# Patient Record
Sex: MALE | Race: WHITE | NOT HISPANIC OR LATINO | Employment: OTHER | URBAN - METROPOLITAN AREA
[De-identification: names, ages, dates, MRNs, and addresses within clinical notes are randomized per-mention and may not be internally consistent; named-entity substitution may affect disease eponyms.]

---

## 2017-01-01 ENCOUNTER — RESULTS ENCOUNTER (OUTPATIENT)
Dept: FAMILY MEDICINE CLINIC | Facility: CLINIC | Age: 67
End: 2017-01-01

## 2017-01-01 DIAGNOSIS — I25.10 CORONARY ARTERY DISEASE INVOLVING NATIVE CORONARY ARTERY OF NATIVE HEART WITHOUT ANGINA PECTORIS: ICD-10-CM

## 2017-01-01 DIAGNOSIS — I11.9 HYPERTENSIVE HEART DISEASE WITHOUT HEART FAILURE: ICD-10-CM

## 2017-01-03 RX ORDER — IPRATROPIUM BROMIDE 17 UG/1
AEROSOL, METERED RESPIRATORY (INHALATION)
Qty: 12.9 INHALER | Refills: 3 | Status: SHIPPED | OUTPATIENT
Start: 2017-01-03 | End: 2017-05-01 | Stop reason: SDUPTHER

## 2017-01-12 ENCOUNTER — HOSPITAL ENCOUNTER (OUTPATIENT)
Dept: CARDIOLOGY | Facility: HOSPITAL | Age: 67
Setting detail: RECURRING SERIES
Discharge: HOME | End: 2017-01-12

## 2017-01-12 PROCEDURE — 36416 COLLJ CAPILLARY BLOOD SPEC: CPT | Performed by: INTERNAL MEDICINE

## 2017-01-12 PROCEDURE — 85610 PROTHROMBIN TIME: CPT | Performed by: INTERNAL MEDICINE

## 2017-01-24 RX ORDER — POTASSIUM CHLORIDE 20 MEQ/1
20 TABLET, EXTENDED RELEASE ORAL 2 TIMES DAILY
Qty: 60 TABLET | Refills: 8 | Status: SHIPPED | OUTPATIENT
Start: 2017-01-24 | End: 2017-08-17

## 2017-01-31 ENCOUNTER — OFFICE VISIT (OUTPATIENT)
Dept: FAMILY MEDICINE CLINIC | Facility: CLINIC | Age: 67
End: 2017-01-31

## 2017-01-31 VITALS
DIASTOLIC BLOOD PRESSURE: 86 MMHG | SYSTOLIC BLOOD PRESSURE: 134 MMHG | OXYGEN SATURATION: 94 % | WEIGHT: 315 LBS | HEIGHT: 76 IN | HEART RATE: 86 BPM | BODY MASS INDEX: 38.36 KG/M2

## 2017-01-31 DIAGNOSIS — I48.0 PAROXYSMAL ATRIAL FIBRILLATION (HCC): ICD-10-CM

## 2017-01-31 DIAGNOSIS — I11.9 HYPERTENSIVE HEART DISEASE WITHOUT HEART FAILURE: Primary | ICD-10-CM

## 2017-01-31 DIAGNOSIS — I25.10 ATHEROSCLEROSIS OF NATIVE CORONARY ARTERY OF NATIVE HEART WITHOUT ANGINA PECTORIS: ICD-10-CM

## 2017-01-31 DIAGNOSIS — L89.311: ICD-10-CM

## 2017-01-31 PROCEDURE — 99214 OFFICE O/P EST MOD 30 MIN: CPT | Performed by: INTERNAL MEDICINE

## 2017-01-31 RX ORDER — MUPIROCIN CALCIUM 20 MG/G
CREAM TOPICAL 3 TIMES DAILY
Qty: 30 G | Refills: 0 | Status: SHIPPED | OUTPATIENT
Start: 2017-01-31 | End: 2018-01-25 | Stop reason: SDUPTHER

## 2017-01-31 RX ORDER — TRIAMCINOLONE ACETONIDE 1 MG/G
CREAM TOPICAL 2 TIMES DAILY
Qty: 45 G | Refills: 0 | Status: SHIPPED | OUTPATIENT
Start: 2017-01-31 | End: 2017-11-16

## 2017-02-02 LAB
ALBUMIN SERPL-MCNC: 4.3 G/DL (ref 3.6–4.8)
ALBUMIN/GLOB SERPL: 1.5 {RATIO} (ref 1.1–2.5)
ALP SERPL-CCNC: 82 IU/L (ref 39–117)
ALT SERPL-CCNC: 11 IU/L (ref 0–44)
APPEARANCE UR: CLEAR
AST SERPL-CCNC: 16 IU/L (ref 0–40)
BACTERIA #/AREA URNS HPF: ABNORMAL /[HPF]
BILIRUB SERPL-MCNC: 0.6 MG/DL (ref 0–1.2)
BILIRUB UR QL STRIP: NEGATIVE
BUN SERPL-MCNC: 10 MG/DL (ref 8–27)
BUN/CREAT SERPL: 12 (ref 10–22)
CALCIUM SERPL-MCNC: 9.4 MG/DL (ref 8.6–10.2)
CHLORIDE SERPL-SCNC: 98 MMOL/L (ref 96–106)
CHOLEST SERPL-MCNC: 170 MG/DL (ref 100–199)
CO2 SERPL-SCNC: 24 MMOL/L (ref 18–29)
COLOR UR: YELLOW
CREAT SERPL-MCNC: 0.85 MG/DL (ref 0.76–1.27)
EPI CELLS #/AREA URNS HPF: ABNORMAL /HPF
ERYTHROCYTE [DISTWIDTH] IN BLOOD BY AUTOMATED COUNT: 14.4 % (ref 12.3–15.4)
GLOBULIN SER CALC-MCNC: 2.8 G/DL (ref 1.5–4.5)
GLUCOSE SERPL-MCNC: 85 MG/DL (ref 65–99)
GLUCOSE UR QL: NEGATIVE
HCT VFR BLD AUTO: 44.3 % (ref 37.5–51)
HDLC SERPL-MCNC: 59 MG/DL
HGB BLD-MCNC: 15.4 G/DL (ref 12.6–17.7)
HGB UR QL STRIP: ABNORMAL
KETONES UR QL STRIP: NEGATIVE
LDLC SERPL CALC-MCNC: 90 MG/DL (ref 0–99)
LEUKOCYTE ESTERASE UR QL STRIP: NEGATIVE
MCH RBC QN AUTO: 33.1 PG (ref 26.6–33)
MCHC RBC AUTO-ENTMCNC: 34.8 G/DL (ref 31.5–35.7)
MCV RBC AUTO: 95 FL (ref 79–97)
MICRO URNS: ABNORMAL
MUCOUS THREADS URNS QL MICRO: PRESENT
NITRITE UR QL STRIP: NEGATIVE
PH UR STRIP: 7 [PH] (ref 5–7.5)
PLATELET # BLD AUTO: 175 X10E3/UL (ref 150–379)
POTASSIUM SERPL-SCNC: 4.4 MMOL/L (ref 3.5–5.2)
PROT SERPL-MCNC: 7.1 G/DL (ref 6–8.5)
PROT UR QL STRIP: NEGATIVE
RBC # BLD AUTO: 4.65 X10E6/UL (ref 4.14–5.8)
RBC #/AREA URNS HPF: ABNORMAL /HPF
SODIUM SERPL-SCNC: 140 MMOL/L (ref 134–144)
SP GR UR: 1.02 (ref 1–1.03)
TRIGL SERPL-MCNC: 106 MG/DL (ref 0–149)
UROBILINOGEN UR STRIP-MCNC: 1 MG/DL (ref 0.2–1)
VLDLC SERPL CALC-MCNC: 21 MG/DL (ref 5–40)
WBC # BLD AUTO: 6.2 X10E3/UL (ref 3.4–10.8)
WBC #/AREA URNS HPF: ABNORMAL /HPF

## 2017-02-03 RX ORDER — CARVEDILOL 12.5 MG/1
TABLET ORAL
Qty: 90 TABLET | Refills: 3 | Status: SHIPPED | OUTPATIENT
Start: 2017-02-03 | End: 2017-04-14 | Stop reason: SDUPTHER

## 2017-02-03 RX ORDER — PRAVASTATIN SODIUM 20 MG
TABLET ORAL
Qty: 90 TABLET | Refills: 3 | Status: SHIPPED | OUTPATIENT
Start: 2017-02-03 | End: 2018-01-28 | Stop reason: SDUPTHER

## 2017-02-03 RX ORDER — WARFARIN SODIUM 5 MG/1
TABLET ORAL
Qty: 135 TABLET | Refills: 0 | Status: SHIPPED | OUTPATIENT
Start: 2017-02-03 | End: 2017-05-03 | Stop reason: SDUPTHER

## 2017-02-06 RX ORDER — METOCLOPRAMIDE 10 MG/1
TABLET ORAL
Qty: 4 TABLET | Refills: 0 | Status: SHIPPED | OUTPATIENT
Start: 2017-02-06 | End: 2017-02-07 | Stop reason: SDUPTHER

## 2017-02-06 RX ORDER — METOCLOPRAMIDE 10 MG/1
TABLET ORAL
Qty: 120 TABLET | Refills: 6 | OUTPATIENT
Start: 2017-02-06

## 2017-02-06 NOTE — TELEPHONE ENCOUNTER
----- Message from Erin Portillo sent at 2/6/2017  3:43 PM EST -----  Regarding: PT CALLED - PENDING REFILL  Contact: [phone]  PT IS OUT OF REGLAN. HE DID ELEN O/V FOR TOMORROW. CAN WE CALL SCRIPT IN TODAY?

## 2017-02-07 ENCOUNTER — OFFICE VISIT (OUTPATIENT)
Dept: GASTROENTEROLOGY | Facility: CLINIC | Age: 67
End: 2017-02-07

## 2017-02-07 ENCOUNTER — OFFICE VISIT (OUTPATIENT)
Dept: FAMILY MEDICINE CLINIC | Facility: CLINIC | Age: 67
End: 2017-02-07

## 2017-02-07 VITALS
BODY MASS INDEX: 38.36 KG/M2 | SYSTOLIC BLOOD PRESSURE: 132 MMHG | WEIGHT: 315 LBS | HEIGHT: 76 IN | DIASTOLIC BLOOD PRESSURE: 80 MMHG

## 2017-02-07 VITALS
HEIGHT: 76 IN | OXYGEN SATURATION: 95 % | SYSTOLIC BLOOD PRESSURE: 152 MMHG | WEIGHT: 315 LBS | BODY MASS INDEX: 38.36 KG/M2 | HEART RATE: 72 BPM | DIASTOLIC BLOOD PRESSURE: 90 MMHG

## 2017-02-07 DIAGNOSIS — K63.5 COLON POLYPS: ICD-10-CM

## 2017-02-07 DIAGNOSIS — K31.84 GASTROPARESIS: Primary | ICD-10-CM

## 2017-02-07 DIAGNOSIS — G47.00 INSOMNIA, UNSPECIFIED TYPE: Primary | ICD-10-CM

## 2017-02-07 PROCEDURE — 99213 OFFICE O/P EST LOW 20 MIN: CPT | Performed by: NURSE PRACTITIONER

## 2017-02-07 PROCEDURE — 99213 OFFICE O/P EST LOW 20 MIN: CPT | Performed by: INTERNAL MEDICINE

## 2017-02-07 RX ORDER — METOCLOPRAMIDE 10 MG/1
TABLET ORAL
Qty: 120 TABLET | Refills: 5 | Status: SHIPPED | OUTPATIENT
Start: 2017-02-07 | End: 2017-11-01 | Stop reason: SDUPTHER

## 2017-02-07 RX ORDER — ALPRAZOLAM 0.5 MG/1
0.5 TABLET ORAL NIGHTLY PRN
Qty: 12 TABLET | Refills: 0 | Status: SHIPPED | OUTPATIENT
Start: 2017-02-07 | End: 2017-04-07 | Stop reason: SDUPTHER

## 2017-02-07 NOTE — PROGRESS NOTES
Subjective     Jalen Lockwood is a 66 y.o. male, followed by Dr. Lagunas for gastroparesis.  Treated with Reglan 10 mg before meals and bedtime.  Reports no side effects of Reglan, aware of side effects and to stop medication immediately should they occur.  Denies early satiety, belching, vomiting and nausea.  History of constipation in the past but has recently began using a stool softener, constipation has resolved.  Will have BM every day, occasionally every other day.  Denies bright red rectal bleeding, black/tarry stools and bloody stools.  Also denies dyspepsia, dysphagia and odynophagia.  Last colonoscopy,9/2015, revealed internal hemorrhoids, diverticulosis and polyps, hyperplastic and tubular adenomas.  Is to have surveillance colonoscopy in 9/2018.    History of Present Illness     Vitals:    02/07/17 1305   BP: 132/80         Current Outpatient Prescriptions:   •  ATROVENT HFA 17 MCG/ACT inhaler, INHALE 2 PUFFS 4 (FOUR) TIMES A DAY., Disp: 12.9 inhaler, Rfl: 3  •  carvedilol (COREG) 12.5 MG tablet, TAKE 1 TABLET BY MOUTH DAILY., Disp: 90 tablet, Rfl: 3  •  finasteride (PROSCAR) 5 MG tablet, Take 1 tablet by mouth daily., Disp: , Rfl:   •  HYDROcodone-acetaminophen (NORCO) 7.5-325 MG per tablet, Take 1 tablet by mouth 2 (two) times a day as needed., Disp: , Rfl:   •  lisinopril (PRINIVIL,ZESTRIL) 20 MG tablet, Take 1 tablet by mouth Daily., Disp: 90 tablet, Rfl: 2  •  metoclopramide (REGLAN) 10 MG tablet, Take 1 tab before meals and at bedtime., Disp: 120 tablet, Rfl: 5  •  mupirocin (BACTROBAN) 2 % cream, Apply  topically 3 (Three) Times a Day., Disp: 30 g, Rfl: 0  •  pravastatin (PRAVACHOL) 20 MG tablet, TAKE 1 TABLET BY MOUTH DAILY., Disp: 90 tablet, Rfl: 3  •  silodosin (RAPAFLO) 8 MG capsule capsule, Take 1 capsule by mouth daily. With food., Disp: , Rfl:   •  triamcinolone (KENALOG) 0.1 % cream, Apply  topically 2 (Two) Times a Day., Disp: 45 g, Rfl: 0  •  VENTOLIN  (90 BASE) MCG/ACT inhaler,  INHALE TWO PUFFS EVERY FOUR HOURS AS NEEDED FOR WHEEZING, Disp: 36 inhaler, Rfl: 1  •  warfarin (COUMADIN) 5 MG tablet, TAKE 1 AND 1/2 TABLETS BY MOUTH EVERY DAY OR USE AS DIRECTED, Disp: 135 tablet, Rfl: 0  •  furosemide (LASIX) 40 MG tablet, Take 1 tablet (40 mg total) by mouth daily., Disp: 90 tablet, Rfl: 3  •  potassium chloride (KLOR-CON) 20 MEQ CR tablet, Take 1 tablet by mouth 2 (Two) Times a Day., Disp: 60 tablet, Rfl: 8    Allergies   Allergen Reactions   • Cephalexin Hives   • Codeine Nausea Only       Past Medical History   Diagnosis Date   • Allergic rhinitis    • Aortectasia      3cm infrarenal abdominal aorta   • Arthritis    • Atrial flutter 2010     s/p ablation    • Charcot's joint of foot    • CHF (congestive heart failure)    • Chronic edema    • Chronic venous insufficiency    • COPD (chronic obstructive pulmonary disease)    • Coronary atherosclerosis      Cath 2010: diffuse 40-50% disease   • Diverticulosis    • Duodenitis    • Fatty liver    • Gastritis    • Gastroparesis    • Hematoma      post-operative; After catheterization, right groin, required surgical exploration   • Hypertensive heart disease without heart failure 7/28/2016   • Internal hemorrhoids    • Morbid obesity    • Osteomyelitis    • Paroxysmal atrial fibrillation    • Peripheral neuropathy    • Popliteal artery aneurysm      left, s/p stenting by Dr. Boston   • Skin cancer    • Sleep apnea    • Tachycardia induced cardiomyopathy      due to flutter and afib; cath 2010 with nonobstructive disease   • Venous stasis        Past Surgical History   Procedure Laterality Date   • Hip surgery Right    • Total knee arthroplasty Left    • Tonsillectomy     • Repair knee ligament       Primary repair of knee ligament cruciate anterior right   • Other surgical history       Catheter ablation atrial flutter   • Repair aneurysm / pseudo aneurysm / ruptured aneurysm popliteal artery       Stent-Graft of the the left popliteal artery   •  Colonoscopy  09/28/2015     NBIH, diverticulosis, polyps   • Upper gastrointestinal endoscopy  09/16/2014     acute gastritis, acute duodenitis       Family History   Problem Relation Age of Onset   • Emphysema Father        Social History     Social History   • Marital status:      Spouse name: N/A   • Number of children: N/A   • Years of education: N/A     Social History Main Topics   • Smoking status: Current Every Day Smoker     Packs/day: 0.25     Types: Cigarettes   • Smokeless tobacco: Never Used   • Alcohol use 4.2 oz/week     7 Standard drinks or equivalent per week      Comment: a couple drinks every day   • Drug use: No   • Sexual activity: Not Asked     Other Topics Concern   • None     Social History Narrative       The following portions of the patient's history were reviewed and updated as appropriate: allergies, current medications, past family history, past medical history, past social history, past surgical history and problem list.    Review of Systems   Constitutional: Negative for activity change, appetite change, chills, fever and unexpected weight change.   HENT: Negative for trouble swallowing.    Respiratory: Negative for cough and shortness of breath.    Cardiovascular: Positive for leg swelling. Negative for chest pain.        Goes to wound care center weekly for venous stasis   Gastrointestinal: Negative for abdominal distention, abdominal pain, anal bleeding, blood in stool, constipation, diarrhea, nausea, rectal pain and vomiting.   Genitourinary: Negative for dysuria.   Musculoskeletal: Positive for arthralgias, gait problem (ankle deformity) and joint swelling. Negative for myalgias.   Skin: Negative.    Neurological: Negative for dizziness, syncope and light-headedness.       Objective     Physical Exam   Constitutional: He is oriented to person, place, and time. He appears well-developed. No distress.   Grossly obese.   HENT:   Head: Normocephalic and atraumatic.   Eyes:  Conjunctivae are normal. No scleral icterus.   Cardiovascular: Normal rate, regular rhythm, normal heart sounds and intact distal pulses.    Lower extremities edematous, wraps on.   Pulmonary/Chest: Effort normal and breath sounds normal. No respiratory distress.   Abdominal: Soft. Bowel sounds are normal. He exhibits no mass. There is no tenderness. There is no rebound and no guarding.   Obese abdomen   Musculoskeletal: He exhibits deformity (bilateral ankles).   Neurological: He is alert and oriented to person, place, and time.   Skin: Skin is warm and dry. No pallor.   Psychiatric: He has a normal mood and affect. His behavior is normal. Judgment and thought content normal.   Nursing note and vitals reviewed.      Assessment/Plan     Jalen was seen today for follow-up.    Diagnoses and all orders for this visit:    Gastroparesis    Colon polyps    Other orders  -     metoclopramide (REGLAN) 10 MG tablet; Take 1 tab before meals and at bedtime.     assessment/plan  1.  Gastroparesis - refill Reglan.  Will stop Reglan and notify the office should side effects occur.  Follow-up in 6 months.  2.  Colon polyps - due surveillance colonoscopy in 9/2018.  Last colonoscopy, 9/2015, bowel prep reported as fair.  Dr. Lagunas recommends 1 gallon GoLYTELY for    Discussed plan of care with patient. Verbalizes understanding and agrees with plan.  Advised to call office for any concerns or questions regarding today's visit and if any GI problems should arise. next colonoscopy preparation.

## 2017-02-07 NOTE — PROGRESS NOTES
Subjective   Jalen Lockwood is a 66 y.o. male who presents today for:    Insomnia (is taking wife's xanax when have trouble sleeping)    History of Present Illness   Insomnia has been a major issue for many years., with no trouble falling asleep but trouble falling back to sleep when he awakens.    He goes to sleep around 0100, sleeping in a chair due to chronic foot pain.  He awakens around 0530 to urinate and usually has trouble falling back to sleep.  He usually awakens around 0900 - 1000 most mornings.    He has been taking his wife's Xanax on the order of 5-6 times the past 2-3 months.  He has had trouble falling back to sleep after dozing off earlier and awakening earlier than usual.  He denies any hangover effect from it.    He has BECKI but has not tolerated the CPAP device.  He wears O2 while sleeping now.    Mr. Lockwood  reports that he has been smoking Cigarettes.  He has been smoking about 0.25 packs per day. He has never used smokeless tobacco. He reports that he drinks about 4.2 oz of alcohol per week  He reports that he does not use illicit drugs. He usually has a couple of glasses of wine with dinner and a highball later that night.        Current Outpatient Prescriptions:   •  ATROVENT HFA 17 MCG/ACT inhaler, INHALE 2 PUFFS 4 (FOUR) TIMES A DAY., Disp: 12.9 inhaler, Rfl: 3  •  carvedilol (COREG) 12.5 MG tablet, TAKE 1 TABLET BY MOUTH DAILY., Disp: 90 tablet, Rfl: 3  •  finasteride (PROSCAR) 5 MG tablet, Take 1 tablet by mouth daily., Disp: , Rfl:   •  HYDROcodone-acetaminophen (NORCO) 7.5-325 MG per tablet, Take 1 tablet by mouth 2 (two) times a day as needed., Disp: , Rfl:   •  lisinopril (PRINIVIL,ZESTRIL) 20 MG tablet, Take 1 tablet by mouth Daily., Disp: 90 tablet, Rfl: 2  •  metoclopramide (REGLAN) 10 MG tablet, Take 1 tab before meals and at bedtime., Disp: 120 tablet, Rfl: 5  •  mupirocin (BACTROBAN) 2 % cream, Apply  topically 3 (Three) Times a Day., Disp: 30 g, Rfl: 0  •  potassium chloride  "(KLOR-CON) 20 MEQ CR tablet, Take 1 tablet by mouth 2 (Two) Times a Day. (Patient taking differently: Take 20 mEq by mouth 2 (Two) Times a Day As Needed.), Disp: 60 tablet, Rfl: 8  •  pravastatin (PRAVACHOL) 20 MG tablet, TAKE 1 TABLET BY MOUTH DAILY., Disp: 90 tablet, Rfl: 3  •  silodosin (RAPAFLO) 8 MG capsule capsule, Take 1 capsule by mouth daily. With food., Disp: , Rfl:   •  triamcinolone (KENALOG) 0.1 % cream, Apply  topically 2 (Two) Times a Day., Disp: 45 g, Rfl: 0  •  VENTOLIN  (90 BASE) MCG/ACT inhaler, INHALE TWO PUFFS EVERY FOUR HOURS AS NEEDED FOR WHEEZING, Disp: 36 inhaler, Rfl: 1  •  warfarin (COUMADIN) 5 MG tablet, TAKE 1 AND 1/2 TABLETS BY MOUTH EVERY DAY OR USE AS DIRECTED, Disp: 135 tablet, Rfl: 0  •  furosemide (LASIX) 40 MG tablet, Take 1 tablet (40 mg total) by mouth daily. (Patient taking differently: Take 40 mg by mouth Daily As Needed.), Disp: 90 tablet, Rfl: 3      The following portions of the patient's history were reviewed and updated as appropriate: allergies, current medications, past social history and problem list.    Review of Systems   Respiratory: Positive for shortness of breath.    Cardiovascular: Positive for leg swelling.   Musculoskeletal: Positive for arthralgias and back pain.   Psychiatric/Behavioral: Positive for sleep disturbance. Negative for dysphoric mood.         Objective   Vitals:    02/07/17 1542   BP: 152/90   BP Location: Right arm   Patient Position: Sitting   Cuff Size: Large Adult   Pulse: 72   SpO2: 95%   Weight: (!) 332 lb (151 kg)   Height: 76\" (193 cm)     Physical Exam  Obese, smells of smoke.  In good spirits.  Alert and oriented ×4.  Affect is appropriate.  Insight and judgment appear intact.  Gait is slow but balance is normal.    Assessment/Plan   Jalen was seen today for insomnia.    Diagnoses and all orders for this visit:    Insomnia, unspecified type  -     ALPRAZolam (XANAX) 0.5 MG tablet; Take 1 tablet by mouth At Night As Needed for " sleep.    GEOFFREY report was obtained and reviewed during the course of today's office visit.   A CSA was signed today, also, but a UDT was not obtained.  He has them done regularly through his pain specialist, so we will obtain his most recent one.  He was instructed to inform his pain specialist of his use of Xanax for sleep.  He was also instructed on the importance of using his Oxygen every night due to his BECKI.  We will give him a limited quantity for use initially, and see how often he uses them.  Our goal, based on his reported need today, is for about once weekly use.  He was counseled regarding the risk of dependence with continued, regular use.

## 2017-02-09 ENCOUNTER — HOSPITAL ENCOUNTER (OUTPATIENT)
Dept: CARDIOLOGY | Facility: HOSPITAL | Age: 67
Setting detail: RECURRING SERIES
Discharge: HOME | End: 2017-02-09

## 2017-02-09 PROCEDURE — 36416 COLLJ CAPILLARY BLOOD SPEC: CPT

## 2017-02-09 PROCEDURE — 85610 PROTHROMBIN TIME: CPT

## 2017-02-28 ENCOUNTER — HOSPITAL ENCOUNTER (OUTPATIENT)
Dept: CARDIOLOGY | Facility: HOSPITAL | Age: 67
Setting detail: RECURRING SERIES
Discharge: HOME | End: 2017-02-28

## 2017-02-28 PROCEDURE — 36416 COLLJ CAPILLARY BLOOD SPEC: CPT

## 2017-02-28 PROCEDURE — 85610 PROTHROMBIN TIME: CPT

## 2017-03-07 ENCOUNTER — HOSPITAL ENCOUNTER (OUTPATIENT)
Dept: CARDIOLOGY | Facility: HOSPITAL | Age: 67
Setting detail: RECURRING SERIES
Discharge: HOME | End: 2017-03-07

## 2017-03-07 PROCEDURE — 85610 PROTHROMBIN TIME: CPT

## 2017-03-07 PROCEDURE — 36416 COLLJ CAPILLARY BLOOD SPEC: CPT

## 2017-03-14 ENCOUNTER — HOSPITAL ENCOUNTER (OUTPATIENT)
Dept: CARDIOLOGY | Facility: HOSPITAL | Age: 67
Setting detail: RECURRING SERIES
Discharge: HOME | End: 2017-03-14

## 2017-03-14 PROCEDURE — 36416 COLLJ CAPILLARY BLOOD SPEC: CPT

## 2017-03-14 PROCEDURE — 85610 PROTHROMBIN TIME: CPT

## 2017-03-23 ENCOUNTER — HOSPITAL ENCOUNTER (OUTPATIENT)
Dept: CARDIOLOGY | Facility: HOSPITAL | Age: 67
Setting detail: RECURRING SERIES
Discharge: HOME | End: 2017-03-23

## 2017-03-23 PROCEDURE — 36416 COLLJ CAPILLARY BLOOD SPEC: CPT

## 2017-03-23 PROCEDURE — 85610 PROTHROMBIN TIME: CPT

## 2017-04-07 ENCOUNTER — OFFICE VISIT (OUTPATIENT)
Dept: FAMILY MEDICINE CLINIC | Facility: CLINIC | Age: 67
End: 2017-04-07

## 2017-04-07 VITALS
HEART RATE: 72 BPM | BODY MASS INDEX: 38.36 KG/M2 | WEIGHT: 315 LBS | DIASTOLIC BLOOD PRESSURE: 94 MMHG | HEIGHT: 76 IN | OXYGEN SATURATION: 93 % | SYSTOLIC BLOOD PRESSURE: 168 MMHG

## 2017-04-07 DIAGNOSIS — G47.00 INSOMNIA, UNSPECIFIED TYPE: ICD-10-CM

## 2017-04-07 DIAGNOSIS — I11.9 HYPERTENSIVE HEART DISEASE WITHOUT HEART FAILURE: Primary | ICD-10-CM

## 2017-04-07 PROCEDURE — 99213 OFFICE O/P EST LOW 20 MIN: CPT | Performed by: INTERNAL MEDICINE

## 2017-04-07 RX ORDER — ALBUTEROL SULFATE 90 UG/1
AEROSOL, METERED RESPIRATORY (INHALATION)
Qty: 36 INHALER | Refills: 1 | Status: SHIPPED | OUTPATIENT
Start: 2017-04-07 | End: 2017-07-16 | Stop reason: SDUPTHER

## 2017-04-07 RX ORDER — ALPRAZOLAM 0.5 MG/1
0.5 TABLET ORAL NIGHTLY PRN
Qty: 30 TABLET | Refills: 0 | Status: SHIPPED | OUTPATIENT
Start: 2017-04-07 | End: 2017-08-17 | Stop reason: SDUPTHER

## 2017-04-07 NOTE — PROGRESS NOTES
Subjective   Jalen Lockwood is a 67 y.o. male who presents today for:    Insomnia (CSE - xanax refill)    History of Present Illness   Insomnia has been a major issue for many years., with no trouble falling asleep but trouble falling back to sleep when he awakens.     He goes to sleep around 0100, sleeping in a chair due to chronic foot pain. He awakens around 0530 to urinate and usually has trouble falling back to sleep. He usually awakens around 0900 - 1000 most mornings.     He has been taking his wife's Xanax on the order of 5-6 times the past 2-3 months. He has had trouble falling back to sleep after dozing off earlier and awakening earlier than usual. He denies any hangover effect from it.     He has BECKI but has not tolerated the CPAP device. He wears O2 while sleeping now.    Mr. Lockwood  reports that he has been smoking Cigarettes.  He has been smoking about 0.25 packs per day. He has never used smokeless tobacco. He reports that he drinks about 4.2 oz of alcohol per week  He reports that he does not use illicit drugs.         Current Outpatient Prescriptions:   •  ALPRAZolam (XANAX) 0.5 MG tablet, Take 1 tablet by mouth At Night As Needed for sleep., Disp: 12 tablet, Rfl: 0  •  ATROVENT HFA 17 MCG/ACT inhaler, INHALE 2 PUFFS 4 (FOUR) TIMES A DAY., Disp: 12.9 inhaler, Rfl: 3  •  carvedilol (COREG) 12.5 MG tablet, TAKE 1 TABLET BY MOUTH DAILY., Disp: 90 tablet, Rfl: 3  •  finasteride (PROSCAR) 5 MG tablet, Take 1 tablet by mouth daily., Disp: , Rfl:   •  furosemide (LASIX) 40 MG tablet, Take 1 tablet (40 mg total) by mouth daily. (Patient taking differently: Take 40 mg by mouth Daily As Needed.), Disp: 90 tablet, Rfl: 3  •  HYDROcodone-acetaminophen (NORCO) 7.5-325 MG per tablet, Take 1 tablet by mouth 2 (two) times a day as needed., Disp: , Rfl:   •  lisinopril (PRINIVIL,ZESTRIL) 20 MG tablet, Take 1 tablet by mouth Daily., Disp: 90 tablet, Rfl: 2  •  metoclopramide (REGLAN) 10 MG tablet, Take 1 tab before  "meals and at bedtime., Disp: 120 tablet, Rfl: 5  •  potassium chloride (KLOR-CON) 20 MEQ CR tablet, Take 1 tablet by mouth 2 (Two) Times a Day. (Patient taking differently: Take 20 mEq by mouth 2 (Two) Times a Day As Needed.), Disp: 60 tablet, Rfl: 8  •  pravastatin (PRAVACHOL) 20 MG tablet, TAKE 1 TABLET BY MOUTH DAILY., Disp: 90 tablet, Rfl: 3  •  silodosin (RAPAFLO) 8 MG capsule capsule, Take 1 capsule by mouth daily. With food., Disp: , Rfl:   •  VENTOLIN  (90 BASE) MCG/ACT inhaler, INHALE TWO PUFFS EVERY FOUR HOURS AS NEEDED FOR WHEEZING, Disp: 36 inhaler, Rfl: 1  •  warfarin (COUMADIN) 5 MG tablet, TAKE 1 AND 1/2 TABLETS BY MOUTH EVERY DAY OR USE AS DIRECTED, Disp: 135 tablet, Rfl: 0  •  mupirocin (BACTROBAN) 2 % cream, Apply  topically 3 (Three) Times a Day., Disp: 30 g, Rfl: 0  •  triamcinolone (KENALOG) 0.1 % cream, Apply  topically 2 (Two) Times a Day., Disp: 45 g, Rfl: 0      The following portions of the patient's history were reviewed and updated as appropriate: allergies, current medications, past social history and problem list.    Review of Systems   Constitutional: Positive for fatigue. Negative for chills, fever and unexpected weight change.   Eyes: Positive for visual disturbance.   Respiratory: Positive for cough and shortness of breath.    Cardiovascular: Positive for leg swelling. Negative for chest pain and palpitations.   Psychiatric/Behavioral: Positive for sleep disturbance. Negative for dysphoric mood. The patient is nervous/anxious.          Objective   Vitals:    04/07/17 1628   BP: 168/94   BP Location: Left arm   Patient Position: Sitting   Cuff Size: Large Adult   Pulse: 72   SpO2: 93%   Weight: (!) 324 lb 9.6 oz (147 kg)   Height: 76\" (193 cm)     Physical Exam  Constitutional: He is oriented to person, place, and time. He appears well-developed. Obese  Eyes: Conjunctivae are normal. No scleral icterus.   Neck: No thyromegaly present.   Cardiovascular: Normal rate and regular " rhythm.  Exam reveals distant heart sounds.    No murmur heard.  Both lower legs are edematous but wrapped.   Abdominal: Soft. Bowel sounds are normal. He exhibits no abdominal bruit.   Lymphadenopathy:     He has no cervical adenopathy.   Neurological: He is alert and oriented to person, place, and time.     Assessment/Plan   Jalen was seen today for insomnia.    Diagnoses and all orders for this visit:    Hypertensive heart disease without heart failure    Insomnia, unspecified type  -     ALPRAZolam (XANAX) 0.5 MG tablet; Take 1 tablet by mouth At Night As Needed for Sleep.    Blood pressure is markedly elevated today.  Unfortunately, he does not know why he is only taking Coreg once daily.  Usually this is dosed twice a day.  We will ask him to increase it to twice a day if it is okay with his cardiologist.  (A message was sent today.)  We will then reassess his blood pressure at his follow-up in approximately 3 months.  BECKI treatment would also help this, but he cannot tolerate a CPAP mask and is reluctant to try nasal pillows or any other method.    GEOFFREY report was obtained and reviewed during the course of today's office visit.  Refill for Xanax was given to patient, expecting he will take it no more than 3 nights per week.  I have asked him to try to limit it so that he is not taking it more days than not.  He has helped, and has not caused any side effects.

## 2017-04-15 RX ORDER — CARVEDILOL 12.5 MG/1
12.5 TABLET ORAL 2 TIMES DAILY WITH MEALS
Qty: 180 TABLET | Refills: 1 | Status: SHIPPED | OUTPATIENT
Start: 2017-04-15 | End: 2017-10-16 | Stop reason: SDUPTHER

## 2017-04-20 ENCOUNTER — HOSPITAL ENCOUNTER (OUTPATIENT)
Dept: CARDIOLOGY | Facility: HOSPITAL | Age: 67
Setting detail: RECURRING SERIES
Discharge: HOME | End: 2017-04-20

## 2017-04-20 PROCEDURE — 36416 COLLJ CAPILLARY BLOOD SPEC: CPT

## 2017-04-20 PROCEDURE — 85610 PROTHROMBIN TIME: CPT

## 2017-05-03 RX ORDER — WARFARIN SODIUM 5 MG/1
TABLET ORAL
Qty: 135 TABLET | Refills: 0 | Status: SHIPPED | OUTPATIENT
Start: 2017-05-03 | End: 2017-08-14 | Stop reason: SDUPTHER

## 2017-05-03 RX ORDER — IPRATROPIUM BROMIDE 17 UG/1
AEROSOL, METERED RESPIRATORY (INHALATION)
Qty: 12.9 INHALER | Refills: 3 | Status: SHIPPED | OUTPATIENT
Start: 2017-05-03 | End: 2017-08-10 | Stop reason: SDUPTHER

## 2017-05-18 ENCOUNTER — HOSPITAL ENCOUNTER (OUTPATIENT)
Dept: CARDIOLOGY | Facility: HOSPITAL | Age: 67
Setting detail: RECURRING SERIES
Discharge: HOME | End: 2017-05-18

## 2017-05-18 PROCEDURE — 36416 COLLJ CAPILLARY BLOOD SPEC: CPT

## 2017-05-18 PROCEDURE — 85610 PROTHROMBIN TIME: CPT

## 2017-06-16 ENCOUNTER — HOSPITAL ENCOUNTER (OUTPATIENT)
Dept: CARDIOLOGY | Facility: HOSPITAL | Age: 67
Setting detail: RECURRING SERIES
Discharge: HOME | End: 2017-06-16

## 2017-06-16 PROCEDURE — 85610 PROTHROMBIN TIME: CPT

## 2017-06-16 PROCEDURE — 36416 COLLJ CAPILLARY BLOOD SPEC: CPT

## 2017-06-29 ENCOUNTER — HOSPITAL ENCOUNTER (OUTPATIENT)
Dept: CARDIOLOGY | Facility: HOSPITAL | Age: 67
Setting detail: RECURRING SERIES
Discharge: HOME | End: 2017-06-29

## 2017-06-29 PROCEDURE — 85610 PROTHROMBIN TIME: CPT

## 2017-06-29 PROCEDURE — 36416 COLLJ CAPILLARY BLOOD SPEC: CPT

## 2017-07-06 ENCOUNTER — HOSPITAL ENCOUNTER (OUTPATIENT)
Dept: CARDIOLOGY | Facility: HOSPITAL | Age: 67
Setting detail: RECURRING SERIES
Discharge: HOME | End: 2017-07-06

## 2017-07-06 PROCEDURE — 36416 COLLJ CAPILLARY BLOOD SPEC: CPT

## 2017-07-06 PROCEDURE — 85610 PROTHROMBIN TIME: CPT

## 2017-07-17 RX ORDER — ALBUTEROL SULFATE 90 UG/1
AEROSOL, METERED RESPIRATORY (INHALATION)
Qty: 36 INHALER | Refills: 1 | Status: SHIPPED | OUTPATIENT
Start: 2017-07-17 | End: 2017-11-01 | Stop reason: SDUPTHER

## 2017-07-21 ENCOUNTER — HOSPITAL ENCOUNTER (OUTPATIENT)
Dept: CARDIOLOGY | Facility: HOSPITAL | Age: 67
Setting detail: RECURRING SERIES
Discharge: HOME | End: 2017-07-21

## 2017-07-21 PROCEDURE — 85610 PROTHROMBIN TIME: CPT

## 2017-07-21 PROCEDURE — 36416 COLLJ CAPILLARY BLOOD SPEC: CPT

## 2017-08-11 RX ORDER — IPRATROPIUM BROMIDE 17 UG/1
AEROSOL, METERED RESPIRATORY (INHALATION)
Qty: 12.9 INHALER | Refills: 3 | Status: SHIPPED | OUTPATIENT
Start: 2017-08-11 | End: 2017-11-26 | Stop reason: SDUPTHER

## 2017-08-14 RX ORDER — WARFARIN SODIUM 5 MG/1
TABLET ORAL
Qty: 135 TABLET | Refills: 1 | Status: SHIPPED | OUTPATIENT
Start: 2017-08-14 | End: 2018-02-09 | Stop reason: SDUPTHER

## 2017-08-17 ENCOUNTER — OFFICE VISIT (OUTPATIENT)
Dept: FAMILY MEDICINE CLINIC | Facility: CLINIC | Age: 67
End: 2017-08-17

## 2017-08-17 VITALS
HEART RATE: 76 BPM | OXYGEN SATURATION: 92 % | DIASTOLIC BLOOD PRESSURE: 92 MMHG | HEIGHT: 76 IN | SYSTOLIC BLOOD PRESSURE: 154 MMHG | WEIGHT: 314.7 LBS | BODY MASS INDEX: 38.32 KG/M2

## 2017-08-17 DIAGNOSIS — G47.00 INSOMNIA, UNSPECIFIED TYPE: Primary | ICD-10-CM

## 2017-08-17 DIAGNOSIS — I11.9 HYPERTENSIVE HEART DISEASE WITHOUT HEART FAILURE: ICD-10-CM

## 2017-08-17 PROCEDURE — 99213 OFFICE O/P EST LOW 20 MIN: CPT | Performed by: INTERNAL MEDICINE

## 2017-08-17 RX ORDER — ALPRAZOLAM 0.5 MG/1
0.5 TABLET ORAL NIGHTLY PRN
Qty: 30 TABLET | Refills: 1 | Status: SHIPPED | OUTPATIENT
Start: 2017-08-17 | End: 2017-11-16 | Stop reason: SDUPTHER

## 2017-08-17 NOTE — PROGRESS NOTES
"Subjective   Jalen Lockwood is a 67 y.o. male who presents today for:    Insomnia (CSE)    History of Present Illness   Insomnia has been a major issue for many years., with no trouble falling asleep but trouble falling back to sleep when he awakens.      He goes to sleep around 0100, sleeping in a chair due to chronic foot pain. He awakens around 0530 to urinate and usually has trouble falling back to sleep. He usually awakens around 0900 - 1000 most mornings.  He awakens \"feeling good\" when he takes them and sleeps well.  He denies excess sedation with it.    He has BECKI but has not tolerated the CPAP device. He wears O2 while sleeping now.      Mr. Lockwood  reports that he has been smoking Cigarettes.  He has been smoking about 0.25 packs per day. He has never used smokeless tobacco. He reports that he drinks about 4.2 oz of alcohol per week  He reports that he does not use illicit drugs.     Allergies   Allergen Reactions   • Cephalexin Hives   • Codeine Nausea Only       Current Outpatient Prescriptions:   •  ALPRAZolam (XANAX) 0.5 MG tablet, Take 1 tablet by mouth At Night As Needed for Sleep., Disp: 30 tablet, Rfl: 0  •  ATROVENT HFA 17 MCG/ACT inhaler, INHALE 2 PUFFS 4 (FOUR) TIMES A DAY., Disp: 12.9 inhaler, Rfl: 3  •  carvedilol (COREG) 12.5 MG tablet, Take 1 tablet by mouth 2 (Two) Times a Day With Meals., Disp: 180 tablet, Rfl: 1  •  finasteride (PROSCAR) 5 MG tablet, Take 1 tablet by mouth daily., Disp: , Rfl:   •  HYDROcodone-acetaminophen (NORCO) 7.5-325 MG per tablet, Take 1 tablet by mouth 2 (two) times a day as needed., Disp: , Rfl:   •  lisinopril (PRINIVIL,ZESTRIL) 20 MG tablet, Take 1 tablet by mouth Daily., Disp: 90 tablet, Rfl: 2  •  metoclopramide (REGLAN) 10 MG tablet, Take 1 tab before meals and at bedtime., Disp: 120 tablet, Rfl: 5  •  mupirocin (BACTROBAN) 2 % cream, Apply  topically 3 (Three) Times a Day., Disp: 30 g, Rfl: 0  •  pravastatin (PRAVACHOL) 20 MG tablet, TAKE 1 TABLET BY MOUTH " "DAILY., Disp: 90 tablet, Rfl: 3  •  silodosin (RAPAFLO) 8 MG capsule capsule, Take 1 capsule by mouth daily. With food., Disp: , Rfl:   •  triamcinolone (KENALOG) 0.1 % cream, Apply  topically 2 (Two) Times a Day., Disp: 45 g, Rfl: 0  •  VENTOLIN  (90 BASE) MCG/ACT inhaler, INHALE TWO PUFFS EVERY FOUR HOURS AS NEEDED FOR WHEEZING, Disp: 36 inhaler, Rfl: 1  •  warfarin (COUMADIN) 5 MG tablet, TAKE 1 AND 1/2 TABLETS BY MOUTH EVERY DAY OR USE AS DIRECTED, Disp: 135 tablet, Rfl: 1      Review of Systems   Constitutional: Positive for fatigue. Negative for chills, fever and unexpected weight change.   Eyes: Positive for visual disturbance.   Respiratory: Positive for cough and shortness of breath.    Cardiovascular: Positive for leg swelling. Negative for chest pain and palpitations.   Psychiatric/Behavioral: Positive for sleep disturbance. Negative for dysphoric mood. The patient is nervous/anxious.        Objective   Vitals:    08/17/17 1537   BP: 154/92   BP Location: Right arm   Patient Position: Sitting   Cuff Size: Large Adult   Pulse: 76   SpO2: 92%   Weight: (!) 314 lb 11.2 oz (143 kg)   Height: 76\" (193 cm)     Physical Exam  Obese, in NAD.  RRR.  Bilateral LE edema.  Mood is frustrated by lack of sleep; affect is appropriate.      Assessment/Plan   Jalen was seen today for insomnia.    Diagnoses and all orders for this visit:    Insomnia, unspecified type  -     ALPRAZolam (XANAX) 0.5 MG tablet; Take 1 tablet by mouth At Night As Needed for Sleep.    Hypertensive heart disease without heart failure  -     Comprehensive Metabolic Panel; Future  -     CBC (No Diff); Future    GEOFFREY report was obtained and reviewed.  Continue Xanax at night as it continues to give him good benefit without side-effect.    RTO in 3 months with labs prior.  Recheck BP at that time.  He remains reluctant to resume CPAP.  "

## 2017-08-31 ENCOUNTER — HOSPITAL ENCOUNTER (OUTPATIENT)
Dept: CARDIOLOGY | Facility: HOSPITAL | Age: 67
Setting detail: RECURRING SERIES
Discharge: HOME | End: 2017-08-31

## 2017-08-31 PROCEDURE — 36416 COLLJ CAPILLARY BLOOD SPEC: CPT

## 2017-08-31 PROCEDURE — 85610 PROTHROMBIN TIME: CPT

## 2017-09-06 RX ORDER — LISINOPRIL 20 MG/1
TABLET ORAL
Qty: 30 TABLET | Refills: 4 | Status: SHIPPED | OUTPATIENT
Start: 2017-09-06 | End: 2017-11-01 | Stop reason: SDUPTHER

## 2017-09-12 ENCOUNTER — HOSPITAL ENCOUNTER (OUTPATIENT)
Dept: CARDIOLOGY | Facility: HOSPITAL | Age: 67
Setting detail: RECURRING SERIES
Discharge: HOME | End: 2017-09-12

## 2017-09-12 PROCEDURE — 85610 PROTHROMBIN TIME: CPT

## 2017-09-12 PROCEDURE — 36416 COLLJ CAPILLARY BLOOD SPEC: CPT

## 2017-09-29 ENCOUNTER — HOSPITAL ENCOUNTER (OUTPATIENT)
Dept: CARDIOLOGY | Facility: HOSPITAL | Age: 67
Setting detail: RECURRING SERIES
Discharge: HOME | End: 2017-09-29

## 2017-09-29 PROCEDURE — 36416 COLLJ CAPILLARY BLOOD SPEC: CPT

## 2017-09-29 PROCEDURE — 85610 PROTHROMBIN TIME: CPT

## 2017-10-16 RX ORDER — CARVEDILOL 12.5 MG/1
TABLET ORAL
Qty: 180 TABLET | Refills: 1 | Status: SHIPPED | OUTPATIENT
Start: 2017-10-16 | End: 2018-04-20 | Stop reason: SDUPTHER

## 2017-10-26 ENCOUNTER — HOSPITAL ENCOUNTER (OUTPATIENT)
Dept: CARDIOLOGY | Facility: HOSPITAL | Age: 67
Setting detail: RECURRING SERIES
Discharge: HOME | End: 2017-10-26

## 2017-10-26 ENCOUNTER — TELEPHONE (OUTPATIENT)
Dept: GASTROENTEROLOGY | Facility: CLINIC | Age: 67
End: 2017-10-26

## 2017-10-26 PROCEDURE — 85610 PROTHROMBIN TIME: CPT

## 2017-10-26 PROCEDURE — 36416 COLLJ CAPILLARY BLOOD SPEC: CPT

## 2017-10-26 RX ORDER — METOCLOPRAMIDE 10 MG/1
10 TABLET ORAL
Qty: 120 TABLET | Refills: 3 | Status: SHIPPED | OUTPATIENT
Start: 2017-10-26 | End: 2018-06-14 | Stop reason: SDUPTHER

## 2017-10-26 NOTE — TELEPHONE ENCOUNTER
----- Message from Rhonda Haddad sent at 10/26/2017 11:11 AM EDT -----  Regarding: PHARM CALLED FOR REFILL REQUEST   Contact: [phone]  PHARM CALLED FOR REFILL REQUEST ON MEDICATION (REGLAN) 10 MG tablet ???

## 2017-10-30 ENCOUNTER — RESULTS ENCOUNTER (OUTPATIENT)
Dept: FAMILY MEDICINE CLINIC | Facility: CLINIC | Age: 67
End: 2017-10-30

## 2017-10-30 DIAGNOSIS — I11.9 HYPERTENSIVE HEART DISEASE WITHOUT HEART FAILURE: ICD-10-CM

## 2017-11-01 ENCOUNTER — OFFICE VISIT (OUTPATIENT)
Dept: CARDIOLOGY | Facility: CLINIC | Age: 67
End: 2017-11-01

## 2017-11-01 VITALS
BODY MASS INDEX: 38.36 KG/M2 | HEIGHT: 76 IN | WEIGHT: 315 LBS | HEART RATE: 62 BPM | DIASTOLIC BLOOD PRESSURE: 82 MMHG | SYSTOLIC BLOOD PRESSURE: 168 MMHG

## 2017-11-01 DIAGNOSIS — R00.0 TACHYCARDIA INDUCED CARDIOMYOPATHY (HCC): ICD-10-CM

## 2017-11-01 DIAGNOSIS — I72.4 POPLITEAL ARTERY ANEURYSM (HCC): ICD-10-CM

## 2017-11-01 DIAGNOSIS — I25.10 NONOCCLUSIVE CORONARY ATHEROSCLEROSIS OF NATIVE CORONARY ARTERY: ICD-10-CM

## 2017-11-01 DIAGNOSIS — I10 ESSENTIAL HYPERTENSION: ICD-10-CM

## 2017-11-01 DIAGNOSIS — I48.3 TYPICAL ATRIAL FLUTTER (HCC): ICD-10-CM

## 2017-11-01 DIAGNOSIS — I77.819 AORTECTASIA (HCC): ICD-10-CM

## 2017-11-01 DIAGNOSIS — I87.8 VENOUS STASIS: ICD-10-CM

## 2017-11-01 DIAGNOSIS — I43 TACHYCARDIA INDUCED CARDIOMYOPATHY (HCC): ICD-10-CM

## 2017-11-01 DIAGNOSIS — I48.0 PAROXYSMAL ATRIAL FIBRILLATION (HCC): Primary | ICD-10-CM

## 2017-11-01 PROCEDURE — 93000 ELECTROCARDIOGRAM COMPLETE: CPT | Performed by: INTERNAL MEDICINE

## 2017-11-01 PROCEDURE — 99213 OFFICE O/P EST LOW 20 MIN: CPT | Performed by: INTERNAL MEDICINE

## 2017-11-01 RX ORDER — LISINOPRIL 20 MG/1
40 TABLET ORAL DAILY
Qty: 90 TABLET | Refills: 3 | Status: SHIPPED | OUTPATIENT
Start: 2017-11-01 | End: 2018-05-16 | Stop reason: SDUPTHER

## 2017-11-01 NOTE — PROGRESS NOTES
Date of Office Visit: 2017  Encounter Provider: Kurt Henry MD  Place of Service: [facility] CARDIOLOGY  Patient Name: Jalen Lockwood  :1950    Chief Complaint   Patient presents with   • Atrial Fibrillation     1 yr follow up   :     HPI: Jalen Lockwood is a 67 y.o. male who presents today for yearly follow up. He presented with rapid atrial flutter in . His ejection fraction was low due to tachycardia-mediated cardiomyopathy. His catheterization revealed moderate nonobstructive coronary disease. Medical therapy only was recommended and his ejection fraction normalized. It was also found that he was drinking quite heavily and we recommended that he cut back.  Shortly after that, he presented with rapid atrial fibrillation and his ejection fraction declined again to 35%, but once his atrial fibrillation resolved, his ejection fraction improved again.     He has chronic edema due to severe venous stasis. In 2016, he developed cellulitis and was treated at [facility]; a lower extremity ultrasound revealed a left popliteal artery aneurysm, which was stented by Dr. Boston.  This was complicated by a wound infection.  A non-contrasted CT of the abdomen/pelvis revealed aortectasia of the infrarenal abdominal aorta but no aneurysm elsewhere.      He denies chest pain, dyspnea, orthopnea, PND, palpitations, or syncope.  He still smokes.  He has not had bleeding from warfarin.  His SBP is usually 150 mm Hg when checked at wound care.      Past Medical History:   Diagnosis Date   • Allergic rhinitis    • Aortectasia     3cm infrarenal abdominal aorta   • Arthritis    • Atrial flutter     s/p ablation    • Charcot's joint of foot    • CHF (congestive heart failure)    • Chronic edema    • Chronic venous insufficiency    • COPD (chronic obstructive pulmonary disease)    • Coronary atherosclerosis     Cath 2010: diffuse 40-50% disease   • Diverticulosis    • Duodenitis    •  Fatty liver    • Gastritis    • Gastroparesis    • Hematoma     post-operative; After catheterization, right groin, required surgical exploration   • Hypertensive heart disease without heart failure 7/28/2016   • Internal hemorrhoids    • Morbid obesity    • Osteomyelitis    • Paroxysmal atrial fibrillation    • Peripheral neuropathy    • Popliteal artery aneurysm     left, s/p stenting by Dr. Boston   • Skin cancer    • Sleep apnea    • Tachycardia induced cardiomyopathy     due to flutter and afib; cath 2010 with nonobstructive disease   • Venous stasis        Past Surgical History:   Procedure Laterality Date   • COLONOSCOPY  09/28/2015    NBIH, diverticulosis, polyps   • HIP SURGERY Right    • OTHER SURGICAL HISTORY      Catheter ablation atrial flutter   • REPAIR ANEURYSM / PSEUDO ANEURYSM / RUPTURED ANEURYSM POPLITEAL ARTERY      Stent-Graft of the the left popliteal artery   • REPAIR KNEE LIGAMENT      Primary repair of knee ligament cruciate anterior right   • TONSILLECTOMY     • TOTAL KNEE ARTHROPLASTY Left    • UPPER GASTROINTESTINAL ENDOSCOPY  09/16/2014    acute gastritis, acute duodenitis       Social History     Social History   • Marital status:      Spouse name: N/A   • Number of children: N/A   • Years of education: N/A     Occupational History   • Not on file.     Social History Main Topics   • Smoking status: Current Every Day Smoker     Packs/day: 0.25     Types: Cigarettes   • Smokeless tobacco: Never Used      Comment: caffeine use: 3 cups of coffee in the morning/ Dt soft drinks in the evening.    • Alcohol use 4.2 oz/week     7 Standard drinks or equivalent per week      Comment: a couple drinks every day   • Drug use: No   • Sexual activity: Not on file     Other Topics Concern   • Not on file     Social History Narrative       Family History   Problem Relation Age of Onset   • Emphysema Father        Review of Systems   Cardiovascular: Positive for leg swelling.   All other systems  "reviewed and are negative.      Allergies   Allergen Reactions   • Cephalexin Hives   • Codeine Nausea Only         Current Outpatient Prescriptions:   •  ALPRAZolam (XANAX) 0.5 MG tablet, Take 1 tablet by mouth At Night As Needed for Sleep., Disp: 30 tablet, Rfl: 1  •  ATROVENT HFA 17 MCG/ACT inhaler, INHALE 2 PUFFS 4 (FOUR) TIMES A DAY., Disp: 12.9 inhaler, Rfl: 3  •  carvedilol (COREG) 12.5 MG tablet, TAKE 1 TABLET BY MOUTH 2 (TWO) TIMES A DAY WITH MEALS., Disp: 180 tablet, Rfl: 1  •  finasteride (PROSCAR) 5 MG tablet, Take 1 tablet by mouth daily., Disp: , Rfl:   •  HYDROcodone-acetaminophen (NORCO) 7.5-325 MG per tablet, Take 1 tablet by mouth 2 (two) times a day as needed., Disp: , Rfl:   •  lisinopril (PRINIVIL,ZESTRIL) 20 MG tablet, Take 2 tablets by mouth Daily., Disp: 90 tablet, Rfl: 3  •  metoclopramide (REGLAN) 10 MG tablet, Take 1 tablet by mouth 4 (Four) Times a Day Before Meals & at Bedtime. (Patient taking differently: Take 10 mg by mouth 3 (Three) Times a Day.), Disp: 120 tablet, Rfl: 3  •  mupirocin (BACTROBAN) 2 % cream, Apply  topically 3 (Three) Times a Day., Disp: 30 g, Rfl: 0  •  pravastatin (PRAVACHOL) 20 MG tablet, TAKE 1 TABLET BY MOUTH DAILY., Disp: 90 tablet, Rfl: 3  •  silodosin (RAPAFLO) 8 MG capsule capsule, Take 1 capsule by mouth daily. With food., Disp: , Rfl:   •  triamcinolone (KENALOG) 0.1 % cream, Apply  topically 2 (Two) Times a Day., Disp: 45 g, Rfl: 0  •  VENTOLIN  (90 BASE) MCG/ACT inhaler, INHALE TWO PUFFS EVERY FOUR HOURS AS NEEDED FOR WHEEZING, Disp: 36 inhaler, Rfl: 1  •  warfarin (COUMADIN) 5 MG tablet, TAKE 1 AND 1/2 TABLETS BY MOUTH EVERY DAY OR USE AS DIRECTED, Disp: 135 tablet, Rfl: 1     Objective:     Vitals:    11/01/17 1300   BP: 168/82   BP Location: Left arm   Pulse: 62   Weight: (!) 317 lb 3.2 oz (144 kg)   Height: 76\" (193 cm)     Body mass index is 38.61 kg/(m^2).    Physical Exam   Constitutional: He is oriented to person, place, and time.   Obese "   HENT:   Head: Normocephalic.   Nose: Nose normal.   Mouth/Throat: Oropharynx is clear and moist.   Eyes: Conjunctivae and EOM are normal. Pupils are equal, round, and reactive to light.   Neck: Normal range of motion.   Cannot assess for JVD due to habitus   Cardiovascular: Normal rate, regular rhythm, normal heart sounds and intact distal pulses.    No murmur heard.  Pulmonary/Chest: Effort normal.   Abdominal: Soft.   Obesity limits abdominal exam   Musculoskeletal: Normal range of motion. He exhibits edema (severe varicose veins/venous stasis/edema) and deformity (bilateral charcot foot).   Neurological: He is alert and oriented to person, place, and time. No cranial nerve deficit.   Skin: Skin is warm and dry. No rash noted.   Numerous seb kers and actinic kers     Psychiatric: He has a normal mood and affect. His behavior is normal. Judgment and thought content normal.   Vitals reviewed.        ECG 12 Lead  Date/Time: 11/1/2017 1:23 PM  Performed by: HUSAM POWELL  Authorized by: HUSAM POWELL   Comparison: compared with previous ECG   Similar to previous ECG  Rhythm: sinus rhythm  Conduction: conduction normal  ST Segments: ST segments normal  T Waves: T waves normal  QRS axis: normal  Other: no other findings  Clinical impression: normal ECG              Assessment:       Diagnosis Plan   1. Paroxysmal atrial fibrillation     2. Typical atrial flutter     3. Tachycardia induced cardiomyopathy     4. Nonocclusive coronary atherosclerosis of native coronary artery     5. Popliteal artery aneurysm     6. Aortectasia     7. Venous stasis     8. Essential hypertension            Plan:       1/2.  He has had both flutter and fib in the past; the former has been ablated.  He will remain on carvedilol and warfarin.    3.  His tachy-mediated CM resolved, and he does not have CHF.  He only uses furosemide as needed and is doing well with that.  He's on carvedilol and lisinopril.    4.  He has diffuse moderate CAD  that is in the 40-50% range.  He does not have angina.  He really needs to quit smoking.    He will remain on pravastatin.    5/6.  His PAD is managed by Dr. Boston.    7.  He has severe venous insufficiency and goes to the wound care clinic.    8.  I increased his lisinopril to 40mg daily.  He has a lab appointment in one week for his physical exam labs and then sees Dr. Caldera in two weeks; the labs will make sure his SCr/K+ tolerate the increased dose.     Sincerely,       Kurt Henry MD

## 2017-11-02 RX ORDER — ALBUTEROL SULFATE 90 UG/1
AEROSOL, METERED RESPIRATORY (INHALATION)
Qty: 1 INHALER | Refills: 2 | Status: SHIPPED | OUTPATIENT
Start: 2017-11-02 | End: 2018-01-14 | Stop reason: SDUPTHER

## 2017-11-09 LAB
ALBUMIN SERPL-MCNC: 4 G/DL (ref 3.6–4.8)
ALBUMIN/GLOB SERPL: 1.3 {RATIO} (ref 1.2–2.2)
ALP SERPL-CCNC: 75 IU/L (ref 39–117)
ALT SERPL-CCNC: 8 IU/L (ref 0–44)
AMBIG ABBREV CMP14 DEFAULT: NORMAL
AST SERPL-CCNC: 14 IU/L (ref 0–40)
BILIRUB SERPL-MCNC: 0.5 MG/DL (ref 0–1.2)
BUN SERPL-MCNC: 8 MG/DL (ref 8–27)
BUN/CREAT SERPL: 10 (ref 10–24)
CALCIUM SERPL-MCNC: 9.2 MG/DL (ref 8.6–10.2)
CHLORIDE SERPL-SCNC: 101 MMOL/L (ref 96–106)
CO2 SERPL-SCNC: 24 MMOL/L (ref 18–29)
CREAT SERPL-MCNC: 0.81 MG/DL (ref 0.76–1.27)
ERYTHROCYTE [DISTWIDTH] IN BLOOD BY AUTOMATED COUNT: 14.5 % (ref 12.3–15.4)
GFR SERPLBLD CREATININE-BSD FMLA CKD-EPI: 107 ML/MIN/1.73
GFR SERPLBLD CREATININE-BSD FMLA CKD-EPI: 93 ML/MIN/1.73
GLOBULIN SER CALC-MCNC: 3 G/DL (ref 1.5–4.5)
GLUCOSE SERPL-MCNC: 70 MG/DL (ref 65–99)
HCT VFR BLD AUTO: 39.2 % (ref 37.5–51)
HGB BLD-MCNC: 13.8 G/DL (ref 12.6–17.7)
MCH RBC QN AUTO: 32.8 PG (ref 26.6–33)
MCHC RBC AUTO-ENTMCNC: 35.2 G/DL (ref 31.5–35.7)
MCV RBC AUTO: 93 FL (ref 79–97)
PLATELET # BLD AUTO: 187 X10E3/UL (ref 150–379)
POTASSIUM SERPL-SCNC: 4.6 MMOL/L (ref 3.5–5.2)
PROT SERPL-MCNC: 7 G/DL (ref 6–8.5)
RBC # BLD AUTO: 4.21 X10E6/UL (ref 4.14–5.8)
SODIUM SERPL-SCNC: 142 MMOL/L (ref 134–144)
WBC # BLD AUTO: 8.5 X10E3/UL (ref 3.4–10.8)

## 2017-11-16 ENCOUNTER — OFFICE VISIT (OUTPATIENT)
Dept: FAMILY MEDICINE CLINIC | Facility: CLINIC | Age: 67
End: 2017-11-16

## 2017-11-16 VITALS
DIASTOLIC BLOOD PRESSURE: 92 MMHG | HEIGHT: 76 IN | OXYGEN SATURATION: 97 % | SYSTOLIC BLOOD PRESSURE: 146 MMHG | BODY MASS INDEX: 38.32 KG/M2 | HEART RATE: 74 BPM | WEIGHT: 314.7 LBS

## 2017-11-16 DIAGNOSIS — I25.10 NONOCCLUSIVE CORONARY ATHEROSCLEROSIS OF NATIVE CORONARY ARTERY: ICD-10-CM

## 2017-11-16 DIAGNOSIS — Z23 FLU VACCINE NEED: ICD-10-CM

## 2017-11-16 DIAGNOSIS — G47.00 INSOMNIA, UNSPECIFIED TYPE: ICD-10-CM

## 2017-11-16 DIAGNOSIS — I11.9 HYPERTENSIVE HEART DISEASE WITHOUT HEART FAILURE: Primary | ICD-10-CM

## 2017-11-16 PROCEDURE — 99213 OFFICE O/P EST LOW 20 MIN: CPT | Performed by: INTERNAL MEDICINE

## 2017-11-16 PROCEDURE — 90662 IIV NO PRSV INCREASED AG IM: CPT | Performed by: INTERNAL MEDICINE

## 2017-11-16 PROCEDURE — 90471 IMMUNIZATION ADMIN: CPT | Performed by: INTERNAL MEDICINE

## 2017-11-16 RX ORDER — HYDROCHLOROTHIAZIDE 12.5 MG/1
12.5 TABLET ORAL DAILY
Qty: 30 TABLET | Refills: 3 | Status: SHIPPED | OUTPATIENT
Start: 2017-11-16 | End: 2018-03-04 | Stop reason: SDUPTHER

## 2017-11-16 RX ORDER — ALPRAZOLAM 0.5 MG/1
0.5 TABLET ORAL NIGHTLY PRN
Qty: 30 TABLET | Refills: 1 | Status: SHIPPED | OUTPATIENT
Start: 2017-11-16 | End: 2018-02-15 | Stop reason: SDUPTHER

## 2017-11-16 NOTE — PROGRESS NOTES
"Subjective   Jalen Lockwood is a 67 y.o. male who presents today for:    Insomnia (3 month f/u & review labs) and Hypertension    History of Present Illness   Insomnia has been a major issue for many years., with no trouble falling asleep but trouble falling back to sleep when he awakens.      He goes to sleep around 0100, sleeping in a chair due to chronic foot pain. He awakens around 0530 to urinate and usually has trouble falling back to sleep. He usually awakens around 0900 - 1000 most mornings.  He awakens \"feeling good\" when he takes them and sleeps well.  He denies excess sedation with it.     He has a long h/o HTN managed with multiple meds. He has cardiomyopathy, likely due to hypertensive heart disease, although he does have nonobstructive ASCVD. He has a h/o a-fib that has dropped his EF enough to cause heart failure, but no episodes in over a year.  Since he has remained out of a-fib and out of heart failure, he has not been taking his furosemide regularly.      He has BECKI but has not tolerated the CPAP device. He wears O2 while sleeping now.    Mr. Lockwood  reports that he has been smoking Cigarettes.  He has been smoking about 0.25 packs per day. He has never used smokeless tobacco. He reports that he drinks about 4.2 oz of alcohol per week  He reports that he does not use illicit drugs.     Allergies   Allergen Reactions   • Cephalexin Hives   • Codeine Nausea Only       Current Outpatient Prescriptions:   •  ALPRAZolam (XANAX) 0.5 MG tablet, Take 1 tablet by mouth At Night As Needed for Sleep., Disp: 30 tablet, Rfl: 1  •  ATROVENT HFA 17 MCG/ACT inhaler, INHALE 2 PUFFS 4 (FOUR) TIMES A DAY., Disp: 12.9 inhaler, Rfl: 3  •  carvedilol (COREG) 12.5 MG tablet, TAKE 1 TABLET BY MOUTH 2 (TWO) TIMES A DAY WITH MEALS., Disp: 180 tablet, Rfl: 1  •  finasteride (PROSCAR) 5 MG tablet, Take 1 tablet by mouth daily., Disp: , Rfl:   •  HYDROcodone-acetaminophen (NORCO) 7.5-325 MG per tablet, Take 1 tablet by mouth " "2 (two) times a day as needed., Disp: , Rfl:   •  lisinopril (PRINIVIL,ZESTRIL) 20 MG tablet, Take 2 tablets by mouth Daily., Disp: 90 tablet, Rfl: 3  •  metoclopramide (REGLAN) 10 MG tablet, Take 1 tablet by mouth 4 (Four) Times a Day Before Meals & at Bedtime. (Patient taking differently: Take 10 mg by mouth 3 (Three) Times a Day.), Disp: 120 tablet, Rfl: 3  •  mupirocin (BACTROBAN) 2 % cream, Apply  topically 3 (Three) Times a Day., Disp: 30 g, Rfl: 0  •  pravastatin (PRAVACHOL) 20 MG tablet, TAKE 1 TABLET BY MOUTH DAILY., Disp: 90 tablet, Rfl: 3  •  silodosin (RAPAFLO) 8 MG capsule capsule, Take 1 capsule by mouth daily. With food., Disp: , Rfl:   •  VENTOLIN  (90 Base) MCG/ACT inhaler, INHALE TWO PUFFS EVERY FOUR HOURS AS NEEDED FOR WHEEZING, Disp: 1 inhaler, Rfl: 2  •  warfarin (COUMADIN) 5 MG tablet, TAKE 1 AND 1/2 TABLETS BY MOUTH EVERY DAY OR USE AS DIRECTED, Disp: 135 tablet, Rfl: 1      Review of Systems   Constitutional: Positive for fatigue. Negative for chills, fever and unexpected weight change.   Eyes: Positive for visual disturbance.   Respiratory: Positive for cough and shortness of breath.    Cardiovascular: Positive for leg swelling. Negative for chest pain and palpitations.   Psychiatric/Behavioral: Positive for sleep disturbance. Negative for dysphoric mood. The patient is nervous/anxious.      Positive symptoms noted above are chronic and unchanged.    Objective   Vitals:    11/16/17 1514   BP: 146/92   BP Location: Right arm   Patient Position: Sitting   Cuff Size: Large Adult   Pulse: 74   SpO2: 97%   Weight: (!) 314 lb 11.2 oz (143 kg)   Height: 76\" (193 cm)     Physical Exam     Morbidly obese male in no acute distress.  Sclerae are anicteric but the conjunctivae are mildly injected.  No discharge noted.  No thyromegaly or mass appreciated.  No carotid bruit noted.  Regular rate and rhythm with distant S1 and S2.  No murmur appreciated.  Abdomen is obese, but soft and nontender.  " Bowel sounds are normoactive.  No bruit noted.  Assessment for hepatosplenomegaly and masses difficult due to his body habitus.  Both lower legs are edematous, but wrapped.  He is alert and oriented ×4 and answers all questions appropriately.  Mood is normal; affect is full and appropriate.  He walks with an antalgic gait, but is fairly steady.      Assessment/Plan   Jalen was seen today for insomnia and hypertension.    Diagnoses and all orders for this visit:    Hypertensive heart disease without heart failure  -     hydrochlorothiazide (HYDRODIURIL) 12.5 MG tablet; Take 1 tablet by mouth Daily.    Insomnia, unspecified type  -     ALPRAZolam (XANAX) 0.5 MG tablet; Take 1 tablet by mouth At Night As Needed for Sleep.    Flu vaccine need  -     Flu Vaccine High Dose PF 65YR+    Blood pressure remains elevated.  Given his cardiomyopathy, we will shoot to bring that down a little bit with hydrochlorothiazide.  He should not need a potassium supplement with this.  We will keep an eye on his electrolytes going forward.  He will call us if he develops muscle cramps, lightheadedness or dizziness, or any other problems with the medicine as he is taking it every day.  Should he develop edema, he may still take the furosemide on a prn basis, and he should take the potassium with that.     GEOFFREY report was obtained and reviewed.  Continue Xanax at night as it continues to give him good benefit without side-effect.

## 2017-11-27 RX ORDER — IPRATROPIUM BROMIDE 17 UG/1
AEROSOL, METERED RESPIRATORY (INHALATION)
Qty: 12.9 INHALER | Refills: 3 | Status: SHIPPED | OUTPATIENT
Start: 2017-11-27 | End: 2018-03-04 | Stop reason: SDUPTHER

## 2017-12-01 ENCOUNTER — HOSPITAL ENCOUNTER (OUTPATIENT)
Dept: CARDIOLOGY | Facility: HOSPITAL | Age: 67
Setting detail: RECURRING SERIES
Discharge: HOME | End: 2017-12-01

## 2017-12-01 PROCEDURE — 36416 COLLJ CAPILLARY BLOOD SPEC: CPT

## 2017-12-01 PROCEDURE — 85610 PROTHROMBIN TIME: CPT

## 2017-12-08 ENCOUNTER — HOSPITAL ENCOUNTER (OUTPATIENT)
Dept: CARDIOLOGY | Facility: HOSPITAL | Age: 67
Setting detail: RECURRING SERIES
Discharge: HOME | End: 2017-12-08

## 2017-12-08 PROCEDURE — 36416 COLLJ CAPILLARY BLOOD SPEC: CPT

## 2017-12-08 PROCEDURE — 85610 PROTHROMBIN TIME: CPT

## 2018-01-04 ENCOUNTER — HOSPITAL ENCOUNTER (OUTPATIENT)
Dept: CARDIOLOGY | Facility: HOSPITAL | Age: 68
Setting detail: RECURRING SERIES
Discharge: HOME | End: 2018-01-04

## 2018-01-04 PROCEDURE — 36416 COLLJ CAPILLARY BLOOD SPEC: CPT

## 2018-01-04 PROCEDURE — 85610 PROTHROMBIN TIME: CPT

## 2018-01-11 ENCOUNTER — HOSPITAL ENCOUNTER (OUTPATIENT)
Dept: CARDIOLOGY | Facility: HOSPITAL | Age: 68
Setting detail: RECURRING SERIES
Discharge: HOME | End: 2018-01-11

## 2018-01-11 PROCEDURE — 85610 PROTHROMBIN TIME: CPT

## 2018-01-11 PROCEDURE — 36416 COLLJ CAPILLARY BLOOD SPEC: CPT

## 2018-01-15 RX ORDER — ALBUTEROL SULFATE 90 UG/1
AEROSOL, METERED RESPIRATORY (INHALATION)
Qty: 18 INHALER | Refills: 2 | Status: SHIPPED | OUTPATIENT
Start: 2018-01-15 | End: 2018-04-10 | Stop reason: SDUPTHER

## 2018-01-25 ENCOUNTER — OFFICE VISIT (OUTPATIENT)
Dept: FAMILY MEDICINE CLINIC | Facility: CLINIC | Age: 68
End: 2018-01-25

## 2018-01-25 VITALS
BODY MASS INDEX: 37.14 KG/M2 | SYSTOLIC BLOOD PRESSURE: 108 MMHG | HEART RATE: 74 BPM | WEIGHT: 305 LBS | DIASTOLIC BLOOD PRESSURE: 66 MMHG | OXYGEN SATURATION: 97 % | HEIGHT: 76 IN

## 2018-01-25 DIAGNOSIS — L02.02 BOIL, FACE: Primary | ICD-10-CM

## 2018-01-25 PROCEDURE — 99213 OFFICE O/P EST LOW 20 MIN: CPT | Performed by: INTERNAL MEDICINE

## 2018-01-25 RX ORDER — SULFAMETHOXAZOLE AND TRIMETHOPRIM 800; 160 MG/1; MG/1
TABLET ORAL
Refills: 2 | COMMUNITY
Start: 2018-01-15 | End: 2018-02-15

## 2018-01-25 RX ORDER — BETAMETHASONE DIPROPIONATE 0.5 MG/G
CREAM TOPICAL
Refills: 3 | COMMUNITY
Start: 2018-01-15 | End: 2018-06-14

## 2018-01-25 NOTE — PROGRESS NOTES
Chief Complaint   Patient presents with   • Mass     on left side face; red and warm to touch; noticed on Saturday     At onset and rapid development of a lesion on the left side of the face, near the jawline started last Saturday.  Within 3-4 days, it had begun to drain pus with a decrease in the size and tenderness.    He is being treated for an MRSA wound on his foot.  He is currently on Bactrim DS twice a day and using Bactroban on his foot.  He has started using the Bactroban on his facial sore as well.    Allergies   Allergen Reactions   • Cephalexin Hives   • Codeine Nausea Only         Current Outpatient Prescriptions:   •  ALPRAZolam (XANAX) 0.5 MG tablet, Take 1 tablet by mouth At Night As Needed for Sleep., Disp: 30 tablet, Rfl: 1  •  ATROVENT HFA 17 MCG/ACT inhaler, INHALE 2 PUFFS 4 (FOUR) TIMES A DAY., Disp: 12.9 inhaler, Rfl: 3  •  betamethasone dipropionate (DIPROLENE) 0.05 % cream, APPLY TO AFFECTED AREA EVERY DAY, Disp: , Rfl: 3  •  carvedilol (COREG) 12.5 MG tablet, TAKE 1 TABLET BY MOUTH 2 (TWO) TIMES A DAY WITH MEALS., Disp: 180 tablet, Rfl: 1  •  finasteride (PROSCAR) 5 MG tablet, Take 1 tablet by mouth daily., Disp: , Rfl:   •  hydrochlorothiazide (HYDRODIURIL) 12.5 MG tablet, Take 1 tablet by mouth Daily., Disp: 30 tablet, Rfl: 3  •  HYDROcodone-acetaminophen (NORCO) 7.5-325 MG per tablet, Take 1 tablet by mouth 2 (two) times a day as needed., Disp: , Rfl:   •  lisinopril (PRINIVIL,ZESTRIL) 20 MG tablet, Take 2 tablets by mouth Daily., Disp: 90 tablet, Rfl: 3  •  metoclopramide (REGLAN) 10 MG tablet, Take 1 tablet by mouth 4 (Four) Times a Day Before Meals & at Bedtime. (Patient taking differently: Take 10 mg by mouth 3 (Three) Times a Day.), Disp: 120 tablet, Rfl: 3  •  mupirocin (BACTROBAN) 2 % ointment, APPLY TO AFFECTED AREA EVERY DAY, Disp: , Rfl: 3  •  pravastatin (PRAVACHOL) 20 MG tablet, TAKE 1 TABLET BY MOUTH DAILY., Disp: 90 tablet, Rfl: 3  •  silodosin (RAPAFLO) 8 MG capsule  "capsule, Take 1 capsule by mouth daily. With food., Disp: , Rfl:   •  sulfamethoxazole-trimethoprim (BACTRIM DS,SEPTRA DS) 800-160 MG per tablet, TAKE 1 TABLET BY MOUTH TWICE A DAY, Disp: , Rfl: 2  •  VENTOLIN  (90 Base) MCG/ACT inhaler, INHALE TWO PUFFS EVERY FOUR HOURS AS NEEDED FOR WHEEZING, Disp: 18 inhaler, Rfl: 2  •  warfarin (COUMADIN) 5 MG tablet, TAKE 1 AND 1/2 TABLETS BY MOUTH EVERY DAY OR USE AS DIRECTED, Disp: 135 tablet, Rfl: 1      He  reports that he has been smoking Cigarettes.  He has been smoking about 0.25 packs per day. He has never used smokeless tobacco. He reports that he drinks about 4.2 oz of alcohol per week  He reports that he does not use illicit drugs.    ROS:   Chronic shortness of breath.  Chronic foot wounds.    /66 (BP Location: Right arm, Patient Position: Sitting, Cuff Size: Large Adult)  Pulse 74  Ht 193 cm (76\")  Wt (!) 138 kg (305 lb)  SpO2 97%  BMI 37.13 kg/m2      EXAM:  Obese male in no acute distress.  No cervical or supraclavicular lymphadenopathy.  1 cm diameter, raised, erythematous pustule on the left side of the face, over the mandible, just inferior to the angle of the mouth.  There is crusting over the surface; there is no focal area of purulence appreciated.  Minimally warm.  Minimally tender.      Jalen was seen today for mass.    Diagnoses and all orders for this visit:    Boil, face    Warm compresses, cleanse the site twice a day and apply Bactroban twice a day.  Continue taking the Bactrim that has been prescribed by the wound specialist at [facility].    Results of a wound culture from 1/15/18 were reviewed during the course of today's office visit.  It shows heavy growth of MRSA from a lower extremity site; it is sensitive to sulfamethoxazole and vancomycin.    If the site worsens, he may discuss this with the wound care specialists at [facility] or he may give us a call.        "

## 2018-01-26 ENCOUNTER — HOSPITAL ENCOUNTER (OUTPATIENT)
Dept: CARDIOLOGY | Facility: HOSPITAL | Age: 68
Setting detail: RECURRING SERIES
Discharge: HOME | End: 2018-01-26

## 2018-01-26 PROCEDURE — 85610 PROTHROMBIN TIME: CPT

## 2018-01-26 PROCEDURE — 36416 COLLJ CAPILLARY BLOOD SPEC: CPT

## 2018-01-30 RX ORDER — PRAVASTATIN SODIUM 20 MG
TABLET ORAL
Qty: 30 TABLET | Refills: 0 | Status: SHIPPED | OUTPATIENT
Start: 2018-01-30 | End: 2018-02-27 | Stop reason: SDUPTHER

## 2018-02-05 ENCOUNTER — RESULTS ENCOUNTER (OUTPATIENT)
Dept: FAMILY MEDICINE CLINIC | Facility: CLINIC | Age: 68
End: 2018-02-05

## 2018-02-05 DIAGNOSIS — I11.9 HYPERTENSIVE HEART DISEASE WITHOUT HEART FAILURE: ICD-10-CM

## 2018-02-05 DIAGNOSIS — I25.10 NONOCCLUSIVE CORONARY ATHEROSCLEROSIS OF NATIVE CORONARY ARTERY: ICD-10-CM

## 2018-02-09 ENCOUNTER — HOSPITAL ENCOUNTER (OUTPATIENT)
Dept: CARDIOLOGY | Facility: HOSPITAL | Age: 68
Setting detail: RECURRING SERIES
Discharge: HOME | End: 2018-02-09

## 2018-02-09 PROCEDURE — 36416 COLLJ CAPILLARY BLOOD SPEC: CPT

## 2018-02-09 PROCEDURE — 85610 PROTHROMBIN TIME: CPT

## 2018-02-12 RX ORDER — WARFARIN SODIUM 5 MG/1
TABLET ORAL
Qty: 135 TABLET | Refills: 0 | Status: SHIPPED | OUTPATIENT
Start: 2018-02-12 | End: 2018-06-27 | Stop reason: SDUPTHER

## 2018-02-15 ENCOUNTER — OFFICE VISIT (OUTPATIENT)
Dept: FAMILY MEDICINE CLINIC | Facility: CLINIC | Age: 68
End: 2018-02-15

## 2018-02-15 VITALS
HEIGHT: 76 IN | DIASTOLIC BLOOD PRESSURE: 72 MMHG | SYSTOLIC BLOOD PRESSURE: 114 MMHG | BODY MASS INDEX: 37.74 KG/M2 | HEART RATE: 76 BPM | OXYGEN SATURATION: 94 % | WEIGHT: 309.9 LBS

## 2018-02-15 DIAGNOSIS — G47.00 INSOMNIA, UNSPECIFIED TYPE: ICD-10-CM

## 2018-02-15 DIAGNOSIS — I11.9 HYPERTENSIVE HEART DISEASE WITHOUT HEART FAILURE: Primary | ICD-10-CM

## 2018-02-15 DIAGNOSIS — F51.01 PRIMARY INSOMNIA: ICD-10-CM

## 2018-02-15 PROCEDURE — 99213 OFFICE O/P EST LOW 20 MIN: CPT | Performed by: INTERNAL MEDICINE

## 2018-02-15 RX ORDER — ALPRAZOLAM 0.5 MG/1
0.5 TABLET ORAL NIGHTLY PRN
Qty: 30 TABLET | Refills: 2 | Status: SHIPPED | OUTPATIENT
Start: 2018-02-15 | End: 2018-06-14 | Stop reason: SDUPTHER

## 2018-02-15 NOTE — PROGRESS NOTES
"Subjective   Jalen Lockwood is a 67 y.o. male who presents today for:    Insomnia (CSE)    History of Present Illness     Insomnia has been a major issue for many years., with no trouble falling asleep but trouble falling back to sleep when he awakens.      He goes to sleep around 0100, sleeping in a chair due to chronic foot pain. He awakens around 0530 to urinate and usually has trouble falling back to sleep. He usually awakens around 0900 - 1000 most mornings.  He awakens \"feeling good\" when he takes them and sleeps well.  He denies excess sedation with it.      He has a long h/o HTN managed with multiple meds. He has cardiomyopathy, likely due to hypertensive heart disease, although he does have nonobstructive ASCVD. He has a h/o a-fib that has dropped his EF enough to cause heart failure, but no episodes in over a year.  Since he has remained out of a-fib and out of heart failure, he has not been taking his furosemide regularly.  Therefore, when his blood pressure was elevated in November, 2017, we added HCTZ.  Its it without any lightheadedness, dizziness, or sense that he is going to pass out.  He does get up in the middle the night to urinate, but had been doing that before we started hydrochlorothiazide.  Blood pressure readings at the [facility] care center have been excellent as well.     He has BECKI but has not tolerated the CPAP device. He wears O2 while sleeping now.    Mr. Lockwood  reports that he has been smoking Cigarettes.  He has been smoking about 0.25 packs per day. He has never used smokeless tobacco. He reports that he drinks about 4.2 oz of alcohol per week  He reports that he does not use illicit drugs.     Allergies   Allergen Reactions   • Cephalexin Hives   • Codeine Nausea Only       Current Outpatient Prescriptions:   •  ALPRAZolam (XANAX) 0.5 MG tablet, Take 1 tablet by mouth At Night As Needed for Sleep., Disp: 30 tablet, Rfl: 1  •  ATROVENT HFA 17 MCG/ACT inhaler, INHALE 2 PUFFS 4 (FOUR) " TIMES A DAY., Disp: 12.9 inhaler, Rfl: 3  •  betamethasone dipropionate (DIPROLENE) 0.05 % cream, APPLY TO AFFECTED AREA EVERY DAY, Disp: , Rfl: 3  •  carvedilol (COREG) 12.5 MG tablet, TAKE 1 TABLET BY MOUTH 2 (TWO) TIMES A DAY WITH MEALS., Disp: 180 tablet, Rfl: 1  •  finasteride (PROSCAR) 5 MG tablet, Take 1 tablet by mouth daily., Disp: , Rfl:   •  hydrochlorothiazide (HYDRODIURIL) 12.5 MG tablet, Take 1 tablet by mouth Daily., Disp: 30 tablet, Rfl: 3  •  HYDROcodone-acetaminophen (NORCO) 7.5-325 MG per tablet, Take 1 tablet by mouth 2 (two) times a day as needed., Disp: , Rfl:   •  lisinopril (PRINIVIL,ZESTRIL) 20 MG tablet, Take 2 tablets by mouth Daily., Disp: 90 tablet, Rfl: 3  •  metoclopramide (REGLAN) 10 MG tablet, Take 1 tablet by mouth 4 (Four) Times a Day Before Meals & at Bedtime. (Patient taking differently: Take 10 mg by mouth 3 (Three) Times a Day.), Disp: 120 tablet, Rfl: 3  •  mupirocin (BACTROBAN) 2 % ointment, APPLY TO AFFECTED AREA EVERY DAY, Disp: , Rfl: 3  •  pravastatin (PRAVACHOL) 20 MG tablet, TAKE 1 TABLET BY MOUTH DAILY., Disp: 30 tablet, Rfl: 0  •  silodosin (RAPAFLO) 8 MG capsule capsule, Take 1 capsule by mouth daily. With food., Disp: , Rfl:   •  VENTOLIN  (90 Base) MCG/ACT inhaler, INHALE TWO PUFFS EVERY FOUR HOURS AS NEEDED FOR WHEEZING, Disp: 18 inhaler, Rfl: 2  •  warfarin (COUMADIN) 5 MG tablet, TAKE 1 AND 1/2 TABLETS BY MOUTH EVERY DAY OR USE AS DIRECTED, Disp: 135 tablet, Rfl: 0      Review of Systems   Constitutional: Negative for unexpected weight change.   Respiratory: Positive for shortness of breath (Chronic).    Cardiovascular: Positive for leg swelling. Negative for chest pain.   Musculoskeletal: Positive for arthralgias.        Rare muscle cramps   Neurological: Negative for syncope and headaches.         Objective   Vitals:    02/15/18 1504   BP: 114/72   BP Location: Right arm   Patient Position: Sitting   Cuff Size: Large Adult   Pulse: 76   SpO2: 94%  "  Weight: (!) 141 kg (309 lb 14.4 oz)   Height: 193 cm (76\")     Physical Exam  Obese male in no acute distress.  He smells of cigarette smoke.  Alert and oriented ×4 and answers all questions appropriately.  Sclerae are anicteric.  Regular rate and rhythm.  Mood is upbeat and affect is appropriate.  Insight and judgment are intact.    Assessment/Plan   Jalen was seen today for insomnia.    Diagnoses and all orders for this visit:    Hypertensive heart disease without heart failure  Blood pressure is doing better with the addition of the HCTZ.  Continue that unchanged.  Labs that were ordered for today have not been done; I have asked him to go to the [facility] nearest him to get those drawn within the next week.    Primary insomnia  -     ALPRAZolam (XANAX) 0.5 MG tablet; Take 1 tablet by mouth At Night As Needed for Sleep.  GEOFFREY report was obtained and reviewed during the course of today's office visit.  He continues to do well on the alprazolam.  In reviewing the Geoffrey report, it would appear he takes it was often than I suspected; his most recent refill was on 1/20/18, 2 months after his last fill.  He should continue to take it as sparingly as possible.  As long as it continues to provide benefit with no untoward side effects, we will continue to use this medication for his insomnia.    "

## 2018-02-24 LAB
ALBUMIN SERPL-MCNC: 3.9 G/DL (ref 3.6–4.8)
ALBUMIN/GLOB SERPL: 1.4 {RATIO} (ref 1.2–2.2)
ALP SERPL-CCNC: 69 IU/L (ref 39–117)
ALT SERPL-CCNC: 11 IU/L (ref 0–44)
AST SERPL-CCNC: 13 IU/L (ref 0–40)
BILIRUB SERPL-MCNC: 0.6 MG/DL (ref 0–1.2)
BUN SERPL-MCNC: 8 MG/DL (ref 8–27)
BUN/CREAT SERPL: 9 (ref 10–24)
CALCIUM SERPL-MCNC: 9.5 MG/DL (ref 8.6–10.2)
CHLORIDE SERPL-SCNC: 96 MMOL/L (ref 96–106)
CHOLEST SERPL-MCNC: 138 MG/DL (ref 100–199)
CO2 SERPL-SCNC: 26 MMOL/L (ref 18–29)
CREAT SERPL-MCNC: 0.94 MG/DL (ref 0.76–1.27)
GFR SERPLBLD CREATININE-BSD FMLA CKD-EPI: 84 ML/MIN/1.73
GFR SERPLBLD CREATININE-BSD FMLA CKD-EPI: 97 ML/MIN/1.73
GLOBULIN SER CALC-MCNC: 2.8 G/DL (ref 1.5–4.5)
GLUCOSE SERPL-MCNC: 91 MG/DL (ref 65–99)
HDLC SERPL-MCNC: 57 MG/DL
LDLC SERPL CALC-MCNC: 64 MG/DL (ref 0–99)
POTASSIUM SERPL-SCNC: 4.2 MMOL/L (ref 3.5–5.2)
PROT SERPL-MCNC: 6.7 G/DL (ref 6–8.5)
SODIUM SERPL-SCNC: 138 MMOL/L (ref 134–144)
TRIGL SERPL-MCNC: 85 MG/DL (ref 0–149)
VLDLC SERPL CALC-MCNC: 17 MG/DL (ref 5–40)

## 2018-02-27 RX ORDER — PRAVASTATIN SODIUM 20 MG
TABLET ORAL
Qty: 30 TABLET | Refills: 2 | Status: SHIPPED | OUTPATIENT
Start: 2018-02-27 | End: 2018-04-26 | Stop reason: SDUPTHER

## 2018-03-04 DIAGNOSIS — I11.9 HYPERTENSIVE HEART DISEASE WITHOUT HEART FAILURE: ICD-10-CM

## 2018-03-05 RX ORDER — IPRATROPIUM BROMIDE 17 UG/1
AEROSOL, METERED RESPIRATORY (INHALATION)
Qty: 12.9 INHALER | Refills: 3 | Status: SHIPPED | OUTPATIENT
Start: 2018-03-05 | End: 2018-06-13 | Stop reason: SDUPTHER

## 2018-03-05 RX ORDER — HYDROCHLOROTHIAZIDE 12.5 MG/1
TABLET ORAL
Qty: 30 TABLET | Refills: 3 | Status: SHIPPED | OUTPATIENT
Start: 2018-03-05 | End: 2018-06-27 | Stop reason: SDUPTHER

## 2018-03-09 ENCOUNTER — HOSPITAL ENCOUNTER (OUTPATIENT)
Dept: CARDIOLOGY | Facility: HOSPITAL | Age: 68
Setting detail: RECURRING SERIES
Discharge: HOME | End: 2018-03-09

## 2018-03-09 PROCEDURE — 36416 COLLJ CAPILLARY BLOOD SPEC: CPT

## 2018-03-09 PROCEDURE — 85610 PROTHROMBIN TIME: CPT

## 2018-04-04 RX ORDER — METOCLOPRAMIDE 10 MG/1
10 TABLET ORAL
Qty: 120 TABLET | Refills: 3 | OUTPATIENT
Start: 2018-04-04

## 2018-04-05 ENCOUNTER — TELEPHONE (OUTPATIENT)
Dept: GASTROENTEROLOGY | Facility: CLINIC | Age: 68
End: 2018-04-05

## 2018-04-05 RX ORDER — METOCLOPRAMIDE 10 MG/1
10 TABLET ORAL
Qty: 120 TABLET | Refills: 0 | Status: SHIPPED | OUTPATIENT
Start: 2018-04-05 | End: 2018-05-17 | Stop reason: SDUPTHER

## 2018-04-05 NOTE — TELEPHONE ENCOUNTER
----- Message from Rhonda Haddad sent at 4/5/2018 12:22 PM EDT -----  Regarding: medication   Contact: [phone]  Pt is calling about his refill on med  (REGLAN). It was denied & pt doesn't know why. He said he has 5 days left of the medication .

## 2018-04-05 NOTE — TELEPHONE ENCOUNTER
Called pt back. Advised that he has not been seen since 2/2017 so he is due for his yearly visit in order to refill meds. Advised if we make an appt, I can refill his medication. Pt verb understanding. Appt made for 4/19 at 3:15 PM.   Medication e-scribed.

## 2018-04-06 ENCOUNTER — HOSPITAL ENCOUNTER (OUTPATIENT)
Dept: CARDIOLOGY | Facility: HOSPITAL | Age: 68
Setting detail: RECURRING SERIES
Discharge: HOME | End: 2018-04-06

## 2018-04-06 PROCEDURE — 85610 PROTHROMBIN TIME: CPT

## 2018-04-06 PROCEDURE — 36416 COLLJ CAPILLARY BLOOD SPEC: CPT

## 2018-04-11 RX ORDER — ALBUTEROL SULFATE 90 UG/1
AEROSOL, METERED RESPIRATORY (INHALATION)
Qty: 18 INHALER | Refills: 2 | Status: SHIPPED | OUTPATIENT
Start: 2018-04-11 | End: 2018-07-16 | Stop reason: SDUPTHER

## 2018-04-19 ENCOUNTER — OFFICE VISIT (OUTPATIENT)
Dept: GASTROENTEROLOGY | Facility: CLINIC | Age: 68
End: 2018-04-19

## 2018-04-19 VITALS
WEIGHT: 315 LBS | BODY MASS INDEX: 38.36 KG/M2 | TEMPERATURE: 98.1 F | SYSTOLIC BLOOD PRESSURE: 136 MMHG | HEIGHT: 76 IN | DIASTOLIC BLOOD PRESSURE: 84 MMHG

## 2018-04-19 DIAGNOSIS — D12.6 ADENOMATOUS POLYP OF COLON, UNSPECIFIED PART OF COLON: ICD-10-CM

## 2018-04-19 DIAGNOSIS — K31.84 GASTROPARESIS: Primary | ICD-10-CM

## 2018-04-19 PROCEDURE — 99214 OFFICE O/P EST MOD 30 MIN: CPT | Performed by: NURSE PRACTITIONER

## 2018-04-19 NOTE — PROGRESS NOTES
Chief Complaint   Patient presents with   • Follow-up     yearly check up        Jalen Lockwood is a  68 y.o. male here for a follow up visit for gastroparesis.     HPI  67 yo m presents today for follow up for gastroparesis with hx adenomatous colon polyps. He is a patient of Dr. Lagunas. He was last seen in the office on 2/2017. He has hx gastroparesis and has been doing well on reglan 10 mg before meals and at bedtime x 5 years. He denies any adverse side effects with the medication. He denies any dysphagia, reflux, abd pain, N&V, diarrhea, constipation, rectal bleeding or melena. He admits his appetite is good and his weight has gone up a bit since last visit (305 to 317).     Past Medical History:   Diagnosis Date   • Allergic rhinitis    • Aortectasia     3cm infrarenal abdominal aorta   • Arthritis    • Atrial flutter 2010    s/p ablation    • Charcot's joint of foot    • CHF (congestive heart failure)    • Chronic edema    • Chronic venous insufficiency    • COPD (chronic obstructive pulmonary disease)    • Coronary atherosclerosis     Cath 2010: diffuse 40-50% disease   • Diverticulosis    • Duodenitis    • Fatty liver    • Gastritis    • Gastroparesis    • Hematoma     post-operative; After catheterization, right groin, required surgical exploration   • Hypertensive heart disease without heart failure 7/28/2016   • Internal hemorrhoids    • Morbid obesity    • Osteomyelitis    • Paroxysmal atrial fibrillation    • Peripheral neuropathy    • Popliteal artery aneurysm     left, s/p stenting by Dr. Boston   • Skin cancer    • Sleep apnea    • Tachycardia induced cardiomyopathy     due to flutter and afib; cath 2010 with nonobstructive disease   • Venous stasis        Past Surgical History:   Procedure Laterality Date   • COLONOSCOPY  09/28/2015    NBIH, diverticulosis, polyps   • HIP SURGERY Right    • OTHER SURGICAL HISTORY      Catheter ablation atrial flutter   • REPAIR ANEURYSM / PSEUDO ANEURYSM /  RUPTURED ANEURYSM POPLITEAL ARTERY      Stent-Graft of the the left popliteal artery   • REPAIR KNEE LIGAMENT      Primary repair of knee ligament cruciate anterior right   • TONSILLECTOMY     • TOTAL KNEE ARTHROPLASTY Left    • UPPER GASTROINTESTINAL ENDOSCOPY  09/16/2014    acute gastritis, acute duodenitis       Scheduled Meds:    Continuous Infusions:  No current facility-administered medications for this visit.     PRN Meds:.    Allergies   Allergen Reactions   • Cephalexin Hives   • Codeine Nausea Only       Social History     Social History   • Marital status:      Spouse name: N/A   • Number of children: N/A   • Years of education: N/A     Occupational History   • Not on file.     Social History Main Topics   • Smoking status: Current Every Day Smoker     Packs/day: 0.25     Types: Cigarettes   • Smokeless tobacco: Never Used      Comment: caffeine use: 3 cups of coffee in the morning/ Dt soft drinks in the evening.    • Alcohol use 4.2 oz/week     7 Standard drinks or equivalent per week      Comment: a couple drinks every day   • Drug use: No   • Sexual activity: Not on file     Other Topics Concern   • Not on file     Social History Narrative   • No narrative on file       Family History   Problem Relation Age of Onset   • Emphysema Father        Review of Systems   Constitutional: Negative for appetite change, chills, diaphoresis, fatigue, fever and unexpected weight change.   HENT: Negative for nosebleeds, postnasal drip, sore throat, trouble swallowing and voice change.    Respiratory: Negative for cough, choking, chest tightness, shortness of breath and wheezing.    Cardiovascular: Negative for chest pain.   Gastrointestinal: Negative for abdominal distention, abdominal pain, anal bleeding, blood in stool, constipation, diarrhea, nausea, rectal pain and vomiting.   Endocrine: Negative for polydipsia, polyphagia and polyuria.   Musculoskeletal: Negative for gait problem.   Skin: Negative for  rash and wound.   Allergic/Immunologic: Negative for food allergies.   Neurological: Negative for dizziness, speech difficulty and light-headedness.   Psychiatric/Behavioral: Negative for confusion, self-injury, sleep disturbance and suicidal ideas.       Vitals:    04/19/18 1518   BP: 136/84   Temp: 98.1 °F (36.7 °C)       Physical Exam   Constitutional: He is oriented to person, place, and time. He appears well-developed and well-nourished. He does not appear ill. No distress.   HENT:   Head: Normocephalic.   Eyes: Pupils are equal, round, and reactive to light.   Cardiovascular: Normal rate, regular rhythm and normal heart sounds.    Pulmonary/Chest: Effort normal and breath sounds normal.   Abdominal: Soft. Bowel sounds are normal. He exhibits no distension and no mass. There is no hepatosplenomegaly. There is no tenderness. There is no rebound and no guarding. No hernia.   Musculoskeletal: Normal range of motion.   Neurological: He is alert and oriented to person, place, and time.   Skin: Skin is warm and dry.   Psychiatric: He has a normal mood and affect. His speech is normal and behavior is normal. Judgment normal.       No images are attached to the encounter.    Assessment & Plan    1. Gastroparesis    2. Adenomatous polyp of colon, unspecified part of colon  - Case Request; Standing  - Case Request    Gastroparesis is well controlled on reglan. Denies any side effects from the reglan. Patient is due for surveillance colonoscopy this September given hx adenomatous colon polyps. He is on coumadin and will need cardiac clearance to come off of it before proceeding with colonoscopy. I will send note to RN to get this done. Follow up with Dr. Lagunas in 1 year or sooner as needed.

## 2018-04-20 ENCOUNTER — TELEPHONE (OUTPATIENT)
Dept: GASTROENTEROLOGY | Facility: CLINIC | Age: 68
End: 2018-04-20

## 2018-04-20 RX ORDER — CARVEDILOL 12.5 MG/1
TABLET ORAL
Qty: 180 TABLET | Refills: 1 | Status: SHIPPED | OUTPATIENT
Start: 2018-04-20 | End: 2018-09-10

## 2018-04-20 NOTE — TELEPHONE ENCOUNTER
----- Message from ESTEFANY Carvalho sent at 4/19/2018  3:41 PM EDT -----  Patient needs surveillance colonoscopy in Sept but is on coumadin and needs clearance. Can you work on this and then notify patient so he can schedule. Thanks.

## 2018-04-26 RX ORDER — PRAVASTATIN SODIUM 20 MG
20 TABLET ORAL DAILY
Qty: 90 TABLET | Refills: 2 | Status: SHIPPED | OUTPATIENT
Start: 2018-04-26 | End: 2018-11-06 | Stop reason: SDUPTHER

## 2018-05-04 ENCOUNTER — HOSPITAL ENCOUNTER (OUTPATIENT)
Dept: CARDIOLOGY | Facility: HOSPITAL | Age: 68
Setting detail: RECURRING SERIES
Discharge: HOME | End: 2018-05-04

## 2018-05-04 PROCEDURE — 85610 PROTHROMBIN TIME: CPT

## 2018-05-04 PROCEDURE — 36416 COLLJ CAPILLARY BLOOD SPEC: CPT

## 2018-05-16 RX ORDER — LISINOPRIL 20 MG/1
40 TABLET ORAL DAILY
Qty: 180 TABLET | Refills: 2 | Status: SHIPPED | OUTPATIENT
Start: 2018-05-16 | End: 2018-11-06 | Stop reason: SDUPTHER

## 2018-05-21 RX ORDER — METOCLOPRAMIDE 10 MG/1
10 TABLET ORAL
Qty: 120 TABLET | Refills: 5 | Status: SHIPPED | OUTPATIENT
Start: 2018-05-21 | End: 2019-01-08 | Stop reason: SDUPTHER

## 2018-05-23 PROBLEM — D12.6 ADENOMATOUS POLYP OF COLON: Status: ACTIVE | Noted: 2018-05-23

## 2018-06-14 ENCOUNTER — OFFICE VISIT (OUTPATIENT)
Dept: FAMILY MEDICINE CLINIC | Facility: CLINIC | Age: 68
End: 2018-06-14

## 2018-06-14 VITALS
BODY MASS INDEX: 36.48 KG/M2 | OXYGEN SATURATION: 94 % | SYSTOLIC BLOOD PRESSURE: 116 MMHG | HEIGHT: 76 IN | DIASTOLIC BLOOD PRESSURE: 76 MMHG | HEART RATE: 75 BPM | WEIGHT: 299.6 LBS

## 2018-06-14 DIAGNOSIS — L72.3 SEBACEOUS CYST: ICD-10-CM

## 2018-06-14 DIAGNOSIS — G89.29 CHRONIC RIGHT SHOULDER PAIN: ICD-10-CM

## 2018-06-14 DIAGNOSIS — M75.01 ADHESIVE CAPSULITIS OF RIGHT SHOULDER: ICD-10-CM

## 2018-06-14 DIAGNOSIS — Z79.01 LONG TERM CURRENT USE OF ANTICOAGULANT: ICD-10-CM

## 2018-06-14 DIAGNOSIS — F51.01 PRIMARY INSOMNIA: Primary | ICD-10-CM

## 2018-06-14 DIAGNOSIS — M25.511 CHRONIC RIGHT SHOULDER PAIN: ICD-10-CM

## 2018-06-14 DIAGNOSIS — H61.92 SKIN LESION OF LEFT EAR: ICD-10-CM

## 2018-06-14 DIAGNOSIS — G47.00 INSOMNIA, UNSPECIFIED TYPE: ICD-10-CM

## 2018-06-14 DIAGNOSIS — M25.511 RIGHT SHOULDER PAIN, UNSPECIFIED CHRONICITY: ICD-10-CM

## 2018-06-14 DIAGNOSIS — Z79.899 ENCOUNTER FOR LONG-TERM (CURRENT) USE OF MEDICATIONS: ICD-10-CM

## 2018-06-14 PROCEDURE — 99214 OFFICE O/P EST MOD 30 MIN: CPT | Performed by: INTERNAL MEDICINE

## 2018-06-14 PROCEDURE — 73030 X-RAY EXAM OF SHOULDER: CPT | Performed by: INTERNAL MEDICINE

## 2018-06-14 RX ORDER — IPRATROPIUM BROMIDE 17 UG/1
AEROSOL, METERED RESPIRATORY (INHALATION)
Qty: 12.9 INHALER | Refills: 3 | Status: SHIPPED | OUTPATIENT
Start: 2018-06-14 | End: 2018-09-10

## 2018-06-14 RX ORDER — ALPRAZOLAM 0.5 MG/1
0.5 TABLET ORAL NIGHTLY PRN
Qty: 30 TABLET | Refills: 2 | Status: SHIPPED | OUTPATIENT
Start: 2018-06-14 | End: 2018-08-30 | Stop reason: SDUPTHER

## 2018-06-14 RX ORDER — AZITHROMYCIN 250 MG/1
TABLET, FILM COATED ORAL
Qty: 6 TABLET | Refills: 0 | Status: SHIPPED | OUTPATIENT
Start: 2018-06-14 | End: 2018-08-02

## 2018-06-14 NOTE — PROGRESS NOTES
"Subjective   Jalen Lockwood is a 68 y.o. male who presents today for:    Insomnia (CSE)    History of Present Illness     Insomnia has been a major issue for many years., with no trouble falling asleep but trouble falling back to sleep when he awakens.      He goes to sleep around 0100, sleeping in a chair due to chronic foot pain. He awakens around 0530 to urinate and usually has trouble falling back to sleep. He usually awakens around 0900 - 1000 most mornings.  He awakens \"feeling good\" when he takes the alprazolam and sleeps well.  He denies excess sedation with it.    He has a large skin lesion on his right cheek that he would like evaluated.  He does not know if this is benign, precancerous, or cancerous.    He has chronic, left shoulder pain that has plagued him for years.  Within the last year, he developed right shoulder pain that has escalated over the last 6 months.  He has trouble raising his right arm.  He has no pain at rest, only with certain movements.  He denies any injury, recent or remote.  He leads a fairly sedentary lifestyle.  This is a new problem that has not been evaluated by us.    Mr. Lockwood  reports that he has been smoking Cigarettes.  He has been smoking about 0.25 packs per day. He has never used smokeless tobacco. He reports that he drinks about 4.2 oz of alcohol per week . He reports that he does not use drugs.     Allergies   Allergen Reactions   • Cephalexin Hives   • Codeine Nausea Only       Current Outpatient Prescriptions:   •  ALPRAZolam (XANAX) 0.5 MG tablet, Take 1 tablet by mouth At Night As Needed for Sleep., Disp: 30 tablet, Rfl: 2  •  ATROVENT HFA 17 MCG/ACT inhaler, INHALE 2 PUFFS 4 (FOUR) TIMES A DAY., Disp: 12.9 inhaler, Rfl: 3  •  carvedilol (COREG) 12.5 MG tablet, TAKE 1 TABLET BY MOUTH 2 (TWO) TIMES A DAY WITH MEALS., Disp: 180 tablet, Rfl: 1  •  finasteride (PROSCAR) 5 MG tablet, Take 1 tablet by mouth daily., Disp: , Rfl:   •  hydrochlorothiazide (HYDRODIURIL) " "12.5 MG tablet, TAKE 1 TABLET BY MOUTH DAILY., Disp: 30 tablet, Rfl: 3  •  HYDROcodone-acetaminophen (NORCO) 7.5-325 MG per tablet, Take 1 tablet by mouth 2 (two) times a day as needed., Disp: , Rfl:   •  lisinopril (PRINIVIL,ZESTRIL) 20 MG tablet, TAKE 2 TABLETS BY MOUTH DAILY., Disp: 180 tablet, Rfl: 2  •  metoclopramide (REGLAN) 10 MG tablet, TAKE 1 TABLET BY MOUTH 4 (FOUR) TIMES A DAY BEFORE MEALS & AT BEDTIME., Disp: 120 tablet, Rfl: 5  •  pravastatin (PRAVACHOL) 20 MG tablet, Take 1 tablet by mouth Daily., Disp: 90 tablet, Rfl: 2  •  silodosin (RAPAFLO) 8 MG capsule capsule, Take 1 capsule by mouth daily. With food., Disp: , Rfl:   •  VENTOLIN  (90 Base) MCG/ACT inhaler, INHALE TWO PUFFS EVERY FOUR HOURS AS NEEDED FOR WHEEZING, Disp: 18 inhaler, Rfl: 2  •  warfarin (COUMADIN) 5 MG tablet, TAKE 1 AND 1/2 TABLETS BY MOUTH EVERY DAY OR USE AS DIRECTED, Disp: 135 tablet, Rfl: 0  •  mupirocin (BACTROBAN) 2 % ointment, APPLY TO AFFECTED AREA EVERY DAY, Disp: , Rfl: 3      Review of Systems   Constitutional: Positive for unexpected weight change.   Musculoskeletal: Positive for arthralgias and back pain.   Skin:        New lesions   Psychiatric/Behavioral: Positive for sleep disturbance.       Objective   Vitals:    06/14/18 1502   BP: 116/76   BP Location: Right arm   Patient Position: Sitting   Cuff Size: Large Adult   Pulse: 75   SpO2: 94%   Weight: 136 kg (299 lb 9.6 oz)   Height: 193 cm (76\")     Physical Exam    Obese male in no acute distress.  Sclerae are anicteric and the conjunctivae are pink but mildly injected.  No carotid bruit appreciated.  No thyromegaly or mass; no thyroid tenderness.  Regular rate and rhythm with a distant S1 and S2.  No murmur noted.  Abdomen is obese, soft, and nontender.  No bruit appreciated.  Bowel sounds are normal.  Erythematous, scaling papule on the superior aspect of the left pinna with a central area of ulceration.  No surrounding erythema.  Multiple, large, waxy " brown papules over the face, neck, and most of the body.  Along the margin of the left buttock, there is an area of hyperpigmentation with a central area of induration that is only mildly tender.  There is no purulent discharge from the site.  There is no significant surrounding erythema.  Decreased range of motion at the right GHJ: He is unable to fully adductor the arm. He is unable to AB duct of the arm beyond 85°.  Internal and external rotation are also compromised.  Empty can sign is positive for pain but negative for give way.  There is no warmth or effusion about the right GHJ.            Jalen was seen today for insomnia.    Diagnoses and all orders for this visit:    Primary insomnia  Continue the alprazolam unchanged.    Encounter for long-term (current) use of medications  We will obtain a GEOFFREY report and then a copy of his urine drug test from his pain specialist.    Skin lesion of left ear  Comments:  superior aspect of the left ear- concerning for malignancy.  Refer to Dr. Jennings.  Orders:  -     Ambulatory Referral to Dermatology    Insomnia, unspecified type  -     ALPRAZolam (XANAX) 0.5 MG tablet; Take 1 tablet by mouth At Night As Needed for Sleep.    Sebaceous cyst  Comments:  Z-pack. Call if it persists/drains.    Long term current use of anticoagulant    Right shoulder pain, unspecified chronicity / Adhesive capsulitis of right shoulder  -     Ambulatory Referral to Physical Therapy  -     XR Shoulder 2+ View Right (In Office): 3 views of the right shoulder were obtained and reviewed today; I have no prior films for comparison.  There are degenerative changes throughout the glenohumeral joint consistent with moderate-severe arthritis.  No fracture or dislocation is appreciated.  I await the radiologist's final interpretation.  However, it is clear that he is going to need physical therapy in order to get good function out of this shoulder.  He will likely need physical therapy on the left  shoulder at some point as well.  If he is unable to do physical therapy because of limiting pain, we will have to get him in here for a steroid injection.    Other orders  -     azithromycin (ZITHROMAX Z-LENNOX) 250 MG tablet; Take 2 tablets the first day, then 1 tablet daily for 4 days for the sebaceous cyst as noted above.

## 2018-06-21 ENCOUNTER — HOSPITAL ENCOUNTER (OUTPATIENT)
Dept: PHYSICAL THERAPY | Facility: HOSPITAL | Age: 68
Setting detail: THERAPIES SERIES
Discharge: HOME | End: 2018-06-21
Attending: INTERNAL MEDICINE

## 2018-06-21 DIAGNOSIS — Z74.09 IMPAIRED MOBILITY: ICD-10-CM

## 2018-06-21 DIAGNOSIS — M25.511 ACUTE PAIN OF RIGHT SHOULDER: Primary | ICD-10-CM

## 2018-06-21 PROCEDURE — 97161 PT EVAL LOW COMPLEX 20 MIN: CPT

## 2018-06-21 PROCEDURE — 97110 THERAPEUTIC EXERCISES: CPT

## 2018-06-22 ENCOUNTER — HOSPITAL ENCOUNTER (OUTPATIENT)
Dept: CARDIOLOGY | Facility: HOSPITAL | Age: 68
Setting detail: RECURRING SERIES
Discharge: HOME | End: 2018-06-22

## 2018-06-22 PROCEDURE — 36416 COLLJ CAPILLARY BLOOD SPEC: CPT

## 2018-06-22 PROCEDURE — G8985 CARRY GOAL STATUS: HCPCS

## 2018-06-22 PROCEDURE — G8984 CARRY CURRENT STATUS: HCPCS

## 2018-06-22 PROCEDURE — 85610 PROTHROMBIN TIME: CPT

## 2018-06-22 NOTE — THERAPY EVALUATION
Outpatient Physical Therapy Ortho Initial Evaluation  [facility]     Patient Name: Jalen Lockwood  : 1950  MRN: [id number]  Today's Date: 2018      Visit Date: 2018    Patient Active Problem List   Diagnosis   • Chronic osteomyelitis   • Foot pain   • Peripheral neuropathy   • Venous stasis   • Paroxysmal atrial fibrillation   • Sleep apnea   • Chronic edema   • Morbid obesity   • COPD (chronic obstructive pulmonary disease)   • Nonocclusive coronary atherosclerosis of native coronary artery   • Atrial flutter   • Tachycardia induced cardiomyopathy   • Aortectasia   • Popliteal artery aneurysm   • Colon polyps   • Gastroparesis   • Insomnia   • Hypertensive heart disease without heart failure   • Adenomatous polyp of colon        Past Medical History:   Diagnosis Date   • Allergic rhinitis    • Aortectasia     3cm infrarenal abdominal aorta   • Arthritis    • Atrial flutter 2010    s/p ablation    • Charcot's joint of foot    • CHF (congestive heart failure)    • Chronic edema    • Chronic venous insufficiency    • COPD (chronic obstructive pulmonary disease)    • Coronary atherosclerosis     Cath 2010: diffuse 40-50% disease   • Diverticulosis    • Duodenitis    • Fatty liver    • Gastritis    • Gastroparesis    • Hematoma     post-operative; After catheterization, right groin, required surgical exploration   • Hypertensive heart disease without heart failure 2016   • Internal hemorrhoids    • Morbid obesity    • Osteomyelitis    • Paroxysmal atrial fibrillation    • Peripheral neuropathy    • Popliteal artery aneurysm     left, s/p stenting by Dr. Boston   • Skin cancer    • Sleep apnea    • Tachycardia induced cardiomyopathy     due to flutter and afib; cath  with nonobstructive disease   • Venous stasis         Past Surgical History:   Procedure Laterality Date   • COLONOSCOPY  2015    NBIH, diverticulosis, polyps   • HIP SURGERY Right    • OTHER SURGICAL HISTORY       Catheter ablation atrial flutter   • REPAIR ANEURYSM / PSEUDO ANEURYSM / RUPTURED ANEURYSM POPLITEAL ARTERY      Stent-Graft of the the left popliteal artery   • REPAIR KNEE LIGAMENT      Primary repair of knee ligament cruciate anterior right   • TONSILLECTOMY     • TOTAL KNEE ARTHROPLASTY Left    • UPPER GASTROINTESTINAL ENDOSCOPY  09/16/2014    acute gastritis, acute duodenitis       Visit Dx:     ICD-10-CM ICD-9-CM   1. Acute pain of right shoulder M25.511 719.41   2. Impaired mobility Z74.09 799.89             Patient History     Row Name 06/21/18 1700             History    Chief Complaint Pain;Muscle weakness;Joint stiffness;Difficulty with daily activities  -CN      Type of Pain Shoulder pain  -CN      Date Current Problem(s) Began 05/08/18  -CN      Brief Description of Current Complaint Pt reports onset of R shoulder pain approximately 1.5 months ago after playing golf. Pt reports history of bursitis 35 years ago which resolved after injection. Pt reports imparied ability to perform OH movements, however underhanded motions do not bother symptoms.   -CN      Patient/Caregiver Goals Relieve pain;Return to prior level of function;Know what to do to help the symptoms;Improve mobility  -CN      Hand Dominance right-handed  -CN      Occupation/sports/leisure activities Plays golf, retired  -CN      How has patient tried to help current problem? Rest  -CN      What clinical tests have you had for this problem? X-ray  -CN      Results of Clinical Tests No acute fractures, OA noted  -CN         Pain     Pain Location Shoulder  -CN      Pain at Present 1  -CN      Pain at Best 1  -CN      Pain at Worst 5  -CN      Pain Frequency Constant/continuous  -CN      Pain Description Aching;Shooting  -CN      What Performance Factors Make the Current Problem(s) WORSE? Reaching OH  -CN      What Performance Factors Make the Current Problem(s) BETTER? Rest  -CN      Is your sleep disturbed? Yes   1x/week  -CN       Difficulties at work? Retired  -CN      Difficulties with ADL's? Yes, any OH movements (putting dishes in cabinet L handed, donning shirt)  -CN      Difficulties with recreational activities? Able to play golf without exacerbation  -CN         Fall Risk Assessment    Any falls in the past year: No  -CN         Daily Activities    Primary Language English  -CN      How does patient learn best? Listening  -CN      Teaching needs identified Home Exercise Program;Management of Condition  -CN      Pt Participated in POC and Goals Yes  -CN         Safety    Are you being hurt, hit, or frightened by anyone at home or in your life? No  -CN      Are you being neglected by a caregiver No  -CN        User Key  (r) = Recorded By, (t) = Taken By, (c) = Cosigned By    Initials Name Provider Type    CN Kierra Ware PT Physical Therapist                PT Ortho     Row Name 06/21/18 1700       Posture/Observations    Alignment Options Forward head;Rounded shoulders;Thoracic kyphosis;Scapular elevation  -CN    Forward Head Mild  -CN    Thoracic Kyphosis Mild;Moderate  -CN    Rounded Shoulders Moderate  -CN    Scapular Elevation Right:;Moderate;Severe  -CN       Shoulder Girdle Palpation    Infraspinatus Right:;Tender  -CN       Shoulder Impingement/Rotator Cuff Special Tests    Marie-Ahmet Test (RC Lesion vs. Bursitis) Right:;Positive   for pain  -CN    Neer Impingement Test (RC Lesion vs. Bursitis) Right:;Positive   for pain  -CN    Full Can Test (RC Lesion) Right:;Positive   for pain  -CN    Empty Can Test (RC Lesion) Right:;Positive   for pain  -CN       Right Upper Ext    Rt Shoulder Abduction AROM 55  -CN    Rt Shoulder Abduction PROM 80  -CN    Rt Shoulder Flexion AROM 55  -CN    Rt Shoulder Flexion PROM 72  -CN    Rt Shoulder External Rotation AROM 62  -CN    Rt Shoulder External Rotation PROM 67  -CN    Rt Shoulder Internal Rotation AROM 49  -CN    Rt Shoulder Internal Rotation PROM 58  -CN    Rt Upper  Extremity Comments  Assessed in semi reclined position due to pain in supine  -CN       Left Upper Ext    Lt Shoulder Abduction AROM 113  -CN    Lt Shoulder Abduction PROM 140  -CN    Lt Shoulder Flexion AROM 135  -CN    Lt Shoulder Flexion PROM 140  -CN    Lt Shoulder External Rotation AROM 62  -CN    Lt Shoulder External Rotation PROM 67  -CN    Lt Shoulder Internal Rotation AROM 45  -CN    Lt Shoulder Internal Rotation PROM 59  -CN    Lt Upper Extremity Comments  Assessed in semi reclined position due to pain in supine  -CN       Left Shoulder (Manual Muscle Testing)    MMT: Flexion, Left Shoulder flexion  -CN    MMT, Gross Movement: Left Shoulder Flexion (4/5) good  -CN    MMT: ABduction, Left Shoulder abduction  -CN    MMT, Gross Movement: Left Shoulder ABduction (4/5) good  -CN    MMT: Internal Rotation, Left Shoulder internal rotation  -CN    MMT, Gross Movement: Left Shoulder Internal Rotation (4+/5) good plus  -CN    MMT: External Rotation, Left Shoulder external rotation  -CN    MMT, Gross Movement: Left Shoulder External Rotation (4/5) good  -CN       Right Shoulder (Manual Muscle Testing)    MMT: Flexion, Right Shoulder flexion  -CN    MMT, Gross Movement: Right Shoulder Flexion (4-/5) good minus  -CN    MMT: ABduction, Right Shoulder abduction  -CN    MMT, Gross Movement: Right Shoulder ABduction (3-/5) fair minus  -CN    MMT: Internal Rotation, Right Shoulder internal rotation  -CN    MMT, Gross Movement: Right Shoulder Internal Rotation (4-/5) good minus  -CN    MMT: External Rotation, Right Shoulder external rotation  -CN    MMT, Gross Movement: Right Shoulder External Rotation (4-/5) good minus  -CN       Upper Extremity Flexibility    Pect Minor Bilateral:;Mildly limited  -CN    Pect Major Bilateral:;Mildly limited  -CN    Latissimus Dorsi Bilateral:;Mildly limited  -CN      User Key  (r) = Recorded By, (t) = Taken By, (c) = Cosigned By    Initials Name Provider Type    ALEX Ware,  PT Physical Therapist                      Therapy Education  Education Details: Anatomy, goals of PT, eval findings, possible referral to ortho MD  Given: HEP, Symptoms/condition management, Pain management, Posture/body mechanics  Program: New  How Provided: Verbal, Demonstration, Written  Provided to: Patient  Level of Understanding: Teach back education performed, Verbalized, Demonstrated           PT OP Goals     Row Name 06/21/18 1300          PT Short Term Goals    STG Date to Achieve 07/13/18  -CN     STG 1 Pt will report subjective pain at 2/10 or less to demonstrate symptom management with ADLs.   -CN     STG 1 Progress New  -CN     STG 2 Pt will be independent and compliant with HEP in order to facilitate self-management of symptoms.   -CN     STG 2 Progress New  -CN        Long Term Goals    LTG Date to Achieve 08/03/18  -CN     LTG 1 Pt will increase shoulder AROM to flex=100 and abd=100 on R side in order to perform all ADLs.   -CN     LTG 1 Progress New  -CN     LTG 2 Pt will improve gross shoulder strength to 4/5 on right side.   -CN     LTG 2 Progress New  -CN     LTG 3 Pt will achieve a DASH score of 5% to demonstrate independence with functional activities.  -CN     LTG 3 Progress New  -CN        Time Calculation    PT Goal Re-Cert Due Date 07/22/18  -CN       User Key  (r) = Recorded By, (t) = Taken By, (c) = Cosigned By    Initials Name Provider Type    ALEX Ware, PT Physical Therapist                PT Assessment/Plan     Row Name 06/21/18 1750          PT Assessment    Functional Limitations Decreased safety during functional activities;Limitations in functional capacity and performance;Limitation in home management;Performance in self-care ADL  -CN     Impairments Range of motion;Muscle strength;Pain;Posture;Poor body mechanics;Impaired flexibility  -CN     Assessment Comments Pt is a 68 year old male in stable clinical condition with c/o R shoulder pain with onset 1.5 months  ago. Symptoms began after playing round of golf and have not changed since that time. Pt with forward head and rounded shoulders as well as significant R shoulder elevation. Pt with decreased and painful AROM and demonstrates shoulder hike with seated elevation activities. Increased pain with Marie kenndey and Neer tests and full/empty can tests were also positive for pain. Unable to assess capsular end feel with ROM due to pain limitations. Discussed possible referral to ortho MD pending response to PT and pt agreeable to this plan. Pt scored 14% on the DASH where 0% is no disability and is having difficulty with putting away dishes, donning shirt and reaching OH. Pt would benefit from skilled PT to address the deficits and return to PLOF.   -CN     Please refer to paper survey for additional self-reported information Yes  -CN     Rehab Potential Good  -CN     Patient/caregiver participated in establishment of treatment plan and goals Yes  -CN     Patient would benefit from skilled therapy intervention Yes  -CN        PT Plan    PT Frequency 2x/week  -CN     Predicted Duration of Therapy Intervention (Therapy Eval) 6 weeks  -CN     Planned CPT's? PT EVAL LOW COMPLEXITY: 93334;PT RE-EVAL: 65845;PT THER PROC EA 15 MIN: 94711;PT THER ACT EA 15 MIN: 50561;PT MANUAL THERAPY EA 15 MIN: 06729;PT NEUROMUSC RE-EDUCATION EA 15 MIN: 06838;PT GAIT TRAINING EA 15 MIN: 59486;PT HOT OR COLD PACK TREAT MCARE;PT ELECTRICAL STIM UNATTEND: ;PT ULTRASOUND EA 15 MIN: 00728  -CN     PT Plan Comments Pt to treat 2x/weeks for 6 weeks consisting of ROM, strengthening and stability exercises. Consider UBE warm up, IR/ER isometrics, supine punches and wall slides next visit.   -CN       User Key  (r) = Recorded By, (t) = Taken By, (c) = Cosigned By    Initials Name Provider Type    ALEX Ware, PT Physical Therapist                  Exercises     Row Name 06/21/18 1300             Total Minutes    76249 - PT Therapeutic  Exercise Minutes 10  -CN         Exercise 1    Exercise Name 1 Reclined OH flexion, self assist  -CN      Cueing 1 Demo  -CN      Reps 1 10  -CN      Time 1 5 sec  -CN         Exercise 2    Exercise Name 2 Reclined ER with cane  -CN      Cueing 2 Demo  -CN      Reps 2 10  -CN      Time 2 5 sec  -CN         Exercise 3    Exercise Name 3 Post shoulder rolls  -CN      Cueing 3 Demo  -CN      Reps 3 10  -CN         Exercise 4    Exercise Name 4 Scap squeeze  -CN      Cueing 4 Demo  -CN      Reps 4 10  -CN        User Key  (r) = Recorded By, (t) = Taken By, (c) = Cosigned By    Initials Name Provider Type    CN Kierra Ware, PT Physical Therapist                        Outcome Measure Options: Disabilities of the Arm, Shoulder, and Hand (DASH)         Time Calculation:     Therapy Suggested Charges     Code   Minutes Charges    80851 (CPT®) Hc Pt Neuromusc Re Education Ea 15 Min      57584 (CPT®) Hc Pt Ther Proc Ea 15 Min 10 1    30106 (CPT®) Hc Gait Training Ea 15 Min      17412 (CPT®) Hc Pt Therapeutic Act Ea 15 Min      43941 (CPT®) Hc Pt Manual Therapy Ea 15 Min      05588 (CPT®) Hc Pt Ther Massage- Per 15 Min      38730 (CPT®) Hc Pt Iontophoresis Ea 15 Min      26624 (CPT®) Hc Pt Elec Stim Ea-Per 15 Min      30675 (CPT®) Hc Pt Ultrasound Ea 15 Min      84394 (CPT®) Hc Pt Self Care/Mgmt/Train Ea 15 Min      Total  10 1          Start Time: 1704  Stop Time: 1744  Time Calculation (min): 40 min     Therapy Charges for Today     Code Description Service Date Service Provider Modifiers Qty    58898497849 HC PT THER PROC EA 15 MIN 6/21/2018 Kierra Ware, PT GP 1    03715905517 HC PT EVAL LOW COMPLEXITY 2 6/21/2018 Kierra Ware, PT GP 1    42376960217 HC PT CARRY MOV HAND OBJ CURRENT 6/22/2018 Kierra Ware, PT GP, CJ 1    73814630100 HC PT CARRY MOV HAND OBJ PROJECTED 6/22/2018 Kierra Ware, PT GP, CI 1          PT G-Codes  PT Professional Judgement Used?: Yes  Outcome  Measure Options: Disabilities of the Arm, Shoulder, and Hand (DASH)  Score: 14% where 0% is no disability  Functional Limitation: Carrying, moving and handling objects  Carrying, Moving and Handling Objects Current Status (): At least 20 percent but less than 40 percent impaired, limited or restricted  Carrying, Moving and Handling Objects Goal Status (): At least 1 percent but less than 20 percent impaired, limited or restricted         Kierra Ware, PT  6/21/2018

## 2018-06-27 DIAGNOSIS — I11.9 HYPERTENSIVE HEART DISEASE WITHOUT HEART FAILURE: ICD-10-CM

## 2018-06-28 ENCOUNTER — HOSPITAL ENCOUNTER (OUTPATIENT)
Dept: PHYSICAL THERAPY | Facility: HOSPITAL | Age: 68
Setting detail: THERAPIES SERIES
Discharge: HOME | End: 2018-06-28

## 2018-06-28 DIAGNOSIS — M25.511 ACUTE PAIN OF RIGHT SHOULDER: Primary | ICD-10-CM

## 2018-06-28 DIAGNOSIS — Z74.09 IMPAIRED MOBILITY: ICD-10-CM

## 2018-06-28 PROCEDURE — 97110 THERAPEUTIC EXERCISES: CPT

## 2018-06-28 RX ORDER — HYDROCHLOROTHIAZIDE 12.5 MG/1
TABLET ORAL
Qty: 30 TABLET | Refills: 3 | Status: SHIPPED | OUTPATIENT
Start: 2018-06-28 | End: 2018-09-05 | Stop reason: SDUPTHER

## 2018-06-28 RX ORDER — WARFARIN SODIUM 5 MG/1
TABLET ORAL
Qty: 96 TABLET | Refills: 0 | Status: SHIPPED | OUTPATIENT
Start: 2018-06-28 | End: 2018-06-28 | Stop reason: SDUPTHER

## 2018-06-28 RX ORDER — WARFARIN SODIUM 5 MG/1
TABLET ORAL
Qty: 96 TABLET | Refills: 0 | Status: SHIPPED | OUTPATIENT
Start: 2018-06-28 | End: 2018-08-02 | Stop reason: SDUPTHER

## 2018-06-28 NOTE — THERAPY TREATMENT NOTE
Outpatient Physical Therapy Ortho Treatment Note  [facility]     Patient Name: Jalen Lockwood  : 1950  MRN: [id number]  Today's Date: 2018      Visit Date: 2018    Visit Dx:    ICD-10-CM ICD-9-CM   1. Acute pain of right shoulder M25.511 719.41   2. Impaired mobility Z74.09 799.89       Patient Active Problem List   Diagnosis   • Chronic osteomyelitis   • Foot pain   • Peripheral neuropathy   • Venous stasis   • Paroxysmal atrial fibrillation   • Sleep apnea   • Chronic edema   • Morbid obesity   • COPD (chronic obstructive pulmonary disease)   • Nonocclusive coronary atherosclerosis of native coronary artery   • Atrial flutter   • Tachycardia induced cardiomyopathy   • Aortectasia   • Popliteal artery aneurysm   • Colon polyps   • Gastroparesis   • Insomnia   • Hypertensive heart disease without heart failure   • Adenomatous polyp of colon        Past Medical History:   Diagnosis Date   • Allergic rhinitis    • Aortectasia     3cm infrarenal abdominal aorta   • Arthritis    • Atrial flutter 2010    s/p ablation    • Charcot's joint of foot    • CHF (congestive heart failure)    • Chronic edema    • Chronic venous insufficiency    • COPD (chronic obstructive pulmonary disease)    • Coronary atherosclerosis     Cath 2010: diffuse 40-50% disease   • Diverticulosis    • Duodenitis    • Fatty liver    • Gastritis    • Gastroparesis    • Hematoma     post-operative; After catheterization, right groin, required surgical exploration   • Hypertensive heart disease without heart failure 2016   • Internal hemorrhoids    • Morbid obesity    • Osteomyelitis    • Paroxysmal atrial fibrillation    • Peripheral neuropathy    • Popliteal artery aneurysm     left, s/p stenting by Dr. Boston   • Skin cancer    • Sleep apnea    • Tachycardia induced cardiomyopathy     due to flutter and afib; cath  with nonobstructive disease   • Venous stasis         Past Surgical History:   Procedure Laterality  Date   • COLONOSCOPY  09/28/2015    NBIH, diverticulosis, polyps   • HIP SURGERY Right    • OTHER SURGICAL HISTORY      Catheter ablation atrial flutter   • REPAIR ANEURYSM / PSEUDO ANEURYSM / RUPTURED ANEURYSM POPLITEAL ARTERY      Stent-Graft of the the left popliteal artery   • REPAIR KNEE LIGAMENT      Primary repair of knee ligament cruciate anterior right   • TONSILLECTOMY     • TOTAL KNEE ARTHROPLASTY Left    • UPPER GASTROINTESTINAL ENDOSCOPY  09/16/2014    acute gastritis, acute duodenitis                             PT Assessment/Plan     Row Name 06/28/18 2736          PT Assessment    Assessment Comments Pt returns for initial follow up after evaluation and reports mild soreness after last session, however no difficulty with exercises at home. Pt reports taking pain pill earlier today and is unsure if shoulder would be bothering him if not for the pain pill. Added several gentle ROM and strengthening exercises today and administered handouts for HEP.   -CN        PT Plan    PT Plan Comments Assess response to added exercises and progress as tolerated.   -CN       User Key  (r) = Recorded By, (t) = Taken By, (c) = Cosigned By    Initials Name Provider Type    CN Kierra Ware, PT Physical Therapist                    Exercises     Row Name 06/28/18 1700             Subjective Comments    Subjective Comments It felt a little sore after last time, but I have been doing the exercises and they feel ok. My feet were really hurting earlier so I took a pain pill. I don't have any pain in my shoulder right now, but it is probably because of the pain pill.   -CN         Subjective Pain    Able to rate subjective pain? yes  -CN      Pre-Treatment Pain Level 0  -CN         Total Minutes    58778 - PT Therapeutic Exercise Minutes 34  -CN         Exercise 1    Exercise Name 1 Reclined OH flexion, self assist  -CN      Cueing 1 Demo  -CN      Reps 1 10  -CN      Time 1 5 sec  -CN         Exercise 2    Exercise  Name 2 Reclined ER with cane  -CN      Cueing 2 Demo  -CN      Reps 2 10  -CN      Time 2 5 sec  -CN         Exercise 3    Exercise Name 3 Post shoulder rolls  -CN      Cueing 3 Demo  -CN      Reps 3 15  -CN         Exercise 4    Exercise Name 4 Scap squeeze  -CN      Cueing 4 Demo  -CN      Reps 4 10  -CN         Exercise 5    Exercise Name 5 UBE  -CN      Cueing 5 Verbal  -CN      Time 5 4 min  -CN      Additional Comments cueing for AAROM on R  -CN         Exercise 6    Exercise Name 6 Pulleys into scaption  -CN      Cueing 6 Demo  -CN      Reps 6 10  -CN      Time 6 5 sec  -CN         Exercise 7    Exercise Name 7 Supine punches  -CN      Cueing 7 Demo  -CN      Sets 7 2  -CN      Reps 7 10  -CN         Exercise 8    Exercise Name 8 Seated rows  -CN      Cueing 8 Demo  -CN      Sets 8 2  -CN      Reps 8 10  -CN      Additional Comments RTB  -CN         Exercise 9    Exercise Name 9 Seated IR/ER isometrics with PT manual assist  -CN      Cueing 9 Verbal;Tactile  -CN      Time 9 3 min  -CN         Exercise 10    Exercise Name 10 Wall slide  -CN      Cueing 10 Demo  -CN      Reps 10 10  -CN      Time 10 5 sec  -CN        User Key  (r) = Recorded By, (t) = Taken By, (c) = Cosigned By    Initials Name Provider Type    ALEX Ware, PT Physical Therapist                               PT OP Goals     Row Name 06/28/18 1800          PT Short Term Goals    STG Date to Achieve 07/13/18  -CN     STG 1 Pt will report subjective pain at 2/10 or less to demonstrate symptom management with ADLs.   -CN     STG 1 Progress Ongoing  -CN     STG 1 Progress Comments Pt reports no pain today, however took pain pill earlier this afternoon due to foot pain.   -CN     STG 2 Pt will be independent and compliant with HEP in order to facilitate self-management of symptoms.   -CN     STG 2 Progress Progressing  -CN     STG 2 Progress Comments Compliant with current program, progressed today.   -CN        Long Term Goals    LTG  Date to Achieve 08/03/18  -CN     LTG 1 Pt will increase shoulder AROM to flex=100 and abd=100 on R side in order to perform all ADLs.   -CN     LTG 1 Progress Ongoing  -CN     LTG 2 Pt will improve gross shoulder strength to 4/5 on right side.   -CN     LTG 2 Progress Ongoing  -CN     LTG 3 Pt will achieve a DASH score of 5% to demonstrate independence with functional activities.  -CN     LTG 3 Progress Ongoing  -CN       User Key  (r) = Recorded By, (t) = Taken By, (c) = Cosigned By    Initials Name Provider Type    ALEX Ware, PT Physical Therapist          Therapy Education  Given: HEP, Symptoms/condition management, Pain management, Posture/body mechanics  Program: Progressed  How Provided: Verbal, Demonstration, Written  Provided to: Patient  Level of Understanding: Teach back education performed, Verbalized, Demonstrated              Time Calculation:   Start Time: 1741  Stop Time: 1815  Time Calculation (min): 34 min  Therapy Suggested Charges     Code   Minutes Charges    17275 (CPT®) Hc Pt Neuromusc Re Education Ea 15 Min      24772 (CPT®) Hc Pt Ther Proc Ea 15 Min 34 2    29861 (CPT®) Hc Gait Training Ea 15 Min      85439 (CPT®) Hc Pt Therapeutic Act Ea 15 Min      25059 (CPT®) Hc Pt Manual Therapy Ea 15 Min      75959 (CPT®) Hc Pt Ther Massage- Per 15 Min      82882 (CPT®) Hc Pt Iontophoresis Ea 15 Min      48442 (CPT®) Hc Pt Elec Stim Ea-Per 15 Min      38574 (CPT®) Hc Pt Ultrasound Ea 15 Min      75494 (CPT®) Hc Pt Self Care/Mgmt/Train Ea 15 Min      Total  34 2        Therapy Charges for Today     Code Description Service Date Service Provider Modifiers Qty    59935012464 HC PT THER PROC EA 15 MIN 6/28/2018 Kierra Ware, PT GP 2                    Kierra Ware PT  6/28/2018

## 2018-07-02 ENCOUNTER — HOSPITAL ENCOUNTER (OUTPATIENT)
Dept: PHYSICAL THERAPY | Facility: HOSPITAL | Age: 68
Setting detail: THERAPIES SERIES
Discharge: HOME | End: 2018-07-02

## 2018-07-02 DIAGNOSIS — Z74.09 IMPAIRED MOBILITY: ICD-10-CM

## 2018-07-02 DIAGNOSIS — M25.511 ACUTE PAIN OF RIGHT SHOULDER: Primary | ICD-10-CM

## 2018-07-02 PROCEDURE — 97140 MANUAL THERAPY 1/> REGIONS: CPT

## 2018-07-02 PROCEDURE — 97110 THERAPEUTIC EXERCISES: CPT

## 2018-07-02 RX ORDER — WARFARIN SODIUM 5 MG/1
TABLET ORAL
Qty: 135 TABLET | Refills: 0 | Status: SHIPPED | OUTPATIENT
Start: 2018-07-02 | End: 2018-09-10

## 2018-07-02 NOTE — THERAPY TREATMENT NOTE
Outpatient Physical Therapy Ortho Treatment Note  [facility]     Patient Name: Jalen Lockwood  : 1950  MRN: [id number]  Today's Date: 2018      Visit Date: 2018    Visit Dx:    ICD-10-CM ICD-9-CM   1. Acute pain of right shoulder M25.511 719.41   2. Impaired mobility Z74.09 799.89       Patient Active Problem List   Diagnosis   • Chronic osteomyelitis (CMS/HCC)   • Foot pain   • Peripheral neuropathy   • Venous stasis   • Paroxysmal atrial fibrillation (CMS/HCC)   • Sleep apnea   • Chronic edema   • Morbid obesity (CMS/HCC)   • COPD (chronic obstructive pulmonary disease) (CMS/HCC)   • Nonocclusive coronary atherosclerosis of native coronary artery   • Atrial flutter (CMS/HCC)   • 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CMS/HCC)   • Aortectasia (CMS/HCC)   • Popliteal artery aneurysm (CMS/HCC)   • Colon polyps   • Gastroparesis   • Insomnia   • Hypertensive heart disease without heart failure   • Adenomatous polyp of colon        Past Medical History:   Diagnosis Date   • Allergic rhinitis    • Aortectasia (CMS/HCC)     3cm infrarenal abdominal aorta   • Arthritis    • Atrial flutter (CMS/HCC) 2010    s/p ablation    • Charcot's joint of foot    • CHF (congestive heart failure) (CMS/HCC)    • Chronic edema    • Chronic venous insufficiency    • COPD (chronic obstructive pulmonary disease) (CMS/HCC)    • Coronary atherosclerosis     Cath 2010: diffuse 40-50% disease   • Diverticulosis    • Duodenitis    • Fatty liver    • Gastritis    • Gastroparesis    • Hematoma     post-operative; After catheterization, right groin, required surgical exploration   • Hypertensive heart disease without heart failure 2016   • Internal hemorrhoids    • Morbid obesity (CMS/HCC)    • Osteomyelitis (CMS/HCC)    • Paroxysmal atrial fibrillation (CMS/HCC)    • Peripheral neuropathy    • Popliteal artery aneurysm (CMS/HCC)     left, s/p stenting by Dr. Boston   • Skin cancer    • Sleep apnea    • Tachycardia  induced cardiomyopathy (CMS/HCC)     due to flutter and afib; cath 2010 with nonobstructive disease   • Venous stasis         Past Surgical History:   Procedure Laterality Date   • COLONOSCOPY  09/28/2015    NBIH, diverticulosis, polyps   • HIP SURGERY Right    • OTHER SURGICAL HISTORY      Catheter ablation atrial flutter   • REPAIR ANEURYSM / PSEUDO ANEURYSM / RUPTURED ANEURYSM POPLITEAL ARTERY      Stent-Graft of the the left popliteal artery   • REPAIR KNEE LIGAMENT      Primary repair of knee ligament cruciate anterior right   • TONSILLECTOMY     • TOTAL KNEE ARTHROPLASTY Left    • UPPER GASTROINTESTINAL ENDOSCOPY  09/16/2014    acute gastritis, acute duodenitis                             PT Assessment/Plan     Row Name 07/02/18 1614          PT Assessment    Assessment Comments Pt reports no soreness after last visit and improved symptoms at end of session. Added several resisted exercises as well as manual PNF and rhythmic stabilization techniques which pt was able to perform with cueing for correct scapular activation.   -CN        PT Plan    PT Plan Comments Assess response to supine resisted exercies and manual techniques and progress as able.   -CN       User Key  (r) = Recorded By, (t) = Taken By, (c) = Cosigned By    Initials Name Provider Type    CN Kierra Ware, PT Physical Therapist                    Exercises     Row Name 07/02/18 1500             Subjective Comments    Subjective Comments It felt fine after last time. It is sore today though and I don't know why.   -CN         Subjective Pain    Able to rate subjective pain? yes  -CN      Pre-Treatment Pain Level 4  -CN         Total Minutes    47346 - PT Therapeutic Exercise Minutes 35  -CN      09698 - PT Manual Therapy Minutes 10  -CN         Exercise 1    Exercise Name 1 Reclined OH flexion, self assist  -CN      Cueing 1 Demo  -CN      Reps 1 10  -CN      Time 1 5 sec  -CN         Exercise 2    Exercise Name 2 Reclined ER with cane   -CN      Cueing 2 Demo  -CN      Reps 2 10  -CN      Time 2 5 sec  -CN         Exercise 3    Exercise Name 3 Post shoulder rolls  -CN      Cueing 3 Demo  -CN      Reps 3 15  -CN         Exercise 5    Exercise Name 5 UBE  -CN      Cueing 5 Verbal  -CN      Time 5 4 min  -CN         Exercise 6    Exercise Name 6 Pulleys into flexion and abduction  -CN      Cueing 6 Demo  -CN      Reps 6 10  -CN      Time 6 5 sec  -CN         Exercise 7    Exercise Name 7 Supine punches  -CN      Cueing 7 Demo  -CN      Sets 7 2  -CN      Reps 7 10  -CN         Exercise 11    Exercise Name 11 Supine B UE ER  -CN      Cueing 11 Demo  -CN      Sets 11 2  -CN      Reps 11 10  -CN      Additional Comments RTB  -CN         Exercise 12    Exercise Name 12 Supine star  -CN      Cueing 12 Demo  -CN      Reps 12 2x10  -CN      Additional Comments RTB  -CN        User Key  (r) = Recorded By, (t) = Taken By, (c) = Cosigned By    Initials Name Provider Type    ALEX Ware, PT Physical Therapist                        Manual Rx (last 36 hours)      Manual Treatments     Row Name 07/02/18 1500             Total Minutes    43161 - PT Manual Therapy Minutes 10  -CN         Manual Rx 1    Manual Rx 1 Location Manual PNF patterns with slow reversal, rhythmic stabilization, resisted ER/IR in 45 deg abduction  -CN      Manual Rx 1 Type Pt in HLing and SLing  -CN      Manual Rx 1 Duration 10 min  -CN        User Key  (r) = Recorded By, (t) = Taken By, (c) = Cosigned By    Initials Name Provider Type    ALEX Ware PT Physical Therapist                PT OP Goals     Row Name 07/02/18 1600          PT Short Term Goals    STG Date to Achieve 07/13/18  -CN     STG 1 Pt will report subjective pain at 2/10 or less to demonstrate symptom management with ADLs.   -CN     STG 1 Progress Ongoing  -CN     STG 1 Progress Comments Pt reports pain 4/10 today.   -CN     STG 2 Pt will be independent and compliant with HEP in order to  facilitate self-management of symptoms.   -CN     STG 2 Progress Progressing  -CN     STG 2 Progress Comments Continues to report complince, required cueing for correct form.   -CN        Long Term Goals    LTG Date to Achieve 08/03/18  -CN     LTG 1 Pt will increase shoulder AROM to flex=100 and abd=100 on R side in order to perform all ADLs.   -CN     LTG 1 Progress Ongoing  -CN     LTG 2 Pt will improve gross shoulder strength to 4/5 on right side.   -CN     LTG 2 Progress Ongoing  -CN     LTG 3 Pt will achieve a DASH score of 5% to demonstrate independence with functional activities.  -CN     LTG 3 Progress Ongoing  -CN       User Key  (r) = Recorded By, (t) = Taken By, (c) = Cosigned By    Initials Name Provider Type    ALEX Ware, PT Physical Therapist          Therapy Education  Given: HEP, Symptoms/condition management, Pain management, Posture/body mechanics  Program: Progressed  How Provided: Verbal, Demonstration  Provided to: Patient  Level of Understanding: Teach back education performed, Verbalized, Demonstrated              Time Calculation:   Start Time: 1530  Stop Time: 1615  Time Calculation (min): 45 min  Therapy Suggested Charges     Code   Minutes Charges    67758 (CPT®) Hc Pt Neuromusc Re Education Ea 15 Min      37184 (CPT®) Hc Pt Ther Proc Ea 15 Min 35 2    08925 (CPT®) Hc Gait Training Ea 15 Min      29932 (CPT®) Hc Pt Therapeutic Act Ea 15 Min      48692 (CPT®) Hc Pt Manual Therapy Ea 15 Min 10 1    00822 (CPT®) Hc Pt Ther Massage- Per 15 Min      36000 (CPT®) Hc Pt Iontophoresis Ea 15 Min      57025 (CPT®) Hc Pt Elec Stim Ea-Per 15 Min      10139 (CPT®) Hc Pt Ultrasound Ea 15 Min      79876 (CPT®) Hc Pt Self Care/Mgmt/Train Ea 15 Min      Total  45 3        Therapy Charges for Today     Code Description Service Date Service Provider Modifiers Qty    22221659278 HC PT THER PROC EA 15 MIN 7/2/2018 Kierra Ware, PT GP 2    76279807851 HC PT MANUAL THERAPY EA 15 MIN  7/2/2018 Kierra Ware, PT GP 1                    Kierra Ware, PT  7/2/2018

## 2018-07-05 ENCOUNTER — HOSPITAL ENCOUNTER (OUTPATIENT)
Dept: PHYSICAL THERAPY | Facility: HOSPITAL | Age: 68
Setting detail: THERAPIES SERIES
Discharge: HOME | End: 2018-07-05

## 2018-07-05 DIAGNOSIS — M25.511 ACUTE PAIN OF RIGHT SHOULDER: Primary | ICD-10-CM

## 2018-07-05 DIAGNOSIS — Z74.09 IMPAIRED MOBILITY: ICD-10-CM

## 2018-07-05 PROCEDURE — 97140 MANUAL THERAPY 1/> REGIONS: CPT

## 2018-07-05 PROCEDURE — 97110 THERAPEUTIC EXERCISES: CPT

## 2018-07-05 NOTE — THERAPY TREATMENT NOTE
Outpatient Physical Therapy Ortho Treatment Note  [facility]     Patient Name: Jalen Lockwood  : 1950  MRN: [id number]  Today's Date: 2018      Visit Date: 2018    Visit Dx:    ICD-10-CM ICD-9-CM   1. Acute pain of right shoulder M25.511 719.41   2. Impaired mobility Z74.09 799.89       Patient Active Problem List   Diagnosis   • Chronic osteomyelitis (CMS/HCC)   • Foot pain   • Peripheral neuropathy   • Venous stasis   • Paroxysmal atrial fibrillation (CMS/HCC)   • Sleep apnea   • Chronic edema   • Morbid obesity (CMS/HCC)   • COPD (chronic obstructive pulmonary disease) (CMS/HCC)   • Nonocclusive coronary atherosclerosis of native coronary artery   • Atrial flutter (CMS/HCC)   • 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CMS/HCC)   • Aortectasia (CMS/HCC)   • Popliteal artery aneurysm (CMS/HCC)   • Colon polyps   • Gastroparesis   • Insomnia   • Hypertensive heart disease without heart failure   • Adenomatous polyp of colon        Past Medical History:   Diagnosis Date   • Allergic rhinitis    • Aortectasia (CMS/HCC)     3cm infrarenal abdominal aorta   • Arthritis    • Atrial flutter (CMS/HCC) 2010    s/p ablation    • Charcot's joint of foot    • CHF (congestive heart failure) (CMS/HCC)    • Chronic edema    • Chronic venous insufficiency    • COPD (chronic obstructive pulmonary disease) (CMS/HCC)    • Coronary atherosclerosis     Cath 2010: diffuse 40-50% disease   • Diverticulosis    • Duodenitis    • Fatty liver    • Gastritis    • Gastroparesis    • Hematoma     post-operative; After catheterization, right groin, required surgical exploration   • Hypertensive heart disease without heart failure 2016   • Internal hemorrhoids    • Morbid obesity (CMS/HCC)    • Osteomyelitis (CMS/HCC)    • Paroxysmal atrial fibrillation (CMS/HCC)    • Peripheral neuropathy    • Popliteal artery aneurysm (CMS/HCC)     left, s/p stenting by Dr. Boston   • Skin cancer    • Sleep apnea    • Tachycardia  induced cardiomyopathy (CMS/HCC)     due to flutter and afib; cath 2010 with nonobstructive disease   • Venous stasis         Past Surgical History:   Procedure Laterality Date   • COLONOSCOPY  09/28/2015    NBIH, diverticulosis, polyps   • HIP SURGERY Right    • OTHER SURGICAL HISTORY      Catheter ablation atrial flutter   • REPAIR ANEURYSM / PSEUDO ANEURYSM / RUPTURED ANEURYSM POPLITEAL ARTERY      Stent-Graft of the the left popliteal artery   • REPAIR KNEE LIGAMENT      Primary repair of knee ligament cruciate anterior right   • TONSILLECTOMY     • TOTAL KNEE ARTHROPLASTY Left    • UPPER GASTROINTESTINAL ENDOSCOPY  09/16/2014    acute gastritis, acute duodenitis                             PT Assessment/Plan     Row Name 07/05/18 1751          PT Assessment    Assessment Comments Pt tolerating progressive exercises well and able to add supine ABCs, lat pull downs and doorway pec stretch for improved shoulder girdle strength and flexibility. Pt demonstrates much scapular movement with elevation tasks and requires cueing for correct performance of scap squeeze with exercises.   -CN        PT Plan    PT Plan Comments Consider supine flexion with band around wrists and shoulder abd/flex to 90 without pain.   -CN       User Key  (r) = Recorded By, (t) = Taken By, (c) = Cosigned By    Initials Name Provider Type    CN Kierra Ware, PT Physical Therapist                    Exercises     Row Name 07/05/18 1700             Subjective Comments    Subjective Comments It is sore today when I try to raise it over my head. It felt fine after last time.   -CN         Subjective Pain    Able to rate subjective pain? yes  -CN      Pre-Treatment Pain Level 3  -CN         Total Minutes    29877 - PT Therapeutic Exercise Minutes 35  -CN      82618 - PT Manual Therapy Minutes 10  -CN         Exercise 1    Exercise Name 1 Reclined OH flexion, self assist  -CN      Cueing 1 Demo  -CN      Reps 1 10  -CN      Time 1 5 sec   -CN         Exercise 2    Exercise Name 2 Reclined ER with cane  -CN      Cueing 2 Demo  -CN      Reps 2 10  -CN      Time 2 5 sec  -CN         Exercise 4    Exercise Name 4 Lat pull down  -CN      Cueing 4 Demo  -CN      Sets 4 2  -CN      Reps 4 10  -CN      Additional Comments RTB  -CN         Exercise 5    Exercise Name 5 UBE  -CN      Cueing 5 Verbal  -CN      Time 5 4 min  -CN         Exercise 6    Exercise Name 6 Pulleys into flexion and abduction  -CN      Cueing 6 Demo  -CN      Reps 6 10  -CN      Time 6 5 sec  -CN         Exercise 7    Exercise Name 7 Supine punches  -CN      Cueing 7 Demo  -CN      Sets 7 2  -CN      Reps 7 10  -CN         Exercise 8    Exercise Name 8 Seated rows  -CN      Cueing 8 Demo  -CN      Sets 8 2  -CN      Reps 8 10  -CN      Additional Comments RTB  -CN         Exercise 9    Exercise Name 9 Doorway pec stretch  -CN      Cueing 9 Demo  -CN      Reps 9 3  -CN      Time 9 20 sec  -CN         Exercise 11    Exercise Name 11 Supine B UE ER  -CN      Cueing 11 Demo  -CN      Sets 11 2  -CN      Reps 11 10  -CN      Additional Comments RTB  -CN         Exercise 12    Exercise Name 12 Supine star  -CN      Cueing 12 Demo  -CN      Reps 12 2x10  -CN      Additional Comments RTB  -CN         Exercise 13    Exercise Name 13 Supine ABCs with protraction  -CN      Cueing 13 Demo  -CN      Reps 13 1  -CN      Additional Comments 1#  -CN        User Key  (r) = Recorded By, (t) = Taken By, (c) = Cosigned By    Initials Name Provider Type    CN Kierra Ware, PT Physical Therapist                        Manual Rx (last 36 hours)      Manual Treatments     Row Name 07/05/18 1700             Total Minutes    51723 - PT Manual Therapy Minutes 10  -CN         Manual Rx 1    Manual Rx 1 Location Manual PNF patterns with slow reversal, rhythmic stabilization, resisted ER/IR in 45 deg abduction  -CN      Manual Rx 1 Type Pt in HLing and SLing  -CN      Manual Rx 1 Duration 10 min  -CN         User Key  (r) = Recorded By, (t) = Taken By, (c) = Cosigned By    Initials Name Provider Type    CN Kierra Ware, PT Physical Therapist              Therapy Education  Given: HEP, Symptoms/condition management, Pain management, Posture/body mechanics  Program: Progressed  How Provided: Verbal, Demonstration  Provided to: Patient  Level of Understanding: Teach back education performed, Verbalized, Demonstrated              Time Calculation:   Start Time: 1700  Stop Time: 1745  Time Calculation (min): 45 min  Therapy Suggested Charges     Code   Minutes Charges    32194 (CPT®) Hc Pt Neuromusc Re Education Ea 15 Min      43776 (CPT®) Hc Pt Ther Proc Ea 15 Min 35 2    52243 (CPT®) Hc Gait Training Ea 15 Min      95080 (CPT®) Hc Pt Therapeutic Act Ea 15 Min      88035 (CPT®) Hc Pt Manual Therapy Ea 15 Min 10 1    47510 (CPT®) Hc Pt Ther Massage- Per 15 Min      66992 (CPT®) Hc Pt Iontophoresis Ea 15 Min      14188 (CPT®) Hc Pt Elec Stim Ea-Per 15 Min      75203 (CPT®) Hc Pt Ultrasound Ea 15 Min      49964 (CPT®) Hc Pt Self Care/Mgmt/Train Ea 15 Min      Total  45 3        Therapy Charges for Today     Code Description Service Date Service Provider Modifiers Qty    22981658598 HC PT THER PROC EA 15 MIN 7/5/2018 Kierra Ware, PT GP 2    35079976347 HC PT MANUAL THERAPY EA 15 MIN 7/5/2018 Kierra Ware, PT GP 1                    Kierra Ware, PT  7/5/2018

## 2018-07-09 ENCOUNTER — HOSPITAL ENCOUNTER (OUTPATIENT)
Dept: PHYSICAL THERAPY | Facility: HOSPITAL | Age: 68
Setting detail: THERAPIES SERIES
Discharge: HOME | End: 2018-07-09

## 2018-07-09 DIAGNOSIS — M25.511 ACUTE PAIN OF RIGHT SHOULDER: Primary | ICD-10-CM

## 2018-07-09 DIAGNOSIS — Z74.09 IMPAIRED MOBILITY: ICD-10-CM

## 2018-07-09 PROCEDURE — 97140 MANUAL THERAPY 1/> REGIONS: CPT

## 2018-07-09 PROCEDURE — 97110 THERAPEUTIC EXERCISES: CPT

## 2018-07-09 NOTE — THERAPY TREATMENT NOTE
Outpatient Physical Therapy Ortho Treatment Note  [facility]     Patient Name: Jalen Lockwood  : 1950  MRN: [id number]  Today's Date: 2018      Visit Date: 2018    Visit Dx:    ICD-10-CM ICD-9-CM   1. Acute pain of right shoulder M25.511 719.41   2. Impaired mobility Z74.09 799.89       Patient Active Problem List   Diagnosis   • Chronic osteomyelitis (CMS/HCC)   • Foot pain   • Peripheral neuropathy   • Venous stasis   • Paroxysmal atrial fibrillation (CMS/HCC)   • Sleep apnea   • Chronic edema   • Morbid obesity (CMS/HCC)   • COPD (chronic obstructive pulmonary disease) (CMS/HCC)   • Nonocclusive coronary atherosclerosis of native coronary artery   • Atrial flutter (CMS/HCC)   • 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CMS/HCC)   • Aortectasia (CMS/HCC)   • Popliteal artery aneurysm (CMS/HCC)   • Colon polyps   • Gastroparesis   • Insomnia   • Hypertensive heart disease without heart failure   • Adenomatous polyp of colon        Past Medical History:   Diagnosis Date   • Allergic rhinitis    • Aortectasia (CMS/HCC)     3cm infrarenal abdominal aorta   • Arthritis    • Atrial flutter (CMS/HCC) 2010    s/p ablation    • Charcot's joint of foot    • CHF (congestive heart failure) (CMS/HCC)    • Chronic edema    • Chronic venous insufficiency    • COPD (chronic obstructive pulmonary disease) (CMS/HCC)    • Coronary atherosclerosis     Cath 2010: diffuse 40-50% disease   • Diverticulosis    • Duodenitis    • Fatty liver    • Gastritis    • Gastroparesis    • Hematoma     post-operative; After catheterization, right groin, required surgical exploration   • Hypertensive heart disease without heart failure 2016   • Internal hemorrhoids    • Morbid obesity (CMS/HCC)    • Osteomyelitis (CMS/HCC)    • Paroxysmal atrial fibrillation (CMS/HCC)    • Peripheral neuropathy    • Popliteal artery aneurysm (CMS/HCC)     left, s/p stenting by Dr. Boston   • Skin cancer    • Sleep apnea    • Tachycardia  induced cardiomyopathy (CMS/HCC)     due to flutter and afib; cath 2010 with nonobstructive disease   • Venous stasis         Past Surgical History:   Procedure Laterality Date   • COLONOSCOPY  09/28/2015    NBIH, diverticulosis, polyps   • HIP SURGERY Right    • OTHER SURGICAL HISTORY      Catheter ablation atrial flutter   • REPAIR ANEURYSM / PSEUDO ANEURYSM / RUPTURED ANEURYSM POPLITEAL ARTERY      Stent-Graft of the the left popliteal artery   • REPAIR KNEE LIGAMENT      Primary repair of knee ligament cruciate anterior right   • TONSILLECTOMY     • TOTAL KNEE ARTHROPLASTY Left    • UPPER GASTROINTESTINAL ENDOSCOPY  09/16/2014    acute gastritis, acute duodenitis                             PT Assessment/Plan     Row Name 07/09/18 1620          PT Assessment    Assessment Comments Pt reporting continued pain with OH elevation and with sleeping at night. Added several exercises for improved stability with OH elevation and pt able to perform with pain only during last reps due to muscle fatigue.   -CN        PT Plan    PT Plan Comments Continue with OH elevation and retraction activities.   -CN       User Key  (r) = Recorded By, (t) = Taken By, (c) = Cosigned By    Initials Name Provider Type    CN Kierra Ware, PT Physical Therapist                    Exercises     Row Name 07/09/18 1500             Subjective Comments    Subjective Comments It feels pretty good today. It still hurts at night and when I reach up though.   -CN         Subjective Pain    Able to rate subjective pain? yes  -CN      Pre-Treatment Pain Level 2  -CN         Total Minutes    29325 - PT Therapeutic Exercise Minutes 35  -CN      61892 - PT Manual Therapy Minutes 10  -CN         Exercise 1    Exercise Name 1 Shoulder flexion with band at wrist  -CN      Cueing 1 Demo  -CN      Sets 1 2  -CN      Reps 1 8  -CN      Additional Comments RTB  -CN         Exercise 5    Exercise Name 5 UBE  -CN      Cueing 5 Verbal  -CN      Time 5 4  min  -CN         Exercise 6    Exercise Name 6 Pulleys into flexion and abduction  -CN      Cueing 6 Demo  -CN      Reps 6 10  -CN      Time 6 5 sec  -CN         Exercise 7    Exercise Name 7 Supine punches  -CN      Cueing 7 Demo  -CN      Sets 7 2  -CN      Reps 7 10  -CN      Additional Comments 4#  -CN         Exercise 11    Exercise Name 11 Supine B UE ER  -CN      Cueing 11 Demo  -CN      Sets 11 2  -CN      Reps 11 10  -CN      Additional Comments RTB  -CN         Exercise 12    Exercise Name 12 Supine star  -CN      Cueing 12 Demo  -CN      Reps 12 2x10  -CN      Additional Comments RTB  -CN         Exercise 13    Exercise Name 13 Supine ABCs with protraction  -CN      Cueing 13 Demo  -CN      Reps 13 1  -CN      Additional Comments 2#  -CN         Exercise 14    Exercise Name 14 Wall walks  -CN      Cueing 14 Demo  -CN      Reps 14 5  -CN      Additional Comments RTB  -CN         Exercise 15    Exercise Name 15 Shelf taps  -CN      Cueing 15 Demo  -CN      Reps 15 10  -CN      Additional Comments 2.5 shelves  -CN         Exercise 16    Exercise Name 16 Seated shoulder flexion with band at wrists  -CN      Cueing 16 Demo  -CN      Reps 16 10  -CN      Additional Comments RTB  -CN         Exercise 17    Exercise Name 17 Ball bounces up wall  -CN      Additional Comments Stopped after 2 reps due to pain  -CN        User Key  (r) = Recorded By, (t) = Taken By, (c) = Cosigned By    Initials Name Provider Type    CN Kierra Ware, PT Physical Therapist                        Manual Rx (last 36 hours)      Manual Treatments     Row Name 07/09/18 1500             Total Minutes    97618 - PT Manual Therapy Minutes 10  -CN         Manual Rx 1    Manual Rx 1 Location Manual PNF patterns with slow reversal, rhythmic stabilization, resisted ER/IR in 45 deg abduction  -CN      Manual Rx 1 Type Pt in HLing and SLing  -CN      Manual Rx 1 Duration 10 min  -CN        User Key  (r) = Recorded By, (t) = Taken By, (c)  = Cosigned By    Initials Name Provider Type    ALEX Ware PT Physical Therapist                PT OP Goals     Row Name 07/09/18 1600          PT Short Term Goals    STG Date to Achieve 07/13/18  -CN     STG 1 Pt will report subjective pain at 2/10 or less to demonstrate symptom management with ADLs.   -CN     STG 1 Progress Ongoing  -CN     STG 2 Pt will be independent and compliant with HEP in order to facilitate self-management of symptoms.   -CN     STG 2 Progress Met  -CN        Long Term Goals    LTG Date to Achieve 08/03/18  -CN     LTG 1 Pt will increase shoulder AROM to flex=100 and abd=100 on R side in order to perform all ADLs.   -CN     LTG 1 Progress Ongoing  -CN     LTG 1 Progress Comments Pt demonstrates improved flexion in standing with exercises.   -CN     LTG 2 Pt will improve gross shoulder strength to 4/5 on right side.   -CN     LTG 2 Progress Ongoing  -CN     LTG 3 Pt will achieve a DASH score of 5% to demonstrate independence with functional activities.  -CN     LTG 3 Progress Ongoing  -CN       User Key  (r) = Recorded By, (t) = Taken By, (c) = Cosigned By    Initials Name Provider Type    ALEX Ware PT Physical Therapist          Therapy Education  Given: HEP, Symptoms/condition management, Pain management, Posture/body mechanics  Program: Progressed  How Provided: Verbal, Demonstration  Provided to: Patient  Level of Understanding: Teach back education performed, Verbalized, Demonstrated              Time Calculation:   Start Time: 1530  Stop Time: 1615  Time Calculation (min): 45 min  Therapy Suggested Charges     Code   Minutes Charges    74402 (CPT®) Hc Pt Neuromusc Re Education Ea 15 Min      74318 (CPT®) Hc Pt Ther Proc Ea 15 Min 35 2    41840 (CPT®) Hc Gait Training Ea 15 Min      16846 (CPT®) Hc Pt Therapeutic Act Ea 15 Min      56915 (CPT®) Hc Pt Manual Therapy Ea 15 Min 10 1    82681 (CPT®) Hc Pt Ther Massage- Per 15 Min      28113 (CPT®) Hc Pt  Iontophoresis Ea 15 Min      33849 (CPT®) Hc Pt Elec Stim Ea-Per 15 Min      75253 (CPT®) Hc Pt Ultrasound Ea 15 Min      71204 (CPT®) Hc Pt Self Care/Mgmt/Train Ea 15 Min      Total  45 3        Therapy Charges for Today     Code Description Service Date Service Provider Modifiers Qty    04244523505 HC PT THER PROC EA 15 MIN 7/9/2018 Kierra Ware, PT GP 2    27737048407 HC PT MANUAL THERAPY EA 15 MIN 7/9/2018 Kierra Ware, PT GP 1                    Kierra Ware, PT  7/9/2018

## 2018-07-11 ENCOUNTER — HOSPITAL ENCOUNTER (OUTPATIENT)
Dept: PHYSICAL THERAPY | Facility: HOSPITAL | Age: 68
Setting detail: THERAPIES SERIES
Discharge: HOME | End: 2018-07-11

## 2018-07-11 DIAGNOSIS — Z74.09 IMPAIRED MOBILITY: ICD-10-CM

## 2018-07-11 DIAGNOSIS — M25.511 ACUTE PAIN OF RIGHT SHOULDER: Primary | ICD-10-CM

## 2018-07-11 PROCEDURE — 97110 THERAPEUTIC EXERCISES: CPT

## 2018-07-11 PROCEDURE — 97140 MANUAL THERAPY 1/> REGIONS: CPT

## 2018-07-11 NOTE — THERAPY TREATMENT NOTE
Outpatient Physical Therapy Ortho Treatment Note  [facility]     Patient Name: Jalen Lockwood  : 1950  MRN: [id number]  Today's Date: 2018      Visit Date: 2018    Visit Dx:    ICD-10-CM ICD-9-CM   1. Acute pain of right shoulder M25.511 719.41   2. Impaired mobility Z74.09 799.89       Patient Active Problem List   Diagnosis   • Chronic osteomyelitis (CMS/HCC)   • Foot pain   • Peripheral neuropathy   • Venous stasis   • Paroxysmal atrial fibrillation (CMS/HCC)   • Sleep apnea   • Chronic edema   • Morbid obesity (CMS/HCC)   • COPD (chronic obstructive pulmonary disease) (CMS/HCC)   • Nonocclusive coronary atherosclerosis of native coronary artery   • Atrial flutter (CMS/HCC)   • 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CMS/HCC)   • Aortectasia (CMS/HCC)   • Popliteal artery aneurysm (CMS/HCC)   • Colon polyps   • Gastroparesis   • Insomnia   • Hypertensive heart disease without heart failure   • Adenomatous polyp of colon        Past Medical History:   Diagnosis Date   • Allergic rhinitis    • Aortectasia (CMS/HCC)     3cm infrarenal abdominal aorta   • Arthritis    • Atrial flutter (CMS/HCC) 2010    s/p ablation    • Charcot's joint of foot    • CHF (congestive heart failure) (CMS/HCC)    • Chronic edema    • Chronic venous insufficiency    • COPD (chronic obstructive pulmonary disease) (CMS/HCC)    • Coronary atherosclerosis     Cath 2010: diffuse 40-50% disease   • Diverticulosis    • Duodenitis    • Fatty liver    • Gastritis    • Gastroparesis    • Hematoma     post-operative; After catheterization, right groin, required surgical exploration   • Hypertensive heart disease without heart failure 2016   • Internal hemorrhoids    • Morbid obesity (CMS/HCC)    • Osteomyelitis (CMS/HCC)    • Paroxysmal atrial fibrillation (CMS/HCC)    • Peripheral neuropathy    • Popliteal artery aneurysm (CMS/HCC)     left, s/p stenting by Dr. Boston   • Skin cancer    • Sleep apnea    • Tachycardia  induced cardiomyopathy (CMS/HCC)     due to flutter and afib; cath 2010 with nonobstructive disease   • Venous stasis         Past Surgical History:   Procedure Laterality Date   • COLONOSCOPY  09/28/2015    NBIH, diverticulosis, polyps   • HIP SURGERY Right    • OTHER SURGICAL HISTORY      Catheter ablation atrial flutter   • REPAIR ANEURYSM / PSEUDO ANEURYSM / RUPTURED ANEURYSM POPLITEAL ARTERY      Stent-Graft of the the left popliteal artery   • REPAIR KNEE LIGAMENT      Primary repair of knee ligament cruciate anterior right   • TONSILLECTOMY     • TOTAL KNEE ARTHROPLASTY Left    • UPPER GASTROINTESTINAL ENDOSCOPY  09/16/2014    acute gastritis, acute duodenitis                             PT Assessment/Plan     Row Name 07/11/18 1645          PT Assessment    Assessment Comments Pain level with active movement continue to be 2/10. Continue to fatigue with exercises. Perfoming exercises at home. Declined need for ice  -WS       User Key  (r) = Recorded By, (t) = Taken By, (c) = Cosigned By    Initials Name Provider Type    WS Bart Pepper PTA Physical Therapy Assistant                    Exercises     Row Name 07/11/18 1615 07/11/18 1500          Subjective Comments    Subjective Comments shoulder sore if raise arm  -WS  --        Subjective Pain    Able to rate subjective pain? yes  -WS  --     Pre-Treatment Pain Level 2  -WS  --        Total Minutes    13471 - PT Therapeutic Exercise Minutes 30  -WS  --     24663 - PT Manual Therapy Minutes  -- 10  -WS        Exercise 1    Exercise Name 1 Shoulder flexion with band at wrist  -WS  --     Cueing 1 Demo  -WS  --     Sets 1 2  -WS  --     Reps 1 8  -WS  --     Time 1 5 sec  -WS  --     Additional Comments RTB  -WS  --        Exercise 5    Exercise Name 5 UBE  -WS  --     Cueing 5 Verbal  -WS  --     Time 5 4 min  -WS  --        Exercise 6    Exercise Name 6 Pulleys into flexion and abduction  -WS  --     Cueing 6 Demo  -WS  --     Reps 6 10  -WS  --     Time  6 5 sec  -WS  --        Exercise 7    Exercise Name 7 Supine punches  -WS  --     Cueing 7 Demo  -WS  --     Sets 7 2  -WS  --     Reps 7 10  -WS  --     Additional Comments 4#  -WS  --        Exercise 11    Exercise Name 11 Supine B UE ER  -WS  --     Cueing 11 Demo  -WS  --     Sets 11 2  -WS  --     Reps 11 10  -WS  --     Additional Comments RTB  -WS  --        Exercise 12    Exercise Name 12 Supine star  -WS  --     Cueing 12 Demo  -WS  --     Reps 12 2x10  -WS  --     Additional Comments RTB  -WS  --        Exercise 13    Exercise Name 13 Supine ABCs with protraction  -WS  --     Cueing 13 Demo  -WS  --     Reps 13 1  -WS  --     Additional Comments 2#  -WS  --        Exercise 14    Exercise Name 14 Wall walks  -WS  --     Cueing 14 Demo  -WS  --     Reps 14 5  -WS  --     Additional Comments RTB  -WS  --        Exercise 15    Exercise Name 15 Shelf taps  -WS  --     Cueing 15 Demo  -WS  --     Reps 15 10  -WS  --     Additional Comments 2.5 shelves  -WS  --        Exercise 16    Exercise Name 16 Seated shoulder flexion with band at wrists  -WS  --     Cueing 16 Demo  -WS  --     Reps 16 10  -WS  --     Additional Comments RTB  -WS  --        Exercise 17    Exercise Name 17 Ball bounces up wall  -WS  --       User Key  (r) = Recorded By, (t) = Taken By, (c) = Cosigned By    Initials Name Provider Type     Bart Pepper PTA Physical Therapy Assistant                        Manual Rx (last 36 hours)      Manual Treatments     Row Name 07/11/18 1500             Total Minutes    37768 - PT Manual Therapy Minutes 10  -WS         Manual Rx 1    Manual Rx 1 Location Manual PNF patterns with slow reversal, rhythmic stabilization, resisted ER/IR in 45 deg abduction  -WS      Manual Rx 1 Duration 10 min  -WS        User Key  (r) = Recorded By, (t) = Taken By, (c) = Cosigned By    Initials Name Provider Type     Bart Pepper PTA Physical Therapy Assistant                PT OP Goals     Row Name 07/11/18 1600           PT Short Term Goals    STG Date to Achieve 07/13/18  -WS     STG 1 Pt will report subjective pain at 2/10 or less to demonstrate symptom management with ADLs.   -WS     STG 1 Progress Ongoing  -WS     STG 2 Pt will be independent and compliant with HEP in order to facilitate self-management of symptoms.   -WS     STG 2 Progress Met  -WS        Long Term Goals    LTG Date to Achieve 08/03/18  -WS     LTG 1 Pt will increase shoulder AROM to flex=100 and abd=100 on R side in order to perform all ADLs.   -WS     LTG 1 Progress Ongoing  -WS     LTG 2 Pt will improve gross shoulder strength to 4/5 on right side.   -WS     LTG 2 Progress Ongoing  -WS     LTG 3 Pt will achieve a DASH score of 5% to demonstrate independence with functional activities.  -     LTG 3 Progress Ongoing  -       User Key  (r) = Recorded By, (t) = Taken By, (c) = Cosigned By    Initials Name Provider Type    SOCORRO Pepper PTA Physical Therapy Assistant          Therapy Education  Given: HEP, Symptoms/condition management, Posture/body mechanics  Program: Reinforced  How Provided: Verbal  Provided to: Patient              Time Calculation:   Start Time: 1615  Stop Time: 1655  Time Calculation (min): 40 min  Therapy Suggested Charges     Code   Minutes Charges    57530 (CPT®) Hc Pt Neuromusc Re Education Ea 15 Min      21836 (CPT®) Hc Pt Ther Proc Ea 15 Min 30 2    72217 (CPT®) Hc Gait Training Ea 15 Min      33797 (CPT®) Hc Pt Therapeutic Act Ea 15 Min      73232 (CPT®) Hc Pt Manual Therapy Ea 15 Min      44164 (CPT®) Hc Pt Ther Massage- Per 15 Min      90357 (CPT®) Hc Pt Iontophoresis Ea 15 Min      68237 (CPT®) Hc Pt Elec Stim Ea-Per 15 Min      24355 (CPT®) Hc Pt Ultrasound Ea 15 Min      47491 (CPT®) Hc Pt Self Care/Mgmt/Train Ea 15 Min      Total  30 2        Therapy Charges for Today     Code Description Service Date Service Provider Modifiers Qty    09563716382 HC PT THER PROC EA 15 MIN 7/11/2018 Bart Pepper PTA GP 2     [id number]  PT MANUAL THERAPY EA 15 MIN 7/11/2018 Bart Pepper, PTA GP 1                    Bart Pepper, DESIREE  7/11/2018

## 2018-07-13 ENCOUNTER — HOSPITAL ENCOUNTER (OUTPATIENT)
Dept: CARDIOLOGY | Facility: HOSPITAL | Age: 68
Setting detail: RECURRING SERIES
Discharge: HOME | End: 2018-07-13

## 2018-07-13 PROCEDURE — 85610 PROTHROMBIN TIME: CPT

## 2018-07-13 PROCEDURE — 36416 COLLJ CAPILLARY BLOOD SPEC: CPT

## 2018-07-16 ENCOUNTER — HOSPITAL ENCOUNTER (OUTPATIENT)
Dept: PHYSICAL THERAPY | Facility: HOSPITAL | Age: 68
Setting detail: THERAPIES SERIES
Discharge: HOME | End: 2018-07-16

## 2018-07-16 DIAGNOSIS — Z74.09 IMPAIRED MOBILITY: ICD-10-CM

## 2018-07-16 DIAGNOSIS — M25.511 ACUTE PAIN OF RIGHT SHOULDER: Primary | ICD-10-CM

## 2018-07-16 PROCEDURE — 97140 MANUAL THERAPY 1/> REGIONS: CPT

## 2018-07-16 PROCEDURE — 97110 THERAPEUTIC EXERCISES: CPT

## 2018-07-16 NOTE — THERAPY TREATMENT NOTE
Outpatient Physical Therapy Ortho Treatment Note  [facility]     Patient Name: Jalen Lockwood  : 1950  MRN: [id number]  Today's Date: 2018      Visit Date: 2018    Visit Dx:    ICD-10-CM ICD-9-CM   1. Acute pain of right shoulder M25.511 719.41   2. Impaired mobility Z74.09 799.89       Patient Active Problem List   Diagnosis   • Chronic osteomyelitis (CMS/HCC)   • Foot pain   • Peripheral neuropathy   • Venous stasis   • Paroxysmal atrial fibrillation (CMS/HCC)   • Sleep apnea   • Chronic edema   • Morbid obesity (CMS/HCC)   • COPD (chronic obstructive pulmonary disease) (CMS/HCC)   • Nonocclusive coronary atherosclerosis of native coronary artery   • Atrial flutter (CMS/HCC)   • 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CMS/HCC)   • Aortectasia (CMS/HCC)   • Popliteal artery aneurysm (CMS/HCC)   • Colon polyps   • Gastroparesis   • Insomnia   • Hypertensive heart disease without heart failure   • Adenomatous polyp of colon        Past Medical History:   Diagnosis Date   • Allergic rhinitis    • Aortectasia (CMS/HCC)     3cm infrarenal abdominal aorta   • Arthritis    • Atrial flutter (CMS/HCC) 2010    s/p ablation    • Charcot's joint of foot    • CHF (congestive heart failure) (CMS/HCC)    • Chronic edema    • Chronic venous insufficiency    • COPD (chronic obstructive pulmonary disease) (CMS/HCC)    • Coronary atherosclerosis     Cath 2010: diffuse 40-50% disease   • Diverticulosis    • Duodenitis    • Fatty liver    • Gastritis    • Gastroparesis    • Hematoma     post-operative; After catheterization, right groin, required surgical exploration   • Hypertensive heart disease without heart failure 2016   • Internal hemorrhoids    • Morbid obesity (CMS/HCC)    • Osteomyelitis (CMS/HCC)    • Paroxysmal atrial fibrillation (CMS/HCC)    • Peripheral neuropathy    • Popliteal artery aneurysm (CMS/HCC)     left, s/p stenting by Dr. Boston   • Skin cancer    • Sleep apnea    • Tachycardia  induced cardiomyopathy (CMS/HCC)     due to flutter and afib; cath 2010 with nonobstructive disease   • Venous stasis         Past Surgical History:   Procedure Laterality Date   • COLONOSCOPY  09/28/2015    NBIH, diverticulosis, polyps   • HIP SURGERY Right    • OTHER SURGICAL HISTORY      Catheter ablation atrial flutter   • REPAIR ANEURYSM / PSEUDO ANEURYSM / RUPTURED ANEURYSM POPLITEAL ARTERY      Stent-Graft of the the left popliteal artery   • REPAIR KNEE LIGAMENT      Primary repair of knee ligament cruciate anterior right   • TONSILLECTOMY     • TOTAL KNEE ARTHROPLASTY Left    • UPPER GASTROINTESTINAL ENDOSCOPY  09/16/2014    acute gastritis, acute duodenitis                             PT Assessment/Plan     Row Name 07/16/18 1658          PT Assessment    Assessment Comments Pt reports increased pain levels yesterday with unknown cause. Pt with improved symptoms today and able to tolerate progression of weights and bands with exercises. Pt continues with limited exercise endurance and reports fatigue in shoulder tissues at end of session.  -CN        PT Plan    PT Plan Comments Assess response to progressed weights/bands and advance exercises as tolerated.   -CN       User Key  (r) = Recorded By, (t) = Taken By, (c) = Cosigned By    Initials Name Provider Type    CN Kierra Ware, PT Physical Therapist                    Exercises     Row Name 07/16/18 1500             Subjective Comments    Subjective Comments It was really hurting yesterday and I don't know why.   -CN         Subjective Pain    Able to rate subjective pain? yes  -CN      Pre-Treatment Pain Level 2  -CN         Total Minutes    55864 - PT Therapeutic Exercise Minutes 32  -CN      07051 - PT Manual Therapy Minutes 10  -CN         Exercise 1    Exercise Name 1 Shoulder flexion with band at wrist  -CN      Cueing 1 Demo  -CN      Sets 1 2  -CN      Reps 1 8  -CN      Additional Comments RTB  -CN         Exercise 6    Exercise Name 6  Pulleys into flexion and abduction  -CN      Cueing 6 Demo  -CN      Reps 6 10  -CN      Time 6 5 sec  -CN         Exercise 7    Exercise Name 7 Supine punches  -CN      Cueing 7 Demo  -CN      Sets 7 2  -CN      Reps 7 10  -CN      Additional Comments 4#  -CN         Exercise 8    Exercise Name 8 Standing rows  -CN      Cueing 8 Demo  -CN      Sets 8 2  -CN      Reps 8 10  -CN      Additional Comments BTB  -CN         Exercise 9    Exercise Name 9 Doorway pec stretch  -CN      Cueing 9 Demo  -CN      Reps 9 3  -CN      Time 9 20 sec  -CN         Exercise 10    Exercise Name 10 Shoulder extension  -CN      Cueing 10 Demo  -CN      Sets 10 2  -CN      Reps 10 10  -CN      Additional Comments BTB  -CN         Exercise 11    Exercise Name 11 Supine B UE ER  -CN      Cueing 11 Demo  -CN      Sets 11 2  -CN      Reps 11 10  -CN      Additional Comments RTB  -CN         Exercise 12    Exercise Name 12 Supine star  -CN      Cueing 12 Demo  -CN      Reps 12 2x10  -CN      Additional Comments GTB  -CN         Exercise 13    Exercise Name 13 Supine ABCs with protraction  -CN      Cueing 13 Demo  -CN      Reps 13 1  -CN      Additional Comments 3#  -CN         Exercise 14    Exercise Name 14 Wall walks  -CN      Cueing 14 Demo  -CN      Sets 14 2  -CN      Reps 14 10  -CN      Additional Comments RTB  -CN         Exercise 16    Exercise Name 16 Seated shoulder flexion with band at wrists  -CN      Cueing 16 Demo  -CN      Reps 16 10  -CN      Additional Comments RTB  -CN         Exercise 17    Exercise Name 17 Ball bounces up wall  -CN      Reps 17 5  -CN      Additional Comments cues for scapular retraction  -CN        User Key  (r) = Recorded By, (t) = Taken By, (c) = Cosigned By    Initials Name Provider Type    CN Kierra Ware, PT Physical Therapist                        Manual Rx (last 36 hours)      Manual Treatments     Row Name 07/16/18 1500             Total Minutes    29695 - PT Manual Therapy Minutes 10   -CN         Manual Rx 1    Manual Rx 1 Location Manual PNF patterns with slow reversal, rhythmic stabilization, resisted ER/IR in 45 deg abduction  -CN      Manual Rx 1 Type in supine  -CN      Manual Rx 1 Duration 10 min  -CN        User Key  (r) = Recorded By, (t) = Taken By, (c) = Cosigned By    Initials Name Provider Type    ALEX Ware, PT Physical Therapist                PT OP Goals     Row Name 07/16/18 1600          PT Short Term Goals    STG Date to Achieve 07/13/18  -CN     STG 1 Pt will report subjective pain at 2/10 or less to demonstrate symptom management with ADLs.   -CN     STG 1 Progress Ongoing  -CN     STG 1 Progress Comments Pt reports pain rated 2/10 today, however increased pain yesterday which resolved with pain pill.   -CN     STG 2 Pt will be independent and compliant with HEP in order to facilitate self-management of symptoms.   -CN     STG 2 Progress Met  -CN        Long Term Goals    LTG Date to Achieve 08/03/18  -CN     LTG 1 Pt will increase shoulder AROM to flex=100 and abd=100 on R side in order to perform all ADLs.   -CN     LTG 1 Progress Ongoing  -CN     LTG 2 Pt will improve gross shoulder strength to 4/5 on right side.   -CN     LTG 2 Progress Ongoing  -CN     LTG 3 Pt will achieve a DASH score of 5% to demonstrate independence with functional activities.  -CN     LTG 3 Progress Ongoing  -CN       User Key  (r) = Recorded By, (t) = Taken By, (c) = Cosigned By    Initials Name Provider Type    ALEX Ware, STEPHEN Physical Therapist          Therapy Education  Given: HEP, Symptoms/condition management, Posture/body mechanics  Program: Reinforced  How Provided: Verbal  Provided to: Patient  Level of Understanding: Teach back education performed, Verbalized, Demonstrated              Time Calculation:   Start Time: 1530  Stop Time: 1615  Time Calculation (min): 45 min  Therapy Suggested Charges     Code   Minutes Charges    29414 (CPT®)  Pt Neuromusc Re  Education Ea 15 Min      93204 (CPT®) Hc Pt Ther Proc Ea 15 Min 32 2    17110 (CPT®) Hc Gait Training Ea 15 Min      08355 (CPT®) Hc Pt Therapeutic Act Ea 15 Min      40116 (CPT®) Hc Pt Manual Therapy Ea 15 Min 10 1    83417 (CPT®) Hc Pt Ther Massage- Per 15 Min      33886 (CPT®) Hc Pt Iontophoresis Ea 15 Min      70423 (CPT®) Hc Pt Elec Stim Ea-Per 15 Min      56659 (CPT®) Hc Pt Ultrasound Ea 15 Min      00493 (CPT®) Hc Pt Self Care/Mgmt/Train Ea 15 Min      Total  42 3        Therapy Charges for Today     Code Description Service Date Service Provider Modifiers Qty    68520521438 HC PT THER PROC EA 15 MIN 7/16/2018 Kierra Ware, PT GP 2    [id number] HC PT MANUAL THERAPY EA 15 MIN 7/16/2018 Kierra Ware, PT GP 1                    Kierra Ware, PT  7/16/2018

## 2018-07-17 RX ORDER — ALBUTEROL SULFATE 90 UG/1
AEROSOL, METERED RESPIRATORY (INHALATION)
Qty: 18 INHALER | Refills: 2 | Status: SHIPPED | OUTPATIENT
Start: 2018-07-17 | End: 2018-09-10

## 2018-07-18 ENCOUNTER — HOSPITAL ENCOUNTER (OUTPATIENT)
Dept: PHYSICAL THERAPY | Facility: HOSPITAL | Age: 68
Setting detail: THERAPIES SERIES
Discharge: HOME | End: 2018-07-18

## 2018-07-18 DIAGNOSIS — Z74.09 IMPAIRED MOBILITY: ICD-10-CM

## 2018-07-18 DIAGNOSIS — M25.511 ACUTE PAIN OF RIGHT SHOULDER: Primary | ICD-10-CM

## 2018-07-18 PROCEDURE — 97110 THERAPEUTIC EXERCISES: CPT

## 2018-07-18 NOTE — THERAPY TREATMENT NOTE
Outpatient Physical Therapy Ortho Treatment Note  [facility]     Patient Name: Jalen Lockwood  : 1950  MRN: [id number]  Today's Date: 2018      Visit Date: 2018    Visit Dx:    ICD-10-CM ICD-9-CM   1. Acute pain of right shoulder M25.511 719.41   2. Impaired mobility Z74.09 799.89       Patient Active Problem List   Diagnosis   • Chronic osteomyelitis (CMS/HCC)   • Foot pain   • Peripheral neuropathy   • Venous stasis   • Paroxysmal atrial fibrillation (CMS/HCC)   • Sleep apnea   • Chronic edema   • Morbid obesity (CMS/HCC)   • COPD (chronic obstructive pulmonary disease) (CMS/HCC)   • Nonocclusive coronary atherosclerosis of native coronary artery   • Atrial flutter (CMS/HCC)   • 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CMS/HCC)   • Aortectasia (CMS/HCC)   • Popliteal artery aneurysm (CMS/HCC)   • Colon polyps   • Gastroparesis   • Insomnia   • Hypertensive heart disease without heart failure   • Adenomatous polyp of colon        Past Medical History:   Diagnosis Date   • Allergic rhinitis    • Aortectasia (CMS/HCC)     3cm infrarenal abdominal aorta   • Arthritis    • Atrial flutter (CMS/HCC) 2010    s/p ablation    • Charcot's joint of foot    • CHF (congestive heart failure) (CMS/HCC)    • Chronic edema    • Chronic venous insufficiency    • COPD (chronic obstructive pulmonary disease) (CMS/HCC)    • Coronary atherosclerosis     Cath 2010: diffuse 40-50% disease   • Diverticulosis    • Duodenitis    • Fatty liver    • Gastritis    • Gastroparesis    • Hematoma     post-operative; After catheterization, right groin, required surgical exploration   • Hypertensive heart disease without heart failure 2016   • Internal hemorrhoids    • Morbid obesity (CMS/HCC)    • Osteomyelitis (CMS/HCC)    • Paroxysmal atrial fibrillation (CMS/HCC)    • Peripheral neuropathy    • Popliteal artery aneurysm (CMS/HCC)     left, s/p stenting by Dr. Boston   • Skin cancer    • Sleep apnea    • Tachycardia  induced cardiomyopathy (CMS/HCC)     due to flutter and afib; cath 2010 with nonobstructive disease   • Venous stasis         Past Surgical History:   Procedure Laterality Date   • COLONOSCOPY  09/28/2015    NBIH, diverticulosis, polyps   • HIP SURGERY Right    • OTHER SURGICAL HISTORY      Catheter ablation atrial flutter   • REPAIR ANEURYSM / PSEUDO ANEURYSM / RUPTURED ANEURYSM POPLITEAL ARTERY      Stent-Graft of the the left popliteal artery   • REPAIR KNEE LIGAMENT      Primary repair of knee ligament cruciate anterior right   • TONSILLECTOMY     • TOTAL KNEE ARTHROPLASTY Left    • UPPER GASTROINTESTINAL ENDOSCOPY  09/16/2014    acute gastritis, acute duodenitis                             PT Assessment/Plan     Row Name 07/18/18 1619          PT Assessment    Assessment Comments Pt with increased symptoms last night and this am due to playing golf yesterday. Pt continues with much limitation with OH activity, however compliant with current program.   -CN        PT Plan    PT Plan Comments Continue with strengthening with emphasis on OH activity as tolerated.   -CN       User Key  (r) = Recorded By, (t) = Taken By, (c) = Cosigned By    Initials Name Provider Type    CN Kierra Ware, PT Physical Therapist                    Exercises     Row Name 07/18/18 1500             Subjective Comments    Subjective Comments Well I played golf last night and it wasn't hurting while I was playing, but it was sore last night and this morning.   -CN         Subjective Pain    Able to rate subjective pain? yes  -CN      Pre-Treatment Pain Level 2  -CN         Total Minutes    43327 - PT Therapeutic Exercise Minutes 35  -CN      90386 - PT Manual Therapy Minutes 5  -CN         Exercise 1    Exercise Name 1 Shoulder flexion with band at wrist  -CN      Cueing 1 Demo  -CN      Sets 1 2  -CN      Reps 1 10  -CN      Additional Comments RTB  -CN         Exercise 4    Exercise Name 4 Lat pull down  -CN      Cueing 4 Demo   -CN      Sets 4 2  -CN      Reps 4 10  -CN      Additional Comments GTB  -CN         Exercise 5    Exercise Name 5 UBE  -CN      Cueing 5 Verbal  -CN      Time 5 4 min  -CN         Exercise 6    Exercise Name 6 Pulleys into flexion and abduction  -CN      Cueing 6 Demo  -CN      Reps 6 10  -CN      Time 6 5 sec  -CN         Exercise 7    Exercise Name 7 Supine punches  -CN      Cueing 7 Demo  -CN      Sets 7 2  -CN      Reps 7 10  -CN      Additional Comments 4#  -CN         Exercise 8    Exercise Name 8 Standing rows  -CN      Cueing 8 Demo  -CN      Sets 8 2  -CN      Reps 8 10  -CN      Additional Comments BTB  -CN         Exercise 9    Exercise Name 9 Doorway pec stretch  -CN      Cueing 9 Demo  -CN      Reps 9 3  -CN      Time 9 20 sec  -CN         Exercise 10    Exercise Name 10 Shoulder extension  -CN      Cueing 10 Demo  -CN      Sets 10 2  -CN      Reps 10 10  -CN      Additional Comments BTB  -CN         Exercise 11    Exercise Name 11 Supine B UE ER  -CN      Cueing 11 Demo  -CN      Sets 11 2  -CN      Reps 11 10  -CN      Additional Comments RTB  -CN         Exercise 12    Exercise Name 12 Supine star  -CN      Cueing 12 Demo  -CN      Reps 12 2x10  -CN      Additional Comments GTB  -CN         Exercise 13    Exercise Name 13 Supine ABCs with protraction  -CN      Cueing 13 Demo  -CN      Reps 13 1  -CN      Additional Comments 3#  -CN         Exercise 14    Exercise Name 14 Wall walks  -CN      Cueing 14 Demo  -CN      Sets 14 2  -CN      Reps 14 10  -CN      Additional Comments RTB  -CN         Exercise 16    Exercise Name 16 Seated shoulder flexion with band at wrists  -CN      Cueing 16 Demo  -CN      Sets 16 2  -CN      Reps 16 10  -CN      Additional Comments RTB  -CN         Exercise 17    Exercise Name 17 Ball bounces up wall  -CN      Additional Comments stopped due to pain  -CN        User Key  (r) = Recorded By, (t) = Taken By, (c) = Cosigned By    Initials Name Provider Type    CN Kierra  Chasity Ware, STEPHEN Physical Therapist                        Manual Rx (last 36 hours)      Manual Treatments     Row Name 07/18/18 1500             Total Minutes    86093 - PT Manual Therapy Minutes 5  -CN         Manual Rx 1    Manual Rx 1 Location Attempted manual PNF and rhythmic stabilization, however stopped due to reports of pain; able to perform isometrics into ER/IR  -CN      Manual Rx 1 Type in supine  -CN      Manual Rx 1 Duration 5 min  -CN        User Key  (r) = Recorded By, (t) = Taken By, (c) = Cosigned By    Initials Name Provider Type    CN Kierra Ware, STEPHEN Physical Therapist              Therapy Education  Given: HEP, Symptoms/condition management, Posture/body mechanics  Program: Reinforced  How Provided: Verbal  Provided to: Patient  Level of Understanding: Teach back education performed, Verbalized, Demonstrated              Time Calculation:   Start Time: 1530  Stop Time: 1610  Time Calculation (min): 40 min  Therapy Suggested Charges     Code   Minutes Charges    29080 (CPT®) Hc Pt Neuromusc Re Education Ea 15 Min      06381 (CPT®) Hc Pt Ther Proc Ea 15 Min 35 3    15062 (CPT®) Hc Gait Training Ea 15 Min      36885 (CPT®) Hc Pt Therapeutic Act Ea 15 Min      23584 (CPT®) Hc Pt Manual Therapy Ea 15 Min 5     64629 (CPT®) Hc Pt Ther Massage- Per 15 Min      57837 (CPT®) Hc Pt Iontophoresis Ea 15 Min      84059 (CPT®) Hc Pt Elec Stim Ea-Per 15 Min      41461 (CPT®) Hc Pt Ultrasound Ea 15 Min      60507 (CPT®) Hc Pt Self Care/Mgmt/Train Ea 15 Min      Total  40 3        Therapy Charges for Today     Code Description Service Date Service Provider Modifiers Qty    07182681806 HC PT THER PROC EA 15 MIN 7/18/2018 Kierra Ware, PT GP 3                    Kierra Ware, PT  7/18/2018

## 2018-07-23 ENCOUNTER — HOSPITAL ENCOUNTER (OUTPATIENT)
Dept: PHYSICAL THERAPY | Facility: HOSPITAL | Age: 68
Setting detail: THERAPIES SERIES
Discharge: HOME | End: 2018-07-23

## 2018-07-23 DIAGNOSIS — Z74.09 IMPAIRED MOBILITY: ICD-10-CM

## 2018-07-23 DIAGNOSIS — M25.511 ACUTE PAIN OF RIGHT SHOULDER: Primary | ICD-10-CM

## 2018-07-23 PROCEDURE — G8986 CARRY D/C STATUS: HCPCS

## 2018-07-23 PROCEDURE — 97110 THERAPEUTIC EXERCISES: CPT

## 2018-07-23 PROCEDURE — G8985 CARRY GOAL STATUS: HCPCS

## 2018-07-23 NOTE — THERAPY PROGRESS REPORT/RE-CERT
Outpatient Physical Therapy Ortho Re-Assessment  [facility]     Patient Name: Jalen Lockwood  : 1950  MRN: [id number]  Today's Date: 2018      Visit Date: 2018    Patient Active Problem List   Diagnosis   • Chronic osteomyelitis (CMS/HCC)   • Foot pain   • Peripheral neuropathy   • Venous stasis   • Paroxysmal atrial fibrillation (CMS/HCC)   • Sleep apnea   • Chronic edema   • Morbid obesity (CMS/HCC)   • COPD (chronic obstructive pulmonary disease) (CMS/HCC)   • Nonocclusive coronary atherosclerosis of native coronary artery   • Atrial flutter (CMS/HCC)   • 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CMS/HCC)   • Aortectasia (CMS/HCC)   • Popliteal artery aneurysm (CMS/HCC)   • Colon polyps   • Gastroparesis   • Insomnia   • Hypertensive heart disease without heart failure   • Adenomatous polyp of colon        Past Medical History:   Diagnosis Date   • Allergic rhinitis    • Aortectasia (CMS/HCC)     3cm infrarenal abdominal aorta   • Arthritis    • Atrial flutter (CMS/HCC) 2010    s/p ablation    • Charcot's joint of foot    • CHF (congestive heart failure) (CMS/HCC)    • Chronic edema    • Chronic venous insufficiency    • COPD (chronic obstructive pulmonary disease) (CMS/HCC)    • Coronary atherosclerosis     Cath 2010: diffuse 40-50% disease   • Diverticulosis    • Duodenitis    • Fatty liver    • Gastritis    • Gastroparesis    • Hematoma     post-operative; After catheterization, right groin, required surgical exploration   • Hypertensive heart disease without heart failure 2016   • Internal hemorrhoids    • Morbid obesity (CMS/HCC)    • Osteomyelitis (CMS/HCC)    • Paroxysmal atrial fibrillation (CMS/HCC)    • Peripheral neuropathy    • Popliteal artery aneurysm (CMS/HCC)     left, s/p stenting by Dr. Boston   • Skin cancer    • Sleep apnea    • 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CMS/HCC)     due to flutter and afib; cath  with nonobstructive disease   • Venous stasis          Past Surgical History:   Procedure Laterality Date   • COLONOSCOPY  09/28/2015    NBIH, diverticulosis, polyps   • HIP SURGERY Right    • OTHER SURGICAL HISTORY      Catheter ablation atrial flutter   • REPAIR ANEURYSM / PSEUDO ANEURYSM / RUPTURED ANEURYSM POPLITEAL ARTERY      Stent-Graft of the the left popliteal artery   • REPAIR KNEE LIGAMENT      Primary repair of knee ligament cruciate anterior right   • TONSILLECTOMY     • TOTAL KNEE ARTHROPLASTY Left    • UPPER GASTROINTESTINAL ENDOSCOPY  09/16/2014    acute gastritis, acute duodenitis       Visit Dx:     ICD-10-CM ICD-9-CM   1. Acute pain of right shoulder M25.511 719.41   2. Impaired mobility Z74.09 799.89                 PT Ortho     Row Name 07/23/18 1500       Right Upper Ext    Rt Shoulder Abduction AROM 108  -CN    Rt Shoulder Flexion AROM 130  -CN       Right Shoulder (Manual Muscle Testing)    MMT, Gross Movement: Right Shoulder Flexion (4-/5) good minus  -CN    MMT, Gross Movement: Right Shoulder ABduction (4-/5) good minus  -CN    MMT, Gross Movement: Right Shoulder Internal Rotation (4-/5) good minus  -CN    MMT, Gross Movement: Right Shoulder External Rotation (4-/5) good minus  -CN      User Key  (r) = Recorded By, (t) = Taken By, (c) = Cosigned By    Initials Name Provider Type    ALEX Ware, PT Physical Therapist                      Therapy Education  Given: HEP, Symptoms/condition management, Posture/body mechanics  Program: Reinforced  How Provided: Verbal  Provided to: Patient  Level of Understanding: Teach back education performed, Verbalized, Demonstrated           PT OP Goals     Row Name 07/23/18 1500          PT Short Term Goals    STG Date to Achieve 07/13/18  -CN     STG 1 Pt will report subjective pain at 2/10 or less to demonstrate symptom management with ADLs.   -CN     STG 1 Progress Partially Met  -CN     STG 1 Progress Comments Typically around 2/10, however spikes to 5/10 occasionally.   -CN     STG 2 Pt  will be independent and compliant with HEP in order to facilitate self-management of symptoms.   -CN     STG 2 Progress Met  -CN        Long Term Goals    LTG Date to Achieve 08/03/18  -CN     LTG 1 Pt will increase shoulder AROM to flex=100 and abd=100 on R side in order to perform all ADLs.   -CN     LTG 1 Progress Met  -CN     LTG 2 Pt will improve gross shoulder strength to 4/5 on right side.   -CN     LTG 2 Progress Ongoing  -CN     LTG 2 Progress Comments Pt continues with significant weakness and pain with MMT.   -CN     LTG 3 Pt will achieve a DASH score of 5% to demonstrate independence with functional activities.  -CN     LTG 3 Progress Not Met  -CN     LTG 3 Progress Comments 21% where 0% is no disability.   -CN       User Key  (r) = Recorded By, (t) = Taken By, (c) = Cosigned By    Initials Name Provider Type    ALEX Ware, PT Physical Therapist                PT Assessment/Plan     Row Name 07/23/18 4434          PT Assessment    Functional Limitations Decreased safety during functional activities;Limitations in functional capacity and performance;Limitation in home management;Performance in self-care ADL  -CN     Impairments Range of motion;Muscle strength;Pain;Posture;Poor body mechanics;Impaired flexibility  -CN     Assessment Comments Pt has attended 8 skilled therapy sessions for treatment of R shoulder pain with likely RTC tear. Pt reports improved symtpoms overall with decreased frequency and intensity of pain with elevation. Pt demonstrates increased shoulder flexion and abduction ROM and improved scapular stability leading to better joint mechanics. Pt to return to MD in 2 weeks and advised to inquire about referral to ortho MD for futher work up. Pt advised to continue with current HEP and return to PT pending ortho evaluation.   -CN        PT Plan    PT Plan Comments Hold on PT until likely ortho evaluation.   -CN       User Key  (r) = Recorded By, (t) = Taken By, (c) =  Cosigned By    Initials Name Provider Type    CN Kierra Ware, PT Physical Therapist                  Exercises     Row Name 07/23/18 1500             Subjective Comments    Subjective Comments Its pretty achey today.   -CN         Subjective Pain    Able to rate subjective pain? yes  -CN      Pre-Treatment Pain Level 2  -CN         Total Minutes    57508 - PT Therapeutic Exercise Minutes 40  -CN         Exercise 1    Exercise Name 1 Shoulder flexion with band at wrist  -CN      Cueing 1 Demo  -CN      Sets 1 2  -CN      Reps 1 10  -CN      Additional Comments GTB  -CN         Exercise 4    Exercise Name 4 Lat pull down  -CN      Cueing 4 Demo  -CN      Sets 4 2  -CN      Reps 4 10  -CN      Additional Comments GTB  -CN         Exercise 5    Exercise Name 5 UBE  -CN      Cueing 5 Verbal  -CN      Time 5 4 min  -CN         Exercise 6    Exercise Name 6 Pulleys into flexion and abduction  -CN      Cueing 6 Demo  -CN      Reps 6 10  -CN      Time 6 5 sec  -CN         Exercise 7    Exercise Name 7 Supine punches  -CN      Cueing 7 Demo  -CN      Sets 7 2  -CN      Reps 7 10  -CN      Additional Comments 4#  -CN         Exercise 8    Exercise Name 8 Standing rows  -CN      Cueing 8 Demo  -CN      Sets 8 2  -CN      Reps 8 10  -CN      Additional Comments BTB  -CN         Exercise 9    Exercise Name 9 Doorway pec stretch  -CN      Cueing 9 Demo  -CN      Reps 9 3  -CN      Time 9 20 sec  -CN         Exercise 10    Exercise Name 10 Shoulder extension  -CN      Cueing 10 Demo  -CN      Sets 10 2  -CN      Reps 10 10  -CN      Additional Comments BTB  -CN         Exercise 11    Exercise Name 11 Supine B UE ER  -CN      Cueing 11 Demo  -CN      Sets 11 2  -CN      Reps 11 10  -CN      Additional Comments GTB  -CN         Exercise 12    Exercise Name 12 Supine star  -CN      Cueing 12 Demo  -CN      Reps 12 2x10  -CN      Additional Comments GTB  -CN         Exercise 13    Exercise Name 13 Supine ABCs with  protraction  -CN      Cueing 13 Demo  -CN      Reps 13 1  -CN      Additional Comments 3#  -CN         Exercise 14    Exercise Name 14 Wall walks  -CN      Cueing 14 Demo  -CN      Sets 14 2  -CN      Reps 14 10  -CN      Additional Comments RTB  -CN         Exercise 16    Exercise Name 16 Seated shoulder flexion with band at wrists  -CN      Cueing 16 Demo  -CN      Sets 16 2  -CN      Reps 16 10  -CN      Additional Comments GTB  -CN         Exercise 17    Exercise Name 17 Ball bounces up wall  -CN      Additional Comments stopped due to pain  -CN        User Key  (r) = Recorded By, (t) = Taken By, (c) = Cosigned By    Initials Name Provider Type    CN Kierra Ware, PT Physical Therapist                        Outcome Measure Options: Disabilities of the Arm, Shoulder, and Hand (DASH)         Time Calculation:     Therapy Suggested Charges     Code   Minutes Charges    76179 (CPT®) Hc Pt Neuromusc Re Education Ea 15 Min      42272 (CPT®) Hc Pt Ther Proc Ea 15 Min 40 3    09159 (CPT®) Hc Gait Training Ea 15 Min      11129 (CPT®) Hc Pt Therapeutic Act Ea 15 Min      28045 (CPT®) Hc Pt Manual Therapy Ea 15 Min      90987 (CPT®) Hc Pt Ther Massage- Per 15 Min      76592 (CPT®) Hc Pt Iontophoresis Ea 15 Min      91273 (CPT®) Hc Pt Elec Stim Ea-Per 15 Min      17523 (CPT®) Hc Pt Ultrasound Ea 15 Min      01642 (CPT®) Hc Pt Self Care/Mgmt/Train Ea 15 Min      Total  40 3          Start Time: 1530  Stop Time: 1615  Time Calculation (min): 45 min     Therapy Charges for Today     Code Description Service Date Service Provider Modifiers Qty    96520978354 HC PT THER PROC EA 15 MIN 7/23/2018 Kierra Ware, PT GP 3    63473752021 HC PT CARRY MOV HAND OBJ PROJECTED 7/23/2018 Kierra Ware, PT GP, CI 1    06507139602 HC PT CARRY MOV HAND OBJ DISCHARGE 7/23/2018 Kierra Ware, PT GP, CJ 1          PT G-Codes  PT Professional Judgement Used?: Yes  Outcome Measure Options: Disabilities of the  Arm, Shoulder, and Hand (DASH)  Score: 21% where 0% is no disability despite subjective reports of improvement  Functional Limitation: Carrying, moving and handling objects  Carrying, Moving and Handling Objects Goal Status (): At least 1 percent but less than 20 percent impaired, limited or restricted  Carrying, Moving and Handling Objects Discharge Status (): At least 20 percent but less than 40 percent impaired, limited or restricted         Kierra Ware, PT  7/23/2018

## 2018-08-02 ENCOUNTER — OFFICE VISIT (OUTPATIENT)
Dept: FAMILY MEDICINE CLINIC | Facility: CLINIC | Age: 68
End: 2018-08-02

## 2018-08-02 VITALS
SYSTOLIC BLOOD PRESSURE: 128 MMHG | WEIGHT: 295 LBS | HEIGHT: 76 IN | HEART RATE: 87 BPM | OXYGEN SATURATION: 96 % | DIASTOLIC BLOOD PRESSURE: 82 MMHG | BODY MASS INDEX: 35.92 KG/M2

## 2018-08-02 DIAGNOSIS — G89.29 CHRONIC RIGHT SHOULDER PAIN: Primary | ICD-10-CM

## 2018-08-02 DIAGNOSIS — M25.511 CHRONIC RIGHT SHOULDER PAIN: Primary | ICD-10-CM

## 2018-08-02 DIAGNOSIS — M75.01 ADHESIVE CAPSULITIS OF RIGHT SHOULDER: ICD-10-CM

## 2018-08-02 PROCEDURE — 20610 DRAIN/INJ JOINT/BURSA W/O US: CPT | Performed by: INTERNAL MEDICINE

## 2018-08-02 RX ORDER — DOXYCYCLINE 100 MG/1
1 CAPSULE ORAL 2 TIMES DAILY
COMMUNITY
Start: 2018-08-01 | End: 2018-09-10

## 2018-08-02 RX ORDER — TRIAMCINOLONE ACETONIDE 40 MG/ML
80 INJECTION, SUSPENSION INTRA-ARTICULAR; INTRAMUSCULAR ONCE
Status: CANCELLED | OUTPATIENT
Start: 2018-08-02 | End: 2018-08-02

## 2018-08-02 RX ORDER — TRIAMCINOLONE ACETONIDE 40 MG/ML
80 INJECTION, SUSPENSION INTRA-ARTICULAR; INTRAMUSCULAR ONCE
Status: COMPLETED | OUTPATIENT
Start: 2018-08-02 | End: 2018-08-02

## 2018-08-02 RX ADMIN — TRIAMCINOLONE ACETONIDE 80 MG: 40 INJECTION, SUSPENSION INTRA-ARTICULAR; INTRAMUSCULAR at 15:25

## 2018-08-02 NOTE — PROGRESS NOTES
Procedure: Steroid Injection of the Shoulder    Indication: Adhesive capsulitis of the right shoulder with persistent pain    Diagnosis:    Encounter Diagnoses   Name Primary?   • Chronic right shoulder pain Yes   • Adhesive capsulitis of right shoulder          Description of procedure:    The risks, benefits, and alternatives of a steroid injection of the right shoulder were discussed with the patient and consent was obtained.  The posterolateral aspect of the right shoulder was prepped with Betadine ×3 and alcohol ×1.  Ethyl chloride spray was used for topical anesthesia.  Under aseptic technique, the right shoulder was injected with 2 cc of Lidocaine 1% without Epi and 2 cc of Kenalog- 40 mg/mL via the posterolateral approach.  The site was cleansed of Betadine and a bandage was applied.    The patient tolerated the procedure well and there were no complications.    The patient was instructed to use ice for postinjection flare and to return to the office for signs or symptoms of an infection.

## 2018-08-09 ENCOUNTER — HOSPITAL ENCOUNTER (OUTPATIENT)
Dept: CARDIOLOGY | Facility: HOSPITAL | Age: 68
Setting detail: RECURRING SERIES
Discharge: HOME | End: 2018-08-09

## 2018-08-09 PROCEDURE — 85610 PROTHROMBIN TIME: CPT

## 2018-08-09 PROCEDURE — 36416 COLLJ CAPILLARY BLOOD SPEC: CPT

## 2018-08-23 ENCOUNTER — HOSPITAL ENCOUNTER (OUTPATIENT)
Dept: CARDIOLOGY | Facility: HOSPITAL | Age: 68
Setting detail: RECURRING SERIES
Discharge: HOME | End: 2018-08-23

## 2018-08-23 PROCEDURE — 36416 COLLJ CAPILLARY BLOOD SPEC: CPT

## 2018-08-23 PROCEDURE — 85610 PROTHROMBIN TIME: CPT

## 2018-08-30 ENCOUNTER — OFFICE VISIT (OUTPATIENT)
Dept: FAMILY MEDICINE CLINIC | Facility: CLINIC | Age: 68
End: 2018-08-30

## 2018-08-30 VITALS
BODY MASS INDEX: 35.38 KG/M2 | SYSTOLIC BLOOD PRESSURE: 120 MMHG | HEART RATE: 80 BPM | OXYGEN SATURATION: 98 % | WEIGHT: 290.5 LBS | HEIGHT: 76 IN | DIASTOLIC BLOOD PRESSURE: 84 MMHG

## 2018-08-30 DIAGNOSIS — G47.00 INSOMNIA, UNSPECIFIED TYPE: ICD-10-CM

## 2018-08-30 PROCEDURE — 99212 OFFICE O/P EST SF 10 MIN: CPT | Performed by: INTERNAL MEDICINE

## 2018-08-30 RX ORDER — ALPRAZOLAM 0.5 MG/1
0.5 TABLET ORAL NIGHTLY PRN
Qty: 30 TABLET | Refills: 2 | Status: SHIPPED | OUTPATIENT
Start: 2018-08-30 | End: 2018-11-29 | Stop reason: SDUPTHER

## 2018-08-30 RX ORDER — CLOTRIMAZOLE AND BETAMETHASONE DIPROPIONATE 10; .64 MG/G; MG/G
CREAM TOPICAL
COMMUNITY
Start: 2018-08-14 | End: 2018-09-10

## 2018-08-30 NOTE — PROGRESS NOTES
Chief Complaint   Patient presents with   • Insomnia     CSE     He has chronic insomnia, plaguing him for years, with trouble staying asleep when he would awaken.  He usually goes to sleep in a chair due to chronic foot pain, but would awaken in the middle the night to urinate.  At that time, he would have trouble falling back to sleep.  He was using Xanax one half tablet at bedtime and one half tablet when he had trouble falling back to sleep.  He currently takes a whole tablet at bedtime.  He is able to go back to sleep upon awakening in the middle the night.  He reports feeling good the morning after taking his Xanax and resting well, without excess sedation.    He  reports that he has been smoking Cigarettes.  He has been smoking about 0.25 packs per day. He has never used smokeless tobacco. He reports that he drinks about 4.2 oz of alcohol per week . He reports that he does not use drugs.    Denies any family history of substance abuse.    Allergies   Allergen Reactions   • Cephalexin Hives   • Codeine Nausea Only       Current Outpatient Prescriptions:   •  ALPRAZolam (XANAX) 0.5 MG tablet, Take 1 tablet by mouth At Night As Needed for Sleep., Disp: 30 tablet, Rfl: 2  •  ATROVENT HFA 17 MCG/ACT inhaler, INHALE 2 PUFFS 4 (FOUR) TIMES A DAY., Disp: 12.9 inhaler, Rfl: 3  •  carvedilol (COREG) 12.5 MG tablet, TAKE 1 TABLET BY MOUTH 2 (TWO) TIMES A DAY WITH MEALS., Disp: 180 tablet, Rfl: 1  •  clotrimazole-betamethasone (LOTRISONE) 1-0.05 % cream, , Disp: , Rfl:   •  doxycycline (MONODOX) 100 MG capsule, Take 1 capsule by mouth 2 (Two) Times a Day., Disp: , Rfl:   •  finasteride (PROSCAR) 5 MG tablet, Take 1 tablet by mouth daily., Disp: , Rfl:   •  hydrochlorothiazide (HYDRODIURIL) 12.5 MG tablet, TAKE 1 TABLET BY MOUTH DAILY., Disp: 30 tablet, Rfl: 3  •  HYDROcodone-acetaminophen (NORCO) 7.5-325 MG per tablet, Take 1 tablet by mouth 2 (two) times a day as needed., Disp: , Rfl:   •  lisinopril (PRINIVIL,ZESTRIL)  "20 MG tablet, TAKE 2 TABLETS BY MOUTH DAILY., Disp: 180 tablet, Rfl: 2  •  metoclopramide (REGLAN) 10 MG tablet, TAKE 1 TABLET BY MOUTH 4 (FOUR) TIMES A DAY BEFORE MEALS & AT BEDTIME., Disp: 120 tablet, Rfl: 5  •  mupirocin (BACTROBAN) 2 % ointment, APPLY TO AFFECTED AREA EVERY DAY, Disp: , Rfl: 3  •  pravastatin (PRAVACHOL) 20 MG tablet, Take 1 tablet by mouth Daily., Disp: 90 tablet, Rfl: 2  •  silodosin (RAPAFLO) 8 MG capsule capsule, Take 1 capsule by mouth daily. With food., Disp: , Rfl:   •  VENTOLIN  (90 Base) MCG/ACT inhaler, INHALE TWO PUFFS EVERY FOUR HOURS AS NEEDED FOR WHEEZING, Disp: 18 inhaler, Rfl: 2  •  warfarin (COUMADIN) 5 MG tablet, TAKE 1 AND 1/2 TABLETS BY MOUTH EVERY DAY OR USE AS DIRECTED, Disp: 135 tablet, Rfl: 0    ROS:  Chronic insomnia.  Chronic dyspnea on exertion with occasional shortness of breath at rest.  Chronic lower extremity edema.  Chronic lower extremity pain.      Vitals:    08/30/18 1359   BP: 120/84   BP Location: Right arm   Patient Position: Sitting   Cuff Size: Large Adult   Pulse: 80   SpO2: 98%   Weight: 132 kg (290 lb 8 oz)   Height: 193 cm (76\")     Obese male, in NAD.  Smells of smoke.  A&O x 4; answers all questions appropriately.    Jalen was seen today for insomnia.    Diagnoses and all orders for this visit:    Insomnia, unspecified type  -     ALPRAZolam (XANAX) 0.5 MG tablet; Take 1 tablet by mouth At Night As Needed for Sleep.    GEOFFREY report was obtained and reviewed during the course of today's office visit.  Continue the alprazolam unchanged.  He continues to give him good benefit without untoward side effect.    "

## 2018-08-31 DIAGNOSIS — I25.10 NONOCCLUSIVE CORONARY ATHEROSCLEROSIS OF NATIVE CORONARY ARTERY: Primary | ICD-10-CM

## 2018-09-05 DIAGNOSIS — I11.9 HYPERTENSIVE HEART DISEASE WITHOUT HEART FAILURE: ICD-10-CM

## 2018-09-05 RX ORDER — HYDROCHLOROTHIAZIDE 12.5 MG/1
12.5 TABLET ORAL DAILY
Qty: 90 TABLET | Refills: 3 | Status: SHIPPED | OUTPATIENT
Start: 2018-09-05 | End: 2019-04-29 | Stop reason: HOSPADM

## 2018-09-10 RX ORDER — DOCUSATE SODIUM 100 MG/1
100 CAPSULE, LIQUID FILLED ORAL DAILY
COMMUNITY
End: 2022-01-01 | Stop reason: ALTCHOICE

## 2018-09-10 RX ORDER — WARFARIN SODIUM 5 MG/1
7.5 TABLET ORAL
COMMUNITY
End: 2018-11-29 | Stop reason: SDUPTHER

## 2018-09-10 RX ORDER — CARVEDILOL 12.5 MG/1
12.5 TABLET ORAL 2 TIMES DAILY WITH MEALS
COMMUNITY
End: 2018-10-08 | Stop reason: SDUPTHER

## 2018-09-10 RX ORDER — ALBUTEROL SULFATE 90 UG/1
2 AEROSOL, METERED RESPIRATORY (INHALATION) EVERY 4 HOURS PRN
COMMUNITY
End: 2018-11-29 | Stop reason: SDUPTHER

## 2018-09-10 RX ORDER — WARFARIN SODIUM 5 MG/1
5 TABLET ORAL
COMMUNITY
End: 2018-11-29 | Stop reason: SDUPTHER

## 2018-09-11 ENCOUNTER — ANESTHESIA EVENT (OUTPATIENT)
Dept: GASTROENTEROLOGY | Facility: HOSPITAL | Age: 68
End: 2018-09-11

## 2018-09-11 ENCOUNTER — ANESTHESIA (OUTPATIENT)
Dept: GASTROENTEROLOGY | Facility: HOSPITAL | Age: 68
End: 2018-09-11

## 2018-09-11 ENCOUNTER — HOSPITAL ENCOUNTER (OUTPATIENT)
Facility: HOSPITAL | Age: 68
Setting detail: HOSPITAL OUTPATIENT SURGERY
Discharge: HOME | End: 2018-09-11
Attending: INTERNAL MEDICINE | Admitting: INTERNAL MEDICINE

## 2018-09-11 VITALS
HEIGHT: 76 IN | RESPIRATION RATE: 16 BRPM | SYSTOLIC BLOOD PRESSURE: 142 MMHG | BODY MASS INDEX: 36.8 KG/M2 | DIASTOLIC BLOOD PRESSURE: 86 MMHG | WEIGHT: 302.2 LBS | OXYGEN SATURATION: 95 % | TEMPERATURE: 98.1 F | HEART RATE: 69 BPM

## 2018-09-11 DIAGNOSIS — D12.6 ADENOMATOUS POLYP OF COLON, UNSPECIFIED PART OF COLON: ICD-10-CM

## 2018-09-11 PROCEDURE — 25010000002 PROPOFOL 10 MG/ML EMULSION: Performed by: ANESTHESIOLOGY

## 2018-09-11 PROCEDURE — S0260 H&P FOR SURGERY: HCPCS | Performed by: INTERNAL MEDICINE

## 2018-09-11 PROCEDURE — 88305 TISSUE EXAM BY PATHOLOGIST: CPT | Performed by: INTERNAL MEDICINE

## 2018-09-11 PROCEDURE — 45380 COLONOSCOPY AND BIOPSY: CPT | Performed by: INTERNAL MEDICINE

## 2018-09-11 RX ORDER — PROPOFOL 10 MG/ML
VIAL (ML) INTRAVENOUS CONTINUOUS PRN
Status: DISCONTINUED | OUTPATIENT
Start: 2018-09-11 | End: 2018-09-11 | Stop reason: SURG

## 2018-09-11 RX ORDER — SODIUM CHLORIDE, SODIUM LACTATE, POTASSIUM CHLORIDE, CALCIUM CHLORIDE 600; 310; 30; 20 MG/100ML; MG/100ML; MG/100ML; MG/100ML
1000 INJECTION, SOLUTION INTRAVENOUS CONTINUOUS
Status: DISCONTINUED | OUTPATIENT
Start: 2018-09-11 | End: 2018-09-11 | Stop reason: HOSPADM

## 2018-09-11 RX ORDER — SODIUM CHLORIDE 0.9 % (FLUSH) 0.9 %
3 SYRINGE (ML) INJECTION AS NEEDED
Status: DISCONTINUED | OUTPATIENT
Start: 2018-09-11 | End: 2018-09-11 | Stop reason: HOSPADM

## 2018-09-11 RX ORDER — LIDOCAINE HYDROCHLORIDE 20 MG/ML
INJECTION, SOLUTION INFILTRATION; PERINEURAL AS NEEDED
Status: DISCONTINUED | OUTPATIENT
Start: 2018-09-11 | End: 2018-09-11 | Stop reason: SURG

## 2018-09-11 RX ORDER — LIDOCAINE HYDROCHLORIDE 10 MG/ML
0.5 INJECTION, SOLUTION INFILTRATION; PERINEURAL ONCE AS NEEDED
Status: DISCONTINUED | OUTPATIENT
Start: 2018-09-11 | End: 2018-09-11 | Stop reason: HOSPADM

## 2018-09-11 RX ADMIN — LIDOCAINE HYDROCHLORIDE 40 MG: 20 INJECTION, SOLUTION INFILTRATION; PERINEURAL at 13:07

## 2018-09-11 RX ADMIN — PROPOFOL 180 MCG/KG/MIN: 10 INJECTION, EMULSION INTRAVENOUS at 13:07

## 2018-09-11 RX ADMIN — ALFENTANIL HYDROCHLORIDE 500 MCG: 500 INJECTION, SOLUTION INTRAVENOUS at 13:08

## 2018-09-11 RX ADMIN — SODIUM CHLORIDE, POTASSIUM CHLORIDE, SODIUM LACTATE AND CALCIUM CHLORIDE 1000 ML: 600; 310; 30; 20 INJECTION, SOLUTION INTRAVENOUS at 12:29

## 2018-09-11 NOTE — NURSING NOTE
"Pt is on Afib with controlled rate. He is on Coumadin and was held since last Wednesday. Informed Dr. Lagunas and asked if he wants a PT/INR check prior to procedure. He said \"no\" when asked. Informed the Adriana.RN. Pt also has bilateral venous ulcer and had been seeing  for wound dressing change every Monday and is due next week. Unable to assess site. Covered with elastic bandage and socks.  "

## 2018-09-11 NOTE — ANESTHESIA PREPROCEDURE EVALUATION
Anesthesia Evaluation     Patient summary reviewed and Nursing notes reviewed   no history of anesthetic complications:  NPO Solid Status: > 6 hours  NPO Liquid Status: > 6 hours           Airway   Mallampati: II  TM distance: >3 FB  Neck ROM: full  no difficulty expected and No difficulty expected  Dental - normal exam     Pulmonary - normal exam    breath sounds clear to auscultation  (+) a smoker Current Smoked day of surgery, COPD, sleep apnea,   (-) rhonchi, decreased breath sounds, wheezes, rales, stridor  Cardiovascular - normal exam    NYHA Classification: I  Rhythm: regular  Rate: normal    (+) hypertension, CAD, dysrhythmias Atrial Fib, Atrial Flutter, PVD, hyperlipidemia,   (-) murmur, weak pulses, friction rub, systolic click, carotid bruits, JVD, peripheral edema      Neuro/Psych- negative ROS  GI/Hepatic/Renal/Endo    (+) obesity, morbid obesity,  liver disease,     Musculoskeletal (-) negative ROS    Abdominal  - normal exam  (+) obese,     Abdomen: soft.   Substance History - negative use     OB/GYN negative ob/gyn ROS         Other      history of cancer remission                  Anesthesia Plan    ASA 3     MAC     intravenous induction   Anesthetic plan, all risks, benefits, and alternatives have been provided, discussed and informed consent has been obtained with: patient.

## 2018-09-11 NOTE — ANESTHESIA POSTPROCEDURE EVALUATION
"Patient: Jalen Lockwood    Procedure Summary     Date:  09/11/18 Room / Location:  [facility] ENDOSCOPY 10 / [facility] ENDOSCOPY    Anesthesia Start:  1307 Anesthesia Stop:  1339    Procedure:  COLONOSCOPY TO CECUM  AND TERM. ILEUM WITH COLD SNARE POLYPECTOMIES (N/A ) Diagnosis:       Adenomatous polyp of colon, unspecified part of colon      (Adenomatous polyp of colon, unspecified part of colon [D12.6])    Surgeon:  Kane Lagunas MD Provider:  Luis Salcido MD    Anesthesia Type:  MAC ASA Status:  3          Anesthesia Type: MAC  Last vitals  BP   142/86 (09/11/18 1358)   Temp   36.7 °C (98.1 °F) (09/11/18 1347)   Pulse   69 (09/11/18 1358)   Resp   16 (09/11/18 1209)     SpO2   95 % (09/11/18 1358)     Post Anesthesia Care and Evaluation    Patient location during evaluation: bedside  Patient participation: complete - patient participated  Level of consciousness: awake and alert  Pain score: 0  Pain management: adequate  Airway patency: patent  Anesthetic complications: No anesthetic complications    Cardiovascular status: acceptable  Respiratory status: acceptable  Hydration status: acceptable    Comments: /86 (BP Location: Left arm, Patient Position: Lying)   Pulse 69   Temp 36.7 °C (98.1 °F) (Oral)   Resp 16   Ht 193 cm (76\")   Wt (!) 137 kg (302 lb 3.2 oz)   SpO2 95%   BMI 36.78 kg/m²       "

## 2018-09-11 NOTE — H&P
Progress Notes  Encounter Date: 4/19/2018  Heather Rose APRN   Gastroenterology   Expand All Collapse All    []Manual[]Template  []Copied       Chief Complaint   Patient presents with   • Follow-up       yearly check up          Jalen Lockwood is a  68 y.o. male here for a follow up visit for gastroparesis.      HPI  69 yo m presents today for follow up for gastroparesis with hx adenomatous colon polyps. He is a patient of Dr. Lagunas. He was last seen in the office on 2/2017. He has hx gastroparesis and has been doing well on reglan 10 mg before meals and at bedtime x 5 years. He denies any adverse side effects with the medication. He denies any dysphagia, reflux, abd pain, N&V, diarrhea, constipation, rectal bleeding or melena. He admits his appetite is good and his weight has gone up a bit since last visit (305 to 317).      Medical History        Past Medical History:   Diagnosis Date   • Allergic rhinitis     • Aortectasia       3cm infrarenal abdominal aorta   • Arthritis     • Atrial flutter 2010     s/p ablation    • Charcot's joint of foot     • CHF (congestive heart failure)     • Chronic edema     • Chronic venous insufficiency     • COPD (chronic obstructive pulmonary disease)     • Coronary atherosclerosis       Cath 2010: diffuse 40-50% disease   • Diverticulosis     • Duodenitis     • Fatty liver     • Gastritis     • Gastroparesis     • Hematoma       post-operative; After catheterization, right groin, required surgical exploration   • Hypertensive heart disease without heart failure 7/28/2016   • Internal hemorrhoids     • Morbid obesity     • Osteomyelitis     • Paroxysmal atrial fibrillation     • Peripheral neuropathy     • Popliteal artery aneurysm       left, s/p stenting by Dr. Boston   • Skin cancer     • Sleep apnea     • Tachycardia induced cardiomyopathy       due to flutter and afib; cath 2010 with nonobstructive disease   • Venous stasis              Surgical History          Past Surgical History:   Procedure Laterality Date   • COLONOSCOPY   09/28/2015     NBIH, diverticulosis, polyps   • HIP SURGERY Right     • OTHER SURGICAL HISTORY         Catheter ablation atrial flutter   • REPAIR ANEURYSM / PSEUDO ANEURYSM / RUPTURED ANEURYSM POPLITEAL ARTERY         Stent-Graft of the the left popliteal artery   • REPAIR KNEE LIGAMENT         Primary repair of knee ligament cruciate anterior right   • TONSILLECTOMY       • TOTAL KNEE ARTHROPLASTY Left     • UPPER GASTROINTESTINAL ENDOSCOPY   09/16/2014     acute gastritis, acute duodenitis            Scheduled Meds:     Continuous Infusions:  No current facility-administered medications for this visit.      PRN Meds:.          Allergies   Allergen Reactions   • Cephalexin Hives   • Codeine Nausea Only         Social History   Social History            Social History   • Marital status:        Spouse name: N/A   • Number of children: N/A   • Years of education: N/A          Occupational History   • Not on file.             Social History Main Topics   • Smoking status: Current Every Day Smoker       Packs/day: 0.25       Types: Cigarettes   • Smokeless tobacco: Never Used         Comment: caffeine use: 3 cups of coffee in the morning/ Dt soft drinks in the evening.    • Alcohol use 4.2 oz/week       7 Standard drinks or equivalent per week         Comment: a couple drinks every day   • Drug use: No   • Sexual activity: Not on file           Other Topics Concern   • Not on file          Social History Narrative   • No narrative on file                  Family History   Problem Relation Age of Onset   • Emphysema Father           Review of Systems   Constitutional: Negative for appetite change, chills, diaphoresis, fatigue, fever and unexpected weight change.   HENT: Negative for nosebleeds, postnasal drip, sore throat, trouble swallowing and voice change.    Respiratory: Negative for cough, choking, chest tightness, shortness of breath  and wheezing.    Cardiovascular: Negative for chest pain.   Gastrointestinal: Negative for abdominal distention, abdominal pain, anal bleeding, blood in stool, constipation, diarrhea, nausea, rectal pain and vomiting.   Endocrine: Negative for polydipsia, polyphagia and polyuria.   Musculoskeletal: Negative for gait problem.   Skin: Negative for rash and wound.   Allergic/Immunologic: Negative for food allergies.   Neurological: Negative for dizziness, speech difficulty and light-headedness.   Psychiatric/Behavioral: Negative for confusion, self-injury, sleep disturbance and suicidal ideas.             Vitals:     04/19/18 1518   BP: 136/84   Temp: 98.1 °F (36.7 °C)         Physical Exam   Constitutional: He is oriented to person, place, and time. He appears well-developed and well-nourished. He does not appear ill. No distress.   HENT:   Head: Normocephalic.   Eyes: Pupils are equal, round, and reactive to light.   Cardiovascular: Normal rate, regular rhythm and normal heart sounds.    Pulmonary/Chest: Effort normal and breath sounds normal.   Abdominal: Soft. Bowel sounds are normal. He exhibits no distension and no mass. There is no hepatosplenomegaly. There is no tenderness. There is no rebound and no guarding. No hernia.   Musculoskeletal: Normal range of motion.   Neurological: He is alert and oriented to person, place, and time.   Skin: Skin is warm and dry.   Psychiatric: He has a normal mood and affect. His speech is normal and behavior is normal. Judgment normal.         No images are attached to the encounter.     Assessment & Plan     1. Gastroparesis     2. Adenomatous polyp of colon, unspecified part of colon  - Case Request; Standing  - Case Request     Gastroparesis is well controlled on reglan. Denies any side effects from the reglan. Patient is due for surveillance colonoscopy this September given hx adenomatous colon polyps. He is on coumadin and will need cardiac clearance to come off of it  before proceeding with colonoscopy. I will send note to RN to get this done. Follow up with Dr. Lagunas in 1 year or sooner as needed.           Office Visit on 4/19/2018            Detailed Report

## 2018-09-12 LAB
CYTO UR: NORMAL
LAB AP CASE REPORT: NORMAL
PATH REPORT.FINAL DX SPEC: NORMAL
PATH REPORT.GROSS SPEC: NORMAL

## 2018-09-19 ENCOUNTER — TELEPHONE (OUTPATIENT)
Dept: GASTROENTEROLOGY | Facility: CLINIC | Age: 68
End: 2018-09-19

## 2018-09-19 NOTE — TELEPHONE ENCOUNTER
----- Message from Kane Lagunas MD sent at 9/12/2018  5:25 PM EDT -----  Tubular adenoma colon polyps, colonoscopy recall one year

## 2018-09-19 NOTE — TELEPHONE ENCOUNTER
Patient called, advised as per Dr. Lagunas's note. He verb understanding and is agreeable to the plan. Patient's health maintenance record updated to reflect the need to repeat colonoscopy in 1 years.

## 2018-09-27 ENCOUNTER — ANTICOAGULATION VISIT (OUTPATIENT)
Dept: PHARMACY | Facility: HOSPITAL | Age: 68
End: 2018-09-27

## 2018-09-27 DIAGNOSIS — I48.0 PAROXYSMAL ATRIAL FIBRILLATION (HCC): ICD-10-CM

## 2018-09-27 LAB
INR PPP: 2.4 (ref 0.91–1.09)
PROTHROMBIN TIME: 28.5 SECONDS (ref 10–13.8)

## 2018-09-27 PROCEDURE — 36416 COLLJ CAPILLARY BLOOD SPEC: CPT

## 2018-09-27 PROCEDURE — 85610 PROTHROMBIN TIME: CPT

## 2018-09-27 PROCEDURE — G0463 HOSPITAL OUTPT CLINIC VISIT: HCPCS

## 2018-09-27 RX ORDER — DOXYCYCLINE HYCLATE 100 MG/1
100 CAPSULE ORAL 2 TIMES DAILY
COMMUNITY
End: 2019-01-31

## 2018-09-27 NOTE — PROGRESS NOTES
Anticoagulation Clinic Progress Note  Anticoagulation Summary  As of 2018    INR goal:   2.0-3.0   TTR:   --   Today's INR:   2.4   Warfarin maintenance plan:   7.5 mg on Tue, Sat; 5 mg all other days   Weekly warfarin total:   40 mg   Plan last modified:   Sharita Nash [facility] (2018)   Next INR check:   10/25/2018   Target end date:   Indefinite    Indications    Paroxysmal atrial fibrillation (CMS/HCC) [I48.0]             Anticoagulation Episode Summary     INR check location:       Preferred lab:       Send INR reminders to:    [facility]    Comments:         Anticoagulation Care Providers     Provider Role Specialty Phone number    Kurt Henry MD Referring Cardiology [phone]    Sharita Nash [facility] Responsible Pharmacy             Drug interactions: started on Doxycycline  Diet: consistent    Clinic Interview:      Education:  Jalen Lockwood is a new start in the [facility]. We discussed the followin) Warfarin's indication, mechanism, and dosing  2) Enforced the importance of taking warfarin as instructed and at the same time every day, preferably in the evening so that we can make dose adjustments more easily following subsequent clinic visits  3) What he should do about a missed dose; pts can take missed doses within about 12 hours of their usual scheduled dose, but he was instructed on the importance of not doubling up on doses unless told to do so by the [facility]  4) Explained possible side effects of warfarin therapy, including increased risk of bleeding, s/sx of bleeding and s/sx of any additional clots/PE/CVA.   5) Discussed monitoring of warfarin, the INR, goal INR range, and the frequency of monitoring  6) Reviewed drug/food/tobacco/EtOH interactions and provided written information covering these topics in more detail, explaining that green, leafy vegetables interact most heavily with warfarin  7) Instructed the pt  not to take or discontinue any medications without informing his physician/pharmacist and reminded him to inform us of any dietary changes, as well  8) Explained that he would be coming into the clinic more frequently in these first few weeks of therapy as we try to adjust his dose and achieve a therapeutic INR x 2 consecutive readings. Once that is achieved, patient will follow up in clinic every 4 weeks, on average.    He stated no problems with transportation or scheduling clinic appts in this manner. he expressed understanding of the information provided and has no additional questions at this time.    Jalen Lockwood was presented with a copy of the Patients Rights and Responsibilities. he expressed verbal consent and agreement to receive care in the [facility] under the current collaborative care agreement with [facility].       INR History:  Previous INR data from [facility] is recorded in [facility] and scanned into the patient's chart in [employer]. These results have been analyzed and reviewed.      Plan:  1. INR is Therapeutic today- see above in Anticoagulation Summary.   Will instruct Jalen Lockwood to Continue their warfarin regimen- see above in Anticoagulation Summary.  2. Follow up in 1 month  3. Patient declines warfarin refills.  4. Verbal and written information provided. Patient expresses understanding and has no further questions at this time.    Sharita Nash [facility]

## 2018-10-08 RX ORDER — CARVEDILOL 12.5 MG/1
12.5 TABLET ORAL 2 TIMES DAILY WITH MEALS
Qty: 180 TABLET | Refills: 3 | Status: SHIPPED | OUTPATIENT
Start: 2018-10-08 | End: 2019-04-29 | Stop reason: HOSPADM

## 2018-10-09 RX ORDER — IPRATROPIUM BROMIDE 17 UG/1
AEROSOL, METERED RESPIRATORY (INHALATION)
Qty: 12.9 INHALER | Refills: 6 | Status: SHIPPED | OUTPATIENT
Start: 2018-10-09 | End: 2019-05-23 | Stop reason: ALTCHOICE

## 2018-10-18 RX ORDER — ALBUTEROL SULFATE 90 UG/1
AEROSOL, METERED RESPIRATORY (INHALATION)
Qty: 18 INHALER | Refills: 2 | Status: SHIPPED | OUTPATIENT
Start: 2018-10-18 | End: 2019-01-14 | Stop reason: SDUPTHER

## 2018-10-19 RX ORDER — WARFARIN SODIUM 5 MG/1
TABLET ORAL
Qty: 135 TABLET | Refills: 0 | Status: SHIPPED | OUTPATIENT
Start: 2018-10-19 | End: 2019-01-31 | Stop reason: SDUPTHER

## 2018-10-25 ENCOUNTER — ANTICOAGULATION VISIT (OUTPATIENT)
Dept: PHARMACY | Facility: HOSPITAL | Age: 68
End: 2018-10-25

## 2018-10-25 DIAGNOSIS — I48.0 PAROXYSMAL ATRIAL FIBRILLATION (HCC): ICD-10-CM

## 2018-10-25 LAB
INR PPP: 2.8 (ref 0.91–1.09)
PROTHROMBIN TIME: 33.5 SECONDS (ref 10–13.8)

## 2018-10-25 PROCEDURE — 85610 PROTHROMBIN TIME: CPT

## 2018-10-25 PROCEDURE — 36416 COLLJ CAPILLARY BLOOD SPEC: CPT

## 2018-10-25 NOTE — PROGRESS NOTES
Anticoagulation Clinic Progress Note    Anticoagulation Summary  As of 10/25/2018    INR goal:   2.0-3.0   TTR:   100.0 % (2.6 wk)   Today's INR:      Warfarin maintenance plan:   7.5 mg on Tue, Sat; 5 mg all other days   Weekly warfarin total:   40 mg   No change documented:   Charlee Hall   Plan last modified:   Sharita Nash [facility] (9/27/2018)   Next INR check:   11/21/2018   Priority:   Maintenance   Target end date:   Indefinite    Indications    Paroxysmal atrial fibrillation (CMS/HCC) [I48.0]             Anticoagulation Episode Summary     INR check location:       Preferred lab:       [facility]ers [facility] [place]    Comments:         Anticoagulation Care Providers     Provider Role Specialty Phone number    Kurt Henry MD Referring Cardiology [phone]    Sharita Nash [facility] Responsible Pharmacy           Drug interactions: has remained unchanged.  Diet: has remained unchanged.    Clinic Interview:  Patient Findings     Negatives:   Signs/symptoms of thrombosis, Signs/symptoms of bleeding,   Laboratory test error suspected, Change in health, Change in alcohol use,   Change in activity, Upcoming invasive procedure, Emergency department   visit, Upcoming dental procedure, Missed doses, Extra doses, Change in   medications, Change in diet/appetite, Hospital admission, Bruising, Other   complaints      Clinical Outcomes     Negatives:   Major bleeding event, Thromboembolic event,   Anticoagulation-related hospital admission, Anticoagulation-related ED   visit, Anticoagulation-related fatality        INR History:  Anticoagulation Monitoring 9/27/2018 10/25/2018   INR 2.4 -   INR Date 9/27/2018 -   INR Goal 2.0-3.0 2.0-3.0   Trend - Same   Last Week Total 0 mg 40 mg   Next Week Total 40 mg 40 mg   Sun 5 mg 5 mg   Mon 5 mg 5 mg   Tue 7.5 mg 7.5 mg   Wed 5 mg 5 mg   Thu 5 mg 5 mg   Fri 5 mg 5 mg   Sat 7.5 mg 7.5 mg   Visit Report - -       Plan:  1. INR is therapeutic  today- see above in Anticoagulation Summary.   Will instruct Jalen Lockwood to continue their warfarin regimen- see above in Anticoagulation Summary.  2. Follow up in 4 weeks.  3. Patient declines warfarin refills.  4. Verbal and written information provided. Patient expresses understanding and has no further questions at this time.    Charlee Hall

## 2018-10-26 NOTE — PROGRESS NOTES
Anticoagulation Monitoring 9/27/2018 10/25/2018   INR 2.4 2.8   INR Date 9/27/2018 10/25/2018   INR Goal 2.0-3.0 2.0-3.0   Trend - Same   Last Week Total 0 mg 40 mg   Next Week Total 40 mg 40 mg   Sun 5 mg 5 mg   Mon 5 mg 5 mg   Tue 7.5 mg 7.5 mg   Wed 5 mg 5 mg   Thu 5 mg 5 mg   Fri 5 mg 5 mg   Sat 7.5 mg 7.5 mg   Visit Report - -

## 2018-11-06 RX ORDER — LISINOPRIL 20 MG/1
40 TABLET ORAL DAILY
Qty: 180 TABLET | Refills: 0 | Status: SHIPPED | OUTPATIENT
Start: 2018-11-06 | End: 2019-02-19 | Stop reason: SDUPTHER

## 2018-11-06 RX ORDER — PRAVASTATIN SODIUM 20 MG
20 TABLET ORAL DAILY
Qty: 90 TABLET | Refills: 0 | Status: SHIPPED | OUTPATIENT
Start: 2018-11-06 | End: 2019-05-18 | Stop reason: SDUPTHER

## 2018-11-07 ENCOUNTER — DOCUMENTATION (OUTPATIENT)
Dept: PHYSICAL THERAPY | Facility: HOSPITAL | Age: 68
End: 2018-11-07

## 2018-11-07 DIAGNOSIS — M25.511 ACUTE PAIN OF RIGHT SHOULDER: Primary | ICD-10-CM

## 2018-11-07 DIAGNOSIS — Z74.09 IMPAIRED MOBILITY: ICD-10-CM

## 2018-11-07 NOTE — THERAPY DISCHARGE NOTE
Outpatient Physical Therapy Discharge Summary         Patient Name: Jalen Lockwood  : 1950  MRN: [id number]    Today's Date: 2018    Visit Dx:    ICD-10-CM ICD-9-CM   1. Acute pain of right shoulder M25.511 719.41   2. Impaired mobility Z74.09 799.89             PT OP Goals     Row Name 18 0700          PT Short Term Goals    STG Date to Achieve 18  -CN     STG 1 Pt will report subjective pain at 2/10 or less to demonstrate symptom management with ADLs.   -CN     STG 1 Progress Partially Met  -CN     STG 2 Pt will be independent and compliant with HEP in order to facilitate self-management of symptoms.   -CN     STG 2 Progress Met  -CN        Long Term Goals    LTG Date to Achieve 18  -CN     LTG 1 Pt will increase shoulder AROM to flex=100 and abd=100 on R side in order to perform all ADLs.   -CN     LTG 1 Progress Met  -CN     LTG 2 Pt will improve gross shoulder strength to 4/5 on right side.   -CN     LTG 2 Progress Not Met  -CN     LTG 3 Pt will achieve a DASH score of 5% to demonstrate independence with functional activities.  -CN     LTG 3 Progress Not Met  -CN       User Key  (r) = Recorded By, (t) = Taken By, (c) = Cosigned By    Initials Name Provider Type    Kierra Oliva, PT Physical Therapist          OP PT Discharge Summary  Date of Discharge: 18  Reason for Discharge: other (comment) (Plateau with skilled PT services)  Outcomes Achieved: Patient able to partially acheive established goals  Discharge Destination: Home with home program  Discharge Instructions/Additional Comments: Pt attended 8 skilled therapy sessions for treatment of R shoulder pain. Pt with improved pain overall, courtmartin demonstrates plateau with skilled PT services. Returned to MD after last appointment for epidural injection. D/C from skilled PT with instructions to continue with HEP for continued strengthening of R shoulder.       Time Calculation:        Therapy Suggested  Charges     Code   Minutes Charges    None                       Kierra Ware, PT  11/7/2018

## 2018-11-11 ENCOUNTER — RESULTS ENCOUNTER (OUTPATIENT)
Dept: FAMILY MEDICINE CLINIC | Facility: CLINIC | Age: 68
End: 2018-11-11

## 2018-11-11 DIAGNOSIS — I25.10 NONOCCLUSIVE CORONARY ATHEROSCLEROSIS OF NATIVE CORONARY ARTERY: ICD-10-CM

## 2018-11-15 ENCOUNTER — OFFICE VISIT (OUTPATIENT)
Dept: CARDIOLOGY | Facility: CLINIC | Age: 68
End: 2018-11-15

## 2018-11-15 VITALS
HEIGHT: 76 IN | HEART RATE: 72 BPM | BODY MASS INDEX: 35.44 KG/M2 | SYSTOLIC BLOOD PRESSURE: 122 MMHG | DIASTOLIC BLOOD PRESSURE: 82 MMHG | WEIGHT: 291 LBS | OXYGEN SATURATION: 99 %

## 2018-11-15 DIAGNOSIS — R00.0 TACHYCARDIA INDUCED CARDIOMYOPATHY (HCC): ICD-10-CM

## 2018-11-15 DIAGNOSIS — I72.4 POPLITEAL ARTERY ANEURYSM (HCC): ICD-10-CM

## 2018-11-15 DIAGNOSIS — I48.21 PERMANENT ATRIAL FIBRILLATION (HCC): Primary | ICD-10-CM

## 2018-11-15 DIAGNOSIS — I48.3 TYPICAL ATRIAL FLUTTER (HCC): ICD-10-CM

## 2018-11-15 DIAGNOSIS — I87.8 VENOUS STASIS: ICD-10-CM

## 2018-11-15 DIAGNOSIS — I10 ESSENTIAL HYPERTENSION: ICD-10-CM

## 2018-11-15 DIAGNOSIS — I25.10 NONOCCLUSIVE CORONARY ATHEROSCLEROSIS OF NATIVE CORONARY ARTERY: ICD-10-CM

## 2018-11-15 DIAGNOSIS — I43 TACHYCARDIA INDUCED CARDIOMYOPATHY (HCC): ICD-10-CM

## 2018-11-15 DIAGNOSIS — I77.819 AORTECTASIA (HCC): ICD-10-CM

## 2018-11-15 PROCEDURE — 93000 ELECTROCARDIOGRAM COMPLETE: CPT | Performed by: INTERNAL MEDICINE

## 2018-11-15 PROCEDURE — 99213 OFFICE O/P EST LOW 20 MIN: CPT | Performed by: INTERNAL MEDICINE

## 2018-11-15 NOTE — PROGRESS NOTES
Date of Office Visit: 11/15/2018  Encounter Provider: Kurt Henry MD  Place of Service: [facility] CARDIOLOGY  Patient Name: Jalen Lockwood  :1950    Chief Complaint   Patient presents with   • Leg Swelling     1 year follow up   • Coronary Artery Disease   :     HPI: Jalen Lockwood is a 68 y.o. male who presents today for yearly follow up. He presented with rapid atrial flutter in . His ejection fraction was low due to tachycardia-mediated cardiomyopathy. His catheterization revealed moderate nonobstructive coronary disease. Medical therapy only was recommended and his ejection fraction normalized. It was also found that he was drinking quite heavily and we recommended that he cut back.  Shortly after that, he presented with rapid atrial fibrillation and his ejection fraction declined again to 35%, but once his atrial fibrillation resolved, his ejection fraction improved again.     He has chronic edema due to severe venous stasis. In , he developed cellulitis and was treated at [facility]; a lower extremity ultrasound revealed a left popliteal artery aneurysm, which was stented by Dr. Boston.  This was complicated by a wound infection.  A non-contrasted CT of the abdomen/pelvis revealed aortectasia of the infrarenal abdominal aorta but no aneurysm elsewhere.      He denies chest pain, dyspnea, orthopnea, PND, palpitations, or syncope.  He still smokes.  He has not had bleeding from warfarin.  He has made dietary changes and has intentionally lost 20 pounds.      Past Medical History:   Diagnosis Date   • Allergic rhinitis    • Aortectasia (CMS/HCC)     3cm infrarenal abdominal aorta   • Arthritis    • Atrial flutter (CMS/HCC)     s/p ablation    • Charcot's joint of foot    • CHF (congestive heart failure) (CMS/HCC)    • Chronic edema     both legs and sees wound care center at [place]    • Chronic venous insufficiency    • COPD (chronic obstructive pulmonary  disease) (CMS/HCC)    • Coronary atherosclerosis     Cath 2010: diffuse 40-50% disease   • Diverticulosis    • Duodenitis    • Fatty liver    • Gastritis    • Gastroparesis    • Hematoma     post-operative; After catheterization, right groin, required surgical exploration   • Hyperlipidemia    • Hypertension    • Hypertensive heart disease without heart failure 7/28/2016   • Internal hemorrhoids    • Morbid obesity (CMS/HCC)    • Open wound     izzy legs has drsg chg weekly at wound care center at [place]  pt does second dressing on left leg another time during week   • Osteomyelitis (CMS/HCC)    • Paroxysmal atrial fibrillation (CMS/HCC)    • Peripheral neuropathy    • Popliteal artery aneurysm (CMS/HCC)     left, s/p stenting by Dr. Boston   • Skin cancer    • Sleep apnea     o2   • 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CMS/HCC)     due to flutter and afib; cath 2010 with nonobstructive disease   • Venous stasis    • Venous stasis ulcer (CMS/HCC)     bilateral legs        Past Surgical History:   Procedure Laterality Date   • CARDIAC CATHETERIZATION     • CATARACT EXTRACTION     • COLONOSCOPY  09/28/2015    NBIH, diverticulosis, polyps   • HIP ARTHROPLASTY Right    • JOINT REPLACEMENT     • OTHER SURGICAL HISTORY      Catheter ablation atrial flutter   • REPAIR ANEURYSM / PSEUDO ANEURYSM / RUPTURED ANEURYSM POPLITEAL ARTERY      Stent-Graft of the the left popliteal artery   • REPAIR KNEE LIGAMENT      Primary repair of knee ligament cruciate anterior right   • TONSILLECTOMY     • TONSILLECTOMY     • TOTAL KNEE ARTHROPLASTY Bilateral    • UPPER GASTROINTESTINAL ENDOSCOPY  09/16/2014    acute gastritis, acute duodenitis       Social History     Socioeconomic History   • Marital status:      Spouse name: Not on file   • Number of children: Not on file   • Years of education: Not on file   • Highest education level: Not on file   Social Needs   • Financial resource strain: Not on file   • Food insecurity -  worry: Not on file   • Food insecurity - inability: Not on file   • Transportation needs - medical: Not on file   • Transportation needs - non-medical: Not on file   Occupational History   • Not on file   Tobacco Use   • Smoking status: Current Every Day Smoker     Packs/day: 0.25     Years: 45.00     Pack years: 11.25     Types: Cigarettes   • Smokeless tobacco: Never Used   • Tobacco comment: caffeine use: 3 cups of coffee in the morning/ Dt soft drinks in the evening.    Substance and Sexual Activity   • Alcohol use: Yes     Alcohol/week: 12.6 oz     Types: 14 Shots of liquor, 7 Standard drinks or equivalent per week     Comment: 2 rum a day   • Drug use: No   • Sexual activity: Defer   Other Topics Concern   • Not on file   Social History Narrative   • Not on file       Family History   Problem Relation Age of Onset   • Emphysema Father    • Malig Hyperthermia Neg Hx        Review of Systems   Constitution: Positive for weight loss.   Cardiovascular: Positive for leg swelling.   Skin: Positive for poor wound healing.   All other systems reviewed and are negative.      Allergies   Allergen Reactions   • Cephalexin Hives   • Codeine Nausea Only         Current Outpatient Medications:   •  albuterol (PROVENTIL HFA;VENTOLIN HFA) 108 (90 Base) MCG/ACT inhaler, Inhale 2 puffs Every 4 (Four) Hours As Needed for Wheezing., Disp: , Rfl:   •  albuterol (VENTOLIN HFA) 108 (90 Base) MCG/ACT inhaler, INHALE TWO PUFFS EVERY FOUR HOURS AS NEEDED FOR WHEEZING, Disp: 18 inhaler, Rfl: 2  •  ALPRAZolam (XANAX) 0.5 MG tablet, Take 1 tablet by mouth At Night As Needed for Sleep., Disp: 30 tablet, Rfl: 2  •  ATROVENT HFA 17 MCG/ACT inhaler, INHALE 2 PUFFS 4 (FOUR) TIMES A DAY., Disp: 12.9 inhaler, Rfl: 6  •  carvedilol (COREG) 12.5 MG tablet, Take 1 tablet by mouth 2 (Two) Times a Day With Meals., Disp: 180 tablet, Rfl: 3  •  docusate sodium (COLACE) 100 MG capsule, Take 100 mg by mouth Daily., Disp: , Rfl:   •  doxycycline  "(VIBRAMYCIN) 100 MG capsule, Take 100 mg by mouth 2 (Two) Times a Day., Disp: , Rfl:   •  finasteride (PROSCAR) 5 MG tablet, Take 1 tablet by mouth daily., Disp: , Rfl:   •  hydrochlorothiazide (HYDRODIURIL) 12.5 MG tablet, Take 1 tablet by mouth Daily., Disp: 90 tablet, Rfl: 3  •  HYDROcodone-acetaminophen (NORCO) 7.5-325 MG per tablet, Take 1 tablet by mouth 2 (two) times a day as needed., Disp: , Rfl:   •  ipratropium (ATROVENT HFA) 17 MCG/ACT inhaler, Inhale 2 puffs 4 (Four) Times a Day., Disp: , Rfl:   •  lisinopril (PRINIVIL,ZESTRIL) 20 MG tablet, Take 2 tablets by mouth Daily., Disp: 180 tablet, Rfl: 0  •  metoclopramide (REGLAN) 10 MG tablet, TAKE 1 TABLET BY MOUTH 4 (FOUR) TIMES A DAY BEFORE MEALS & AT BEDTIME., Disp: 120 tablet, Rfl: 5  •  mupirocin (BACTROBAN) 2 % ointment, Apply  topically to the appropriate area as directed 3 (Three) Times a Week., Disp: , Rfl:   •  pravastatin (PRAVACHOL) 20 MG tablet, Take 1 tablet by mouth Daily., Disp: 90 tablet, Rfl: 0  •  silodosin (RAPAFLO) 8 MG capsule capsule, Take 1 capsule by mouth daily. With food., Disp: , Rfl:   •  warfarin (COUMADIN) 5 MG tablet, Take 5 mg by mouth. mon  WED  THUR  FRI  SUN, Disp: , Rfl:   •  warfarin (COUMADIN) 5 MG tablet, Take 7.5 mg by mouth. Tues  Sat, Disp: , Rfl:   •  warfarin (COUMADIN) 5 MG tablet, TAKE ONE AND ONE - HALF TABLET BY MOUTH ON Tuesday AND Saturday THEN TAKE ONE TABLET ALL OTHER DAYS OR AS DIRECTED., Disp: 135 tablet, Rfl: 0     Objective:     Vitals:    11/15/18 1433   BP: 122/82   BP Location: Right arm   Pulse: 72   SpO2: 99%   Weight: 132 kg (291 lb)   Height: 193 cm (76\")     Body mass index is 35.42 kg/m².    Physical Exam   Constitutional: He is oriented to person, place, and time.   Obese   HENT:   Head: Normocephalic.   Nose: Nose normal.   Mouth/Throat: Oropharynx is clear and moist.   Eyes: Conjunctivae and EOM are normal. Pupils are equal, round, and reactive to light.   Neck: Normal range of motion. "   Cannot assess for JVD due to habitus   Cardiovascular: Normal rate, normal heart sounds and intact distal pulses. An irregularly irregular rhythm present.   No murmur heard.  Pulmonary/Chest: Effort normal.   Abdominal: Soft.   Obesity limits abdominal exam   Musculoskeletal: Normal range of motion. He exhibits edema (severe varicose veins/venous stasis/edema) and deformity (bilateral charcot foot).   Neurological: He is alert and oriented to person, place, and time. No cranial nerve deficit.   Skin: Skin is warm and dry. No rash noted.   Numerous seb kers and actinic kers     Psychiatric: He has a normal mood and affect. His behavior is normal. Judgment and thought content normal.   Vitals reviewed.        ECG 12 Lead  Date/Time: 11/15/2018 2:58 PM  Performed by: Kurt Henry MD  Authorized by: Kurt Henry MD   Comparison: compared with previous ECG   Comparison to previous ECG: AF replaces SR  Rhythm: atrial fibrillation  Conduction: conduction normal  ST Segments: ST segments normal  T Waves: T waves normal  QRS axis: normal  Other: no other findings  Clinical impression: abnormal ECG              Assessment:       Diagnosis Plan   1. Permanent atrial fibrillation (CMS/HCC)     2. Typical atrial flutter (CMS/HCC)     3. 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CMS/HCC)     4. Nonocclusive coronary atherosclerosis of native coronary artery     5. Essential hypertension     6. Aortectasia (CMS/HCC)     7. Popliteal artery aneurysm (CMS/HCC)     8. Venous stasis            Plan:       1/2.  He has had both flutter and fib in the past; the former has been ablated.  He will remain on carvedilol and warfarin.  He has progressed to what I suspect is now permanent atrial fibrillation.    3.  His tachy-mediated CM resolved, and he does not have CHF.  He only uses furosemide as needed and is doing well with that.  He's on carvedilol and lisinopril.    4.  He has diffuse moderate CAD that is in the 40-50% range.  He does  not have angina.  He really needs to quit smoking.    He will remain on pravastatin.    5.  His BP is well controlled.  If he continues to lose weight, he may not need as much medication.    6/7.  His PAD is managed by Dr. Boston.    8.  He has severe venous insufficiency and goes to the wound care clinic.    Sincerely,       Kurt Henry MD

## 2018-11-20 ENCOUNTER — ANTICOAGULATION VISIT (OUTPATIENT)
Dept: PHARMACY | Facility: HOSPITAL | Age: 68
End: 2018-11-20

## 2018-11-20 DIAGNOSIS — I48.21 PERMANENT ATRIAL FIBRILLATION (HCC): ICD-10-CM

## 2018-11-20 LAB
INR PPP: 1.9 (ref 0.91–1.09)
PROTHROMBIN TIME: 23.1 SECONDS (ref 10–13.8)

## 2018-11-20 PROCEDURE — G0463 HOSPITAL OUTPT CLINIC VISIT: HCPCS

## 2018-11-20 PROCEDURE — 36416 COLLJ CAPILLARY BLOOD SPEC: CPT

## 2018-11-20 PROCEDURE — 85610 PROTHROMBIN TIME: CPT

## 2018-11-20 NOTE — PROGRESS NOTES
Anticoagulation Clinic Progress Note    Anticoagulation Summary  As of 2018    INR goal:   2.0-3.0   TTR:   93.5 % (1.5 mo)   INR used for dosin.9! (2018)   Warfarin maintenance plan:   7.5 mg on Tue, Sat; 5 mg all other days   Weekly warfarin total:   40 mg   Plan last modified:   Sharita Nash [facility] (2018)   Next INR check:   2018   Priority:   Maintenance   Target end date:   Indefinite    Indications    Permanent atrial fibrillation (CMS/HCC) [I48.2]             Anticoagulation Episode Summary     INR check location:       Preferred lab:       Send INR reminders to:    [facility]    Comments:         Anticoagulation Care Providers     Provider Role Specialty Phone number    Kurt Henry MD Referring Cardiology [phone]    Sharita Nash [facility] [phone]          Drug interactions: has remained unchanged.  Diet: has remained unchanged.    Clinic Interview:  Patient Findings     Positives:   Change in medications    Negatives:   Signs/symptoms of thrombosis, Signs/symptoms of bleeding,   Laboratory test error suspected, Change in health, Change in alcohol use,   Change in activity, Upcoming invasive procedure, Emergency department   visit, Upcoming dental procedure, Missed doses, Extra doses, Change in   diet/appetite, Hospital admission, Bruising, Other complaints    Comments:   Stopped Doxycycline 2 weeks ago.       Clinical Outcomes     Negatives:   Major bleeding event, Thromboembolic event,   Anticoagulation-related hospital admission, Anticoagulation-related ED   visit, Anticoagulation-related fatality    Comments:   Stopped Doxycycline 2 weeks ago.         INR History:  Anticoagulation Monitoring 2018 10/25/2018 2018   INR 2.4 2.8 1.9   INR Date 2018 10/25/2018 2018   INR Goal 2.0-3.0 2.0-3.0 2.0-3.0   Trend - Same Same   Last Week Total 0 mg 40 mg 40 mg   Next Week Total 40 mg 40 mg 40 mg   Sun 5 mg 5 mg 5  mg   Mon 5 mg 5 mg 5 mg   Tue 7.5 mg 7.5 mg 7.5 mg   Wed 5 mg 5 mg 5 mg   Thu 5 mg 5 mg 5 mg   Fri 5 mg 5 mg 5 mg   Sat 7.5 mg 7.5 mg 7.5 mg   Visit Report - - -   Some recent data might be hidden       Previous INR data from [facility] is recorded in [facility] and scanned into the patient's chart in [facility]. These results have been analyzed and reviewed.      Plan:  1. INR is Subtherapeutic today- see above in Anticoagulation Summary.  Will instruct Jalen Lockwood to Continue their warfarin regimen- see above in Anticoagulation Summary. Stopped Doxycycline 2 weeks ago.   2. Follow up in 2 weeks  3. Patient declines warfarin refills.  4. Verbal and written information provided. Patient expresses understanding and has no further questions at this time.    Sharita Nash [facility]

## 2018-11-21 LAB
ALBUMIN SERPL-MCNC: 4.3 G/DL (ref 3.6–4.8)
ALBUMIN/GLOB SERPL: 1.7 {RATIO} (ref 1.2–2.2)
ALP SERPL-CCNC: 67 IU/L (ref 39–117)
ALT SERPL-CCNC: 9 IU/L (ref 0–44)
AST SERPL-CCNC: 15 IU/L (ref 0–40)
BASOPHILS # BLD AUTO: 0.1 X10E3/UL (ref 0–0.2)
BASOPHILS NFR BLD AUTO: 1 %
BILIRUB SERPL-MCNC: 0.8 MG/DL (ref 0–1.2)
BUN SERPL-MCNC: 8 MG/DL (ref 8–27)
BUN/CREAT SERPL: 9 (ref 10–24)
CALCIUM SERPL-MCNC: 9.4 MG/DL (ref 8.6–10.2)
CHLORIDE SERPL-SCNC: 96 MMOL/L (ref 96–106)
CHOLEST SERPL-MCNC: 137 MG/DL (ref 100–199)
CO2 SERPL-SCNC: 23 MMOL/L (ref 20–29)
CREAT SERPL-MCNC: 0.9 MG/DL (ref 0.76–1.27)
EOSINOPHIL # BLD AUTO: 0.2 X10E3/UL (ref 0–0.4)
EOSINOPHIL NFR BLD AUTO: 2 %
ERYTHROCYTE [DISTWIDTH] IN BLOOD BY AUTOMATED COUNT: 15 % (ref 12.3–15.4)
GLOBULIN SER CALC-MCNC: 2.5 G/DL (ref 1.5–4.5)
GLUCOSE SERPL-MCNC: 88 MG/DL (ref 65–99)
HCT VFR BLD AUTO: 40.2 % (ref 37.5–51)
HDLC SERPL-MCNC: 70 MG/DL
HGB BLD-MCNC: 13.9 G/DL (ref 13–17.7)
IMM GRANULOCYTES # BLD: 0 X10E3/UL (ref 0–0.1)
IMM GRANULOCYTES NFR BLD: 0 %
LDLC SERPL CALC-MCNC: 55 MG/DL (ref 0–99)
LYMPHOCYTES # BLD AUTO: 2 X10E3/UL (ref 0.7–3.1)
LYMPHOCYTES NFR BLD AUTO: 24 %
MCH RBC QN AUTO: 33.3 PG (ref 26.6–33)
MCHC RBC AUTO-ENTMCNC: 34.6 G/DL (ref 31.5–35.7)
MCV RBC AUTO: 96 FL (ref 79–97)
MONOCYTES # BLD AUTO: 0.5 X10E3/UL (ref 0.1–0.9)
MONOCYTES NFR BLD AUTO: 6 %
NEUTROPHILS # BLD AUTO: 5.6 X10E3/UL (ref 1.4–7)
NEUTROPHILS NFR BLD AUTO: 67 %
PLATELET # BLD AUTO: 186 X10E3/UL (ref 150–379)
POTASSIUM SERPL-SCNC: 4 MMOL/L (ref 3.5–5.2)
PROT SERPL-MCNC: 6.8 G/DL (ref 6–8.5)
RBC # BLD AUTO: 4.17 X10E6/UL (ref 4.14–5.8)
SODIUM SERPL-SCNC: 136 MMOL/L (ref 134–144)
TRIGL SERPL-MCNC: 61 MG/DL (ref 0–149)
VLDLC SERPL CALC-MCNC: 12 MG/DL (ref 5–40)
WBC # BLD AUTO: 8.4 X10E3/UL (ref 3.4–10.8)

## 2018-11-29 ENCOUNTER — OFFICE VISIT (OUTPATIENT)
Dept: FAMILY MEDICINE CLINIC | Facility: CLINIC | Age: 68
End: 2018-11-29

## 2018-11-29 VITALS
SYSTOLIC BLOOD PRESSURE: 118 MMHG | HEIGHT: 76 IN | DIASTOLIC BLOOD PRESSURE: 68 MMHG | HEART RATE: 62 BPM | BODY MASS INDEX: 35.13 KG/M2 | OXYGEN SATURATION: 98 % | WEIGHT: 288.5 LBS

## 2018-11-29 DIAGNOSIS — F51.01 PRIMARY INSOMNIA: ICD-10-CM

## 2018-11-29 DIAGNOSIS — I10 ESSENTIAL HYPERTENSION: Primary | ICD-10-CM

## 2018-11-29 DIAGNOSIS — G47.00 INSOMNIA, UNSPECIFIED TYPE: ICD-10-CM

## 2018-11-29 DIAGNOSIS — I48.21 PERMANENT ATRIAL FIBRILLATION (HCC): ICD-10-CM

## 2018-11-29 DIAGNOSIS — Z79.899 ENCOUNTER FOR LONG-TERM (CURRENT) USE OF MEDICATIONS: ICD-10-CM

## 2018-11-29 PROCEDURE — 99214 OFFICE O/P EST MOD 30 MIN: CPT | Performed by: INTERNAL MEDICINE

## 2018-11-29 RX ORDER — ALPRAZOLAM 0.5 MG/1
0.5 TABLET ORAL NIGHTLY PRN
Qty: 30 TABLET | Refills: 2 | Status: SHIPPED | OUTPATIENT
Start: 2018-11-29 | End: 2019-02-21 | Stop reason: SDUPTHER

## 2018-12-05 ENCOUNTER — ANTICOAGULATION VISIT (OUTPATIENT)
Dept: PHARMACY | Facility: HOSPITAL | Age: 68
End: 2018-12-05

## 2018-12-05 DIAGNOSIS — I48.21 PERMANENT ATRIAL FIBRILLATION (HCC): ICD-10-CM

## 2018-12-05 LAB
DRUGS UR: NORMAL
INR PPP: 1.9 (ref 0.91–1.09)
PROTHROMBIN TIME: 22.3 SECONDS (ref 10–13.8)

## 2018-12-05 PROCEDURE — 36416 COLLJ CAPILLARY BLOOD SPEC: CPT

## 2018-12-05 PROCEDURE — 85610 PROTHROMBIN TIME: CPT

## 2018-12-05 NOTE — PROGRESS NOTES
Anticoagulation Clinic Progress Note    Anticoagulation Summary  As of 2018    INR goal:   2.0-3.0   TTR:   70.0 % (2 mo)   INR used for dosin.9! (2018)   Warfarin maintenance plan:   7.5 mg on Tue, Sat; 5 mg all other days   Weekly warfarin total:   40 mg   No change documented:   Chaitanya Tello RP   Plan last modified:   Sharita Nash [facility] (2018)   Next INR check:   2019   Priority:   Maintenance   Target end date:   Indefinite    Indications    Permanent atrial fibrillation (CMS/HCC) [I48.2]             Anticoagulation Episode Summary     INR check location:       Preferred lab:       Send INR reminders to:    [facility] [place]    Comments:         Anticoagulation Care Providers     Provider Role Specialty Phone number    Kurt Henry MD Referring Cardiology [phone]          Drug interactions: has remained unchanged.  Diet: has remained unchanged.    Clinic Interview:  Patient Findings     Negatives:   Signs/symptoms of thrombosis, Signs/symptoms of bleeding,   Laboratory test error suspected, Change in health, Change in alcohol use,   Change in activity, Upcoming invasive procedure, Emergency department   visit, Upcoming dental procedure, Missed doses, Extra doses, Change in   medications, Change in diet/appetite, Hospital admission, Bruising, Other   complaints      Clinical Outcomes     Negatives:   Major bleeding event, Thromboembolic event,   Anticoagulation-related hospital admission, Anticoagulation-related ED   visit, Anticoagulation-related fatality        INR History:  Anticoagulation Monitoring 10/25/2018 2018 2018   INR 2.8 1.9 1.9   INR Date 10/25/2018 2018 2018   INR Goal 2.0-3.0 2.0-3.0 2.0-3.0   Trend Same Same Same   Last Week Total 40 mg 40 mg 40 mg   Next Week Total 40 mg 40 mg 40 mg   Sun 5 mg 5 mg 5 mg   Mon 5 mg 5 mg 5 mg   Tue 7.5 mg 7.5 mg 7.5 mg   Wed 5 mg 5 mg 5 mg   Thu 5 mg 5 mg 5 mg   Fri 5 mg 5 mg 5 mg   Sat 7.5  mg 7.5 mg 7.5 mg   Visit Report - - -   Some recent data might be hidden       Plan:  1. INR is Subtherapeutic today- see above in Anticoagulation Summary.  Will instruct Jalen Lockwood to Continue their warfarin regimen- see above in Anticoagulation Summary.  2. Follow up in 1 month. Pt refused 2 week follow up.  3. Patient declines warfarin refills.  4. Verbal and written information provided. Patient expresses understanding and has no further questions at this time.    Chaitanya Tello [facility]

## 2019-01-04 ENCOUNTER — ANTICOAGULATION VISIT (OUTPATIENT)
Dept: PHARMACY | Facility: HOSPITAL | Age: 69
End: 2019-01-04

## 2019-01-04 DIAGNOSIS — I48.21 PERMANENT ATRIAL FIBRILLATION (HCC): ICD-10-CM

## 2019-01-04 LAB
INR PPP: 2.8 (ref 0.91–1.09)
PROTHROMBIN TIME: 34.1 SECONDS (ref 10–13.8)

## 2019-01-04 PROCEDURE — 85610 PROTHROMBIN TIME: CPT

## 2019-01-04 PROCEDURE — 36416 COLLJ CAPILLARY BLOOD SPEC: CPT

## 2019-01-04 NOTE — PROGRESS NOTES
Anticoagulation Clinic Progress Note    Anticoagulation Summary  As of 2019    INR goal:   2.0-3.0   TTR:   76.3 % (3 mo)   INR used for dosin.8 (2019)   Warfarin maintenance plan:   7.5 mg on Tue, Sat; 5 mg all other days   Weekly warfarin total:   40 mg   No change documented:   Iman Benites   Plan last modified:   Sharita Nash [facility] (2018)   Next INR check:   2019   Priority:   Maintenance   Target end date:   Indefinite    Indications    Permanent atrial fibrillation (CMS/HCC) [I48.2]             Anticoagulation Episode Summary     INR check location:       Preferred lab:       Send INR reminders to:    [facility] POOL    Comments:         Anticoagulation Care Providers     Provider Role Specialty Phone number    Kurt Henry MD Referring Cardiology [phone]          Clinic Interview:  Patient Findings     Negatives:   Signs/symptoms of thrombosis, Signs/symptoms of bleeding,   Laboratory test error suspected, Change in health, Change in alcohol use,   Change in activity, Upcoming invasive procedure, Emergency department   visit, Upcoming dental procedure, Missed doses, Extra doses, Change in   medications, Change in diet/appetite, Hospital admission, Bruising, Other   complaints      Clinical Outcomes     Negatives:   Major bleeding event, Thromboembolic event,   Anticoagulation-related hospital admission, Anticoagulation-related ED   visit, Anticoagulation-related fatality        INR History:  Anticoagulation Monitoring 2018   INR 1.9 1.9 2.8   INR Date 2018   INR Goal 2.0-3.0 2.0-3.0 2.0-3.0   Trend Same Same Same   Last Week Total 40 mg 40 mg 40 mg   Next Week Total 40 mg 40 mg 40 mg   Sun 5 mg 5 mg 5 mg   Mon 5 mg 5 mg 5 mg   Tue 7.5 mg 7.5 mg 7.5 mg   Wed 5 mg 5 mg 5 mg   Thu 5 mg 5 mg 5 mg   Fri 5 mg 5 mg 5 mg   Sat 7.5 mg 7.5 mg 7.5 mg   Visit Report - - -   Some recent data might be hidden        Plan:  1. INR is therapeutic today- see above in Anticoagulation Summary.   Will instruct Jalen Lockwood to continue their warfarin regimen- see above in Anticoagulation Summary.  2. Follow up in 4 weeks.  3. Patient declines warfarin refills.  4. Verbal and written information provided. Patient expresses understanding and has no further questions at this time.    Iman Benites

## 2019-01-10 RX ORDER — METOCLOPRAMIDE 10 MG/1
10 TABLET ORAL
Qty: 120 TABLET | Refills: 2 | Status: SHIPPED | OUTPATIENT
Start: 2019-01-10 | End: 2019-06-27 | Stop reason: SDUPTHER

## 2019-01-14 RX ORDER — ALBUTEROL SULFATE 90 UG/1
AEROSOL, METERED RESPIRATORY (INHALATION)
Qty: 1 INHALER | Refills: 2 | Status: SHIPPED | OUTPATIENT
Start: 2019-01-14 | End: 2019-02-21 | Stop reason: SDUPTHER

## 2019-01-31 ENCOUNTER — ANTICOAGULATION VISIT (OUTPATIENT)
Dept: PHARMACY | Facility: HOSPITAL | Age: 69
End: 2019-01-31

## 2019-01-31 DIAGNOSIS — I48.21 PERMANENT ATRIAL FIBRILLATION (HCC): ICD-10-CM

## 2019-01-31 LAB
INR PPP: 2.6 (ref 0.91–1.09)
PROTHROMBIN TIME: 31.7 SECONDS (ref 10–13.8)

## 2019-01-31 PROCEDURE — 85610 PROTHROMBIN TIME: CPT

## 2019-01-31 PROCEDURE — 36416 COLLJ CAPILLARY BLOOD SPEC: CPT

## 2019-01-31 RX ORDER — WARFARIN SODIUM 5 MG/1
TABLET ORAL
Qty: 135 TABLET | Refills: 1 | Status: SHIPPED | OUTPATIENT
Start: 2019-01-31 | End: 2019-04-29 | Stop reason: HOSPADM

## 2019-01-31 NOTE — PROGRESS NOTES
Anticoagulation Clinic Progress Note    Anticoagulation Summary  As of 2019    INR goal:   2.0-3.0   TTR:   81.8 % (3.9 mo)   INR used for dosin.6 (2019)   Warfarin maintenance plan:   7.5 mg on Tue, Sat; 5 mg all other days   Weekly warfarin total:   40 mg   No change documented:   Charlee Hall   Plan last modified:   Sharita Nash [facility] (2018)   Next INR check:   2019   Priority:   Maintenance   Target end date:   Indefinite    Indications    Permanent atrial fibrillation (CMS/HCC) [I48.2]             Anticoagulation Episode Summary     INR check location:       Preferred lab:       Send INR reminders to:    [facility] POOL    Comments:         Anticoagulation Care Providers     Provider Role Specialty Phone number    Kurt Henry MD Referring Cardiology [phone]          Clinic Interview:  Patient Findings     Negatives:   Signs/symptoms of thrombosis, Signs/symptoms of bleeding,   Laboratory test error suspected, Change in health, Change in alcohol use,   Change in activity, Upcoming invasive procedure, Emergency department   visit, Upcoming dental procedure, Missed doses, Extra doses, Change in   medications, Change in diet/appetite, Hospital admission, Bruising, Other   complaints      Clinical Outcomes     Negatives:   Major bleeding event, Thromboembolic event,   Anticoagulation-related hospital admission, Anticoagulation-related ED   visit, Anticoagulation-related fatality        INR History:  Anticoagulation Monitoring 2018   INR 1.9 2.8 2.6   INR Date 2018   INR Goal 2.0-3.0 2.0-3.0 2.0-3.0   Trend Same Same Same   Last Week Total 40 mg 40 mg 40 mg   Next Week Total 40 mg 40 mg 40 mg   Sun 5 mg 5 mg 5 mg   Mon 5 mg 5 mg 5 mg   Tue 7.5 mg 7.5 mg 7.5 mg   Wed 5 mg 5 mg 5 mg   Thu 5 mg 5 mg 5 mg   Fri 5 mg 5 mg 5 mg   Sat 7.5 mg 7.5 mg 7.5 mg   Visit Report - - -   Some recent data might be hidden        Plan:  1. INR is therapeutic today- see above in Anticoagulation Summary.   Will instruct Jalen Lockwood to continue their warfarin regimen- see above in Anticoagulation Summary.  2. Follow up in 4 weeks.  3. Patient desires warfarin refills.  4. Verbal and written information provided. Patient expresses understanding and has no further questions at this time.    Charlee Hall

## 2019-02-19 RX ORDER — LISINOPRIL 20 MG/1
TABLET ORAL
Qty: 180 TABLET | Refills: 2 | Status: SHIPPED | OUTPATIENT
Start: 2019-02-19 | End: 2019-04-29 | Stop reason: HOSPADM

## 2019-02-21 ENCOUNTER — OFFICE VISIT (OUTPATIENT)
Dept: FAMILY MEDICINE CLINIC | Facility: CLINIC | Age: 69
End: 2019-02-21

## 2019-02-21 VITALS
DIASTOLIC BLOOD PRESSURE: 74 MMHG | HEART RATE: 72 BPM | WEIGHT: 280 LBS | BODY MASS INDEX: 34.1 KG/M2 | HEIGHT: 76 IN | SYSTOLIC BLOOD PRESSURE: 108 MMHG | OXYGEN SATURATION: 94 %

## 2019-02-21 DIAGNOSIS — F51.01 PRIMARY INSOMNIA: Primary | ICD-10-CM

## 2019-02-21 DIAGNOSIS — Z79.01 LONG TERM CURRENT USE OF ANTICOAGULANT: ICD-10-CM

## 2019-02-21 DIAGNOSIS — J44.9 CHRONIC OBSTRUCTIVE PULMONARY DISEASE, UNSPECIFIED COPD TYPE (HCC): ICD-10-CM

## 2019-02-21 DIAGNOSIS — I48.3 TYPICAL ATRIAL FLUTTER (HCC): ICD-10-CM

## 2019-02-21 DIAGNOSIS — G47.00 INSOMNIA, UNSPECIFIED TYPE: ICD-10-CM

## 2019-02-21 LAB — INR PPP: 4.4 (ref 2–3)

## 2019-02-21 PROCEDURE — 85610 PROTHROMBIN TIME: CPT | Performed by: INTERNAL MEDICINE

## 2019-02-21 PROCEDURE — 36416 COLLJ CAPILLARY BLOOD SPEC: CPT | Performed by: INTERNAL MEDICINE

## 2019-02-21 PROCEDURE — 99214 OFFICE O/P EST MOD 30 MIN: CPT | Performed by: INTERNAL MEDICINE

## 2019-02-21 RX ORDER — DOXYCYCLINE 100 MG/1
100 CAPSULE ORAL 2 TIMES DAILY
COMMUNITY
Start: 2019-02-12 | End: 2019-04-29 | Stop reason: HOSPADM

## 2019-02-21 RX ORDER — CLOTRIMAZOLE AND BETAMETHASONE DIPROPIONATE 10; .64 MG/G; MG/G
CREAM TOPICAL
Refills: 3 | COMMUNITY
Start: 2019-01-09 | End: 2020-11-10 | Stop reason: SDDI

## 2019-02-21 RX ORDER — TRIAMCINOLONE ACETONIDE 1 MG/G
CREAM TOPICAL
Refills: 5 | COMMUNITY
Start: 2019-02-04 | End: 2019-04-29 | Stop reason: HOSPADM

## 2019-02-21 RX ORDER — ALBUTEROL SULFATE 90 UG/1
2 AEROSOL, METERED RESPIRATORY (INHALATION) EVERY 4 HOURS PRN
Qty: 1 INHALER | Refills: 6 | Status: SHIPPED | OUTPATIENT
Start: 2019-02-21 | End: 2019-04-09 | Stop reason: SDUPTHER

## 2019-02-21 RX ORDER — GENTAMICIN SULFATE 1 MG/G
OINTMENT TOPICAL
Refills: 2 | COMMUNITY
Start: 2019-02-10 | End: 2019-12-10

## 2019-02-21 RX ORDER — ALPRAZOLAM 0.5 MG/1
0.5 TABLET ORAL NIGHTLY PRN
Qty: 30 TABLET | Refills: 2 | Status: SHIPPED | OUTPATIENT
Start: 2019-02-21 | End: 2019-05-03 | Stop reason: HOSPADM

## 2019-02-21 RX ORDER — SULFAMETHOXAZOLE AND TRIMETHOPRIM 800; 160 MG/1; MG/1
1 TABLET ORAL 2 TIMES DAILY
COMMUNITY
Start: 2019-02-12 | End: 2019-04-29 | Stop reason: HOSPADM

## 2019-02-21 NOTE — PROGRESS NOTES
Subjective   Jalen Lockwood is a 68 y.o. male who presents today for:    Insomnia (CSE)    History of Present Illness     He has chronic insomnia treated with alprazolam nightly.  Despite chronic use, he still realizes good benefit from it.  He does not experience excess sedation, even when taken close to his Norco dose.)  (The pain medication is prescribed by his pain specialist, Dr. Benavidez.)  His insomnia manifests as difficulty falling back to sleep once he awakens in the middle the night, usually to urinate.    He has a history of atrial flutter/atrial fibrillation treated with rate control (carvedilol) and anticoagulation for stroke prophylaxis (warfarin).    He is on Bactrim for cellulitis.  This was started about 3 weeks ago and he has about another 3-4 days of treatment left.  He has not had a pro time done since starting the Bactrim.    Mr. Lockwood  reports that he has been smoking cigarettes.  He has a 11.25 pack-year smoking history. he has never used smokeless tobacco. He reports that he drinks about 12.6 oz of alcohol per week. He reports that he does not use drugs.     Allergies   Allergen Reactions   • Cephalexin Hives   • Codeine Nausea Only       Current Outpatient Medications:   •  albuterol sulfate  (90 Base) MCG/ACT inhaler, NHALE TWO PUFFS EVERY FOUR HOURS AS NEEDED FOR WHEEZING, Disp: 1 inhaler, Rfl: 2  •  ALPRAZolam (XANAX) 0.5 MG tablet, Take 1 tablet by mouth At Night As Needed for Sleep., Disp: 30 tablet, Rfl: 2  •  ATROVENT HFA 17 MCG/ACT inhaler, INHALE 2 PUFFS 4 (FOUR) TIMES A DAY., Disp: 12.9 inhaler, Rfl: 6  •  carvedilol (COREG) 12.5 MG tablet, Take 1 tablet by mouth 2 (Two) Times a Day With Meals., Disp: 180 tablet, Rfl: 3  •  clotrimazole-betamethasone (LOTRISONE) 1-0.05 % cream, APPLY TO AFFECTED AREA DAILY, Disp: , Rfl: 3  •  docusate sodium (COLACE) 100 MG capsule, Take 100 mg by mouth Daily., Disp: , Rfl:   •  doxycycline (MONODOX) 100 MG capsule, Take 100 mg by mouth 2  (Two) Times a Day., Disp: , Rfl:   •  finasteride (PROSCAR) 5 MG tablet, Take 1 tablet by mouth daily., Disp: , Rfl:   •  gentamicin (GARAMYCIN) 0.1 % ointment, APPLY TO AFFECTED AREA EVERY DAY, Disp: , Rfl: 2  •  hydrochlorothiazide (HYDRODIURIL) 12.5 MG tablet, Take 1 tablet by mouth Daily., Disp: 90 tablet, Rfl: 3  •  HYDROcodone-acetaminophen (NORCO) 7.5-325 MG per tablet, Take 1 tablet by mouth 2 (two) times a day as needed., Disp: , Rfl:   •  lisinopril (PRINIVIL,ZESTRIL) 20 MG tablet, TAKE 2 TABLETS BY MOUTH EVERY DAY, Disp: 180 tablet, Rfl: 2  •  metoclopramide (REGLAN) 10 MG tablet, TAKE 1 TABLET BY MOUTH 4 (FOUR) TIMES A DAY BEFORE MEALS & AT BEDTIME., Disp: 120 tablet, Rfl: 2  •  mupirocin (BACTROBAN) 2 % ointment, Apply  topically to the appropriate area as directed 3 (Three) Times a Week., Disp: , Rfl:   •  pravastatin (PRAVACHOL) 20 MG tablet, Take 1 tablet by mouth Daily., Disp: 90 tablet, Rfl: 0  •  silodosin (RAPAFLO) 8 MG capsule capsule, Take 1 capsule by mouth daily. With food., Disp: , Rfl:   •  sulfamethoxazole-trimethoprim (BACTRIM DS,SEPTRA DS) 800-160 MG per tablet, Take 1 tablet by mouth 2 (Two) Times a Day., Disp: , Rfl:   •  triamcinolone (KENALOG) 0.1 % cream, APPLY TO AFFECTED AREAS 2 TIMES A DAY, Disp: , Rfl: 5  •  warfarin (COUMADIN) 5 MG tablet, TAKE ONE AND ONE - HALF TABS BY MOUTH ON Tuesday AND Saturday. TAKE ONE TABLET ALL OTHER DAYS OR AS DIRECTED BY [facility]., Disp: 135 tablet, Rfl: 1      Review of Systems   Constitutional: Negative for diaphoresis and fever.   Eyes: Negative for visual disturbance.   Respiratory: Positive for shortness of breath and wheezing (intermittently). Negative for cough.    Cardiovascular: Positive for leg swelling (Chronic). Negative for chest pain and palpitations.   Gastrointestinal: Negative for abdominal pain, constipation, diarrhea and nausea.   Genitourinary: Positive for difficulty urinating and urgency (chronic).   Musculoskeletal:  "Positive for arthralgias, back pain and gait problem.   Skin:        Lower extremity cellulitis.   Neurological: Positive for numbness. Negative for dizziness, syncope and headaches.   Hematological: Bruises/bleeds easily.   Psychiatric/Behavioral: Positive for sleep disturbance. Negative for dysphoric mood.         Objective   Vitals:    02/21/19 1428   BP: 108/74   BP Location: Right arm   Patient Position: Sitting   Cuff Size: Large Adult   Pulse: 72   SpO2: 94%   Weight: 127 kg (280 lb)   Height: 193 cm (76\")     Physical Exam    Obese male in no acute distress.  He smells of smoke.  Alert and oriented x4.  He answers all questions appropriately.  No carotid bruit.  Diminished breath sounds throughout all lung fields.  No wheeze or crackles appreciated.  Respiratory effort is normal.  Irregularly irregular rhythm.  Diminished S1 and S2.  No murmur noted.  Affect is mildly blunted, but otherwise appropriate.  Insight and judgment are intact.        Jalen was seen today for insomnia.    Diagnoses and all orders for this visit:    Primary insomnia  GEOFFREY report was obtained and reviewed.  He will continue the alprazolam at this low dose at bedtime.  It has given him good benefit with out untoward side effect.  A prescription for #30/2 was given to the patient today.    -     ALPRAZolam (XANAX) 0.5 MG tablet; Take 1 tablet by mouth At Night As Needed for Sleep.    Chronic obstructive pulmonary disease, unspecified COPD type (CMS/HCC)  Hopefully, he will continue to cut back on his smoking.  He has done well in that regard of late.  A refill for his albuterol was given to him today.    -     albuterol sulfate  (90 Base) MCG/ACT inhaler; Inhale 2 puffs Every 4 (Four) Hours As Needed for Wheezing.    Typical atrial flutter (CMS/HCC  Long term current use of anticoagulant  -     POC INR is 4.4 today.  Therefore, he will hold his warfarin today, resume it tomorrow but only take 2.5 mg.  He will take 2.5 mg " daily until he has completed his course of Bactrim.  He will take 5 mg that first day after he has completed his Bactrim course, and he will resume his regular dosing schedule thereafter.

## 2019-02-28 ENCOUNTER — ANTICOAGULATION VISIT (OUTPATIENT)
Dept: PHARMACY | Facility: HOSPITAL | Age: 69
End: 2019-02-28

## 2019-02-28 DIAGNOSIS — I48.21 PERMANENT ATRIAL FIBRILLATION (HCC): ICD-10-CM

## 2019-02-28 LAB
INR PPP: 2.1 (ref 0.91–1.09)
PROTHROMBIN TIME: 24.8 SECONDS (ref 10–13.8)

## 2019-02-28 PROCEDURE — 36416 COLLJ CAPILLARY BLOOD SPEC: CPT

## 2019-02-28 PROCEDURE — 85610 PROTHROMBIN TIME: CPT

## 2019-02-28 NOTE — PROGRESS NOTES
Anticoagulation Clinic Progress Note    Anticoagulation Summary  As of 2019    INR goal:   2.0-3.0   TTR:   71.1 % (4.8 mo)   INR used for dosin.1 (2019)   Warfarin maintenance plan:   7.5 mg on Tue, Sat; 5 mg all other days   Weekly warfarin total:   40 mg   No change documented:   Paulette Valle   Plan last modified:   Sharita Nash, [facility] (2018)   Next INR check:   3/28/2019   Priority:   Maintenance   Target end date:   Indefinite    Indications    Permanent atrial fibrillation (CMS/HCC) [I48.2]             Anticoagulation Episode Summary     INR check location:       Preferred lab:       [facility] [place]    Comments:         Anticoagulation Care Providers     Provider Role Specialty Phone number    Kurt Henry MD Referring Cardiology [phone]          Clinic Interview:  Patient Findings     Positives:   Change in medications    Comments:   : Was on Bactrim DS and Doxy 100 Startin/12 #20 BID   for 10 Days. * We were unaware of him starting antibiotics*        INR History:  Anticoagulation Monitoring 2019   INR 2.8 2.6 2.1   INR Date 2019   INR Goal 2.0-3.0 2.0-3.0 2.0-3.0   Trend Same Same Same   Last Week Total 40 mg 40 mg 40 mg   Next Week Total 40 mg 40 mg 40 mg   Sun 5 mg 5 mg 5 mg   Mon 5 mg 5 mg 5 mg   Tue 7.5 mg 7.5 mg 7.5 mg   Wed 5 mg 5 mg 5 mg   Thu 5 mg 5 mg 5 mg   Fri 5 mg 5 mg 5 mg   Sat 7.5 mg 7.5 mg 7.5 mg   Visit Report - - -   Some recent data might be hidden       Plan:  1. INR is therapeutic today- see above in Anticoagulation Summary.   Will instruct Jalen Lockwood to continue their warfarin regimen- see above in Anticoagulation Summary.  2. Patient refusing pharmacist interaction at this visit and states he will be back in 4 weeks for INR.  He did receive PCP instruction previously for managing INR elevated due to antibiotics.  3. Patient declines warfarin  refills.  4. Verbal and written information provided. Patient expresses understanding and has no further questions at this time.      Meghna Horan [facility]

## 2019-02-28 NOTE — PROGRESS NOTES
Anticoagulation Clinic Progress Note    Anticoagulation Summary  As of 2019    INR goal:   2.0-3.0   TTR:   71.1 % (4.8 mo)   INR used for dosin.1 (2019)   Warfarin maintenance plan:   7.5 mg on Tue, Sat; 5 mg all other days   Weekly warfarin total:   40 mg   No change documented:   Paluette Valle   Plan last modified:   Sharita Nash, [facility] (2018)   Next INR check:   3/28/2019   Priority:   Maintenance   Target end date:   Indefinite    Indications    Permanent atrial fibrillation (CMS/HCC) [I48.2]             Anticoagulation Episode Summary     INR check location:       Preferred lab:       Send INR reminders to:    [facility] POOL    Comments:         Anticoagulation Care Providers     Provider Role Specialty Phone number    Kurt Henry MD Referring Cardiology [phone]          Clinic Interview:  Patient Findings     Positives:   Change in medications    Comments:   : Was on Bactrim DS and Doxy 100 Startin/12 #20 BID   for 10 Days. * We were un aware of him starting antibiotics*      Clinical Outcomes     Comments:   : Was on Bactrim DS and Doxy 100 Startin/12 #20 BID   for 10 Days. * We were un aware of him starting antibiotics*        INR History:  Anticoagulation Monitoring 2019   INR 2.8 2.6 2.1   INR Date 2019   INR Goal 2.0-3.0 2.0-3.0 2.0-3.0   Trend Same Same Same   Last Week Total 40 mg 40 mg 40 mg   Next Week Total 40 mg 40 mg 40 mg   Sun 5 mg 5 mg 5 mg   Mon 5 mg 5 mg 5 mg   Tue 7.5 mg 7.5 mg 7.5 mg   Wed 5 mg 5 mg 5 mg   Thu 5 mg 5 mg 5 mg   Fri 5 mg 5 mg 5 mg   Sat 7.5 mg 7.5 mg 7.5 mg   Visit Report - - -   Some recent data might be hidden       Plan:  1. INR is therapeutic today- see above in Anticoagulation Summary.   Will instruct Jalen Lockwood to continue their warfarin regimen- see above in Anticoagulation Summary.  2. Follow up in 4 weeks.  3. Patient declines warfarin refills.  4.  February 7, 2017      Geoffrey Noel, OD  [address]  [place] [address]           [facility]  [address]  [place] [address]  Phone: [phone]  Fax: [phone]          Patient: Brett Castañeda   MR Number: [id number]   YOB: 1972   Date of Visit: 2/7/2017       Dear Dr. Geoffrey Noel:    Thank you for referring Brett Castañeda to me for evaluation. Attached you will find relevant portions of my assessment and plan of care.    If you have questions, please do not hesitate to call me. I look forward to following Brett Castañeda along with you.    Sincerely,    Dilma Matamoros MD    Enclosure  CC:  No Recipients    If you would like to receive this communication electronically, please contact [email or web address] or [phone] to request more information on [employer] access.    For providers and/or their staff who would like to refer a patient to [facility], please contact us through our one-stop-shop provider referral line, [facility], at [phone].    If you feel you have received this communication in error or would no longer like to receive these types of communications, please e-mail [email or web address]          Verbal and written information provided. Patient expresses understanding and has no further questions at this time.    Paulette Valle

## 2019-03-28 ENCOUNTER — ANTICOAGULATION VISIT (OUTPATIENT)
Dept: PHARMACY | Facility: HOSPITAL | Age: 69
End: 2019-03-28

## 2019-03-28 DIAGNOSIS — I48.21 PERMANENT ATRIAL FIBRILLATION (HCC): ICD-10-CM

## 2019-03-28 LAB
INR PPP: 2.7 (ref 0.91–1.09)
PROTHROMBIN TIME: 32.4 SECONDS (ref 10–13.8)

## 2019-03-28 PROCEDURE — 36416 COLLJ CAPILLARY BLOOD SPEC: CPT

## 2019-03-28 PROCEDURE — G0463 HOSPITAL OUTPT CLINIC VISIT: HCPCS

## 2019-03-28 PROCEDURE — 85610 PROTHROMBIN TIME: CPT

## 2019-03-28 NOTE — PROGRESS NOTES
Anticoagulation Clinic Progress Note    Anticoagulation Summary  As of 3/28/2019    INR goal:   2.0-3.0   TTR:   75.8 % (5.7 mo)   INR used for dosin.7 (3/28/2019)   Warfarin maintenance plan:   7.5 mg every Tue, Sat; 5 mg all other days   Weekly warfarin total:   40 mg   Plan last modified:   Sharita Nash, [facility] (2018)   Next INR check:   2019   Priority:   Maintenance   Target end date:   Indefinite    Indications    Permanent atrial fibrillation (CMS/HCC) [I48.2]             Anticoagulation Episode Summary     INR check location:       Preferred lab:       Send INR reminders to:    [facility] CLINICAL POOL    Comments:         Anticoagulation Care Providers     Provider Role Specialty Phone number    Kurt Henry MD Referring Cardiology [phone]          Clinic Interview:  Patient Findings     Positives:   Change in health, Change in medications    Negatives:   Signs/symptoms of thrombosis, Signs/symptoms of bleeding,   Laboratory test error suspected, Change in alcohol use, Change in   activity, Upcoming invasive procedure, Emergency department visit,   Upcoming dental procedure, Missed doses, Extra doses, Change in   diet/appetite, Hospital admission, Bruising, Other complaints    Comments:   Started Bactrim/doxycycline 3/25 (10 day course).      Clinical Outcomes     Negatives:   Major bleeding event, Thromboembolic event,   Anticoagulation-related hospital admission, Anticoagulation-related ED   visit, Anticoagulation-related fatality    Comments:   Started Bactrim/doxycycline 3/25 (10 day course).        INR History:  Anticoagulation Monitoring 2019 2019 3/28/2019   INR 2.6 2.1 2.7   INR Date 2019 2019 3/28/2019   INR Goal 2.0-3.0 2.0-3.0 2.0-3.0   Trend Same Same Same   Last Week Total 40 mg 40 mg 40 mg   Next Week Total 40 mg 40 mg 35 mg   Sun 5 mg 5 mg 5 mg   Mon 5 mg 5 mg 5 mg   Tue 7.5 mg 7.5 mg 5 mg ()   Wed 5 mg 5 mg 5 mg   Thu 5 mg 5 mg 5 mg     mg 5 mg 5 mg   Sat 7.5 mg 7.5 mg 5 mg (3/30)   Visit Report - - -   Some recent data might be hidden       Plan:  1. INR is Therapeutic today- see above in Anticoagulation Summary.  Will instruct Jalen Lockwood to Change their warfarin regimen to 5 mg daily until Bactrim/doxycycline are completed after 10 day course - see above in Anticoagulation Summary.  2. Follow up in 1 week  3. Patient declines warfarin refills.  4. Verbal information provided (declined written information). Patient expresses understanding and has no further questions at this time.    Brendan Live [facility]

## 2019-04-04 ENCOUNTER — ANTICOAGULATION VISIT (OUTPATIENT)
Dept: PHARMACY | Facility: HOSPITAL | Age: 69
End: 2019-04-04

## 2019-04-04 DIAGNOSIS — I48.21 PERMANENT ATRIAL FIBRILLATION (HCC): ICD-10-CM

## 2019-04-04 LAB
INR PPP: 3.3 (ref 0.91–1.09)
PROTHROMBIN TIME: 40 SECONDS (ref 10–13.8)

## 2019-04-04 PROCEDURE — 85610 PROTHROMBIN TIME: CPT

## 2019-04-04 PROCEDURE — G0463 HOSPITAL OUTPT CLINIC VISIT: HCPCS

## 2019-04-04 PROCEDURE — 36416 COLLJ CAPILLARY BLOOD SPEC: CPT

## 2019-04-04 NOTE — PROGRESS NOTES
Anticoagulation Clinic Progress Note    Anticoagulation Summary  As of 4/4/2019    INR goal:   2.0-3.0   TTR:   74.8 % (6 mo)   INR used for dosing:   3.3! (4/4/2019)   Warfarin maintenance plan:   7.5 mg every Tue, Sat; 5 mg all other days   Weekly warfarin total:   40 mg   Plan last modified:   Sharita Nash, [facility] (9/27/2018)   Next INR check:   4/11/2019   Priority:   Maintenance   Target end date:   Indefinite    Indications    Permanent atrial fibrillation (CMS/HCC) [I48.2]             Anticoagulation Episode Summary     INR check location:       Preferred lab:       Send INR reminders to:    [facility] CLINICAL POOL    Comments:         Anticoagulation Care Providers     Provider Role Specialty Phone number    Kurt Henry MD Referring Cardiology [phone]          Clinic Interview:  Patient Findings     Positives:   Change in medications    Negatives:   Signs/symptoms of thrombosis, Signs/symptoms of bleeding,   Laboratory test error suspected, Change in health, Change in alcohol use,   Change in activity, Upcoming invasive procedure, Emergency department   visit, Upcoming dental procedure, Missed doses, Extra doses, Change in   diet/appetite, Hospital admission, Bruising, Other complaints    Comments:   Bactrim/doxycylcine to be extended 10 days starting tomorrow.        Clinical Outcomes     Negatives:   Major bleeding event, Thromboembolic event,   Anticoagulation-related hospital admission, Anticoagulation-related ED   visit, Anticoagulation-related fatality    Comments:   Bactrim/doxycylcine to be extended 10 days starting tomorrow.          INR History:  Anticoagulation Monitoring 2/28/2019 3/28/2019 4/4/2019   INR 2.1 2.7 3.3   INR Date 2/28/2019 3/28/2019 4/4/2019   INR Goal 2.0-3.0 2.0-3.0 2.0-3.0   Trend Same Same Same   Last Week Total 40 mg 40 mg 35 mg   Next Week Total 40 mg 35 mg 30 mg   Sun 5 mg 5 mg 5 mg   Mon 5 mg 5 mg 2.5 mg (4/8)   Tue 7.5 mg 5 mg (4/2) 5 mg (4/9)   Wed 5 mg 5  mg 5 mg   Thu 5 mg 5 mg 2.5 mg (4/4)   Fri 5 mg 5 mg 5 mg   Sat 7.5 mg 5 mg (3/30) 5 mg (4/6)   Visit Report - - -   Some recent data might be hidden       Plan:  1. INR is Supratherapeutic today- see above in Anticoagulation Summary.  Will instruct Jalen Lockwood to Change their warfarin regimen- see above in Anticoagulation Summary.  2. Follow up in 1 week  3. Patient declines warfarin refills.  4. Verbal and written information provided. Patient expresses understanding and has no further questions at this time.    Brendan Live [facility]

## 2019-04-09 ENCOUNTER — OFFICE VISIT (OUTPATIENT)
Dept: FAMILY MEDICINE CLINIC | Facility: CLINIC | Age: 69
End: 2019-04-09

## 2019-04-09 VITALS
OXYGEN SATURATION: 98 % | RESPIRATION RATE: 18 BRPM | SYSTOLIC BLOOD PRESSURE: 122 MMHG | BODY MASS INDEX: 34.1 KG/M2 | HEIGHT: 76 IN | WEIGHT: 280 LBS | DIASTOLIC BLOOD PRESSURE: 80 MMHG | TEMPERATURE: 98.3 F | HEART RATE: 78 BPM

## 2019-04-09 DIAGNOSIS — G89.29 CHRONIC RIGHT SHOULDER PAIN: Primary | ICD-10-CM

## 2019-04-09 DIAGNOSIS — M25.511 CHRONIC RIGHT SHOULDER PAIN: Primary | ICD-10-CM

## 2019-04-09 PROCEDURE — 99213 OFFICE O/P EST LOW 20 MIN: CPT | Performed by: NURSE PRACTITIONER

## 2019-04-09 RX ORDER — ALBUTEROL SULFATE 90 UG/1
2 AEROSOL, METERED RESPIRATORY (INHALATION) EVERY 4 HOURS PRN
Qty: 1 INHALER | Refills: 6 | Status: SHIPPED | OUTPATIENT
Start: 2019-04-09 | End: 2019-07-10 | Stop reason: SDUPTHER

## 2019-04-09 NOTE — PROGRESS NOTES
Subjective   Jalen Lockwood is a 69 y.o. male.     Chief Complaint   Patient presents with   • Shoulder Pain     Right     Mr. Lockwood presents today with complaints of right shoulder pain. This is not a new problem to him. This current episode of pain started 2 months ago. He states that he had an injection that was done by Dr. Caldera last summer into his shoulder. He was hoping to get another injection today.     I have reviewed the patient's medical history in detail and updated the computerized patient record.     The following portions of the patient's history were reviewed and updated as appropriate: allergies, current medications, past family history, past medical history, past social history, past surgical history and problem list.       Current Outpatient Medications:   •  albuterol sulfate  (90 Base) MCG/ACT inhaler, Inhale 2 puffs Every 4 (Four) Hours As Needed for Wheezing., Disp: 1 inhaler, Rfl: 6  •  ALPRAZolam (XANAX) 0.5 MG tablet, Take 1 tablet by mouth At Night As Needed for Sleep., Disp: 30 tablet, Rfl: 2  •  ATROVENT HFA 17 MCG/ACT inhaler, INHALE 2 PUFFS 4 (FOUR) TIMES A DAY., Disp: 12.9 inhaler, Rfl: 6  •  carvedilol (COREG) 12.5 MG tablet, Take 1 tablet by mouth 2 (Two) Times a Day With Meals., Disp: 180 tablet, Rfl: 3  •  clotrimazole-betamethasone (LOTRISONE) 1-0.05 % cream, APPLY TO AFFECTED AREA DAILY, Disp: , Rfl: 3  •  docusate sodium (COLACE) 100 MG capsule, Take 100 mg by mouth Daily., Disp: , Rfl:   •  doxycycline (MONODOX) 100 MG capsule, Take 100 mg by mouth 2 (Two) Times a Day., Disp: , Rfl:   •  finasteride (PROSCAR) 5 MG tablet, Take 1 tablet by mouth daily., Disp: , Rfl:   •  gentamicin (GARAMYCIN) 0.1 % ointment, APPLY TO AFFECTED AREA EVERY DAY, Disp: , Rfl: 2  •  hydrochlorothiazide (HYDRODIURIL) 12.5 MG tablet, Take 1 tablet by mouth Daily., Disp: 90 tablet, Rfl: 3  •  HYDROcodone-acetaminophen (NORCO) 7.5-325 MG per tablet, Take 1 tablet by mouth 2 (two) times a  "day as needed., Disp: , Rfl:   •  lisinopril (PRINIVIL,ZESTRIL) 20 MG tablet, TAKE 2 TABLETS BY MOUTH EVERY DAY, Disp: 180 tablet, Rfl: 2  •  metoclopramide (REGLAN) 10 MG tablet, TAKE 1 TABLET BY MOUTH 4 (FOUR) TIMES A DAY BEFORE MEALS & AT BEDTIME., Disp: 120 tablet, Rfl: 2  •  mupirocin (BACTROBAN) 2 % ointment, Apply  topically to the appropriate area as directed 3 (Three) Times a Week., Disp: , Rfl:   •  pravastatin (PRAVACHOL) 20 MG tablet, Take 1 tablet by mouth Daily., Disp: 90 tablet, Rfl: 0  •  silodosin (RAPAFLO) 8 MG capsule capsule, Take 1 capsule by mouth daily. With food., Disp: , Rfl:   •  sulfamethoxazole-trimethoprim (BACTRIM DS,SEPTRA DS) 800-160 MG per tablet, Take 1 tablet by mouth 2 (Two) Times a Day., Disp: , Rfl:   •  triamcinolone (KENALOG) 0.1 % cream, APPLY TO AFFECTED AREAS 2 TIMES A DAY, Disp: , Rfl: 5  •  warfarin (COUMADIN) 5 MG tablet, TAKE ONE AND ONE - HALF TABS BY MOUTH ON Tuesday AND Saturday. TAKE ONE TABLET ALL OTHER DAYS OR AS DIRECTED BY [facility]., Disp: 135 tablet, Rfl: 1    Review of Systems   Constitutional: Negative.    Eyes: Negative.    Respiratory: Negative.    Musculoskeletal: Positive for arthralgias (right shoulder pain).   Neurological: Negative.         Vitals:    04/09/19 1421   BP: 122/80   BP Location: Right arm   Patient Position: Sitting   Cuff Size: Adult   Pulse: 78   Resp: 18   Temp: 98.3 °F (36.8 °C)   TempSrc: Oral   SpO2: 98%   Weight: 127 kg (280 lb)   Height: 193 cm (76\")       Objective   Physical Exam   Constitutional: He is oriented to person, place, and time. He appears well-developed and well-nourished.   Cardiovascular: Normal rate, regular rhythm and normal heart sounds.   Pulmonary/Chest: Effort normal and breath sounds normal.   Musculoskeletal:        Right shoulder: He exhibits decreased range of motion, pain and decreased strength.   Neurological: He is alert and oriented to person, place, and time.   Psychiatric:   No acute distress "   Vitals reviewed.        Assessment/Plan   Jalen was seen today for shoulder pain.    Diagnoses and all orders for this visit:    Chronic right shoulder pain  -     Ambulatory Referral to Orthopedic Surgery    Other orders  -     albuterol sulfate  (90 Base) MCG/ACT inhaler; Inhale 2 puffs Every 4 (Four) Hours As Needed for Wheezing.    1. I have explained to Mr. Lockwood that I do not do joint injections and the I will refer him to orthopedic surgery for further evaluation and treatment.   2. He also needs refills on Albuterol today.  3. He is to follow up as needed until he establishes with his new PCP in June.

## 2019-04-11 ENCOUNTER — ANTICOAGULATION VISIT (OUTPATIENT)
Dept: PHARMACY | Facility: HOSPITAL | Age: 69
End: 2019-04-11

## 2019-04-11 DIAGNOSIS — I48.21 PERMANENT ATRIAL FIBRILLATION (HCC): ICD-10-CM

## 2019-04-11 LAB
INR PPP: 2 (ref 0.91–1.09)
PROTHROMBIN TIME: 23.8 SECONDS (ref 10–13.8)

## 2019-04-11 PROCEDURE — G0463 HOSPITAL OUTPT CLINIC VISIT: HCPCS

## 2019-04-11 PROCEDURE — 36416 COLLJ CAPILLARY BLOOD SPEC: CPT

## 2019-04-11 PROCEDURE — 85610 PROTHROMBIN TIME: CPT

## 2019-04-11 NOTE — PROGRESS NOTES
Anticoagulation Clinic Progress Note    Anticoagulation Summary  As of 2019    INR goal:   2.0-3.0   TTR:   74.8 % (6.2 mo)   INR used for dosin.0 (2019)   Warfarin maintenance plan:   7.5 mg every Tue, Sat; 5 mg all other days   Weekly warfarin total:   40 mg   Plan last modified:   Sharita Nash [facility] (2018)   Next INR check:   2019   Priority:   Maintenance   Target end date:   Indefinite    Indications    Permanent atrial fibrillation (CMS/HCC) [I48.2]             Anticoagulation Episode Summary     INR check location:       Preferred lab:       Send INR reminders [facility]:    [facility]    Comments:         Anticoagulation Care Providers     Provider Role Specialty Phone number    Kurt Henry MD Referring Cardiology [phone]          Clinic Interview:      INR History:  Anticoagulation Monitoring 3/28/2019 2019 2019   INR 2.7 3.3 2.0   INR Date 3/28/2019 2019 2019   INR Goal 2.0-3.0 2.0-3.0 2.0-3.0   Trend Same Same Same   Last Week Total 40 mg 35 mg 30 mg   Next Week Total 35 mg 30 mg 37.5 mg   Sun 5 mg 5 mg 5 mg   Mon 5 mg 2.5 mg () 5 mg   Tue 5 mg () 5 mg () 7.5 mg   Wed 5 mg 5 mg 5 mg   Thu 5 mg 2.5 mg () 5 mg   Fri 5 mg 5 mg 5 mg   Sat 5 mg (3/30) 5 mg () 5 mg ()   Visit Report - - -   Some recent data might be hidden       Plan:  1. INR is Therapeutic today- see above in Anticoagulation Summary.  Will instruct Jalen Lockwood to Change their warfarin regimen- see above in Anticoagulation Summary.  2. Follow up in 1 week  3. Patient declines warfarin refills.  4. Verbal and written information provided. Patient expresses understanding and has no further questions at this time.    Jennifer Harris [facility]

## 2019-04-18 ENCOUNTER — ANTICOAGULATION VISIT (OUTPATIENT)
Dept: PHARMACY | Facility: HOSPITAL | Age: 69
End: 2019-04-18

## 2019-04-18 DIAGNOSIS — I48.21 PERMANENT ATRIAL FIBRILLATION (HCC): ICD-10-CM

## 2019-04-18 LAB
INR PPP: 2.5 (ref 0.91–1.09)
PROTHROMBIN TIME: 29.9 SECONDS (ref 10–13.8)

## 2019-04-18 PROCEDURE — G0463 HOSPITAL OUTPT CLINIC VISIT: HCPCS

## 2019-04-18 PROCEDURE — 85610 PROTHROMBIN TIME: CPT

## 2019-04-18 PROCEDURE — 36416 COLLJ CAPILLARY BLOOD SPEC: CPT

## 2019-04-18 NOTE — PROGRESS NOTES
Anticoagulation Clinic Progress Note    Anticoagulation Summary  As of 2019    INR goal:   2.0-3.0   TTR:   75.8 % (6.4 mo)   INR used for dosin.5 (2019)   Warfarin maintenance plan:   7.5 mg every Tue, Sat; 5 mg all other days   Weekly warfarin total:   40 mg   Plan last modified:   Sharita Nash, [facility] (2018)   Next INR check:   2019   Priority:   Maintenance   Target end date:   Indefinite    Indications    Permanent atrial fibrillation (CMS/HCC) [I48.2]             Anticoagulation Episode Summary     INR check location:       Preferred lab:       Send INR reminders to:    [facility] CLINICAL POOL    Comments:         Anticoagulation Care Providers     Provider Role Specialty Phone number    Kurt Henry MD Referring Cardiology [phone]          Clinic Interview:  Patient Findings     Positives:   Change in medications    Negatives:   Signs/symptoms of thrombosis, Signs/symptoms of bleeding,   Laboratory test error suspected, Change in health, Change in alcohol use,   Change in activity, Upcoming invasive procedure, Emergency department   visit, Upcoming dental procedure, Missed doses, Extra doses, Change in   diet/appetite, Hospital admission, Bruising, Other complaints    Comments:   Bactrim and Doxycycline have been extended an additional 10   days. Last day of antibiotics now 19      Clinical Outcomes     Negatives:   Major bleeding event, Thromboembolic event,   Anticoagulation-related hospital admission, Anticoagulation-related ED   visit, Anticoagulation-related fatality    Comments:   Bactrim and Doxycycline have been extended an additional 10   days. Last day of antibiotics now 19        INR History:  Anticoagulation Monitoring 2019   INR 3.3 2.0 2.5   INR Date 2019   INR Goal 2.0-3.0 2.0-3.0 2.0-3.0   Trend Same Same Same   Last Week Total 35 mg 30 mg 37.5 mg   Next Week Total 30 mg 37.5 mg 35 mg   Sun 5 mg 5  mg 5 mg   Mon 2.5 mg (4/8) 5 mg 5 mg   Tue 5 mg (4/9) 7.5 mg 5 mg (4/23)   Wed 5 mg 5 mg 5 mg   Thu 2.5 mg (4/4) 5 mg 5 mg   Fri 5 mg 5 mg 5 mg   Sat 5 mg (4/6) 5 mg (4/13) 5 mg (4/20)   Visit Report - - -   Some recent data might be hidden       Plan:  1. INR is Therapeutic today- see above in Anticoagulation Summary.  Will instruct Jalen Lockwood to take 5 mg daily until next INR follow-up in clinic in 1 week due to continuation of antibiotics - see above in Anticoagulation Summary.  2. Follow up in 1 week  3. Patient declines warfarin refills.  4. Verbal and written information provided. Patient expresses understanding and has no further questions at this time.    Jennifer Harris, [facility]

## 2019-04-24 ENCOUNTER — APPOINTMENT (OUTPATIENT)
Dept: CT IMAGING | Facility: HOSPITAL | Age: 69
End: 2019-04-24

## 2019-04-24 ENCOUNTER — HOSPITAL ENCOUNTER (INPATIENT)
Facility: HOSPITAL | Age: 69
LOS: 5 days | Discharge: HOME HEALTH | End: 2019-04-29
Attending: EMERGENCY MEDICINE | Admitting: HOSPITALIST

## 2019-04-24 DIAGNOSIS — R77.9 ABNORMALITY OF PLASMA PROTEIN: ICD-10-CM

## 2019-04-24 DIAGNOSIS — R63.4 ABNORMAL WEIGHT LOSS: ICD-10-CM

## 2019-04-24 DIAGNOSIS — E87.1 HYPONATREMIA: Primary | ICD-10-CM

## 2019-04-24 DIAGNOSIS — I87.8 VENOUS STASIS: ICD-10-CM

## 2019-04-24 DIAGNOSIS — R53.1 WEAKNESS: ICD-10-CM

## 2019-04-24 DIAGNOSIS — Z79.01 CHRONIC ANTICOAGULATION: Chronic | ICD-10-CM

## 2019-04-24 DIAGNOSIS — T14.8XXA OPEN WOUND: ICD-10-CM

## 2019-04-24 DIAGNOSIS — T14.8XXA MULTIPLE SKIN TEARS: ICD-10-CM

## 2019-04-24 DIAGNOSIS — R59.0 RETROPERITONEAL LYMPHADENOPATHY: ICD-10-CM

## 2019-04-24 DIAGNOSIS — R53.1 GENERALIZED WEAKNESS: ICD-10-CM

## 2019-04-24 DIAGNOSIS — R29.6 MULTIPLE FALLS: ICD-10-CM

## 2019-04-24 PROBLEM — T50.2X5A: Status: ACTIVE | Noted: 2019-04-24

## 2019-04-24 PROBLEM — F10.10 ETOH ABUSE: Status: ACTIVE | Noted: 2019-04-24

## 2019-04-24 PROBLEM — R13.12 OROPHARYNGEAL DYSPHAGIA: Status: ACTIVE | Noted: 2019-04-24

## 2019-04-24 PROBLEM — Z72.0 TOBACCO ABUSE: Chronic | Status: ACTIVE | Noted: 2019-04-24

## 2019-04-24 LAB
ALBUMIN SERPL-MCNC: 3.9 G/DL (ref 3.5–5.2)
ALBUMIN/GLOB SERPL: 1.5 G/DL
ALP SERPL-CCNC: 69 U/L (ref 39–117)
ALT SERPL W P-5'-P-CCNC: 17 U/L (ref 1–41)
AMPHET+METHAMPHET UR QL: NEGATIVE
ANION GAP SERPL CALCULATED.3IONS-SCNC: 10.1 MMOL/L
ANION GAP SERPL CALCULATED.3IONS-SCNC: 15.4 MMOL/L
ANION GAP SERPL CALCULATED.3IONS-SCNC: 15.5 MMOL/L
ANION GAP SERPL CALCULATED.3IONS-SCNC: 18.2 MMOL/L
AST SERPL-CCNC: 30 U/L (ref 1–40)
BACTERIA UR QL AUTO: NORMAL /HPF
BARBITURATES UR QL SCN: NEGATIVE
BASOPHILS # BLD AUTO: 0.03 10*3/MM3 (ref 0–0.2)
BASOPHILS NFR BLD AUTO: 0.4 % (ref 0–1.5)
BENZODIAZ UR QL SCN: NEGATIVE
BILIRUB SERPL-MCNC: 0.7 MG/DL (ref 0.2–1.2)
BILIRUB UR QL STRIP: NEGATIVE
BUN BLD-MCNC: 10 MG/DL (ref 8–23)
BUN BLD-MCNC: 8 MG/DL (ref 8–23)
BUN BLD-MCNC: 9 MG/DL (ref 8–23)
BUN BLD-MCNC: 9 MG/DL (ref 8–23)
BUN/CREAT SERPL: 8 (ref 7–25)
BUN/CREAT SERPL: 8 (ref 7–25)
BUN/CREAT SERPL: 8.4 (ref 7–25)
BUN/CREAT SERPL: 8.6 (ref 7–25)
CALCIUM SPEC-SCNC: 9.1 MG/DL (ref 8.6–10.5)
CALCIUM SPEC-SCNC: 9.6 MG/DL (ref 8.6–10.5)
CALCIUM SPEC-SCNC: 9.7 MG/DL (ref 8.6–10.5)
CALCIUM SPEC-SCNC: 9.9 MG/DL (ref 8.6–10.5)
CANNABINOIDS SERPL QL: NEGATIVE
CHLORIDE SERPL-SCNC: 77 MMOL/L (ref 98–107)
CHLORIDE SERPL-SCNC: 81 MMOL/L (ref 98–107)
CHLORIDE SERPL-SCNC: 85 MMOL/L (ref 98–107)
CHLORIDE SERPL-SCNC: 86 MMOL/L (ref 98–107)
CHLORIDE UR-SCNC: <20 MMOL/L
CK SERPL-CCNC: 198 U/L (ref 20–200)
CLARITY UR: CLEAR
CO2 SERPL-SCNC: 19.8 MMOL/L (ref 22–29)
CO2 SERPL-SCNC: 21.6 MMOL/L (ref 22–29)
CO2 SERPL-SCNC: 22.9 MMOL/L (ref 22–29)
CO2 SERPL-SCNC: 23.5 MMOL/L (ref 22–29)
COCAINE UR QL: NEGATIVE
COLOR UR: YELLOW
CREAT BLD-MCNC: 1 MG/DL (ref 0.76–1.27)
CREAT BLD-MCNC: 1.05 MG/DL (ref 0.76–1.27)
CREAT BLD-MCNC: 1.12 MG/DL (ref 0.76–1.27)
CREAT BLD-MCNC: 1.19 MG/DL (ref 0.76–1.27)
CREAT UR-MCNC: 40.7 MG/DL
CREAT UR-MCNC: 42.4 MG/DL
DEPRECATED RDW RBC AUTO: 46.7 FL (ref 37–54)
DEPRECATED RDW RBC AUTO: 47 FL (ref 37–54)
DIFFERENTIAL METHOD BLD: ABNORMAL
EOSINOPHIL # BLD AUTO: 0.1 10*3/MM3 (ref 0–0.4)
EOSINOPHIL NFR BLD AUTO: 1.5 % (ref 0.3–6.2)
ERYTHROCYTE [DISTWIDTH] IN BLOOD BY AUTOMATED COUNT: 13.3 % (ref 12.3–15.4)
ERYTHROCYTE [DISTWIDTH] IN BLOOD BY AUTOMATED COUNT: 13.3 % (ref 12.3–15.4)
ETHANOL BLD-MCNC: 57 MG/DL (ref 0–10)
ETHANOL UR QL: 0.06 %
GFR SERPL CREATININE-BSD FRML MDRD: 61 ML/MIN/1.73
GFR SERPL CREATININE-BSD FRML MDRD: 65 ML/MIN/1.73
GFR SERPL CREATININE-BSD FRML MDRD: 70 ML/MIN/1.73
GFR SERPL CREATININE-BSD FRML MDRD: 74 ML/MIN/1.73
GLOBULIN UR ELPH-MCNC: 2.6 GM/DL
GLUCOSE BLD-MCNC: 113 MG/DL (ref 65–99)
GLUCOSE BLD-MCNC: 91 MG/DL (ref 65–99)
GLUCOSE BLD-MCNC: 94 MG/DL (ref 65–99)
GLUCOSE BLD-MCNC: 94 MG/DL (ref 65–99)
GLUCOSE UR STRIP-MCNC: NEGATIVE MG/DL
HCT VFR BLD AUTO: 32.1 % (ref 37.5–51)
HCT VFR BLD AUTO: 34.9 % (ref 37.5–51)
HGB BLD-MCNC: 11.8 G/DL (ref 13–17.7)
HGB BLD-MCNC: 12.7 G/DL (ref 13–17.7)
HGB UR QL STRIP.AUTO: NEGATIVE
HYALINE CASTS UR QL AUTO: NORMAL /LPF
IMM GRANULOCYTES # BLD AUTO: 0.03 10*3/MM3 (ref 0–0.05)
IMM GRANULOCYTES NFR BLD AUTO: 0.4 % (ref 0–0.5)
INR PPP: 2.29 (ref 0.9–1.1)
INR PPP: 2.45 (ref 0.9–1.1)
KETONES UR QL STRIP: NEGATIVE
LEUKOCYTE ESTERASE UR QL STRIP.AUTO: ABNORMAL
LYMPHOCYTES # BLD AUTO: 1.14 10*3/MM3 (ref 0.7–3.1)
LYMPHOCYTES NFR BLD AUTO: 16.8 % (ref 19.6–45.3)
MAGNESIUM SERPL-MCNC: 1.8 MG/DL (ref 1.6–2.4)
MCH RBC QN AUTO: 34.8 PG (ref 26.6–33)
MCH RBC QN AUTO: 35.1 PG (ref 26.6–33)
MCHC RBC AUTO-ENTMCNC: 36.4 G/DL (ref 31.5–35.7)
MCHC RBC AUTO-ENTMCNC: 36.8 G/DL (ref 31.5–35.7)
MCV RBC AUTO: 95.5 FL (ref 79–97)
MCV RBC AUTO: 95.6 FL (ref 79–97)
METHADONE UR QL SCN: NEGATIVE
MONOCYTES # BLD AUTO: 0.37 10*3/MM3 (ref 0.1–0.9)
MONOCYTES NFR BLD AUTO: 5.5 % (ref 5–12)
NEUTROPHILS # BLD AUTO: 5.11 10*3/MM3 (ref 1.7–7)
NEUTROPHILS NFR BLD AUTO: 75.4 % (ref 42.7–76)
NITRITE UR QL STRIP: NEGATIVE
NRBC BLD AUTO-RTO: 0 /100 WBC (ref 0–0.2)
OPIATES UR QL: POSITIVE
OSMOLALITY SERPL: 256 MOSM/KG (ref 280–301)
OSMOLALITY UR: 158 MOSM/KG
OSMOLALITY UR: 203 MOSM/KG
OXYCODONE UR QL SCN: NEGATIVE
PH UR STRIP.AUTO: 6.5 [PH] (ref 5–8)
PHOSPHATE SERPL-MCNC: 2.6 MG/DL (ref 2.5–4.5)
PLATELET # BLD AUTO: 127 10*3/MM3 (ref 140–450)
PLATELET # BLD AUTO: 139 10*3/MM3 (ref 140–450)
PMV BLD AUTO: 10.1 FL (ref 6–12)
PMV BLD AUTO: 10.5 FL (ref 6–12)
POTASSIUM BLD-SCNC: 4.1 MMOL/L (ref 3.5–5.2)
POTASSIUM BLD-SCNC: 4.4 MMOL/L (ref 3.5–5.2)
POTASSIUM BLD-SCNC: 4.5 MMOL/L (ref 3.5–5.2)
POTASSIUM BLD-SCNC: 4.6 MMOL/L (ref 3.5–5.2)
PROT SERPL-MCNC: 6.5 G/DL (ref 6–8.5)
PROT UR QL STRIP: NEGATIVE
PROTHROMBIN TIME: 24.9 SECONDS (ref 11.7–14.2)
PROTHROMBIN TIME: 26.3 SECONDS (ref 11.7–14.2)
RBC # BLD AUTO: 3.36 10*6/MM3 (ref 4.14–5.8)
RBC # BLD AUTO: 3.65 10*6/MM3 (ref 4.14–5.8)
RBC # UR: NORMAL /HPF
REF LAB TEST METHOD: NORMAL
SODIUM BLD-SCNC: 115 MMOL/L (ref 136–145)
SODIUM BLD-SCNC: 119 MMOL/L (ref 136–145)
SODIUM BLD-SCNC: 120 MMOL/L (ref 136–145)
SODIUM BLD-SCNC: 122 MMOL/L (ref 136–145)
SODIUM BLD-SCNC: 122 MMOL/L (ref 136–145)
SODIUM UR-SCNC: 21 MMOL/L
SODIUM UR-SCNC: 37 MMOL/L
SP GR UR STRIP: 1.01 (ref 1–1.03)
SQUAMOUS #/AREA URNS HPF: NORMAL /HPF
TROPONIN T SERPL-MCNC: <0.01 NG/ML (ref 0–0.03)
UROBILINOGEN UR QL STRIP: ABNORMAL
WBC # BLD AUTO: 6.78 10*3/MM3 (ref 3.4–10.8)
WBC NRBC COR # BLD: 6.78 10*3/MM3 (ref 3.4–10.8)
WBC NRBC COR # BLD: 9.21 10*3/MM3 (ref 3.4–10.8)
WBC UR QL AUTO: NORMAL /HPF

## 2019-04-24 PROCEDURE — 94640 AIRWAY INHALATION TREATMENT: CPT

## 2019-04-24 PROCEDURE — 93010 ELECTROCARDIOGRAM REPORT: CPT | Performed by: INTERNAL MEDICINE

## 2019-04-24 PROCEDURE — 81001 URINALYSIS AUTO W/SCOPE: CPT | Performed by: PHYSICIAN ASSISTANT

## 2019-04-24 PROCEDURE — 94799 UNLISTED PULMONARY SVC/PX: CPT

## 2019-04-24 PROCEDURE — 80307 DRUG TEST PRSMV CHEM ANLYZR: CPT | Performed by: PHYSICIAN ASSISTANT

## 2019-04-24 PROCEDURE — 82570 ASSAY OF URINE CREATININE: CPT | Performed by: NURSE PRACTITIONER

## 2019-04-24 PROCEDURE — 0 DIATRIZOATE MEGLUMINE & SODIUM PER 1 ML: Performed by: INTERNAL MEDICINE

## 2019-04-24 PROCEDURE — 36415 COLL VENOUS BLD VENIPUNCTURE: CPT | Performed by: NURSE PRACTITIONER

## 2019-04-24 PROCEDURE — 83930 ASSAY OF BLOOD OSMOLALITY: CPT | Performed by: NURSE PRACTITIONER

## 2019-04-24 PROCEDURE — 84484 ASSAY OF TROPONIN QUANT: CPT | Performed by: PHYSICIAN ASSISTANT

## 2019-04-24 PROCEDURE — 84100 ASSAY OF PHOSPHORUS: CPT | Performed by: NURSE PRACTITIONER

## 2019-04-24 PROCEDURE — 80069 RENAL FUNCTION PANEL: CPT | Performed by: INTERNAL MEDICINE

## 2019-04-24 PROCEDURE — 92610 EVALUATE SWALLOWING FUNCTION: CPT

## 2019-04-24 PROCEDURE — 70450 CT HEAD/BRAIN W/O DYE: CPT

## 2019-04-24 PROCEDURE — 84295 ASSAY OF SERUM SODIUM: CPT | Performed by: INTERNAL MEDICINE

## 2019-04-24 PROCEDURE — 74176 CT ABD & PELVIS W/O CONTRAST: CPT

## 2019-04-24 PROCEDURE — 83735 ASSAY OF MAGNESIUM: CPT | Performed by: NURSE PRACTITIONER

## 2019-04-24 PROCEDURE — 97162 PT EVAL MOD COMPLEX 30 MIN: CPT

## 2019-04-24 PROCEDURE — 85610 PROTHROMBIN TIME: CPT | Performed by: NURSE PRACTITIONER

## 2019-04-24 PROCEDURE — 82436 ASSAY OF URINE CHLORIDE: CPT | Performed by: INTERNAL MEDICINE

## 2019-04-24 PROCEDURE — 82550 ASSAY OF CK (CPK): CPT | Performed by: PHYSICIAN ASSISTANT

## 2019-04-24 PROCEDURE — 0HQDXZZ REPAIR RIGHT LOWER ARM SKIN, EXTERNAL APPROACH: ICD-10-PCS | Performed by: EMERGENCY MEDICINE

## 2019-04-24 PROCEDURE — 85027 COMPLETE CBC AUTOMATED: CPT | Performed by: NURSE PRACTITIONER

## 2019-04-24 PROCEDURE — 85025 COMPLETE CBC W/AUTO DIFF WBC: CPT | Performed by: PHYSICIAN ASSISTANT

## 2019-04-24 PROCEDURE — 83935 ASSAY OF URINE OSMOLALITY: CPT | Performed by: INTERNAL MEDICINE

## 2019-04-24 PROCEDURE — 93005 ELECTROCARDIOGRAM TRACING: CPT | Performed by: PHYSICIAN ASSISTANT

## 2019-04-24 PROCEDURE — 82570 ASSAY OF URINE CREATININE: CPT | Performed by: INTERNAL MEDICINE

## 2019-04-24 PROCEDURE — 0HQBXZZ REPAIR RIGHT UPPER ARM SKIN, EXTERNAL APPROACH: ICD-10-PCS | Performed by: EMERGENCY MEDICINE

## 2019-04-24 PROCEDURE — 71250 CT THORAX DX C-: CPT

## 2019-04-24 PROCEDURE — 25010000002 THIAMINE PER 100 MG: Performed by: NURSE PRACTITIONER

## 2019-04-24 PROCEDURE — 97110 THERAPEUTIC EXERCISES: CPT

## 2019-04-24 PROCEDURE — 83935 ASSAY OF URINE OSMOLALITY: CPT | Performed by: NURSE PRACTITIONER

## 2019-04-24 PROCEDURE — 99285 EMERGENCY DEPT VISIT HI MDM: CPT

## 2019-04-24 PROCEDURE — 80053 COMPREHEN METABOLIC PANEL: CPT | Performed by: PHYSICIAN ASSISTANT

## 2019-04-24 PROCEDURE — 84300 ASSAY OF URINE SODIUM: CPT | Performed by: INTERNAL MEDICINE

## 2019-04-24 PROCEDURE — 85610 PROTHROMBIN TIME: CPT | Performed by: PHYSICIAN ASSISTANT

## 2019-04-24 PROCEDURE — 84300 ASSAY OF URINE SODIUM: CPT | Performed by: NURSE PRACTITIONER

## 2019-04-24 RX ORDER — LORAZEPAM 2 MG/ML
1 INJECTION INTRAMUSCULAR
Status: DISCONTINUED | OUTPATIENT
Start: 2019-04-24 | End: 2019-04-29 | Stop reason: HOSPADM

## 2019-04-24 RX ORDER — DOCUSATE SODIUM 100 MG/1
100 CAPSULE, LIQUID FILLED ORAL DAILY
Status: DISCONTINUED | OUTPATIENT
Start: 2019-04-24 | End: 2019-04-29 | Stop reason: HOSPADM

## 2019-04-24 RX ORDER — LORAZEPAM 0.5 MG/1
0.5 TABLET ORAL
Status: DISCONTINUED | OUTPATIENT
Start: 2019-04-24 | End: 2019-04-29 | Stop reason: HOSPADM

## 2019-04-24 RX ORDER — FOLIC ACID 1 MG/1
1 TABLET ORAL DAILY
Status: COMPLETED | OUTPATIENT
Start: 2019-04-25 | End: 2019-04-27

## 2019-04-24 RX ORDER — THIAMINE MONONITRATE (VIT B1) 100 MG
100 TABLET ORAL DAILY
Status: COMPLETED | OUTPATIENT
Start: 2019-04-25 | End: 2019-04-27

## 2019-04-24 RX ORDER — SODIUM CHLORIDE 9 MG/ML
50 INJECTION, SOLUTION INTRAVENOUS CONTINUOUS
Status: DISCONTINUED | OUTPATIENT
Start: 2019-04-24 | End: 2019-04-24

## 2019-04-24 RX ORDER — IPRATROPIUM BROMIDE AND ALBUTEROL SULFATE 2.5; .5 MG/3ML; MG/3ML
3 SOLUTION RESPIRATORY (INHALATION) ONCE
Status: COMPLETED | OUTPATIENT
Start: 2019-04-24 | End: 2019-04-24

## 2019-04-24 RX ORDER — WARFARIN SODIUM 7.5 MG/1
7.5 TABLET ORAL
Status: DISCONTINUED | OUTPATIENT
Start: 2019-04-27 | End: 2019-04-26 | Stop reason: DRUGHIGH

## 2019-04-24 RX ORDER — METOCLOPRAMIDE 10 MG/1
10 TABLET ORAL
Status: DISCONTINUED | OUTPATIENT
Start: 2019-04-24 | End: 2019-04-29 | Stop reason: HOSPADM

## 2019-04-24 RX ORDER — GENTAMICIN SULFATE 1 MG/G
OINTMENT TOPICAL 2 TIMES DAILY
Status: DISCONTINUED | OUTPATIENT
Start: 2019-04-24 | End: 2019-04-29 | Stop reason: HOSPADM

## 2019-04-24 RX ORDER — IPRATROPIUM BROMIDE AND ALBUTEROL SULFATE 2.5; .5 MG/3ML; MG/3ML
3 SOLUTION RESPIRATORY (INHALATION)
Status: DISCONTINUED | OUTPATIENT
Start: 2019-04-24 | End: 2019-04-29 | Stop reason: HOSPADM

## 2019-04-24 RX ORDER — ONDANSETRON 2 MG/ML
4 INJECTION INTRAMUSCULAR; INTRAVENOUS EVERY 6 HOURS PRN
Status: DISCONTINUED | OUTPATIENT
Start: 2019-04-24 | End: 2019-04-29 | Stop reason: HOSPADM

## 2019-04-24 RX ORDER — ALBUTEROL SULFATE 2.5 MG/3ML
2.5 SOLUTION RESPIRATORY (INHALATION) EVERY 6 HOURS PRN
Status: DISCONTINUED | OUTPATIENT
Start: 2019-04-24 | End: 2019-04-29 | Stop reason: HOSPADM

## 2019-04-24 RX ORDER — PRAVASTATIN SODIUM 20 MG
20 TABLET ORAL DAILY
Status: DISCONTINUED | OUTPATIENT
Start: 2019-04-24 | End: 2019-04-29 | Stop reason: HOSPADM

## 2019-04-24 RX ORDER — SODIUM CHLORIDE 0.9 % (FLUSH) 0.9 %
3 SYRINGE (ML) INJECTION EVERY 12 HOURS SCHEDULED
Status: DISCONTINUED | OUTPATIENT
Start: 2019-04-24 | End: 2019-04-29 | Stop reason: HOSPADM

## 2019-04-24 RX ORDER — FINASTERIDE 5 MG/1
5 TABLET, FILM COATED ORAL DAILY
Status: DISCONTINUED | OUTPATIENT
Start: 2019-04-24 | End: 2019-04-29 | Stop reason: HOSPADM

## 2019-04-24 RX ORDER — CARVEDILOL 12.5 MG/1
12.5 TABLET ORAL 2 TIMES DAILY WITH MEALS
Status: DISCONTINUED | OUTPATIENT
Start: 2019-04-24 | End: 2019-04-27

## 2019-04-24 RX ORDER — TRIAMCINOLONE ACETONIDE 1 MG/G
CREAM TOPICAL EVERY 12 HOURS SCHEDULED
Status: DISCONTINUED | OUTPATIENT
Start: 2019-04-24 | End: 2019-04-25

## 2019-04-24 RX ORDER — WARFARIN SODIUM 5 MG/1
5 TABLET ORAL
Status: DISCONTINUED | OUTPATIENT
Start: 2019-04-24 | End: 2019-04-25 | Stop reason: DRUGHIGH

## 2019-04-24 RX ORDER — ONDANSETRON 4 MG/1
4 TABLET, FILM COATED ORAL EVERY 6 HOURS PRN
Status: DISCONTINUED | OUTPATIENT
Start: 2019-04-24 | End: 2019-04-29 | Stop reason: HOSPADM

## 2019-04-24 RX ORDER — SODIUM CHLORIDE 9 MG/ML
100 INJECTION, SOLUTION INTRAVENOUS ONCE
Status: DISCONTINUED | OUTPATIENT
Start: 2019-04-24 | End: 2019-04-24

## 2019-04-24 RX ORDER — HYDROCODONE BITARTRATE AND ACETAMINOPHEN 7.5; 325 MG/1; MG/1
1 TABLET ORAL EVERY 8 HOURS PRN
Status: DISCONTINUED | OUTPATIENT
Start: 2019-04-24 | End: 2019-04-29 | Stop reason: HOSPADM

## 2019-04-24 RX ORDER — CLOTRIMAZOLE AND BETAMETHASONE DIPROPIONATE 10; .64 MG/G; MG/G
CREAM TOPICAL EVERY 12 HOURS SCHEDULED
Status: DISCONTINUED | OUTPATIENT
Start: 2019-04-24 | End: 2019-04-29 | Stop reason: HOSPADM

## 2019-04-24 RX ORDER — LORAZEPAM 2 MG/ML
0.5 INJECTION INTRAMUSCULAR
Status: DISCONTINUED | OUTPATIENT
Start: 2019-04-24 | End: 2019-04-29 | Stop reason: HOSPADM

## 2019-04-24 RX ORDER — ACETAMINOPHEN 325 MG/1
650 TABLET ORAL EVERY 4 HOURS PRN
Status: DISCONTINUED | OUTPATIENT
Start: 2019-04-24 | End: 2019-04-29 | Stop reason: HOSPADM

## 2019-04-24 RX ORDER — TAMSULOSIN HYDROCHLORIDE 0.4 MG/1
0.4 CAPSULE ORAL NIGHTLY
Status: DISCONTINUED | OUTPATIENT
Start: 2019-04-24 | End: 2019-04-29 | Stop reason: HOSPADM

## 2019-04-24 RX ORDER — SODIUM CHLORIDE 9 MG/ML
50 INJECTION, SOLUTION INTRAVENOUS CONTINUOUS
Status: DISCONTINUED | OUTPATIENT
Start: 2019-04-24 | End: 2019-04-25

## 2019-04-24 RX ORDER — DIPHENOXYLATE HYDROCHLORIDE AND ATROPINE SULFATE 2.5; .025 MG/1; MG/1
1 TABLET ORAL DAILY
Status: COMPLETED | OUTPATIENT
Start: 2019-04-25 | End: 2019-04-27

## 2019-04-24 RX ORDER — LORAZEPAM 1 MG/1
1 TABLET ORAL
Status: DISCONTINUED | OUTPATIENT
Start: 2019-04-24 | End: 2019-04-29 | Stop reason: HOSPADM

## 2019-04-24 RX ORDER — SODIUM CHLORIDE 0.9 % (FLUSH) 0.9 %
1-10 SYRINGE (ML) INJECTION AS NEEDED
Status: DISCONTINUED | OUTPATIENT
Start: 2019-04-24 | End: 2019-04-29 | Stop reason: HOSPADM

## 2019-04-24 RX ORDER — NITROGLYCERIN 0.4 MG/1
0.4 TABLET SUBLINGUAL
Status: DISCONTINUED | OUTPATIENT
Start: 2019-04-24 | End: 2019-04-29 | Stop reason: HOSPADM

## 2019-04-24 RX ORDER — LISINOPRIL 40 MG/1
40 TABLET ORAL DAILY
Status: DISCONTINUED | OUTPATIENT
Start: 2019-04-24 | End: 2019-04-26

## 2019-04-24 RX ORDER — THIAMINE MONONITRATE (VIT B1) 100 MG
100 TABLET ORAL ONCE
Status: COMPLETED | OUTPATIENT
Start: 2019-04-24 | End: 2019-04-24

## 2019-04-24 RX ADMIN — TAMSULOSIN HYDROCHLORIDE 0.4 MG: 0.4 CAPSULE ORAL at 21:32

## 2019-04-24 RX ADMIN — SODIUM CHLORIDE, PRESERVATIVE FREE 3 ML: 5 INJECTION INTRAVENOUS at 21:32

## 2019-04-24 RX ADMIN — SODIUM CHLORIDE 50 ML/HR: 9 INJECTION, SOLUTION INTRAVENOUS at 04:11

## 2019-04-24 RX ADMIN — PRAVASTATIN SODIUM 20 MG: 20 TABLET ORAL at 16:55

## 2019-04-24 RX ADMIN — IPRATROPIUM BROMIDE AND ALBUTEROL SULFATE 3 ML: 2.5; .5 SOLUTION RESPIRATORY (INHALATION) at 11:16

## 2019-04-24 RX ADMIN — CARVEDILOL 12.5 MG: 12.5 TABLET, FILM COATED ORAL at 17:31

## 2019-04-24 RX ADMIN — DOCUSATE SODIUM 100 MG: 100 CAPSULE, LIQUID FILLED ORAL at 16:55

## 2019-04-24 RX ADMIN — IPRATROPIUM BROMIDE AND ALBUTEROL SULFATE 3 ML: 2.5; .5 SOLUTION RESPIRATORY (INHALATION) at 21:41

## 2019-04-24 RX ADMIN — METOCLOPRAMIDE 10 MG: 10 TABLET ORAL at 21:32

## 2019-04-24 RX ADMIN — WARFARIN SODIUM 5 MG: 5 TABLET ORAL at 16:55

## 2019-04-24 RX ADMIN — THIAMINE HYDROCHLORIDE 100 MG: 100 INJECTION, SOLUTION INTRAMUSCULAR; INTRAVENOUS at 14:15

## 2019-04-24 RX ADMIN — TRIAMCINOLONE ACETONIDE: 1 CREAM TOPICAL at 21:33

## 2019-04-24 RX ADMIN — DIATRIZOATE MEGLUMINE AND DIATRIZOATE SODIUM 30 ML: 600; 100 SOLUTION ORAL; RECTAL at 16:54

## 2019-04-24 RX ADMIN — TRIAMCINOLONE ACETONIDE: 1 CREAM TOPICAL at 16:54

## 2019-04-24 RX ADMIN — IPRATROPIUM BROMIDE AND ALBUTEROL SULFATE 3 ML: 2.5; .5 SOLUTION RESPIRATORY (INHALATION) at 04:12

## 2019-04-24 RX ADMIN — LISINOPRIL 40 MG: 40 TABLET ORAL at 16:56

## 2019-04-24 RX ADMIN — SODIUM CHLORIDE, PRESERVATIVE FREE 3 ML: 5 INJECTION INTRAVENOUS at 11:33

## 2019-04-24 RX ADMIN — FINASTERIDE 5 MG: 5 TABLET, FILM COATED ORAL at 16:55

## 2019-04-24 RX ADMIN — METOCLOPRAMIDE 10 MG: 10 TABLET ORAL at 16:55

## 2019-04-24 RX ADMIN — SODIUM CHLORIDE 75 ML/HR: 9 INJECTION, SOLUTION INTRAVENOUS at 18:38

## 2019-04-24 RX ADMIN — IPRATROPIUM BROMIDE AND ALBUTEROL SULFATE 3 ML: 2.5; .5 SOLUTION RESPIRATORY (INHALATION) at 14:59

## 2019-04-24 RX ADMIN — HYDROCODONE BITARTRATE AND ACETAMINOPHEN 1 TABLET: 7.5; 325 TABLET ORAL at 16:54

## 2019-04-25 ENCOUNTER — APPOINTMENT (OUTPATIENT)
Dept: GENERAL RADIOLOGY | Facility: HOSPITAL | Age: 69
End: 2019-04-25

## 2019-04-25 ENCOUNTER — APPOINTMENT (OUTPATIENT)
Dept: ULTRASOUND IMAGING | Facility: HOSPITAL | Age: 69
End: 2019-04-25

## 2019-04-25 PROBLEM — D69.6 THROMBOCYTOPENIA (HCC): Status: ACTIVE | Noted: 2019-04-25

## 2019-04-25 PROBLEM — R59.0 RETROPERITONEAL LYMPHADENOPATHY: Status: ACTIVE | Noted: 2019-04-25

## 2019-04-25 PROBLEM — D35.02 ADRENAL ADENOMA, LEFT: Status: ACTIVE | Noted: 2019-04-25

## 2019-04-25 PROBLEM — E01.0 THYROMEGALY: Status: ACTIVE | Noted: 2019-04-25

## 2019-04-25 PROBLEM — B37.2 CANDIDAL INTERTRIGO: Chronic | Status: ACTIVE | Noted: 2019-04-25

## 2019-04-25 LAB
ALBUMIN SERPL-MCNC: 3.6 G/DL (ref 3.5–5.2)
ANION GAP SERPL CALCULATED.3IONS-SCNC: 11.4 MMOL/L
ANION GAP SERPL CALCULATED.3IONS-SCNC: 9.6 MMOL/L
BASOPHILS # BLD AUTO: 0.01 10*3/MM3 (ref 0–0.2)
BASOPHILS NFR BLD AUTO: 0.2 % (ref 0–1.5)
BUN BLD-MCNC: 8 MG/DL (ref 8–23)
BUN BLD-MCNC: 9 MG/DL (ref 8–23)
BUN/CREAT SERPL: 9.8 (ref 7–25)
BUN/CREAT SERPL: 9.9 (ref 7–25)
CALCIUM SPEC-SCNC: 8.6 MG/DL (ref 8.6–10.5)
CALCIUM SPEC-SCNC: 8.9 MG/DL (ref 8.6–10.5)
CHLORIDE SERPL-SCNC: 89 MMOL/L (ref 98–107)
CHLORIDE SERPL-SCNC: 90 MMOL/L (ref 98–107)
CO2 SERPL-SCNC: 22.4 MMOL/L (ref 22–29)
CO2 SERPL-SCNC: 22.6 MMOL/L (ref 22–29)
CREAT BLD-MCNC: 0.81 MG/DL (ref 0.76–1.27)
CREAT BLD-MCNC: 0.92 MG/DL (ref 0.76–1.27)
DEPRECATED RDW RBC AUTO: 48.8 FL (ref 37–54)
EOSINOPHIL # BLD AUTO: 0.03 10*3/MM3 (ref 0–0.4)
EOSINOPHIL NFR BLD AUTO: 0.5 % (ref 0.3–6.2)
ERYTHROCYTE [DISTWIDTH] IN BLOOD BY AUTOMATED COUNT: 13.5 % (ref 12.3–15.4)
GFR SERPL CREATININE-BSD FRML MDRD: 82 ML/MIN/1.73
GFR SERPL CREATININE-BSD FRML MDRD: 95 ML/MIN/1.73
GLUCOSE BLD-MCNC: 104 MG/DL (ref 65–99)
GLUCOSE BLD-MCNC: 121 MG/DL (ref 65–99)
HCT VFR BLD AUTO: 31.4 % (ref 37.5–51)
HEMOCCULT STL QL: NEGATIVE
HGB BLD-MCNC: 11.1 G/DL (ref 13–17.7)
HGB RETIC QN AUTO: 39.1 PG (ref 29.8–36.1)
IMM RETICS NFR: 9.9 % (ref 3–15.8)
INR PPP: 3.02 (ref 0.9–1.1)
LYMPHOCYTES # BLD AUTO: 0.68 10*3/MM3 (ref 0.7–3.1)
LYMPHOCYTES NFR BLD AUTO: 12.4 % (ref 19.6–45.3)
MCH RBC QN AUTO: 34.8 PG (ref 26.6–33)
MCHC RBC AUTO-ENTMCNC: 35.4 G/DL (ref 31.5–35.7)
MCV RBC AUTO: 98.4 FL (ref 79–97)
MONOCYTES # BLD AUTO: 0.33 10*3/MM3 (ref 0.1–0.9)
MONOCYTES NFR BLD AUTO: 6 % (ref 5–12)
NEUTROPHILS # BLD AUTO: 4.42 10*3/MM3 (ref 1.7–7)
NEUTROPHILS NFR BLD AUTO: 80.5 % (ref 42.7–76)
PHOSPHATE SERPL-MCNC: 2.4 MG/DL (ref 2.5–4.5)
PLATELET # BLD AUTO: 112 10*3/MM3 (ref 140–450)
PMV BLD AUTO: 10.4 FL (ref 6–12)
POTASSIUM BLD-SCNC: 4.2 MMOL/L (ref 3.5–5.2)
POTASSIUM BLD-SCNC: 4.4 MMOL/L (ref 3.5–5.2)
PROTHROMBIN TIME: 31 SECONDS (ref 11.7–14.2)
PSA SERPL-MCNC: 0.09 NG/ML (ref 0–4)
RBC # BLD AUTO: 3.19 10*6/MM3 (ref 4.14–5.8)
RETICS/RBC NFR AUTO: 2.89 % (ref 0.7–1.9)
SODIUM BLD-SCNC: 122 MMOL/L (ref 136–145)
SODIUM BLD-SCNC: 123 MMOL/L (ref 136–145)
WBC NRBC COR # BLD: 5.49 10*3/MM3 (ref 3.4–10.8)

## 2019-04-25 PROCEDURE — 94760 N-INVAS EAR/PLS OXIMETRY 1: CPT

## 2019-04-25 PROCEDURE — 85610 PROTHROMBIN TIME: CPT | Performed by: NURSE PRACTITIONER

## 2019-04-25 PROCEDURE — 97110 THERAPEUTIC EXERCISES: CPT

## 2019-04-25 PROCEDURE — 77075 RADEX OSSEOUS SURVEY COMPL: CPT

## 2019-04-25 PROCEDURE — 94799 UNLISTED PULMONARY SVC/PX: CPT

## 2019-04-25 PROCEDURE — 84153 ASSAY OF PSA TOTAL: CPT | Performed by: HOSPITALIST

## 2019-04-25 PROCEDURE — 80048 BASIC METABOLIC PNL TOTAL CA: CPT | Performed by: NURSE PRACTITIONER

## 2019-04-25 PROCEDURE — 85046 RETICYTE/HGB CONCENTRATE: CPT | Performed by: INTERNAL MEDICINE

## 2019-04-25 PROCEDURE — 85025 COMPLETE CBC W/AUTO DIFF WBC: CPT | Performed by: HOSPITALIST

## 2019-04-25 PROCEDURE — 99223 1ST HOSP IP/OBS HIGH 75: CPT | Performed by: INTERNAL MEDICINE

## 2019-04-25 PROCEDURE — 82272 OCCULT BLD FECES 1-3 TESTS: CPT | Performed by: HOSPITALIST

## 2019-04-25 PROCEDURE — 76536 US EXAM OF HEAD AND NECK: CPT

## 2019-04-25 RX ORDER — NYSTATIN 100000 [USP'U]/G
POWDER TOPICAL EVERY 12 HOURS SCHEDULED
Status: DISCONTINUED | OUTPATIENT
Start: 2019-04-25 | End: 2019-04-29 | Stop reason: HOSPADM

## 2019-04-25 RX ADMIN — METOCLOPRAMIDE 10 MG: 10 TABLET ORAL at 17:54

## 2019-04-25 RX ADMIN — NYSTATIN: 100000 POWDER TOPICAL at 20:14

## 2019-04-25 RX ADMIN — SODIUM CHLORIDE 500 ML: 9 INJECTION, SOLUTION INTRAVENOUS at 20:26

## 2019-04-25 RX ADMIN — IPRATROPIUM BROMIDE AND ALBUTEROL SULFATE 3 ML: 2.5; .5 SOLUTION RESPIRATORY (INHALATION) at 20:27

## 2019-04-25 RX ADMIN — PRAVASTATIN SODIUM 20 MG: 20 TABLET ORAL at 08:38

## 2019-04-25 RX ADMIN — CARVEDILOL 12.5 MG: 12.5 TABLET, FILM COATED ORAL at 08:37

## 2019-04-25 RX ADMIN — METOCLOPRAMIDE 10 MG: 10 TABLET ORAL at 20:24

## 2019-04-25 RX ADMIN — HYDROCODONE BITARTRATE AND ACETAMINOPHEN 1 TABLET: 7.5; 325 TABLET ORAL at 08:38

## 2019-04-25 RX ADMIN — FINASTERIDE 5 MG: 5 TABLET, FILM COATED ORAL at 08:38

## 2019-04-25 RX ADMIN — METOCLOPRAMIDE 10 MG: 10 TABLET ORAL at 06:53

## 2019-04-25 RX ADMIN — IPRATROPIUM BROMIDE AND ALBUTEROL SULFATE 3 ML: 2.5; .5 SOLUTION RESPIRATORY (INHALATION) at 09:00

## 2019-04-25 RX ADMIN — DOCUSATE SODIUM 100 MG: 100 CAPSULE, LIQUID FILLED ORAL at 08:38

## 2019-04-25 RX ADMIN — LISINOPRIL 40 MG: 40 TABLET ORAL at 08:38

## 2019-04-25 RX ADMIN — SODIUM CHLORIDE, PRESERVATIVE FREE 3 ML: 5 INJECTION INTRAVENOUS at 08:38

## 2019-04-25 RX ADMIN — CARVEDILOL 12.5 MG: 12.5 TABLET, FILM COATED ORAL at 17:54

## 2019-04-25 RX ADMIN — Medication 100 MG: at 08:38

## 2019-04-25 RX ADMIN — IPRATROPIUM BROMIDE AND ALBUTEROL SULFATE 3 ML: 2.5; .5 SOLUTION RESPIRATORY (INHALATION) at 13:53

## 2019-04-25 RX ADMIN — FOLIC ACID 1 MG: 1 TABLET ORAL at 08:38

## 2019-04-25 RX ADMIN — ALBUTEROL SULFATE 2.5 MG: 2.5 SOLUTION RESPIRATORY (INHALATION) at 01:50

## 2019-04-25 RX ADMIN — TAMSULOSIN HYDROCHLORIDE 0.4 MG: 0.4 CAPSULE ORAL at 20:13

## 2019-04-25 RX ADMIN — METOCLOPRAMIDE 10 MG: 10 TABLET ORAL at 12:11

## 2019-04-25 RX ADMIN — TRIAMCINOLONE ACETONIDE: 1 CREAM TOPICAL at 08:38

## 2019-04-25 RX ADMIN — Medication 1 TABLET: at 08:38

## 2019-04-25 RX ADMIN — SODIUM CHLORIDE, PRESERVATIVE FREE 3 ML: 5 INJECTION INTRAVENOUS at 20:14

## 2019-04-26 LAB
ALBUMIN SERPL-MCNC: 3.7 G/DL (ref 3.5–5.2)
ALBUMIN/GLOB SERPL: 1.4 G/DL
ALP SERPL-CCNC: 60 U/L (ref 39–117)
ALT SERPL W P-5'-P-CCNC: 16 U/L (ref 1–41)
ANION GAP SERPL CALCULATED.3IONS-SCNC: 10 MMOL/L
AST SERPL-CCNC: 24 U/L (ref 1–40)
BASOPHILS # BLD AUTO: 0.03 10*3/MM3 (ref 0–0.2)
BASOPHILS NFR BLD AUTO: 0.6 % (ref 0–1.5)
BILIRUB SERPL-MCNC: 0.9 MG/DL (ref 0.2–1.2)
BUN BLD-MCNC: 10 MG/DL (ref 8–23)
BUN/CREAT SERPL: 11.2 (ref 7–25)
CALCIUM SPEC-SCNC: 9 MG/DL (ref 8.6–10.5)
CHLORIDE SERPL-SCNC: 94 MMOL/L (ref 98–107)
CO2 SERPL-SCNC: 23 MMOL/L (ref 22–29)
CORTIS SERPL-MCNC: 10.38 MCG/DL
CREAT BLD-MCNC: 0.89 MG/DL (ref 0.76–1.27)
DEPRECATED RDW RBC AUTO: 50 FL (ref 37–54)
EOSINOPHIL # BLD AUTO: 0.08 10*3/MM3 (ref 0–0.4)
EOSINOPHIL NFR BLD AUTO: 1.5 % (ref 0.3–6.2)
ERYTHROCYTE [DISTWIDTH] IN BLOOD BY AUTOMATED COUNT: 13.8 % (ref 12.3–15.4)
FERRITIN SERPL-MCNC: 584 NG/ML (ref 30–400)
FOLATE SERPL-MCNC: 6.13 NG/ML (ref 4.78–24.2)
GFR SERPL CREATININE-BSD FRML MDRD: 85 ML/MIN/1.73
GLOBULIN UR ELPH-MCNC: 2.6 GM/DL
GLUCOSE BLD-MCNC: 105 MG/DL (ref 65–99)
HCT VFR BLD AUTO: 30 % (ref 37.5–51)
HGB BLD-MCNC: 10.7 G/DL (ref 13–17.7)
IMM GRANULOCYTES # BLD AUTO: 0.04 10*3/MM3 (ref 0–0.05)
IMM GRANULOCYTES NFR BLD AUTO: 0.7 % (ref 0–0.5)
INR PPP: 2.01 (ref 0.9–1.1)
IRON 24H UR-MRATE: 66 MCG/DL (ref 59–158)
IRON SATN MFR SERPL: 27 % (ref 20–50)
LYMPHOCYTES # BLD AUTO: 1 10*3/MM3 (ref 0.7–3.1)
LYMPHOCYTES NFR BLD AUTO: 18.5 % (ref 19.6–45.3)
MCH RBC QN AUTO: 35.5 PG (ref 26.6–33)
MCHC RBC AUTO-ENTMCNC: 35.7 G/DL (ref 31.5–35.7)
MCV RBC AUTO: 99.7 FL (ref 79–97)
MONOCYTES # BLD AUTO: 0.35 10*3/MM3 (ref 0.1–0.9)
MONOCYTES NFR BLD AUTO: 6.5 % (ref 5–12)
NEUTROPHILS # BLD AUTO: 3.9 10*3/MM3 (ref 1.7–7)
NEUTROPHILS NFR BLD AUTO: 72.2 % (ref 42.7–76)
NRBC BLD AUTO-RTO: 0 /100 WBC (ref 0–0.2)
PLATELET # BLD AUTO: 113 10*3/MM3 (ref 140–450)
PLATELET # BLD AUTO: 113 10*3/MM3 (ref 140–450)
PLATELETS.RETICULATED NFR BLD AUTO: 5.2 % (ref 0.9–6.5)
PMV BLD AUTO: 10.7 FL (ref 6–12)
POTASSIUM BLD-SCNC: 4.3 MMOL/L (ref 3.5–5.2)
PROT SERPL-MCNC: 6.3 G/DL (ref 6–8.5)
PROTHROMBIN TIME: 22.5 SECONDS (ref 11.7–14.2)
RBC # BLD AUTO: 3.01 10*6/MM3 (ref 4.14–5.8)
SODIUM BLD-SCNC: 127 MMOL/L (ref 136–145)
T4 FREE SERPL-MCNC: 1.27 NG/DL (ref 0.93–1.7)
TIBC SERPL-MCNC: 247 MCG/DL (ref 298–536)
TRANSFERRIN SERPL-MCNC: 166 MG/DL (ref 200–360)
TSH SERPL DL<=0.05 MIU/L-ACNC: 1.43 MIU/ML (ref 0.27–4.2)
VIT B12 BLD-MCNC: 365 PG/ML (ref 211–946)
WBC NRBC COR # BLD: 5.4 10*3/MM3 (ref 3.4–10.8)

## 2019-04-26 PROCEDURE — 82607 VITAMIN B-12: CPT | Performed by: HOSPITALIST

## 2019-04-26 PROCEDURE — 94799 UNLISTED PULMONARY SVC/PX: CPT

## 2019-04-26 PROCEDURE — 86334 IMMUNOFIX E-PHORESIS SERUM: CPT | Performed by: INTERNAL MEDICINE

## 2019-04-26 PROCEDURE — 84443 ASSAY THYROID STIM HORMONE: CPT | Performed by: HOSPITALIST

## 2019-04-26 PROCEDURE — 84165 PROTEIN E-PHORESIS SERUM: CPT | Performed by: INTERNAL MEDICINE

## 2019-04-26 PROCEDURE — 83883 ASSAY NEPHELOMETRY NOT SPEC: CPT | Performed by: INTERNAL MEDICINE

## 2019-04-26 PROCEDURE — 82784 ASSAY IGA/IGD/IGG/IGM EACH: CPT | Performed by: INTERNAL MEDICINE

## 2019-04-26 PROCEDURE — 80053 COMPREHEN METABOLIC PANEL: CPT | Performed by: HOSPITALIST

## 2019-04-26 PROCEDURE — 85610 PROTHROMBIN TIME: CPT | Performed by: NURSE PRACTITIONER

## 2019-04-26 PROCEDURE — 99233 SBSQ HOSP IP/OBS HIGH 50: CPT | Performed by: INTERNAL MEDICINE

## 2019-04-26 PROCEDURE — 82533 TOTAL CORTISOL: CPT | Performed by: HOSPITALIST

## 2019-04-26 PROCEDURE — 84439 ASSAY OF FREE THYROXINE: CPT | Performed by: HOSPITALIST

## 2019-04-26 PROCEDURE — 85025 COMPLETE CBC W/AUTO DIFF WBC: CPT | Performed by: HOSPITALIST

## 2019-04-26 PROCEDURE — 94760 N-INVAS EAR/PLS OXIMETRY 1: CPT

## 2019-04-26 PROCEDURE — 97110 THERAPEUTIC EXERCISES: CPT

## 2019-04-26 PROCEDURE — 83540 ASSAY OF IRON: CPT | Performed by: HOSPITALIST

## 2019-04-26 PROCEDURE — 82728 ASSAY OF FERRITIN: CPT | Performed by: HOSPITALIST

## 2019-04-26 PROCEDURE — 84466 ASSAY OF TRANSFERRIN: CPT | Performed by: HOSPITALIST

## 2019-04-26 PROCEDURE — 85055 RETICULATED PLATELET ASSAY: CPT | Performed by: HOSPITALIST

## 2019-04-26 PROCEDURE — 82746 ASSAY OF FOLIC ACID SERUM: CPT | Performed by: HOSPITALIST

## 2019-04-26 RX ORDER — LISINOPRIL 20 MG/1
20 TABLET ORAL DAILY
Status: DISCONTINUED | OUTPATIENT
Start: 2019-04-26 | End: 2019-04-29

## 2019-04-26 RX ORDER — PETROLATUM 420 MG/G
OINTMENT TOPICAL EVERY 12 HOURS SCHEDULED
Status: DISCONTINUED | OUTPATIENT
Start: 2019-04-26 | End: 2019-04-29 | Stop reason: HOSPADM

## 2019-04-26 RX ORDER — WARFARIN SODIUM 5 MG/1
5 TABLET ORAL
Status: DISCONTINUED | OUTPATIENT
Start: 2019-04-26 | End: 2019-04-27

## 2019-04-26 RX ADMIN — HYDROCODONE BITARTRATE AND ACETAMINOPHEN 1 TABLET: 7.5; 325 TABLET ORAL at 18:21

## 2019-04-26 RX ADMIN — METOCLOPRAMIDE 10 MG: 10 TABLET ORAL at 12:34

## 2019-04-26 RX ADMIN — FINASTERIDE 5 MG: 5 TABLET, FILM COATED ORAL at 08:51

## 2019-04-26 RX ADMIN — IPRATROPIUM BROMIDE AND ALBUTEROL SULFATE 3 ML: 2.5; .5 SOLUTION RESPIRATORY (INHALATION) at 14:58

## 2019-04-26 RX ADMIN — PETROLATUM 100 APPLICATION: 420 OINTMENT TOPICAL at 20:28

## 2019-04-26 RX ADMIN — FOLIC ACID 1 MG: 1 TABLET ORAL at 08:51

## 2019-04-26 RX ADMIN — METOCLOPRAMIDE 10 MG: 10 TABLET ORAL at 06:30

## 2019-04-26 RX ADMIN — SODIUM CHLORIDE, PRESERVATIVE FREE 3 ML: 5 INJECTION INTRAVENOUS at 20:27

## 2019-04-26 RX ADMIN — Medication 100 MG: at 08:51

## 2019-04-26 RX ADMIN — NYSTATIN: 100000 POWDER TOPICAL at 08:57

## 2019-04-26 RX ADMIN — METOCLOPRAMIDE 10 MG: 10 TABLET ORAL at 20:27

## 2019-04-26 RX ADMIN — TAMSULOSIN HYDROCHLORIDE 0.4 MG: 0.4 CAPSULE ORAL at 20:27

## 2019-04-26 RX ADMIN — IPRATROPIUM BROMIDE AND ALBUTEROL SULFATE 3 ML: 2.5; .5 SOLUTION RESPIRATORY (INHALATION) at 10:58

## 2019-04-26 RX ADMIN — WARFARIN SODIUM 5 MG: 5 TABLET ORAL at 20:27

## 2019-04-26 RX ADMIN — GENTAMICIN SULFATE: 1 OINTMENT TOPICAL at 20:27

## 2019-04-26 RX ADMIN — IPRATROPIUM BROMIDE AND ALBUTEROL SULFATE 3 ML: 2.5; .5 SOLUTION RESPIRATORY (INHALATION) at 07:11

## 2019-04-26 RX ADMIN — DOCUSATE SODIUM 100 MG: 100 CAPSULE, LIQUID FILLED ORAL at 08:57

## 2019-04-26 RX ADMIN — METOCLOPRAMIDE 10 MG: 10 TABLET ORAL at 18:22

## 2019-04-26 RX ADMIN — NYSTATIN 1 APPLICATION: 100000 POWDER TOPICAL at 21:41

## 2019-04-26 RX ADMIN — Medication 1 TABLET: at 08:51

## 2019-04-26 RX ADMIN — PRAVASTATIN SODIUM 20 MG: 20 TABLET ORAL at 08:51

## 2019-04-26 RX ADMIN — IPRATROPIUM BROMIDE AND ALBUTEROL SULFATE 3 ML: 2.5; .5 SOLUTION RESPIRATORY (INHALATION) at 21:10

## 2019-04-26 RX ADMIN — CLOTRIMAZOLE AND BETAMETHASONE DIPROPIONATE: 10; .5 CREAM TOPICAL at 20:27

## 2019-04-26 RX ADMIN — SODIUM CHLORIDE, PRESERVATIVE FREE 3 ML: 5 INJECTION INTRAVENOUS at 08:51

## 2019-04-27 PROBLEM — D63.8 ANEMIA OF CHRONIC DISEASE: Status: ACTIVE | Noted: 2019-04-27

## 2019-04-27 LAB
ALBUMIN SERPL-MCNC: 3.5 G/DL (ref 3.5–5.2)
ANION GAP SERPL CALCULATED.3IONS-SCNC: 7.4 MMOL/L
BASOPHILS # BLD AUTO: 0.03 10*3/MM3 (ref 0–0.2)
BASOPHILS NFR BLD AUTO: 0.5 % (ref 0–1.5)
BUN BLD-MCNC: 11 MG/DL (ref 8–23)
BUN/CREAT SERPL: 14.3 (ref 7–25)
CALCIUM SPEC-SCNC: 9.2 MG/DL (ref 8.6–10.5)
CHLORIDE SERPL-SCNC: 94 MMOL/L (ref 98–107)
CO2 SERPL-SCNC: 24.6 MMOL/L (ref 22–29)
CREAT BLD-MCNC: 0.77 MG/DL (ref 0.76–1.27)
DEPRECATED RDW RBC AUTO: 51.4 FL (ref 37–54)
EOSINOPHIL # BLD AUTO: 0.1 10*3/MM3 (ref 0–0.4)
EOSINOPHIL NFR BLD AUTO: 1.7 % (ref 0.3–6.2)
ERYTHROCYTE [DISTWIDTH] IN BLOOD BY AUTOMATED COUNT: 13.9 % (ref 12.3–15.4)
GFR SERPL CREATININE-BSD FRML MDRD: 100 ML/MIN/1.73
GLUCOSE BLD-MCNC: 100 MG/DL (ref 65–99)
HCT VFR BLD AUTO: 30.1 % (ref 37.5–51)
HGB BLD-MCNC: 10.6 G/DL (ref 13–17.7)
INR PPP: 1.4 (ref 0.9–1.1)
KAPPA LC SERPL-MCNC: 44.4 MG/L (ref 3.3–19.4)
KAPPA LC/LAMBDA SER: 1.68 {RATIO} (ref 0.26–1.65)
LAMBDA LC FREE SERPL-MCNC: 26.4 MG/L (ref 5.7–26.3)
LYMPHOCYTES # BLD AUTO: 1.24 10*3/MM3 (ref 0.7–3.1)
LYMPHOCYTES NFR BLD AUTO: 21.3 % (ref 19.6–45.3)
MCH RBC QN AUTO: 35.5 PG (ref 26.6–33)
MCHC RBC AUTO-ENTMCNC: 35.2 G/DL (ref 31.5–35.7)
MCV RBC AUTO: 100.7 FL (ref 79–97)
MONOCYTES # BLD AUTO: 0.36 10*3/MM3 (ref 0.1–0.9)
MONOCYTES NFR BLD AUTO: 6.2 % (ref 5–12)
NEUTROPHILS # BLD AUTO: 4.04 10*3/MM3 (ref 1.7–7)
NEUTROPHILS NFR BLD AUTO: 69.4 % (ref 42.7–76)
PHOSPHATE SERPL-MCNC: 3.2 MG/DL (ref 2.5–4.5)
PLATELET # BLD AUTO: 123 10*3/MM3 (ref 140–450)
PMV BLD AUTO: 10.2 FL (ref 6–12)
POTASSIUM BLD-SCNC: 4.1 MMOL/L (ref 3.5–5.2)
PROTHROMBIN TIME: 16.9 SECONDS (ref 11.7–14.2)
RBC # BLD AUTO: 2.99 10*6/MM3 (ref 4.14–5.8)
SODIUM BLD-SCNC: 126 MMOL/L (ref 136–145)
WBC NRBC COR # BLD: 5.82 10*3/MM3 (ref 3.4–10.8)

## 2019-04-27 PROCEDURE — 94799 UNLISTED PULMONARY SVC/PX: CPT

## 2019-04-27 PROCEDURE — 80069 RENAL FUNCTION PANEL: CPT | Performed by: INTERNAL MEDICINE

## 2019-04-27 PROCEDURE — 85610 PROTHROMBIN TIME: CPT | Performed by: NURSE PRACTITIONER

## 2019-04-27 PROCEDURE — 99232 SBSQ HOSP IP/OBS MODERATE 35: CPT | Performed by: INTERNAL MEDICINE

## 2019-04-27 PROCEDURE — 85025 COMPLETE CBC W/AUTO DIFF WBC: CPT | Performed by: INTERNAL MEDICINE

## 2019-04-27 PROCEDURE — 97110 THERAPEUTIC EXERCISES: CPT | Performed by: PHYSICAL THERAPIST

## 2019-04-27 RX ORDER — FUROSEMIDE 20 MG/1
20 TABLET ORAL DAILY
Status: DISCONTINUED | OUTPATIENT
Start: 2019-04-27 | End: 2019-04-29 | Stop reason: HOSPADM

## 2019-04-27 RX ORDER — CARVEDILOL 6.25 MG/1
6.25 TABLET ORAL 2 TIMES DAILY WITH MEALS
Status: DISCONTINUED | OUTPATIENT
Start: 2019-04-27 | End: 2019-04-29

## 2019-04-27 RX ORDER — WARFARIN SODIUM 7.5 MG/1
7.5 TABLET ORAL
Status: COMPLETED | OUTPATIENT
Start: 2019-04-27 | End: 2019-04-27

## 2019-04-27 RX ORDER — WARFARIN SODIUM 5 MG/1
5 TABLET ORAL
Status: DISCONTINUED | OUTPATIENT
Start: 2019-04-28 | End: 2019-04-28

## 2019-04-27 RX ADMIN — IPRATROPIUM BROMIDE AND ALBUTEROL SULFATE 3 ML: 2.5; .5 SOLUTION RESPIRATORY (INHALATION) at 15:32

## 2019-04-27 RX ADMIN — FINASTERIDE 5 MG: 5 TABLET, FILM COATED ORAL at 09:18

## 2019-04-27 RX ADMIN — PETROLATUM 100 APPLICATION: 420 OINTMENT TOPICAL at 21:57

## 2019-04-27 RX ADMIN — FOLIC ACID 1 MG: 1 TABLET ORAL at 09:18

## 2019-04-27 RX ADMIN — CLOTRIMAZOLE AND BETAMETHASONE DIPROPIONATE: 10; .5 CREAM TOPICAL at 21:56

## 2019-04-27 RX ADMIN — IPRATROPIUM BROMIDE AND ALBUTEROL SULFATE 3 ML: 2.5; .5 SOLUTION RESPIRATORY (INHALATION) at 20:14

## 2019-04-27 RX ADMIN — METOCLOPRAMIDE 10 MG: 10 TABLET ORAL at 12:34

## 2019-04-27 RX ADMIN — LISINOPRIL 20 MG: 20 TABLET ORAL at 09:18

## 2019-04-27 RX ADMIN — WARFARIN SODIUM 7.5 MG: 7.5 TABLET ORAL at 17:35

## 2019-04-27 RX ADMIN — Medication 100 MG: at 09:18

## 2019-04-27 RX ADMIN — SODIUM CHLORIDE, PRESERVATIVE FREE 3 ML: 5 INJECTION INTRAVENOUS at 21:57

## 2019-04-27 RX ADMIN — HYDROCODONE BITARTRATE AND ACETAMINOPHEN 1 TABLET: 7.5; 325 TABLET ORAL at 18:30

## 2019-04-27 RX ADMIN — METOCLOPRAMIDE 10 MG: 10 TABLET ORAL at 17:35

## 2019-04-27 RX ADMIN — PETROLATUM: 420 OINTMENT TOPICAL at 09:20

## 2019-04-27 RX ADMIN — CARVEDILOL 6.25 MG: 6.25 TABLET, FILM COATED ORAL at 17:35

## 2019-04-27 RX ADMIN — IPRATROPIUM BROMIDE AND ALBUTEROL SULFATE 3 ML: 2.5; .5 SOLUTION RESPIRATORY (INHALATION) at 07:57

## 2019-04-27 RX ADMIN — PRAVASTATIN SODIUM 20 MG: 20 TABLET ORAL at 09:18

## 2019-04-27 RX ADMIN — METOCLOPRAMIDE 10 MG: 10 TABLET ORAL at 21:56

## 2019-04-27 RX ADMIN — Medication 1 TABLET: at 09:18

## 2019-04-27 RX ADMIN — CARVEDILOL 12.5 MG: 12.5 TABLET, FILM COATED ORAL at 09:18

## 2019-04-27 RX ADMIN — FUROSEMIDE 20 MG: 20 TABLET ORAL at 18:27

## 2019-04-27 RX ADMIN — TAMSULOSIN HYDROCHLORIDE 0.4 MG: 0.4 CAPSULE ORAL at 21:56

## 2019-04-27 RX ADMIN — SODIUM CHLORIDE, PRESERVATIVE FREE 3 ML: 5 INJECTION INTRAVENOUS at 09:18

## 2019-04-27 RX ADMIN — CLOTRIMAZOLE AND BETAMETHASONE DIPROPIONATE: 10; .5 CREAM TOPICAL at 09:19

## 2019-04-27 RX ADMIN — METOCLOPRAMIDE 10 MG: 10 TABLET ORAL at 06:31

## 2019-04-27 RX ADMIN — GENTAMICIN SULFATE: 1 OINTMENT TOPICAL at 09:18

## 2019-04-27 RX ADMIN — NYSTATIN: 100000 POWDER TOPICAL at 21:56

## 2019-04-27 RX ADMIN — GENTAMICIN SULFATE: 1 OINTMENT TOPICAL at 21:56

## 2019-04-27 RX ADMIN — IPRATROPIUM BROMIDE AND ALBUTEROL SULFATE 3 ML: 2.5; .5 SOLUTION RESPIRATORY (INHALATION) at 11:28

## 2019-04-27 RX ADMIN — HYDROCODONE BITARTRATE AND ACETAMINOPHEN 1 TABLET: 7.5; 325 TABLET ORAL at 02:05

## 2019-04-27 RX ADMIN — NYSTATIN: 100000 POWDER TOPICAL at 09:18

## 2019-04-28 LAB
ANION GAP SERPL CALCULATED.3IONS-SCNC: 10.2 MMOL/L
BUN BLD-MCNC: 12 MG/DL (ref 8–23)
BUN/CREAT SERPL: 14.3 (ref 7–25)
CALCIUM SPEC-SCNC: 9.1 MG/DL (ref 8.6–10.5)
CHLORIDE SERPL-SCNC: 96 MMOL/L (ref 98–107)
CO2 SERPL-SCNC: 23.8 MMOL/L (ref 22–29)
CREAT BLD-MCNC: 0.84 MG/DL (ref 0.76–1.27)
DEPRECATED RDW RBC AUTO: 51.9 FL (ref 37–54)
ERYTHROCYTE [DISTWIDTH] IN BLOOD BY AUTOMATED COUNT: 14 % (ref 12.3–15.4)
GFR SERPL CREATININE-BSD FRML MDRD: 91 ML/MIN/1.73
GLUCOSE BLD-MCNC: 100 MG/DL (ref 65–99)
HCT VFR BLD AUTO: 30.6 % (ref 37.5–51)
HGB BLD-MCNC: 10.5 G/DL (ref 13–17.7)
INR PPP: 1.35 (ref 0.9–1.1)
MCH RBC QN AUTO: 34.5 PG (ref 26.6–33)
MCHC RBC AUTO-ENTMCNC: 34.3 G/DL (ref 31.5–35.7)
MCV RBC AUTO: 100.7 FL (ref 79–97)
PLATELET # BLD AUTO: 128 10*3/MM3 (ref 140–450)
PMV BLD AUTO: 10.2 FL (ref 6–12)
POTASSIUM BLD-SCNC: 4.2 MMOL/L (ref 3.5–5.2)
PROTHROMBIN TIME: 16.3 SECONDS (ref 11.7–14.2)
RBC # BLD AUTO: 3.04 10*6/MM3 (ref 4.14–5.8)
SODIUM BLD-SCNC: 130 MMOL/L (ref 136–145)
WBC NRBC COR # BLD: 5.49 10*3/MM3 (ref 3.4–10.8)

## 2019-04-28 PROCEDURE — 80048 BASIC METABOLIC PNL TOTAL CA: CPT | Performed by: HOSPITALIST

## 2019-04-28 PROCEDURE — 85610 PROTHROMBIN TIME: CPT | Performed by: NURSE PRACTITIONER

## 2019-04-28 PROCEDURE — 94799 UNLISTED PULMONARY SVC/PX: CPT

## 2019-04-28 PROCEDURE — 85027 COMPLETE CBC AUTOMATED: CPT | Performed by: HOSPITALIST

## 2019-04-28 PROCEDURE — 99233 SBSQ HOSP IP/OBS HIGH 50: CPT | Performed by: INTERNAL MEDICINE

## 2019-04-28 RX ORDER — WARFARIN SODIUM 7.5 MG/1
7.5 TABLET ORAL
Status: DISCONTINUED | OUTPATIENT
Start: 2019-04-28 | End: 2019-04-29 | Stop reason: HOSPADM

## 2019-04-28 RX ADMIN — CARVEDILOL 6.25 MG: 6.25 TABLET, FILM COATED ORAL at 09:11

## 2019-04-28 RX ADMIN — METOCLOPRAMIDE 10 MG: 10 TABLET ORAL at 17:04

## 2019-04-28 RX ADMIN — CLOTRIMAZOLE AND BETAMETHASONE DIPROPIONATE: 10; .5 CREAM TOPICAL at 20:53

## 2019-04-28 RX ADMIN — SODIUM CHLORIDE, PRESERVATIVE FREE 3 ML: 5 INJECTION INTRAVENOUS at 09:12

## 2019-04-28 RX ADMIN — LISINOPRIL 20 MG: 20 TABLET ORAL at 09:11

## 2019-04-28 RX ADMIN — CARVEDILOL 6.25 MG: 6.25 TABLET, FILM COATED ORAL at 17:04

## 2019-04-28 RX ADMIN — DOCUSATE SODIUM 100 MG: 100 CAPSULE, LIQUID FILLED ORAL at 09:11

## 2019-04-28 RX ADMIN — IPRATROPIUM BROMIDE AND ALBUTEROL SULFATE 3 ML: 2.5; .5 SOLUTION RESPIRATORY (INHALATION) at 07:33

## 2019-04-28 RX ADMIN — FUROSEMIDE 20 MG: 20 TABLET ORAL at 09:11

## 2019-04-28 RX ADMIN — NYSTATIN: 100000 POWDER TOPICAL at 09:12

## 2019-04-28 RX ADMIN — CLOTRIMAZOLE AND BETAMETHASONE DIPROPIONATE: 10; .5 CREAM TOPICAL at 09:12

## 2019-04-28 RX ADMIN — METOCLOPRAMIDE 10 MG: 10 TABLET ORAL at 20:53

## 2019-04-28 RX ADMIN — GENTAMICIN SULFATE: 1 OINTMENT TOPICAL at 20:53

## 2019-04-28 RX ADMIN — PETROLATUM 100 APPLICATION: 420 OINTMENT TOPICAL at 20:54

## 2019-04-28 RX ADMIN — TAMSULOSIN HYDROCHLORIDE 0.4 MG: 0.4 CAPSULE ORAL at 20:53

## 2019-04-28 RX ADMIN — WARFARIN SODIUM 7.5 MG: 7.5 TABLET ORAL at 17:04

## 2019-04-28 RX ADMIN — PRAVASTATIN SODIUM 20 MG: 20 TABLET ORAL at 09:11

## 2019-04-28 RX ADMIN — GENTAMICIN SULFATE: 1 OINTMENT TOPICAL at 09:12

## 2019-04-28 RX ADMIN — NYSTATIN: 100000 POWDER TOPICAL at 20:54

## 2019-04-28 RX ADMIN — IPRATROPIUM BROMIDE AND ALBUTEROL SULFATE 3 ML: 2.5; .5 SOLUTION RESPIRATORY (INHALATION) at 21:44

## 2019-04-28 RX ADMIN — FINASTERIDE 5 MG: 5 TABLET, FILM COATED ORAL at 09:11

## 2019-04-28 RX ADMIN — PETROLATUM 100 APPLICATION: 420 OINTMENT TOPICAL at 09:12

## 2019-04-28 RX ADMIN — HYDROCODONE BITARTRATE AND ACETAMINOPHEN 1 TABLET: 7.5; 325 TABLET ORAL at 17:08

## 2019-04-28 RX ADMIN — IPRATROPIUM BROMIDE AND ALBUTEROL SULFATE 3 ML: 2.5; .5 SOLUTION RESPIRATORY (INHALATION) at 15:34

## 2019-04-28 RX ADMIN — SODIUM CHLORIDE, PRESERVATIVE FREE 3 ML: 5 INJECTION INTRAVENOUS at 20:53

## 2019-04-28 RX ADMIN — METOCLOPRAMIDE 10 MG: 10 TABLET ORAL at 12:07

## 2019-04-28 RX ADMIN — METOCLOPRAMIDE 10 MG: 10 TABLET ORAL at 06:10

## 2019-04-28 RX ADMIN — IPRATROPIUM BROMIDE AND ALBUTEROL SULFATE 3 ML: 2.5; .5 SOLUTION RESPIRATORY (INHALATION) at 11:35

## 2019-04-28 RX ADMIN — HYDROCODONE BITARTRATE AND ACETAMINOPHEN 1 TABLET: 7.5; 325 TABLET ORAL at 03:16

## 2019-04-29 VITALS
BODY MASS INDEX: 34.39 KG/M2 | SYSTOLIC BLOOD PRESSURE: 106 MMHG | OXYGEN SATURATION: 97 % | WEIGHT: 282.4 LBS | DIASTOLIC BLOOD PRESSURE: 73 MMHG | RESPIRATION RATE: 16 BRPM | HEIGHT: 76 IN | TEMPERATURE: 97.9 F | HEART RATE: 81 BPM

## 2019-04-29 PROBLEM — E04.1 THYROID NODULE: Status: ACTIVE | Noted: 2019-04-29

## 2019-04-29 PROBLEM — M14.679 CHARCOT'S JOINT OF FOOT: Chronic | Status: ACTIVE | Noted: 2019-04-29

## 2019-04-29 LAB
ALBUMIN SERPL-MCNC: 3.1 G/DL (ref 2.9–4.4)
ALBUMIN/GLOB SERPL: 1.1 {RATIO} (ref 0.7–1.7)
ALPHA1 GLOB FLD ELPH-MCNC: 0.4 G/DL (ref 0–0.4)
ALPHA2 GLOB SERPL ELPH-MCNC: 0.5 G/DL (ref 0.4–1)
ANION GAP SERPL CALCULATED.3IONS-SCNC: 8.1 MMOL/L
B-GLOBULIN SERPL ELPH-MCNC: 0.9 G/DL (ref 0.7–1.3)
BASOPHILS # BLD AUTO: 0.05 10*3/MM3 (ref 0–0.2)
BASOPHILS NFR BLD AUTO: 0.9 % (ref 0–1.5)
BUN BLD-MCNC: 14 MG/DL (ref 8–23)
BUN/CREAT SERPL: 15.6 (ref 7–25)
CALCIUM SPEC-SCNC: 9.5 MG/DL (ref 8.6–10.5)
CHLORIDE SERPL-SCNC: 96 MMOL/L (ref 98–107)
CO2 SERPL-SCNC: 28.9 MMOL/L (ref 22–29)
CREAT BLD-MCNC: 0.9 MG/DL (ref 0.76–1.27)
DEPRECATED RDW RBC AUTO: 52.9 FL (ref 37–54)
EOSINOPHIL # BLD AUTO: 0.11 10*3/MM3 (ref 0–0.4)
EOSINOPHIL NFR BLD AUTO: 1.9 % (ref 0.3–6.2)
ERYTHROCYTE [DISTWIDTH] IN BLOOD BY AUTOMATED COUNT: 14 % (ref 12.3–15.4)
GAMMA GLOB SERPL ELPH-MCNC: 1.1 G/DL (ref 0.4–1.8)
GFR SERPL CREATININE-BSD FRML MDRD: 84 ML/MIN/1.73
GLOBULIN SER CALC-MCNC: 3 G/DL (ref 2.2–3.9)
GLUCOSE BLD-MCNC: 103 MG/DL (ref 65–99)
HCT VFR BLD AUTO: 32 % (ref 37.5–51)
HGB BLD-MCNC: 11 G/DL (ref 13–17.7)
IGA SERPL-MCNC: 344 MG/DL (ref 61–437)
IGG SERPL-MCNC: 914 MG/DL (ref 700–1600)
IGM SERPL-MCNC: 139 MG/DL (ref 20–172)
IMM GRANULOCYTES # BLD AUTO: 0.04 10*3/MM3 (ref 0–0.05)
IMM GRANULOCYTES NFR BLD AUTO: 0.7 % (ref 0–0.5)
INR PPP: 1.32 (ref 0.9–1.1)
INTERPRETATION SERPL IEP-IMP: NORMAL
LYMPHOCYTES # BLD AUTO: 1.23 10*3/MM3 (ref 0.7–3.1)
LYMPHOCYTES NFR BLD AUTO: 21.1 % (ref 19.6–45.3)
Lab: NORMAL
M-SPIKE: NORMAL G/DL
MCH RBC QN AUTO: 35.1 PG (ref 26.6–33)
MCHC RBC AUTO-ENTMCNC: 34.4 G/DL (ref 31.5–35.7)
MCV RBC AUTO: 102.2 FL (ref 79–97)
MONOCYTES # BLD AUTO: 0.45 10*3/MM3 (ref 0.1–0.9)
MONOCYTES NFR BLD AUTO: 7.7 % (ref 5–12)
NEUTROPHILS # BLD AUTO: 3.94 10*3/MM3 (ref 1.7–7)
NEUTROPHILS NFR BLD AUTO: 67.7 % (ref 42.7–76)
NRBC BLD AUTO-RTO: 0 /100 WBC (ref 0–0.2)
PLATELET # BLD AUTO: 136 10*3/MM3 (ref 140–450)
PMV BLD AUTO: 9.2 FL (ref 6–12)
POTASSIUM BLD-SCNC: 4.8 MMOL/L (ref 3.5–5.2)
PROT SERPL-MCNC: 6.1 G/DL (ref 6–8.5)
PROTHROMBIN TIME: 16.1 SECONDS (ref 11.7–14.2)
RBC # BLD AUTO: 3.13 10*6/MM3 (ref 4.14–5.8)
SODIUM BLD-SCNC: 133 MMOL/L (ref 136–145)
WBC NRBC COR # BLD: 5.82 10*3/MM3 (ref 3.4–10.8)

## 2019-04-29 PROCEDURE — 85610 PROTHROMBIN TIME: CPT | Performed by: NURSE PRACTITIONER

## 2019-04-29 PROCEDURE — 80048 BASIC METABOLIC PNL TOTAL CA: CPT | Performed by: HOSPITALIST

## 2019-04-29 PROCEDURE — 94799 UNLISTED PULMONARY SVC/PX: CPT

## 2019-04-29 PROCEDURE — 85025 COMPLETE CBC W/AUTO DIFF WBC: CPT | Performed by: HOSPITALIST

## 2019-04-29 RX ORDER — CARVEDILOL 3.12 MG/1
3.12 TABLET ORAL 2 TIMES DAILY WITH MEALS
Qty: 60 TABLET | Refills: 0 | Status: SHIPPED | OUTPATIENT
Start: 2019-04-29 | End: 2019-07-10 | Stop reason: SDUPTHER

## 2019-04-29 RX ORDER — LISINOPRIL 10 MG/1
10 TABLET ORAL DAILY
Status: DISCONTINUED | OUTPATIENT
Start: 2019-04-29 | End: 2019-04-29 | Stop reason: HOSPADM

## 2019-04-29 RX ORDER — PETROLATUM 420 MG/G
OINTMENT TOPICAL EVERY 12 HOURS SCHEDULED
Qty: 454 G | Refills: 0 | Status: SHIPPED | OUTPATIENT
Start: 2019-04-29 | End: 2019-05-23

## 2019-04-29 RX ORDER — LISINOPRIL 10 MG/1
10 TABLET ORAL DAILY
Qty: 30 TABLET | Refills: 0 | Status: SHIPPED | OUTPATIENT
Start: 2019-04-30 | End: 2019-11-20 | Stop reason: ALTCHOICE

## 2019-04-29 RX ORDER — WARFARIN SODIUM 5 MG/1
7.5 TABLET ORAL NIGHTLY
Qty: 60 TABLET | Refills: 0 | Status: SHIPPED | OUTPATIENT
Start: 2019-04-29 | End: 2019-10-21 | Stop reason: SDUPTHER

## 2019-04-29 RX ORDER — CARVEDILOL 3.12 MG/1
3.12 TABLET ORAL 2 TIMES DAILY WITH MEALS
Status: DISCONTINUED | OUTPATIENT
Start: 2019-04-29 | End: 2019-04-29 | Stop reason: HOSPADM

## 2019-04-29 RX ORDER — NYSTATIN 100000 [USP'U]/G
POWDER TOPICAL EVERY 12 HOURS SCHEDULED
Qty: 60 G | Refills: 1 | Status: SHIPPED | OUTPATIENT
Start: 2019-04-29 | End: 2019-12-10

## 2019-04-29 RX ORDER — FUROSEMIDE 20 MG/1
20 TABLET ORAL DAILY
Qty: 30 TABLET | Refills: 0 | Status: SHIPPED | OUTPATIENT
Start: 2019-04-30 | End: 2019-06-11 | Stop reason: SDUPTHER

## 2019-04-29 RX ADMIN — IPRATROPIUM BROMIDE AND ALBUTEROL SULFATE 3 ML: 2.5; .5 SOLUTION RESPIRATORY (INHALATION) at 11:01

## 2019-04-29 RX ADMIN — DOCUSATE SODIUM 100 MG: 100 CAPSULE, LIQUID FILLED ORAL at 09:03

## 2019-04-29 RX ADMIN — FINASTERIDE 5 MG: 5 TABLET, FILM COATED ORAL at 09:03

## 2019-04-29 RX ADMIN — SODIUM CHLORIDE, PRESERVATIVE FREE 3 ML: 5 INJECTION INTRAVENOUS at 09:06

## 2019-04-29 RX ADMIN — METOCLOPRAMIDE 10 MG: 10 TABLET ORAL at 06:48

## 2019-04-29 RX ADMIN — IPRATROPIUM BROMIDE AND ALBUTEROL SULFATE 3 ML: 2.5; .5 SOLUTION RESPIRATORY (INHALATION) at 14:54

## 2019-04-29 RX ADMIN — LISINOPRIL 10 MG: 10 TABLET ORAL at 11:52

## 2019-04-29 RX ADMIN — HYDROCODONE BITARTRATE AND ACETAMINOPHEN 1 TABLET: 7.5; 325 TABLET ORAL at 01:49

## 2019-04-29 RX ADMIN — CLOTRIMAZOLE AND BETAMETHASONE DIPROPIONATE: 10; .5 CREAM TOPICAL at 09:04

## 2019-04-29 RX ADMIN — GENTAMICIN SULFATE: 1 OINTMENT TOPICAL at 09:04

## 2019-04-29 RX ADMIN — FUROSEMIDE 20 MG: 20 TABLET ORAL at 09:03

## 2019-04-29 RX ADMIN — HYDROCODONE BITARTRATE AND ACETAMINOPHEN 1 TABLET: 7.5; 325 TABLET ORAL at 11:57

## 2019-04-29 RX ADMIN — METOCLOPRAMIDE 10 MG: 10 TABLET ORAL at 11:52

## 2019-04-29 RX ADMIN — IPRATROPIUM BROMIDE AND ALBUTEROL SULFATE 3 ML: 2.5; .5 SOLUTION RESPIRATORY (INHALATION) at 07:07

## 2019-04-29 RX ADMIN — PRAVASTATIN SODIUM 20 MG: 20 TABLET ORAL at 09:03

## 2019-04-29 RX ADMIN — CARVEDILOL 3.12 MG: 6.25 TABLET, FILM COATED ORAL at 09:02

## 2019-04-29 RX ADMIN — NYSTATIN: 100000 POWDER TOPICAL at 09:05

## 2019-04-29 RX ADMIN — PETROLATUM 100 APPLICATION: 420 OINTMENT TOPICAL at 09:05

## 2019-04-30 ENCOUNTER — READMISSION MANAGEMENT (OUTPATIENT)
Dept: CALL CENTER | Facility: HOSPITAL | Age: 69
End: 2019-04-30

## 2019-04-30 ENCOUNTER — ANTICOAGULATION VISIT (OUTPATIENT)
Dept: PHARMACY | Facility: HOSPITAL | Age: 69
End: 2019-04-30

## 2019-04-30 ENCOUNTER — APPOINTMENT (OUTPATIENT)
Dept: GENERAL RADIOLOGY | Facility: HOSPITAL | Age: 69
End: 2019-04-30

## 2019-04-30 ENCOUNTER — HOSPITAL ENCOUNTER (INPATIENT)
Facility: HOSPITAL | Age: 69
LOS: 3 days | Discharge: NURSING, REHAB OR LONG-TERM CARE | End: 2019-05-03
Attending: EMERGENCY MEDICINE | Admitting: INTERNAL MEDICINE

## 2019-04-30 DIAGNOSIS — I48.21 PERMANENT ATRIAL FIBRILLATION (HCC): ICD-10-CM
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
## 2019-04-30 DIAGNOSIS — R26.2 DIFFICULTY WALKING: ICD-10-CM

## 2019-04-30 DIAGNOSIS — I48.91 ATRIAL FIBRILLATION WITH RAPID VENTRICULAR RESPONSE (HCC): ICD-10-CM

## 2019-04-30 DIAGNOSIS — R53.1 GENERALIZED WEAKNESS: ICD-10-CM

## 2019-04-30 DIAGNOSIS — R33.9 URINARY RETENTION: ICD-10-CM

## 2019-04-30 DIAGNOSIS — L03.116 LEFT LEG CELLULITIS: Primary | ICD-10-CM

## 2019-04-30 LAB
ALBUMIN SERPL-MCNC: 4.1 G/DL (ref 3.5–5.2)
ALBUMIN/GLOB SERPL: 1.2 G/DL
ALP SERPL-CCNC: 70 U/L (ref 39–117)
ALT SERPL W P-5'-P-CCNC: 27 U/L (ref 1–41)
ANION GAP SERPL CALCULATED.3IONS-SCNC: 10.9 MMOL/L
AST SERPL-CCNC: 26 U/L (ref 1–40)
BACTERIA UR QL AUTO: ABNORMAL /HPF
BASOPHILS # BLD AUTO: 0.04 10*3/MM3 (ref 0–0.2)
BASOPHILS NFR BLD AUTO: 0.3 % (ref 0–1.5)
BILIRUB SERPL-MCNC: 1.3 MG/DL (ref 0.2–1.2)
BILIRUB UR QL STRIP: NEGATIVE
BUN BLD-MCNC: 17 MG/DL (ref 8–23)
BUN/CREAT SERPL: 17.7 (ref 7–25)
CALCIUM SPEC-SCNC: 9.5 MG/DL (ref 8.6–10.5)
CHLORIDE SERPL-SCNC: 93 MMOL/L (ref 98–107)
CLARITY UR: CLEAR
CO2 SERPL-SCNC: 27.1 MMOL/L (ref 22–29)
COLOR UR: YELLOW
CREAT BLD-MCNC: 0.96 MG/DL (ref 0.76–1.27)
D-LACTATE SERPL-SCNC: 1.5 MMOL/L (ref 0.5–2)
DEPRECATED RDW RBC AUTO: 51.3 FL (ref 37–54)
EOSINOPHIL # BLD AUTO: 0.01 10*3/MM3 (ref 0–0.4)
EOSINOPHIL NFR BLD AUTO: 0.1 % (ref 0.3–6.2)
ERYTHROCYTE [DISTWIDTH] IN BLOOD BY AUTOMATED COUNT: 13.6 % (ref 12.3–15.4)
ETHANOL BLD-MCNC: <10 MG/DL (ref 0–10)
ETHANOL UR QL: <0.01 %
GFR SERPL CREATININE-BSD FRML MDRD: 78 ML/MIN/1.73
GLOBULIN UR ELPH-MCNC: 3.3 GM/DL
GLUCOSE BLD-MCNC: 90 MG/DL (ref 65–99)
GLUCOSE UR STRIP-MCNC: NEGATIVE MG/DL
HCT VFR BLD AUTO: 36.8 % (ref 37.5–51)
HGB BLD-MCNC: 12.8 G/DL (ref 13–17.7)
HGB UR QL STRIP.AUTO: NEGATIVE
HYALINE CASTS UR QL AUTO: ABNORMAL /LPF
IMM GRANULOCYTES # BLD AUTO: 0.17 10*3/MM3 (ref 0–0.05)
IMM GRANULOCYTES NFR BLD AUTO: 1.2 % (ref 0–0.5)
INR PPP: 1.18 (ref 0.9–1.1)
INR PPP: 1.3
KETONES UR QL STRIP: NEGATIVE
LEUKOCYTE ESTERASE UR QL STRIP.AUTO: ABNORMAL
LYMPHOCYTES # BLD AUTO: 0.32 10*3/MM3 (ref 0.7–3.1)
LYMPHOCYTES NFR BLD AUTO: 2.3 % (ref 19.6–45.3)
MAGNESIUM SERPL-MCNC: 2 MG/DL (ref 1.6–2.4)
MCH RBC QN AUTO: 35.2 PG (ref 26.6–33)
MCHC RBC AUTO-ENTMCNC: 34.8 G/DL (ref 31.5–35.7)
MCV RBC AUTO: 101.1 FL (ref 79–97)
MONOCYTES # BLD AUTO: 0.34 10*3/MM3 (ref 0.1–0.9)
MONOCYTES NFR BLD AUTO: 2.5 % (ref 5–12)
NEUTROPHILS # BLD AUTO: 12.96 10*3/MM3 (ref 1.7–7)
NEUTROPHILS NFR BLD AUTO: 93.6 % (ref 42.7–76)
NITRITE UR QL STRIP: NEGATIVE
NRBC BLD AUTO-RTO: 0 /100 WBC (ref 0–0.2)
NT-PROBNP SERPL-MCNC: 3262 PG/ML (ref 5–900)
PH UR STRIP.AUTO: 6 [PH] (ref 5–8)
PLATELET # BLD AUTO: 148 10*3/MM3 (ref 140–450)
PMV BLD AUTO: 9.6 FL (ref 6–12)
POTASSIUM BLD-SCNC: 4.2 MMOL/L (ref 3.5–5.2)
PROT SERPL-MCNC: 7.4 G/DL (ref 6–8.5)
PROT UR QL STRIP: NEGATIVE
PROTHROMBIN TIME: 14.7 SECONDS (ref 11.7–14.2)
RBC # BLD AUTO: 3.64 10*6/MM3 (ref 4.14–5.8)
RBC # UR: ABNORMAL /HPF
REF LAB TEST METHOD: ABNORMAL
SODIUM BLD-SCNC: 131 MMOL/L (ref 136–145)
SP GR UR STRIP: 1.01 (ref 1–1.03)
SQUAMOUS #/AREA URNS HPF: ABNORMAL /HPF
TROPONIN T SERPL-MCNC: <0.01 NG/ML (ref 0–0.03)
TSH SERPL DL<=0.05 MIU/L-ACNC: 1.25 MIU/ML (ref 0.27–4.2)
UROBILINOGEN UR QL STRIP: ABNORMAL
WBC NRBC COR # BLD: 13.84 10*3/MM3 (ref 3.4–10.8)
WBC UR QL AUTO: ABNORMAL /HPF

## 2019-04-30 PROCEDURE — 93010 ELECTROCARDIOGRAM REPORT: CPT | Performed by: INTERNAL MEDICINE

## 2019-04-30 PROCEDURE — 94640 AIRWAY INHALATION TREATMENT: CPT

## 2019-04-30 PROCEDURE — 99285 EMERGENCY DEPT VISIT HI MDM: CPT

## 2019-04-30 PROCEDURE — 81001 URINALYSIS AUTO W/SCOPE: CPT | Performed by: EMERGENCY MEDICINE

## 2019-04-30 PROCEDURE — 85610 PROTHROMBIN TIME: CPT | Performed by: EMERGENCY MEDICINE

## 2019-04-30 PROCEDURE — 25010000002 VANCOMYCIN 10 G RECONSTITUTED SOLUTION: Performed by: EMERGENCY MEDICINE

## 2019-04-30 PROCEDURE — 84484 ASSAY OF TROPONIN QUANT: CPT | Performed by: EMERGENCY MEDICINE

## 2019-04-30 PROCEDURE — 83605 ASSAY OF LACTIC ACID: CPT | Performed by: EMERGENCY MEDICINE

## 2019-04-30 PROCEDURE — 36415 COLL VENOUS BLD VENIPUNCTURE: CPT

## 2019-04-30 PROCEDURE — 71046 X-RAY EXAM CHEST 2 VIEWS: CPT

## 2019-04-30 PROCEDURE — 93005 ELECTROCARDIOGRAM TRACING: CPT | Performed by: EMERGENCY MEDICINE

## 2019-04-30 PROCEDURE — 51702 INSERT TEMP BLADDER CATH: CPT

## 2019-04-30 PROCEDURE — 83735 ASSAY OF MAGNESIUM: CPT | Performed by: EMERGENCY MEDICINE

## 2019-04-30 PROCEDURE — 85025 COMPLETE CBC W/AUTO DIFF WBC: CPT | Performed by: EMERGENCY MEDICINE

## 2019-04-30 PROCEDURE — 51798 US URINE CAPACITY MEASURE: CPT

## 2019-04-30 PROCEDURE — 84443 ASSAY THYROID STIM HORMONE: CPT | Performed by: EMERGENCY MEDICINE

## 2019-04-30 PROCEDURE — 80053 COMPREHEN METABOLIC PANEL: CPT | Performed by: EMERGENCY MEDICINE

## 2019-04-30 PROCEDURE — 25010000002 MORPHINE PER 10 MG: Performed by: EMERGENCY MEDICINE

## 2019-04-30 PROCEDURE — 87040 BLOOD CULTURE FOR BACTERIA: CPT | Performed by: EMERGENCY MEDICINE

## 2019-04-30 PROCEDURE — 83880 ASSAY OF NATRIURETIC PEPTIDE: CPT | Performed by: EMERGENCY MEDICINE

## 2019-04-30 PROCEDURE — 80307 DRUG TEST PRSMV CHEM ANLYZR: CPT | Performed by: EMERGENCY MEDICINE

## 2019-04-30 RX ORDER — FINASTERIDE 5 MG/1
5 TABLET, FILM COATED ORAL DAILY
Status: DISCONTINUED | OUTPATIENT
Start: 2019-05-01 | End: 2019-05-03 | Stop reason: HOSPADM

## 2019-04-30 RX ORDER — ALBUTEROL SULFATE 2.5 MG/3ML
2.5 SOLUTION RESPIRATORY (INHALATION) EVERY 6 HOURS PRN
Status: DISCONTINUED | OUTPATIENT
Start: 2019-04-30 | End: 2019-05-03 | Stop reason: HOSPADM

## 2019-04-30 RX ORDER — FUROSEMIDE 20 MG/1
20 TABLET ORAL DAILY
Status: DISCONTINUED | OUTPATIENT
Start: 2019-05-01 | End: 2019-05-03 | Stop reason: HOSPADM

## 2019-04-30 RX ORDER — GENTAMICIN SULFATE 1 MG/G
OINTMENT TOPICAL 2 TIMES DAILY
Status: DISCONTINUED | OUTPATIENT
Start: 2019-04-30 | End: 2019-05-03 | Stop reason: HOSPADM

## 2019-04-30 RX ORDER — NYSTATIN 100000 [USP'U]/G
POWDER TOPICAL EVERY 12 HOURS SCHEDULED
Status: DISCONTINUED | OUTPATIENT
Start: 2019-04-30 | End: 2019-05-03 | Stop reason: HOSPADM

## 2019-04-30 RX ORDER — ONDANSETRON 2 MG/ML
4 INJECTION INTRAMUSCULAR; INTRAVENOUS EVERY 6 HOURS PRN
Status: DISCONTINUED | OUTPATIENT
Start: 2019-04-30 | End: 2019-05-03 | Stop reason: HOSPADM

## 2019-04-30 RX ORDER — SODIUM CHLORIDE 0.9 % (FLUSH) 0.9 %
3 SYRINGE (ML) INJECTION EVERY 12 HOURS SCHEDULED
Status: DISCONTINUED | OUTPATIENT
Start: 2019-04-30 | End: 2019-05-03 | Stop reason: HOSPADM

## 2019-04-30 RX ORDER — PETROLATUM 420 MG/G
OINTMENT TOPICAL EVERY 12 HOURS SCHEDULED
Status: DISCONTINUED | OUTPATIENT
Start: 2019-04-30 | End: 2019-05-03 | Stop reason: HOSPADM

## 2019-04-30 RX ORDER — WARFARIN SODIUM 7.5 MG/1
7.5 TABLET ORAL
Status: DISCONTINUED | OUTPATIENT
Start: 2019-04-30 | End: 2019-05-03 | Stop reason: HOSPADM

## 2019-04-30 RX ORDER — NITROGLYCERIN 0.4 MG/1
0.4 TABLET SUBLINGUAL
Status: DISCONTINUED | OUTPATIENT
Start: 2019-04-30 | End: 2019-05-03 | Stop reason: HOSPADM

## 2019-04-30 RX ORDER — ACETAMINOPHEN 500 MG
1000 TABLET ORAL ONCE
Status: COMPLETED | OUTPATIENT
Start: 2019-04-30 | End: 2019-04-30

## 2019-04-30 RX ORDER — ONDANSETRON 4 MG/1
4 TABLET, FILM COATED ORAL EVERY 6 HOURS PRN
Status: DISCONTINUED | OUTPATIENT
Start: 2019-04-30 | End: 2019-05-03 | Stop reason: HOSPADM

## 2019-04-30 RX ORDER — CARVEDILOL 3.12 MG/1
3.12 TABLET ORAL 2 TIMES DAILY WITH MEALS
Status: DISCONTINUED | OUTPATIENT
Start: 2019-04-30 | End: 2019-05-03 | Stop reason: HOSPADM

## 2019-04-30 RX ORDER — DOCUSATE SODIUM 100 MG/1
100 CAPSULE, LIQUID FILLED ORAL DAILY
Status: DISCONTINUED | OUTPATIENT
Start: 2019-05-01 | End: 2019-05-03 | Stop reason: HOSPADM

## 2019-04-30 RX ORDER — TAMSULOSIN HYDROCHLORIDE 0.4 MG/1
0.4 CAPSULE ORAL NIGHTLY
Status: DISCONTINUED | OUTPATIENT
Start: 2019-04-30 | End: 2019-05-03 | Stop reason: HOSPADM

## 2019-04-30 RX ORDER — ACETAMINOPHEN 325 MG/1
650 TABLET ORAL EVERY 4 HOURS PRN
Status: DISCONTINUED | OUTPATIENT
Start: 2019-04-30 | End: 2019-05-03 | Stop reason: HOSPADM

## 2019-04-30 RX ORDER — SODIUM CHLORIDE 0.9 % (FLUSH) 0.9 %
1-10 SYRINGE (ML) INJECTION AS NEEDED
Status: DISCONTINUED | OUTPATIENT
Start: 2019-04-30 | End: 2019-05-03 | Stop reason: HOSPADM

## 2019-04-30 RX ORDER — MORPHINE SULFATE 2 MG/ML
2 INJECTION, SOLUTION INTRAMUSCULAR; INTRAVENOUS ONCE
Status: COMPLETED | OUTPATIENT
Start: 2019-04-30 | End: 2019-04-30

## 2019-04-30 RX ORDER — METOCLOPRAMIDE 10 MG/1
10 TABLET ORAL
Status: DISCONTINUED | OUTPATIENT
Start: 2019-04-30 | End: 2019-05-03 | Stop reason: HOSPADM

## 2019-04-30 RX ORDER — CLOTRIMAZOLE AND BETAMETHASONE DIPROPIONATE 10; .64 MG/G; MG/G
CREAM TOPICAL EVERY 12 HOURS SCHEDULED
Status: DISCONTINUED | OUTPATIENT
Start: 2019-04-30 | End: 2019-05-03 | Stop reason: HOSPADM

## 2019-04-30 RX ORDER — HYDROCODONE BITARTRATE AND ACETAMINOPHEN 7.5; 325 MG/1; MG/1
1 TABLET ORAL EVERY 8 HOURS PRN
Status: DISCONTINUED | OUTPATIENT
Start: 2019-04-30 | End: 2019-05-03 | Stop reason: HOSPADM

## 2019-04-30 RX ORDER — IPRATROPIUM BROMIDE AND ALBUTEROL SULFATE 2.5; .5 MG/3ML; MG/3ML
3 SOLUTION RESPIRATORY (INHALATION)
Status: DISCONTINUED | OUTPATIENT
Start: 2019-04-30 | End: 2019-05-03 | Stop reason: HOSPADM

## 2019-04-30 RX ORDER — LISINOPRIL 10 MG/1
10 TABLET ORAL DAILY
Status: DISCONTINUED | OUTPATIENT
Start: 2019-05-01 | End: 2019-05-03 | Stop reason: HOSPADM

## 2019-04-30 RX ORDER — SODIUM CHLORIDE 9 MG/ML
125 INJECTION, SOLUTION INTRAVENOUS CONTINUOUS
Status: DISCONTINUED | OUTPATIENT
Start: 2019-04-30 | End: 2019-05-01

## 2019-04-30 RX ORDER — SODIUM CHLORIDE 0.9 % (FLUSH) 0.9 %
10 SYRINGE (ML) INJECTION AS NEEDED
Status: DISCONTINUED | OUTPATIENT
Start: 2019-04-30 | End: 2019-05-03 | Stop reason: HOSPADM

## 2019-04-30 RX ORDER — PRAVASTATIN SODIUM 20 MG
20 TABLET ORAL DAILY
Status: DISCONTINUED | OUTPATIENT
Start: 2019-05-01 | End: 2019-05-03 | Stop reason: HOSPADM

## 2019-04-30 RX ADMIN — CARVEDILOL 3.12 MG: 3.12 TABLET, FILM COATED ORAL at 23:45

## 2019-04-30 RX ADMIN — ACETAMINOPHEN 1000 MG: 500 TABLET, FILM COATED ORAL at 17:06

## 2019-04-30 RX ADMIN — VANCOMYCIN HYDROCHLORIDE 2500 MG: 10 INJECTION, POWDER, LYOPHILIZED, FOR SOLUTION INTRAVENOUS at 19:54

## 2019-04-30 RX ADMIN — SODIUM CHLORIDE 125 ML/HR: 9 INJECTION, SOLUTION INTRAVENOUS at 17:32

## 2019-04-30 RX ADMIN — IPRATROPIUM BROMIDE AND ALBUTEROL SULFATE 3 ML: 2.5; .5 SOLUTION RESPIRATORY (INHALATION) at 23:31

## 2019-04-30 RX ADMIN — METOCLOPRAMIDE 10 MG: 10 TABLET ORAL at 23:45

## 2019-04-30 RX ADMIN — MORPHINE SULFATE 2 MG: 2 INJECTION, SOLUTION INTRAMUSCULAR; INTRAVENOUS at 18:48

## 2019-04-30 RX ADMIN — SODIUM CHLORIDE 125 ML/HR: 9 INJECTION, SOLUTION INTRAVENOUS at 23:45

## 2019-04-30 RX ADMIN — TAMSULOSIN HYDROCHLORIDE 0.4 MG: 0.4 CAPSULE ORAL at 23:45

## 2019-04-30 NOTE — PROGRESS NOTES
Anticoagulation Clinic Progress Note    Anticoagulation Summary  As of 2019    INR goal:   2.0-3.0   TTR:   76.4 % (6.6 mo)   INR used for dosin.30! (2019)   Warfarin maintenance plan:   7.5 mg every Tue, Sat; 5 mg all other days   Weekly warfarin total:   40 mg   Plan last modified:   Sharita Nash [facility] (2018)   Next INR check:   2019   Priority:   Maintenance   Target end date:   Indefinite    Indications    Permanent atrial fibrillation (CMS/HCC) [I48.2]             Anticoagulation Episode Summary     INR check location:       Preferred lab:       Send INR reminders to:    [facility] MCMULLEN CLINICAL POOL    Comments:         Anticoagulation Care Providers     Provider Role Specialty Phone number    Kurt Henry MD Referring Cardiology [phone]          INR History:  Anticoagulation Monitoring 2019   INR 2.0 2.5 1.30   INR Date 2019   INR Goal 2.0-3.0 2.0-3.0 2.0-3.0   Trend Same Same Same   Last Week Total 30 mg 37.5 mg 35 mg   Next Week Total 37.5 mg 35 mg 42.5 mg   Sun 5 mg 5 mg -   Mon 5 mg 5 mg -   Tue 7.5 mg 5 mg () 7.5 mg   Wed 5 mg 5 mg 7.5 mg ()   Thu 5 mg 5 mg -   Fri 5 mg 5 mg -   Sat 5 mg () 5 mg () -   Visit Report - - -   Some recent data might be hidden       Plan:  1. INR is Subtherapeutic today- see above in Anticoagulation Summary.   Provided instructions to Brittney with [facility] to have Mr. Lockwood take a booster dose today and tomorrow of 7.5mg, then to recheck INR on .    Sharita Nash [facility]

## 2019-05-01 ENCOUNTER — READMISSION MANAGEMENT (OUTPATIENT)
Dept: CALL CENTER | Facility: HOSPITAL | Age: 69
End: 2019-05-01

## 2019-05-01 PROBLEM — A41.9 SEPSIS (HCC): Status: ACTIVE | Noted: 2019-05-01

## 2019-05-01 LAB
ANION GAP SERPL CALCULATED.3IONS-SCNC: 10.4 MMOL/L
BASOPHILS # BLD AUTO: 0.03 10*3/MM3 (ref 0–0.2)
BASOPHILS NFR BLD AUTO: 0.3 % (ref 0–1.5)
BUN BLD-MCNC: 16 MG/DL (ref 8–23)
BUN/CREAT SERPL: 18.2 (ref 7–25)
CALCIUM SPEC-SCNC: 9 MG/DL (ref 8.6–10.5)
CHLORIDE SERPL-SCNC: 99 MMOL/L (ref 98–107)
CO2 SERPL-SCNC: 22.6 MMOL/L (ref 22–29)
CREAT BLD-MCNC: 0.88 MG/DL (ref 0.76–1.27)
DEPRECATED RDW RBC AUTO: 53 FL (ref 37–54)
EOSINOPHIL # BLD AUTO: 0.01 10*3/MM3 (ref 0–0.4)
EOSINOPHIL NFR BLD AUTO: 0.1 % (ref 0.3–6.2)
ERYTHROCYTE [DISTWIDTH] IN BLOOD BY AUTOMATED COUNT: 13.9 % (ref 12.3–15.4)
GFR SERPL CREATININE-BSD FRML MDRD: 86 ML/MIN/1.73
GLUCOSE BLD-MCNC: 91 MG/DL (ref 65–99)
HCT VFR BLD AUTO: 30.4 % (ref 37.5–51)
HGB BLD-MCNC: 10.1 G/DL (ref 13–17.7)
IMM GRANULOCYTES # BLD AUTO: 0.1 10*3/MM3 (ref 0–0.05)
IMM GRANULOCYTES NFR BLD AUTO: 1.1 % (ref 0–0.5)
LYMPHOCYTES # BLD AUTO: 0.51 10*3/MM3 (ref 0.7–3.1)
LYMPHOCYTES NFR BLD AUTO: 5.4 % (ref 19.6–45.3)
MCH RBC QN AUTO: 34.6 PG (ref 26.6–33)
MCHC RBC AUTO-ENTMCNC: 33.2 G/DL (ref 31.5–35.7)
MCV RBC AUTO: 104.1 FL (ref 79–97)
MONOCYTES # BLD AUTO: 0.39 10*3/MM3 (ref 0.1–0.9)
MONOCYTES NFR BLD AUTO: 4.1 % (ref 5–12)
NEUTROPHILS # BLD AUTO: 8.44 10*3/MM3 (ref 1.7–7)
NEUTROPHILS NFR BLD AUTO: 89 % (ref 42.7–76)
NRBC BLD AUTO-RTO: 0 /100 WBC (ref 0–0.2)
PLATELET # BLD AUTO: 123 10*3/MM3 (ref 140–450)
PMV BLD AUTO: 9.7 FL (ref 6–12)
POTASSIUM BLD-SCNC: 3.7 MMOL/L (ref 3.5–5.2)
RBC # BLD AUTO: 2.92 10*6/MM3 (ref 4.14–5.8)
SODIUM BLD-SCNC: 132 MMOL/L (ref 136–145)
WBC NRBC COR # BLD: 9.48 10*3/MM3 (ref 3.4–10.8)

## 2019-05-01 PROCEDURE — 85025 COMPLETE CBC W/AUTO DIFF WBC: CPT | Performed by: INTERNAL MEDICINE

## 2019-05-01 PROCEDURE — 94799 UNLISTED PULMONARY SVC/PX: CPT

## 2019-05-01 PROCEDURE — 25010000002 VANCOMYCIN 10 G RECONSTITUTED SOLUTION: Performed by: INTERNAL MEDICINE

## 2019-05-01 PROCEDURE — 97162 PT EVAL MOD COMPLEX 30 MIN: CPT

## 2019-05-01 PROCEDURE — 92610 EVALUATE SWALLOWING FUNCTION: CPT

## 2019-05-01 PROCEDURE — 80048 BASIC METABOLIC PNL TOTAL CA: CPT | Performed by: INTERNAL MEDICINE

## 2019-05-01 RX ORDER — WARFARIN SODIUM 2.5 MG/1
2.5 TABLET ORAL
Status: COMPLETED | OUTPATIENT
Start: 2019-05-01 | End: 2019-05-01

## 2019-05-01 RX ADMIN — NYSTATIN: 100000 POWDER TOPICAL at 20:59

## 2019-05-01 RX ADMIN — WARFARIN SODIUM 2.5 MG: 2.5 TABLET ORAL at 00:37

## 2019-05-01 RX ADMIN — CARVEDILOL 3.12 MG: 3.12 TABLET, FILM COATED ORAL at 08:12

## 2019-05-01 RX ADMIN — DOCUSATE SODIUM 100 MG: 100 CAPSULE, LIQUID FILLED ORAL at 08:12

## 2019-05-01 RX ADMIN — IPRATROPIUM BROMIDE AND ALBUTEROL SULFATE 3 ML: 2.5; .5 SOLUTION RESPIRATORY (INHALATION) at 21:03

## 2019-05-01 RX ADMIN — WARFARIN SODIUM 7.5 MG: 7.5 TABLET ORAL at 18:59

## 2019-05-01 RX ADMIN — CLOTRIMAZOLE AND BETAMETHASONE DIPROPIONATE: 10; .5 CREAM TOPICAL at 20:59

## 2019-05-01 RX ADMIN — SODIUM CHLORIDE, PRESERVATIVE FREE 3 ML: 5 INJECTION INTRAVENOUS at 21:00

## 2019-05-01 RX ADMIN — CLOTRIMAZOLE AND BETAMETHASONE DIPROPIONATE: 10; .5 CREAM TOPICAL at 16:40

## 2019-05-01 RX ADMIN — TAMSULOSIN HYDROCHLORIDE 0.4 MG: 0.4 CAPSULE ORAL at 20:59

## 2019-05-01 RX ADMIN — SODIUM CHLORIDE, PRESERVATIVE FREE 3 ML: 5 INJECTION INTRAVENOUS at 03:00

## 2019-05-01 RX ADMIN — VANCOMYCIN HYDROCHLORIDE 2000 MG: 10 INJECTION, POWDER, LYOPHILIZED, FOR SOLUTION INTRAVENOUS at 08:15

## 2019-05-01 RX ADMIN — METOCLOPRAMIDE 10 MG: 10 TABLET ORAL at 08:12

## 2019-05-01 RX ADMIN — NYSTATIN: 100000 POWDER TOPICAL at 00:37

## 2019-05-01 RX ADMIN — FINASTERIDE 5 MG: 5 TABLET, FILM COATED ORAL at 08:12

## 2019-05-01 RX ADMIN — IPRATROPIUM BROMIDE AND ALBUTEROL SULFATE 3 ML: 2.5; .5 SOLUTION RESPIRATORY (INHALATION) at 07:58

## 2019-05-01 RX ADMIN — METOCLOPRAMIDE 10 MG: 10 TABLET ORAL at 16:49

## 2019-05-01 RX ADMIN — SODIUM CHLORIDE, PRESERVATIVE FREE 3 ML: 5 INJECTION INTRAVENOUS at 02:32

## 2019-05-01 RX ADMIN — PRAVASTATIN SODIUM 20 MG: 20 TABLET ORAL at 08:12

## 2019-05-01 RX ADMIN — IPRATROPIUM BROMIDE AND ALBUTEROL SULFATE 3 ML: 2.5; .5 SOLUTION RESPIRATORY (INHALATION) at 15:25

## 2019-05-01 RX ADMIN — METOCLOPRAMIDE 10 MG: 10 TABLET ORAL at 20:59

## 2019-05-01 RX ADMIN — HYDROCODONE BITARTRATE AND ACETAMINOPHEN 1 TABLET: 7.5; 325 TABLET ORAL at 16:49

## 2019-05-01 RX ADMIN — PETROLATUM 100 APPLICATION: 420 OINTMENT TOPICAL at 15:58

## 2019-05-01 RX ADMIN — FUROSEMIDE 20 MG: 20 TABLET ORAL at 08:12

## 2019-05-01 RX ADMIN — HYDROCODONE BITARTRATE AND ACETAMINOPHEN 1 TABLET: 7.5; 325 TABLET ORAL at 01:44

## 2019-05-01 RX ADMIN — LISINOPRIL 10 MG: 10 TABLET ORAL at 08:12

## 2019-05-01 RX ADMIN — IPRATROPIUM BROMIDE AND ALBUTEROL SULFATE 3 ML: 2.5; .5 SOLUTION RESPIRATORY (INHALATION) at 11:26

## 2019-05-01 RX ADMIN — VANCOMYCIN HYDROCHLORIDE 2000 MG: 10 INJECTION, POWDER, LYOPHILIZED, FOR SOLUTION INTRAVENOUS at 20:59

## 2019-05-01 RX ADMIN — CARVEDILOL 3.12 MG: 3.12 TABLET, FILM COATED ORAL at 18:59

## 2019-05-01 RX ADMIN — NYSTATIN: 100000 POWDER TOPICAL at 16:41

## 2019-05-01 RX ADMIN — GENTAMICIN SULFATE: 1 OINTMENT TOPICAL at 15:57

## 2019-05-01 NOTE — PLAN OF CARE
Problem: Patient Care Overview  Goal: Plan of Care Review   05/01/19 1530   Coping/Psychosocial   Plan of Care Reviewed With patient   OTHER   Outcome Summary Pt is 70 yo male adm with LE weakness, cellulitis. Diag with sepsis. Pt just d/c home from hosp 4/29 for afib, dysphagia, & other medical conditions. Pt up in chair waiting for LEs to be wrapped [facility]. Not safe at this time to stand or ambulate pt as he wears orthotic shoes and has skin issues. Will follow up tomorrow for txfs, standing, & ambulation when pt is able to maureen shoes. He will benefit from skilled PT to work on bed mob, txfs, gait, general strengthening.

## 2019-05-01 NOTE — PROGRESS NOTES
"Pharmacokinetic Consult - Vancomycin Dosing (Initial Note)    Jalen Lockwood has been consulted for pharmacy to dose vancomycin for SSTI.  Pharmacy dosing vancomycin per Dr. Singh's request.   Goal trough: 15-20 mg/L   Other antimicrobials: None    Relevant clinical data and objective history reviewed:  69 y.o. male 193 cm (76\") 127 kg (280 lb)      Creatinine   Date Value Ref Range Status   04/30/2019 0.96 0.76 - 1.27 mg/dL Final   04/29/2019 0.90 0.76 - 1.27 mg/dL Final   04/28/2019 0.84 0.76 - 1.27 mg/dL Final     BUN   Date Value Ref Range Status   04/30/2019 17 8 - 23 mg/dL Final     Estimated Creatinine Clearance: 107.3 mL/min (by C-G formula based on SCr of 0.96 mg/dL).    Lab Results   Component Value Date    WBC 13.84 (H) 04/30/2019     Temp Readings from Last 3 Encounters:   04/30/19 100.2 °F (37.9 °C) (Oral)   04/29/19 97.9 °F (36.6 °C) (Oral)   04/09/19 98.3 °F (36.8 °C) (Oral)      Baseline culture/source/susceptibility:   4/30   Blood Cx pending 2/2       Assessment/Plan  Given Vancomcyin 2500mg (19.7mg/kg) in the ED at 1954.    Will start vancomycin 2000mg (15.7mg/kg) IV q12h with the next dose at 0800 on 5/1. Will monitor serum creatinine every 24 hours for the first 3 days then at least every 48 hours per dosing recommendations. Vancomycin trough scheduled for 5/2 at 0730 prior to 4th total dose. Pharmacy will continue to follow daily while on vancomycin and adjust as needed.     Edil Araiza, [facility]  "

## 2019-05-01 NOTE — PROGRESS NOTES
Discharge Planning Assessment  [facility]     Patient Name: Jalen Lockwood  MRN: [id number]  Today's Date: 5/1/2019    Admit Date: 4/30/2019    Discharge Needs Assessment     Row Name 05/01/19 1338       Living Environment    Lives With  spouse    Name(s) of Who Lives With Patient  wife, Mariposa Lockwood, [phone]    Current Living Arrangements  home/apartment/condo    Primary Care Provided by  self    Provides Primary Care For  no one    Family Caregiver if Needed  spouse    Quality of Family Relationships  helpful;involved;supportive    Able to Return to Prior Arrangements  yes       Resource/Environmental Concerns    Resource/Environmental Concerns  none       Transition Planning    Patient/Family Anticipates Transition to  inpatient rehabilitation facility;home with help/services;home with family    Patient/Family Anticipated Services at Transition  home health care;rehabilitation services;skilled nursing    Transportation Anticipated  family or friend will provide       Discharge Needs Assessment    Concerns to be Addressed  discharge planning    Equipment Currently Used at Home  oxygen;walker, rolling    Outpatient/Agency/Support Group Needs  homecare agency;skilled nursing facility    Discharge Facility/Level of Care Needs  home with home health;nursing facility, skilled    Discharge Coordination/Progress  SNF referral pending        Discharge Plan     Row Name 05/01/19 1340       Plan    Plan  SNF referral pending, will need [employer] Medicare precert for rehab    Patient/Family in Agreement with Plan  yes    Plan Comments  IMM letter checked. CCP met with pt to discuss d/c planning. Facesheet verified. CCP role explained. Pt resides with his wife in a single level home, uses walker and O2 via [facility], and was recently discharged from [facility] with [facility] services (4/24/19). Pt has been to sub-acute rehab at [facility] but now requests a referral to [facility]. Pt's pharmacy is [facility] on [address]. Pt  is seen at [facility]. CCP made referral to [facility] and will follow for facility eval. PT eval pending. Pt will need [employer] Medicare precert for rehab. CURRY Brown        Destination      Service Provider Request Status Selected Services Address Phone Number Fax Number    [facility] Pending - Request Sent N/A [address] [address] [phone]      Durable Medical Equipment      No service coordination in this encounter.      Dialysis/Infusion      No service coordination in this encounter.      Home Medical Care      Service Provider Request Status Selected Services Address Phone Number Fax Number    [facility] Pending - Request Sent N/A [address] [address] [phone]      Therapy      No service coordination in this encounter.      Community Resources      No service coordination in this encounter.          Demographic Summary     Row Name 05/01/19 1338       General Information    Admission Type  inpatient    Arrived From  home    Required Notices Provided  Important Message from Medicare    Referral Source  admission list    Reason for Consult  discharge planning    Preferred Language  English        Functional Status     Row Name 05/01/19 1338       Functional Status    Usual Activity Tolerance  moderate    Current Activity Tolerance  poor       Functional Status, IADL    Medications  assistive equipment    Meal Preparation  assistive equipment    Housekeeping  assistive equipment    Laundry  assistive equipment    Shopping  assistive equipment       Mental Status Summary    Recent Changes in Mental Status/Cognitive Functioning  no changes        Psychosocial    No documentation.       Abuse/Neglect    No documentation.       Legal    No documentation.       Substance Abuse    No documentation.       Patient Forms    No documentation.           Valerie Lock  LCSW

## 2019-05-01 NOTE — PROGRESS NOTES
" LOS: 1 day     Name: Jalen Lockwood  Age: 69 y.o.  Sex: male  :  1950  MRN: [id number]         Primary Care Physician: Nannette Higgins APRN    Subjective   Subjective  States that he is feeling better today.  Overall still weak.  Denies fevers or chills.    Objective   Vital Signs  Temp:  [98.6 °F (37 °C)-102.9 °F (39.4 °C)] 99.5 °F (37.5 °C)  Heart Rate:  [] 87  Resp:  [18-22] 18  BP: ()/(55-80) 106/60  Body mass index is 34.68 kg/m².    Objective:  General Appearance:  Comfortable and in no acute distress (Chronically ill-appearing.  Appears older than stated age.).    Vital signs: (most recent): Blood pressure 106/60, pulse 87, temperature 99.5 °F (37.5 °C), temperature source Oral, resp. rate 18, height 193 cm (76\"), weight 129 kg (284 lb 14.4 oz), SpO2 98 %.    Lungs:  Normal effort and normal respiratory rate.    Heart: Normal rate.  Regular rhythm.    Abdomen: Abdomen is soft.  Bowel sounds are normal.   There is no abdominal tenderness.     Extremities: There is dependent edema.  There is no local swelling.  (Chronic deformity of the bilateral feet and ankles with external rotation)  Neurological: Patient is alert and oriented to person, place and time.    Skin:  Warm and dry.  (Evidence of chronic venous stasis as well as overlying cellulitis)            Results Review:       I reviewed the patient's new clinical results.    Results from last 7 days   Lab Units 19  1134 19  1659 19  0704 19  0538 19  0522 19  0558 19  0624   WBC 10*3/mm3 9.48 13.84* 5.82 5.49 5.82 5.40 5.49   HEMOGLOBIN g/dL 10.1* 12.8* 11.0* 10.5* 10.6* 10.7* 11.1*   PLATELETS 10*3/mm3 123* 148 136* 128* 123* 113*  113* 112*     Results from last 7 days   Lab Units 19  1134 19  1659 19  0704 19  0538 19  0522 19  0558 19  0624   SODIUM mmol/L 132* 131* 133* 130* 126* 127* 122*   POTASSIUM mmol/L 3.7 4.2 4.8 4.2 4.1 4.3 4.2   CHLORIDE " mmol/L 99 93* 96* 96* 94* 94* 90*   CO2 mmol/L 22.6 27.1 28.9 23.8 24.6 23.0 22.4   BUN mg/dL 16 17 14 12 11 10 8   CREATININE mg/dL 0.88 0.96 0.90 0.84 0.77 0.89 0.81   CALCIUM mg/dL 9.0 9.5 9.5 9.1 9.2 9.0 8.6   GLUCOSE mg/dL 91 90 103* 100* 100* 105* 104*     Results from last 7 days   Lab Units 04/30/19  1700 04/30/19 04/29/19  0704 04/28/19  0538 04/27/19  0522 04/26/19  0558 04/25/19  0624   INR  1.18* 1.30 1.32* 1.35* 1.40* 2.01* 3.02*       Scheduled Meds:     carvedilol 3.125 mg Oral BID With Meals   clotrimazole-betamethasone  Topical Q12H   docusate sodium 100 mg Oral Daily   finasteride 5 mg Oral Daily   furosemide 20 mg Oral Daily   gentamicin  Topical BID   ipratropium-albuterol 3 mL Nebulization 4x Daily - RT   lisinopril 10 mg Oral Daily   metoclopramide 10 mg Oral 4x Daily AC & at Bedtime   nystatin  Topical Q12H   Petrolatum  Topical Q12H   pravastatin 20 mg Oral Daily   sodium chloride 3 mL Intravenous Q12H   tamsulosin 0.4 mg Oral Nightly   vancomycin 15 mg/kg Intravenous Q12H   warfarin 7.5 mg Oral Daily     PRN Meds:   •  acetaminophen  •  albuterol  •  HYDROcodone-acetaminophen  •  nitroglycerin  •  ondansetron **OR** ondansetron  •  Pharmacy to dose vancomycin  •  sodium chloride  •  [COMPLETED] Insert peripheral IV **AND** sodium chloride  Continuous Infusions:    Pharmacy to dose vancomycin        Assessment/Plan   Active Hospital Problems    Diagnosis  POA   • **Left leg cellulitis [L03.116]  Yes   • Sepsis (CMS/HCC) [A41.9]  Unknown   • Anemia of chronic disease [D63.8]  Yes   • Weakness [R53.1]  Yes   • Chronic anticoagulation [Z79.01]  Not Applicable   • Essential hypertension [I10]  Yes   • Gastroparesis [K31.84]  Yes   • Morbid obesity (CMS/HCC) [E66.01]  Yes   • COPD (chronic obstructive pulmonary disease) (CMS/HCC) [J44.9]  Yes   • Permanent atrial fibrillation (CMS/HCC) [I48.2]  Yes   • Venous stasis [I87.8]  Yes   • Sleep apnea [G47.30]  Yes      Resolved Hospital Problems   No  resolved problems to display.       Assessment & Plan    -Cellulitis and sepsis present on admission are responding to vancomycin started yesterday evening with reduction of his fever and resolution of leukocytosis.  Continue same for now and consider switching to doxycycline tomorrow.  -Blood pressure running on the low side.  Avoid antihypertensive agents and will be cautious with IV fluids.  -Follow-up on pending blood cultures  -Sodium level appears stable.  Continue management.  Will stop IV fluids  -INR subtherapeutic yesterday.  Continue Coumadin at current dose recheck tomorrow.  -Physical therapy  -Discussed with the patient and his wife.  They are agreeable to rehab.  Discussed with CCP who will help facilitate this.      Javi Hatch MD  [facility]  05/01/19  1:28 PM

## 2019-05-01 NOTE — THERAPY EVALUATION
Acute Care - Speech Language Pathology   Swallow Initial Evaluation [facility]     Patient Name: Jalen Lockwood  : 1950  MRN: [id number]  Today's Date: 2019               Admit Date: 2019    Visit Dx:     ICD-10-CM ICD-9-CM   1. Left leg cellulitis L03.116 682.6   2. Atrial fibrillation with rapid ventricular response (CMS/HCC) I48.91 427.31   3. Generalized weakness R53.1 780.79   4. Fall, initial encounter W19.XXXA E888.9     Patient Active Problem List   Diagnosis   • Chronic osteomyelitis (CMS/HCC)   • Foot pain   • Peripheral neuropathy   • Venous stasis   • Permanent atrial fibrillation (CMS/HCC)   • Sleep apnea   • Chronic edema   • Morbid obesity (CMS/HCC)   • COPD (chronic obstructive pulmonary disease) (CMS/HCC)   • Nonocclusive coronary atherosclerosis of native coronary artery   • Atrial flutter (CMS/HCC)   • 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CMS/HCC)   • Aortectasia (CMS/HCC)   • Popliteal artery aneurysm (CMS/HCC)   • Colon polyps   • Gastroparesis   • Insomnia   • Adenomatous polyp of colon   • Essential hypertension   • Hyponatremia   • ETOH abuse   • Open wound   • Adverse effect of thiazide diuretic   • Chronic anticoagulation   • Weakness   • Oropharyngeal dysphagia   • Abnormal weight loss   • Tobacco abuse   • Thyromegaly   • Adrenal adenoma, left   • Thrombocytopenia (CMS/HCC)   • Retroperitoneal lymphadenopathy   • Candidal intertrigo   • Anemia of chronic disease   • Thyroid nodule   • Charcot's joint of foot   • Left leg cellulitis     Past Medical History:   Diagnosis Date   • Allergic rhinitis    • Aortectasia (CMS/HCC)     3cm infrarenal abdominal aorta   • Arthritis    • Atrial flutter (CMS/HCC) 2010    s/p ablation    • Charcot's joint of foot    • CHF (congestive heart failure) (CMS/HCC)    • Chronic edema     both legs and sees wound care center at [place]    • Chronic venous insufficiency    • COPD (chronic obstructive pulmonary disease) (CMS/HCC)    • Coronary  atherosclerosis     Cath 2010: diffuse 40-50% disease   • Diverticulosis    • Duodenitis    • Fatty liver    • Gastritis    • Gastroparesis    • Hematoma     post-operative; After catheterization, right groin, required surgical exploration   • Hyperlipidemia    • Hypertension    • Hypertensive heart disease without heart failure 7/28/2016   • Internal hemorrhoids    • Morbid obesity (CMS/HCC)    • Open wound     izzy legs has drsg chg weekly at wound care center at [place]  pt does second dressing on left leg another time during week   • Osteomyelitis (CMS/HCC)    • Paroxysmal atrial fibrillation (CMS/HCC)    • Peripheral neuropathy    • Popliteal artery aneurysm (CMS/HCC)     left, s/p stenting by Dr. Boston   • Skin cancer    • Sleep apnea     o2   • 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CMS/HCC)     due to flutter and afib; cath 2010 with nonobstructive disease   • Venous stasis    • Venous stasis ulcer (CMS/HCC)     bilateral legs      Past Surgical History:   Procedure Laterality Date   • CARDIAC CATHETERIZATION     • CATARACT EXTRACTION     • COLONOSCOPY  09/28/2015    NBIH, diverticulosis, polyps   • COLONOSCOPY N/A 9/11/2018    Procedure: COLONOSCOPY TO CECUM  AND TERM. ILEUM WITH COLD SNARE POLYPECTOMIES;  Surgeon: Kane Lagunas MD;  Location: [facility] ENDOSCOPY;  Service: Gastroenterology   • HIP ARTHROPLASTY Right    • JOINT REPLACEMENT     • OTHER SURGICAL HISTORY      Catheter ablation atrial flutter   • REPAIR ANEURYSM / PSEUDO ANEURYSM / RUPTURED ANEURYSM POPLITEAL ARTERY      Stent-Graft of the the left popliteal artery   • REPAIR KNEE LIGAMENT      Primary repair of knee ligament cruciate anterior right   • TONSILLECTOMY     • TONSILLECTOMY     • TOTAL KNEE ARTHROPLASTY Bilateral    • UPPER GASTROINTESTINAL ENDOSCOPY  09/16/2014    acute gastritis, acute duodenitis        SWALLOW EVALUATION (last 72 hours)      [facility] Adult Swallow Evaluation     Row Name 05/01/19 1200                   Rehab Evaluation     Document Type  evaluation  -AW        Subjective Information  no complaints  -AW        Patient Observations  alert;cooperative;agree to therapy  -AW        Patient/Family Observations  Pt up in recliner, wife present.  -AW        Patient Effort  good  -AW        Symptoms Noted During/After Treatment  none  -AW           General Information    Patient Profile Reviewed  yes  -AW        Pertinent History Of Current Problem  Pt discharged from hospital yesterday and readmitted with B LE weakness, fever.  -AW        Current Method of Nutrition  regular textures;thin liquids  -AW        Prior Level of Function-Communication  WFL  -AW        Prior Level of Function-Swallowing  no diet consistency restrictions;other (see comments) no straws  -AW        Plans/Goals Discussed with  patient;spouse/S.O.;agreed upon  -AW        Barriers to Rehab  none identified  -AW        Patient's Goals for Discharge  return home  -AW        Family Goals for Discharge  family did not state  -AW           Pain Assessment    Additional Documentation  Pain Scale: Numbers Pre/Post-Treatment (Group)  -AW           Pain Scale: Numbers Pre/Post-Treatment    Pain Scale: Numbers, Pretreatment  0/10 - no pain  -AW        Pain Scale: Numbers, Post-Treatment  0/10 - no pain  -AW           Oral Motor and Function    Dentition Assessment  upper dentures/partial in place  -AW        Secretion Management  WNL/WFL  -AW        Mucosal Quality  moist, healthy  -AW        Volitional Swallow  WFL  -AW        Volitional Cough  WFL  -AW           Oral Musculature and Cranial Nerve Assessment    Oral Motor General Assessment  WFL  -AW           General Eating/Swallowing Observations    Respiratory Support Currently in Use  room air  -AW        Eating/Swallowing Skills  fed by SLP  -AW        Positioning During Eating  upright in chair  -AW        Utensils Used  spoon;cup  -AW        Consistencies Trialed  soft textures;pureed;thin liquids mixed  -AW            Clinical Swallow Eval    Oral Prep Phase  WFL  -AW        Oral Transit  WFL  -AW        Oral Residue  WFL  -AW        Pharyngeal Phase  no overt signs/symptoms of pharyngeal impairment  -AW        Clinical Swallow Evaluation Summary  Pt had upper teeth only. Regular textures were not tried. Wife to bring lower dentures today. Pt tolerated thins via cup with no s/s. Straws not tested due to recommendation on last BSE. No s/s with pureed, soft, or mixed. Laryngeal elevation adequate.  -AW           Clinical Impression    SLP Swallowing Diagnosis  functional oral phase;functional pharyngeal phase  -AW        Functional Impact  no impact on function  -AW        Swallow Criteria for Skilled Therapeutic Interventions Met  no problems identified which require skilled intervention  -AW           Recommendations    Therapy Frequency (Swallow)  evaluation only  -AW        SLP Diet Recommendation  regular textures;thin liquids  -AW        Recommended Precautions and Strategies  upright posture during/after eating;small bites of food and sips of liquid;no straw  -AW        SLP Rec. for Method of Medication Administration  meds whole;with thin liquids;with pudding or applesauce;as tolerated  -AW        Monitor for Signs of Aspiration  yes;notify SLP if any concerns  -AW        [facility]  -AW          User Key  (r) = Recorded By, (t) = Taken By, (c) = Cosigned By    Initials Name Effective Dates    Payal Biswas, MS CCC-SLP 06/08/18 -           EDUCATION  The patient has been educated in the following areas:   Dysphagia (Swallowing Impairment) Oral Care/Hydration.    SLP Recommendation and Plan  SLP Swallowing Diagnosis: functional oral phase, functional pharyngeal phase  SLP Diet Recommendation: regular textures, thin liquids  Recommended Precautions and Strategies: upright posture during/after eating, small bites of food and sips of liquid, no straw     Monitor for Signs of  Aspiration: yes, notify SLP if any concerns     Swallow Criteria for Skilled Therapeutic Interventions Met: no problems identified which require skilled intervention  Anticipated Dischage Disposition: skilled nursing facility     Therapy Frequency (Swallow): evaluation only          Plan of Care Reviewed With: patient  Plan of Care Review  Plan of Care Reviewed With: patient  Progress: no change  Outcome Summary: Bedside Swallow eval completed. No s/s of aspiration noted. Recommend continue on a regular diet with thin, no straw. ST is not indicated at this time.         SLP Outcome Measures (last 72 hours)      SLP Outcome Measures     Row Name 05/01/19 1200             SLP Outcome Measures    Outcome Measure Used?  Adult NOMS  -AW         Adult FCM Scores    FCM Chosen  Swallowing  -AW      Swallowing FCM Score  6  -AW        User Key  (r) = Recorded By, (t) = Taken By, (c) = Cosigned By    Initials Name Effective Dates    Payal Biswas MS CCC-SLP 06/08/18 -            Time Calculation:   Time Calculation- SLP     Row Name 05/01/19 1219             Time Calculation- SLP    SLP Start Time  1130  -      SLP Received On  05/01/19  -        User Key  (r) = Recorded By, (t) = Taken By, (c) = Cosigned By    Initials Name Provider Type    Paayl Biswas MS CCC-SLP Speech and Language Pathologist          Therapy Charges for Today     Code Description Service Date Service Provider Modifiers Qty    [id number] HC ST EVAL ORAL PHARYNG SWALLOW 3 5/1/2019 Payal Chavira MS CCC-SLP GN 1               Payal Chavira MS CCC-SLP  5/1/2019

## 2019-05-01 NOTE — NURSING NOTE
CWOCN consult for BLE. Patient has had a recent admission and is known to our team. He is current with Dr Mckeon at [facility] [place]. At this time, his BLE appear about the same as last admission. We had tried additional compression prior, which did not make much difference in his progress so recommend to continue the home plan and patient will continue to follow up with MD.  Patient has his own special shoes made related to the deformities of his feet. They are quite difficult to put on and they do not seem to allow for any air flow. When we removed them this am there was a lot of moisture in the shoes and odor. When we returned after his wife brought the Promogran, the odor had improved. She brought a newer pair of the shoes that patient states are vented. I believe the shoes, though necessary due to the shape of his foot, may be contributing to the wounds and the drainage.   There are weeping areas on the left great toe, left medial and lateral ankle. There is a moderate amount of serous drainage come from these wounds. The feet are misshapen. BLE are dry. Also, there is a wound between the 1st and 2nd toes on the right foot. The skin is macerated and there is scant serous drainage.   Wound care orders: cleanse leg with foam cleanser and wounds with NS. Apply aquaphor to legs. Apply gentamyacin ointment to open wounds. Apply Promogran over the toe and medial ankle (LLE). Apply opticel (without silver) to the wound between the 1st and 2nd toes on the right foot. Wrap a thin layer of kerlix toes to knee on BLE. Wrap ACE wraps x2 ankle to knee.   Patient is knowledgeable of this care. It is performed 2x/day.  I also ordered a low air loss mattress for patient per RN request.  He fell at home and his buttocks are bruised as noted in the photo. I talked with patient about not spending all day sitting in the chair and getting in bed. He stated that he is walking some. He needs off loading. He seems to prefer  sitting in recliner.

## 2019-05-01 NOTE — DISCHARGE PLACEMENT REQUEST
"He Lockwood (69 y.o. Male)     Date of Birth Social Security Number Address Home Phone MRN    1950  [id number] [id number] [phone] [id number]    Sikh Marital Status          None        Admission Date Admission Type Admitting Provider Attending Provider Department, Room/Bed    4/30/19 Emergency Rahel Singh MD McCracken, Robert Russell, MD [address], [address]    Discharge Date Discharge Disposition Discharge Destination                       Attending Provider:  Javi Hatch MD    Allergies:  Cephalexin, Codeine, Penicillins    Isolation:  None   Infection:  None   Code Status:  CPR    Ht:  193 cm (76\")   Wt:  129 kg (284 lb 14.4 oz)    Admission Cmt:  None   Principal Problem:  Left leg cellulitis [L03.116]                 Active Insurance as of 4/30/2019     Primary Coverage     Payor Plan Insurance Group Employer/Plan Group    HU[employer] MEDICARE REPLACEMENT HU[employer]NA MEDICARE REPL [id number]     Payor Plan Address Payor Plan Phone Number Payor Plan Fax Number Effective Dates    [address] [address] [phone]  1/1/2018 - None Entered    [facility] [id number]       Subscriber Name Subscriber Birth Date Member ID       HE LOCKWOOD 1950 [id number]                 Emergency Contacts      (Rel.) Home Phone Work Phone Mobile Phone    Mariposa Lockwood (Spouse) [phone] -- [phone]    AlyssabehzadCarl (Friend) -- -- [phone]              "

## 2019-05-01 NOTE — H&P
Internal medicine history and physical  INTERNAL MEDICINE   [facility]       Patient Identification:  Name: Jalen Lockwood  Age: 69 y.o.  Sex: male  :  1950  MRN: [id number]                     Primary Care Physician: Nannette Higgins APRN                                   Chief Complaint: Bilateral lower extremity weakness and unable to use can and ambulate after being discharged from the hospital yesterday on 2019.    History of Present Illness:   Patient is a 69-year-old male who appears much older than the stated age has complicated past medical history as noted below was in the hospital for 5 days and was discharged on 2019 after being managed for following issues consisting of hyponatremia atrial fibrillation on chronic anticoagulation therapy alcohol abuse abnormal weight loss dysphagia chronic bilateral lower extremity edema with skin breakdown and chronic wounds and candidal intertrigo rash.  His creatinine improved to 133 from 115 over the course of 5 days after adjustment of his diuretics, placement of fluid restriction and recommendations of avoiding alcohol or use of hydrochlorothiazide.  According to the patient when he left he was feeling better and today when he tried to get up he could not because he was very weak his attempt to use cane was also unsuccessful.  This resulted in EMS being called and patient was brought to the emergency room.  In the emergency room he was found to be febrile ill-appearing and noted to have WBC count of 13,000 with left leg erythema in the context of chronic wound.  Patient's lactic acid level was 1.5 but he did have episode of atrial fibrillation with rapid ventricular response with heart rate in 150s.  His sodium was 131.  Patient is being admitted for evolving sepsis due to cellulitis of the left leg and chronic wound of the lower extremities.  Patient currently denies any other symptoms.      Past Medical History:  Past Medical  History:   Diagnosis Date   • Allergic rhinitis    • Aortectasia (CMS/HCC)     3cm infrarenal abdominal aorta   • Arthritis    • Atrial flutter (CMS/HCC) 2010    s/p ablation    • Charcot's joint of foot    • CHF (congestive heart failure) (CMS/HCC)    • Chronic edema     both legs and sees wound care center at [place]    • Chronic venous insufficiency    • COPD (chronic obstructive pulmonary disease) (CMS/HCC)    • Coronary atherosclerosis     Cath 2010: diffuse 40-50% disease   • Diverticulosis    • Duodenitis    • Fatty liver    • Gastritis    • Gastroparesis    • Hematoma     post-operative; After catheterization, right groin, required surgical exploration   • Hyperlipidemia    • Hypertension    • Hypertensive heart disease without heart failure 7/28/2016   • Internal hemorrhoids    • Morbid obesity (CMS/HCC)    • Open wound     izzy legs has drsg chg weekly at wound care center at [place]  pt does second dressing on left leg another time during week   • Osteomyelitis (CMS/HCC)    • Paroxysmal atrial fibrillation (CMS/HCC)    • Peripheral neuropathy    • Popliteal artery aneurysm (CMS/HCC)     left, s/p stenting by Dr. Boston   • Skin cancer    • Sleep apnea     o2   • 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CMS/HCC)     due to flutter and afib; cath 2010 with nonobstructive disease   • Venous stasis    • Venous stasis ulcer (CMS/HCC)     bilateral legs      Past Surgical History:  Past Surgical History:   Procedure Laterality Date   • CARDIAC CATHETERIZATION     • CATARACT EXTRACTION     • COLONOSCOPY  09/28/2015    NBIH, diverticulosis, polyps   • COLONOSCOPY N/A 9/11/2018    Procedure: COLONOSCOPY TO CECUM  AND TERM. ILEUM WITH COLD SNARE POLYPECTOMIES;  Surgeon: Kane Lagunas MD;  Location: [facility] ENDOSCOPY;  Service: Gastroenterology   • HIP ARTHROPLASTY Right    • JOINT REPLACEMENT     • OTHER SURGICAL HISTORY      Catheter ablation atrial flutter   • REPAIR ANEURYSM / PSEUDO ANEURYSM / RUPTURED ANEURYSM  POPLITEAL ARTERY      Stent-Graft of the the left popliteal artery   • REPAIR KNEE LIGAMENT      Primary repair of knee ligament cruciate anterior right   • TONSILLECTOMY     • TONSILLECTOMY     • TOTAL KNEE ARTHROPLASTY Bilateral    • UPPER GASTROINTESTINAL ENDOSCOPY  09/16/2014    acute gastritis, acute duodenitis      Home Meds:  Medications Prior to Admission   Medication Sig Dispense Refill Last Dose   • albuterol sulfate  (90 Base) MCG/ACT inhaler Inhale 2 puffs Every 4 (Four) Hours As Needed for Wheezing. 1 inhaler 6 4/23/2019 at Unknown time   • ALPRAZolam (XANAX) 0.5 MG tablet Take 1 tablet by mouth At Night As Needed for Sleep. 30 tablet 2 4/23/2019 at Unknown time   • ATROVENT HFA 17 MCG/ACT inhaler INHALE 2 PUFFS 4 (FOUR) TIMES A DAY. 12.9 inhaler 6 4/23/2019 at Unknown time   • carvedilol (COREG) 3.125 MG tablet Take 1 tablet by mouth 2 (Two) Times a Day With Meals. 60 tablet 0    • clotrimazole-betamethasone (LOTRISONE) 1-0.05 % cream APPLY TO AFFECTED AREA DAILY  3 4/23/2019 at Unknown time   • docusate sodium (COLACE) 100 MG capsule Take 100 mg by mouth Daily.   4/23/2019 at Unknown time   • finasteride (PROSCAR) 5 MG tablet Take 1 tablet by mouth daily.   4/23/2019 at Unknown time   • furosemide (LASIX) 20 MG tablet Take 1 tablet by mouth Daily. 30 tablet 0    • gentamicin (GARAMYCIN) 0.1 % ointment APPLY TO AFFECTED AREA EVERY DAY  2 4/23/2019 at Unknown time   • HYDROcodone-acetaminophen (NORCO) 7.5-325 MG per tablet Take 1 tablet by mouth Every 8 (Eight) Hours As Needed.   4/23/2019 at Unknown time   • lisinopril (PRINIVIL,ZESTRIL) 10 MG tablet Take 1 tablet by mouth Daily. 30 tablet 0    • metoclopramide (REGLAN) 10 MG tablet TAKE 1 TABLET BY MOUTH 4 (FOUR) TIMES A DAY BEFORE MEALS & AT BEDTIME. 120 tablet 2 4/23/2019 at Unknown time   • mupirocin (BACTROBAN) 2 % ointment Apply  topically to the appropriate area as directed 3 (Three) Times a Week.   Taking   • nystatin (MYCOSTATIN) 498329  UNIT/GM powder Apply  topically to the appropriate area as directed Every 12 (Twelve) Hours. 60 g 1    • Petrolatum 42 % ointment Apply  topically to the appropriate area as directed Every 12 (Twelve) Hours. 454 g 0    • pravastatin (PRAVACHOL) 20 MG tablet Take 1 tablet by mouth Daily. 90 tablet 0 Past Week at Unknown time   • silodosin (RAPAFLO) 8 MG capsule capsule Take 1 capsule by mouth daily. With food.   4/23/2019 at Unknown time   • warfarin (COUMADIN) 5 MG tablet Take 1.5 tablets by mouth Every Night. 60 tablet 0      Current Meds:     Current Facility-Administered Medications:   •  [COMPLETED] Insert peripheral IV, , , Once **AND** sodium chloride 0.9 % flush 10 mL, 10 mL, Intravenous, PRN, Katie Nelson MD  •  sodium chloride 0.9 % infusion, 125 mL/hr, Intravenous, Continuous, Katie Nelson MD, Last Rate: 125 mL/hr at 04/30/19 1732, 125 mL/hr at 04/30/19 1732  Allergies:  Allergies   Allergen Reactions   • Cephalexin Hives   • Codeine Nausea Only   • Penicillins Other (See Comments)     Childhood allergy     Social History:   Social History     Tobacco Use   • Smoking status: Current Every Day Smoker     Packs/day: 0.25     Years: 45.00     Pack years: 11.25     Types: Cigarettes   • Smokeless tobacco: Never Used   • Tobacco comment: caffeine use: 3 cups of coffee in the morning/ Dt soft drinks in the evening.    Substance Use Topics   • Alcohol use: Yes     Alcohol/week: 12.6 oz     Types: 14 Shots of liquor, 7 Standard drinks or equivalent per week     Comment: 2 rum a day      Family History:  Family History   Problem Relation Age of Onset   • Emphysema Father    • Malig Hyperthermia Neg Hx           Review of Systems  See history of present illness and past medical history.   Constitutional: Negative for chills and fever in fact surprised by the fact that when he was told in the emergency room that his temperature was 102.9.  HENT: Negative for sore throat and trouble swallowing.    Eyes: Negative for  "visual disturbance.   Respiratory: Negative for cough and shortness of breath.    Cardiovascular: Negative for chest pain and leg swelling.   Gastrointestinal: Negative for abdominal pain, diarrhea and vomiting.   Endocrine: Negative.    Genitourinary: Positive for decreased urine volume. Negative for frequency.   Musculoskeletal: Negative for neck pain.  Has chronic swelling of his legs and discomfort in his left leg more than usual.  Skin: Negative for rash.   Allergic/Immunologic: Negative.    Neurological: Positive for weakness (generalized, BLE). Negative for numbness.   Hematological: Negative.    Psychiatric/Behavioral: Negative.          Vitals:   BP 96/57 (BP Location: Right arm, Patient Position: Lying)   Pulse 81   Temp 100.2 °F (37.9 °C) (Oral)   Resp 22   Ht 193 cm (76\")   Wt 127 kg (280 lb)   SpO2 96%   BMI 34.08 kg/m²   I/O:     Intake/Output Summary (Last 24 hours) at 4/30/2019 2128  Last data filed at 4/30/2019 2046  Gross per 24 hour   Intake --   Output 850 ml   Net -850 ml     Exam:  General Appearance:    Alert, cooperative, no distress, appears stated age   Head:    Normocephalic, without obvious abnormality, atraumatic   Eyes:    PERRL, conjunctiva/corneas clear, EOM's intact, both eyes   Ears:    Normal external ear canals, both ears   Nose:   Nares normal, septum midline, mucosa normal, no drainage    or sinus tenderness   Throat:   Lips, tongue, gums normal; oral mucosa pink and moist   Neck:   Supple, symmetrical, trachea midline, no adenopathy;     thyroid:  no enlargement/tenderness/nodules; no carotid    bruit or JVD   Back:     Symmetric, no curvature, ROM normal, no CVA tenderness   Lungs:     Clear to auscultation bilaterally, respirations unlabored   Chest Wall:    No tenderness or deformity    Heart:    Regular rate and rhythm, S1 and S2 normal, no murmur, rub   or gallop   Abdomen:     Soft, non-tender, bowel sounds active all four quadrants,     no masses, no hepatomegaly, " no splenomegaly   Extremities:   Extremities normal, atraumatic, no cyanosis or edema   Pulses:   Pulses palpable in all extremities; symmetric all extremities   Skin:   Skin color normal, Skin is warm and dry,  no rashes or palpable lesions   Neurologic:   CNII-XII intact, motor strength grossly intact, sensation grossly intact to light touch, no focal deficits noted       Data Review:      I reviewed the patient's new clinical results.  Results from last 7 days   Lab Units 04/30/19 1659 04/29/19  0704 04/28/19  0538 04/27/19  0522 04/26/19  0558 04/25/19  0624 04/24/19  0754   WBC 10*3/mm3 13.84* 5.82 5.49 5.82 5.40 5.49 9.21   HEMOGLOBIN g/dL 12.8* 11.0* 10.5* 10.6* 10.7* 11.1* 12.7*   PLATELETS 10*3/mm3 148 136* 128* 123* 113*  113* 112* 139*     Results from last 7 days   Lab Units 04/30/19 1659 04/29/19  0704 04/28/19  0538 04/27/19  0522 04/26/19  0558 04/25/19  0624 04/24/19  2334   SODIUM mmol/L 131* 133* 130* 126* 127* 122* 123*   POTASSIUM mmol/L 4.2 4.8 4.2 4.1 4.3 4.2 4.4   CHLORIDE mmol/L 93* 96* 96* 94* 94* 90* 89*   CO2 mmol/L 27.1 28.9 23.8 24.6 23.0 22.4 22.6   BUN mg/dL 17 14 12 11 10 8 9   CREATININE mg/dL 0.96 0.90 0.84 0.77 0.89 0.81 0.92   CALCIUM mg/dL 9.5 9.5 9.1 9.2 9.0 8.6 8.9   GLUCOSE mg/dL 90 103* 100* 100* 105* 104* 121*       Assessment:  Active Hospital Problems    Diagnosis POA   • **Left leg cellulitis [L03.116] Yes   • Anemia of chronic disease [D63.8] Yes   • Weakness [R53.1] Yes   • Chronic anticoagulation [Z79.01] Not Applicable   • Essential hypertension [I10] Yes   • Gastroparesis [K31.84] Yes   • Morbid obesity (CMS/HCC) [E66.01] Yes   • COPD (chronic obstructive pulmonary disease) (CMS/HCC) [J44.9] Yes   • Permanent atrial fibrillation (CMS/HCC) [I48.2] Yes   • Venous stasis [I87.8] Yes   • Sleep apnea [G47.30] Yes       Medical decision making:  Generalized weakness of fairly sudden onset associated with increasing fever of 102.9 and redness of the left foot and leg and  WBC count of 13,000 -this presentation is likely consistent with evolving sepsis due to cellulitis of the left leg with systemic weakness.  Plan is to admit the patient continue with IV vancomycin elevate his leg and monitor his electrolytes and clinical course.  Continue with wrapping of the legs and wound care consult.  History of bilateral lower extremity edema with venous stasis and chronic wound-continue with wound care consult and wrapping of the legs.  Hyponatremia with sodium of 131 much improved compared to previous hospital admission sodium level of 115.  Plan is to continue fluid restriction and avoidance of hydrochlorothiazide and monitor his electrolytes level.  History of alcohol use-patient was placed on CIWA protocol during his last hospitalization but did not require use of Ativan plan is to continue to monitor home for any alcohol withdrawal and intervene as needed.  Hypertension-continue his antihypertensive regimen and avoid diuretics.  COPD with sleep apnea-provide him with nebulizer treatment and monitor his pulse ox and mental status.  History of atrial fibrillation-continue with anticoagulation therapy currently he is subtherapeutic and closely monitor his INR.  Chronic anemia with hemoglobin stable likely due to chronic disease plan is to monitor.  Rahel Singh MD   4/30/2019  9:28 PM  Much of this encounter note is an electronic transcription/translation of spoken language to printed text. The electronic translation of spoken language may permit erroneous, or at times, nonsensical words or phrases to be inadvertently transcribed; Although I have reviewed the note for such errors, some may still exist

## 2019-05-01 NOTE — PLAN OF CARE
Problem: Patient Care Overview  Goal: Plan of Care Review  Outcome: Ongoing (interventions implemented as appropriate)   05/01/19 1218   Coping/Psychosocial   Plan of Care Reviewed With patient   Plan of Care Review   Progress no change   OTHER   Outcome Summary Bedside Swallow eval completed. No s/s of aspiration noted. Recommend continue on a regular diet with thin, no straw. ST is not indicated at this time.

## 2019-05-01 NOTE — THERAPY EVALUATION
Acute Care - Physical Therapy Initial Evaluation  [facility]     Patient Name: Jalen Lockwood  : 1950  MRN: [id number]  Today's Date: 2019   Onset of Illness/Injury or Date of Surgery: 19  Date of Referral to PT: 19         Admit Date: 2019    Visit Dx:     ICD-10-CM ICD-9-CM   1. Left leg cellulitis L03.116 682.6   2. Atrial fibrillation with rapid ventricular response (CMS/HCC) I48.91 427.31   3. Generalized weakness R53.1 780.79   4. Fall, initial encounter W19.XXXA E888.9   5. Difficulty walking R26.2 719.7     Patient Active Problem List   Diagnosis   • Chronic osteomyelitis (CMS/HCC)   • Foot pain   • Peripheral neuropathy   • Venous stasis   • Permanent atrial fibrillation (CMS/HCC)   • Sleep apnea   • Chronic edema   • Morbid obesity (CMS/HCC)   • COPD (chronic obstructive pulmonary disease) (CMS/HCC)   • Nonocclusive coronary atherosclerosis of native coronary artery   • Atrial flutter (CMS/HCC)   • 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CMS/HCC)   • Aortectasia (CMS/HCC)   • Popliteal artery aneurysm (CMS/HCC)   • Colon polyps   • Gastroparesis   • Insomnia   • Adenomatous polyp of colon   • Essential hypertension   • Hyponatremia   • ETOH abuse   • Open wound   • Adverse effect of thiazide diuretic   • Chronic anticoagulation   • Weakness   • Oropharyngeal dysphagia   • Abnormal weight loss   • Tobacco abuse   • Thyromegaly   • Adrenal adenoma, left   • Thrombocytopenia (CMS/HCC)   • Retroperitoneal lymphadenopathy   • Candidal intertrigo   • Anemia of chronic disease   • Thyroid nodule   • Charcot's joint of foot   • Left leg cellulitis     Past Medical History:   Diagnosis Date   • Allergic rhinitis    • Aortectasia (CMS/HCC)     3cm infrarenal abdominal aorta   • Arthritis    • Atrial flutter (CMS/HCC) 2010    s/p ablation    • Charcot's joint of foot    • CHF (congestive heart failure) (CMS/HCC)    • Chronic edema     both legs and sees wound care center at [place]    •  Chronic venous insufficiency    • COPD (chronic obstructive pulmonary disease) (CMS/HCC)    • Coronary atherosclerosis     Cath 2010: diffuse 40-50% disease   • Diverticulosis    • Duodenitis    • Fatty liver    • Gastritis    • Gastroparesis    • Hematoma     post-operative; After catheterization, right groin, required surgical exploration   • Hyperlipidemia    • Hypertension    • Hypertensive heart disease without heart failure 7/28/2016   • Internal hemorrhoids    • Morbid obesity (CMS/HCC)    • Open wound     izzy legs has drsg chg weekly at wound care center at [place]  pt does second dressing on left leg another time during week   • Osteomyelitis (CMS/HCC)    • Paroxysmal atrial fibrillation (CMS/HCC)    • Peripheral neuropathy    • Popliteal artery aneurysm (CMS/HCC)     left, s/p stenting by Dr. Boston   • Skin cancer    • Sleep apnea     o2   • 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CMS/HCC)     due to flutter and afib; cath 2010 with nonobstructive disease   • Venous stasis    • Venous stasis ulcer (CMS/HCC)     bilateral legs      Past Surgical History:   Procedure Laterality Date   • CARDIAC CATHETERIZATION     • CATARACT EXTRACTION     • COLONOSCOPY  09/28/2015    NBIH, diverticulosis, polyps   • COLONOSCOPY N/A 9/11/2018    Procedure: COLONOSCOPY TO CECUM  AND TERM. ILEUM WITH COLD SNARE POLYPECTOMIES;  Surgeon: Kane Lagunas MD;  Location: [facility] ENDOSCOPY;  Service: Gastroenterology   • HIP ARTHROPLASTY Right    • JOINT REPLACEMENT     • OTHER SURGICAL HISTORY      Catheter ablation atrial flutter   • REPAIR ANEURYSM / PSEUDO ANEURYSM / RUPTURED ANEURYSM POPLITEAL ARTERY      Stent-Graft of the the left popliteal artery   • REPAIR KNEE LIGAMENT      Primary repair of knee ligament cruciate anterior right   • TONSILLECTOMY     • TONSILLECTOMY     • TOTAL KNEE ARTHROPLASTY Bilateral    • UPPER GASTROINTESTINAL ENDOSCOPY  09/16/2014    acute gastritis, acute duodenitis        PT ASSESSMENT (last 12  hours)      Physical Therapy Evaluation     Row Name 05/01/19 1521          PT Evaluation Time/Intention    Subjective Information  no complaints waiting for Wound Center to wrap legs  -EF     Document Type  evaluation  -EF     Mode of Treatment  physical therapy  -EF     Patient Effort  good  -EF     Symptoms Noted During/After Treatment  none  -EF     Row Name 05/01/19 1521          General Information    Patient Profile Reviewed?  yes  -EF     Onset of Illness/Injury or Date of Surgery  04/30/19  -EF     Patient Observations  alert;cooperative;agree to therapy  -EF     Patient/Family Observations  reclined in chair, LE edema & discolored, B ankle valgus  -EF     Prior Level of Function  -- pt pt amb with wx & shoes  -EF     Equipment Currently Used at Home  walker, rolling orthotic shoes  -EF     Pertinent History of Current Functional Problem  Pt adm from home with LE weakness, cellulitis. Diag with sepsis. Pt had just returned home 4/29 following hosp admission for afib, dysphagia, & other medical issues.  -EF     Existing Precautions/Restrictions  fall skin, wears orthotic shoes  -EF     Barriers to Rehab  medically complex  -EF     Row Name 05/01/19 1521          Cognitive Assessment/Intervention- PT/OT    Orientation Status (Cognition)  oriented x 3  -EF     Follows Commands (Cognition)  WFL  -EF     Row Name 05/01/19 1521          Bed Mobility Assessment/Treatment    Comment (Bed Mobility)  not tested-up in chair  -EF     Row Name 05/01/19 1521          Transfer Assessment/Treatment    Comment (Transfers)  not tested due to LEs not wrapped-waiting for Wound Center  -EF     Row Name 05/01/19 1521          Gait/Stairs Assessment/Training    Comment (Gait/Stairs)  not assessed  -EF     Row Name 05/01/19 1521          General ROM    GENERAL ROM COMMENTS  L UE WFL, R UE WFL except shoulder trace/min, B LEs WFL except ankles min  -EF     Row Name 05/01/19 1521          MMT (Manual Muscle Testing)    General MMT  Comments  L UE WFL, R UE WFL except shoulder 2-/5, B LEs 3/5 or greater except B ankles 2/5  -EF     Row Name 05/01/19 1521          Motor Assessment/Intervention    Additional Documentation  Therapeutic Exercise (Group);Therapeutic Exercise Interventions (Group)  -EF     Row Name 05/01/19 1521          Therapeutic Exercise    Comment (Therapeutic Exercise)  sitting LAQ x 5 reps  -EF     Row Name 05/01/19 1521          Pain Scale: Numbers Pre/Post-Treatment    Pain Scale: Numbers, Pretreatment  0/10 - no pain  -EF     Pain Scale: Numbers, Post-Treatment  0/10 - no pain  -EF     Row Name             Wound 04/24/19 0508 Left elbow skin tear    Wound - Properties Group Date first assessed: 04/24/19  -GC Time first assessed: 0508  -GC Present On Admission : yes  -GC Side: Left  -GC Location: elbow  -GC Type: skin tear  -GC    Row Name             Wound 04/24/19 0514 Right elbow avulsion    Wound - Properties Group Date first assessed: 04/24/19  -GC Time first assessed: 0514  -GC Present On Admission : yes  -GC Side: Right  -GC Location: elbow  -GC Type: avulsion  -GC    Row Name             Wound 04/24/19 0520 Right arm skin tear    Wound - Properties Group Date first assessed: 04/24/19  -GC Time first assessed: 0520  -GC Side: Right  -GC Location: arm  -GC, forearm  Type: skin tear  -GC    Row Name             Wound 04/24/19 0528 Right anterior other (see notes) skin tear    Wound - Properties Group Date first assessed: 04/24/19  -GC Time first assessed: 0528  -GC Present On Admission : yes  -GC Side: Right  -GC Orientation: anterior  -GC Location: other (see notes)  -GC, forearm/wrist  Type: skin tear  -GC    Row Name             Wound 04/24/19 0532 Right lateral hand skin tear    Wound - Properties Group Date first assessed: 04/24/19  -GC Time first assessed: 0532  -GC Present On Admission : yes  -GC Side: Right  -GC Orientation: lateral  -GC Location: hand  -GC Type: skin tear  -GC    Row Name             Wound  04/24/19 0535 Right medial ankle ulceration, venous    Wound - Properties Group Date first assessed: 04/24/19  -GC Time first assessed: 0535  -GC Present On Admission : yes;picture taken  -GC Side: Right  -GC Orientation: medial  -GC Location: ankle  -GC Type: ulceration, venous  -GC    Row Name             Wound 04/24/19 0540 Left upper foot ulceration, venous    Wound - Properties Group Date first assessed: 04/24/19  -GC Time first assessed: 0540  -GC Present On Admission : yes;picture taken  -GC Side: Left  -GC Orientation: upper  -GC Location: foot  -GC, top of first and second toe  Type: ulceration, venous  -GC    Row Name             Wound 04/24/19 Left heel ulceration, venous    Wound - Properties Group Date first assessed: 04/24/19  -GC Present On Admission : yes;picture taken  -GC Side: Left  -GC Location: heel  -GC, bottom  Type: ulceration, venous  -GC    Row Name             Wound 04/24/19 0545 Left midline ankle    Wound - Properties Group Date first assessed: 04/24/19  -GC Time first assessed: 0545  -GC Present On Admission : yes  -GC Side: Left  -GC Orientation: midline  -GC Location: ankle  -GC    Row Name             Wound 04/30/19 2110 Other (See comments) coccyx pressure injury    Wound - Properties Group Date first assessed: 04/30/19  -TB Time first assessed: 2110  -TB Side: Other (See comments)  -TB, coccyx  Location: coccyx  -TB Type: pressure injury  -TB Stage, Pressure Injury: deep tissue injury  -TB    Row Name 05/01/19 1521          Physical Therapy Clinical Impression    Date of Referral to PT  05/01/19  -EF     Criteria for Skilled Interventions Met (PT Clinical Impression)  yes;treatment indicated  -EF     Rehab Potential (PT Clinical Summary)  good, to achieve stated therapy goals  -EF     Row Name 05/01/19 1521          Physical Therapy Goals    Bed Mobility Goal Selection (PT)  bed mobility, PT goal 1  -EF     Transfer Goal Selection (PT)  transfer, PT goal 1  -EF     Gait Training  Goal Selection (PT)  gait training, PT goal 1  -EF     Row Name 05/01/19 1521          Bed Mobility Goal 1 (PT)    Activity/Assistive Device (Bed Mobility Goal 1, PT)  bed mobility activities, all  -EF     [place] Level/Cues Needed (Bed Mobility Goal 1, PT)  minimum assist (75% or more patient effort)  -EF     Time Frame (Bed Mobility Goal 1, PT)  1 week  -EF     Row Name 05/01/19 1521          Transfer Goal 1 (PT)    Activity/Assistive Device (Transfer Goal 1, PT)  transfers, all;walker, rolling  -EF     [place] Level/Cues Needed (Transfer Goal 1, PT)  minimum assist (75% or more patient effort);2 person assist  -EF     Time Frame (Transfer Goal 1, PT)  1 week  -EF     Row Name 05/01/19 1521          Gait Training Goal 1 (PT)    Activity/Assistive Device (Gait Training Goal 1, PT)  gait (walking locomotion);assistive device use;walker, rolling  -EF     [place] Level (Gait Training Goal 1, PT)  minimum assist (75% or more patient effort);2 person assist  -EF     Distance (Gait Goal 1, PT)  60  -EF     Time Frame (Gait Training Goal 1, PT)  1 week  -EF     Row Name 05/01/19 1521          Positioning and Restraints    Pre-Treatment Position  sitting in chair/recliner  -EF     Post Treatment Position  chair  -EF     In Chair  reclined;notified nsg;call light within reach;encouraged to call for assist  -EF       User Key  (r) = Recorded By, (t) = Taken By, (c) = Cosigned By    Initials Name Provider Type    EF Kaylan Gilliland, PT Physical Therapist    Daria Funes RN Registered Nurse    Bonnie Castro RN Registered Nurse          PT Recommendation and Plan  Anticipated Discharge Disposition (PT): skilled nursing facility  Therapy Frequency (PT Clinical Impression): daily  Outcome Summary/Treatment Plan (PT)  Anticipated Discharge Disposition (PT): skilled nursing facility  Plan of Care Reviewed With: patient  Outcome Summary: Pt is 70 yo male adm with LE weakness, cellulitis.  Diag with sepsis. Pt just d/c home from hosp 4/29 for afib, dysphagia, & other medical conditions. Pt up in chair waiting for LEs to be wrapped by [facility]. Not safe at this time to stand or ambulate pt as he wears orthotic shoes and has skin issues. Will follow up tomorrow for txfs, standing, & ambulation when pt is able to maureen shoes. He will benefit from skilled PT to work on bed mob, txfs, gait, general strengthening.  Outcome Measures     Row Name 05/01/19 1500             How much help from another person do you currently need...    Turning from your back to your side while in flat bed without using bedrails?  3  -EF      Moving from lying on back to sitting on the side of a flat bed without bedrails?  3  -EF      Moving to and from a bed to a chair (including a wheelchair)?  2  -EF      Standing up from a chair using your arms (e.g., wheelchair, bedside chair)?  2  -EF      Climbing 3-5 steps with a railing?  1  -EF      To walk in hospital room?  2  -EF      AM-PAC 6 Clicks Score  13  -EF         Functional Assessment    Outcome Measure Options  AM-PAC 6 Clicks Basic Mobility (PT)  -EF        User Key  (r) = Recorded By, (t) = Taken By, (c) = Cosigned By    Initials Name Provider Type    Kaylan Wilson, PT Physical Therapist         Time Calculation:   PT Charges     Row Name 05/01/19 1533 05/01/19 1201          Time Calculation    Start Time  1509  -EF  --     Stop Time  1519  -EF  --     Time Calculation (min)  10 min  -EF  --     PT Received On  05/01/19  -EF  --     PT - Next Appointment  05/02/19  -EF  --  -EE     PT Goal Re-Cert Due Date  05/08/19  -EF  --       User Key  (r) = Recorded By, (t) = Taken By, (c) = Cosigned By    Initials Name Provider Type    Kaylan Wilson, PT Physical Therapist    Chica Yap PT Physical Therapist        Therapy Charges for Today     Code Description Service Date Service Provider Modifiers Qty    [id number] HC PT EVAL MOD COMPLEXITY 2  5/1/2019 Kaylan Gilliland, PT GP 1          PT G-Codes  Outcome Measure Options: AM-PAC 6 Clicks Basic Mobility (PT)  AM-PAC 6 Clicks Score: 13      Kaylan Gilliland, PT  5/1/2019

## 2019-05-01 NOTE — OUTREACH NOTE
Medical Week 1 Survey      Responses   Facility patient discharged from?  [facility]   Does the patient have one of the following disease processes/diagnoses(primary or secondary)?  Other   Is there a successful TCM telephone encounter documented?  No   Week 1 attempt successful?  No   Revoke  Readmitted          Pretty Huang RN

## 2019-05-02 LAB
ANION GAP SERPL CALCULATED.3IONS-SCNC: 10.9 MMOL/L
BUN BLD-MCNC: 15 MG/DL (ref 8–23)
BUN/CREAT SERPL: 22.4 (ref 7–25)
CALCIUM SPEC-SCNC: 8.7 MG/DL (ref 8.6–10.5)
CHLORIDE SERPL-SCNC: 102 MMOL/L (ref 98–107)
CO2 SERPL-SCNC: 23.1 MMOL/L (ref 22–29)
CREAT BLD-MCNC: 0.67 MG/DL (ref 0.76–1.27)
DEPRECATED RDW RBC AUTO: 51.2 FL (ref 37–54)
ERYTHROCYTE [DISTWIDTH] IN BLOOD BY AUTOMATED COUNT: 13.8 % (ref 12.3–15.4)
GFR SERPL CREATININE-BSD FRML MDRD: 118 ML/MIN/1.73
GLUCOSE BLD-MCNC: 99 MG/DL (ref 65–99)
HCT VFR BLD AUTO: 31.5 % (ref 37.5–51)
HGB BLD-MCNC: 10.8 G/DL (ref 13–17.7)
INR PPP: 1.21 (ref 0.9–1.1)
MCH RBC QN AUTO: 35 PG (ref 26.6–33)
MCHC RBC AUTO-ENTMCNC: 34.3 G/DL (ref 31.5–35.7)
MCV RBC AUTO: 101.9 FL (ref 79–97)
PLATELET # BLD AUTO: 131 10*3/MM3 (ref 140–450)
PMV BLD AUTO: 10.1 FL (ref 6–12)
POTASSIUM BLD-SCNC: 4 MMOL/L (ref 3.5–5.2)
PROTHROMBIN TIME: 15 SECONDS (ref 11.7–14.2)
RBC # BLD AUTO: 3.09 10*6/MM3 (ref 4.14–5.8)
SODIUM BLD-SCNC: 136 MMOL/L (ref 136–145)
VANCOMYCIN TROUGH SERPL-MCNC: 18.4 MCG/ML (ref 5–20)
WBC NRBC COR # BLD: 5.93 10*3/MM3 (ref 3.4–10.8)

## 2019-05-02 PROCEDURE — 85027 COMPLETE CBC AUTOMATED: CPT | Performed by: INTERNAL MEDICINE

## 2019-05-02 PROCEDURE — 80202 ASSAY OF VANCOMYCIN: CPT | Performed by: INTERNAL MEDICINE

## 2019-05-02 PROCEDURE — 25010000002 ENOXAPARIN PER 10 MG: Performed by: INTERNAL MEDICINE

## 2019-05-02 PROCEDURE — 85610 PROTHROMBIN TIME: CPT | Performed by: INTERNAL MEDICINE

## 2019-05-02 PROCEDURE — 80048 BASIC METABOLIC PNL TOTAL CA: CPT | Performed by: INTERNAL MEDICINE

## 2019-05-02 PROCEDURE — 97110 THERAPEUTIC EXERCISES: CPT | Performed by: PHYSICAL THERAPIST

## 2019-05-02 PROCEDURE — 94799 UNLISTED PULMONARY SVC/PX: CPT

## 2019-05-02 PROCEDURE — 25010000002 VANCOMYCIN 10 G RECONSTITUTED SOLUTION: Performed by: INTERNAL MEDICINE

## 2019-05-02 RX ORDER — ALPRAZOLAM 0.5 MG/1
0.5 TABLET ORAL NIGHTLY PRN
Status: DISCONTINUED | OUTPATIENT
Start: 2019-05-02 | End: 2019-05-03 | Stop reason: HOSPADM

## 2019-05-02 RX ORDER — AMOXICILLIN AND CLAVULANATE POTASSIUM 875; 125 MG/1; MG/1
1 TABLET, FILM COATED ORAL EVERY 12 HOURS SCHEDULED
Status: DISCONTINUED | OUTPATIENT
Start: 2019-05-02 | End: 2019-05-03 | Stop reason: HOSPADM

## 2019-05-02 RX ORDER — DOXYCYCLINE 100 MG/1
100 CAPSULE ORAL EVERY 12 HOURS SCHEDULED
Status: DISCONTINUED | OUTPATIENT
Start: 2019-05-02 | End: 2019-05-03 | Stop reason: HOSPADM

## 2019-05-02 RX ADMIN — METOCLOPRAMIDE 10 MG: 10 TABLET ORAL at 22:17

## 2019-05-02 RX ADMIN — LISINOPRIL 10 MG: 10 TABLET ORAL at 08:55

## 2019-05-02 RX ADMIN — HYDROCODONE BITARTRATE AND ACETAMINOPHEN 1 TABLET: 7.5; 325 TABLET ORAL at 22:17

## 2019-05-02 RX ADMIN — GENTAMICIN SULFATE: 1 OINTMENT TOPICAL at 22:17

## 2019-05-02 RX ADMIN — IPRATROPIUM BROMIDE AND ALBUTEROL SULFATE 3 ML: 2.5; .5 SOLUTION RESPIRATORY (INHALATION) at 15:35

## 2019-05-02 RX ADMIN — WARFARIN SODIUM 7.5 MG: 7.5 TABLET ORAL at 17:21

## 2019-05-02 RX ADMIN — HYDROCODONE BITARTRATE AND ACETAMINOPHEN 1 TABLET: 7.5; 325 TABLET ORAL at 13:56

## 2019-05-02 RX ADMIN — IPRATROPIUM BROMIDE AND ALBUTEROL SULFATE 3 ML: 2.5; .5 SOLUTION RESPIRATORY (INHALATION) at 10:33

## 2019-05-02 RX ADMIN — DOXYCYCLINE 100 MG: 100 CAPSULE ORAL at 13:54

## 2019-05-02 RX ADMIN — GENTAMICIN SULFATE: 1 OINTMENT TOPICAL at 11:25

## 2019-05-02 RX ADMIN — FINASTERIDE 5 MG: 5 TABLET, FILM COATED ORAL at 08:55

## 2019-05-02 RX ADMIN — VANCOMYCIN HYDROCHLORIDE 2000 MG: 10 INJECTION, POWDER, LYOPHILIZED, FOR SOLUTION INTRAVENOUS at 08:44

## 2019-05-02 RX ADMIN — AMOXICILLIN AND CLAVULANATE POTASSIUM 1 TABLET: 875; 125 TABLET, FILM COATED ORAL at 13:54

## 2019-05-02 RX ADMIN — IPRATROPIUM BROMIDE AND ALBUTEROL SULFATE 3 ML: 2.5; .5 SOLUTION RESPIRATORY (INHALATION) at 20:10

## 2019-05-02 RX ADMIN — SODIUM CHLORIDE, PRESERVATIVE FREE 3 ML: 5 INJECTION INTRAVENOUS at 22:18

## 2019-05-02 RX ADMIN — DOXYCYCLINE 100 MG: 100 CAPSULE ORAL at 22:17

## 2019-05-02 RX ADMIN — CARVEDILOL 3.12 MG: 3.12 TABLET, FILM COATED ORAL at 08:44

## 2019-05-02 RX ADMIN — PETROLATUM 100 APPLICATION: 420 OINTMENT TOPICAL at 22:17

## 2019-05-02 RX ADMIN — NYSTATIN: 100000 POWDER TOPICAL at 13:54

## 2019-05-02 RX ADMIN — IPRATROPIUM BROMIDE AND ALBUTEROL SULFATE 3 ML: 2.5; .5 SOLUTION RESPIRATORY (INHALATION) at 06:54

## 2019-05-02 RX ADMIN — SODIUM CHLORIDE, PRESERVATIVE FREE 3 ML: 5 INJECTION INTRAVENOUS at 08:44

## 2019-05-02 RX ADMIN — CLOTRIMAZOLE AND BETAMETHASONE DIPROPIONATE: 10; .5 CREAM TOPICAL at 11:25

## 2019-05-02 RX ADMIN — ALBUTEROL SULFATE 2.5 MG: 2.5 SOLUTION RESPIRATORY (INHALATION) at 03:18

## 2019-05-02 RX ADMIN — CLOTRIMAZOLE AND BETAMETHASONE DIPROPIONATE: 10; .5 CREAM TOPICAL at 22:17

## 2019-05-02 RX ADMIN — METOCLOPRAMIDE 10 MG: 10 TABLET ORAL at 12:00

## 2019-05-02 RX ADMIN — TAMSULOSIN HYDROCHLORIDE 0.4 MG: 0.4 CAPSULE ORAL at 22:17

## 2019-05-02 RX ADMIN — PETROLATUM 100 APPLICATION: 420 OINTMENT TOPICAL at 11:25

## 2019-05-02 RX ADMIN — PRAVASTATIN SODIUM 20 MG: 20 TABLET ORAL at 08:55

## 2019-05-02 RX ADMIN — FUROSEMIDE 20 MG: 20 TABLET ORAL at 08:55

## 2019-05-02 RX ADMIN — METOCLOPRAMIDE 10 MG: 10 TABLET ORAL at 17:21

## 2019-05-02 RX ADMIN — METOCLOPRAMIDE 10 MG: 10 TABLET ORAL at 08:10

## 2019-05-02 RX ADMIN — AMOXICILLIN AND CLAVULANATE POTASSIUM 1 TABLET: 875; 125 TABLET, FILM COATED ORAL at 22:17

## 2019-05-02 RX ADMIN — NYSTATIN: 100000 POWDER TOPICAL at 22:18

## 2019-05-02 RX ADMIN — DOCUSATE SODIUM 100 MG: 100 CAPSULE, LIQUID FILLED ORAL at 08:55

## 2019-05-02 RX ADMIN — HYDROCODONE BITARTRATE AND ACETAMINOPHEN 1 TABLET: 7.5; 325 TABLET ORAL at 02:33

## 2019-05-02 RX ADMIN — ENOXAPARIN SODIUM 40 MG: 40 INJECTION SUBCUTANEOUS at 12:08

## 2019-05-02 RX ADMIN — CARVEDILOL 3.12 MG: 3.12 TABLET, FILM COATED ORAL at 17:21

## 2019-05-02 NOTE — PROGRESS NOTES
Continued Stay Note  [facility]     Patient Name: Jalen Lockwood  MRN: [id number]  Today's Date: 5/2/2019    Admit Date: 4/30/2019    Discharge Plan     Row Name 05/02/19 1702       Plan    Plan  [facility], accepted pending pre-cert, pre-cert initiated today    Plan Comments  Spoke with [facility], pt has been accepted to [facility], pre-cert initiated today. Per Dr Hatch pt may be ready for dc tomorrow. CCP to follow. JChasteenRN/CCP          Discharge Codes    No documentation.             Elvi Baca RN

## 2019-05-02 NOTE — PROGRESS NOTES
Continued Stay Note  [facility]     Patient Name: Jalen Lockwood  MRN: [id number]  Today's Date: 5/2/2019    Admit Date: 4/30/2019    Discharge Plan     Row Name 05/02/19 1348       Plan    Plan  [facility], referral pending, will need [facility] pre-cert     Plan Comments  Spoke with Dr Hatch, pt will likely be ready for dc tomorrow. Spoke with [facility], [facility] cannot accept the pt at this time. Spoke with pts wife, she would like a referral made to [facility] notified. CCP to follow. JChasteenRn/CCP         Discharge Codes    No documentation.             Elvi Baca RN

## 2019-05-02 NOTE — PROGRESS NOTES
"Pharmacokinetic Consult - Vancomycin Dosing (Follow-up Note)    Jalen Lockwood is on day 3 of 7 pharmacy to dose vancomycin for SSTI  Pharmacy dosing vancomycin per Dr. Singh's request.   Goal trough: 15-20 mg/L     Relevant clinical data and objective history reviewed:  69 y.o. male 193 cm (76\") 132 kg (291 lb 4.8 oz)    Past Medical History:   Diagnosis Date   • Allergic rhinitis    • Aortectasia (CMS/HCC)     3cm infrarenal abdominal aorta   • Arthritis    • Atrial flutter (CMS/HCC) 2010    s/p ablation    • Charcot's joint of foot    • CHF (congestive heart failure) (CMS/HCC)    • Chronic edema     both legs and sees wound care center at [place]    • Chronic venous insufficiency    • COPD (chronic obstructive pulmonary disease) (CMS/HCC)    • Coronary atherosclerosis     Cath 2010: diffuse 40-50% disease   • Diverticulosis    • Duodenitis    • Fatty liver    • Gastritis    • Gastroparesis    • Hematoma     post-operative; After catheterization, right groin, required surgical exploration   • Hyperlipidemia    • Hypertension    • Hypertensive heart disease without heart failure 7/28/2016   • Internal hemorrhoids    • Morbid obesity (CMS/HCC)    • Open wound     izzy legs has drsg chg weekly at wound care center at [place]  pt does second dressing on left leg another time during week   • Osteomyelitis (CMS/HCC)    • Paroxysmal atrial fibrillation (CMS/HCC)    • Peripheral neuropathy    • Popliteal artery aneurysm (CMS/HCC)     left, s/p stenting by Dr. Boston   • Skin cancer    • Sleep apnea     o2   • 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CMS/HCC)     due to flutter and afib; cath 2010 with nonobstructive disease   • Venous stasis    • Venous stasis ulcer (CMS/HCC)     bilateral legs      Creatinine   Date Value Ref Range Status   05/02/2019 0.67 (L) 0.76 - 1.27 mg/dL Final   05/01/2019 0.88 0.76 - 1.27 mg/dL Final   04/30/2019 0.96 0.76 - 1.27 mg/dL Final     BUN   Date Value Ref Range Status   05/02/2019 15 8 - " 23 mg/dL Final     Estimated Creatinine Clearance: 131.3 mL/min (A) (by C-G formula based on SCr of 0.67 mg/dL (L)).    Lab Results   Component Value Date    WBC 5.93 05/02/2019     Temp Readings from Last 3 Encounters:   05/02/19 97.2 °F (36.2 °C) (Oral)   04/29/19 97.9 °F (36.6 °C) (Oral)   04/09/19 98.3 °F (36.8 °C) (Oral)       Anti-Infectives (From admission, onward)    Ordered     Dose/Rate Route Frequency Start Stop    04/30/19 2144  vancomycin 2000 mg/500 mL 0.9% NS IVPB ([facility])     Ordering Provider:  Rahel Singh MD    15 mg/kg × 127 kg  over 200 Minutes Intravenous Every 12 Hours 05/01/19 0800 05/08/19 0759    04/30/19 2131  Pharmacy to dose vancomycin     Ordering Provider:  Rahel Singh MD     Does not apply Continuous PRN 04/30/19 2131 05/07/19 2130 04/30/19 1834  vancomycin 2500 mg/500 mL 0.9% NS IVPB ([facility])     Ordering Provider:  Katie Nelson MD    20 mg/kg × 127 kg Intravenous Once 04/30/19 1836 04/30/19 1954         Lab Results   Component Value Date    VANCOTROUGH 18.40 05/02/2019     No results found for: DARREN    Assessment/Plan  Vancomycin trough level this am at 0721 = 18.4 mcg/ml (therapeutic) therefore will continue with current regimen of 2gm iv q12h for now. Pharmacy will continue to follow daily while on the vancomycin and adjust if needed.     Arden Merida, [facility]

## 2019-05-02 NOTE — PLAN OF CARE
Problem: Patient Care Overview  Goal: Plan of Care Review  Outcome: Ongoing (interventions implemented as appropriate)      Problem: Fall Risk (Adult)  Goal: Identify Related Risk Factors and Signs and Symptoms  Outcome: Ongoing (interventions implemented as appropriate)    Goal: Absence of Fall  Outcome: Ongoing (interventions implemented as appropriate)      Problem: Wound (Includes Pressure Injury) (Adult)  Goal: Signs and Symptoms of Listed Potential Problems Will be Absent, Minimized or Managed (Wound)  Outcome: Ongoing (interventions implemented as appropriate)

## 2019-05-02 NOTE — PLAN OF CARE
Problem: Wound (Includes Pressure Injury) (Adult)  Intervention: Promote Effective Wound Healing   05/02/19 1417   Nutrition Interventions   Oral Nutrition Promotion nutritional therapy counseling provided; agrees to [facility] B[facility] to help promote wound healing

## 2019-05-02 NOTE — THERAPY TREATMENT NOTE
Acute Care - Physical Therapy Treatment Note  [facility]     Patient Name: Jalen Lockwood  : 1950  MRN: [id number]  Today's Date: 2019  Onset of Illness/Injury or Date of Surgery: 19  Date of Referral to PT: 19       Admit Date: 2019    Visit Dx:    ICD-10-CM ICD-9-CM   1. Left leg cellulitis L03.116 682.6   2. Atrial fibrillation with rapid ventricular response (CMS/HCC) I48.91 427.31   3. Generalized weakness R53.1 780.79   4. Fall, initial encounter W19.XXXA E888.9   5. Difficulty walking R26.2 719.7     Patient Active Problem List   Diagnosis   • Chronic osteomyelitis (CMS/HCC)   • Foot pain   • Peripheral neuropathy   • Venous stasis   • Permanent atrial fibrillation (CMS/HCC)   • Sleep apnea   • Chronic edema   • Morbid obesity (CMS/HCC)   • COPD (chronic obstructive pulmonary disease) (CMS/HCC)   • Nonocclusive coronary atherosclerosis of native coronary artery   • Atrial flutter (CMS/HCC)   • 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CMS/HCC)   • Aortectasia (CMS/HCC)   • Popliteal artery aneurysm (CMS/HCC)   • Colon polyps   • Gastroparesis   • Insomnia   • Adenomatous polyp of colon   • Essential hypertension   • Hyponatremia   • ETOH abuse   • Open wound   • Adverse effect of thiazide diuretic   • Chronic anticoagulation   • Weakness   • Oropharyngeal dysphagia   • Abnormal weight loss   • Tobacco abuse   • Thyromegaly   • Adrenal adenoma, left   • Thrombocytopenia (CMS/HCC)   • Retroperitoneal lymphadenopathy   • Candidal intertrigo   • Anemia of chronic disease   • Thyroid nodule   • Charcot's joint of foot   • Left leg cellulitis   • Sepsis (CMS/HCC)       Therapy Treatment    Rehabilitation Treatment Summary     Row Name 19 1000             Treatment Time/Intention    Discipline  physical therapist  -      Document Type  therapy note (daily note)  -TOÑITO      Subjective Information  no complaints  -KH      Mode of Treatment  physical therapy  -      Patient/Family  Observations  sitting up in chair  -KH      Therapy Frequency (PT Clinical Impression)  daily  -KH      Patient Effort  good  -KH      Existing Precautions/Restrictions  fall  -KH      Recorded by [TOÑITO] Sofy Ramsay, PT 05/02/19 1013      Row Name 05/02/19 1000             Bed Mobility Assessment/Treatment    Comment (Bed Mobility)  NT- up in chair  -KH      Recorded by [TOÑITO] Sofy Ramsay, PT 05/02/19 1013      Row Name 05/02/19 1000             Transfer Assessment/Treatment    Transfer Assessment/Treatment  sit-stand transfer;stand-sit transfer  -KH      Recorded by [TOÑITO] Sofy Ramsay, PT 05/02/19 1013      Row Name 05/02/19 1000             Sit-Stand Transfer    Sit-Stand [place] (Transfers)  minimum assist (75% patient effort);2 person assist  -      Assistive Device (Sit-Stand Transfers)  walker, front-wheeled  -KH      Recorded by [TOÑITO] Sofy Ramsay, PT 05/02/19 1013      Row Name 05/02/19 1000             Stand-Sit Transfer    Stand-Sit [place] (Transfers)  minimum assist (75% patient effort)  -      Assistive Device (Stand-Sit Transfers)  walker, front-wheeled  -KH      Recorded by [] Sofy Ramsay, PT 05/02/19 1013      Row Name 05/02/19 1000             Gait/Stairs Assessment/Training    [place] Level (Gait)  contact guard;2 person assist  -      Assistive Device (Gait)  walker, front-wheeled  -KH      Distance in Feet (Gait)  150  -KH      Pattern (Gait)  step-through  -KH      Deviations/Abnormal Patterns (Gait)  stride length decreased;gait speed decreased  -KH      Comment (Gait/Stairs)  secondary person due to family reports that legs have been giving out lately  -KH      Recorded by [TOÑITO] Sofy Ramsay, PT 05/02/19 1013      Row Name 05/02/19 1000             Therapeutic Exercise    Comment (Therapeutic Exercise)  LAQ, seated marching x 15 reps  -KH      Recorded by [] Sofy Ramsay, PT 05/02/19 1013      Row  Name 05/02/19 1000             Positioning and Restraints    Pre-Treatment Position  sitting in chair/recliner  -KH      Post Treatment Position  chair  -KH      In Chair  sitting;call light within reach;encouraged to call for assist  -KH      Recorded by [KH] Sofy Ramsay, PT 05/02/19 1013      Row Name 05/02/19 1000             Pain Scale: Numbers Pre/Post-Treatment    Pain Scale: Numbers, Pretreatment  0/10 - no pain  -KH      Pain Scale: Numbers, Post-Treatment  0/10 - no pain  -KH      Recorded by [KH] Sofy Ramsay, PT 05/02/19 1013      Row Name                Wound 04/24/19 0508 Left elbow skin tear    Wound - Properties Group Date first assessed: 04/24/19 [GC] Time first assessed: 0508 [GC] Present On Admission : yes [GC] Side: Left [GC] Location: elbow [GC] Type: skin tear [GC] Recorded by:  [GC] Daria Castaneda RN 04/24/19 0510    Row Name                Wound 04/24/19 0514 Right elbow avulsion    Wound - Properties Group Date first assessed: 04/24/19 [GC] Time first assessed: 0514 [GC] Present On Admission : yes [GC] Side: Right [GC] Location: elbow [GC] Type: avulsion [GC] Recorded by:  [ELIZABETH] Daria Castaneda RN 04/24/19 0515    Row Name                Wound 04/24/19 0520 Right arm skin tear    Wound - Properties Group Date first assessed: 04/24/19 [GC] Time first assessed: 0520 [GC] Side: Right [GC] Location: arm [GC], forearm  Type: skin tear [GC] Recorded by:  [ELIZABETH] Daria Castaneda RN 04/24/19 0521    Row Name                Wound 04/24/19 0528 Right anterior other (see notes) skin tear    Wound - Properties Group Date first assessed: 04/24/19 [GC] Time first assessed: 0528 [GC] Present On Admission : yes [GC] Side: Right [GC] Orientation: anterior [GC] Location: other (see notes) [GC], forearm/wrist  Type: skin tear [GC] Recorded by:  [ELIZABETH] Daria Castaneda RN 04/24/19 0528    Row Name                Wound 04/24/19 0532 Right  lateral hand skin tear    Wound - Properties Group Date first assessed: 04/24/19 [GC] Time first assessed: 0532 [GC] Present On Admission : yes [GC] Side: Right [GC] Orientation: lateral [GC] Location: hand [GC] Type: skin tear [GC] Recorded by:  [GC] Daria Castaneda RN 04/24/19 0532    Row Name                Wound 04/24/19 0535 Right medial ankle ulceration, venous    Wound - Properties Group Date first assessed: 04/24/19 [GC] Time first assessed: 0535 [GC] Present On Admission : yes;picture taken [GC2] Side: Right [GC] Orientation: medial [GC] Location: ankle [GC] Type: ulceration, venous [GC] Recorded by:  [GC] Daria Castaneda RN 04/24/19 0535 [GC2] Daria Castaneda RN 04/24/19 0539    Row Name                Wound 04/24/19 0540 Left upper foot ulceration, venous    Wound - Properties Group Date first assessed: 04/24/19 [GC] Time first assessed: 0540 [GC] Present On Admission : yes;picture taken [GC] Side: Left [GC] Orientation: upper [GC] Location: foot [GC2], top of first and second toe  Type: ulceration, venous [GC] Recorded by:  [GC] Daria Castaneda RN 04/24/19 0540 [GC2] Daria Castaneda, RN 04/24/19 0543    Row Name                Wound 04/24/19 Left heel ulceration, venous    Wound - Properties Group Date first assessed: 04/24/19 [GC] Present On Admission : yes;picture taken [GC] Side: Left [GC] Location: heel [GC2], bottom  Type: ulceration, venous [GC] Recorded by:  [GC] Daria Castaneda RN 04/24/19 0543 [GC2] Daria Castaneda, RN 04/24/19 0550    Row Name                Wound 04/24/19 0545 Left midline ankle    Wound - Properties Group Date first assessed: 04/24/19 [GC] Time first assessed: 0545 [GC] Present On Admission : yes [GC] Side: Left [GC] Orientation: midline [GC] Location: ankle [GC] Recorded by:  [GC] Daria Castaneda RN 04/24/19 8081    Row Name                Wound 04/30/19 2110 Other (See  comments) coccyx pressure injury    Wound - Properties Group Date first assessed: 04/30/19 [TB] Time first assessed: 2110 [TB] Side: Other (See comments) [TB], coccyx  Location: coccyx [TB] Type: pressure injury [TB] Stage, Pressure Injury: deep tissue injury [TB] Recorded by:  [TB] Bonnie Feldman RN 05/01/19 0331    Row Name 05/02/19 1000             Plan of Care Review    Plan of Care Reviewed With  patient;spouse  -      Recorded by [] Sofy Ramsay, PT 05/02/19 1013      Row Name 05/02/19 1000             Outcome Summary/Treatment Plan (PT)    Anticipated Discharge Disposition (PT)  skilled nursing facility  -      Recorded by [TOÑITO] Sofy Ramsay, PT 05/02/19 1013        User Key  (r) = Recorded By, (t) = Taken By, (c) = Cosigned By    Initials Name Effective Dates Discipline    Sofy Vora, PT 02/05/19 -  PT    Daria Funes RN 10/30/17 -  Nurse    TB Bonnie Feldman RN 06/16/16 -  Nurse          Wound 04/24/19 0508 Left elbow skin tear (Active)   Dressing Appearance open to air 5/2/2019  8:58 AM   Base scab 5/2/2019  8:58 AM   Drainage Amount none 5/2/2019  8:58 AM       Wound 04/24/19 0514 Right elbow avulsion (Active)   Dressing Appearance dry;intact 5/2/2019  8:58 AM   Closure JUANA 5/2/2019  8:58 AM   Base dressing in place, unable to visualize 5/2/2019  8:58 AM       Wound 04/24/19 0520 Right arm skin tear (Active)   Closure JUANA 5/2/2019  8:58 AM   Base dressing in place, unable to visualize 5/2/2019  8:58 AM       Wound 04/24/19 0528 Right anterior other (see notes) skin tear (Active)   Dressing Appearance dry;intact 5/2/2019  8:58 AM   Closure JUANA 5/2/2019  8:58 AM   Base dressing in place, unable to visualize 5/2/2019  8:58 AM       Wound 04/24/19 0532 Right lateral hand skin tear (Active)   Dressing Appearance dry;intact 5/2/2019  8:58 AM   Closure JUANA 5/2/2019  8:58 AM   Base dressing in place, unable to visualize 5/2/2019  8:58 AM        Wound 04/24/19 0535 Right medial ankle ulceration, venous (Active)   Dressing Appearance dry;intact 5/2/2019  8:58 AM   Closure JUANA 5/2/2019  8:58 AM   Base dressing in place, unable to visualize 5/2/2019  8:58 AM       Wound 04/24/19 0540 Left upper foot ulceration, venous (Active)   Closure JUANA 5/2/2019  8:58 AM   Base dressing in place, unable to visualize 5/2/2019  8:58 AM       Wound 04/24/19 Left heel ulceration, venous (Active)   Closure JUANA 5/2/2019  8:58 AM   Base dressing in place, unable to visualize 5/2/2019  8:58 AM       Wound 04/24/19 0545 Left midline ankle (Active)   Closure JUANA 5/2/2019  8:58 AM   Base dressing in place, unable to visualize 5/2/2019  8:58 AM       Wound 04/30/19 2110 Other (See comments) coccyx pressure injury (Active)   Dressing Appearance open to air 5/2/2019  8:58 AM   Closure None 5/2/2019  8:58 AM   Base maroon/purple 5/2/2019  8:58 AM   Drainage Amount none 5/2/2019  8:58 AM           Physical Therapy Education     Title: PT OT SLP Therapies (Done)     Topic: Physical Therapy (Done)     Point: Mobility training (Done)     Learning Progress Summary           Patient Acceptance, E, VU,NR by  at 5/2/2019 10:14 AM                   Point: Home exercise program (Done)     Learning Progress Summary           Patient Acceptance, E, VU,NR by  at 5/2/2019 10:14 AM                   Point: Body mechanics (Done)     Learning Progress Summary           Patient Acceptance, E, VU,NR by TOÑITO at 5/2/2019 10:14 AM                   Point: Precautions (Done)     Learning Progress Summary           Patient Acceptance, E, VU,NR by  at 5/2/2019 10:14 AM                               User Key     Initials Effective Dates Name Provider Type Discipline    TOÑITO 02/05/19 -  Sofy Ramsay, PT Physical Therapist PT                PT Recommendation and Plan  Anticipated Discharge Disposition (PT): skilled nursing facility  Therapy Frequency (PT Clinical Impression): daily  Outcome  Summary/Treatment Plan (PT)  Anticipated Discharge Disposition (PT): skilled nursing facility  Plan of Care Reviewed With: patient, spouse  Outcome Summary: Pt required min A x2 to stand and was able to ambulate 150 ft with RWx CGA. Seconds person present for safety as family reports frequent knee buckling.   Outcome Measures     Row Name 05/02/19 1024 05/01/19 1500          How much help from another person do you currently need...    Turning from your back to your side while in flat bed without using bedrails?  4  -KH  3  -EF     Moving from lying on back to sitting on the side of a flat bed without bedrails?  4  -KH  3  -EF     Moving to and from a bed to a chair (including a wheelchair)?  3  -KH  2  -EF     Standing up from a chair using your arms (e.g., wheelchair, bedside chair)?  3  -KH  2  -EF     Climbing 3-5 steps with a railing?  1  -KH  1  -EF     To walk in hospital room?  3  -KH  2  -EF     AM-PAC 6 Clicks Score  18  -KH  13  -EF        Functional Assessment    Outcome Measure Options  AM-PAC 6 Clicks Basic Mobility (PT)  -KH  AM-PAC 6 Clicks Basic Mobility (PT)  -EF       User Key  (r) = Recorded By, (t) = Taken By, (c) = Cosigned By    Initials Name Provider Type    Sofy Voar, PT Physical Therapist    Kaylan Wilson, PT Physical Therapist         Time Calculation:   PT Charges     Row Name 05/02/19 1009             Time Calculation    Start Time  0958  -      Stop Time  1010  -      Time Calculation (min)  12 min  -      PT Received On  05/02/19  -TOÑITO      PT - Next Appointment  05/03/19  -TOÑITO         Time Calculation- PT    Total Timed Code Minutes- PT  12 minute(s)  -        User Key  (r) = Recorded By, (t) = Taken By, (c) = Cosigned By    Initials Name Provider Type    Sofy Vora, PT Physical Therapist        Therapy Charges for Today     Code Description Service Date Service Provider Modifiers Qty    [id number]  PT THER PROC EA 15 MIN 5/2/2019  Sofy Ramsay, PT GP 1    [id number]  PT THER SUPP EA 15 MIN 5/2/2019 Sofy Ramsay, PT GP 1          PT G-Codes  Outcome Measure Options: AM-PAC 6 Clicks Basic Mobility (PT)  AM-PAC 6 Clicks Score: 18    Sofy Ramsay, PT  5/2/2019

## 2019-05-02 NOTE — PLAN OF CARE
Problem: Patient Care Overview  Goal: Plan of Care Review  Outcome: Ongoing (interventions implemented as appropriate)   05/02/19 8383   Coping/Psychosocial   Plan of Care Reviewed With patient   OTHER   Outcome Summary ambulated in stock twice today with walker; encouraged pt to keep legs elevated in bed or in recliner, but pt prefers to sit in chair; only voiding small amounts per urinal; straight cath done x1; denies bladder discomfort; on oral antibiotics now; cont to monitor     Goal: Individualization and Mutuality  Outcome: Ongoing (interventions implemented as appropriate)    Goal: Discharge Needs Assessment  Outcome: Ongoing (interventions implemented as appropriate)    Goal: Interprofessional Rounds/Family Conf  Outcome: Ongoing (interventions implemented as appropriate)      Problem: Fall Risk (Adult)  Goal: Identify Related Risk Factors and Signs and Symptoms  Outcome: Outcome(s) achieved Date Met: 05/02/19    Goal: Absence of Fall  Outcome: Ongoing (interventions implemented as appropriate)      Problem: Wound (Includes Pressure Injury) (Adult)  Goal: Signs and Symptoms of Listed Potential Problems Will be Absent, Minimized or Managed (Wound)  Outcome: Ongoing (interventions implemented as appropriate)

## 2019-05-02 NOTE — PROGRESS NOTES
Adult Nutrition  Assessment/PES    Patient Name:  Jalen Lockwood  YOB: 1950  MRN: [id number]  Admit Date:  4/30/2019    Assessment Date:  5/2/2019    Comments:  Assessment triggered by chart skin report.  Patient with a large variety of skin issues.  Followed by [facility].  50-75% at meals.  D/c'ed 4/29, re-admitted 4/30.  Seen by RD last admit on 4/26.    Patient reports good appetite, says eating fairly well.  Agrees to Lion BID to help promote wound healing.    Will continue to follow.    Reason for Assessment     Row Name 05/02/19 1409          Reason for Assessment    Reason For Assessment  identified at risk by screening criteria     Diagnosis  cardiac disease;pulmonary disease;gastrointestinal disease;infection/sepsis arthritis, CHF, COPD, diverticulosis, duodenitis, gastritis, fatty liver, gastroparesis, HTN, PAF, sleep apnea; adm with L leg cellulitis     Identified At Risk by Screening Criteria  large or nonhealing wound, burn or pressure injury         Nutrition/Diet History     Row Name 05/02/19 1410          Nutrition/Diet History    Typical Food/Fluid Intake  patient reports good appetite     Supplemental Drinks/Foods/Additives  agrees to try Lion BID         Anthropometrics     Row Name 05/02/19 1411 05/02/19 0900       Anthropometrics    Weight  --  132 kg (291 lb 4.8 oz) with speacial boots on        Admit Weight    Admit Weight  -- 291# 5/2  --       Body Mass Index (BMI)    BMI Assessment  BMI 35-39.9: obesity grade II  --        Labs/Tests/Procedures/Meds     Row Name 05/02/19 1411          Labs/Procedures/Meds    Lab Results Reviewed  reviewed, pertinent     Lab Results Comments  Creat, HGb, Hct, Platelets        Diagnostic Tests/Procedures    Diagnostic Test/Procedure Reviewed  reviewed, pertinent     Diagnostic Test/Procedures Comments  s/p SLP yesterday        Medications    Pertinent Medications Reviewed  reviewed, pertinent     Pertinent Medications Comments   augmentin, colace, lasix, reglan, coumadin         Physical Findings     Row Name 05/02/19 1412          Physical Findings    Overall Physical Appearance  obese     Skin  other (see comments);non-healing wound(s);poor skin integrity/turgor coccyx DTI, L elbow skin tear, R elbow avulsion, R arm skin tear, R forearm/wrist skin tear, R hand skin tear, R ankle venous ulcer, L foot venous ulcer, L heel venous ulcer, L ankle wound, bruised, B=19           Nutrition Prescription Ordered     Row Name 05/02/19 1413          Nutrition Prescription PO    Current PO Diet  Regular     Fluid Consistency  Thin     Common Modifiers  Cardiac;Fluid Restriction     Fluid Restriction mL per Day  1500 mL     Other Modifiers  No Straws         Evaluation of Received Nutrient/Fluid Intake     Row Name 05/02/19 1413          PO Evaluation    Number of Meals  3     % PO Intake  67               Problem/Interventions:  Problem 1     Row Name 05/02/19 1413          Nutrition Diagnoses Problem 1    Problem 1  Increased Nutrient Needs     Etiology (related to)  Medical Diagnosis     Skin  Non healing wound     Signs/Symptoms (evidenced by)  Report/Observation                 Intervention Goal     Row Name 05/02/19 1414          Intervention Goal    General  Maintain nutrition;Disease management/therapy;Reduce/improve symptoms     PO  Increase intake;PO intake (%)     PO Intake %  75 %     Weight  Appropriate weight loss         Nutrition Intervention     Row Name 05/02/19 1414          Nutrition Intervention    RD/Tech Action  Follow Tx progress;Care plan reviewd;Encourage intake;Recommend/ordered     Recommended/Ordered  Supplement         Nutrition Prescription     Row Name 05/02/19 1414          Nutrition Prescription PO    PO Prescription  Begin/change supplement     Supplement  Lion     Supplement Frequency  2 times a day     New PO Prescription Ordered?  Yes         Education/Evaluation     Row Name 05/02/19 1414          Education     Education  Will Instruct as appropriate        Monitor/Evaluation    Monitor  Per protocol;PO intake;Supplement intake;Pertinent labs;Weight;Skin status;Symptoms           Electronically signed by:  Opal Batres RD  05/02/19 2:15 PM

## 2019-05-02 NOTE — PLAN OF CARE
Problem: Patient Care Overview  Goal: Plan of Care Review   05/02/19 1014   OTHER   Outcome Summary Pt required min A x2 to stand and was able to ambulate 150 ft with RWx CGA. Seconds person present for safety as family reports frequent knee buckling.

## 2019-05-02 NOTE — PROGRESS NOTES
" LOS: 2 days     Name: Jalen Lockwood  Age: 69 y.o.  Sex: male  :  1950  MRN: [id number]         Primary Care Physician: Nannette Higgins APRN    Subjective   Subjective  No new complaints or overnight events.  No fevers or chills.  Feels about at baseline.    Objective   Vital Signs  Temp:  [97.2 °F (36.2 °C)-98.5 °F (36.9 °C)] 97.2 °F (36.2 °C)  Heart Rate:  [65-79] 78  Resp:  [16-18] 18  BP: ()/(57-96) 131/96  Body mass index is 35.46 kg/m².    Objective:  General Appearance:  Comfortable and in no acute distress (Chronically ill-appearing).    Vital signs: (most recent): Blood pressure 131/96, pulse 78, temperature 97.2 °F (36.2 °C), temperature source Oral, resp. rate 18, height 193 cm (76\"), weight 132 kg (291 lb 4.8 oz), SpO2 99 %.    Lungs:  Normal effort and normal respiratory rate.    Heart: Normal rate.  Regular rhythm.    Abdomen: Abdomen is soft.  Bowel sounds are normal.   There is no abdominal tenderness.     Extremities: There is dependent edema.  There is no local swelling.    Neurological: Patient is alert and oriented to person, place and time.    Skin:  Warm and dry.  (Changes consistent with chronic venous stasis and improving cellulitis)            Results Review:       I reviewed the patient's new clinical results.    Results from last 7 days   Lab Units 19  0719  0704 19  0538 19  0522 19  0558   WBC 10*3/mm3 5.93 9.48 13.84* 5.82 5.49 5.82 5.40   HEMOGLOBIN g/dL 10.8* 10.1* 12.8* 11.0* 10.5* 10.6* 10.7*   PLATELETS 10*3/mm3 131* 123* 148 136* 128* 123* 113*  113*     Results from last 7 days   Lab Units 19  0721 19  11319  16519  0704 19  0538 19  0522 19  0558   SODIUM mmol/L 136 132* 131* 133* 130* 126* 127*   POTASSIUM mmol/L 4.0 3.7 4.2 4.8 4.2 4.1 4.3   CHLORIDE mmol/L 102 99 93* 96* 96* 94* 94*   CO2 mmol/L 23.1 22.6 27.1 28.9 23.8 24.6 23.0   BUN mg/dL 15 16 17 " 14 12 11 10   CREATININE mg/dL 0.67* 0.88 0.96 0.90 0.84 0.77 0.89   CALCIUM mg/dL 8.7 9.0 9.5 9.5 9.1 9.2 9.0   GLUCOSE mg/dL 99 91 90 103* 100* 100* 105*     Results from last 7 days   Lab Units 05/02/19  0721 04/30/19  1700 04/30/19 04/29/19  0704 04/28/19  0538 04/27/19  0522 04/26/19  0558   INR  1.21* 1.18* 1.30 1.32* 1.35* 1.40* 2.01*             Scheduled Meds:     amoxicillin-clavulanate 1 tablet Oral Q12H   carvedilol 3.125 mg Oral BID With Meals   clotrimazole-betamethasone  Topical Q12H   docusate sodium 100 mg Oral Daily   doxycycline 100 mg Oral Q12H   enoxaparin 40 mg Subcutaneous Q24H   finasteride 5 mg Oral Daily   furosemide 20 mg Oral Daily   gentamicin  Topical BID   ipratropium-albuterol 3 mL Nebulization 4x Daily - RT   lisinopril 10 mg Oral Daily   metoclopramide 10 mg Oral 4x Daily AC & at Bedtime   nystatin  Topical Q12H   Petrolatum  Topical Q12H   pravastatin 20 mg Oral Daily   sodium chloride 3 mL Intravenous Q12H   tamsulosin 0.4 mg Oral Nightly   warfarin 7.5 mg Oral Daily     PRN Meds:   •  acetaminophen  •  albuterol  •  HYDROcodone-acetaminophen  •  nitroglycerin  •  ondansetron **OR** ondansetron  •  sodium chloride  •  [COMPLETED] Insert peripheral IV **AND** sodium chloride  Continuous Infusions:       Assessment/Plan   Active Hospital Problems    Diagnosis  POA   • **Left leg cellulitis [L03.116]  Yes   • Sepsis (CMS/HCC) [A41.9]  Unknown   • Anemia of chronic disease [D63.8]  Yes   • Weakness [R53.1]  Yes   • Chronic anticoagulation [Z79.01]  Not Applicable   • Essential hypertension [I10]  Yes   • Gastroparesis [K31.84]  Yes   • Morbid obesity (CMS/HCC) [E66.01]  Yes   • COPD (chronic obstructive pulmonary disease) (CMS/HCC) [J44.9]  Yes   • Permanent atrial fibrillation (CMS/HCC) [I48.2]  Yes   • Venous stasis [I87.8]  Yes   • Sleep apnea [G47.30]  Yes      Resolved Hospital Problems   No resolved problems to display.       Assessment & Plan    -Cellulitis and sepsis present  on admission are responding nicely to antibiotics.  I will change to doxycycline and Augmentin.  Discussed his listed penicillin allergy however he states the allergy was from childhood and he does not know what the reaction was.  He has tolerated ampicillin and amoxicillin in the past without difficulty.  -Blood pressure improved today  -Blood cultures no growth to date  -Sodium level appears stable.  Continue to monitor.  -INR subtherapeutic yesterday.  Continue Coumadin at current dose recheck tomorrow.  -Add Lovenox for DVT prophylaxis  -Sleeping very poorly.  He has requested that we add back his home dose of evening, as needed Xanax and I will do so today.  -Physical therapy  -Discussed with the patient and his wife.  They are agreeable to rehab.  Discussed with CCP who will help facilitate this.    Javi Hatch MD  [facility]  05/02/19  12:41 PM

## 2019-05-03 VITALS
RESPIRATION RATE: 16 BRPM | HEART RATE: 68 BPM | TEMPERATURE: 98.1 F | SYSTOLIC BLOOD PRESSURE: 131 MMHG | BODY MASS INDEX: 35.57 KG/M2 | HEIGHT: 76 IN | WEIGHT: 292.1 LBS | OXYGEN SATURATION: 98 % | DIASTOLIC BLOOD PRESSURE: 89 MMHG

## 2019-05-03 LAB
ANION GAP SERPL CALCULATED.3IONS-SCNC: 8.9 MMOL/L
BUN BLD-MCNC: 12 MG/DL (ref 8–23)
BUN/CREAT SERPL: 16.9 (ref 7–25)
CALCIUM SPEC-SCNC: 8.7 MG/DL (ref 8.6–10.5)
CHLORIDE SERPL-SCNC: 104 MMOL/L (ref 98–107)
CO2 SERPL-SCNC: 22.1 MMOL/L (ref 22–29)
CREAT BLD-MCNC: 0.71 MG/DL (ref 0.76–1.27)
DEPRECATED RDW RBC AUTO: 49.9 FL (ref 37–54)
ERYTHROCYTE [DISTWIDTH] IN BLOOD BY AUTOMATED COUNT: 13.2 % (ref 12.3–15.4)
GFR SERPL CREATININE-BSD FRML MDRD: 110 ML/MIN/1.73
GLUCOSE BLD-MCNC: 97 MG/DL (ref 65–99)
HCT VFR BLD AUTO: 29.4 % (ref 37.5–51)
HGB BLD-MCNC: 10.3 G/DL (ref 13–17.7)
INR PPP: 1.21 (ref 0.9–1.1)
MCH RBC QN AUTO: 35.3 PG (ref 26.6–33)
MCHC RBC AUTO-ENTMCNC: 35 G/DL (ref 31.5–35.7)
MCV RBC AUTO: 100.7 FL (ref 79–97)
PLATELET # BLD AUTO: 125 10*3/MM3 (ref 140–450)
PMV BLD AUTO: 10.1 FL (ref 6–12)
POTASSIUM BLD-SCNC: 4 MMOL/L (ref 3.5–5.2)
PROTHROMBIN TIME: 15 SECONDS (ref 11.7–14.2)
RBC # BLD AUTO: 2.92 10*6/MM3 (ref 4.14–5.8)
SODIUM BLD-SCNC: 135 MMOL/L (ref 136–145)
WBC NRBC COR # BLD: 4.54 10*3/MM3 (ref 3.4–10.8)

## 2019-05-03 PROCEDURE — 25010000002 ENOXAPARIN PER 10 MG: Performed by: INTERNAL MEDICINE

## 2019-05-03 PROCEDURE — 85610 PROTHROMBIN TIME: CPT | Performed by: INTERNAL MEDICINE

## 2019-05-03 PROCEDURE — 94799 UNLISTED PULMONARY SVC/PX: CPT

## 2019-05-03 PROCEDURE — 97110 THERAPEUTIC EXERCISES: CPT

## 2019-05-03 PROCEDURE — 85027 COMPLETE CBC AUTOMATED: CPT | Performed by: INTERNAL MEDICINE

## 2019-05-03 PROCEDURE — 80048 BASIC METABOLIC PNL TOTAL CA: CPT | Performed by: INTERNAL MEDICINE

## 2019-05-03 RX ORDER — ALPRAZOLAM 0.5 MG/1
0.5 TABLET ORAL NIGHTLY PRN
Qty: 3 TABLET | Refills: 0 | Status: SHIPPED | OUTPATIENT
Start: 2019-05-03 | End: 2021-04-13 | Stop reason: HOSPADM

## 2019-05-03 RX ORDER — HYDROCODONE BITARTRATE AND ACETAMINOPHEN 7.5; 325 MG/1; MG/1
1 TABLET ORAL EVERY 8 HOURS PRN
Qty: 12 TABLET | Refills: 0 | Status: ON HOLD | OUTPATIENT
Start: 2019-05-03 | End: 2021-03-31

## 2019-05-03 RX ORDER — DOXYCYCLINE 100 MG/1
100 CAPSULE ORAL EVERY 12 HOURS SCHEDULED
Start: 2019-05-03 | End: 2019-05-10

## 2019-05-03 RX ORDER — AMOXICILLIN AND CLAVULANATE POTASSIUM 875; 125 MG/1; MG/1
1 TABLET, FILM COATED ORAL EVERY 12 HOURS SCHEDULED
Start: 2019-05-03 | End: 2019-05-10

## 2019-05-03 RX ADMIN — DOCUSATE SODIUM 100 MG: 100 CAPSULE, LIQUID FILLED ORAL at 11:59

## 2019-05-03 RX ADMIN — ALBUTEROL SULFATE 2.5 MG: 2.5 SOLUTION RESPIRATORY (INHALATION) at 02:46

## 2019-05-03 RX ADMIN — METOCLOPRAMIDE 10 MG: 10 TABLET ORAL at 17:58

## 2019-05-03 RX ADMIN — HYDROCODONE BITARTRATE AND ACETAMINOPHEN 1 TABLET: 7.5; 325 TABLET ORAL at 11:59

## 2019-05-03 RX ADMIN — ENOXAPARIN SODIUM 40 MG: 40 INJECTION SUBCUTANEOUS at 13:52

## 2019-05-03 RX ADMIN — AMOXICILLIN AND CLAVULANATE POTASSIUM 1 TABLET: 875; 125 TABLET, FILM COATED ORAL at 12:00

## 2019-05-03 RX ADMIN — IPRATROPIUM BROMIDE AND ALBUTEROL SULFATE 3 ML: 2.5; .5 SOLUTION RESPIRATORY (INHALATION) at 15:16

## 2019-05-03 RX ADMIN — PETROLATUM 100 APPLICATION: 420 OINTMENT TOPICAL at 12:07

## 2019-05-03 RX ADMIN — SODIUM CHLORIDE, PRESERVATIVE FREE 3 ML: 5 INJECTION INTRAVENOUS at 12:06

## 2019-05-03 RX ADMIN — FINASTERIDE 5 MG: 5 TABLET, FILM COATED ORAL at 11:59

## 2019-05-03 RX ADMIN — ALPRAZOLAM 0.5 MG: 0.5 TABLET ORAL at 02:38

## 2019-05-03 RX ADMIN — CLOTRIMAZOLE AND BETAMETHASONE DIPROPIONATE: 10; .5 CREAM TOPICAL at 12:07

## 2019-05-03 RX ADMIN — FUROSEMIDE 20 MG: 20 TABLET ORAL at 11:59

## 2019-05-03 RX ADMIN — CARVEDILOL 3.12 MG: 3.12 TABLET, FILM COATED ORAL at 11:59

## 2019-05-03 RX ADMIN — GENTAMICIN SULFATE: 1 OINTMENT TOPICAL at 12:07

## 2019-05-03 RX ADMIN — METOCLOPRAMIDE 10 MG: 10 TABLET ORAL at 11:59

## 2019-05-03 RX ADMIN — NYSTATIN: 100000 POWDER TOPICAL at 12:07

## 2019-05-03 RX ADMIN — LISINOPRIL 10 MG: 10 TABLET ORAL at 11:59

## 2019-05-03 RX ADMIN — WARFARIN SODIUM 7.5 MG: 7.5 TABLET ORAL at 17:58

## 2019-05-03 RX ADMIN — DOXYCYCLINE 100 MG: 100 CAPSULE ORAL at 11:59

## 2019-05-03 RX ADMIN — PRAVASTATIN SODIUM 20 MG: 20 TABLET ORAL at 11:59

## 2019-05-03 RX ADMIN — IPRATROPIUM BROMIDE AND ALBUTEROL SULFATE 3 ML: 2.5; .5 SOLUTION RESPIRATORY (INHALATION) at 06:35

## 2019-05-03 RX ADMIN — CARVEDILOL 3.12 MG: 3.12 TABLET, FILM COATED ORAL at 17:58

## 2019-05-03 RX ADMIN — IPRATROPIUM BROMIDE AND ALBUTEROL SULFATE 3 ML: 2.5; .5 SOLUTION RESPIRATORY (INHALATION) at 10:40

## 2019-05-03 NOTE — PROGRESS NOTES
Continued Stay Note  [facility]     Patient Name: Jalen Lockwood  MRN: [id number]  Today's Date: 5/3/2019    Admit Date: 4/30/2019    Discharge Plan     Row Name 05/03/19 1523       Plan    Plan  Giovany, accepted pre-cert obtained, bed available     Plan Comments  Spoke with Odalys/Giovany, pre-cert obtained, bed available today, [facility] Rm 121. Family to transport. Dr Gonzalez notified. Pt updated at bedside. CCP to follow. JChasteleniRN/CCP         Discharge Codes    No documentation.             Elvi Baca RN

## 2019-05-03 NOTE — PLAN OF CARE
Problem: Patient Care Overview  Goal: Plan of Care Review  Outcome: Ongoing (interventions implemented as appropriate)   05/03/19 0947   Coping/Psychosocial   Plan of Care Reviewed With patient   Plan of Care Review   Progress improving   OTHER   Outcome Summary pt tolerated treatment with no complaints. Pt improving with sit/stand transfers requiring CGA. Pt ambulated 75 feet X2 with rwx, CGAX2. Pt will continue to benefit from skilled PT to improve strength and overall mobility.

## 2019-05-03 NOTE — PLAN OF CARE
Problem: Patient Care Overview  Goal: Plan of Care Review  Outcome: Ongoing (interventions implemented as appropriate)   05/03/19 0331   Coping/Psychosocial   Plan of Care Reviewed With patient   Plan of Care Review   Progress improving   OTHER   Outcome Summary Voiding small amounts per urinal. Sat in chair until 0215. Dressing changes done as ordered after returned to bed. Medicated once for pain with good results. No acute distress noted. Will continue to monitor.       Problem: Fall Risk (Adult)  Goal: Absence of Fall  Outcome: Ongoing (interventions implemented as appropriate)      Problem: Wound (Includes Pressure Injury) (Adult)  Goal: Signs and Symptoms of Listed Potential Problems Will be Absent, Minimized or Managed (Wound)  Outcome: Ongoing (interventions implemented as appropriate)

## 2019-05-03 NOTE — DISCHARGE SUMMARY
Patient Name: Jalen Lockwood  : 1950  MRN: [id number]    Date of Admission: 2019  Date of Discharge:  5/3/2019  Primary Care Physician: Nannette Higgins APRN      Chief Complaint:   Weakness - Generalized      Discharge Diagnoses     Active Hospital Problems    Diagnosis  POA   • **Left leg cellulitis [L03.116]  Yes   • Sepsis (CMS/HCC) [A41.9]  Unknown   • Anemia of chronic disease [D63.8]  Yes   • Weakness [R53.1]  Yes   • Chronic anticoagulation [Z79.01]  Not Applicable   • Essential hypertension [I10]  Yes   • Gastroparesis [K31.84]  Yes   • Morbid obesity (CMS/[facility]) [E66.01]  Yes   • COPD (chronic obstructive pulmonary disease) (CMS/[facility]) [J44.9]  Yes   • Permanent atrial fibrillation (CMS/[facility]) [I48.2]  Yes   • Venous stasis [I87.8]  Yes   • Sleep apnea [G47.30]  Yes      Resolved Hospital Problems   No resolved problems to display.        Hospital Course     Mr. Lockwood is a 69 y.o. male smoker with a history of CHF, COPD, CAD, hypertension, BECKI, chronic venous stasis, etc. who presented to [facility] initially complaining of weakness since discharge day prior.  Please see the admitting history and physical for further details.  As stated he was discharged the day prior but had not done well at home since discharge.  On presentation to the ED he had a temp of almost 103 degrees with evidence of sepsis and left leg cellulitis.  Started on empiric antibiotics.  Cultures are negative and he is afebrile.  He seems to responded well to treatment.  He was transitioned to oral antibiotics yesterday will complete full course.  Arrangements have been made to transfer to a SNU facility post discharge.    Please see original discharge summary from 2019 by Dr. Rip Samuel for full details on previous hospitalization.      Day of Discharge     HPI:   He denies any chest pain, SOA, nausea, vomiting or diarrhea.     Physical Exam:  Temp:  [97.8 °F (36.6 °C)-98.2 °F (36.8 °C)] 98.1 °F (36.7  °C)  Heart Rate:  [68-77] 68  Resp:  [16-18] 16  BP: (125-136)/(78-89) 131/89  Body mass index is 35.56 kg/m².  Physical Exam   Constitutional: He is oriented to person, place, and time. No distress.   Cardiovascular: Normal rate and regular rhythm.   No murmur heard.  Pulmonary/Chest: Effort normal and breath sounds normal.   Abdominal: Soft. Bowel sounds are normal. He exhibits no distension. There is no tenderness.   Musculoskeletal: Normal range of motion. He exhibits no edema.   Neurological: He is alert and oriented to person, place, and time.   Skin: Skin is warm and dry. He is not diaphoretic.   Legs wrapped       Consultants     None    Procedures     Imaging Results (all)     Procedure Component Value Units Date/Time    XR Chest 2 View [[id number]] Collected:  04/30/19 1834     Updated:  04/30/19 2029    Narrative:       TWO VIEWS CHEST     HISTORY: Pneumonia.     Two views of the chest demonstrate the heart to be within normal limits  in size. There is mild bibasilar atelectasis/infiltrate slightly more  prominent at the left lung base without consolidation or effusion. There  is no evidence of congestive failure.              Pertinent Labs     Results from last 7 days   Lab Units 05/03/19  0649 05/02/19  0721 05/01/19  1134 04/30/19  1659   WBC 10*3/mm3 4.54 5.93 9.48 13.84*   HEMOGLOBIN g/dL 10.3* 10.8* 10.1* 12.8*   PLATELETS 10*3/mm3 125* 131* 123* 148     Results from last 7 days   Lab Units 05/03/19  0649 05/02/19  0721 05/01/19  1134 04/30/19  1659   SODIUM mmol/L 135* 136 132* 131*   POTASSIUM mmol/L 4.0 4.0 3.7 4.2   CHLORIDE mmol/L 104 102 99 93*   CO2 mmol/L 22.1 23.1 22.6 27.1   BUN mg/dL 12 15 16 17   CREATININE mg/dL 0.71* 0.67* 0.88 0.96   GLUCOSE mg/dL 97 99 91 90   Estimated Creatinine Clearance: 131.3 mL/min (A) (by C-G formula based on SCr of 0.71 mg/dL (L)).  Results from last 7 days   Lab Units 04/30/19  1659 04/27/19  0522   ALBUMIN g/dL 4.10 3.50   BILIRUBIN mg/dL 1.3*  --    ALK  PHOS U/L 70  --    AST (SGOT) U/L 26  --    ALT (SGPT) U/L 27  --      Results from last 7 days   Lab Units 05/03/19  0649 05/02/19  0721 05/01/19  1134 04/30/19  1659  04/27/19  0522   CALCIUM mg/dL 8.7 8.7 9.0 9.5   < > 9.2   ALBUMIN g/dL  --   --   --  4.10  --  3.50   MAGNESIUM mg/dL  --   --   --  2.0  --   --    PHOSPHORUS mg/dL  --   --   --   --   --  3.2    < > = values in this interval not displayed.       Results from last 7 days   Lab Units 04/30/19  1659   TROPONIN T ng/mL <0.010   PROBNP pg/mL 3,262.0*     Results from last 7 days   Lab Units 04/30/19  1853 04/30/19  1700   BLOODCX  No growth at 2 days No growth at 2 days     Results from last 7 days   Lab Units 05/03/19  0649 05/02/19  0721 04/30/19  1700 04/30/19 04/29/19  0704 04/28/19  0538 04/27/19  0522   PROTIME Seconds 15.0* 15.0* 14.7*  --  16.1* 16.3* 16.9*   INR  1.21* 1.21* 1.18* 1.30 1.32* 1.35* 1.40*       Test Results Pending at Discharge      Order Current Status    Blood Culture - Blood, Arm, Left Preliminary result    Blood Culture - Blood, Arm, Left Preliminary result        Discharge Details        Discharge Medications      New Medications      Instructions Start Date   amoxicillin-clavulanate 875-125 MG per tablet  Commonly known as:  AUGMENTIN   1 tablet, Oral, Every 12 Hours Scheduled      doxycycline 100 MG capsule  Commonly known as:  MONODOX   100 mg, Oral, Every 12 Hours Scheduled         Changes to Medications      Instructions Start Date   ALPRAZolam 0.5 MG tablet  Commonly known as:  XANAX  What changed:  reasons to take this   0.5 mg, Oral, Nightly PRN      HYDROcodone-acetaminophen 7.5-325 MG per tablet  Commonly known as:  NORCO  What changed:  reasons to take this   1 tablet, Oral, Every 8 Hours PRN         Continue These Medications      Instructions Start Date   albuterol sulfate  (90 Base) MCG/ACT inhaler  Commonly known as:  PROVENTIL HFA;VENTOLIN HFA;PROAIR HFA   2 puffs, Inhalation, Every 4 Hours PRN       ATROVENT HFA 17 MCG/ACT inhaler  Generic drug:  ipratropium   INHALE 2 PUFFS 4 (FOUR) TIMES A DAY.      carvedilol 3.125 MG tablet  Commonly known as:  COREG   3.125 mg, Oral, 2 Times Daily With Meals      clotrimazole-betamethasone 1-0.05 % cream  Commonly known as:  LOTRISONE   APPLY TO AFFECTED AREA DAILY      docusate sodium 100 MG capsule  Commonly known as:  COLACE   100 mg, Oral, Daily      finasteride 5 MG tablet  Commonly known as:  PROSCAR   1 tablet, Oral, Daily      furosemide 20 MG tablet  Commonly known as:  LASIX   20 mg, Oral, Daily      gentamicin 0.1 % ointment  Commonly known as:  GARAMYCIN   APPLY TO AFFECTED AREA EVERY DAY      lisinopril 10 MG tablet  Commonly known as:  PRINIVIL,ZESTRIL   10 mg, Oral, Daily      metoclopramide 10 MG tablet  Commonly known as:  REGLAN   10 mg, Oral, 4 Times Daily Before Meals & Nightly      mupirocin 2 % ointment  Commonly known as:  BACTROBAN   Topical, 3 Times Weekly      nystatin 823937 UNIT/GM powder  Commonly known as:  MYCOSTATIN   Topical, Every 12 Hours Scheduled      Petrolatum 42 % ointment   Topical, Every 12 Hours Scheduled      pravastatin 20 MG tablet  Commonly known as:  PRAVACHOL   20 mg, Oral, Daily      silodosin 8 MG capsule capsule  Commonly known as:  RAPAFLO   1 capsule, Oral, Daily, With food.       warfarin 5 MG tablet  Commonly known as:  COUMADIN   7.5 mg, Oral, Nightly             Allergies   Allergen Reactions   • Cephalexin Hives   • Codeine Nausea Only   • Penicillins Other (See Comments)     Childhood allergy         Discharge Disposition:  Skilled Nursing Facility (DC - External)    Discharge Diet:  Diet Order   Procedures   • Diet Regular; Thin; Cardiac, Daily Fluid Restriction; 1500 mL Fluid Per Day       Discharge Activity:   Activity Instructions     Activity as Tolerated            CODE STATUS:    Code Status and Medical Interventions:   Ordered at: 04/30/19 0712     Code Status:    CPR     Medical Interventions (Level of  Support Prior to Arrest):    Full       Future Appointments   Date Time Provider Department Center   5/14/2019  9:00 AM [facility] PET ADMIN RM 1 [facility]   5/14/2019 10:30 AM [facility] PET 1 [facility]   5/17/2019  1:20 PM Bunny Carver MD [facility] None   5/21/2019  3:50 PM LAB CHAIR 2 [facility]   5/21/2019  4:20 PM Juni Landa MD [facility]   6/5/2019  2:45 PM Nannette Higgins APRN [facility] None   6/24/2019 10:00 AM Marc Ludwig MD [facility] None   11/20/2019  1:45 PM Kurt Henry MD [facility] None     Additional Instructions for the Follow-ups that You Need to Schedule     Protime-INR    May 09, 2019 (Approximate)      Is Patient on anti-coag:  Yes            Contact information for follow-up providers     Nannette Higgins APRN Follow up in 2 week(s).    Specialty:  Family Medicine  Contact information:  [address] [place] [id number]  [phone]                   Contact information for after-discharge care     Destination     [facility] Follow up.    Service:  Skilled Nursing  Contact information:  [address] [facility] [address] [phone]                             Additional Instructions for the Follow-ups that You Need to Schedule     Protime-INR    May 09, 2019 (Approximate)      Is Patient on anti-coag:  Yes               Time Spent on Discharge:  Greater than 30 minutes      Electronically signed by Ayush Gonzalez MD, 5/3/2019, 3:38 PM

## 2019-05-03 NOTE — THERAPY TREATMENT NOTE
Acute Care - Physical Therapy Treatment Note  [facility]     Patient Name: Jalen Lockwood  : 1950  MRN: [id number]  Today's Date: 5/3/2019  Onset of Illness/Injury or Date of Surgery: 19  Date of Referral to PT: 19       Admit Date: 2019    Visit Dx:    ICD-10-CM ICD-9-CM   1. Left leg cellulitis L03.116 682.6   2. Atrial fibrillation with rapid ventricular response (CMS/HCC) I48.91 427.31   3. Generalized weakness R53.1 780.79   4. Fall, initial encounter W19.XXXA E888.9   5. Difficulty walking R26.2 719.7   6. Urinary retention R33.9 788.20     Patient Active Problem List   Diagnosis   • Chronic osteomyelitis (CMS/HCC)   • Foot pain   • Peripheral neuropathy   • Venous stasis   • Permanent atrial fibrillation (CMS/HCC)   • Sleep apnea   • Chronic edema   • Morbid obesity (CMS/HCC)   • COPD (chronic obstructive pulmonary disease) (CMS/HCC)   • Nonocclusive coronary atherosclerosis of native coronary artery   • Atrial flutter (CMS/HCC)   • 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CMS/HCC)   • Aortectasia (CMS/HCC)   • Popliteal artery aneurysm (CMS/HCC)   • Colon polyps   • Gastroparesis   • Insomnia   • Adenomatous polyp of colon   • Essential hypertension   • Hyponatremia   • ETOH abuse   • Open wound   • Adverse effect of thiazide diuretic   • Chronic anticoagulation   • Weakness   • Oropharyngeal dysphagia   • Abnormal weight loss   • Tobacco abuse   • Thyromegaly   • Adrenal adenoma, left   • Thrombocytopenia (CMS/HCC)   • Retroperitoneal lymphadenopathy   • Candidal intertrigo   • Anemia of chronic disease   • Thyroid nodule   • Charcot's joint of foot   • Left leg cellulitis   • Sepsis (CMS/HCC)       Therapy Treatment    Rehabilitation Treatment Summary     Row Name 19 0949             Treatment Time/Intention    Discipline  physical therapy assistant  -EH      Document Type  therapy note (daily note)  -EH      Subjective Information  no complaints  -EH      Mode of Treatment   physical therapy  -EH      Patient/Family Observations  pt sitting in chair  -EH      Care Plan Review  patient/other agree to care plan  -EH      Therapy Frequency (PT Clinical Impression)  daily  -EH      Patient Effort  good  -EH      Recorded by [] Leslie Cabrera PTA 05/03/19 0950      Row Name 05/03/19 0949             Cognitive Assessment/Intervention- PT/OT    Orientation Status (Cognition)  oriented x 3  -EH      Follows Commands (Cognition)  WFL  -EH      Recorded by [] Leslie Cabrera PTA 05/03/19 0950      Row Name 05/03/19 0949             Sit-Stand Transfer    Sit-Stand [place] (Transfers)  contact guard  -      Assistive Device (Sit-Stand Transfers)  walker, front-wheeled  -EH      Recorded by [] Leslie Cabrera PTA 05/03/19 0950      Row Name 05/03/19 0949             Stand-Sit Transfer    Stand-Sit [place] (Transfers)  contact guard  -      Assistive Device (Stand-Sit Transfers)  walker, front-wheeled  -EH      Recorded by [] Leslie Cabrera PTA 05/03/19 0950      Row Name 05/03/19 0949             Gait/Stairs Assessment/Training    [place]field Level (Gait)  contact guard;2 person assist  -EH      Assistive Device (Gait)  walker, front-wheeled  -EH      Distance in Feet (Gait)  75 X2  -EH      Pattern (Gait)  step-through  -EH      Deviations/Abnormal Patterns (Gait)  stride length decreased;gait speed decreased  -EH      Recorded by [] Leslie Cabrera PTA 05/03/19 0950      Row Name 05/03/19 0949             Positioning and Restraints    Pre-Treatment Position  sitting in chair/recliner no chair alarm on up on entry  -EH      Post Treatment Position  chair  -EH      In Chair  sitting;call light within reach;encouraged to call for assist  -EH      Recorded by [] Leslie Cabrera PTA 05/03/19 0950      Row Name                Wound 04/24/19 0508 Left elbow skin tear    Wound - Properties Group Date first assessed: 04/24/19 [GC] Time first assessed: 0508 [GC] Present On Admission :  yes [GC] Side: Left [GC] Location: elbow [GC] Type: skin tear [GC] Recorded by:  [GC] Daria Castaneda RN 04/24/19 0510    Row Name                Wound 04/24/19 0514 Right elbow avulsion    Wound - Properties Group Date first assessed: 04/24/19 [GC] Time first assessed: 0514 [GC] Present On Admission : yes [GC] Side: Right [GC] Location: elbow [GC] Type: avulsion [GC] Recorded by:  [GC] Daria Castaneda RN 04/24/19 0515    Row Name                Wound 04/24/19 0520 Right arm skin tear    Wound - Properties Group Date first assessed: 04/24/19 [GC] Time first assessed: 0520 [GC] Side: Right [GC] Location: arm [GC], forearm  Type: skin tear [GC] Recorded by:  [GC] Daria Castaneda RN 04/24/19 0521    Row Name                Wound 04/24/19 0528 Right anterior other (see notes) skin tear    Wound - Properties Group Date first assessed: 04/24/19 [GC] Time first assessed: 0528 [GC] Present On Admission : yes [GC] Side: Right [GC] Orientation: anterior [GC] Location: other (see notes) [GC], forearm/wrist  Type: skin tear [GC] Recorded by:  [GC] Daria Castaneda RN 04/24/19 0528    Row Name                Wound 04/24/19 0532 Right lateral hand skin tear    Wound - Properties Group Date first assessed: 04/24/19 [GC] Time first assessed: 0532 [GC] Present On Admission : yes [GC] Side: Right [GC] Orientation: lateral [GC] Location: hand [GC] Type: skin tear [GC] Recorded by:  [GC] Daria Castaneda RN 04/24/19 0532    Row Name                Wound 04/24/19 0535 Right medial ankle ulceration, venous    Wound - Properties Group Date first assessed: 04/24/19 [GC] Time first assessed: 0535 [GC] Present On Admission : yes;picture taken [GC2] Side: Right [GC] Orientation: medial [GC] Location: ankle [GC] Type: ulceration, venous [GC] Recorded by:  [GC] Daria Castaneda, RN 04/24/19 0535 [GC2] Darai Castaneda, RN 04/24/19 0539    Row Name                 Wound 04/24/19 0540 Left upper foot ulceration, venous    Wound - Properties Group Date first assessed: 04/24/19 [GC] Time first assessed: 0540 [GC] Present On Admission : yes;picture taken [GC] Side: Left [GC] Orientation: upper [GC] Location: foot [GC2], top of first and second toe  Type: ulceration, venous [GC] Recorded by:  [GC] Daria aCstaneda RN 04/24/19 0540 [GC2] Daria Castaneda RN 04/24/19 0543    Row Name                Wound 04/24/19 Left heel ulceration, venous    Wound - Properties Group Date first assessed: 04/24/19 [GC] Present On Admission : yes;picture taken [GC] Side: Left [GC] Location: heel [GC2], bottom  Type: ulceration, venous [GC] Recorded by:  [GC] Daria Castaneda RN 04/24/19 0543 [GC2] Daria Castaneda RN 04/24/19 0550    Row Name                Wound 04/24/19 0545 Left midline ankle    Wound - Properties Group Date first assessed: 04/24/19 [GC] Time first assessed: 0545 [GC] Present On Admission : yes [GC] Side: Left [GC] Orientation: midline [GC] Location: ankle [GC] Recorded by:  [GC] Daria Castaneda RN 04/24/19 0545    Row Name                Wound 04/30/19 2110 Other (See comments) coccyx pressure injury    Wound - Properties Group Date first assessed: 04/30/19 [TB] Time first assessed: 2110 [TB] Side: Other (See comments) [TB], coccyx  Location: coccyx [TB] Type: pressure injury [TB] Stage, Pressure Injury: deep tissue injury [TB] Recorded by:  [TB] Bonnie Feldman RN 05/01/19 0331      User Key  (r) = Recorded By, (t) = Taken By, (c) = Cosigned By    Initials Name Effective Dates Discipline     Daria Castaneda RN 10/30/17 -  Nurse    Bonnie Castro RN 06/16/16 -  Nurse    Leslie Brown PTA 08/19/18 -  PT          Wound 04/24/19 0509 Left elbow skin tear (Active)   Dressing Appearance open to air 5/2/2019 10:16 PM   Base scab 5/2/2019  2:48 PM   Drainage Amount none 5/2/2019  2:48 PM        Wound 04/24/19 0514 Right elbow avulsion (Active)   Dressing Appearance dry;intact 5/2/2019 10:16 PM   Closure JUANA 5/2/2019  2:48 PM   Base dressing in place, unable to visualize 5/2/2019  2:48 PM   Dressing Care, Wound dressing changed;border dressing;non-adherent 5/3/2019  2:46 AM       Wound 04/24/19 0520 Right arm skin tear (Active)   Dressing Appearance dry;intact 5/2/2019 10:16 PM   Closure JUANA 5/2/2019  2:48 PM   Base dressing in place, unable to visualize 5/2/2019  2:48 PM   Dressing Care, Wound dressing changed;petroleum-based;tubular wrap 5/3/2019  2:46 AM       Wound 04/24/19 0528 Right anterior other (see notes) skin tear (Active)   Dressing Appearance dry;intact 5/2/2019 10:16 PM   Closure JUANA 5/2/2019  2:48 PM   Base dressing in place, unable to visualize 5/2/2019  2:48 PM   Dressing Care, Wound dressing changed;border dressing 5/3/2019  2:46 AM       Wound 04/24/19 0532 Right lateral hand skin tear (Active)   Dressing Appearance dry;intact 5/2/2019 10:16 PM   Closure JUANA 5/2/2019  2:48 PM   Base dressing in place, unable to visualize 5/2/2019  2:48 PM   Dressing Care, Wound dressing changed;petroleum-based;tubular wrap 5/3/2019  2:46 AM       Wound 04/24/19 0535 Right medial ankle ulceration, venous (Active)   Dressing Appearance dry;intact 5/2/2019 10:16 PM   Closure JUANA 5/2/2019  2:48 PM   Base dressing in place, unable to visualize 5/2/2019  2:48 PM   Dressing Care, Wound other (see comments) 5/3/2019  2:46 AM       Wound 04/24/19 0540 Left upper foot ulceration, venous (Active)   Dressing Appearance dry;intact 5/2/2019 10:16 PM   Closure JUANA 5/2/2019  2:48 PM   Base dressing in place, unable to visualize 5/2/2019  2:48 PM   Periwound pink 5/2/2019 11:30 AM   Drainage Amount moderate 5/3/2019  2:46 AM   Care, Wound collagenase agent applied;cleansed with 5/3/2019  2:46 AM   Dressing Care, Wound dressing changed 5/3/2019  2:46 AM   Periwound Care, Wound cleansed with pH balanced cleanser 5/3/2019   2:46 AM       Wound 04/24/19 Left heel ulceration, venous (Active)   Dressing Appearance dry;intact 5/2/2019 10:16 PM   Closure JUANA 5/2/2019  2:48 PM   Base dressing in place, unable to visualize 5/2/2019  2:48 PM   Drainage Characteristics/Odor serous 5/2/2019 11:30 AM   Drainage Amount small 5/2/2019 11:30 AM   Care, Wound cleansed with;collagenase agent applied 5/3/2019  2:46 AM   Dressing Care, Wound dressing changed 5/3/2019  2:46 AM   Periwound Care, Wound cleansed with pH balanced cleanser 5/3/2019  2:46 AM       Wound 04/24/19 0545 Left midline ankle (Active)   Dressing Appearance dry;intact 5/2/2019 10:16 PM   Closure JUANA 5/2/2019  2:48 PM   Base dressing in place, unable to visualize 5/2/2019  2:48 PM   Periwound intact;dry 5/2/2019 11:30 AM   Drainage Amount none 5/2/2019 11:30 AM   Care, Wound cleansed with;collagenase agent applied 5/3/2019  2:46 AM   Dressing Care, Wound dressing changed 5/3/2019  2:46 AM   Periwound Care, Wound cleansed with pH balanced cleanser 5/3/2019  2:46 AM       Wound 04/30/19 2110 Other (See comments) coccyx pressure injury (Active)   Dressing Appearance open to air 5/2/2019 10:16 PM   Closure None 5/2/2019  2:48 PM   Base maroon/purple 5/2/2019  2:48 PM   Drainage Amount none 5/2/2019 10:16 PM           Physical Therapy Education     Title: PT OT SLP Therapies (Done)     Topic: Physical Therapy (Done)     Point: Mobility training (Done)     Learning Progress Summary           Patient Acceptance, E, VU,NR by  at 5/3/2019  9:48 AM    Acceptance, E, VU,NR by  at 5/2/2019 10:14 AM                   Point: Home exercise program (Done)     Learning Progress Summary           Patient Acceptance, E, VU,NR by  at 5/3/2019  9:48 AM    Acceptance, E, VU,NR by TOÑITO at 5/2/2019 10:14 AM                   Point: Body mechanics (Done)     Learning Progress Summary           Patient Acceptance, E, VU,NR by  at 5/3/2019  9:48 AM    Acceptance, E, VU,NR by TOÑITO at 5/2/2019 10:14 AM                    Point: Precautions (Done)     Learning Progress Summary           Patient Acceptance, E, VU,NR by  at 5/3/2019  9:48 AM    Acceptance, E, VU,NR by  at 5/2/2019 10:14 AM                               User Key     Initials Effective Dates Name Provider Type Discipline     02/05/19 -  Sofy Ramsay, PT Physical Therapist PT     08/19/18 -  Leslie Cabrera PTA Physical Therapy Assistant PT                PT Recommendation and Plan  Therapy Frequency (PT Clinical Impression): daily  Plan of Care Reviewed With: patient  Progress: improving  Outcome Summary: pt tolerated treatment with no complaints. Pt improving with sit/stand transfers requiring CGA. Pt ambulated 75 feet X2 with rwx, CGAX2. Pt will continue to benefit from skilled PT to improve strength and overall mobility.   Outcome Measures     Row Name 05/03/19 0900 05/02/19 1024 05/01/19 1500       How much help from another person do you currently need...    Turning from your back to your side while in flat bed without using bedrails?  4  -  4  -  3  -EF    Moving from lying on back to sitting on the side of a flat bed without bedrails?  4  -  4  -  3  -EF    Moving to and from a bed to a chair (including a wheelchair)?  3  -  3  -  2  -EF    Standing up from a chair using your arms (e.g., wheelchair, bedside chair)?  3  -  3  -  2  -EF    Climbing 3-5 steps with a railing?  3  -  1  -  1  -EF    To walk in hospital room?  3  -  3  -  2  -EF    AM-PAC 6 Clicks Score  20  -  18  -  13  -EF       Functional Assessment    Outcome Measure Options  --  AM-PAC 6 Clicks Basic Mobility (PT)  -  AM-PAC 6 Clicks Basic Mobility (PT)  -EF      User Key  (r) = Recorded By, (t) = Taken By, (c) = Cosigned By    Initials Name Provider Type    Sofy Vora, PT Physical Therapist     Kaylan Gilliland, PT Physical Therapist     Leslie Cabrera, DESIREE Physical Therapy Assistant         Time Calculation:   PT  Charges     Row Name 05/03/19 0948             Time Calculation    Start Time  0930  -      Stop Time  0943  -      Time Calculation (min)  13 min  -      PT Received On  05/03/19  -      PT - Next Appointment  05/04/19  -         Time Calculation- PT    Total Timed Code Minutes- PT  13 minute(s)  -        User Key  (r) = Recorded By, (t) = Taken By, (c) = Cosigned By    Initials Name Provider Type     Leslie Cabrera PTA Physical Therapy Assistant        Therapy Charges for Today     Code Description Service Date Service Provider Modifiers Qty    96390967199 HC PT THER PROC EA 15 MIN 5/3/2019 Leslie Cabrera PTA GP 1    95457079081 HC PT THER SUPP EA 15 MIN 5/3/2019 Leslie Cabrera PTA GP 1          PT G-Codes  Outcome Measure Options: AM-PAC 6 Clicks Basic Mobility (PT)  AM-PAC 6 Clicks Score: 20    Leslie Cabrera PTA  5/3/2019

## 2019-05-05 LAB
BACTERIA SPEC AEROBE CULT: NORMAL
BACTERIA SPEC AEROBE CULT: NORMAL

## 2019-05-07 NOTE — PROGRESS NOTES
Case Management Discharge Note    Final Note: Pt dc'd to [facility]     Destination - Selection Complete      Service Provider Request Status Selected Services Address Phone Number Fax Number    [facility] Selected Skilled Nursing [address] [place] [id number] [phone]      Durable Medical Equipment      No service has been selected for the patient.      Dialysis/Infusion      No service has been selected for the patient.      Home Medical Care      No service has been selected for the patient.      Therapy      No service has been selected for the patient.      Community Resources      No service has been selected for the patient.        Transportation Services  Private: Car    Final Discharge Disposition Code: 03 - skilled nursing facility (SNF)

## 2019-05-13 ENCOUNTER — TELEPHONE (OUTPATIENT)
Dept: GENERAL RADIOLOGY | Facility: HOSPITAL | Age: 69
End: 2019-05-13

## 2019-05-13 NOTE — TELEPHONE ENCOUNTER
----- Message from Amira River sent at 5/13/2019 10:47 AM EDT -----  Regarding: please cancel PET scheduled for tomorrow 05/14/2019  Per Dr Landa please cancel PET that is scheduled for tomorrow 05/14/2019.    Thank you   ----- Message -----  From: Abby Torres APRN  Sent: 5/13/2019  10:15 AM  To: Amira Paz  Subject: RE: INS HAS DENIED PET WANTS BX FIRST            I have spoken with Dr. Landa and he states he will just see the patient back next week and decide what to do from there. So cancel PET. Thanks.    Kelly    ----- Message -----  From: Amira River  Sent: 5/13/2019   9:35 AM  To: Juni Landa MD, Julia Limon, #  Subject: INS HAS DENIED PET WANTS BX FIRST                Dr Cordell Diaz has a PET order for tomorrow 05/14/2019. Insurance has denied this on a Nurse level, a Biopsy is required first.    If you do not agree with this then next step is peer to peer      Please advise so that I can follow up accordingly.        Thank you

## 2019-05-14 ENCOUNTER — HOSPITAL ENCOUNTER (OUTPATIENT)
Dept: PET IMAGING | Facility: HOSPITAL | Age: 69
End: 2019-05-14

## 2019-05-14 ENCOUNTER — APPOINTMENT (OUTPATIENT)
Dept: PET IMAGING | Facility: HOSPITAL | Age: 69
End: 2019-05-14

## 2019-05-20 ENCOUNTER — ANTICOAGULATION VISIT (OUTPATIENT)
Dept: PHARMACY | Facility: HOSPITAL | Age: 69
End: 2019-05-20

## 2019-05-20 DIAGNOSIS — I48.21 PERMANENT ATRIAL FIBRILLATION (HCC): ICD-10-CM

## 2019-05-20 LAB — INR PPP: 1.9

## 2019-05-20 NOTE — PROGRESS NOTES
Anticoagulation Clinic Progress Note    Anticoagulation Summary  As of 2019    INR goal:   2.0-3.0   TTR:   76.4 % (6.6 mo)   INR used for dosin.90! (2019)   Warfarin maintenance plan:   7.5 mg every Tue, Sat; 5 mg all other days   Weekly warfarin total:   40 mg   Plan last modified:   [facility] (2018)   Next INR check:   2019   Priority:   Maintenance   Target end date:   Indefinite    Indications    Permanent atrial fibrillation (CMS/HCC) [I48.2]             Anticoagulation Episode Summary     INR check location:       Preferred lab:       Send INR reminders to:    [facility] CLINICAL POOL    Comments:         Anticoagulation Care Providers     Provider Role Specialty Phone number    Kurt Henry MD Referring Cardiology [phone]          INR History:  Anticoagulation Monitoring 2019   INR 2.5 1.30 1.90   INR Date 2019   INR Goal 2.0-3.0 2.0-3.0 2.0-3.0   Trend Same Same Same   Last Week Total 37.5 mg 35 mg 70 mg   Next Week Total 35 mg 42.5 mg 55 mg   Sun 5 mg - -   Mon 5 mg - 12.5 mg ()   Tue 5 mg () 7.5 mg 10 mg ()   Wed 5 mg 7.5 mg () 10 mg ()   Thu 5 mg - -   Fri 5 mg - -   Sat 5 mg () - -   Visit Report - - -   Some recent data might be hidden       Plan:  1. INR is Subtherapeutic today- see above in Anticoagulation Summary.   Provided instructions to Domonique with [facility] to Change their warfarin regimen- see above in Anticoagulation Summary.  2. Follow up in 3 days      [facility]rendan [facility]

## 2019-05-20 NOTE — PATIENT INSTRUCTIONS
Extra 2.5 mg today (12.5 mg total), then resume reported 10 mg daily.   Recheck INR in 3 days, 5/23/19.

## 2019-05-21 ENCOUNTER — LAB (OUTPATIENT)
Dept: LAB | Facility: HOSPITAL | Age: 69
End: 2019-05-21

## 2019-05-21 ENCOUNTER — OFFICE VISIT (OUTPATIENT)
Dept: ONCOLOGY | Facility: CLINIC | Age: 69
End: 2019-05-21

## 2019-05-21 VITALS
TEMPERATURE: 98.3 F | HEART RATE: 78 BPM | DIASTOLIC BLOOD PRESSURE: 79 MMHG | OXYGEN SATURATION: 97 % | RESPIRATION RATE: 16 BRPM | BODY MASS INDEX: 30.16 KG/M2 | WEIGHT: 247.7 LBS | SYSTOLIC BLOOD PRESSURE: 113 MMHG | HEIGHT: 76 IN

## 2019-05-21 DIAGNOSIS — R93.7 ABNORMAL CT SCAN, LUMBAR SPINE: ICD-10-CM

## 2019-05-21 DIAGNOSIS — R59.0 RETROPERITONEAL LYMPHADENOPATHY: ICD-10-CM

## 2019-05-21 DIAGNOSIS — R59.0 RETROPERITONEAL LYMPHADENOPATHY: Primary | ICD-10-CM

## 2019-05-21 LAB
ALBUMIN SERPL-MCNC: 3.5 G/DL (ref 3.5–5.2)
ALBUMIN/GLOB SERPL: 1 G/DL (ref 1.1–2.4)
ALP SERPL-CCNC: 80 U/L (ref 38–116)
ALT SERPL W P-5'-P-CCNC: 12 U/L (ref 0–41)
ANION GAP SERPL CALCULATED.3IONS-SCNC: 9.5 MMOL/L
AST SERPL-CCNC: 13 U/L (ref 0–40)
BASOPHILS # BLD AUTO: 0.05 10*3/MM3 (ref 0–0.2)
BASOPHILS NFR BLD AUTO: 0.7 % (ref 0–1.5)
BILIRUB SERPL-MCNC: 0.5 MG/DL (ref 0.2–1.2)
BUN BLD-MCNC: 22 MG/DL (ref 6–20)
BUN/CREAT SERPL: 20.8 (ref 7.3–30)
CALCIUM SPEC-SCNC: 9.4 MG/DL (ref 8.5–10.2)
CHLORIDE SERPL-SCNC: 99 MMOL/L (ref 98–107)
CO2 SERPL-SCNC: 26.5 MMOL/L (ref 22–29)
CREAT BLD-MCNC: 1.06 MG/DL (ref 0.7–1.3)
DEPRECATED RDW RBC AUTO: 47.4 FL (ref 37–54)
EOSINOPHIL # BLD AUTO: 0.17 10*3/MM3 (ref 0–0.4)
EOSINOPHIL NFR BLD AUTO: 2.2 % (ref 0.3–6.2)
ERYTHROCYTE [DISTWIDTH] IN BLOOD BY AUTOMATED COUNT: 12.9 % (ref 12.3–15.4)
GFR SERPL CREATININE-BSD FRML MDRD: 69 ML/MIN/1.73
GLOBULIN UR ELPH-MCNC: 3.4 GM/DL (ref 1.8–3.5)
GLUCOSE BLD-MCNC: 97 MG/DL (ref 74–124)
HCT VFR BLD AUTO: 34.1 % (ref 37.5–51)
HGB BLD-MCNC: 11.6 G/DL (ref 13–17.7)
IMM GRANULOCYTES # BLD AUTO: 0.02 10*3/MM3 (ref 0–0.05)
IMM GRANULOCYTES NFR BLD AUTO: 0.3 % (ref 0–0.5)
LYMPHOCYTES # BLD AUTO: 1.54 10*3/MM3 (ref 0.7–3.1)
LYMPHOCYTES NFR BLD AUTO: 20.2 % (ref 19.6–45.3)
MCH RBC QN AUTO: 33.9 PG (ref 26.6–33)
MCHC RBC AUTO-ENTMCNC: 34 G/DL (ref 31.5–35.7)
MCV RBC AUTO: 99.7 FL (ref 79–97)
MONOCYTES # BLD AUTO: 0.68 10*3/MM3 (ref 0.1–0.9)
MONOCYTES NFR BLD AUTO: 8.9 % (ref 5–12)
NEUTROPHILS # BLD AUTO: 5.15 10*3/MM3 (ref 1.7–7)
NEUTROPHILS NFR BLD AUTO: 67.7 % (ref 42.7–76)
NRBC BLD AUTO-RTO: 0 /100 WBC (ref 0–0.2)
PLATELET # BLD AUTO: 185 10*3/MM3 (ref 140–450)
PMV BLD AUTO: 10 FL (ref 6–12)
POTASSIUM BLD-SCNC: 4.7 MMOL/L (ref 3.5–4.7)
PROT SERPL-MCNC: 6.9 G/DL (ref 6.3–8)
RBC # BLD AUTO: 3.42 10*6/MM3 (ref 4.14–5.8)
SODIUM BLD-SCNC: 135 MMOL/L (ref 134–145)
WBC NRBC COR # BLD: 7.61 10*3/MM3 (ref 3.4–10.8)

## 2019-05-21 PROCEDURE — 85025 COMPLETE CBC W/AUTO DIFF WBC: CPT | Performed by: INTERNAL MEDICINE

## 2019-05-21 PROCEDURE — 80053 COMPREHEN METABOLIC PANEL: CPT | Performed by: INTERNAL MEDICINE

## 2019-05-21 PROCEDURE — 99215 OFFICE O/P EST HI 40 MIN: CPT | Performed by: INTERNAL MEDICINE

## 2019-05-21 PROCEDURE — 36415 COLL VENOUS BLD VENIPUNCTURE: CPT | Performed by: INTERNAL MEDICINE

## 2019-05-21 RX ORDER — FLUCONAZOLE 200 MG/1
TABLET ORAL
COMMUNITY
Start: 2019-05-20 | End: 2020-05-21

## 2019-05-21 RX ORDER — DOXYCYCLINE 100 MG/1
CAPSULE ORAL
COMMUNITY
Start: 2019-05-20 | End: 2019-12-10

## 2019-05-21 RX ORDER — PRAVASTATIN SODIUM 20 MG
TABLET ORAL
Qty: 90 TABLET | Refills: 2 | Status: SHIPPED | OUTPATIENT
Start: 2019-05-21 | End: 2020-02-14

## 2019-05-21 NOTE — PROGRESS NOTES
[facility] GROUP OUTPATIENT FOLLOW UP CLINIC VISIT    REASON FOR FOLLOW-UP:    Lymphadenopathy and bone lesions    HISTORY OF PRESENT ILLNESS:  Jalen Lockwood is a 69 y.o. male who returns today for follow up of the above issues.  He feels reasonably well.  He remains weak.  He denies any alcohol use.  His legs are wrapped.  He states that his groin rash has improved.  He states he is eating and drinking well.        ONCOLOGIC HISTORY:  He has a history of alcohol use.  He was admitted with bilateral lower extremity weakness.  Labs indicated significant hyponatremia with sodium of 115.  It has improved somewhat.  Otherwise his creatinine has been normal.  His calcium is normal.  He is mildly anemic with a normal to slightly elevated MCV.  Platelets are mildly low.     He states he has lost about 50 pounds over the past year.  He states that he did this by cutting out fast food and not eating as much.  His wife apparently states that he does not eat much at all and is drinking 2 glasses of rum daily.  He admits to 2-3 rum and diet Cokes.  He smokes about a third of cigarettes daily.     He has no personal history of malignancy.  He is anticoagulated for atrial fibrillation and has chronic purpura on his arms.       CT imaging of the chest abdomen and pelvis was performed on 4/24/2019 to further evaluate weakness and weight loss.  There is a 4 cm a sending aorta.  A few small subcentimeter mediastinal lymph nodes are noted.  There is asymmetric enlargement of the left thyroid lobe.  No pleural effusion.  No pneumothorax.  No pulmonary consolidation or mass.  There is a small 7 mm left lower lobe pulmonary nodule.  The examination of the abdomen and pelvis is limited without intravenous contrast.  There is a 2.1 cm left adrenal nodule consistent with adenoma.  Left adrenal 1.5 cm adenomas present as well.  There is a third 1.2 cm left adrenal nodule consistent with adenoma.  The liver, gallbladder, spleen,  adrenal glands, pancreas, kidneys, and bladder otherwise appeared normal.  There is no bowel obstruction.  Borderline prominent lymphadenopathy was noted with a left iliac chain node measuring 1.2 cm in the right iliac chain node also measuring 1.2 cm.  The left inguinal lymph node is partially imaged but measures 1.7 cm.  Scattered lymphadenopathy noted otherwise.  There are some scattered lucent lesions present at L2, L4, left pubis, right pubis.  No fracture is noted.     He denies any respiratory symptoms.  No fevers or chills.  No digestive problems.  No urinary problems.  Again, he has chronic purpura on his arms.  He has chronic orthopedic issues with his feet.  He has a bilateral groin rash.    Serum protein studies showed no evidence for a monoclonal protein.        ALLERGIES:  Allergies   Allergen Reactions   • Cephalexin Hives   • Codeine Nausea Only   • Penicillins Other (See Comments)     Childhood allergy       MEDICATIONS:  The medication list has been reviewed with the patient by the medical assistant, and the list has been updated in the electronic medical record, which I reviewed.  Medication dosages and frequencies were confirmed to be accurate.    REVIEW OF SYSTEMS:  PAIN:  See Vital Signs below.  GENERAL:  No fevers, chills, night sweats, or unintended weight loss.  SKIN:  No rashes or non-healing lesions.  HEME/LYMPH:  No abnormal bleeding.  No palpable lymphadenopathy.  EYES:  No vision changes or diplopia.  ENT:  No sore throat or difficulty swallowing.  RESPIRATORY:  No cough, shortness of breath, hemoptysis, or wheezing.  CARDIOVASCULAR:  No chest pain, palpitations, orthopnea, or dyspnea on exertion.  GASTROINTESTINAL:  No abdominal pain, nausea, vomiting, constipation, diarrhea, melena, or hematochezia.  GENITOURINARY:  No dysuria or hematuria.  MUSCULOSKELETAL: Chronic foot deformities.  Legs are wrapped.  NEUROLOGIC:  No dizziness, loss of consciousness, or seizures.  PSYCHIATRIC:  No  "depression, anxiety, or mood changes.    Vitals:    05/21/19 1558   BP: 113/79   Pulse: 78   Resp: 16   Temp: 98.3 °F (36.8 °C)   TempSrc: Oral   SpO2: 97%   Weight: 112 kg (247 lb 11.2 oz)   Height: 193 cm (75.98\")   PainSc: 4  Comment: both feet       PHYSICAL EXAMINATION:  GENERAL:  Well-developed well-nourished male; awake, alert and oriented, in no acute distress.  SKIN: Chronic skin changes.  Purpura on both of his arms.  HEAD:  Normocephalic, atraumatic.  EYES:  Pupils equal, round and reactive to light.  Extraocular movements intact.  Conjunctivae normal.  EARS:  Hearing intact.  NOSE:  Septum midline.  No excoriations or nasal discharge.  MOUTH:  No stomatitis or ulcers.  Lips are normal.  THROAT:  Oropharynx without lesions or exudates.  LYMPHATICS:  No cervical, supraclavicular, axillary lymphadenopathy.   CHEST:  Lungs are clear to auscultation bilaterally.  No wheezes, rales, or rhonchi.  HEART:  Regular rate; normal rhythm.  No murmurs, gallops or rubs.  ABDOMEN: Not examined today  EXTREMITIES: Legs are wrapped.  Chronic foot deformities  NEUROLOGICAL:  No focal neurologic deficits.    DIAGNOSTIC DATA:  Results for orders placed or performed in visit on 05/21/19   CBC Auto Differential   Result Value Ref Range    WBC 7.61 3.40 - 10.80 10*3/mm3    RBC 3.42 (L) 4.14 - 5.80 10*6/mm3    Hemoglobin 11.6 (L) 13.0 - 17.7 g/dL    Hematocrit 34.1 (L) 37.5 - 51.0 %    MCV 99.7 (H) 79.0 - 97.0 fL    MCH 33.9 (H) 26.6 - 33.0 pg    MCHC 34.0 31.5 - 35.7 g/dL    RDW 12.9 12.3 - 15.4 %    RDW-SD 47.4 37.0 - 54.0 fl    MPV 10.0 6.0 - 12.0 fL    Platelets 185 140 - 450 10*3/mm3    Neutrophil % 67.7 42.7 - 76.0 %    Lymphocyte % 20.2 19.6 - 45.3 %    Monocyte % 8.9 5.0 - 12.0 %    Eosinophil % 2.2 0.3 - 6.2 %    Basophil % 0.7 0.0 - 1.5 %    Immature Grans % 0.3 0.0 - 0.5 %    Neutrophils, Absolute 5.15 1.70 - 7.00 10*3/mm3    Lymphocytes, Absolute 1.54 0.70 - 3.10 10*3/mm3    Monocytes, Absolute 0.68 0.10 - 0.90 " 10*3/mm3    Eosinophils, Absolute 0.17 0.00 - 0.40 10*3/mm3    Basophils, Absolute 0.05 0.00 - 0.20 10*3/mm3    Immature Grans, Absolute 0.02 0.00 - 0.05 10*3/mm3    nRBC 0.0 0.0 - 0.2 /100 WBC       IMAGING: None for review today      CT chest 4/24/2019:  IMPRESSION:     1. Nonspecific retroperitoneal, bilateral iliac chain, left groin lymph  nodes could be reactive or neoplastic in nature, clinical correlation  and further evaluation/follow-up recommended.  2. An indeterminate left lower lobe pulmonary nodule, three-month  follow-up chest CT recommended to characterize change.     3. Indeterminate lucent lesions in the spine, pubis.  4. Nonobstructive right nephrolith. Bilateral perinephric fat stranding,  correlate clinically. Urinary bladder is partly obscured by hardware  artifact, could be further characterized with ultrasound.  5. Asymmetric enlargement of the left thyroid lobe could reflect  presence of the thyroid nodule.     6. Abdominal aortic aneurysm.    ASSESSMENT:  This is a 69 y.o. male with:  1.  Lucent bone lesions on imaging at L2, L4, and the pubis.  These were visualized on CT imaging but not necessarily on the skeletal survey.  MRI was suggested by radiology.  We opted for a PET scan due to the lymphadenopathy.  However, this was not approved by insurance.  Serum protein studies did not indicate a monoclonal protein.    2.  Lymphadenopathy: This is small and scattered and my suspicion for lymphoma has been low.  He does have some inguinal lymphadenopathy but this could be reactive secondary to the tinea intertrigo.  If the lymphadenopathy persists we can consider an ultrasound-guided needle biopsy of the largest left inguinal node.  However, he is therapeutically anticoagulated on warfarin.    3.  Thrombocytopenia: Platelets are normal today    4.  Alcohol use: He denies any alcohol use at this time    5.  Normocytic to macrocytic anemia with ferritin of 584 likely secondary to alcohol use and  a normal percent iron saturation at 27%.  Fecal occult blood testing was negative.  Likely due to chronic disease and alcohol consumption.  Hemoglobin is improving    6.  Purpura associated with anticoagulation    7.  Atrial fibrillation/flutter anticoagulated with warfarin    8.  Stage III chronic kidney disease: His creatinine is 1.06 today.  We will plan to give IV contrast with his scans.    9.  Significant hyponatremia during her recent hospitalization at [facility] which improved with supportive care.    PLAN:  1.  In about a month we will obtain an MRI of his lumbar spine with and without contrast and we will repeat CT imaging of the chest abdomen and pelvis with contrast to further evaluate the lumbar spine lesions and also to reassess lymphadenopathy.  I will see him back after that to review the results.

## 2019-05-22 ENCOUNTER — TELEPHONE (OUTPATIENT)
Dept: CARDIOLOGY | Facility: CLINIC | Age: 69
End: 2019-05-22

## 2019-05-22 NOTE — TELEPHONE ENCOUNTER
Dagoberto with [facility] called to give an update on pt's bp during home visit.      Pt's bp was running low today 98/60, hr 86;  denies lightheaded, dizziness or fatigue.     Carvedilol 3.125 mg bid   Furosemide 20 mg qd  Lisinopril 10 mg qd      Does pt need any med changes or recommendations?

## 2019-05-23 ENCOUNTER — OFFICE VISIT (OUTPATIENT)
Dept: FAMILY MEDICINE CLINIC | Facility: CLINIC | Age: 69
End: 2019-05-23

## 2019-05-23 ENCOUNTER — ANTICOAGULATION VISIT (OUTPATIENT)
Dept: PHARMACY | Facility: HOSPITAL | Age: 69
End: 2019-05-23

## 2019-05-23 VITALS
TEMPERATURE: 98 F | SYSTOLIC BLOOD PRESSURE: 116 MMHG | BODY MASS INDEX: 32.76 KG/M2 | RESPIRATION RATE: 18 BRPM | WEIGHT: 269 LBS | DIASTOLIC BLOOD PRESSURE: 80 MMHG | OXYGEN SATURATION: 99 % | HEIGHT: 76 IN | HEART RATE: 74 BPM

## 2019-05-23 DIAGNOSIS — I48.21 PERMANENT ATRIAL FIBRILLATION (HCC): ICD-10-CM

## 2019-05-23 DIAGNOSIS — Z09 HOSPITAL DISCHARGE FOLLOW-UP: Primary | ICD-10-CM

## 2019-05-23 LAB — INR PPP: 3

## 2019-05-23 PROCEDURE — 99214 OFFICE O/P EST MOD 30 MIN: CPT | Performed by: NURSE PRACTITIONER

## 2019-05-23 RX ORDER — ALPRAZOLAM 0.5 MG/1
0.5 TABLET ORAL 2 TIMES DAILY PRN
Qty: 30 TABLET | Refills: 0 | Status: SHIPPED | OUTPATIENT
Start: 2019-05-23 | End: 2019-07-16 | Stop reason: SDUPTHER

## 2019-05-23 NOTE — PROGRESS NOTES
Anticoagulation Clinic Progress Note    Anticoagulation Summary  As of 5/23/2019    INR goal:   2.0-3.0   TTR:   76.6 % (6.7 mo)   INR used for dosing:   3.00 (5/23/2019)   Warfarin maintenance plan:   7.5 mg every Sun, Thu; 10 mg all other days   Weekly warfarin total:   65 mg   Plan last modified:   Brendan Live RPH (5/23/2019)   Next INR check:   5/28/2019   Priority:   Maintenance   Target end date:   Indefinite    Indications    Permanent atrial fibrillation (CMS/HCC) [I48.2]             Anticoagulation Episode Summary     INR check location:       Preferred lab:       Send INR reminders to:    [facility] [place]    Comments:         Anticoagulation Care Providers     Provider Role Specialty Phone number    Kurt Henry MD Referring Cardiology [phone]        Pt findings:  Increased vit k in hospital and rehab but less since returning home. Pt confirmed taking 10 mg daily last 3 weeks with exception of boost earlier this wk.    INR History:  Anticoagulation Monitoring 4/30/2019 5/20/2019 5/23/2019   INR 1.30 1.90 3.00   INR Date 4/30/2019 5/20/2019 5/23/2019   INR Goal 2.0-3.0 2.0-3.0 2.0-3.0   Trend Same Same Up   Last Week Total 35 mg 70 mg 72.5 mg   Next Week Total 42.5 mg 55 mg 65 mg   Sun - - 7.5 mg (5/26)   Mon - 12.5 mg (5/20) 10 mg (5/27)   Tue 7.5 mg 10 mg (5/21) -   Wed 7.5 mg (5/1) 10 mg (5/22) -   Thu - - 7.5 mg (5/23)   Fri - - 10 mg (5/24)   Sat - - 10 mg (5/25)   Visit Report - - -   Some recent data might be hidden       Plan:  1. INR is Therapeutic today- see above in Anticoagulation Summary.   Provided instructions to Dagoberto with [facility] and to pt to Change their warfarin regimen- see above in Anticoagulation Summary.  2. Follow up in 5 days      Brendan Live RPH

## 2019-05-28 ENCOUNTER — ANTICOAGULATION VISIT (OUTPATIENT)
Dept: PHARMACY | Facility: HOSPITAL | Age: 69
End: 2019-05-28

## 2019-05-28 DIAGNOSIS — I48.21 PERMANENT ATRIAL FIBRILLATION (HCC): ICD-10-CM

## 2019-05-28 LAB — INR PPP: 8

## 2019-05-28 NOTE — PROGRESS NOTES
Anticoagulation Clinic Progress Note    Anticoagulation Summary  As of 2019    INR goal:   2.0-3.0   TTR:   74.8 % (6.9 mo)   INR used for dosin.00! (2019)   Warfarin maintenance plan:   7.5 mg every Sun, Thu; 10 mg all other days   Weekly warfarin total:   65 mg   Plan last modified:   Brendan Live RPH (2019)   Next INR check:   2019   Priority:   Maintenance   Target end date:   Indefinite    Indications    Permanent atrial fibrillation (CMS/HCC) [I48.2]             Anticoagulation Episode Summary     INR check location:       Preferred lab:       Send INR reminders to:    [facility]    Comments:         Anticoagulation Care Providers     Provider Role Specialty Phone number    Kurt Henry MD Referring Cardiology [phone]        Patient findings:  Fluconazole x 7 days (finished 2 days ago); Doxycycline x 10 days (Will finish tomorrow evening). Denies s/sx of bleeding.    INR History:  Anticoagulation Monitoring 2019   INR 1.90 3.00 8.00   INR Date 2019   INR Goal 2.0-3.0 2.0-3.0 2.0-3.0   Trend Same Up Same   Last Week Total 70 mg 72.5 mg 65 mg   Next Week Total 55 mg 65 mg 37.5 mg   Sun - 7.5 mg () -   Mon 12.5 mg () 10 mg () -   Tue 10 mg () - Hold ()   Wed 10 mg () - Hold ()   Thu - 7.5 mg () Hold ()   Fri - 10 mg () -   Sat - 10 mg () -   Visit Report - - -   Some recent data might be hidden       Plan:  1. INR is Supratherapeutic today- see above in Anticoagulation Summary.   Provided instructions to Dagoberto with [facility] to Change their warfarin regimen- see above in Anticoagulation Summary.  2. Follow up in 3 days  3. Instructions were relayed to both [facility] nurse Dagoberto and pt. Mr. Lockwood was advised to seek immediate medical attention if any s/sx of bleeding are noted or a fall is experienced.       Brendan Live, [facility]

## 2019-05-31 ENCOUNTER — ANTICOAGULATION VISIT (OUTPATIENT)
Dept: PHARMACY | Facility: HOSPITAL | Age: 69
End: 2019-05-31

## 2019-05-31 DIAGNOSIS — I48.21 PERMANENT ATRIAL FIBRILLATION (HCC): ICD-10-CM

## 2019-05-31 LAB — INR PPP: 4.5

## 2019-05-31 NOTE — PROGRESS NOTES
Anticoagulation Clinic Progress Note    Anticoagulation Summary  As of 2019    INR goal:   2.0-3.0   TTR:   73.7 % (7 mo)   INR used for dosin.50! (2019)   Warfarin maintenance plan:   7.5 mg every Sun, Thu; 10 mg all other days   Weekly warfarin total:   65 mg   Plan last modified:   Brendan Live RPH (2019)   Next INR check:   6/3/2019   Priority:   Maintenance   Target end date:   Indefinite    Indications    Permanent atrial fibrillation (CMS/HCC) [I48.2]             Anticoagulation Episode Summary     INR check location:       Preferred lab:       Send INR reminders to:    [facility] [place]    Comments:         Anticoagulation Care Providers     Provider Role Specialty Phone number    Kurt Henry MD Referring Cardiology [phone]          INR History:  Anticoagulation Monitoring 2019   INR 3.00 8.00 4.50   INR Date 2019   INR Goal 2.0-3.0 2.0-3.0 2.0-3.0   Trend Up Same Same   Last Week Total 72.5 mg 65 mg 37.5 mg   Next Week Total 65 mg 37.5 mg 47.5 mg   Sun 7.5 mg () - 5 mg ()   Mon 10 mg () - -   Tue - Hold () -   Wed - Hold () -   Thu 7.5 mg () Hold () -   Fri 10 mg () - Hold ()   Sat 10 mg () - 5 mg ()   Visit Report - - -   Some recent data might be hidden       Plan:  1. INR is Supratherapeutic today- see above in Anticoagulation Summary.   Provided instructions to JAYANT Arenas with [facility] to Change their warfarin regimen- see above in Anticoagulation Summary. Also spoke with patient to provide dosing instructions.  2. Follow up in 3 days      Preet River RPH

## 2019-06-03 ENCOUNTER — ANTICOAGULATION VISIT (OUTPATIENT)
Dept: PHARMACY | Facility: HOSPITAL | Age: 69
End: 2019-06-03

## 2019-06-03 DIAGNOSIS — I48.21 PERMANENT ATRIAL FIBRILLATION (HCC): ICD-10-CM

## 2019-06-03 LAB — INR PPP: 3.3

## 2019-06-03 NOTE — PROGRESS NOTES
Anticoagulation Clinic Progress Note    Anticoagulation Summary  As of 6/3/2019    INR goal:   2.0-3.0   TTR:   72.7 % (7.1 mo)   INR used for dosing:   3.30! (6/3/2019)   Warfarin maintenance plan:   7.5 mg every Sun, Thu; 10 mg all other days   Weekly warfarin total:   65 mg   Plan last modified:   Brendan Live RPH (5/23/2019)   Next INR check:   6/6/2019   Priority:   Maintenance   Target end date:   Indefinite    Indications    Permanent atrial fibrillation (CMS/HCC) [I48.2]             Anticoagulation Episode Summary     INR check location:       Preferred lab:       Send INR reminders to:    [facility]    Comments:         Anticoagulation Care Providers     Provider Role Specialty Phone number    Kurt Henry MD Referring Cardiology [phone]          INR History:  Anticoagulation Monitoring 5/28/2019 5/31/2019 6/3/2019   INR 8.00 4.50 3.30   INR Date 5/28/2019 5/31/2019 6/3/2019   INR Goal 2.0-3.0 2.0-3.0 2.0-3.0   Trend Same Same Same   Last Week Total 65 mg 37.5 mg 20 mg   Next Week Total 37.5 mg 47.5 mg 50 mg   Sun - 5 mg (6/2) -   Mon - - 5 mg (6/3)   Tue Hold (5/28) - 5 mg (6/4)   Wed Hold (5/29) - 5 mg (6/5)   Thu Hold (5/30) - -   Fri - Hold (5/31) -   Sat - 5 mg (6/1) -   Visit Report - - -   Some recent data might be hidden       Plan:  1. INR is Supratherapeutic today- see above in Anticoagulation Summary.   Provided instructions to Juan Manuel with [facility] to Change their warfarin regimen- see above in Anticoagulation Summary.  2. Follow up in 3 days      Brendan Live RPH

## 2019-06-06 ENCOUNTER — ANTICOAGULATION VISIT (OUTPATIENT)
Dept: PHARMACY | Facility: HOSPITAL | Age: 69
End: 2019-06-06

## 2019-06-06 DIAGNOSIS — I48.21 PERMANENT ATRIAL FIBRILLATION (HCC): ICD-10-CM

## 2019-06-06 LAB — INR PPP: 1.9

## 2019-06-06 NOTE — PROGRESS NOTES
Anticoagulation Clinic Progress Note    Anticoagulation Summary  As of 2019    INR goal:   2.0-3.0   TTR:   72.7 % (7.2 mo)   INR used for dosin.90! (2019)   Warfarin maintenance plan:   7.5 mg every Sun, Thu; 5 mg all other days   Weekly warfarin total:   40 mg   Plan last modified:   Meghna Horan RPH (2019)   Next INR check:   2019   Priority:   Maintenance   Target end date:   Indefinite    Indications    Permanent atrial fibrillation (CMS/HCC) [I48.2]             Anticoagulation Episode Summary     INR check location:       Preferred lab:       Send INR reminders to:    [facility] PO[facility]    Comments:         Anticoagulation Care Providers     Provider Role Specialty Phone number    Kurt Henry MD Referring Cardiology [phone]          INR History:  Anticoagulation Monitoring 2019 6/3/2019 2019   INR 4.50 3.30 1.90   INR Date 2019 6/3/2019 2019   INR Goal 2.0-3.0 2.0-3.0 2.0-3.0   Trend Same Same Down   Last Week Total 37.5 mg 20 mg 25 mg   Next Week Total 47.5 mg 50 mg 40 mg   Sun 5 mg () - 7.5 mg   Mon - 5 mg (6/3) 5 mg   Tue - 5 mg () -   Wed - 5 mg () -   Thu - - 7.5 mg   Fri Hold () - 5 mg   Sat 5 mg () - 5 mg   Visit Report - - -   Some recent data might be hidden       Plan:  1. INR is Subtherapeutic today- see above in Anticoagulation Summary.   Provided instructions to Dagoberto with [facility] to Increase their warfarin regimen- see above in Anticoagulation Summary.  2. Follow up in 5 days      Meghna Horan RPH

## 2019-06-07 ENCOUNTER — OFFICE VISIT (OUTPATIENT)
Dept: ORTHOPEDIC SURGERY | Facility: CLINIC | Age: 69
End: 2019-06-07

## 2019-06-07 VITALS — HEIGHT: 72 IN | WEIGHT: 268 LBS | TEMPERATURE: 97.3 F | BODY MASS INDEX: 36.3 KG/M2

## 2019-06-07 DIAGNOSIS — M75.100 TEAR OF ROTATOR CUFF, UNSPECIFIED LATERALITY, UNSPECIFIED TEAR EXTENT: ICD-10-CM

## 2019-06-07 DIAGNOSIS — M25.511 CHRONIC RIGHT SHOULDER PAIN: Primary | ICD-10-CM

## 2019-06-07 DIAGNOSIS — G89.29 CHRONIC RIGHT SHOULDER PAIN: Primary | ICD-10-CM

## 2019-06-07 DIAGNOSIS — M19.019 ARTHRITIS OF SHOULDER: ICD-10-CM

## 2019-06-07 PROCEDURE — 20610 DRAIN/INJ JOINT/BURSA W/O US: CPT | Performed by: ORTHOPAEDIC SURGERY

## 2019-06-07 PROCEDURE — 99203 OFFICE O/P NEW LOW 30 MIN: CPT | Performed by: ORTHOPAEDIC SURGERY

## 2019-06-07 PROCEDURE — 73030 X-RAY EXAM OF SHOULDER: CPT | Performed by: ORTHOPAEDIC SURGERY

## 2019-06-07 RX ADMIN — METHYLPREDNISOLONE ACETATE 80 MG: 80 INJECTION, SUSPENSION INTRA-ARTICULAR; INTRALESIONAL; INTRAMUSCULAR; SOFT TISSUE at 14:08

## 2019-06-07 NOTE — PROGRESS NOTES
Patient: Jalen Lockwood    YOB: 1950    Medical Record Number: [id number]    Chief Complaints:  Right shoulder pain and weakness    History of Present Illness:     69 y.o. male patient who presents with a long history of progressively worsening pain and dysfunction affecting the right shoulder.  Symptoms began about a year ago.  He tells me that he had an injection at the time and it helped tremendously.  He was also referred to physical therapy prior to the injection but it did not seem to help nearly as much.  His pain recurred in March.  He does not recall any specific inciting event or factor.  Pain is moderate, intermittent and aching.  Pain is worse with reaching and lifting.  He has noticed some clicking and popping in his shoulder.  He is right-hand dominant and this has been limiting for him from a functional standpoint.  Denies any other complaints or issues.    Allergies:   Allergies   Allergen Reactions   • Cephalexin Hives   • Codeine Nausea Only   • Penicillins Other (See Comments)     Childhood allergy       Home Medications:    Current Outpatient Medications:   •  albuterol sulfate  (90 Base) MCG/ACT inhaler, Inhale 2 puffs Every 4 (Four) Hours As Needed for Wheezing., Disp: 1 inhaler, Rfl: 6  •  ALPRAZolam (XANAX) 0.5 MG tablet, Take 1 tablet by mouth 2 (Two) Times a Day As Needed for Anxiety., Disp: 30 tablet, Rfl: 0  •  carvedilol (COREG) 3.125 MG tablet, Take 1 tablet by mouth 2 (Two) Times a Day With Meals., Disp: 60 tablet, Rfl: 0  •  docusate sodium (COLACE) 100 MG capsule, Take 100 mg by mouth Daily., Disp: , Rfl:   •  finasteride (PROSCAR) 5 MG tablet, Take 1 tablet by mouth daily., Disp: , Rfl:   •  furosemide (LASIX) 20 MG tablet, Take 1 tablet by mouth Daily., Disp: 30 tablet, Rfl: 0  •  gentamicin (GARAMYCIN) 0.1 % ointment, APPLY TO AFFECTED AREA EVERY DAY, Disp: , Rfl: 2  •  HYDROcodone-acetaminophen (NORCO) 7.5-325 MG per tablet, Take 1 tablet by mouth Every 8  (Eight) Hours As Needed for Mild Pain ., Disp: 12 tablet, Rfl: 0  •  lisinopril (PRINIVIL,ZESTRIL) 10 MG tablet, Take 1 tablet by mouth Daily., Disp: 30 tablet, Rfl: 0  •  metoclopramide (REGLAN) 10 MG tablet, TAKE 1 TABLET BY MOUTH 4 (FOUR) TIMES A DAY BEFORE MEALS & AT BEDTIME., Disp: 120 tablet, Rfl: 2  •  nystatin (MYCOSTATIN) 723376 UNIT/GM powder, Apply  topically to the appropriate area as directed Every 12 (Twelve) Hours., Disp: 60 g, Rfl: 1  •  pravastatin (PRAVACHOL) 20 MG tablet, TAKE 1 TABLET BY MOUTH EVERY DAY, Disp: 90 tablet, Rfl: 2  •  silodosin (RAPAFLO) 8 MG capsule capsule, Take 1 capsule by mouth daily. With food., Disp: , Rfl:   •  warfarin (COUMADIN) 5 MG tablet, Take 1.5 tablets by mouth Every Night., Disp: 60 tablet, Rfl: 0  •  clotrimazole-betamethasone (LOTRISONE) 1-0.05 % cream, APPLY TO AFFECTED AREA DAILY, Disp: , Rfl: 3  •  doxycycline (MONODOX) 100 MG capsule, , Disp: , Rfl:   •  fluconazole (DIFLUCAN) 200 MG tablet, , Disp: , Rfl:   •  mupirocin (BACTROBAN) 2 % ointment, Apply  topically to the appropriate area as directed 3 (Three) Times a Week., Disp: , Rfl:     Past Medical History:   Diagnosis Date   • Allergic rhinitis    • Aortectasia (CMS/HCC)     3cm infrarenal abdominal aorta   • Arthritis    • Atrial flutter (CMS/HCC) 2010    s/p ablation    • Charcot's joint of foot    • CHF (congestive heart failure) (CMS/HCC)    • Chronic edema     both legs and sees wound care center at [place]    • Chronic venous insufficiency    • COPD (chronic obstructive pulmonary disease) (CMS/HCC)    • Coronary atherosclerosis     Cath 2010: diffuse 40-50% disease   • Diverticulosis    • Duodenitis    • Fatty liver    • Gastritis    • Gastroparesis    • Hematoma     post-operative; After catheterization, right groin, required surgical exploration   • Hyperlipidemia    • Hypertension    • Hypertensive heart disease without heart failure 7/28/2016   • Internal hemorrhoids    • Morbid obesity  (CMS/HCC)    • Open wound     izzy legs has teddy marrufog weekly at wound care center at [place]  pt does second dressing on left leg another time during week   • Osteomyelitis (CMS/HCC)    • Paroxysmal atrial fibrillation (CMS/HCC)    • Peripheral neuropathy    • Popliteal artery aneurysm (CMS/HCC)     left, s/p stenting by Dr. Boston   • Skin cancer    • Sleep apnea     o2   • 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CMS/HCC)     due to flutter and afib; cath 2010 with nonobstructive disease   • Venous stasis    • Venous stasis ulcer (CMS/HCC)     bilateral legs        Past Surgical History:   Procedure Laterality Date   • CARDIAC CATHETERIZATION     • CATARACT EXTRACTION     • COLONOSCOPY  09/28/2015    NBIH, diverticulosis, polyps   • COLONOSCOPY N/A 9/11/2018    Procedure: COLONOSCOPY TO CECUM  AND TERM. ILEUM WITH COLD SNARE POLYPECTOMIES;  Surgeon: Kane Lagunas MD;  Location: [facility] ENDOSCOPY;  Service: Gastroenterology   • HIP ARTHROPLASTY Right    • JOINT REPLACEMENT     • OTHER SURGICAL HISTORY      Catheter ablation atrial flutter   • REPAIR ANEURYSM / PSEUDO ANEURYSM / RUPTURED ANEURYSM POPLITEAL ARTERY      Stent-Graft of the the left popliteal artery   • REPAIR KNEE LIGAMENT      Primary repair of knee ligament cruciate anterior right   • TONSILLECTOMY     • TONSILLECTOMY     • TOTAL KNEE ARTHROPLASTY Bilateral    • UPPER GASTROINTESTINAL ENDOSCOPY  09/16/2014    acute gastritis, acute duodenitis       Social History     Occupational History   • Not on file   Tobacco Use   • Smoking status: Current Every Day Smoker     Packs/day: 0.25     Years: 45.00     Pack years: 11.25     Types: Cigarettes   • Smokeless tobacco: Never Used   • Tobacco comment: caffeine use: 3 cups of coffee in the morning/ Dt soft drinks in the evening.    Substance and Sexual Activity   • Alcohol use: Yes     Alcohol/week: 4.8 - 5.4 oz     Types: 7 Standard drinks or equivalent, 1 - 2 Shots of liquor per week     Comment: 2 rum a day  "  • Drug use: No   • Sexual activity: Defer      Social History     Social History Narrative   • Not on file       Family History   Problem Relation Age of Onset   • Emphysema Father    • Malig Hyperthermia Neg Hx      Review of Systems:      Constitutional: Denies fever, shaking or chills   Eyes: Denies change in visual acuity   HEENT: Denies nasal congestion or sore throat   Respiratory: Denies cough or shortness of breath   Cardiovascular: Denies chest pain or edema  Endocrine: Denies tremors, palpitations, intolerance of heat or cold, polyuria, polydipsia.  GI: Denies abdominal pain, nausea, vomiting, bloody stools or diarrhea  : Denies frequency, urgency, incontinence, retention, or nocturia.  Musculoskeletal: Denies numbness, tingling or loss of motor function except as above  Integument: Denies rash, lesion or ulceration   Neurologic: Denies headache or focal weakness, deficits  Heme: Denies spontaneous or excessive bleeding, epistaxis, hematuria, melena, fatigue, enlarged or tender lymph nodes.      All other pertinent positives and negatives as noted above in HPI.    Physical Exam: 69 y.o. male  Vitals:    06/07/19 1335   Temp: 97.3 °F (36.3 °C)   Weight: 122 kg (268 lb)   Height: 182.9 cm (72\")     General:  Patient is awake and alert.  Appears in no acute distress or discomfort.    Psych:  Affect and demeanor are appropriate.    Eyes:  Conjunctiva and sclera appear grossly normal.  Eyes track well and EOM seem to be intact.    Ears:  No gross abnormalities.  Hearing adequate for the exam.    Cardiovascular:  Regular rate and rhythm.    Lungs:  Good chest expansion.  Breathing unlabored.    Spine:  Neck appears grossly normal.  No palpable masses or adenopathy.  Good motion.  Spurling's maneuver is negative for any shoulder or arm symptoms.    Extremities: Right shoulder is examined.  Skin is benign.  No obvious gross abnormalities about right shoulder.  No palpable masses or adenopathy.  Moderate " tenderness noted over anterior glenohumeral joint and rotator interval.  Motion is very limited and uncomfortable.  He cannot elevate beyond about 60 degrees or abduct beyond about 40 degrees.  He has crepitus with range of motion.  No instability.  4 out of 5 strength with resistive testing of elevation in the scapular plane and external rotation.  Intact axillary nerve sensation.  Good motor and sensory function in lower arm and hand.  Palpable radial pulse and brisk capillary refill.         Radiology:  AP, scapular Y, and axillary views of the right shoulder are ordered by myself and reviewed to evaluate the patient's complaint.  No comparison films are immediately available.  The x-rays show significant rotator cuff tear arthropathy with a diminished acromiohumeral interval, joint space narrowing, osteophyte formation, and subchondral sclerosis.      Assessment/Plan:  Right shoulder rotator cuff tear arthropathy    We discussed treatment options in detail including the risks, benefits, and alternatives of conservative treatment versus surgical options.  Regarding conservative treatment, we discussed appropriate activity modifications, anti-inflammatories, injections, and physical therapy.  We also discussed the option of an arthroplasty and all that would entail.  I have recommended that we start with a conservative approach and the patient agrees.    The patient has acknowledged understanding of the information and elected for an injection.  The risks, benefits and alternatives were discussed.  He consented.  Going forward, he will follow-up as needed.    Bunny Carver MD    06/07/2019    CC to Nannette Higgins APRN     Large Joint Arthrocentesis: R subacromial bursa  Date/Time: 6/7/2019 2:08 PM  Consent given by: patient  Site marked: site marked  Timeout: Immediately prior to procedure a time out was called to verify the correct patient, procedure, equipment, support staff and site/side marked as  required   Supporting Documentation  Indications: pain   Procedure Details  Location: shoulder - R subacromial bursa  Preparation: Patient was prepped and draped in the usual sterile fashion  Needle gauge: 21 G.  Approach: posterior  Medications administered: 2 mL lidocaine (cardiac); 80 mg methylPREDNISolone acetate 80 MG/ML  Patient tolerance: patient tolerated the procedure well with no immediate complications

## 2019-06-09 RX ORDER — METHYLPREDNISOLONE ACETATE 80 MG/ML
80 INJECTION, SUSPENSION INTRA-ARTICULAR; INTRALESIONAL; INTRAMUSCULAR; SOFT TISSUE
Status: COMPLETED | OUTPATIENT
Start: 2019-06-07 | End: 2019-06-07

## 2019-06-11 ENCOUNTER — ANTICOAGULATION VISIT (OUTPATIENT)
Dept: PHARMACY | Facility: HOSPITAL | Age: 69
End: 2019-06-11

## 2019-06-11 DIAGNOSIS — I48.21 PERMANENT ATRIAL FIBRILLATION (HCC): ICD-10-CM

## 2019-06-11 LAB — INR PPP: 1.7

## 2019-06-11 RX ORDER — FUROSEMIDE 20 MG/1
20 TABLET ORAL DAILY
Qty: 90 TABLET | Refills: 0 | Status: SHIPPED | OUTPATIENT
Start: 2019-06-11 | End: 2019-09-17 | Stop reason: SDUPTHER

## 2019-06-11 NOTE — PROGRESS NOTES
Anticoagulation Clinic Progress Note    Anticoagulation Summary  As of 2019    INR goal:   2.0-3.0   TTR:   71.0 % (7.4 mo)   INR used for dosin.70! (2019)   Warfarin maintenance plan:   7.5 mg every Sun, Tue, Thu; 5 mg all other days   Weekly warfarin total:   42.5 mg   Plan last modified:   Brendan Live RPH (2019)   Next INR check:   2019   Priority:   Maintenance   Target end date:   Indefinite    Indications    Permanent atrial fibrillation (CMS/HCC) [I48.2]             Anticoagulation Episode Summary     INR check location:       Preferred lab:       Send INR reminders to:   [facility] CLINICAL POOL    Comments:         Anticoagulation Care Providers     Provider Role Specialty Phone number    Kurt Henry MD Referring Cardiology [phone]          INR History:  Anticoagulation Monitoring 6/3/2019 2019 2019   INR 3.30 1.90 1.70   INR Date 6/3/2019 2019 2019   INR Goal 2.0-3.0 2.0-3.0 2.0-3.0   Trend Same Down Up   Last Week Total 20 mg 25 mg 40 mg   Next Week Total 50 mg 40 mg 42.5 mg   Sun - 7.5 mg -   Mon 5 mg (6/3) 5 mg -   Tue 5 mg () - 7.5 mg   Wed 5 mg (/) - 5 mg   Thu - 7.5 mg 7.5 mg   Fri - 5 mg -   Sat - 5 mg -   Visit Report - - -   Some recent data might be hidden       Plan:  1. INR is Subtherapeutic today- see above in Anticoagulation Summary.   Provided instructions to Dagoberto with [facility] to Increase their warfarin regimen- see above in Anticoagulation Summary.  2. Follow up in 3 days      Brendan Live RPH

## 2019-06-14 ENCOUNTER — LAB REQUISITION (OUTPATIENT)
Dept: LAB | Facility: HOSPITAL | Age: 69
End: 2019-06-14

## 2019-06-14 ENCOUNTER — ANTICOAGULATION VISIT (OUTPATIENT)
Dept: PHARMACY | Facility: HOSPITAL | Age: 69
End: 2019-06-14

## 2019-06-14 DIAGNOSIS — I48.21 PERMANENT ATRIAL FIBRILLATION (HCC): ICD-10-CM

## 2019-06-14 DIAGNOSIS — Z00.00 ENCOUNTER FOR GENERAL ADULT MEDICAL EXAMINATION WITHOUT ABNORMAL FINDINGS: ICD-10-CM

## 2019-06-14 LAB
INR PPP: 1.9 (ref 0.9–1.1)
PROTHROMBIN TIME: 21.5 SECONDS (ref 11.7–14.2)

## 2019-06-14 PROCEDURE — 85610 PROTHROMBIN TIME: CPT | Performed by: INTERNAL MEDICINE

## 2019-06-14 NOTE — PROGRESS NOTES
Anticoagulation Clinic Progress Note    Anticoagulation Summary  As of 2019    INR goal:   2.0-3.0   TTR:   69.9 % (7.5 mo)   INR used for dosin.90! (2019)   Warfarin maintenance plan:   5 mg every Mon, Wed, Fri; 7.5 mg all other days   Weekly warfarin total:   45 mg   Plan last modified:   Brendan Live RPH (2019)   Next INR check:   2019   Priority:   Maintenance   Target end date:   Indefinite    Indications    Permanent atrial fibrillation (CMS/HCC) [I48.2]             Anticoagulation Episode Summary     INR check location:       Preferred lab:       [facility]ers [facility]    Comments:         Anticoagulation Care Providers     Provider Role Specialty Phone number    Kurt Henry MD Referring Cardiology [phone]          INR History:  Anticoagulation Monitoring 2019   INR 1.90 1.70 1.90   INR Date 2019   INR Goal 2.0-3.0 2.0-3.0 2.0-3.0   Trend Down Up Up   Last Week Total 25 mg 40 mg 42.5 mg   Next Week Total 40 mg 42.5 mg 45 mg   Sun 7.5 mg - 7.5 mg   Mon 5 mg - 5 mg   Tue - 7.5 mg 7.5 mg   Wed - 5 mg -   Thu 7.5 mg 7.5 mg -   Fri 5 mg - 5 mg   Sat 5 mg - 7.5 mg   Visit Report - - -   Some recent data might be hidden       Plan:  1. INR is Subtherapeutic today- see above in Anticoagulation Summary.   Provided instructions to Svitlana/Juan Manuel with [facility] to Increase their warfarin regimen- see above in Anticoagulation Summary.  2. Follow up in 5 days      Brendan Live RPH

## 2019-06-18 ENCOUNTER — HOSPITAL ENCOUNTER (OUTPATIENT)
Dept: MRI IMAGING | Facility: HOSPITAL | Age: 69
Discharge: HOME | End: 2019-06-18

## 2019-06-18 ENCOUNTER — HOSPITAL ENCOUNTER (OUTPATIENT)
Dept: CT IMAGING | Facility: HOSPITAL | Age: 69
Discharge: HOME | End: 2019-06-18
Admitting: INTERNAL MEDICINE

## 2019-06-18 DIAGNOSIS — R59.0 RETROPERITONEAL LYMPHADENOPATHY: ICD-10-CM

## 2019-06-18 DIAGNOSIS — R93.7 ABNORMAL CT SCAN, LUMBAR SPINE: ICD-10-CM

## 2019-06-18 LAB — CREAT BLDA-MCNC: 0.8 MG/DL (ref 0.6–1.3)

## 2019-06-18 PROCEDURE — A9577 INJ MULTIHANCE: HCPCS | Performed by: INTERNAL MEDICINE

## 2019-06-18 PROCEDURE — 71260 CT THORAX DX C+: CPT

## 2019-06-18 PROCEDURE — 25010000002 IOPAMIDOL 61 % SOLUTION: Performed by: INTERNAL MEDICINE

## 2019-06-18 PROCEDURE — 0 GADOBENATE DIMEGLUMINE 529 MG/ML SOLUTION: Performed by: INTERNAL MEDICINE

## 2019-06-18 PROCEDURE — 82565 ASSAY OF CREATININE: CPT

## 2019-06-18 PROCEDURE — 74177 CT ABD & PELVIS W/CONTRAST: CPT

## 2019-06-18 PROCEDURE — 72158 MRI LUMBAR SPINE W/O & W/DYE: CPT

## 2019-06-18 RX ADMIN — GADOBENATE DIMEGLUMINE 20 ML: 529 INJECTION, SOLUTION INTRAVENOUS at 12:38

## 2019-06-18 RX ADMIN — IOPAMIDOL 85 ML: 612 INJECTION, SOLUTION INTRAVENOUS at 14:56

## 2019-06-19 ENCOUNTER — ANTICOAGULATION VISIT (OUTPATIENT)
Dept: PHARMACY | Facility: HOSPITAL | Age: 69
End: 2019-06-19

## 2019-06-19 DIAGNOSIS — I48.21 PERMANENT ATRIAL FIBRILLATION (HCC): ICD-10-CM

## 2019-06-19 LAB — INR PPP: 3.6

## 2019-06-19 NOTE — PROGRESS NOTES
Anticoagulation Clinic Progress Note    Anticoagulation Summary  As of 6/19/2019    INR goal:   2.0-3.0   TTR:   69.7 % (7.6 mo)   INR used for dosing:   3.60! (6/19/2019)   Warfarin maintenance plan:   7.5 mg every Tue, Sat; 5 mg all other days   Weekly warfarin total:   40 mg   Plan last modified:   Meghna Horan RPH (6/19/2019)   Next INR check:   6/25/2019   Priority:   Maintenance   Target end date:   Indefinite    Indications    Permanent atrial fibrillation (CMS/HCC) [I48.2]             Anticoagulation Episode Summary     INR check location:       Preferred lab:       Send INR reminders to:    [facility] CLINICAL POOL    Comments:         Anticoagulation Care Providers     Provider Role Specialty Phone number    Kurt Henry MD Referring Cardiology [phone]          INR History:  Anticoagulation Monitoring 6/11/2019 6/14/2019 6/19/2019   INR 1.70 1.90 3.60   INR Date 6/11/2019 6/14/2019 6/19/2019   INR Goal 2.0-3.0 2.0-3.0 2.0-3.0   Trend Up Up Down   Last Week Total 40 mg 42.5 mg 45 mg   Next Week Total 42.5 mg 45 mg 35 mg   Sun - 7.5 mg 5 mg   Mon - 5 mg 5 mg   Tue 7.5 mg 7.5 mg -   Wed 5 mg - 2.5 mg (6/19)   Thu 7.5 mg - 5 mg   Fri - 5 mg 5 mg   Sat - 7.5 mg 5 mg (6/22)   Visit Report - - -   Some recent data might be hidden       Plan:  1. INR is Supratherapeutic today- see above in Anticoagulation Summary.   Provided instructions to Dagoberto with [facility] to Change their warfarin regimen- see above in Anticoagulation Summary.  2. Follow up in 6 days      Meghna Horan RPH

## 2019-06-24 ENCOUNTER — OFFICE VISIT (OUTPATIENT)
Dept: FAMILY MEDICINE CLINIC | Facility: CLINIC | Age: 69
End: 2019-06-24

## 2019-06-24 VITALS
OXYGEN SATURATION: 98 % | TEMPERATURE: 98.5 F | WEIGHT: 275 LBS | HEIGHT: 72 IN | DIASTOLIC BLOOD PRESSURE: 86 MMHG | SYSTOLIC BLOOD PRESSURE: 120 MMHG | BODY MASS INDEX: 37.25 KG/M2 | HEART RATE: 68 BPM

## 2019-06-24 DIAGNOSIS — I25.10 NONOCCLUSIVE CORONARY ATHEROSCLEROSIS OF NATIVE CORONARY ARTERY: ICD-10-CM

## 2019-06-24 DIAGNOSIS — E78.2 MIXED HYPERLIPIDEMIA: ICD-10-CM

## 2019-06-24 DIAGNOSIS — G58.8 OTHER MONONEUROPATHY: ICD-10-CM

## 2019-06-24 DIAGNOSIS — J44.9 CHRONIC OBSTRUCTIVE PULMONARY DISEASE, UNSPECIFIED COPD TYPE (HCC): ICD-10-CM

## 2019-06-24 DIAGNOSIS — D69.6 THROMBOCYTOPENIA (HCC): ICD-10-CM

## 2019-06-24 DIAGNOSIS — E66.9 OBESITY (BMI 35.0-39.9 WITHOUT COMORBIDITY): ICD-10-CM

## 2019-06-24 DIAGNOSIS — I10 ESSENTIAL HYPERTENSION: Primary | ICD-10-CM

## 2019-06-24 DIAGNOSIS — Z51.81 ENCOUNTER FOR MEDICATION MONITORING: ICD-10-CM

## 2019-06-24 DIAGNOSIS — D35.02 ADRENAL ADENOMA, LEFT: ICD-10-CM

## 2019-06-24 DIAGNOSIS — Z72.0 TOBACCO ABUSE: ICD-10-CM

## 2019-06-24 DIAGNOSIS — E04.1 THYROID NODULE: ICD-10-CM

## 2019-06-24 DIAGNOSIS — D63.8 ANEMIA OF CHRONIC DISEASE: ICD-10-CM

## 2019-06-24 PROCEDURE — 99214 OFFICE O/P EST MOD 30 MIN: CPT | Performed by: FAMILY MEDICINE

## 2019-06-24 RX ORDER — LISINOPRIL 20 MG/1
40 TABLET ORAL DAILY
Refills: 2 | COMMUNITY
Start: 2019-06-07 | End: 2019-12-30

## 2019-06-24 RX ORDER — IPRATROPIUM BROMIDE 17 UG/1
AEROSOL, METERED RESPIRATORY (INHALATION)
COMMUNITY
Start: 2019-06-07 | End: 2019-07-11 | Stop reason: SDUPTHER

## 2019-06-24 NOTE — PROGRESS NOTES
SUBJECTIVE:  The patient is a 69-year-old white male who is in this office for the first time.  He has a history of multiple medical problems.  He generally feels okay in spite of his multiple chronic illnesses.  PAST MEDICAL HISTORY:  Reviewed.    REVIEW OF SYSTEMS:  Please see above; 14 point ROS negative.      OBJECTIVE:   Vitals signs are reviewed and are stable.    General:  Well-nourished.  Alert and oriented x3 in no acute distress.  HEENT: PERRLA.   Neck:  Supple.   Lungs:  Clear.    Heart:  Regular rate and rhythm.   Abdomen:   Soft, nontender.   Extremities: Edema noted.  The legs are currently wrapped and he is scheduled to see his wound care specialist this morning.  Neurological:  Grossly intact without motor or sensory deficits.     ASSESSMENT:    Hypertension  Hyperlipidemia  COPD  Neuropathy  Insomnia  Chronic anemia  Tobacco abuse  Thyroid nodule  Coronary artery disease  Venous stasis/venous insufficiency-current left foot ulcer    PLAN: Patient had recent labs which are reviewed.  He will schedule another appointment in 2 or 3 months.  In the meantime he will call if any problems.    Dictated utilizing Dragon dictation.

## 2019-06-25 ENCOUNTER — LAB (OUTPATIENT)
Dept: LAB | Facility: HOSPITAL | Age: 69
End: 2019-06-25

## 2019-06-25 ENCOUNTER — OFFICE VISIT (OUTPATIENT)
Dept: ONCOLOGY | Facility: CLINIC | Age: 69
End: 2019-06-25

## 2019-06-25 VITALS
BODY MASS INDEX: 37.25 KG/M2 | TEMPERATURE: 98.7 F | WEIGHT: 275 LBS | HEART RATE: 77 BPM | DIASTOLIC BLOOD PRESSURE: 87 MMHG | HEIGHT: 72 IN | OXYGEN SATURATION: 95 % | RESPIRATION RATE: 16 BRPM | SYSTOLIC BLOOD PRESSURE: 133 MMHG

## 2019-06-25 DIAGNOSIS — R59.0 RETROPERITONEAL LYMPHADENOPATHY: Primary | ICD-10-CM

## 2019-06-25 DIAGNOSIS — R59.0 RETROPERITONEAL LYMPHADENOPATHY: ICD-10-CM

## 2019-06-25 LAB
ALBUMIN SERPL-MCNC: 3.9 G/DL (ref 3.5–5.2)
ALBUMIN/GLOB SERPL: 1.3 G/DL (ref 1.1–2.4)
ALP SERPL-CCNC: 77 U/L (ref 38–116)
ALT SERPL W P-5'-P-CCNC: 12 U/L (ref 0–41)
ANION GAP SERPL CALCULATED.3IONS-SCNC: 11.6 MMOL/L
AST SERPL-CCNC: 15 U/L (ref 0–40)
BASOPHILS # BLD AUTO: 0.05 10*3/MM3 (ref 0–0.2)
BASOPHILS NFR BLD AUTO: 0.8 % (ref 0–1.5)
BILIRUB SERPL-MCNC: 1.1 MG/DL (ref 0.2–1.2)
BUN BLD-MCNC: 8 MG/DL (ref 6–20)
BUN/CREAT SERPL: 9 (ref 7.3–30)
CALCIUM SPEC-SCNC: 9.4 MG/DL (ref 8.5–10.2)
CHLORIDE SERPL-SCNC: 102 MMOL/L (ref 98–107)
CO2 SERPL-SCNC: 27.4 MMOL/L (ref 22–29)
CREAT BLD-MCNC: 0.89 MG/DL (ref 0.7–1.3)
DEPRECATED RDW RBC AUTO: 51.2 FL (ref 37–54)
EOSINOPHIL # BLD AUTO: 0.16 10*3/MM3 (ref 0–0.4)
EOSINOPHIL NFR BLD AUTO: 2.5 % (ref 0.3–6.2)
ERYTHROCYTE [DISTWIDTH] IN BLOOD BY AUTOMATED COUNT: 14.4 % (ref 12.3–15.4)
GFR SERPL CREATININE-BSD FRML MDRD: 85 ML/MIN/1.73
GLOBULIN UR ELPH-MCNC: 3 GM/DL (ref 1.8–3.5)
GLUCOSE BLD-MCNC: 96 MG/DL (ref 74–124)
HCT VFR BLD AUTO: 38.1 % (ref 37.5–51)
HGB BLD-MCNC: 13 G/DL (ref 13–17.7)
IMM GRANULOCYTES # BLD AUTO: 0.02 10*3/MM3 (ref 0–0.05)
IMM GRANULOCYTES NFR BLD AUTO: 0.3 % (ref 0–0.5)
LYMPHOCYTES # BLD AUTO: 1.62 10*3/MM3 (ref 0.7–3.1)
LYMPHOCYTES NFR BLD AUTO: 25.6 % (ref 19.6–45.3)
MCH RBC QN AUTO: 33 PG (ref 26.6–33)
MCHC RBC AUTO-ENTMCNC: 34.1 G/DL (ref 31.5–35.7)
MCV RBC AUTO: 96.7 FL (ref 79–97)
MONOCYTES # BLD AUTO: 0.32 10*3/MM3 (ref 0.1–0.9)
MONOCYTES NFR BLD AUTO: 5 % (ref 5–12)
NEUTROPHILS # BLD AUTO: 4.17 10*3/MM3 (ref 1.7–7)
NEUTROPHILS NFR BLD AUTO: 65.8 % (ref 42.7–76)
NRBC BLD AUTO-RTO: 0 /100 WBC (ref 0–0.2)
PLATELET # BLD AUTO: 145 10*3/MM3 (ref 140–450)
PMV BLD AUTO: 9.8 FL (ref 6–12)
POTASSIUM BLD-SCNC: 3.7 MMOL/L (ref 3.5–4.7)
PROT SERPL-MCNC: 6.9 G/DL (ref 6.3–8)
RBC # BLD AUTO: 3.94 10*6/MM3 (ref 4.14–5.8)
SODIUM BLD-SCNC: 141 MMOL/L (ref 134–145)
WBC NRBC COR # BLD: 6.34 10*3/MM3 (ref 3.4–10.8)

## 2019-06-25 PROCEDURE — 99215 OFFICE O/P EST HI 40 MIN: CPT | Performed by: INTERNAL MEDICINE

## 2019-06-25 PROCEDURE — 85025 COMPLETE CBC W/AUTO DIFF WBC: CPT

## 2019-06-25 PROCEDURE — 80053 COMPREHEN METABOLIC PANEL: CPT

## 2019-06-25 PROCEDURE — 36415 COLL VENOUS BLD VENIPUNCTURE: CPT

## 2019-06-25 NOTE — PROGRESS NOTES
[facility] GROUP OUTPATIENT FOLLOW UP CLINIC VISIT    REASON FOR FOLLOW-UP:    Lymphadenopathy and bone lesions    HISTORY OF PRESENT ILLNESS:  Jalen Lockwood is a 69 y.o. male who returns today for follow up of the above issues to review CT imaging and an MRI of his lumbar spine.  He remains weak but is slowly regaining his strength.  He continues to lose some weight and his abdominal girth is decreasing.  He is eating and drinking well according to him and his wife.  He complains of pain in his legs and feet as well as lower extremity edema.  These issues are chronic..      ONCOLOGIC HISTORY:  He has a history of alcohol use.  He was admitted with bilateral lower extremity weakness.  Labs indicated significant hyponatremia with sodium of 115.  It has improved somewhat.  Otherwise his creatinine has been normal.  His calcium is normal.  He is mildly anemic with a normal to slightly elevated MCV.  Platelets are mildly low.     He states he has lost about 50 pounds over the past year.  He states that he did this by cutting out fast food and not eating as much.  His wife apparently states that he does not eat much at all and is drinking 2 glasses of rum daily.  He admits to 2-3 rum and diet Cokes.  He smokes about a third of cigarettes daily.     He has no personal history of malignancy.  He is anticoagulated for atrial fibrillation and has chronic purpura on his arms.       CT imaging of the chest abdomen and pelvis was performed on 4/24/2019 to further evaluate weakness and weight loss.  There is a 4 cm a sending aorta.  A few small subcentimeter mediastinal lymph nodes are noted.  There is asymmetric enlargement of the left thyroid lobe.  No pleural effusion.  No pneumothorax.  No pulmonary consolidation or mass.  There is a small 7 mm left lower lobe pulmonary nodule.  The examination of the abdomen and pelvis is limited without intravenous contrast.  There is a 2.1 cm left adrenal nodule consistent with  adenoma.  Left adrenal 1.5 cm adenomas present as well.  There is a third 1.2 cm left adrenal nodule consistent with adenoma.  The liver, gallbladder, spleen, adrenal glands, pancreas, kidneys, and bladder otherwise appeared normal.  There is no bowel obstruction.  Borderline prominent lymphadenopathy was noted with a left iliac chain node measuring 1.2 cm in the right iliac chain node also measuring 1.2 cm.  The left inguinal lymph node is partially imaged but measures 1.7 cm.  Scattered lymphadenopathy noted otherwise.  There are some scattered lucent lesions present at L2, L4, left pubis, right pubis.  No fracture is noted.     He denies any respiratory symptoms.  No fevers or chills.  No digestive problems.  No urinary problems.  Again, he has chronic purpura on his arms.  He has chronic orthopedic issues with his feet.  He has a bilateral groin rash.    Serum protein studies showed no evidence for a monoclonal protein.        ALLERGIES:  Allergies   Allergen Reactions   • Cephalexin Hives   • Codeine Nausea Only   • Penicillins Other (See Comments)     Childhood allergy       MEDICATIONS:  The medication list has been reviewed with the patient by the medical assistant, and the list has been updated in the electronic medical record, which I reviewed.  Medication dosages and frequencies were confirmed to be accurate.    REVIEW OF SYSTEMS:  PAIN:  See Vital Signs below.  GENERAL:  No fevers, chills, night sweats, or unintended weight loss.  SKIN:  No rashes or non-healing lesions.  HEME/LYMPH:  No abnormal bleeding.  No palpable lymphadenopathy.  EYES:  No vision changes or diplopia.  ENT:  No sore throat or difficulty swallowing.  RESPIRATORY:  No cough, shortness of breath, hemoptysis, or wheezing.  CARDIOVASCULAR:  No chest pain, palpitations, orthopnea, or dyspnea on exertion.  GASTROINTESTINAL:  No abdominal pain, nausea, vomiting, constipation, diarrhea, melena, or hematochezia.  GENITOURINARY:  No dysuria  "or hematuria.  MUSCULOSKELETAL: Chronic foot deformities.  Legs are wrapped.  NEUROLOGIC:  No dizziness, loss of consciousness, or seizures.  PSYCHIATRIC:  No depression, anxiety, or mood changes.    Vitals:    06/25/19 1507   BP: 133/87   Pulse: 77   Resp: 16   Temp: 98.7 °F (37.1 °C)   TempSrc: Oral   SpO2: 95%   Weight: 125 kg (275 lb)   Height: 182.9 cm (72.01\")   PainSc: 4  Comment: retroperitoneal lymphadenopathy, both feet       PHYSICAL EXAMINATION:  GENERAL:  Well-developed well-nourished male; awake, alert and oriented, in no acute distress.  SKIN: Chronic skin changes.  Purpura on both of his arms.  HEAD:  Normocephalic, atraumatic.  EARS:  Hearing intact.  NOSE:  Septum midline.  No excoriations or nasal discharge.  MOUTH:  No stomatitis or ulcers.  Lips are normal.  THROAT:  Oropharynx without lesions or exudates.  LYMPHATICS:  No cervical, supraclavicular, axillary lymphadenopathy.   CHEST:  Lungs are clear to auscultation bilaterally.  No wheezes, rales, or rhonchi.  HEART:  Regular rate; normal rhythm.  No murmurs, gallops or rubs.  ABDOMEN: Not examined today  EXTREMITIES: Legs are wrapped.  Chronic foot deformities  NEUROLOGICAL:  No focal neurologic deficits.    DIAGNOSTIC DATA:  Results for orders placed or performed in visit on 06/25/19   CBC Auto Differential   Result Value Ref Range    WBC 6.34 3.40 - 10.80 10*3/mm3    RBC 3.94 (L) 4.14 - 5.80 10*6/mm3    Hemoglobin 13.0 13.0 - 17.7 g/dL    Hematocrit 38.1 37.5 - 51.0 %    MCV 96.7 79.0 - 97.0 fL    MCH 33.0 26.6 - 33.0 pg    MCHC 34.1 31.5 - 35.7 g/dL    RDW 14.4 12.3 - 15.4 %    RDW-SD 51.2 37.0 - 54.0 fl    MPV 9.8 6.0 - 12.0 fL    Platelets 145 140 - 450 10*3/mm3    Neutrophil % 65.8 42.7 - 76.0 %    Lymphocyte % 25.6 19.6 - 45.3 %    Monocyte % 5.0 5.0 - 12.0 %    Eosinophil % 2.5 0.3 - 6.2 %    Basophil % 0.8 0.0 - 1.5 %    Immature Grans % 0.3 0.0 - 0.5 %    Neutrophils, Absolute 4.17 1.70 - 7.00 10*3/mm3    Lymphocytes, Absolute 1.62 " 0.70 - 3.10 10*3/mm3    Monocytes, Absolute 0.32 0.10 - 0.90 10*3/mm3    Eosinophils, Absolute 0.16 0.00 - 0.40 10*3/mm3    Basophils, Absolute 0.05 0.00 - 0.20 10*3/mm3    Immature Grans, Absolute 0.02 0.00 - 0.05 10*3/mm3    nRBC 0.0 0.0 - 0.2 /100 WBC       IMAGING:     CT images of the chest abdomen and pelvis and MRI lumbar spine dated 6/18/2019 images personally reviewed.  Stable small lymphadenopathy.  Small bony lesions.  Indeterminate.  Not well visualized on MRI imaging.        ASSESSMENT:  This is a 69 y.o. male with:  1.  Lucent bone lesions on imaging at L2, L4, and the pubis.  These were visualized on CT imaging but not necessarily on the skeletal survey.  MRI was suggested by radiology.  We opted for a PET scan due to the lymphadenopathy.  However, this was not approved by insurance.  Serum protein studies did not indicate a monoclonal protein.    Not well visualized on MRI imaging.  Likely benign.  Follow-up on repeat CT imaging.    2.  Lymphadenopathy: This is small and scattered and my suspicion for lymphoma has been low.  He does have some inguinal lymphadenopathy but this could be reactive secondary to the tinea intertrigo.  If the lymphadenopathy persists we can consider an ultrasound-guided needle biopsy of the largest left inguinal node.  However, he is therapeutically anticoagulated on warfarin.    Follow-up imaging 6/18/2019 stable.    3.  Thrombocytopenia: Platelets are normal today    4.  Alcohol use: He denies any alcohol use at this time    5.  Normocytic to macrocytic anemia with ferritin of 584 likely secondary to alcohol use and a normal percent iron saturation at 27%.  Fecal occult blood testing was negative.  Likely due to chronic disease and alcohol consumption.  Hemoglobin is normal today    6.  Purpura associated with anticoagulation    7.  Atrial fibrillation/flutter anticoagulated with warfarin    8.  Stage III chronic kidney disease: Creatinine pending from today.    9.   Significant hyponatremia during his recent hospitalization at [facility] which improved with supportive care.    PLAN:  1.  CT imaging of the abdomen and pelvis in 6 months and I will see him back with a CBC after that to review the results.  If everything appears stable at that time I do not think any further evaluation or follow-up here will be necessary.

## 2019-06-26 ENCOUNTER — ANTICOAGULATION VISIT (OUTPATIENT)
Dept: PHARMACY | Facility: HOSPITAL | Age: 69
End: 2019-06-26

## 2019-06-26 ENCOUNTER — TELEPHONE (OUTPATIENT)
Dept: FAMILY MEDICINE CLINIC | Facility: CLINIC | Age: 69
End: 2019-06-26

## 2019-06-26 DIAGNOSIS — I48.21 PERMANENT ATRIAL FIBRILLATION (HCC): ICD-10-CM

## 2019-06-26 LAB — INR PPP: 2.4

## 2019-06-26 NOTE — PROGRESS NOTES
Anticoagulation Clinic Progress Note    Anticoagulation Summary  As of 2019    INR goal:   2.0-3.0   TTR:   69.1 % (7.9 mo)   INR used for dosin.40 (2019)   Warfarin maintenance plan:   7.5 mg every Tue, Sat; 5 mg all other days   Weekly warfarin total:   40 mg   Plan last modified:   Brendan Live RPH (2019)   Next INR check:   7/3/2019   Priority:   Maintenance   Target end date:   Indefinite    Indications    Permanent atrial fibrillation (CMS/HCC) [I48.2]             Anticoagulation Episode Summary     INR check location:       Preferred lab:       [facility]    Comments:         Anticoagulation Care Providers     Provider Role Specialty Phone number    Kurt Henry MD Referring Cardiology [phone]          INR History:  Anticoagulation Monitoring 2019   INR 1.90 3.60 2.40   INR Date 2019   INR Goal 2.0-3.0 2.0-3.0 2.0-3.0   Trend Up Down Same   Last Week Total 42.5 mg 45 mg 32.5 mg   Next Week Total 45 mg 35 mg 40 mg   Sun 7.5 mg 5 mg 5 mg   Mon 5 mg 5 mg 5 mg   Tue 7.5 mg - 7.5 mg   Wed - 2.5 mg () 5 mg   Thu - 5 mg 5 mg   Fri 5 mg 5 mg 5 mg   Sat 7.5 mg 5 mg () 7.5 mg   Visit Report - - -   Some recent data might be hidden       Plan:  1. INR is Therapeutic today- see above in Anticoagulation Summary.   Provided instructions to Dagoberto with [facility] to Change their warfarin regimen- see above in Anticoagulation Summary.  2. Follow up in 7 days (pt refused another check sooner than 7 days due to other conflicts)      Brendan Live RPH

## 2019-06-28 LAB — CONV REPORT SUMMARY: NORMAL

## 2019-06-28 RX ORDER — METOCLOPRAMIDE 10 MG/1
10 TABLET ORAL
Qty: 360 TABLET | Refills: 0 | Status: SHIPPED | OUTPATIENT
Start: 2019-06-28 | End: 2019-11-06 | Stop reason: SDUPTHER

## 2019-07-03 ENCOUNTER — ANTICOAGULATION VISIT (OUTPATIENT)
Dept: PHARMACY | Facility: HOSPITAL | Age: 69
End: 2019-07-03

## 2019-07-03 DIAGNOSIS — I48.21 PERMANENT ATRIAL FIBRILLATION (HCC): ICD-10-CM

## 2019-07-03 LAB — INR PPP: 1.7

## 2019-07-03 NOTE — PROGRESS NOTES
Anticoagulation Clinic Progress Note    Anticoagulation Summary  As of 7/3/2019    INR goal:   2.0-3.0   TTR:   68.8 % (8.1 mo)   INR used for dosin.70! (7/3/2019)   Warfarin maintenance plan:   7.5 mg every Wed, Sat; 5 mg all other days   Weekly warfarin total:   40 mg   Plan last modified:   Brendan Live RPH (7/3/2019)   Next INR check:   7/10/2019   Priority:   Maintenance   Target end date:   Indefinite    Indications    Permanent atrial fibrillation (CMS/HCC) [I48.2]             Anticoagulation Episode Summary     INR check location:       Preferred lab:       Send INR reminders to:    [facility] CLINICAL POOL    Comments:         Anticoagulation Care Providers     Provider Role Specialty Phone number    Kurt Henry MD Referring Cardiology [phone]        Pertinent information:  Pt reports taking lower dose of 7.5 mg Sun, 5 mg rest of wk (per phone call with [facility] RN, Kelsey).    INR History:  Anticoagulation Monitoring 2019 2019 7/3/2019   INR 3.60 2.40 1.70   INR Date 2019 2019 7/3/2019   INR Goal 2.0-3.0 2.0-3.0 2.0-3.0   Trend Down Same Same   Last Week Total 45 mg 32.5 mg 37.5 mg   Next Week Total 35 mg 40 mg 40 mg   Sun 5 mg 5 mg 5 mg   Mon 5 mg 5 mg 5 mg   Tue - 7.5 mg 5 mg   Wed 2.5 mg () 5 mg 7.5 mg (7/3)   Thu 5 mg 5 mg 5 mg   Fri 5 mg 5 mg 5 mg   Sat 5 mg () 7.5 mg 7.5 mg   Visit Report - - -   Some recent data might be hidden       Plan:  1. INR is Subtherapeutic today- see above in Anticoagulation Summary.   Provided instructions to Jerman with [facility] to Increase their warfarin regimen- see above in Anticoagulation Summary.  2. Follow up in 1 week      Brendan Live RPH

## 2019-07-10 ENCOUNTER — ANTICOAGULATION VISIT (OUTPATIENT)
Dept: PHARMACY | Facility: HOSPITAL | Age: 69
End: 2019-07-10

## 2019-07-10 ENCOUNTER — TELEPHONE (OUTPATIENT)
Dept: FAMILY MEDICINE CLINIC | Facility: CLINIC | Age: 69
End: 2019-07-10

## 2019-07-10 DIAGNOSIS — I48.21 PERMANENT ATRIAL FIBRILLATION (HCC): ICD-10-CM

## 2019-07-10 LAB — INR PPP: 1.8

## 2019-07-10 RX ORDER — CARVEDILOL 3.12 MG/1
3.12 TABLET ORAL 2 TIMES DAILY WITH MEALS
Qty: 60 TABLET | Refills: 3 | Status: SHIPPED | OUTPATIENT
Start: 2019-07-10 | End: 2019-10-02 | Stop reason: SDUPTHER

## 2019-07-10 RX ORDER — ALBUTEROL SULFATE 90 UG/1
2 AEROSOL, METERED RESPIRATORY (INHALATION) EVERY 4 HOURS PRN
Qty: 1 INHALER | Refills: 6 | Status: SHIPPED | OUTPATIENT
Start: 2019-07-10 | End: 2020-06-07

## 2019-07-10 NOTE — PROGRESS NOTES
Anticoagulation Clinic Progress Note    Anticoagulation Summary  As of 7/10/2019    INR goal:   2.0-3.0   TTR:   66.9 % (8.3 mo)   INR used for dosin.80! (7/10/2019)   Warfarin maintenance plan:   7.5 mg every Sun, Wed, Fri; 5 mg all other days   Weekly warfarin total:   42.5 mg   Plan last modified:   Brendan Live RPH (7/10/2019)   Next INR check:   2019   Priority:   Maintenance   Target end date:   Indefinite    Indications    Permanent atrial fibrillation (CMS/HCC) [I48.2]             Anticoagulation Episode Summary     INR check location:       Preferred lab:       [facility]ers [facility]    Comments:         Anticoagulation Care Providers     Provider Role Specialty Phone number    Kurt Henry MD Referring Cardiology [phone]        Pertinent info:  Pt reported taking warfarin 7.5 mg Thurs/Sun, 5 mg rest of wk rather than 7.5 mg Wed/Sat, 5 mg rest of wk.    INR History:  Anticoagulation Monitoring 2019 7/3/2019 7/10/2019   INR 2.40 1.70 1.80   INR Date 2019 7/3/2019 7/10/2019   INR Goal 2.0-3.0 2.0-3.0 2.0-3.0   Trend Same Same Up   Last Week Total 32.5 mg 37.5 mg 40 mg   Next Week Total 40 mg 40 mg 42.5 mg   Sun 5 mg 5 mg 7.5 mg   Mon 5 mg 5 mg 5 mg   Tue 7.5 mg 5 mg 5 mg   Wed 5 mg 7.5 mg (7/3) 7.5 mg   Thu 5 mg 5 mg 5 mg   Fri 5 mg 5 mg 7.5 mg   Sat 7.5 mg 7.5 mg 5 mg   Visit Report - - -   Some recent data might be hidden       Plan:  1. INR is Subtherapeutic today- see above in Anticoagulation Summary.   Provided instructions via secure voicemail to Dagoberto with [facility] to Increase their warfarin regimen- see above in Anticoagulation Summary.  2. Follow up in 1 week      Brendan Live RPH

## 2019-07-11 ENCOUNTER — TELEPHONE (OUTPATIENT)
Dept: FAMILY MEDICINE CLINIC | Facility: CLINIC | Age: 69
End: 2019-07-11

## 2019-07-11 RX ORDER — IPRATROPIUM BROMIDE 17 UG/1
2 AEROSOL, METERED RESPIRATORY (INHALATION)
Qty: 3 INHALER | Refills: 3 | Status: SHIPPED | OUTPATIENT
Start: 2019-07-11 | End: 2020-08-08

## 2019-07-16 RX ORDER — ALPRAZOLAM 0.5 MG/1
0.5 TABLET ORAL 2 TIMES DAILY PRN
Qty: 30 TABLET | Refills: 0 | Status: SHIPPED | OUTPATIENT
Start: 2019-07-16 | End: 2019-08-19 | Stop reason: SDUPTHER

## 2019-07-17 LAB — INR PPP: 2.7

## 2019-07-18 ENCOUNTER — ANTICOAGULATION VISIT (OUTPATIENT)
Dept: PHARMACY | Facility: HOSPITAL | Age: 69
End: 2019-07-18

## 2019-07-18 DIAGNOSIS — I48.21 PERMANENT ATRIAL FIBRILLATION (HCC): ICD-10-CM

## 2019-07-18 NOTE — PROGRESS NOTES
Anticoagulation Clinic Progress Note    Anticoagulation Summary  As of 2019    INR goal:   2.0-3.0   TTR:   67.1 % (8.6 mo)   INR used for dosin.70 (2019)   Warfarin maintenance plan:   7.5 mg every Sun, Tue, Thu; 5 mg all other days   Weekly warfarin total:   42.5 mg   Plan last modified:   Sharita Nash [facility] (2019)   Next INR check:   2019   Priority:   Maintenance   Target end date:   Indefinite    Indications    Permanent atrial fibrillation (CMS/HCC) [I48.2]             Anticoagulation Episode Summary     INR check location:       Preferred lab:       Send INR reminders to:   [facility] CLINICAL POOL    Comments:         Anticoagulation Care Providers     Provider Role Specialty Phone number    Kurt Henry MD Referring Cardiology [phone]          INR History:  Anticoagulation Monitoring 7/3/2019 7/10/2019 2019   INR 1.70 1.80 2.70   INR Date 7/3/2019 7/10/2019 2019   INR Goal 2.0-3.0 2.0-3.0 2.0-3.0   Trend Same Up Same   Last Week Total 37.5 mg 40 mg 42.5 mg   Next Week Total 40 mg 42.5 mg 42.5 mg   Sun 5 mg 7.5 mg 7.5 mg   Mon 5 mg 5 mg 5 mg   Tue 5 mg 5 mg 7.5 mg   Wed 7.5 mg (7/3) 7.5 mg -   Thu 5 mg 5 mg 7.5 mg   Fri 5 mg 7.5 mg 5 mg   Sat 7.5 mg 5 mg 5 mg   Visit Report - - -   Some recent data might be hidden       Plan:  1. INR is Therapeutic today- see above in Anticoagulation Summary.   Provided instructions to Adriana with [facility] to continue their current warfarin regimen- see above in Anticoagulation Summary.  2. Follow up in 1 week      Sharita Nash [facility]

## 2019-07-24 ENCOUNTER — ANTICOAGULATION VISIT (OUTPATIENT)
Dept: PHARMACY | Facility: HOSPITAL | Age: 69
End: 2019-07-24

## 2019-07-24 DIAGNOSIS — I48.21 PERMANENT ATRIAL FIBRILLATION (HCC): ICD-10-CM

## 2019-07-24 LAB — INR PPP: 1.9

## 2019-07-24 NOTE — PROGRESS NOTES
Anticoagulation Clinic Progress Note    Anticoagulation Summary  As of 2019    INR goal:   2.0-3.0   TTR:   67.7 % (8.8 mo)   INR used for dosin.90! (2019)   Warfarin maintenance plan:   7.5 mg every Sun, Tue, Thu; 5 mg all other days   Weekly warfarin total:   42.5 mg   Plan last modified:   Sharita Nash RPH (2019)   Next INR check:   2019   Priority:   Maintenance   Target end date:   Indefinite    Indications    Permanent atrial fibrillation (CMS/HCC) [I48.2]             Anticoagulation Episode Summary     INR check location:       Preferred lab:       [facility]    Comments:         Anticoagulation Care Providers     Provider Role Specialty Phone number    Kurt Henry MD Referring Cardiology [phone]          INR History:  Anticoagulation Monitoring 7/10/2019 2019 2019   INR 1.80 2.70 1.90   INR Date 7/10/2019 2019 2019   INR Goal 2.0-3.0 2.0-3.0 2.0-3.0   Trend Up Same Same   Last Week Total 40 mg 42.5 mg 42.5 mg   Next Week Total 42.5 mg 42.5 mg 42.5 mg   Sun 7.5 mg 7.5 mg 7.5 mg   Mon 5 mg 5 mg 5 mg   Tue 5 mg 7.5 mg 7.5 mg   Wed 7.5 mg - 5 mg   Thu 5 mg 7.5 mg 7.5 mg   Fri 7.5 mg 5 mg 5 mg   Sat 5 mg 5 mg 5 mg   Visit Report - - -   Some recent data might be hidden       Plan:  1. INR is Subtherapeutic today- see above in Anticoagulation Summary.   Provided instructions to Dagoberto with [facility] to Continue their warfarin regimen- see above in Anticoagulation Summary.  2. Follow up in 1 week      Meghna Horan RPH

## 2019-07-31 ENCOUNTER — ANTICOAGULATION VISIT (OUTPATIENT)
Dept: PHARMACY | Facility: HOSPITAL | Age: 69
End: 2019-07-31

## 2019-07-31 DIAGNOSIS — I48.21 PERMANENT ATRIAL FIBRILLATION (HCC): ICD-10-CM

## 2019-07-31 LAB — INR PPP: 1.8

## 2019-07-31 RX ORDER — WARFARIN SODIUM 5 MG/1
TABLET ORAL
Qty: 102 TABLET | Refills: 0 | Status: SHIPPED | OUTPATIENT
Start: 2019-07-31 | End: 2019-10-18 | Stop reason: SDUPTHER

## 2019-07-31 NOTE — PROGRESS NOTES
Anticoagulation Clinic Progress Note    Anticoagulation Summary  As of 2019    INR goal:   2.0-3.0   TTR:   65.9 % (9 mo)   INR used for dosin.80! (2019)   Warfarin maintenance plan:   5 mg every Mon, Wed, Fri; 7.5 mg all other days   Weekly warfarin total:   45 mg   Plan last modified:   Brendan Live RPH (2019)   Next INR check:   2019   Priority:   Maintenance   Target end date:   Indefinite    Indications    Permanent atrial fibrillation (CMS/HCC) [I48.2]             Anticoagulation Episode Summary     INR check location:       Preferred lab:       Send INR reminders to:    [facility] CL[facility]ICAL POOL    Comments:         Anticoagulation Care Providers     Provider Role Specialty Phone number    Kurt Henry MD Referring Cardiology [phone]          INR History:  Anticoagulation Monitoring 2019   INR 2.70 1.90 1.80   INR Date 2019   INR Goal 2.0-3.0 2.0-3.0 2.0-3.0   Trend Same Same Up   Last Week Total 42.5 mg 42.5 mg 42.5 mg   Next Week Total 42.5 mg 42.5 mg 45 mg   Sun 7.5 mg 7.5 mg 7.5 mg   Mon 5 mg 5 mg 5 mg   Tue 7.5 mg 7.5 mg 7.5 mg   Wed - 5 mg 5 mg   Thu 7.5 mg 7.5 mg 7.5 mg   Fri 5 mg 5 mg 5 mg   Sat 5 mg 5 mg 7.5 mg   Visit Report - - -   Some recent data might be hidden       Plan:  1. INR is Subtherapeutic today- see above in Anticoagulation Summary.   Provided instructions via secure voicemail to Lianne with [facility] [employer] [facility] to Increase their warfarin regimen- see above in Anticoagulation Summary. Faxed order to [facility].  2. Follow up in 1 week      Brendan Live RPH

## 2019-08-07 ENCOUNTER — ANTICOAGULATION VISIT (OUTPATIENT)
Dept: PHARMACY | Facility: HOSPITAL | Age: 69
End: 2019-08-07

## 2019-08-07 DIAGNOSIS — I48.21 PERMANENT ATRIAL FIBRILLATION (HCC): ICD-10-CM

## 2019-08-07 LAB — INR PPP: 2.5

## 2019-08-07 NOTE — PROGRESS NOTES
Anticoagulation Clinic Progress Note    Anticoagulation Summary  As of 2019    INR goal:   2.0-3.0   TTR:   66.1 % (9.3 mo)   INR used for dosin.50 (2019)   Warfarin maintenance plan:   7.5 mg every Sun, Tue, Thu; 5 mg all other days   Weekly warfarin total:   42.5 mg   Plan last modified:   Brendan Live RPH (2019)   Next INR check:   2019   Priority:   Maintenance   Target end date:   Indefinite    Indications    Permanent atrial fibrillation (CMS/HCC) [I48.2]             Anticoagulation Episode Summary     INR check location:       Preferred lab:       [facility]    Comments:         Anticoagulation Care Providers     Provider Role Specialty Phone number    Kurt Henry MD Referring Cardiology [phone]        Pertinent information:  Pt reported still only taking 7.5 Sun/Tues/Thurs, 5 mg rest of wk. (Not Sat despite being instructed)    INR History:  Anticoagulation Monitoring 2019   INR 1.90 1.80 2.50   INR Date 2019   INR Goal 2.0-3.0 2.0-3.0 2.0-3.0   Trend Same Up Down   Last Week Total 42.5 mg 42.5 mg 42.5 mg   Next Week Total 42.5 mg 45 mg 42.5 mg   Sun 7.5 mg 7.5 mg 7.5 mg   Mon 5 mg 5 mg 5 mg   Tue 7.5 mg 7.5 mg 7.5 mg   Wed 5 mg 5 mg 5 mg   Thu 7.5 mg 7.5 mg 7.5 mg   Fri 5 mg 5 mg 5 mg   Sat 5 mg 7.5 mg 5 mg   Visit Report - - -   Some recent data might be hidden       Plan:  1. INR is Therapeutic today- see above in Anticoagulation Summary.   Provided instructions to Kelsey with [facility] [place] to Continue their warfarin regimen as reported (7.5 mg Sun/Tues/Thurs, 5 mg rest of wk)- see above in Anticoagulation Summary.  2. Follow up in 1 week      Brendan Live RPH

## 2019-08-15 ENCOUNTER — ANTICOAGULATION VISIT (OUTPATIENT)
Dept: PHARMACY | Facility: HOSPITAL | Age: 69
End: 2019-08-15

## 2019-08-15 DIAGNOSIS — I48.21 PERMANENT ATRIAL FIBRILLATION (HCC): ICD-10-CM

## 2019-08-15 LAB — INR PPP: 2.8

## 2019-08-15 NOTE — PROGRESS NOTES
Anticoagulation Clinic Progress Note    Anticoagulation Summary  As of 8/15/2019    INR goal:   2.0-3.0   TTR:   67.0 % (9.5 mo)   INR used for dosin.80 (8/15/2019)   Warfarin maintenance plan:   7.5 mg every Sun, Tue, Thu; 5 mg all other days   Weekly warfarin total:   42.5 mg   No change documented:   Brendan Live RPH   Plan last modified:   Brendan Live RPH (2019)   Next INR check:   2019   Priority:   Maintenance   Target end date:   Indefinite    Indications    Permanent atrial fibrillation (CMS/HCC) [I48.2]             Anticoagulation Episode Summary     INR check location:       Preferred lab:       Send INR reminders to:    [facility] CLINICAL [place]    Comments:         Anticoagulation Care Providers     Provider Role Specialty Phone number    Kurt Henry MD Referring Cardiology [phone]          INR History:  Anticoagulation Monitoring 2019 2019 8/15/2019   INR 1.80 2.50 2.80   INR Date 2019 2019 8/15/2019   INR Goal 2.0-3.0 2.0-3.0 2.0-3.0   Trend Up Down Same   Last Week Total 42.5 mg 42.5 mg 42.5 mg   Next Week Total 45 mg 42.5 mg 42.5 mg   Sun 7.5 mg 7.5 mg 7.5 mg   Mon 5 mg 5 mg 5 mg   Tue 7.5 mg 7.5 mg 7.5 mg   Wed 5 mg 5 mg 5 mg   Thu 7.5 mg 7.5 mg 7.5 mg   Fri 5 mg 5 mg 5 mg   Sat 7.5 mg 5 mg 5 mg   Visit Report - - -   Some recent data might be hidden       Plan:  1. INR is Therapeutic today- see above in Anticoagulation Summary.   Provided instructions to Dagoberto with [facility] to Continue their warfarin regimen- see above in Anticoagulation Summary.  2. Follow up in 1 week      Brendan Live RPH

## 2019-08-20 RX ORDER — ALPRAZOLAM 0.5 MG/1
0.5 TABLET ORAL 2 TIMES DAILY PRN
Qty: 30 TABLET | Refills: 0 | Status: SHIPPED | OUTPATIENT
Start: 2019-08-20 | End: 2019-09-18 | Stop reason: SDUPTHER

## 2019-08-23 ENCOUNTER — ANTICOAGULATION VISIT (OUTPATIENT)
Dept: PHARMACY | Facility: HOSPITAL | Age: 69
End: 2019-08-23

## 2019-08-23 ENCOUNTER — TELEPHONE (OUTPATIENT)
Dept: FAMILY MEDICINE CLINIC | Facility: CLINIC | Age: 69
End: 2019-08-23

## 2019-08-23 DIAGNOSIS — I48.21 PERMANENT ATRIAL FIBRILLATION (HCC): ICD-10-CM

## 2019-08-23 LAB — INR PPP: 2.8

## 2019-08-23 NOTE — PROGRESS NOTES
Anticoagulation Clinic Progress Note    Anticoagulation Summary  As of 2019    INR goal:   2.0-3.0   TTR:   67.9 % (9.8 mo)   INR used for dosin.80 (2019)   Warfarin maintenance plan:   7.5 mg every Sun, Tue, Thu; 5 mg all other days   Weekly warfarin total:   42.5 mg   No change documented:   Meghna Horan RPH   Plan last modified:   Brendan Live RPH (2019)   Next INR check:   2019   Priority:   Maintenance   Target end date:   Indefinite    Indications    Permanent atrial fibrillation (CMS/HCC) [I48.2]             Anticoagulation Episode Summary     INR check location:       Preferred lab:       Send INR reminders to:    [facility] CL[facility]ICAL [place]    Comments:         Anticoagulation Care Providers     Provider Role Specialty Phone number    Kurt Henry MD Referring Cardiology [phone]          INR History:  Anticoagulation Monitoring 2019 8/15/2019 2019   INR 2.50 2.80 2.80   INR Date 2019 8/15/2019 2019   INR Goal 2.0-3.0 2.0-3.0 2.0-3.0   Trend Down Same Same   Last Week Total 42.5 mg 42.5 mg 42.5 mg   Next Week Total 42.5 mg 42.5 mg 42.5 mg   Sun 7.5 mg 7.5 mg 7.5 mg   Mon 5 mg 5 mg 5 mg   Tue 7.5 mg 7.5 mg 7.5 mg   Wed 5 mg 5 mg 5 mg   Thu 7.5 mg 7.5 mg 7.5 mg   Fri 5 mg 5 mg 5 mg   Sat 5 mg 5 mg 5 mg   Visit Report - - -   Some recent data might be hidden       Plan:  1. INR is Therapeutic today- see above in Anticoagulation Summary.   Provided instructions to Dagoberto with [facility] to Continue their warfarin regimen- see above in Anticoagulation Summary.  2. Follow up in 1 week      Meghna Horan RPH

## 2019-08-28 ENCOUNTER — TELEPHONE (OUTPATIENT)
Dept: FAMILY MEDICINE CLINIC | Facility: CLINIC | Age: 69
End: 2019-08-28

## 2019-08-30 ENCOUNTER — ANTICOAGULATION VISIT (OUTPATIENT)
Dept: PHARMACY | Facility: HOSPITAL | Age: 69
End: 2019-08-30

## 2019-08-30 DIAGNOSIS — I48.21 PERMANENT ATRIAL FIBRILLATION (HCC): ICD-10-CM

## 2019-08-30 LAB — INR PPP: 1.6

## 2019-08-30 NOTE — PROGRESS NOTES
Anticoagulation Clinic Progress Note    Anticoagulation Summary  As of 2019    INR goal:   2.0-3.0   TTR:   67.9 % (10 mo)   INR used for dosin.60! (2019)   Warfarin maintenance plan:   7.5 mg every Sun, Tue, Thu; 5 mg all other days   Weekly warfarin total:   42.5 mg   Plan last modified:   Brendan Live RPH (2019)   Next INR check:   2019   Priority:   Maintenance   Target end date:   Indefinite    Indications    Permanent atrial fibrillation (CMS/HCC) [I48.2]             Anticoagulation Episode Summary     INR check location:       Preferred lab:       Send INR reminders to:   [facility] CLINICAL [place]    Comments:         Anticoagulation Care Providers     Provider Role Specialty Phone number    Kurt Henry MD Referring Cardiology [phone]          INR History:  Anticoagulation Monitoring 8/15/2019 2019 2019   INR 2.80 2.80 1.60   INR Date 8/15/2019 2019 2019   INR Goal 2.0-3.0 2.0-3.0 2.0-3.0   Trend Same Same Same   Last Week Total 42.5 mg 42.5 mg 37.5 mg   Next Week Total 42.5 mg 42.5 mg 45 mg   Sun 7.5 mg 7.5 mg 7.5 mg   Mon 5 mg 5 mg 5 mg   Tue 7.5 mg 7.5 mg 7.5 mg   Wed 5 mg 5 mg 5 mg   Thu 7.5 mg 7.5 mg 7.5 mg   Fri 5 mg 5 mg 7.5 mg ()   Sat 5 mg 5 mg 5 mg   Visit Report - - -   Some recent data might be hidden       Plan:  1. INR is Subtherapeutic today likely due to missed dose on - see above in Anticoagulation Summary.   Provided instructions to [facility] to boost to 7.5mg today then continue their warfarin regimen- see above in Anticoagulation Summary.  2. Follow up in 1 week      Meghna Horan RPH

## 2019-09-06 ENCOUNTER — ANTICOAGULATION VISIT (OUTPATIENT)
Dept: PHARMACY | Facility: HOSPITAL | Age: 69
End: 2019-09-06

## 2019-09-06 DIAGNOSIS — I48.21 PERMANENT ATRIAL FIBRILLATION (HCC): ICD-10-CM

## 2019-09-06 LAB — INR PPP: 2.7

## 2019-09-06 NOTE — PROGRESS NOTES
Anticoagulation Clinic Progress Note    Anticoagulation Summary  As of 2019    INR goal:   2.0-3.0   TTR:   67.8 % (10.3 mo)   INR used for dosin.70 (2019)   Warfarin maintenance plan:   7.5 mg every Sun, Tue, Thu; 5 mg all other days   Weekly warfarin total:   42.5 mg   No change documented:   Brendan Live RPH   Plan last modified:   Brendan Live RPH (2019)   Next INR check:   2019   Priority:   Maintenance   Target end date:   Indefinite    Indications    Permanent atrial fibrillation (CMS/HCC) [I48.2]             Anticoagulation Episode Summary     INR check location:       Preferred lab:       Send INR reminders to:    [facility]    Comments:         Anticoagulation Care Providers     Provider Role Specialty Phone number    Kurt Henry MD Referring Cardiology [phone]          INR History:  Anticoagulation Monitoring 2019   INR 2.80 1.60 2.70   INR Date 2019   INR Goal 2.0-3.0 2.0-3.0 2.0-3.0   Trend Same Same Same   Last Week Total 42.5 mg 37.5 mg 45 mg   Next Week Total 42.5 mg 45 mg 42.5 mg   Sun 7.5 mg 7.5 mg 7.5 mg   Mon 5 mg 5 mg 5 mg   Tue 7.5 mg 7.5 mg 7.5 mg   Wed 5 mg 5 mg 5 mg   Thu 7.5 mg 7.5 mg 7.5 mg   Fri 5 mg 7.5 mg () 5 mg   Sat 5 mg 5 mg 5 mg   Visit Report - - -   Some recent data might be hidden       Plan:  1. INR is Therapeutic today- see above in Anticoagulation Summary.   Provided instructions via faxed orders to [facility] to Continue their warfarin regimen- see above in Anticoagulation Summary.  2. Follow up in 1 week      Brendan Live RPH

## 2019-09-09 RX ORDER — FUROSEMIDE 20 MG/1
TABLET ORAL
Qty: 90 TABLET | Refills: 0 | OUTPATIENT
Start: 2019-09-09

## 2019-09-12 ENCOUNTER — PREP FOR SURGERY (OUTPATIENT)
Dept: OTHER | Facility: HOSPITAL | Age: 69
End: 2019-09-12

## 2019-09-12 DIAGNOSIS — K63.5 COLON POLYPS: Primary | ICD-10-CM

## 2019-09-13 ENCOUNTER — ANTICOAGULATION VISIT (OUTPATIENT)
Dept: PHARMACY | Facility: HOSPITAL | Age: 69
End: 2019-09-13

## 2019-09-13 DIAGNOSIS — I48.21 PERMANENT ATRIAL FIBRILLATION (HCC): ICD-10-CM

## 2019-09-13 LAB — INR PPP: 2.3

## 2019-09-13 NOTE — PROGRESS NOTES
Anticoagulation Clinic Progress Note    Anticoagulation Summary  As of 2019    INR goal:   2.0-3.0   TTR:   68.5 % (10.5 mo)   INR used for dosin.30 (2019)   Warfarin maintenance plan:   7.5 mg every Sun, Tue, Thu; 5 mg all other days   Weekly warfarin total:   42.5 mg   No change documented:   Brendan Live RPH   Plan last modified:   Brendan Live RPH (2019)   Next INR check:   2019   Priority:   Maintenance   Target end date:   Indefinite    Indications    Permanent atrial fibrillation (CMS/HCC) [I48.2]             Anticoagulation Episode Summary     INR check location:       Preferred lab:       Send INR reminders to:    [facility] [place]    Comments:         Anticoagulation Care Providers     Provider Role Specialty Phone number    Kurt Hnery MD Referring Cardiology [phone]          INR History:  Anticoagulation Monitoring 2019   INR 1.60 2.70 2.30   INR Date 2019   INR Goal 2.0-3.0 2.0-3.0 2.0-3.0   Trend Same Same Same   Last Week Total 37.5 mg 45 mg 42.5 mg   Next Week Total 45 mg 42.5 mg 42.5 mg   Sun 7.5 mg 7.5 mg 7.5 mg   Mon 5 mg 5 mg 5 mg   Tue 7.5 mg 7.5 mg 7.5 mg   Wed 5 mg 5 mg 5 mg   Thu 7.5 mg 7.5 mg 7.5 mg   Fri 7.5 mg () 5 mg 5 mg   Sat 5 mg 5 mg 5 mg   Visit Report - - -   Some recent data might be hidden       Plan:  1. INR is Therapeutic today- see above in Anticoagulation Summary.   Provided instructions to Juan Manuel with [facility] to Continue their warfarin regimen- see above in Anticoagulation Summary.  2. Follow up in 1 week      Brendan Live RPH

## 2019-09-17 RX ORDER — FUROSEMIDE 20 MG/1
TABLET ORAL
Qty: 90 TABLET | Refills: 0 | Status: SHIPPED | OUTPATIENT
Start: 2019-09-17 | End: 2019-12-10 | Stop reason: SDUPTHER

## 2019-09-18 RX ORDER — ALPRAZOLAM 0.5 MG/1
0.5 TABLET ORAL 2 TIMES DAILY PRN
Qty: 30 TABLET | Refills: 0 | Status: SHIPPED | OUTPATIENT
Start: 2019-09-18 | End: 2019-10-14 | Stop reason: SDUPTHER

## 2019-09-20 ENCOUNTER — ANTICOAGULATION VISIT (OUTPATIENT)
Dept: PHARMACY | Facility: HOSPITAL | Age: 69
End: 2019-09-20

## 2019-09-20 DIAGNOSIS — I48.21 PERMANENT ATRIAL FIBRILLATION (HCC): ICD-10-CM

## 2019-09-20 LAB — INR PPP: 2.6

## 2019-09-20 NOTE — PROGRESS NOTES
Anticoagulation Clinic Progress Note    Anticoagulation Summary  As of 2019    INR goal:   2.0-3.0   TTR:   69.2 % (10.7 mo)   INR used for dosin.60 (2019)   Warfarin maintenance plan:   7.5 mg every Sun, Tue, Thu; 5 mg all other days   Weekly warfarin total:   42.5 mg   No change documented:   Brendan Live RPH   Plan last modified:   Brendan Live RPH (2019)   Next INR check:   2019   Priority:   Maintenance   Target end date:   Indefinite    Indications    Permanent atrial fibrillation (CMS/HCC) [I48.2]             Anticoagulation Episode Summary     INR check location:       Preferred lab:       Send INR reminders to:    [facility] [place]    Comments:         Anticoagulation Care Providers     Provider Role Specialty Phone number    Kurt Henry MD Referring Cardiology [phone]          INR History:  Anticoagulation Monitoring 2019   INR 2.70 2.30 2.60   INR Date 2019   INR Goal 2.0-3.0 2.0-3.0 2.0-3.0   Trend Same Same Same   Last Week Total 45 mg 42.5 mg 42.5 mg   Next Week Total 42.5 mg 42.5 mg 42.5 mg   Sun 7.5 mg 7.5 mg 7.5 mg   Mon 5 mg 5 mg 5 mg   Tue 7.5 mg 7.5 mg 7.5 mg   Wed 5 mg 5 mg 5 mg   Thu 7.5 mg 7.5 mg 7.5 mg   Fri 5 mg 5 mg 5 mg   Sat 5 mg 5 mg 5 mg   Visit Report - - -   Some recent data might be hidden       Plan:  1. INR is Therapeutic today- see above in Anticoagulation Summary.   Provided instructions to Juan Manuel with [facility] to Continue their warfarin regimen- see above in Anticoagulation Summary.  2. Follow up in 1 week      Brendan Live RPH

## 2019-09-27 ENCOUNTER — ANTICOAGULATION VISIT (OUTPATIENT)
Dept: PHARMACY | Facility: HOSPITAL | Age: 69
End: 2019-09-27

## 2019-09-27 DIAGNOSIS — I48.21 PERMANENT ATRIAL FIBRILLATION (HCC): ICD-10-CM

## 2019-09-27 LAB — INR PPP: 1.9

## 2019-09-27 NOTE — PROGRESS NOTES
Anticoagulation Clinic Progress Note    Anticoagulation Summary  As of 2019    INR goal:   2.0-3.0   TTR:   69.5 % (11 mo)   INR used for dosin.90! (2019)   Warfarin maintenance plan:   7.5 mg every Sun, Tue, Thu; 5 mg all other days   Weekly warfarin total:   42.5 mg   No change documented:   Brendan Live RPH   Plan last modified:   Brendan Live RPH (2019)   Next INR check:   10/4/2019   Priority:   Maintenance   Target end date:   Indefinite    Indications    Permanent atrial fibrillation (CMS/HCC) [I48.2]             Anticoagulation Episode Summary     INR check location:       Preferred lab:       [facility] [place]    Comments:         Anticoagulation Care Providers     Provider Role Specialty Phone number    Kurt Henry MD Referring Cardiology [phone]          INR History:  Anticoagulation Monitoring 2019   INR 2.30 2.60 1.90   INR Date 2019   INR Goal 2.0-3.0 2.0-3.0 2.0-3.0   Trend Same Same Same   Last Week Total 42.5 mg 42.5 mg 42.5 mg   Next Week Total 42.5 mg 42.5 mg 42.5 mg   Sun 7.5 mg 7.5 mg 7.5 mg   Mon 5 mg 5 mg 5 mg   Tue 7.5 mg 7.5 mg 7.5 mg   Wed 5 mg 5 mg 5 mg   Thu 7.5 mg 7.5 mg 7.5 mg   Fri 5 mg 5 mg 5 mg   Sat 5 mg 5 mg 5 mg   Visit Report - - -   Some recent data might be hidden       Plan:  1. INR is Subtherapeutic today- see above in Anticoagulation Summary.   Provided instructions via secure voicemail to Dagoberto with [facility] to Continue their warfarin regimen- see above in Anticoagulation Summary.  2. Follow up in 1 week      Brendan Live RPH

## 2019-09-27 NOTE — PROGRESS NOTES
Anticoagulation Clinic Progress Note    Anticoagulation Summary  As of 2019    INR goal:   2.0-3.0   TTR:   69.5 % (11 mo)   INR used for dosin.90! (2019)   Warfarin maintenance plan:   7.5 mg every Sun, Tue, Thu; 5 mg all other days   Weekly warfarin total:   42.5 mg   Plan last modified:   Brendan Live RPH (2019)   Next INR check:   10/4/2019   Priority:   Maintenance   Target end date:   Indefinite    Indications    Permanent atrial fibrillation (CMS/HCC) [I48.2]             Anticoagulation Episode Summary     INR check location:       Preferred lab:       [facility] [place]    Comments:         Anticoagulation Care Providers     Provider Role Specialty Phone number    Kurt Henry MD Referring Cardiology [phone]        Pertinent info:  Forgot last night's dose; took >12 hours later this morning.    INR History:  Anticoagulation Monitoring 2019   INR 2.30 2.60 1.90   INR Date 2019   INR Goal 2.0-3.0 2.0-3.0 2.0-3.0   Trend Same Same Same   Last Week Total 42.5 mg 42.5 mg 35 mg   Next Week Total 42.5 mg 42.5 mg 47.5 mg   Sun 7.5 mg 7.5 mg 7.5 mg   Mon 5 mg 5 mg 5 mg   Tue 7.5 mg 7.5 mg 7.5 mg   Wed 5 mg 5 mg 5 mg   Thu 7.5 mg 7.5 mg 7.5 mg   Fri 5 mg 5 mg 10 mg ()   Sat 5 mg 5 mg 5 mg   Visit Report - - -   Some recent data might be hidden       Plan:  1. INR is Subtherapeutic today- see above in Anticoagulation Summary.   Provided instructions to Joaquín Fabián and Dagoberto with [facility] to Change their warfarin regimen- see above in Anticoagulation Summary.  2. Follow up in 1 week      Brendan Live RPH

## 2019-10-02 RX ORDER — CARVEDILOL 3.12 MG/1
TABLET ORAL
Qty: 180 TABLET | Refills: 1 | Status: SHIPPED | OUTPATIENT
Start: 2019-10-02 | End: 2020-03-26

## 2019-10-04 ENCOUNTER — ANTICOAGULATION VISIT (OUTPATIENT)
Dept: PHARMACY | Facility: HOSPITAL | Age: 69
End: 2019-10-04

## 2019-10-04 DIAGNOSIS — I48.21 PERMANENT ATRIAL FIBRILLATION (HCC): ICD-10-CM

## 2019-10-04 LAB — INR PPP: 2.2

## 2019-10-04 NOTE — PROGRESS NOTES
Anticoagulation Clinic Progress Note    Anticoagulation Summary  As of 10/4/2019    INR goal:   2.0-3.0   TTR:   69.5 % (11.2 mo)   INR used for dosin.20 (10/4/2019)   Warfarin maintenance plan:   7.5 mg every Sun, Tue, Thu; 5 mg all other days   Weekly warfarin total:   42.5 mg   No change documented:   Brendan Live RPH   Plan last modified:   Brendan Live RPH (2019)   Next INR check:   10/11/2019   Priority:   Maintenance   Target end date:   Indefinite    Indications    Permanent atrial fibrillation [I48.21]             Anticoagulation Episode Summary     INR check location:       Preferred lab:       [facility] [place]    Comments:         Anticoagulation Care Providers     Provider Role Specialty Phone number    Kurt Henry MD Referring Cardiology [phone]          INR History:  Anticoagulation Monitoring 2019 2019 10/4/2019   INR 2.60 1.90 2.20   INR Date 2019 2019 10/4/2019   INR Goal 2.0-3.0 2.0-3.0 2.0-3.0   Trend Same Same Same   Last Week Total 42.5 mg 35 mg 47.5 mg   Next Week Total 42.5 mg 47.5 mg 42.5 mg   Sun 7.5 mg 7.5 mg 7.5 mg   Mon 5 mg 5 mg 5 mg   Tue 7.5 mg 7.5 mg 7.5 mg   Wed 5 mg 5 mg 5 mg   Thu 7.5 mg 7.5 mg 7.5 mg   Fri 5 mg 10 mg () 5 mg   Sat 5 mg 5 mg 5 mg   Visit Report - - -   Some recent data might be hidden       Plan:  1. INR is Therapeutic today- see above in Anticoagulation Summary.   Provided instructions to Lianne with [facility] to Continue their warfarin regimen- see above in Anticoagulation Summary.  2. Follow up in 1 week      Brendan Live RPH

## 2019-10-10 ENCOUNTER — ANTICOAGULATION VISIT (OUTPATIENT)
Dept: PHARMACY | Facility: HOSPITAL | Age: 69
End: 2019-10-10

## 2019-10-10 DIAGNOSIS — I48.21 PERMANENT ATRIAL FIBRILLATION (HCC): ICD-10-CM

## 2019-10-10 LAB — INR PPP: 3

## 2019-10-10 NOTE — PROGRESS NOTES
Anticoagulation Clinic Progress Note    Anticoagulation Summary  As of 10/10/2019    INR goal:   2.0-3.0   TTR:   70.0 % (11.4 mo)   INR used for dosing:   3.00 (10/10/2019)   Warfarin maintenance plan:   7.5 mg every Sun, Tue, Thu; 5 mg all other days   Weekly warfarin total:   42.5 mg   Plan last modified:   [facility] (8[facility]   Next INR check:   10/17/2019   Priority:   Maintenance   Target end date:   Indefinite    Indications    Permanent atrial fibrillation [I48.21]             Anticoagulation Episode Summary     INR check location:       Preferred lab:       Send INR reminders to:    [facility] [place]    Comments:         Anticoagulation Care Providers     Provider Role Specialty Phone number    Kurt Henry MD Referring Cardiology [phone]          Drug interactions: has remained unchanged.  Diet: has remained unchanged.    Clinic Interview:  Patient Findings     Negatives:   Signs/symptoms of thrombosis, Signs/symptoms of bleeding,   Laboratory test error suspected, Change in health, Change in alcohol use,   Change in activity, Upcoming invasive procedure, Emergency department   visit, Upcoming dental procedure, Missed doses, Extra doses, Change in   medications, Change in diet/appetite, Hospital admission, Bruising, Other   complaints      Clinical Outcomes     Negatives:   Major bleeding event, Thromboembolic event,   Anticoagulation-related hospital admission, Anticoagulation-related ED   visit, Anticoagulation-related fatality        INR History:  Anticoagulation Monitoring 9/27/2019 10/4/2019 10/10/2019   INR 1.90 2.20 3.00   INR Date 9/27/2019 10/4/2019 10/10/2019   INR Goal 2.0-3.0 2.0-3.0 2.0-3.0   Trend Same Same Same   Last Week Total 35 mg 47.5 mg 42.5 mg   Next Week Total 47.5 mg 42.5 mg 42.5 mg   Sun 7.5 mg 7.5 mg 7.5 mg   Mon 5 mg 5 mg 5 mg   Tue 7.5 mg 7.5 mg 7.5 mg   Wed 5 mg 5 mg 5 mg   Thu 7.5 mg 7.5 mg 7.5 mg   Fri 10 mg (9/27) 5 mg 5 mg   Sat 5 mg 5 mg 5 mg    Visit Report - - -   Some recent data might be hidden       Plan:  1. INR is Therapeutic today- see above in Anticoagulation Summary.   Will instruct Jalen Lockwood to Continue their warfarin regimen- see above in Anticoagulation Summary.  2. Follow up in 1 week  3. Pt has agreed to only be called if INR out of range. They have been instructed to call if any changes in medications, doses, concerns, etc. Patient expresses understanding and has no further questions at this time.    Fahad Jimenez, PharmD

## 2019-10-15 RX ORDER — ALPRAZOLAM 0.5 MG/1
0.5 TABLET ORAL 2 TIMES DAILY PRN
Qty: 30 TABLET | Refills: 0 | Status: SHIPPED | OUTPATIENT
Start: 2019-10-15 | End: 2019-11-15 | Stop reason: SDUPTHER

## 2019-10-17 ENCOUNTER — ANTICOAGULATION VISIT (OUTPATIENT)
Dept: PHARMACY | Facility: HOSPITAL | Age: 69
End: 2019-10-17

## 2019-10-17 DIAGNOSIS — I48.21 PERMANENT ATRIAL FIBRILLATION (HCC): ICD-10-CM

## 2019-10-17 LAB — INR PPP: 3.2

## 2019-10-17 NOTE — PROGRESS NOTES
Anticoagulation Clinic Progress Note    Anticoagulation Summary  As of 10/17/2019    INR goal:   2.0-3.0   TTR:   68.6 % (11.6 mo)   INR used for dosing:   3.20! (10/17/2019)   Warfarin maintenance plan:   7.5 mg every Sun, Tue, Thu; 5 mg all other days   Weekly warfarin total:   42.5 mg   Plan last modified:   Brendan Live RPH (8/7/2019)   Next INR check:   10/24/2019   Priority:   Maintenance   Target end date:   Indefinite    Indications    Permanent atrial fibrillation [I48.21]             Anticoagulation Episode Summary     INR check location:       Preferred lab:       [facility]    Comments:         Anticoagulation Care Providers     Provider Role Specialty Phone number    Kurt Henry MD Referring Cardiology [phone]          INR History:  Anticoagulation Monitoring 10/4/2019 10/10/2019 10/17/2019   INR 2.20 3.00 3.20   INR Date 10/4/2019 10/10/2019 10/17/2019   INR Goal 2.0-3.0 2.0-3.0 2.0-3.0   Trend Same Same Same   Last Week Total 47.5 mg 42.5 mg 42.5 mg   Next Week Total 42.5 mg 42.5 mg 40 mg   Sun 7.5 mg 7.5 mg 7.5 mg   Mon 5 mg 5 mg 5 mg   Tue 7.5 mg 7.5 mg 7.5 mg   Wed 5 mg 5 mg 5 mg   Thu 7.5 mg 7.5 mg 5 mg (10/17)   Fri 5 mg 5 mg 5 mg   Sat 5 mg 5 mg 5 mg   Visit Report - - -   Some recent data might be hidden       Plan:  1. INR is Supratherapeutic today- see above in Anticoagulation Summary.   Provided instructions to Dagoberto with [facility] to Change their warfarin regimen- see above in Anticoagulation Summary.  2. Follow up in 1 week      Brendan Live RPH

## 2019-10-21 RX ORDER — WARFARIN SODIUM 5 MG/1
TABLET ORAL
Qty: 110 TABLET | Refills: 0 | Status: SHIPPED | OUTPATIENT
Start: 2019-10-21 | End: 2020-01-14

## 2019-10-24 ENCOUNTER — ANTICOAGULATION VISIT (OUTPATIENT)
Dept: PHARMACY | Facility: HOSPITAL | Age: 69
End: 2019-10-24

## 2019-10-24 DIAGNOSIS — I48.21 PERMANENT ATRIAL FIBRILLATION (HCC): ICD-10-CM

## 2019-10-24 LAB — INR PPP: 2.3

## 2019-10-24 NOTE — PROGRESS NOTES
Anticoagulation Clinic Progress Note    Anticoagulation Summary  As of 10/24/2019    INR goal:   2.0-3.0   TTR:   68.8 % (11.9 mo)   INR used for dosin.30 (10/24/2019)   Warfarin maintenance plan:   7.5 mg every Sun, Tue, Thu; 5 mg all other days   Weekly warfarin total:   42.5 mg   Plan last modified:   Brendan Live RPH (2019)   Next INR check:   10/31/2019   Priority:   Maintenance   Target end date:   Indefinite    Indications    Permanent atrial fibrillation [I48.21]             Anticoagulation Episode Summary     INR check location:       Preferred lab:       Send INR reminders to:    [facility]    Comments:         Anticoagulation Care Providers     Provider Role Specialty Phone number    Kurt Henry MD Referring Cardiology [phone]        Pertinent information:  Colonoscopy 10/29; Dr. Lagunas has advised holding x 5 days per pt and Julita/[facility] health report.    INR History:  Anticoagulation Monitoring 10/10/2019 10/17/2019 10/24/2019   INR 3.00 3.20 2.30   INR Date 10/10/2019 10/17/2019 10/24/2019   INR Goal 2.0-3.0 2.0-3.0 2.0-3.0   Trend Same Same Same   Last Week Total 42.5 mg 42.5 mg 40 mg   Next Week Total 42.5 mg 40 mg 15 mg   Sun 7.5 mg 7.5 mg Hold (10/27)   Mon 5 mg 5 mg Hold (10/28)   Tue 7.5 mg 7.5 mg 10 mg (10/29)   Wed 5 mg 5 mg 5 mg   Thu 7.5 mg 5 mg (10/17) Hold (10/24)   Fri 5 mg 5 mg Hold (10/25)   Sat 5 mg 5 mg Hold (10/26)   Visit Report - - -   Some recent data might be hidden       Plan:  1. INR is Therapeutic today- see above in Anticoagulation Summary.   Provided instructions to Julita with [facility] to Change their warfarin regimen- see above in Anticoagulation Summary.  2. Follow up in 1 week      Brendan Live RPH

## 2019-10-29 ENCOUNTER — ANESTHESIA EVENT (OUTPATIENT)
Dept: GASTROENTEROLOGY | Facility: HOSPITAL | Age: 69
End: 2019-10-29

## 2019-10-29 ENCOUNTER — HOSPITAL ENCOUNTER (OUTPATIENT)
Facility: HOSPITAL | Age: 69
Setting detail: HOSPITAL OUTPATIENT SURGERY
Discharge: HOME | End: 2019-10-29
Attending: INTERNAL MEDICINE | Admitting: INTERNAL MEDICINE

## 2019-10-29 ENCOUNTER — ANESTHESIA (OUTPATIENT)
Dept: GASTROENTEROLOGY | Facility: HOSPITAL | Age: 69
End: 2019-10-29

## 2019-10-29 VITALS
HEIGHT: 76 IN | BODY MASS INDEX: 33.97 KG/M2 | TEMPERATURE: 98.2 F | RESPIRATION RATE: 16 BRPM | WEIGHT: 279 LBS | SYSTOLIC BLOOD PRESSURE: 168 MMHG | OXYGEN SATURATION: 97 % | DIASTOLIC BLOOD PRESSURE: 95 MMHG | HEART RATE: 71 BPM

## 2019-10-29 DIAGNOSIS — K63.5 COLON POLYPS: ICD-10-CM

## 2019-10-29 PROCEDURE — 88305 TISSUE EXAM BY PATHOLOGIST: CPT | Performed by: INTERNAL MEDICINE

## 2019-10-29 PROCEDURE — 45385 COLONOSCOPY W/LESION REMOVAL: CPT | Performed by: INTERNAL MEDICINE

## 2019-10-29 PROCEDURE — 25010000002 MIDAZOLAM PER 1 MG: Performed by: ANESTHESIOLOGY

## 2019-10-29 PROCEDURE — S0260 H&P FOR SURGERY: HCPCS | Performed by: INTERNAL MEDICINE

## 2019-10-29 PROCEDURE — 25010000002 PROPOFOL 10 MG/ML EMULSION: Performed by: ANESTHESIOLOGY

## 2019-10-29 RX ORDER — PROMETHAZINE HYDROCHLORIDE 25 MG/1
25 TABLET ORAL ONCE AS NEEDED
Status: DISCONTINUED | OUTPATIENT
Start: 2019-10-29 | End: 2019-10-29 | Stop reason: HOSPADM

## 2019-10-29 RX ORDER — EPHEDRINE SULFATE 50 MG/ML
5 INJECTION, SOLUTION INTRAVENOUS ONCE AS NEEDED
Status: DISCONTINUED | OUTPATIENT
Start: 2019-10-29 | End: 2019-10-29 | Stop reason: HOSPADM

## 2019-10-29 RX ORDER — DIPHENHYDRAMINE HCL 25 MG
25 CAPSULE ORAL
Status: DISCONTINUED | OUTPATIENT
Start: 2019-10-29 | End: 2019-10-29 | Stop reason: HOSPADM

## 2019-10-29 RX ORDER — PROMETHAZINE HYDROCHLORIDE 25 MG/ML
12.5 INJECTION, SOLUTION INTRAMUSCULAR; INTRAVENOUS ONCE AS NEEDED
Status: DISCONTINUED | OUTPATIENT
Start: 2019-10-29 | End: 2019-10-29 | Stop reason: HOSPADM

## 2019-10-29 RX ORDER — NALOXONE HCL 0.4 MG/ML
0.2 VIAL (ML) INJECTION AS NEEDED
Status: DISCONTINUED | OUTPATIENT
Start: 2019-10-29 | End: 2019-10-29 | Stop reason: HOSPADM

## 2019-10-29 RX ORDER — DIPHENHYDRAMINE HYDROCHLORIDE 50 MG/ML
12.5 INJECTION INTRAMUSCULAR; INTRAVENOUS
Status: DISCONTINUED | OUTPATIENT
Start: 2019-10-29 | End: 2019-10-29 | Stop reason: HOSPADM

## 2019-10-29 RX ORDER — SODIUM CHLORIDE, SODIUM LACTATE, POTASSIUM CHLORIDE, CALCIUM CHLORIDE 600; 310; 30; 20 MG/100ML; MG/100ML; MG/100ML; MG/100ML
1000 INJECTION, SOLUTION INTRAVENOUS CONTINUOUS
Status: DISCONTINUED | OUTPATIENT
Start: 2019-10-29 | End: 2019-10-29 | Stop reason: HOSPADM

## 2019-10-29 RX ORDER — LIDOCAINE HYDROCHLORIDE 20 MG/ML
INJECTION, SOLUTION INFILTRATION; PERINEURAL AS NEEDED
Status: DISCONTINUED | OUTPATIENT
Start: 2019-10-29 | End: 2019-10-29 | Stop reason: SURG

## 2019-10-29 RX ORDER — PROMETHAZINE HYDROCHLORIDE 25 MG/1
25 SUPPOSITORY RECTAL ONCE AS NEEDED
Status: DISCONTINUED | OUTPATIENT
Start: 2019-10-29 | End: 2019-10-29 | Stop reason: HOSPADM

## 2019-10-29 RX ORDER — FENTANYL CITRATE 50 UG/ML
50 INJECTION, SOLUTION INTRAMUSCULAR; INTRAVENOUS
Status: DISCONTINUED | OUTPATIENT
Start: 2019-10-29 | End: 2019-10-29 | Stop reason: HOSPADM

## 2019-10-29 RX ORDER — HYDROCODONE BITARTRATE AND ACETAMINOPHEN 7.5; 325 MG/1; MG/1
1 TABLET ORAL ONCE AS NEEDED
Status: DISCONTINUED | OUTPATIENT
Start: 2019-10-29 | End: 2019-10-29 | Stop reason: HOSPADM

## 2019-10-29 RX ORDER — HYDRALAZINE HYDROCHLORIDE 20 MG/ML
5 INJECTION INTRAMUSCULAR; INTRAVENOUS
Status: DISCONTINUED | OUTPATIENT
Start: 2019-10-29 | End: 2019-10-29 | Stop reason: HOSPADM

## 2019-10-29 RX ORDER — ONDANSETRON 2 MG/ML
4 INJECTION INTRAMUSCULAR; INTRAVENOUS ONCE AS NEEDED
Status: DISCONTINUED | OUTPATIENT
Start: 2019-10-29 | End: 2019-10-29 | Stop reason: HOSPADM

## 2019-10-29 RX ORDER — MIDAZOLAM HYDROCHLORIDE 1 MG/ML
1 INJECTION INTRAMUSCULAR; INTRAVENOUS ONCE
Status: COMPLETED | OUTPATIENT
Start: 2019-10-29 | End: 2019-10-29

## 2019-10-29 RX ORDER — ACETAMINOPHEN 650 MG/1
650 SUPPOSITORY RECTAL ONCE AS NEEDED
Status: DISCONTINUED | OUTPATIENT
Start: 2019-10-29 | End: 2019-10-29 | Stop reason: HOSPADM

## 2019-10-29 RX ORDER — ACETAMINOPHEN 325 MG/1
650 TABLET ORAL ONCE AS NEEDED
Status: DISCONTINUED | OUTPATIENT
Start: 2019-10-29 | End: 2019-10-29 | Stop reason: HOSPADM

## 2019-10-29 RX ORDER — PROPOFOL 10 MG/ML
VIAL (ML) INTRAVENOUS AS NEEDED
Status: DISCONTINUED | OUTPATIENT
Start: 2019-10-29 | End: 2019-10-29 | Stop reason: SURG

## 2019-10-29 RX ORDER — HYDROMORPHONE HCL 110MG/55ML
0.5 PATIENT CONTROLLED ANALGESIA SYRINGE INTRAVENOUS
Status: DISCONTINUED | OUTPATIENT
Start: 2019-10-29 | End: 2019-10-29 | Stop reason: HOSPADM

## 2019-10-29 RX ORDER — FLUMAZENIL 0.1 MG/ML
0.2 INJECTION INTRAVENOUS AS NEEDED
Status: DISCONTINUED | OUTPATIENT
Start: 2019-10-29 | End: 2019-10-29 | Stop reason: HOSPADM

## 2019-10-29 RX ORDER — PROMETHAZINE HYDROCHLORIDE 25 MG/ML
6.25 INJECTION, SOLUTION INTRAMUSCULAR; INTRAVENOUS
Status: DISCONTINUED | OUTPATIENT
Start: 2019-10-29 | End: 2019-10-29 | Stop reason: HOSPADM

## 2019-10-29 RX ORDER — LIDOCAINE HYDROCHLORIDE 10 MG/ML
0.5 INJECTION, SOLUTION INFILTRATION; PERINEURAL ONCE AS NEEDED
Status: DISCONTINUED | OUTPATIENT
Start: 2019-10-29 | End: 2019-10-29 | Stop reason: HOSPADM

## 2019-10-29 RX ORDER — PROPOFOL 10 MG/ML
VIAL (ML) INTRAVENOUS CONTINUOUS PRN
Status: DISCONTINUED | OUTPATIENT
Start: 2019-10-29 | End: 2019-10-29 | Stop reason: SURG

## 2019-10-29 RX ORDER — SODIUM CHLORIDE 0.9 % (FLUSH) 0.9 %
10 SYRINGE (ML) INJECTION AS NEEDED
Status: DISCONTINUED | OUTPATIENT
Start: 2019-10-29 | End: 2019-10-29 | Stop reason: HOSPADM

## 2019-10-29 RX ORDER — OXYCODONE AND ACETAMINOPHEN 7.5; 325 MG/1; MG/1
1 TABLET ORAL ONCE AS NEEDED
Status: DISCONTINUED | OUTPATIENT
Start: 2019-10-29 | End: 2019-10-29 | Stop reason: HOSPADM

## 2019-10-29 RX ADMIN — MIDAZOLAM 1 MG: 1 INJECTION INTRAMUSCULAR; INTRAVENOUS at 16:08

## 2019-10-29 RX ADMIN — PROPOFOL 140 MCG/KG/MIN: 10 INJECTION, EMULSION INTRAVENOUS at 16:26

## 2019-10-29 RX ADMIN — SODIUM CHLORIDE, POTASSIUM CHLORIDE, SODIUM LACTATE AND CALCIUM CHLORIDE 1000 ML: 600; 310; 30; 20 INJECTION, SOLUTION INTRAVENOUS at 15:15

## 2019-10-29 RX ADMIN — PROPOFOL 100 MG: 10 INJECTION, EMULSION INTRAVENOUS at 16:26

## 2019-10-29 RX ADMIN — LIDOCAINE HYDROCHLORIDE 100 MG: 20 INJECTION, SOLUTION INFILTRATION; PERINEURAL at 16:26

## 2019-10-29 NOTE — ANESTHESIA PREPROCEDURE EVALUATION
Anesthesia Evaluation     Patient summary reviewed and Nursing notes reviewed   no history of anesthetic complications:  NPO Solid Status: > 8 hours             Airway   Mallampati: II  TM distance: >3 FB  Neck ROM: full  Dental - normal exam     Pulmonary - normal exam   (+) COPD, sleep apnea,   Cardiovascular - normal exam    (+) hypertension, CAD, dysrhythmias Atrial Fib, CHF , hyperlipidemia,       Neuro/Psych  (+) weakness, numbness, psychiatric history,     GI/Hepatic/Renal/Endo    (+) obesity,   liver disease,     Musculoskeletal     Abdominal  - normal exam    Bowel sounds: normal.   Substance History   (+) alcohol use,      OB/GYN negative ob/gyn ROS         Other   arthritis,    history of cancer                    Anesthesia Plan    ASA 3     MAC     Anesthetic plan, all risks, benefits, and alternatives have been provided, discussed and informed consent has been obtained with: patient.    Plan discussed with CRNA.

## 2019-10-29 NOTE — ANESTHESIA POSTPROCEDURE EVALUATION
"Patient: Jalen Lockwood    Procedure Summary     Date:  10/29/19 Room / Location:   [facility]    Anesthesia Start:  1624 Anesthesia Stop:  1656    Procedure:  COLONOSCOPY TO TO CECUM AND TERMINAL ILEUM WITH HOT AND COLD SNARE POLYPECTOMIES (N/A ) Diagnosis:       Colon polyps      (Colon polyps [K63.5])    Surgeon:  Kane Lagunas MD Provider:  Aminta Dimas MD    Anesthesia Type:  MAC ASA Status:  3          Anesthesia Type: MAC  Last vitals  BP   132/96 (10/29/19 1652)   Temp   36.8 °C (98.2 °F) (10/29/19 1505)   Pulse   71 (10/29/19 1652)   Resp   16 (10/29/19 1505)     SpO2   95 % (10/29/19 1652)     Post Anesthesia Care and Evaluation    Patient location during evaluation: PHASE II  Patient participation: complete - patient participated  Level of consciousness: sleepy but conscious  Pain management: adequate  Airway patency: patent  Anesthetic complications: No anesthetic complications    Cardiovascular status: acceptable  Respiratory status: acceptable  Hydration status: acceptable    Comments: /96 (BP Location: Left arm, Patient Position: Lying)   Pulse 71   Temp 36.8 °C (98.2 °F) (Oral)   Resp 16   Ht 193 cm (76\")   Wt 127 kg (279 lb)   SpO2 95%   BMI 33.96 kg/m²         "

## 2019-10-31 ENCOUNTER — ANTICOAGULATION VISIT (OUTPATIENT)
Dept: PHARMACY | Facility: HOSPITAL | Age: 69
End: 2019-10-31

## 2019-10-31 DIAGNOSIS — I48.21 PERMANENT ATRIAL FIBRILLATION (HCC): ICD-10-CM

## 2019-10-31 LAB
CYTO UR: NORMAL
INR PPP: 1.3
LAB AP CASE REPORT: NORMAL
PATH REPORT.FINAL DX SPEC: NORMAL
PATH REPORT.GROSS SPEC: NORMAL

## 2019-10-31 NOTE — PROGRESS NOTES
Anticoagulation Clinic Progress Note    Anticoagulation Summary  As of 10/31/2019    INR goal:   2.0-3.0   TTR:   68.1 % (1 y)   INR used for dosin.30! (10/31/2019)   Warfarin maintenance plan:   7.5 mg every Sun, Tue, Thu; 5 mg all other days   Weekly warfarin total:   42.5 mg   Plan last modified:   Brendan Live RPH (2019)   Next INR check:   2019   Priority:   Maintenance   Target end date:   Indefinite    Indications    Permanent atrial fibrillation [I48.21]             Anticoagulation Episode Summary     INR check location:       Preferred lab:       Send INR reminders to:    [facility] [place]    Comments:         Anticoagulation Care Providers     Provider Role Specialty Phone number    Kurt Henry MD Referring Cardiology [phone]        Pertinent info:  Resumed warfarin yesterday following holding for colonoscopy. Took higher dose of 10 mg x 1 day as instructed.    INR History:  Anticoagulation Monitoring 10/17/2019 10/24/2019 10/31/2019   INR 3.20 2.30 1.30   INR Date 10/17/2019 10/24/2019 10/31/2019   INR Goal 2.0-3.0 2.0-3.0 2.0-3.0   Trend Same Same Same   Last Week Total 42.5 mg 40 mg 10 mg   Next Week Total 40 mg 15 mg 45 mg   Sun 7.5 mg Hold (10/27) 7.5 mg   Mon 5 mg Hold (10/28) 5 mg   Tue 7.5 mg 10 mg (10/29) 7.5 mg   Wed 5 mg 5 mg 5 mg   Thu 5 mg (10/17) Hold (10/24) 10 mg (10/31)   Fri 5 mg Hold (10/25) 5 mg   Sat 5 mg Hold (10/26) 5 mg   Visit Report - - -   Some recent data might be hidden       Plan:  1. INR is Subtherapeutic today- see above in Anticoagulation Summary.   Provided instructions to Ema with [facility] to Change their warfarin regimen- see above in Anticoagulation Summary.  2. Follow up in 1 week      Brendan Live RPH

## 2019-11-06 ENCOUNTER — TELEPHONE (OUTPATIENT)
Dept: GASTROENTEROLOGY | Facility: CLINIC | Age: 69
End: 2019-11-06

## 2019-11-06 NOTE — TELEPHONE ENCOUNTER
----- Message from Kane Lagunas MD sent at 11/1/2019 10:58 AM EDT -----  Tubular adenoma colon polyps  Colonoscopy recall 3 years

## 2019-11-07 ENCOUNTER — ANTICOAGULATION VISIT (OUTPATIENT)
Dept: PHARMACY | Facility: HOSPITAL | Age: 69
End: 2019-11-07

## 2019-11-07 DIAGNOSIS — I48.21 PERMANENT ATRIAL FIBRILLATION (HCC): ICD-10-CM

## 2019-11-07 LAB — INR PPP: 2

## 2019-11-07 RX ORDER — METOCLOPRAMIDE 10 MG/1
10 TABLET ORAL
Qty: 360 TABLET | Refills: 0 | Status: SHIPPED | OUTPATIENT
Start: 2019-11-07 | End: 2020-04-23

## 2019-11-07 NOTE — PROGRESS NOTES
Anticoagulation Clinic Progress Note    Anticoagulation Summary  As of 2019    INR goal:   2.0-3.0   TTR:   66.8 % (1 y)   INR used for dosin.00 (2019)   Warfarin maintenance plan:   7.5 mg every Sun, Tue, Thu; 5 mg all other days   Weekly warfarin total:   42.5 mg   No change documented:   Brendan Live RPH   Plan last modified:   Brendan Live RPH (2019)   Next INR check:   2019   Priority:   Maintenance   Target end date:   Indefinite    Indications    Permanent atrial fibrillation [I48.21]             Anticoagulation Episode Summary     INR check location:       Preferred lab:       [facility]    Comments:         Anticoagulation Care Providers     Provider Role Specialty Phone number    Kurt Henry MD Referring Cardiology [phone]          INR History:  Anticoagulation Monitoring 10/24/2019 10/31/2019 2019   INR 2.30 1.30 2.00   INR Date 10/24/2019 10/31/2019 2019   INR Goal 2.0-3.0 2.0-3.0 2.0-3.0   Trend Same Same Same   Last Week Total 40 mg 10 mg 45 mg   Next Week Total 15 mg 45 mg 42.5 mg   Sun Hold (10/27) 7.5 mg 7.5 mg   Mon Hold (10/28) 5 mg 5 mg   Tue 10 mg (10/29) 7.5 mg 7.5 mg   Wed 5 mg 5 mg 5 mg   Thu Hold (10/24) 10 mg (10/31) 7.5 mg   Fri Hold (10/25) 5 mg 5 mg   Sat Hold (10/26) 5 mg 5 mg   Visit Report - - -   Some recent data might be hidden       Plan:  1. INR is Therapeutic today- see above in Anticoagulation Summary.   Provided instructions to Dagoberto with [facility] to Continue their warfarin regimen- see above in Anticoagulation Summary.  2. Follow up in 1 week      Brendan Live RPH

## 2019-11-14 ENCOUNTER — ANTICOAGULATION VISIT (OUTPATIENT)
Dept: PHARMACY | Facility: HOSPITAL | Age: 69
End: 2019-11-14

## 2019-11-14 DIAGNOSIS — I48.21 PERMANENT ATRIAL FIBRILLATION (HCC): ICD-10-CM

## 2019-11-14 LAB — INR PPP: 3.5

## 2019-11-14 NOTE — PROGRESS NOTES
Anticoagulation Clinic Progress Note    Anticoagulation Summary  As of 11/14/2019    INR goal:   2.0-3.0   TTR:   66.8 % (1 y)   INR used for dosing:   3.50! (11/14/2019)   Warfarin maintenance plan:   7.5 mg every Sun, Thu; 5 mg all other days   Weekly warfarin total:   40 mg   Plan last modified:   Berndan Live RPH (11/14/2019)   Next INR check:   11/21/2019   Priority:   Maintenance   Target end date:   Indefinite    Indications    Permanent atrial fibrillation [I48.21]             Anticoagulation Episode Summary     INR check location:       Preferred lab:       Send INR reminders to:    [facility]    Comments:         Anticoagulation Care Providers     Provider Role Specialty Phone number    Kurt Henry MD Referring Cardiology [phone]          INR History:  Anticoagulation Monitoring 10/31/2019 11/7/2019 11/14/2019   INR 1.30 2.00 3.50   INR Date 10/31/2019 11/7/2019 11/14/2019   INR Goal 2.0-3.0 2.0-3.0 2.0-3.0   Trend Same Same Down   Last Week Total 10 mg 45 mg 42.5 mg   Next Week Total 45 mg 42.5 mg 37.5 mg   Sun 7.5 mg 7.5 mg 7.5 mg   Mon 5 mg 5 mg 5 mg   Tue 7.5 mg 7.5 mg 5 mg   Wed 5 mg 5 mg 5 mg   Thu 10 mg (10/31) 7.5 mg 5 mg (11/14)   Fri 5 mg 5 mg 5 mg   Sat 5 mg 5 mg 5 mg   Visit Report - - -   Some recent data might be hidden       Plan:  1. INR is Supratherapeutic today- see above in Anticoagulation Summary.   Provided instructions to Najma with [facility] to Change their warfarin regimen (Partial dose of 5 mg today, then decrease to 7.5 Sun/Thurs, 5 mg rest of wk.)- see above in Anticoagulation Summary.  2. Follow up in 1 week      Brendan Live RPH

## 2019-11-16 RX ORDER — ALPRAZOLAM 0.5 MG/1
0.5 TABLET ORAL 2 TIMES DAILY PRN
Qty: 30 TABLET | Refills: 0 | Status: SHIPPED | OUTPATIENT
Start: 2019-11-16 | End: 2019-12-16 | Stop reason: SDUPTHER

## 2019-11-20 ENCOUNTER — OFFICE VISIT (OUTPATIENT)
Dept: CARDIOLOGY | Facility: CLINIC | Age: 69
End: 2019-11-20

## 2019-11-20 VITALS
BODY MASS INDEX: 34.1 KG/M2 | HEART RATE: 76 BPM | SYSTOLIC BLOOD PRESSURE: 135 MMHG | DIASTOLIC BLOOD PRESSURE: 90 MMHG | WEIGHT: 280 LBS | HEIGHT: 76 IN

## 2019-11-20 DIAGNOSIS — I43 TACHYCARDIA INDUCED CARDIOMYOPATHY (HCC): ICD-10-CM

## 2019-11-20 DIAGNOSIS — R00.0 TACHYCARDIA INDUCED CARDIOMYOPATHY (HCC): ICD-10-CM

## 2019-11-20 DIAGNOSIS — I48.21 PERMANENT ATRIAL FIBRILLATION (HCC): Primary | ICD-10-CM

## 2019-11-20 DIAGNOSIS — I10 ESSENTIAL HYPERTENSION: ICD-10-CM

## 2019-11-20 DIAGNOSIS — I25.10 NONOCCLUSIVE CORONARY ATHEROSCLEROSIS OF NATIVE CORONARY ARTERY: ICD-10-CM

## 2019-11-20 DIAGNOSIS — I87.8 VENOUS STASIS: ICD-10-CM

## 2019-11-20 DIAGNOSIS — I72.4 POPLITEAL ARTERY ANEURYSM (HCC): ICD-10-CM

## 2019-11-20 DIAGNOSIS — I48.3 TYPICAL ATRIAL FLUTTER (HCC): ICD-10-CM

## 2019-11-20 DIAGNOSIS — I77.819 AORTECTASIA (HCC): ICD-10-CM

## 2019-11-20 PROBLEM — B37.2 CANDIDAL INTERTRIGO: Chronic | Status: RESOLVED | Noted: 2019-04-25 | Resolved: 2019-11-20

## 2019-11-20 PROBLEM — L03.116 LEFT LEG CELLULITIS: Status: RESOLVED | Noted: 2019-04-30 | Resolved: 2019-11-20

## 2019-11-20 PROBLEM — R63.4 ABNORMAL WEIGHT LOSS: Status: RESOLVED | Noted: 2019-04-24 | Resolved: 2019-11-20

## 2019-11-20 PROBLEM — R53.1 WEAKNESS: Status: RESOLVED | Noted: 2019-04-24 | Resolved: 2019-11-20

## 2019-11-20 PROBLEM — A41.9 SEPSIS: Status: RESOLVED | Noted: 2019-05-01 | Resolved: 2019-11-20

## 2019-11-20 PROBLEM — T14.8XXA OPEN WOUND: Status: RESOLVED | Noted: 2019-04-24 | Resolved: 2019-11-20

## 2019-11-20 PROBLEM — E87.1 HYPONATREMIA: Status: RESOLVED | Noted: 2019-04-24 | Resolved: 2019-11-20

## 2019-11-20 PROBLEM — T50.2X5A: Status: RESOLVED | Noted: 2019-04-24 | Resolved: 2019-11-20

## 2019-11-20 PROCEDURE — 93000 ELECTROCARDIOGRAM COMPLETE: CPT | Performed by: INTERNAL MEDICINE

## 2019-11-20 PROCEDURE — 99214 OFFICE O/P EST MOD 30 MIN: CPT | Performed by: INTERNAL MEDICINE

## 2019-11-20 NOTE — PATIENT INSTRUCTIONS
Steps to Quit Smoking    Smoking tobacco can be harmful to your health and can affect almost every organ in your body. Smoking puts you, and those around you, at risk for developing many serious chronic diseases. Quitting smoking is difficult, but it is one of the best things that you can do for your health. It is never too late to quit.  What are the benefits of quitting smoking?  When you quit smoking, you lower your risk of developing serious diseases and conditions, such as:  · Lung cancer or lung disease, such as COPD.  · Heart disease.  · Stroke.  · Heart attack.  · Infertility.  · Osteoporosis and bone fractures.  Additionally, symptoms such as coughing, wheezing, and shortness of breath may get better when you quit. You may also find that you get sick less often because your body is stronger at fighting off colds and infections. If you are pregnant, quitting smoking can help to reduce your chances of having a baby of low birth weight.  How do I get ready to quit?  When you decide to quit smoking, create a plan to make sure that you are successful. Before you quit:  · Pick a date to quit. Set a date within the next two weeks to give you time to prepare.  · Write down the reasons why you are quitting. Keep this list in places where you will see it often, such as on your bathroom mirror or in your car or wallet.  · Identify the people, places, things, and activities that make you want to smoke (triggers) and avoid them. Make sure to take these actions:  ? Throw away all cigarettes at home, at work, and in your car.  ? Throw away smoking accessories, such as ashtrays and lighters.  ? Clean your car and make sure to empty the ashtray.  ? Clean your home, including curtains and carpets.  · Tell your family, friends, and coworkers that you are quitting. Support from your loved ones can make quitting easier.  · Talk with your health care provider about your options for quitting smoking.  · Find out what  treatment options are covered by your health insurance.  What strategies can I use to quit smoking?  Talk with your healthcare provider about different strategies to quit smoking. Some strategies include:  · Quitting smoking altogether instead of gradually lessening how much you smoke over a period of time. Research shows that quitting “cold turkey” is more successful than gradually quitting.  · Attending in-person counseling to help you build problem-solving skills. You are more likely to have success in quitting if you attend several counseling sessions. Even short sessions of 10 minutes can be effective.  · Finding resources and support systems that can help you to quit smoking and remain smoke-free after you quit. These resources are most helpful when you use them often. They can include:  ? Online chats with a counselor.  ? Telephone quitlines.  ? Printed self-help materials.  ? Support groups or group counseling.  ? Text messaging programs.  ? Mobile phone applications.  · Taking medicines to help you quit smoking. (If you are pregnant or breastfeeding, talk with your health care provider first.) Some medicines contain nicotine and some do not. Both types of medicines help with cravings, but the medicines that include nicotine help to relieve withdrawal symptoms. Your health care provider may recommend:  ? Nicotine patches, gum, or lozenges.  ? Nicotine inhalers or sprays.  ? Non-nicotine medicine that is taken by mouth.  Talk with your health care provider about combining strategies, such as taking medicines while you are also receiving in-person counseling. Using these two strategies together makes you more likely to succeed in quitting than if you used either strategy on its own.  If you are pregnant or breastfeeding, talk with your health care provider about finding counseling or other support strategies to quit smoking. Do not take medicine to help you quit smoking unless told to do so by your health care  provider.  What things can I do to make it easier to quit?  Quitting smoking might feel overwhelming at first, but there is a lot that you can do to make it easier. Take these important actions:  · Reach out to your family and friends and ask that they support and encourage you during this time. Call telephone quitlines, reach out to support groups, or work with a counselor for support.  · Ask people who smoke to avoid smoking around you.  · Avoid places that trigger you to smoke, such as bars, parties, or smoke-break areas at work.  · Spend time around people who do not smoke.  · Lessen stress in your life, because stress can be a smoking trigger for some people. To lessen stress, try:  ? Exercising regularly.  ? Deep-breathing exercises.  ? Yoga.  ? Meditating.  ? Performing a body scan. This involves closing your eyes, scanning your body from head to toe, and noticing which parts of your body are particularly tense. Purposefully relax the muscles in those areas.  · Download or purchase mobile phone or tablet apps (applications) that can help you stick to your quit plan by providing reminders, tips, and encouragement. There are many free apps, such as QuitGuide from the CDC (Centers for Disease Control and Prevention). You can find other support for quitting smoking (smoking cessation) through smokefree.gov and other websites.  How will I feel when I quit smoking?  Within the first 24 hours of quitting smoking, you may start to feel some withdrawal symptoms. These symptoms are usually most noticeable 2-3 days after quitting, but they usually do not last beyond 2-3 weeks. Changes or symptoms that you might experience include:  · Mood swings.  · Restlessness, anxiety, or irritation.  · Difficulty concentrating.  · Dizziness.  · Strong cravings for sugary foods in addition to nicotine.  · Mild weight gain.  · Constipation.  · Nausea.  · Coughing or a sore throat.  · Changes in how your medicines work in your  body.  · A depressed mood.  · Difficulty sleeping (insomnia).  After the first 2-3 weeks of quitting, you may start to notice more positive results, such as:  · Improved sense of smell and taste.  · Decreased coughing and sore throat.  · Slower heart rate.  · Lower blood pressure.  · Clearer skin.  · The ability to breathe more easily.  · Fewer sick days.  Quitting smoking is very challenging for most people. Do not get discouraged if you are not successful the first time. Some people need to make many attempts to quit before they achieve long-term success. Do your best to stick to your quit plan, and talk with your health care provider if you have any questions or concerns.  This information is not intended to replace advice given to you by your health care provider. Make sure you discuss any questions you have with your health care provider.  Document Released: 12/12/2002 Document Revised: 07/24/2018 Document Reviewed: 05/03/2016  [employer] Interactive Patient Education © 2019 [employer]

## 2019-11-20 NOTE — PROGRESS NOTES
Date of Office Visit: 2019  Encounter Provider: Kurt Henry MD  Place of Service: [facility] CARDIOLOGY  Patient Name: Jalen Lockwood  :1950    Chief Complaint   Patient presents with   • Atrial Fibrillation   :     HPI: Jalen Lockwood is a 69 y.o. male who presents today for yearly follow up.     He presented with rapid atrial flutter in . His ejection fraction was low due to tachycardia-mediated cardiomyopathy. His catheterization revealed moderate nonobstructive coronary disease. Medical therapy only was recommended and his ejection fraction normalized. It was also found that he was drinking quite heavily and we recommended that he cut back.  Shortly after that, he presented with rapid atrial fibrillation and his ejection fraction declined again to 35%, but once his atrial fibrillation resolved, his ejection fraction improved again.     He has chronic edema due to severe venous stasis. He has had recurrent cellulitis.      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 Boston.  This was complicated by a wound infection.  A non-contrasted CT of the abdomen/pelvis revealed aortectasia of the infrarenal abdominal aorta but no aneurysm elsewhere.     Earlier this year, he became very hyponatremic and weak.  His thiazide was stopped. He has lost fifty pounds!    He denies chest pain, dyspnea, orthopnea, PND, palpitations, or syncope.  He still smokes, and he drinks moderately. He has not had bleeding from warfarin.     Past Medical History:   Diagnosis Date   • Allergic rhinitis    • Anxiety    • Aortectasia (CMS/[facility])     3cm infrarenal abdominal aorta   • Arthritis    • Atrial flutter (CMS/HCC)     s/p ablation    • Charcot's joint of foot    • Chronic edema     both legs and sees wound care center at [place]    • Chronic venous insufficiency    • COPD (chronic obstructive pulmonary disease) (CMS/HCC)    • Coronary  atherosclerosis     Cath 2010: diffuse 40-50% disease   • Diverticulosis    • Duodenitis    • Fatty liver    • Gastritis    • Gastroparesis    • Hematoma     post-operative; After catheterization, right groin, required surgical exploration   • Hyperlipidemia    • Hypertension    • Insomnia    • Internal hemorrhoids    • Open wound     izzy legs has drsg chg weekly at wound care center at [place]  pt does second dressing on left leg another time during week   • Osteomyelitis (CMS/HCC)    • Paroxysmal atrial fibrillation (CMS/HCC)    • Peripheral neuropathy    • Popliteal artery aneurysm (CMS/HCC)     left, s/p stenting by Dr. Boston   • Skin cancer    • Sleep apnea     o2   • 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CMS/HCC)     due to flutter and afib; cath 2010 with nonobstructive disease   • Venous stasis    • Venous stasis ulcer (CMS/HCC)     bilateral legs        Past Surgical History:   Procedure Laterality Date   • CARDIAC CATHETERIZATION     • CATARACT EXTRACTION     • COLONOSCOPY  09/28/2015    NBIH, diverticulosis, polyps   • COLONOSCOPY N/A 9/11/2018    Procedure: COLONOSCOPY TO CECUM  AND TERM. ILEUM WITH COLD SNARE POLYPECTOMIES;  Surgeon: Kane Lagunas MD;  Location: [facility] ENDOSCOPY;  Service: Gastroenterology   • COLONOSCOPY N/A 10/29/2019    Procedure: COLONOSCOPY TO TO CECUM AND TERMINAL ILEUM WITH HOT AND COLD SNARE POLYPECTOMIES;  Surgeon: Kane Lagunas MD;  Location: [facility] ENDOSCOPY;  Service: Gastroenterology   • HIP ARTHROPLASTY Right    • JOINT REPLACEMENT Left    • OTHER SURGICAL HISTORY      Catheter ablation atrial flutter   • REPAIR ANEURYSM / PSEUDO ANEURYSM / RUPTURED ANEURYSM POPLITEAL ARTERY      Stent-Graft of the the left popliteal artery   • REPAIR KNEE LIGAMENT      Primary repair of knee ligament cruciate anterior right   • TONSILLECTOMY     • TONSILLECTOMY     • TOTAL KNEE ARTHROPLASTY Bilateral    • UPPER GASTROINTESTINAL ENDOSCOPY  09/16/2014    acute gastritis, acute  duodenitis       Social History     Socioeconomic History   • Marital status:      Spouse name: Not on file   • Number of children: Not on file   • Years of education: Not on file   • Highest education level: Not on file   Tobacco Use   • Smoking status: Current Every Day Smoker     Packs/day: 0.25     Years: 45.00     Pack years: 11.25     Types: Cigarettes   • Smokeless tobacco: Never Used   Substance and Sexual Activity   • Alcohol use: Yes     Alcohol/week: 4.8 - 5.4 oz     Types: 1 - 2 Shots of liquor, 7 Standard drinks or equivalent per week     Comment: 2 rum a day   • Drug use: No   • Sexual activity: Defer   Social History Narrative    Today is pt's first day with no cigarettes.         Family History   Problem Relation Age of Onset   • Emphysema Father    • Malig Hyperthermia Neg Hx        Review of Systems   Constitution: Positive for weight loss.   Cardiovascular: Positive for leg swelling.   Skin: Positive for color change and poor wound healing.   Musculoskeletal: Positive for joint pain and joint swelling.   Genitourinary: Positive for decreased libido.   All other systems reviewed and are negative.      Allergies   Allergen Reactions   • Cephalexin Hives   • Codeine Nausea Only   • Penicillins Other (See Comments)     Childhood allergy         Current Outpatient Medications:   •  albuterol sulfate  (90 Base) MCG/ACT inhaler, Inhale 2 puffs Every 4 (Four) Hours As Needed for Wheezing., Disp: 1 inhaler, Rfl: 6  •  ALPRAZolam (XANAX) 0.5 MG tablet, TAKE 1 TABLET BY MOUTH 2 (TWO) TIMES A DAY AS NEEDED FOR ANXIETY., Disp: 30 tablet, Rfl: 0  •  ATROVENT HFA 17 MCG/ACT inhaler, Inhale 2 puffs 4 (Four) Times a Day., Disp: 3 inhaler, Rfl: 3  •  carvedilol (COREG) 3.125 MG tablet, TAKE 1 TABLET BY MOUTH TWICE A DAY WITH MEALS, Disp: 180 tablet, Rfl: 1  •  clotrimazole-betamethasone (LOTRISONE) 1-0.05 % cream, APPLY TO AFFECTED AREA DAILY, Disp: , Rfl: 3  •  docusate sodium (COLACE) 100 MG  "capsule, Take 100 mg by mouth Daily., Disp: , Rfl:   •  doxycycline (MONODOX) 100 MG capsule, , Disp: , Rfl:   •  finasteride (PROSCAR) 5 MG tablet, Take 1 tablet by mouth daily., Disp: , Rfl:   •  fluconazole (DIFLUCAN) 200 MG tablet, , Disp: , Rfl:   •  furosemide (LASIX) 20 MG tablet, TAKE 1 TABLET BY MOUTH EVERY DAY, Disp: 90 tablet, Rfl: 0  •  gentamicin (GARAMYCIN) 0.1 % ointment, APPLY TO AFFECTED AREA EVERY DAY, Disp: , Rfl: 2  •  HYDROcodone-acetaminophen (NORCO) 7.5-325 MG per tablet, Take 1 tablet by mouth Every 8 (Eight) Hours As Needed for Mild Pain ., Disp: 12 tablet, Rfl: 0  •  metoclopramide (REGLAN) 10 MG tablet, TAKE 1 TABLET BY MOUTH 4 (FOUR) TIMES A DAY BEFORE MEALS & AT BEDTIME., Disp: 360 tablet, Rfl: 0  •  mupirocin (BACTROBAN) 2 % ointment, Apply  topically to the appropriate area as directed 3 (Three) Times a Week., Disp: , Rfl:   •  nystatin (MYCOSTATIN) 794457 UNIT/GM powder, Apply  topically to the appropriate area as directed Every 12 (Twelve) Hours., Disp: 60 g, Rfl: 1  •  pravastatin (PRAVACHOL) 20 MG tablet, TAKE 1 TABLET BY MOUTH EVERY DAY, Disp: 90 tablet, Rfl: 2  •  silodosin (RAPAFLO) 8 MG capsule capsule, Take 1 capsule by mouth daily. With food., Disp: , Rfl:   •  warfarin (COUMADIN) 5 MG tablet, Take 1.5 tablets (7.5 mg) by mouth Sun, Tues, Thurs, and take 1 tablet (5 mg) by mouth all other days or as directed., Disp: 110 tablet, Rfl: 0  •  lisinopril (PRINIVIL,ZESTRIL) 10 MG tablet, Take 1 tablet by mouth Daily., Disp: 30 tablet, Rfl: 0  •  lisinopril (PRINIVIL,ZESTRIL) 20 MG tablet, Take 40 mg by mouth Daily., Disp: , Rfl: 2     Objective:     Vitals:    11/20/19 1424 11/20/19 1440   BP: 146/100 135/90   BP Location: Left arm    Pulse: 76    Weight: 127 kg (280 lb)    Height: 193 cm (76\")      Body mass index is 34.08 kg/m².    Physical Exam   Constitutional: He is oriented to person, place, and time.   Obese   HENT:   Head: Normocephalic.   Nose: Nose normal.   Mouth/Throat: " Oropharynx is clear and moist.   Eyes: Conjunctivae and EOM are normal. Pupils are equal, round, and reactive to light.   Neck: Normal range of motion. No JVD present.   Cardiovascular: Normal rate, normal heart sounds and intact distal pulses. An irregularly irregular rhythm present.   No murmur heard.  Pulmonary/Chest: Effort normal.   Abdominal: Soft. There is no tenderness.   Obesity limits abdominal exam   Musculoskeletal: Normal range of motion. He exhibits edema (severe varicose veins/venous stasis/edema) and deformity (bilateral charcot foot).   Neurological: He is alert and oriented to person, place, and time. No cranial nerve deficit.   Skin: Skin is warm and dry. No rash noted.   Numerous seb kers and actinic kers     Psychiatric: He has a normal mood and affect. His behavior is normal. Judgment and thought content normal.   Vitals reviewed.        ECG 12 Lead  Date/Time: 11/20/2019 2:33 PM  Performed by: Kurt Henry MD  Authorized by: Kurt Henry MD   Comparison: compared with previous ECG   Similar to previous ECG  Rhythm: atrial fibrillation  Conduction: non-specific intraventricular conduction delay  ST Segments: ST segments normal  T Waves: T waves normal  QRS axis: normal  Other: no other findings    Clinical impression: abnormal EKG              Assessment:       Diagnosis Plan   1. Permanent atrial fibrillation  ECG 12 Lead   2. Typical atrial flutter (CMS/HCC)     3. 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CMS/HCC)     4. Nonocclusive coronary atherosclerosis of native coronary artery     5. Essential hypertension     6. Aortectasia (CMS/HCC)     7. Popliteal artery aneurysm (CMS/HCC)     8. Venous stasis            Plan:       1/2.  He has had both flutter and fib in the past; the former has been ablated.  He will remain on carvedilol and warfarin.  He has progressed to what I suspect is now permanent atrial fibrillation.    3.  His tachy-mediated CM resolved, and he does not have CHF.  He  only uses furosemide as needed and is doing well with that.  He's on carvedilol and lisinopril.    4.  He has diffuse moderate CAD that is in the 40-50% range.  He does not have angina.  He really needs to quit smoking.    He will remain on pravastatin.    5.  His BP is a little high today.  At one pont he was on a thiazide but it caused hyponatremia. His BP is borderline today, but honestly, he has so much going on that I want to watch him right now.  We could add spironolactone if needed.    6/7.  His PAD is managed by the group at [place].    8.  He has severe venous insufficiency and goes to the wound care clinic.    Sincerely,       Kurt Henry MD

## 2019-11-21 ENCOUNTER — ANTICOAGULATION VISIT (OUTPATIENT)
Dept: PHARMACY | Facility: HOSPITAL | Age: 69
End: 2019-11-21

## 2019-11-21 DIAGNOSIS — I48.21 PERMANENT ATRIAL FIBRILLATION (HCC): ICD-10-CM

## 2019-11-21 LAB — INR PPP: 2.9

## 2019-11-21 NOTE — PROGRESS NOTES
Anticoagulation Clinic Progress Note    Anticoagulation Summary  As of 2019    INR goal:   2.0-3.0   TTR:   65.9 % (1.1 y)   INR used for dosin.90 (2019)   Warfarin maintenance plan:   7.5 mg every Sun, Thu; 5 mg all other days   Weekly warfarin total:   40 mg   No change documented:   Brendan Live RPH   Plan last modified:   Brendan Live RPH (2019)   Next INR check:   2019   Priority:   Maintenance   Target end date:   Indefinite    Indications    Permanent atrial fibrillation [I48.21]             Anticoagulation Episode Summary     INR check location:       Preferred lab:       Send INR reminders to:   [facility] CLINICAL POOL    Comments:         Anticoagulation Care Providers     Provider Role Specialty Phone number    Kurt Henry MD Referring Cardiology [phone]          INR History:  Anticoagulation Monitoring 2019   INR 2.00 3.50 2.90   INR Date 2019   INR Goal 2.0-3.0 2.0-3.0 2.0-3.0   Trend Same Down Same   Last Week Total 45 mg 42.5 mg 37.5 mg   Next Week Total 42.5 mg 37.5 mg 40 mg   Sun 7.5 mg 7.5 mg 7.5 mg   Mon 5 mg 5 mg 5 mg   Tue 7.5 mg 5 mg -   Wed 5 mg 5 mg -   Thu 7.5 mg 5 mg () 7.5 mg   Fri 5 mg 5 mg 5 mg   Sat 5 mg 5 mg 5 mg   Visit Report - - -   Some recent data might be hidden       Plan:  1. INR is Therapeutic today- see above in Anticoagulation Summary.   Provided instructions to Juan Manuel with [facility] to Continue their warfarin regimen- see above in Anticoagulation Summary.  2. Follow up in 5 days      Brendan Live RPH

## 2019-11-25 ENCOUNTER — OFFICE VISIT (OUTPATIENT)
Dept: FAMILY MEDICINE CLINIC | Facility: CLINIC | Age: 69
End: 2019-11-25

## 2019-11-25 VITALS
WEIGHT: 279 LBS | OXYGEN SATURATION: 93 % | BODY MASS INDEX: 33.97 KG/M2 | TEMPERATURE: 98 F | HEART RATE: 100 BPM | SYSTOLIC BLOOD PRESSURE: 122 MMHG | HEIGHT: 76 IN | DIASTOLIC BLOOD PRESSURE: 82 MMHG

## 2019-11-25 DIAGNOSIS — R06.02 SOB (SHORTNESS OF BREATH) ON EXERTION: ICD-10-CM

## 2019-11-25 DIAGNOSIS — R60.0 BILATERAL LEG EDEMA: Primary | ICD-10-CM

## 2019-11-25 LAB
ALBUMIN SERPL-MCNC: 4.2 G/DL (ref 3.5–5.2)
ALBUMIN/GLOB SERPL: 1.4 G/DL
ALP SERPL-CCNC: 69 U/L (ref 39–117)
ALT SERPL W P-5'-P-CCNC: 5 U/L (ref 1–41)
ANION GAP SERPL CALCULATED.3IONS-SCNC: 10.8 MMOL/L (ref 5–15)
AST SERPL-CCNC: 12 U/L (ref 1–40)
BILIRUB SERPL-MCNC: 2.2 MG/DL (ref 0.2–1.2)
BUN BLD-MCNC: 9 MG/DL (ref 8–23)
BUN/CREAT SERPL: 8.7 (ref 7–25)
CALCIUM SPEC-SCNC: 9.5 MG/DL (ref 8.6–10.5)
CHLORIDE SERPL-SCNC: 98 MMOL/L (ref 98–107)
CO2 SERPL-SCNC: 29.2 MMOL/L (ref 22–29)
CREAT BLD-MCNC: 1.04 MG/DL (ref 0.76–1.27)
ERYTHROCYTE [DISTWIDTH] IN BLOOD BY AUTOMATED COUNT: 17 % (ref 12.3–15.4)
GFR SERPL CREATININE-BSD FRML MDRD: 71 ML/MIN/1.73
GLOBULIN UR ELPH-MCNC: 3.1 GM/DL
GLUCOSE BLD-MCNC: 114 MG/DL (ref 65–99)
HCT VFR BLD AUTO: 42.3 % (ref 37.5–51)
HGB BLD-MCNC: 14.1 G/DL (ref 13–17.7)
LYMPHOCYTES # BLD AUTO: 1.3 10*3/MM3 (ref 0.7–3.1)
LYMPHOCYTES NFR BLD AUTO: 18.4 % (ref 19.6–45.3)
MCH RBC QN AUTO: 33.4 PG (ref 26.6–33)
MCHC RBC AUTO-ENTMCNC: 33.3 G/DL (ref 31.5–35.7)
MCV RBC AUTO: 100.1 FL (ref 79–97)
MONOCYTES # BLD AUTO: 0.3 10*3/MM3 (ref 0.1–0.9)
MONOCYTES NFR BLD AUTO: 4.4 % (ref 5–12)
NEUTROPHILS # BLD AUTO: 5.5 10*3/MM3 (ref 1.7–7)
NEUTROPHILS NFR BLD AUTO: 77.2 % (ref 42.7–76)
NT-PROBNP SERPL-MCNC: 2144 PG/ML (ref 5–900)
PLATELET # BLD AUTO: 140 10*3/MM3 (ref 140–450)
PMV BLD AUTO: 7.6 FL (ref 6–12)
POTASSIUM BLD-SCNC: 3.7 MMOL/L (ref 3.5–5.2)
PROT SERPL-MCNC: 7.3 G/DL (ref 6–8.5)
RBC # BLD AUTO: 4.22 10*6/MM3 (ref 4.14–5.8)
SODIUM BLD-SCNC: 138 MMOL/L (ref 136–145)
WBC NRBC COR # BLD: 7.1 10*3/MM3 (ref 3.4–10.8)

## 2019-11-25 PROCEDURE — 36415 COLL VENOUS BLD VENIPUNCTURE: CPT | Performed by: NURSE PRACTITIONER

## 2019-11-25 PROCEDURE — 85025 COMPLETE CBC W/AUTO DIFF WBC: CPT | Performed by: NURSE PRACTITIONER

## 2019-11-25 PROCEDURE — 99213 OFFICE O/P EST LOW 20 MIN: CPT | Performed by: NURSE PRACTITIONER

## 2019-11-25 PROCEDURE — 83880 ASSAY OF NATRIURETIC PEPTIDE: CPT | Performed by: NURSE PRACTITIONER

## 2019-11-25 PROCEDURE — 80053 COMPREHEN METABOLIC PANEL: CPT | Performed by: NURSE PRACTITIONER

## 2019-11-25 PROCEDURE — 71046 X-RAY EXAM CHEST 2 VIEWS: CPT | Performed by: NURSE PRACTITIONER

## 2019-11-25 RX ORDER — DOXYCYCLINE HYCLATE 100 MG
100 TABLET ORAL 2 TIMES DAILY
Qty: 14 TABLET | Refills: 0 | Status: SHIPPED | OUTPATIENT
Start: 2019-11-25 | End: 2019-12-04

## 2019-11-25 NOTE — PATIENT INSTRUCTIONS
Labs today will call with results.   cxr today, will call with results.   Advised admission or ER today for leg swelling and SOA, patient refused hospital today, he would like to try outpatient medications, educated about swelling and SOA, he will resume lasix 20mg daily, start doxycycline 100mg bid for 7 days, recheck in office Wednesday.   He was advised to go to the ER with any worsening symptoms, SOA, swelling,  Patient agrees with plan of care and understands instructions. Call if worsening symptoms or any problems or concerns.

## 2019-11-25 NOTE — PROGRESS NOTES
Subjective   Jalen Lockwood is a 69 y.o. male.     History of Present Illness   C/o SOB, states he stopped smoking last week, has had SOB since then, states SOA with sitting, saw wound care Dr. Uribe today for leg swelling, states he has noticed increased swelling in bilat legs and feet over the weekend, has lasix 20mg has not recently taken medication, has as needed, plans to resume today. sees cardiology Dr. Henry, last visit 11/20/19, states swelling worse this week, with afib, cardiomyopathy, wearing home 02 at night, wore all weekend, has albuterol inhaler, hx of COPD, taking coumadin for afib, checked by home health.       The following portions of the patient's history were reviewed and updated as appropriate: allergies, current medications, past family history, past medical history, past social history, past surgical history and problem list.    Review of Systems   Constitutional: Negative for chills, diaphoresis and fever.   HENT: Negative for congestion.    Respiratory: Positive for shortness of breath. Negative for cough and wheezing.    Cardiovascular: Positive for leg swelling. Negative for chest pain and palpitations.   Musculoskeletal: Negative for arthralgias and myalgias.   Neurological: Negative for dizziness, light-headedness and headache.   All other systems reviewed and are negative.      Objective   Physical Exam   Constitutional: He is oriented to person, place, and time. He appears well-developed and well-nourished.   HENT:   Head: Normocephalic.   Eyes: Pupils are equal, round, and reactive to light.   Neck: Normal range of motion.   Cardiovascular: Normal rate, regular rhythm, normal heart sounds and intact distal pulses.   bilat LE edema 3+ edema. Legs wrapped per wound care.      Pulmonary/Chest: Effort normal and breath sounds normal. No respiratory distress. He has no decreased breath sounds. He has no wheezes. He has no rhonchi. He has no rales.   Musculoskeletal: Normal range of  motion.   Lymphadenopathy:     He has no cervical adenopathy.   Neurological: He is alert and oriented to person, place, and time.   Skin: Skin is warm and dry.   Psychiatric: He has a normal mood and affect. His behavior is normal.   Nursing note and vitals reviewed.    cxr 2v in office for SOA no comparison, shows possible fluid, increase pulmonary vasculature, awaiting radiology over read.     Assessment/Plan   Jalen was seen today for shortness of breath and medication problem.    Diagnoses and all orders for this visit:    Bilateral leg edema  -     CBC & Differential  -     Comprehensive Metabolic Panel  -     proBNP  -     XR Chest 2 View  -     CBC Auto Differential    SOB (shortness of breath) on exertion  -     CBC & Differential  -     Comprehensive Metabolic Panel  -     proBNP  -     XR Chest 2 View  -     CBC Auto Differential    Other orders  -     doxycycline (VIBRAMYICN) 100 MG tablet; Take 1 tablet by mouth 2 (Two) Times a Day.        Labs today will call with results.   cxr today, will call with results.   Advised admission or ER today for leg swelling and SOA, patient refused hospital today, he would like to try outpatient medications, educated about swelling and SOA, he will resume lasix 20mg daily, start doxycycline 100mg bid for 7 days, recheck in office Wednesday.   He was advised to go to the ER with any worsening symptoms, SOA, swelling,  Patient agrees with plan of care and understands instructions. Call if worsening symptoms or any problems or concerns.

## 2019-11-27 ENCOUNTER — ANTICOAGULATION VISIT (OUTPATIENT)
Dept: PHARMACY | Facility: HOSPITAL | Age: 69
End: 2019-11-27

## 2019-11-27 ENCOUNTER — TELEPHONE (OUTPATIENT)
Dept: FAMILY MEDICINE CLINIC | Facility: CLINIC | Age: 69
End: 2019-11-27

## 2019-11-27 ENCOUNTER — OFFICE VISIT (OUTPATIENT)
Dept: FAMILY MEDICINE CLINIC | Facility: CLINIC | Age: 69
End: 2019-11-27

## 2019-11-27 VITALS
DIASTOLIC BLOOD PRESSURE: 88 MMHG | WEIGHT: 275 LBS | SYSTOLIC BLOOD PRESSURE: 124 MMHG | OXYGEN SATURATION: 98 % | HEIGHT: 76 IN | BODY MASS INDEX: 33.49 KG/M2 | HEART RATE: 86 BPM | TEMPERATURE: 98.2 F

## 2019-11-27 DIAGNOSIS — J90 PLEURAL EFFUSION: ICD-10-CM

## 2019-11-27 DIAGNOSIS — R60.0 BILATERAL LEG EDEMA: Primary | ICD-10-CM

## 2019-11-27 DIAGNOSIS — I48.21 PERMANENT ATRIAL FIBRILLATION (HCC): ICD-10-CM

## 2019-11-27 LAB — INR PPP: 3.7

## 2019-11-27 PROCEDURE — G0008 ADMIN INFLUENZA VIRUS VAC: HCPCS | Performed by: NURSE PRACTITIONER

## 2019-11-27 PROCEDURE — 99213 OFFICE O/P EST LOW 20 MIN: CPT | Performed by: NURSE PRACTITIONER

## 2019-11-27 PROCEDURE — 90653 IIV ADJUVANT VACCINE IM: CPT | Performed by: NURSE PRACTITIONER

## 2019-11-27 RX ORDER — POTASSIUM CHLORIDE 750 MG/1
10 TABLET, FILM COATED, EXTENDED RELEASE ORAL DAILY
Qty: 30 TABLET | Refills: 0 | Status: SHIPPED | OUTPATIENT
Start: 2019-11-27 | End: 2019-12-19

## 2019-11-27 NOTE — PROGRESS NOTES
Anticoagulation Clinic Progress Note    Anticoagulation Summary  As of 11/27/2019    INR goal:   2.0-3.0   TTR:   65.0 % (1.1 y)   INR used for dosing:   3.70! (11/27/2019)   Warfarin maintenance plan:   7.5 mg every Sun; 5 mg all other days   Weekly warfarin total:   37.5 mg   Plan last modified:   Meghna Horan RPH (11/27/2019)   Next INR check:   12/11/2019   Priority:   Maintenance   Target end date:   Indefinite    Indications    Permanent atrial fibrillation [I48.21]             Anticoagulation Episode Summary     INR check location:       Preferred lab:       [facility] IN[facility] reminders [facility]:    [facility] [place]    Comments:         Anticoagulation Care Providers     Provider Role Specialty Phone number    Kurt Henry MD Referring Cardiology [phone]          INR History:  Anticoagulation Monitoring 11/14/2019 11/21/2019 11/27/2019   INR 3.50 2.90 3.70   INR Date 11/14/2019 11/21/2019 11/27/2019   INR Goal 2.0-3.0 2.0-3.0 2.0-3.0   Trend Down Same Down   Last Week Total 42.5 mg 37.5 mg 40 mg   Next Week Total 37.5 mg 40 mg 22.5 mg   Sun 7.5 mg 7.5 mg 7.5 mg   Mon 5 mg 5 mg Hold (12/2); Otherwise 5 mg   Tue 5 mg - Hold (12/3); Otherwise 5 mg   Wed 5 mg - Hold (11/27, 12/4)   Thu 5 mg (11/14) 7.5 mg 5 mg   Fri 5 mg 5 mg 5 mg   Sat 5 mg 5 mg 5 mg   Visit Report - - -   Some recent data might be hidden       Plan:  1. INR is Supratherapeutic today and pt is on doxycycline which may increase INR- see above in Anticoagulation Summary.   Provided instructions to Lianne (intake) with [facility] to HOLD today then change their warfarin regimen- see above in Anticoagulation Summary.  2. Follow up in 1 week--after holding for surgery or as directed at discharge      Meghna Horan RPH

## 2019-11-27 NOTE — PROGRESS NOTES
Subjective   Jalen Lockwood is a 69 y.o. male.     History of Present Illness   Here today for f/u, he was seen on 11/25/19 for bilat leg edema, he deferred hospital visit that visit, resumed lasix 20mg also given doxycycline, labs ok BNP slightly elevated, cxr showed increased vascular markings, possible edema,small bilat pleural effusions, sees cardiology Dr. Henry. States he is feeling better today, he does have some SOA but much improved since 2 days ago, no longer requiring home 02 except at night, which he normally uses. He states leg swelling has improved. Taking lasix 20mg. He has also lost about 4lbs. denies dizziness, CP, HA. Not fasting today.           The following portions of the patient's history were reviewed and updated as appropriate: allergies, current medications, past family history, past medical history, past social history, past surgical history and problem list.    Review of Systems   Constitutional: Negative for chills, diaphoresis and fever.   Respiratory: Negative for cough and shortness of breath.    Cardiovascular: Positive for leg swelling. Negative for chest pain and palpitations.   Musculoskeletal: Negative for arthralgias and myalgias.   Neurological: Negative for dizziness, light-headedness and headache.   All other systems reviewed and are negative.      Objective   Physical Exam   Constitutional: He is oriented to person, place, and time. He appears well-developed and well-nourished.   HENT:   Head: Normocephalic.   Eyes: Pupils are equal, round, and reactive to light.   Neck: Normal range of motion.   Cardiovascular: Normal rate, regular rhythm and normal heart sounds.   Pulses:       Radial pulses are 2+ on the right side, and 2+ on the left side.   bilat leg edema noted 3+ but noticeably improved.    Pulmonary/Chest: Effort normal. He has no decreased breath sounds. He has no wheezes. He has no rhonchi. He has no rales.   Musculoskeletal: Normal range of motion.    Lymphadenopathy:     He has no cervical adenopathy.   Neurological: He is alert and oriented to person, place, and time.   Skin: Skin is warm and dry.   Psychiatric: He has a normal mood and affect. His behavior is normal.   Nursing note and vitals reviewed.        Assessment/Plan   Jalen was seen today for follow-up.    Diagnoses and all orders for this visit:    Bilateral leg edema    Pleural effusion      Symptoms improved today, 02 level improved today, deferred ER again today, he will cont lasix 20mg daily, monitor weight, cont doxycycline, f/u in office 1 week for recheck and recheck labs.   Advised to make f/u with cardiology, he will call for appt as he is established,   If any worsening symptoms, SOA, leg swelling advised ER.   Patient agrees with plan of care and understands instructions. Call if worsening symptoms or any problems or concerns.

## 2019-11-27 NOTE — PATIENT INSTRUCTIONS
Symptoms improved today, 02 level improved today, deferred ER again today, he will cont lasix 20mg daily, monitor weight, cont doxycycline, f/u in office 1 week for recheck and recheck labs.   Advised to make f/u with cardiology, he will call for appt as he is established,   If any worsening symptoms, SOA, leg swelling advised ER.   Patient agrees with plan of care and understands instructions. Call if worsening symptoms or any problems or concerns.

## 2019-12-03 ENCOUNTER — HOSPITAL ENCOUNTER (OUTPATIENT)
Dept: PET IMAGING | Facility: HOSPITAL | Age: 69
Discharge: HOME | End: 2019-12-03
Admitting: INTERNAL MEDICINE

## 2019-12-03 DIAGNOSIS — R59.0 RETROPERITONEAL LYMPHADENOPATHY: ICD-10-CM

## 2019-12-03 LAB — CREAT BLDA-MCNC: 1.1 MG/DL (ref 0.6–1.3)

## 2019-12-03 PROCEDURE — 74177 CT ABD & PELVIS W/CONTRAST: CPT

## 2019-12-03 PROCEDURE — 82565 ASSAY OF CREATININE: CPT

## 2019-12-03 PROCEDURE — 0 DIATRIZOATE MEGLUMINE & SODIUM PER 1 ML: Performed by: INTERNAL MEDICINE

## 2019-12-03 RX ADMIN — DIATRIZOATE MEGLUMINE AND DIATRIZOATE SODIUM 30 ML: 660; 100 LIQUID ORAL; RECTAL at 11:58

## 2019-12-04 ENCOUNTER — OFFICE VISIT (OUTPATIENT)
Dept: FAMILY MEDICINE CLINIC | Facility: CLINIC | Age: 69
End: 2019-12-04

## 2019-12-04 VITALS
TEMPERATURE: 97.7 F | DIASTOLIC BLOOD PRESSURE: 82 MMHG | HEART RATE: 81 BPM | WEIGHT: 264 LBS | SYSTOLIC BLOOD PRESSURE: 124 MMHG | BODY MASS INDEX: 32.15 KG/M2 | OXYGEN SATURATION: 93 % | HEIGHT: 76 IN

## 2019-12-04 DIAGNOSIS — R60.0 BILATERAL LEG EDEMA: Primary | ICD-10-CM

## 2019-12-04 DIAGNOSIS — L98.9 SKIN LESION: ICD-10-CM

## 2019-12-04 LAB
ANION GAP SERPL CALCULATED.3IONS-SCNC: 14.3 MMOL/L (ref 5–15)
BUN BLD-MCNC: 8 MG/DL (ref 8–23)
BUN/CREAT SERPL: 7.1 (ref 7–25)
CALCIUM SPEC-SCNC: 9.4 MG/DL (ref 8.6–10.5)
CHLORIDE SERPL-SCNC: 97 MMOL/L (ref 98–107)
CO2 SERPL-SCNC: 27.7 MMOL/L (ref 22–29)
CREAT BLD-MCNC: 1.13 MG/DL (ref 0.76–1.27)
GFR SERPL CREATININE-BSD FRML MDRD: 65 ML/MIN/1.73
GLUCOSE BLD-MCNC: 108 MG/DL (ref 65–99)
POTASSIUM BLD-SCNC: 3.9 MMOL/L (ref 3.5–5.2)
SODIUM BLD-SCNC: 139 MMOL/L (ref 136–145)

## 2019-12-04 PROCEDURE — 80048 BASIC METABOLIC PNL TOTAL CA: CPT | Performed by: NURSE PRACTITIONER

## 2019-12-04 PROCEDURE — 99213 OFFICE O/P EST LOW 20 MIN: CPT | Performed by: NURSE PRACTITIONER

## 2019-12-04 PROCEDURE — 36415 COLL VENOUS BLD VENIPUNCTURE: CPT | Performed by: NURSE PRACTITIONER

## 2019-12-04 NOTE — PATIENT INSTRUCTIONS
Recheck bmp today,   Cont lasix daily.   Cont f/u with specialists as scheduled.   Increase fluid intake, get plenty of rest.   Patient agrees with plan of care and understands instructions. Call if worsening symptoms or any problems or concerns.

## 2019-12-04 NOTE — PROGRESS NOTES
Subjective   Jalen Lockwood is a 69 y.o. male.     History of Present Illness   Here today for f/u he was seen on 11/25/19 and 11/27/19 for bilat leg edema, deferred hospital, resumed lasix 20mg daily, also given doxycycline, sees wound care Dr. Chavez regularly for leg swelling, recently had doppler, kelly 11/29/19, states leg swelling has improved, denies SOA, wearing oxygen as usual. He has finished abx. Now taking lasix daily, also taking potassium supplement daily.   With a fib, sees cardiology Dr. Henry, taking coumadin, has checked at INR clinic, has procedure scheduled for tomorrow with dermatology for skin lesion removal, skin cancer. He states he will have local anesthesia. [facility] Stopped coumadin per cardiology. He will have home health check INR tomorrow, also for wrapping legs.       The following portions of the patient's history were reviewed and updated as appropriate: allergies, current medications, past family history, past medical history, past social history, past surgical history and problem list.    Review of Systems   Constitutional: Negative for chills, diaphoresis and fever.   Respiratory: Negative for cough, shortness of breath and wheezing.    Cardiovascular: Positive for leg swelling. Negative for chest pain and palpitations.   Musculoskeletal: Negative for arthralgias and myalgias.   Skin: Positive for skin lesions.   Neurological: Negative for dizziness, light-headedness and headache.   All other systems reviewed and are negative.      Objective   Physical Exam   Constitutional: He is oriented to person, place, and time. He appears well-developed and well-nourished.   HENT:   Head: Normocephalic.   Eyes: Pupils are equal, round, and reactive to light.   Neck: Normal range of motion.   Cardiovascular: Normal rate, regular rhythm and normal heart sounds.   Pulses:       Radial pulses are 2+ on the right side, and 2+ on the left side.   bilat leg  edema improved, 1+ pitting.    Pulmonary/Chest: Effort normal and breath sounds normal.   Musculoskeletal: Normal range of motion.   Lymphadenopathy:     He has no cervical adenopathy.   Neurological: He is alert and oriented to person, place, and time.   Skin: Skin is warm and dry. Lesion noted.        Psychiatric: He has a normal mood and affect. His behavior is normal.   Nursing note and vitals reviewed.        Assessment/Plan   Jalen was seen today for follow-up and surgical clearance.    Diagnoses and all orders for this visit:    Bilateral leg edema  -     Basic metabolic panel    Skin lesion        Recheck bmp today,   Cont lasix daily.   Cont f/u with specialists as scheduled.   Increase fluid intake, get plenty of rest.   Patient agrees with plan of care and understands instructions. Call if worsening symptoms or any problems or concerns.

## 2019-12-10 ENCOUNTER — OFFICE VISIT (OUTPATIENT)
Dept: ONCOLOGY | Facility: CLINIC | Age: 69
End: 2019-12-10

## 2019-12-10 ENCOUNTER — LAB (OUTPATIENT)
Dept: LAB | Facility: HOSPITAL | Age: 69
End: 2019-12-10

## 2019-12-10 VITALS
WEIGHT: 279.5 LBS | HEART RATE: 78 BPM | TEMPERATURE: 97.7 F | DIASTOLIC BLOOD PRESSURE: 98 MMHG | SYSTOLIC BLOOD PRESSURE: 124 MMHG | HEIGHT: 72 IN | RESPIRATION RATE: 16 BRPM | OXYGEN SATURATION: 97 % | BODY MASS INDEX: 37.86 KG/M2

## 2019-12-10 DIAGNOSIS — R59.0 RETROPERITONEAL LYMPHADENOPATHY: ICD-10-CM

## 2019-12-10 DIAGNOSIS — D69.6 THROMBOCYTOPENIA (HCC): ICD-10-CM

## 2019-12-10 DIAGNOSIS — R59.0 RETROPERITONEAL LYMPHADENOPATHY: Primary | ICD-10-CM

## 2019-12-10 LAB
BASOPHILS # BLD AUTO: 0.07 10*3/MM3 (ref 0–0.2)
BASOPHILS NFR BLD AUTO: 1.1 % (ref 0–1.5)
DEPRECATED RDW RBC AUTO: 52.1 FL (ref 37–54)
EOSINOPHIL # BLD AUTO: 0.21 10*3/MM3 (ref 0–0.4)
EOSINOPHIL NFR BLD AUTO: 3.2 % (ref 0.3–6.2)
ERYTHROCYTE [DISTWIDTH] IN BLOOD BY AUTOMATED COUNT: 14.4 % (ref 12.3–15.4)
HCT VFR BLD AUTO: 40.6 % (ref 37.5–51)
HGB BLD-MCNC: 14.2 G/DL (ref 13–17.7)
IMM GRANULOCYTES # BLD AUTO: 0.03 10*3/MM3 (ref 0–0.05)
IMM GRANULOCYTES NFR BLD AUTO: 0.5 % (ref 0–0.5)
LYMPHOCYTES # BLD AUTO: 1.33 10*3/MM3 (ref 0.7–3.1)
LYMPHOCYTES NFR BLD AUTO: 20.5 % (ref 19.6–45.3)
MCH RBC QN AUTO: 34.1 PG (ref 26.6–33)
MCHC RBC AUTO-ENTMCNC: 35 G/DL (ref 31.5–35.7)
MCV RBC AUTO: 97.4 FL (ref 79–97)
MONOCYTES # BLD AUTO: 0.44 10*3/MM3 (ref 0.1–0.9)
MONOCYTES NFR BLD AUTO: 6.8 % (ref 5–12)
NEUTROPHILS # BLD AUTO: 4.41 10*3/MM3 (ref 1.7–7)
NEUTROPHILS NFR BLD AUTO: 67.9 % (ref 42.7–76)
NRBC BLD AUTO-RTO: 0 /100 WBC (ref 0–0.2)
PLATELET # BLD AUTO: 114 10*3/MM3 (ref 140–450)
PMV BLD AUTO: 10.7 FL (ref 6–12)
RBC # BLD AUTO: 4.17 10*6/MM3 (ref 4.14–5.8)
WBC NRBC COR # BLD: 6.49 10*3/MM3 (ref 3.4–10.8)

## 2019-12-10 PROCEDURE — 36416 COLLJ CAPILLARY BLOOD SPEC: CPT

## 2019-12-10 PROCEDURE — 99214 OFFICE O/P EST MOD 30 MIN: CPT | Performed by: INTERNAL MEDICINE

## 2019-12-10 PROCEDURE — 85025 COMPLETE CBC W/AUTO DIFF WBC: CPT

## 2019-12-10 RX ORDER — FUROSEMIDE 20 MG/1
TABLET ORAL
Qty: 90 TABLET | Refills: 0 | Status: SHIPPED | OUTPATIENT
Start: 2019-12-10 | End: 2020-03-05

## 2019-12-10 NOTE — PROGRESS NOTES
[facility] GROUP OUTPATIENT FOLLOW UP CLINIC VISIT    REASON FOR FOLLOW-UP:    Lymphadenopathy and bone lesions    HISTORY OF PRESENT ILLNESS:  Jalen Lockwood is a 69 y.o. male who returns today for follow up of the above issues to review CT imaging.    He continues to do about the same.  He denies any palpable lymphadenopathy.  He recently had a lesion resected from the right side of his face with sutures still in place.  No bleeding.  Anticoagulation has been temporarily held but now restarted.  No fevers chills or night sweats.    ONCOLOGIC HISTORY:  He has a history of alcohol use.  He was admitted with bilateral lower extremity weakness.  Labs indicated significant hyponatremia with sodium of 115.  It has improved somewhat.  Otherwise his creatinine has been normal.  His calcium is normal.  He is mildly anemic with a normal to slightly elevated MCV.  Platelets are mildly low.     He states he has lost about 50 pounds over the past year.  He states that he did this by cutting out fast food and not eating as much.  His wife apparently states that he does not eat much at all and is drinking 2 glasses of rum daily.  He admits to 2-3 rum and diet Cokes.  He smokes about a third of cigarettes daily.     He has no personal history of malignancy.  He is anticoagulated for atrial fibrillation and has chronic purpura on his arms.       CT imaging of the chest abdomen and pelvis was performed on 4/24/2019 to further evaluate weakness and weight loss.  There is a 4 cm a sending aorta.  A few small subcentimeter mediastinal lymph nodes are noted.  There is asymmetric enlargement of the left thyroid lobe.  No pleural effusion.  No pneumothorax.  No pulmonary consolidation or mass.  There is a small 7 mm left lower lobe pulmonary nodule.  The examination of the abdomen and pelvis is limited without intravenous contrast.  There is a 2.1 cm left adrenal nodule consistent with adenoma.  Left adrenal 1.5 cm adenomas  present as well.  There is a third 1.2 cm left adrenal nodule consistent with adenoma.  The liver, gallbladder, spleen, adrenal glands, pancreas, kidneys, and bladder otherwise appeared normal.  There is no bowel obstruction.  Borderline prominent lymphadenopathy was noted with a left iliac chain node measuring 1.2 cm in the right iliac chain node also measuring 1.2 cm.  The left inguinal lymph node is partially imaged but measures 1.7 cm.  Scattered lymphadenopathy noted otherwise.  There are some scattered lucent lesions present at L2, L4, left pubis, right pubis.  No fracture is noted.     He denies any respiratory symptoms.  No fevers or chills.  No digestive problems.  No urinary problems.  Again, he has chronic purpura on his arms.  He has chronic orthopedic issues with his feet.  He has a bilateral groin rash.    Serum protein studies showed no evidence for a monoclonal protein.        ALLERGIES:  Allergies   Allergen Reactions   • Cephalexin Hives   • Codeine Nausea Only   • Penicillins Other (See Comments)     Childhood allergy       MEDICATIONS:  The medication list has been reviewed with the patient by the medical assistant, and the list has been updated in the electronic medical record, which I reviewed.  Medication dosages and frequencies were confirmed to be accurate.    REVIEW OF SYSTEMS:  PAIN:  See Vital Signs below.  GENERAL:  No fevers, chills, night sweats, or unintended weight loss.  SKIN: Healing surgical incision on the right side of his face  HEME/LYMPH:  No abnormal bleeding.  No palpable lymphadenopathy.  EYES:  No vision changes or diplopia.  ENT:  No sore throat or difficulty swallowing.  RESPIRATORY:  No cough, shortness of breath, hemoptysis, or wheezing.  CARDIOVASCULAR:  No chest pain, palpitations, orthopnea, or dyspnea on exertion.  GASTROINTESTINAL:  No abdominal pain, nausea, vomiting, constipation, diarrhea, melena, or hematochezia.  GENITOURINARY:  No dysuria or  "hematuria.  MUSCULOSKELETAL: Chronic foot deformities.  Legs are wrapped.  NEUROLOGIC:  No dizziness, loss of consciousness, or seizures.  PSYCHIATRIC:  No depression, anxiety, or mood changes.    Vitals:    12/10/19 1436 12/10/19 1454   BP: (!) 165/113  Comment: did not take med today 124/98  Comment: manually   Pulse: 78    Resp: 16    Temp: 97.7 °F (36.5 °C)    TempSrc: Oral    SpO2: 97%    Weight: 127 kg (279 lb 8 oz)    Height: 182.9 cm (72.01\")    PainSc: 3  Comment: retroperitoneal laymphadenopathy    PainLoc: Foot  Comment: both feet        PHYSICAL EXAMINATION:  GENERAL:  Well-developed well-nourished male; awake, alert and oriented, in no acute distress.  SKIN: Chronic skin changes.  Purpura on both of his arms.  Healing surgical incision on the right side of his face.  HEAD:  Normocephalic, atraumatic.  EARS:  Hearing intact.  NOSE:  Septum midline.  No excoriations or nasal discharge.  MOUTH:  No stomatitis or ulcers.  Lips are normal.  THROAT:  Oropharynx without lesions or exudates.  LYMPHATICS:  No cervical, supraclavicular, axillary lymphadenopathy.   CHEST:  Lungs are clear to auscultation bilaterally.  No wheezes, rales, or rhonchi.  HEART:  Regular rate; normal rhythm.  No murmurs, gallops or rubs.  ABDOMEN: Not examined today  EXTREMITIES: Legs are wrapped.  Chronic foot deformities  NEUROLOGICAL:  No focal neurologic deficits.    DIAGNOSTIC DATA:  Results for orders placed or performed in visit on 12/10/19   CBC Auto Differential   Result Value Ref Range    WBC 6.49 3.40 - 10.80 10*3/mm3    RBC 4.17 4.14 - 5.80 10*6/mm3    Hemoglobin 14.2 13.0 - 17.7 g/dL    Hematocrit 40.6 37.5 - 51.0 %    MCV 97.4 (H) 79.0 - 97.0 fL    MCH 34.1 (H) 26.6 - 33.0 pg    MCHC 35.0 31.5 - 35.7 g/dL    RDW 14.4 12.3 - 15.4 %    RDW-SD 52.1 37.0 - 54.0 fl    MPV 10.7 6.0 - 12.0 fL    Platelets 114 (L) 140 - 450 10*3/mm3    Neutrophil % 67.9 42.7 - 76.0 %    Lymphocyte % 20.5 19.6 - 45.3 %    Monocyte % 6.8 5.0 - 12.0 " %    Eosinophil % 3.2 0.3 - 6.2 %    Basophil % 1.1 0.0 - 1.5 %    Immature Grans % 0.5 0.0 - 0.5 %    Neutrophils, Absolute 4.41 1.70 - 7.00 10*3/mm3    Lymphocytes, Absolute 1.33 0.70 - 3.10 10*3/mm3    Monocytes, Absolute 0.44 0.10 - 0.90 10*3/mm3    Eosinophils, Absolute 0.21 0.00 - 0.40 10*3/mm3    Basophils, Absolute 0.07 0.00 - 0.20 10*3/mm3    Immature Grans, Absolute 0.03 0.00 - 0.05 10*3/mm3    nRBC 0.0 0.0 - 0.2 /100 WBC       IMAGING:     I personally reviewed CT images of the abdomen and pelvis from 12/3/2019.  Lymphadenopathy persists, slightly larger.    ASSESSMENT:  This is a 69 y.o. male with:  1.  Lucent bone lesions on imaging at L2, L4, and the pubis.  These were visualized on CT imaging but not necessarily on the skeletal survey.  MRI was suggested by radiology.  We opted for a PET scan due to the lymphadenopathy.  However, this was not approved by insurance.  Serum protein studies did not indicate a monoclonal protein.    Not well visualized on MRI imaging.  Likely benign.  Follow-up on repeat CT imaging.    2.  Lymphadenopathy: This is small and scattered and my suspicion for lymphoma has been low.  He does have some inguinal lymphadenopathy but this could be reactive secondary to the tinea intertrigo.  If the lymphadenopathy persists we can consider an ultrasound-guided needle biopsy of the largest left inguinal node.  However, he is therapeutically anticoagulated on warfarin.    Follow-up imaging 6/18/2019 stable.    Follow-up imaging 12/3/2019 with a mild increase in size of lymphadenopathy.    3.  Thrombocytopenia: Platelets are a little bit low today.  Continue to monitor.    4.  History of alcohol use: He denies any alcohol use at this time    5.  Normocytic to macrocytic anemia with ferritin of 584 likely secondary to alcohol use and a normal percent iron saturation at 27%.  Fecal occult blood testing was negative.  Likely due to chronic disease and alcohol consumption.  Hemoglobin is  normal today    6.  Purpura associated with anticoagulation    7.  Atrial fibrillation/flutter anticoagulated with warfarin    8.  Stage III chronic kidney disease:    9.  Significant hyponatremia during a  hospitalization at [facility] which improved with supportive care.    PLAN:  1.  CT imaging of the abdomen and pelvis in 6 months and I will see him back with a CBC after that to review the results.  Biopsy only if necessary.

## 2019-12-12 ENCOUNTER — ANTICOAGULATION VISIT (OUTPATIENT)
Dept: PHARMACY | Facility: HOSPITAL | Age: 69
End: 2019-12-12

## 2019-12-12 DIAGNOSIS — I48.21 PERMANENT ATRIAL FIBRILLATION (HCC): ICD-10-CM

## 2019-12-12 LAB — INR PPP: 1.4

## 2019-12-12 NOTE — PROGRESS NOTES
Anticoagulation Clinic Progress Note    Anticoagulation Summary  As of 2019    INR goal:   2.0-3.0   TTR:   64.2 % (1.1 y)   INR used for dosin.40! (2019)   Warfarin maintenance plan:   7.5 mg every Sun; 5 mg all other days   Weekly warfarin total:   37.5 mg   Plan last modified:   Meghna Horan RPH (2019)   Next INR check:   2019   Priority:   Maintenance   Target end date:   Indefinite    Indications    Permanent atrial fibrillation [I48.21]             Anticoagulation Episode Summary     INR check location:       Preferred lab:       Send INR reminders to:    [facility]    Comments:         Anticoagulation Care Providers     Provider Role Specialty Phone number    Kurt Henry MD Referring Cardiology [phone]          INR History:  Anticoagulation Monitoring 2019   INR 2.90 3.70 1.40   INR Date 2019   INR Goal 2.0-3.0 2.0-3.0 2.0-3.0   Trend Same Down Same   Last Week Total 37.5 mg 40 mg 37.5 mg   Next Week Total 40 mg 22.5 mg 40 mg   Sun 7.5 mg 7.5 mg 7.5 mg   Mon 5 mg Hold (); Otherwise 5 mg 5 mg   Tue - Hold (12/3); Otherwise 5 mg 5 mg   Wed - Hold (, ) 5 mg   Thu 7.5 mg 5 mg 7.5 mg ()   Fri 5 mg 5 mg 5 mg   Sat 5 mg 5 mg 5 mg   Visit Report - - -   Some recent data might be hidden       Plan:  1. INR is Subtherapeutic today- see above in Anticoagulation Summary.   Provided instructions via secure voicemail to Lianne with [facility] to Change their warfarin regimen- see above in Anticoagulation Summary.  2. Follow up in 1 week      Brendan Live RPH

## 2019-12-17 RX ORDER — ALPRAZOLAM 0.5 MG/1
0.5 TABLET ORAL 2 TIMES DAILY PRN
Qty: 30 TABLET | Refills: 0 | Status: SHIPPED | OUTPATIENT
Start: 2019-12-17 | End: 2020-01-25

## 2019-12-19 ENCOUNTER — ANTICOAGULATION VISIT (OUTPATIENT)
Dept: PHARMACY | Facility: HOSPITAL | Age: 69
End: 2019-12-19

## 2019-12-19 ENCOUNTER — TELEPHONE (OUTPATIENT)
Dept: FAMILY MEDICINE CLINIC | Facility: CLINIC | Age: 69
End: 2019-12-19

## 2019-12-19 DIAGNOSIS — I48.21 PERMANENT ATRIAL FIBRILLATION (HCC): ICD-10-CM

## 2019-12-19 LAB — INR PPP: 2.1

## 2019-12-19 RX ORDER — POTASSIUM CHLORIDE 750 MG/1
TABLET, FILM COATED, EXTENDED RELEASE ORAL
Qty: 30 TABLET | Refills: 0 | Status: SHIPPED | OUTPATIENT
Start: 2019-12-19 | End: 2020-05-21

## 2019-12-30 RX ORDER — LISINOPRIL 20 MG/1
TABLET ORAL
Qty: 180 TABLET | Refills: 2 | Status: SHIPPED | OUTPATIENT
Start: 2019-12-30 | End: 2020-09-18

## 2020-01-02 ENCOUNTER — ANTICOAGULATION VISIT (OUTPATIENT)
Dept: PHARMACY | Facility: HOSPITAL | Age: 70
End: 2020-01-02

## 2020-01-02 DIAGNOSIS — I48.21 PERMANENT ATRIAL FIBRILLATION (HCC): ICD-10-CM

## 2020-01-02 LAB
INR PPP: 2.5 (ref 0.91–1.09)
PROTHROMBIN TIME: 30.2 SECONDS (ref 10–13.8)

## 2020-01-02 PROCEDURE — 36416 COLLJ CAPILLARY BLOOD SPEC: CPT

## 2020-01-02 PROCEDURE — 85610 PROTHROMBIN TIME: CPT

## 2020-01-02 NOTE — PROGRESS NOTES
I have supervised and reviewed the notes, assessments, and/or procedures performed by our PharmD Candidate. The documented assessment and plan were developed cooperatively. I concur with the documentation of this patient encounter.    Brendan Live RP

## 2020-01-02 NOTE — PROGRESS NOTES
Anticoagulation Clinic Progress Note    Anticoagulation Summary  As of 2020    INR goal:   2.0-3.0   TTR:   64.6 % (1.2 y)   INR used for dosin.5 (2020)   Warfarin maintenance plan:   7.5 mg every Sun, Thu; 5 mg all other days   Weekly warfarin total:   40 mg   No change documented:   Dianna Brown, Pharmacy Intern   Plan last modified:   Meghna Horan RPH (2019)   Next INR check:   2020   Priority:   Maintenance   Target end date:   Indefinite    Indications    Permanent atrial fibrillation [I48.21]             Anticoagulation Episode Summary     INR check location:       Preferred lab:       Send INR reminders to:    [facility]    Comments:         Anticoagulation Care Providers     Provider Role Specialty Phone number    Kurt Henry MD Referring Cardiology [phone]          Clinic Interview:  Patient Findings     Negatives:   Signs/symptoms of thrombosis, Signs/symptoms of bleeding,   Laboratory test error suspected, Change in health, Change in alcohol use,   Change in activity, Upcoming invasive procedure, Emergency department   visit, Upcoming dental procedure, Missed doses, Extra doses, Change in   medications, Change in diet/appetite, Hospital admission, Bruising, Other   complaints      Clinical Outcomes     Negatives:   Major bleeding event, Thromboembolic event,   Anticoagulation-related hospital admission, Anticoagulation-related ED   visit, Anticoagulation-related fatality        INR History:  Anticoagulation Monitoring 2019   INR 1.40 2.10 2.5   INR Date 2019   INR Goal 2.0-3.0 2.0-3.0 2.0-3.0   Trend Same Up Same   Last Week Total 37.5 mg 40 mg 40 mg   Next Week Total 40 mg 40 mg 40 mg   Sun 7.5 mg 7.5 mg 7.5 mg   Mon 5 mg 5 mg 5 mg   Tue 5 mg 5 mg 5 mg   Wed 5 mg 5 mg 5 mg   Thu 7.5 mg () 7.5 mg 7.5 mg   Fri 5 mg 5 mg 5 mg   Sat 5 mg 5 mg 5 mg   Visit Report - - -   Some recent data might be  hidden       Plan:  1. INR is Therapeutic today- see above in Anticoagulation Summary.  Will instruct Jalen Lockwood to Continue their warfarin regimen- see above in Anticoagulation Summary.  2. Follow up in 2 weeks  3. Patient declines warfarin refills.  4. Verbal and written information provided. Patient expresses understanding and has no further questions at this time.    Dianna Brown, Pharmacy Intern

## 2020-01-14 RX ORDER — WARFARIN SODIUM 5 MG/1
TABLET ORAL
Qty: 105 TABLET | Refills: 0 | Status: SHIPPED | OUTPATIENT
Start: 2020-01-14 | End: 2020-06-18

## 2020-01-16 ENCOUNTER — ANTICOAGULATION VISIT (OUTPATIENT)
Dept: PHARMACY | Facility: HOSPITAL | Age: 70
End: 2020-01-16

## 2020-01-16 DIAGNOSIS — I48.21 PERMANENT ATRIAL FIBRILLATION (HCC): ICD-10-CM

## 2020-01-16 LAB
INR PPP: 2.3 (ref 0.91–1.09)
PROTHROMBIN TIME: 27.7 SECONDS (ref 10–13.8)

## 2020-01-16 PROCEDURE — 36416 COLLJ CAPILLARY BLOOD SPEC: CPT

## 2020-01-16 PROCEDURE — 85610 PROTHROMBIN TIME: CPT

## 2020-01-16 NOTE — PROGRESS NOTES
Anticoagulation Clinic Progress Note    Anticoagulation Summary  As of 2020    INR goal:   2.0-3.0   TTR:   65.8 % (1.2 y)   INR used for dosin.3 (2020)   Warfarin maintenance plan:   7.5 mg every Sun, Thu; 5 mg all other days   Weekly warfarin total:   40 mg   No change documented:   Paulette Valle   Plan last modified:   Meghna Horan RPH (2019)   Next INR check:   2020   Priority:   Maintenance   Target end date:   Indefinite    Indications    Permanent atrial fibrillation [I48.21]             Anticoagulation Episode Summary     INR check location:       Preferred lab:       Send INR reminders to:    [facility]    Comments:         Anticoagulation Care Providers     Provider Role Specialty Phone number    Kurt Henry MD Referring Cardiology [phone]          Clinic Interview:  Patient Findings     Negatives:   Signs/symptoms of thrombosis, Signs/symptoms of bleeding,   Laboratory test error suspected, Change in health, Change in alcohol use,   Change in activity, Upcoming invasive procedure, Emergency department   visit, Upcoming dental procedure, Missed doses, Extra doses, Change in   medications, Change in diet/appetite, Hospital admission, Bruising, Other   complaints      Clinical Outcomes     Negatives:   Major bleeding event, Thromboembolic event,   Anticoagulation-related hospital admission, Anticoagulation-related ED   visit, Anticoagulation-related fatality        INR History:  Anticoagulation Monitoring 2019   INR 2.10 2.5 2.3   INR Date 2019   INR Goal 2.0-3.0 2.0-3.0 2.0-3.0   Trend Up Same Same   Last Week Total 40 mg 40 mg 40 mg   Next Week Total 40 mg 40 mg 40 mg   Sun 7.5 mg 7.5 mg 7.5 mg   Mon 5 mg 5 mg 5 mg   Tue 5 mg 5 mg 5 mg   Wed 5 mg 5 mg 5 mg   Thu 7.5 mg 7.5 mg 7.5 mg   Fri 5 mg 5 mg 5 mg   Sat 5 mg 5 mg 5 mg   Visit Report - - -   Some recent data might be hidden       Plan:  1. INR is  therapeutic today- see above in Anticoagulation Summary.   Will instruct Jalen Lockwood to continue their warfarin regimen- see above in Anticoagulation Summary.  2. Follow up in 4 weeks.  3. Patient declines warfarin refills.  4. Verbal and written information provided. Patient expresses understanding and has no further questions at this time.    Paulette Valle

## 2020-01-25 RX ORDER — ALPRAZOLAM 0.5 MG/1
0.5 TABLET ORAL 2 TIMES DAILY PRN
Qty: 30 TABLET | Refills: 0 | Status: SHIPPED | OUTPATIENT
Start: 2020-01-25 | End: 2020-02-24

## 2020-02-13 ENCOUNTER — ANTICOAGULATION VISIT (OUTPATIENT)
Dept: PHARMACY | Facility: HOSPITAL | Age: 70
End: 2020-02-13

## 2020-02-13 DIAGNOSIS — I48.21 PERMANENT ATRIAL FIBRILLATION (HCC): ICD-10-CM

## 2020-02-13 LAB
INR PPP: 1.8 (ref 0.91–1.09)
PROTHROMBIN TIME: 21.6 SECONDS (ref 10–13.8)

## 2020-02-13 PROCEDURE — G0463 HOSPITAL OUTPT CLINIC VISIT: HCPCS

## 2020-02-13 PROCEDURE — 36416 COLLJ CAPILLARY BLOOD SPEC: CPT

## 2020-02-13 PROCEDURE — 85610 PROTHROMBIN TIME: CPT

## 2020-02-13 NOTE — PROGRESS NOTES
Anticoagulation Clinic Progress Note    Anticoagulation Summary  As of 2020    INR goal:   2.0-3.0   TTR:   65.4 % (1.3 y)   INR used for dosin.8! (2020)   Warfarin maintenance plan:   7.5 mg every Sun, Thu; 5 mg all other days   Weekly warfarin total:   40 mg   No change documented:   Brendan Live RPH   Plan last modified:   Meghna Horan RPH (2019)   Next INR check:   3/5/2020   Priority:   Maintenance   Target end date:   Indefinite    Indications    Permanent atrial fibrillation [I48.21]             Anticoagulation Episode Summary     INR check location:       Preferred lab:       Send INR reminders to:    [facility]    Comments:         Anticoagulation Care Providers     Provider Role Specialty Phone number    Kurt Henry MD Referring Cardiology [phone]          Clinic Interview:  Patient Findings     Positives:   Missed doses    Negatives:   Signs/symptoms of thrombosis, Signs/symptoms of bleeding,   Laboratory test error suspected, Change in health, Change in alcohol use,   Change in activity, Upcoming invasive procedure, Emergency department   visit, Upcoming dental procedure, Extra doses, Change in medications,   Change in diet/appetite, Hospital admission, Bruising, Other complaints    Comments:   Missed dose Mon.       Clinical Outcomes     Negatives:   Major bleeding event, Thromboembolic event,   Anticoagulation-related hospital admission, Anticoagulation-related ED   visit, Anticoagulation-related fatality    Comments:   Missed dose Mon.         INR History:  Anticoagulation Monitoring 2020   INR 2.5 2.3 1.8   INR Date 2020   INR Goal 2.0-3.0 2.0-3.0 2.0-3.0   Trend Same Same Same   Last Week Total 40 mg 40 mg 35 mg   Next Week Total 40 mg 40 mg 40 mg   Sun 7.5 mg 7.5 mg 7.5 mg   Mon 5 mg 5 mg 5 mg   Tue 5 mg 5 mg 5 mg   Wed 5 mg 5 mg 5 mg   Thu 7.5 mg 7.5 mg 7.5 mg   Fri 5 mg 5 mg 5 mg   Sat 5 mg 5 mg 5 mg    Visit Report - - -   Some recent data might be hidden       Plan:  1. INR is Subtherapeutic today- see above in Anticoagulation Summary.  Will instruct Jalen Lockwood to Continue their warfarin regimen- see above in Anticoagulation Summary.  2. Follow up in 3 weeks  3. Patient declines warfarin refills.  4. Verbal and written information provided. Patient expresses understanding and has no further questions at this time.    Brendan Live [facility]

## 2020-02-14 RX ORDER — PRAVASTATIN SODIUM 20 MG
TABLET ORAL
Qty: 90 TABLET | Refills: 2 | Status: SHIPPED | OUTPATIENT
Start: 2020-02-14 | End: 2020-12-10 | Stop reason: SDUPTHER

## 2020-02-23 DIAGNOSIS — F41.9 ANXIETY: Primary | ICD-10-CM

## 2020-02-24 RX ORDER — ALPRAZOLAM 0.5 MG/1
0.5 TABLET ORAL 2 TIMES DAILY PRN
Qty: 30 TABLET | Refills: 0 | OUTPATIENT
Start: 2020-02-24 | End: 2020-03-19

## 2020-03-05 ENCOUNTER — ANTICOAGULATION VISIT (OUTPATIENT)
Dept: PHARMACY | Facility: HOSPITAL | Age: 70
End: 2020-03-05

## 2020-03-05 DIAGNOSIS — I48.21 PERMANENT ATRIAL FIBRILLATION (HCC): ICD-10-CM

## 2020-03-05 LAB
INR PPP: 1.5 (ref 0.91–1.09)
PROTHROMBIN TIME: 18.1 SECONDS (ref 10–13.8)

## 2020-03-05 PROCEDURE — 85610 PROTHROMBIN TIME: CPT

## 2020-03-05 PROCEDURE — G0463 HOSPITAL OUTPT CLINIC VISIT: HCPCS

## 2020-03-05 PROCEDURE — 36416 COLLJ CAPILLARY BLOOD SPEC: CPT

## 2020-03-05 RX ORDER — FUROSEMIDE 20 MG/1
TABLET ORAL
Qty: 90 TABLET | Refills: 0 | Status: SHIPPED | OUTPATIENT
Start: 2020-03-05 | End: 2020-05-28

## 2020-03-05 NOTE — PROGRESS NOTES
Anticoagulation Clinic Progress Note    Anticoagulation Summary  As of 3/5/2020    INR goal:   2.0-3.0   TTR:   62.6 % (1.3 y)   INR used for dosin.5! (3/5/2020)   Warfarin maintenance plan:   7.5 mg every Sun, Thu; 5 mg all other days   Weekly warfarin total:   40 mg   Plan last modified:   Meghna Horan RPH (2019)   Next INR check:   3/11/2020   Priority:   Maintenance   Target end date:   Indefinite    Indications    Permanent atrial fibrillation [I48.21]             Anticoagulation Episode Summary     INR check location:       Preferred lab:       Send INR reminders to:    [facility]    Comments:         Anticoagulation Care Providers     Provider Role Specialty Phone number    Kurt Henry MD Referring Cardiology [phone]          Clinic Interview:  Megan kel  (#30)      INR History:  Anticoagulation Monitoring 2020 2020 3/5/2020   INR 2.3 1.8 1.5   INR Date 2020 2020 3/5/2020   INR Goal 2.0-3.0 2.0-3.0 2.0-3.0   Trend Same Same Same   Last Week Total 40 mg 35 mg 40 mg   Next Week Total 40 mg 40 mg 42.5 mg   Sun 7.5 mg 7.5 mg 7.5 mg   Mon 5 mg 5 mg 5 mg   Tue 5 mg 5 mg 5 mg   Wed 5 mg 5 mg -   Thu 7.5 mg 7.5 mg 10 mg (3/5)   Fri 5 mg 5 mg 5 mg   Sat 5 mg 5 mg 5 mg   Visit Report - - -   Some recent data might be hidden       Plan:  1. INR is Subtherapeutic today- see above in Anticoagulation Summary.  Will instruct Jalen Lockwood to boost warfarin dose to 10mg today, then continue their warfarin regimen- see above in Anticoagulation Summary.  2. Follow up in 1 week--pt states he will have [facility] next week to draw INR on Wed.  3. Patient declines warfarin refills.  4. Verbal and written information provided. Patient expresses understanding and has no further questions at this time.    Meghna Horan RPH

## 2020-03-11 ENCOUNTER — ANTICOAGULATION VISIT (OUTPATIENT)
Dept: PHARMACY | Facility: HOSPITAL | Age: 70
End: 2020-03-11

## 2020-03-11 DIAGNOSIS — I48.21 PERMANENT ATRIAL FIBRILLATION (HCC): ICD-10-CM

## 2020-03-11 LAB — INR PPP: 1.7

## 2020-03-11 NOTE — PROGRESS NOTES
Anticoagulation Clinic Progress Note    Anticoagulation Summary  As of 3/11/2020    INR goal:   2.0-3.0   TTR:   61.9 % (1.4 y)   INR used for dosin.70! (3/11/2020)   Warfarin maintenance plan:   7.5 mg every Sun, Thu; 5 mg all other days   Weekly warfarin total:   40 mg   Plan last modified:   Meghna Horan RPH (2019)   Next INR check:   3/18/2020   Priority:   Maintenance   Target end date:   Indefinite    Indications    Permanent atrial fibrillation [I48.21]             Anticoagulation Episode Summary     INR check location:       Preferred lab:       Send INR reminders to:    [facility] [place]    Comments:         Anticoagulation Care Providers     Provider Role Specialty Phone number    Kurt Henry MD Referring Cardiology [phone]          INR History:  Anticoagulation Monitoring 2020 3/5/2020 3/11/2020   INR 1.8 1.5 1.70   INR Date 2020 3/5/2020 3/11/2020   INR Goal 2.0-3.0 2.0-3.0 2.0-3.0   Trend Same Same Same   Last Week Total 35 mg 40 mg 42.5 mg   Next Week Total 40 mg 42.5 mg 42.5 mg   Sun 7.5 mg 7.5 mg 7.5 mg   Mon 5 mg 5 mg 5 mg   Tue 5 mg 5 mg 5 mg   Wed 5 mg - 7.5 mg (3/11)   Thu 7.5 mg 10 mg (3/5) 7.5 mg   Fri 5 mg 5 mg 5 mg   Sat 5 mg 5 mg 5 mg   Visit Report - - -   Some recent data might be hidden       Plan:  1. INR is Subtherapeutic today- see above in Anticoagulation Summary.   Provided instructions to Juan Manuel with [facility] to Change their warfarin regimen- see above in Anticoagulation Summary.  2. Follow up in 1 week      Brendan Live RP

## 2020-03-18 ENCOUNTER — ANTICOAGULATION VISIT (OUTPATIENT)
Dept: PHARMACY | Facility: HOSPITAL | Age: 70
End: 2020-03-18

## 2020-03-18 DIAGNOSIS — I48.21 PERMANENT ATRIAL FIBRILLATION (HCC): ICD-10-CM

## 2020-03-18 NOTE — PROGRESS NOTES
Anticoagulation Clinic Progress Note    Anticoagulation Summary  As of 3/18/2020    INR goal:   2.0-3.0   TTR:   61.9 % (1.4 y)   INR used for dosing:   No new INR was available at the time of this encounter.   Warfarin maintenance plan:   7.5 mg every Sun, Thu; 5 mg all other days   Weekly warfarin total:   40 mg   Plan last modified:   Meghna Horan RPH (12/19/2019)   Next INR check:   4/1/2020   Priority:   Maintenance   Target end date:   Indefinite    Indications    Permanent atrial fibrillation [I48.21]             Anticoagulation Episode Summary     INR check location:       Preferred lab:       Send INR reminders to:   [facility]    Comments:         Anticoagulation Care Providers     Provider Role Specialty Phone number    Kurt Henry MD Referring Cardiology [phone]          INR History:  Anticoagulation Monitoring 3/5/2020 3/11/2020 3/18/2020   INR 1.5 1.70 -   INR Date 3/5/2020 3/11/2020 -   INR Goal 2.0-3.0 2.0-3.0 2.0-3.0   Trend Same Same Same   Last Week Total 40 mg 42.5 mg 42.5 mg   Next Week Total 42.5 mg 42.5 mg 40 mg   Sun 7.5 mg 7.5 mg 7.5 mg   Mon 5 mg 5 mg 5 mg   Tue 5 mg 5 mg 5 mg   Wed - 7.5 mg (3/11) 5 mg   Thu 10 mg (3/5) 7.5 mg 7.5 mg   Fri 5 mg 5 mg 5 mg   Sat 5 mg 5 mg 5 mg   Visit Report - - -   Some recent data might be hidden       Plan:  1. No INR today since spouse has fever so [facility] won't go into house.  2. Provided instructions to Meme with [facility] to Continue their warfarin regimen- see above in Anticoagulation Summary.  2. Follow up in 2 weeks when [facility] can enter home for INR  3. Patient/[facility] will let us know if anything changes so we can consider earlier INR/warfarin dose adjustment if needed.      Meghna Horan RPH

## 2020-03-19 DIAGNOSIS — F41.9 ANXIETY: ICD-10-CM

## 2020-03-19 RX ORDER — ALPRAZOLAM 0.5 MG/1
TABLET ORAL
Qty: 30 TABLET | Refills: 0 | Status: SHIPPED | OUTPATIENT
Start: 2020-03-19 | End: 2020-04-23

## 2020-03-26 RX ORDER — CARVEDILOL 3.12 MG/1
TABLET ORAL
Qty: 180 TABLET | Refills: 1 | Status: SHIPPED | OUTPATIENT
Start: 2020-03-26 | End: 2020-10-01

## 2020-04-01 ENCOUNTER — ANTICOAGULATION VISIT (OUTPATIENT)
Dept: PHARMACY | Facility: HOSPITAL | Age: 70
End: 2020-04-01

## 2020-04-01 DIAGNOSIS — I48.21 PERMANENT ATRIAL FIBRILLATION (HCC): ICD-10-CM

## 2020-04-01 LAB — INR PPP: 1.4

## 2020-04-01 NOTE — PROGRESS NOTES
Anticoagulation Clinic Progress Note    Anticoagulation Summary  As of 2020    INR goal:   2.0-3.0   TTR:   59.4 % (1.4 y)   INR used for dosin.40! (2020)   Warfarin maintenance plan:   5 mg every Sun, Tue, Thu; 7.5 mg all other days   Weekly warfarin total:   45 mg   Plan last modified:   Brendan Live RPH (2020)   Next INR check:   2020   Priority:   Maintenance   Target end date:   Indefinite    Indications    Permanent atrial fibrillation [I48.21]             Anticoagulation Episode Summary     INR check location:       Preferred lab:       Send INR reminders to:    [facility] [place]    Comments:         Anticoagulation Care Providers     Provider Role Specialty Phone number    Kurt Henry MD Referring Cardiology [phone]        Pertinent info:  Finished levofloxacin over weekend. Denies missed doses.    INR History:  Anticoagulation Monitoring 3/11/2020 3/18/2020 2020   INR 1.70 - 1.40   INR Date 3/11/2020 - 2020   INR Goal 2.0-3.0 2.0-3.0 2.0-3.0   Trend Same Same Up   Last Week Total 42.5 mg 42.5 mg 40 mg   Next Week Total 42.5 mg 40 mg 45 mg   Sun 7.5 mg 7.5 mg 5 mg   Mon 5 mg 5 mg 7.5 mg   Tue 5 mg 5 mg 5 mg   Wed 7.5 mg (3/11) 5 mg 7.5 mg   Thu 7.5 mg 7.5 mg 5 mg   Fri 5 mg 5 mg 7.5 mg   Sat 5 mg 5 mg 7.5 mg   Visit Report - - -   Some recent data might be hidden       Plan:  1. INR is Subtherapeutic today- see above in Anticoagulation Summary.   Provided instructions to Lianne with [facility] to Increase their warfarin regimen- see above in Anticoagulation Summary. Also relayed instructions directly to patient.   2. Follow up in 1 week      Brendan Live RPH

## 2020-04-10 ENCOUNTER — ANTICOAGULATION VISIT (OUTPATIENT)
Dept: PHARMACY | Facility: HOSPITAL | Age: 70
End: 2020-04-10

## 2020-04-10 DIAGNOSIS — I48.21 PERMANENT ATRIAL FIBRILLATION (HCC): ICD-10-CM

## 2020-04-10 LAB — INR PPP: 2

## 2020-04-10 NOTE — PROGRESS NOTES
Anticoagulation Clinic Progress Note    Anticoagulation Summary  As of 4/10/2020    INR goal:   2.0-3.0   TTR:   58.4 % (1.4 y)   INR used for dosin.00 (4/10/2020)   Warfarin maintenance plan:   5 mg every Sun, Tue, Thu; 7.5 mg all other days   Weekly warfarin total:   45 mg   No change documented:   Brendan Live RPH   Plan last modified:   Brendan Live RPH (2020)   Next INR check:   2020   Priority:   Maintenance   Target end date:   Indefinite    Indications    Permanent atrial fibrillation [I48.21]             Anticoagulation Episode Summary     INR check location:       Preferred lab:       [facility]    Comments:         Anticoagulation Care Providers     Provider Role Specialty Phone number    Kurt Henry MD Referring Cardiology [phone]          INR History:  Anticoagulation Monitoring 3/18/2020 2020 4/10/2020   INR - 1.40 2.00   INR Date - 2020 4/10/2020   INR Goal 2.0-3.0 2.0-3.0 2.0-3.0   Trend Same Up Same   Last Week Total 42.5 mg 40 mg 45 mg   Next Week Total 40 mg 45 mg 45 mg   Sun 7.5 mg 5 mg 5 mg   Mon 5 mg 7.5 mg 7.5 mg   Tue 5 mg 5 mg 5 mg   Wed 5 mg 7.5 mg 7.5 mg   Thu 7.5 mg 5 mg 5 mg   Fri 5 mg 7.5 mg 7.5 mg   Sat 5 mg 7.5 mg 7.5 mg   Visit Report - - -   Some recent data might be hidden       Plan:  1. INR is Therapeutic today- see above in Anticoagulation Summary.   Provided instructions to Juan Manuel with [facility] to Continue their warfarin regimen- see above in Anticoagulation Summary.  2. Follow up in 1 week      Brendan Live RPH

## 2020-04-17 ENCOUNTER — ANTICOAGULATION VISIT (OUTPATIENT)
Dept: PHARMACY | Facility: HOSPITAL | Age: 70
End: 2020-04-17

## 2020-04-17 DIAGNOSIS — I48.21 PERMANENT ATRIAL FIBRILLATION (HCC): ICD-10-CM

## 2020-04-17 LAB — INR PPP: 2.2

## 2020-04-17 NOTE — PROGRESS NOTES
Anticoagulation Clinic Progress Note    Anticoagulation Summary  As of 2020    INR goal:   2.0-3.0   TTR:   58.9 % (1.5 y)   INR used for dosin.20 (2020)   Warfarin maintenance plan:   5 mg every Sun, Tue, Thu; 7.5 mg all other days   Weekly warfarin total:   45 mg   No change documented:   Brendan Live RPH   Plan last modified:   Brendan Live RPH (2020)   Next INR check:   2020   Priority:   Maintenance   Target end date:   Indefinite    Indications    Permanent atrial fibrillation [I48.21]             Anticoagulation Episode Summary     INR check location:       Preferred lab:       [facility]    Comments:         Anticoagulation Care Providers     Provider Role Specialty Phone number    Kurt Henry MD Referring Cardiology [phone]          INR History:  Anticoagulation Monitoring 2020 4/10/2020 2020   INR 1.40 2.00 2.20   INR Date 2020 4/10/2020 2020   INR Goal 2.0-3.0 2.0-3.0 2.0-3.0   Trend Up Same Same   Last Week Total 40 mg 45 mg 45 mg   Next Week Total 45 mg 45 mg 45 mg   Sun 5 mg 5 mg 5 mg   Mon 7.5 mg 7.5 mg 7.5 mg   Tue 5 mg 5 mg 5 mg   Wed 7.5 mg 7.5 mg 7.5 mg   Thu 5 mg 5 mg 5 mg   Fri 7.5 mg 7.5 mg 7.5 mg   Sat 7.5 mg 7.5 mg 7.5 mg   Visit Report - - -   Some recent data might be hidden       Plan:  1. INR is Therapeutic today- see above in Anticoagulation Summary.   Provided instructions to Nesha with [facility] to Continue their warfarin regimen- see above in Anticoagulation Summary.  2. Follow up in 2 weeks      Brendan Live RPH

## 2020-04-22 DIAGNOSIS — F41.9 ANXIETY: ICD-10-CM

## 2020-04-23 RX ORDER — ALPRAZOLAM 0.5 MG/1
TABLET ORAL
Qty: 30 TABLET | Refills: 0 | Status: SHIPPED | OUTPATIENT
Start: 2020-04-23 | End: 2020-05-27

## 2020-04-23 RX ORDER — METOCLOPRAMIDE 10 MG/1
10 TABLET ORAL
Qty: 360 TABLET | Refills: 0 | Status: SHIPPED | OUTPATIENT
Start: 2020-04-23 | End: 2020-10-18

## 2020-04-24 NOTE — TELEPHONE ENCOUNTER
Patient called, advised as per Joanne's note. He verb understanding. F/u scheduled for 6/25@8015 with THANG Rubio.

## 2020-04-24 NOTE — TELEPHONE ENCOUNTER
Joanne Farnsworth, Mary Sarkar, RN   Caller: Unspecified (2 days ago, 12:49 PM)             Yes but needs a follow up in 6 weeks. Looks like he has not been seen for gastroparesis for a while. He has seen Dr. BARTH for screening colon but not a dedicated visit for gastroparesis.

## 2020-04-30 ENCOUNTER — ANTICOAGULATION VISIT (OUTPATIENT)
Dept: PHARMACY | Facility: HOSPITAL | Age: 70
End: 2020-04-30

## 2020-04-30 DIAGNOSIS — I48.21 PERMANENT ATRIAL FIBRILLATION (HCC): ICD-10-CM

## 2020-04-30 LAB — INR PPP: 2.3

## 2020-04-30 NOTE — PROGRESS NOTES
Anticoagulation Clinic Progress Note    Anticoagulation Summary  As of 2020    INR goal:   2.0-3.0   TTR:   59.9 % (1.5 y)   INR used for dosin.30 (2020)   Warfarin maintenance plan:   5 mg every Sun, Tue, Thu; 7.5 mg all other days   Weekly warfarin total:   45 mg   Plan last modified:   Brendan Live RPH (2020)   Next INR check:   2020   Priority:   Maintenance   Target end date:   Indefinite    Indications    Permanent atrial fibrillation [I48.21]             Anticoagulation Episode Summary     INR check location:       Preferred lab:       Send INR reminders to:    [facility] [place]    Comments:         Anticoagulation Care Providers     Provider Role Specialty Phone number    Kurt Henry MD Referring Cardiology [phone]          INR History:  Anticoagulation Monitoring 4/10/2020 2020 2020   INR 2.00 2.20 2.30   INR Date 4/10/2020 2020 2020   INR Goal 2.0-3.0 2.0-3.0 2.0-3.0   Trend Same Same Same   Last Week Total 45 mg 45 mg 45 mg   Next Week Total 45 mg 45 mg 45 mg   Sun 5 mg 5 mg 5 mg   Mon 7.5 mg 7.5 mg 7.5 mg   Tue 5 mg 5 mg 5 mg   Wed 7.5 mg 7.5 mg 7.5 mg   Thu 5 mg 5 mg 5 mg   Fri 7.5 mg 7.5 mg 7.5 mg   Sat 7.5 mg 7.5 mg 7.5 mg   Visit Report - - -   Some recent data might be hidden       Plan:  1. INR is Therapeutic today- see above in Anticoagulation Summary.   Provided instructions to Nesha with [facility] to Continue their warfarin regimen- see above in Anticoagulation Summary.  2. Follow up in 3 weeks      Meghna Horan RPH

## 2020-05-19 ENCOUNTER — HOSPITAL ENCOUNTER (OUTPATIENT)
Dept: PET IMAGING | Facility: HOSPITAL | Age: 70
Discharge: HOME | End: 2020-05-19
Admitting: INTERNAL MEDICINE

## 2020-05-19 DIAGNOSIS — R59.0 RETROPERITONEAL LYMPHADENOPATHY: ICD-10-CM

## 2020-05-19 LAB — CREAT BLDA-MCNC: 0.9 MG/DL (ref 0.6–1.3)

## 2020-05-19 PROCEDURE — 82565 ASSAY OF CREATININE: CPT

## 2020-05-19 PROCEDURE — 0 DIATRIZOATE MEGLUMINE & SODIUM PER 1 ML: Performed by: INTERNAL MEDICINE

## 2020-05-19 PROCEDURE — 74177 CT ABD & PELVIS W/CONTRAST: CPT

## 2020-05-19 PROCEDURE — 0 IOPAMIDOL PER 1 ML: Performed by: INTERNAL MEDICINE

## 2020-05-19 RX ADMIN — DIATRIZOATE MEGLUMINE AND DIATRIZOATE SODIUM 30 ML: 660; 100 LIQUID ORAL; RECTAL at 13:30

## 2020-05-19 RX ADMIN — IOPAMIDOL 150 ML: 755 INJECTION, SOLUTION INTRAVENOUS at 14:10

## 2020-05-21 ENCOUNTER — OFFICE VISIT (OUTPATIENT)
Dept: CARDIOLOGY | Facility: CLINIC | Age: 70
End: 2020-05-21

## 2020-05-21 VITALS — HEIGHT: 72 IN | WEIGHT: 272 LBS | BODY MASS INDEX: 36.84 KG/M2

## 2020-05-21 DIAGNOSIS — I87.8 VENOUS STASIS: ICD-10-CM

## 2020-05-21 DIAGNOSIS — R00.0 TACHYCARDIA INDUCED CARDIOMYOPATHY (HCC): ICD-10-CM

## 2020-05-21 DIAGNOSIS — I43 TACHYCARDIA INDUCED CARDIOMYOPATHY (HCC): ICD-10-CM

## 2020-05-21 DIAGNOSIS — I72.4 POPLITEAL ARTERY ANEURYSM (HCC): ICD-10-CM

## 2020-05-21 DIAGNOSIS — F10.21 ALCOHOL DEPENDENCE, IN REMISSION (HCC): ICD-10-CM

## 2020-05-21 DIAGNOSIS — R60.9 CHRONIC EDEMA: ICD-10-CM

## 2020-05-21 DIAGNOSIS — I10 ESSENTIAL HYPERTENSION: ICD-10-CM

## 2020-05-21 DIAGNOSIS — E66.01 CLASS 2 SEVERE OBESITY DUE TO EXCESS CALORIES WITH SERIOUS COMORBIDITY AND BODY MASS INDEX (BMI) OF 36.0 TO 36.9 IN ADULT (HCC): ICD-10-CM

## 2020-05-21 DIAGNOSIS — G47.33 OBSTRUCTIVE SLEEP APNEA SYNDROME: ICD-10-CM

## 2020-05-21 DIAGNOSIS — I48.21 PERMANENT ATRIAL FIBRILLATION (HCC): Primary | ICD-10-CM

## 2020-05-21 DIAGNOSIS — Z72.0 TOBACCO ABUSE: Chronic | ICD-10-CM

## 2020-05-21 DIAGNOSIS — I48.3 TYPICAL ATRIAL FLUTTER (HCC): ICD-10-CM

## 2020-05-21 DIAGNOSIS — I25.10 NONOCCLUSIVE CORONARY ATHEROSCLEROSIS OF NATIVE CORONARY ARTERY: ICD-10-CM

## 2020-05-21 PROBLEM — D69.6 THROMBOCYTOPENIA (HCC): Status: RESOLVED | Noted: 2019-04-25 | Resolved: 2020-05-21

## 2020-05-21 PROCEDURE — 99441 PR PHYS/QHP TELEPHONE EVALUATION 5-10 MIN: CPT | Performed by: NURSE PRACTITIONER

## 2020-05-21 RX ORDER — GABAPENTIN 300 MG/1
300 CAPSULE ORAL 3 TIMES DAILY
COMMUNITY
End: 2020-11-10 | Stop reason: DRUGHIGH

## 2020-05-21 NOTE — PROGRESS NOTES
Telehealth Visit    Date of Visit: 2020  Encounter Provider: ESTEFANY Dobson  Place of Service: [facility] CARDIOLOGY  Patient Name: Jalen Lockwood  :1950  Primary Cardiologist: Dr. Kurt Henry     Chief Complaint   Patient presents with   • Atrial Fibrillation   • Follow-up   :     Dear Dr. Marc Ludwig,     HPI: Jalen Lockwood is a pleasant 70 y.o. male who is an established patient of our practice. Due to COVID-19 virus, I am conducting a telehealth visit via telephone with patient and he has consented to this visit today.  He is a new patient to me and his previous records have been reviewed.    In , he was diagnosed with atrial flutter with rapid ventricular response and his EF was low due to tachycardia-mediated cardiomyopathy.  Catheterization revealed moderate nonobstructive coronary artery disease and medical therapy was recommended.  He was drinking quite heavily at that time and recommended alcohol cessation.  Shortly after that, he presented with rapid atrial fibrillation and his EF declined again to 35%.  Once his atrial fibrillation resolved his EF normalized.    He has known history of hypertension, hyperlipidemia, and chronic edema due to severe venous stasis.  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    In 2019, he followed up with Dr. Kurt Henry.  He denied any cardiac symptoms and was still smoking and drinking alcohol moderately.  He was using furosemide on an as-needed basis.  She noted at one point he was on a thiazide diuretic but this caused hyponatremia.  She said if we needed to add diuretic or antihypertensive therapy in the future we may use spironolactone.    In 2019, he had a CBC completed which showed normal hemoglobin and hematocrit.  BMP normal except for glucose of 108 and chloride 97.  He recently had a creatinine on 2020 which was  normal at 0.9.  Last INR on 4/30/2020 was 2.3.    Today is his follow-up telephone visit.  He said overall he is doing very well.  The home health nurses actually there right now wrapping his legs and his blood pressure is been within normal limits.  He has chronic lower extremity edema proved.  He denies chest pain, shortness of breath, PND, orthopnea, palpitations, dizziness, syncope, or bleeding.    Past Medical History:   Diagnosis Date   • Allergic rhinitis    • Anxiety    • Aortectasia (CMS/HCC)     3cm infrarenal abdominal aorta   • Arthritis    • Atrial flutter (CMS/HCC) 2010    s/p ablation    • Charcot's joint of foot    • Chronic edema     both legs and sees wound care center at [place]    • Chronic venous insufficiency    • COPD (chronic obstructive pulmonary disease) (CMS/HCC)    • Coronary atherosclerosis     Cath 2010: diffuse 40-50% disease   • Diverticulosis    • Duodenitis    • Fatty liver    • Gastritis    • Gastroparesis    • Hematoma     post-operative; After catheterization, right groin, required surgical exploration   • Hyperlipidemia    • Hypertension    • Insomnia    • Internal hemorrhoids    • Open wound     izzy legs has teddy chg weekly at wound care center at [place]  pt does second dressing on left leg another time during week   • Osteomyelitis (CMS/HCC)    • Paroxysmal atrial fibrillation (CMS/HCC)    • Peripheral neuropathy    • Popliteal artery aneurysm (CMS/HCC)     left, s/p stenting by Dr. Boston   • Skin cancer    • Sleep apnea     o2   • 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CMS/HCC)     due to flutter and afib; cath 2010 with nonobstructive disease   • Venous stasis    • Venous stasis ulcer (CMS/HCC)     bilateral legs        Past Surgical History:   Procedure Laterality Date   • CARDIAC CATHETERIZATION     • CATARACT EXTRACTION     • COLONOSCOPY  09/28/2015    NBIH, diverticulosis, polyps   • COLONOSCOPY N/A 9/11/2018    Procedure: COLONOSCOPY TO CECUM  AND TERM. ILEUM WITH COLD  SNARE POLYPECTOMIES;  Surgeon: Kane Lagunas MD;  Location:  [facility];  Service: Gastroenterology   • COLONOSCOPY N/A 10/29/2019    Procedure: COLONOSCOPY TO TO CECUM AND TERMINAL ILEUM WITH HOT AND COLD SNARE POLYPECTOMIES;  Surgeon: Kane Lagunas MD;  Location:  [facility];  Service: Gastroenterology   • HIP ARTHROPLASTY Right 2017   • JOINT REPLACEMENT Left    • OTHER SURGICAL HISTORY      Catheter ablation atrial flutter   • REPAIR ANEURYSM / PSEUDO ANEURYSM / RUPTURED ANEURYSM POPLITEAL ARTERY      Stent-Graft of the the left popliteal artery   • REPAIR KNEE LIGAMENT      Primary repair of knee ligament cruciate anterior right   • TONSILLECTOMY  1958   • TOTAL KNEE ARTHROPLASTY Bilateral    • UPPER GASTROINTESTINAL ENDOSCOPY  09/16/2014    acute gastritis, acute duodenitis       Social History     Socioeconomic History   • Marital status:      Spouse name: Mariposa   • Number of children: Not on file   • Years of education: Not on file   • Highest education level: Not on file   Occupational History     Employer: RETIRED   Tobacco Use   • Smoking status: Current Every Day Smoker     Packs/day: 0.25     Years: 45.00     Pack years: 11.25     Types: Cigarettes   • Smokeless tobacco: Never Used   Substance and Sexual Activity   • Alcohol use: Yes     Alcohol/week: 8.0 - 9.0 standard drinks     Types: 1 - 2 Shots of liquor, 7 Standard drinks or equivalent per week     Comment: 2 rum a day//Caffeine use: 3 cups daily   • Drug use: No   • Sexual activity: Defer   Social History Narrative    Today is pt's first day with no cigarettes.         Family History   Problem Relation Age of Onset   • Emphysema Father    • Malig Hyperthermia Neg Hx        The following portion of the patient's history were reviewed and updated as appropriate: past medical history, past surgical history, past social history, past family history, allergies, current medications, and problem list.    Review of Systems    Constitution: Negative.   Cardiovascular: Positive for leg swelling.   Respiratory: Negative.    Hematologic/Lymphatic: Negative for bleeding problem.   Neurological: Negative.        Allergies   Allergen Reactions   • Cephalexin Hives   • Codeine Nausea Only   • Penicillins Other (See Comments)     Childhood allergy         Current Outpatient Medications:   •  albuterol sulfate  (90 Base) MCG/ACT inhaler, Inhale 2 puffs Every 4 (Four) Hours As Needed for Wheezing., Disp: 1 inhaler, Rfl: 6  •  ALPRAZolam (XANAX) 0.5 MG tablet, TAKE 1 TABLET BY MOUTH TWICE A DAY AS NEEDED FOR ANXIETY, Disp: 30 tablet, Rfl: 0  •  ATROVENT HFA 17 MCG/ACT inhaler, Inhale 2 puffs 4 (Four) Times a Day., Disp: 3 inhaler, Rfl: 3  •  carvedilol (COREG) 3.125 MG tablet, TAKE 1 TABLET BY MOUTH TWICE A DAY WITH MEALS, Disp: 180 tablet, Rfl: 1  •  clotrimazole-betamethasone (LOTRISONE) 1-0.05 % cream, APPLY TO AFFECTED AREA DAILY, Disp: , Rfl: 3  •  docusate sodium (COLACE) 100 MG capsule, Take 100 mg by mouth Daily., Disp: , Rfl:   •  finasteride (PROSCAR) 5 MG tablet, Take 1 tablet by mouth daily., Disp: , Rfl:   •  furosemide (LASIX) 20 MG tablet, TAKE 1 TABLET BY MOUTH EVERY DAY (Patient taking differently: Take 40 mg by mouth Daily.), Disp: 90 tablet, Rfl: 0  •  gabapentin (NEURONTIN) 300 MG capsule, Take 300 mg by mouth 3 (Three) Times a Day., Disp: , Rfl:   •  HYDROcodone-acetaminophen (NORCO) 7.5-325 MG per tablet, Take 1 tablet by mouth Every 8 (Eight) Hours As Needed for Mild Pain ., Disp: 12 tablet, Rfl: 0  •  lisinopril (PRINIVIL,ZESTRIL) 20 MG tablet, TAKE 2 TABLETS BY MOUTH EVERY DAY, Disp: 180 tablet, Rfl: 2  •  metoclopramide (REGLAN) 10 MG tablet, TAKE 1 TABLET BY MOUTH 4 (FOUR) TIMES A DAY BEFORE MEALS & AT BEDTIME., Disp: 360 tablet, Rfl: 0  •  pravastatin (PRAVACHOL) 20 MG tablet, TAKE 1 TABLET BY MOUTH EVERY DAY, Disp: 90 tablet, Rfl: 2  •  silodosin (RAPAFLO) 8 MG capsule capsule, Take 1 capsule by mouth daily.  "With food., Disp: , Rfl:   •  warfarin (COUMADIN) 5 MG tablet, Take 1.5 tablets (7.5 mg) by mouth Sun and Thurs, and take 1 tablet (5 mg) by mouth all other days or as directed., Disp: 105 tablet, Rfl: 0        Objective:     Vitals:    05/21/20 1130   Weight: 123 kg (272 lb)   Height: 182.9 cm (72\")     Body mass index is 36.89 kg/m².    Due to telehealth visit, there was no EKG, vitals, or weight performed in our office.  Vitals/Weight were reported by the patient and conducted at home.       Assessment:       Diagnosis Plan   1. Permanent atrial fibrillation     2. Typical atrial flutter (CMS/HCC)     3. 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CMS/HCC)     4. Nonocclusive coronary atherosclerosis of native coronary artery     5. Essential hypertension     6. Venous stasis     7. Chronic edema     8. Obstructive sleep apnea syndrome     9. Tobacco abuse     10. Class 2 severe obesity due to excess calories with serious comorbidity and body mass index (BMI) of 36.0 to 36.9 in adult (CMS/HCC)            Plan:       1/2.  Atrial Fibrillation/Atrial Flutter: Status post atrial flutter ablation.  Remains in permanent atrial fibrillation according to his last office visit.  He remains on warfarin for stroke prevention and INRs followed at [facility].    3.  Tachycardia Induced Cardiomyopathy: When he has been in atrial flutter/fibrillation with rapid ventricular response his ejection fraction decreases.  Once he restore his normal rhythm, his EF has improved.    4.  Nonobstructive Coronary Artery Disease: Denies anginal symptoms.  He is not on aspirin because of the warfarin.  Continue pravastatin and carvedilol.    5.  Hypertension: He says his blood pressure is been well controlled at home.    6/7: Venous Stasis and Chronic Lower Extremity Edema: He typically goes to the wound center and has had recurrent cellulitis in the past.  Currently home health nursing is at his home wrapping his legs and he " says they have improved.    8.  Obstructive Sleep Apnea: Compliant with CPAP machine.    9.  Tobacco Abuse: Smoking cessation highly recommended.    10.  Obesity: BMI greater than 36.  He would benefit from weight loss, exercise, and heart healthy, low-sodium diet.    11.  Overall he feels he is doing very well from a cardiac standpoint.  I recommended follow-up with Dr. Kurt Henry in 6 months, unless otherwise needed sooner.    This patient has consented to a telehealth visit via telephone. The visit was scheduled as a telephone visit to comply with patient safety concerns in accordance with CDC recommendations.  All vitals recorded within this visit are reported by the patient.  I spent 25 minutes in total including but not limited to the 5 minutes spent in direct conversation with this patient.      As always, it has been a pleasure to participate in your patient's care. Thank you.       Sincerely,         ESTEFANY Duckworth        Dictated utilizing Dragon dictation

## 2020-05-26 ENCOUNTER — APPOINTMENT (OUTPATIENT)
Dept: LAB | Facility: HOSPITAL | Age: 70
End: 2020-05-26

## 2020-05-26 ENCOUNTER — OFFICE VISIT (OUTPATIENT)
Dept: ONCOLOGY | Facility: CLINIC | Age: 70
End: 2020-05-26

## 2020-05-26 DIAGNOSIS — D63.8 ANEMIA OF CHRONIC DISEASE: Primary | ICD-10-CM

## 2020-05-26 PROCEDURE — 99442 PR PHYS/QHP TELEPHONE EVALUATION 11-20 MIN: CPT | Performed by: INTERNAL MEDICINE

## 2020-05-26 NOTE — PROGRESS NOTES
[facility] GROUP OUTPATIENT FOLLOW UP CLINIC VISIT    REASON FOR FOLLOW-UP:    Lymphadenopathy and bone lesions    HISTORY OF PRESENT ILLNESS:  Jalen Lockwood is a 70 y.o. male who returns today for follow up of the above issues to review CT imaging.    Telephone visit today.    Doing well. Feet still hurt but this is chronic.  No palpable lymphadenopathy.  No bleeding.      ONCOLOGIC HISTORY:  He has a history of alcohol use.  He was admitted with bilateral lower extremity weakness.  Labs indicated significant hyponatremia with sodium of 115.  It has improved somewhat.  Otherwise his creatinine has been normal.  His calcium is normal.  He is mildly anemic with a normal to slightly elevated MCV.  Platelets are mildly low.     He states he has lost about 50 pounds over the past year.  He states that he did this by cutting out fast food and not eating as much.  His wife apparently states that he does not eat much at all and is drinking 2 glasses of rum daily.  He admits to 2-3 rum and diet Cokes.  He smokes about a third of cigarettes daily.     He has no personal history of malignancy.  He is anticoagulated for atrial fibrillation and has chronic purpura on his arms.       CT imaging of the chest abdomen and pelvis was performed on 4/24/2019 to further evaluate weakness and weight loss.  There is a 4 cm a sending aorta.  A few small subcentimeter mediastinal lymph nodes are noted.  There is asymmetric enlargement of the left thyroid lobe.  No pleural effusion.  No pneumothorax.  No pulmonary consolidation or mass.  There is a small 7 mm left lower lobe pulmonary nodule.  The examination of the abdomen and pelvis is limited without intravenous contrast.  There is a 2.1 cm left adrenal nodule consistent with adenoma.  Left adrenal 1.5 cm adenomas present as well.  There is a third 1.2 cm left adrenal nodule consistent with adenoma.  The liver, gallbladder, spleen, adrenal glands, pancreas, kidneys, and bladder  otherwise appeared normal.  There is no bowel obstruction.  Borderline prominent lymphadenopathy was noted with a left iliac chain node measuring 1.2 cm in the right iliac chain node also measuring 1.2 cm.  The left inguinal lymph node is partially imaged but measures 1.7 cm.  Scattered lymphadenopathy noted otherwise.  There are some scattered lucent lesions present at L2, L4, left pubis, right pubis.  No fracture is noted.     He denies any respiratory symptoms.  No fevers or chills.  No digestive problems.  No urinary problems.  Again, he has chronic purpura on his arms.  He has chronic orthopedic issues with his feet.  He has a bilateral groin rash.    Serum protein studies showed no evidence for a monoclonal protein.        ALLERGIES:  Allergies   Allergen Reactions   • Cephalexin Hives   • Codeine Nausea Only   • Penicillins Other (See Comments)     Childhood allergy       MEDICATIONS:  The medication list has been reviewed with the patient by the medical assistant, and the list has been updated in the electronic medical record, which I reviewed.  Medication dosages and frequencies were confirmed to be accurate.    REVIEW OF SYSTEMS:  PAIN:  See Vital Signs below.  GENERAL:  No fevers, chills, night sweats, or unintended weight loss.  SKIN: no rash  HEME/LYMPH:  No abnormal bleeding.  No palpable lymphadenopathy.  EYES:  No vision changes or diplopia.  ENT:  No sore throat or difficulty swallowing.  RESPIRATORY:  No cough, shortness of breath, hemoptysis, or wheezing.  CARDIOVASCULAR:  No chest pain, palpitations, orthopnea, or dyspnea on exertion.  GASTROINTESTINAL:  No abdominal pain, nausea, vomiting, constipation, diarrhea, melena, or hematochezia.  GENITOURINARY:  No dysuria or hematuria.  MUSCULOSKELETAL: Chronic foot deformities.    NEUROLOGIC:  No dizziness, loss of consciousness, or seizures.  PSYCHIATRIC:  No depression, anxiety, or mood changes.    There were no vitals filed for this  visit.    Telephone visit today    DIAGNOSTIC DATA:  Results for orders placed or performed during the hospital encounter of 05/19/20   POC Creatinine   Result Value Ref Range    Creatinine 0.90 0.60 - 1.30 mg/dL       IMAGING:     I personally reviewed CT images of the abdomen and pelvis from 5/19/2020.  Lymphadenopathy and bony lesions are unchanged.      ASSESSMENT:  This is a 70 y.o. male with:  1.  Lucent bone lesions on imaging at L2, L4, and the pubis.  These were visualized on CT imaging but not necessarily on the skeletal survey.  MRI was suggested by radiology.  We opted for a PET scan due to the lymphadenopathy.  However, this was not approved by insurance.  Serum protein studies did not indicate a monoclonal protein.    Not well visualized on MRI imaging.  Likely benign.  Follow-up on repeat CT imaging 5/19/2020 stable.      2.  Lymphadenopathy: This is small and scattered and my suspicion for lymphoma has been low.  He does have some inguinal lymphadenopathy but this could be reactive secondary to the tinea intertrigo.  If the lymphadenopathy persists we can consider an ultrasound-guided needle biopsy of the largest left inguinal node.  However, he is therapeutically anticoagulated on warfarin.    Follow-up imaging 6/18/2019 stable.    Follow-up imaging 12/3/2019 with a mild increase in size of lymphadenopathy.    Stable on 5/19/2020 imaging.      3.  Thrombocytopenia:  No CBC done with his scan.      4.  History of alcohol use:  Not discussed today.      5.  Normocytic to macrocytic anemia with ferritin of 584 likely secondary to alcohol use and a normal percent iron saturation at 27%.  Fecal occult blood testing was negative.  Likely due to chronic disease and alcohol consumption.  No CBC done     6.  Purpura associated with anticoagulation    7.  Atrial fibrillation/flutter anticoagulated with warfarin    8.  Stage III chronic kidney disease:    9.  Significant hyponatremia during a prior   hospitalization at [facility] which improved with supportive care.    PLAN:  1.  I don't think there is an indication to biopsy a lymph node at this point  2.  Follow up in 6 months with a CBC  3.  Consider repeat CT imaging in one year.    You have chosen to receive care through a telephone visit. Do you consent to use a telephone visit for your medical care today? Yes    15 minutes

## 2020-05-27 DIAGNOSIS — F41.9 ANXIETY: ICD-10-CM

## 2020-05-27 RX ORDER — ALPRAZOLAM 0.5 MG/1
TABLET ORAL
Qty: 30 TABLET | Refills: 0 | Status: SHIPPED | OUTPATIENT
Start: 2020-05-27 | End: 2020-06-19

## 2020-05-28 ENCOUNTER — ANTICOAGULATION VISIT (OUTPATIENT)
Dept: PHARMACY | Facility: HOSPITAL | Age: 70
End: 2020-05-28

## 2020-05-28 DIAGNOSIS — I48.21 PERMANENT ATRIAL FIBRILLATION (HCC): ICD-10-CM

## 2020-05-28 LAB — INR PPP: 1.5

## 2020-05-28 RX ORDER — FUROSEMIDE 20 MG/1
TABLET ORAL
Qty: 90 TABLET | Refills: 0 | Status: SHIPPED | OUTPATIENT
Start: 2020-05-28 | End: 2020-12-10

## 2020-05-28 NOTE — PROGRESS NOTES
Anticoagulation Clinic Progress Note    Anticoagulation Summary  As of 2020    INR goal:   2.0-3.0   TTR:   58.8 % (1.6 y)   INR used for dosin.50! (2020)   Warfarin maintenance plan:   5 mg every Sun, Tue, Thu; 7.5 mg all other days   Weekly warfarin total:   45 mg   Plan last modified:   Brendan Live [facility] (2020)   Next INR check:   2020   Priority:   Maintenance   Target end date:   Indefinite    Indications    Permanent atrial fibrillation [I48.21]             Anticoagulation Episode Summary     INR check location:       Preferred lab:       Send INR reminders to:   [facility]    Comments:         Anticoagulation Care Providers     Provider Role Specialty Phone number    Kurt Henry MD Referring Cardiology [phone]          Drug interactions: has remained unchanged.  Diet: has remained unchanged.    Clinic Interview:  No pertinent clinical findings have been reported.    INR History:  Anticoagulation Monitoring 2020   INR 2.20 2.30 1.50   INR Date 2020   INR Goal 2.0-3.0 2.0-3.0 2.0-3.0   Trend Same Same Same   Last Week Total 45 mg 45 mg 45 mg   Next Week Total 45 mg 45 mg 47.5 mg   Sun 5 mg 5 mg 5 mg   Mon 7.5 mg 7.5 mg 7.5 mg   Tue 5 mg 5 mg 5 mg   Wed 7.5 mg 7.5 mg 7.5 mg   Thu 5 mg 5 mg 7.5 mg ()   Fri 7.5 mg 7.5 mg 7.5 mg   Sat 7.5 mg 7.5 mg 7.5 mg   Visit Report - - -   Some recent data might be hidden       Plan:  1. INR is 1.5 today- see above in Anticoagulation Summary.    Spoke with Nesha [facility] -[phone], no reason for low INR,  patient will take booster of 7.5mg today, then continue current regimen,  2. Follow up  20   3. Pt has agreed to only be called if INR out of range. They have been instructed to call if any changes in medications, doses, concerns, etc. Patient expresses understanding and has no further questions at this time.    Ruth Orta [facility]

## 2020-06-04 ENCOUNTER — ANTICOAGULATION VISIT (OUTPATIENT)
Dept: PHARMACY | Facility: HOSPITAL | Age: 70
End: 2020-06-04

## 2020-06-04 DIAGNOSIS — I48.21 PERMANENT ATRIAL FIBRILLATION (HCC): ICD-10-CM

## 2020-06-04 LAB
INR PPP: 2 (ref 0.91–1.09)
PROTHROMBIN TIME: 23.9 SECONDS (ref 10–13.8)

## 2020-06-04 PROCEDURE — 36416 COLLJ CAPILLARY BLOOD SPEC: CPT

## 2020-06-04 PROCEDURE — 85610 PROTHROMBIN TIME: CPT

## 2020-06-04 PROCEDURE — G0463 HOSPITAL OUTPT CLINIC VISIT: HCPCS

## 2020-06-04 NOTE — PROGRESS NOTES
Anticoagulation Clinic Progress Note    Anticoagulation Summary  As of 2020    INR goal:   2.0-3.0   TTR:   58.1 % (1.6 y)   INR used for dosin.0 (2020)   Warfarin maintenance plan:   5 mg every Mon, Fri; 7.5 mg all other days   Weekly warfarin total:   47.5 mg   Plan last modified:   Brendan Live [facility] (2020)   Next INR check:   2020   Priority:   Maintenance   Target end date:   Indefinite    Indications    Permanent atrial fibrillation [I48.21]             Anticoagulation Episode Summary     INR check location:       Preferred lab:       Send INR reminders to:   [facility]    Comments:         Anticoagulation Care Providers     Provider Role Specialty Phone number    Kurt Henry MD Referring Cardiology [phone]          Clinic Interview:  Patient Findings     Negatives:   Signs/symptoms of thrombosis, Signs/symptoms of bleeding,   Laboratory test error suspected, Change in health, Change in alcohol use,   Change in activity, Upcoming invasive procedure, Emergency department   visit, Upcoming dental procedure, Missed doses, Extra doses, Change in   medications, Change in diet/appetite, Hospital admission, Bruising, Other   complaints      Clinical Outcomes     Negatives:   Major bleeding event, Thromboembolic event,   Anticoagulation-related hospital admission, Anticoagulation-related ED   visit, Anticoagulation-related fatality        INR History:  Anticoagulation Monitoring 2020   INR 2.30 1.50 2.0   INR Date 2020   INR Goal 2.0-3.0 2.0-3.0 2.0-3.0   Trend Same Same Up   Last Week Total 45 mg 45 mg 47.5 mg   Next Week Total 45 mg 47.5 mg 47.5 mg   Sun 5 mg 5 mg 7.5 mg   Mon 7.5 mg 7.5 mg 5 mg   Tue 5 mg 5 mg 7.5 mg   Wed 7.5 mg 7.5 mg 7.5 mg   Thu 5 mg 7.5 mg () 7.5 mg   Fri 7.5 mg 7.5 mg 5 mg   Sat 7.5 mg 7.5 mg 7.5 mg   Visit Report - - -   Some recent data might be hidden       Plan:  1. INR is Therapeutic  today- see above in Anticoagulation Summary.  Will instruct Jalen Lockwood to Change their warfarin regimen- see above in Anticoagulation Summary.  2. Follow up in 2 weeks  3. Patient declines warfarin refills.  4. Verbal and written information provided. Patient expresses understanding and has no further questions at this time.    Brendan Live [facility]

## 2020-06-07 RX ORDER — ALBUTEROL SULFATE 90 UG/1
AEROSOL, METERED RESPIRATORY (INHALATION)
Qty: 18 INHALER | Refills: 6 | Status: SHIPPED | OUTPATIENT
Start: 2020-06-07 | End: 2021-03-25 | Stop reason: SDUPTHER

## 2020-06-18 ENCOUNTER — ANTICOAGULATION VISIT (OUTPATIENT)
Dept: PHARMACY | Facility: HOSPITAL | Age: 70
End: 2020-06-18

## 2020-06-18 DIAGNOSIS — I48.21 PERMANENT ATRIAL FIBRILLATION (HCC): ICD-10-CM

## 2020-06-18 LAB
INR PPP: 2.5 (ref 0.91–1.09)
PROTHROMBIN TIME: 30.4 SECONDS (ref 10–13.8)

## 2020-06-18 PROCEDURE — 85610 PROTHROMBIN TIME: CPT

## 2020-06-18 PROCEDURE — 36416 COLLJ CAPILLARY BLOOD SPEC: CPT

## 2020-06-18 RX ORDER — WARFARIN SODIUM 5 MG/1
TABLET ORAL
Qty: 130 TABLET | Refills: 1 | Status: SHIPPED | OUTPATIENT
Start: 2020-06-18 | End: 2020-12-18

## 2020-06-18 NOTE — PROGRESS NOTES
Anticoagulation Clinic Progress Note    Anticoagulation Summary  As of 2020    INR goal:   2.0-3.0   TTR:   59.0 % (1.6 y)   INR used for dosin.5 (2020)   Warfarin maintenance plan:   5 mg every Mon, Fri; 7.5 mg all other days   Weekly warfarin total:   47.5 mg   Plan last modified:   Brendan Live [facility] (2020)   Next INR check:   2020   Priority:   Maintenance   Target end date:   Indefinite    Indications    Permanent atrial fibrillation (CMS/HCC) [I48.21]             Anticoagulation Episode Summary     INR check location:       Preferred lab:       Send INR reminders to:    [facility]    Comments:         Anticoagulation Care Providers     Provider Role Specialty Phone number    Kurt Henry MD Referring Cardiology [phone]          Clinic Interview:  Patient Findings     Positives:   Change in medications        INR History:  Anticoagulation Monitoring 2020   INR 1.50 2.0 2.5   INR Date 2020   INR Goal 2.0-3.0 2.0-3.0 2.0-3.0   Trend Same Up Same   Last Week Total 45 mg 47.5 mg 47.5 mg   Next Week Total 47.5 mg 47.5 mg 42.5 mg   Sun 5 mg 7.5 mg 5 mg ()   Mon 7.5 mg 5 mg 5 mg   Tue 5 mg 7.5 mg 7.5 mg   Wed 7.5 mg 7.5 mg 7.5 mg   Thu 7.5 mg () 7.5 mg 5 mg ()   Fri 7.5 mg 5 mg 5 mg   Sat 7.5 mg 7.5 mg 7.5 mg   Visit Report - - -   Some recent data might be hidden       Plan:  1. INR is therapeutic today- see above in Anticoagulation Summary.   Will instruct Jalen Lockwood to continue their warfarin regimen- see above in Anticoagulation Summary.  2. Follow up in 1 week.  3. Patient declines warfarin refills.  4. Verbal and written information provided. Patient expresses understanding and has no further questions at this time.    Charlee Hall

## 2020-06-19 DIAGNOSIS — F41.9 ANXIETY: ICD-10-CM

## 2020-06-19 RX ORDER — ALPRAZOLAM 0.5 MG/1
TABLET ORAL
Qty: 30 TABLET | Refills: 0 | Status: SHIPPED | OUTPATIENT
Start: 2020-06-19 | End: 2020-07-23

## 2020-06-25 ENCOUNTER — ANTICOAGULATION VISIT (OUTPATIENT)
Dept: PHARMACY | Facility: HOSPITAL | Age: 70
End: 2020-06-25

## 2020-06-25 ENCOUNTER — OFFICE VISIT (OUTPATIENT)
Dept: GASTROENTEROLOGY | Facility: CLINIC | Age: 70
End: 2020-06-25

## 2020-06-25 VITALS — HEIGHT: 76 IN | TEMPERATURE: 97.1 F | WEIGHT: 270 LBS | BODY MASS INDEX: 32.88 KG/M2

## 2020-06-25 DIAGNOSIS — I48.21 PERMANENT ATRIAL FIBRILLATION (HCC): ICD-10-CM

## 2020-06-25 DIAGNOSIS — K31.84 GASTROPARESIS: Primary | ICD-10-CM

## 2020-06-25 DIAGNOSIS — Z86.010 PERSONAL HISTORY OF COLONIC POLYPS: ICD-10-CM

## 2020-06-25 LAB
INR PPP: 2.2 (ref 0.91–1.09)
PROTHROMBIN TIME: 26.3 SECONDS (ref 10–13.8)

## 2020-06-25 PROCEDURE — 99213 OFFICE O/P EST LOW 20 MIN: CPT | Performed by: NURSE PRACTITIONER

## 2020-06-25 PROCEDURE — 85610 PROTHROMBIN TIME: CPT

## 2020-06-25 PROCEDURE — 36416 COLLJ CAPILLARY BLOOD SPEC: CPT

## 2020-06-25 RX ORDER — DOXYCYCLINE HYCLATE 100 MG/1
100 CAPSULE ORAL 2 TIMES DAILY
COMMUNITY
Start: 2020-06-15 | End: 2020-11-10

## 2020-06-25 NOTE — PROGRESS NOTES
Chief Complaint   Patient presents with   • gastroparesis     HPI    Jalen Lockwood is a  70 y.o. male here for a follow up visit for gastroparesis.  This patient follows with Dr. Lagunas, new to me.  He has a history of gastroparesis maintained on Reglan 10 mg before meals and at bedtime x7 years.    Today he is doing quite well regarding his GI symptoms.  No nausea, vomiting, acid reflux/heartburn, or dysphagia.  His appetite is good.  His weight is stable.  BMI 32.9.  On average he takes Reglan 10 mg 3 times a day.  He rarely has to take a bedtime dose.    No diarrhea, constipation, or rectal bleeding.    Reviewed colonoscopy dated 10/20/2019 with normal ileum, nonbleeding internal hemorrhoids, diverticulosis, and polyps.  Recall 3 years.  Pathology with tubular adenoma colon polyps.  Past Medical History:   Diagnosis Date   • Allergic rhinitis    • Anxiety    • Aortectasia (CMS/HCC)     3cm infrarenal abdominal aorta   • Arthritis    • Atrial flutter (CMS/HCC) 2010    s/p ablation    • Charcot's joint of foot    • Chronic edema     both legs and sees wound care center at [place]    • Chronic venous insufficiency    • COPD (chronic obstructive pulmonary disease) (CMS/HCC)    • Coronary atherosclerosis     Cath 2010: diffuse 40-50% disease   • Diverticulosis    • Duodenitis    • Fatty liver    • Gastritis    • Gastroparesis    • Hematoma     post-operative; After catheterization, right groin, required surgical exploration   • Hyperlipidemia    • Hypertension    • Insomnia    • Internal hemorrhoids    • Open wound     izzy legs has teddy chg weekly at wound care center at [place]  pt does second dressing on left leg another time during week   • Osteomyelitis (CMS/HCC)    • Paroxysmal atrial fibrillation (CMS/HCC)    • Peripheral neuropathy    • Popliteal artery aneurysm (CMS/HCC)     left, s/p stenting by Dr. Boston   • Skin cancer    • Sleep apnea     o2   • 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CMS/HCC)     due to  flutter and afib; cath 2010 with nonobstructive disease   • Venous stasis    • Venous stasis ulcer (CMS/HCC)     bilateral legs        Past Surgical History:   Procedure Laterality Date   • CARDIAC CATHETERIZATION     • CATARACT EXTRACTION     • COLONOSCOPY  09/28/2015    NBIH, diverticulosis, polyps   • COLONOSCOPY N/A 9/11/2018    Procedure: COLONOSCOPY TO CECUM  AND TERM. ILEUM WITH COLD SNARE POLYPECTOMIES;  Surgeon: Kane Lagunas MD;  Location:  [facility];  Service: Gastroenterology   • COLONOSCOPY N/A 10/29/2019    Procedure: COLONOSCOPY TO TO CECUM AND TERMINAL ILEUM WITH HOT AND COLD SNARE POLYPECTOMIES;  Surgeon: Kane Lagunas MD;  Location:  [facility];  Service: Gastroenterology   • HIP ARTHROPLASTY Right 2017   • JOINT REPLACEMENT Left    • OTHER SURGICAL HISTORY      Catheter ablation atrial flutter   • REPAIR ANEURYSM / PSEUDO ANEURYSM / RUPTURED ANEURYSM POPLITEAL ARTERY      Stent-Graft of the the left popliteal artery   • REPAIR KNEE LIGAMENT      Primary repair of knee ligament cruciate anterior right   • TONSILLECTOMY  1958   • TOTAL KNEE ARTHROPLASTY Bilateral    • UPPER GASTROINTESTINAL ENDOSCOPY  09/16/2014    acute gastritis, acute duodenitis       Scheduled Meds:  Outpatient Encounter Medications as of 6/25/2020   Medication Sig Dispense Refill   • ALPRAZolam (XANAX) 0.5 MG tablet TAKE 1 TABLET BY MOUTH TWICE A DAY AS NEEDED FOR ANXIETY 30 tablet 0   • ATROVENT HFA 17 MCG/ACT inhaler Inhale 2 puffs 4 (Four) Times a Day. 3 inhaler 3   • carvedilol (COREG) 3.125 MG tablet TAKE 1 TABLET BY MOUTH TWICE A DAY WITH MEALS 180 tablet 1   • clotrimazole-betamethasone (LOTRISONE) 1-0.05 % cream APPLY TO AFFECTED AREA DAILY  3   • docusate sodium (COLACE) 100 MG capsule Take 100 mg by mouth Daily.     • doxycycline (VIBRAMYCIN) 100 MG capsule Take 100 mg by mouth 2 (Two) Times a Day.     • finasteride (PROSCAR) 5 MG tablet Take 1 tablet by mouth daily.     • furosemide (LASIX) 20 MG  tablet TAKE 1 TABLET BY MOUTH EVERY DAY 90 tablet 0   • gabapentin (NEURONTIN) 300 MG capsule Take 300 mg by mouth 3 (Three) Times a Day.     • HYDROcodone-acetaminophen (NORCO) 7.5-325 MG per tablet Take 1 tablet by mouth Every 8 (Eight) Hours As Needed for Mild Pain . 12 tablet 0   • lisinopril (PRINIVIL,ZESTRIL) 20 MG tablet TAKE 2 TABLETS BY MOUTH EVERY  tablet 2   • metoclopramide (REGLAN) 10 MG tablet TAKE 1 TABLET BY MOUTH 4 (FOUR) TIMES A DAY BEFORE MEALS & AT BEDTIME. 360 tablet 0   • pravastatin (PRAVACHOL) 20 MG tablet TAKE 1 TABLET BY MOUTH EVERY DAY 90 tablet 2   • silodosin (RAPAFLO) 8 MG capsule capsule Take 1 capsule by mouth daily. With food.     • VENTOLIN  (90 Base) MCG/ACT inhaler TAKE 2 PUFFS BY MOUTH EVERY 4 HOURS AS NEEDED FOR WHEEZE 18 inhaler 6   • warfarin (COUMADIN) 5 MG tablet Take 1 tablet (5mg) on Mon & Fri and take 1.5 tablets (7.5mg) on all other days Or as directed by [facility] 130 tablet 1     No facility-administered encounter medications on file as of 6/25/2020.        Continuous Infusions:  No current facility-administered medications for this visit.     PRN Meds:.    Allergies   Allergen Reactions   • Cephalexin Hives   • Codeine Nausea Only   • Penicillins Other (See Comments)     Childhood allergy       Social History     Socioeconomic History   • Marital status:      Spouse name: Mariposa   • Number of children: Not on file   • Years of education: Not on file   • Highest education level: Not on file   Occupational History     Employer: RETIRED   Tobacco Use   • Smoking status: Current Every Day Smoker     Packs/day: 0.25     Years: 45.00     Pack years: 11.25     Types: Cigarettes   • Smokeless tobacco: Never Used   Substance and Sexual Activity   • Alcohol use: Yes     Alcohol/week: 8.0 - 9.0 standard drinks     Types: 1 - 2 Shots of liquor, 7 Standard drinks or equivalent per week     Comment: 2 rum a day//Caffeine use: 3 cups daily   • Drug  use: No   • Sexual activity: Defer   Social History Narrative    Today is pt's first day with no cigarettes.         Family History   Problem Relation Age of Onset   • Emphysema Father    • Malig Hyperthermia Neg Hx        Review of Systems   Constitutional: Negative for activity change, appetite change, fatigue, fever and unexpected weight change.   HENT: Negative for trouble swallowing.    Respiratory: Negative for apnea, cough, choking, chest tightness, shortness of breath and wheezing.    Cardiovascular: Negative for chest pain, palpitations and leg swelling.   Gastrointestinal: Negative for abdominal distention, abdominal pain, anal bleeding, blood in stool, constipation, diarrhea, nausea, rectal pain and vomiting.       Vitals:    06/25/20 1433   Temp: 97.1 °F (36.2 °C)       Physical Exam   Constitutional: He is oriented to person, place, and time. He appears well-developed and well-nourished.   Eyes: Pupils are equal, round, and reactive to light.   Cardiovascular: Normal rate, regular rhythm and normal heart sounds.   Pulmonary/Chest: Effort normal and breath sounds normal. No respiratory distress. He has no wheezes.   Abdominal: Soft. Bowel sounds are normal. He exhibits no distension and no mass. There is no tenderness. There is no guarding. No hernia.   Musculoskeletal: Normal range of motion.   Neurological: He is alert and oriented to person, place, and time.   Skin: Skin is warm and dry. Capillary refill takes less than 2 seconds.   Psychiatric: He has a normal mood and affect. His behavior is normal.       No radiology results for the last 7 days    Jalen was seen today for gastroparesis.    Diagnoses and all orders for this visit:    Gastroparesis    Personal history of colonic polyps    Assessment/plan    Stable gastroparesis, recommend he continue current prokinetic therapy.We did discuss the very rare (<1%) risk of irreversible tardive dyskinesia with reglan use and she knows to discontinue the  medication and call me should any tremors, tongue rolling, etc, develop.  Patient denies symptoms on visit today.    Patient up-to-date in terms of colon cancer screening for personal history of colon polyps.  Colonoscopy due in 2022.  Return to clinic 1 year or sooner if needed.

## 2020-06-25 NOTE — PROGRESS NOTES
Anticoagulation Clinic Progress Note    Anticoagulation Summary  As of 2020    INR goal:   2.0-3.0   TTR:   59.5 % (1.6 y)   INR used for dosin.2 (2020)   Warfarin maintenance plan:   5 mg every Mon, Fri; 7.5 mg all other days   Weekly warfarin total:   47.5 mg   No change documented:   Charlee Hall   Plan last modified:   [facility] (2020)   Next INR check:   2020   Priority:   Maintenance   Target end date:   Indefinite    Indications    Permanent atrial fibrillation (CMS/HCC) [I48.21]             Anticoagulation Episode Summary     INR check location:       Preferred lab:       Send INR reminders to:    [facility] [place]    Comments:         Anticoagulation Care Providers     Provider Role Specialty Phone number    Kurt Henry MD Referring Cardiology [phone]          Clinic Interview:      INR History:  Anticoagulation Monitoring 2020   INR 2.0 2.5 2.2   INR Date 2020   INR Goal 2.0-3.0 2.0-3.0 2.0-3.0   Trend Up Same Same   Last Week Total 47.5 mg 47.5 mg 42.5 mg   Next Week Total 47.5 mg 42.5 mg 47.5 mg   Sun 7.5 mg 5 mg () 7.5 mg   Mon 5 mg 5 mg 5 mg   Tue 7.5 mg 7.5 mg 7.5 mg   Wed 7.5 mg 7.5 mg 7.5 mg   Thu 7.5 mg 5 mg () 7.5 mg   Fri 5 mg 5 mg 5 mg   Sat 7.5 mg 7.5 mg 7.5 mg   Visit Report - - -   Some recent data might be hidden       Plan:  1. INR is therapeutic today- see above in Anticoagulation Summary.   Will instruct Jalen Lockwood to continue their warfarin regimen- see above in Anticoagulation Summary.  2. Follow up in 2 weeks.  3. Patient declines warfarin refills.  4. Verbal and written information provided. Patient expresses understanding and has no further questions at this time.    Charlee Hall

## 2020-07-09 ENCOUNTER — ANTICOAGULATION VISIT (OUTPATIENT)
Dept: PHARMACY | Facility: HOSPITAL | Age: 70
End: 2020-07-09

## 2020-07-09 DIAGNOSIS — I48.21 PERMANENT ATRIAL FIBRILLATION (HCC): ICD-10-CM

## 2020-07-09 LAB
INR PPP: 1.7 (ref 0.91–1.09)
PROTHROMBIN TIME: 20.6 SECONDS (ref 10–13.8)

## 2020-07-09 PROCEDURE — 85610 PROTHROMBIN TIME: CPT

## 2020-07-09 PROCEDURE — 36416 COLLJ CAPILLARY BLOOD SPEC: CPT

## 2020-07-09 NOTE — PROGRESS NOTES
Anticoagulation Clinic Progress Note    Anticoagulation Summary  As of 2020    INR goal:   2.0-3.0   TTR:   59.1 % (1.7 y)   INR used for dosin.7! (2020)   Warfarin maintenance plan:   5 mg every Mon, Fri; 7.5 mg all other days   Weekly warfarin total:   47.5 mg   Plan last modified:   [facility] (2020)   Next INR check:   2020   Priority:   Maintenance   Target end date:   Indefinite    Indications    Permanent atrial fibrillation (CMS/HCC) [I48.21]             Anticoagulation Episode Summary     INR check location:       Preferred lab:       Send INR reminders to:   [facility]    Comments:         Anticoagulation Care Providers     Provider Role Specialty Phone number    Kurt Henry MD Referring Cardiology [phone]          Clinic Interview:  Patient Findings     Negatives:   Signs/symptoms of thrombosis, Signs/symptoms of bleeding,   Laboratory test error suspected, Change in health, Change in alcohol use,   Change in activity, Upcoming invasive procedure, Emergency department   visit, Upcoming dental procedure, Missed doses, Extra doses, Change in   medications, Change in diet/appetite, Hospital admission, Bruising, Other   complaints      Clinical Outcomes     Negatives:   Major bleeding event, Thromboembolic event,   Anticoagulation-related hospital admission, Anticoagulation-related ED   visit, Anticoagulation-related fatality        INR History:  Anticoagulation Monitoring 2020   INR 2.5 2.2 1.7   INR Date 2020   INR Goal 2.0-3.0 2.0-3.0 2.0-3.0   Trend Same Same Same   Last Week Total 47.5 mg 42.5 mg 47.5 mg   Next Week Total 42.5 mg 47.5 mg 50 mg   Sun 5 mg () 7.5 mg 7.5 mg   Mon 5 mg 5 mg 5 mg   Tue 7.5 mg 7.5 mg 7.5 mg   Wed 7.5 mg 7.5 mg 7.5 mg   Thu 5 mg () 7.5 mg 10 mg (); Otherwise 7.5 mg   Fri 5 mg 5 mg 5 mg   Sat 7.5 mg 7.5 mg 7.5 mg   Visit Report - - -   Some recent data might be  hidden       Plan:  1. INR is Subtherapeutic today- see above in Anticoagulation Summary.  Will instruct Jalen Lockwood to boost warfarin dose today, then continue their warfarin regimen- see above in Anticoagulation Summary.  2. Follow up in 2 weeks  3. Patient declines warfarin refills.  4. Verbal and written information provided. Patient expresses understanding and has no further questions at this time.    Meghna Horan [facility]

## 2020-07-09 NOTE — PROGRESS NOTES
Anticoagulation Clinic Progress Note    Anticoagulation Summary  As of 2020    INR goal:   2.0-3.0   TTR:   59.1 % (1.7 y)   INR used for dosin.7! (2020)   Warfarin maintenance plan:   5 mg every Mon, Fri; 7.5 mg all other days   Weekly warfarin total:   47.5 mg   Plan last modified:   [facility] (2020)   Next INR check:   2020   Priority:   Maintenance   Target end date:   Indefinite    Indications    Permanent atrial fibrillation (CMS/HCC) [I48.21]             Anticoagulation Episode Summary     INR check location:       Preferred lab:       Send INR reminders to:   [facility]    Comments:         Anticoagulation Care Providers     Provider Role Specialty Phone number    Kurt Henry MD Referring Cardiology [phone]          Clinic Interview:  Patient Findings     Negatives:   Signs/symptoms of thrombosis, Signs/symptoms of bleeding,   Laboratory test error suspected, Change in health, Change in alcohol use,   Change in activity, Upcoming invasive procedure, Emergency department   visit, Upcoming dental procedure, Missed doses, Extra doses, Change in   medications, Change in diet/appetite, Hospital admission, Bruising, Other   complaints      Clinical Outcomes     Negatives:   Major bleeding event, Thromboembolic event,   Anticoagulation-related hospital admission, Anticoagulation-related ED   visit, Anticoagulation-related fatality        INR History:  Anticoagulation Monitoring 2020   INR 2.5 2.2 1.7   INR Date 2020   INR Goal 2.0-3.0 2.0-3.0 2.0-3.0   Trend Same Same Same   Last Week Total 47.5 mg 42.5 mg 47.5 mg   Next Week Total 42.5 mg 47.5 mg 50 mg   Sun 5 mg () 7.5 mg 7.5 mg   Mon 5 mg 5 mg 5 mg   Tue 7.5 mg 7.5 mg 7.5 mg   Wed 7.5 mg 7.5 mg 7.5 mg   Thu 5 mg () 7.5 mg 10 mg (); Otherwise 7.5 mg   Fri 5 mg 5 mg 5 mg   Sat 7.5 mg 7.5 mg 7.5 mg   Visit Report - - -   Some recent data might be  hidden       Plan:  1. INR is subtherapeutic today- see above in Anticoagulation Summary.   Will instruct Jalen Lockwood to continue their warfarin regimen- see above in Anticoagulation Summary.  2. Follow up in 2 weeks.  3. Patient declines warfarin refills.  4. Verbal and written information provided. Patient expresses understanding and has no further questions at this time.    Iman Benites

## 2020-07-22 DIAGNOSIS — F41.9 ANXIETY: ICD-10-CM

## 2020-07-23 ENCOUNTER — ANTICOAGULATION VISIT (OUTPATIENT)
Dept: PHARMACY | Facility: HOSPITAL | Age: 70
End: 2020-07-23

## 2020-07-23 DIAGNOSIS — I48.21 PERMANENT ATRIAL FIBRILLATION (HCC): ICD-10-CM

## 2020-07-23 LAB
INR PPP: 2.5 (ref 0.91–1.09)
PROTHROMBIN TIME: 29.5 SECONDS (ref 10–13.8)

## 2020-07-23 PROCEDURE — 85610 PROTHROMBIN TIME: CPT

## 2020-07-23 PROCEDURE — 36416 COLLJ CAPILLARY BLOOD SPEC: CPT

## 2020-07-23 RX ORDER — ALPRAZOLAM 0.5 MG/1
TABLET ORAL
Qty: 30 TABLET | Refills: 0 | Status: SHIPPED | OUTPATIENT
Start: 2020-07-23 | End: 2020-08-24

## 2020-07-23 NOTE — PROGRESS NOTES
Anticoagulation Clinic Progress Note    Anticoagulation Summary  As of 2020    INR goal:   2.0-3.0   TTR:   59.1 % (1.7 y)   INR used for dosin.5 (2020)   Warfarin maintenance plan:   5 mg every Mon, Fri; 7.5 mg all other days   Weekly warfarin total:   47.5 mg   No change documented:   Iman Benites   Plan last modified:   [facility] (2020)   Next INR check:   2020   Priority:   Maintenance   Target end date:   Indefinite    Indications    Permanent atrial fibrillation (CMS/[facility]) [I48.21]             Anticoagulation Episode Summary     INR check location:       Preferred lab:       Send INR reminders to:    [facility] [place]    Comments:         Anticoagulation Care Providers     Provider Role Specialty Phone number    Kurt Henry MD Referring Cardiology [phone]          Clinic Interview:      INR History:  Anticoagulation Monitoring 2020   INR 2.2 1.7 2.5   INR Date 2020   INR Goal 2.0-3.0 2.0-3.0 2.0-3.0   Trend Same Same Same   Last Week Total 42.5 mg 47.5 mg 47.5 mg   Next Week Total 47.5 mg 50 mg 47.5 mg   Sun 7.5 mg 7.5 mg 7.5 mg   Mon 5 mg 5 mg 5 mg   Tue 7.5 mg 7.5 mg 7.5 mg   Wed 7.5 mg 7.5 mg 7.5 mg   Thu 7.5 mg 10 mg (); Otherwise 7.5 mg 7.5 mg   Fri 5 mg 5 mg 5 mg   Sat 7.5 mg 7.5 mg 7.5 mg   Visit Report - - -   Some recent data might be hidden       Plan:  1. INR is therapeutic today- see above in Anticoagulation Summary.   Will instruct Jalen Lockwood to continue their warfarin regimen- see above in Anticoagulation Summary.  2. Follow up in 3 weeks.  3. Patient declines warfarin refills.  4. Verbal and written information provided. Patient expresses understanding and has no further questions at this time.    Iman Benites

## 2020-08-08 RX ORDER — IPRATROPIUM BROMIDE 17 UG/1
AEROSOL, METERED RESPIRATORY (INHALATION)
Qty: 38.7 INHALER | Refills: 3 | Status: SHIPPED | OUTPATIENT
Start: 2020-08-08 | End: 2021-03-16

## 2020-08-13 ENCOUNTER — ANTICOAGULATION VISIT (OUTPATIENT)
Dept: PHARMACY | Facility: HOSPITAL | Age: 70
End: 2020-08-13

## 2020-08-13 DIAGNOSIS — I48.21 PERMANENT ATRIAL FIBRILLATION (HCC): ICD-10-CM

## 2020-08-13 LAB
INR PPP: 1.6 (ref 0.91–1.09)
PROTHROMBIN TIME: 19.6 SECONDS (ref 10–13.8)

## 2020-08-13 PROCEDURE — G0463 HOSPITAL OUTPT CLINIC VISIT: HCPCS

## 2020-08-13 PROCEDURE — 36416 COLLJ CAPILLARY BLOOD SPEC: CPT

## 2020-08-13 PROCEDURE — 85610 PROTHROMBIN TIME: CPT

## 2020-08-13 NOTE — PROGRESS NOTES
I have supervised and reviewed the notes, assessments, and/or procedures performed by our PharmD Candidate. The documented assessment and plan were developed cooperatively, and the plan was implemented in my presence. I concur with the documentation of this patient encounter.    Meghna Horan [facility]

## 2020-08-13 NOTE — PROGRESS NOTES
Anticoagulation Clinic Progress Note    Anticoagulation Summary  As of 2020    INR goal:   2.0-3.0   TTR:   59.0 % (1.8 y)   INR used for dosin.6! (2020)   Warfarin maintenance plan:   5 mg every Mon, Fri; 7.5 mg all other days   Weekly warfarin total:   47.5 mg   Plan last modified:   [facility] (2020)   Next INR check:   2020   Priority:   Maintenance   Target end date:   Indefinite    Indications    Permanent atrial fibrillation (CMS/HCC) [I48.21]             Anticoagulation Episode Summary     INR check location:       Preferred lab:       Send INR reminders to:   [facility]    Comments:         Anticoagulation Care Providers     Provider Role Specialty Phone number    Kurt Henry MD Referring Cardiology [phone]          Clinic Interview:  Patient Findings     Positives:   Missed doses    Negatives:   Signs/symptoms of thrombosis, Signs/symptoms of bleeding,   Laboratory test error suspected, Change in health, Change in alcohol use,   Change in activity, Upcoming invasive procedure, Emergency department   visit, Upcoming dental procedure, Extra doses, Change in medications,   Change in diet/appetite, Hospital admission, Bruising, Other complaints      Clinical Outcomes     Negatives:   Major bleeding event, Thromboembolic event,   Anticoagulation-related hospital admission, Anticoagulation-related ED   visit, Anticoagulation-related fatality        INR History:  Anticoagulation Monitoring 2020   INR 1.7 2.5 1.6   INR Date 2020   INR Goal 2.0-3.0 2.0-3.0 2.0-3.0   Trend Same Same Same   Last Week Total 47.5 mg 47.5 mg 47.5 mg   Next Week Total 50 mg 47.5 mg 50 mg   Sun 7.5 mg 7.5 mg 7.5 mg   Mon 5 mg 5 mg 5 mg   Tue 7.5 mg 7.5 mg 7.5 mg   Wed 7.5 mg 7.5 mg 7.5 mg   Thu 10 mg (); Otherwise 7.5 mg 7.5 mg 10 mg (); Otherwise 7.5 mg   Fri 5 mg 5 mg 5 mg   Sat 7.5 mg 7.5 mg 7.5 mg   Visit Report - - -    Some recent data might be hidden       Plan:  1. INR is Subtherapeutic today- see above in Anticoagulation Summary.  Will instruct Jalen Lockwood to take 10 mg today and continue wth regimen-  see above in Anticoagulation Summary.  2. Follow up in 2 weeks  3. Patient declines warfarin refills.  4. Verbal and written information provided. Patient expresses understanding and has no further questions at this time.    Olya Haji, Pharmacy Intern

## 2020-08-23 DIAGNOSIS — F41.9 ANXIETY: ICD-10-CM

## 2020-08-24 RX ORDER — ALPRAZOLAM 0.5 MG/1
TABLET ORAL
Qty: 30 TABLET | Refills: 0 | Status: SHIPPED | OUTPATIENT
Start: 2020-08-24 | End: 2020-09-18

## 2020-08-27 ENCOUNTER — ANTICOAGULATION VISIT (OUTPATIENT)
Dept: PHARMACY | Facility: HOSPITAL | Age: 70
End: 2020-08-27

## 2020-08-27 DIAGNOSIS — I48.21 PERMANENT ATRIAL FIBRILLATION (HCC): ICD-10-CM

## 2020-08-27 LAB
INR PPP: 2.1 (ref 0.91–1.09)
PROTHROMBIN TIME: 24.7 SECONDS (ref 10–13.8)

## 2020-08-27 PROCEDURE — 36416 COLLJ CAPILLARY BLOOD SPEC: CPT

## 2020-08-27 PROCEDURE — 85610 PROTHROMBIN TIME: CPT

## 2020-08-27 NOTE — PROGRESS NOTES
Anticoagulation Clinic Progress Note    Anticoagulation Summary  As of 2020    INR goal:   2.0-3.0   TTR:   58.2 % (1.8 y)   INR used for dosin.1 (2020)   Warfarin maintenance plan:   5 mg every Mon, Fri; 7.5 mg all other days   Weekly warfarin total:   47.5 mg   No change documented:   Paulette Valle   Plan last modified:   [facility] (2020)   Next INR check:   2020   Priority:   Maintenance   Target end date:   Indefinite    Indications    Permanent atrial fibrillation (CMS/[facility]) [I48.21]             Anticoagulation Episode Summary     INR check location:       Preferred lab:       Send INR reminders to:    [facility]    Comments:         Anticoagulation Care Providers     Provider Role Specialty Phone number    Kurt Henry MD Referring Cardiology [phone]          Clinic Interview:  Patient Findings     Negatives:   Signs/symptoms of thrombosis, Signs/symptoms of bleeding,   Laboratory test error suspected, Change in health, Change in alcohol use,   Change in activity, Upcoming invasive procedure, Emergency department   visit, Upcoming dental procedure, Missed doses, Extra doses, Change in   medications, Change in diet/appetite, Hospital admission, Bruising, Other   complaints      Clinical Outcomes     Negatives:   Major bleeding event, Thromboembolic event,   Anticoagulation-related hospital admission, Anticoagulation-related ED   visit, Anticoagulation-related fatality        INR History:  Anticoagulation Monitoring 2020   INR 2.5 1.6 2.1   INR Date 2020   INR Goal 2.0-3.0 2.0-3.0 2.0-3.0   Trend Same Same Same   Last Week Total 47.5 mg 47.5 mg 47.5 mg   Next Week Total 47.5 mg 50 mg 47.5 mg   Sun 7.5 mg 7.5 mg 7.5 mg   Mon 5 mg 5 mg 5 mg   Tue 7.5 mg 7.5 mg 7.5 mg   Wed 7.5 mg 7.5 mg 7.5 mg   Thu 7.5 mg 10 mg (); Otherwise 7.5 mg 7.5 mg   Fri 5 mg 5 mg 5 mg   Sat 7.5 mg 7.5 mg 7.5 mg   Visit Report  - - -   Some recent data might be hidden       Plan:  1. INR is therapeutic today- see above in Anticoagulation Summary.   Will instruct Jalen Lockwood to continue their warfarin regimen- see above in Anticoagulation Summary.  2. Follow up in 3 weeks.  3. Patient declines warfarin refills.  4. Verbal and written information provided. Patient expresses understanding and has no further questions at this time.    Paulette Valle

## 2020-09-17 ENCOUNTER — ANTICOAGULATION VISIT (OUTPATIENT)
Dept: PHARMACY | Facility: HOSPITAL | Age: 70
End: 2020-09-17

## 2020-09-17 DIAGNOSIS — I48.21 PERMANENT ATRIAL FIBRILLATION (HCC): ICD-10-CM

## 2020-09-17 LAB
INR PPP: 2.4 (ref 0.91–1.09)
PROTHROMBIN TIME: 29.3 SECONDS (ref 10–13.8)

## 2020-09-17 PROCEDURE — 36416 COLLJ CAPILLARY BLOOD SPEC: CPT

## 2020-09-17 PROCEDURE — 85610 PROTHROMBIN TIME: CPT

## 2020-09-17 NOTE — PROGRESS NOTES
I have supervised and reviewed the notes, assessments, and/or procedures performed by our Pharmacy Intern. The documented assessment and plan were developed cooperatively.  I concur with the documentation of this patient encounter.    Meghna Horan RP

## 2020-09-17 NOTE — PROGRESS NOTES
Anticoagulation Clinic Progress Note    Anticoagulation Summary  As of 2020    INR goal:  2.0-3.0   TTR:  59.5 % (1.9 y)   INR used for dosin.4 (2020)   Warfarin maintenance plan:  5 mg every Mon, Fri; 7.5 mg all other days   Weekly warfarin total:  47.5 mg   No change documented:  Olya Haji, Pharmacy Intern   Plan last modified:  Brendan Live RP (2020)   Next INR check:  10/15/2020   Priority:  Maintenance   Target end date:  Indefinite    Indications    Permanent atrial fibrillation (CMS/[facility]) [I48.21]             Anticoagulation Episode Summary     INR check location:      Preferred lab:      Send INR reminders to:   [facility] PO[facility]    Comments:        Anticoagulation Care Providers     Provider Role Specialty Phone number    Kurt Henry MD Referring Cardiology [phone]          Clinic Interview:  Patient Findings     Negatives:  Signs/symptoms of thrombosis, Signs/symptoms of bleeding,   Laboratory test error suspected, Change in health, Change in alcohol use,   Change in activity, Upcoming invasive procedure, Emergency department   visit, Upcoming dental procedure, Missed doses, Extra doses, Change in   medications, Change in diet/appetite, Hospital admission, Bruising, Other   complaints      Clinical Outcomes     Negatives:  Major bleeding event, Thromboembolic event,   Anticoagulation-related hospital admission, Anticoagulation-related ED   visit, Anticoagulation-related fatality        INR History:  Anticoagulation Monitoring 2020   INR 1.6 2.1 2.4   INR Date 2020   INR Goal 2.0-3.0 2.0-3.0 2.0-3.0   Trend Same Same Same   Last Week Total 47.5 mg 47.5 mg 47.5 mg   Next Week Total 50 mg 47.5 mg 47.5 mg   Sun 7.5 mg 7.5 mg 7.5 mg   Mon 5 mg 5 mg 5 mg   Tue 7.5 mg 7.5 mg 7.5 mg   Wed 7.5 mg 7.5 mg 7.5 mg   Thu 10 mg (); Otherwise 7.5 mg 7.5 mg 7.5 mg   Fri 5 mg 5 mg 5 mg   Sat 7.5 mg 7.5 mg 7.5 mg   Visit Report -  - -   Some recent data might be hidden       Plan:  1. INR is Therapeutic today- see above in Anticoagulation Summary.  Will instruct Jalen Lockwood to Continue their warfarin regimen- see above in Anticoagulation Summary.  2. Follow up in 4 weeks  3. Patient declines warfarin refills.  4. Verbal and written information provided. Patient expresses understanding and has no further questions at this time.    Olya Haji, Pharmacy Intern

## 2020-09-18 DIAGNOSIS — F41.9 ANXIETY: ICD-10-CM

## 2020-09-18 RX ORDER — LISINOPRIL 20 MG/1
TABLET ORAL
Qty: 180 TABLET | Refills: 0 | Status: SHIPPED | OUTPATIENT
Start: 2020-09-18 | End: 2020-12-10 | Stop reason: SDUPTHER

## 2020-09-18 RX ORDER — ALPRAZOLAM 0.5 MG/1
TABLET ORAL
Qty: 30 TABLET | Refills: 0 | Status: SHIPPED | OUTPATIENT
Start: 2020-09-18 | End: 2020-10-19

## 2020-10-01 RX ORDER — CARVEDILOL 3.12 MG/1
TABLET ORAL
Qty: 180 TABLET | Refills: 1 | Status: SHIPPED | OUTPATIENT
Start: 2020-10-01 | End: 2021-04-29 | Stop reason: DRUGHIGH

## 2020-10-15 ENCOUNTER — ANTICOAGULATION VISIT (OUTPATIENT)
Dept: PHARMACY | Facility: HOSPITAL | Age: 70
End: 2020-10-15

## 2020-10-15 DIAGNOSIS — I48.21 PERMANENT ATRIAL FIBRILLATION (HCC): ICD-10-CM

## 2020-10-15 LAB
INR PPP: 2.5 (ref 0.91–1.09)
PROTHROMBIN TIME: 30.5 SECONDS (ref 10–13.8)

## 2020-10-15 PROCEDURE — 36416 COLLJ CAPILLARY BLOOD SPEC: CPT

## 2020-10-15 PROCEDURE — 85610 PROTHROMBIN TIME: CPT

## 2020-10-15 NOTE — PROGRESS NOTES
Anticoagulation Clinic Progress Note    Anticoagulation Summary  As of 10/15/2020    INR goal:  2.0-3.0   TTR:  61.1 % (2 y)   INR used for dosin.5 (10/15/2020)   Warfarin maintenance plan:  5 mg every Mon, Fri; 7.5 mg all other days   Weekly warfarin total:  47.5 mg   No change documented:  Paulette Valle   Plan last modified:  Brendan Live RPH (2020)   Next INR check:  2020   Priority:  Maintenance   Target end date:  Indefinite    Indications    Permanent atrial fibrillation (CMS/[facility]) [I48.21]             Anticoagulation Episode Summary     INR check location:      Preferred lab:      Send INR reminders to:   GAYAT[facility] MCMULLEN CLINICAL POOL    Comments:        Anticoagulation Care Providers     Provider Role Specialty Phone number    Kurt Henry MD Referring Cardiology [phone]          Clinic Interview:  Patient Findings     Negatives:  Signs/symptoms of thrombosis, Signs/symptoms of bleeding,   Laboratory test error suspected, Change in health, Change in alcohol use,   Change in activity, Upcoming invasive procedure, Emergency department   visit, Upcoming dental procedure, Missed doses, Extra doses, Change in   medications, Change in diet/appetite, Hospital admission, Bruising, Other   complaints      Clinical Outcomes     Negatives:  Major bleeding event, Thromboembolic event,   Anticoagulation-related hospital admission, Anticoagulation-related ED   visit, Anticoagulation-related fatality        INR History:  Anticoagulation Monitoring 2020 2020 10/15/2020   INR 2.1 2.4 2.5   INR Date 2020 2020 10/15/2020   INR Goal 2.0-3.0 2.0-3.0 2.0-3.0   Trend Same Same Same   Last Week Total 47.5 mg 47.5 mg 47.5 mg   Next Week Total 47.5 mg 47.5 mg 47.5 mg   Sun 7.5 mg 7.5 mg 7.5 mg   Mon 5 mg 5 mg 5 mg   Tue 7.5 mg 7.5 mg 7.5 mg   Wed 7.5 mg 7.5 mg 7.5 mg   Thu 7.5 mg 7.5 mg 7.5 mg   Fri 5 mg 5 mg 5 mg   Sat 7.5 mg 7.5 mg 7.5 mg   Visit Report - - -   Some recent data might be  hidden       Plan:  1. INR is therapeutic today- see above in Anticoagulation Summary.   Will instruct Jalen Lockwood to continue their warfarin regimen- see above in Anticoagulation Summary.  2. Follow up in 4 weeks.  3. Patient declines warfarin refills.  4. Verbal and written information provided. Patient expresses understanding and has no further questions at this time.    Paulette Valle

## 2020-10-18 DIAGNOSIS — F41.9 ANXIETY: ICD-10-CM

## 2020-10-18 RX ORDER — METOCLOPRAMIDE 10 MG/1
10 TABLET ORAL
Qty: 360 TABLET | Refills: 3 | Status: SHIPPED | OUTPATIENT
Start: 2020-10-18 | End: 2021-04-13 | Stop reason: HOSPADM

## 2020-10-19 RX ORDER — ALPRAZOLAM 0.5 MG/1
TABLET ORAL
Qty: 30 TABLET | Refills: 0 | Status: SHIPPED | OUTPATIENT
Start: 2020-10-19 | End: 2020-11-19

## 2020-11-10 ENCOUNTER — OFFICE VISIT (OUTPATIENT)
Dept: ONCOLOGY | Facility: CLINIC | Age: 70
End: 2020-11-10

## 2020-11-10 ENCOUNTER — LAB (OUTPATIENT)
Dept: LAB | Facility: HOSPITAL | Age: 70
End: 2020-11-10

## 2020-11-10 VITALS
SYSTOLIC BLOOD PRESSURE: 124 MMHG | BODY MASS INDEX: 39.67 KG/M2 | RESPIRATION RATE: 16 BRPM | WEIGHT: 299.3 LBS | DIASTOLIC BLOOD PRESSURE: 87 MMHG | HEIGHT: 73 IN | TEMPERATURE: 97.3 F | OXYGEN SATURATION: 96 % | HEART RATE: 62 BPM

## 2020-11-10 DIAGNOSIS — D63.8 ANEMIA OF CHRONIC DISEASE: Primary | ICD-10-CM

## 2020-11-10 DIAGNOSIS — D63.8 ANEMIA OF CHRONIC DISEASE: ICD-10-CM

## 2020-11-10 DIAGNOSIS — R59.0 RETROPERITONEAL LYMPHADENOPATHY: ICD-10-CM

## 2020-11-10 LAB
BASOPHILS # BLD AUTO: 0.09 10*3/MM3 (ref 0–0.2)
BASOPHILS NFR BLD AUTO: 1.5 % (ref 0–1.5)
DEPRECATED RDW RBC AUTO: 50 FL (ref 37–54)
EOSINOPHIL # BLD AUTO: 0.28 10*3/MM3 (ref 0–0.4)
EOSINOPHIL NFR BLD AUTO: 4.6 % (ref 0.3–6.2)
ERYTHROCYTE [DISTWIDTH] IN BLOOD BY AUTOMATED COUNT: 13.7 % (ref 12.3–15.4)
HCT VFR BLD AUTO: 38.1 % (ref 37.5–51)
HGB BLD-MCNC: 13.7 G/DL (ref 13–17.7)
IMM GRANULOCYTES # BLD AUTO: 0.02 10*3/MM3 (ref 0–0.05)
IMM GRANULOCYTES NFR BLD AUTO: 0.3 % (ref 0–0.5)
LYMPHOCYTES # BLD AUTO: 1.34 10*3/MM3 (ref 0.7–3.1)
LYMPHOCYTES NFR BLD AUTO: 22 % (ref 19.6–45.3)
MCH RBC QN AUTO: 35.7 PG (ref 26.6–33)
MCHC RBC AUTO-ENTMCNC: 36 G/DL (ref 31.5–35.7)
MCV RBC AUTO: 99.2 FL (ref 79–97)
MONOCYTES # BLD AUTO: 0.55 10*3/MM3 (ref 0.1–0.9)
MONOCYTES NFR BLD AUTO: 9 % (ref 5–12)
NEUTROPHILS NFR BLD AUTO: 3.82 10*3/MM3 (ref 1.7–7)
NEUTROPHILS NFR BLD AUTO: 62.6 % (ref 42.7–76)
NRBC BLD AUTO-RTO: 0 /100 WBC (ref 0–0.2)
PLATELET # BLD AUTO: 154 10*3/MM3 (ref 140–450)
PMV BLD AUTO: 10.1 FL (ref 6–12)
RBC # BLD AUTO: 3.84 10*6/MM3 (ref 4.14–5.8)
WBC # BLD AUTO: 6.1 10*3/MM3 (ref 3.4–10.8)

## 2020-11-10 PROCEDURE — 36415 COLL VENOUS BLD VENIPUNCTURE: CPT

## 2020-11-10 PROCEDURE — 99213 OFFICE O/P EST LOW 20 MIN: CPT | Performed by: INTERNAL MEDICINE

## 2020-11-10 PROCEDURE — 85025 COMPLETE CBC W/AUTO DIFF WBC: CPT

## 2020-11-10 RX ORDER — INFLUENZA A VIRUS A/MICHIGAN/45/2015 X-275 (H1N1) ANTIGEN (FORMALDEHYDE INACTIVATED), INFLUENZA A VIRUS A/SINGAPORE/INFIMH-16-0019/2016 IVR-186 (H3N2) ANTIGEN (FORMALDEHYDE INACTIVATED), INFLUENZA B VIRUS B/PHUKET/3073/2013 ANTIGEN (FORMALDEHYDE INACTIVATED), AND INFLUENZA B VIRUS B/MARYLAND/15/2016 BX-69A ANTIGEN (FORMALDEHYDE INACTIVATED) 60; 60; 60; 60 UG/.7ML; UG/.7ML; UG/.7ML; UG/.7ML
INJECTION, SUSPENSION INTRAMUSCULAR
Status: ON HOLD | COMMUNITY
Start: 2020-09-24 | End: 2021-03-31

## 2020-11-10 RX ORDER — GABAPENTIN 600 MG/1
600 TABLET ORAL 3 TIMES DAILY
COMMUNITY
Start: 2020-10-18 | End: 2021-04-13 | Stop reason: HOSPADM

## 2020-11-10 NOTE — PROGRESS NOTES
[facility] GROUP OUTPATIENT FOLLOW UP CLINIC VISIT    REASON FOR FOLLOW-UP:    Lymphadenopathy and bone lesions    HISTORY OF PRESENT ILLNESS:  Jalen Lockwood is a 70 y.o. male who returns today for follow up of the above issues.    He continues to do about the same.  He plays golf intermittently.  Ongoing fatigue.  Lower extremity edema and chronic changes persist.  He admits to ongoing alcohol use.  He denies any palpable lymphadenopathy.      ONCOLOGIC HISTORY:  He has a history of alcohol use.  He was admitted with bilateral lower extremity weakness.  Labs indicated significant hyponatremia with sodium of 115.  It has improved somewhat.  Otherwise his creatinine has been normal.  His calcium is normal.  He is mildly anemic with a normal to slightly elevated MCV.  Platelets are mildly low.     He states he has lost about 50 pounds over the past year.  He states that he did this by cutting out fast food and not eating as much.  His wife apparently states that he does not eat much at all and is drinking 2 glasses of rum daily.  He admits to 2-3 rum and diet Cokes.  He smokes about a third of cigarettes daily.     He has no personal history of malignancy.  He is anticoagulated for atrial fibrillation and has chronic purpura on his arms.       CT imaging of the chest abdomen and pelvis was performed on 4/24/2019 to further evaluate weakness and weight loss.  There is a 4 cm a sending aorta.  A few small subcentimeter mediastinal lymph nodes are noted.  There is asymmetric enlargement of the left thyroid lobe.  No pleural effusion.  No pneumothorax.  No pulmonary consolidation or mass.  There is a small 7 mm left lower lobe pulmonary nodule.  The examination of the abdomen and pelvis is limited without intravenous contrast.  There is a 2.1 cm left adrenal nodule consistent with adenoma.  Left adrenal 1.5 cm adenomas present as well.  There is a third 1.2 cm left adrenal nodule consistent with adenoma.  The  liver, gallbladder, spleen, adrenal glands, pancreas, kidneys, and bladder otherwise appeared normal.  There is no bowel obstruction.  Borderline prominent lymphadenopathy was noted with a left iliac chain node measuring 1.2 cm in the right iliac chain node also measuring 1.2 cm.  The left inguinal lymph node is partially imaged but measures 1.7 cm.  Scattered lymphadenopathy noted otherwise.  There are some scattered lucent lesions present at L2, L4, left pubis, right pubis.  No fracture is noted.     He denies any respiratory symptoms.  No fevers or chills.  No digestive problems.  No urinary problems.  Again, he has chronic purpura on his arms.  He has chronic orthopedic issues with his feet.  He has a bilateral groin rash.    Serum protein studies showed no evidence for a monoclonal protein.        ALLERGIES:  Allergies   Allergen Reactions   • Cephalexin Hives   • Codeine Nausea Only   • Penicillins Other (See Comments)     Childhood allergy       MEDICATIONS:  The medication list has been reviewed with the patient by the medical assistant, and the list has been updated in the electronic medical record, which I reviewed.  Medication dosages and frequencies were confirmed to be accurate.    REVIEW OF SYSTEMS:  PAIN:  See Vital Signs below.  GENERAL:  No fevers, chills, night sweats, or unintended weight loss.  Weight has stabilized.  Ongoing fatigue.  SKIN: no rash.  Chronic purpura related to ongoing anticoagulation.  HEME/LYMPH:  No abnormal bleeding.  No palpable lymphadenopathy.  EYES:  No vision changes or diplopia.  ENT:  No sore throat or difficulty swallowing.  RESPIRATORY:  No cough, shortness of breath, hemoptysis, or wheezing.  CARDIOVASCULAR:  No chest pain, palpitations, orthopnea, or dyspnea on exertion.  GASTROINTESTINAL:  No abdominal pain, nausea, vomiting, constipation, diarrhea, melena, or hematochezia.  GENITOURINARY:  No dysuria or hematuria.  MUSCULOSKELETAL: Chronic foot deformities and  "lower extremity edema.    NEUROLOGIC:  No dizziness, loss of consciousness, or seizures.  PSYCHIATRIC:  No depression, anxiety, or mood changes.    Vitals:    11/10/20 1304   BP: 124/87   Pulse: 62   Resp: 16   Temp: 97.3 °F (36.3 °C)   TempSrc: Temporal   SpO2: 96%   Weight: 136 kg (299 lb 4.8 oz)  Comment: with shoes   Height: 186 cm (73.23\")  Comment: with shoes   PainSc: 4  Comment: anemia   PainLoc: Comment: bilateral feet     General:  No acute distress, awake, alert and oriented  Skin:  Warm and dry, no visible rash  HEENT:  Normocephalic/atraumatic.  Wearing a facemask.  Chest:  Normal respiratory effort.  Lungs clear to auscultation bilaterally.  Heart: Regular rate and rhythm  Lymphatics: No cervical supraclavicular or axillary adenopathy  Extremities: Both legs are wrapped.  Chronic edema.  Feet are in boots.    Neuro/psych:  Grossly non-focal.  Normal mood and affect.        DIAGNOSTIC DATA:  Results for orders placed or performed in visit on 11/10/20   CBC Auto Differential    Specimen: Blood   Result Value Ref Range    WBC 6.10 3.40 - 10.80 10*3/mm3    RBC 3.84 (L) 4.14 - 5.80 10*6/mm3    Hemoglobin 13.7 13.0 - 17.7 g/dL    Hematocrit 38.1 37.5 - 51.0 %    MCV 99.2 (H) 79.0 - 97.0 fL    MCH 35.7 (H) 26.6 - 33.0 pg    MCHC 36.0 (H) 31.5 - 35.7 g/dL    RDW 13.7 12.3 - 15.4 %    RDW-SD 50.0 37.0 - 54.0 fl    MPV 10.1 6.0 - 12.0 fL    Platelets 154 140 - 450 10*3/mm3    Neutrophil % 62.6 42.7 - 76.0 %    Lymphocyte % 22.0 19.6 - 45.3 %    Monocyte % 9.0 5.0 - 12.0 %    Eosinophil % 4.6 0.3 - 6.2 %    Basophil % 1.5 0.0 - 1.5 %    Immature Grans % 0.3 0.0 - 0.5 %    Neutrophils, Absolute 3.82 1.70 - 7.00 10*3/mm3    Lymphocytes, Absolute 1.34 0.70 - 3.10 10*3/mm3    Monocytes, Absolute 0.55 0.10 - 0.90 10*3/mm3    Eosinophils, Absolute 0.28 0.00 - 0.40 10*3/mm3    Basophils, Absolute 0.09 0.00 - 0.20 10*3/mm3    Immature Grans, Absolute 0.02 0.00 - 0.05 10*3/mm3    nRBC 0.0 0.0 - 0.2 /100 WBC       IMAGING: "     I personally reviewed CT images of the abdomen and pelvis from 5/19/2020.  Lymphadenopathy and bony lesions are unchanged.      ASSESSMENT:  This is a 70 y.o. male with:  1.  Lucent bone lesions on imaging at L2, L4, and the pubis.  These were visualized on CT imaging but not necessarily on the skeletal survey.  MRI was suggested by radiology.  We opted for a PET scan due to the lymphadenopathy.  However, this was not approved by insurance.  Serum protein studies did not indicate a monoclonal protein.    Not well visualized on MRI imaging.  Likely benign.  Follow-up on repeat CT imaging 5/19/2020 stable.      2.  Lymphadenopathy: This is small and scattered and my suspicion for lymphoma has been low.  He does have some inguinal lymphadenopathy but this could be reactive secondary to the tinea intertrigo.  If the lymphadenopathy persists we can consider an ultrasound-guided needle biopsy of the largest left inguinal node.  However, he is therapeutically anticoagulated on warfarin.    Follow-up imaging 6/18/2019 stable.    Follow-up imaging 12/3/2019 with a mild increase in size of lymphadenopathy.    Stable on 5/19/2020 imaging.      3.  History of thrombocytopenia: Platelet count has normalized at 154,000 today.    4.  History of alcohol use: He does admit to ongoing alcohol use at this point.    5.  Normocytic to macrocytic anemia with ferritin of 584 likely secondary to alcohol use and a normal percent iron saturation at 27%.  Fecal occult blood testing was negative.  Likely due to chronic disease and alcohol consumption.  Hemoglobin today is normalized at 13.7.  The MCV remains elevated at 99.2 likely due to alcohol.    6.  Purpura associated with anticoagulation    7.  Atrial fibrillation/flutter anticoagulated with warfarin.  Stable bruising.  No bleeding.    8.  Stage III chronic kidney disease: We did not check a creatinine today.    9.  Significant hyponatremia during a prior  hospitalization at [facility] which improved with supportive care.  We did not check a CMP today.    PLAN:  1.  Follow-up in 6 months with a CBC  2.  We discussed pursuing repeat CT imaging prior to that visit but he prefers to wait until year from now to repeat imaging which is acceptable.

## 2020-11-12 ENCOUNTER — ANTICOAGULATION VISIT (OUTPATIENT)
Dept: PHARMACY | Facility: HOSPITAL | Age: 70
End: 2020-11-12

## 2020-11-12 DIAGNOSIS — I48.21 PERMANENT ATRIAL FIBRILLATION (HCC): ICD-10-CM

## 2020-11-12 LAB
INR PPP: 2.2 (ref 0.91–1.09)
PROTHROMBIN TIME: 26.1 SECONDS (ref 10–13.8)

## 2020-11-12 PROCEDURE — 85610 PROTHROMBIN TIME: CPT

## 2020-11-12 PROCEDURE — 36416 COLLJ CAPILLARY BLOOD SPEC: CPT

## 2020-11-12 NOTE — PROGRESS NOTES
Anticoagulation Clinic Progress Note    Anticoagulation Summary  As of 2020    INR goal:  2.0-3.0   TTR:  62.6 % (2 y)   INR used for dosin.2 (2020)   Warfarin maintenance plan:  5 mg every Mon, Fri; 7.5 mg all other days   Weekly warfarin total:  47.5 mg   No change documented:  Charlee Hall   Plan last modified:  Brendan Live RP (2020)   Next INR check:  12/10/2020   Priority:  Maintenance   Target end date:  Indefinite    Indications    Permanent atrial fibrillation (CMS/[facility]) [I48.21]             Anticoagulation Episode Summary     INR check location:      Preferred lab:      Send INR reminders to:   GAYAT[facility] MCMULLEN CLINICAL POOL    Comments:        Anticoagulation Care Providers     Provider Role Specialty Phone number    Kurt Henry MD Referring Cardiology [phone]          Clinic Interview:  Patient Findings     Positives:  Missed doses, Extra doses        INR History:  Anticoagulation Monitoring 2020 10/15/2020 2020   INR 2.4 2.5 2.2   INR Date 2020 10/15/2020 2020   INR Goal 2.0-3.0 2.0-3.0 2.0-3.0   Trend Same Same Same   Last Week Total 47.5 mg 47.5 mg 47.5 mg   Next Week Total 47.5 mg 47.5 mg 47.5 mg   Sun 7.5 mg 7.5 mg 7.5 mg   Mon 5 mg 5 mg 5 mg   Tue 7.5 mg 7.5 mg 7.5 mg   Wed 7.5 mg 7.5 mg 7.5 mg   Thu 7.5 mg 7.5 mg 7.5 mg   Fri 5 mg 5 mg 5 mg   Sat 7.5 mg 7.5 mg 7.5 mg   Visit Report - - -   Some recent data might be hidden       Plan:  1. INR is therapeutic today- see above in Anticoagulation Summary.   Will instruct Jalen Lockwood to continue their warfarin regimen- see above in Anticoagulation Summary.  2. Follow up in 4 weeks.  3. Patient declines warfarin refills.  4. Verbal and written information provided. Patient expresses understanding and has no further questions at this time.    Charlee Hall

## 2020-11-19 DIAGNOSIS — F41.9 ANXIETY: ICD-10-CM

## 2020-11-19 RX ORDER — ALPRAZOLAM 0.5 MG/1
TABLET ORAL
Qty: 30 TABLET | Refills: 0 | Status: SHIPPED | OUTPATIENT
Start: 2020-11-19 | End: 2020-12-18

## 2020-12-03 NOTE — PROGRESS NOTES
RM:________     PCP: Marc Ludwig MD    : 1950  AGE: 70 y.o.  EST PATIENT   REASON FOR VISIT/  CC:    BP Readings from Last 3 Encounters:   11/10/20 124/87   12/10/19 124/98   19 124/82        WT: ____________ BP: __________L __________R HR______    CHEST PAIN: _____________    SOA: _____________PALPS: _______________     LIGHTHEADED: ___________FATIGUE: ________________ EDEMA __________    ALLERGIES:Cephalexin, Codeine, and Penicillins SMOKING HISTORY:  Social History     Tobacco Use   • Smoking status: Current Every Day Smoker     Packs/day: 0.25     Years: 45.00     Pack years: 11.25     Types: Cigarettes   • Smokeless tobacco: Never Used   Substance Use Topics   • Alcohol use: Yes     Alcohol/week: 8.0 - 9.0 standard drinks     Types: 1 - 2 Shots of liquor, 7 Standard drinks or equivalent per week     Comment: 2 rum a day//Caffeine use: 3 cups daily   • Drug use: No     CAFFEINE USE_________________  ALCOHOL ______________________    Below is the patient's most recent value for Albumin, ALT, AST, BUN, Calcium, Chloride, Cholesterol, CO2, Creatinine, GFR, Glucose, HDL, Hematocrit, Hemoglobin, Hemoglobin A1C, LDL, Magnesium, Phosphorus, Platelets, Potassium, PSA, Sodium, Triglycerides, TSH and WBC.   Lab Results   Component Value Date    ALBUMIN 4.20 2019    ALT 5 2019    AST 12 2019    BUN 8 2019    CALCIUM 9.4 2019    CL 97 (L) 2019    CO2 27.7 2019    CREATININE 0.90 2020    GLU 88 2018    HDL 70 2018    HCT 38.1 11/10/2020    HGB 13.7 11/10/2020    HGBA1C 5.3 2016    LDL 55 2018    MG 2.0 2019    PHOS 3.2 2019     11/10/2020    K 3.9 2019    PSA 0.089 2019     2019    TRIG 61 2018    TSH 1.250 2019    WBC 6.10 11/10/2020          NEW DIAGNOSIS/ SURGERY/ HOSP OR ED VISITS:  ______________________    __________________________________________________________________      RECENT LABS OR DIAGNOSTIC TESTING:  _____________________________    __________________________________________________________________      ASSESSMENT/ PLAN: _______________________________________________    __________________________________________________________________

## 2020-12-10 ENCOUNTER — OFFICE VISIT (OUTPATIENT)
Dept: CARDIOLOGY | Facility: CLINIC | Age: 70
End: 2020-12-10

## 2020-12-10 ENCOUNTER — ANTICOAGULATION VISIT (OUTPATIENT)
Dept: PHARMACY | Facility: HOSPITAL | Age: 70
End: 2020-12-10

## 2020-12-10 VITALS
WEIGHT: 288.6 LBS | DIASTOLIC BLOOD PRESSURE: 74 MMHG | SYSTOLIC BLOOD PRESSURE: 117 MMHG | HEART RATE: 70 BPM | BODY MASS INDEX: 38.25 KG/M2 | HEIGHT: 73 IN

## 2020-12-10 DIAGNOSIS — I25.10 NONOCCLUSIVE CORONARY ATHEROSCLEROSIS OF NATIVE CORONARY ARTERY: ICD-10-CM

## 2020-12-10 DIAGNOSIS — I48.21 PERMANENT ATRIAL FIBRILLATION (HCC): ICD-10-CM

## 2020-12-10 DIAGNOSIS — I48.21 PERMANENT ATRIAL FIBRILLATION (HCC): Primary | ICD-10-CM

## 2020-12-10 DIAGNOSIS — R00.0 TACHYCARDIA INDUCED CARDIOMYOPATHY (HCC): ICD-10-CM

## 2020-12-10 DIAGNOSIS — I87.8 VENOUS STASIS: ICD-10-CM

## 2020-12-10 DIAGNOSIS — I48.3 TYPICAL ATRIAL FLUTTER (HCC): ICD-10-CM

## 2020-12-10 DIAGNOSIS — I43 TACHYCARDIA INDUCED CARDIOMYOPATHY (HCC): ICD-10-CM

## 2020-12-10 DIAGNOSIS — I10 ESSENTIAL HYPERTENSION: ICD-10-CM

## 2020-12-10 LAB
INR PPP: 1.8 (ref 0.91–1.09)
PROTHROMBIN TIME: 21.3 SECONDS (ref 10–13.8)

## 2020-12-10 PROCEDURE — 99213 OFFICE O/P EST LOW 20 MIN: CPT | Performed by: INTERNAL MEDICINE

## 2020-12-10 PROCEDURE — 93000 ELECTROCARDIOGRAM COMPLETE: CPT | Performed by: INTERNAL MEDICINE

## 2020-12-10 PROCEDURE — G0463 HOSPITAL OUTPT CLINIC VISIT: HCPCS

## 2020-12-10 PROCEDURE — 85610 PROTHROMBIN TIME: CPT

## 2020-12-10 PROCEDURE — 36416 COLLJ CAPILLARY BLOOD SPEC: CPT

## 2020-12-10 RX ORDER — LISINOPRIL 20 MG/1
40 TABLET ORAL DAILY
Qty: 180 TABLET | Refills: 3 | Status: SHIPPED | OUTPATIENT
Start: 2020-12-10 | End: 2021-04-13 | Stop reason: HOSPADM

## 2020-12-10 RX ORDER — FUROSEMIDE 40 MG/1
40 TABLET ORAL DAILY
COMMUNITY
End: 2022-01-01 | Stop reason: HOSPADM

## 2020-12-10 RX ORDER — PRAVASTATIN SODIUM 20 MG
20 TABLET ORAL DAILY
Qty: 90 TABLET | Refills: 2 | Status: SHIPPED | OUTPATIENT
Start: 2020-12-10 | End: 2021-04-13 | Stop reason: HOSPADM

## 2020-12-10 NOTE — PROGRESS NOTES
Date of Office Visit: 12/10/2020  Encounter Provider: Kurt Henry MD  Place of Service: [facility] CARDIOLOGY  Patient Name: Jalen Lockwood  :1950    Chief Complaint   Patient presents with   • Atrial Fibrillation   :     HPI: Jalen Lockwood is a 70 y.o. male who presents today for yearly follow up. I have reviewed prior notes and there are no changes except for any new updates described below. I have also reviewed any information entered into the medical record by the patient or by ancillary staff.     He presented with rapid atrial flutter in . His ejection fraction was low due to tachycardia-mediated cardiomyopathy. His catheterization revealed moderate nonobstructive coronary disease. Medical therapy only was recommended and his ejection fraction normalized. It was also found that he was drinking quite heavily and we recommended that he cut back.  Shortly after that, he presented with rapid atrial fibrillation and his ejection fraction declined again to 35%, but once his atrial fibrillation resolved, his ejection fraction improved again.     He has chronic edema due to severe venous stasis. He has had recurrent cellulitis.      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 Boston.  This was complicated by a wound infection.  A non-contrasted CT of the abdomen/pelvis revealed aortectasia of the infrarenal abdominal aorta but no aneurysm elsewhere.     He denies chest pain, dyspnea, orthopnea, PND, palpitations, or syncope.  He still smokes, and he drinks moderately. He has not had bleeding from warfarin.     Past Medical History:   Diagnosis Date   • Allergic rhinitis    • Anxiety    • Aortectasia (CMS/HCC)     3cm infrarenal abdominal aorta   • Arthritis    • Atrial flutter (CMS/HCC)     s/p ablation    • Charcot's joint of foot    • Chronic edema     both legs and sees wound care center at [place]    • Chronic venous  insufficiency    • COPD (chronic obstructive pulmonary disease) (CMS/HCC)    • Coronary atherosclerosis     Cath 2010: diffuse 40-50% disease   • Diverticulosis    • Duodenitis    • Fatty liver    • Gastritis    • Gastroparesis    • Hematoma     post-operative; After catheterization, right groin, required surgical exploration   • Hyperlipidemia    • Hypertension    • Insomnia    • Internal hemorrhoids    • Open wound     izzy legs has drsg chg weekly at wound care center at [place]  pt does second dressing on left leg another time during week   • Osteomyelitis (CMS/HCC)    • Paroxysmal atrial fibrillation (CMS/HCC)    • Peripheral neuropathy    • Popliteal artery aneurysm (CMS/HCC)     left, s/p stenting by Dr. Boston   • Skin cancer    • Sleep apnea     o2   • 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CMS/HCC)     due to flutter and afib; cath 2010 with nonobstructive disease   • Venous stasis    • Venous stasis ulcer (CMS/HCC)     bilateral legs        Past Surgical History:   Procedure Laterality Date   • CARDIAC CATHETERIZATION     • CATARACT EXTRACTION     • COLONOSCOPY  09/28/2015    NBIH, diverticulosis, polyps   • COLONOSCOPY N/A 9/11/2018    Procedure: COLONOSCOPY TO CECUM  AND TERM. ILEUM WITH COLD SNARE POLYPECTOMIES;  Surgeon: Kane Lagunas MD;  Location: [facility] ENDOSCOPY;  Service: Gastroenterology   • COLONOSCOPY N/A 10/29/2019    Procedure: COLONOSCOPY TO TO CECUM AND TERMINAL ILEUM WITH HOT AND COLD SNARE POLYPECTOMIES;  Surgeon: Kane Lagunas MD;  Location: [facility] ENDOSCOPY;  Service: Gastroenterology   • HIP ARTHROPLASTY Right 2017   • JOINT REPLACEMENT Left    • OTHER SURGICAL HISTORY      Catheter ablation atrial flutter   • REPAIR ANEURYSM / PSEUDO ANEURYSM / RUPTURED ANEURYSM POPLITEAL ARTERY      Stent-Graft of the the left popliteal artery   • REPAIR KNEE LIGAMENT      Primary repair of knee ligament cruciate anterior right   • TONSILLECTOMY  1958   • TOTAL KNEE ARTHROPLASTY Bilateral    •  UPPER GASTROINTESTINAL ENDOSCOPY  09/16/2014    acute gastritis, acute duodenitis       Social History     Socioeconomic History   • Marital status:      Spouse name: Mariposa   • Number of children: Not on file   • Years of education: Not on file   • Highest education level: Not on file   Occupational History     Employer: RETIRED   Tobacco Use   • Smoking status: Current Every Day Smoker     Packs/day: 0.25     Years: 45.00     Pack years: 11.25     Types: Cigarettes   • Smokeless tobacco: Never Used   Substance and Sexual Activity   • Alcohol use: Yes     Alcohol/week: 8.0 - 9.0 standard drinks     Types: 1 - 2 Shots of liquor, 7 Standard drinks or equivalent per week     Comment: 2 rum a day//Caffeine use: 3 cups daily   • Drug use: No   • Sexual activity: Defer   Social History Narrative    Today is pt's first day with no cigarettes.         Family History   Problem Relation Age of Onset   • Emphysema Father    • Malig Hyperthermia Neg Hx        Review of Systems   Cardiovascular: Positive for leg swelling.   Skin: Positive for color change and poor wound healing.   Musculoskeletal: Positive for joint pain and joint swelling.   Genitourinary: Positive for decreased libido.   All other systems reviewed and are negative.      Allergies   Allergen Reactions   • Cephalexin Hives   • Codeine Nausea Only         Current Outpatient Medications:   •  ALPRAZolam (XANAX) 0.5 MG tablet, TAKE 1 TABLET BY MOUTH TWICE A DAY AS NEEDED FOR ANXIETY, Disp: 30 tablet, Rfl: 0  •  ATROVENT HFA 17 MCG/ACT inhaler, TAKE 2 PUFFS BY MOUTH 4 TIMES A DAY, Disp: 38.7 inhaler, Rfl: 3  •  carvedilol (COREG) 3.125 MG tablet, TAKE 1 TABLET BY MOUTH TWICE A DAY WITH MEALS, Disp: 180 tablet, Rfl: 1  •  docusate sodium (COLACE) 100 MG capsule, Take 100 mg by mouth Daily., Disp: , Rfl:   •  finasteride (PROSCAR) 5 MG tablet, Take 1 tablet by mouth daily., Disp: , Rfl:   •  Fluzone High-Dose Quadrivalent 0.7 ML suspension prefilled syringe,  "PHARMACY ADMINISTERED, Disp: , Rfl:   •  furosemide (LASIX) 40 MG tablet, Take 40 mg by mouth Daily., Disp: , Rfl:   •  gabapentin (NEURONTIN) 600 MG tablet, Take 600 mg by mouth 3 (Three) Times a Day., Disp: , Rfl:   •  HYDROcodone-acetaminophen (NORCO) 7.5-325 MG per tablet, Take 1 tablet by mouth Every 8 (Eight) Hours As Needed for Mild Pain ., Disp: 12 tablet, Rfl: 0  •  lisinopril (PRINIVIL,ZESTRIL) 20 MG tablet, Take 2 tablets by mouth Daily., Disp: 180 tablet, Rfl: 3  •  metoclopramide (REGLAN) 10 MG tablet, TAKE 1 TABLET BY MOUTH 4 (FOUR) TIMES A DAY BEFORE MEALS & AT BEDTIME., Disp: 360 tablet, Rfl: 3  •  pravastatin (PRAVACHOL) 20 MG tablet, Take 1 tablet by mouth Daily., Disp: 90 tablet, Rfl: 2  •  silodosin (RAPAFLO) 8 MG capsule capsule, Take 1 capsule by mouth daily. With food., Disp: , Rfl:   •  VENTOLIN  (90 Base) MCG/ACT inhaler, TAKE 2 PUFFS BY MOUTH EVERY 4 HOURS AS NEEDED FOR WHEEZE, Disp: 18 inhaler, Rfl: 6  •  warfarin (COUMADIN) 5 MG tablet, Take 1 tablet (5mg) on Mon & Fri and take 1.5 tablets (7.5mg) on all other days Or as directed by [facility], Disp: 130 tablet, Rfl: 1     Objective:     Vitals:    12/10/20 1459 12/10/20 1526   BP: 146/76 117/74   BP Location: Left arm    Pulse: 70    Weight: 131 kg (288 lb 9.6 oz)    Height: 186 cm (73.23\")      Body mass index is 37.84 kg/m².    Physical Exam   Constitutional: He is oriented to person, place, and time.   Obese   HENT:   Head: Normocephalic.   Nose: Nose normal.   Masked   Eyes: Conjunctivae and EOM are normal.   Neck: Normal range of motion. No JVD present.   Cardiovascular: Normal rate, normal heart sounds and intact distal pulses. An irregularly irregular rhythm present.   No murmur heard.  Pulmonary/Chest: Effort normal.   Abdominal: Soft. There is no abdominal tenderness.   Obesity limits abdominal exam   Musculoskeletal: Normal range of motion.         General: Deformity (bilateral charcot foot) and edema (severe " varicose veins/venous stasis/edema) present.   Neurological: He is alert and oriented to person, place, and time. No cranial nerve deficit.   Skin: Skin is warm and dry. No rash noted.   Numerous seb kers and actinic kers     Psychiatric: He has a normal mood and affect. His behavior is normal. Judgment and thought content normal.   Vitals reviewed.        ECG 12 Lead    Date/Time: 12/10/2020 3:22 PM  Performed by: Kurt Henry MD  Authorized by: Kurt Henry MD   Comparison: compared with previous ECG   Similar to previous ECG  Rhythm: atrial fibrillation  Conduction: non-specific intraventricular conduction delay  QRS axis: left  Other findings: non-specific ST-T wave changes    Clinical impression: abnormal EKG            Assessment:       Diagnosis Plan   1. Permanent atrial fibrillation (CMS/HCC)     2. Typical atrial flutter (CMS/HCC)     3. 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CMS/HCC)     4. Essential hypertension     5. Nonocclusive coronary atherosclerosis of native coronary artery     6. Venous stasis            Plan:       1/2.  He has had both flutter and fib in the past; the former has been ablated.  He will remain on carvedilol and warfarin.      3.  His tachy-mediated CM resolved, and he does not have CHF. He's on carvedilol, furosemide, and lisinopril.    4.  His BP was a little high today upon arrival, but it was fine when I rechecked it with a large cuff.  At one pont he was on a thiazide but it caused hyponatremia.     5.  He has diffuse moderate CAD that is in the 40-50% range.  He does not have angina.  He really needs to quit smoking.    He will remain on pravastatin.    6.  He has severe venous insufficiency and goes to the wound care clinic.    Sincerely,       Kurt Henry MD

## 2020-12-10 NOTE — PROGRESS NOTES
Anticoagulation Clinic Progress Note    Anticoagulation Summary  As of 12/10/2020    INR goal:  2.0-3.0   TTR:  62.1 % (2.1 y)   INR used for dosin.8 (12/10/2020)   Warfarin maintenance plan:  5 mg every Mon, Fri; 7.5 mg all other days   Weekly warfarin total:  47.5 mg   Plan last modified:  Brendan Live RPH (2020)   Next INR check:  2021   Priority:  Maintenance   Target end date:  Indefinite    Indications    Permanent atrial fibrillation (CMS/HCC) [I48.21]             Anticoagulation Episode Summary     INR check location:      Preferred lab:      Send INR reminders to:  [facility] CLINICAL POOL    Comments:        Anticoagulation Care Providers     Provider Role Specialty Phone number    Kurt Henry MD Referring Cardiology [phone]          Clinic Interview:  Patient Findings     Positives:  Missed doses    Negatives:  Signs/symptoms of thrombosis, Signs/symptoms of bleeding,   Laboratory test error suspected, Change in health, Change in alcohol use,   Change in activity, Upcoming invasive procedure, Emergency department   visit, Upcoming dental procedure, Extra doses, Change in medications,   Change in diet/appetite, Hospital admission, Bruising, Other complaints    Comments:  Missed dose 2 days ago.       Clinical Outcomes     Negatives:  Major bleeding event, Thromboembolic event,   Anticoagulation-related hospital admission, Anticoagulation-related ED   visit, Anticoagulation-related fatality    Comments:  Missed dose 2 days ago.         INR History:  Anticoagulation Monitoring 10/15/2020 2020 12/10/2020   INR 2.5 2.2 1.8   INR Date 10/15/2020 2020 12/10/2020   INR Goal 2.0-3.0 2.0-3.0 2.0-3.0   Trend Same Same Same   Last Week Total 47.5 mg 47.5 mg 40 mg   Next Week Total 47.5 mg 47.5 mg 50 mg   Sun 7.5 mg 7.5 mg 7.5 mg   Mon 5 mg 5 mg 5 mg   Tue 7.5 mg 7.5 mg 7.5 mg   Wed 7.5 mg 7.5 mg 7.5 mg   Thu 7.5 mg 7.5 mg 10 mg (12/10); Otherwise 7.5 mg   Fri 5 mg 5 mg 5 mg      Sat 7.5 mg 7.5 mg 7.5 mg   Visit Report - - -   Some recent data might be hidden       Plan:  1. INR is Subtherapeutic today- see above in Anticoagulation Summary.  Will instruct Jalen Lockwood to Change their warfarin regimen- see above in Anticoagulation Summary.  2. Follow up in 4 weeks  3. Patient declines warfarin refills.  4. Verbal information provided; pt declined AVS. Patient expresses understanding and has no further questions at this time.    Brendan Live [facility]

## 2020-12-17 DIAGNOSIS — F41.9 ANXIETY: ICD-10-CM

## 2020-12-18 RX ORDER — ALPRAZOLAM 0.5 MG/1
TABLET ORAL
Qty: 30 TABLET | Refills: 0 | Status: SHIPPED | OUTPATIENT
Start: 2020-12-18 | End: 2021-01-19

## 2020-12-18 RX ORDER — WARFARIN SODIUM 5 MG/1
TABLET ORAL
Qty: 130 TABLET | Refills: 1 | Status: SHIPPED | OUTPATIENT
Start: 2020-12-18 | End: 2021-06-17 | Stop reason: SDUPTHER

## 2021-01-07 ENCOUNTER — ANTICOAGULATION VISIT (OUTPATIENT)
Dept: PHARMACY | Facility: HOSPITAL | Age: 71
End: 2021-01-07

## 2021-01-07 DIAGNOSIS — I48.21 PERMANENT ATRIAL FIBRILLATION (HCC): ICD-10-CM

## 2021-01-07 LAB
INR PPP: 3.2 (ref 0.91–1.09)
PROTHROMBIN TIME: 38.7 SECONDS (ref 10–13.8)

## 2021-01-07 PROCEDURE — 85610 PROTHROMBIN TIME: CPT

## 2021-01-07 PROCEDURE — 36416 COLLJ CAPILLARY BLOOD SPEC: CPT

## 2021-01-07 PROCEDURE — G0463 HOSPITAL OUTPT CLINIC VISIT: HCPCS

## 2021-01-07 NOTE — PROGRESS NOTES
Anticoagulation Clinic Progress Note    Anticoagulation Summary  As of 1/7/2021    INR goal:  2.0-3.0   TTR:  62.4 % (2.2 y)   INR used for dosing:  3.2 (1/7/2021)   Warfarin maintenance plan:  5 mg every Mon, Fri; 7.5 mg all other days   Weekly warfarin total:  47.5 mg   Plan last modified:  Brendan Live [facility] (6/4/2020)   Next INR check:  2/4/2021   Priority:  Maintenance   Target end date:  Indefinite    Indications    Permanent atrial fibrillation (CMS/HCC) [I48.21]             Anticoagulation Episode Summary     INR check location:      Preferred lab:      [facility]    Comments:        Anticoagulation Care Providers     Provider Role Specialty Phone number    Kurt Henry MD Referring Cardiology [phone]          Clinic Interview:      INR History:  Anticoagulation Monitoring 11/12/2020 12/10/2020 1/7/2021   INR 2.2 1.8 3.2   INR Date 11/12/2020 12/10/2020 1/7/2021   INR Goal 2.0-3.0 2.0-3.0 2.0-3.0   Trend Same Same Same   Last Week Total 47.5 mg 40 mg 47.5 mg   Next Week Total 47.5 mg 50 mg 45 mg   Sun 7.5 mg 7.5 mg 7.5 mg   Mon 5 mg 5 mg 5 mg   Tue 7.5 mg 7.5 mg 7.5 mg   Wed 7.5 mg 7.5 mg 7.5 mg   Thu 7.5 mg 10 mg (12/10); Otherwise 7.5 mg 5 mg (1/7); Otherwise 7.5 mg   Fri 5 mg 5 mg 5 mg   Sat 7.5 mg 7.5 mg 7.5 mg   Visit Report - - -   Some recent data might be hidden       Plan:  1. INR is Supratherapeutic today- see above in Anticoagulation Summary.  Will instruct Jalen Lockwood to reduce warfarin dose today only, then continue their warfarin regimen- see above in Anticoagulation Summary.  2. Follow up in 1 month  3. Patient declines warfarin refills.  4. Verbal and written information provided. Patient expresses understanding and has no further questions at this time.    Meghna Horan [facility]

## 2021-01-18 DIAGNOSIS — F41.9 ANXIETY: ICD-10-CM

## 2021-01-19 RX ORDER — ALPRAZOLAM 0.5 MG/1
TABLET ORAL
Qty: 30 TABLET | Refills: 0 | Status: SHIPPED | OUTPATIENT
Start: 2021-01-19 | End: 2021-02-19

## 2021-02-04 ENCOUNTER — ANTICOAGULATION VISIT (OUTPATIENT)
Dept: PHARMACY | Facility: HOSPITAL | Age: 71
End: 2021-02-04

## 2021-02-04 DIAGNOSIS — I48.21 PERMANENT ATRIAL FIBRILLATION (HCC): ICD-10-CM

## 2021-02-04 LAB
INR PPP: 2.9 (ref 0.91–1.09)
PROTHROMBIN TIME: 34.8 SECONDS (ref 10–13.8)

## 2021-02-04 PROCEDURE — 85610 PROTHROMBIN TIME: CPT

## 2021-02-04 PROCEDURE — 36416 COLLJ CAPILLARY BLOOD SPEC: CPT

## 2021-02-04 NOTE — PROGRESS NOTES
Anticoagulation Clinic Progress Note    Anticoagulation Summary  As of 2021    INR goal:  2.0-3.0   TTR:  61.4 % (2.3 y)   INR used for dosin.9 (2021)   Warfarin maintenance plan:  5 mg every Mon, Fri; 7.5 mg all other days   Weekly warfarin total:  47.5 mg   No change documented:  Iman Benites   Plan last modified:  Brendan Live [facility] (2020)   Next INR check:  3/4/2021   Priority:  Maintenance   Target end date:  Indefinite    Indications    Permanent atrial fibrillation (CMS/HCC) [I48.21]             Anticoagulation Episode Summary     INR check location:      Preferred lab:      Send INR reminders to:   [facility]    Comments:        Anticoagulation Care Providers     Provider Role Specialty Phone number    Kurt Henry MD Referring Cardiology [phone]          Clinic Interview:      INR History:  Anticoagulation Monitoring 12/10/2020 2021 2021   INR 1.8 3.2 2.9   INR Date 12/10/2020 2021 2021   INR Goal 2.0-3.0 2.0-3.0 2.0-3.0   Trend Same Same Same   Last Week Total 40 mg 47.5 mg 47.5 mg   Next Week Total 50 mg 45 mg 47.5 mg   Sun 7.5 mg 7.5 mg 7.5 mg   Mon 5 mg 5 mg 5 mg   Tue 7.5 mg 7.5 mg 7.5 mg   Wed 7.5 mg 7.5 mg 7.5 mg   Thu 10 mg (12/10); Otherwise 7.5 mg 5 mg (); Otherwise 7.5 mg 7.5 mg   Fri 5 mg 5 mg 5 mg   Sat 7.5 mg 7.5 mg 7.5 mg   Visit Report - - -   Some recent data might be hidden       Plan:  1. INR is therapeutic today- see above in Anticoagulation Summary.   Will instruct Jalen Lockwood to continue their warfarin regimen- see above in Anticoagulation Summary.  2. Follow up in 4 weeks.  3. Patient declines warfarin refills.  4. Verbal and written information provided. Patient expresses understanding and has no further questions at this time.    Iman Benites

## 2021-02-19 DIAGNOSIS — F41.9 ANXIETY: ICD-10-CM

## 2021-02-19 RX ORDER — ALPRAZOLAM 0.5 MG/1
TABLET ORAL
Qty: 15 TABLET | Refills: 0 | Status: SHIPPED | OUTPATIENT
Start: 2021-02-19 | End: 2021-03-03 | Stop reason: SDUPTHER

## 2021-03-03 ENCOUNTER — OFFICE VISIT (OUTPATIENT)
Dept: FAMILY MEDICINE CLINIC | Facility: CLINIC | Age: 71
End: 2021-03-03

## 2021-03-03 ENCOUNTER — ANTICOAGULATION VISIT (OUTPATIENT)
Dept: PHARMACY | Facility: HOSPITAL | Age: 71
End: 2021-03-03

## 2021-03-03 VITALS
BODY MASS INDEX: 36.99 KG/M2 | OXYGEN SATURATION: 95 % | HEIGHT: 76 IN | WEIGHT: 303.8 LBS | DIASTOLIC BLOOD PRESSURE: 76 MMHG | TEMPERATURE: 96.9 F | HEART RATE: 95 BPM | SYSTOLIC BLOOD PRESSURE: 124 MMHG

## 2021-03-03 DIAGNOSIS — I48.21 PERMANENT ATRIAL FIBRILLATION (HCC): ICD-10-CM

## 2021-03-03 DIAGNOSIS — E66.01 CLASS 2 SEVERE OBESITY DUE TO EXCESS CALORIES WITH SERIOUS COMORBIDITY AND BODY MASS INDEX (BMI) OF 36.0 TO 36.9 IN ADULT (HCC): ICD-10-CM

## 2021-03-03 DIAGNOSIS — I10 ESSENTIAL HYPERTENSION: ICD-10-CM

## 2021-03-03 DIAGNOSIS — F41.9 ANXIETY: Primary | ICD-10-CM

## 2021-03-03 DIAGNOSIS — J44.9 CHRONIC OBSTRUCTIVE PULMONARY DISEASE, UNSPECIFIED COPD TYPE (HCC): ICD-10-CM

## 2021-03-03 DIAGNOSIS — I25.10 NONOCCLUSIVE CORONARY ATHEROSCLEROSIS OF NATIVE CORONARY ARTERY: ICD-10-CM

## 2021-03-03 LAB
CHOLEST SERPL-MCNC: 151 MG/DL (ref 0–200)
HDLC SERPL-MCNC: 73 MG/DL (ref 40–60)
INR PPP: 2.8 (ref 0.91–1.09)
LDLC SERPL CALC-MCNC: 63 MG/DL (ref 0–100)
LDLC/HDLC SERPL: 0.86 {RATIO}
PROTHROMBIN TIME: 33.3 SECONDS (ref 10–13.8)
TRIGL SERPL-MCNC: 77 MG/DL (ref 0–150)
VLDLC SERPL-MCNC: 15 MG/DL (ref 5–40)

## 2021-03-03 PROCEDURE — 85610 PROTHROMBIN TIME: CPT

## 2021-03-03 PROCEDURE — 80061 LIPID PANEL: CPT | Performed by: FAMILY MEDICINE

## 2021-03-03 PROCEDURE — 36416 COLLJ CAPILLARY BLOOD SPEC: CPT

## 2021-03-03 PROCEDURE — 36415 COLL VENOUS BLD VENIPUNCTURE: CPT | Performed by: FAMILY MEDICINE

## 2021-03-03 PROCEDURE — 80053 COMPREHEN METABOLIC PANEL: CPT | Performed by: FAMILY MEDICINE

## 2021-03-03 PROCEDURE — 99214 OFFICE O/P EST MOD 30 MIN: CPT | Performed by: FAMILY MEDICINE

## 2021-03-03 RX ORDER — GENTAMICIN SULFATE 1 MG/G
OINTMENT TOPICAL
Status: ON HOLD | COMMUNITY
Start: 2021-03-02 | End: 2021-03-31

## 2021-03-03 RX ORDER — HYDROCODONE BITARTRATE AND ACETAMINOPHEN 10; 325 MG/1; MG/1
1 TABLET ORAL EVERY 6 HOURS PRN
Status: ON HOLD | COMMUNITY
Start: 2021-02-27 | End: 2021-04-13 | Stop reason: SDUPTHER

## 2021-03-03 RX ORDER — ALPRAZOLAM 0.5 MG/1
0.5 TABLET ORAL NIGHTLY
Qty: 90 TABLET | Refills: 1 | Status: ON HOLD | OUTPATIENT
Start: 2021-03-03 | End: 2021-03-31

## 2021-03-03 NOTE — PROGRESS NOTES
"SUBJECTIVE:  The patient is a 70-year-old white male.  He has anxiety and hypertension.  He needs a refill on his anxiety medication.  He has atrial fibrillation and coronary artery disease as well as COPD.  He states he is doing fairly well.  He is up-to-date on colonoscopy and prostate exam.  He does some exercising.  He plays golf when it is warm.    PAST MEDICAL HISTORY:  Reviewed.    REVIEW OF SYSTEMS:  Please see above.  All systems reviewed and are negative.      OBJECTIVE:   /76   Pulse 95   Temp 96.9 °F (36.1 °C)   Ht 193 cm (76\")   Wt (!) 138 kg (303 lb 12.8 oz)   SpO2 95%   BMI 36.98 kg/m²    Vitals signs are reviewed and are stable.    General:  Well-nourished.  Alert and oriented x3 in no acute distress.  HEENT: PERRLA.   Neck:  Supple.   Lungs:  Clear.    Heart:  Regular rate and rhythm.   Abdomen:   Soft, nontender.   Extremities: 1-2+ edema bilateral  Neurological:  Grossly intact without motor or sensory deficits.     ASSESSMENT:      Diagnoses and all orders for this visit:    1. Anxiety (Primary)  -     Comprehensive Metabolic Panel  -     Lipid Panel  -     ALPRAZolam (XANAX) 0.5 MG tablet; Take 1 tablet by mouth Every Night.  Dispense: 90 tablet; Refill: 1    2. Essential hypertension  -     Comprehensive Metabolic Panel  -     Lipid Panel    3. Permanent atrial fibrillation (CMS/HCC)  -     Comprehensive Metabolic Panel  -     Lipid Panel    4. Nonocclusive coronary atherosclerosis of native coronary artery  -     Comprehensive Metabolic Panel  -     Lipid Panel    5. Chronic obstructive pulmonary disease, unspecified COPD type (CMS/HCC)  -     Comprehensive Metabolic Panel  -     Lipid Panel         PLAN: See above orders.  Discussed healthy lifestyle.  Work hard with diet and exercise.  Follow-up on labs.  Call if problems.    Dictated utilizing Dragon dictation.    "

## 2021-03-03 NOTE — PROGRESS NOTES
Anticoagulation Clinic Progress Note    Anticoagulation Summary  As of 3/3/2021    INR goal:  2.0-3.0   TTR:  62.7 % (2.3 y)   INR used for dosin.8 (3/3/2021)   Warfarin maintenance plan:  5 mg every Mon, Fri; 7.5 mg all other days   Weekly warfarin total:  47.5 mg   No change documented:  Iman Benites   Plan last modified:  Brendan Live [facility] (2020)   Next INR check:  2021   Priority:  Maintenance   Target end date:  Indefinite    Indications    Permanent atrial fibrillation (CMS/HCC) [I48.21]             Anticoagulation Episode Summary     INR check location:      Preferred lab:      Send INR reminders to:   [facility] [place]    Comments:        Anticoagulation Care Providers     Provider Role Specialty Phone number    Kurt Henry MD Referring Cardiology [phone]          Clinic Interview:      INR History:  Anticoagulation Monitoring 2021 2021 3/3/2021   INR 3.2 2.9 2.8   INR Date 2021 2021 3/3/2021   INR Goal 2.0-3.0 2.0-3.0 2.0-3.0   Trend Same Same Same   Last Week Total 47.5 mg 47.5 mg 47.5 mg   Next Week Total 45 mg 47.5 mg 47.5 mg   Sun 7.5 mg 7.5 mg 7.5 mg   Mon 5 mg 5 mg 5 mg   Tue 7.5 mg 7.5 mg 7.5 mg   Wed 7.5 mg 7.5 mg 7.5 mg   Thu 5 mg (); Otherwise 7.5 mg 7.5 mg 7.5 mg   Fri 5 mg 5 mg 5 mg   Sat 7.5 mg 7.5 mg 7.5 mg   Visit Report - - -   Some recent data might be hidden       Plan:  1. INR is therapeutic today- see above in Anticoagulation Summary.   Will instruct Jalen Lockwood to continue their warfarin regimen- see above in Anticoagulation Summary.  2. Follow up in 4 weeks.  3. Patient declines warfarin refills.  4. Verbal and written information provided. Patient expresses understanding and has no further questions at this time.    Iman Benites

## 2021-03-04 LAB
ALBUMIN SERPL-MCNC: 4.2 G/DL (ref 3.5–5.2)
ALBUMIN/GLOB SERPL: 1.3 G/DL
ALP SERPL-CCNC: 88 U/L (ref 39–117)
ALT SERPL W P-5'-P-CCNC: 8 U/L (ref 1–41)
ANION GAP SERPL CALCULATED.3IONS-SCNC: 10.3 MMOL/L (ref 5–15)
AST SERPL-CCNC: 17 U/L (ref 1–40)
BILIRUB SERPL-MCNC: 1 MG/DL (ref 0–1.2)
BUN SERPL-MCNC: 7 MG/DL (ref 8–23)
BUN/CREAT SERPL: 8.3 (ref 7–25)
CALCIUM SPEC-SCNC: 9.7 MG/DL (ref 8.6–10.5)
CHLORIDE SERPL-SCNC: 94 MMOL/L (ref 98–107)
CO2 SERPL-SCNC: 29.7 MMOL/L (ref 22–29)
CREAT SERPL-MCNC: 0.84 MG/DL (ref 0.76–1.27)
GFR SERPL CREATININE-BSD FRML MDRD: 91 ML/MIN/1.73
GLOBULIN UR ELPH-MCNC: 3.2 GM/DL
GLUCOSE SERPL-MCNC: 114 MG/DL (ref 65–99)
POTASSIUM SERPL-SCNC: 3.9 MMOL/L (ref 3.5–5.2)
PROT SERPL-MCNC: 7.4 G/DL (ref 6–8.5)
SODIUM SERPL-SCNC: 134 MMOL/L (ref 136–145)

## 2021-03-16 RX ORDER — IPRATROPIUM BROMIDE 17 UG/1
AEROSOL, METERED RESPIRATORY (INHALATION)
Qty: 1 EACH | Refills: 3 | Status: SHIPPED | OUTPATIENT
Start: 2021-03-16 | End: 2021-03-25 | Stop reason: SDUPTHER

## 2021-03-25 ENCOUNTER — TELEPHONE (OUTPATIENT)
Dept: FAMILY MEDICINE CLINIC | Facility: CLINIC | Age: 71
End: 2021-03-25

## 2021-03-25 RX ORDER — IPRATROPIUM BROMIDE 17 UG/1
2 AEROSOL, METERED RESPIRATORY (INHALATION)
Qty: 1 EACH | Refills: 3 | Status: SHIPPED | OUTPATIENT
Start: 2021-03-25 | End: 2021-09-19

## 2021-03-25 RX ORDER — ALBUTEROL SULFATE 90 UG/1
2 AEROSOL, METERED RESPIRATORY (INHALATION) EVERY 4 HOURS PRN
Qty: 18 G | Refills: 3 | Status: SHIPPED | OUTPATIENT
Start: 2021-03-25 | End: 2021-07-22

## 2021-03-25 NOTE — TELEPHONE ENCOUNTER
PATIENT STATES THAT THE PHARMACY JUST CALLED AND STATED THAT PRESCRIPTION CAN'T BE PICKED UP UNTIL 4/8 FOR HIS ATROVENT HFA .   PATIENT NEEDS A DIFFERENT / HIGHER DOSAGE PRESCRIPTION TO BE ABLE TO GET A NEW INHALER.  PLEASE CONTACT PATIENT ASAP @ [phone].    ADDING TO PREVIOUS ENCOUNTER

## 2021-03-25 NOTE — TELEPHONE ENCOUNTER
PATIENT CALLED STATING HE IS NEED A NEW PRESCRIPTION FOR THE Atrovent HFA 17 MCG/ACT inhaler HE STATED REFILL WONT BE FILLED UNTIL 04/08/2021 HE STATED HE NEEDS IT SOONER THAN THAT.    PLEASE ADVISE  [phone]

## 2021-03-25 NOTE — TELEPHONE ENCOUNTER
Caller: Jalen Lockwood    Relationship: Self    Best call back number: [phone]    Medication needed:       When do you need the refill by: ASAP    What additional details did the patient provide when requesting the medication: PATIENT SAID THAT HIS ATROVENT INHALER IS RUNNING LOW. HE CALLED HIS INSURANCE COMPANY AND THEY TOLD HIM THAT THEY WOULD APPROVE A NEW PRESCRIPTION TO BE FILLED. PATIENT ASKED IF DR. GANT COULD PRESCRIBE HIM A NEW INHALER.     Does the patient have less than a 3 day supply:  [x] Yes  [] No    What is the patient's preferred pharmacy: [facility]/pharmacy #[id number] [place], [place] - [address] AT IN THE [place] - [phone] [facility] [phone]   [phone]

## 2021-03-30 ENCOUNTER — APPOINTMENT (OUTPATIENT)
Dept: CT IMAGING | Facility: HOSPITAL | Age: 71
End: 2021-03-30

## 2021-03-30 ENCOUNTER — APPOINTMENT (OUTPATIENT)
Dept: GENERAL RADIOLOGY | Facility: HOSPITAL | Age: 71
End: 2021-03-30

## 2021-03-30 ENCOUNTER — HOSPITAL ENCOUNTER (INPATIENT)
Facility: HOSPITAL | Age: 71
LOS: 14 days | Discharge: NURSING, REHAB OR LONG-TERM CARE | End: 2021-04-13
Attending: EMERGENCY MEDICINE | Admitting: INTERNAL MEDICINE

## 2021-03-30 DIAGNOSIS — I48.92 ATRIAL FLUTTER, UNSPECIFIED TYPE (HCC): ICD-10-CM

## 2021-03-30 DIAGNOSIS — E87.1 HYPONATREMIA: ICD-10-CM

## 2021-03-30 DIAGNOSIS — I48.91 ATRIAL FIBRILLATION WITH RVR (HCC): ICD-10-CM

## 2021-03-30 DIAGNOSIS — R04.2 HEMOPTYSIS: ICD-10-CM

## 2021-03-30 DIAGNOSIS — R65.20 SEVERE SEPSIS (HCC): Primary | ICD-10-CM

## 2021-03-30 DIAGNOSIS — E87.3 ACUTE RESPIRATORY ALKALOSIS: ICD-10-CM

## 2021-03-30 DIAGNOSIS — M14.679 CHARCOT'S JOINT OF FOOT, UNSPECIFIED LATERALITY: ICD-10-CM

## 2021-03-30 DIAGNOSIS — N39.0 ACUTE UTI: ICD-10-CM

## 2021-03-30 DIAGNOSIS — R10.9 ACUTE ABDOMINAL PAIN: ICD-10-CM

## 2021-03-30 DIAGNOSIS — R50.9 FEVER IN ADULT: ICD-10-CM

## 2021-03-30 DIAGNOSIS — G93.41 ACUTE METABOLIC ENCEPHALOPATHY: ICD-10-CM

## 2021-03-30 DIAGNOSIS — A41.9 SEVERE SEPSIS (HCC): Primary | ICD-10-CM

## 2021-03-30 LAB
ALBUMIN SERPL-MCNC: 4.4 G/DL (ref 3.5–5.2)
ALBUMIN/GLOB SERPL: 1.2 G/DL
ALP SERPL-CCNC: 96 U/L (ref 39–117)
ALT SERPL W P-5'-P-CCNC: 12 U/L (ref 1–41)
AMPHET+METHAMPHET UR QL: NEGATIVE
ANION GAP SERPL CALCULATED.3IONS-SCNC: 10.7 MMOL/L (ref 5–15)
ANION GAP SERPL CALCULATED.3IONS-SCNC: 13.2 MMOL/L (ref 5–15)
APAP SERPL-MCNC: <5 MCG/ML (ref 0–30)
APTT PPP: 54.7 SECONDS (ref 22.7–35.4)
ARTERIAL PATENCY WRIST A: POSITIVE
AST SERPL-CCNC: 18 U/L (ref 1–40)
ATMOSPHERIC PRESS: 752.8 MMHG
B PARAPERT DNA SPEC QL NAA+PROBE: NOT DETECTED
B PERT DNA SPEC QL NAA+PROBE: NOT DETECTED
BACTERIA UR QL AUTO: ABNORMAL /HPF
BARBITURATES UR QL SCN: NEGATIVE
BASE EXCESS BLDA CALC-SCNC: 2.4 MMOL/L (ref 0–2)
BASOPHILS # BLD AUTO: 0.07 10*3/MM3 (ref 0–0.2)
BASOPHILS NFR BLD AUTO: 0.4 % (ref 0–1.5)
BDY SITE: ABNORMAL
BENZODIAZ UR QL SCN: NEGATIVE
BILIRUB SERPL-MCNC: 1.6 MG/DL (ref 0–1.2)
BILIRUB UR QL STRIP: NEGATIVE
BUN SERPL-MCNC: 10 MG/DL (ref 8–23)
BUN SERPL-MCNC: 9 MG/DL (ref 8–23)
BUN/CREAT SERPL: 8.1 (ref 7–25)
BUN/CREAT SERPL: 9.7 (ref 7–25)
C PNEUM DNA NPH QL NAA+NON-PROBE: NOT DETECTED
CALCIUM SPEC-SCNC: 10.3 MG/DL (ref 8.6–10.5)
CALCIUM SPEC-SCNC: 8.8 MG/DL (ref 8.6–10.5)
CANNABINOIDS SERPL QL: NEGATIVE
CHLORIDE SERPL-SCNC: 92 MMOL/L (ref 98–107)
CHLORIDE SERPL-SCNC: 97 MMOL/L (ref 98–107)
CLARITY UR: CLEAR
CO2 SERPL-SCNC: 23.8 MMOL/L (ref 22–29)
CO2 SERPL-SCNC: 24.3 MMOL/L (ref 22–29)
COCAINE UR QL: NEGATIVE
COLOR UR: YELLOW
CREAT SERPL-MCNC: 1.03 MG/DL (ref 0.76–1.27)
CREAT SERPL-MCNC: 1.11 MG/DL (ref 0.76–1.27)
D-LACTATE SERPL-SCNC: 2.8 MMOL/L (ref 0.5–2)
D-LACTATE SERPL-SCNC: 4.6 MMOL/L (ref 0.5–2)
DEPRECATED RDW RBC AUTO: 49.8 FL (ref 37–54)
EOSINOPHIL # BLD AUTO: 0.04 10*3/MM3 (ref 0–0.4)
EOSINOPHIL NFR BLD AUTO: 0.2 % (ref 0.3–6.2)
ERYTHROCYTE [DISTWIDTH] IN BLOOD BY AUTOMATED COUNT: 13.6 % (ref 12.3–15.4)
ETHANOL BLD-MCNC: <10 MG/DL (ref 0–10)
ETHANOL UR QL: <0.01 %
FLUAV SUBTYP SPEC NAA+PROBE: NOT DETECTED
FLUBV RNA ISLT QL NAA+PROBE: NOT DETECTED
GFR SERPL CREATININE-BSD FRML MDRD: 65 ML/MIN/1.73
GFR SERPL CREATININE-BSD FRML MDRD: 71 ML/MIN/1.73
GLOBULIN UR ELPH-MCNC: 3.7 GM/DL
GLUCOSE BLDC GLUCOMTR-MCNC: 100 MG/DL (ref 70–130)
GLUCOSE BLDC GLUCOMTR-MCNC: 127 MG/DL (ref 70–130)
GLUCOSE SERPL-MCNC: 110 MG/DL (ref 65–99)
GLUCOSE SERPL-MCNC: 85 MG/DL (ref 65–99)
GLUCOSE UR STRIP-MCNC: NEGATIVE MG/DL
HADV DNA SPEC NAA+PROBE: NOT DETECTED
HBA1C MFR BLD: 5 % (ref 4.8–5.6)
HCO3 BLDA-SCNC: 23.3 MMOL/L (ref 22–28)
HCOV 229E RNA SPEC QL NAA+PROBE: NOT DETECTED
HCOV HKU1 RNA SPEC QL NAA+PROBE: NOT DETECTED
HCOV NL63 RNA SPEC QL NAA+PROBE: NOT DETECTED
HCOV OC43 RNA SPEC QL NAA+PROBE: NOT DETECTED
HCT VFR BLD AUTO: 44.2 % (ref 37.5–51)
HGB BLD-MCNC: 15.5 G/DL (ref 13–17.7)
HGB UR QL STRIP.AUTO: ABNORMAL
HMPV RNA NPH QL NAA+NON-PROBE: NOT DETECTED
HPIV1 RNA SPEC QL NAA+PROBE: NOT DETECTED
HPIV2 RNA SPEC QL NAA+PROBE: NOT DETECTED
HPIV3 RNA NPH QL NAA+PROBE: NOT DETECTED
HPIV4 P GENE NPH QL NAA+PROBE: NOT DETECTED
HYALINE CASTS UR QL AUTO: ABNORMAL /LPF
IMM GRANULOCYTES # BLD AUTO: 0.16 10*3/MM3 (ref 0–0.05)
IMM GRANULOCYTES NFR BLD AUTO: 0.8 % (ref 0–0.5)
INR PPP: 2.12 (ref 0.9–1.1)
KETONES UR QL STRIP: NEGATIVE
LEUKOCYTE ESTERASE UR QL STRIP.AUTO: ABNORMAL
LYMPHOCYTES # BLD AUTO: 1.24 10*3/MM3 (ref 0.7–3.1)
LYMPHOCYTES NFR BLD AUTO: 6.2 % (ref 19.6–45.3)
M PNEUMO IGG SER IA-ACNC: NOT DETECTED
MCH RBC QN AUTO: 34.9 PG (ref 26.6–33)
MCHC RBC AUTO-ENTMCNC: 35.1 G/DL (ref 31.5–35.7)
MCV RBC AUTO: 99.5 FL (ref 79–97)
METHADONE UR QL SCN: NEGATIVE
MODALITY: ABNORMAL
MONOCYTES # BLD AUTO: 0.78 10*3/MM3 (ref 0.1–0.9)
MONOCYTES NFR BLD AUTO: 3.9 % (ref 5–12)
NEUTROPHILS NFR BLD AUTO: 17.56 10*3/MM3 (ref 1.7–7)
NEUTROPHILS NFR BLD AUTO: 88.5 % (ref 42.7–76)
NITRITE UR QL STRIP: POSITIVE
NRBC BLD AUTO-RTO: 0 /100 WBC (ref 0–0.2)
NT-PROBNP SERPL-MCNC: 2415 PG/ML (ref 0–900)
OPIATES UR QL: POSITIVE
OXYCODONE UR QL SCN: NEGATIVE
PCO2 BLDA: 26.5 MM HG (ref 35–45)
PH BLDA: 7.55 PH UNITS (ref 7.35–7.45)
PH UR STRIP.AUTO: 7 [PH] (ref 5–8)
PLATELET # BLD AUTO: 226 10*3/MM3 (ref 140–450)
PMV BLD AUTO: 10 FL (ref 6–12)
PO2 BLDA: 64.6 MM HG (ref 80–100)
POTASSIUM SERPL-SCNC: 3.4 MMOL/L (ref 3.5–5.2)
POTASSIUM SERPL-SCNC: 4.4 MMOL/L (ref 3.5–5.2)
PROCALCITONIN SERPL-MCNC: 0.42 NG/ML (ref 0–0.25)
PROT SERPL-MCNC: 8.1 G/DL (ref 6–8.5)
PROT UR QL STRIP: ABNORMAL
PROTHROMBIN TIME: 23.5 SECONDS (ref 11.7–14.2)
QT INTERVAL: 317 MS
RBC # BLD AUTO: 4.44 10*6/MM3 (ref 4.14–5.8)
RBC # UR: ABNORMAL /HPF
REF LAB TEST METHOD: ABNORMAL
RHINOVIRUS RNA SPEC NAA+PROBE: NOT DETECTED
RSV RNA NPH QL NAA+NON-PROBE: NOT DETECTED
SALICYLATES SERPL-MCNC: <0.3 MG/DL
SAO2 % BLDCOA: 95.2 % (ref 92–99)
SARS-COV-2 RNA NPH QL NAA+NON-PROBE: NOT DETECTED
SODIUM SERPL-SCNC: 129 MMOL/L (ref 136–145)
SODIUM SERPL-SCNC: 132 MMOL/L (ref 136–145)
SP GR UR STRIP: 1.01 (ref 1–1.03)
SQUAMOUS #/AREA URNS HPF: ABNORMAL /HPF
TOTAL RATE: 22 BREATHS/MINUTE
TROPONIN T SERPL-MCNC: 0.03 NG/ML (ref 0–0.03)
TROPONIN T SERPL-MCNC: 0.17 NG/ML (ref 0–0.03)
UROBILINOGEN UR QL STRIP: ABNORMAL
WBC # BLD AUTO: 19.85 10*3/MM3 (ref 3.4–10.8)
WBC UR QL AUTO: ABNORMAL /HPF

## 2021-03-30 PROCEDURE — 80307 DRUG TEST PRSMV CHEM ANLYZR: CPT | Performed by: EMERGENCY MEDICINE

## 2021-03-30 PROCEDURE — 25010000002 IOPAMIDOL 61 % SOLUTION: Performed by: EMERGENCY MEDICINE

## 2021-03-30 PROCEDURE — 99285 EMERGENCY DEPT VISIT HI MDM: CPT

## 2021-03-30 PROCEDURE — 25010000002 PIPERACILLIN SOD-TAZOBACTAM PER 1 G: Performed by: INTERNAL MEDICINE

## 2021-03-30 PROCEDURE — 36600 WITHDRAWAL OF ARTERIAL BLOOD: CPT

## 2021-03-30 PROCEDURE — 81001 URINALYSIS AUTO W/SCOPE: CPT | Performed by: EMERGENCY MEDICINE

## 2021-03-30 PROCEDURE — 85025 COMPLETE CBC W/AUTO DIFF WBC: CPT | Performed by: EMERGENCY MEDICINE

## 2021-03-30 PROCEDURE — 80143 DRUG ASSAY ACETAMINOPHEN: CPT | Performed by: EMERGENCY MEDICINE

## 2021-03-30 PROCEDURE — 71045 X-RAY EXAM CHEST 1 VIEW: CPT

## 2021-03-30 PROCEDURE — 94640 AIRWAY INHALATION TREATMENT: CPT

## 2021-03-30 PROCEDURE — 85730 THROMBOPLASTIN TIME PARTIAL: CPT | Performed by: EMERGENCY MEDICINE

## 2021-03-30 PROCEDURE — 93005 ELECTROCARDIOGRAM TRACING: CPT | Performed by: EMERGENCY MEDICINE

## 2021-03-30 PROCEDURE — 94799 UNLISTED PULMONARY SVC/PX: CPT

## 2021-03-30 PROCEDURE — 87040 BLOOD CULTURE FOR BACTERIA: CPT | Performed by: EMERGENCY MEDICINE

## 2021-03-30 PROCEDURE — 83036 HEMOGLOBIN GLYCOSYLATED A1C: CPT | Performed by: INTERNAL MEDICINE

## 2021-03-30 PROCEDURE — 80179 DRUG ASSAY SALICYLATE: CPT | Performed by: EMERGENCY MEDICINE

## 2021-03-30 PROCEDURE — 84484 ASSAY OF TROPONIN QUANT: CPT | Performed by: EMERGENCY MEDICINE

## 2021-03-30 PROCEDURE — 82077 ASSAY SPEC XCP UR&BREATH IA: CPT | Performed by: EMERGENCY MEDICINE

## 2021-03-30 PROCEDURE — P9612 CATHETERIZE FOR URINE SPEC: HCPCS

## 2021-03-30 PROCEDURE — 25010000002 VANCOMYCIN 10 G RECONSTITUTED SOLUTION: Performed by: EMERGENCY MEDICINE

## 2021-03-30 PROCEDURE — 25010000002 FENTANYL CITRATE (PF) 100 MCG/2ML SOLUTION: Performed by: INTERNAL MEDICINE

## 2021-03-30 PROCEDURE — 82962 GLUCOSE BLOOD TEST: CPT

## 2021-03-30 PROCEDURE — 82803 BLOOD GASES ANY COMBINATION: CPT

## 2021-03-30 PROCEDURE — 93010 ELECTROCARDIOGRAM REPORT: CPT | Performed by: INTERNAL MEDICINE

## 2021-03-30 PROCEDURE — 83880 ASSAY OF NATRIURETIC PEPTIDE: CPT | Performed by: EMERGENCY MEDICINE

## 2021-03-30 PROCEDURE — 83605 ASSAY OF LACTIC ACID: CPT | Performed by: EMERGENCY MEDICINE

## 2021-03-30 PROCEDURE — 80053 COMPREHEN METABOLIC PANEL: CPT | Performed by: EMERGENCY MEDICINE

## 2021-03-30 PROCEDURE — 74177 CT ABD & PELVIS W/CONTRAST: CPT

## 2021-03-30 PROCEDURE — 84145 PROCALCITONIN (PCT): CPT | Performed by: EMERGENCY MEDICINE

## 2021-03-30 PROCEDURE — 85610 PROTHROMBIN TIME: CPT | Performed by: EMERGENCY MEDICINE

## 2021-03-30 PROCEDURE — 0202U NFCT DS 22 TRGT SARS-COV-2: CPT | Performed by: EMERGENCY MEDICINE

## 2021-03-30 PROCEDURE — 25010000002 PIPERACILLIN SOD-TAZOBACTAM PER 1 G: Performed by: EMERGENCY MEDICINE

## 2021-03-30 PROCEDURE — 87086 URINE CULTURE/COLONY COUNT: CPT | Performed by: EMERGENCY MEDICINE

## 2021-03-30 RX ORDER — NICOTINE POLACRILEX 4 MG
15 LOZENGE BUCCAL
Status: DISCONTINUED | OUTPATIENT
Start: 2021-03-30 | End: 2021-04-06

## 2021-03-30 RX ORDER — MAGNESIUM SULFATE HEPTAHYDRATE 40 MG/ML
2 INJECTION, SOLUTION INTRAVENOUS AS NEEDED
Status: DISCONTINUED | OUTPATIENT
Start: 2021-03-30 | End: 2021-04-11

## 2021-03-30 RX ORDER — IPRATROPIUM BROMIDE AND ALBUTEROL SULFATE 2.5; .5 MG/3ML; MG/3ML
3 SOLUTION RESPIRATORY (INHALATION) EVERY 6 HOURS PRN
Status: DISCONTINUED | OUTPATIENT
Start: 2021-03-30 | End: 2021-04-13 | Stop reason: HOSPADM

## 2021-03-30 RX ORDER — ACETAMINOPHEN 325 MG/1
650 TABLET ORAL EVERY 4 HOURS PRN
Status: DISCONTINUED | OUTPATIENT
Start: 2021-03-30 | End: 2021-04-13 | Stop reason: HOSPADM

## 2021-03-30 RX ORDER — MAGNESIUM SULFATE HEPTAHYDRATE 40 MG/ML
4 INJECTION, SOLUTION INTRAVENOUS AS NEEDED
Status: DISCONTINUED | OUTPATIENT
Start: 2021-03-30 | End: 2021-04-11

## 2021-03-30 RX ORDER — DEXTROSE MONOHYDRATE 25 G/50ML
25 INJECTION, SOLUTION INTRAVENOUS
Status: DISCONTINUED | OUTPATIENT
Start: 2021-03-30 | End: 2021-04-06

## 2021-03-30 RX ORDER — SODIUM CHLORIDE, SODIUM LACTATE, POTASSIUM CHLORIDE, CALCIUM CHLORIDE 600; 310; 30; 20 MG/100ML; MG/100ML; MG/100ML; MG/100ML
150 INJECTION, SOLUTION INTRAVENOUS CONTINUOUS
Status: DISCONTINUED | OUTPATIENT
Start: 2021-03-30 | End: 2021-04-01

## 2021-03-30 RX ORDER — FENTANYL CITRATE 50 UG/ML
25 INJECTION, SOLUTION INTRAMUSCULAR; INTRAVENOUS
Status: DISCONTINUED | OUTPATIENT
Start: 2021-03-30 | End: 2021-04-02

## 2021-03-30 RX ORDER — ACETAMINOPHEN 650 MG/1
650 SUPPOSITORY RECTAL ONCE
Status: COMPLETED | OUTPATIENT
Start: 2021-03-30 | End: 2021-03-30

## 2021-03-30 RX ORDER — ALUMINA, MAGNESIA, AND SIMETHICONE 2400; 2400; 240 MG/30ML; MG/30ML; MG/30ML
15 SUSPENSION ORAL EVERY 6 HOURS PRN
Status: DISCONTINUED | OUTPATIENT
Start: 2021-03-30 | End: 2021-04-13 | Stop reason: HOSPADM

## 2021-03-30 RX ORDER — ONDANSETRON 4 MG/1
4 TABLET, FILM COATED ORAL EVERY 6 HOURS PRN
Status: DISCONTINUED | OUTPATIENT
Start: 2021-03-30 | End: 2021-04-13 | Stop reason: HOSPADM

## 2021-03-30 RX ORDER — ONDANSETRON 2 MG/ML
4 INJECTION INTRAMUSCULAR; INTRAVENOUS EVERY 6 HOURS PRN
Status: DISCONTINUED | OUTPATIENT
Start: 2021-03-30 | End: 2021-04-13 | Stop reason: HOSPADM

## 2021-03-30 RX ORDER — ALBUTEROL SULFATE 90 UG/1
2 AEROSOL, METERED RESPIRATORY (INHALATION) ONCE
Status: DISCONTINUED | OUTPATIENT
Start: 2021-03-30 | End: 2021-03-31

## 2021-03-30 RX ORDER — SODIUM CHLORIDE 0.9 % (FLUSH) 0.9 %
10 SYRINGE (ML) INJECTION AS NEEDED
Status: DISCONTINUED | OUTPATIENT
Start: 2021-03-30 | End: 2021-04-13 | Stop reason: HOSPADM

## 2021-03-30 RX ORDER — POTASSIUM CHLORIDE 7.45 MG/ML
10 INJECTION INTRAVENOUS
Status: DISCONTINUED | OUTPATIENT
Start: 2021-03-30 | End: 2021-04-11

## 2021-03-30 RX ORDER — CALCIUM GLUCONATE 20 MG/ML
1 INJECTION, SOLUTION INTRAVENOUS AS NEEDED
Status: DISCONTINUED | OUTPATIENT
Start: 2021-03-30 | End: 2021-04-11

## 2021-03-30 RX ORDER — ACETAMINOPHEN 650 MG/1
650 SUPPOSITORY RECTAL EVERY 4 HOURS PRN
Status: DISCONTINUED | OUTPATIENT
Start: 2021-03-30 | End: 2021-04-11

## 2021-03-30 RX ORDER — INSULIN LISPRO 100 [IU]/ML
0-14 INJECTION, SOLUTION INTRAVENOUS; SUBCUTANEOUS
Status: DISCONTINUED | OUTPATIENT
Start: 2021-03-30 | End: 2021-04-06

## 2021-03-30 RX ORDER — NOREPINEPHRINE BIT/0.9 % NACL 8 MG/250ML
.02-.3 INFUSION BOTTLE (ML) INTRAVENOUS
Status: DISCONTINUED | OUTPATIENT
Start: 2021-03-30 | End: 2021-04-02

## 2021-03-30 RX ADMIN — SODIUM CHLORIDE 1000 ML: 9 INJECTION, SOLUTION INTRAVENOUS at 12:12

## 2021-03-30 RX ADMIN — SODIUM CHLORIDE, POTASSIUM CHLORIDE, SODIUM LACTATE AND CALCIUM CHLORIDE 150 ML/HR: 600; 310; 30; 20 INJECTION, SOLUTION INTRAVENOUS at 15:05

## 2021-03-30 RX ADMIN — TAZOBACTAM SODIUM AND PIPERACILLIN SODIUM 4.5 G: 500; 4 INJECTION, SOLUTION INTRAVENOUS at 18:28

## 2021-03-30 RX ADMIN — TAZOBACTAM SODIUM AND PIPERACILLIN SODIUM 4.5 G: 500; 4 INJECTION, SOLUTION INTRAVENOUS at 12:19

## 2021-03-30 RX ADMIN — IPRATROPIUM BROMIDE AND ALBUTEROL SULFATE 3 ML: 2.5; .5 SOLUTION RESPIRATORY (INHALATION) at 15:29

## 2021-03-30 RX ADMIN — FENTANYL CITRATE 25 MCG: 50 INJECTION, SOLUTION INTRAMUSCULAR; INTRAVENOUS at 21:09

## 2021-03-30 RX ADMIN — ACETAMINOPHEN 650 MG: 650 SUPPOSITORY RECTAL at 12:06

## 2021-03-30 RX ADMIN — VANCOMYCIN HYDROCHLORIDE 2750 MG: 10 INJECTION, POWDER, LYOPHILIZED, FOR SOLUTION INTRAVENOUS at 13:07

## 2021-03-30 RX ADMIN — FENTANYL CITRATE 25 MCG: 50 INJECTION, SOLUTION INTRAMUSCULAR; INTRAVENOUS at 23:37

## 2021-03-30 RX ADMIN — IPRATROPIUM BROMIDE AND ALBUTEROL SULFATE 3 ML: 2.5; .5 SOLUTION RESPIRATORY (INHALATION) at 20:28

## 2021-03-30 RX ADMIN — SODIUM CHLORIDE, POTASSIUM CHLORIDE, SODIUM LACTATE AND CALCIUM CHLORIDE 150 ML/HR: 600; 310; 30; 20 INJECTION, SOLUTION INTRAVENOUS at 22:02

## 2021-03-30 RX ADMIN — IOPAMIDOL 85 ML: 612 INJECTION, SOLUTION INTRAVENOUS at 14:18

## 2021-03-30 RX ADMIN — SODIUM CHLORIDE 3140 ML: 9 INJECTION, SOLUTION INTRAVENOUS at 13:20

## 2021-03-30 RX ADMIN — Medication 0.02 MCG/KG/MIN: at 15:11

## 2021-03-31 ENCOUNTER — APPOINTMENT (OUTPATIENT)
Dept: CARDIOLOGY | Facility: HOSPITAL | Age: 71
End: 2021-03-31

## 2021-03-31 LAB
ANION GAP SERPL CALCULATED.3IONS-SCNC: 11.9 MMOL/L (ref 5–15)
AORTIC DIMENSIONLESS INDEX: 0.6 (DI)
BACTERIA SPEC AEROBE CULT: NORMAL
BASOPHILS # BLD AUTO: 0.02 10*3/MM3 (ref 0–0.2)
BASOPHILS NFR BLD AUTO: 0.1 % (ref 0–1.5)
BH CV ECHO MEAS - AO MAX PG (FULL): 3.4 MMHG
BH CV ECHO MEAS - AO MAX PG: 5.1 MMHG
BH CV ECHO MEAS - AO MEAN PG (FULL): 2 MMHG
BH CV ECHO MEAS - AO MEAN PG: 3 MMHG
BH CV ECHO MEAS - AO ROOT AREA (BSA CORRECTED): 1.4
BH CV ECHO MEAS - AO ROOT AREA: 10.8 CM^2
BH CV ECHO MEAS - AO ROOT DIAM: 3.7 CM
BH CV ECHO MEAS - AO V2 MAX: 113 CM/SEC
BH CV ECHO MEAS - AO V2 MEAN: 81.7 CM/SEC
BH CV ECHO MEAS - AO V2 VTI: 20.7 CM
BH CV ECHO MEAS - AVA(I,A): 2.4 CM^2
BH CV ECHO MEAS - AVA(I,D): 2.4 CM^2
BH CV ECHO MEAS - AVA(V,A): 2.4 CM^2
BH CV ECHO MEAS - AVA(V,D): 2.4 CM^2
BH CV ECHO MEAS - BSA(HAYCOCK): 2.8 M^2
BH CV ECHO MEAS - BSA: 2.7 M^2
BH CV ECHO MEAS - BZI_BMI: 38 KILOGRAMS/M^2
BH CV ECHO MEAS - BZI_METRIC_HEIGHT: 193 CM
BH CV ECHO MEAS - BZI_METRIC_WEIGHT: 141.5 KG
BH CV ECHO MEAS - EDV(CUBED): 216 ML
BH CV ECHO MEAS - EDV(MOD-SP2): 157 ML
BH CV ECHO MEAS - EDV(MOD-SP4): 141 ML
BH CV ECHO MEAS - EDV(TEICH): 180 ML
BH CV ECHO MEAS - EF(CUBED): 38.6 %
BH CV ECHO MEAS - EF(MOD-BP): 43.8 %
BH CV ECHO MEAS - EF(MOD-SP2): 35.7 %
BH CV ECHO MEAS - EF(MOD-SP4): 52.5 %
BH CV ECHO MEAS - EF(TEICH): 31.2 %
BH CV ECHO MEAS - ESV(CUBED): 132.7 ML
BH CV ECHO MEAS - ESV(MOD-SP2): 101 ML
BH CV ECHO MEAS - ESV(MOD-SP4): 67 ML
BH CV ECHO MEAS - ESV(TEICH): 123.8 ML
BH CV ECHO MEAS - FS: 15 %
BH CV ECHO MEAS - IVS/LVPW: 1.1
BH CV ECHO MEAS - IVSD: 1.1 CM
BH CV ECHO MEAS - LAT PEAK E' VEL: 10.1 CM/SEC
BH CV ECHO MEAS - LV DIASTOLIC VOL/BSA (35-75): 52.7 ML/M^2
BH CV ECHO MEAS - LV MASS(C)D: 263 GRAMS
BH CV ECHO MEAS - LV MASS(C)DI: 98.3 GRAMS/M^2
BH CV ECHO MEAS - LV MAX PG: 1.7 MMHG
BH CV ECHO MEAS - LV MEAN PG: 1 MMHG
BH CV ECHO MEAS - LV SYSTOLIC VOL/BSA (12-30): 25 ML/M^2
BH CV ECHO MEAS - LV V1 MAX: 65.3 CM/SEC
BH CV ECHO MEAS - LV V1 MEAN: 45 CM/SEC
BH CV ECHO MEAS - LV V1 VTI: 12.2 CM
BH CV ECHO MEAS - LVIDD: 6 CM
BH CV ECHO MEAS - LVIDS: 5.1 CM
BH CV ECHO MEAS - LVLD AP2: 8.5 CM
BH CV ECHO MEAS - LVLD AP4: 8.6 CM
BH CV ECHO MEAS - LVLS AP2: 7.8 CM
BH CV ECHO MEAS - LVLS AP4: 8.4 CM
BH CV ECHO MEAS - LVOT AREA (M): 4.2 CM^2
BH CV ECHO MEAS - LVOT AREA: 4.2 CM^2
BH CV ECHO MEAS - LVOT DIAM: 2.3 CM
BH CV ECHO MEAS - LVPWD: 1 CM
BH CV ECHO MEAS - MED PEAK E' VEL: 7.1 CM/SEC
BH CV ECHO MEAS - MV DEC SLOPE: 561 CM/SEC^2
BH CV ECHO MEAS - MV DEC TIME: 116 SEC
BH CV ECHO MEAS - MV E MAX VEL: 104 CM/SEC
BH CV ECHO MEAS - MV MAX PG: 4.6 MMHG
BH CV ECHO MEAS - MV MEAN PG: 2 MMHG
BH CV ECHO MEAS - MV P1/2T MAX VEL: 109 CM/SEC
BH CV ECHO MEAS - MV P1/2T: 56.9 MSEC
BH CV ECHO MEAS - MV V2 MAX: 107 CM/SEC
BH CV ECHO MEAS - MV V2 MEAN: 61 CM/SEC
BH CV ECHO MEAS - MV V2 VTI: 20.3 CM
BH CV ECHO MEAS - MVA P1/2T LCG: 2 CM^2
BH CV ECHO MEAS - MVA(P1/2T): 3.9 CM^2
BH CV ECHO MEAS - MVA(VTI): 2.5 CM^2
BH CV ECHO MEAS - RAP SYSTOLE: 15 MMHG
BH CV ECHO MEAS - RVSP: 40 MMHG
BH CV ECHO MEAS - SI(AO): 83.2 ML/M^2
BH CV ECHO MEAS - SI(CUBED): 31.1 ML/M^2
BH CV ECHO MEAS - SI(LVOT): 18.9 ML/M^2
BH CV ECHO MEAS - SI(MOD-SP2): 20.9 ML/M^2
BH CV ECHO MEAS - SI(MOD-SP4): 27.6 ML/M^2
BH CV ECHO MEAS - SI(TEICH): 21 ML/M^2
BH CV ECHO MEAS - SV(AO): 222.6 ML
BH CV ECHO MEAS - SV(CUBED): 83.3 ML
BH CV ECHO MEAS - SV(LVOT): 50.7 ML
BH CV ECHO MEAS - SV(MOD-SP2): 56 ML
BH CV ECHO MEAS - SV(MOD-SP4): 74 ML
BH CV ECHO MEAS - SV(TEICH): 56.2 ML
BH CV ECHO MEAS - TAPSE (>1.6): 2.3 CM
BH CV ECHO MEAS - TR MAX VEL: 246 CM/SEC
BH CV ECHO MEASUREMENTS AVERAGE E/E' RATIO: 12.09
BH CV XLRA - RV BASE: 5 CM
BH CV XLRA - TDI S': 10.2 CM/SEC
BUN SERPL-MCNC: 12 MG/DL (ref 8–23)
BUN/CREAT SERPL: 13 (ref 7–25)
CALCIUM SPEC-SCNC: 8.6 MG/DL (ref 8.6–10.5)
CHLORIDE SERPL-SCNC: 100 MMOL/L (ref 98–107)
CO2 SERPL-SCNC: 21.1 MMOL/L (ref 22–29)
CREAT SERPL-MCNC: 0.92 MG/DL (ref 0.76–1.27)
DEPRECATED RDW RBC AUTO: 48.1 FL (ref 37–54)
EOSINOPHIL # BLD AUTO: 0 10*3/MM3 (ref 0–0.4)
EOSINOPHIL NFR BLD AUTO: 0 % (ref 0.3–6.2)
ERYTHROCYTE [DISTWIDTH] IN BLOOD BY AUTOMATED COUNT: 13.6 % (ref 12.3–15.4)
GFR SERPL CREATININE-BSD FRML MDRD: 81 ML/MIN/1.73
GLUCOSE BLDC GLUCOMTR-MCNC: 112 MG/DL (ref 70–130)
GLUCOSE BLDC GLUCOMTR-MCNC: 113 MG/DL (ref 70–130)
GLUCOSE BLDC GLUCOMTR-MCNC: 117 MG/DL (ref 70–130)
GLUCOSE BLDC GLUCOMTR-MCNC: 120 MG/DL (ref 70–130)
GLUCOSE BLDC GLUCOMTR-MCNC: 123 MG/DL (ref 70–130)
GLUCOSE BLDC GLUCOMTR-MCNC: 131 MG/DL (ref 70–130)
GLUCOSE SERPL-MCNC: 119 MG/DL (ref 65–99)
HCT VFR BLD AUTO: 36.8 % (ref 37.5–51)
HGB BLD-MCNC: 13.4 G/DL (ref 13–17.7)
IMM GRANULOCYTES # BLD AUTO: 0.11 10*3/MM3 (ref 0–0.05)
IMM GRANULOCYTES NFR BLD AUTO: 0.7 % (ref 0–0.5)
LEFT ATRIUM VOLUME INDEX: 29 ML/M2
LV EF 2D ECHO EST: 30 %
LYMPHOCYTES # BLD AUTO: 0.62 10*3/MM3 (ref 0.7–3.1)
LYMPHOCYTES NFR BLD AUTO: 4 % (ref 19.6–45.3)
MAGNESIUM SERPL-MCNC: 2 MG/DL (ref 1.6–2.4)
MAXIMAL PREDICTED HEART RATE: 150 BPM
MCH RBC QN AUTO: 35.3 PG (ref 26.6–33)
MCHC RBC AUTO-ENTMCNC: 36.4 G/DL (ref 31.5–35.7)
MCV RBC AUTO: 96.8 FL (ref 79–97)
MONOCYTES # BLD AUTO: 0.65 10*3/MM3 (ref 0.1–0.9)
MONOCYTES NFR BLD AUTO: 4.2 % (ref 5–12)
NEUTROPHILS NFR BLD AUTO: 14.06 10*3/MM3 (ref 1.7–7)
NEUTROPHILS NFR BLD AUTO: 91 % (ref 42.7–76)
NRBC BLD AUTO-RTO: 0 /100 WBC (ref 0–0.2)
PLATELET # BLD AUTO: 159 10*3/MM3 (ref 140–450)
PMV BLD AUTO: 10.8 FL (ref 6–12)
POTASSIUM SERPL-SCNC: 3.6 MMOL/L (ref 3.5–5.2)
QT INTERVAL: 452 MS
RBC # BLD AUTO: 3.8 10*6/MM3 (ref 4.14–5.8)
SODIUM SERPL-SCNC: 133 MMOL/L (ref 136–145)
STRESS TARGET HR: 128 BPM
TROPONIN T SERPL-MCNC: 0.25 NG/ML (ref 0–0.03)
WBC # BLD AUTO: 15.46 10*3/MM3 (ref 3.4–10.8)

## 2021-03-31 PROCEDURE — 93306 TTE W/DOPPLER COMPLETE: CPT

## 2021-03-31 PROCEDURE — 94799 UNLISTED PULMONARY SVC/PX: CPT

## 2021-03-31 PROCEDURE — 93306 TTE W/DOPPLER COMPLETE: CPT | Performed by: INTERNAL MEDICINE

## 2021-03-31 PROCEDURE — 80048 BASIC METABOLIC PNL TOTAL CA: CPT | Performed by: INTERNAL MEDICINE

## 2021-03-31 PROCEDURE — 83735 ASSAY OF MAGNESIUM: CPT | Performed by: INTERNAL MEDICINE

## 2021-03-31 PROCEDURE — 85025 COMPLETE CBC W/AUTO DIFF WBC: CPT | Performed by: INTERNAL MEDICINE

## 2021-03-31 PROCEDURE — 84484 ASSAY OF TROPONIN QUANT: CPT | Performed by: INTERNAL MEDICINE

## 2021-03-31 PROCEDURE — 82962 GLUCOSE BLOOD TEST: CPT

## 2021-03-31 PROCEDURE — 94640 AIRWAY INHALATION TREATMENT: CPT

## 2021-03-31 PROCEDURE — 99222 1ST HOSP IP/OBS MODERATE 55: CPT | Performed by: INTERNAL MEDICINE

## 2021-03-31 PROCEDURE — 25010000002 FENTANYL CITRATE (PF) 100 MCG/2ML SOLUTION: Performed by: INTERNAL MEDICINE

## 2021-03-31 PROCEDURE — 94760 N-INVAS EAR/PLS OXIMETRY 1: CPT

## 2021-03-31 PROCEDURE — 93010 ELECTROCARDIOGRAM REPORT: CPT | Performed by: INTERNAL MEDICINE

## 2021-03-31 PROCEDURE — 25010000002 VANCOMYCIN 10 G RECONSTITUTED SOLUTION: Performed by: INTERNAL MEDICINE

## 2021-03-31 PROCEDURE — 25010000002 PIPERACILLIN SOD-TAZOBACTAM PER 1 G: Performed by: INTERNAL MEDICINE

## 2021-03-31 PROCEDURE — 93005 ELECTROCARDIOGRAM TRACING: CPT | Performed by: INTERNAL MEDICINE

## 2021-03-31 RX ORDER — BUDESONIDE AND FORMOTEROL FUMARATE DIHYDRATE 160; 4.5 UG/1; UG/1
2 AEROSOL RESPIRATORY (INHALATION)
Status: DISCONTINUED | OUTPATIENT
Start: 2021-03-31 | End: 2021-04-13 | Stop reason: HOSPADM

## 2021-03-31 RX ORDER — GABAPENTIN 100 MG/1
200 CAPSULE ORAL EVERY 8 HOURS SCHEDULED
Status: DISCONTINUED | OUTPATIENT
Start: 2021-03-31 | End: 2021-03-31

## 2021-03-31 RX ORDER — ASPIRIN 81 MG/1
81 TABLET ORAL DAILY
Status: DISCONTINUED | OUTPATIENT
Start: 2021-03-31 | End: 2021-04-04

## 2021-03-31 RX ORDER — HYDROCODONE BITARTRATE AND ACETAMINOPHEN 5; 325 MG/1; MG/1
2 TABLET ORAL EVERY 6 HOURS PRN
Status: DISPENSED | OUTPATIENT
Start: 2021-03-31 | End: 2021-04-07

## 2021-03-31 RX ORDER — METOPROLOL SUCCINATE 25 MG/1
12.5 TABLET, EXTENDED RELEASE ORAL
Status: DISCONTINUED | OUTPATIENT
Start: 2021-03-31 | End: 2021-04-01

## 2021-03-31 RX ORDER — GABAPENTIN 300 MG/1
300 CAPSULE ORAL EVERY 8 HOURS SCHEDULED
Status: DISCONTINUED | OUTPATIENT
Start: 2021-03-31 | End: 2021-04-13 | Stop reason: HOSPADM

## 2021-03-31 RX ORDER — IPRATROPIUM BROMIDE AND ALBUTEROL SULFATE 2.5; .5 MG/3ML; MG/3ML
3 SOLUTION RESPIRATORY (INHALATION)
Status: DISCONTINUED | OUTPATIENT
Start: 2021-03-31 | End: 2021-04-13 | Stop reason: HOSPADM

## 2021-03-31 RX ORDER — FINASTERIDE 5 MG/1
5 TABLET, FILM COATED ORAL DAILY
Status: DISCONTINUED | OUTPATIENT
Start: 2021-03-31 | End: 2021-04-13 | Stop reason: HOSPADM

## 2021-03-31 RX ADMIN — HYDROCODONE BITARTRATE AND ACETAMINOPHEN 2 TABLET: 5; 325 TABLET ORAL at 19:41

## 2021-03-31 RX ADMIN — VANCOMYCIN HYDROCHLORIDE 1250 MG: 10 INJECTION, POWDER, LYOPHILIZED, FOR SOLUTION INTRAVENOUS at 01:21

## 2021-03-31 RX ADMIN — ASPIRIN 81 MG: 81 TABLET, COATED ORAL at 16:14

## 2021-03-31 RX ADMIN — GABAPENTIN 300 MG: 300 CAPSULE ORAL at 13:02

## 2021-03-31 RX ADMIN — FENTANYL CITRATE 25 MCG: 50 INJECTION, SOLUTION INTRAMUSCULAR; INTRAVENOUS at 09:11

## 2021-03-31 RX ADMIN — FINASTERIDE 5 MG: 5 TABLET, FILM COATED ORAL at 11:19

## 2021-03-31 RX ADMIN — BUDESONIDE AND FORMOTEROL FUMARATE DIHYDRATE 2 PUFF: 160; 4.5 AEROSOL RESPIRATORY (INHALATION) at 20:02

## 2021-03-31 RX ADMIN — SODIUM CHLORIDE, POTASSIUM CHLORIDE, SODIUM LACTATE AND CALCIUM CHLORIDE 150 ML/HR: 600; 310; 30; 20 INJECTION, SOLUTION INTRAVENOUS at 19:43

## 2021-03-31 RX ADMIN — TAZOBACTAM SODIUM AND PIPERACILLIN SODIUM 4.5 G: 500; 4 INJECTION, SOLUTION INTRAVENOUS at 02:43

## 2021-03-31 RX ADMIN — TAZOBACTAM SODIUM AND PIPERACILLIN SODIUM 4.5 G: 500; 4 INJECTION, SOLUTION INTRAVENOUS at 18:18

## 2021-03-31 RX ADMIN — HYDROCODONE BITARTRATE AND ACETAMINOPHEN 2 TABLET: 5; 325 TABLET ORAL at 13:02

## 2021-03-31 RX ADMIN — IPRATROPIUM BROMIDE AND ALBUTEROL SULFATE 3 ML: 2.5; .5 SOLUTION RESPIRATORY (INHALATION) at 02:28

## 2021-03-31 RX ADMIN — GABAPENTIN 300 MG: 300 CAPSULE ORAL at 21:42

## 2021-03-31 RX ADMIN — METOPROLOL SUCCINATE 12.5 MG: 25 TABLET, EXTENDED RELEASE ORAL at 16:14

## 2021-03-31 RX ADMIN — TAZOBACTAM SODIUM AND PIPERACILLIN SODIUM 4.5 G: 500; 4 INJECTION, SOLUTION INTRAVENOUS at 09:27

## 2021-03-31 RX ADMIN — FENTANYL CITRATE 25 MCG: 50 INJECTION, SOLUTION INTRAMUSCULAR; INTRAVENOUS at 02:43

## 2021-03-31 RX ADMIN — VANCOMYCIN HYDROCHLORIDE 1250 MG: 10 INJECTION, POWDER, LYOPHILIZED, FOR SOLUTION INTRAVENOUS at 14:52

## 2021-03-31 RX ADMIN — IPRATROPIUM BROMIDE AND ALBUTEROL SULFATE 3 ML: 2.5; .5 SOLUTION RESPIRATORY (INHALATION) at 11:22

## 2021-03-31 RX ADMIN — BUDESONIDE AND FORMOTEROL FUMARATE DIHYDRATE 2 PUFF: 160; 4.5 AEROSOL RESPIRATORY (INHALATION) at 08:34

## 2021-03-31 RX ADMIN — IPRATROPIUM BROMIDE AND ALBUTEROL SULFATE 3 ML: 2.5; .5 SOLUTION RESPIRATORY (INHALATION) at 16:02

## 2021-03-31 RX ADMIN — FENTANYL CITRATE 25 MCG: 50 INJECTION, SOLUTION INTRAMUSCULAR; INTRAVENOUS at 18:13

## 2021-04-01 ENCOUNTER — TELEPHONE (OUTPATIENT)
Dept: FAMILY MEDICINE CLINIC | Facility: CLINIC | Age: 71
End: 2021-04-01

## 2021-04-01 ENCOUNTER — APPOINTMENT (OUTPATIENT)
Dept: GENERAL RADIOLOGY | Facility: HOSPITAL | Age: 71
End: 2021-04-01

## 2021-04-01 LAB
ANION GAP SERPL CALCULATED.3IONS-SCNC: 9.2 MMOL/L (ref 5–15)
BASOPHILS # BLD AUTO: 0.04 10*3/MM3 (ref 0–0.2)
BASOPHILS NFR BLD AUTO: 0.4 % (ref 0–1.5)
BUN SERPL-MCNC: 13 MG/DL (ref 8–23)
BUN/CREAT SERPL: 15.1 (ref 7–25)
CALCIUM SPEC-SCNC: 8.2 MG/DL (ref 8.6–10.5)
CHLORIDE SERPL-SCNC: 103 MMOL/L (ref 98–107)
CO2 SERPL-SCNC: 22.8 MMOL/L (ref 22–29)
CREAT SERPL-MCNC: 0.86 MG/DL (ref 0.76–1.27)
DEPRECATED RDW RBC AUTO: 47.8 FL (ref 37–54)
EOSINOPHIL # BLD AUTO: 0.06 10*3/MM3 (ref 0–0.4)
EOSINOPHIL NFR BLD AUTO: 0.5 % (ref 0.3–6.2)
ERYTHROCYTE [DISTWIDTH] IN BLOOD BY AUTOMATED COUNT: 13.4 % (ref 12.3–15.4)
GFR SERPL CREATININE-BSD FRML MDRD: 88 ML/MIN/1.73
GLUCOSE BLDC GLUCOMTR-MCNC: 111 MG/DL (ref 70–130)
GLUCOSE BLDC GLUCOMTR-MCNC: 121 MG/DL (ref 70–130)
GLUCOSE BLDC GLUCOMTR-MCNC: 123 MG/DL (ref 70–130)
GLUCOSE BLDC GLUCOMTR-MCNC: 124 MG/DL (ref 70–130)
GLUCOSE BLDC GLUCOMTR-MCNC: 135 MG/DL (ref 70–130)
GLUCOSE BLDC GLUCOMTR-MCNC: 141 MG/DL (ref 70–130)
GLUCOSE SERPL-MCNC: 117 MG/DL (ref 65–99)
HCT VFR BLD AUTO: 34.3 % (ref 37.5–51)
HGB BLD-MCNC: 12.3 G/DL (ref 13–17.7)
IMM GRANULOCYTES # BLD AUTO: 0.05 10*3/MM3 (ref 0–0.05)
IMM GRANULOCYTES NFR BLD AUTO: 0.5 % (ref 0–0.5)
INR PPP: 1.66 (ref 0.9–1.1)
LYMPHOCYTES # BLD AUTO: 1.07 10*3/MM3 (ref 0.7–3.1)
LYMPHOCYTES NFR BLD AUTO: 9.8 % (ref 19.6–45.3)
MCH RBC QN AUTO: 34.9 PG (ref 26.6–33)
MCHC RBC AUTO-ENTMCNC: 35.9 G/DL (ref 31.5–35.7)
MCV RBC AUTO: 97.4 FL (ref 79–97)
MONOCYTES # BLD AUTO: 0.62 10*3/MM3 (ref 0.1–0.9)
MONOCYTES NFR BLD AUTO: 5.7 % (ref 5–12)
NEUTROPHILS NFR BLD AUTO: 83.1 % (ref 42.7–76)
NEUTROPHILS NFR BLD AUTO: 9.13 10*3/MM3 (ref 1.7–7)
NRBC BLD AUTO-RTO: 0 /100 WBC (ref 0–0.2)
PLATELET # BLD AUTO: 122 10*3/MM3 (ref 140–450)
PMV BLD AUTO: 10.7 FL (ref 6–12)
POTASSIUM SERPL-SCNC: 3.5 MMOL/L (ref 3.5–5.2)
PROTHROMBIN TIME: 19.4 SECONDS (ref 11.7–14.2)
RBC # BLD AUTO: 3.52 10*6/MM3 (ref 4.14–5.8)
SODIUM SERPL-SCNC: 135 MMOL/L (ref 136–145)
TROPONIN T SERPL-MCNC: 0.33 NG/ML (ref 0–0.03)
VANCOMYCIN TROUGH SERPL-MCNC: 13.9 MCG/ML (ref 5–20)
WBC # BLD AUTO: 10.97 10*3/MM3 (ref 3.4–10.8)

## 2021-04-01 PROCEDURE — 84484 ASSAY OF TROPONIN QUANT: CPT | Performed by: INTERNAL MEDICINE

## 2021-04-01 PROCEDURE — 82962 GLUCOSE BLOOD TEST: CPT

## 2021-04-01 PROCEDURE — 94799 UNLISTED PULMONARY SVC/PX: CPT

## 2021-04-01 PROCEDURE — 99233 SBSQ HOSP IP/OBS HIGH 50: CPT | Performed by: INTERNAL MEDICINE

## 2021-04-01 PROCEDURE — 02HV33Z INSERTION OF INFUSION DEVICE INTO SUPERIOR VENA CAVA, PERCUTANEOUS APPROACH: ICD-10-PCS | Performed by: HOSPITALIST

## 2021-04-01 PROCEDURE — 94760 N-INVAS EAR/PLS OXIMETRY 1: CPT

## 2021-04-01 PROCEDURE — 25010000002 VANCOMYCIN 10 G RECONSTITUTED SOLUTION: Performed by: INTERNAL MEDICINE

## 2021-04-01 PROCEDURE — 80202 ASSAY OF VANCOMYCIN: CPT | Performed by: INTERNAL MEDICINE

## 2021-04-01 PROCEDURE — 87070 CULTURE OTHR SPECIMN AEROBIC: CPT | Performed by: INTERNAL MEDICINE

## 2021-04-01 PROCEDURE — 71045 X-RAY EXAM CHEST 1 VIEW: CPT

## 2021-04-01 PROCEDURE — C1751 CATH, INF, PER/CENT/MIDLINE: HCPCS

## 2021-04-01 PROCEDURE — 25010000002 PIPERACILLIN SOD-TAZOBACTAM PER 1 G: Performed by: INTERNAL MEDICINE

## 2021-04-01 PROCEDURE — 99223 1ST HOSP IP/OBS HIGH 75: CPT | Performed by: PSYCHIATRY & NEUROLOGY

## 2021-04-01 PROCEDURE — 87205 SMEAR GRAM STAIN: CPT | Performed by: INTERNAL MEDICINE

## 2021-04-01 PROCEDURE — 85610 PROTHROMBIN TIME: CPT | Performed by: INTERNAL MEDICINE

## 2021-04-01 PROCEDURE — 80048 BASIC METABOLIC PNL TOTAL CA: CPT | Performed by: INTERNAL MEDICINE

## 2021-04-01 PROCEDURE — 85025 COMPLETE CBC W/AUTO DIFF WBC: CPT | Performed by: INTERNAL MEDICINE

## 2021-04-01 RX ORDER — SODIUM CHLORIDE 0.9 % (FLUSH) 0.9 %
10 SYRINGE (ML) INJECTION EVERY 12 HOURS SCHEDULED
Status: DISCONTINUED | OUTPATIENT
Start: 2021-04-01 | End: 2021-04-13 | Stop reason: HOSPADM

## 2021-04-01 RX ORDER — SODIUM CHLORIDE 0.9 % (FLUSH) 0.9 %
20 SYRINGE (ML) INJECTION AS NEEDED
Status: DISCONTINUED | OUTPATIENT
Start: 2021-04-01 | End: 2021-04-13 | Stop reason: HOSPADM

## 2021-04-01 RX ORDER — SODIUM CHLORIDE 0.9 % (FLUSH) 0.9 %
10 SYRINGE (ML) INJECTION EVERY 12 HOURS SCHEDULED
Status: DISCONTINUED | OUTPATIENT
Start: 2021-04-01 | End: 2021-04-12

## 2021-04-01 RX ORDER — WARFARIN SODIUM 5 MG/1
5 TABLET ORAL
Status: DISCONTINUED | OUTPATIENT
Start: 2021-04-01 | End: 2021-04-01

## 2021-04-01 RX ORDER — POTASSIUM CHLORIDE 750 MG/1
40 TABLET, FILM COATED, EXTENDED RELEASE ORAL AS NEEDED
Status: DISCONTINUED | OUTPATIENT
Start: 2021-04-01 | End: 2021-04-11

## 2021-04-01 RX ORDER — ATORVASTATIN CALCIUM 20 MG/1
40 TABLET, FILM COATED ORAL NIGHTLY
Status: DISCONTINUED | OUTPATIENT
Start: 2021-04-01 | End: 2021-04-13 | Stop reason: HOSPADM

## 2021-04-01 RX ORDER — POTASSIUM CHLORIDE 1.5 G/1.77G
40 POWDER, FOR SOLUTION ORAL AS NEEDED
Status: DISCONTINUED | OUTPATIENT
Start: 2021-04-01 | End: 2021-04-11

## 2021-04-01 RX ORDER — SODIUM CHLORIDE 0.9 % (FLUSH) 0.9 %
10 SYRINGE (ML) INJECTION AS NEEDED
Status: DISCONTINUED | OUTPATIENT
Start: 2021-04-01 | End: 2021-04-13 | Stop reason: HOSPADM

## 2021-04-01 RX ORDER — WARFARIN SODIUM 7.5 MG/1
7.5 TABLET ORAL
Status: COMPLETED | OUTPATIENT
Start: 2021-04-01 | End: 2021-04-01

## 2021-04-01 RX ADMIN — WARFARIN 7.5 MG: 7.5 TABLET ORAL at 18:54

## 2021-04-01 RX ADMIN — IPRATROPIUM BROMIDE AND ALBUTEROL SULFATE 3 ML: 2.5; .5 SOLUTION RESPIRATORY (INHALATION) at 16:33

## 2021-04-01 RX ADMIN — IPRATROPIUM BROMIDE AND ALBUTEROL SULFATE 3 ML: 2.5; .5 SOLUTION RESPIRATORY (INHALATION) at 02:27

## 2021-04-01 RX ADMIN — TAZOBACTAM SODIUM AND PIPERACILLIN SODIUM 4.5 G: 500; 4 INJECTION, SOLUTION INTRAVENOUS at 18:54

## 2021-04-01 RX ADMIN — VANCOMYCIN HYDROCHLORIDE 1250 MG: 10 INJECTION, POWDER, LYOPHILIZED, FOR SOLUTION INTRAVENOUS at 01:58

## 2021-04-01 RX ADMIN — GABAPENTIN 300 MG: 300 CAPSULE ORAL at 21:54

## 2021-04-01 RX ADMIN — FINASTERIDE 5 MG: 5 TABLET, FILM COATED ORAL at 10:00

## 2021-04-01 RX ADMIN — HYDROCODONE BITARTRATE AND ACETAMINOPHEN 2 TABLET: 5; 325 TABLET ORAL at 15:47

## 2021-04-01 RX ADMIN — ATORVASTATIN CALCIUM 40 MG: 20 TABLET, FILM COATED ORAL at 21:54

## 2021-04-01 RX ADMIN — SODIUM CHLORIDE, PRESERVATIVE FREE 10 ML: 5 INJECTION INTRAVENOUS at 21:55

## 2021-04-01 RX ADMIN — BUDESONIDE AND FORMOTEROL FUMARATE DIHYDRATE 2 PUFF: 160; 4.5 AEROSOL RESPIRATORY (INHALATION) at 21:14

## 2021-04-01 RX ADMIN — VANCOMYCIN HYDROCHLORIDE 1250 MG: 10 INJECTION, POWDER, LYOPHILIZED, FOR SOLUTION INTRAVENOUS at 15:47

## 2021-04-01 RX ADMIN — ASPIRIN 81 MG: 81 TABLET, COATED ORAL at 08:14

## 2021-04-01 RX ADMIN — IPRATROPIUM BROMIDE AND ALBUTEROL SULFATE 3 ML: 2.5; .5 SOLUTION RESPIRATORY (INHALATION) at 11:41

## 2021-04-01 RX ADMIN — SODIUM CHLORIDE, POTASSIUM CHLORIDE, SODIUM LACTATE AND CALCIUM CHLORIDE 150 ML/HR: 600; 310; 30; 20 INJECTION, SOLUTION INTRAVENOUS at 05:27

## 2021-04-01 RX ADMIN — POTASSIUM CHLORIDE 40 MEQ: 750 TABLET, EXTENDED RELEASE ORAL at 10:00

## 2021-04-01 RX ADMIN — HYDROCODONE BITARTRATE AND ACETAMINOPHEN 2 TABLET: 5; 325 TABLET ORAL at 08:14

## 2021-04-01 RX ADMIN — HYDROCODONE BITARTRATE AND ACETAMINOPHEN 2 TABLET: 5; 325 TABLET ORAL at 02:01

## 2021-04-01 RX ADMIN — HYDROCODONE BITARTRATE AND ACETAMINOPHEN 2 TABLET: 5; 325 TABLET ORAL at 21:54

## 2021-04-01 RX ADMIN — GABAPENTIN 300 MG: 300 CAPSULE ORAL at 15:48

## 2021-04-01 RX ADMIN — BUDESONIDE AND FORMOTEROL FUMARATE DIHYDRATE 2 PUFF: 160; 4.5 AEROSOL RESPIRATORY (INHALATION) at 08:17

## 2021-04-01 RX ADMIN — TAZOBACTAM SODIUM AND PIPERACILLIN SODIUM 4.5 G: 500; 4 INJECTION, SOLUTION INTRAVENOUS at 10:37

## 2021-04-01 RX ADMIN — GABAPENTIN 300 MG: 300 CAPSULE ORAL at 06:43

## 2021-04-01 RX ADMIN — TAZOBACTAM SODIUM AND PIPERACILLIN SODIUM 4.5 G: 500; 4 INJECTION, SOLUTION INTRAVENOUS at 03:15

## 2021-04-01 NOTE — TELEPHONE ENCOUNTER
Caller: [facility]    Relationship: Home Health    Best call back number: [phone]    Additional notes: [facility] CALLED STATING PATIENT IS CURRENTLY IN HOSPITAL, BUT WAS BEING SEEN BY HOME HEALTH PRIOR TO AND WOULD LIKE AN ORDER TO CONTINUE HOME HEALTH AFTER DISCHARGE FROM HOSPITAL FOR HEALTHCARE OTHER THAN WOUNDS.

## 2021-04-01 NOTE — PROGRESS NOTES
LOS: 2 days   Patient Care Team:  Marc Ludwig MD as PCP - General (Family Medicine)  Blas Mckeon MD as Surgeon (General Surgery)  Jonnathan Boston II, MD as Consulting Physician (Vascular Surgery)  Xavi Elliott MD as Consulting Physician (Urology)  Marc Benavidez MD as Consulting Physician (Pain Medicine)  Kurt Henry MD as Consulting Physician (Cardiology)  Meghna Horan [facility] as Pharmacist  Juni Landa MD as Consulting Physician (Hematology and Oncology)  Brendan Live [facility] as Pharmacist (Pharmacy)    Chief Complaint: Follow-up for NSTEMI, persistent atrial fibrillation, new cardiomyopathy.    Interval History: EF on echocardiogram was 30 to 35%.  He has not had any shortness of breath.  No chest pain.  His main complaint is pain in his lower extremities from his wounds.    Vital Signs:  Temp:  [97.4 °F (36.3 °C)-98.7 °F (37.1 °C)] 97.4 °F (36.3 °C)  Heart Rate:  [66-98] 69  Resp:  [16-20] 18  BP: ()/(46-97) 97/56    Intake/Output Summary (Last 24 hours) at 4/1/2021 1644  Last data filed at 4/1/2021 0000  Gross per 24 hour   Intake 970 ml   Output 300 ml   Net 670 ml       Physical Exam:   General Appearance:    No acute distress, alert and oriented x4   Lungs:     Bilateral rhonchi and coarse breath sounds    Heart:    Irregularly irregular rhythm and normal rate.  No murmurs, gallops, or rubs.   Abdomen:     Soft, nontender, nondistended.    Extremities:   Wraps noted bilaterally with multiple areas of ecchymoses     Results Review:    Results from last 7 days   Lab Units 04/01/21  0346   SODIUM mmol/L 135*   POTASSIUM mmol/L 3.5   CHLORIDE mmol/L 103   CO2 mmol/L 22.8   BUN mg/dL 13   CREATININE mg/dL 0.86   GLUCOSE mg/dL 117*   CALCIUM mg/dL 8.2*     Results from last 7 days   Lab Units 04/01/21  0346 03/31/21  0644 03/30/21  1504   TROPONIN T ng/mL 0.333* 0.254* 0.167*     Results from last 7 days   Lab Units 04/01/21  0346   WBC 10*3/mm3 10.97*   HEMOGLOBIN g/dL 12.3*    HEMATOCRIT % 34.3*   PLATELETS 10*3/mm3 122*     Results from last 7 days   Lab Units 04/01/21  1325 03/30/21  1207   INR  1.66* 2.12*   APTT seconds  --  54.7*         Results from last 7 days   Lab Units 03/31/21  0644   MAGNESIUM mg/dL 2.0           I reviewed the patient's new clinical results.        Assessment:  1.  Severe sepsis, likely lower extremity wounds as source  2.  Urinary tract infection  3.  Metabolic encephalopathy, resolved  4.  NSTEMI with history of moderate diffuse coronary artery disease  5.  New cardiomyopathy, likely ischemic (EF 30 to 35%)  6.  Moderate to severe right ventricular dilatation  7.  Chronic edema, multifactorial and partially from venous stasis  8.  Bilateral lower extremity wounds and recurrent cellulitis  9.  Persistent atrial fibrillation  10.  Left popliteal artery aneurysm, status post stenting in 2016  11.  Ongoing tobacco and alcohol use    Plan:  -I discussed the patient with Dr. Lepe.  I would like to potentially decrease his fluids as he does have a new cardiomyopathy with an EF of 30 to 35%.  He is still hypotensive, although I would be concerned about volume overload and pulmonary edema.    -His cardiomyopathy is likely ischemic and follows in the LAD distribution.  I suspect that he has significant coronary artery disease in the LAD, and this, coupled with the stress of his current situation caused the NSTEMI.  However, I discussed this with Dr. Henry, his regular cardiologist.  He has multiple issues, including significant lower extremity wounds and bleeding, as well as the need for systemic anticoagulation with warfarin.  He would be a poor candidate for dual antiplatelet therapy if stenting were needed.  I would recommend treating him medically for now, and this can certainly be addressed in the future when his acute issues have been resolved.    -Add Lipitor 40 mg/day.  Continue aspirin 81 mg/day.  His blood pressure is too low to tolerate a beta-blocker  currently, and the Toprol-XL I started yesterday had to be held.    -Atrial fibrillation rate is controlled.  Continue warfarin.  His INR was slightly subtherapeutic today 1.66.  Can supplement with Lovenox if needed if still subtherapeutic tomorrow.    Hilario Ngo MD  04/01/21  16:44 EDT

## 2021-04-01 NOTE — PROGRESS NOTES
"Pharmacokinetic Evaluation - Vancomycin    Jalen Lockwood is a 71 y.o. male on vancomycin pharmacy to dose.  MRN: [id number]  : 1950    Day of vancomycin therapy: 3/7  Indication: sepsis  Target level: AUC24 400-600  Consulting Provider:  Dr. Chapman  Current Vancomycin Dose: 1250 mg (~9 mg/kg) IV q12h      Other Antimicrobials: zosyn 4.5 g q8     Blood pressure (!) 78/64, pulse 81, temperature 97.4 °F (36.3 °C), resp. rate 18, height 193 cm (76\"), weight (!) 142 kg (313 lb 15 oz), SpO2 97 %.  Results from last 7 days   Lab Units 21  0346 21  0644 21  1504   CREATININE mg/dL 0.86 0.92 1.03     Estimated Creatinine Clearance: 123.2 mL/min (by C-G formula based on SCr of 0.86 mg/dL).  Results from last 7 days   Lab Units 21  0346 21  0518 21  1504 21  1207   WBC 10*3/mm3 10.97* 15.46*  --  19.85*   HEMOGLOBIN g/dL 12.3* 13.4  --  15.5   HEMATOCRIT % 34.3* 36.8*  --  44.2   PLATELETS 10*3/mm3 122* 159  --  226   LACTATE mmol/L  --   --  2.8* 4.6*           Cultures:      Microbiology Results (last 10 days)     Procedure Component Value - Date/Time    Blood Culture - Blood, Arm, Left [[id number]] Collected: 218    Lab Status: Preliminary result Specimen: Blood from Arm, Left Updated: 21 1315     Blood Culture No growth at 24 hours    Respiratory Panel PCR w/COVID-19(SARS-CoV-2) GAYAT[facility]/LUCAS/SHEILA/PAD/[facility]/MAD/[facility] In-House, NP Swab in UTM/VTM, 3-4 HR TAT - Swab, Nasopharynx [[id number]]  (Normal) Collected: 21 1215    Lab Status: Final result Specimen: Swab from Nasopharynx Updated: 21 1359     ADENOVIRUS, PCR Not Detected     Coronavirus 229E Not Detected     Coronavirus HKU1 Not Detected     Coronavirus NL63 Not Detected     Coronavirus OC43 Not Detected     COVID19 Not Detected     Human Metapneumovirus Not Detected     Human Rhinovirus/Enterovirus Not Detected     Influenza A PCR Not Detected     Influenza B PCR Not Detected     Parainfluenza Virus 1 " "Not Detected     Parainfluenza Virus 2 Not Detected     Parainfluenza Virus 3 Not Detected     Parainfluenza Virus 4 Not Detected     RSV, PCR Not Detected     Bordetella pertussis pcr Not Detected     Bordetella parapertussis PCR Not Detected     Chlamydophila pneumoniae PCR Not Detected     Mycoplasma pneumo by PCR Not Detected    Narrative:      Fact sheet for providers: https://docs.[employer]/wp-content/uploads/KYD4955-7744-QX5.1-EUA-Provider-Fact-Sheet-3.pdf    Fact sheet for patients: https://docs.[employer]/wp-content/uploads/FJC2139-7598-HF2.1-EUA-Patient-Fact-Sheet-1.pdf    Test performed by PCR.    Urine Culture - Urine, Urine, Catheter [[id number]] Collected: 03/30/21 1210    Lab Status: Final result Specimen: Urine, Catheter Updated: 03/31/21 1454     Urine Culture >100,000 CFU/mL Mixed Darian Isolated    Narrative:      Specimen contains mixed organisms of questionable pathogenicity which indicates contamination with commensal darian.  Further identification is unlikely to provide clinically useful information.  Suggest recollection.    Blood Culture - Blood, Arm, Left [[id number]] Collected: 03/30/21 1209    Lab Status: Preliminary result Specimen: Blood from Arm, Left Updated: 04/01/21 1230     Blood Culture No growth at 2 days            Dosing hx (include troughs if drawn):  3/30     1307    2750 mg x 1  3/31     1250 mg q12:  0121, 1452  4/1 1250 mg q12: 0158,    4/1 1325 trough=13.9 mcg/ml, BCX=835     Assessment:  Bayesian analysis of the most recent level(s) using [employer] provides the following patient-specific pharmacokinetic parameters:   CL: 5.71 L/h   Vd: 118 L   T1/2: 14.8 h  If the predicted trough on this regimen is not within what was previously considered a \"target trough range\", the AUC24 (a stronger predictor of vancomycin efficacy) is predicted to achieve the accepted target of 400-600 mg*h/L. To best balance efficacy and toxicity, we will be targeting AUC24 in this patient " rather than steady-state trough levels.     Continuing the regimen of 1250 mg (8.8 mg/kg) IV every 12 hours gives a predicted steady-state trough of 14.2 mg/L and AUC24 of 436 mg*h/L.      Plan:  1) Continue vancomycin 1250 mg every 12 hours.  2) Next trough tbd.  3) Encourage adequate hydration if appropriate. Monitor for decreased UOP, rash or other signs of vancomycin intolerance.    Thanks for this consult, will follow until Calderon riggs.D, BCCCP

## 2021-04-01 NOTE — PLAN OF CARE
Goal Outcome Evaluation:  Plan of Care Reviewed With: patient  Progress: improving  Outcome Summary: Patient remains stable overnight. Critical troponin called to cardiology, no new orders. Patient denies any chest pain.

## 2021-04-01 NOTE — SIGNIFICANT NOTE
04/01/21 1455   PICC Triple Lumen 04/01/21 Right Basilic   Placement Date/Time: 04/01/21 1453   Hand Hygiene Completed: Yes  Size (Fr): 5  Description (optional): Lot#[id number] EXP:03-  Length (cm): 36 cm  Orientation: Right  Location: Basilic  Site Prep: Chlorhexidine  All 5 Sterile Barriers Used (Glov...   Site Assessment Clean;Dry;Intact   #1 Lumen Status Blood return noted;Capped;Flushed;Normal saline locked   #2 Lumen Status Blood return noted;Capped;Flushed;Normal saline locked   #3 Lumen Status Blood return noted;Capped;Flushed;Normal saline locked   Length ange (cm) 36 cm   Extremity Circumference (cm) 45 cm   Dressing Type Border Dressing;Transparent;Securing device;Antimicrobial dressing/disc   Dressing Status Clean;Dry;Intact   Dressing Intervention New dressing   Liquid Adhesive Applied   Dressing Change Due 04/08/21   Pending Xray     PICC tip at the [facility] [place].  Primary nurse notified.

## 2021-04-01 NOTE — PROGRESS NOTES
Pharmacy Consult: Warfarin Dosing/ Monitoring    Jalen Lockwood is a 71 y.o. male, estimated creatinine clearance is 123.2 mL/min (by C-G formula based on SCr of 0.86 mg/dL). weighing (!) 142 kg (313 lb 15 oz).     has a past medical history of Allergic rhinitis, Anxiety, Aortectasia (CMS/HCC), Arthritis, Atrial flutter (CMS/HCC) (2010), Charcot's joint of foot, Chronic edema, Chronic venous insufficiency, COPD (chronic obstructive pulmonary disease) (CMS/HCC), Coronary atherosclerosis, Diverticulosis, Duodenitis, Fatty liver, Gastritis, Gastroparesis, Hematoma, Hyperlipidemia, Hypertension, Insomnia, Internal hemorrhoids, Open wound, Osteomyelitis (CMS/HCC), Paroxysmal atrial fibrillation (CMS/HCC), Peripheral neuropathy, Popliteal artery aneurysm (CMS/HCC), Skin cancer, Sleep apnea, 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CMS/HCC), Venous stasis, and Venous stasis ulcer (CMS/[facility]).    Social History     Tobacco Use    Smoking status: Current Every Day Smoker     Packs/day: 0.25     Years: 45.00     Pack years: 11.25     Types: Cigarettes    Smokeless tobacco: Never Used   Vaping Use    Vaping Use: Never used   Substance Use Topics    Alcohol use: Yes     Alcohol/week: 8.0 - 9.0 standard drinks     Types: 1 - 2 Shots of liquor, 7 Standard drinks or equivalent per week     Comment: 2 rum a day//Caffeine use: 3 cups daily    Drug use: No       Results from last 7 days   Lab Units 04/01/21  1325 04/01/21  0346 03/31/21  0518 03/30/21  1207   INR  1.66*  --   --  2.12*   APTT seconds  --   --   --  54.7*   HEMOGLOBIN g/dL  --  12.3* 13.4 15.5   HEMATOCRIT %  --  34.3* 36.8* 44.2   PLATELETS 10*3/mm3  --  122* 159 226     Results from last 7 days   Lab Units 04/01/21  0346 03/31/21  0644 03/30/21  1504 03/30/21  1207   SODIUM mmol/L 135* 133* 132* 129*   POTASSIUM mmol/L 3.5 3.6 3.4* 4.4   CHLORIDE mmol/L 103 100 97* 92*   CO2 mmol/L 22.8 21.1* 24.3 23.8   BUN mg/dL 13 12 10 9   CREATININE mg/dL 0.86 0.92 1.03 1.11    CALCIUM mg/dL 8.2* 8.6 8.8 10.3   BILIRUBIN mg/dL  --   --   --  1.6*   ALK PHOS U/L  --   --   --  96   ALT (SGPT) U/L  --   --   --  12   AST (SGOT) U/L  --   --   --  18   GLUCOSE mg/dL 117* 119* 85 110*     Anticoagulation history: managed by [facility] anti-co[facility]. 5 mg M, F; 7.5 mg other days    Hospital Anticoagulation:  Consulting provider: Dr. Brad Lepe  Start date: pta (inpt 4/1)  Indication: atrial fibrillation  Target INR: 2-3  Expected duration: lifetime   Bridge Therapy: No                Date 4/1            INR 1.66            Warfarin dose 7.5              Potential drug interactions:   Aspirin-new med-increased risk of bleeding  Piperacillin/tazobactam-increased risk of bleeding  Vancomycin-increased risk of bleeding    Relevant nutrition status: regular diet      Education complete?/ Date: yes on 4/1/21    Assessment/Plan:   Will order daily doses depending on INR and using home regimen as a guide.  Will start with 7.5 mg today.      Pharmacy will continue to follow until discharge or discontinuation of warfarin.   Arden Carrillo [facility]  4/1/2021

## 2021-04-01 NOTE — PROGRESS NOTES
"  Daily Progress Note.   [facility] INTENSIVE CARE  4/1/2021    Patient:  Name:  Jalen Lockwood  MRN:  [id number]  1950  71 y.o.  male         CC: altered mental status fever    Interval History/ROS:  Admitted for sepsis with altered mental status with history of abd pain and worsening dyspnea.  Significant fever upon admission.   bp robust yesterday, declined slightly overnight. Maps still above 65  Echo with reduced ef, wall motion abn.  Some cough, some sputum. No hemoptysis  Mental status improved.     no fever, no diarrhea, no chest pain  PMFSSH: no change    Physical Exam:  BP 96/74   Pulse 86   Temp 97.7 °F (36.5 °C)   Resp 17   Ht 193 cm (76\")   Wt (!) 142 kg (313 lb 15 oz)   SpO2 96%   BMI 38.21 kg/m²   Body mass index is 38.21 kg/m².    Intake/Output Summary (Last 24 hours) at 4/1/2021 0948  Last data filed at 4/1/2021 0000  Gross per 24 hour   Intake 970 ml   Output 675 ml   Net 295 ml     General appearance: awake alert,  conversant   Eyes: anicteric sclerae, moist conjunctivae; no lid-lag; PERRLA  HENT: Atraumatic; oropharynx clear with moist mucous membranes and no mucosal ulcerations; normal hard and soft palate  Neck: Trachea midline; FROM, supple,   Lungs: +int rhonchi, + exp wheeze, with normal respiratory effort and no intercostal retractions  CV: irreg irreg no rub  Abdomen: Soft, non-tender;BS+ non rigid  Extremities:+ble edema with color change, wrapped from mid foot to just below knee  Skin: ble wrapped, multiple areas of ecchymosis  Psych: calm affect, alert and oriented to person  Neuro moves all ext, cns 2-12 intact sensation intact    +++evaluated LE with wound care yesterday, foul smell to left foot no gross purulence, ulcers non healing present    Data Review:  Notable Labs:  Results from last 7 days   Lab Units 04/01/21  0346 03/31/21  0518 03/30/21  1207   WBC 10*3/mm3 10.97* 15.46* 19.85*   HEMOGLOBIN g/dL 12.3* 13.4 15.5   PLATELETS 10*3/mm3 122* 159 226 "     Results from last 7 days   Lab Units 04/01/21  0346 03/31/21  0644 03/30/21  1504 03/30/21  1207   SODIUM mmol/L 135* 133* 132* 129*   POTASSIUM mmol/L 3.5 3.6 3.4* 4.4   CHLORIDE mmol/L 103 100 97* 92*   CO2 mmol/L 22.8 21.1* 24.3 23.8   BUN mg/dL 13 12 10 9   CREATININE mg/dL 0.86 0.92 1.03 1.11   GLUCOSE mg/dL 117* 119* 85 110*   CALCIUM mg/dL 8.2* 8.6 8.8 10.3   MAGNESIUM mg/dL  --  2.0  --   --    Estimated Creatinine Clearance: 123.2 mL/min (by C-G formula based on SCr of 0.86 mg/dL).    Results from last 7 days   Lab Units 04/01/21  0346 03/31/21  0518 03/30/21  1504 03/30/21  1207   AST (SGOT) U/L  --   --   --  18   ALT (SGPT) U/L  --   --   --  12   PROCALCITONIN ng/mL  --   --   --  0.42*   LACTATE mmol/L  --   --  2.8* 4.6*   PLATELETS 10*3/mm3 122* 159  --  226       Results from last 7 days   Lab Units 03/30/21  1215   PH, ARTERIAL pH units 7.551*   PCO2, ARTERIAL mm Hg 26.5*   PO2 ART mm Hg 64.6*   HCO3 ART mmol/L 23.3     Trop 0.167  RVp negative with covid negative  Lactic 4.6  procal 0.42    Imaging:  Reviewed chest images personally from past 3 days    tte 3/31/2021:  · The left ventricular cavity is moderately dilated.  · There is akinesis of the entire septum, distal anterior wall, distal inferior wall, and apex  · Left ventricular ejection fraction appears to be 31 - 35%.  · Left ventricular diastolic function was indeterminate.  · The right ventricular cavity is moderate to severely dilated. Normal right ventricular systolic function noted.  · The left atrial cavity is moderately dilated.  · The right atrial cavity is severely dilated.  · Moderate tricuspid valve regurgitation is present.  · Calculated right ventricular systolic pressure from tricuspid regurgitation is 40 mmHg.  · The IVC is grossly dilated, measuring over 3 cm  · There is no evidence of pericardial effusion    Scheduled meds:    aspirin, 81 mg, Oral, Daily  budesonide-formoterol, 2 puff, Inhalation, BID - RT  finasteride,  5 mg, Oral, Daily  gabapentin, 300 mg, Oral, Q8H  insulin lispro, 0-14 Units, Subcutaneous, 4x Daily With Meals & Nightly  ipratropium-albuterol, 3 mL, Nebulization, 4x Daily - RT  piperacillin-tazobactam, 4.5 g, Intravenous, Q8H  vancomycin, 1,250 mg, Intravenous, Q12H        ASSESSMENT  /  PLAN:  Severe sepsis  Hypotension  UTI  Altered mental status/metabolic encephalopathy  Respiratory alkalosis  Hyponatremia  Lactic acidosis  Elevated procalcitonin  Abdominal pain  Atrial fibrillation with RVR  COPD: Currently without exacerbation  Chronic venous insufficiency  History of gastroparesis  History of MRSA soft tissue skin infection  Sleep apnea  Current daily smoker  Daily alcohol use  CAD  NSTEMI  Acute systolic heart failure with regional wall motion abn     Scheduled duonebs    Cont vanc/zosyn follow cultures -follow micro, suspect skin as likely source    Neuro consult - d/w neurologist today, mental status back to normal as infection being treated.    NStemi - d/w Dr Ngo, tight lad regional wall motion abn, medical mgmt, regional wall motion abns, will hold off on metoprolol given hypotension and stop ivfs to reduce chances of heart failure with newly reduced ef.  Pain control - chronically takes these, not new or newly increased per pt wife.    If maintains off vasopressors can go to floor later today - too soft at present will watch closely.        Brad Lepe MD  [facility]  04/01/21  [id number]

## 2021-04-01 NOTE — CONSULTS
Neurology Consult Note    Consult Date: 4/1/2021    Referring MD: Braxton Chapman MD, Dr. Morrow    Reason for Consult I have been asked to see the patient in neurological consultation to render advice and opinion regarding altered mentation with which he presented on March 30, 2021.    Jalen Lockwood is a 71 y.o. right-handed white male who lives at home with his wife and was recently found to be septic and presented to the emergency room on March 30, 2021 with acute sepsis.  He was found to be talking out of his head at that time.  Since then he has been in the intensive care unit and has been medically stabilized treated aggressively with antibiotics.    When I came to see him he was sleeping but he was easily arousable.  He was able to tell me the correct time from the wall clock and was also able to tell me the month, the year and the name of the president.  He was also able to recognize common pictures and a complex picture seen and interpreted without problems.  He was also able to read aloud, and words and phrases without problems and able to repeat sentences after me without any issues.  He followed one-step, two-step and three-step commands without any issues.    Past Medical/Surgical Hx:  Past Medical History:   Diagnosis Date   • Allergic rhinitis    • Anxiety    • Aortectasia (CMS/HCC)     3cm infrarenal abdominal aorta   • Arthritis    • Atrial flutter (CMS/HCC) 2010    s/p ablation    • Charcot's joint of foot    • Chronic edema     both legs and sees wound care center at [place]    • Chronic venous insufficiency    • COPD (chronic obstructive pulmonary disease) (CMS/HCC)    • Coronary atherosclerosis     Cath 2010: diffuse 40-50% disease   • Diverticulosis    • Duodenitis    • Fatty liver    • Gastritis    • Gastroparesis    • Hematoma     post-operative; After catheterization, right groin, required surgical exploration   • Hyperlipidemia    • Hypertension    • Insomnia    • Internal hemorrhoids     • Open wound     izzy legs has teddy chg weekly at wound care center at [place]  pt does second dressing on left leg another time during week   • Osteomyelitis (CMS/HCC)    • Paroxysmal atrial fibrillation (CMS/HCC)    • Peripheral neuropathy    • Popliteal artery aneurysm (CMS/HCC)     left, s/p stenting by Dr. Boston   • Skin cancer    • Sleep apnea     o2   • 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CMS/HCC)     due to flutter and afib; cath 2010 with nonobstructive disease   • Venous stasis    • Venous stasis ulcer (CMS/HCC)     bilateral legs      Past Surgical History:   Procedure Laterality Date   • CARDIAC CATHETERIZATION     • CATARACT EXTRACTION     • COLONOSCOPY  09/28/2015    NBIH, diverticulosis, polyps   • COLONOSCOPY N/A 9/11/2018    Procedure: COLONOSCOPY TO CECUM  AND TERM. ILEUM WITH COLD SNARE POLYPECTOMIES;  Surgeon: Kane Lagunas MD;  Location: [facility] ENDOSCOPY;  Service: Gastroenterology   • COLONOSCOPY N/A 10/29/2019    Procedure: COLONOSCOPY TO TO CECUM AND TERMINAL ILEUM WITH HOT AND COLD SNARE POLYPECTOMIES;  Surgeon: Kane Lagunas MD;  Location: [facility] ENDOSCOPY;  Service: Gastroenterology   • HIP ARTHROPLASTY Right 2017   • JOINT REPLACEMENT Left    • OTHER SURGICAL HISTORY      Catheter ablation atrial flutter   • REPAIR ANEURYSM / PSEUDO ANEURYSM / RUPTURED ANEURYSM POPLITEAL ARTERY      Stent-Graft of the the left popliteal artery   • REPAIR KNEE LIGAMENT      Primary repair of knee ligament cruciate anterior right   • TONSILLECTOMY  1958   • TOTAL KNEE ARTHROPLASTY Bilateral    • UPPER GASTROINTESTINAL ENDOSCOPY  09/16/2014    acute gastritis, acute duodenitis       Medications On Admission  Medications Prior to Admission   Medication Sig Dispense Refill Last Dose   • albuterol sulfate HFA (Ventolin HFA) 108 (90 Base) MCG/ACT inhaler Inhale 2 puffs Every 4 (Four) Hours As Needed for Wheezing. 18 g 3 3/30/2021 at Unknown time   • ALPRAZolam (XANAX) 0.5 MG tablet Take 1 tablet by  mouth At Night As Needed for Anxiety for up to 9 days. 3 tablet 0 3/30/2021 at Unknown time   • Atrovent HFA 17 MCG/ACT inhaler Inhale 2 puffs 4 (Four) Times a Day. 1 each 3 3/30/2021 at Unknown time   • carvedilol (COREG) 3.125 MG tablet TAKE 1 TABLET BY MOUTH TWICE A DAY WITH MEALS 180 tablet 1 3/30/2021 at Unknown time   • docusate sodium (COLACE) 100 MG capsule Take 100 mg by mouth Daily.   3/30/2021 at Unknown time   • finasteride (PROSCAR) 5 MG tablet Take 1 tablet by mouth daily.   3/30/2021 at Unknown time   • Fluticasone Furoate-Vilanterol (Breo Ellipta) 200-25 MCG/INH inhaler Inhale 1 puff Daily. 1 each 1 3/30/2021 at Unknown time   • furosemide (LASIX) 40 MG tablet Take 40 mg by mouth Daily.   3/30/2021 at Unknown time   • gabapentin (NEURONTIN) 600 MG tablet Take 600 mg by mouth 3 (Three) Times a Day.   3/30/2021 at Unknown time   • HYDROcodone-acetaminophen (NORCO)  MG per tablet 1 tablet Every 6 (Six) Hours As Needed.   3/30/2021 at Unknown time   • lisinopril (PRINIVIL,ZESTRIL) 20 MG tablet Take 2 tablets by mouth Daily. 180 tablet 3 3/30/2021 at Unknown time   • metoclopramide (REGLAN) 10 MG tablet TAKE 1 TABLET BY MOUTH 4 (FOUR) TIMES A DAY BEFORE MEALS & AT BEDTIME. 360 tablet 3 3/29/2021 at Unknown time   • pravastatin (PRAVACHOL) 20 MG tablet Take 1 tablet by mouth Daily. 90 tablet 2 3/29/2021 at Unknown time   • silodosin (RAPAFLO) 8 MG capsule capsule Take 1 capsule by mouth daily. With food.   3/29/2021 at Unknown time   • warfarin (COUMADIN) 5 MG tablet TAKE 1 TABLET ON MON & FRI AND 1.5 TABLETS ON ALL OTHER DAYS OR AS DIRECTED BY MED MANAGEMENT CLINIC 130 tablet 1 3/29/2021 at Unknown time       Allergies:  Allergies   Allergen Reactions   • Cephalexin Hives   • Codeine Nausea Only       Social Hx:  Social History     Socioeconomic History   • Marital status:      Spouse name: Mariposa   • Number of children: Not on file   • Years of education: Not on file   • Highest education  "level: Not on file   Tobacco Use   • Smoking status: Current Every Day Smoker     Packs/day: 0.25     Years: 45.00     Pack years: 11.25     Types: Cigarettes   • Smokeless tobacco: Never Used   Vaping Use   • Vaping Use: Never used   Substance and Sexual Activity   • Alcohol use: Yes     Alcohol/week: 8.0 - 9.0 standard drinks     Types: 1 - 2 Shots of liquor, 7 Standard drinks or equivalent per week     Comment: 2 rum a day//Caffeine use: 3 cups daily   • Drug use: No   • Sexual activity: Defer       Family Hx:  Family History   Problem Relation Age of Onset   • Emphysema Father    • Malig Hyperthermia Neg Hx        Review of systems  Constitutional: No fevers, chills  Eye: No recent visual problems, eye discharge  Respiratory: No shortness of breath, cough  Cardiovascular: No Chest pain, palpitations  Neurologic: No weakness, numbness  Psychiatric: No anxiety, depression    All other systems reviewed and are negative    Exam    BP 96/74   Pulse 86   Temp 97.7 °F (36.5 °C)   Resp 17   Ht 193 cm (76\")   Wt (!) 142 kg (313 lb 15 oz)   SpO2 96%   BMI 38.21 kg/m²   gen: NAD, vitals reviewed  Eyes: fundus sharp with no papilledema or retinal hemorrhages  HEENT: no nuchal rigidity  CVS: RRR, S1, S2  MS: oriented x3, recent/remote memory intact, normal attention/concentration, language intact, no neglect, normal fund of knowledge  CN: visual acuity grossly normal, visual fields full, PERRL, EOMI, facial sensation equal, no facial droop, hearing symmetric, palate elevates symmetrically, shoulder shrug equal, tongue midline  Motor: 5/5 throughout upper and lower extremities, normal tone  Sensation: intact to vibration and temperature throughout  Reflexes: 2+ throughout upper and lower extremities, downgoing plantars  Coordination: no dysmetria with finger to nose bilaterally  Gait: no ataxia, normal station    DATA:    Lab Results   Component Value Date    GLUCOSE 117 (H) 04/01/2021    CALCIUM 8.2 (L) 04/01/2021    "  (L) 04/01/2021    K 3.5 04/01/2021    CO2 22.8 04/01/2021     04/01/2021    BUN 13 04/01/2021    CREATININE 0.86 04/01/2021    EGFRIFAFRI 102 11/20/2018    EGFRIFNONA 88 04/01/2021    BCR 15.1 04/01/2021    ANIONGAP 9.2 04/01/2021     Lab Results   Component Value Date    WBC 10.97 (H) 04/01/2021    HGB 12.3 (L) 04/01/2021    HCT 34.3 (L) 04/01/2021    MCV 97.4 (H) 04/01/2021     (L) 04/01/2021     Lab Results   Component Value Date    LDL 63 03/03/2021    LDL 55 11/20/2018    LDL 64 02/23/2018     Lab Results   Component Value Date    HGBA1C 5.00 03/30/2021     Lab Results   Component Value Date    INR 2.12 (H) 03/30/2021    INR 2.8 (H) 03/03/2021    INR 2.9 (H) 02/04/2021    PROTIME 23.5 (H) 03/30/2021    PROTIME 33.3 (H) 03/03/2021    PROTIME 34.8 (H) 02/04/2021       Lab review: Reviewed the labs and he is well anticoagulated with warfarin.    Imaging review: He did not have any recent neuroimaging.  However as per the intensivist he had an outside CT of the brain that was unremarkable.    Diagnoses:  Altered mentation due to underlying medical sepsis which has resolved and the patient is completely back to baseline.    Comment: Discussed with the patient that he is being aggressively treated medically and I do not foresee any need for further neurological work-up including MRI, EEG or spinal fluid analysis.    PLAN: Continue aggressive management for underlying sepsis and no further neurological work-up is needed at this point.  However if his condition deteriorates or worsens please feel free to reconsult.    Recommendations discussed with Dr. Brad Morrow, the intensivist and I spent 60 minutes in face-to-face evaluation and management of the patient and will be signing off at this point.

## 2021-04-02 LAB
ANION GAP SERPL CALCULATED.3IONS-SCNC: 10.4 MMOL/L (ref 5–15)
BASOPHILS # BLD AUTO: 0.05 10*3/MM3 (ref 0–0.2)
BASOPHILS NFR BLD AUTO: 0.7 % (ref 0–1.5)
BUN SERPL-MCNC: 11 MG/DL (ref 8–23)
BUN/CREAT SERPL: 13.6 (ref 7–25)
CALCIUM SPEC-SCNC: 8.1 MG/DL (ref 8.6–10.5)
CHLORIDE SERPL-SCNC: 104 MMOL/L (ref 98–107)
CO2 SERPL-SCNC: 20.6 MMOL/L (ref 22–29)
CREAT SERPL-MCNC: 0.81 MG/DL (ref 0.76–1.27)
DEPRECATED RDW RBC AUTO: 49.5 FL (ref 37–54)
EOSINOPHIL # BLD AUTO: 0.15 10*3/MM3 (ref 0–0.4)
EOSINOPHIL NFR BLD AUTO: 2 % (ref 0.3–6.2)
ERYTHROCYTE [DISTWIDTH] IN BLOOD BY AUTOMATED COUNT: 13.5 % (ref 12.3–15.4)
GFR SERPL CREATININE-BSD FRML MDRD: 94 ML/MIN/1.73
GLUCOSE BLDC GLUCOMTR-MCNC: 100 MG/DL (ref 70–130)
GLUCOSE BLDC GLUCOMTR-MCNC: 114 MG/DL (ref 70–130)
GLUCOSE BLDC GLUCOMTR-MCNC: 117 MG/DL (ref 70–130)
GLUCOSE BLDC GLUCOMTR-MCNC: 196 MG/DL (ref 70–130)
GLUCOSE SERPL-MCNC: 105 MG/DL (ref 65–99)
HCT VFR BLD AUTO: 35.3 % (ref 37.5–51)
HGB BLD-MCNC: 12.3 G/DL (ref 13–17.7)
IMM GRANULOCYTES # BLD AUTO: 0.03 10*3/MM3 (ref 0–0.05)
IMM GRANULOCYTES NFR BLD AUTO: 0.4 % (ref 0–0.5)
INR PPP: 1.6 (ref 0.9–1.1)
LYMPHOCYTES # BLD AUTO: 1.32 10*3/MM3 (ref 0.7–3.1)
LYMPHOCYTES NFR BLD AUTO: 17.2 % (ref 19.6–45.3)
MCH RBC QN AUTO: 34.8 PG (ref 26.6–33)
MCHC RBC AUTO-ENTMCNC: 34.8 G/DL (ref 31.5–35.7)
MCV RBC AUTO: 100 FL (ref 79–97)
MONOCYTES # BLD AUTO: 0.4 10*3/MM3 (ref 0.1–0.9)
MONOCYTES NFR BLD AUTO: 5.2 % (ref 5–12)
NEUTROPHILS NFR BLD AUTO: 5.71 10*3/MM3 (ref 1.7–7)
NEUTROPHILS NFR BLD AUTO: 74.5 % (ref 42.7–76)
NRBC BLD AUTO-RTO: 0 /100 WBC (ref 0–0.2)
PLATELET # BLD AUTO: 124 10*3/MM3 (ref 140–450)
PMV BLD AUTO: 10.7 FL (ref 6–12)
POTASSIUM SERPL-SCNC: 4 MMOL/L (ref 3.5–5.2)
PROTHROMBIN TIME: 18.8 SECONDS (ref 11.7–14.2)
QT INTERVAL: 493 MS
RBC # BLD AUTO: 3.53 10*6/MM3 (ref 4.14–5.8)
SODIUM SERPL-SCNC: 135 MMOL/L (ref 136–145)
TROPONIN T SERPL-MCNC: 0.23 NG/ML (ref 0–0.03)
WBC # BLD AUTO: 7.66 10*3/MM3 (ref 3.4–10.8)

## 2021-04-02 PROCEDURE — 80048 BASIC METABOLIC PNL TOTAL CA: CPT | Performed by: INTERNAL MEDICINE

## 2021-04-02 PROCEDURE — 94760 N-INVAS EAR/PLS OXIMETRY 1: CPT

## 2021-04-02 PROCEDURE — 25010000002 ENOXAPARIN PER 10 MG: Performed by: INTERNAL MEDICINE

## 2021-04-02 PROCEDURE — 63710000001 INSULIN LISPRO (HUMAN) PER 5 UNITS: Performed by: INTERNAL MEDICINE

## 2021-04-02 PROCEDURE — 94799 UNLISTED PULMONARY SVC/PX: CPT

## 2021-04-02 PROCEDURE — 93005 ELECTROCARDIOGRAM TRACING: CPT | Performed by: INTERNAL MEDICINE

## 2021-04-02 PROCEDURE — 25010000002 VANCOMYCIN 10 G RECONSTITUTED SOLUTION: Performed by: INTERNAL MEDICINE

## 2021-04-02 PROCEDURE — 25010000002 PIPERACILLIN SOD-TAZOBACTAM PER 1 G: Performed by: INTERNAL MEDICINE

## 2021-04-02 PROCEDURE — 82962 GLUCOSE BLOOD TEST: CPT

## 2021-04-02 PROCEDURE — 84484 ASSAY OF TROPONIN QUANT: CPT | Performed by: INTERNAL MEDICINE

## 2021-04-02 PROCEDURE — 99233 SBSQ HOSP IP/OBS HIGH 50: CPT | Performed by: INTERNAL MEDICINE

## 2021-04-02 PROCEDURE — 85025 COMPLETE CBC W/AUTO DIFF WBC: CPT | Performed by: INTERNAL MEDICINE

## 2021-04-02 PROCEDURE — 93010 ELECTROCARDIOGRAM REPORT: CPT | Performed by: INTERNAL MEDICINE

## 2021-04-02 PROCEDURE — 85610 PROTHROMBIN TIME: CPT | Performed by: INTERNAL MEDICINE

## 2021-04-02 PROCEDURE — 97110 THERAPEUTIC EXERCISES: CPT

## 2021-04-02 PROCEDURE — 97162 PT EVAL MOD COMPLEX 30 MIN: CPT

## 2021-04-02 RX ORDER — WARFARIN SODIUM 7.5 MG/1
7.5 TABLET ORAL
Status: COMPLETED | OUTPATIENT
Start: 2021-04-02 | End: 2021-04-02

## 2021-04-02 RX ORDER — METOPROLOL SUCCINATE 25 MG/1
12.5 TABLET, EXTENDED RELEASE ORAL
Status: DISCONTINUED | OUTPATIENT
Start: 2021-04-02 | End: 2021-04-04

## 2021-04-02 RX ORDER — NYSTATIN 100000 [USP'U]/G
POWDER TOPICAL EVERY 12 HOURS SCHEDULED
Status: DISCONTINUED | OUTPATIENT
Start: 2021-04-02 | End: 2021-04-13 | Stop reason: HOSPADM

## 2021-04-02 RX ADMIN — ATORVASTATIN CALCIUM 40 MG: 20 TABLET, FILM COATED ORAL at 20:51

## 2021-04-02 RX ADMIN — GABAPENTIN 300 MG: 300 CAPSULE ORAL at 18:03

## 2021-04-02 RX ADMIN — HYDROCODONE BITARTRATE AND ACETAMINOPHEN 2 TABLET: 5; 325 TABLET ORAL at 12:22

## 2021-04-02 RX ADMIN — ENOXAPARIN SODIUM 140 MG: 150 INJECTION SUBCUTANEOUS at 12:14

## 2021-04-02 RX ADMIN — SODIUM CHLORIDE, PRESERVATIVE FREE 10 ML: 5 INJECTION INTRAVENOUS at 09:27

## 2021-04-02 RX ADMIN — IPRATROPIUM BROMIDE AND ALBUTEROL SULFATE 3 ML: 2.5; .5 SOLUTION RESPIRATORY (INHALATION) at 20:03

## 2021-04-02 RX ADMIN — TAZOBACTAM SODIUM AND PIPERACILLIN SODIUM 4.5 G: 500; 4 INJECTION, SOLUTION INTRAVENOUS at 09:26

## 2021-04-02 RX ADMIN — HYDROCODONE BITARTRATE AND ACETAMINOPHEN 2 TABLET: 5; 325 TABLET ORAL at 18:48

## 2021-04-02 RX ADMIN — IPRATROPIUM BROMIDE AND ALBUTEROL SULFATE 3 ML: 2.5; .5 SOLUTION RESPIRATORY (INHALATION) at 16:40

## 2021-04-02 RX ADMIN — IPRATROPIUM BROMIDE AND ALBUTEROL SULFATE 3 ML: 2.5; .5 SOLUTION RESPIRATORY (INHALATION) at 09:14

## 2021-04-02 RX ADMIN — VANCOMYCIN HYDROCHLORIDE 1250 MG: 10 INJECTION, POWDER, LYOPHILIZED, FOR SOLUTION INTRAVENOUS at 14:32

## 2021-04-02 RX ADMIN — GABAPENTIN 300 MG: 300 CAPSULE ORAL at 05:54

## 2021-04-02 RX ADMIN — TAZOBACTAM SODIUM AND PIPERACILLIN SODIUM 4.5 G: 500; 4 INJECTION, SOLUTION INTRAVENOUS at 02:14

## 2021-04-02 RX ADMIN — IPRATROPIUM BROMIDE AND ALBUTEROL SULFATE 3 ML: 2.5; .5 SOLUTION RESPIRATORY (INHALATION) at 12:24

## 2021-04-02 RX ADMIN — BUDESONIDE AND FORMOTEROL FUMARATE DIHYDRATE 2 PUFF: 160; 4.5 AEROSOL RESPIRATORY (INHALATION) at 23:54

## 2021-04-02 RX ADMIN — VANCOMYCIN HYDROCHLORIDE 1250 MG: 10 INJECTION, POWDER, LYOPHILIZED, FOR SOLUTION INTRAVENOUS at 02:14

## 2021-04-02 RX ADMIN — TAZOBACTAM SODIUM AND PIPERACILLIN SODIUM 4.5 G: 500; 4 INJECTION, SOLUTION INTRAVENOUS at 18:03

## 2021-04-02 RX ADMIN — WARFARIN 7.5 MG: 7.5 TABLET ORAL at 18:03

## 2021-04-02 RX ADMIN — ASPIRIN 81 MG: 81 TABLET, COATED ORAL at 09:26

## 2021-04-02 RX ADMIN — SODIUM CHLORIDE, PRESERVATIVE FREE 10 ML: 5 INJECTION INTRAVENOUS at 09:28

## 2021-04-02 RX ADMIN — IPRATROPIUM BROMIDE AND ALBUTEROL SULFATE 3 ML: 2.5; .5 SOLUTION RESPIRATORY (INHALATION) at 02:13

## 2021-04-02 RX ADMIN — BUDESONIDE AND FORMOTEROL FUMARATE DIHYDRATE 2 PUFF: 160; 4.5 AEROSOL RESPIRATORY (INHALATION) at 09:16

## 2021-04-02 RX ADMIN — HYDROCODONE BITARTRATE AND ACETAMINOPHEN 2 TABLET: 5; 325 TABLET ORAL at 05:57

## 2021-04-02 RX ADMIN — INSULIN LISPRO 3 UNITS: 100 INJECTION, SOLUTION INTRAVENOUS; SUBCUTANEOUS at 12:15

## 2021-04-02 RX ADMIN — FINASTERIDE 5 MG: 5 TABLET, FILM COATED ORAL at 09:26

## 2021-04-02 NOTE — PROGRESS NOTES
"Pharmacy Consult - Lovenox    Jalen JUAN Lockwood has been consulted for pharmacy to dose enoxaparin for a fib requiring full anti-coagulation per Dr. Ngo's request.     -INR is subtherapeutic, warfarin restarted 4/1    Relevant clinical data and objective history reviewed:  71 y.o. male 193 cm (76\") (!) 142 kg (313 lb 15 oz) Body mass index is 38.21 kg/m².    Home Anticoagulation: warfarin    Past Medical History:   Diagnosis Date   • Allergic rhinitis    • Anxiety    • Aortectasia (CMS/HCC)     3cm infrarenal abdominal aorta   • Arthritis    • Atrial flutter (CMS/HCC) 2010    s/p ablation    • Charcot's joint of foot    • Chronic edema     both legs and sees wound care center at [place]    • Chronic venous insufficiency    • COPD (chronic obstructive pulmonary disease) (CMS/HCC)    • Coronary atherosclerosis     Cath 2010: diffuse 40-50% disease   • Diverticulosis    • Duodenitis    • Fatty liver    • Gastritis    • Gastroparesis    • Hematoma     post-operative; After catheterization, right groin, required surgical exploration   • Hyperlipidemia    • Hypertension    • Insomnia    • Internal hemorrhoids    • Open wound     izzy legs has drsg chg weekly at wound care center at [place]  pt does second dressing on left leg another time during week   • Osteomyelitis (CMS/HCC)    • Paroxysmal atrial fibrillation (CMS/HCC)    • Peripheral neuropathy    • Popliteal artery aneurysm (CMS/HCC)     left, s/p stenting by Dr. Boston   • Skin cancer    • Sleep apnea     o2   • 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CMS/HCC)     due to flutter and afib; cath 2010 with nonobstructive disease   • Venous stasis    • Venous stasis ulcer (CMS/HCC)     bilateral legs      is allergic to cephalexin and codeine.    Lab Results   Component Value Date    WBC 7.66 04/02/2021    HGB 12.3 (L) 04/02/2021    HCT 35.3 (L) 04/02/2021    .0 (H) 04/02/2021     (L) 04/02/2021     Lab Results   Component Value Date    GLUCOSE 105 (H) " 04/02/2021    CALCIUM 8.1 (L) 04/02/2021     (L) 04/02/2021    K 4.0 04/02/2021    CO2 20.6 (L) 04/02/2021     04/02/2021    BUN 11 04/02/2021    CREATININE 0.81 04/02/2021    EGFRIFAFRI 102 11/20/2018    EGFRIFNONA 94 04/02/2021    BCR 13.6 04/02/2021    ANIONGAP 10.4 04/02/2021       Serum creatinine: 0.81 mg/dL 04/02/21 0327  Estimated creatinine clearance: 130.8 mL/min    Active Inpatient Anticoagulation Orders: warfarin dosed daily by pharmacy    Assessment/Plan    Will start patient on 140 mg (1 mg/kg) subcutaneous every 12 hours, adjusted for renal function.   Pharmacy will continue to follow.     Calderon Carrillo Pharm.D, BCCCP

## 2021-04-02 NOTE — PROGRESS NOTES
LOS: 3 days   Patient Care Team:  Marc Ludwig MD as PCP - General (Family Medicine)  Blas Mckeon MD as Surgeon (General Surgery)  Jonnathan Boston II, MD as Consulting Physician (Vascular Surgery)  Xavi Elliott MD as Consulting Physician (Urology)  Marc Benavidez MD as Consulting Physician (Pain Medicine)  Kurt Henry MD as Consulting Physician (Cardiology)  [facility] as Pharmacist  Juni Landa MD as Consulting Physician (Hematology and Oncology)  [facility] as Pharmacist (Pharmacy)    Chief Complaint: Follow-up for NSTEMI, persistent atrial fibrillation, new cardiomyopathy.    Interval History: He is still having pain in his lower extremities as his only complaint.  He has not had any chest pain or shortness of breath.    Vital Signs:  Temp:  [97.4 °F (36.3 °C)-98.4 °F (36.9 °C)] 98.4 °F (36.9 °C)  Heart Rate:  [58-87] 61  Resp:  [14-18] 14  BP: ()/(56-94) 108/68    Intake/Output Summary (Last 24 hours) at 4/2/2021 1008  Last data filed at 4/2/2021 0430  Gross per 24 hour   Intake 4907 ml   Output 600 ml   Net 4307 ml       Physical Exam:   General Appearance:    No acute distress, alert and oriented x4   Lungs:     Bilateral rhonchi and coarse breath sounds    Heart:    Irregularly irregular rhythm and normal rate.  No murmurs, gallops, or rubs.   Abdomen:     Soft, nontender, nondistended.    Extremities:   Wraps noted bilaterally with multiple areas of ecchymoses     Results Review:    Results from last 7 days   Lab Units 04/02/21  0327   SODIUM mmol/L 135*   POTASSIUM mmol/L 4.0   CHLORIDE mmol/L 104   CO2 mmol/L 20.6*   BUN mg/dL 11   CREATININE mg/dL 0.81   GLUCOSE mg/dL 105*   CALCIUM mg/dL 8.1*     Results from last 7 days   Lab Units 04/02/21  0327 04/01/21  0346 03/31/21  0644   TROPONIN T ng/mL 0.233* 0.333* 0.254*     Results from last 7 days   Lab Units 04/02/21  0327   WBC 10*3/mm3 7.66   HEMOGLOBIN g/dL 12.3*   HEMATOCRIT % 35.3*   PLATELETS  10*3/mm3 124*     Results from last 7 days   Lab Units 04/02/21  0327 04/01/21  1325 03/30/21  1207   INR  1.60* 1.66* 2.12*   APTT seconds  --   --  54.7*         Results from last 7 days   Lab Units 03/31/21  0644   MAGNESIUM mg/dL 2.0           I reviewed the patient's new clinical results.        Assessment:  1.  Severe sepsis, likely lower extremity wounds as source  2.  Urinary tract infection  3.  Metabolic encephalopathy, resolved  4.  NSTEMI with history of moderate diffuse coronary artery disease  5.  New cardiomyopathy, likely ischemic (EF 30 to 35%)  6.  Moderate to severe right ventricular dilatation  7.  Chronic edema, multifactorial and partially from venous stasis  8.  Bilateral lower extremity wounds and recurrent cellulitis  9.  Persistent atrial fibrillation  10.  Left popliteal artery aneurysm, status post stenting in 2016  11.  Ongoing tobacco and alcohol use    Plan:  -Troponin now is trending downward.    -Blood pressure is better today.  I am going to add low-dose Toprol-XL.    -His cardiomyopathy is likely ischemic and follows in the LAD distribution.  I suspect that he has significant coronary artery disease in the LAD, and this, coupled with the stress of his current situation caused the NSTEMI.  However, he has multiple issues, including significant lower extremity wounds and bleeding, as well as the need for systemic anticoagulation with warfarin.  He would be a poor candidate for dual antiplatelet therapy if stenting were needed, and I feel bleeding complications would be likely.  Continue medical treatment for now.    -Added Lipitor 40 mg/day.  Continue aspirin 81 mg/day.      -Atrial fibrillation rate is controlled.  Continue warfarin.  His INR was subtherapeutic again at 1.6.  I will add Lovenox until therapeutic.    Hilario Ngo MD  04/02/21  10:08 EDT

## 2021-04-02 NOTE — NURSING NOTE
[facility] dept - patient goes to [facility] on Mondays. Patient reports no wounds to RLE and this leg gets wrapped weekly with 4 layer compression every Monday at [facility]. LLE gets 4 layer compression M-W-F (Monday at [facility] and Wed and Fri by  nurse). Betadine gauze placed between R toes daily and betadine gauze and opticel AG placed between L toes daily.  Today, wrap removed from LLE, skin cleansed with foam soap, lotion applied to intact skin, opticel AG applied to medial ankle moist area but there is not a visible wound, and opticel AG applied to dorsal foot/toes, betadine painted to L toes and between toes and opticel AG placed between toes. No obvious wounds to toes but they are moist.   [facility] dept to see Monday to change 4 layer wraps. Treatment to toes to be done daily by staff nurses.

## 2021-04-02 NOTE — THERAPY EVALUATION
Patient Name: Jalen Lockwood  : 1950    MRN: [id number]                              Today's Date: 2021       Admit Date: 3/30/2021    Visit Dx:     ICD-10-CM ICD-9-CM   1. Severe sepsis (CMS/HCC)  A41.9 038.9    R65.20 995.92   2. Acute UTI  N39.0 599.0   3. Acute abdominal pain  R10.9 789.00     338.19   4. Acute respiratory alkalosis  E87.3 276.3   5. Hyponatremia  E87.1 276.1   6. Atrial fibrillation with RVR (CMS/HCC)  I48.91 427.31   7. Fever in adult  R50.9 780.60   8. Acute metabolic encephalopathy  G93.41 348.31     Patient Active Problem List   Diagnosis   • Chronic osteomyelitis (CMS/HCC)   • Foot pain   • Peripheral neuropathy   • Venous stasis   • Permanent atrial fibrillation (CMS/HCC)   • Sleep apnea   • Chronic edema   • Class 2 severe obesity due to excess calories with serious comorbidity and body mass index (BMI) of 36.0 to 36.9 in adult (CMS/HCC)   • COPD (chronic obstructive pulmonary disease) (CMS/HCC)   • Nonocclusive coronary atherosclerosis of native coronary artery   • Atrial flutter (CMS/HCC)   • 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CMS/HCC)   • Aortectasia (CMS/HCC)   • Popliteal artery aneurysm (CMS/HCC)   • Colon polyps   • Gastroparesis   • Insomnia   • Adenomatous polyp of colon   • Essential hypertension   • ETOH abuse   • Chronic anticoagulation   • Oropharyngeal dysphagia   • Tobacco abuse   • Thyromegaly   • Adrenal adenoma, left   • Retroperitoneal lymphadenopathy   • Anemia of chronic disease   • Thyroid nodule   • Charcot's joint of foot   • Abnormal CT scan, lumbar spine   • Alcohol dependence, in remission (CMS/HCC)   • Severe sepsis (CMS/HCC)     Past Medical History:   Diagnosis Date   • Allergic rhinitis    • Anxiety    • Aortectasia (CMS/HCC)     3cm infrarenal abdominal aorta   • Arthritis    • Atrial flutter (CMS/HCC) 2010    s/p ablation    • Charcot's joint of foot    • Chronic edema     both legs and sees wound care center at [place]    • Chronic venous  insufficiency    • COPD (chronic obstructive pulmonary disease) (CMS/HCC)    • Coronary atherosclerosis     Cath 2010: diffuse 40-50% disease   • Diverticulosis    • Duodenitis    • Fatty liver    • Gastritis    • Gastroparesis    • Hematoma     post-operative; After catheterization, right groin, required surgical exploration   • Hyperlipidemia    • Hypertension    • Insomnia    • Internal hemorrhoids    • Open wound     izzy legs has drsg chg weekly at wound care center at [place]  pt does second dressing on left leg another time during week   • Osteomyelitis (CMS/HCC)    • Paroxysmal atrial fibrillation (CMS/HCC)    • Peripheral neuropathy    • Popliteal artery aneurysm (CMS/HCC)     left, s/p stenting by Dr. [place]   • Skin cancer    • Sleep apnea     o2   • 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CMS/HCC)     due to flutter and afib; cath 2010 with nonobstructive disease   • Venous stasis    • Venous stasis ulcer (CMS/HCC)     bilateral legs      Past Surgical History:   Procedure Laterality Date   • CARDIAC CATHETERIZATION     • CATARACT EXTRACTION     • COLONOSCOPY  09/28/2015    NBIH, diverticulosis, polyps   • COLONOSCOPY N/A 9/11/2018    Procedure: COLONOSCOPY TO CECUM  AND TERM. ILEUM WITH COLD SNARE POLYPECTOMIES;  Surgeon: Kane Lagunas MD;  Location: [facility] ENDOSCOPY;  Service: Gastroenterology   • COLONOSCOPY N/A 10/29/2019    Procedure: COLONOSCOPY TO TO CECUM AND TERMINAL ILEUM WITH HOT AND COLD SNARE POLYPECTOMIES;  Surgeon: Kane Lagunas MD;  Location: [facility] ENDOSCOPY;  Service: Gastroenterology   • HIP ARTHROPLASTY Right 2017   • JOINT REPLACEMENT Left    • OTHER SURGICAL HISTORY      Catheter ablation atrial flutter   • REPAIR ANEURYSM / PSEUDO ANEURYSM / RUPTURED ANEURYSM POPLITEAL ARTERY      Stent-Graft of the the left popliteal artery   • REPAIR KNEE LIGAMENT      Primary repair of knee ligament cruciate anterior right   • TONSILLECTOMY  1958   • TOTAL KNEE ARTHROPLASTY Bilateral    •  UPPER GASTROINTESTINAL ENDOSCOPY  09/16/2014    acute gastritis, acute duodenitis     General Information     [facility] Name 04/02/21 1441          Physical Therapy Time and Intention    Document Type  evaluation  (Pended)   -ME     Mode of Treatment  individual therapy;physical therapy  (Pended)   -[facility] Name 04/02/21 1441          General Information    Patient Profile Reviewed  yes  (Pended)   -ME     Prior Level of Function  independent:  (Pended)   -ME     Existing Precautions/Restrictions  fall  (Pended)   -ME     Barriers to Rehab  impaired sensation  (Pended)   -[facility] Name 04/02/21 1441          Living Environment    Lives With  spouse  (Pended)   -[facility] Name 04/02/21 1441          Home Main Entrance    Number of Stairs, Main Entrance  three  (Pended)   -ME     Stair Railings, Main Entrance  none  (Pended)   -[facility] Name 04/02/21 1441          Stairs Within Home, Primary    Number of Stairs, Within Home, Primary  none  (Pended)   -ME     Row Name 04/02/21 1441          Cognition    Orientation Status (Cognition)  oriented x 3;person;place;situation  (Pended)   -ME     Row Name 04/02/21 1441          Safety Issues, Functional Mobility    Impairments Affecting Function (Mobility)  balance;endurance/activity tolerance;pain;strength;shortness of breath  (Pended)   -ME       User Key  (r) = Recorded By, (t) = Taken By, (c) = Cosigned By    Initials Name Provider Type    ME Сергей Ryder, PT Student PT Student        Mobility     Row Name 04/02/21 1442          Bed Mobility    Bed Mobility  supine-sit;sit-supine;rolling right;rolling left  (Pended)   -ME     Rolling Left [place] (Bed Mobility)  minimum assist (75% patient effort);1 person assist  (Pended)   -ME     Rolling Right [place] (Bed Mobility)  minimum assist (75% patient effort);1 person assist  (Pended)   -ME     Supine-Sit [place] (Bed Mobility)  1 person assist;minimum assist (75% patient effort)  (Pended)   -ME      Sit-Supine [place] (Bed Mobility)  minimum assist (75% patient effort);2 person assist;moderate assist (50% patient effort)  (Pended)   -ME     Assistive Device (Bed Mobility)  draw sheet;head of bed elevated;bed rails  (Pended)   -[facility] Name 04/02/21 1442          Sit-Stand Transfer    Sit-Stand [place] (Transfers)  contact guard;1 person assist  (Pended)   -ME     Assistive Device (Sit-Stand Transfers)  walker, front-wheeled  (Pended)   -ME     Row Name 04/02/21 1442          Gait/Stairs (Locomotion)    [place] Level (Gait)  contact guard;1 person assist  (Pended)   -ME     Assistive Device (Gait)  walker, front-wheeled  (Pended)   -ME     Distance in Feet (Gait)  40 ft  (Pended)   -ME     Deviations/Abnormal Patterns (Gait)  bilateral deviations;base of support, wide;sowmya decreased;gait speed decreased;stride length decreased;festinating/shuffling  (Pended)   -ME     Bilateral Gait Deviations  --  (Pended)  pt's hips are externally rotated  -ME     Comment (Gait/Stairs)  could use a bariatric rwx for gait, pt is steady on feet but has short stride length.  (Pended)   -ME       User Key  (r) = Recorded By, (t) = Taken By, (c) = Cosigned By    Initials Name Provider Type    ME Сергей Ryder, PT Student PT Student        Obj/Interventions     [facility] Name 04/02/21 1447          Range of Motion Comprehensive    General Range of Motion  no range of motion deficits identified  (Pended)   -ME     Row Name 04/02/21 1447          Strength Comprehensive (MMT)    Comment, General Manual Muscle Testing (MMT) Assessment  no focal strength deficits identified during gait.  (Pended)   -ME     Row Name 04/02/21 1447          Motor Skills    Therapeutic Exercise  --  (Pended)  APs and LAQs  -ME     Row Name 04/02/21 1447          Balance    Balance Assessment  sitting dynamic balance;standing static balance;standing dynamic balance  (Pended)   -ME     Dynamic Sitting Balance  WFL  (Pended)   -ME     Static  Standing Balance  WFL  (Pended)   -ME     Dynamic Standing Balance  mild impairment  (Pended)   -ME     Comment, Balance  pt has neuropathy on feet, sensation loss on both distal LE interrupting balance  (Pended)   -ME     Row Name 04/02/21 1447          Sensory Assessment (Somatosensory)    Sensory Assessment (Somatosensory)  --  (Pended)  Decreased B sensation to distal B LE.  -ME       User Key  (r) = Recorded By, (t) = Taken By, (c) = Cosigned By    Initials Name Provider Type    ME Сергей Ryder, PT Student PT Student        Goals/Plan     [facility] Name 04/02/21 1501          Bed Mobility Goal 1 (PT)    Activity/Assistive Device (Bed Mobility Goal 1, PT)  bed mobility activities, all  (Pended)   -ME     [place] Level/Cues Needed (Bed Mobility Goal 1, PT)  contact guard assist;1 person assist  (Pended)   -ME     Time Frame (Bed Mobility Goal 1, PT)  1 week  (Pended)   -[facility] Name 04/02/21 1501          Transfer Goal 1 (PT)    Activity/Assistive Device (Transfer Goal 1, PT)  transfers, all  (Pended)   -ME     [place] Level/Cues Needed (Transfer Goal 1, PT)  contact guard assist;1 person assist  (Pended)   -ME     Time Frame (Transfer Goal 1, PT)  1 week  (Pended)   -ME     Row Name 04/02/21 1501          Gait Training Goal 1 (PT)    Activity/Assistive Device (Gait Training Goal 1, PT)  gait (walking locomotion)  (Pended)   -ME     [place] Level (Gait Training Goal 1, PT)  standby assist  (Pended)   -ME     Distance (Gait Training Goal 1, PT)  100 ft  (Pended)   -ME     Time Frame (Gait Training Goal 1, PT)  1 week  (Pended)   -ME       User Key  (r) = Recorded By, (t) = Taken By, (c) = Cosigned By    Initials Name Provider Type    ME Сергей Ryder, PT Student PT Student        Clinical Impression     [facility] Name 04/02/21 1450          Pain    Additional Documentation  Pain Scale: Numbers Pre/Post-Treatment (Group)  (Pended)   -ME     Row Name 04/02/21 1450          Pain Scale: Numbers  Pre/Post-Treatment    Pretreatment Pain Rating  6/10  (Pended)   -ME     Pain Location  other (see comments)  (Pended)  B feet  -ME     Pre/Posttreatment Pain Comment  pt also stated his legs were a 3/10  (Pended)   -ME     Pain Intervention(s)  Repositioned  (Pended)   -ME     Row Name 04/02/21 1450          Plan of Care Review    Plan of Care Reviewed With  patient  (Pended)   -ME     Outcome Summary  pt was admitted to hospital due to sepsis. Prior to hospital admittance, pt was independent and did not use an AD to ambulate in the community. He lives with his wife who is able to help out when he goes back home. He has three stairs to get into his home with no railings. PMH includes COPD, edema, HTN, Neuropathy, and A-fib. pt stated that he has sensation loss in his distal B LE that affect his balance. pt sat EOB from supine min Ax1. However, to go from sitting to supine he was min-mod Ax2. pt was unable to get his legs onto the bed. STS was min Ax1. His hips were externally rotated, causing his feet to point laterally. pt ambulated 40 ft min Ax1. No LOB but pt had significant SOA that limited farther ambulation. Verbal cues to push up from the bed for safety. PT recommends SNF upon d/c from hospital due to decreased activity tolerance with PT. PT will follow up with pt.  (Pended)   -ME     Row Name 04/02/21 1450          Therapy Assessment/Plan (PT)    Patient/Family Therapy Goals Statement (PT)  Get better  (Pended)   -ME     Rehab Potential (PT)  good, to achieve stated therapy goals  (Pended)   -ME     Criteria for Skilled Interventions Met (PT)  yes;skilled treatment is necessary  (Pended)   -ME     Row Name 04/02/21 1450          Vital Signs    Pre Systolic BP Rehab  100  (Pended)   -ME     Pre Treatment Diastolic BP  65  (Pended)   -ME     Pretreatment Heart Rate (beats/min)  96  (Pended)   -ME     Posttreatment Heart Rate (beats/min)  105  (Pended)   -ME     Pre SpO2 (%)  94  (Pended)   -ME     O2 Delivery  Pre Treatment  supplemental O2  (Pended)   -ME     Post SpO2 (%)  96  (Pended)   -ME     O2 Delivery Post Treatment  room air  (Pended)   -ME     Row Name 04/02/21 1450          Positioning and Restraints    Pre-Treatment Position  in bed  (Pended)   -ME     Post Treatment Position  bed  (Pended)   -ME     In Bed  supine;call light within reach  (Pended)   -ME       User Key  (r) = Recorded By, (t) = Taken By, (c) = Cosigned By    Initials Name Provider Type    ME Сергей Ryder, PT Student PT Student        Outcome Measures     Row Name 04/02/21 1502          How much help from another person do you currently need...    Turning from your back to your side while in flat bed without using bedrails?  2  (Pended)   -ME     Moving from lying on back to sitting on the side of a flat bed without bedrails?  2  (Pended)   -ME     Moving to and from a bed to a chair (including a wheelchair)?  3  (Pended)   -ME     Standing up from a chair using your arms (e.g., wheelchair, bedside chair)?  3  (Pended)   -ME     Climbing 3-5 steps with a railing?  2  (Pended)   -ME     To walk in hospital room?  3  (Pended)   -ME     AM-PAC 6 Clicks Score (PT)  15  (Pended)   -ME     Row Name 04/02/21 1502          Functional Assessment    Outcome Measure Options  AM-PAC 6 Clicks Basic Mobility (PT)  (Pended)   -ME       User Key  (r) = Recorded By, (t) = Taken By, (c) = Cosigned By    Initials Name Provider Type    ME Сергей Ryder, PT Student PT Student        Physical Therapy Education                 Title: PT OT SLP Therapies (In Progress)     Topic: Physical Therapy (In Progress)     Point: Mobility training (Done)     Learning Progress Summary           Patient Acceptance, E, VU by ME at 4/2/2021 1502                   Point: Home exercise program (Not Started)     Learner Progress:  Not documented in this visit.          Point: Body mechanics (Done)     Learning Progress Summary           Patient Acceptance, E, VU by ME at  4/2/2021 1502                   Point: Precautions (Done)     Learning Progress Summary           Patient Acceptance, E, VU by ME at 4/2/2021 1502                               User Key     Initials Effective Dates Name Provider Type Discipline    ME 03/12/21 -  Сергей Ryder, PT Student PT Student PT              PT Recommendation and Plan  Planned Therapy Interventions (PT): (P) balance training, bed mobility training, gait training, home exercise program, patient/family education, strengthening, transfer training  Plan of Care Reviewed With: (P) patient  Outcome Summary: (P) pt was admitted to hospital due to sepsis. Prior to hospital admittance, pt was independent and did not use an AD to ambulate in the community. He lives with his wife who is able to help out when he goes back home. He has three stairs to get into his home with no railings. PMH includes COPD, edema, HTN, Neuropathy, and A-fib. pt stated that he has sensation loss in his distal B LE that affect his balance. pt sat EOB from supine min Ax1. However, to go from sitting to supine he was min-mod Ax2. pt was unable to get his legs onto the bed. STS was min Ax1. His hips were externally rotated, causing his feet to point laterally. pt ambulated 40 ft min Ax1. No LOB but pt had significant SOA that limited farther ambulation. Verbal cues to push up from the bed for safety. PT recommends SNF upon d/c from hospital due to decreased activity tolerance with PT. PT will follow up with pt.     Time Calculation:   PT Charges     Row Name 04/02/21 1504 04/02/21 1359          Time Calculation    Start Time  1334  (Pended)   -ME  --     Stop Time  1352  (Pended)   -ME  --     Time Calculation (min)  18 min  (Pended)   -ME  --     PT Received On  04/02/21  (Pended)   -ME  04/02/21  -AE     PT - Next Appointment  04/03/21  (Pended)   -ME  04/05/21  -AE     PT Goal Re-Cert Due Date  04/09/21  (Pended)   -ME  --        Time Calculation- PT    Total Timed Code  Minutes- PT  9 minute(s)  (Pended)   -ME  --       User Key  (r) = Recorded By, (t) = Taken By, (c) = Cosigned By    Initials Name Provider Type    Chula Calderon, PT Physical Therapist    Joe Rogers, PT Student PT Student        Therapy Charges for Today     Code Description Service Date Service Provider Modifiers Qty    29684982900 HC PT EVAL MOD COMPLEXITY 2 4/2/2021 Joe Ryder, PT Student GP 1    27003722042 HC PT THER PROC EA 15 MIN 4/2/2021 Joe Ryder, PT Student GP 1    [id number]  PT THER SUPP EA 15 MIN 4/2/2021 Joe Ryder, PT Student GP 1          PT G-Codes  Outcome Measure Options: (P) AM-PAC 6 Clicks Basic Mobility (PT)  AM-PAC 6 Clicks Score (PT): (P) 15    JOE RYDER PT Student  4/2/2021

## 2021-04-02 NOTE — CONSULTS
CONSULT NOTE    INTERNAL MEDICINE   [facility]       Patient Identification:  Name: Jalen Lockwood  Age: 71 y.o.  Sex: male  :  1950  MRN: [id number]             Date of Consultation:  21          Primary Care Physician: Marc Ludwig MD                               Requesting Physician: dr aguilar mckeon  Reason for Consultation:medical management    Chief Complaint:  71 year old gentleman was admitted for sepsis; he was in the icu initially but was able to transfer out today; we are asked to see him regarding medical management; he had altered mental status on presentation but appears to be back at baseline now; denies pain presently;     History of Present Illness:   As above      Past Medical History:  Past Medical History:   Diagnosis Date   • Allergic rhinitis    • Anxiety    • Aortectasia (CMS/HCC)     3cm infrarenal abdominal aorta   • Arthritis    • Atrial flutter (CMS/HCC) 2010    s/p ablation    • Charcot's joint of foot    • Chronic edema     both legs and sees wound care center at [place]    • Chronic venous insufficiency    • COPD (chronic obstructive pulmonary disease) (CMS/HCC)    • Coronary atherosclerosis     Cath 2010: diffuse 40-50% disease   • Diverticulosis    • Duodenitis    • Fatty liver    • Gastritis    • Gastroparesis    • Hematoma     post-operative; After catheterization, right groin, required surgical exploration   • Hyperlipidemia    • Hypertension    • Insomnia    • Internal hemorrhoids    • Open wound     izzy legs has teddy chg weekly at wound care center at [place]  pt does second dressing on left leg another time during week   • Osteomyelitis (CMS/HCC)    • Paroxysmal atrial fibrillation (CMS/HCC)    • Peripheral neuropathy    • Popliteal artery aneurysm (CMS/HCC)     left, s/p stenting by Dr. Boston   • Skin cancer    • Sleep apnea     o2   • 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CMS/HCC)     due to flutter and afib; cath  with nonobstructive  disease   • Venous stasis    • Venous stasis ulcer (CMS/HCC)     bilateral legs      Past Surgical History:  Past Surgical History:   Procedure Laterality Date   • CARDIAC CATHETERIZATION     • CATARACT EXTRACTION     • COLONOSCOPY  09/28/2015    NBIH, diverticulosis, polyps   • COLONOSCOPY N/A 9/11/2018    Procedure: COLONOSCOPY TO CECUM  AND TERM. ILEUM WITH COLD SNARE POLYPECTOMIES;  Surgeon: Kane Lagunas MD;  Location:  [facility];  Service: Gastroenterology   • COLONOSCOPY N/A 10/29/2019    Procedure: COLONOSCOPY TO TO CECUM AND TERMINAL ILEUM WITH HOT AND COLD SNARE POLYPECTOMIES;  Surgeon: Kane Lagunas MD;  Location:  [facility];  Service: Gastroenterology   • HIP ARTHROPLASTY Right 2017   • JOINT REPLACEMENT Left    • OTHER SURGICAL HISTORY      Catheter ablation atrial flutter   • REPAIR ANEURYSM / PSEUDO ANEURYSM / RUPTURED ANEURYSM POPLITEAL ARTERY      Stent-Graft of the the left popliteal artery   • REPAIR KNEE LIGAMENT      Primary repair of knee ligament cruciate anterior right   • TONSILLECTOMY  1958   • TOTAL KNEE ARTHROPLASTY Bilateral    • UPPER GASTROINTESTINAL ENDOSCOPY  09/16/2014    acute gastritis, acute duodenitis      Home Meds:  Medications Prior to Admission   Medication Sig Dispense Refill Last Dose   • albuterol sulfate HFA (Ventolin HFA) 108 (90 Base) MCG/ACT inhaler Inhale 2 puffs Every 4 (Four) Hours As Needed for Wheezing. 18 g 3 3/30/2021 at Unknown time   • ALPRAZolam (XANAX) 0.5 MG tablet Take 1 tablet by mouth At Night As Needed for Anxiety for up to 9 days. 3 tablet 0 3/30/2021 at Unknown time   • Atrovent HFA 17 MCG/ACT inhaler Inhale 2 puffs 4 (Four) Times a Day. 1 each 3 3/30/2021 at Unknown time   • carvedilol (COREG) 3.125 MG tablet TAKE 1 TABLET BY MOUTH TWICE A DAY WITH MEALS 180 tablet 1 3/30/2021 at Unknown time   • docusate sodium (COLACE) 100 MG capsule Take 100 mg by mouth Daily.   3/30/2021 at Unknown time   • finasteride (PROSCAR) 5 MG tablet  Take 1 tablet by mouth daily.   3/30/2021 at Unknown time   • Fluticasone Furoate-Vilanterol (Breo Ellipta) 200-25 MCG/INH inhaler Inhale 1 puff Daily. 1 each 1 3/30/2021 at Unknown time   • furosemide (LASIX) 40 MG tablet Take 40 mg by mouth Daily.   3/30/2021 at Unknown time   • gabapentin (NEURONTIN) 600 MG tablet Take 600 mg by mouth 3 (Three) Times a Day.   3/30/2021 at Unknown time   • HYDROcodone-acetaminophen (NORCO)  MG per tablet 1 tablet Every 6 (Six) Hours As Needed.   3/30/2021 at Unknown time   • lisinopril (PRINIVIL,ZESTRIL) 20 MG tablet Take 2 tablets by mouth Daily. 180 tablet 3 3/30/2021 at Unknown time   • metoclopramide (REGLAN) 10 MG tablet TAKE 1 TABLET BY MOUTH 4 (FOUR) TIMES A DAY BEFORE MEALS & AT BEDTIME. 360 tablet 3 3/29/2021 at Unknown time   • pravastatin (PRAVACHOL) 20 MG tablet Take 1 tablet by mouth Daily. 90 tablet 2 3/29/2021 at Unknown time   • silodosin (RAPAFLO) 8 MG capsule capsule Take 1 capsule by mouth daily. With food.   3/29/2021 at Unknown time   • warfarin (COUMADIN) 5 MG tablet TAKE 1 TABLET ON MON & FRI AND 1.5 TABLETS ON ALL OTHER DAYS OR AS DIRECTED BY MED MANAGEMENT CLINIC 130 tablet 1 3/29/2021 at Unknown time     Current Meds:     Current Facility-Administered Medications:   •  acetaminophen (TYLENOL) tablet 650 mg, 650 mg, Oral, Q4H PRN **OR** acetaminophen (TYLENOL) suppository 650 mg, 650 mg, Rectal, Q4H PRN, Braxton Chapman MD  •  aluminum-magnesium hydroxide-simethicone (MAALOX MAX) 400-400-40 MG/5ML suspension 15 mL, 15 mL, Oral, Q6H PRN, Braxton Chapman MD  •  aspirin EC tablet 81 mg, 81 mg, Oral, Daily, Hilario Ngo MD, 81 mg at 04/02/21 0926  •  atorvastatin (LIPITOR) tablet 40 mg, 40 mg, Oral, Nightly, Hilario Ngo MD, 40 mg at 04/01/21 9137  •  budesonide-formoterol (SYMBICORT) 160-4.5 MCG/ACT inhaler 2 puff, 2 puff, Inhalation, BID - RT, Mal Jimenes MD, 2 puff at 04/02/21 0916  •  calcium gluconate 1g/50ml  0.675% NaCl IV SOLN, 1 g, Intravenous, PRN **AND** calcium gluconate 6 g in sodium chloride 0.9 % 500 mL IVPB, 6 g, Intravenous, PRN **AND** Calcium, , , PRN, Braxton Chapman MD  •  dextrose (D50W) 25 g/ 50mL Intravenous Solution 25 g, 25 g, Intravenous, Q15 Min PRN, Braxton Chapman MD  •  dextrose (GLUTOSE) oral gel 15 g, 15 g, Oral, Q15 Min PRN, Braxton Chapman MD  •  enoxaparin (LOVENOX) injection 140 mg, 1 mg/kg, Subcutaneous, Q12H, Hilario Ngo MD, 140 mg at 04/02/21 1214  •  fentaNYL citrate (PF) (SUBLIMAZE) injection 25 mcg, 25 mcg, Intravenous, Q1H PRN, Mal Jimenes MD, 25 mcg at 03/31/21 1813  •  finasteride (PROSCAR) tablet 5 mg, 5 mg, Oral, Daily, Brad Lepe MD, 5 mg at 04/02/21 0926  •  gabapentin (NEURONTIN) capsule 300 mg, 300 mg, Oral, Q8H, Brad Lepe MD, 300 mg at 04/02/21 1803  •  glucagon (human recombinant) (GLUCAGEN DIAGNOSTIC) injection 1 mg, 1 mg, Subcutaneous, Q15 Min PRN, Braxton Chapman MD  •  HYDROcodone-acetaminophen (NORCO) 5-325 MG per tablet 2 tablet, 2 tablet, Oral, Q6H PRN, Brad Lepe MD, 2 tablet at 04/02/21 1848  •  insulin lispro (ADMELOG) injection 0-14 Units, 0-14 Units, Subcutaneous, 4x Daily With Meals & Nightly, Braxton Chapman MD, 3 Units at 04/02/21 1215  •  ipratropium-albuterol (DUO-NEB) nebulizer solution 3 mL, 3 mL, Nebulization, Q6H PRN, Braxton Chapman MD, 3 mL at 04/02/21 0213  •  ipratropium-albuterol (DUO-NEB) nebulizer solution 3 mL, 3 mL, Nebulization, 4x Daily - RT, Brad Lepe MD, 3 mL at 04/02/21 1640  •  Magnesium Sulfate 2 gram Bolus, followed by 8 gram infusion (total Mg dose 10 grams)- Mg less than or equal to 1mg/dL, 2 g, Intravenous, PRN **OR** Magnesium Sulfate 2 gram / 50mL Infusion (GIVE X 3 BAGS TO EQUAL 6GM TOTAL DOSE) - Mg 1.1 - 1.5 mg/dl, 2 g, Intravenous, PRN **OR** Magnesium Sulfate 4 gram infusion- Mg 1.6-1.9 mg/dL, 4 g, Intravenous, PRN, Braxton Chapman  MD Jg  •  metoprolol succinate XL (TOPROL-XL) 24 hr tablet 12.5 mg, 12.5 mg, Oral, Q24H, Hilario Ngo MD, Stopped at 04/02/21 1201  •  norepinephrine (LEVOPHED) 8 mg in 250 mL NS infusion (premix), 0.02-0.3 mcg/kg/min, Intravenous, Titrated, Brendan Umana MD, Stopped at 03/30/21 1815  •  ondansetron (ZOFRAN) tablet 4 mg, 4 mg, Oral, Q6H PRN **OR** ondansetron (ZOFRAN) injection 4 mg, 4 mg, Intravenous, Q6H PRN, Braxton Chapman MD  •  Pharmacy to Dose enoxaparin (LOVENOX), , Does not apply, Continuous PRN, Hilario Ngo MD  •  Pharmacy to dose vancomycin, , Does not apply, Continuous PRN, Braxton Chapman MD  •  Pharmacy to dose warfarin, , Does not apply, Continuous PRN, Brad Lepe MD  •  piperacillin-tazobactam (ZOSYN) 4.5 g in iso-osmotic dextrose 100 mL IVPB (premix), 4.5 g, Intravenous, Q8H, Braxton Chapman MD, 4.5 g at 04/02/21 1803  •  potassium chloride (K-DUR,KLOR-CON) ER tablet 40 mEq, 40 mEq, Oral, PRN, Brad Lepe MD, 40 mEq at 04/01/21 1000  •  potassium chloride (KLOR-CON) packet 40 mEq, 40 mEq, Oral, PRN, Brad Lepe MD  •  potassium chloride 10 mEq in 100 mL IVPB, 10 mEq, Intravenous, Q1H PRN, Braxton Chapman MD  •  potassium phosphate 45 mmol in sodium chloride 0.9 % 500 mL infusion, 45 mmol, Intravenous, PRN **OR** potassium phosphate 30 mmol in sodium chloride 0.9 % 250 mL infusion, 30 mmol, Intravenous, PRN **OR** potassium phosphate 15 mmol in sodium chloride 0.9 % 100 mL infusion, 15 mmol, Intravenous, PRN **OR** sodium phosphates 40 mmol in sodium chloride 0.9 % 500 mL IVPB, 40 mmol, Intravenous, PRN **OR** sodium phosphates 20 mmol in sodium chloride 0.9 % 250 mL IVPB, 20 mmol, Intravenous, PRN, Braxton Chapman MD  •  [COMPLETED] Insert peripheral IV, , , Once **AND** sodium chloride 0.9 % flush 10 mL, 10 mL, Intravenous, PRN, Brendan Umana MD  •  sodium chloride 0.9 % flush 10 mL, 10 mL, Intravenous, Q12H, Sherley  Brad Erwin MD, 10 mL at 04/02/21 0928  •  sodium chloride 0.9 % flush 10 mL, 10 mL, Intravenous, Q12H, rBad Lepe MD, 10 mL at 04/02/21 0927  •  sodium chloride 0.9 % flush 10 mL, 10 mL, Intravenous, Q12H, Brad Lepe MD, 10 mL at 04/02/21 0927  •  sodium chloride 0.9 % flush 10 mL, 10 mL, Intravenous, PRN, Brad Lepe MD  •  sodium chloride 0.9 % flush 20 mL, 20 mL, Intravenous, PRN, Brad Lepe MD  •  vancomycin 1250 mg/250 mL 0.9% NS IVPB ([facility]), 1,250 mg, Intravenous, Q12H, Braxton Chapman MD, 1,250 mg at 04/02/21 1432  Allergies:  Allergies   Allergen Reactions   • Cephalexin Hives   • Codeine Nausea Only     Social History:   Social History     Socioeconomic History   • Marital status:      Spouse name: Mariposa   • Number of children: Not on file   • Years of education: Not on file   • Highest education level: Not on file   Tobacco Use   • Smoking status: Current Every Day Smoker     Packs/day: 0.25     Years: 45.00     Pack years: 11.25     Types: Cigarettes   • Smokeless tobacco: Never Used   Vaping Use   • Vaping Use: Never used   Substance and Sexual Activity   • Alcohol use: Yes     Alcohol/week: 8.0 - 9.0 standard drinks     Types: 1 - 2 Shots of liquor, 7 Standard drinks or equivalent per week     Comment: 2 rum a day//Caffeine use: 3 cups daily   • Drug use: No   • Sexual activity: Defer     Family History:  Family History   Problem Relation Age of Onset   • Emphysema Father    • Malig Hyperthermia Neg Hx           Review of Systems  See history of present illness and past medical history.  Patient denies headache, dizziness, syncope, falls, trauma, change in vision, change in hearing, change in taste, changes in weight, changes in appetite, focal weakness, numbness, or paresthesia.  Patient denies chest pain, palpitations, cough, sinus pressure, rhinorrhea, epistaxis, hemoptysis, nausea, vomiting,hematemesis, diarrhea, constipation or hematchezia.   "Denies cold or heat intolerance, polydipsia, polyuria, polyphagia. Denies hematuria, pyuria, dysuria, hesitancy, frequency or urgency. Denies consumption of raw and under cooked meats foods or change in water source.  Denies fever, chills, sweats, night sweats.  Denies missing any routine medications. Remainder of ROS is negative.      Vitals:   /84 (BP Location: Left arm, Patient Position: Lying)   Pulse 88   Temp 98.3 °F (36.8 °C) (Oral)   Resp 18   Ht 193 cm (76\")   Wt (!) 142 kg (313 lb 15 oz)   SpO2 95%   BMI 38.21 kg/m²   I/O:     Intake/Output Summary (Last 24 hours) at 4/2/2021 1912  Last data filed at 4/2/2021 1716  Gross per 24 hour   Intake 4907 ml   Output 825 ml   Net 4082 ml     Exam:  General Appearance:    Alert, cooperative, no distress, appears stated age; chronically ill appearing   Head:    Normocephalic, without obvious abnormality, atraumatic   Eyes:    PERRL, conjunctivae/corneas clear, EOM's intact, both eyes   Ears:    Normal external ear canals, both ears   Nose:   Nares normal, septum midline, mucosa normal, no drainage    or sinus tenderness   Throat:   Lips, tongue, gums normal; oral mucosa pink and moist   Neck:   Supple, symmetrical, trachea midline, no adenopathy;     thyroid:  no enlargement/tenderness/nodules; no carotid    bruit or JVD   Back:     Symmetric, no curvature, ROM normal, no CVA tenderness   Lungs:     Decreased breath sounds bilaterally, respirations unlabored   Chest Wall:    No tenderness or deformity    Heart:    Regular rate and rhythm, S1 and S2 normal, no murmur, rub   or gallop   Abdomen:     Soft, nontender, bowel sounds active all four quadrants,     no masses, no hepatomegaly, no splenomegaly   Extremities:   Extremities normal, atraumatic, no cyanosis or edema   Pulses:   Pulses palpable in all extremities; symmetric all extremities   Skin:   Skin color normal, Skin is warm and dry, dressings in place   Neurologic:   CNII-XII intact, motor " strength grossly intact, sensation grossly intact to light touch, no focal deficits noted       Data Review:  Labs in chart were reviewed.  WBC   Date Value Ref Range Status   04/02/2021 7.66 3.40 - 10.80 10*3/mm3 Final     Hemoglobin   Date Value Ref Range Status   04/02/2021 12.3 (L) 13.0 - 17.7 g/dL Final     Hematocrit   Date Value Ref Range Status   04/02/2021 35.3 (L) 37.5 - 51.0 % Final     Platelets   Date Value Ref Range Status   04/02/2021 124 (L) 140 - 450 10*3/mm3 Final     Sodium   Date Value Ref Range Status   04/02/2021 135 (L) 136 - 145 mmol/L Final     Potassium   Date Value Ref Range Status   04/02/2021 4.0 3.5 - 5.2 mmol/L Final     Comment:     Slight hemolysis detected by analyzer. Results may be affected.     Chloride   Date Value Ref Range Status   04/02/2021 104 98 - 107 mmol/L Final     CO2   Date Value Ref Range Status   04/02/2021 20.6 (L) 22.0 - 29.0 mmol/L Final     BUN   Date Value Ref Range Status   04/02/2021 11 8 - 23 mg/dL Final     Creatinine   Date Value Ref Range Status   04/02/2021 0.81 0.76 - 1.27 mg/dL Final     Glucose   Date Value Ref Range Status   04/02/2021 105 (H) 65 - 99 mg/dL Final     Calcium   Date Value Ref Range Status   04/02/2021 8.1 (L) 8.6 - 10.5 mg/dL Final     Magnesium   Date Value Ref Range Status   03/31/2021 2.0 1.6 - 2.4 mg/dL Final     No results found for: AST, ALT, ALKPHOS  INR   Date Value Ref Range Status   04/02/2021 1.60 (H) 0.90 - 1.10 Final         Results from last 7 days   Lab Units 03/30/21  1207   HEMOGLOBIN A1C % 5.00       Imaging Results (Last 7 Days)     Procedure Component Value Units Date/Time    XR Chest 1 View [[id number]] Collected: 04/01/21 1206     Updated: 04/02/21 0805    Narrative:      PORTABLE CHEST     HISTORY: Cough, wheezing.     COMPARISON: 03/30/2021     FINDINGS: The heart is at the upper limits of normal in size. There is  mild interstitial prominence in the perihilar regions bilaterally and at  the left lung base  increased slightly as compared to the examination of  03/30/2021. There is no evidence of consolidation or of gross effusion.     This report was finalized on 4/2/2021 8:02 AM by Dr. Isaias Gonzalez M.D.       XR Chest Post CVA Port [[id number]] Collected: 04/01/21 1533     Updated: 04/01/21 1537    Narrative:      XR CHEST POST CVA PORT-  04/01/2021     HISTORY: PICC line placement.     Right upper extremity PICC line is seen with its tip overlying the SVC  near the junction with the right innominate vein.     Heart size is mildly enlarged. There are some minimal patchy increased  density in the lung bases right greater than left.       Impression:      Right upper extremity PICC line tip near the junction of the  right innominate vein and SVC.     This report was finalized on 4/1/2021 3:34 PM by Dr. Shaan Nam M.D.       CT Abdomen Pelvis With Contrast [[id number]] Collected: 03/30/21 1501     Updated: 03/30/21 1620    Narrative:      CT OF THE ABDOMEN AND PELVIS WITH CONTRAST 03/30/2021     HISTORY: Abdominal discomfort. Sepsis.     TECHNIQUE: Spiral images were obtained from the lung bases to the  symphysis pubis after intravenous contrast. No oral contrast was given.     Some of the images are degraded by patient motion.     FINDINGS:  In the subpleural region of the right lower lobe there is an  ill-defined area of dependent edema, atelectasis or mild inflammatory  infiltrate.     The liver appears somewhat small but no focal hepatic lesions are seen.  The gallbladder, spleen, pancreas, and adrenals appear unremarkable.     There is an approximately 7 mm nonobstructing right renal stone.     There is some aortoiliac calcification. There is colonic diverticulosis.  Right hip prosthesis is seen.     No abscess or abnormal fluid collections are seen. No free air is seen.       Impression:      1. Some of the images are degraded by patient motion.  2. Mild dependent edema, atelectasis or inflammatory  infiltrate in the  posterior right lower lobe.  3. Nonobstructing right renal stone.  4. Mild colonic diverticulosis.  5. No abscess, free air or free fluid is seen.     Radiation dose reduction techniques were utilized, including automated  exposure control and exposure modulation based on body size.     This report was finalized on 3/30/2021 4:17 PM by Dr. Shaan Nam M.D.       XR Chest 1 View [[id number]] Collected: 03/30/21 1401     Updated: 03/30/21 1442    Narrative:      ONE VIEW PORTABLE CHEST     HISTORY: Shortness of breath and chest tightness.     FINDINGS: The lungs are well-expanded and clear. There is mild  cardiomegaly unchanged from 04/30/2019.     This report was finalized on 3/30/2021 2:39 PM by Dr. Gokul Garcia M.D.           Past Medical History:   Diagnosis Date   • Allergic rhinitis    • Anxiety    • Aortectasia (CMS/HCC)     3cm infrarenal abdominal aorta   • Arthritis    • Atrial flutter (CMS/HCC) 2010    s/p ablation    • Charcot's joint of foot    • Chronic edema     both legs and sees wound care center at [place]    • Chronic venous insufficiency    • COPD (chronic obstructive pulmonary disease) (CMS/HCC)    • Coronary atherosclerosis     Cath 2010: diffuse 40-50% disease   • Diverticulosis    • Duodenitis    • Fatty liver    • Gastritis    • Gastroparesis    • Hematoma     post-operative; After catheterization, right groin, required surgical exploration   • Hyperlipidemia    • Hypertension    • Insomnia    • Internal hemorrhoids    • Open wound     izzy legs has drsg chg weekly at wound care center at [place]  pt does second dressing on left leg another time during week   • Osteomyelitis (CMS/HCC)    • Paroxysmal atrial fibrillation (CMS/HCC)    • Peripheral neuropathy    • Popliteal artery aneurysm (CMS/HCC)     left, s/p stenting by Dr. Boston   • Skin cancer    • Sleep apnea     o2   • 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CMS/HCC)     due to flutter and afib; cath 2010 with  nonobstructive disease   • Venous stasis    • Venous stasis ulcer (CMS/HCC)     bilateral legs        Assessment:  Active Hospital Problems    Diagnosis  POA   • Severe sepsis (CMS/HCC) [A41.9, R65.20]  Yes      Resolved Hospital Problems   No resolved problems to display.   leg wounds  Persistent atrial fibrillation  nstemi  uti  Sleep apnea  Obesity  Copd  Acute systolic chf  hypertension      Plan:  Will follow since he is out of the icu  Continue antibiotics  Cardiology is following  Cultures negative so far  Trend sodium  Blood pressure has stabilized  Thanks for involving us in his care  Will follow  Elysia Mcmullen MD   4/2/2021  19:12 EDT

## 2021-04-02 NOTE — PROGRESS NOTES
"  Daily Progress Note.   [facility] INTENSIVE CARE  4/2/2021    Patient:  Name:  Jalen Lockwood  MRN:  [id number]  1950  71 y.o.  male         CC: altered mental status fever    Interval History/ROS:  Admitted for sepsis with altered mental status with history of abd pain and worsening dyspnea.    Fever curve down, still had fever of 38.2  bp stable overnight.  Awake alert  Memory intact    no diarrhea, no chest pain  PMFSSH: no change    Physical Exam:  /68   Pulse 61   Temp 98.4 °F (36.9 °C) (Oral)   Resp 14   Ht 193 cm (76\")   Wt (!) 142 kg (313 lb 15 oz)   SpO2 96%   BMI 38.21 kg/m²   Body mass index is 38.21 kg/m².    Intake/Output Summary (Last 24 hours) at 4/2/2021 1110  Last data filed at 4/2/2021 0430  Gross per 24 hour   Intake 4907 ml   Output 600 ml   Net 4307 ml     General appearance: awake alert,  conversant   Eyes: anicteric sclerae, moist conjunctivae; no lid-lag; PERRLA  HENT: Atraumatic; oropharynx clear with moist mucous membranes and no mucosal ulcerations; normal hard and soft palate  Neck: Trachea midline; FROM, supple,   Lungs: +int rhonchi, + exp wheeze, with normal respiratory effort and no intercostal retractions  CV: irreg irreg no rub  Abdomen: Soft, non-tender;BS+ non rigid  Extremities:+ble edema with color change, wrapped from mid foot to just below knee  Skin: ble wrapped, multiple areas of ecchymosis  Psych: calm affect, alert and oriented to person  Neuro moves all ext, cns 2-12 intact sensation intact        Data Review:  Notable Labs:  Results from last 7 days   Lab Units 04/02/21  0327 04/01/21  0346 03/31/21  0518 03/30/21  1207   WBC 10*3/mm3 7.66 10.97* 15.46* 19.85*   HEMOGLOBIN g/dL 12.3* 12.3* 13.4 15.5   PLATELETS 10*3/mm3 124* 122* 159 226     Results from last 7 days   Lab Units 04/02/21  0327 04/01/21  0346 03/31/21  0644 03/30/21  1504 03/30/21  1207   SODIUM mmol/L 135* 135* 133* 132* 129*   POTASSIUM mmol/L 4.0 3.5 3.6 3.4* 4.4 "   CHLORIDE mmol/L 104 103 100 97* 92*   CO2 mmol/L 20.6* 22.8 21.1* 24.3 23.8   BUN mg/dL 11 13 12 10 9   CREATININE mg/dL 0.81 0.86 0.92 1.03 1.11   GLUCOSE mg/dL 105* 117* 119* 85 110*   CALCIUM mg/dL 8.1* 8.2* 8.6 8.8 10.3   MAGNESIUM mg/dL  --   --  2.0  --   --    Estimated Creatinine Clearance: 130.8 mL/min (by C-G formula based on SCr of 0.81 mg/dL).    Results from last 7 days   Lab Units 04/02/21  0327 04/01/21  0346 03/31/21  0518 03/30/21  1504 03/30/21  1207   AST (SGOT) U/L  --   --   --   --  18   ALT (SGPT) U/L  --   --   --   --  12   PROCALCITONIN ng/mL  --   --   --   --  0.42*   LACTATE mmol/L  --   --   --  2.8* 4.6*   PLATELETS 10*3/mm3 124* 122* 159  --  226       Results from last 7 days   Lab Units 03/30/21  1215   PH, ARTERIAL pH units 7.551*   PCO2, ARTERIAL mm Hg 26.5*   PO2 ART mm Hg 64.6*   HCO3 ART mmol/L 23.3     Trop 0.167  RVp negative with covid negative  Lactic 4.6  procal 0.42    Imaging:  Reviewed chest images personally from past 3 days    tte 3/31/2021:  · The left ventricular cavity is moderately dilated.  · There is akinesis of the entire septum, distal anterior wall, distal inferior wall, and apex  · Left ventricular ejection fraction appears to be 31 - 35%.  · Left ventricular diastolic function was indeterminate.  · The right ventricular cavity is moderate to severely dilated. Normal right ventricular systolic function noted.  · The left atrial cavity is moderately dilated.  · The right atrial cavity is severely dilated.  · Moderate tricuspid valve regurgitation is present.  · Calculated right ventricular systolic pressure from tricuspid regurgitation is 40 mmHg.  · The IVC is grossly dilated, measuring over 3 cm  · There is no evidence of pericardial effusion    Scheduled meds:    aspirin, 81 mg, Oral, Daily  atorvastatin, 40 mg, Oral, Nightly  budesonide-formoterol, 2 puff, Inhalation, BID - RT  enoxaparin, 1 mg/kg, Subcutaneous, Q12H  finasteride, 5 mg, Oral,  Daily  gabapentin, 300 mg, Oral, Q8H  insulin lispro, 0-14 Units, Subcutaneous, 4x Daily With Meals & Nightly  ipratropium-albuterol, 3 mL, Nebulization, 4x Daily - RT  metoprolol succinate XL, 12.5 mg, Oral, Q24H  piperacillin-tazobactam, 4.5 g, Intravenous, Q8H  sodium chloride, 10 mL, Intravenous, Q12H  sodium chloride, 10 mL, Intravenous, Q12H  sodium chloride, 10 mL, Intravenous, Q12H  vancomycin, 1,250 mg, Intravenous, Q12H  warfarin, 7.5 mg, Oral, Once        ASSESSMENT  /  PLAN:  Severe sepsis  Hypotension  UTI  Altered mental status/metabolic encephalopathy  Respiratory alkalosis  Hyponatremia  Lactic acidosis  Elevated procalcitonin  Abdominal pain  Atrial fibrillation with RVR  COPD: Currently without exacerbation  Chronic venous insufficiency  History of gastroparesis  History of MRSA soft tissue skin infection  Sleep apnea  Current daily smoker  Daily alcohol use  CAD  NSTEMI  Acute systolic heart failure with regional wall motion abn     Scheduled duonebs    Cont vanc/zosyn follow cultures -follow micro, suspect skin as likely source  If no growth will transition to keflex tomorrow am, wbc downtrending, fever curve down.    NStemi - per Dr Ngo, tight lad regional wall motion abn, medical mgmt, regional wall motion abns, will hold off on metoprolol given hypotension and stop ivfs to reduce chances of heart failure with newly reduced ef.    Pain control - chronically takes these, not new or newly increased per pt wife.    lovenox therapeutic coverage while inr below 2    To floor today.    Will consult A for medical care outside of ICU.  Will continue to follow for copd.        Brad Lepe MD  [facility]  04/02/21  [id number]

## 2021-04-02 NOTE — PLAN OF CARE
Goal Outcome Evaluation:  Plan of Care Reviewed With: (P) patient     Outcome Summary: (P) pt was admitted to hospital due to sepsis. Prior to hospital admittance, pt was independent and did not use an AD to ambulate in the community. He lives with his wife who is able to help out when he goes back home. He has three stairs to get into his home with no railings. PMH includes COPD, edema, HTN, Neuropathy, and A-fib. pt stated that he has sensation loss in his distal B LE that affect his balance. pt sat EOB from supine min Ax1. However, to go from sitting to supine he was min-mod Ax2. pt was unable to get his legs onto the bed. STS was min Ax1. His hips were externally rotated, causing his feet to point laterally. pt ambulated 40 ft min Ax1. No LOB but pt had significant SOA that limited farther ambulation. Verbal cues to push up from the bed for safety. PT recommends SNF upon d/c from hospital due to decreased activity tolerance with PT. PT will follow up with pt.  Patient was intermittently wearing a face mask during this therapy encounter. Therapist used appropriate personal protective equipment including eye protection, mask, and gloves.  Mask used was standard procedure mask. Appropriate PPE was worn during the entire therapy session. Hand hygiene was completed before and after therapy session. Patient is not in enhanced droplet precautions.

## 2021-04-02 NOTE — PLAN OF CARE
"Goal Outcome Evaluation:  Plan of Care Reviewed With: patient  Progress: improving     Patient was transferred to [address] [place] [address] from ICU.  Admitted on 3/30 with altered mental status and \"severe sepsis\" thought due to venous ulcers on legs.  Patient also has multiple skin tears on upper extremities and excoriated groin. Wound/ost team is taking care of lower extremity dressing changes which are to be performed on M,W,F per their note. Patient has been getting up to the restroom with one assist and gait belt.  Receiving Vanc/Zosyn IVBP and blood cultures are pending.  Also on Coumadin d/t history of atrial fibrillation, and cardio has also started Lovenox for additional coverage until INR is therapeutic. Patient reporting 6/10 pain in lower legs, treated with Norco every 6 hours as needed.  Vitals have been stable.  Specialty bed ordered for this patient d/t current condition of skin.  Will continue to monitor.  "

## 2021-04-02 NOTE — PROGRESS NOTES
Pharmacy Consult: Warfarin Dosing/ Monitoring    Jalen Lockwood is a 71 y.o. male, estimated creatinine clearance is 130.8 mL/min (by C-G formula based on SCr of 0.81 mg/dL). weighing (!) 142 kg (313 lb 15 oz).     has a past medical history of Allergic rhinitis, Anxiety, Aortectasia (CMS/HCC), Arthritis, Atrial flutter (CMS/HCC) (2010), Charcot's joint of foot, Chronic edema, Chronic venous insufficiency, COPD (chronic obstructive pulmonary disease) (CMS/HCC), Coronary atherosclerosis, Diverticulosis, Duodenitis, Fatty liver, Gastritis, Gastroparesis, Hematoma, Hyperlipidemia, Hypertension, Insomnia, Internal hemorrhoids, Open wound, Osteomyelitis (CMS/HCC), Paroxysmal atrial fibrillation (CMS/HCC), Peripheral neuropathy, Popliteal artery aneurysm (CMS/HCC), Skin cancer, Sleep apnea, 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CMS/HCC), Venous stasis, and Venous stasis ulcer (CMS/[facility]).    Social History     Tobacco Use    Smoking status: Current Every Day Smoker     Packs/day: 0.25     Years: 45.00     Pack years: 11.25     Types: Cigarettes    Smokeless tobacco: Never Used   Vaping Use    Vaping Use: Never used   Substance Use Topics    Alcohol use: Yes     Alcohol/week: 8.0 - 9.0 standard drinks     Types: 1 - 2 Shots of liquor, 7 Standard drinks or equivalent per week     Comment: 2 rum a day//Caffeine use: 3 cups daily    Drug use: No       Results from last 7 days   Lab Units 04/02/21  0327 04/01/21  1325 04/01/21  0346 03/31/21  0518 03/30/21  1207   INR  1.60* 1.66*  --   --  2.12*   APTT seconds  --   --   --   --  54.7*   HEMOGLOBIN g/dL 12.3*  --  12.3* 13.4 15.5   HEMATOCRIT % 35.3*  --  34.3* 36.8* 44.2   PLATELETS 10*3/mm3 124*  --  122* 159 226     Results from last 7 days   Lab Units 04/02/21  0327 04/01/21  0346 03/31/21  0644 03/30/21  1207   SODIUM mmol/L 135* 135* 133* 129*   POTASSIUM mmol/L 4.0 3.5 3.6 4.4   CHLORIDE mmol/L 104 103 100 92*   CO2 mmol/L 20.6* 22.8 21.1* 23.8   BUN mg/dL 11 13 12 9    CREATININE mg/dL 0.81 0.86 0.92 1.11   CALCIUM mg/dL 8.1* 8.2* 8.6 10.3   BILIRUBIN mg/dL  --   --   --  1.6*   ALK PHOS U/L  --   --   --  96   ALT (SGPT) U/L  --   --   --  12   AST (SGOT) U/L  --   --   --  18   GLUCOSE mg/dL 105* 117* 119* 110*     Anticoagulation history: managed by [facility] anti-coag clinic. 5 mg M, F; 7.5 mg other days    Hospital Anticoagulation:  Consulting provider: Dr. Brad Lepe  Start date: pta (inpt 4/1)  Indication: atrial fibrillation  Target INR: 2-3  Expected duration: lifetime   Bridge Therapy: enoxaparin 1 mg/kg q12 started 4/2/21                Date 4/1 4/2           INR 1.66 1.60           Warfarin dose 7.5              Potential drug interactions:   Aspirin-new med-increased risk of bleeding  Piperacillin/tazobactam-increased risk of bleeding  Vancomycin-increased risk of bleeding    Relevant nutrition status: regular diet      Education complete?/ Date: yes on 4/1/21    Assessment/Plan:  Warfarin restarted 4/1, INR 1.6 this am, Cardiology is adding enoxaparin full dose to cover until INR is therapeutic.  Will redose warfarin at 7.5 mg today (normally takes 5 mg at home on Fridays).      Pharmacy will continue to follow until discharge or discontinuation of warfarin.   Arden Carrillo, [facility]  4/2/2021

## 2021-04-02 NOTE — PLAN OF CARE
Goal Outcome Evaluation:  Plan of Care Reviewed With: patient  Progress: no change  Outcome Summary: Pt remains in ICU overnight. On 1L nasal cannula. Did not require levophed overnight. Troponin is trending down. A. fib with HR <100. Wound care completed per [facility] orders. Afebrile. Sumaya Lai RN.

## 2021-04-03 PROBLEM — I25.5 ISCHEMIC CARDIOMYOPATHY: Status: ACTIVE | Noted: 2021-04-03

## 2021-04-03 PROBLEM — L03.119 CELLULITIS OF LOWER EXTREMITY: Status: ACTIVE | Noted: 2021-04-03

## 2021-04-03 PROBLEM — G93.41 METABOLIC ENCEPHALOPATHY: Status: ACTIVE | Noted: 2021-04-03

## 2021-04-03 PROBLEM — I51.9 LEFT VENTRICULAR SYSTOLIC DYSFUNCTION: Status: ACTIVE | Noted: 2021-04-03

## 2021-04-03 PROBLEM — E87.1 HYPONATREMIA: Status: ACTIVE | Noted: 2021-04-03

## 2021-04-03 PROBLEM — D69.6 THROMBOCYTOPENIA (HCC): Status: ACTIVE | Noted: 2021-04-03

## 2021-04-03 PROBLEM — G93.41 METABOLIC ENCEPHALOPATHY: Status: RESOLVED | Noted: 2021-04-03 | Resolved: 2021-04-03

## 2021-04-03 LAB
ANION GAP SERPL CALCULATED.3IONS-SCNC: 6.9 MMOL/L (ref 5–15)
BACTERIA SPEC RESP CULT: NO GROWTH
BASOPHILS # BLD AUTO: 0.07 10*3/MM3 (ref 0–0.2)
BASOPHILS NFR BLD AUTO: 1.2 % (ref 0–1.5)
BUN SERPL-MCNC: 8 MG/DL (ref 8–23)
BUN/CREAT SERPL: 11.3 (ref 7–25)
CALCIUM SPEC-SCNC: 8.4 MG/DL (ref 8.6–10.5)
CHLORIDE SERPL-SCNC: 104 MMOL/L (ref 98–107)
CO2 SERPL-SCNC: 24.1 MMOL/L (ref 22–29)
CREAT SERPL-MCNC: 0.71 MG/DL (ref 0.76–1.27)
CRP SERPL-MCNC: 1.63 MG/DL (ref 0–0.5)
DEPRECATED RDW RBC AUTO: 47.7 FL (ref 37–54)
EOSINOPHIL # BLD AUTO: 0.16 10*3/MM3 (ref 0–0.4)
EOSINOPHIL NFR BLD AUTO: 2.7 % (ref 0.3–6.2)
ERYTHROCYTE [DISTWIDTH] IN BLOOD BY AUTOMATED COUNT: 13.4 % (ref 12.3–15.4)
ERYTHROCYTE [SEDIMENTATION RATE] IN BLOOD: 26 MM/HR (ref 0–20)
GFR SERPL CREATININE-BSD FRML MDRD: 110 ML/MIN/1.73
GLUCOSE BLDC GLUCOMTR-MCNC: 108 MG/DL (ref 70–130)
GLUCOSE BLDC GLUCOMTR-MCNC: 114 MG/DL (ref 70–130)
GLUCOSE BLDC GLUCOMTR-MCNC: 118 MG/DL (ref 70–130)
GLUCOSE BLDC GLUCOMTR-MCNC: 118 MG/DL (ref 70–130)
GLUCOSE BLDC GLUCOMTR-MCNC: 125 MG/DL (ref 70–130)
GLUCOSE SERPL-MCNC: 101 MG/DL (ref 65–99)
GRAM STN SPEC: NORMAL
HCT VFR BLD AUTO: 34.7 % (ref 37.5–51)
HGB BLD-MCNC: 12.1 G/DL (ref 13–17.7)
INR PPP: 1.99 (ref 0.9–1.1)
LYMPHOCYTES # BLD AUTO: 1.07 10*3/MM3 (ref 0.7–3.1)
LYMPHOCYTES NFR BLD AUTO: 18.2 % (ref 19.6–45.3)
MCH RBC QN AUTO: 34.1 PG (ref 26.6–33)
MCHC RBC AUTO-ENTMCNC: 34.9 G/DL (ref 31.5–35.7)
MCV RBC AUTO: 97.7 FL (ref 79–97)
MONOCYTES # BLD AUTO: 0.43 10*3/MM3 (ref 0.1–0.9)
MONOCYTES NFR BLD AUTO: 7.3 % (ref 5–12)
NEUTROPHILS NFR BLD AUTO: 4.13 10*3/MM3 (ref 1.7–7)
NEUTROPHILS NFR BLD AUTO: 70.3 % (ref 42.7–76)
PLATELET # BLD AUTO: 134 10*3/MM3 (ref 140–450)
PMV BLD AUTO: 10.8 FL (ref 6–12)
POTASSIUM SERPL-SCNC: 3.8 MMOL/L (ref 3.5–5.2)
PROTHROMBIN TIME: 22.3 SECONDS (ref 11.7–14.2)
QT INTERVAL: 467 MS
RBC # BLD AUTO: 3.55 10*6/MM3 (ref 4.14–5.8)
SODIUM SERPL-SCNC: 135 MMOL/L (ref 136–145)
WBC # BLD AUTO: 5.88 10*3/MM3 (ref 3.4–10.8)

## 2021-04-03 PROCEDURE — 80048 BASIC METABOLIC PNL TOTAL CA: CPT | Performed by: INTERNAL MEDICINE

## 2021-04-03 PROCEDURE — 99232 SBSQ HOSP IP/OBS MODERATE 35: CPT | Performed by: INTERNAL MEDICINE

## 2021-04-03 PROCEDURE — 82962 GLUCOSE BLOOD TEST: CPT

## 2021-04-03 PROCEDURE — 85025 COMPLETE CBC W/AUTO DIFF WBC: CPT | Performed by: INTERNAL MEDICINE

## 2021-04-03 PROCEDURE — 25010000002 ENOXAPARIN PER 10 MG: Performed by: INTERNAL MEDICINE

## 2021-04-03 PROCEDURE — 85610 PROTHROMBIN TIME: CPT | Performed by: INTERNAL MEDICINE

## 2021-04-03 PROCEDURE — 94799 UNLISTED PULMONARY SVC/PX: CPT

## 2021-04-03 PROCEDURE — 25010000002 PIPERACILLIN SOD-TAZOBACTAM PER 1 G: Performed by: INTERNAL MEDICINE

## 2021-04-03 PROCEDURE — 25010000002 ONDANSETRON PER 1 MG: Performed by: INTERNAL MEDICINE

## 2021-04-03 PROCEDURE — 25010000003 AMPICILLIN-SULBACTAM PER 1.5 G: Performed by: INTERNAL MEDICINE

## 2021-04-03 PROCEDURE — 25010000002 VANCOMYCIN 10 G RECONSTITUTED SOLUTION: Performed by: INTERNAL MEDICINE

## 2021-04-03 PROCEDURE — 93010 ELECTROCARDIOGRAM REPORT: CPT | Performed by: INTERNAL MEDICINE

## 2021-04-03 PROCEDURE — 36415 COLL VENOUS BLD VENIPUNCTURE: CPT | Performed by: INTERNAL MEDICINE

## 2021-04-03 PROCEDURE — 25010000002 ALTEPLASE: Performed by: INTERNAL MEDICINE

## 2021-04-03 PROCEDURE — 85652 RBC SED RATE AUTOMATED: CPT | Performed by: INTERNAL MEDICINE

## 2021-04-03 PROCEDURE — 93005 ELECTROCARDIOGRAM TRACING: CPT | Performed by: INTERNAL MEDICINE

## 2021-04-03 PROCEDURE — 86140 C-REACTIVE PROTEIN: CPT | Performed by: INTERNAL MEDICINE

## 2021-04-03 RX ORDER — WARFARIN SODIUM 5 MG/1
5 TABLET ORAL
Status: COMPLETED | OUTPATIENT
Start: 2021-04-03 | End: 2021-04-03

## 2021-04-03 RX ADMIN — BUDESONIDE AND FORMOTEROL FUMARATE DIHYDRATE 2 PUFF: 160; 4.5 AEROSOL RESPIRATORY (INHALATION) at 06:59

## 2021-04-03 RX ADMIN — SODIUM CHLORIDE, PRESERVATIVE FREE 10 ML: 5 INJECTION INTRAVENOUS at 01:32

## 2021-04-03 RX ADMIN — WARFARIN 5 MG: 5 TABLET ORAL at 18:09

## 2021-04-03 RX ADMIN — HYDROCODONE BITARTRATE AND ACETAMINOPHEN 2 TABLET: 5; 325 TABLET ORAL at 08:30

## 2021-04-03 RX ADMIN — HYDROCODONE BITARTRATE AND ACETAMINOPHEN 2 TABLET: 5; 325 TABLET ORAL at 22:37

## 2021-04-03 RX ADMIN — ENOXAPARIN SODIUM 140 MG: 150 INJECTION SUBCUTANEOUS at 11:17

## 2021-04-03 RX ADMIN — ENOXAPARIN SODIUM 140 MG: 150 INJECTION SUBCUTANEOUS at 01:29

## 2021-04-03 RX ADMIN — GABAPENTIN 300 MG: 300 CAPSULE ORAL at 01:28

## 2021-04-03 RX ADMIN — HYDROCODONE BITARTRATE AND ACETAMINOPHEN 2 TABLET: 5; 325 TABLET ORAL at 14:29

## 2021-04-03 RX ADMIN — SODIUM CHLORIDE, PRESERVATIVE FREE 10 ML: 5 INJECTION INTRAVENOUS at 22:43

## 2021-04-03 RX ADMIN — BUDESONIDE AND FORMOTEROL FUMARATE DIHYDRATE 2 PUFF: 160; 4.5 AEROSOL RESPIRATORY (INHALATION) at 19:42

## 2021-04-03 RX ADMIN — GABAPENTIN 300 MG: 300 CAPSULE ORAL at 14:29

## 2021-04-03 RX ADMIN — FINASTERIDE 5 MG: 5 TABLET, FILM COATED ORAL at 08:03

## 2021-04-03 RX ADMIN — SODIUM CHLORIDE, PRESERVATIVE FREE 10 ML: 5 INJECTION INTRAVENOUS at 08:39

## 2021-04-03 RX ADMIN — AMPICILLIN SODIUM AND SULBACTAM SODIUM 3 G: 2; 1 INJECTION, POWDER, FOR SOLUTION INTRAMUSCULAR; INTRAVENOUS at 17:58

## 2021-04-03 RX ADMIN — METOPROLOL SUCCINATE 12.5 MG: 25 TABLET, EXTENDED RELEASE ORAL at 08:03

## 2021-04-03 RX ADMIN — HYDROCODONE BITARTRATE AND ACETAMINOPHEN 2 TABLET: 5; 325 TABLET ORAL at 01:28

## 2021-04-03 RX ADMIN — GABAPENTIN 300 MG: 300 CAPSULE ORAL at 08:30

## 2021-04-03 RX ADMIN — ALTEPLASE: 2.2 INJECTION, POWDER, LYOPHILIZED, FOR SOLUTION INTRAVENOUS at 20:31

## 2021-04-03 RX ADMIN — NYSTATIN: 100000 POWDER TOPICAL at 08:06

## 2021-04-03 RX ADMIN — VANCOMYCIN HYDROCHLORIDE 1250 MG: 10 INJECTION, POWDER, LYOPHILIZED, FOR SOLUTION INTRAVENOUS at 01:30

## 2021-04-03 RX ADMIN — SODIUM CHLORIDE, PRESERVATIVE FREE 10 ML: 5 INJECTION INTRAVENOUS at 22:42

## 2021-04-03 RX ADMIN — ATORVASTATIN CALCIUM 40 MG: 20 TABLET, FILM COATED ORAL at 22:37

## 2021-04-03 RX ADMIN — SODIUM CHLORIDE, PRESERVATIVE FREE 10 ML: 5 INJECTION INTRAVENOUS at 01:31

## 2021-04-03 RX ADMIN — GABAPENTIN 300 MG: 300 CAPSULE ORAL at 22:37

## 2021-04-03 RX ADMIN — IPRATROPIUM BROMIDE AND ALBUTEROL SULFATE 3 ML: 2.5; .5 SOLUTION RESPIRATORY (INHALATION) at 10:47

## 2021-04-03 RX ADMIN — IPRATROPIUM BROMIDE AND ALBUTEROL SULFATE 3 ML: 2.5; .5 SOLUTION RESPIRATORY (INHALATION) at 02:43

## 2021-04-03 RX ADMIN — ONDANSETRON HYDROCHLORIDE 4 MG: 2 SOLUTION INTRAMUSCULAR; INTRAVENOUS at 14:09

## 2021-04-03 RX ADMIN — VANCOMYCIN HYDROCHLORIDE 1250 MG: 10 INJECTION, POWDER, LYOPHILIZED, FOR SOLUTION INTRAVENOUS at 12:22

## 2021-04-03 RX ADMIN — TAZOBACTAM SODIUM AND PIPERACILLIN SODIUM 4.5 G: 500; 4 INJECTION, SOLUTION INTRAVENOUS at 10:01

## 2021-04-03 RX ADMIN — IPRATROPIUM BROMIDE AND ALBUTEROL SULFATE 3 ML: 2.5; .5 SOLUTION RESPIRATORY (INHALATION) at 15:04

## 2021-04-03 RX ADMIN — ASPIRIN 81 MG: 81 TABLET, COATED ORAL at 08:05

## 2021-04-03 RX ADMIN — TAZOBACTAM SODIUM AND PIPERACILLIN SODIUM 4.5 G: 500; 4 INJECTION, SOLUTION INTRAVENOUS at 01:30

## 2021-04-03 RX ADMIN — ALTEPLASE: 2.2 INJECTION, POWDER, LYOPHILIZED, FOR SOLUTION INTRAVENOUS at 20:28

## 2021-04-03 RX ADMIN — ALTEPLASE: 2.2 INJECTION, POWDER, LYOPHILIZED, FOR SOLUTION INTRAVENOUS at 20:25

## 2021-04-03 RX ADMIN — SODIUM CHLORIDE, PRESERVATIVE FREE 10 ML: 5 INJECTION INTRAVENOUS at 08:32

## 2021-04-03 RX ADMIN — NYSTATIN: 100000 POWDER TOPICAL at 01:33

## 2021-04-03 NOTE — PROGRESS NOTES
"Pharmacokinetic Evaluation: Vancomycin IV  Patient: Jalen Lockwood  Admission Date: 330  MRN: [id number]  : 1950    Day of vancomycin therapy: 5  Consult for Dr. Chapman  Treating: Sepsis  Goal AUC24, ss: 400-600 mg/L.hr     Current regimen: Vancomycin 1250 mg IV q12hrs  Other antimicrobials: Zosyn 4.5 gm IV q8hrs EI    Relevant clinical data and objective history reviewed:  71 y.o. male 193 cm (76\") (!) 140 kg (308 lb 3.3 oz)  Body mass index is 37.52 kg/m².  Results from last 7 days   Lab Units 21  0817 21  0327 21  0346   CREATININE mg/dL 0.71* 0.81 0.86     Estimated Creatinine Clearance: 131.3 mL/min (A) (by C-G formula based on SCr of 0.71 mg/dL (L)).    WBC   Date Value Ref Range Status   2021 5.88 3.40 - 10.80 10*3/mm3 Final   2021 7.66 3.40 - 10.80 10*3/mm3 Final   2021 10.97 (H) 3.40 - 10.80 10*3/mm3 Final     Temp:  [97.6 °F (36.4 °C)-98.3 °F (36.8 °C)] 97.6 °F (36.4 °C)  Heart Rate:  [64-90] 81  Resp:  [14-20] 18  BP: ()/(59-90) 105/80    Intake/Output Summary (Last 24 hours) at 4/3/2021 1112  Last data filed at 4/3/2021 1012  Gross per 24 hour   Intake 350 ml   Output 725 ml   Net -375 ml     Baseline labs/radiology/cultures:    Labs:  3/30 Lactate: 4.6, 2.8  3/30 Procalcitonin: 0.42    Radiology:    CXR: The heart is at the upper limits of normal in size. There is mild interstitial prominence in the perihilar regions bilaterally and at the left lung base increased slightly as compared to the examination of 2021. There is no evidence of consolidation or of gross effusion.     Cultures:   3/30 Blood cx x2: NG x 3 days  3/30 Urine cx: >100K mixed darian  3/30 RVP: None detected   Sputum cx: NGF    Assessment/Plan:   PMH: Allergic rhinitis, Anxiety, Aortectasia, Arthritis, Atrial flutter (2010), Charcot's joint of foot, Chronic edema, Chronic venous insufficiency, COPD, Coronary atherosclerosis, Diverticulosis, Duodenitis, Fatty liver, " Gastritis, Gastroparesis, Hematoma, Hyperlipidemia, Hypertension, Insomnia, Internal hemorrhoids, Open wound, Osteomyelitis, Paroxysmal atrial fibrillation, Peripheral neuropathy, Popliteal artery aneurysm, Skin cancer, Sleep apnea, Tachycardia induced cardiomyopathy, Venous stasis, and Venous stasis ulcer     1. Scr improved  2. Vanc trough ordered for 4/4/21 @ 11:30 am    Bayesian analysis of the most recent level using [employer] software gives the following patient-specific pharmacokinetic parameters:              CL: 5.76 L/hr              Vd: 116 L              T1/2: 14.5 hr  Using these values, the current regimen gives a predicted steady-state trough of 14.1 mcg/mL and AUC24, ss of 435 mg/L.hr     Vancomycin IV Dosing & Levels History:  3/30 13:07 2750 mg x 1  3/31 01:21, 14:52  1250 mg q12hr  4/01 01:58, 15:47         4/01 13:25 Trough: 13.9 mcg/mL, AUC= 436  4/02 02:14, 14:32  4/03 01:30, 12:00 (due)        Thank you for your consult,    Joleen Watkins [facility]

## 2021-04-03 NOTE — PROGRESS NOTES
"  Daily Progress Note.   [address]  4/3/2021    Patient:  Name:  Jalen Lockwood  MRN:  [id number]  1950  71 y.o.  male         CC: altered mental status fever    Interval History/ROS:  Admitted for sepsis with altered mental status with history of abd pain and worsening dyspnea.    Patient says breathing feels about the same.  Says he felt better since starting DuoNebs.  He denies any pleuritic chest pain worsening cough or sputum production.  No hemoptysis.  He says he wears 2 L nasal cannula at night and also uses oxygen throughout the day whenever his legs hurt and he gives him a headache he puts on oxygen he says that it goes away.    He is new to the ICU and is doing well      no diarrhea, no chest pain  PMFSSH: no change    Physical Exam:  /81 (BP Location: Left arm, Patient Position: Sitting)   Pulse 81   Temp 98.2 °F (36.8 °C) (Oral)   Resp 20   Ht 193 cm (76\")   Wt (!) 140 kg (308 lb 3.3 oz)   SpO2 93%   BMI 37.52 kg/m²   Body mass index is 37.52 kg/m².    Intake/Output Summary (Last 24 hours) at 4/3/2021 1618  Last data filed at 4/3/2021 1222  Gross per 24 hour   Intake 600 ml   Output 725 ml   Net -125 ml   2lnc 97%  General appearance: awake alert,  conversant   Eyes: anicteric sclerae, moist conjunctivae; no lid-lag; PERRLA  HENT: Atraumatic; oropharynx clear with moist mucous membranes and no mucosal ulcerations; normal hard and soft palate  Neck: Trachea midline; FROM, supple,   Lungs: No rhonchi no wheeze today with normal respiratory effort and no intercostal retractions  CV: irreg irreg no rub  Abdomen: Soft, non-tender;BS+ non rigid  Extremities:+ble edema with color change, wrapped from mid foot to just below knee  Skin: ble wrapped, multiple areas of ecchymosis  Psych: calm affect, alert and oriented to person  Neuro moves all ext, cns 2-12 intact sensation intact        Data Review:  Notable Labs:  Results from last 7 days   Lab Units 04/03/21  0817 " 04/02/21 0327 04/01/21  0346 03/31/21  0518 03/30/21  1207   WBC 10*3/mm3 5.88 7.66 10.97* 15.46* 19.85*   HEMOGLOBIN g/dL 12.1* 12.3* 12.3* 13.4 15.5   PLATELETS 10*3/mm3 134* 124* 122* 159 226     Results from last 7 days   Lab Units 04/03/21  0817 04/02/21 0327 04/01/21  0346 03/31/21  0644 03/30/21  1504 03/30/21  1207   SODIUM mmol/L 135* 135* 135* 133* 132* 129*   POTASSIUM mmol/L 3.8 4.0 3.5 3.6 3.4* 4.4   CHLORIDE mmol/L 104 104 103 100 97* 92*   CO2 mmol/L 24.1 20.6* 22.8 21.1* 24.3 23.8   BUN mg/dL 8 11 13 12 10 9   CREATININE mg/dL 0.71* 0.81 0.86 0.92 1.03 1.11   GLUCOSE mg/dL 101* 105* 117* 119* 85 110*   CALCIUM mg/dL 8.4* 8.1* 8.2* 8.6 8.8 10.3   MAGNESIUM mg/dL  --   --   --  2.0  --   --    Estimated Creatinine Clearance: 131.3 mL/min (A) (by C-G formula based on SCr of 0.71 mg/dL (L)).    Results from last 7 days   Lab Units 04/03/21  0817 04/02/21 0327 04/01/21  0346 03/30/21  1504 03/30/21  1207   AST (SGOT) U/L  --   --   --   --  18   ALT (SGPT) U/L  --   --   --   --  12   PROCALCITONIN ng/mL  --   --   --   --  0.42*   LACTATE mmol/L  --   --   --  2.8* 4.6*   PLATELETS 10*3/mm3 134* 124* 122*  --  226       Results from last 7 days   Lab Units 03/30/21  1215   PH, ARTERIAL pH units 7.551*   PCO2, ARTERIAL mm Hg 26.5*   PO2 ART mm Hg 64.6*   HCO3 ART mmol/L 23.3     Trop 0.167  RVp negative with covid negative  Lactic 4.6  procal 0.42    Imaging:  Reviewed chest images personally from past 3 days    tte 3/31/2021:  · The left ventricular cavity is moderately dilated.  · There is akinesis of the entire septum, distal anterior wall, distal inferior wall, and apex  · Left ventricular ejection fraction appears to be 31 - 35%.  · Left ventricular diastolic function was indeterminate.  · The right ventricular cavity is moderate to severely dilated. Normal right ventricular systolic function noted.  · The left atrial cavity is moderately dilated.  · The right atrial cavity is severely  dilated.  · Moderate tricuspid valve regurgitation is present.  · Calculated right ventricular systolic pressure from tricuspid regurgitation is 40 mmHg.  · The IVC is grossly dilated, measuring over 3 cm  · There is no evidence of pericardial effusion    Scheduled meds:    alteplase, 2 mg, Intracatheter, Once  alteplase, 2 mg, Intracatheter, Once  alteplase, 2 mg, Intracatheter, Once  ampicillin-sulbactam, 3 g, Intravenous, Q6H  aspirin, 81 mg, Oral, Daily  atorvastatin, 40 mg, Oral, Nightly  budesonide-formoterol, 2 puff, Inhalation, BID - RT  finasteride, 5 mg, Oral, Daily  gabapentin, 300 mg, Oral, Q8H  insulin lispro, 0-14 Units, Subcutaneous, 4x Daily With Meals & Nightly  ipratropium-albuterol, 3 mL, Nebulization, 4x Daily - RT  metoprolol succinate XL, 12.5 mg, Oral, Q24H  nystatin, , Topical, Q12H  sodium chloride, 10 mL, Intravenous, Q12H  sodium chloride, 10 mL, Intravenous, Q12H  sodium chloride, 10 mL, Intravenous, Q12H  warfarin, 5 mg, Oral, Once        ASSESSMENT  /  PLAN:  Severe sepsis  Hypotension  UTI  Altered mental status/metabolic encephalopathy  Respiratory alkalosis  Hyponatremia  Lactic acidosis  Elevated procalcitonin  Abdominal pain  Atrial fibrillation with RVR  COPD: Currently without exacerbation  Chronic venous insufficiency  History of gastroparesis  History of MRSA soft tissue skin infection  Sleep apnea  Current daily smoker  Daily alcohol use  CAD  NSTEMI  Acute systolic heart failure with regional wall motion abn     Out of icu under hospitalist care from pulmonary perspective doing quite well.  He has no wheeze or rhonchi.  He is on 2 L nasal cannula however saturation is 97%.  He is on DuoNebs and Symbicort.  At this time I feel like his respiratory status is fairly stable.  We will go ahead and sign off and defer ongoing care of sepsis to infectious disease and hospitalist service.  Appreciate cardiology following for NSTEMI.    Please call us if we can be of any further  assistance in regards to his pulmonary care.  Appreciate A assuming care of this patient outside of the ICU.    Brad Lepe MD  [facility]  04/03/21  4:48 PM

## 2021-04-03 NOTE — PLAN OF CARE
"Administratively Closed by Health Information Management    Goal Outcome Evaluation:  Patient had good shift, up in chair for several hours.  Lower ext wrapped by wound care RN, These will be changed by wound care on Monday, Wednesday, and Fridays.  He had several dressings on his arms, old dried drainage noted, mepiplexes removed, cleaned with saline and vasline gauze applied, covered with 4x4, wrapped with kerlix, and then \"heavy\" coban.  There is a wound on right ring finger that is scabbed over and a skin tear on left upper arm that is scabbed over.  Patient is taking Norco every 6 hours and requests it as soon a           "

## 2021-04-03 NOTE — PROGRESS NOTES
Pharmacy Consult: Warfarin Dosing/ Monitoring    Jalen Lockwood is a 71 y.o. male, estimated creatinine clearance is 131.3 mL/min (A) (by C-G formula based on SCr of 0.71 mg/dL (L)). weighing (!) 140 kg (308 lb 3.3 oz).    PMH: Allergic rhinitis, Anxiety, Aortectasia, Arthritis, Atrial flutter (2010), Charcot's joint of foot, Chronic edema, Chronic venous insufficiency, COPD, Coronary atherosclerosis, Diverticulosis, Duodenitis, Fatty liver, Gastritis, Gastroparesis, Hematoma, Hyperlipidemia, Hypertension, Insomnia, Internal hemorrhoids, Open wound, Osteomyelitis, Paroxysmal atrial fibrillation, Peripheral neuropathy, Popliteal artery aneurysm, Skin cancer, Sleep apnea, Tachycardia induced cardiomyopathy, Venous stasis, and Venous stasis ulcer    Social History     Tobacco Use    Smoking status: Current Every Day Smoker     Packs/day: 0.25     Years: 45.00     Pack years: 11.25     Types: Cigarettes    Smokeless tobacco: Never Used   Vaping Use    Vaping Use: Never used   Substance Use Topics    Alcohol use: Yes     Alcohol/week: 8.0 - 9.0 standard drinks     Types: 1 - 2 Shots of liquor, 7 Standard drinks or equivalent per week     Comment: 2 rum a day//Caffeine use: 3 cups daily    Drug use: No     Results from last 7 days   Lab Units 04/03/21 0817 04/02/21 0327 04/01/21  1325 04/01/21  0346 03/31/21  0518 03/30/21  1207   INR  1.99* 1.60* 1.66*  --   --  2.12*   APTT seconds  --   --   --   --   --  54.7*   HEMOGLOBIN g/dL 12.1* 12.3*  --  12.3* 13.4 15.5   HEMATOCRIT % 34.7* 35.3*  --  34.3* 36.8* 44.2   PLATELETS 10*3/mm3 134* 124*  --  122* 159 226     Results from last 7 days   Lab Units 04/03/21 0817 04/02/21 0327 04/01/21  0346 03/30/21  1207   SODIUM mmol/L 135* 135* 135* 129*   POTASSIUM mmol/L 3.8 4.0 3.5 4.4   CHLORIDE mmol/L 104 104 103 92*   CO2 mmol/L 24.1 20.6* 22.8 23.8   BUN mg/dL 8 11 13 9   CREATININE mg/dL 0.71* 0.81 0.86 1.11   CALCIUM mg/dL 8.4* 8.1* 8.2* 10.3   BILIRUBIN mg/dL  --   --    --  1.6*   ALK PHOS U/L  --   --   --  96   ALT (SGPT) U/L  --   --   --  12   AST (SGOT) U/L  --   --   --  18   GLUCOSE mg/dL 101* 105* 117* 110*     Anticoagulation history: Managed by [facility] anti-coag clinic. 5 mg M, F; 7.5 mg other days    Hospital Anticoagulation:  Consulting provider: Dr. Brad Lepe  Start date: Home med continued on 4/1/2021  Indication: Atrial fibrillation  Target INR: 2-3  Expected duration: lifetime   Bridge Therapy: Enoxaparin 1 mg/kg q12 started on 4/2/21                Date 4/1 4/2 4/3           INR 1.66 1.60 1.99          Warfarin dose 7.5 mg 7.5 mg 5 mg            Potential drug interactions:   Aspirin-new med-increased risk of bleeding  Piperacillin/tazobactam-increased risk of bleeding  Vancomycin-increased risk of bleeding    Relevant nutrition status: Regular diet    Education complete?/ Date: Yes, on 4/1/21    Assessment/Plan:  Warfarin restarted 4/1/21, INR 1.99 this am, Cardiology is adding enoxaparin full dose to cover until INR is therapeutic.  Will order warfarin at 5 mg po x1 dose today.     Pharmacy will continue to follow until discharge or discontinuation of warfarin.     Joleen Watkins [facility]  Clinical Staff Pharmacist

## 2021-04-03 NOTE — NURSING NOTE
IV TEAM CALLED TO EVALUATE TRIPLE LUMEN PICC. BLOOD WAS SLUGGISH IN RED AND WHITE LUMENS, AND OCCLUDED COMPLETELY IN GRAY LUMEN. REESTABLISHED BLOOD RETURN IN ALL LUMENS WITH MULTIPLE NS FLUSHES. IF BLOOD RETURN BECOMES AN ISSUE AGAIN I RECOMMEND CATH SHEILA BE ORDERED IN THE AM.

## 2021-04-03 NOTE — PROGRESS NOTES
LOS: 4 days   Patient Care Team:  Marc Ludwig MD as PCP - General (Family Medicine)  Blas Mckeon MD as Surgeon (General Surgery)  Jonnathan Boston II, MD as Consulting Physician (Vascular Surgery)  Xavi Elliott MD as Consulting Physician (Urology)  Marc Benavidez MD as Consulting Physician (Pain Medicine)  Kurt Henry MD as Consulting Physician (Cardiology)  Meghna Horan [facility] as Pharmacist  Juni Landa MD as Consulting Physician (Hematology and Oncology)  Brendan Live [facility] as Pharmacist (Pharmacy)    Chief Complaint: Follow-up for NSTEMI, persistent atrial fibrillation, new cardiomyopathy.    Interval History: He is fatigued, and has pain in his lower extremities still.  No chest pain or significant shortness of breath.  He did get up and walk briefly yesterday.    Vital Signs:  Temp:  [97.6 °F (36.4 °C)-98.2 °F (36.8 °C)] 98.2 °F (36.8 °C)  Heart Rate:  [64-90] 75  Resp:  [16-20] 20  BP: (105-134)/(79-90) 122/81    Intake/Output Summary (Last 24 hours) at 4/3/2021 1421  Last data filed at 4/3/2021 1222  Gross per 24 hour   Intake 600 ml   Output 725 ml   Net -125 ml       Physical Exam:   General Appearance:    No acute distress, alert and oriented x4   Lungs:     Bilateral rhonchi and coarse breath sounds    Heart:    Irregularly irregular rhythm and normal rate.  No murmurs, gallops, or rubs.   Abdomen:     Soft, nontender, nondistended.    Extremities:   Wraps noted bilaterally with multiple areas of ecchymoses     Results Review:    Results from last 7 days   Lab Units 04/03/21  0817   SODIUM mmol/L 135*   POTASSIUM mmol/L 3.8   CHLORIDE mmol/L 104   CO2 mmol/L 24.1   BUN mg/dL 8   CREATININE mg/dL 0.71*   GLUCOSE mg/dL 101*   CALCIUM mg/dL 8.4*     Results from last 7 days   Lab Units 04/02/21  0327 04/01/21  0346 03/31/21  0644   TROPONIN T ng/mL 0.233* 0.333* 0.254*     Results from last 7 days   Lab Units 04/03/21  0817   WBC 10*3/mm3 5.88   HEMOGLOBIN g/dL 12.1*    HEMATOCRIT % 34.7*   PLATELETS 10*3/mm3 134*     Results from last 7 days   Lab Units 04/03/21  0817 04/02/21  0327 04/01/21  1325 03/30/21  1207   INR  1.99* 1.60* 1.66* 2.12*   APTT seconds  --   --   --  54.7*         Results from last 7 days   Lab Units 03/31/21  0644   MAGNESIUM mg/dL 2.0           I reviewed the patient's new clinical results.        Assessment:  1.  Severe sepsis, likely lower extremity wounds as source  2.  Urinary tract infection  3.  Metabolic encephalopathy, resolved  4.  NSTEMI with history of moderate diffuse coronary artery disease  5.  New cardiomyopathy, likely ischemic (EF 30 to 35%)  6.  Moderate to severe right ventricular dilatation  7.  Chronic edema, multifactorial and partially from venous stasis  8.  Bilateral lower extremity wounds and recurrent cellulitis  9.  Persistent atrial fibrillation  10.  Left popliteal artery aneurysm, status post stenting in 2016  11.  Ongoing tobacco and alcohol use    Plan:    -His cardiomyopathy is likely ischemic and follows in the LAD distribution.  I suspect that he has significant coronary artery disease in the LAD, and this, coupled with the stress of his current situation caused the NSTEMI.  However, he has multiple issues, including significant lower extremity wounds and bleeding, as well as the need for systemic anticoagulation with warfarin.  He would be a poor candidate for dual antiplatelet therapy if stenting were needed, and I feel the chances for bleeding complications would be high.  Continue medical treatment for now.    -Continue Toprol-XL at 12.5 mg/day.  Can titrate upwards if his blood pressure remains stable tomorrow.    -Continue Lipitor 40 mg/day and aspirin 81 mg/day.      -INR is back up to 1.99.  I am going to hold the Lovenox for now.  Again, he does have multiple bleeding wounds on his arms and legs.    Hilario Ngo MD  04/03/21  14:21 EDT

## 2021-04-03 NOTE — PROGRESS NOTES
Name: Jalen Lockwood ADMIT: 3/30/2021   : 1950  PCP: Marc Ludwig MD    MRN: [id number] LOS: 4 days   AGE/SEX: 71 y.o. male  ROOM: [facility]/     Subjective   Subjective   CC: generalized weakness  No acute events. Patient has no new complaints. His abdominal pain is much improved. No more dysuria. Denies CP/f/c/n/v/d. Dyspnea is at baseline.  His wife was at bedside during the time of my evaluation.    Objective   Objective   Vital Signs  Temp:  [97.6 °F (36.4 °C)-98.2 °F (36.8 °C)] 98.2 °F (36.8 °C)  Heart Rate:  [64-90] 81  Resp:  [16-20] 20  BP: (105-134)/(79-90) 122/81  SpO2:  [90 %-97 %] 93 %  on  Flow (L/min):  [2] 2;   Device (Oxygen Therapy): nasal cannula  Body mass index is 37.52 kg/m².  Physical Exam  Vitals and nursing note reviewed.   Constitutional:       General: He is not in acute distress.     Appearance: He is ill-appearing (chronically). He is not toxic-appearing.   HENT:      Head: Normocephalic and atraumatic.      Nose: Nose normal.      Mouth/Throat:      Mouth: Mucous membranes are moist.      Pharynx: Oropharynx is clear.   Eyes:      Extraocular Movements: Extraocular movements intact.      Conjunctiva/sclera: Conjunctivae normal.      Pupils: Pupils are equal, round, and reactive to light.   Cardiovascular:      Rate and Rhythm: Normal rate. Rhythm irregular.      Pulses: Normal pulses.   Pulmonary:      Effort: Pulmonary effort is normal.      Breath sounds: Examination of the right-lower field reveals decreased breath sounds. Examination of the left-lower field reveals decreased breath sounds. Decreased breath sounds present.   Abdominal:      General: Bowel sounds are normal.      Palpations: Abdomen is soft.      Tenderness: There is no abdominal tenderness.   Musculoskeletal:         General: Swelling (1-2+ BLE) present. No tenderness.      Cervical back: Normal range of motion and neck supple.      Comments: Compression wraps in place BLE   Skin:     General: Skin is warm  and dry.      Capillary Refill: Capillary refill takes less than 2 seconds.   Neurological:      General: No focal deficit present.      Mental Status: He is alert and oriented to person, place, and time.   Psychiatric:         Mood and Affect: Mood normal.         Behavior: Behavior normal.       Results Review     I reviewed the patient's new clinical results.  I reviewed the patient's telemetry.  I reviewed the patient's chest xray  Results from last 7 days   Lab Units 04/03/21  0817 04/02/21 0327 04/01/21 0346 03/31/21  0518   WBC 10*3/mm3 5.88 7.66 10.97* 15.46*   HEMOGLOBIN g/dL 12.1* 12.3* 12.3* 13.4   PLATELETS 10*3/mm3 134* 124* 122* 159     Results from last 7 days   Lab Units 04/03/21  0817 04/02/21 0327 04/01/21  0346 03/31/21  0644   SODIUM mmol/L 135* 135* 135* 133*   POTASSIUM mmol/L 3.8 4.0 3.5 3.6   CHLORIDE mmol/L 104 104 103 100   CO2 mmol/L 24.1 20.6* 22.8 21.1*   BUN mg/dL 8 11 13 12   CREATININE mg/dL 0.71* 0.81 0.86 0.92   GLUCOSE mg/dL 101* 105* 117* 119*   Estimated Creatinine Clearance: 131.3 mL/min (A) (by C-G formula based on SCr of 0.71 mg/dL (L)).  Results from last 7 days   Lab Units 03/30/21  1207   ALBUMIN g/dL 4.40   BILIRUBIN mg/dL 1.6*   ALK PHOS U/L 96   AST (SGOT) U/L 18   ALT (SGPT) U/L 12     Results from last 7 days   Lab Units 04/03/21  0817 04/02/21  0327 04/01/21  0346 03/31/21  0644 03/30/21  1207   CALCIUM mg/dL 8.4* 8.1* 8.2* 8.6 10.3   ALBUMIN g/dL  --   --   --   --  4.40   MAGNESIUM mg/dL  --   --   --  2.0  --      Results from last 7 days   Lab Units 03/30/21  1504 03/30/21  1207   PROCALCITONIN ng/mL  --  0.42*   LACTATE mmol/L 2.8* 4.6*     COVID19   Date Value Ref Range Status   03/30/2021 Not Detected Not Detected - Ref. Range Final     Glucose   Date/Time Value Ref Range Status   04/03/2021 1609 114 70 - 130 mg/dL Final   04/03/2021 1106 118 70 - 130 mg/dL Final   04/03/2021 0647 125 70 - 130 mg/dL Final   04/03/2021 0112 108 70 - 130 mg/dL Final    04/02/2021 1957 117 70 - 130 mg/dL Final   04/02/2021 1738 114 70 - 130 mg/dL Final   04/02/2021 1213 196 (H) 70 - 130 mg/dL Final       XR Chest 1 View  PORTABLE CHEST     HISTORY: Cough, wheezing.     COMPARISON: 03/30/2021     FINDINGS: The heart is at the upper limits of normal in size. There is  mild interstitial prominence in the perihilar regions bilaterally and at  the left lung base increased slightly as compared to the examination of  03/30/2021. There is no evidence of consolidation or of gross effusion.     This report was finalized on 4/2/2021 8:02 AM by Dr. Isaias Gonzalez M.D.       Scheduled Medications  alteplase, 2 mg, Intracatheter, Once  alteplase, 2 mg, Intracatheter, Once  alteplase, 2 mg, Intracatheter, Once  ampicillin-sulbactam, 3 g, Intravenous, Q6H  aspirin, 81 mg, Oral, Daily  atorvastatin, 40 mg, Oral, Nightly  budesonide-formoterol, 2 puff, Inhalation, BID - RT  finasteride, 5 mg, Oral, Daily  gabapentin, 300 mg, Oral, Q8H  insulin lispro, 0-14 Units, Subcutaneous, 4x Daily With Meals & Nightly  ipratropium-albuterol, 3 mL, Nebulization, 4x Daily - RT  metoprolol succinate XL, 12.5 mg, Oral, Q24H  nystatin, , Topical, Q12H  sodium chloride, 10 mL, Intravenous, Q12H  sodium chloride, 10 mL, Intravenous, Q12H  sodium chloride, 10 mL, Intravenous, Q12H  warfarin, 5 mg, Oral, Once    Infusions  Pharmacy to dose warfarin,     Diet  Diet Regular       Assessment/Plan     Active Hospital Problems    Diagnosis  POA   • Hyponatremia [E87.1]  Yes   • Thrombocytopenia (CMS/HCC) [D69.6]  Yes   • Ischemic cardiomyopathy [I25.5]  Yes   • Left ventricular systolic dysfunction [I51.9]  Yes   • Cellulitis of lower extremity [L03.119]  Yes   • Severe sepsis (CMS/HCC) [A41.9, R65.20]  Yes   • Alcohol dependence, 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]  Yes   • Essential hypertension [I10]  Yes   • COPD (chronic obstructive pulmonary disease) (CMS/HCC) [J44.9]  Yes   • Permanent atrial fibrillation (CMS/HCC)  [I48.21]  Yes   • Venous stasis [I87.8]  Yes      Resolved Hospital Problems    Diagnosis Date Resolved POA   • Metabolic encephalopathy [G93.41] 04/03/2021 Yes   Severe Sepsis with Metabolic Encephalopathy, Present on Admission  - most likely source seems to be LE cellulitis, possible UTI  - blood cx's NGTD, urine culture with mixed darian  - no purulence noted associated with LE cellulitis-will narrow abx from vanc/zosyn to unasyn and monitor with this-follow inflammatory markers and procalcitonin  - now off IVF, hemodynamically stable    NSTEMI/ICM/LV Systolic Dysfunction  - likely LAD disease per cardiology, recommending continuing medical therapy  - monitor volume status  - cardiology following, appreciate recs    Permanent Afib  - continue AC with warfarin  - stop lovenox bridge as he is nearly therapeutic  - continue metoprolol    COPD  - no exacerbation  - continue on symbicort  - duo-nebs    Thrombocytopenia  - mild, likely due to severe sepsis  - improving, will monitor    Warfarin (home med) for DVT prophylaxis.  Full code.  Discussed with patient, family and nursing staff.  Anticipate discharge to SNU facility in 2-3 days.      Camilo Blanco MD  [facility]  04/03/21  16:25 EDT

## 2021-04-03 NOTE — PLAN OF CARE
"Goal Outcome Evaluation:   Patient transferred from ICU, dx Sepsis, UTI.  He had a good night, up in chair watching TV until 4am, Patient is awake,, alert and oriented, On oxygen at 2l via NC, lung sounds diminished, no cough witnessed, He is up with assistance and we got him a walker to use.  He was changed to a specialty bed.  He has venous stasis ulcers on lower extremities that wound care is dressing.   They will be in on Mondays, Wednesdays and Fridays.  Lovenox has been added to his meds until his INR is therapeutic.  He requested a breathing treatment around 2am.  Granbury was given at approx 1am  He has glucose checks ac and hs.  He had 2 results on this shift 117 and 108, no coverage needed.  Bilateral arms with several skin tears, mepiplexs with old drainage removed, vaseline aleksey applied, covered with 4x4, wrapped with kerlix roll and sedured with \"coban\".  Mr Lockwood has a picc line in his right upper arm, attempts to flush were made at the beginning of my shift and I was unable to flush, used 3cc syringe, able to flush 1 and get a \"sluggish\" blood return.  Notified IV team, they responded and flushed numerous times.  Instructions to flush several times throughout my shift.  I flushed 4 times this shift, very hard to get blood return, reported this to oncoming RN,         "

## 2021-04-03 NOTE — PLAN OF CARE
Goal Outcome Evaluation:  Plan of Care Reviewed With: patient  Progress: no change  Outcome Summary: Patient continues to require pain medications every six hours to keep pain at tolerable level. Premedicated prior to wound care.  betadine wet gauze placed between all toes and opticell ag also placed in between left toes.  Opticell AG placed on top of left foot.  Patient tolerated wound care.  Refusing bath.  Patient educated on daily bathing to prevent infection in PICC line.  Allowed aide to cleanse arm that PICC is in.  no blood return noted in all three lumens of PICC.  Lumen 3 unable to push.  Orders for cathflo recieved and IV therapy notified of need.

## 2021-04-03 NOTE — PLAN OF CARE
Administratively Closed by Health Information Management    Goal Outcome Evaluation:  Patient from ICU yesterday,  Dx Sepsis and UTI, Patient is awake, alert and oriented, on O2 at 2l, up with assistance, also use of walker.  Lower ext swollen and venous ulcers.  Lower ext dressed by wound care nurse and they will be back on Monday.  Dressing changes will take place Monday, wed, and fridays

## 2021-04-04 ENCOUNTER — APPOINTMENT (OUTPATIENT)
Dept: GENERAL RADIOLOGY | Facility: HOSPITAL | Age: 71
End: 2021-04-04

## 2021-04-04 PROBLEM — I50.21 ACUTE SYSTOLIC (CONGESTIVE) HEART FAILURE (HCC): Status: ACTIVE | Noted: 2021-04-03

## 2021-04-04 LAB
ANION GAP SERPL CALCULATED.3IONS-SCNC: 9.8 MMOL/L (ref 5–15)
BACTERIA SPEC AEROBE CULT: NORMAL
BACTERIA SPEC AEROBE CULT: NORMAL
BASOPHILS # BLD AUTO: 0.03 10*3/MM3 (ref 0–0.2)
BASOPHILS NFR BLD AUTO: 0.5 % (ref 0–1.5)
BUN SERPL-MCNC: 8 MG/DL (ref 8–23)
BUN/CREAT SERPL: 11.6 (ref 7–25)
CALCIUM SPEC-SCNC: 8.2 MG/DL (ref 8.6–10.5)
CHLORIDE SERPL-SCNC: 103 MMOL/L (ref 98–107)
CO2 SERPL-SCNC: 22.2 MMOL/L (ref 22–29)
CREAT SERPL-MCNC: 0.69 MG/DL (ref 0.76–1.27)
DEPRECATED RDW RBC AUTO: 47 FL (ref 37–54)
EOSINOPHIL # BLD AUTO: 0.19 10*3/MM3 (ref 0–0.4)
EOSINOPHIL NFR BLD AUTO: 3.2 % (ref 0.3–6.2)
ERYTHROCYTE [DISTWIDTH] IN BLOOD BY AUTOMATED COUNT: 13.1 % (ref 12.3–15.4)
GFR SERPL CREATININE-BSD FRML MDRD: 113 ML/MIN/1.73
GLUCOSE BLDC GLUCOMTR-MCNC: 106 MG/DL (ref 70–130)
GLUCOSE BLDC GLUCOMTR-MCNC: 120 MG/DL (ref 70–130)
GLUCOSE BLDC GLUCOMTR-MCNC: 155 MG/DL (ref 70–130)
GLUCOSE BLDC GLUCOMTR-MCNC: 96 MG/DL (ref 70–130)
GLUCOSE SERPL-MCNC: 92 MG/DL (ref 65–99)
HCT VFR BLD AUTO: 36 % (ref 37.5–51)
HGB BLD-MCNC: 12.7 G/DL (ref 13–17.7)
INR PPP: 1.99 (ref 0.9–1.1)
LYMPHOCYTES # BLD AUTO: 1.14 10*3/MM3 (ref 0.7–3.1)
LYMPHOCYTES NFR BLD AUTO: 19.4 % (ref 19.6–45.3)
MCH RBC QN AUTO: 35 PG (ref 26.6–33)
MCHC RBC AUTO-ENTMCNC: 35.3 G/DL (ref 31.5–35.7)
MCV RBC AUTO: 99.2 FL (ref 79–97)
MONOCYTES # BLD AUTO: 0.47 10*3/MM3 (ref 0.1–0.9)
MONOCYTES NFR BLD AUTO: 8 % (ref 5–12)
NEUTROPHILS NFR BLD AUTO: 4.03 10*3/MM3 (ref 1.7–7)
NEUTROPHILS NFR BLD AUTO: 68.6 % (ref 42.7–76)
PLATELET # BLD AUTO: 128 10*3/MM3 (ref 140–450)
PMV BLD AUTO: 11.1 FL (ref 6–12)
POTASSIUM SERPL-SCNC: 4.1 MMOL/L (ref 3.5–5.2)
PROCALCITONIN SERPL-MCNC: 0.97 NG/ML (ref 0–0.25)
PROTHROMBIN TIME: 22.4 SECONDS (ref 11.7–14.2)
RBC # BLD AUTO: 3.63 10*6/MM3 (ref 4.14–5.8)
SODIUM SERPL-SCNC: 135 MMOL/L (ref 136–145)
WBC # BLD AUTO: 5.88 10*3/MM3 (ref 3.4–10.8)

## 2021-04-04 PROCEDURE — 94799 UNLISTED PULMONARY SVC/PX: CPT

## 2021-04-04 PROCEDURE — 82962 GLUCOSE BLOOD TEST: CPT

## 2021-04-04 PROCEDURE — 85025 COMPLETE CBC W/AUTO DIFF WBC: CPT | Performed by: INTERNAL MEDICINE

## 2021-04-04 PROCEDURE — 71045 X-RAY EXAM CHEST 1 VIEW: CPT

## 2021-04-04 PROCEDURE — 99233 SBSQ HOSP IP/OBS HIGH 50: CPT | Performed by: INTERNAL MEDICINE

## 2021-04-04 PROCEDURE — 25010000003 AMPICILLIN-SULBACTAM PER 1.5 G: Performed by: INTERNAL MEDICINE

## 2021-04-04 PROCEDURE — 85610 PROTHROMBIN TIME: CPT | Performed by: INTERNAL MEDICINE

## 2021-04-04 PROCEDURE — 25010000002 FUROSEMIDE PER 20 MG: Performed by: INTERNAL MEDICINE

## 2021-04-04 PROCEDURE — 80048 BASIC METABOLIC PNL TOTAL CA: CPT | Performed by: INTERNAL MEDICINE

## 2021-04-04 PROCEDURE — 84145 PROCALCITONIN (PCT): CPT | Performed by: INTERNAL MEDICINE

## 2021-04-04 PROCEDURE — 25010000002 ALTEPLASE PER 1 MG: Performed by: INTERNAL MEDICINE

## 2021-04-04 PROCEDURE — 63710000001 INSULIN LISPRO (HUMAN) PER 5 UNITS: Performed by: INTERNAL MEDICINE

## 2021-04-04 RX ORDER — FUROSEMIDE 10 MG/ML
40 INJECTION INTRAMUSCULAR; INTRAVENOUS ONCE
Status: DISCONTINUED | OUTPATIENT
Start: 2021-04-04 | End: 2021-04-04

## 2021-04-04 RX ORDER — METOPROLOL SUCCINATE 25 MG/1
12.5 TABLET, EXTENDED RELEASE ORAL EVERY 12 HOURS SCHEDULED
Status: DISCONTINUED | OUTPATIENT
Start: 2021-04-04 | End: 2021-04-11

## 2021-04-04 RX ORDER — WARFARIN SODIUM 5 MG/1
5 TABLET ORAL
Status: DISCONTINUED | OUTPATIENT
Start: 2021-04-05 | End: 2021-04-09

## 2021-04-04 RX ORDER — WARFARIN SODIUM 7.5 MG/1
7.5 TABLET ORAL
Status: DISCONTINUED | OUTPATIENT
Start: 2021-04-04 | End: 2021-04-09

## 2021-04-04 RX ORDER — LOSARTAN POTASSIUM 25 MG/1
12.5 TABLET ORAL
Status: DISCONTINUED | OUTPATIENT
Start: 2021-04-04 | End: 2021-04-12

## 2021-04-04 RX ORDER — FUROSEMIDE 10 MG/ML
40 INJECTION INTRAMUSCULAR; INTRAVENOUS EVERY 12 HOURS
Status: DISCONTINUED | OUTPATIENT
Start: 2021-04-04 | End: 2021-04-07

## 2021-04-04 RX ADMIN — AMPICILLIN SODIUM AND SULBACTAM SODIUM 3 G: 2; 1 INJECTION, POWDER, FOR SOLUTION INTRAMUSCULAR; INTRAVENOUS at 17:59

## 2021-04-04 RX ADMIN — IPRATROPIUM BROMIDE AND ALBUTEROL SULFATE 3 ML: 2.5; .5 SOLUTION RESPIRATORY (INHALATION) at 11:19

## 2021-04-04 RX ADMIN — AMPICILLIN SODIUM AND SULBACTAM SODIUM 3 G: 2; 1 INJECTION, POWDER, FOR SOLUTION INTRAMUSCULAR; INTRAVENOUS at 12:01

## 2021-04-04 RX ADMIN — IPRATROPIUM BROMIDE AND ALBUTEROL SULFATE 3 ML: 2.5; .5 SOLUTION RESPIRATORY (INHALATION) at 15:40

## 2021-04-04 RX ADMIN — HYDROCODONE BITARTRATE AND ACETAMINOPHEN 2 TABLET: 5; 325 TABLET ORAL at 04:49

## 2021-04-04 RX ADMIN — WARFARIN 7.5 MG: 7.5 TABLET ORAL at 18:37

## 2021-04-04 RX ADMIN — HYDROCODONE BITARTRATE AND ACETAMINOPHEN 2 TABLET: 5; 325 TABLET ORAL at 10:44

## 2021-04-04 RX ADMIN — ATORVASTATIN CALCIUM 40 MG: 20 TABLET, FILM COATED ORAL at 21:23

## 2021-04-04 RX ADMIN — GABAPENTIN 300 MG: 300 CAPSULE ORAL at 21:23

## 2021-04-04 RX ADMIN — FINASTERIDE 5 MG: 5 TABLET, FILM COATED ORAL at 08:16

## 2021-04-04 RX ADMIN — NYSTATIN: 100000 POWDER TOPICAL at 08:16

## 2021-04-04 RX ADMIN — NYSTATIN: 100000 POWDER TOPICAL at 21:23

## 2021-04-04 RX ADMIN — LOSARTAN POTASSIUM 12.5 MG: 25 TABLET, FILM COATED ORAL at 12:17

## 2021-04-04 RX ADMIN — AMPICILLIN SODIUM AND SULBACTAM SODIUM 3 G: 2; 1 INJECTION, POWDER, FOR SOLUTION INTRAMUSCULAR; INTRAVENOUS at 23:53

## 2021-04-04 RX ADMIN — SODIUM CHLORIDE, PRESERVATIVE FREE 10 ML: 5 INJECTION INTRAVENOUS at 21:24

## 2021-04-04 RX ADMIN — INSULIN LISPRO 3 UNITS: 100 INJECTION, SOLUTION INTRAVENOUS; SUBCUTANEOUS at 21:13

## 2021-04-04 RX ADMIN — SODIUM CHLORIDE, PRESERVATIVE FREE 10 ML: 5 INJECTION INTRAVENOUS at 08:17

## 2021-04-04 RX ADMIN — BUDESONIDE AND FORMOTEROL FUMARATE DIHYDRATE 2 PUFF: 160; 4.5 AEROSOL RESPIRATORY (INHALATION) at 21:38

## 2021-04-04 RX ADMIN — SODIUM CHLORIDE, PRESERVATIVE FREE 10 ML: 5 INJECTION INTRAVENOUS at 21:23

## 2021-04-04 RX ADMIN — BUDESONIDE AND FORMOTEROL FUMARATE DIHYDRATE 2 PUFF: 160; 4.5 AEROSOL RESPIRATORY (INHALATION) at 07:50

## 2021-04-04 RX ADMIN — HYDROCODONE BITARTRATE AND ACETAMINOPHEN 2 TABLET: 5; 325 TABLET ORAL at 16:49

## 2021-04-04 RX ADMIN — NYSTATIN: 100000 POWDER TOPICAL at 00:46

## 2021-04-04 RX ADMIN — HYDROCODONE BITARTRATE AND ACETAMINOPHEN 2 TABLET: 5; 325 TABLET ORAL at 23:00

## 2021-04-04 RX ADMIN — FUROSEMIDE 40 MG: 10 INJECTION, SOLUTION INTRAMUSCULAR; INTRAVENOUS at 15:56

## 2021-04-04 RX ADMIN — AMPICILLIN SODIUM AND SULBACTAM SODIUM 3 G: 2; 1 INJECTION, POWDER, FOR SOLUTION INTRAMUSCULAR; INTRAVENOUS at 04:49

## 2021-04-04 RX ADMIN — IPRATROPIUM BROMIDE AND ALBUTEROL SULFATE 3 ML: 2.5; .5 SOLUTION RESPIRATORY (INHALATION) at 03:04

## 2021-04-04 RX ADMIN — ALTEPLASE: KIT at 01:03

## 2021-04-04 RX ADMIN — METOPROLOL SUCCINATE 12.5 MG: 25 TABLET, EXTENDED RELEASE ORAL at 08:16

## 2021-04-04 RX ADMIN — AMPICILLIN SODIUM AND SULBACTAM SODIUM 3 G: 2; 1 INJECTION, POWDER, FOR SOLUTION INTRAMUSCULAR; INTRAVENOUS at 01:31

## 2021-04-04 RX ADMIN — SODIUM CHLORIDE, PRESERVATIVE FREE 10 ML: 5 INJECTION INTRAVENOUS at 08:16

## 2021-04-04 RX ADMIN — ASPIRIN 81 MG: 81 TABLET, COATED ORAL at 08:16

## 2021-04-04 RX ADMIN — GABAPENTIN 300 MG: 300 CAPSULE ORAL at 07:13

## 2021-04-04 RX ADMIN — GABAPENTIN 300 MG: 300 CAPSULE ORAL at 14:00

## 2021-04-04 RX ADMIN — METOPROLOL SUCCINATE 12.5 MG: 25 TABLET, EXTENDED RELEASE ORAL at 21:23

## 2021-04-04 NOTE — PROGRESS NOTES
Name: Jalen Lockwood ADMIT: 3/30/2021   : 1950  PCP: Marc Ludwig MD    MRN: [id number] LOS: 5 days   AGE/SEX: 71 y.o. male  ROOM: [facility]/     Subjective   Subjective   CC: generalized weakness  No acute events. Patient had a cough today with blood-tinged sputum. Denies CP/f/c/n/v/d. Dyspnea is at baseline.  No family at bedside during the time of my evaluation.    Objective   Objective   Vital Signs  Temp:  [97.7 °F (36.5 °C)-98.4 °F (36.9 °C)] 98 °F (36.7 °C)  Heart Rate:  [71-82] 71  Resp:  [16-24] 24  BP: (115-132)/(84-97) 132/86  SpO2:  [93 %-98 %] 94 %  on  Flow (L/min):  [2] 2;   Device (Oxygen Therapy): nasal cannula  Body mass index is 37.86 kg/m².  Physical Exam  Vitals and nursing note reviewed.   Constitutional:       General: He is not in acute distress.     Appearance: He is ill-appearing (chronically). He is not toxic-appearing.   HENT:      Head: Normocephalic and atraumatic.      Nose: Nose normal.      Mouth/Throat:      Mouth: Mucous membranes are moist.      Pharynx: Oropharynx is clear.   Eyes:      Extraocular Movements: Extraocular movements intact.      Conjunctiva/sclera: Conjunctivae normal.      Pupils: Pupils are equal, round, and reactive to light.   Cardiovascular:      Rate and Rhythm: Normal rate. Rhythm irregular.      Pulses: Normal pulses.   Pulmonary:      Effort: Pulmonary effort is normal.      Breath sounds: Examination of the right-lower field reveals decreased breath sounds. Examination of the left-lower field reveals decreased breath sounds. Decreased breath sounds and rales (bibasilar) present.   Abdominal:      General: Bowel sounds are normal.      Palpations: Abdomen is soft.      Tenderness: There is no abdominal tenderness.   Musculoskeletal:         General: Swelling (2+ BLE) present. No tenderness.      Cervical back: Normal range of motion and neck supple.      Comments: Compression wraps in place BLE   Skin:     General: Skin is warm and dry.       Capillary Refill: Capillary refill takes less than 2 seconds.   Neurological:      General: No focal deficit present.      Mental Status: He is alert and oriented to person, place, and time.   Psychiatric:         Mood and Affect: Mood normal.         Behavior: Behavior normal.     Results Review     I reviewed the patient's new clinical results.  I reviewed the patient's telemetry.  I reviewed the patient's chest xray  Results from last 7 days   Lab Units 04/04/21 0442 04/03/21 0817 04/02/21 0327 04/01/21  0346   WBC 10*3/mm3 5.88 5.88 7.66 10.97*   HEMOGLOBIN g/dL 12.7* 12.1* 12.3* 12.3*   PLATELETS 10*3/mm3 128* 134* 124* 122*     Results from last 7 days   Lab Units 04/04/21 0442 04/03/21 0817 04/02/21 0327 04/01/21  0346   SODIUM mmol/L 135* 135* 135* 135*   POTASSIUM mmol/L 4.1 3.8 4.0 3.5   CHLORIDE mmol/L 103 104 104 103   CO2 mmol/L 22.2 24.1 20.6* 22.8   BUN mg/dL 8 8 11 13   CREATININE mg/dL 0.69* 0.71* 0.81 0.86   GLUCOSE mg/dL 92 101* 105* 117*   Estimated Creatinine Clearance: 131.3 mL/min (A) (by C-G formula based on SCr of 0.69 mg/dL (L)).  Results from last 7 days   Lab Units 03/30/21  1207   ALBUMIN g/dL 4.40   BILIRUBIN mg/dL 1.6*   ALK PHOS U/L 96   AST (SGOT) U/L 18   ALT (SGPT) U/L 12     Results from last 7 days   Lab Units 04/04/21 0442 04/03/21 0817 04/02/21 0327 04/01/21 0346 03/31/21  0644 03/30/21  1207   CALCIUM mg/dL 8.2* 8.4* 8.1* 8.2* 8.6 10.3   ALBUMIN g/dL  --   --   --   --   --  4.40   MAGNESIUM mg/dL  --   --   --   --  2.0  --      Results from last 7 days   Lab Units 04/04/21 0442 03/30/21  1504 03/30/21  1207   PROCALCITONIN ng/mL 0.97*  --  0.42*   LACTATE mmol/L  --  2.8* 4.6*     COVID19   Date Value Ref Range Status   03/30/2021 Not Detected Not Detected - Ref. Range Final     Glucose   Date/Time Value Ref Range Status   04/04/2021 1059 120 70 - 130 mg/dL Final   04/04/2021 0656 96 70 - 130 mg/dL Final   04/03/2021 2044 118 70 - 130 mg/dL Final   04/03/2021 1609  114 70 - 130 mg/dL Final   04/03/2021 1106 118 70 - 130 mg/dL Final   04/03/2021 0647 125 70 - 130 mg/dL Final   04/03/2021 0112 108 70 - 130 mg/dL Final       XR Chest 1 View  ONE VIEW PORTABLE CHEST AT 9:13 AM     HISTORY: Shortness of breath. Fever. Hemoptysis.     FINDINGS: There is cardiomegaly with slight vascular prominence. In  addition, there is now increased density in the right lower lobe most  consistent with a combination of atelectasis and pneumonia occurring  since 3 days ago.     This report was finalized on 4/4/2021 9:40 AM by Dr. Gokul Garcia M.D.       Scheduled Medications  ampicillin-sulbactam, 3 g, Intravenous, Q6H  aspirin, 81 mg, Oral, Daily  atorvastatin, 40 mg, Oral, Nightly  budesonide-formoterol, 2 puff, Inhalation, BID - RT  finasteride, 5 mg, Oral, Daily  furosemide, 40 mg, Intravenous, Q12H  gabapentin, 300 mg, Oral, Q8H  insulin lispro, 0-14 Units, Subcutaneous, 4x Daily With Meals & Nightly  ipratropium-albuterol, 3 mL, Nebulization, 4x Daily - RT  losartan, 12.5 mg, Oral, Q24H  metoprolol succinate XL, 12.5 mg, Oral, Q12H  nystatin, , Topical, Q12H  sodium chloride, 10 mL, Intravenous, Q12H  sodium chloride, 10 mL, Intravenous, Q12H  sodium chloride, 10 mL, Intravenous, Q12H  [START ON 4/5/2021] warfarin, 5 mg, Oral, Once per day on Mon Fri  warfarin, 7.5 mg, Oral, Once per day on Sun Tue Wed Thu Sat    Infusions  Pharmacy to dose warfarin,     Diet  Diet Regular       Assessment/Plan     Active Hospital Problems    Diagnosis  POA   • Hyponatremia [E87.1]  Yes   • Thrombocytopenia (CMS/HCC) [D69.6]  Yes   • Ischemic cardiomyopathy [I25.5]  Yes   • Acute systolic (congestive) heart failure (CMS/HCC) [I50.21]  No   • Cellulitis of lower extremity [L03.119]  Yes   • Severe sepsis (CMS/HCC) [A41.9, R65.20]  Yes   • Alcohol dependence, 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]  Yes   • Essential hypertension [I10]  Yes   • COPD (chronic obstructive pulmonary disease) (CMS/HCC) [J44.9]  Yes   •  Permanent atrial fibrillation (CMS/HCC) [I48.21]  Yes   • Venous stasis [I87.8]  Yes      Resolved Hospital Problems    Diagnosis Date Resolved POA   • Metabolic encephalopathy [G93.41] 04/03/2021 Yes   Severe Sepsis with Metabolic Encephalopathy, Present on Admission  - most likely source seems to be LE cellulitis, possible UTI  - blood cx's NGTD, urine culture with mixed darian  - no purulence noted associated with LE cellulitis-abx narrowed from vanc/zosyn to unasyn-continue to follow inflammatory markers and procalcitonin  - now off IVF, hemodynamically stable    NSTEMI/ICM/Acute Systolic CHF  - likely LAD disease per cardiology, recommending continuing medical therapy  - had some blood-tinged sputum-CXR showing generous central volume-will start IV lasix and follow I&Os, daily weights, and chemistries  - cardiology following, appreciate recs    Permanent Afib  - continue AC with warfarin  - continue metoprolol    COPD  - no exacerbation  - continue on symbicort  - duo-nebs    Thrombocytopenia  - mild, likely due to severe sepsis  - overall stable but fluctuating, will monitor    Warfarin (home med) for DVT prophylaxis.  Full code.  Discussed with patient and nursing staff.  Anticipate discharge to SNU facility in 2-3 days.      Camilo Blanco MD  [facility]  04/04/21  14:28 EDT

## 2021-04-04 NOTE — PLAN OF CARE
"Goal Outcome Evaluation:  Plan of Care Reviewed With: patient.  He continues to req pain med every 6 hours,  Cath flow instilled in picc by IV RN, she also took dressing off, found another kink, corrected , and redressed.  Cath brett had to be used twice on grey port.  All ports had blood return when she finished.  I attempted to flush after antibiotic unfused and could flush but no blood return. Dressings to lower ext, will be changed by wound care RN, arms were dressed 4/3 with vaseline aleksey, 4x4's, kerlix and ace wrap.  Wound on ring finger left hand has \"dime\" size wound that had a scab, patient  hit it on something and it started to bleed, cleaned with ns, antibiotic ointment found.  Afib with rates pf 60s and 70s.  Was up in chair until 0300.        "

## 2021-04-04 NOTE — PROGRESS NOTES
Pharmacy Consult: Warfarin Dosing/ Monitoring    Jalen Lockwood is a 71 y.o. male, estimated creatinine clearance is 131.3 mL/min (A) (by C-G formula based on SCr of 0.69 mg/dL (L)). weighing (!) 141 kg (311 lb).    PMH: Allergic rhinitis, Anxiety, Aortectasia, Arthritis, Atrial flutter (2010), Charcot's joint of foot, Chronic edema, Chronic venous insufficiency, COPD, Coronary atherosclerosis, Diverticulosis, Duodenitis, Fatty liver, Gastritis, Gastroparesis, Hematoma, Hyperlipidemia, Hypertension, Insomnia, Internal hemorrhoids, Open wound, Osteomyelitis, Paroxysmal atrial fibrillation, Peripheral neuropathy, Popliteal artery aneurysm, Skin cancer, Sleep apnea, Tachycardia induced cardiomyopathy, Venous stasis, and Venous stasis ulcer    Social History     Tobacco Use    Smoking status: Current Every Day Smoker     Packs/day: 0.25     Years: 45.00     Pack years: 11.25     Types: Cigarettes    Smokeless tobacco: Never Used   Vaping Use    Vaping Use: Never used   Substance Use Topics    Alcohol use: Yes     Alcohol/week: 8.0 - 9.0 standard drinks     Types: 1 - 2 Shots of liquor, 7 Standard drinks or equivalent per week     Comment: 2 rum a day//Caffeine use: 3 cups daily    Drug use: No     Results from last 7 days   Lab Units 04/04/21 0442 04/03/21 0817 04/02/21 0327 04/01/21  1325 04/01/21  0346 03/31/21  0518 03/30/21  1207   INR  1.99* 1.99* 1.60* 1.66*  --   --  2.12*   APTT seconds  --   --   --   --   --   --  54.7*   HEMOGLOBIN g/dL 12.7* 12.1* 12.3*  --  12.3* 13.4 15.5   HEMATOCRIT % 36.0* 34.7* 35.3*  --  34.3* 36.8* 44.2   PLATELETS 10*3/mm3 128* 134* 124*  --  122* 159 226     Results from last 7 days   Lab Units 04/04/21 0442 04/03/21  0817 04/02/21  0327 03/30/21  1207   SODIUM mmol/L 135* 135* 135* 129*   POTASSIUM mmol/L 4.1 3.8 4.0 4.4   CHLORIDE mmol/L 103 104 104 92*   CO2 mmol/L 22.2 24.1 20.6* 23.8   BUN mg/dL 8 8 11 9   CREATININE mg/dL 0.69* 0.71* 0.81 1.11   CALCIUM mg/dL 8.2* 8.4*  8.1* 10.3   BILIRUBIN mg/dL  --   --   --  1.6*   ALK PHOS U/L  --   --   --  96   ALT (SGPT) U/L  --   --   --  12   AST (SGOT) U/L  --   --   --  18   GLUCOSE mg/dL 92 101* 105* 110*     Anticoagulation history: Managed by  [facility]: Warfarin 5 mg po qMF + 7.5 mg po all other days    Hospital Anticoagulation:  Consulting provider: Dr. Brad Lepe  Start date: Home med continued on 4/1/2021  Indication: Atrial fibrillation  Target INR: 2-3  Expected duration: Lifetime   Bridge Therapy: No, Enoxaparin - d/c'd on 4/3/21                Date 4/1 4/2 4/3  4/4         INR 1.66 1.60 1.99 1.99         Warfarin dose 7.5 mg 7.5 mg 5 mg 7.5 mg           Potential drug interactions:   Aspirin-new med-increased risk of bleeding  Piperacillin/tazobactam-increased risk of bleeding (D/C'd on 4/3/21)  Vancomycin-increased risk of bleeding (D/C'd on 43/21)  Ampicillin-sulbactam - may result in an increased risk of bleeding.     Relevant nutrition status: Regular diet    Education complete?/ Date: Yes, on 4/1/21    Assessment/Plan:    1) Warfarin restarted  on 4/1/21, INR 1.99 again this am  2) Enoxaparin 140 mg sq q12hrs- d/c'd on 4/3/21 pm  3) Will resume home regimen of warfarin 5 mg po qMF and 7.5 mg po qSSTWT    Pharmacy will continue to follow until discharge or discontinuation of warfarin.     Joleen Watkins, [facility]  Clinical Staff Pharmacist

## 2021-04-04 NOTE — PLAN OF CARE
Goal Outcome Evaluation:  Plan of Care Reviewed With: patient  Progress: no change  Outcome Summary: Patient noted coughing up bloody sputum this morning.  CXR done and reviewed by MD.  New orders recieved to initiate IV lasix.  BP medications adjusted and patient tolerating well.  Continues to require pain medications every 6 hrs to keep pain level at tolerable level.  PICC line continues to be positional with no blood return x3 lumens.  Flushes are sluggish but with repositioning of arm flow improves. Cathflo and dressing chnages x2 completed prior to this shift. Wound care to bilat toes ordered for daily and to be completed later this evening when pain medications can be given prior.  Assist of one to from bed to chair

## 2021-04-05 ENCOUNTER — APPOINTMENT (OUTPATIENT)
Dept: GENERAL RADIOLOGY | Facility: HOSPITAL | Age: 71
End: 2021-04-05

## 2021-04-05 LAB
ANION GAP SERPL CALCULATED.3IONS-SCNC: 9.4 MMOL/L (ref 5–15)
BASOPHILS # BLD AUTO: 0.04 10*3/MM3 (ref 0–0.2)
BASOPHILS NFR BLD AUTO: 0.7 % (ref 0–1.5)
BUN SERPL-MCNC: 7 MG/DL (ref 8–23)
BUN/CREAT SERPL: 9.6 (ref 7–25)
CALCIUM SPEC-SCNC: 8.3 MG/DL (ref 8.6–10.5)
CHLORIDE SERPL-SCNC: 104 MMOL/L (ref 98–107)
CO2 SERPL-SCNC: 23.6 MMOL/L (ref 22–29)
CREAT SERPL-MCNC: 0.73 MG/DL (ref 0.76–1.27)
CRP SERPL-MCNC: 0.76 MG/DL (ref 0–0.5)
DEPRECATED RDW RBC AUTO: 50.3 FL (ref 37–54)
EOSINOPHIL # BLD AUTO: 0.2 10*3/MM3 (ref 0–0.4)
EOSINOPHIL NFR BLD AUTO: 3.6 % (ref 0.3–6.2)
ERYTHROCYTE [DISTWIDTH] IN BLOOD BY AUTOMATED COUNT: 13.6 % (ref 12.3–15.4)
ERYTHROCYTE [SEDIMENTATION RATE] IN BLOOD: 31 MM/HR (ref 0–20)
GFR SERPL CREATININE-BSD FRML MDRD: 106 ML/MIN/1.73
GLUCOSE BLDC GLUCOMTR-MCNC: 103 MG/DL (ref 70–130)
GLUCOSE BLDC GLUCOMTR-MCNC: 107 MG/DL (ref 70–130)
GLUCOSE BLDC GLUCOMTR-MCNC: 109 MG/DL (ref 70–130)
GLUCOSE BLDC GLUCOMTR-MCNC: 111 MG/DL (ref 70–130)
GLUCOSE SERPL-MCNC: 95 MG/DL (ref 65–99)
HCT VFR BLD AUTO: 37 % (ref 37.5–51)
HGB BLD-MCNC: 12.6 G/DL (ref 13–17.7)
IMM GRANULOCYTES # BLD AUTO: 0.03 10*3/MM3 (ref 0–0.05)
IMM GRANULOCYTES NFR BLD AUTO: 0.5 % (ref 0–0.5)
INR PPP: 2.17 (ref 0.9–1.1)
LYMPHOCYTES # BLD AUTO: 1.2 10*3/MM3 (ref 0.7–3.1)
LYMPHOCYTES NFR BLD AUTO: 21.7 % (ref 19.6–45.3)
MCH RBC QN AUTO: 34.7 PG (ref 26.6–33)
MCHC RBC AUTO-ENTMCNC: 34.1 G/DL (ref 31.5–35.7)
MCV RBC AUTO: 101.9 FL (ref 79–97)
MONOCYTES # BLD AUTO: 0.53 10*3/MM3 (ref 0.1–0.9)
MONOCYTES NFR BLD AUTO: 9.6 % (ref 5–12)
NEUTROPHILS NFR BLD AUTO: 3.53 10*3/MM3 (ref 1.7–7)
NEUTROPHILS NFR BLD AUTO: 63.9 % (ref 42.7–76)
NRBC BLD AUTO-RTO: 0 /100 WBC (ref 0–0.2)
PLATELET # BLD AUTO: 146 10*3/MM3 (ref 140–450)
PMV BLD AUTO: 10.8 FL (ref 6–12)
POTASSIUM SERPL-SCNC: 3.8 MMOL/L (ref 3.5–5.2)
PROCALCITONIN SERPL-MCNC: 0.62 NG/ML (ref 0–0.25)
PROTHROMBIN TIME: 23.9 SECONDS (ref 11.7–14.2)
RBC # BLD AUTO: 3.63 10*6/MM3 (ref 4.14–5.8)
SODIUM SERPL-SCNC: 137 MMOL/L (ref 136–145)
WBC # BLD AUTO: 5.53 10*3/MM3 (ref 3.4–10.8)

## 2021-04-05 PROCEDURE — 85025 COMPLETE CBC W/AUTO DIFF WBC: CPT | Performed by: INTERNAL MEDICINE

## 2021-04-05 PROCEDURE — 80048 BASIC METABOLIC PNL TOTAL CA: CPT | Performed by: INTERNAL MEDICINE

## 2021-04-05 PROCEDURE — 85610 PROTHROMBIN TIME: CPT | Performed by: INTERNAL MEDICINE

## 2021-04-05 PROCEDURE — 71045 X-RAY EXAM CHEST 1 VIEW: CPT

## 2021-04-05 PROCEDURE — 97530 THERAPEUTIC ACTIVITIES: CPT

## 2021-04-05 PROCEDURE — 94799 UNLISTED PULMONARY SVC/PX: CPT

## 2021-04-05 PROCEDURE — 85652 RBC SED RATE AUTOMATED: CPT | Performed by: INTERNAL MEDICINE

## 2021-04-05 PROCEDURE — 25010000002 FUROSEMIDE PER 20 MG: Performed by: INTERNAL MEDICINE

## 2021-04-05 PROCEDURE — 99232 SBSQ HOSP IP/OBS MODERATE 35: CPT | Performed by: INTERNAL MEDICINE

## 2021-04-05 PROCEDURE — 86140 C-REACTIVE PROTEIN: CPT | Performed by: INTERNAL MEDICINE

## 2021-04-05 PROCEDURE — 82962 GLUCOSE BLOOD TEST: CPT

## 2021-04-05 PROCEDURE — 36415 COLL VENOUS BLD VENIPUNCTURE: CPT | Performed by: INTERNAL MEDICINE

## 2021-04-05 PROCEDURE — 84145 PROCALCITONIN (PCT): CPT | Performed by: INTERNAL MEDICINE

## 2021-04-05 PROCEDURE — 25010000003 AMPICILLIN-SULBACTAM PER 1.5 G: Performed by: INTERNAL MEDICINE

## 2021-04-05 RX ORDER — AMOXICILLIN 250 MG
2 CAPSULE ORAL NIGHTLY
Status: DISCONTINUED | OUTPATIENT
Start: 2021-04-05 | End: 2021-04-13 | Stop reason: HOSPADM

## 2021-04-05 RX ADMIN — FINASTERIDE 5 MG: 5 TABLET, FILM COATED ORAL at 08:45

## 2021-04-05 RX ADMIN — FUROSEMIDE 40 MG: 10 INJECTION, SOLUTION INTRAMUSCULAR; INTRAVENOUS at 06:41

## 2021-04-05 RX ADMIN — GABAPENTIN 300 MG: 300 CAPSULE ORAL at 06:41

## 2021-04-05 RX ADMIN — AMPICILLIN SODIUM AND SULBACTAM SODIUM 3 G: 2; 1 INJECTION, POWDER, FOR SOLUTION INTRAMUSCULAR; INTRAVENOUS at 11:15

## 2021-04-05 RX ADMIN — AMPICILLIN SODIUM AND SULBACTAM SODIUM 3 G: 2; 1 INJECTION, POWDER, FOR SOLUTION INTRAMUSCULAR; INTRAVENOUS at 06:41

## 2021-04-05 RX ADMIN — SODIUM CHLORIDE, PRESERVATIVE FREE 10 ML: 5 INJECTION INTRAVENOUS at 08:46

## 2021-04-05 RX ADMIN — NYSTATIN: 100000 POWDER TOPICAL at 08:45

## 2021-04-05 RX ADMIN — SODIUM CHLORIDE, PRESERVATIVE FREE 10 ML: 5 INJECTION INTRAVENOUS at 20:41

## 2021-04-05 RX ADMIN — DOCUSATE SODIUM 50MG AND SENNOSIDES 8.6MG 2 TABLET: 8.6; 5 TABLET, FILM COATED ORAL at 20:40

## 2021-04-05 RX ADMIN — HYDROCODONE BITARTRATE AND ACETAMINOPHEN 2 TABLET: 5; 325 TABLET ORAL at 23:01

## 2021-04-05 RX ADMIN — AMPICILLIN SODIUM AND SULBACTAM SODIUM 3 G: 2; 1 INJECTION, POWDER, FOR SOLUTION INTRAMUSCULAR; INTRAVENOUS at 23:01

## 2021-04-05 RX ADMIN — FUROSEMIDE 40 MG: 10 INJECTION, SOLUTION INTRAMUSCULAR; INTRAVENOUS at 17:06

## 2021-04-05 RX ADMIN — ATORVASTATIN CALCIUM 40 MG: 20 TABLET, FILM COATED ORAL at 20:40

## 2021-04-05 RX ADMIN — NYSTATIN: 100000 POWDER TOPICAL at 20:41

## 2021-04-05 RX ADMIN — LOSARTAN POTASSIUM 12.5 MG: 25 TABLET, FILM COATED ORAL at 08:45

## 2021-04-05 RX ADMIN — IPRATROPIUM BROMIDE AND ALBUTEROL SULFATE 3 ML: 2.5; .5 SOLUTION RESPIRATORY (INHALATION) at 11:23

## 2021-04-05 RX ADMIN — SODIUM CHLORIDE, PRESERVATIVE FREE 10 ML: 5 INJECTION INTRAVENOUS at 20:42

## 2021-04-05 RX ADMIN — BUDESONIDE AND FORMOTEROL FUMARATE DIHYDRATE 2 PUFF: 160; 4.5 AEROSOL RESPIRATORY (INHALATION) at 20:28

## 2021-04-05 RX ADMIN — GABAPENTIN 300 MG: 300 CAPSULE ORAL at 21:07

## 2021-04-05 RX ADMIN — BUDESONIDE AND FORMOTEROL FUMARATE DIHYDRATE 2 PUFF: 160; 4.5 AEROSOL RESPIRATORY (INHALATION) at 08:12

## 2021-04-05 RX ADMIN — AMPICILLIN SODIUM AND SULBACTAM SODIUM 3 G: 2; 1 INJECTION, POWDER, FOR SOLUTION INTRAMUSCULAR; INTRAVENOUS at 17:06

## 2021-04-05 RX ADMIN — METOPROLOL SUCCINATE 12.5 MG: 25 TABLET, EXTENDED RELEASE ORAL at 20:40

## 2021-04-05 RX ADMIN — IPRATROPIUM BROMIDE AND ALBUTEROL SULFATE 3 ML: 2.5; .5 SOLUTION RESPIRATORY (INHALATION) at 03:02

## 2021-04-05 RX ADMIN — METOPROLOL SUCCINATE 12.5 MG: 25 TABLET, EXTENDED RELEASE ORAL at 08:45

## 2021-04-05 RX ADMIN — GABAPENTIN 300 MG: 300 CAPSULE ORAL at 14:01

## 2021-04-05 RX ADMIN — HYDROCODONE BITARTRATE AND ACETAMINOPHEN 2 TABLET: 5; 325 TABLET ORAL at 17:16

## 2021-04-05 RX ADMIN — HYDROCODONE BITARTRATE AND ACETAMINOPHEN 2 TABLET: 5; 325 TABLET ORAL at 11:15

## 2021-04-05 RX ADMIN — IPRATROPIUM BROMIDE AND ALBUTEROL SULFATE 3 ML: 2.5; .5 SOLUTION RESPIRATORY (INHALATION) at 15:35

## 2021-04-05 RX ADMIN — WARFARIN 5 MG: 5 TABLET ORAL at 17:06

## 2021-04-05 RX ADMIN — HYDROCODONE BITARTRATE AND ACETAMINOPHEN 2 TABLET: 5; 325 TABLET ORAL at 04:51

## 2021-04-05 NOTE — PLAN OF CARE
Problem: Fall Injury Risk  Goal: Absence of Fall and Fall-Related Injury  Outcome: Ongoing, Progressing  Intervention: Identify and Manage Contributors to Fall Injury Risk  Recent Flowsheet Documentation  Taken 4/4/2021 2111 by Natalie Wells RN  Medication Review/Management:   medications reviewed   high-risk medications identified  Intervention: Promote Injury-Free Environment  Recent Flowsheet Documentation  Taken 4/5/2021 0641 by Natalie Wells RN  Safety Promotion/Fall Prevention: safety round/check completed  Taken 4/5/2021 0451 by Natalie Wells RN  Safety Promotion/Fall Prevention: safety round/check completed  Taken 4/5/2021 0251 by Natalie Wells RN  Safety Promotion/Fall Prevention: safety round/check completed  Taken 4/5/2021 0000 by Natalie Wells RN  Safety Promotion/Fall Prevention: safety round/check completed  Taken 4/4/2021 2300 by Natalie Wells RN  Safety Promotion/Fall Prevention: safety round/check completed  Taken 4/4/2021 2111 by Natalie Wells RN  Safety Promotion/Fall Prevention:   safety round/check completed   nonskid shoes/slippers when out of bed   fall prevention program maintained   activity supervised  Taken 4/4/2021 1905 by Natalie Wells RN  Safety Promotion/Fall Prevention: safety round/check completed   Goal Outcome Evaluation:  Plan of Care Reviewed With: patient  Progress: improving  Outcome Summary: pt is alert and oriented x 4. pt is on 2 L. IV lasix given. IV abx given. PICC line care completed. BLE wound care to be done by [facility] today. skin tears covered by vaseline gauze and dry gauze. VSS. norco given prn for BLE pain. will ctm.

## 2021-04-05 NOTE — NURSING NOTE
[facility] dept - patient has had wounds with compression treated for the past 15 years. Patient goes to [facility] on Mondays. Patient reports no wounds to RLE and this leg gets wrapped weekly with 4 layer compression every Monday at [facility]. LLE gets 4 layer compression M-W-F (Monday at [facility] and Wed and Fri by  nurse). Betadine gauze placed between R toes daily and betadine gauze and opticel AG placed between L toes daily.  L foot very moist toes, no wounds but very weepy. R toes dry. Significant edema above the level of compression wraps to knee and thighs, firm, chronic edema.   Today, wrap removed from BLE, skin cleansed with foam soap, lotion applied to intact skin, LLE - betadine painted to L toes and between toes and opticel AG wove between toes, opticel AG applied to L medial ankle moist area but there is not a visible wound, and a piece of opticel AG applied to dorsal foot/toes; RLE -  Betadine moist 4x4 wove between toes on R foot. Applied profore 4 layer wraps to BLE.  [facility] dept to see M-W-F to change wrap on LLE, RLE wrap changed on Monday's. Treatment to toes to be done daily by staff nurses.   Patient likes the compression wraps to be applied covering knee but the wraps rolled down and there is significant redness from the pressure. Wraps only applied up to the point that they usually roll down to, will check on legs next day and see if they have stayed in place.

## 2021-04-05 NOTE — PROGRESS NOTES
Pharmacy Consult: Warfarin Dosing/ Monitoring    Jalen Lockwood is a 71 y.o. male, estimated creatinine clearance is 130 mL/min (A) (by C-G formula based on SCr of 0.73 mg/dL (L)). weighing (!) 138 kg (304 lb 0.2 oz).    PMH: Allergic rhinitis, Anxiety, Aortectasia, Arthritis, Atrial flutter (2010), Charcot's joint of foot, Chronic edema, Chronic venous insufficiency, COPD, Coronary atherosclerosis, Diverticulosis, Duodenitis, Fatty liver, Gastritis, Gastroparesis, Hematoma, Hyperlipidemia, Hypertension, Insomnia, Internal hemorrhoids, Open wound, Osteomyelitis, Paroxysmal atrial fibrillation, Peripheral neuropathy, Popliteal artery aneurysm, Skin cancer, Sleep apnea, Tachycardia induced cardiomyopathy, Venous stasis, and Venous stasis ulcer    Social History     Tobacco Use    Smoking status: Current Every Day Smoker     Packs/day: 0.25     Years: 45.00     Pack years: 11.25     Types: Cigarettes    Smokeless tobacco: Never Used   Vaping Use    Vaping Use: Never used   Substance Use Topics    Alcohol use: Yes     Alcohol/week: 8.0 - 9.0 standard drinks     Types: 1 - 2 Shots of liquor, 7 Standard drinks or equivalent per week     Comment: 2 rum a day//Caffeine use: 3 cups daily    Drug use: No     Results from last 7 days   Lab Units 04/05/21 0438 04/04/21 0442 04/03/21 0817 04/02/21  0327 04/01/21  1325 04/01/21  0346 03/31/21  0518 03/30/21  1207   INR  2.17* 1.99* 1.99* 1.60* 1.66*  --   --  2.12*   APTT seconds  --   --   --   --   --   --   --  54.7*   HEMOGLOBIN g/dL 12.6* 12.7* 12.1* 12.3*  --  12.3* 13.4 15.5   HEMATOCRIT % 37.0* 36.0* 34.7* 35.3*  --  34.3* 36.8* 44.2   PLATELETS 10*3/mm3 146 128* 134* 124*  --  122* 159 226     Results from last 7 days   Lab Units 04/05/21 0438 04/04/21 0442 04/03/21  0817 03/30/21  1207   SODIUM mmol/L 137 135* 135* 129*   POTASSIUM mmol/L 3.8 4.1 3.8 4.4   CHLORIDE mmol/L 104 103 104 92*   CO2 mmol/L 23.6 22.2 24.1 23.8   BUN mg/dL 7* 8 8 9   CREATININE mg/dL  0.73* 0.69* 0.71* 1.11   CALCIUM mg/dL 8.3* 8.2* 8.4* 10.3   BILIRUBIN mg/dL  --   --   --  1.6*   ALK PHOS U/L  --   --   --  96   ALT (SGPT) U/L  --   --   --  12   AST (SGOT) U/L  --   --   --  18   GLUCOSE mg/dL 95 92 101* 110*     Anticoagulation history: [facility] by  [facility]: Warfarin 5 mg po qMF + 7.5 mg po all other days    Hospital Anticoagulation:  Consulting provider: Dr. Brad Lepe  Start date: Home med continued on 4/1/2021  Indication: Atrial fibrillation  Target INR: 2-3  Expected duration: Lifetime   Bridge Therapy: No, Enoxaparin - d/c'd on 4/3/21                Date 4/1 4/2 4/3  4/4 4/5        INR 1.66 1.60 1.99 1.99 2.17        Warfarin dose 7.5 mg 7.5 mg 5 mg 7.5 mg 5 mg          Potential drug interactions:   Aspirin-new med-increased risk of bleeding  Piperacillin/tazobactam-increased risk of bleeding (D/C'd on 4/3/21)  Vancomycin-increased risk of bleeding (D/C'd on 43/21)  Ampicillin-sulbactam - may result in an increased risk of bleeding.     Relevant nutrition status: Regular diet    Education complete?/ Date: Yes, on 4/1/21    Assessment/Plan:    1) Warfarin restarted  on 4/1/21, INR 2.17 this am  2) Enoxaparin 140 mg sq q12hrs- d/c'd on 4/3/21 pm  3) Continue home regimen of warfarin 5 mg po qMF and 7.5 mg po qSSTWTh    Pharmacy will continue to follow until discharge or discontinuation of warfarin.     Joleen Watkins [facility]  Clinical Staff Pharmacist

## 2021-04-05 NOTE — DISCHARGE PLACEMENT REQUEST
"He Lockwood (71 y.o. 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)   Code Status: CPR    Ht: 193 cm (76\")   Wt: 138 kg (304 lb 0.2 oz)    Admission Cmt: None   Principal Problem: None                Active Insurance as of 3/30/2021     Primary Coverage     Payor Plan Insurance Group Employer/Plan Group    HUMANA MEDICARE REPLACEMENT HUMANA MEDICARE REPLACEMENT [id number]     Payor Plan Address Payor Plan Phone Number Payor Plan Fax Number Effective Dates    [address] [address] [phone]  1/1/2018 - None Entered    [facility] [id number]       Subscriber Name Subscriber Birth Date Member ID       HE LOCKWOOD 1950 [id number]                 Emergency Contacts      (Rel.) Home Phone Work Phone Mobile Phone    Mariposa Lockwood (Spouse) [phone] -- [phone]    Carl Lozano (Friend) -- -- [phone]              "

## 2021-04-05 NOTE — THERAPY TREATMENT NOTE
Patient Name: Jalen Lockwood  : 1950    MRN: [id number]                              Today's Date: 2021       Admit Date: 3/30/2021    Visit Dx:     ICD-10-CM ICD-9-CM   1. Severe sepsis (CMS/HCC)  A41.9 038.9    R65.20 995.92   2. Acute UTI  N39.0 599.0   3. Acute abdominal pain  R10.9 789.00     338.19   4. Acute respiratory alkalosis  E87.3 276.3   5. Hyponatremia  E87.1 276.1   6. Atrial fibrillation with RVR (CMS/HCC)  I48.91 427.31   7. Fever in adult  R50.9 780.60   8. Acute metabolic encephalopathy  G93.41 348.31     Patient Active Problem List   Diagnosis   • Chronic osteomyelitis (CMS/HCC)   • Foot pain   • Peripheral neuropathy   • Venous stasis   • Permanent atrial fibrillation (CMS/HCC)   • Sleep apnea   • Chronic edema   • Class 2 severe obesity due to excess calories with serious comorbidity and body mass index (BMI) of 36.0 to 36.9 in adult (CMS/HCC)   • COPD (chronic obstructive pulmonary disease) (CMS/HCC)   • Nonocclusive coronary atherosclerosis of native coronary artery   • Atrial flutter (CMS/HCC)   • 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CMS/HCC)   • Aortectasia (CMS/HCC)   • Popliteal artery aneurysm (CMS/HCC)   • Colon polyps   • Gastroparesis   • Insomnia   • Adenomatous polyp of colon   • Essential hypertension   • ETOH abuse   • Chronic anticoagulation   • Oropharyngeal dysphagia   • Tobacco abuse   • Thyromegaly   • Adrenal adenoma, left   • Retroperitoneal lymphadenopathy   • Anemia of chronic disease   • Thyroid nodule   • Charcot's joint of foot   • Abnormal CT scan, lumbar spine   • Alcohol dependence, in remission (CMS/HCC)   • Severe sepsis (CMS/HCC)   • Hyponatremia   • Thrombocytopenia (CMS/HCC)   • Ischemic cardiomyopathy   • Acute systolic (congestive) heart failure (CMS/HCC)   • Cellulitis of lower extremity     Past Medical History:   Diagnosis Date   • Allergic rhinitis    • Anxiety    • Aortectasia (CMS/HCC)     3cm infrarenal abdominal aorta   • Arthritis    •  Atrial flutter (CMS/HCC) 2010    s/p ablation    • Charcot's joint of foot    • Chronic edema     both legs and sees wound care center at [place]    • Chronic venous insufficiency    • COPD (chronic obstructive pulmonary disease) (CMS/HCC)    • Coronary atherosclerosis     Cath 2010: diffuse 40-50% disease   • Diverticulosis    • Duodenitis    • Fatty liver    • Gastritis    • Gastroparesis    • Hematoma     post-operative; After catheterization, right groin, required surgical exploration   • Hyperlipidemia    • Hypertension    • Insomnia    • Internal hemorrhoids    • Open wound     izzy legs has drsg chg weekly at wound care center at [place]  pt does second dressing on left leg another time during week   • Osteomyelitis (CMS/HCC)    • Paroxysmal atrial fibrillation (CMS/HCC)    • Peripheral neuropathy    • Popliteal artery aneurysm (CMS/HCC)     left, s/p stenting by Dr. Boston   • Skin cancer    • Sleep apnea     o2   • 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CMS/HCC)     due to flutter and afib; cath 2010 with nonobstructive disease   • Venous stasis    • Venous stasis ulcer (CMS/HCC)     bilateral legs      Past Surgical History:   Procedure Laterality Date   • CARDIAC CATHETERIZATION     • CATARACT EXTRACTION     • COLONOSCOPY  09/28/2015    NBIH, diverticulosis, polyps   • COLONOSCOPY N/A 9/11/2018    Procedure: COLONOSCOPY TO CECUM  AND TERM. ILEUM WITH COLD SNARE POLYPECTOMIES;  Surgeon: Kane Lagunas MD;  [facility];  Service: Gastroenterology   • COLONOSCOPY N/A 10/29/2019    Procedure: COLONOSCOPY TO TO CECUM AND TERMINAL ILEUM WITH HOT AND COLD SNARE POLYPECTOMIES;  Surgeon: Kane Lagunas MD;  [facility];  Service: Gastroenterology   • HIP ARTHROPLASTY Right 2017   • JOINT REPLACEMENT Left    • OTHER SURGICAL HISTORY      Catheter ablation atrial flutter   • REPAIR ANEURYSM / PSEUDO ANEURYSM / RUPTURED ANEURYSM POPLITEAL ARTERY      Stent-Graft of the the left  popliteal artery   • REPAIR KNEE LIGAMENT      Primary repair of knee ligament cruciate anterior right   • TONSILLECTOMY  1958   • TOTAL KNEE ARTHROPLASTY Bilateral    • UPPER GASTROINTESTINAL ENDOSCOPY  09/16/2014    acute gastritis, acute duodenitis     General Information     Row Name 04/05/21 1440          Physical Therapy Time and Intention    Document Type  therapy note (daily note)  -     Mode of Treatment  individual therapy;physical therapy  -     Row Name 04/05/21 1440          General Information    Patient Profile Reviewed  yes  -       User Key  (r) = Recorded By, (t) = Taken By, (c) = Cosigned By    Initials Name Provider Type     Yolanda Batista, PT Physical Therapist        Mobility     Row Name 04/05/21 1553          Bed Mobility    Supine-Sit Pettis (Bed Mobility)  contact guard  -     Sit-Supine Pettis (Bed Mobility)  moderate assist (50% patient effort);minimum assist (75% patient effort);2 person assist  -SV     Comment (Bed Mobility)  sit to supine with BLE only due to edema and avoidance of shear on wounds/wraps  -     Row Name 04/05/21 1553          Transfers    Comment (Transfers)  bed elevated for STS to rwx today( reports recliner at home)  -     Row Name 04/05/21 1553          Sit-Stand Transfer    Sit-Stand Pettis (Transfers)  contact guard;2 person assist  -     Assistive Device (Sit-Stand Transfers)  walker, front-wheeled  -     Row Name 04/05/21 1553          Gait/Stairs (Locomotion)    Pettis Level (Gait)  2 person assist;contact guard  -     Assistive Device (Gait)  walker, front-wheeled  -SV     Distance in Feet (Gait)  75' with soa  noted last 20', HR elevated upon return to room, BLE ext rot with pronation/collapse arch( Bariatric rwx not available but needed due to right foot hit rear post of rwx today)  -       User Key  (r) = Recorded By, (t) = Taken By, (c) = Cosigned By    Initials Name Provider Type    Yolanda Sue, PT  Physical Therapist        Obj/Interventions     Row Name 04/05/21 1556          Balance    Comment, Balance  static sit balance sba, standing bal  cga with rwx  -SV       User Key  (r) = Recorded By, (t) = Taken By, (c) = Cosigned By    Initials Name Provider Type    Yolanda Sue, PT Physical Therapist        Goals/Plan    No documentation.       Clinical Impression     Row Name 04/05/21 1556          Pain Scale: Numbers Pre/Post-Treatment    Pre/Posttreatment Pain Comment  no report of pain  -SV     Row Name 04/05/21 1556          Vital Signs    Intratreatment Heart Rate (beats/min)  150  -SV     Posttreatment Heart Rate (beats/min)  74  -SV     O2 Delivery Pre Treatment  supplemental O2  -SV     Intra SpO2 (%)  92  -SV     O2 Delivery Intra Treatment  room air  -SV     Post SpO2 (%)  97  -SV     O2 Delivery Post Treatment  room air  -SV     Pre Patient Position  Supine  -SV     Intra Patient Position  Standing  -SV     Post Patient Position  Supine  -SV     Row Name 04/05/21 1550          Positioning and Restraints    Pre-Treatment Position  in bed  -SV     Post Treatment Position  bed  -SV     In Bed  notified nsg;fowlers;encouraged to call for assist;call light within reach;exit alarm on  -SV       User Key  (r) = Recorded By, (t) = Taken By, (c) = Cosigned By    Initials Name Provider Type    Yolanda Sue, PT Physical Therapist        Outcome Measures     Row Name 04/05/21 2189          How much help from another person do you currently need...    Turning from your back to your side while in flat bed without using bedrails?  3  -SV     Moving from lying on back to sitting on the side of a flat bed without bedrails?  3  -SV     Moving to and from a bed to a chair (including a wheelchair)?  3  -SV     Standing up from a chair using your arms (e.g., wheelchair, bedside chair)?  3  -SV     Climbing 3-5 steps with a railing?  2  -SV     To walk in hospital room?  3  -SV     AM-PAC 6 Clicks Score (PT)   17  -SV       User Key  (r) = Recorded By, (t) = Taken By, (c) = Cosigned By    Initials Name Provider Type    SV Yolanda Batista, PT Physical Therapist        Physical Therapy Education                 Title: PT OT SLP Therapies (Done)     Topic: Physical Therapy (Done)     Point: Mobility training (Done)     Learning Progress Summary           Patient Acceptance, E,TB,D, VU,NR by  at 4/5/2021 1558    Acceptance, E, VU by ME at 4/2/2021 1502                   Point: Home exercise program (Done)     Learning Progress Summary           Patient Acceptance, E,TB,D, VU,NR by  at 4/5/2021 1558                   Point: Body mechanics (Done)     Learning Progress Summary           Patient Acceptance, E, VU by ME at 4/2/2021 1502                   Point: Precautions (Done)     Learning Progress Summary           Patient Acceptance, E, VU by ME at 4/2/2021 1502                               User Key     Initials Effective Dates Name Provider Type Discipline     04/03/18 -  Yolanda Batista, PT Physical Therapist PT    ME 03/12/21 -  Сергей Ryder PT Student PT Student PT              PT Recommendation and Plan           Time Calculation:   PT Charges     Row Name 04/05/21 1602             Time Calculation    Start Time  1420  -      Stop Time  1434  -SV      Time Calculation (min)  14 min  -SV      PT Received On  04/05/21  -      PT - Next Appointment  04/06/21  -        User Key  (r) = Recorded By, (t) = Taken By, (c) = Cosigned By    Initials Name Provider Type    SV Yolanda Batista, PT Physical Therapist        Therapy Charges for Today     Code Description Service Date Service Provider Modifiers Qty    68511927936 HC PT THER SUPP EA 15 MIN 4/5/2021 Yolanda Batista, PT GP 1    71156875474 HC PT THERAPEUTIC ACT EA 15 MIN 4/5/2021 Yolanda Batista, PT GP 1          PT G-Codes  Outcome Measure Options: AM-PAC 6 Clicks Basic Mobility (PT)  AM-PAC 6 Clicks Score (PT): 17    Yolanda Batista  PT  4/5/2021

## 2021-04-05 NOTE — PLAN OF CARE
Goal Outcome Evaluation:         Pt just finished with wound RN for dressing changes and wraps to BLE. Pt sup to sit with cga. Stood to rwx with cga ( elevated surface today). Pt amb on RA today for 75'. He did become soa today. O2 sat 92% but HR elevated to 150 noted immediately post ambulation. MIn/mod x2 for elevating edematous BLE into bed. Recommend amb on O2 for endurance tomorrow. He did almost double his distance.

## 2021-04-05 NOTE — PROGRESS NOTES
Continued Stay Note  [facility]     Patient Name: Jalen Lockwood  MRN: [id number]  Today's Date: 4/5/2021    Admit Date: 3/30/2021    Discharge Plan     Row Name 04/05/21 1338       Plan    Plan  Referrals for skilled rehab are pending    Plan Comments  Patient prefers that [facility] speak with his spouse about dc planning.  Call to Mariposa Lockwood [phone].  She prefers that the patient go to [facility] and her second choices is [facility].  She will look at Medicare.gov for other choices if needed.  Referrals are pending..........................Emily Juarez RN        Discharge Codes    No documentation.             Emily Juarez RN

## 2021-04-05 NOTE — PLAN OF CARE
Goal Outcome Evaluation:  Plan of Care Reviewed With: patient  Progress: improving  Outcome Summary: wound care provided by wound care nurse, IV lasix with good diuresis, oriented, PRN pain medication, 2L o2, vss, will continue to monitor

## 2021-04-05 NOTE — PROGRESS NOTES
Name: Jalen Lockwood ADMIT: 3/30/2021   : 1950  PCP: Marc Ludwig MD    MRN: [id number] LOS: 6 days   AGE/SEX: 71 y.o. male  ROOM: RUST/     Subjective   Subjective   CC: generalized weakness  No acute events. Patient denies further cough. Denies CP/f/c/n/v/d.   No family at bedside during the time of my evaluation.    Objective   Objective   Vital Signs  Temp:  [97.8 °F (36.6 °C)-98.6 °F (37 °C)] 97.8 °F (36.6 °C)  Heart Rate:  [55-83] 68  Resp:  [16-20] 16  BP: (113-129)/(75-87) 113/75  SpO2:  [95 %-98 %] 96 %  on  Flow (L/min):  [2] 2;   Device (Oxygen Therapy): nasal cannula  Body mass index is 37.01 kg/m².  Physical Exam  Vitals and nursing note reviewed.   Constitutional:       General: He is not in acute distress.     Appearance: He is ill-appearing (chronically). He is not toxic-appearing.   HENT:      Head: Normocephalic and atraumatic.      Nose: Nose normal.      Mouth/Throat:      Mouth: Mucous membranes are moist.      Pharynx: Oropharynx is clear.   Eyes:      Extraocular Movements: Extraocular movements intact.      Conjunctiva/sclera: Conjunctivae normal.      Pupils: Pupils are equal, round, and reactive to light.   Cardiovascular:      Rate and Rhythm: Normal rate. Rhythm irregular.      Pulses: Normal pulses.   Pulmonary:      Effort: Pulmonary effort is normal.      Breath sounds: Examination of the right-lower field reveals decreased breath sounds. Examination of the left-lower field reveals decreased breath sounds. Decreased breath sounds and rales (bibasilar) present.   Abdominal:      General: Bowel sounds are normal.      Palpations: Abdomen is soft.      Tenderness: There is no abdominal tenderness.   Musculoskeletal:         General: Swelling (2+ BLE) present. No tenderness.      Cervical back: Normal range of motion and neck supple.      Comments: BLE with edema and chronic skin changes with some skin breakdown, weeping on the LLE no purulent drainage   Skin:     General:  Skin is warm and dry.      Capillary Refill: Capillary refill takes less than 2 seconds.   Neurological:      General: No focal deficit present.      Mental Status: He is alert and oriented to person, place, and time.   Psychiatric:         Mood and Affect: Mood normal.         Behavior: Behavior normal.     Results Review     I reviewed the patient's new clinical results.  I reviewed the patient's telemetry.  Results from last 7 days   Lab Units 04/05/21 0438 04/04/21 0442 04/03/21 0817 04/02/21 0327   WBC 10*3/mm3 5.53 5.88 5.88 7.66   HEMOGLOBIN g/dL 12.6* 12.7* 12.1* 12.3*   PLATELETS 10*3/mm3 146 128* 134* 124*     Results from last 7 days   Lab Units 04/05/21 0438 04/04/21 0442 04/03/21 0817 04/02/21 0327   SODIUM mmol/L 137 135* 135* 135*   POTASSIUM mmol/L 3.8 4.1 3.8 4.0   CHLORIDE mmol/L 104 103 104 104   CO2 mmol/L 23.6 22.2 24.1 20.6*   BUN mg/dL 7* 8 8 11   CREATININE mg/dL 0.73* 0.69* 0.71* 0.81   GLUCOSE mg/dL 95 92 101* 105*   Estimated Creatinine Clearance: 130 mL/min (A) (by C-G formula based on SCr of 0.73 mg/dL (L)).  Results from last 7 days   Lab Units 03/30/21  1207   ALBUMIN g/dL 4.40   BILIRUBIN mg/dL 1.6*   ALK PHOS U/L 96   AST (SGOT) U/L 18   ALT (SGPT) U/L 12     Results from last 7 days   Lab Units 04/05/21 0438 04/04/21 0442 04/03/21 0817 04/02/21 0327 03/31/21  0644 03/30/21  1207   CALCIUM mg/dL 8.3* 8.2* 8.4* 8.1* 8.6 10.3   ALBUMIN g/dL  --   --   --   --   --  4.40   MAGNESIUM mg/dL  --   --   --   --  2.0  --      Results from last 7 days   Lab Units 04/05/21 0438 04/04/21 0442 03/30/21  1504 03/30/21  1207   PROCALCITONIN ng/mL 0.62* 0.97*  --  0.42*   LACTATE mmol/L  --   --  2.8* 4.6*     COVID19   Date Value Ref Range Status   03/30/2021 Not Detected Not Detected - Ref. Range Final     Glucose   Date/Time Value Ref Range Status   04/05/2021 1057 107 70 - 130 mg/dL Final   04/05/2021 0700 103 70 - 130 mg/dL Final   04/04/2021 2037 155 (H) 70 - 130 mg/dL Final    04/04/2021 1605 106 70 - 130 mg/dL Final   04/04/2021 1059 120 70 - 130 mg/dL Final   04/04/2021 0656 96 70 - 130 mg/dL Final   04/03/2021 2044 118 70 - 130 mg/dL Final       XR Chest 1 View  ONE VIEW PORTABLE CHEST AT 9:13 AM     HISTORY: Shortness of breath. Fever. Hemoptysis.     FINDINGS: There is cardiomegaly with slight vascular prominence. In  addition, there is now increased density in the right lower lobe most  consistent with a combination of atelectasis and pneumonia occurring  since 3 days ago.     This report was finalized on 4/4/2021 9:40 AM by Dr. Gokul Garcia M.D.       Scheduled Medications  ampicillin-sulbactam, 3 g, Intravenous, Q6H  atorvastatin, 40 mg, Oral, Nightly  budesonide-formoterol, 2 puff, Inhalation, BID - RT  finasteride, 5 mg, Oral, Daily  furosemide, 40 mg, Intravenous, Q12H  gabapentin, 300 mg, Oral, Q8H  insulin lispro, 0-14 Units, Subcutaneous, 4x Daily With Meals & Nightly  ipratropium-albuterol, 3 mL, Nebulization, 4x Daily - RT  losartan, 12.5 mg, Oral, Q24H  metoprolol succinate XL, 12.5 mg, Oral, Q12H  nystatin, , Topical, Q12H  senna-docusate sodium, 2 tablet, Oral, Nightly  sodium chloride, 10 mL, Intravenous, Q12H  sodium chloride, 10 mL, Intravenous, Q12H  sodium chloride, 10 mL, Intravenous, Q12H  warfarin, 5 mg, Oral, Once per day on Mon Fri  warfarin, 7.5 mg, Oral, Once per day on Sun Tue Wed Thu Sat    Infusions  Pharmacy to dose warfarin,     Diet  Diet Regular       Assessment/Plan     Active Hospital Problems    Diagnosis  POA   • Hyponatremia [E87.1]  Yes   • Thrombocytopenia (CMS/HCC) [D69.6]  Yes   • Ischemic cardiomyopathy [I25.5]  Yes   • Acute systolic (congestive) heart failure (CMS/HCC) [I50.21]  No   • Cellulitis of lower extremity [L03.119]  Yes   • Severe sepsis (CMS/HCC) [A41.9, R65.20]  Yes   • Alcohol dependence, 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]  Yes   • Essential hypertension [I10]  Yes   • COPD (chronic obstructive pulmonary disease) (CMS/HCC)  [J44.9]  Yes   • Permanent atrial fibrillation (CMS/HCC) [I48.21]  Yes   • Venous stasis [I87.8]  Yes      Resolved Hospital Problems    Diagnosis Date Resolved POA   • Metabolic encephalopathy [G93.41] 04/03/2021 Yes   Severe Sepsis with Metabolic Encephalopathy, Present on Admission  - most likely source seems to be UTI vs LLE cellulitis but the latter is not really apparent on my exam with wraps off  - blood cx's NGTD, urine culture with mixed darian  - no purulence noted associated on LLE-abx narrowed from vanc/zosyn to unasyn-inflammatory markers and procalcitonin are overall improved so will continue current course  - now off IVF, hemodynamically stable    NSTEMI/ICM/Acute Systolic CHF  - likely LAD disease per cardiology, recommending continuing medical therapy in the setting of increased bleeding risk with DAPT and need for chronic warfarin  - continue IV lasix and follow I&Os, daily weights, and chemistries  - cardiology following, appreciate recs    Permanent Afib  - continue AC with warfarin  - continue metoprolol    COPD  - no exacerbation  - continue on symbicort  - duo-nebs    Thrombocytopenia  - mild, likely due to severe sepsis  - overall stable but fluctuating, will monitor    Warfarin (home med) for DVT prophylaxis.  Full code.  Discussed with patient and nursing staff.  Anticipate discharge to SNU facility in 2-3 days.      Camilo Blanco MD  [facility]  04/05/21  13:54 EDT

## 2021-04-05 NOTE — PROGRESS NOTES
Hospital Follow Up    LOS:  LOS: 6 days   Patient Name: Jalen Lockwood  Age/Sex: 71 y.o. male  : 1950  MRN: [id number]    Day of Service: 21   Length of Stay: 6  Encounter Provider: Lake Koroma MD  Place of Service: [facility] CARDIOLOGY  Patient Care Team:  Marc Ludwig MD as PCP - General (Family Medicine)  Blas Mckeon MD as Surgeon (General Surgery)  Jonnathan Boston II, MD as Consulting Physician (Vascular Surgery)  Xavi Elliott MD as Consulting Physician (Urology)  Marc Benavidez MD as Consulting Physician (Pain Medicine)  Kurt Henry MD as Consulting Physician (Cardiology)  Meghna Horan [facility] as Pharmacist  Juni Landa MD as Consulting Physician (Hematology and Oncology)  Brendan Live RP as Pharmacist (Pharmacy)    Subjective:     Chief Complaint: Atrial fibrillation/cardiomyopathy.    Interval History: Patient says he is doing a little bit better.    Objective:     Objective:  Temp:  [97.8 °F (36.6 °C)-98.6 °F (37 °C)] 97.8 °F (36.6 °C)  Heart Rate:  [55-83] 68  Resp:  [16-20] 16  BP: (113-129)/(75-87) 113/75     Intake/Output Summary (Last 24 hours) at 2021 1345  Last data filed at 2021 1013  Gross per 24 hour   Intake 160 ml   Output 3950 ml   Net -3790 ml     Body mass index is 37.01 kg/m².      21  0525 21  0653 21  0500   Weight: (!) 144 kg (317 lb) (!) 141 kg (311 lb) (!) 138 kg (304 lb 0.2 oz)     Weight change: -5.89 kg (-12 lb 15.8 oz)      Physical Exam:   General : Alert, cooperative, in no acute distress.  Neuro: Alert,cooperative and oriented.  Lungs: CTAB. Normal respiratory effort and rate.  CV: irregular rate and rhythm, normal S1 and S2, no murmurs, gallops or rubs.  ABD: Soft, nontender, nondistended. Positive bowel sounds.  Extr: Discoloration bilaterally.  Nurses were doing leg wraps when I was in the room.  Left leg was being currently wrapped right leg showed a lot of swelling  in his feet and toes with Betadine noted on multiple toes.    Lab Review:   Results from last 7 days   Lab Units 04/05/21 0438 04/04/21  0442 03/30/21  1207   SODIUM mmol/L 137 135* 129*   POTASSIUM mmol/L 3.8 4.1 4.4   CHLORIDE mmol/L 104 103 92*   CO2 mmol/L 23.6 22.2 23.8   BUN mg/dL 7* 8 9   CREATININE mg/dL 0.73* 0.69* 1.11   GLUCOSE mg/dL 95 92 110*   CALCIUM mg/dL 8.3* 8.2* 10.3   AST (SGOT) U/L  --   --  18   ALT (SGPT) U/L  --   --  12     Results from last 7 days   Lab Units 04/02/21  0327 04/01/21  0346 03/31/21  0644 03/30/21  1504 03/30/21  1207   TROPONIN T ng/mL 0.233* 0.333* 0.254* 0.167* 0.028     Results from last 7 days   Lab Units 04/05/21 0438 04/04/21  0442   WBC 10*3/mm3 5.53 5.88   HEMOGLOBIN g/dL 12.6* 12.7*   HEMATOCRIT % 37.0* 36.0*   PLATELETS 10*3/mm3 146 128*     Results from last 7 days   Lab Units 04/05/21 0438 04/04/21  0442 03/30/21  1207   INR  2.17* 1.99* 2.12*   APTT seconds  --   --  54.7*     Results from last 7 days   Lab Units 03/31/21  0644   MAGNESIUM mg/dL 2.0           Invalid input(s): LDLCALC  Results from last 7 days   Lab Units 03/30/21  1207   PROBNP pg/mL 2,415.0*           Current Medications:   Scheduled Meds:ampicillin-sulbactam, 3 g, Intravenous, Q6H  atorvastatin, 40 mg, Oral, Nightly  budesonide-formoterol, 2 puff, Inhalation, BID - RT  finasteride, 5 mg, Oral, Daily  furosemide, 40 mg, Intravenous, Q12H  gabapentin, 300 mg, Oral, Q8H  insulin lispro, 0-14 Units, Subcutaneous, 4x Daily With Meals & Nightly  ipratropium-albuterol, 3 mL, Nebulization, 4x Daily - RT  losartan, 12.5 mg, Oral, Q24H  metoprolol succinate XL, 12.5 mg, Oral, Q12H  nystatin, , Topical, Q12H  senna-docusate sodium, 2 tablet, Oral, Nightly  sodium chloride, 10 mL, Intravenous, Q12H  sodium chloride, 10 mL, Intravenous, Q12H  sodium chloride, 10 mL, Intravenous, Q12H  warfarin, 5 mg, Oral, Once per day on Mon Fri  warfarin, 7.5 mg, Oral, Once per day on Sun Tue Wed Thu  Sat      Continuous Infusions:Pharmacy to dose warfarin,         Allergies:  Allergies   Allergen Reactions   • Cephalexin Hives   • Codeine Nausea Only       Assessment:       Venous stasis    Permanent atrial fibrillation (CMS/HCC)    COPD (chronic obstructive pulmonary disease) (CMS/HCC)    Essential hypertension    Alcohol dependence, in remission (CMS/HCC)    Severe sepsis (CMS/HCC)    Hyponatremia    Thrombocytopenia (CMS/HCC)    Ischemic cardiomyopathy    Acute systolic (congestive) heart failure (CMS/[facility])    Cellulitis of lower extremity        Plan:     1.  Sepsis  2.  Urinary tract infections   3.  Non-STEMI with moderate diffuse coronary artery disease.  4.  New onset cardiomyopathy ejection fraction 30 to 35% clinically he is doing better today breathing better.  5.  Atrial fibrillation heart rate is well controlled.  Patient is being anticoagulated with warfarin.  6.  Continue supportive care.    Lake Koroma MD  04/05/21  13:45 EDT

## 2021-04-05 NOTE — PROGRESS NOTES
LOS: 5 days   Patient Care Team:  Marc Ludwig MD as PCP - General (Family Medicine)  Blas Mckeon MD as Surgeon (General Surgery)  Jonnathan Boston II, MD as Consulting Physician (Vascular Surgery)  Xavi Elliott MD as Consulting Physician (Urology)  Marc Benavidez MD as Consulting Physician (Pain Medicine)  Kurt Henry MD as Consulting Physician (Cardiology)  Meghna Horan [facility] as Pharmacist  Juni Landa MD as Consulting Physician (Hematology and Oncology)  Brendan Live [facility] as Pharmacist (Pharmacy)    Chief Complaint: Follow-up for NSTEMI, persistent atrial fibrillation, new cardiomyopathy.    Interval History: Had some blood-tinged sputum earlier this morning.  Chest x-ray did show increased interstitial prominence, and IV Lasix has been started.  Shortness of breath is at baseline.  No chest pain overnight.    Vital Signs:  Temp:  [97.7 °F (36.5 °C)-98.4 °F (36.9 °C)] 98.1 °F (36.7 °C)  Heart Rate:  [55-82] 72  Resp:  [18-24] 20  BP: (114-132)/(82-97) 114/82    Intake/Output Summary (Last 24 hours) at 4/4/2021 2028  Last data filed at 4/4/2021 1843  Gross per 24 hour   Intake 880 ml   Output 3250 ml   Net -2370 ml       Physical Exam:   General Appearance:    No acute distress, alert and oriented x4   Lungs:     Bilateral rhonchi and coarse breath sounds    Heart:    Irregularly irregular rhythm and normal rate.  No murmurs, gallops, or rubs.   Abdomen:     Soft, nontender, nondistended.    Extremities:   Wraps noted bilaterally with multiple areas of ecchymoses     Results Review:    Results from last 7 days   Lab Units 04/04/21  0442   SODIUM mmol/L 135*   POTASSIUM mmol/L 4.1   CHLORIDE mmol/L 103   CO2 mmol/L 22.2   BUN mg/dL 8   CREATININE mg/dL 0.69*   GLUCOSE mg/dL 92   CALCIUM mg/dL 8.2*     Results from last 7 days   Lab Units 04/02/21  0327 04/01/21  0346 03/31/21  0644   TROPONIN T ng/mL 0.233* 0.333* 0.254*     Results from last 7 days   Lab Units 04/04/21  0442   WBC  10*3/mm3 5.88   HEMOGLOBIN g/dL 12.7*   HEMATOCRIT % 36.0*   PLATELETS 10*3/mm3 128*     Results from last 7 days   Lab Units 04/04/21  0442 04/03/21  0817 04/02/21  0327 03/30/21  1207   INR  1.99* 1.99* 1.60* 2.12*   APTT seconds  --   --   --  54.7*         Results from last 7 days   Lab Units 03/31/21  0644   MAGNESIUM mg/dL 2.0           I reviewed the patient's new clinical results.        Assessment:  1.  Severe sepsis, likely lower extremity wounds as source  2.  Urinary tract infection  3.  Metabolic encephalopathy, resolved  4.  NSTEMI with history of moderate diffuse coronary artery disease  5.  New cardiomyopathy, likely ischemic (EF 30 to 35%)  6.  Moderate to severe right ventricular dilatation  7.  Chronic edema, multifactorial and partially from venous stasis  8.  Bilateral lower extremity wounds and recurrent cellulitis  9.  Persistent atrial fibrillation  10.  Left popliteal artery aneurysm, status post stenting in 2016  11.  Ongoing tobacco and alcohol use    Plan:    -Given the blood-tinged sputum, I am going to hold the aspirin for now as he is also on Coumadin.    -Chest x-ray with increased interstitial prominence.  Agree with Dr. Blanco in starting the Lasix 40 mg IV every 12 hours for 3 doses.    -His cardiomyopathy is likely ischemic and follows in the LAD distribution.  I suspect that he has significant coronary artery disease in the LAD, and this, coupled with the stress of his current situation caused the NSTEMI.  However, he has multiple issues, including significant lower extremity wounds and bleeding, as well as the need for systemic anticoagulation with warfarin.  He would be a poor candidate for dual antiplatelet therapy if stenting were needed, and I feel the chances for bleeding complications would be high.  Continue medical treatment for now.    -I increased the Toprol-XL to 12.5 mg twice a day, and added losartan 12.5 mg/day.  His blood pressure will need to be monitored closely  with these changes.    -Continue Lipitor 40 mg/day.      Hilario Ngo MD  04/04/21  20:28 EDT

## 2021-04-06 ENCOUNTER — TELEPHONE (OUTPATIENT)
Dept: PHARMACY | Facility: HOSPITAL | Age: 71
End: 2021-04-06

## 2021-04-06 LAB
ANION GAP SERPL CALCULATED.3IONS-SCNC: 10.5 MMOL/L (ref 5–15)
BASOPHILS # BLD AUTO: 0.05 10*3/MM3 (ref 0–0.2)
BASOPHILS NFR BLD AUTO: 0.7 % (ref 0–1.5)
BUN SERPL-MCNC: 9 MG/DL (ref 8–23)
BUN/CREAT SERPL: 12 (ref 7–25)
CALCIUM SPEC-SCNC: 8.8 MG/DL (ref 8.6–10.5)
CHLORIDE SERPL-SCNC: 102 MMOL/L (ref 98–107)
CO2 SERPL-SCNC: 26.5 MMOL/L (ref 22–29)
CREAT SERPL-MCNC: 0.75 MG/DL (ref 0.76–1.27)
DEPRECATED RDW RBC AUTO: 49.9 FL (ref 37–54)
EOSINOPHIL # BLD AUTO: 0.24 10*3/MM3 (ref 0–0.4)
EOSINOPHIL NFR BLD AUTO: 3.4 % (ref 0.3–6.2)
ERYTHROCYTE [DISTWIDTH] IN BLOOD BY AUTOMATED COUNT: 13.5 % (ref 12.3–15.4)
GFR SERPL CREATININE-BSD FRML MDRD: 103 ML/MIN/1.73
GLUCOSE BLDC GLUCOMTR-MCNC: 97 MG/DL (ref 70–130)
GLUCOSE SERPL-MCNC: 87 MG/DL (ref 65–99)
HCT VFR BLD AUTO: 40.9 % (ref 37.5–51)
HGB BLD-MCNC: 14.1 G/DL (ref 13–17.7)
IMM GRANULOCYTES # BLD AUTO: 0.04 10*3/MM3 (ref 0–0.05)
IMM GRANULOCYTES NFR BLD AUTO: 0.6 % (ref 0–0.5)
INR PPP: 2.03 (ref 0.9–1.1)
LYMPHOCYTES # BLD AUTO: 1.84 10*3/MM3 (ref 0.7–3.1)
LYMPHOCYTES NFR BLD AUTO: 26.4 % (ref 19.6–45.3)
MCH RBC QN AUTO: 34.9 PG (ref 26.6–33)
MCHC RBC AUTO-ENTMCNC: 34.5 G/DL (ref 31.5–35.7)
MCV RBC AUTO: 101.2 FL (ref 79–97)
MONOCYTES # BLD AUTO: 0.66 10*3/MM3 (ref 0.1–0.9)
MONOCYTES NFR BLD AUTO: 9.5 % (ref 5–12)
NEUTROPHILS NFR BLD AUTO: 4.13 10*3/MM3 (ref 1.7–7)
NEUTROPHILS NFR BLD AUTO: 59.4 % (ref 42.7–76)
NRBC BLD AUTO-RTO: 0 /100 WBC (ref 0–0.2)
PLATELET # BLD AUTO: 178 10*3/MM3 (ref 140–450)
PMV BLD AUTO: 10.4 FL (ref 6–12)
POTASSIUM SERPL-SCNC: 4 MMOL/L (ref 3.5–5.2)
PROTHROMBIN TIME: 22.7 SECONDS (ref 11.7–14.2)
RBC # BLD AUTO: 4.04 10*6/MM3 (ref 4.14–5.8)
SODIUM SERPL-SCNC: 139 MMOL/L (ref 136–145)
WBC # BLD AUTO: 6.96 10*3/MM3 (ref 3.4–10.8)

## 2021-04-06 PROCEDURE — 99232 SBSQ HOSP IP/OBS MODERATE 35: CPT | Performed by: INTERNAL MEDICINE

## 2021-04-06 PROCEDURE — 25010000003 AMPICILLIN-SULBACTAM PER 1.5 G: Performed by: INTERNAL MEDICINE

## 2021-04-06 PROCEDURE — 94799 UNLISTED PULMONARY SVC/PX: CPT

## 2021-04-06 PROCEDURE — 97116 GAIT TRAINING THERAPY: CPT

## 2021-04-06 PROCEDURE — 85025 COMPLETE CBC W/AUTO DIFF WBC: CPT | Performed by: INTERNAL MEDICINE

## 2021-04-06 PROCEDURE — 80048 BASIC METABOLIC PNL TOTAL CA: CPT | Performed by: INTERNAL MEDICINE

## 2021-04-06 PROCEDURE — 82962 GLUCOSE BLOOD TEST: CPT

## 2021-04-06 PROCEDURE — 85610 PROTHROMBIN TIME: CPT | Performed by: INTERNAL MEDICINE

## 2021-04-06 PROCEDURE — 97530 THERAPEUTIC ACTIVITIES: CPT

## 2021-04-06 PROCEDURE — 25010000002 FUROSEMIDE PER 20 MG: Performed by: INTERNAL MEDICINE

## 2021-04-06 RX ADMIN — SODIUM CHLORIDE, PRESERVATIVE FREE 10 ML: 5 INJECTION INTRAVENOUS at 08:10

## 2021-04-06 RX ADMIN — WARFARIN 7.5 MG: 7.5 TABLET ORAL at 17:23

## 2021-04-06 RX ADMIN — BUDESONIDE AND FORMOTEROL FUMARATE DIHYDRATE 2 PUFF: 160; 4.5 AEROSOL RESPIRATORY (INHALATION) at 08:24

## 2021-04-06 RX ADMIN — SODIUM CHLORIDE, PRESERVATIVE FREE 10 ML: 5 INJECTION INTRAVENOUS at 21:31

## 2021-04-06 RX ADMIN — METOPROLOL SUCCINATE 12.5 MG: 25 TABLET, EXTENDED RELEASE ORAL at 21:30

## 2021-04-06 RX ADMIN — NYSTATIN: 100000 POWDER TOPICAL at 08:09

## 2021-04-06 RX ADMIN — IPRATROPIUM BROMIDE AND ALBUTEROL SULFATE 3 ML: 2.5; .5 SOLUTION RESPIRATORY (INHALATION) at 11:25

## 2021-04-06 RX ADMIN — LOSARTAN POTASSIUM 12.5 MG: 25 TABLET, FILM COATED ORAL at 08:09

## 2021-04-06 RX ADMIN — AMPICILLIN SODIUM AND SULBACTAM SODIUM 3 G: 2; 1 INJECTION, POWDER, FOR SOLUTION INTRAMUSCULAR; INTRAVENOUS at 05:07

## 2021-04-06 RX ADMIN — FUROSEMIDE 40 MG: 10 INJECTION, SOLUTION INTRAMUSCULAR; INTRAVENOUS at 17:24

## 2021-04-06 RX ADMIN — SODIUM CHLORIDE, PRESERVATIVE FREE 10 ML: 5 INJECTION INTRAVENOUS at 22:06

## 2021-04-06 RX ADMIN — DOCUSATE SODIUM 50MG AND SENNOSIDES 8.6MG 2 TABLET: 8.6; 5 TABLET, FILM COATED ORAL at 21:31

## 2021-04-06 RX ADMIN — ATORVASTATIN CALCIUM 40 MG: 20 TABLET, FILM COATED ORAL at 21:31

## 2021-04-06 RX ADMIN — FINASTERIDE 5 MG: 5 TABLET, FILM COATED ORAL at 08:09

## 2021-04-06 RX ADMIN — HYDROCODONE BITARTRATE AND ACETAMINOPHEN 2 TABLET: 5; 325 TABLET ORAL at 17:24

## 2021-04-06 RX ADMIN — HYDROCODONE BITARTRATE AND ACETAMINOPHEN 2 TABLET: 5; 325 TABLET ORAL at 11:07

## 2021-04-06 RX ADMIN — IPRATROPIUM BROMIDE AND ALBUTEROL SULFATE 3 ML: 2.5; .5 SOLUTION RESPIRATORY (INHALATION) at 15:34

## 2021-04-06 RX ADMIN — HYDROCODONE BITARTRATE AND ACETAMINOPHEN 2 TABLET: 5; 325 TABLET ORAL at 05:05

## 2021-04-06 RX ADMIN — IPRATROPIUM BROMIDE AND ALBUTEROL SULFATE 3 ML: 2.5; .5 SOLUTION RESPIRATORY (INHALATION) at 03:54

## 2021-04-06 RX ADMIN — HYDROCODONE BITARTRATE AND ACETAMINOPHEN 2 TABLET: 5; 325 TABLET ORAL at 23:25

## 2021-04-06 RX ADMIN — GABAPENTIN 300 MG: 300 CAPSULE ORAL at 05:07

## 2021-04-06 RX ADMIN — GABAPENTIN 300 MG: 300 CAPSULE ORAL at 21:31

## 2021-04-06 RX ADMIN — FUROSEMIDE 40 MG: 10 INJECTION, SOLUTION INTRAMUSCULAR; INTRAVENOUS at 06:39

## 2021-04-06 RX ADMIN — AMPICILLIN SODIUM AND SULBACTAM SODIUM 3 G: 2; 1 INJECTION, POWDER, FOR SOLUTION INTRAMUSCULAR; INTRAVENOUS at 17:23

## 2021-04-06 RX ADMIN — GABAPENTIN 300 MG: 300 CAPSULE ORAL at 13:56

## 2021-04-06 RX ADMIN — METOPROLOL SUCCINATE 12.5 MG: 25 TABLET, EXTENDED RELEASE ORAL at 08:09

## 2021-04-06 RX ADMIN — BUDESONIDE AND FORMOTEROL FUMARATE DIHYDRATE 2 PUFF: 160; 4.5 AEROSOL RESPIRATORY (INHALATION) at 19:20

## 2021-04-06 RX ADMIN — SODIUM CHLORIDE, PRESERVATIVE FREE 10 ML: 5 INJECTION INTRAVENOUS at 22:05

## 2021-04-06 RX ADMIN — NYSTATIN: 100000 POWDER TOPICAL at 21:31

## 2021-04-06 RX ADMIN — AMPICILLIN SODIUM AND SULBACTAM SODIUM 3 G: 2; 1 INJECTION, POWDER, FOR SOLUTION INTRAMUSCULAR; INTRAVENOUS at 11:07

## 2021-04-06 NOTE — NURSING NOTE
CWOCN- checked patient's legs/compression. Compression wraps are down slightly from yesterday on the right leg. May need to change the level of compression patient is in to see if we get better result/monitor if the wraps continue to roll down. Will change in am.

## 2021-04-06 NOTE — PLAN OF CARE
Goal Outcome Evaluation:  Plan of Care Reviewed With: patient  Progress: no change  Outcome Summary: iv lasix, iv abx, c/o pain in feet, no other changes, vss, will continue to monitor

## 2021-04-06 NOTE — PROGRESS NOTES
"Patient Name: Jalen Lockwood  :1950  71 y.o.      Patient Care Team:  Marc Ludwig MD as PCP - General (Family Medicine)  Blas Mckeon MD as Surgeon (General Surgery)  Jonnathan Boston II, MD as Consulting Physician (Vascular Surgery)  Xavi Elliott MD as Consulting Physician (Urology)  Marc Benavidez MD as Consulting Physician (Pain Medicine)  Kurt Henry MD as Consulting Physician (Cardiology)  Meghna Horan [facility] as Pharmacist  Juni Landa MD as Consulting Physician (Hematology and Oncology)  Brendan Live [facility] as Pharmacist (Pharmacy)    Interval History:   He says his breathing is much improved.    Subjective:  Following for volume overload    Objective   Vital Signs  Temp:  [97.8 °F (36.6 °C)-98.4 °F (36.9 °C)] 97.9 °F (36.6 °C)  Heart Rate:  [66-89] 84  Resp:  [16-20] 18  BP: (115-144)/(79-90) 115/79    Intake/Output Summary (Last 24 hours) at 2021 1402  Last data filed at 2021 1107  Gross per 24 hour   Intake --   Output 2850 ml   Net -2850 ml     Flowsheet Rows      First Filed Value   Admission Height  193 cm (76\") Documented at 2021 1200   Admission Weight  (!) 138 kg (303 lb 9.2 oz) Documented at 2021 1205          Physical Exam:   General Appearance:    Alert, cooperative, in no acute distress   Lungs:     Clear to auscultation.  Normal respiratory effort and rate.      Heart:    Regular rhythm and normal rate, normal S1 and S2, no murmurs, gallops or rubs.     Chest Wall:    No abnormalities observed   Abdomen:     Soft, nontender, positive bowel sounds.     Extremities:   no cyanosis, clubbing or edema.  No marked joint deformities.  Adequate musculoskeletal strength.       Results Review:    Results from last 7 days   Lab Units 21  0417   SODIUM mmol/L 139   POTASSIUM mmol/L 4.0   CHLORIDE mmol/L 102   CO2 mmol/L 26.5   BUN mg/dL 9   CREATININE mg/dL 0.75*   GLUCOSE mg/dL 87   CALCIUM mg/dL 8.8     Results from last 7 days   Lab Units " 04/02/21  0327 04/01/21  0346 03/31/21  0644   TROPONIN T ng/mL 0.233* 0.333* 0.254*     Results from last 7 days   Lab Units 04/06/21  0417   WBC 10*3/mm3 6.96   HEMOGLOBIN g/dL 14.1   HEMATOCRIT % 40.9   PLATELETS 10*3/mm3 178     Results from last 7 days   Lab Units 04/06/21  0417 04/05/21  0438 04/04/21  0442   INR  2.03* 2.17* 1.99*         Results from last 7 days   Lab Units 03/31/21  0644   MAGNESIUM mg/dL 2.0             Medication Review:   ampicillin-sulbactam, 3 g, Intravenous, Q6H  atorvastatin, 40 mg, Oral, Nightly  budesonide-formoterol, 2 puff, Inhalation, BID - RT  finasteride, 5 mg, Oral, Daily  furosemide, 40 mg, Intravenous, Q12H  gabapentin, 300 mg, Oral, Q8H  ipratropium-albuterol, 3 mL, Nebulization, 4x Daily - RT  losartan, 12.5 mg, Oral, Q24H  metoprolol succinate XL, 12.5 mg, Oral, Q12H  nystatin, , Topical, Q12H  senna-docusate sodium, 2 tablet, Oral, Nightly  sodium chloride, 10 mL, Intravenous, Q12H  sodium chloride, 10 mL, Intravenous, Q12H  sodium chloride, 10 mL, Intravenous, Q12H  warfarin, 5 mg, Oral, Once per day on Mon Fri  warfarin, 7.5 mg, Oral, Once per day on Sun Tue Wed Thu Sat         Pharmacy to dose warfarin,         Assessment/Plan     1.  Coronary artery disease with moderate diffuse blockages  2.  New onset cardiomyopathy.  Ejection fraction 30 to 35%.  He is on losartan and Toprol-XL.  Heart rate and blood pressure are well controlled.  He is still on IV Lasix.  He feels his breathing is much improved but continues to have lower extremity edema which he says is chronic for him.  Will monitor labs and transition to oral diuretics when possible.  3.  Atrial fibrillation.  Anticoagulated with warfarin    Elvi Streeter MD, [facility]  04/06/21  14:02 EDT

## 2021-04-06 NOTE — PROGRESS NOTES
Name: Jalen Lockwood ADMIT: 3/30/2021   : 1950  PCP: Marc Ludwig MD    MRN: [id number] LOS: 7 days   AGE/SEX: 71 y.o. male  ROOM: [facility]/     Subjective   Subjective   CC: generalized weakness  No acute events. Patient had some cough yesterday evening with a couple blood clots but has had no further cough today. His dyspnea is improved. Denies CP/f/c/n/v/d.   No family at bedside during the time of my evaluation.    Objective   Objective   Vital Signs  Temp:  [97.8 °F (36.6 °C)-98.4 °F (36.9 °C)] 97.9 °F (36.6 °C)  Heart Rate:  [66-89] 84  Resp:  [16-20] 18  BP: (115-144)/(79-90) 115/79  SpO2:  [93 %-99 %] 93 %  on  Flow (L/min):  [2] 2;   Device (Oxygen Therapy): nasal cannula  Body mass index is 36.5 kg/m².  Physical Exam  Vitals and nursing note reviewed.   Constitutional:       General: He is not in acute distress.     Appearance: He is ill-appearing (chronically). He is not toxic-appearing.   HENT:      Head: Normocephalic and atraumatic.      Nose: Nose normal.      Mouth/Throat:      Mouth: Mucous membranes are moist.      Pharynx: Oropharynx is clear.   Eyes:      Extraocular Movements: Extraocular movements intact.      Conjunctiva/sclera: Conjunctivae normal.      Pupils: Pupils are equal, round, and reactive to light.   Cardiovascular:      Rate and Rhythm: Normal rate. Rhythm irregular.      Pulses: Normal pulses.   Pulmonary:      Effort: Pulmonary effort is normal.      Breath sounds: Examination of the right-lower field reveals decreased breath sounds. Examination of the left-lower field reveals decreased breath sounds. Decreased breath sounds and rales (bibasilar) present.   Abdominal:      General: Bowel sounds are normal.      Palpations: Abdomen is soft.      Tenderness: There is no abdominal tenderness.   Musculoskeletal:         General: Swelling (2+ BLE) present. No tenderness.      Cervical back: Normal range of motion and neck supple.      Comments: BLE with edema and chronic  skin changes with some skin breakdown, weeping on the LLE no purulent drainage   Skin:     General: Skin is warm and dry.      Capillary Refill: Capillary refill takes less than 2 seconds.   Neurological:      General: No focal deficit present.      Mental Status: He is alert and oriented to person, place, and time.   Psychiatric:         Mood and Affect: Mood normal.         Behavior: Behavior normal.     Results Review     I reviewed the patient's new clinical results.  I reviewed the patient's telemetry.  Results from last 7 days   Lab Units 04/06/21 0417 04/05/21 0438 04/04/21 0442 04/03/21  0817   WBC 10*3/mm3 6.96 5.53 5.88 5.88   HEMOGLOBIN g/dL 14.1 12.6* 12.7* 12.1*   PLATELETS 10*3/mm3 178 146 128* 134*     Results from last 7 days   Lab Units 04/06/21 0417 04/05/21 0438 04/04/21 0442 04/03/21  0817   SODIUM mmol/L 139 137 135* 135*   POTASSIUM mmol/L 4.0 3.8 4.1 3.8   CHLORIDE mmol/L 102 104 103 104   CO2 mmol/L 26.5 23.6 22.2 24.1   BUN mg/dL 9 7* 8 8   CREATININE mg/dL 0.75* 0.73* 0.69* 0.71*   GLUCOSE mg/dL 87 95 92 101*   Estimated Creatinine Clearance: 128.8 mL/min (A) (by C-G formula based on SCr of 0.75 mg/dL (L)).      Results from last 7 days   Lab Units 04/06/21 0417 04/05/21 0438 04/04/21 0442 04/03/21  0817 03/31/21  0644   CALCIUM mg/dL 8.8 8.3* 8.2* 8.4* 8.6   MAGNESIUM mg/dL  --   --   --   --  2.0     Results from last 7 days   Lab Units 04/05/21 0438 04/04/21 0442 03/30/21  1504   PROCALCITONIN ng/mL 0.62* 0.97*  --    LACTATE mmol/L  --   --  2.8*     COVID19   Date Value Ref Range Status   03/30/2021 Not Detected Not Detected - Ref. Range Final     Glucose   Date/Time Value Ref Range Status   04/06/2021 0641 97 70 - 130 mg/dL Final   04/05/2021 2039 109 70 - 130 mg/dL Final   04/05/2021 1616 111 70 - 130 mg/dL Final   04/05/2021 1057 107 70 - 130 mg/dL Final   04/05/2021 0700 103 70 - 130 mg/dL Final   04/04/2021 2037 155 (H) 70 - 130 mg/dL Final   04/04/2021 1605 106 70 -  130 mg/dL Final       XR Chest 1 View  Narrative: XR CHEST 1 VW-     HISTORY: Male who is 71 years-old,  hemoptysis     TECHNIQUE: Frontal view of the chest     COMPARISON: 04/04/2021     FINDINGS: A right PICC extending to the right subclavian region. Heart  size is normal. Aorta is tortuous, calcified. Mild prominence of  vascular and interstitial markings. Small likely atelectasis or  infiltrate at the right lung base appears mildly decreased. No pleural  effusion, or pneumothorax. No acute osseous process.     Impression: Small likely atelectasis or infiltrate at the right lung  base appears mildly decreased.     This report was finalized on 4/5/2021 7:07 PM by Dr. Khurram Chambers M.D.       Scheduled Medications  ampicillin-sulbactam, 3 g, Intravenous, Q6H  atorvastatin, 40 mg, Oral, Nightly  budesonide-formoterol, 2 puff, Inhalation, BID - RT  finasteride, 5 mg, Oral, Daily  furosemide, 40 mg, Intravenous, Q12H  gabapentin, 300 mg, Oral, Q8H  ipratropium-albuterol, 3 mL, Nebulization, 4x Daily - RT  losartan, 12.5 mg, Oral, Q24H  metoprolol succinate XL, 12.5 mg, Oral, Q12H  nystatin, , Topical, Q12H  senna-docusate sodium, 2 tablet, Oral, Nightly  sodium chloride, 10 mL, Intravenous, Q12H  sodium chloride, 10 mL, Intravenous, Q12H  sodium chloride, 10 mL, Intravenous, Q12H  warfarin, 5 mg, Oral, Once per day on Mon Fri  warfarin, 7.5 mg, Oral, Once per day on Sun Tue Wed Thu Sat    Infusions  Pharmacy to dose warfarin,     Diet  Diet Regular       Assessment/Plan     Active Hospital Problems    Diagnosis  POA   • Hyponatremia [E87.1]  Yes   • Thrombocytopenia (CMS/HCC) [D69.6]  Yes   • Ischemic cardiomyopathy [I25.5]  Yes   • Acute systolic (congestive) heart failure (CMS/HCC) [I50.21]  No   • Cellulitis of lower extremity [L03.119]  Yes   • Severe sepsis (CMS/HCC) [A41.9, R65.20]  Yes   • Alcohol dependence, 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]  Yes   • Essential hypertension [I10]  Yes   • COPD (chronic  obstructive pulmonary disease) (CMS/HCC) [J44.9]  Yes   • Permanent atrial fibrillation (CMS/HCC) [I48.21]  Yes   • Venous stasis [I87.8]  Yes      Resolved Hospital Problems    Diagnosis Date Resolved POA   • Metabolic encephalopathy [G93.41] 04/03/2021 Yes   Severe Sepsis with Metabolic Encephalopathy, Present on Admission  - most likely source seems to be UTI vs LLE cellulitis but the latter is not really apparent on my exam with wraps off  - blood cx's NGTD, urine culture with mixed darian  - no purulence noted associated on LLE-abx narrowed from vanc/zosyn to unasyn-inflammatory markers and procalcitonin are overall improved so will continue current course-follow these up in AM  - now off IVF, hemodynamically stable    Cough/Hemoptysis  - hemoptysis is mild and does not appear to be having active bleeding-his respiratory status is improving  - I suspect this is from CHF-CXR reviewed and is improving  - ASA stopped, still on warfarin and tolerating well    NSTEMI/ICM/Acute Systolic CHF  - likely LAD disease per cardiology, recommending continuing medical therapy in the setting of increased bleeding risk with DAPT and need for chronic warfarin  - continue IV lasix and follow I&Os, daily weights, and chemistries-potentially can switch to oral diuretic tomorrow   - cardiology following, appreciate recs    Permanent Afib  - continue AC with warfarin  - continue metoprolol    COPD  - no exacerbation  - continue on symbicort  - duo-nebs    Thrombocytopenia  - mild, likely due to severe sepsis  - overall stable but fluctuating, will monitor    Warfarin (home med) for DVT prophylaxis.  Full code.  Discussed with patient and nursing staff.  Anticipate discharge to SNU facility in 2-3 days.      Camilo Blanco MD  [facility]  04/06/21  14:21 EDT

## 2021-04-06 NOTE — PLAN OF CARE
Goal Outcome Evaluation:  Plan of Care Reviewed With: patient  Progress: improving  Outcome Summary: Pt tolerated treatment with c/o bilateral foot pain. pt is CGA with sit<->stand transfers. Pt ambulated 60ft with rwx, CGAX2. Pt steady on feet but demos decreased stride length and forward flexed posture. Verbal cues to increase stride length and to correct posture. Will continue to progress pt as tolerated.  ..Patient was wearing a face mask during this therapy encounter. Therapist used appropriate personal protective equipment including eye protection, mask, and gloves.  Mask used was standard procedure mask. Appropriate PPE was worn during the entire therapy session. Hand hygiene was completed before and after therapy session. Patient is not in enhanced droplet precautions.

## 2021-04-06 NOTE — PROGRESS NOTES
Pharmacy Consult: Warfarin Dosing/ Monitoring    Jalen Lockwood is a 71 y.o. male, estimated creatinine clearance is 128.8 mL/min (A) (by C-G formula based on SCr of 0.75 mg/dL (L)). weighing 136 kg (299 lb 13.2 oz).    PMH: Allergic rhinitis, Anxiety, Aortectasia, Arthritis, Atrial flutter (2010), Charcot's joint of foot, Chronic edema, Chronic venous insufficiency, COPD, Coronary atherosclerosis, Diverticulosis, Duodenitis, Fatty liver, Gastritis, Gastroparesis, Hematoma, Hyperlipidemia, Hypertension, Insomnia, Internal hemorrhoids, Open wound, Osteomyelitis, Paroxysmal atrial fibrillation, Peripheral neuropathy, Popliteal artery aneurysm, Skin cancer, Sleep apnea, Tachycardia induced cardiomyopathy, Venous stasis, and Venous stasis ulcer    Social History     Tobacco Use    Smoking status: Current Every Day Smoker     Packs/day: 0.25     Years: 45.00     Pack years: 11.25     Types: Cigarettes    Smokeless tobacco: Never Used   Vaping Use    Vaping Use: Never used   Substance Use Topics    Alcohol use: Yes     Alcohol/week: 8.0 - 9.0 standard drinks     Types: 1 - 2 Shots of liquor, 7 Standard drinks or equivalent per week     Comment: 2 rum a day//Caffeine use: 3 cups daily    Drug use: No     Results from last 7 days   Lab Units 04/06/21  0417 04/05/21  0438 04/04/21  0442 04/03/21  0817 04/02/21  0327 04/01/21  1325 04/01/21  0346 03/31/21  0518 03/30/21  1207   INR  2.03* 2.17* 1.99* 1.99* 1.60* 1.66*  --   --  2.12*   APTT seconds  --   --   --   --   --   --   --   --  54.7*   HEMOGLOBIN g/dL 14.1 12.6* 12.7* 12.1* 12.3*  --  12.3* 13.4 15.5   HEMATOCRIT % 40.9 37.0* 36.0* 34.7* 35.3*  --  34.3* 36.8* 44.2   PLATELETS 10*3/mm3 178 146 128* 134* 124*  --  122* 159 226     Results from last 7 days   Lab Units 04/06/21  0417 04/05/21  0438 04/04/21  0442 03/30/21  1207   SODIUM mmol/L 139 137 135* 129*   POTASSIUM mmol/L 4.0 3.8 4.1 4.4   CHLORIDE mmol/L 102 104 103 92*   CO2 mmol/L 26.5 23.6 22.2 23.8   BUN  mg/dL 9 7* 8 9   CREATININE mg/dL 0.75* 0.73* 0.69* 1.11   CALCIUM mg/dL 8.8 8.3* 8.2* 10.3   BILIRUBIN mg/dL  --   --   --  1.6*   ALK PHOS U/L  --   --   --  96   ALT (SGPT) U/L  --   --   --  12   AST (SGOT) U/L  --   --   --  18   GLUCOSE mg/dL 87 95 92 110*     Anticoagulation history: Managed by [facility] anti-coag clinic: Warfarin 5 mg po qMF + 7.5 mg po all other days    Hospital Anticoagulation:  Consulting provider: Dr. Brad Lepe  Start date: Home med continued on 4/1/2021  Indication: Atrial fibrillation  Target INR: 2-3  Expected duration: Lifetime   Bridge Therapy: No, Enoxaparin - d/c'd on 4/3/21                Date 4/1 4/2 4/3  4/4 4/5 4/6       INR 1.66 1.60 1.99 1.99 2.17 2.03       Warfarin dose 7.5 mg 7.5 mg 5 mg 7.5 mg 5 mg 7.5 mg         Potential drug interactions:   Aspirin-new med-increased risk of bleeding  Piperacillin/tazobactam-increased risk of bleeding (D/C'd on 4/3/21)  Vancomycin-increased risk of bleeding (D/C'd on 43/21)  Ampicillin-sulbactam - may result in an increased risk of bleeding.     Relevant nutrition status: Regular diet    Education complete?/ Date: Yes, on 4/1/21    Assessment/Plan:    1) Warfarin restarted  on 4/1/21, INR 2.03 this am  2) Enoxaparin 140 mg sq q12hrs- d/c'd on 4/3/21 pm  3) Continue home regimen of warfarin 5 mg po qMF and 7.5 mg po qSSTWTh    Pharmacy will continue to follow until discharge or discontinuation of warfarin.     Joleen Watkins, [facility]  Clinical Staff Pharmacist

## 2021-04-06 NOTE — THERAPY TREATMENT NOTE
Patient Name: Jalen Lockwood  : 1950    MRN: [id number]                              Today's Date: 2021       Admit Date: 3/30/2021    Visit Dx:     ICD-10-CM ICD-9-CM   1. Severe sepsis (CMS/HCC)  A41.9 038.9    R65.20 995.92   2. Acute UTI  N39.0 599.0   3. Acute abdominal pain  R10.9 789.00     338.19   4. Acute respiratory alkalosis  E87.3 276.3   5. Hyponatremia  E87.1 276.1   6. Atrial fibrillation with RVR (CMS/HCC)  I48.91 427.31   7. Fever in adult  R50.9 780.60   8. Acute metabolic encephalopathy  G93.41 348.31     Patient Active Problem List   Diagnosis   • Chronic osteomyelitis (CMS/HCC)   • Foot pain   • Peripheral neuropathy   • Venous stasis   • Permanent atrial fibrillation (CMS/HCC)   • Sleep apnea   • Chronic edema   • Class 2 severe obesity due to excess calories with serious comorbidity and body mass index (BMI) of 36.0 to 36.9 in adult (CMS/HCC)   • COPD (chronic obstructive pulmonary disease) (CMS/HCC)   • Nonocclusive coronary atherosclerosis of native coronary artery   • Atrial flutter (CMS/HCC)   • 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CMS/HCC)   • Aortectasia (CMS/HCC)   • Popliteal artery aneurysm (CMS/HCC)   • Colon polyps   • Gastroparesis   • Insomnia   • Adenomatous polyp of colon   • Essential hypertension   • ETOH abuse   • Chronic anticoagulation   • Oropharyngeal dysphagia   • Tobacco abuse   • Thyromegaly   • Adrenal adenoma, left   • Retroperitoneal lymphadenopathy   • Anemia of chronic disease   • Thyroid nodule   • Charcot's joint of foot   • Abnormal CT scan, lumbar spine   • Alcohol dependence, in remission (CMS/HCC)   • Severe sepsis (CMS/HCC)   • Hyponatremia   • Thrombocytopenia (CMS/HCC)   • Ischemic cardiomyopathy   • Acute systolic (congestive) heart failure (CMS/HCC)   • Cellulitis of lower extremity     Past Medical History:   Diagnosis Date   • Allergic rhinitis    • Anxiety    • Aortectasia (CMS/HCC)     3cm infrarenal abdominal aorta   • Arthritis    •  Atrial flutter (CMS/HCC) 2010    s/p ablation    • Charcot's joint of foot    • Chronic edema     both legs and sees wound care center at [facility]    • Chronic venous insufficiency    • COPD (chronic obstructive pulmonary disease) (CMS/HCC)    • Coronary atherosclerosis     Cath 2010: diffuse 40-50% disease   • Diverticulosis    • Duodenitis    • Fatty liver    • Gastritis    • Gastroparesis    • Hematoma     post-operative; After catheterization, right groin, required surgical exploration   • Hyperlipidemia    • Hypertension    • Insomnia    • Internal hemorrhoids    • Open wound     izzy legs has drsg chg weekly at wound care center at [facility]  pt does second dressing on left leg another time during week   • Osteomyelitis (CMS/HCC)    • Paroxysmal atrial fibrillation (CMS/HCC)    • Peripheral neuropathy    • Popliteal artery aneurysm (CMS/HCC)     left, s/p stenting by Dr. Boston   • Skin cancer    • Sleep apnea     o2   • 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CMS/HCC)     due to flutter and afib; cath 2010 with nonobstructive disease   • Venous stasis    • Venous stasis ulcer (CMS/HCC)     bilateral legs      Past Surgical History:   Procedure Laterality Date   • CARDIAC CATHETERIZATION     • CATARACT EXTRACTION     • COLONOSCOPY  09/28/2015    NBIH, diverticulosis, polyps   • COLONOSCOPY N/A 9/11/2018    Procedure: COLONOSCOPY TO CECUM  AND TERM. ILEUM WITH COLD SNARE POLYPECTOMIES;  Surgeon: Kane Lagunas MD;  Location: [facility] END[facility];  Service: Gastroenterology   • COLONOSCOPY N/A 10/29/2019    Procedure: COLONOSCOPY TO TO CECUM AND TERMINAL ILEUM WITH HOT AND COLD SNARE POLYPECTOMIES;  Surgeon: Kane Lagunas MD;  Location: [facility] ENDOS[facility];  Service: Gastroenterology   • HIP ARTHROPLASTY Right 2017   • JOINT REPLACEMENT Left    • OTHER SURGICAL HISTORY      Catheter ablation atrial flutter   • REPAIR ANEURYSM / PSEUDO ANEURYSM / RUPTURED ANEURYSM POPLITEAL ARTERY      Stent-Graft of the the left  popliteal artery   • REPAIR KNEE LIGAMENT      Primary repair of knee ligament cruciate anterior right   • TONSILLECTOMY  1958   • TOTAL KNEE ARTHROPLASTY Bilateral    • UPPER GASTROINTESTINAL ENDOSCOPY  09/16/2014    acute gastritis, acute duodenitis     General Information     Row Name 04/06/21 1626          Physical Therapy Time and Intention    Document Type  therapy note (daily note)  -     Mode of Treatment  individual therapy;physical therapy  -EB     Row Name 04/06/21 1626          General Information    Existing Precautions/Restrictions  fall  -     Row Name 04/06/21 1626          Cognition    Orientation Status (Cognition)  oriented x 3  -     Row Name 04/06/21 1626          Safety Issues, Functional Mobility    Impairments Affecting Function (Mobility)  balance;endurance/activity tolerance;pain;strength;shortness of breath  -       User Key  (r) = Recorded By, (t) = Taken By, (c) = Cosigned By    Initials Name Provider Type    EB Leslie Magana PTA Physical Therapy Assistant        Mobility     Row Name 04/06/21 1626          Bed Mobility    Comment (Bed Mobility)  NT-UIC  -     Row Name 04/06/21 1626          Transfers    Comment (Transfers)  Cues for hand placement with chair when standing/sitting.  -EB     Row Name 04/06/21 1626          Sit-Stand Transfer    Sit-Stand [place] (Transfers)  contact guard;nonverbal cues (demo/gesture);verbal cues  -     Assistive Device (Sit-Stand Transfers)  walker, front-wheeled  -     Row Name 04/06/21 1626          Gait/Stairs (Locomotion)    Garfield Level (Gait)  2 person assist;contact guard  -     Assistive Device (Gait)  walker, front-wheeled  -     Distance in Feet (Gait)  60  -EB     Deviations/Abnormal Patterns (Gait)  bilateral deviations;base of support, wide;sowmya decreased;gait speed decreased;stride length decreased;festinating/shuffling  -     Bilateral Gait Deviations  -- hips externally rotated.  -EB     Comment  (Gait/Stairs)  pt steady on feet but required cues for posture and increase step length. Pt felt less SOA today during ambulation but did not go as far.  -EB       User Key  (r) = Recorded By, (t) = Taken By, (c) = Cosigned By    Initials Name Provider Type    Leslie Cruz PTA Physical Therapy Assistant        Obj/Interventions     Row Name 04/06/21 1629          Motor Skills    Therapeutic Exercise  -- BLE: LAQs, seated marches (X10)  -EB       User Key  (r) = Recorded By, (t) = Taken By, (c) = Cosigned By    Initials Name Provider Type    Leslie Cruz PTA Physical Therapy Assistant        Goals/Plan    No documentation.       Clinical Impression     Row Name 04/06/21 1629          Plan of Care Review    Plan of Care Reviewed With  patient  -EB     Progress  improving  -EB     Outcome Summary  Pt tolerated treatment with c/o bilateral foot pain. pt is CGA with sit<->stand transfers. Pt ambulated 60ft with rwx, CGAX2. Pt steady on feet but demos decreased stride length and forward flexed posture. Verbal cues to increase stride length and to correct posture. Will continue to progress pt as tolerated.  -EB     Row Name 04/06/21 1629          Positioning and Restraints    Pre-Treatment Position  sitting in chair/recliner  -EB     Post Treatment Position  chair  -EB     In Chair  reclined;call light within reach;encouraged to call for assist;exit alarm on  -EB       User Key  (r) = Recorded By, (t) = Taken By, (c) = Cosigned By    Initials Name Provider Type    Leslie Cruz PTA Physical Therapy Assistant        Outcome Measures     Row Name 04/06/21 1631          How much help from another person do you currently need...    Turning from your back to your side while in flat bed without using bedrails?  3  -EB     Moving from lying on back to sitting on the side of a flat bed without bedrails?  3  -EB     Moving to and from a bed to a chair (including a wheelchair)?  3  -EB     Standing up from a chair  using your arms (e.g., wheelchair, bedside chair)?  3  -EB     Climbing 3-5 steps with a railing?  2  -EB     To walk in hospital room?  3  -EB     AM-PAC 6 Clicks Score (PT)  17  -EB       User Key  (r) = Recorded By, (t) = Taken By, (c) = Cosigned By    Initials Name Provider Type    EB Leslie Magana PTA Physical Therapy Assistant        Physical Therapy Education                 Title: PT OT SLP Therapies (Done)     Topic: Physical Therapy (Done)     Point: Mobility training (Done)     Learning Progress Summary           Patient Acceptance, E,D, VU,NR by  at 4/6/2021 1631    Acceptance, E,TB,D, VU,NR by  at 4/5/2021 1558    Acceptance, E, VU by ME at 4/2/2021 1502                   Point: Home exercise program (Done)     Learning Progress Summary           Patient Acceptance, E,D, VU,NR by  at 4/6/2021 1631    Acceptance, E,TB,D, VU,NR by  at 4/5/2021 1558                   Point: Body mechanics (Done)     Learning Progress Summary           Patient Acceptance, E,D, VU,NR by  at 4/6/2021 1631    Acceptance, E, VU by ME at 4/2/2021 1502                   Point: Precautions (Done)     Learning Progress Summary           Patient Acceptance, E,D, VU,NR by  at 4/6/2021 1631    Acceptance, E, VU by ME at 4/2/2021 1502                               User Key     Initials Effective Dates Name Provider Type Discipline     04/03/18 -  Yolanda Batista, PT Physical Therapist PT     03/10/21 -  Leslie Magana PTA Physical Therapy Assistant PT    ME 03/12/21 -  Сергей Ryder PT Student PT Student PT              PT Recommendation and Plan     Plan of Care Reviewed With: patient  Progress: improving  Outcome Summary: Pt tolerated treatment with c/o bilateral foot pain. pt is CGA with sit<->stand transfers. Pt ambulated 60ft with rwx, CGAX2. Pt steady on feet but demos decreased stride length and forward flexed posture. Verbal cues to increase stride length and to correct posture. Will continue to progress  pt as tolerated.     Time Calculation:   PT Charges     Row Name 04/06/21 1625             Time Calculation    Start Time  1446  -EB      Stop Time  1509  -EB      Time Calculation (min)  23 min  -EB      PT Received On  04/06/21  -EB      PT - Next Appointment  04/07/21  -EB         Time Calculation- PT    Total Timed Code Minutes- PT  23 minute(s)  -EB        User Key  (r) = Recorded By, (t) = Taken By, (c) = Cosigned By    Initials Name Provider Type    EB Leslie Magana PTA Physical Therapy Assistant        Therapy Charges for Today     Code Description Service Date Service Provider Modifiers Qty    43358611284 HC GAIT TRAINING EA 15 MIN 4/6/2021 Leslie Magana, DESIREE GP 1    14180593968 HC PT THERAPEUTIC ACT EA 15 MIN 4/6/2021 Leslie Magana PTA GP 1    80031598498 HC PT THER SUPP EA 15 MIN 4/6/2021 Leslie Magana PTA GP 1          PT G-Codes  Outcome Measure Options: AM-PAC 6 Clicks Basic Mobility (PT)  AM-PAC 6 Clicks Score (PT): 17    Leslie Magana PTA  4/6/2021

## 2021-04-06 NOTE — PROGRESS NOTES
Continued Stay Note  [facility]     Patient Name: Jalen Lockwood  MRN: [id number]  Today's Date: 4/6/2021    Admit Date: 3/30/2021    Discharge Plan     Row Name 04/06/21 1241       Plan    Plan  Referrals for skilled rehab pending    Plan Comments  Spoke with Louise with [facility] as she had declined in [facility].  She states she does not have any beds at this time.  Discussed that he may not be medically ready for dc for a couple more day.  She will follow.  Notified Mariposa, spouse.  She declines to provide another choice.  She states that the patient's wound doctor will assist getting him a bed at [facility].  Partial packet in office.......................Emily Juarez RN        Discharge Codes    No documentation.             Emily Juarez RN

## 2021-04-07 LAB
ANION GAP SERPL CALCULATED.3IONS-SCNC: 7.6 MMOL/L (ref 5–15)
BASOPHILS # BLD MANUAL: 0.06 10*3/MM3 (ref 0–0.2)
BASOPHILS NFR BLD AUTO: 1 % (ref 0–1.5)
BUN SERPL-MCNC: 10 MG/DL (ref 8–23)
BUN/CREAT SERPL: 11.4 (ref 7–25)
CALCIUM SPEC-SCNC: 8.6 MG/DL (ref 8.6–10.5)
CHLORIDE SERPL-SCNC: 100 MMOL/L (ref 98–107)
CO2 SERPL-SCNC: 31.4 MMOL/L (ref 22–29)
CREAT SERPL-MCNC: 0.88 MG/DL (ref 0.76–1.27)
CRP SERPL-MCNC: 0.55 MG/DL (ref 0–0.5)
DEPRECATED RDW RBC AUTO: 48.9 FL (ref 37–54)
EOSINOPHIL # BLD MANUAL: 0.11 10*3/MM3 (ref 0–0.4)
EOSINOPHIL NFR BLD MANUAL: 2 % (ref 0.3–6.2)
ERYTHROCYTE [DISTWIDTH] IN BLOOD BY AUTOMATED COUNT: 13.3 % (ref 12.3–15.4)
ERYTHROCYTE [SEDIMENTATION RATE] IN BLOOD: 35 MM/HR (ref 0–20)
GFR SERPL CREATININE-BSD FRML MDRD: 86 ML/MIN/1.73
GLUCOSE BLDC GLUCOMTR-MCNC: 105 MG/DL (ref 70–130)
GLUCOSE BLDC GLUCOMTR-MCNC: 106 MG/DL (ref 70–130)
GLUCOSE SERPL-MCNC: 96 MG/DL (ref 65–99)
HCT VFR BLD AUTO: 35.3 % (ref 37.5–51)
HGB BLD-MCNC: 12.4 G/DL (ref 13–17.7)
INR PPP: 1.98 (ref 0.9–1.1)
LYMPHOCYTES # BLD MANUAL: 1.48 10*3/MM3 (ref 0.7–3.1)
LYMPHOCYTES NFR BLD MANUAL: 26 % (ref 19.6–45.3)
LYMPHOCYTES NFR BLD MANUAL: 4 % (ref 5–12)
MAGNESIUM SERPL-MCNC: 2.1 MG/DL (ref 1.6–2.4)
MCH RBC QN AUTO: 34.9 PG (ref 26.6–33)
MCHC RBC AUTO-ENTMCNC: 35.1 G/DL (ref 31.5–35.7)
MCV RBC AUTO: 99.4 FL (ref 79–97)
MONOCYTES # BLD AUTO: 0.23 10*3/MM3 (ref 0.1–0.9)
NEUTROPHILS # BLD AUTO: 3.82 10*3/MM3 (ref 1.7–7)
NEUTROPHILS NFR BLD MANUAL: 67 % (ref 42.7–76)
PLAT MORPH BLD: NORMAL
PLATELET # BLD AUTO: 172 10*3/MM3 (ref 140–450)
PMV BLD AUTO: 10.2 FL (ref 6–12)
POTASSIUM SERPL-SCNC: 3.7 MMOL/L (ref 3.5–5.2)
PROCALCITONIN SERPL-MCNC: 0.24 NG/ML (ref 0–0.25)
PROTHROMBIN TIME: 22.3 SECONDS (ref 11.7–14.2)
QT INTERVAL: 474 MS
RBC # BLD AUTO: 3.55 10*6/MM3 (ref 4.14–5.8)
RBC MORPH BLD: NORMAL
SODIUM SERPL-SCNC: 139 MMOL/L (ref 136–145)
TROPONIN T SERPL-MCNC: 0.15 NG/ML (ref 0–0.03)
WBC # BLD AUTO: 5.7 10*3/MM3 (ref 3.4–10.8)
WBC MORPH BLD: NORMAL

## 2021-04-07 PROCEDURE — 94799 UNLISTED PULMONARY SVC/PX: CPT

## 2021-04-07 PROCEDURE — 25010000002 FUROSEMIDE PER 20 MG: Performed by: INTERNAL MEDICINE

## 2021-04-07 PROCEDURE — 85007 BL SMEAR W/DIFF WBC COUNT: CPT | Performed by: INTERNAL MEDICINE

## 2021-04-07 PROCEDURE — 94760 N-INVAS EAR/PLS OXIMETRY 1: CPT

## 2021-04-07 PROCEDURE — 97530 THERAPEUTIC ACTIVITIES: CPT

## 2021-04-07 PROCEDURE — 93010 ELECTROCARDIOGRAM REPORT: CPT | Performed by: INTERNAL MEDICINE

## 2021-04-07 PROCEDURE — 85610 PROTHROMBIN TIME: CPT | Performed by: INTERNAL MEDICINE

## 2021-04-07 PROCEDURE — 99232 SBSQ HOSP IP/OBS MODERATE 35: CPT | Performed by: INTERNAL MEDICINE

## 2021-04-07 PROCEDURE — 82962 GLUCOSE BLOOD TEST: CPT

## 2021-04-07 PROCEDURE — 93005 ELECTROCARDIOGRAM TRACING: CPT | Performed by: INTERNAL MEDICINE

## 2021-04-07 PROCEDURE — 83735 ASSAY OF MAGNESIUM: CPT | Performed by: INTERNAL MEDICINE

## 2021-04-07 PROCEDURE — 86140 C-REACTIVE PROTEIN: CPT | Performed by: INTERNAL MEDICINE

## 2021-04-07 PROCEDURE — 84145 PROCALCITONIN (PCT): CPT | Performed by: INTERNAL MEDICINE

## 2021-04-07 PROCEDURE — 25010000003 AMPICILLIN-SULBACTAM PER 1.5 G: Performed by: INTERNAL MEDICINE

## 2021-04-07 PROCEDURE — 85025 COMPLETE CBC W/AUTO DIFF WBC: CPT | Performed by: INTERNAL MEDICINE

## 2021-04-07 PROCEDURE — 80048 BASIC METABOLIC PNL TOTAL CA: CPT | Performed by: INTERNAL MEDICINE

## 2021-04-07 PROCEDURE — 85652 RBC SED RATE AUTOMATED: CPT | Performed by: INTERNAL MEDICINE

## 2021-04-07 PROCEDURE — 84484 ASSAY OF TROPONIN QUANT: CPT | Performed by: INTERNAL MEDICINE

## 2021-04-07 RX ORDER — BUMETANIDE 1 MG/1
1 TABLET ORAL 2 TIMES DAILY
Status: DISCONTINUED | OUTPATIENT
Start: 2021-04-07 | End: 2021-04-12

## 2021-04-07 RX ORDER — HYDROCODONE BITARTRATE AND ACETAMINOPHEN 5; 325 MG/1; MG/1
2 TABLET ORAL EVERY 6 HOURS PRN
Status: DISCONTINUED | OUTPATIENT
Start: 2021-04-07 | End: 2021-04-13 | Stop reason: HOSPADM

## 2021-04-07 RX ORDER — AMOXICILLIN AND CLAVULANATE POTASSIUM 875; 125 MG/1; MG/1
1 TABLET, FILM COATED ORAL EVERY 12 HOURS SCHEDULED
Status: COMPLETED | OUTPATIENT
Start: 2021-04-07 | End: 2021-04-11

## 2021-04-07 RX ADMIN — ATORVASTATIN CALCIUM 40 MG: 20 TABLET, FILM COATED ORAL at 21:18

## 2021-04-07 RX ADMIN — BUMETANIDE 1 MG: 1 TABLET ORAL at 12:20

## 2021-04-07 RX ADMIN — BUMETANIDE 1 MG: 1 TABLET ORAL at 21:18

## 2021-04-07 RX ADMIN — IPRATROPIUM BROMIDE AND ALBUTEROL SULFATE 3 ML: 2.5; .5 SOLUTION RESPIRATORY (INHALATION) at 03:05

## 2021-04-07 RX ADMIN — BUDESONIDE AND FORMOTEROL FUMARATE DIHYDRATE 2 PUFF: 160; 4.5 AEROSOL RESPIRATORY (INHALATION) at 08:27

## 2021-04-07 RX ADMIN — AMPICILLIN SODIUM AND SULBACTAM SODIUM 3 G: 2; 1 INJECTION, POWDER, FOR SOLUTION INTRAMUSCULAR; INTRAVENOUS at 00:48

## 2021-04-07 RX ADMIN — WARFARIN 7.5 MG: 7.5 TABLET ORAL at 18:33

## 2021-04-07 RX ADMIN — FUROSEMIDE 40 MG: 10 INJECTION, SOLUTION INTRAMUSCULAR; INTRAVENOUS at 06:21

## 2021-04-07 RX ADMIN — AMOXICILLIN AND CLAVULANATE POTASSIUM 1 TABLET: 875; 125 TABLET, FILM COATED ORAL at 21:18

## 2021-04-07 RX ADMIN — HYDROCODONE BITARTRATE AND ACETAMINOPHEN 2 TABLET: 5; 325 TABLET ORAL at 05:26

## 2021-04-07 RX ADMIN — METOPROLOL SUCCINATE 12.5 MG: 25 TABLET, EXTENDED RELEASE ORAL at 21:18

## 2021-04-07 RX ADMIN — IPRATROPIUM BROMIDE AND ALBUTEROL SULFATE 3 ML: 2.5; .5 SOLUTION RESPIRATORY (INHALATION) at 11:04

## 2021-04-07 RX ADMIN — HYDROCODONE BITARTRATE AND ACETAMINOPHEN 2 TABLET: 5; 325 TABLET ORAL at 18:33

## 2021-04-07 RX ADMIN — SODIUM CHLORIDE, PRESERVATIVE FREE 10 ML: 5 INJECTION INTRAVENOUS at 21:18

## 2021-04-07 RX ADMIN — SODIUM CHLORIDE, PRESERVATIVE FREE 10 ML: 5 INJECTION INTRAVENOUS at 08:24

## 2021-04-07 RX ADMIN — SODIUM CHLORIDE, PRESERVATIVE FREE 10 ML: 5 INJECTION INTRAVENOUS at 21:20

## 2021-04-07 RX ADMIN — GABAPENTIN 300 MG: 300 CAPSULE ORAL at 06:29

## 2021-04-07 RX ADMIN — GABAPENTIN 300 MG: 300 CAPSULE ORAL at 21:18

## 2021-04-07 RX ADMIN — BUDESONIDE AND FORMOTEROL FUMARATE DIHYDRATE 2 PUFF: 160; 4.5 AEROSOL RESPIRATORY (INHALATION) at 20:06

## 2021-04-07 RX ADMIN — SODIUM CHLORIDE, PRESERVATIVE FREE 10 ML: 5 INJECTION INTRAVENOUS at 21:19

## 2021-04-07 RX ADMIN — GABAPENTIN 300 MG: 300 CAPSULE ORAL at 14:45

## 2021-04-07 RX ADMIN — AMPICILLIN SODIUM AND SULBACTAM SODIUM 3 G: 2; 1 INJECTION, POWDER, FOR SOLUTION INTRAMUSCULAR; INTRAVENOUS at 06:21

## 2021-04-07 RX ADMIN — NYSTATIN: 100000 POWDER TOPICAL at 21:19

## 2021-04-07 RX ADMIN — LOSARTAN POTASSIUM 12.5 MG: 25 TABLET, FILM COATED ORAL at 08:22

## 2021-04-07 RX ADMIN — DOCUSATE SODIUM 50MG AND SENNOSIDES 8.6MG 2 TABLET: 8.6; 5 TABLET, FILM COATED ORAL at 21:18

## 2021-04-07 RX ADMIN — METOPROLOL SUCCINATE 12.5 MG: 25 TABLET, EXTENDED RELEASE ORAL at 08:22

## 2021-04-07 RX ADMIN — FINASTERIDE 5 MG: 5 TABLET, FILM COATED ORAL at 08:22

## 2021-04-07 RX ADMIN — NYSTATIN: 100000 POWDER TOPICAL at 08:25

## 2021-04-07 RX ADMIN — HYDROCODONE BITARTRATE AND ACETAMINOPHEN 2 TABLET: 5; 325 TABLET ORAL at 12:20

## 2021-04-07 RX ADMIN — AMOXICILLIN AND CLAVULANATE POTASSIUM 1 TABLET: 875; 125 TABLET, FILM COATED ORAL at 12:20

## 2021-04-07 RX ADMIN — IPRATROPIUM BROMIDE AND ALBUTEROL SULFATE 3 ML: 2.5; .5 SOLUTION RESPIRATORY (INHALATION) at 14:56

## 2021-04-07 NOTE — PROGRESS NOTES
Name: Jalen Lockwood ADMIT: 3/30/2021   : 1950  PCP: Marc Ludwig MD    MRN: [id number] LOS: 8 days   AGE/SEX: 71 y.o. male  ROOM: [facility]/     Subjective   Subjective   CC: generalized weakness  No acute events. Patient denies further cough. His dyspnea is much improved. Denies CP/f/c/n/v/d.   His wife was at bedside during the time of my evaluation.    Objective   Objective   Vital Signs  Temp:  [97.9 °F (36.6 °C)-98.7 °F (37.1 °C)] 97.9 °F (36.6 °C)  Heart Rate:  [64-85] 69  Resp:  [16-20] 16  BP: (115-132)/(79-87) 131/87  SpO2:  [93 %-98 %] 98 %  on  Flow (L/min):  [2] 2;   Device (Oxygen Therapy): nasal cannula  Body mass index is 38.11 kg/m².  Physical Exam  Vitals and nursing note reviewed.   Constitutional:       General: He is not in acute distress.     Appearance: He is ill-appearing (chronically). He is not toxic-appearing.   HENT:      Head: Normocephalic and atraumatic.      Nose: Nose normal.      Mouth/Throat:      Mouth: Mucous membranes are moist.      Pharynx: Oropharynx is clear.   Eyes:      Extraocular Movements: Extraocular movements intact.      Conjunctiva/sclera: Conjunctivae normal.      Pupils: Pupils are equal, round, and reactive to light.   Cardiovascular:      Rate and Rhythm: Normal rate. Rhythm irregular.      Pulses: Normal pulses.   Pulmonary:      Effort: Pulmonary effort is normal.      Breath sounds: Normal breath sounds. No wheezing, rhonchi or rales.   Abdominal:      General: Bowel sounds are normal.      Palpations: Abdomen is soft.      Tenderness: There is no abdominal tenderness.   Musculoskeletal:         General: Swelling (2+ BLE) present. No tenderness.      Cervical back: Normal range of motion and neck supple.      Comments: BLE in compression wraps, c/d/i   Skin:     General: Skin is warm and dry.      Capillary Refill: Capillary refill takes less than 2 seconds.   Neurological:      General: No focal deficit present.      Mental Status: He is alert and  oriented to person, place, and time.   Psychiatric:         Mood and Affect: Mood normal.         Behavior: Behavior normal.     Results Review     I reviewed the patient's new clinical results.  I reviewed the patient's telemetry.  Results from last 7 days   Lab Units 04/07/21 0423 04/06/21 0417 04/05/21 0438 04/04/21 0442   WBC 10*3/mm3 5.70 6.96 5.53 5.88   HEMOGLOBIN g/dL 12.4* 14.1 12.6* 12.7*   PLATELETS 10*3/mm3 172 178 146 128*     Results from last 7 days   Lab Units 04/07/21 0423 04/06/21 0417 04/05/21 0438 04/04/21 0442   SODIUM mmol/L 139 139 137 135*   POTASSIUM mmol/L 3.7 4.0 3.8 4.1   CHLORIDE mmol/L 100 102 104 103   CO2 mmol/L 31.4* 26.5 23.6 22.2   BUN mg/dL 10 9 7* 8   CREATININE mg/dL 0.88 0.75* 0.73* 0.69*   GLUCOSE mg/dL 96 87 95 92   Estimated Creatinine Clearance: 120.4 mL/min (by C-G formula based on SCr of 0.88 mg/dL).      Results from last 7 days   Lab Units 04/07/21 0423 04/06/21 0417 04/05/21 0438 04/04/21 0442   CALCIUM mg/dL 8.6 8.8 8.3* 8.2*   MAGNESIUM mg/dL 2.1  --   --   --      Results from last 7 days   Lab Units 04/07/21 0423 04/05/21 0438 04/04/21 0442   PROCALCITONIN ng/mL 0.24 0.62* 0.97*     COVID19   Date Value Ref Range Status   03/30/2021 Not Detected Not Detected - Ref. Range Final     Glucose   Date/Time Value Ref Range Status   04/07/2021 1056 106 70 - 130 mg/dL Final   04/06/2021 0641 97 70 - 130 mg/dL Final   04/05/2021 2039 109 70 - 130 mg/dL Final   04/05/2021 1616 111 70 - 130 mg/dL Final   04/05/2021 1057 107 70 - 130 mg/dL Final   04/05/2021 0700 103 70 - 130 mg/dL Final   04/04/2021 2037 155 (H) 70 - 130 mg/dL Final       XR Chest 1 View  Narrative: XR CHEST 1 VW-     HISTORY: Male who is 71 years-old,  hemoptysis     TECHNIQUE: Frontal view of the chest     COMPARISON: 04/04/2021     FINDINGS: A right PICC extending to the right subclavian region. Heart  size is normal. Aorta is tortuous, calcified. Mild prominence of  vascular and interstitial  markings. Small likely atelectasis or  infiltrate at the right lung base appears mildly decreased. No pleural  effusion, or pneumothorax. No acute osseous process.     Impression: Small likely atelectasis or infiltrate at the right lung  base appears mildly decreased.     This report was finalized on 4/5/2021 7:07 PM by Dr. Khurram Chambers M.D.       Scheduled Medications  amoxicillin-clavulanate, 1 tablet, Oral, Q12H  atorvastatin, 40 mg, Oral, Nightly  budesonide-formoterol, 2 puff, Inhalation, BID - RT  bumetanide, 1 mg, Oral, BID  finasteride, 5 mg, Oral, Daily  gabapentin, 300 mg, Oral, Q8H  ipratropium-albuterol, 3 mL, Nebulization, 4x Daily - RT  losartan, 12.5 mg, Oral, Q24H  metoprolol succinate XL, 12.5 mg, Oral, Q12H  nystatin, , Topical, Q12H  senna-docusate sodium, 2 tablet, Oral, Nightly  sodium chloride, 10 mL, Intravenous, Q12H  sodium chloride, 10 mL, Intravenous, Q12H  sodium chloride, 10 mL, Intravenous, Q12H  warfarin, 5 mg, Oral, Once per day on Mon Fri  warfarin, 7.5 mg, Oral, Once per day on Sun Tue Wed Thu Sat    Infusions  Pharmacy to dose warfarin,     Diet  Diet Regular       Assessment/Plan     Active Hospital Problems    Diagnosis  POA   • Hyponatremia [E87.1]  Yes   • Thrombocytopenia (CMS/HCC) [D69.6]  Yes   • Ischemic cardiomyopathy [I25.5]  Yes   • Acute systolic (congestive) heart failure (CMS/HCC) [I50.21]  No   • Cellulitis of lower extremity [L03.119]  Yes   • Severe sepsis (CMS/HCC) [A41.9, R65.20]  Yes   • Alcohol dependence, 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]  Yes   • Essential hypertension [I10]  Yes   • COPD (chronic obstructive pulmonary disease) (CMS/HCC) [J44.9]  Yes   • Permanent atrial fibrillation (CMS/HCC) [I48.21]  Yes   • Venous stasis [I87.8]  Yes      Resolved Hospital Problems    Diagnosis Date Resolved POA   • Metabolic encephalopathy [G93.41] 04/03/2021 Yes   Severe Sepsis with Metabolic Encephalopathy, Present on Admission  - most likely source seems to be  UTI vs LLE cellulitis but the latter is not really apparent on my exam with wraps off  - blood cx's NGTD, urine culture with mixed darian  - no purulence noted associated on LLE-abx narrowed from vanc/zosyn to unasyn-inflammatory markers and procalcitonin continue to improve  - now off IVF, hemodynamically stable  - will switch unasyn to augmentin to complete course    Cough/Hemoptysis  - hemoptysis was mild and does not appear to be having active bleeding-his respiratory status is improving and hemoptysis has resolved  - I suspect this is from CHF-CXR reviewed and is improving  - ASA stopped, still on warfarin and tolerating well    NSTEMI/ICM/Acute Systolic CHF  - likely LAD disease per cardiology, recommending continuing medical therapy in the setting of increased bleeding risk with DAPT and need for chronic warfarin  - volume status is much better-will switch to oral bumex and monitor response today  - cardiology following, appreciate recs    Permanent Afib  - continue AC with warfarin  - continue metoprolol    COPD  - no exacerbation  - continue on symbicort  - duo-nebs    Thrombocytopenia  - mild, likely due to severe sepsis  - this has normalized    Warfarin (home med) for DVT prophylaxis.  Full code.  Discussed with patient, family, nursing staff, CCP and care team on multidisciplinary rounds.  Anticipate discharge to SNU facility tomorrow.      Camilo Blanco MD  [facility]  04/07/21  12:04 EDT

## 2021-04-07 NOTE — PROGRESS NOTES
Continued Stay Note  [facility]     Patient Name: Jalen Lockwood  MRN: [id number]  Today's Date: 4/7/2021    Admit Date: 3/30/2021    Discharge Plan     Row Name 04/07/21 1145       Plan    Plan  [facility] Pending Pre-cert    Plan Comments  Spoke with Louise she will have an opening at [facility].  She will start pre-cert.  Spoke with Mariposa forbes [phone].  She accepts the bed at [facility].  Packet in office.....................Emily Juarez RN        Discharge Codes    No documentation.             Emily Juarez RN

## 2021-04-07 NOTE — THERAPY TREATMENT NOTE
Patient Name: Jalen Locwkood  : 1950    MRN: [id number]                              Today's Date: 2021       Admit Date: 3/30/2021    Visit Dx:     ICD-10-CM ICD-9-CM   1. Severe sepsis (CMS/HCC)  A41.9 038.9    R65.20 995.92   2. Acute UTI  N39.0 599.0   3. Acute abdominal pain  R10.9 789.00     338.19   4. Acute respiratory alkalosis  E87.3 276.3   5. Hyponatremia  E87.1 276.1   6. Atrial fibrillation with RVR (CMS/HCC)  I48.91 427.31   7. Fever in adult  R50.9 780.60   8. Acute metabolic encephalopathy  G93.41 348.31     Patient Active Problem List   Diagnosis   • Chronic osteomyelitis (CMS/HCC)   • Foot pain   • Peripheral neuropathy   • Venous stasis   • Permanent atrial fibrillation (CMS/HCC)   • Sleep apnea   • Chronic edema   • Class 2 severe obesity due to excess calories with serious comorbidity and body mass index (BMI) of 36.0 to 36.9 in adult (CMS/HCC)   • COPD (chronic obstructive pulmonary disease) (CMS/HCC)   • Nonocclusive coronary atherosclerosis of native coronary artery   • Atrial flutter (CMS/HCC)   • 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CMS/HCC)   • Aortectasia (CMS/HCC)   • Popliteal artery aneurysm (CMS/HCC)   • Colon polyps   • Gastroparesis   • Insomnia   • Adenomatous polyp of colon   • Essential hypertension   • ETOH abuse   • Chronic anticoagulation   • Oropharyngeal dysphagia   • Tobacco abuse   • Thyromegaly   • Adrenal adenoma, left   • Retroperitoneal lymphadenopathy   • Anemia of chronic disease   • Thyroid nodule   • Charcot's joint of foot   • Abnormal CT scan, lumbar spine   • Alcohol dependence, in remission (CMS/HCC)   • Severe sepsis (CMS/HCC)   • Hyponatremia   • Thrombocytopenia (CMS/HCC)   • Ischemic cardiomyopathy   • Acute systolic (congestive) heart failure (CMS/HCC)   • Cellulitis of lower extremity     Past Medical History:   Diagnosis Date   • Allergic rhinitis    • Anxiety    • Aortectasia (CMS/HCC)     3cm infrarenal abdominal aorta   • Arthritis    •  Atrial flutter (CMS/HCC) 2010    s/p ablation    • Charcot's joint of foot    • Chronic edema     both legs and sees wound care center at [facility]    • Chronic venous insufficiency    • COPD (chronic obstructive pulmonary disease) (CMS/HCC)    • Coronary atherosclerosis     Cath 2010: diffuse 40-50% disease   • Diverticulosis    • Duodenitis    • Fatty liver    • Gastritis    • Gastroparesis    • Hematoma     post-operative; After catheterization, right groin, required surgical exploration   • Hyperlipidemia    • Hypertension    • Insomnia    • Internal hemorrhoids    • Open wound     izzy legs has drsg chg weekly at wound care center at [facility]  pt does second dressing on left leg another time during week   • Osteomyelitis (CMS/HCC)    • Paroxysmal atrial fibrillation (CMS/HCC)    • Peripheral neuropathy    • Popliteal artery aneurysm (CMS/HCC)     left, s/p stenting by Dr. Boston   • Skin cancer    • Sleep apnea     o2   • 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CMS/HCC)     due to flutter and afib; cath 2010 with nonobstructive disease   • Venous stasis    • Venous stasis ulcer (CMS/HCC)     bilateral legs      Past Surgical History:   Procedure Laterality Date   • CARDIAC CATHETERIZATION     • CATARACT EXTRACTION     • COLONOSCOPY  09/28/2015    NBIH, diverticulosis, polyps   • COLONOSCOPY N/A 9/11/2018    Procedure: COLONOSCOPY TO CECUM  AND TERM. ILEUM WITH COLD SNARE POLYPECTOMIES;  Surgeon: Kane Lagunas MD;  Location: [facility];  Service: Gastroenterology   • COLONOSCOPY N/A 10/29/2019    Procedure: COLONOSCOPY TO TO CECUM AND TERMINAL ILEUM WITH HOT AND COLD SNARE POLYPECTOMIES;  Surgeon: Kane Lagunas MD;  Location: [facility] END[facility];  Service: Gastroenterology   • HIP ARTHROPLASTY Right 2017   • JOINT REPLACEMENT Left    • OTHER SURGICAL HISTORY      Catheter ablation atrial flutter   • REPAIR ANEURYSM / PSEUDO ANEURYSM / RUPTURED ANEURYSM POPLITEAL ARTERY      Stent-Graft of the the left  popliteal artery   • REPAIR KNEE LIGAMENT      Primary repair of knee ligament cruciate anterior right   • TONSILLECTOMY  1958   • TOTAL KNEE ARTHROPLASTY Bilateral    • UPPER GASTROINTESTINAL ENDOSCOPY  09/16/2014    acute gastritis, acute duodenitis     General Information     [facility] Name 04/07/21 1542          Physical Therapy Time and Intention    Document Type  therapy note (daily note)  (Pended)   -     Mode of Treatment  individual therapy;physical therapy  (Pended)   -BH     Row Name 04/07/21 1542          General Information    Existing Precautions/Restrictions  fall  (Pended)   -[facility] Name 04/07/21 1542          Cognition    Orientation Status (Cognition)  oriented x 3  (Pended)   -[facility] Name 04/07/21 1542          Safety Issues, Functional Mobility    Impairments Affecting Function (Mobility)  balance;endurance/activity tolerance;pain;strength;shortness of breath  (Pended)   -       User Key  (r) = Recorded By, (t) = Taken By, (c) = Cosigned By    Initials Name Provider Type     Madonna Landa, PT Student PT Student        Mobility     Row Name 04/07/21 1544          Bed Mobility    Supine-Sit [place] (Bed Mobility)  not tested  (Pended)   -     Sit-Supine [place] (Bed Mobility)  not tested  (Pended)   -     Comment (Bed Mobility)  UIC  (Pended)   -BH     Row Name 04/07/21 1544          Sit-Stand Transfer    Sit-Stand [place] (Transfers)  contact guard;nonverbal cues (demo/gesture);verbal cues  (Pended)   -     Assistive Device (Sit-Stand Transfers)  walker, 4-wheeled  (Pended)   -BH     Row Name 04/07/21 1544          Gait/Stairs (Locomotion)    [place] Level (Gait)  contact guard  (Pended)   -     Assistive Device (Gait)  walker, front-wheeled  (Pended)   -     Distance in Feet (Gait)  45ft  (Pended)   -     Deviations/Abnormal Patterns (Gait)  bilateral deviations;base of support, wide;sowmya decreased;gait speed decreased;stride length  decreased;festinating/shuffling  (Pended)   -     Bilateral Gait Deviations  --  (Pended)  B hip ER  -     Comment (Gait/Stairs)  Pt with improved steadiness with gait, requiring only CGA x1 and rwx. Gait distance limited secondary to B foot pain.  (Pended)   -       User Key  (r) = Recorded By, (t) = Taken By, (c) = Cosigned By    Initials Name Provider Type     Madonna Landa PT Student PT Student        Obj/Interventions     Row Name 04/07/21 1547          Motor Skills    Therapeutic Exercise  --  (Pended)  10 reps B AP/LAQ/seated marches  -       User Key  (r) = Recorded By, (t) = Taken By, (c) = Cosigned By    Initials Name Provider Type     Madonna Landa PT Student PT Student        Goals/Plan    No documentation.       Clinical Impression     Row Name 04/07/21 1547          Pain    Additional Documentation  Pain Scale: FACES Pre/Post-Treatment (Group)  (Pended)   -BH     Row Name 04/07/21 1547          Pain Scale: Numbers Pre/Post-Treatment    Pain Intervention(s)  Repositioned;Ambulation/increased activity  (Pended)   -BH     Row Name 04/07/21 1547          Pain Scale: FACES Pre/Post-Treatment    Pain: FACES Scale, Pretreatment  4-->hurts little more  (Pended)   [facility]     Posttreatment Pain Rating  4-->hurts little more  (Pended)   -     Pain Location - Side  Bilateral  (Pended)   -     Pain Location  foot  (Pended)   -BH     Row Name 04/07/21 1547          Plan of Care Review    Plan of Care Reviewed With  patient  (Pended)   -     Progress  improving  (Pended)   [facility]     Outcome Summary  Pt able to ambulate to the door and back to chair with CGA x1 and rwx. Pt demonstrated improved steadiness with gait and no reports of SOA/fatigue. However, gait distance limited secondary to B foot pain. PT discussed transportation options for D/C, and pt requested a wheelchair van for transport. Pt will continue to benefit from skilled PT to improve strength/endurance and progress gait distance as  tolerated.  (Pended)   -     Row Name 04/07/21 1547          Positioning and Restraints    Pre-Treatment Position  sitting in chair/recliner  (Pended)   -     Post Treatment Position  chair  (Pended)   -BH     In Chair  reclined;call light within reach;encouraged to call for assist;exit alarm on;with other staff  (Pended)   -       User Key  (r) = Recorded By, (t) = Taken By, (c) = Cosigned By    Initials Name Provider Type     Madonna Landa, PT Student PT Student        Outcome Measures     Row Name 04/07/21 1551          How much help from another person do you currently need...    Turning from your back to your side while in flat bed without using bedrails?  3  (Pended)   -BH     Moving from lying on back to sitting on the side of a flat bed without bedrails?  3  (Pended)   -BH     Moving to and from a bed to a chair (including a wheelchair)?  3  (Pended)   -BH     Standing up from a chair using your arms (e.g., wheelchair, bedside chair)?  3  (Pended)   -BH     Climbing 3-5 steps with a railing?  2  (Pended)   -BH     To walk in hospital room?  3  (Pended)   -     AM-PAC 6 Clicks Score (PT)  17  (Pended)   -     Row Name 04/07/21 1551          Functional Assessment    Outcome Measure Options  AM-PAC 6 Clicks Basic Mobility (PT)  (Pended)   -       User Key  (r) = Recorded By, (t) = Taken By, (c) = Cosigned By    Initials Name Provider Type     Madonna Landa, PT Student PT Student        Physical Therapy Education                 Title: PT OT SLP Therapies (Done)     Topic: Physical Therapy (Done)     Point: Mobility training (Done)     Learning Progress Summary           Patient Acceptance, E,TB,D, VU,NR by  at 4/7/2021 1551    Acceptance, E,D, VU,NR by EB at 4/6/2021 1631    Acceptance, E,TB,D, VU,NR by SV at 4/5/2021 1558    Acceptance, E, VU by ME at 4/2/2021 1502                   Point: Home exercise program (Done)     Learning Progress Summary           Patient Acceptance, E,TB,D, VU,NR  by  at 4/7/2021 1551    Acceptance, E,D, VU,NR by  at 4/6/2021 1631    Acceptance, E,TB,D, VU,NR by  at 4/5/2021 1558                   Point: Body mechanics (Done)     Learning Progress Summary           Patient Acceptance, E,TB,D, VU,NR by  at 4/7/2021 1551    Acceptance, E,D, VU,NR by  at 4/6/2021 1631    Acceptance, E, VU by ME at 4/2/2021 1502                   Point: Precautions (Done)     Learning Progress Summary           Patient Acceptance, E,TB,D, VU,NR by  at 4/7/2021 1551    Acceptance, E,D, VU,NR by  at 4/6/2021 1631    Acceptance, E, VU by ME at 4/2/2021 1502                               User Key     Initials Effective Dates Name Provider Type Discipline     04/03/18 -  Yolanda Batista, PT Physical Therapist PT     03/10/21 -  Leslie Magana PTA Physical Therapy Assistant PT     02/01/21 -  Madonna Landa, PT Student PT Student PT    ME 03/12/21 -  Сергей Ryder, PT Student PT Student PT              PT Recommendation and Plan     Plan of Care Reviewed With: (P) patient  Progress: (P) improving  Outcome Summary: (P) Pt able to ambulate to the door and back to chair with CGA x1 and rwx. Pt demonstrated improved steadiness with gait and no reports of SOA/fatigue. However, gait distance limited secondary to B foot pain. PT discussed transportation options for D/C, and pt requested a wheelchair van for transport. Pt will continue to benefit from skilled PT to improve strength/endurance and progress gait distance as tolerated.     Time Calculation:   PT Charges     Row Name 04/07/21 1413             Time Calculation    Start Time  1328  (Pended)   -      Stop Time  1336  (Pended)   -      Time Calculation (min)  8 min  (Pended)   -      PT Received On  04/07/21  (Pended)   -      PT - Next Appointment  04/08/21  (Pended)   -         Time Calculation- PT    Total Timed Code Minutes- PT  8 minute(s)  (Pended)   -        User Key  (r) = Recorded By, (t) = Taken By, (c) =  Cosigned By    Initials Name Provider Type     Madonna Landa, PT Student PT Student        Therapy Charges for Today     Code Description Service Date Service Provider Modifiers Qty    52756798418 HC PT THERAPEUTIC ACT EA 15 MIN 4/7/2021 Madonna Landa, PT Student GP 1          PT G-Codes  Outcome Measure Options: (P) AM-PAC 6 Clicks Basic Mobility (PT)  AM-PAC 6 Clicks Score (PT): (P) 17    Madonna Landa PT Student  4/7/2021

## 2021-04-07 NOTE — PROGRESS NOTES
Continued Stay Note  [facility]     Patient Name: Jalen Lockwood  MRN: [id number]  Today's Date: 4/7/2021    Admit Date: 3/30/2021    Discharge Plan     Row Name 04/07/21 1612       Plan    Plan  To be determined    Plan Comments  Incoming call from Kristi with [facility] stating insurance is requesting a peer to peer for SNU pre-cert.  Notified Adelina with [facility].  Deadline is 4/8 at 11am.  # [phone] option #5.........................Emily Juarez RN        Discharge Codes    No documentation.             Emily Juarez RN

## 2021-04-07 NOTE — PROGRESS NOTES
"Patient Name: Jalen Lockwood  :1950  71 y.o.      Patient Care Team:  Marc Ludwig MD as PCP - General (Family Medicine)  Blas Mckeon MD as Surgeon (General Surgery)  Jonnathan Boston II, MD as Consulting Physician (Vascular Surgery)  Xavi Elliott MD as Consulting Physician (Urology)  Marc Benavidez MD as Consulting Physician (Pain Medicine)  Kurt Henry MD as Consulting Physician (Cardiology)  Meghna Horan [facility] as Pharmacist  Juni Landa MD as Consulting Physician (Hematology and Oncology)  Brendan Live [facility] as Pharmacist (Pharmacy)    Interval History:   Seems to have good diuresis    Subjective:  Following for heart failure    Objective   Vital Signs  Temp:  [97.9 °F (36.6 °C)-98.7 °F (37.1 °C)] 97.9 °F (36.6 °C)  Heart Rate:  [64-85] 69  Resp:  [16-20] 16  BP: (131-132)/(86-87) 131/87    Intake/Output Summary (Last 24 hours) at 2021 1423  Last data filed at 2021 1200  Gross per 24 hour   Intake --   Output 2200 ml   Net -2200 ml     Flowsheet Rows      First Filed Value   Admission Height  193 cm (76\") Documented at 2021 1200   Admission Weight  (!) 138 kg (303 lb 9.2 oz) Documented at 2021 1205          Physical Exam:   General Appearance:    Alert, cooperative, in no acute distress   Lungs:     Clear to auscultation.  Normal respiratory effort and rate.      Heart:    Regular rhythm and normal rate, normal S1 and S2, no murmurs, gallops or rubs.     Chest Wall:    No abnormalities observed   Abdomen:     Soft, nontender, positive bowel sounds.     Extremities:  Legs are Ace wrapped with chronic edema.     Results Review:    Results from last 7 days   Lab Units 21  0423   SODIUM mmol/L 139   POTASSIUM mmol/L 3.7   CHLORIDE mmol/L 100   CO2 mmol/L 31.4*   BUN mg/dL 10   CREATININE mg/dL 0.88   GLUCOSE mg/dL 96   CALCIUM mg/dL 8.6     Results from last 7 days   Lab Units 21  0423 21  0327 21  0346   TROPONIN T ng/mL 0.155* 0.233* " 0.333*     Results from last 7 days   Lab Units 04/07/21  0423   WBC 10*3/mm3 5.70   HEMOGLOBIN g/dL 12.4*   HEMATOCRIT % 35.3*   PLATELETS 10*3/mm3 172     Results from last 7 days   Lab Units 04/07/21  0423 04/06/21  0417 04/05/21  0438   INR  1.98* 2.03* 2.17*         Results from last 7 days   Lab Units 04/07/21  0423   MAGNESIUM mg/dL 2.1             Medication Review:   amoxicillin-clavulanate, 1 tablet, Oral, Q12H  atorvastatin, 40 mg, Oral, Nightly  budesonide-formoterol, 2 puff, Inhalation, BID - RT  bumetanide, 1 mg, Oral, BID  finasteride, 5 mg, Oral, Daily  gabapentin, 300 mg, Oral, Q8H  ipratropium-albuterol, 3 mL, Nebulization, 4x Daily - RT  losartan, 12.5 mg, Oral, Q24H  metoprolol succinate XL, 12.5 mg, Oral, Q12H  nystatin, , Topical, Q12H  senna-docusate sodium, 2 tablet, Oral, Nightly  sodium chloride, 10 mL, Intravenous, Q12H  sodium chloride, 10 mL, Intravenous, Q12H  sodium chloride, 10 mL, Intravenous, Q12H  warfarin, 5 mg, Oral, Once per day on Mon Fri  warfarin, 7.5 mg, Oral, Once per day on Sun Tue Wed Thu Sat         Pharmacy to dose warfarin,         Assessment/Plan     1.  Coronary artery disease with moderate diffuse blockages  2.  New onset cardiomyopathy.  Ejection fraction 30 to 35%.  He is on losartan and Toprol-XL.  Heart rate and blood pressure are well controlled.    BUN, creatinine and CO2 all bumped up.  I agree with discontinuing IV Lasix and transitioning to oral diuretics.  3.  Atrial fibrillation.  Anticoagulated with warfarin    I turned his oxygen down to 1 L.  Hopefully that can be weaned off tonight.  He does use oxygen at home at night and sometimes during the day when he has a headache.  I am just hoping we do not have to send him on 24/7 oxygen.  Hopefully home tomorrow.    Elvi Streeter MD, [facility]  04/07/21  14:23 EDT

## 2021-04-07 NOTE — PLAN OF CARE
Goal Outcome Evaluation:  Plan of Care Reviewed With: (P) patient  Progress: (P) improving  Outcome Summary: (P) Pt able to ambulate to the door and back to chair with CGA x1 and rwx. Pt demonstrated improved steadiness with gait and no reports of SOA/fatigue. However, gait distance limited secondary to B foot pain. PT discussed transportation options for D/C, and pt requested a wheelchair van for transport. Pt will continue to benefit from skilled PT to improve strength/endurance and progress gait distance as tolerated.

## 2021-04-07 NOTE — PLAN OF CARE
Goal Outcome Evaluation:  Plan of Care Reviewed With: patient  Progress: improving     Jalen Lockwood  71 year old male  Admitted 3/30/2021 for severe sepsis    NSR. VSS on 2 L nasal cannula. Complaints of pain in feet. Treated with norco. PICC line. IV abx. IV lasix. Urinal. A/Ox4. Wound care. Up in chair.    Will continue to monitor.

## 2021-04-07 NOTE — PROGRESS NOTES
Pharmacy Consult: Warfarin Dosing/ Monitoring    Jalen Lockwood is a 71 y.o. male, estimated creatinine clearance is 120.4 mL/min (by C-G formula based on SCr of 0.88 mg/dL). weighing (!) 142 kg (313 lb 1.6 oz).    PMH: Allergic rhinitis, Anxiety, Aortectasia, Arthritis, Atrial flutter (2010), Charcot's joint of foot, Chronic edema, Chronic venous insufficiency, COPD, Coronary atherosclerosis, Diverticulosis, Duodenitis, Fatty liver, Gastritis, Gastroparesis, Hematoma, Hyperlipidemia, Hypertension, Insomnia, Internal hemorrhoids, Open wound, Osteomyelitis, Paroxysmal atrial fibrillation, Peripheral neuropathy, Popliteal artery aneurysm, Skin cancer, Sleep apnea, Tachycardia induced cardiomyopathy, Venous stasis, and Venous stasis ulcer    Social History     Tobacco Use    Smoking status: Current Every Day Smoker     Packs/day: 0.25     Years: 45.00     Pack years: 11.25     Types: Cigarettes    Smokeless tobacco: Never Used   Vaping Use    Vaping Use: Never used   Substance Use Topics    Alcohol use: Yes     Alcohol/week: 8.0 - 9.0 standard drinks     Types: 1 - 2 Shots of liquor, 7 Standard drinks or equivalent per week     Comment: 2 rum a day//Caffeine use: 3 cups daily    Drug use: No     Results from last 7 days   Lab Units 04/07/21  0423 04/06/21  0417 04/05/21  0438 04/04/21  0442 04/03/21  0817 04/02/21  0327 04/01/21  1325 04/01/21  0346   INR  1.98* 2.03* 2.17* 1.99* 1.99* 1.60* 1.66*  --    HEMOGLOBIN g/dL 12.4* 14.1 12.6* 12.7* 12.1* 12.3*  --  12.3*   HEMATOCRIT % 35.3* 40.9 37.0* 36.0* 34.7* 35.3*  --  34.3*   PLATELETS 10*3/mm3 172 178 146 128* 134* 124*  --  122*     Results from last 7 days   Lab Units 04/07/21  0423 04/06/21  0417 04/05/21  0438   SODIUM mmol/L 139 139 137   POTASSIUM mmol/L 3.7 4.0 3.8   CHLORIDE mmol/L 100 102 104   CO2 mmol/L 31.4* 26.5 23.6   BUN mg/dL 10 9 7*   CREATININE mg/dL 0.88 0.75* 0.73*   CALCIUM mg/dL 8.6 8.8 8.3*   GLUCOSE mg/dL 96 87 95     Anticoagulation history:  Managed by  [facility]: Warfarin 5 mg po qMF + 7.5 mg po all other days    Hospital Anticoagulation:  Consulting provider: Dr. Brad Lepe  Start date: Home med continued on 4/1/2021  Indication: Atrial fibrillation  Target INR: 2-3  Expected duration: Lifetime   Bridge Therapy: No, Enoxaparin - d/c'd on 4/3/21                Date 4/1 4/2 4/3  4/4 4/5 4/6 4/7      INR 1.66 1.60 1.99 1.99 2.17 2.03 1.98      Warfarin dose 7.5 mg 7.5 mg 5 mg 7.5 mg 5 mg 7.5 mg 7.5 mg        Potential drug interactions:   Aspirin-new med-increased risk of bleeding  Piperacillin/tazobactam-increased risk of bleeding (D/C'd on 4/3/21)  Vancomycin-increased risk of bleeding (D/C'd on 43/21)  Ampicillin-sulbactam - may result in an increased risk of bleeding.     Relevant nutrition status: Regular diet    Education complete?/ Date: Yes, on 4/1/21    Assessment/Plan:    1) Warfarin restarted  on 4/1/21, INR 1.98 this am  2) Enoxaparin 140 mg sq q12hrs- d/c'd on 4/3/21 pm  3) Continue home regimen of warfarin 5 mg po qMF and 7.5 mg po qSSTWTh    Pharmacy will continue to follow until discharge or discontinuation of warfarin.     Joleen Watkins, [facility]  Clinical Staff Pharmacist

## 2021-04-07 NOTE — NURSING NOTE
"CWOCN- changed patient's BLE compression. Continued with 4 layer compression. Wraps continue to roll down some. I wrapped the compression higher and will check tomorrow to see how the wraps held. Need to make sure when it rolls, that it does not get too tight on the leg and cause skin breakdown. Patient states \"it always rolls.\" Legs continue with edema but are stable. Continued betadine and opticel to feet as before. Noted dark area on left medial great toe. Possible abrasion or pressure but does not appear to be from the wraps. Will monitor. Plan to continue M-W-F schedule.  "

## 2021-04-08 LAB
ANION GAP SERPL CALCULATED.3IONS-SCNC: 11.2 MMOL/L (ref 5–15)
BUN SERPL-MCNC: 13 MG/DL (ref 8–23)
BUN/CREAT SERPL: 15.7 (ref 7–25)
CALCIUM SPEC-SCNC: 8.6 MG/DL (ref 8.6–10.5)
CHLORIDE SERPL-SCNC: 100 MMOL/L (ref 98–107)
CO2 SERPL-SCNC: 29.8 MMOL/L (ref 22–29)
CREAT SERPL-MCNC: 0.83 MG/DL (ref 0.76–1.27)
DEPRECATED RDW RBC AUTO: 48.8 FL (ref 37–54)
EOSINOPHIL # BLD MANUAL: 0.11 10*3/MM3 (ref 0–0.4)
EOSINOPHIL NFR BLD MANUAL: 2 % (ref 0.3–6.2)
ERYTHROCYTE [DISTWIDTH] IN BLOOD BY AUTOMATED COUNT: 13.6 % (ref 12.3–15.4)
GFR SERPL CREATININE-BSD FRML MDRD: 92 ML/MIN/1.73
GLUCOSE SERPL-MCNC: 100 MG/DL (ref 65–99)
HCT VFR BLD AUTO: 34.4 % (ref 37.5–51)
HGB BLD-MCNC: 12.3 G/DL (ref 13–17.7)
INR PPP: 1.81 (ref 0.9–1.1)
LYMPHOCYTES # BLD MANUAL: 1.45 10*3/MM3 (ref 0.7–3.1)
LYMPHOCYTES NFR BLD MANUAL: 25.3 % (ref 19.6–45.3)
LYMPHOCYTES NFR BLD MANUAL: 5.1 % (ref 5–12)
MCH RBC QN AUTO: 35.3 PG (ref 26.6–33)
MCHC RBC AUTO-ENTMCNC: 35.8 G/DL (ref 31.5–35.7)
MCV RBC AUTO: 98.9 FL (ref 79–97)
MONOCYTES # BLD AUTO: 0.29 10*3/MM3 (ref 0.1–0.9)
NEUTROPHILS # BLD AUTO: 3.87 10*3/MM3 (ref 1.7–7)
NEUTROPHILS NFR BLD MANUAL: 67.7 % (ref 42.7–76)
NRBC BLD AUTO-RTO: 0 /100 WBC (ref 0–0.2)
PLAT MORPH BLD: NORMAL
PLATELET # BLD AUTO: 175 10*3/MM3 (ref 140–450)
PMV BLD AUTO: 10.3 FL (ref 6–12)
POTASSIUM SERPL-SCNC: 3.7 MMOL/L (ref 3.5–5.2)
PROTHROMBIN TIME: 20.7 SECONDS (ref 11.7–14.2)
RBC # BLD AUTO: 3.48 10*6/MM3 (ref 4.14–5.8)
RBC MORPH BLD: NORMAL
SODIUM SERPL-SCNC: 141 MMOL/L (ref 136–145)
WBC # BLD AUTO: 5.72 10*3/MM3 (ref 3.4–10.8)
WBC MORPH BLD: NORMAL

## 2021-04-08 PROCEDURE — 85610 PROTHROMBIN TIME: CPT | Performed by: INTERNAL MEDICINE

## 2021-04-08 PROCEDURE — 85025 COMPLETE CBC W/AUTO DIFF WBC: CPT | Performed by: INTERNAL MEDICINE

## 2021-04-08 PROCEDURE — 85007 BL SMEAR W/DIFF WBC COUNT: CPT | Performed by: INTERNAL MEDICINE

## 2021-04-08 PROCEDURE — 97166 OT EVAL MOD COMPLEX 45 MIN: CPT

## 2021-04-08 PROCEDURE — 97535 SELF CARE MNGMENT TRAINING: CPT

## 2021-04-08 PROCEDURE — 94799 UNLISTED PULMONARY SVC/PX: CPT

## 2021-04-08 PROCEDURE — 99232 SBSQ HOSP IP/OBS MODERATE 35: CPT | Performed by: INTERNAL MEDICINE

## 2021-04-08 PROCEDURE — 97110 THERAPEUTIC EXERCISES: CPT

## 2021-04-08 PROCEDURE — 80048 BASIC METABOLIC PNL TOTAL CA: CPT | Performed by: INTERNAL MEDICINE

## 2021-04-08 RX ADMIN — AMOXICILLIN AND CLAVULANATE POTASSIUM 1 TABLET: 875; 125 TABLET, FILM COATED ORAL at 09:14

## 2021-04-08 RX ADMIN — IPRATROPIUM BROMIDE AND ALBUTEROL SULFATE 3 ML: 2.5; .5 SOLUTION RESPIRATORY (INHALATION) at 15:40

## 2021-04-08 RX ADMIN — DOCUSATE SODIUM 50MG AND SENNOSIDES 8.6MG 2 TABLET: 8.6; 5 TABLET, FILM COATED ORAL at 22:01

## 2021-04-08 RX ADMIN — ATORVASTATIN CALCIUM 40 MG: 20 TABLET, FILM COATED ORAL at 22:04

## 2021-04-08 RX ADMIN — BUMETANIDE 1 MG: 1 TABLET ORAL at 09:14

## 2021-04-08 RX ADMIN — HYDROCODONE BITARTRATE AND ACETAMINOPHEN 2 TABLET: 5; 325 TABLET ORAL at 18:35

## 2021-04-08 RX ADMIN — IPRATROPIUM BROMIDE AND ALBUTEROL SULFATE 3 ML: 2.5; .5 SOLUTION RESPIRATORY (INHALATION) at 02:44

## 2021-04-08 RX ADMIN — BUMETANIDE 1 MG: 1 TABLET ORAL at 22:02

## 2021-04-08 RX ADMIN — METOPROLOL SUCCINATE 12.5 MG: 25 TABLET, EXTENDED RELEASE ORAL at 22:00

## 2021-04-08 RX ADMIN — AMOXICILLIN AND CLAVULANATE POTASSIUM 1 TABLET: 875; 125 TABLET, FILM COATED ORAL at 22:01

## 2021-04-08 RX ADMIN — NYSTATIN 1 APPLICATION: 100000 POWDER TOPICAL at 22:05

## 2021-04-08 RX ADMIN — SODIUM CHLORIDE, PRESERVATIVE FREE 10 ML: 5 INJECTION INTRAVENOUS at 09:15

## 2021-04-08 RX ADMIN — SODIUM CHLORIDE, PRESERVATIVE FREE 10 ML: 5 INJECTION INTRAVENOUS at 22:06

## 2021-04-08 RX ADMIN — METOPROLOL SUCCINATE 12.5 MG: 25 TABLET, EXTENDED RELEASE ORAL at 09:14

## 2021-04-08 RX ADMIN — IPRATROPIUM BROMIDE AND ALBUTEROL SULFATE 3 ML: 2.5; .5 SOLUTION RESPIRATORY (INHALATION) at 12:28

## 2021-04-08 RX ADMIN — LOSARTAN POTASSIUM 12.5 MG: 25 TABLET, FILM COATED ORAL at 09:15

## 2021-04-08 RX ADMIN — BUDESONIDE AND FORMOTEROL FUMARATE DIHYDRATE 2 PUFF: 160; 4.5 AEROSOL RESPIRATORY (INHALATION) at 07:51

## 2021-04-08 RX ADMIN — FINASTERIDE 5 MG: 5 TABLET, FILM COATED ORAL at 09:14

## 2021-04-08 RX ADMIN — HYDROCODONE BITARTRATE AND ACETAMINOPHEN 2 TABLET: 5; 325 TABLET ORAL at 00:26

## 2021-04-08 RX ADMIN — NYSTATIN: 100000 POWDER TOPICAL at 09:15

## 2021-04-08 RX ADMIN — HYDROCODONE BITARTRATE AND ACETAMINOPHEN 2 TABLET: 5; 325 TABLET ORAL at 06:44

## 2021-04-08 RX ADMIN — BUDESONIDE AND FORMOTEROL FUMARATE DIHYDRATE 2 PUFF: 160; 4.5 AEROSOL RESPIRATORY (INHALATION) at 19:28

## 2021-04-08 RX ADMIN — GABAPENTIN 300 MG: 300 CAPSULE ORAL at 05:59

## 2021-04-08 RX ADMIN — WARFARIN 7.5 MG: 7.5 TABLET ORAL at 17:35

## 2021-04-08 RX ADMIN — GABAPENTIN 300 MG: 300 CAPSULE ORAL at 13:39

## 2021-04-08 RX ADMIN — GABAPENTIN 300 MG: 300 CAPSULE ORAL at 22:00

## 2021-04-08 RX ADMIN — HYDROCODONE BITARTRATE AND ACETAMINOPHEN 2 TABLET: 5; 325 TABLET ORAL at 12:34

## 2021-04-08 NOTE — PLAN OF CARE
Goal Outcome Evaluation:  Plan of Care Reviewed With: patient     Outcome Summary: Pt sat up in the chair most of the day today.  Wocn performed leg wraps and arm skin tears.  PICC line dressing change not due until the 11.  Pt up to bedside commode with 1 assist.  He took 02 off today to go to bedside commode right after breathing treatment, and HR became elevated.  No other instances at this time, pt was asymptomatic.  Pt had large bm this afternoon.  Worked with PT but would only walk to doorway, OT stated he bacame winded trying to put on socks.  Will continue to monitor for discharge status.

## 2021-04-08 NOTE — PROGRESS NOTES
"Patient Name: Jalen Lockwood  :1950  71 y.o.      Patient Care Team:  Marc Ludwig MD as PCP - General (Family Medicine)  Blas Mckeon MD as Surgeon (General Surgery)  Jonnathan Boston II, MD as Consulting Physician (Vascular Surgery)  Xavi Elliott MD as Consulting Physician (Urology)  Marc Benavidez MD as Consulting Physician (Pain Medicine)  Kurt Henry MD as Consulting Physician (Cardiology)  Meghna Horan [facility] as Pharmacist  Juni Landa MD as Consulting Physician (Hematology and Oncology)  Brendan Live [facility] as Pharmacist (Pharmacy)    Interval History:   Labs stable on oral diuretics.    Subjective:  Following for heart failure    Objective   Vital Signs  Temp:  [97.6 °F (36.4 °C)-99 °F (37.2 °C)] 97.8 °F (36.6 °C)  Heart Rate:  [59-80] 74  Resp:  [16-20] 18  BP: (117-128)/(69-92) 117/69    Intake/Output Summary (Last 24 hours) at 2021 1013  Last data filed at 2021 0600  Gross per 24 hour   Intake --   Output 1450 ml   Net -1450 ml     Flowsheet Rows      First Filed Value   Admission Height  193 cm (76\") Documented at 2021 1200   Admission Weight  (!) 138 kg (303 lb 9.2 oz) Documented at 2021 1205          Physical Exam:   General Appearance:    Alert, cooperative, in no acute distress   Lungs:     Clear to auscultation.  Normal respiratory effort and rate.      Heart:    Regular rhythm and normal rate, normal S1 and S2, no murmurs, gallops or rubs.     Chest Wall:    No abnormalities observed   Abdomen:     Soft, nontender, positive bowel sounds.     Extremities:  Chronic lower extremity edema.  Ace wrapped.     Results Review:    Results from last 7 days   Lab Units 21  0419   SODIUM mmol/L 141   POTASSIUM mmol/L 3.7   CHLORIDE mmol/L 100   CO2 mmol/L 29.8*   BUN mg/dL 13   CREATININE mg/dL 0.83   GLUCOSE mg/dL 100*   CALCIUM mg/dL 8.6     Results from last 7 days   Lab Units 21  0423 21  0327   TROPONIN T ng/mL 0.155* 0.233* "     Results from last 7 days   Lab Units 04/08/21  0419   WBC 10*3/mm3 5.72   HEMOGLOBIN g/dL 12.3*   HEMATOCRIT % 34.4*   PLATELETS 10*3/mm3 175     Results from last 7 days   Lab Units 04/08/21  0419 04/07/21  0423 04/06/21  0417   INR  1.81* 1.98* 2.03*         Results from last 7 days   Lab Units 04/07/21 0423   MAGNESIUM mg/dL 2.1             Medication Review:   amoxicillin-clavulanate, 1 tablet, Oral, Q12H  atorvastatin, 40 mg, Oral, Nightly  budesonide-formoterol, 2 puff, Inhalation, BID - RT  bumetanide, 1 mg, Oral, BID  finasteride, 5 mg, Oral, Daily  gabapentin, 300 mg, Oral, Q8H  ipratropium-albuterol, 3 mL, Nebulization, 4x Daily - RT  losartan, 12.5 mg, Oral, Q24H  metoprolol succinate XL, 12.5 mg, Oral, Q12H  nystatin, , Topical, Q12H  senna-docusate sodium, 2 tablet, Oral, Nightly  sodium chloride, 10 mL, Intravenous, Q12H  sodium chloride, 10 mL, Intravenous, Q12H  sodium chloride, 10 mL, Intravenous, Q12H  warfarin, 5 mg, Oral, Once per day on Mon Fri  warfarin, 7.5 mg, Oral, Once per day on Sun Tue Wed Thu Sat         Pharmacy to dose warfarin,         Assessment/Plan     1.  Coronary artery disease with moderate diffuse blockages  2.  New onset cardiomyopathy.  Ejection fraction 30 to 35%.  He is on losartan and Toprol-XL.  I reviewed his labs.  Continue with oral diuretics.    3.  Atrial fibrillation.  Anticoagulated with warfarin     Continue current medications.  Okay with discharge from my standpoint.  I will arrange for follow-up with Dr. Henry's nurse practitioner in the next few weeks.  I am going to sign off.    Elvi Streeter MD, [facility]  04/08/21  10:13 EDT

## 2021-04-08 NOTE — PROGRESS NOTES
Continued Stay Note  [facility]     Patient Name: Jalen Lockwood  MRN: [id number]  Today's Date: 4/8/2021    Admit Date: 3/30/2021    Discharge Plan     Row Name 04/08/21 1349       Plan    Plan  Peer to Peer upheld for skilled rehab at [facility], patient has appealed    Plan Comments  Dr Blanco did complete peer to peer.  Insurance declines skilled rehab.  Patient and spouse plan appeal and that is pending.  ..........................Emily Juarez RN        Discharge Codes    No documentation.             Emily Juarez RN

## 2021-04-08 NOTE — PLAN OF CARE
Goal Outcome Evaluation:  Plan of Care Reviewed With: patient     Outcome Summary: Pt is a 71 y.o male admitted to [facility] 3/30 with sepsis. Pt with hx of COPD, BLE edema, HTN. Lives with spouse and independent at baseline with ADL and mobility without AD/DME. Today pt presents with generalized weakness and activity tolerance limiting ADL. Requires max A today to don/doff socks, CGA for mobility to sink with rwx, engages in 1 minute standing grooming at sink but limited by SOB and fatique. Pt returned to chair and desats to 88% on RA and placed back on 2L. Recommend continued OT to increase independence and safety with ADL. Recommend SNF at d/c to return home safely. Pt's wife can provide limited assist at home.    Appropriate PPE worn during encounter including gloves, mask, and eye protection. Hand hygiene completed. Pt wore a mask.

## 2021-04-08 NOTE — PROGRESS NOTES
Name: Jalen Lockwood ADMIT: 3/30/2021   : 1950  PCP: Marc Ludwig MD    MRN: [id number] LOS: 9 days   AGE/SEX: 71 y.o. male  ROOM: [facility]/     Subjective   Subjective   CC: generalized weakness  No acute events. Patient has had some cough with blood clots last night. His dyspnea is improved overall. Denies CP/f/c/n/v/d.  His wife was at bedside during the time of my evaluation.    Objective   Objective   Vital Signs  Temp:  [97.6 °F (36.4 °C)-99 °F (37.2 °C)] 98.3 °F (36.8 °C)  Heart Rate:  [59-89] 89  Resp:  [16-20] 18  BP: (117-143)/(69-97) 143/97  SpO2:  [92 %-99 %] 92 %  on  Flow (L/min):  [1-2] 2;   Device (Oxygen Therapy): nasal cannula  Body mass index is 36.42 kg/m².  Physical Exam  Vitals and nursing note reviewed.   Constitutional:       General: He is not in acute distress.     Appearance: He is ill-appearing (chronically). He is not toxic-appearing.   HENT:      Head: Normocephalic and atraumatic.      Nose: Nose normal.      Mouth/Throat:      Mouth: Mucous membranes are moist.      Pharynx: Oropharynx is clear.   Eyes:      Extraocular Movements: Extraocular movements intact.      Conjunctiva/sclera: Conjunctivae normal.      Pupils: Pupils are equal, round, and reactive to light.   Cardiovascular:      Rate and Rhythm: Normal rate. Rhythm irregular.      Pulses: Normal pulses.   Pulmonary:      Effort: Pulmonary effort is normal.      Breath sounds: Normal breath sounds. No wheezing, rhonchi or rales.   Abdominal:      General: Bowel sounds are normal.      Palpations: Abdomen is soft.      Tenderness: There is no abdominal tenderness.   Musculoskeletal:         General: Swelling (2+ BLE) present. No tenderness.      Cervical back: Normal range of motion and neck supple.      Comments: BLE in compression wraps, c/d/i   Skin:     General: Skin is warm and dry.      Capillary Refill: Capillary refill takes less than 2 seconds.   Neurological:      General: No focal deficit present.       Mental Status: He is alert and oriented to person, place, and time.   Psychiatric:         Mood and Affect: Mood normal.         Behavior: Behavior normal.     Results Review     I reviewed the patient's new clinical results.  I reviewed the patient's telemetry.  Results from last 7 days   Lab Units 04/08/21 0419 04/07/21 0423 04/06/21 0417 04/05/21 0438   WBC 10*3/mm3 5.72 5.70 6.96 5.53   HEMOGLOBIN g/dL 12.3* 12.4* 14.1 12.6*   PLATELETS 10*3/mm3 175 172 178 146     Results from last 7 days   Lab Units 04/08/21 0419 04/07/21 0423 04/06/21 0417 04/05/21 0438   SODIUM mmol/L 141 139 139 137   POTASSIUM mmol/L 3.7 3.7 4.0 3.8   CHLORIDE mmol/L 100 100 102 104   CO2 mmol/L 29.8* 31.4* 26.5 23.6   BUN mg/dL 13 10 9 7*   CREATININE mg/dL 0.83 0.88 0.75* 0.73*   GLUCOSE mg/dL 100* 96 87 95   Estimated Creatinine Clearance: 124.2 mL/min (by C-G formula based on SCr of 0.83 mg/dL).      Results from last 7 days   Lab Units 04/08/21 0419 04/07/21 0423 04/06/21 0417 04/05/21 0438   CALCIUM mg/dL 8.6 8.6 8.8 8.3*   MAGNESIUM mg/dL  --  2.1  --   --      Results from last 7 days   Lab Units 04/07/21 0423 04/05/21 0438 04/04/21 0442   PROCALCITONIN ng/mL 0.24 0.62* 0.97*     COVID19   Date Value Ref Range Status   03/30/2021 Not Detected Not Detected - Ref. Range Final     Glucose   Date/Time Value Ref Range Status   04/07/2021 1616 105 70 - 130 mg/dL Final   04/07/2021 1056 106 70 - 130 mg/dL Final   04/06/2021 0641 97 70 - 130 mg/dL Final   04/05/2021 2039 109 70 - 130 mg/dL Final   04/05/2021 1616 111 70 - 130 mg/dL Final       XR Chest 1 View  Narrative: XR CHEST 1 VW-     HISTORY: Male who is 71 years-old,  hemoptysis     TECHNIQUE: Frontal view of the chest     COMPARISON: 04/04/2021     FINDINGS: A right PICC extending to the right subclavian region. Heart  size is normal. Aorta is tortuous, calcified. Mild prominence of  vascular and interstitial markings. Small likely atelectasis or  infiltrate at the  right lung base appears mildly decreased. No pleural  effusion, or pneumothorax. No acute osseous process.     Impression: Small likely atelectasis or infiltrate at the right lung  base appears mildly decreased.     This report was finalized on 4/5/2021 7:07 PM by Dr. Khurram Chambers M.D.       Scheduled Medications  amoxicillin-clavulanate, 1 tablet, Oral, Q12H  atorvastatin, 40 mg, Oral, Nightly  budesonide-formoterol, 2 puff, Inhalation, BID - RT  bumetanide, 1 mg, Oral, BID  finasteride, 5 mg, Oral, Daily  gabapentin, 300 mg, Oral, Q8H  ipratropium-albuterol, 3 mL, Nebulization, 4x Daily - RT  losartan, 12.5 mg, Oral, Q24H  metoprolol succinate XL, 12.5 mg, Oral, Q12H  nystatin, , Topical, Q12H  senna-docusate sodium, 2 tablet, Oral, Nightly  sodium chloride, 10 mL, Intravenous, Q12H  sodium chloride, 10 mL, Intravenous, Q12H  sodium chloride, 10 mL, Intravenous, Q12H  warfarin, 5 mg, Oral, Once per day on Mon Fri  warfarin, 7.5 mg, Oral, Once per day on Sun Tue Wed Thu Sat    Infusions  Pharmacy to dose warfarin,     Diet  Diet Regular       Assessment/Plan     Active Hospital Problems    Diagnosis  POA   • Hyponatremia [E87.1]  Yes   • Thrombocytopenia (CMS/HCC) [D69.6]  Yes   • Ischemic cardiomyopathy [I25.5]  Yes   • Acute systolic (congestive) heart failure (CMS/HCC) [I50.21]  No   • Cellulitis of lower extremity [L03.119]  Yes   • Severe sepsis (CMS/HCC) [A41.9, R65.20]  Yes   • Alcohol dependence, 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]  Yes   • Essential hypertension [I10]  Yes   • COPD (chronic obstructive pulmonary disease) (CMS/HCC) [J44.9]  Yes   • Permanent atrial fibrillation (CMS/HCC) [I48.21]  Yes   • Venous stasis [I87.8]  Yes      Resolved Hospital Problems    Diagnosis Date Resolved POA   • Metabolic encephalopathy [G93.41] 04/03/2021 Yes   Severe Sepsis with Metabolic Encephalopathy, Present on Admission  - most likely source seems to be UTI vs LLE cellulitis but the latter is not really apparent  on my exam with wraps off  - blood cx's NGTD, urine culture with mixed darian  - no purulence noted associated on LLE-abx narrowed from vanc/zosyn to unasyn-inflammatory markers and procalcitonin continue to improve  - now off IVF, hemodynamically stable  - will switch unasyn to augmentin to complete course    Cough/Hemoptysis  - CXR showed improvement and this has been mild but given persistence will check CTA of the chest  - ASA stopped, still on warfarin and tolerating well    NSTEMI/ICM/Acute Systolic CHF  - likely LAD disease per cardiology, recommending continuing medical therapy in the setting of increased bleeding risk with DAPT and need for chronic warfarin  - volume status is much better-will switch to oral bumex and monitor response today  - cardiology following, appreciate recs    Permanent Afib  - continue AC with warfarin  - continue metoprolol    COPD  - no exacerbation  - continue on symbicort  - duo-nebs    Thrombocytopenia  - mild, likely due to severe sepsis  - this has normalized    Warfarin (home med) for DVT prophylaxis.  Full code.  Discussed with patient, family, nursing staff, CCP and care team on multidisciplinary rounds.  Anticipate discharge to SNU facility in 1-2 days.      Camilo Blanco MD  [facility]  04/08/21  13:29 EDT

## 2021-04-08 NOTE — NURSING NOTE
CWOCN- LLE compression had rolled and caused discomfort to patient's leg. I removed it and replaced. Patient is not actually tolerating the 4 layer compression well as it always rolls down- even at home as he has stated numerous times. However, he is not willing to try anything new. Discussed with patient that it might be helpful to be evaluated by OT Lymphedema clinic about wraps that actually go to the thighs. Patient does not seem interested but I believe it would be worth looking into as an outpatient. Follow up would be required however.

## 2021-04-08 NOTE — THERAPY EVALUATION
Patient Name: Jalen Lockwood  : 1950    MRN: [id number]                              Today's Date: 2021       Admit Date: 3/30/2021    Visit Dx:     ICD-10-CM ICD-9-CM   1. Severe sepsis (CMS/HCC)  A41.9 038.9    R65.20 995.92   2. Acute UTI  N39.0 599.0   3. Acute abdominal pain  R10.9 789.00     338.19   4. Acute respiratory alkalosis  E87.3 276.3   5. Hyponatremia  E87.1 276.1   6. Atrial fibrillation with RVR (CMS/HCC)  I48.91 427.31   7. Fever in adult  R50.9 780.60   8. Acute metabolic encephalopathy  G93.41 348.31     Patient Active Problem List   Diagnosis   • Chronic osteomyelitis (CMS/HCC)   • Foot pain   • Peripheral neuropathy   • Venous stasis   • Permanent atrial fibrillation (CMS/HCC)   • Sleep apnea   • Chronic edema   • Class 2 severe obesity due to excess calories with serious comorbidity and body mass index (BMI) of 36.0 to 36.9 in adult (CMS/HCC)   • COPD (chronic obstructive pulmonary disease) (CMS/HCC)   • Nonocclusive coronary atherosclerosis of native coronary artery   • Atrial flutter (CMS/HCC)   • 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CMS/HCC)   • Aortectasia (CMS/HCC)   • Popliteal artery aneurysm (CMS/HCC)   • Colon polyps   • Gastroparesis   • Insomnia   • Adenomatous polyp of colon   • Essential hypertension   • ETOH abuse   • Chronic anticoagulation   • Oropharyngeal dysphagia   • Tobacco abuse   • Thyromegaly   • Adrenal adenoma, left   • Retroperitoneal lymphadenopathy   • Anemia of chronic disease   • Thyroid nodule   • Charcot's joint of foot   • Abnormal CT scan, lumbar spine   • Alcohol dependence, in remission (CMS/HCC)   • Severe sepsis (CMS/HCC)   • Hyponatremia   • Thrombocytopenia (CMS/HCC)   • Ischemic cardiomyopathy   • Acute systolic (congestive) heart failure (CMS/HCC)   • Cellulitis of lower extremity     Past Medical History:   Diagnosis Date   • Allergic rhinitis    • Anxiety    • Aortectasia (CMS/HCC)     3cm infrarenal abdominal aorta   • Arthritis    •  Atrial flutter (CMS/HCC) 2010    s/p ablation    • Charcot's joint of foot    • Chronic edema     both legs and sees wound care center at [place]    • Chronic venous insufficiency    • COPD (chronic obstructive pulmonary disease) (CMS/HCC)    • Coronary atherosclerosis     Cath 2010: diffuse 40-50% disease   • Diverticulosis    • Duodenitis    • Fatty liver    • Gastritis    • Gastroparesis    • Hematoma     post-operative; After catheterization, right groin, required surgical exploration   • Hyperlipidemia    • Hypertension    • Insomnia    • Internal hemorrhoids    • Open wound     izzy legs has drsg chg weekly at wound care center at [place]  pt does second dressing on left leg another time during week   • Osteomyelitis (CMS/HCC)    • Paroxysmal atrial fibrillation (CMS/HCC)    • Peripheral neuropathy    • Popliteal artery aneurysm (CMS/HCC)     left, s/p stenting by Dr. Boston   • Skin cancer    • Sleep apnea     o2   • 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CMS/HCC)     due to flutter and afib; cath 2010 with nonobstructive disease   • Venous stasis    • Venous stasis ulcer (CMS/HCC)     bilateral legs      Past Surgical History:   Procedure Laterality Date   • CARDIAC CATHETERIZATION     • CATARACT EXTRACTION     • COLONOSCOPY  09/28/2015    NBIH, diverticulosis, polyps   • COLONOSCOPY N/A 9/11/2018    Procedure: COLONOSCOPY TO CECUM  AND TERM. ILEUM WITH COLD SNARE POLYPECTOMIES;  Surgeon: Kane Lagunas MD;  [facility];  Service: Gastroenterology   • COLONOSCOPY N/A 10/29/2019    Procedure: COLONOSCOPY TO TO CECUM AND TERMINAL ILEUM WITH HOT AND COLD SNARE POLYPECTOMIES;  Surgeon: Kane Lagunas MD;  [facility];  Service: Gastroenterology   • HIP ARTHROPLASTY Right 2017   • JOINT REPLACEMENT Left    • OTHER SURGICAL HISTORY      Catheter ablation atrial flutter   • REPAIR ANEURYSM / PSEUDO ANEURYSM / RUPTURED ANEURYSM POPLITEAL ARTERY      Stent-Graft of the the left  popliteal artery   • REPAIR KNEE LIGAMENT      Primary repair of knee ligament cruciate anterior right   • TONSILLECTOMY  1958   • TOTAL KNEE ARTHROPLASTY Bilateral    • UPPER GASTROINTESTINAL ENDOSCOPY  09/16/2014    acute gastritis, acute duodenitis     General Information     [facility] Name 04/08/21 1526          OT Time and Intention    Document Type  evaluation  -     Mode of Treatment  occupational therapy;individual therapy  -[facility] Name 04/08/21 1526          General Information    Patient Profile Reviewed  yes  -     Prior Level of Function  independent:;ADL's;all household mobility  -     Existing Precautions/Restrictions  fall;oxygen therapy device and L/min  -[facility] Name 04/08/21 1526          Occupational Profile    Reason for Services/Referral (Occupational Profile)  Decreased ADL performance, generalized weakness limiting.  -     Environmental Supports and Barriers (Occupational Profile)  Pt denies any DME/AE at home. Takes standing sponge baths at baseline d/t not being able to get into tub/bath.  -[facility] Name 04/08/21 1526          Living Environment    Lives With  spouse  -SM     Row Name 04/08/21 1526          Cognition    Orientation Status (Cognition)  oriented x 4  -SM     Row Name 04/08/21 1526          Safety Issues, Functional Mobility    Impairments Affecting Function (Mobility)  endurance/activity tolerance;strength;shortness of breath  -       User Key  (r) = Recorded By, (t) = Taken By, (c) = Cosigned By    Initials Name Provider Type     Griselda Saleh OT Occupational Therapist          Mobility/ADL's     [facility] Name 04/08/21 1527          Bed Mobility    Comment (Bed Mobility)  not tested pt up in chair.  -[facility] Name 04/08/21 1527          Transfers    Transfers  sit-stand transfer  -     Sit-Stand [place] (Transfers)  contact guard  -SM     Row Name 04/08/21 1527          Sit-Stand Transfer    Assistive Device (Sit-Stand Transfers)  walker, front-wheeled  " -[facility] Name 04/08/21 1527          Functional Mobility    Functional Mobility- Ind. Level  contact guard assist  -     Functional Mobility- Device  rolling walker  -     Functional Mobility-Distance (Feet)  20  -     Functional Mobility- Comment  to sink and back, limited to go farher this date d/t SOB.  -[facility] Name 04/08/21 1527          Activities of Daily Living    BADL Assessment/Intervention  lower body dressing;grooming  -[facility] Name 04/08/21 1527          Lower Body Dressing Assessment/Training    [place] Level (Lower Body Dressing)  lower body dressing skills;don;socks;maximum assist (25% patient effort)  -     Position (Lower Body Dressing)  supported sitting  -[facility] Name 04/08/21 1527          Grooming Assessment/Training    [place] Level (Grooming)  grooming skills;wash face, hands;contact guard assist  -     Position (Grooming)  sink side;supported standing  -     Comment (Grooming)  Pt able to stand 1 minute during task but limited by SOB and fatique.  -       User Key  (r) = Recorded By, (t) = Taken By, (c) = Cosigned By    Initials Name Provider Type     Griselda Saleh OT Occupational Therapist        Obj/Interventions     [facility] Name 04/08/21 1529          Sensory Assessment (Somatosensory)    Sensory Assessment (Somatosensory)  other (see comments) BLE neuropathy  -[facility] Name 04/08/21 1529          Vision Assessment/Intervention    Visual Impairment/Limitations  WFL  -[facility] Name 04/08/21 1529          Range of Motion Comprehensive    Comment, General Range of Motion  R shoulder ROM deficits, pt has approx 1/4 AROM shoulder flexion. Previous deficit, pt reports d/t OA/\"worn out shoulder\". BUE WFL except R shoulder.  -[facility] Name 04/08/21 1529          Strength Comprehensive (MMT)    Comment, General Manual Muscle Testing (MMT) Assessment  R shoulder 2-/5, distally 4/5, LUE 4/5 grossly.  -[facility] Name 04/08/21 1529          Balance    " Static Standing Balance  mild impairment;supported  -SM     Balance Interventions  standing;occupation based/functional task  -SM     Comment, Balance  CGA at rwx but limited activity tolerance this date with standing ADL.  -SM       User Key  (r) = Recorded By, (t) = Taken By, (c) = Cosigned By    Initials Name Provider Type    Griselda Bruce, OT Occupational Therapist        Goals/Plan     Row Name 04/08/21 1537          Transfer Goal 1 (OT)    Activity/Assistive Device (Transfer Goal 1, OT)  transfers, all;toilet  -SM     [place] Level/Cues Needed (Transfer Goal 1, OT)  modified independence  -SM     Time Frame (Transfer Goal 1, OT)  short term goal (STG);2 weeks  -SM     Progress/Outcome (Transfer Goal 1, OT)  goal ongoing  -[facility] Name 04/08/21 1537          Bathing Goal 1 (OT)    Activity/Device (Bathing Goal 1, OT)  bathing skills, all  -SM     [place] Level/Cues Needed (Bathing Goal 1, OT)  modified independence  -SM     Time Frame (Bathing Goal 1, OT)  short term goal (STG);2 weeks  -SM     Progress/Outcomes (Bathing Goal 1, OT)  goal ongoing  -[facility] Name 04/08/21 1537          Dressing Goal 1 (OT)    Activity/Device (Dressing Goal 1, OT)  dressing skills, all  -SM     [place]/Cues Needed (Dressing Goal 1, OT)  modified independence  -SM     Time Frame (Dressing Goal 1, OT)  short term goal (STG);2 weeks  -SM     Progress/Outcome (Dressing Goal 1, OT)  goal ongoing  -[facility] Name 04/08/21 1537          Toileting Goal 1 (OT)    Activity/Device (Toileting Goal 1, OT)  toileting skills, all  -SM     [place] Level/Cues Needed (Toileting Goal 1, OT)  modified independence  -SM     Time Frame (Toileting Goal 1, OT)  short term goal (STG);2 weeks  -SM     Progress/Outcome (Toileting Goal 1, OT)  goal ongoing  -     Row Name 04/08/21 1537          Therapy Assessment/Plan (OT)    Planned Therapy Interventions (OT)  activity tolerance training;adaptive equipment training;BADL  retraining;functional balance retraining;IADL retraining;occupation/activity based interventions;patient/caregiver education/training;transfer/mobility retraining;strengthening exercise;ROM/therapeutic exercise  -       User Key  (r) = Recorded By, (t) = Taken By, (c) = Cosigned By    Initials Name Provider Type     Griselda Saleh OT Occupational Therapist        Clinical Impression     Row Name 04/08/21 1531          Pain Scale: Numbers Pre/Post-Treatment    Pretreatment Pain Rating  0/10 - no pain  -     Row Name 04/08/21 1531          Plan of Care Review    Plan of Care Reviewed With  patient  -     Outcome Summary  Pt is a 71 y.o male admitted to [facility] 3/30 with sepsis. Pt with hx of COPD, BLE edema, HTN. Lives with spouse and independent at baseline with ADL and mobility without AD/DME. Today pt presents with generalized weakness and activity tolerance limiting ADL. Requires max A today to don/doff socks, CGA for mobility to sink with rwx, engages in 1 minute standing grooming at sink but limited by SOB and fatique. Pt returned to chair and desats to 88% on RA and placed back on 2L. Recommend continued OT to increase independence and safety with ADL. Recommend SNF at d/c to return home safely. Pt's wife can provide limited assist at home.  -     Row Name 04/08/21 1531          Therapy Assessment/Plan (OT)    Rehab Potential (OT)  good, to achieve stated therapy goals  -     Criteria for Skilled Therapeutic Interventions Met (OT)  yes;skilled treatment is necessary  -     Therapy Frequency (OT)  3 times/wk  -     Row Name 04/08/21 1531          Therapy Plan Review/Discharge Plan (OT)    Anticipated Discharge Disposition (OT)  skilled nursing facility  -     Row Name 04/08/21 1531          Vital Signs    Pre SpO2 (%)  97 96% resting on RA  -SM     O2 Delivery Pre Treatment  supplemental O2  -SM     Intra SpO2 (%)  88  -SM     O2 Delivery Intra Treatment  room air  -SM     Post SpO2 (%)  94   -SM     O2 Delivery Post Treatment  supplemental O2  -     Row Name 04/08/21 1531          Positioning and Restraints    Pre-Treatment Position  sitting in chair/recliner  -     Post Treatment Position  chair  -SM     In Chair  sitting;call light within reach;encouraged to call for assist;notified nsg;with family/caregiver  -       User Key  (r) = Recorded By, (t) = Taken By, (c) = Cosigned By    Initials Name Provider Type    Griselda Bruce OT Occupational Therapist        Outcome Measures     Row Name 04/08/21 1538          How much help from another is currently needed...    Putting on and taking off regular lower body clothing?  2  -SM     Bathing (including washing, rinsing, and drying)  2  -SM     Toileting (which includes using toilet bed pan or urinal)  2  -SM     Putting on and taking off regular upper body clothing  3  -SM     Taking care of personal grooming (such as brushing teeth)  3  -SM     Eating meals  4  -SM     AM-PAC 6 Clicks Score (OT)  16  -     Row Name 04/08/21 1538          Functional Assessment    Outcome Measure Options  AM-PAC 6 Clicks Daily Activity (OT)  -       User Key  (r) = Recorded By, (t) = Taken By, (c) = Cosigned By    Initials Name Provider Type    Griselda Bruce OT Occupational Therapist        Occupational Therapy Education                 Title: PT OT SLP Therapies (In Progress)     Topic: Occupational Therapy (In Progress)     Point: ADL training (Done)     Description:   Instruct learner(s) on proper safety adaptation and remediation techniques during self care or transfers.   Instruct in proper use of assistive devices.              Learning Progress Summary           Patient Acceptance, E, VU by  at 4/8/2021 1538    Comment: Education in role of OT, pacing ADL and breathing exercises throughout ADL.                   Point: Home exercise program (Not Started)     Description:   Instruct learner(s) on appropriate technique for monitoring,  assisting and/or progressing therapeutic exercises/activities.              Learner Progress:  Not documented in this visit.          Point: Precautions (Not Started)     Description:   Instruct learner(s) on prescribed precautions during self-care and functional transfers.              Learner Progress:  Not documented in this visit.          Point: Body mechanics (Not Started)     Description:   Instruct learner(s) on proper positioning and spine alignment during self-care, functional mobility activities and/or exercises.              Learner Progress:  Not documented in this visit.                      User Key     Initials Effective Dates Name Provider Type Discipline     04/02/20 -  Griselda Saleh OT Occupational Therapist OT              OT Recommendation and Plan  Planned Therapy Interventions (OT): activity tolerance training, adaptive equipment training, BADL retraining, functional balance retraining, IADL retraining, occupation/activity based interventions, patient/caregiver education/training, transfer/mobility retraining, strengthening exercise, ROM/therapeutic exercise  Therapy Frequency (OT): 3 times/wk  Plan of Care Review  Plan of Care Reviewed With: patient  Outcome Summary: Pt is a 71 y.o male admitted to [facility] 3/30 with sepsis. Pt with hx of COPD, BLE edema, HTN. Lives with spouse and independent at baseline with ADL and mobility without AD/DME. Today pt presents with generalized weakness and activity tolerance limiting ADL. Requires max A today to don/doff socks, CGA for mobility to sink with rwx, engages in 1 minute standing grooming at sink but limited by SOB and fatique. Pt returned to chair and desats to 88% on RA and placed back on 2L. Recommend continued OT to increase independence and safety with ADL. Recommend SNF at d/c to return home safely. Pt's wife can provide limited assist at home.     Time Calculation:   Time Calculation- OT     Row Name 04/08/21 6760             Time  Calculation- OT    OT Start Time  0308  -      OT Stop Time  0322  -      OT Time Calculation (min)  14 min  -      Total Timed Code Minutes- OT  8 minute(s)  -      OT Received On  04/08/21  -      OT - Next Appointment  04/09/21  -      OT Goal Re-Cert Due Date  04/22/21  -        User Key  (r) = Recorded By, (t) = Taken By, (c) = Cosigned By    Initials Name Provider Type     Griselda Saleh OT Occupational Therapist        Therapy Charges for Today     Code Description Service Date Service Provider Modifiers Qty    [id number]  OT EVAL MOD COMPLEXITY 2 4/8/2021 Griselda Saleh OT GO 1    [id number]  OT SELF CARE/MGMT/TRAIN EA 15 MIN 4/8/2021 Griselda Saleh OT GO 1               Griselda Saleh OT  4/8/2021

## 2021-04-08 NOTE — PLAN OF CARE
Goal Outcome Evaluation:         Rested short periods through night, medicated for pain with relief. Up to chair most of night, tolerating 2 liter nasal cannula. A fib on monitor. Alert and oriented, no distress. VSS cont to monitor.

## 2021-04-08 NOTE — PLAN OF CARE
Problem: Adult Inpatient Plan of Care  Goal: Plan of Care Review  Recent Flowsheet Documentation  Taken 4/8/2021 1426 by Dayana Wilkins PT  Progress: no change  Plan of Care Reviewed With: patient  Outcome Summary: Pt UIC resting in NAD. Nurse reported increased heart rate previously when using bathroom. Today, pt req CGA for sit/stand transfer from recliner. He amb 45' to door and back with r wx and CGA of 1 before requesting to rest. Pt fatigues easily and shuffles feet with flexed posture. Progressing slowly due to decreased endurance. Pt performed seated HEP. Cont PT to address functional deficits and prepare for d/c to SNF.  Patient was intermittently wearing a face mask during this therapy encounter. Therapist used appropriate personal protective equipment including eye protection, mask, and gloves.  Mask used was standard procedure mask. Appropriate PPE was worn during the entire therapy session. Hand hygiene was completed before and after therapy session. Patient is not in enhanced droplet precautions.

## 2021-04-08 NOTE — PROGRESS NOTES
Continued Stay Note  [facility]     Patient Name: Jalen Lockwood  MRN: [id number]  Today's Date: 4/8/2021    Admit Date: 3/30/2021    Discharge Plan     Row Name 04/08/21 1547       Plan    Plan  Appeal is pending for skilled rehab at [facility]    Plan Comments  Faxed requested information to [facility] Appeals [phone].  Appeal is pending for skilled rehab at [facility].  ........................Emily Juarez RN    Row Name 04/08/21 0529       Plan    Plan  Peer to Peer upheld for skilled rehab at [place], patient has appealed    Plan Comments  Dr Blanco did complete peer to peer.  Insurance declines skilled rehab.  Patient and spouse plan appeal and that is pending.  ..........................Emily Juarez RN        Discharge Codes    No documentation.             Emily Juarez RN

## 2021-04-08 NOTE — PROGRESS NOTES
Pharmacy Consult: Warfarin Dosing/ Monitoring    Jalen Lockwood is a 71 y.o. male, estimated creatinine clearance is 124.2 mL/min (by C-G formula based on SCr of 0.83 mg/dL). weighing (!) 142 kg (313 lb 1.6 oz).    PMH: Allergic rhinitis, Anxiety, Aortectasia, Arthritis, Atrial flutter (2010), Charcot's joint of foot, Chronic edema, Chronic venous insufficiency, COPD, Coronary atherosclerosis, Diverticulosis, Duodenitis, Fatty liver, Gastritis, Gastroparesis, Hematoma, Hyperlipidemia, Hypertension, Insomnia, Internal hemorrhoids, Open wound, Osteomyelitis, Paroxysmal atrial fibrillation, Peripheral neuropathy, Popliteal artery aneurysm, Skin cancer, Sleep apnea, Tachycardia induced cardiomyopathy, Venous stasis, and Venous stasis ulcer    Social History     Tobacco Use    Smoking status: Current Every Day Smoker     Packs/day: 0.25     Years: 45.00     Pack years: 11.25     Types: Cigarettes    Smokeless tobacco: Never Used   Vaping Use    Vaping Use: Never used   Substance Use Topics    Alcohol use: Yes     Alcohol/week: 8.0 - 9.0 standard drinks     Types: 1 - 2 Shots of liquor, 7 Standard drinks or equivalent per week     Comment: 2 rum a day//Caffeine use: 3 cups daily    Drug use: No     Results from last 7 days   Lab Units 04/08/21 0419 04/07/21 0423 04/06/21 0417 04/05/21  0438 04/04/21  0442 04/03/21  0817 04/02/21  0327   INR  1.81* 1.98* 2.03* 2.17* 1.99* 1.99* 1.60*   HEMOGLOBIN g/dL 12.3* 12.4* 14.1 12.6* 12.7* 12.1* 12.3*   HEMATOCRIT % 34.4* 35.3* 40.9 37.0* 36.0* 34.7* 35.3*   PLATELETS 10*3/mm3 175 172 178 146 128* 134* 124*     Results from last 7 days   Lab Units 04/08/21 0419 04/07/21  0423 04/06/21  0417   SODIUM mmol/L 141 139 139   POTASSIUM mmol/L 3.7 3.7 4.0   CHLORIDE mmol/L 100 100 102   CO2 mmol/L 29.8* 31.4* 26.5   BUN mg/dL 13 10 9   CREATININE mg/dL 0.83 0.88 0.75*   CALCIUM mg/dL 8.6 8.6 8.8   GLUCOSE mg/dL 100* 96 87     Anticoagulation history: Managed by [facility]: Warfarin 5 mg po qMF + 7.5 mg po all other days    Hospital Anticoagulation:  Consulting provider: Dr. Brad Lepe  Start date: Home med continued on 4/1/2021  Indication: Atrial fibrillation  Target INR: 2-3  Expected duration: Lifetime   Bridge Therapy: No, Enoxaparin - d/c'd on 4/3/21                Date 4/1 4/2 4/3  4/4 4/5 4/6 4/7 4/8     INR 1.66 1.60 1.99 1.99 2.17 2.03 1.98 1.81     Warfarin dose 7.5 mg 7.5 mg 5 mg 7.5 mg 5 mg 7.5 mg 7.5 mg 7.5mg       Potential drug interactions:   Aspirin-new med-increased risk of bleeding  Piperacillin/tazobactam-increased risk of bleeding (D/C'd on 4/3/21)  Vancomycin-increased risk of bleeding (D/C'd on 43/21)  Ampicillin-sulbactam - may result in an increased risk of bleeding.     Relevant nutrition status: Regular diet    Education complete?/ Date: Yes, on 4/1/21    Assessment/Plan:    1) Warfarin restarted  on 4/1/21. Even though INR is slightly low today at 1.81 we will continue his current schedule.   2) Enoxaparin 140 mg sq q12hrs- d/c'd on 4/3/21 pm  3) Continue home regimen of warfarin 5 mg po qMF and 7.5 mg po qSSTWTh. Will receive 7.5mg today.     Pharmacy will continue to follow until discharge or discontinuation of warfarin.     Arden Merida [facility]  Clinical Staff Pharmacist

## 2021-04-08 NOTE — THERAPY TREATMENT NOTE
Patient Name: Jalen Lockwood  : 1950    MRN: [id number]                              Today's Date: 2021       Admit Date: 3/30/2021    Visit Dx:     ICD-10-CM ICD-9-CM   1. Severe sepsis (CMS/HCC)  A41.9 038.9    R65.20 995.92   2. Acute UTI  N39.0 599.0   3. Acute abdominal pain  R10.9 789.00     338.19   4. Acute respiratory alkalosis  E87.3 276.3   5. Hyponatremia  E87.1 276.1   6. Atrial fibrillation with RVR (CMS/HCC)  I48.91 427.31   7. Fever in adult  R50.9 780.60   8. Acute metabolic encephalopathy  G93.41 348.31     Patient Active Problem List   Diagnosis   • Chronic osteomyelitis (CMS/HCC)   • Foot pain   • Peripheral neuropathy   • Venous stasis   • Permanent atrial fibrillation (CMS/HCC)   • Sleep apnea   • Chronic edema   • Class 2 severe obesity due to excess calories with serious comorbidity and body mass index (BMI) of 36.0 to 36.9 in adult (CMS/HCC)   • COPD (chronic obstructive pulmonary disease) (CMS/HCC)   • Nonocclusive coronary atherosclerosis of native coronary artery   • Atrial flutter (CMS/HCC)   • 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CMS/HCC)   • Aortectasia (CMS/HCC)   • Popliteal artery aneurysm (CMS/HCC)   • Colon polyps   • Gastroparesis   • Insomnia   • Adenomatous polyp of colon   • Essential hypertension   • ETOH abuse   • Chronic anticoagulation   • Oropharyngeal dysphagia   • Tobacco abuse   • Thyromegaly   • Adrenal adenoma, left   • Retroperitoneal lymphadenopathy   • Anemia of chronic disease   • Thyroid nodule   • Charcot's joint of foot   • Abnormal CT scan, lumbar spine   • Alcohol dependence, in remission (CMS/HCC)   • Severe sepsis (CMS/HCC)   • Hyponatremia   • Thrombocytopenia (CMS/HCC)   • Ischemic cardiomyopathy   • Acute systolic (congestive) heart failure (CMS/HCC)   • Cellulitis of lower extremity     Past Medical History:   Diagnosis Date   • Allergic rhinitis    • Anxiety    • Aortectasia (CMS/HCC)     3cm infrarenal abdominal aorta   • Arthritis    •  Atrial flutter (CMS/HCC) 2010    s/p ablation    • Charcot's joint of foot    • Chronic edema     both legs and sees wound care center at [place]    • Chronic venous insufficiency    • COPD (chronic obstructive pulmonary disease) (CMS/HCC)    • Coronary atherosclerosis     Cath 2010: diffuse 40-50% disease   • Diverticulosis    • Duodenitis    • Fatty liver    • Gastritis    • Gastroparesis    • Hematoma     post-operative; After catheterization, right groin, required surgical exploration   • Hyperlipidemia    • Hypertension    • Insomnia    • Internal hemorrhoids    • Open wound     izzy legs has drsg chg weekly at wound care center at [place]  pt does second dressing on left leg another time during week   • Osteomyelitis (CMS/HCC)    • Paroxysmal atrial fibrillation (CMS/HCC)    • Peripheral neuropathy    • Popliteal artery aneurysm (CMS/HCC)     left, s/p stenting by Dr. Boston   • Skin cancer    • Sleep apnea     o2   • 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CMS/HCC)     due to flutter and afib; cath 2010 with nonobstructive disease   • Venous stasis    • Venous stasis ulcer (CMS/HCC)     bilateral legs      Past Surgical History:   Procedure Laterality Date   • CARDIAC CATHETERIZATION     • CATARACT EXTRACTION     • COLONOSCOPY  09/28/2015    NBIH, diverticulosis, polyps   • COLONOSCOPY N/A 9/11/2018    Procedure: COLONOSCOPY TO CECUM  AND TERM. ILEUM WITH COLD SNARE POLYPECTOMIES;  Surgeon: Kane Lagunas MD;  [facility];  Service: Gastroenterology   • COLONOSCOPY N/A 10/29/2019    Procedure: COLONOSCOPY TO TO CECUM AND TERMINAL ILEUM WITH HOT AND COLD SNARE POLYPECTOMIES;  Surgeon: Kane Lagunas MD;  [facility];  Service: Gastroenterology   • HIP ARTHROPLASTY Right 2017   • JOINT REPLACEMENT Left    • OTHER SURGICAL HISTORY      Catheter ablation atrial flutter   • REPAIR ANEURYSM / PSEUDO ANEURYSM / RUPTURED ANEURYSM POPLITEAL ARTERY      Stent-Graft of the the left  popliteal artery   • REPAIR KNEE LIGAMENT      Primary repair of knee ligament cruciate anterior right   • TONSILLECTOMY  1958   • TOTAL KNEE ARTHROPLASTY Bilateral    • UPPER GASTROINTESTINAL ENDOSCOPY  09/16/2014    acute gastritis, acute duodenitis     General Information     Row Name 04/08/21 1422          Physical Therapy Time and Intention    Document Type  therapy note (daily note)  -DJ     Mode of Treatment  individual therapy;physical therapy  -DJ     Row Name 04/08/21 1422          General Information    Patient Profile Reviewed  yes  -DJ     Existing Precautions/Restrictions  fall  -DJ     Row Name 04/08/21 1422          Cognition    Orientation Status (Cognition)  oriented x 3 Pleasant and cooperative  -DJ     Row Name 04/08/21 1422          Safety Issues, Functional Mobility    Comment, Safety Issues/Impairments (Mobility)  gt belt, grippy socks  -DJ       User Key  (r) = Recorded By, (t) = Taken By, (c) = Cosigned By    Initials Name Provider Type    Dayana Galo, PT Physical Therapist        Mobility     Row Name 04/08/21 1423          Bed Mobility    Supine-Sit Edgar (Bed Mobility)  not tested  -DJ     Sit-Supine Edgar (Bed Mobility)  not tested  -DJ     Comment (Bed Mobility)  Pt UIC  -DJ     Row Name 04/08/21 1423          Transfers    Comment (Transfers)  sit/stand from recliner chair  -DJ     Row Name 04/08/21 1423          Bed-Chair Transfer    Bed-Chair Edgar (Transfers)  not tested  -DJ     Row Name 04/08/21 1423          Sit-Stand Transfer    Sit-Stand Edgar (Transfers)  contact guard;verbal cues  -DJ     Assistive Device (Sit-Stand Transfers)  walker, front-wheeled  -DJ     Row Name 04/08/21 1423          Gait/Stairs (Locomotion)    Edgar Level (Gait)  contact guard;verbal cues  -DJ     Assistive Device (Gait)  walker, front-wheeled  -DJ     Distance in Feet (Gait)  45'  -DJ     Deviations/Abnormal Patterns (Gait)  base of support, wide;sowmya  decreased;gait speed decreased;stride length decreased;festinating/shuffling  -DJ     Bilateral Gait Deviations  forward flexed posture  -DJ     Hardy Level (Stairs)  not tested  -DJ     Comment (Gait/Stairs)  Pt amb 45' with r wx and CGA of 1 before having to rest. Pace is slow, hips are ext rotated, waddles, pt fatigues easily.  -DJ       User Key  (r) = Recorded By, (t) = Taken By, (c) = Cosigned By    Initials Name Provider Type    Dayana Galo PT Physical Therapist        Obj/Interventions     Row Name 04/08/21 1425          Motor Skills    Therapeutic Exercise  -- AP, LAQ, seated hip flex, arm raises 5-10x each  -DJ     Row Name 04/08/21 1425          Balance    Balance Interventions  standing;supported;weight shifting activity  -DJ       User Key  (r) = Recorded By, (t) = Taken By, (c) = Cosigned By    Initials Name Provider Type    Dayana Galo PT Physical Therapist        Goals/Plan    No documentation.       Clinical Impression     Row Name 04/08/21 1426          Pain    Additional Documentation  Pain Scale: Numbers Pre/Post-Treatment (Group)  -DJ     Row Name 04/08/21 1426          Pain Scale: Numbers Pre/Post-Treatment    Pre/Posttreatment Pain Comment  Pt generally without c/o pain today  -DJ     Row Name 04/08/21 1426          Plan of Care Review    Plan of Care Reviewed With  patient  -DJ     Progress  no change  -DJ     Outcome Summary  Pt UIC resting in NAD. Nurse reported increased heart rate previously when usign bathroom. Benrie, pt req CGA for sit/stand transfer from recliner. He amb 45' to door and back with r wx and CGA of 1 before requesting to rest. Pt fatigues easily and shuffles feet with flexed posture. Progressing slowly due to decreased endurance. Pt performed seated HEP. Cont PT to address functional deficits and prepare for d/c to SNF.  -DJ     Row Name 04/08/21 1426          Therapy Assessment/Plan (PT)    Criteria for Skilled Interventions Met (PT)  yes;skilled  treatment is necessary  -DJ     Row Name 04/08/21 1426          Vital Signs    O2 Delivery Pre Treatment  supplemental O2  -DJ     O2 Delivery Intra Treatment  room air  -DJ     O2 Delivery Post Treatment  supplemental O2  -DJ     Pre Patient Position  Sitting  -DJ     Intra Patient Position  Standing  -DJ     Post Patient Position  Sitting  -DJ     Row Name 04/08/21 1426          Positioning and Restraints    Pre-Treatment Position  sitting in chair/recliner  -DJ     Post Treatment Position  chair  -DJ     In Chair  reclined;call light within reach;encouraged to call for assist;legs elevated exit alarm not on when PT entered room  -DJ       User Key  (r) = Recorded By, (t) = Taken By, (c) = Cosigned By    Initials Name Provider Type    Dayana Galo PT Physical Therapist        Outcome Measures     Row Name 04/08/21 1429          How much help from another person do you currently need...    Turning from your back to your side while in flat bed without using bedrails?  3  -DJ     Moving from lying on back to sitting on the side of a flat bed without bedrails?  3  -DJ     Moving to and from a bed to a chair (including a wheelchair)?  3  -DJ     Standing up from a chair using your arms (e.g., wheelchair, bedside chair)?  3  -DJ     Climbing 3-5 steps with a railing?  2  -DJ     To walk in hospital room?  3  -DJ     AM-PAC 6 Clicks Score (PT)  17  -DJ     Row Name 04/08/21 1429          Functional Assessment    Outcome Measure Options  AM-PAC 6 Clicks Basic Mobility (PT)  -DJ       User Key  (r) = Recorded By, (t) = Taken By, (c) = Cosigned By    Initials Name Provider Type    Dayana Galo, STEPHEN Physical Therapist        Physical Therapy Education                 Title: PT OT SLP Therapies (Done)     Topic: Physical Therapy (Done)     Point: Mobility training (Done)     Learning Progress Summary           Patient Acceptance, TB, DU by YEFRI at 4/8/2021 1429    Acceptance, E,TB,D, VU,NR by  at 4/7/2021 9231     Acceptance, E,D, VU,NR by EB at 4/6/2021 1631    Acceptance, E,TB,D, VU,NR by  at 4/5/2021 1558    Acceptance, E, VU by ME at 4/2/2021 1502                   Point: Home exercise program (Done)     Learning Progress Summary           Patient Acceptance, TB, DU by DJ at 4/8/2021 1429    Acceptance, E,TB,D, VU,NR by  at 4/7/2021 1551    Acceptance, E,D, VU,NR by EB at 4/6/2021 1631    Acceptance, E,TB,D, VU,NR by  at 4/5/2021 1558                   Point: Body mechanics (Done)     Learning Progress Summary           Patient Acceptance, TB, DU by  at 4/8/2021 1429    Acceptance, E,TB,D, VU,NR by  at 4/7/2021 1551    Acceptance, E,D, VU,NR by EB at 4/6/2021 1631    Acceptance, E, VU by ME at 4/2/2021 1502                   Point: Precautions (Done)     Learning Progress Summary           Patient Acceptance, TB, DU by DJ at 4/8/2021 1429    Acceptance, E,TB,D, VU,NR by  at 4/7/2021 1551    Acceptance, E,D, VU,NR by EB at 4/6/2021 1631    Acceptance, E, VU by ME at 4/2/2021 1502                               User Key     Initials Effective Dates Name Provider Type Discipline     04/03/18 -  Yolanda Batista, PT Physical Therapist PT     03/10/21 -  Leslie Magana PTA Physical Therapy Assistant PT     10/25/19 -  Dayana Wilkins, PT Physical Therapist PT     02/01/21 -  Madonna Landa, PT Student PT Student PT    ME 03/12/21 -  Сергей Ryder, PT Student PT Student PT              PT Recommendation and Plan     Plan of Care Reviewed With: patient  Progress: no change  Outcome Summary: Pt UIC resting in NAD. Nurse reported increased heart rate previously when usign bathroom. Bernie, pt req CGA for sit/stand transfer from recliner. He amb 45' to door and back with r wx and CGA of 1 before requesting to rest. Pt fatigues easily and shuffles feet with flexed posture. Progressing slowly due to decreased endurance. Pt performed seated HEP. Cont PT to address functional deficits and prepare for d/c to SNF.      Time Calculation:   PT Charges     Row Name 04/08/21 1430             Time Calculation    Start Time  1402  -DJ      Stop Time  1422  -DJ      Time Calculation (min)  20 min  -DJ      PT Non-Billable Time (min)  10 min  -DJ      PT Received On  04/08/21  -DJ      PT - Next Appointment  04/09/21  -DJ        User Key  (r) = Recorded By, (t) = Taken By, (c) = Cosigned By    Initials Name Provider Type    Dayana Galo, PT Physical Therapist        Therapy Charges for Today     Code Description Service Date Service Provider Modifiers Qty    15004187553 HC PT THER PROC EA 15 MIN 4/8/2021 Dayana Wilkins, PT GP 1          PT G-Codes  Outcome Measure Options: AM-PAC 6 Clicks Basic Mobility (PT)  AM-PAC 6 Clicks Score (PT): 17    Dayana Wilkins PT  4/8/2021

## 2021-04-09 ENCOUNTER — APPOINTMENT (OUTPATIENT)
Dept: CT IMAGING | Facility: HOSPITAL | Age: 71
End: 2021-04-09

## 2021-04-09 LAB
ANION GAP SERPL CALCULATED.3IONS-SCNC: 9.5 MMOL/L (ref 5–15)
BASOPHILS # BLD AUTO: 0.05 10*3/MM3 (ref 0–0.2)
BASOPHILS NFR BLD AUTO: 0.6 % (ref 0–1.5)
BUN SERPL-MCNC: 15 MG/DL (ref 8–23)
BUN/CREAT SERPL: 18.3 (ref 7–25)
CALCIUM SPEC-SCNC: 9.2 MG/DL (ref 8.6–10.5)
CHLORIDE SERPL-SCNC: 100 MMOL/L (ref 98–107)
CO2 SERPL-SCNC: 27.5 MMOL/L (ref 22–29)
CREAT SERPL-MCNC: 0.82 MG/DL (ref 0.76–1.27)
DEPRECATED RDW RBC AUTO: 47.7 FL (ref 37–54)
EOSINOPHIL # BLD AUTO: 0.1 10*3/MM3 (ref 0–0.4)
EOSINOPHIL NFR BLD AUTO: 1.3 % (ref 0.3–6.2)
ERYTHROCYTE [DISTWIDTH] IN BLOOD BY AUTOMATED COUNT: 13.3 % (ref 12.3–15.4)
GFR SERPL CREATININE-BSD FRML MDRD: 93 ML/MIN/1.73
GLUCOSE SERPL-MCNC: 98 MG/DL (ref 65–99)
HCT VFR BLD AUTO: 36.5 % (ref 37.5–51)
HGB BLD-MCNC: 13 G/DL (ref 13–17.7)
IMM GRANULOCYTES # BLD AUTO: 0.02 10*3/MM3 (ref 0–0.05)
IMM GRANULOCYTES NFR BLD AUTO: 0.3 % (ref 0–0.5)
INR PPP: 1.92 (ref 0.9–1.1)
LYMPHOCYTES # BLD AUTO: 1.09 10*3/MM3 (ref 0.7–3.1)
LYMPHOCYTES NFR BLD AUTO: 14.1 % (ref 19.6–45.3)
MCH RBC QN AUTO: 34.9 PG (ref 26.6–33)
MCHC RBC AUTO-ENTMCNC: 35.6 G/DL (ref 31.5–35.7)
MCV RBC AUTO: 97.9 FL (ref 79–97)
MONOCYTES # BLD AUTO: 0.49 10*3/MM3 (ref 0.1–0.9)
MONOCYTES NFR BLD AUTO: 6.3 % (ref 5–12)
NEUTROPHILS NFR BLD AUTO: 6 10*3/MM3 (ref 1.7–7)
NEUTROPHILS NFR BLD AUTO: 77.4 % (ref 42.7–76)
NRBC BLD AUTO-RTO: 0 /100 WBC (ref 0–0.2)
PLATELET # BLD AUTO: 197 10*3/MM3 (ref 140–450)
PMV BLD AUTO: 10.2 FL (ref 6–12)
POTASSIUM SERPL-SCNC: 4 MMOL/L (ref 3.5–5.2)
PROTHROMBIN TIME: 21.8 SECONDS (ref 11.7–14.2)
RBC # BLD AUTO: 3.73 10*6/MM3 (ref 4.14–5.8)
SODIUM SERPL-SCNC: 137 MMOL/L (ref 136–145)
WBC # BLD AUTO: 7.75 10*3/MM3 (ref 3.4–10.8)

## 2021-04-09 PROCEDURE — 85610 PROTHROMBIN TIME: CPT | Performed by: INTERNAL MEDICINE

## 2021-04-09 PROCEDURE — 97110 THERAPEUTIC EXERCISES: CPT

## 2021-04-09 PROCEDURE — 87205 SMEAR GRAM STAIN: CPT | Performed by: INTERNAL MEDICINE

## 2021-04-09 PROCEDURE — 71275 CT ANGIOGRAPHY CHEST: CPT

## 2021-04-09 PROCEDURE — 87070 CULTURE OTHR SPECIMN AEROBIC: CPT | Performed by: INTERNAL MEDICINE

## 2021-04-09 PROCEDURE — 94799 UNLISTED PULMONARY SVC/PX: CPT

## 2021-04-09 PROCEDURE — 0 IOPAMIDOL PER 1 ML: Performed by: INTERNAL MEDICINE

## 2021-04-09 PROCEDURE — 80048 BASIC METABOLIC PNL TOTAL CA: CPT | Performed by: INTERNAL MEDICINE

## 2021-04-09 PROCEDURE — 92610 EVALUATE SWALLOWING FUNCTION: CPT

## 2021-04-09 PROCEDURE — 85025 COMPLETE CBC W/AUTO DIFF WBC: CPT | Performed by: INTERNAL MEDICINE

## 2021-04-09 RX ADMIN — SODIUM CHLORIDE, PRESERVATIVE FREE 10 ML: 5 INJECTION INTRAVENOUS at 09:25

## 2021-04-09 RX ADMIN — FINASTERIDE 5 MG: 5 TABLET, FILM COATED ORAL at 09:22

## 2021-04-09 RX ADMIN — HYDROCODONE BITARTRATE AND ACETAMINOPHEN 2 TABLET: 5; 325 TABLET ORAL at 06:39

## 2021-04-09 RX ADMIN — BUMETANIDE 1 MG: 1 TABLET ORAL at 09:22

## 2021-04-09 RX ADMIN — IOPAMIDOL 100 ML: 755 INJECTION, SOLUTION INTRAVENOUS at 08:00

## 2021-04-09 RX ADMIN — HYDROCODONE BITARTRATE AND ACETAMINOPHEN 2 TABLET: 5; 325 TABLET ORAL at 00:51

## 2021-04-09 RX ADMIN — IPRATROPIUM BROMIDE AND ALBUTEROL SULFATE 3 ML: 2.5; .5 SOLUTION RESPIRATORY (INHALATION) at 11:35

## 2021-04-09 RX ADMIN — METOPROLOL SUCCINATE 12.5 MG: 25 TABLET, EXTENDED RELEASE ORAL at 09:22

## 2021-04-09 RX ADMIN — AMOXICILLIN AND CLAVULANATE POTASSIUM 1 TABLET: 875; 125 TABLET, FILM COATED ORAL at 09:21

## 2021-04-09 RX ADMIN — HYDROCODONE BITARTRATE AND ACETAMINOPHEN 2 TABLET: 5; 325 TABLET ORAL at 18:23

## 2021-04-09 RX ADMIN — NYSTATIN: 100000 POWDER TOPICAL at 09:22

## 2021-04-09 RX ADMIN — IPRATROPIUM BROMIDE AND ALBUTEROL SULFATE 3 ML: 2.5; .5 SOLUTION RESPIRATORY (INHALATION) at 14:53

## 2021-04-09 RX ADMIN — BUDESONIDE AND FORMOTEROL FUMARATE DIHYDRATE 2 PUFF: 160; 4.5 AEROSOL RESPIRATORY (INHALATION) at 19:15

## 2021-04-09 RX ADMIN — IPRATROPIUM BROMIDE AND ALBUTEROL SULFATE 3 ML: 2.5; .5 SOLUTION RESPIRATORY (INHALATION) at 02:18

## 2021-04-09 RX ADMIN — LOSARTAN POTASSIUM 12.5 MG: 25 TABLET, FILM COATED ORAL at 09:21

## 2021-04-09 RX ADMIN — BUDESONIDE AND FORMOTEROL FUMARATE DIHYDRATE 2 PUFF: 160; 4.5 AEROSOL RESPIRATORY (INHALATION) at 06:44

## 2021-04-09 RX ADMIN — HYDROCODONE BITARTRATE AND ACETAMINOPHEN 2 TABLET: 5; 325 TABLET ORAL at 12:34

## 2021-04-09 RX ADMIN — GABAPENTIN 300 MG: 300 CAPSULE ORAL at 06:39

## 2021-04-09 RX ADMIN — GABAPENTIN 300 MG: 300 CAPSULE ORAL at 13:21

## 2021-04-09 NOTE — THERAPY TREATMENT NOTE
Patient Name: Jalen Lockwood  : 1950    MRN: [id number]                              Today's Date: 2021       Admit Date: 3/30/2021    Visit Dx:     ICD-10-CM ICD-9-CM   1. Severe sepsis (CMS/HCC)  A41.9 038.9    R65.20 995.92   2. Acute UTI  N39.0 599.0   3. Acute abdominal pain  R10.9 789.00     338.19   4. Acute respiratory alkalosis  E87.3 276.3   5. Hyponatremia  E87.1 276.1   6. Atrial fibrillation with RVR (CMS/HCC)  I48.91 427.31   7. Fever in adult  R50.9 780.60   8. Acute metabolic encephalopathy  G93.41 348.31     Patient Active Problem List   Diagnosis   • Chronic osteomyelitis (CMS/HCC)   • Foot pain   • Peripheral neuropathy   • Venous stasis   • Permanent atrial fibrillation (CMS/HCC)   • Sleep apnea   • Chronic edema   • Class 2 severe obesity due to excess calories with serious comorbidity and body mass index (BMI) of 36.0 to 36.9 in adult (CMS/HCC)   • COPD (chronic obstructive pulmonary disease) (CMS/HCC)   • Nonocclusive coronary atherosclerosis of native coronary artery   • Atrial flutter (CMS/HCC)   • 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CMS/HCC)   • Aortectasia (CMS/HCC)   • Popliteal artery aneurysm (CMS/HCC)   • Colon polyps   • Gastroparesis   • Insomnia   • Adenomatous polyp of colon   • Essential hypertension   • ETOH abuse   • Chronic anticoagulation   • Oropharyngeal dysphagia   • Tobacco abuse   • Thyromegaly   • Adrenal adenoma, left   • Retroperitoneal lymphadenopathy   • Anemia of chronic disease   • Thyroid nodule   • Charcot's joint of foot   • Abnormal CT scan, lumbar spine   • Alcohol dependence, in remission (CMS/HCC)   • Severe sepsis (CMS/HCC)   • Hyponatremia   • Thrombocytopenia (CMS/HCC)   • Ischemic cardiomyopathy   • Acute systolic (congestive) heart failure (CMS/HCC)   • Cellulitis of lower extremity     Past Medical History:   Diagnosis Date   • Allergic rhinitis    • Anxiety    • Aortectasia (CMS/HCC)     3cm infrarenal abdominal aorta   • Arthritis    •  Atrial flutter (CMS/HCC) 2010    s/p ablation    • Charcot's joint of foot    • Chronic edema     both legs and sees wound care center at [place]    • Chronic venous insufficiency    • COPD (chronic obstructive pulmonary disease) (CMS/HCC)    • Coronary atherosclerosis     Cath 2010: diffuse 40-50% disease   • Diverticulosis    • Duodenitis    • Fatty liver    • Gastritis    • Gastroparesis    • Hematoma     post-operative; After catheterization, right groin, required surgical exploration   • Hyperlipidemia    • Hypertension    • Insomnia    • Internal hemorrhoids    • Open wound     izzy legs has drsg chg weekly at wound care center at [place]  pt does second dressing on left leg another time during week   • Osteomyelitis (CMS/HCC)    • Paroxysmal atrial fibrillation (CMS/HCC)    • Peripheral neuropathy    • Popliteal artery aneurysm (CMS/HCC)     left, s/p stenting by Dr. Boston   • Skin cancer    • Sleep apnea     o2   • 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CMS/HCC)     due to flutter and afib; cath 2010 with nonobstructive disease   • Venous stasis    • Venous stasis ulcer (CMS/HCC)     bilateral legs      Past Surgical History:   Procedure Laterality Date   • CARDIAC CATHETERIZATION     • CATARACT EXTRACTION     • COLONOSCOPY  09/28/2015    NBIH, diverticulosis, polyps   • COLONOSCOPY N/A 9/11/2018    Procedure: COLONOSCOPY TO CECUM  AND TERM. ILEUM WITH COLD SNARE POLYPECTOMIES;  Surgeon: Kane Lagunas MD;  Location[facility];  Service: Gastroenterology   • COLONOSCOPY N/A 10/29/2019    Procedure: COLONOSCOPY TO TO CECUM AND TERMINAL ILEUM WITH HOT AND COLD SNARE POLYPECTOMIES;  Surgeon: Kane Lagunas MD;  Location[facility];  Service: Gastroenterology   • HIP ARTHROPLASTY Right 2017   • JOINT REPLACEMENT Left    • OTHER SURGICAL HISTORY      Catheter ablation atrial flutter   • REPAIR ANEURYSM / PSEUDO ANEURYSM / RUPTURED ANEURYSM POPLITEAL ARTERY      Stent-Graft of the the left  popliteal artery   • REPAIR KNEE LIGAMENT      Primary repair of knee ligament cruciate anterior right   • TONSILLECTOMY  1958   • TOTAL KNEE ARTHROPLASTY Bilateral    • UPPER GASTROINTESTINAL ENDOSCOPY  09/16/2014    acute gastritis, acute duodenitis     General Information     Row Name 04/09/21 1536          OT Time and Intention    Document Type  therapy note (daily note)  -BT     Mode of Treatment  individual therapy;occupational therapy  -BT     Row Name 04/09/21 1536          General Information    Patient Profile Reviewed  yes  -BT     Existing Precautions/Restrictions  fall  -BT     Row Name 04/09/21 1536          Cognition    Orientation Status (Cognition)  oriented x 4  -BT     Row Name 04/09/21 1536          Safety Issues, Functional Mobility    Impairments Affecting Function (Mobility)  endurance/activity tolerance;strength;shortness of breath  -BT     Comment, Safety Issues/Impairments (Mobility)  gt belt, grippy socks  -BT       User Key  (r) = Recorded By, (t) = Taken By, (c) = Cosigned By    Initials Name Provider Type    BT Kristen Lucas OT Occupational Therapist          Mobility/ADL's     Row Name 04/09/21 1537          Bed Mobility    Comment (Bed Mobility)  pt up in chair upon arrival  -BT     Row Name 04/09/21 1537          Transfers    Transfers  sit-stand transfer  -BT     Sit-Stand Elmo (Transfers)  supervision  -BT     Row Name 04/09/21 1537          Sit-Stand Transfer    Assistive Device (Sit-Stand Transfers)  walker, front-wheeled  -BT     Row Name 04/09/21 1537          Functional Mobility    Functional Mobility- Device  rolling walker  -BT       User Key  (r) = Recorded By, (t) = Taken By, (c) = Cosigned By    Initials Name Provider Type    BT Kristen Lucas OT Occupational Therapist        Obj/Interventions     Row Name 04/09/21 1538          Shoulder (Therapeutic Exercise)    Shoulder (Therapeutic Exercise)  AROM (active range of motion);strengthening exercise  -BT      Shoulder AROM (Therapeutic Exercise)  bilateral;flexion;extension;aBduction;aDduction;sitting;10 repetitions;2 sets  -BT     Shoulder Strengthening (Therapeutic Exercise)  bilateral;flexion;extension;aBduction;aDduction;sitting;resistance band;yellow;10 repetitions;2 sets  -BT     Row Name 04/09/21 1538          Elbow/Forearm (Therapeutic Exercise)    Elbow/Forearm (Therapeutic Exercise)  AROM (active range of motion);strengthening exercise  -BT     Elbow/Forearm AROM (Therapeutic Exercise)  bilateral;flexion;extension;sitting;10 repetitions;2 sets  -BT     Elbow/Forearm Strengthening (Therapeutic Exercise)  bilateral;flexion;extension;sitting;resistance band;yellow;10 repetitions;2 sets  -BT     Row Name 04/09/21 1538          Therapeutic Exercise    Therapeutic Exercise  shoulder;elbow/forearm  -BT       User Key  (r) = Recorded By, (t) = Taken By, (c) = Cosigned By    Initials Name Provider Type    BT Kristen Lucas OT Occupational Therapist        Goals/Plan    No documentation.       Clinical Impression     Row Name 04/09/21 1539          Pain Assessment    Additional Documentation  Pain Scale: Numbers Pre/Post-Treatment (Group)  -BT     Row Name 04/09/21 1539          Pain Scale: Numbers Pre/Post-Treatment    Pretreatment Pain Rating  5/10  -BT     Posttreatment Pain Rating  5/10  -BT     Pain Location - Side  Bilateral  -BT     Pain Location  foot  -BT     Pain Intervention(s)  Medication (See MAR);Repositioned;Ambulation/increased activity  -BT     Row Name 04/09/21 1539          Plan of Care Review    Plan of Care Reviewed With  patient  -BT     Progress  improving  -BT     Outcome Summary  Pt up in recliner upon arrival, agreeable to participate in OT on this date. Pt declined adls on this date, stating he had already done them today. Pt required SBA/supervision for functional transfers utilzing rwx on this date. Pt completed 2 x 10 sets of BUE AROM/strengthening exercises utilizing yellow theraband to  increase strength and activity tolerance with adls and functional transfers. Rest breaks throughout ther ex.  -BT       User Key  (r) = Recorded By, (t) = Taken By, (c) = Cosigned By    Initials Name Provider Type    Kristen Wong OT Occupational Therapist        Outcome Measures     Row Name 04/09/21 1542          How much help from another is currently needed...    Putting on and taking off regular lower body clothing?  2  -BT     Bathing (including washing, rinsing, and drying)  2  -BT     Toileting (which includes using toilet bed pan or urinal)  2  -BT     Putting on and taking off regular upper body clothing  3  -BT     Taking care of personal grooming (such as brushing teeth)  3  -BT     Eating meals  4  -BT     AM-PAC 6 Clicks Score (OT)  16  -BT     Row Name 04/09/21 1542          Functional Assessment    Outcome Measure Options  AM-PAC 6 Clicks Daily Activity (OT)  -BT       User Key  (r) = Recorded By, (t) = Taken By, (c) = Cosigned By    Initials Name Provider Type    Kristen Wong OT Occupational Therapist        Occupational Therapy Education                 Title: PT OT SLP Therapies (In Progress)     Topic: Occupational Therapy (In Progress)     Point: ADL training (Done)     Description:   Instruct learner(s) on proper safety adaptation and remediation techniques during self care or transfers.   Instruct in proper use of assistive devices.              Learning Progress Summary           Patient Acceptance, E, VU by  at 4/8/2021 8615    Comment: Education in role of OT, pacing ADL and breathing exercises throughout ADL.                   Point: Home exercise program (Not Started)     Description:   Instruct learner(s) on appropriate technique for monitoring, assisting and/or progressing therapeutic exercises/activities.              Learner Progress:  Not documented in this visit.          Point: Precautions (Not Started)     Description:   Instruct learner(s) on prescribed precautions  during self-care and functional transfers.              Learner Progress:  Not documented in this visit.          Point: Body mechanics (Not Started)     Description:   Instruct learner(s) on proper positioning and spine alignment during self-care, functional mobility activities and/or exercises.              Learner Progress:  Not documented in this visit.                      User Key     Initials Effective Dates Name Provider Type Discipline     04/02/20 -  Griselda Saleh OT Occupational Therapist OT              OT Recommendation and Plan     Plan of Care Review  Plan of Care Reviewed With: patient  Progress: improving  Outcome Summary: Pt up in recliner upon arrival, agreeable to participate in OT on this date. Pt declined adls on this date, stating he had already done them today. Pt required SBA/supervision for functional transfers utilzing rwx on this date. Pt completed 2 x 10 sets of BUE AROM/strengthening exercises utilizing yellow theraband to increase strength and activity tolerance with adls and functional transfers. Rest breaks throughout ther ex.     Time Calculation:   Time Calculation- OT     Row Name 04/09/21 1546             Time Calculation- OT    OT Start Time  1405  -BT      OT Stop Time  1428  -BT      OT Time Calculation (min)  23 min  -BT      Total Timed Code Minutes- OT  23 minute(s)  -BT      OT Received On  04/09/21  -BT      OT - Next Appointment  04/12/21  -BT        User Key  (r) = Recorded By, (t) = Taken By, (c) = Cosigned By    Initials Name Provider Type    BT Kristen Lucas OT Occupational Therapist        Therapy Charges for Today     Code Description Service Date Service Provider Modifiers Qty    22931565812  OT THER PROC EA 15 MIN 4/9/2021 Kristen Lucas OT GO 2               Kristen Lucas OT  4/9/2021

## 2021-04-09 NOTE — PLAN OF CARE
Goal Outcome Evaluation:         Bedside swallow evaluation completed. No overt clinical s/s aspiration noted on any consistency presented, however CANNOT R/O SILENT aspiration at bedside.    Recommend:    VFSS to further assess swallow and R/O aspiration ( especially given infiltrate in right lung)    Mechanical soft diet with chopped meats ( due to lack of  upper dentures)  Small bites/sips, slow rate, upright for meals and 30 min afterwards    Medication whole with thin liquids or puree, as tolerated

## 2021-04-09 NOTE — PROGRESS NOTES
Name: Jalen Lockwood ADMIT: 3/30/2021   : 1950  PCP: Marc Ludwig MD    MRN: [id number] LOS: 10 days   AGE/SEX: 71 y.o. male  ROOM: [facility]/     Subjective   Subjective   CC: generalized weakness  No acute events. Patient has had more hemoptysis overnight. No increased dyspnea. Denies CP/f/c/n/v/d.  His wife was at bedside during the time of my evaluation.    Objective   Objective   Vital Signs  Temp:  [98 °F (36.7 °C)-98.8 °F (37.1 °C)] 98 °F (36.7 °C)  Heart Rate:  [60-88] 88  Resp:  [16-20] 20  BP: (112-133)/(73-94) 122/94  SpO2:  [92 %-98 %] 92 %  on  Flow (L/min):  [2] 2;   Device (Oxygen Therapy): nasal cannula  Body mass index is 36.38 kg/m².  Physical Exam  Vitals and nursing note reviewed.   Constitutional:       General: He is not in acute distress.     Appearance: He is ill-appearing (chronically). He is not toxic-appearing.   HENT:      Head: Normocephalic and atraumatic.      Nose: Nose normal.      Mouth/Throat:      Mouth: Mucous membranes are moist.      Pharynx: Oropharynx is clear.   Eyes:      Extraocular Movements: Extraocular movements intact.      Conjunctiva/sclera: Conjunctivae normal.      Pupils: Pupils are equal, round, and reactive to light.   Cardiovascular:      Rate and Rhythm: Normal rate. Rhythm irregular.      Pulses: Normal pulses.   Pulmonary:      Effort: Pulmonary effort is normal.      Breath sounds: Normal breath sounds. No wheezing, rhonchi or rales.   Abdominal:      General: Bowel sounds are normal.      Palpations: Abdomen is soft.      Tenderness: There is no abdominal tenderness.   Musculoskeletal:         General: Swelling (2+ BLE) present. No tenderness.      Cervical back: Normal range of motion and neck supple.      Comments: BLE in compression wraps, c/d/i   Skin:     General: Skin is warm and dry.      Capillary Refill: Capillary refill takes less than 2 seconds.   Neurological:      General: No focal deficit present.      Mental Status: He is alert and  oriented to person, place, and time.   Psychiatric:         Mood and Affect: Mood normal.         Behavior: Behavior normal.     Results Review     I reviewed the patient's new clinical results.  I reviewed the patient's telemetry.  Results from last 7 days   Lab Units 04/09/21  1050 04/08/21 0419 04/07/21 0423 04/06/21 0417   WBC 10*3/mm3 7.75 5.72 5.70 6.96   HEMOGLOBIN g/dL 13.0 12.3* 12.4* 14.1   PLATELETS 10*3/mm3 197 175 172 178     Results from last 7 days   Lab Units 04/09/21  1050 04/08/21  0419 04/07/21  0423 04/06/21  0417   SODIUM mmol/L 137 141 139 139   POTASSIUM mmol/L 4.0 3.7 3.7 4.0   CHLORIDE mmol/L 100 100 100 102   CO2 mmol/L 27.5 29.8* 31.4* 26.5   BUN mg/dL 15 13 10 9   CREATININE mg/dL 0.82 0.83 0.88 0.75*   GLUCOSE mg/dL 98 100* 96 87   Estimated Creatinine Clearance: 125.7 mL/min (by C-G formula based on SCr of 0.82 mg/dL).      Results from last 7 days   Lab Units 04/09/21  1050 04/08/21 0419 04/07/21 0423 04/06/21 0417   CALCIUM mg/dL 9.2 8.6 8.6 8.8   MAGNESIUM mg/dL  --   --  2.1  --      Results from last 7 days   Lab Units 04/07/21  0423 04/05/21  0438 04/04/21  0442   PROCALCITONIN ng/mL 0.24 0.62* 0.97*     COVID19   Date Value Ref Range Status   03/30/2021 Not Detected Not Detected - Ref. Range Final     Glucose   Date/Time Value Ref Range Status   04/07/2021 1616 105 70 - 130 mg/dL Final   04/07/2021 1056 106 70 - 130 mg/dL Final       CT Angiogram Chest With & Without Contrast  Narrative: PROCEDURE:CT ANGIOGRAM CHEST W WO CONTRAST-     HISTORY:  Hemoptysis.     TECHNIQUE:  CT images of the chest were obtained before and following  the administration of intravenous contrast using an angiography  protocol. Reformatted images were reviewed.  Radiation dose reduction  techniques were utilized, including automated exposure control and  exposure modulation based on body size.     COMPARISON:  Chest radiograph 04/05/2021. CT abdomen and pelvis with  contrast 03/30/2021. CT chest  with contrast 06/18/2019.        FINDINGS CT ANGIOGRAPHY CHEST:   There is a right PICC with the tip in the upper SVC. The left thyroid  lobe is asymmetrically enlarged and contains a subtle hypodense thyroid  nodule, similar to prior CT chest.   There is a mildly enlarged 1.4 cm short axis right hilar lymph node,  previously 1.3 cm. There is a 1.5 cm short axis right paratracheal lymph  node, previously 0.9 cm. There are calcified right hilar lymph nodes.  Heart size is normal. There is no pericardial effusion. There is  calcific coronary artery atherosclerosis, incompletely assessed. There  is moderate calcific aortic and branch vessel atherosclerosis. The  ascending thoracic aorta is dilated to 4.2 cm at the level of the  pulmonary trunk, previously 4.2 cm when remeasured. The pulmonary trunk  is dilated to 3.7 cm, previously 3.4 cm. There is a small right pleural  effusion, new from 03/30/2021.     Limited imaging through the upper abdomen demonstrates a nodular hepatic  contour, which can be seen with cirrhosis. There is suspected  cholelithiasis. There is nodular thickening of the left adrenal gland,  not significantly changed. There is calcific atherosclerosis of the  visualized upper abdominal aorta and its branch vessels. There are  collateral vessels in the anterior chest wall. There is left  glenohumeral osteoarthritis with a corresponding joint effusion. There  is multilevel degenerative disc disease.     The trachea is clear. There are dependent secretions in the right  mainstem bronchus there is multifocal mucous plugging in right middle  and lower lobe bronchi. There is patchy consolidation and groundglass  opacity in the right middle lobe and right lower lobe. There is a 2.2 x  1.7 cm nodule in the medial right lung base, which is new from  03/30/2021. There is a 0.6 cm nodule in the left lower lobe and a  punctate nodule in the right upper lobe, which are not significantly  changed dating back to  2010 and likely benign. A punctate nodule in the  right upper lobe (image 72) is not clearly seen on prior CT and is  nonspecific.        Impression:    1.  New small right pleural effusion with patchy consolidation in the  right middle and lower lobes, which could be due to atelectasis from  mucous plugging within the right middle and lower lobe bronchi or could  be due to multifocal aspiration and/or bronchopneumonia. Given the  reported history of hemoptysis, bronchoscopy with attention to the right  middle and lower lobe should be considered. Short-term follow-up CT is  recommended in 1-3 months.  2.  A 1.9 cm average diameter nodule in the right lower lobe is new from  03/30/2021, favoring a benign process. A punctate nodule in the right  upper lobe is not clearly seen on prior CT chest and is nonspecific.  Attention on follow-up CT chest is recommended.   3.  Mildly enlarged mediastinal and right hilar lymph nodes are  nonspecific and could be reactive. Attention on follow-up CT chest is  recommended.  4.  Ascending thoracic aortic aneurysm to 4.2 cm, not significantly  changed.     This report was finalized on 4/9/2021 9:14 AM by Dr. Chica Gutierrez M.D.       Scheduled Medications  amoxicillin-clavulanate, 1 tablet, Oral, Q12H  atorvastatin, 40 mg, Oral, Nightly  budesonide-formoterol, 2 puff, Inhalation, BID - RT  bumetanide, 1 mg, Oral, BID  finasteride, 5 mg, Oral, Daily  gabapentin, 300 mg, Oral, Q8H  ipratropium-albuterol, 3 mL, Nebulization, 4x Daily - RT  losartan, 12.5 mg, Oral, Q24H  metoprolol succinate XL, 12.5 mg, Oral, Q12H  nystatin, , Topical, Q12H  senna-docusate sodium, 2 tablet, Oral, Nightly  sodium chloride, 10 mL, Intravenous, Q12H  sodium chloride, 10 mL, Intravenous, Q12H  sodium chloride, 10 mL, Intravenous, Q12H  warfarin, 5 mg, Oral, Once per day on Mon Fri  warfarin, 7.5 mg, Oral, Once per day on Sun Tue Wed Thu Sat    [facility] to dose warfarin,     Diet  Diet Soft  Texture; Chopped; Thin       Assessment/Plan     Active Hospital Problems    Diagnosis  POA   • Hyponatremia [E87.1]  Yes   • Thrombocytopenia (CMS/[facility]) [D69.6]  Yes   • Ischemic cardiomyopathy [I25.5]  Yes   • Acute systolic (congestive) heart failure (CMS/[facility]) [I50.21]  No   • Cellulitis of lower extremity [L03.119]  Yes   • Severe sepsis (CMS/[facility]) [A41.9, R65.20]  Yes   • Alcohol dependence, in remission (CMS/[facility]) [F10.21]  Yes   • Essential hypertension [I10]  Yes   • COPD (chronic obstructive pulmonary disease) (CMS/[facility]) [J44.9]  Yes   • Permanent atrial fibrillation (CMS/[facility]) [I48.21]  Yes   • Venous stasis [I87.8]  Yes      Resolved Hospital Problems    Diagnosis Date Resolved POA   • Metabolic encephalopathy [G93.41] 04/03/2021 Yes   Severe Sepsis with Metabolic Encephalopathy, Present on Admission  - most likely source seems to be UTI vs LLE cellulitis but the latter is not really apparent on my exam with wraps off  - blood cx's NGTD, urine culture with mixed darian  - no purulence noted associated on LLE-abx narrowed from vanc/zosyn to unasyn-inflammatory markers and procalcitonin continued to improve-finish abx course with augmentin    Cough/Hemoptysis  - CTA chest reviewed showing RML possible bronchopneumonia as well as nodule-I spoke with Dr. Brad Lepe and LPC will see the patient to evaluate further  - ASA stopped, still on warfarin and tolerating well    NSTEMI/ICM/Acute Systolic CHF  - likely LAD disease per cardiology, recommending continuing medical therapy in the setting of increased bleeding risk with DAPT and need for chronic warfarin  - volume status is much better-on oral bumex, will continue  - cardiology following, appreciate recs    Permanent Afib  - continue AC with warfarin  - continue metoprolol    COPD  - no exacerbation  - continue on symbicort  - duo-nebs    Thrombocytopenia  - mild, likely due to severe sepsis  - this has normalized    Warfarin (home med) for DVT  prophylaxis.  Full code.  Discussed with patient, family, nursing staff, CCP and care team on multidisciplinary rounds.  Anticipate discharge to SNU facility in 1-2 days.      Camilo Blanco MD  [facility]  04/09/21  15:22 EDT

## 2021-04-09 NOTE — NURSING NOTE
CWOCN- removed and rewrapped LLE with 4 layer compression and wound care as per the orders to have patient back on a M-W-F schedule. The RLE is weekly, generally. Will continue to follow along. Discussed OT outpatient Lymphedema eval with patient's wife.

## 2021-04-09 NOTE — PLAN OF CARE
Goal Outcome Evaluation:  Plan of Care Reviewed With: patient  Patient slept well, neurontin given as scheduled, Pain medicine given every 6 hours per patients request.  Patient had breathing treatment at approx 230am, he splet in the chair per his request.

## 2021-04-09 NOTE — PLAN OF CARE
Goal Outcome Evaluation:  Plan of Care Reviewed With: patient     Outcome Summary: Pt continues same as yesterday. Pain meds requested q6 hours for pain in legs, feet.  Wocn performed leg wraps today.  Pt stated that they were too tights so wocn reevaled and removed some coban.  Pt continues to work with PT OT.  Expect placement to be finialized by Monday.  VS wnl, maintianing sats on 2l o2.

## 2021-04-09 NOTE — PROGRESS NOTES
Pharmacy Consult: Warfarin Dosing/ Monitoring    Jalen Lockwood is a 71 y.o. male, estimated creatinine clearance is 124.2 mL/min (by C-G formula based on SCr of 0.83 mg/dL). weighing (!) 142 kg (313 lb 1.6 oz).    PMH: Allergic rhinitis, Anxiety, Aortectasia, Arthritis, Atrial flutter (2010), Charcot's joint of foot, Chronic edema, Chronic venous insufficiency, COPD, Coronary atherosclerosis, Diverticulosis, Duodenitis, Fatty liver, Gastritis, Gastroparesis, Hematoma, Hyperlipidemia, Hypertension, Insomnia, Internal hemorrhoids, Open wound, Osteomyelitis, Paroxysmal atrial fibrillation, Peripheral neuropathy, Popliteal artery aneurysm, Skin cancer, Sleep apnea, Tachycardia induced cardiomyopathy, Venous stasis, and Venous stasis ulcer    Social History     Tobacco Use    Smoking status: Current Every Day Smoker     Packs/day: 0.25     Years: 45.00     Pack years: 11.25     Types: Cigarettes    Smokeless tobacco: Never Used   Vaping Use    Vaping Use: Never used   Substance Use Topics    Alcohol use: Yes     Alcohol/week: 8.0 - 9.0 standard drinks     Types: 1 - 2 Shots of liquor, 7 Standard drinks or equivalent per week     Comment: 2 rum a day//Caffeine use: 3 cups daily    Drug use: No     Results from last 7 days   Lab Units 04/08/21 0419 04/07/21 0423 04/06/21 0417 04/05/21  0438 04/04/21  0442 04/03/21  0817 04/02/21  0327   INR  1.81* 1.98* 2.03* 2.17* 1.99* 1.99* 1.60*   HEMOGLOBIN g/dL 12.3* 12.4* 14.1 12.6* 12.7* 12.1* 12.3*   HEMATOCRIT % 34.4* 35.3* 40.9 37.0* 36.0* 34.7* 35.3*   PLATELETS 10*3/mm3 175 172 178 146 128* 134* 124*     Results from last 7 days   Lab Units 04/08/21 0419 04/07/21  0423 04/06/21  0417   SODIUM mmol/L 141 139 139   POTASSIUM mmol/L 3.7 3.7 4.0   CHLORIDE mmol/L 100 100 102   CO2 mmol/L 29.8* 31.4* 26.5   BUN mg/dL 13 10 9   CREATININE mg/dL 0.83 0.88 0.75*   CALCIUM mg/dL 8.6 8.6 8.8   GLUCOSE mg/dL 100* 96 87     Anticoagulation history: Managed by [facility]: Warfarin 5 mg po qMF + 7.5 mg po all other days    Hospital Anticoagulation:  Consulting provider: Dr. Brad Lepe  Start date: Home med continued on 4/1/2021  Indication: Atrial fibrillation  Target INR: 2-3  Expected duration: Lifetime   Bridge Therapy: No, Enoxaparin - d/c'd on 4/3/21                Date 4/1 4/2 4/3  4/4 4/5 4/6 4/7 4/8 4/9    INR 1.66 1.60 1.99 1.99 2.17 2.03 1.98 1.81 1.92    Warfarin dose 7.5 mg 7.5 mg 5 mg 7.5 mg 5 mg 7.5 mg 7.5 mg 7.5mg 5mg      Potential drug interactions:   Aspirin-new med-increased risk of bleeding  Piperacillin/tazobactam-increased risk of bleeding (D/C'd on 4/3/21)  Vancomycin-increased risk of bleeding (D/C'd on 43/21)  Ampicillin-sulbactam - may result in an increased risk of bleeding.     Relevant nutrition status: Regular diet    Education complete?/ Date: Yes, on 4/1/21    Assessment/Plan:    1) Warfarin restarted on 4/1/21. INR is slightly below target today at 1.92 but we will continue his current home regimen schedule (5 mg po qMF and 7.5 mg po qSSTWTh) He will receive 5mg today.   2) Was on Enoxaparin 140 mg sq q12hrs- this was d/c'd on 4/3/21 pm  3) Follow up with INR daily    Pharmacy will continue to follow until discharge or discontinuation of warfarin.     Arden Merida, [facility]  Clinical Staff Pharmacist

## 2021-04-09 NOTE — PROGRESS NOTES
Continued Stay Note  [facility]     Patient Name: Jalen Lockwood  MRN: [id number]  Today's Date: 4/9/2021    Admit Date: 3/30/2021    Discharge Plan     Row Name 04/09/21 1700       Plan    Plan  Appeal pending [facility] skilled rehab [facility]    Plan Comments  Spoke with Louise and she states she will call the weekend CCP if Amber Miller obtains information about the patients appear of the denial for skilled rehab.  Packet for Amber Miller is in the chart...........................Emily Juarez RN.        Discharge Codes    No documentation.             Emily Juarez RN

## 2021-04-09 NOTE — PROGRESS NOTES
"  Daily Progress Note.   [address]  4/9/2021    Patient:  Name:  Jalen Lockwood  MRN:  [id number]  1950  71 y.o.  male         Reason for consultation hemoptysis    Interval History:  Called back to evaluate patient having some hemoptysis.  Patient describes some hemoptysis.  Says has been ongoing for about 2 to 3 days now.  Has not been getting worse has been getting better.  He has resumed warfarin.  He says his breathing is fine.  He has no worsening mentation.  He says he feels a lot better than when he first came in.  Denies any choking or aspiration that he is aware of.  Denies excessive sputum production.  He denies any epistaxis  ROS: No fever, no diarrhea, no chest pain  PMFSSH: no change    Physical Exam:  /94 (BP Location: Left arm, Patient Position: Sitting)   Pulse 88   Temp 98 °F (36.7 °C) (Oral)   Resp 20   Ht 193 cm (76\")   Wt 136 kg (298 lb 14.4 oz)   SpO2 92%   BMI 36.38 kg/m²   Body mass index is 36.38 kg/m².    Intake/Output Summary (Last 24 hours) at 4/9/2021 1622  Last data filed at 4/9/2021 1223  Gross per 24 hour   Intake 100 ml   Output 950 ml   Net -850 ml     2lnc 97%  General appearance: awake alert,  conversant   Eyes: anicteric sclerae, moist conjunctivae; no lid-lag; PERRLA  HENT: Atraumatic; oropharynx clear with moist mucous membranes and no mucosal ulcerations; normal hard and soft palate  Neck: Trachea midline; FROM, supple,   Lungs:  Intermittent rhonchi no wheeze  with normal respiratory effort and no intercostal retractions  CV: irreg irreg no rub  Abdomen: Soft, non-tender;BS+ non rigid  Extremities:+ble edema with color change, wrapped from mid foot to just below knee  Skin: ble wrapped, multiple areas of ecchymosis  Psych: calm affect, alert and oriented to person  Neuro moves all ext, cns 2-12 intact sensation intact       Data Review:  Notable Labs:  Results from last 7 days   Lab Units 04/09/21  1050 04/08/21  0419 04/07/21  0423 " 04/06/21 0417 04/05/21 0438 04/04/21 0442 04/03/21  0817   WBC 10*3/mm3 7.75 5.72 5.70 6.96 5.53 5.88 5.88   HEMOGLOBIN g/dL 13.0 12.3* 12.4* 14.1 12.6* 12.7* 12.1*   PLATELETS 10*3/mm3 197 175 172 178 146 128* 134*     Results from last 7 days   Lab Units 04/09/21  1050 04/08/21 0419 04/07/21 0423 04/06/21 0417 04/05/21 0438 04/04/21 0442 04/03/21  0817   SODIUM mmol/L 137 141 139 139 137 135* 135*   POTASSIUM mmol/L 4.0 3.7 3.7 4.0 3.8 4.1 3.8   CHLORIDE mmol/L 100 100 100 102 104 103 104   CO2 mmol/L 27.5 29.8* 31.4* 26.5 23.6 22.2 24.1   BUN mg/dL 15 13 10 9 7* 8 8   CREATININE mg/dL 0.82 0.83 0.88 0.75* 0.73* 0.69* 0.71*   GLUCOSE mg/dL 98 100* 96 87 95 92 101*   CALCIUM mg/dL 9.2 8.6 8.6 8.8 8.3* 8.2* 8.4*   MAGNESIUM mg/dL  --   --  2.1  --   --   --   --    Estimated Creatinine Clearance: 125.7 mL/min (by C-G formula based on SCr of 0.82 mg/dL).    Results from last 7 days   Lab Units 04/09/21  1050 04/08/21 0419 04/07/21 0423 04/05/21 0438 04/04/21 0442 04/03/21  0817   PROCALCITONIN ng/mL  --   --  0.24 0.62* 0.97*  --    CRP mg/dL  --   --  0.55* 0.76*  --  1.63*   PLATELETS 10*3/mm3 197 175 172 146 128* 134*             Imaging:  Reviewed chest images personally from past 3 days    Scheduled meds:    amoxicillin-clavulanate, 1 tablet, Oral, Q12H  atorvastatin, 40 mg, Oral, Nightly  budesonide-formoterol, 2 puff, Inhalation, BID - RT  bumetanide, 1 mg, Oral, BID  finasteride, 5 mg, Oral, Daily  gabapentin, 300 mg, Oral, Q8H  ipratropium-albuterol, 3 mL, Nebulization, 4x Daily - RT  losartan, 12.5 mg, Oral, Q24H  metoprolol succinate XL, 12.5 mg, Oral, Q12H  nystatin, , Topical, Q12H  senna-docusate sodium, 2 tablet, Oral, Nightly  sodium chloride, 10 mL, Intravenous, Q12H  sodium chloride, 10 mL, Intravenous, Q12H  sodium chloride, 10 mL, Intravenous, Q12H  warfarin, 5 mg, Oral, Once per day on Mon Fri  warfarin, 7.5 mg, Oral, Once per day on Sun Tue Wed Thu Sat        ASSESSMENT  /   PLAN:  Severe sepsis improved  Hypotension improved   UTI s/p treatment  Altered mental status/metabolic encephalopathy improved  Hyponatremia improved  Lactic acidosis improved  Atrial fibrillation with RVR improved  COPD: Currently without exacerbation  Chronic venous insufficiency  History of gastroparesis  History of MRSA soft tissue skin infection  Sleep apnea  Current daily smoker  Daily alcohol use  CAD  NSTEMI  Acute systolic heart failure with regional wall motion abn  Anticoagulated with coumadin  Hemoptysis - scant.    CT reviewed patient has mucous plugging and some hemoptysis.  Ongoing now for 2 to 3 days he says.  At present time he is being anticoagulated with an INR of 1.92.  We will need to hold Coumadin to see if this hemoptysis can be resolved.  I suspect he may need a bronchoscopy to investigate any potential source of her mucous plug clearance.  I will go him placed on n.p.o. to see if this can be accomplished tomorrow if he is still coughing up blood.  May need to wait until INR is lower to safely proceed.  We will ask for sputum culture.  Discussed with Dr. Blanco appreciate his care for this patient.        Brad Lepe MD  [facility]  04/09/21  16:22 EDT

## 2021-04-09 NOTE — THERAPY TREATMENT NOTE
Patient Name: Jalen Lockwood  : 1950    MRN: [id number]                              Today's Date: 2021       Admit Date: 3/30/2021    Visit Dx:     ICD-10-CM ICD-9-CM   1. Severe sepsis (CMS/HCC)  A41.9 038.9    R65.20 995.92   2. Acute UTI  N39.0 599.0   3. Acute abdominal pain  R10.9 789.00     338.19   4. Acute respiratory alkalosis  E87.3 276.3   5. Hyponatremia  E87.1 276.1   6. Atrial fibrillation with RVR (CMS/HCC)  I48.91 427.31   7. Fever in adult  R50.9 780.60   8. Acute metabolic encephalopathy  G93.41 348.31     Patient Active Problem List   Diagnosis   • Chronic osteomyelitis (CMS/HCC)   • Foot pain   • Peripheral neuropathy   • Venous stasis   • Permanent atrial fibrillation (CMS/HCC)   • Sleep apnea   • Chronic edema   • Class 2 severe obesity due to excess calories with serious comorbidity and body mass index (BMI) of 36.0 to 36.9 in adult (CMS/HCC)   • COPD (chronic obstructive pulmonary disease) (CMS/HCC)   • Nonocclusive coronary atherosclerosis of native coronary artery   • Atrial flutter (CMS/HCC)   • 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CMS/HCC)   • Aortectasia (CMS/HCC)   • Popliteal artery aneurysm (CMS/HCC)   • Colon polyps   • Gastroparesis   • Insomnia   • Adenomatous polyp of colon   • Essential hypertension   • ETOH abuse   • Chronic anticoagulation   • Oropharyngeal dysphagia   • Tobacco abuse   • Thyromegaly   • Adrenal adenoma, left   • Retroperitoneal lymphadenopathy   • Anemia of chronic disease   • Thyroid nodule   • Charcot's joint of foot   • Abnormal CT scan, lumbar spine   • Alcohol dependence, in remission (CMS/HCC)   • Severe sepsis (CMS/HCC)   • Hyponatremia   • Thrombocytopenia (CMS/HCC)   • Ischemic cardiomyopathy   • Acute systolic (congestive) heart failure (CMS/HCC)   • Cellulitis of lower extremity     Past Medical History:   Diagnosis Date   • Allergic rhinitis    • Anxiety    • Aortectasia (CMS/HCC)     3cm infrarenal abdominal aorta   • Arthritis    •  Atrial flutter (CMS/HCC) 2010    s/p ablation    • Charcot's joint of foot    • Chronic edema     both legs and sees wound care center at [place]    • Chronic venous insufficiency    • COPD (chronic obstructive pulmonary disease) (CMS/HCC)    • Coronary atherosclerosis     Cath 2010: diffuse 40-50% disease   • Diverticulosis    • Duodenitis    • Fatty liver    • Gastritis    • Gastroparesis    • Hematoma     post-operative; After catheterization, right groin, required surgical exploration   • Hyperlipidemia    • Hypertension    • Insomnia    • Internal hemorrhoids    • Open wound     izzy legs has drsg chg weekly at wound care center at [place]  pt does second dressing on left leg another time during week   • Osteomyelitis (CMS/HCC)    • Paroxysmal atrial fibrillation (CMS/HCC)    • Peripheral neuropathy    • Popliteal artery aneurysm (CMS/HCC)     left, s/p stenting by Dr. Boston   • Skin cancer    • Sleep apnea     o2   • 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CMS/HCC)     due to flutter and afib; cath 2010 with nonobstructive disease   • Venous stasis    • Venous stasis ulcer (CMS/HCC)     bilateral legs      Past Surgical History:   Procedure Laterality Date   • CARDIAC CATHETERIZATION     • CATARACT EXTRACTION     • COLONOSCOPY  09/28/2015    NBIH, diverticulosis, polyps   • COLONOSCOPY N/A 9/11/2018    Procedure: COLONOSCOPY TO CECUM  AND TERM. ILEUM WITH COLD SNARE POLYPECTOMIES;  Surgeon: Kane Lagunas MD;  Location: [facility];  Service: Gastroenterology   • COLONOSCOPY N/A 10/29/2019    Procedure: COLONOSCOPY TO TO CECUM AND TERMINAL ILEUM WITH HOT AND COLD SNARE POLYPECTOMIES;  Surgeon: Kane Lagunas MD;  Location: [facility];  Service: Gastroenterology   • HIP ARTHROPLASTY Right 2017   • JOINT REPLACEMENT Left    • OTHER SURGICAL HISTORY      Catheter ablation atrial flutter   • REPAIR ANEURYSM / PSEUDO ANEURYSM / RUPTURED ANEURYSM POPLITEAL ARTERY      Stent-Graft of the the left  popliteal artery   • REPAIR KNEE LIGAMENT      Primary repair of knee ligament cruciate anterior right   • TONSILLECTOMY  1958   • TOTAL KNEE ARTHROPLASTY Bilateral    • UPPER GASTROINTESTINAL ENDOSCOPY  09/16/2014    acute gastritis, acute duodenitis     General Information     [facility] Name 04/09/21 1107          Physical Therapy Time and Intention    Document Type  therapy note (daily note)  -     Mode of Treatment  physical therapy;individual therapy  -[facility] Name 04/09/21 1107          General Information    Existing Precautions/Restrictions  fall;oxygen therapy device and L/min  -     Barriers to Rehab  impaired sensation;physical barrier  -[facility] Name 04/09/21 1107          Cognition    Orientation Status (Cognition)  oriented x 4  -[facility] Name 04/09/21 1107          Safety Issues, Functional Mobility    Impairments Affecting Function (Mobility)  endurance/activity tolerance;strength;shortness of breath  -       User Key  (r) = Recorded By, (t) = Taken By, (c) = Cosigned By    Initials Name Provider Type     Asad Appiah, DESIREE Physical Therapy Assistant        Mobility     [facility] Name 04/09/21 1108          Bed Mobility    Rolling Left [place] (Bed Mobility)  not tested  -     Supine-Sit [place] (Bed Mobility)  not tested  -[facility] Name 04/09/21 1108          Bed-Chair Transfer    Bed-Chair [place] (Transfers)  not tested  -[facility] Name 04/09/21 1108          Sit-Stand Transfer    Sit-Stand [place] (Transfers)  standby assist  -     Assistive Device (Sit-Stand Transfers)  walker, front-wheeled  -[facility] Name 04/09/21 1108          Gait/Stairs (Locomotion)    [place] Level (Gait)  standby assist  -     Assistive Device (Gait)  walker, front-wheeled  -     Distance in Feet (Gait)  45 ft with turns  -     Deviations/Abnormal Patterns (Gait)  base of support, wide;sowmya decreased;gait speed decreased;stride length decreased;festinating/shuffling  -      Bilateral Gait Deviations  forward flexed posture  -RH     [place] Level (Stairs)  not tested  -RH       User Key  (r) = Recorded By, (t) = Taken By, (c) = Cosigned By    Initials Name Provider Type    Asad Sweet PTA Physical Therapy Assistant        Obj/Interventions    No documentation.       Goals/Plan    No documentation.       Clinical Impression     Row Name 04/09/21 1116          Pain Scale: Numbers Pre/Post-Treatment    Pretreatment Pain Rating  8/10  -RH     Posttreatment Pain Rating  8/10  -RH     Pain Location - Side  Left;Right  -RH     Pain Location - Orientation  lower  -RH     Pain Location  ankle  -RH     Row Name 04/09/21 1116          Vital Signs    O2 Delivery Pre Treatment  supplemental O2  -RH     Row Name 04/09/21 1116          Positioning and Restraints    Pre-Treatment Position  sitting in chair/recliner  -RH     Post Treatment Position  chair  -RH     In Chair  sitting;call light within reach;with family/caregiver  -RH       User Key  (r) = Recorded By, (t) = Taken By, (c) = Cosigned By    Initials Name Provider Type    Asad Sweet PTA Physical Therapy Assistant        Outcome Measures     Row Name 04/09/21 1117          How much help from another person do you currently need...    Turning from your back to your side while in flat bed without using bedrails?  3  -RH     Moving from lying on back to sitting on the side of a flat bed without bedrails?  3  -RH     Moving to and from a bed to a chair (including a wheelchair)?  3  -RH     Standing up from a chair using your arms (e.g., wheelchair, bedside chair)?  3  -RH     Climbing 3-5 steps with a railing?  2  -RH     To walk in hospital room?  3  -RH     AM-PAC 6 Clicks Score (PT)  17  -RH       User Key  (r) = Recorded By, (t) = Taken By, (c) = Cosigned By    Initials Name Provider Type    Asad Sweet PTA Physical Therapy Assistant        Physical Therapy Education                 Title: PT OT SLP  Therapies (In Progress)     Topic: Physical Therapy (Done)     Point: Mobility training (Done)     Learning Progress Summary           Patient Acceptance, TB, DU by  at 4/8/2021 1429    Acceptance, E,TB,D, VU,NR by  at 4/7/2021 1551    Acceptance, E,D, VU,NR by EB at 4/6/2021 1631    Acceptance, E,TB,D, VU,NR by  at 4/5/2021 1558    Acceptance, E, VU by ME at 4/2/2021 1502                   Point: Home exercise program (Done)     Learning Progress Summary           Patient Acceptance, TB, DU by DJ at 4/8/2021 1429    Acceptance, E,TB,D, VU,NR by  at 4/7/2021 1551    Acceptance, E,D, VU,NR by EB at 4/6/2021 1631    Acceptance, E,TB,D, VU,NR by  at 4/5/2021 1558                   Point: Body mechanics (Done)     Learning Progress Summary           Patient Acceptance, TB, DU by DJ at 4/8/2021 1429    Acceptance, E,TB,D, VU,NR by  at 4/7/2021 1551    Acceptance, E,D, VU,NR by EB at 4/6/2021 1631    Acceptance, E, VU by ME at 4/2/2021 1502                   Point: Precautions (Done)     Learning Progress Summary           Patient Acceptance, TB, DU by  at 4/8/2021 1429    Acceptance, E,TB,D, VU,NR by  at 4/7/2021 1551    Acceptance, E,D, VU,NR by  at 4/6/2021 1631    Acceptance, E, VU by ME at 4/2/2021 1502                               User Key     Initials Effective Dates Name Provider Type Discipline     04/03/18 -  Yolanda Batista, PT Physical Therapist PT    EB 03/10/21 -  Leslie Magana, PTA Physical Therapy Assistant PT     10/25/19 -  Dayana Wilkins, PT Physical Therapist PT     02/01/21 -  Madonna Landa, PT Student PT Student PT    ME 03/12/21 -  Сергей Ryder, PT Student PT Student PT              PT Recommendation and Plan           Time Calculation:   PT Charges     Row Name 04/09/21 1118             Time Calculation    Start Time  1055  -RH      Stop Time  1118  -RH      Time Calculation (min)  23 min  -RH      PT Received On  04/09/21  -RH      PT - Next Appointment  04/10/21   -        User Key  (r) = Recorded By, (t) = Taken By, (c) = Cosigned By    Initials Name Provider Type     Asad Appiah, DESIREE Physical Therapy Assistant        Therapy Charges for Today     Code Description Service Date Service Provider Modifiers Qty    66493059677 HC PT THER PROC EA 15 MIN 4/9/2021 Asad Appiah PTA GP 2          PT G-Codes  Outcome Measure Options: AM-PAC 6 Clicks Daily Activity (OT)  AM-PAC 6 Clicks Score (PT): 17  AM-PAC 6 Clicks Score (OT): 16    Asad Appiah PTA  4/9/2021

## 2021-04-09 NOTE — NURSING NOTE
Spoke with Nathalie at [facility] regarding pt's 8 beat run of Vtach during the night at 241 am.  She said he was probably sleeping and asymptomatic.  She reported card signed off yesterday.

## 2021-04-09 NOTE — THERAPY EVALUATION
Acute Care - Speech Language Pathology   Swallow Initial Evaluation [facility]     Patient Name: Jalen Lockwood  : 1950  MRN: [id number]  Today's Date: 2021               Admit Date: 3/30/2021        NOTE: PATIENT was NOT wearing a mask during this evaluation. Therapist wore standard procedure mask, goggles, and gloves.       Visit Dx:     ICD-10-CM ICD-9-CM   1. Severe sepsis (CMS/HCC)  A41.9 038.9    R65.20 995.92   2. Acute UTI  N39.0 599.0   3. Acute abdominal pain  R10.9 789.00     338.19   4. Acute respiratory alkalosis  E87.3 276.3   5. Hyponatremia  E87.1 276.1   6. Atrial fibrillation with RVR (CMS/HCC)  I48.91 427.31   7. Fever in adult  R50.9 780.60   8. Acute metabolic encephalopathy  G93.41 348.31     Patient Active Problem List   Diagnosis   • Chronic osteomyelitis (CMS/HCC)   • Foot pain   • Peripheral neuropathy   • Venous stasis   • Permanent atrial fibrillation (CMS/HCC)   • Sleep apnea   • Chronic edema   • Class 2 severe obesity due to excess calories with serious comorbidity and body mass index (BMI) of 36.0 to 36.9 in adult (CMS/HCC)   • COPD (chronic obstructive pulmonary disease) (CMS/HCC)   • Nonocclusive coronary atherosclerosis of native coronary artery   • Atrial flutter (CMS/HCC)   • 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CMS/HCC)   • Aortectasia (CMS/HCC)   • Popliteal artery aneurysm (CMS/HCC)   • Colon polyps   • Gastroparesis   • Insomnia   • Adenomatous polyp of colon   • Essential hypertension   • ETOH abuse   • Chronic anticoagulation   • Oropharyngeal dysphagia   • Tobacco abuse   • Thyromegaly   • Adrenal adenoma, left   • Retroperitoneal lymphadenopathy   • Anemia of chronic disease   • Thyroid nodule   • Charcot's joint of foot   • Abnormal CT scan, lumbar spine   • Alcohol dependence, in remission (CMS/HCC)   • Severe sepsis (CMS/HCC)   • Hyponatremia   • Thrombocytopenia (CMS/HCC)   • Ischemic cardiomyopathy   • Acute systolic (congestive) heart failure (CMS/HCC)    • Cellulitis of lower extremity     Past Medical History:   Diagnosis Date   • Allergic rhinitis    • Anxiety    • Aortectasia (CMS/HCC)     3cm infrarenal abdominal aorta   • Arthritis    • Atrial flutter (CMS/HCC) 2010    s/p ablation    • Charcot's joint of foot    • Chronic edema     both legs and sees wound care center at [place]    • Chronic venous insufficiency    • COPD (chronic obstructive pulmonary disease) (CMS/HCC)    • Coronary atherosclerosis     Cath 2010: diffuse 40-50% disease   • Diverticulosis    • Duodenitis    • Fatty liver    • Gastritis    • Gastroparesis    • Hematoma     post-operative; After catheterization, right groin, required surgical exploration   • Hyperlipidemia    • Hypertension    • Insomnia    • Internal hemorrhoids    • Open wound     izzy legs has drsg chg weekly at wound care center at [place]  pt does second dressing on left leg another time during week   • Osteomyelitis (CMS/HCC)    • Paroxysmal atrial fibrillation (CMS/HCC)    • Peripheral neuropathy    • Popliteal artery aneurysm (CMS/HCC)     left, s/p stenting by Dr. Boston   • Skin cancer    • Sleep apnea     o2   • 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CMS/HCC)     due to flutter and afib; cath 2010 with nonobstructive disease   • Venous stasis    • Venous stasis ulcer (CMS/HCC)     bilateral legs      Past Surgical History:   Procedure Laterality Date   • CARDIAC CATHETERIZATION     • CATARACT EXTRACTION     • COLONOSCOPY  09/28/2015    NBIH, diverticulosis, polyps   • COLONOSCOPY N/A 9/11/2018    Procedure: COLONOSCOPY TO CECUM  AND TERM. ILEUM WITH COLD SNARE POLYPECTOMIES;  Surgeon: Kane Lagunas MD;  Location: [facility] ENDOSCOPY;  Service: Gastroenterology   • COLONOSCOPY N/A 10/29/2019    Procedure: COLONOSCOPY TO TO CECUM AND TERMINAL ILEUM WITH HOT AND COLD SNARE POLYPECTOMIES;  Surgeon: Kane Lagunas MD;  Location: [facility] ENDOSCOPY;  Service: Gastroenterology   • HIP ARTHROPLASTY Right 2017   • JOINT  REPLACEMENT Left    • OTHER SURGICAL HISTORY      Catheter ablation atrial flutter   • REPAIR ANEURYSM / PSEUDO ANEURYSM / RUPTURED ANEURYSM POPLITEAL ARTERY      Stent-Graft of the the left popliteal artery   • REPAIR KNEE LIGAMENT      Primary repair of knee ligament cruciate anterior right   • TONSILLECTOMY  1958   • TOTAL KNEE ARTHROPLASTY Bilateral    • UPPER GASTROINTESTINAL ENDOSCOPY  09/16/2014    acute gastritis, acute duodenitis        SWALLOW EVALUATION (last 72 hours)      SLP Adult Swallow Evaluation     Row Name 04/09/21 1142                   Rehab Evaluation    Document Type  evaluation  -SL        Subjective Information  no complaints  -SL        Patient Observations  alert;cooperative  -SL        Patient/Family/Caregiver Comments/Observations  family present  -SL        Symptoms Noted During/After Treatment  none  -SL           General Information    Patient Profile Reviewed  yes  -SL        Pertinent History Of Current Problem  Admitted with AMS, increased weakness, SOB- dx with spesis, metabolic encephalopathy, COPD;  chest x ray - revealed atelectasis/infiltrate in R lung- MD concerned re: aspiration;     HX- gastroparesis  -SL        Current Method of Nutrition  regular textures;thin liquids  -SL        Precautions/Limitations, Vision  WFL;for purposes of eval  -SL        Precautions/Limitations, Hearing  WFL;for purposes of eval  -SL        Prior Level of Function-Communication  WFL  -SL        Prior Level of Function-Swallowing  no diet consistency restrictions  -SL        Plans/Goals Discussed with  patient;family  -SL        Barriers to Rehab  medically complex  -SL        Patient's Goals for Discharge  return to regular diet  -SL           Oral Motor Structure and Function    Dentition Assessment  edentulous, dentures not available pt has lower dentures, not upper  -SL        Secretion Management  WNL/WFL  -SL        Mucosal Quality  moist, healthy  -SL        Volitional Swallow  WFL  -SL   "         Oral Musculature and Cranial Nerve Assessment    Oral Motor General Assessment  WFL  -           General Eating/Swallowing Observations    Respiratory Support Currently in Use  nasal cannula  -        Eating/Swallowing Skills  fed by SLP;self-fed  -        Positioning During Eating  upright 90 degree;upright in chair  -        Utensils Used  spoon;cup;straw  -        Consistencies Trialed  soft textures;mechanical soft, no mixed consistencies;mixed consistency;pureed;thin liquids;ice chips  -           Clinical Swallow Eval    Clinical Swallow Evaluation Summary  No overt clinical s/s aspiraiton noted on any consistency presented- however cannot rule out SILENT aspiraiton at bedside;  pt  refused cracker, stating he did not have upper dentures, and that it was \"too dry\", and did complain about difficulty chewing meats;  masticaiton was adequate with soft mixed consistencies, and fxnl bolus control and a-p transit observed; swallow initiation was timely with all trials, and laryngeal elevation appeared adeq in range to mildly reduced;  would recommend instrumental assessment to further assess swallow due to possibility of SILENT ASPIRATION and recent right lung infiltrate  -           Clinical Impression    SLP Swallowing Diagnosis  R/O pharyngeal dysphagia  -        Functional Impact  risk of aspiration/pneumonia  -        Rehab Potential/Prognosis, Swallowing  good, to achieve stated therapy goals  -        Swallow Criteria for Skilled Therapeutic Interventions Met  demonstrates skilled criteria  -           Recommendations    Therapy Frequency (Swallow)  PRN  -        Predicted Duration Therapy Intervention (Days)  until discharge  -        SLP Diet Recommendation  soft textures;chopped;thin liquids  -        Recommended Diagnostics  (S) VFSS (MBS) r/o silent aspiration  -        Recommended Precautions and Strategies  upright posture during/after eating;small bites of food " and sips of liquid;general aspiration precautions  -        Oral Care Recommendations  Oral Care before breakfast, after meals and PRN  -        SLP Rec. for Method of Medication Administration  meds whole;with thin liquids;with pudding or applesauce;as tolerated  -        Monitor for Signs of Aspiration  yes;notify SLP if any concerns  -        Anticipated Discharge Disposition (SLP)  unknown  -SL           Oral Nutrition/Hydration Goal 1 (SLP)    Oral Nutrition/Hydration Goal 1, SLP  safe swallow - least restrictive diet  -        Time Frame (Oral Nutrition/Hydration Goal 1, SLP)  by discharge  -          User Key  (r) = Recorded By, (t) = Taken By, (c) = Cosigned By    Initials Name Effective Dates    Yris Latham, MS CCC-SLP 06/08/18 -           EDUCATION  The patient has been educated in the following areas:   Dysphagia (Swallowing Impairment).    SLP Recommendation and Plan  SLP Swallowing Diagnosis: R/O pharyngeal dysphagia  SLP Diet Recommendation: soft textures, chopped, thin liquids  Recommended Precautions and Strategies: upright posture during/after eating, small bites of food and sips of liquid, general aspiration precautions  SLP Rec. for Method of Medication Administration: meds whole, with thin liquids, with pudding or applesauce, as tolerated     Monitor for Signs of Aspiration: yes, notify SLP if any concerns  Recommended Diagnostics: (S) VFSS (MBS) (r/o silent aspiration)  Swallow Criteria for Skilled Therapeutic Interventions Met: demonstrates skilled criteria  Anticipated Discharge Disposition (SLP): unknown  Rehab Potential/Prognosis, Swallowing: good, to achieve stated therapy goals  Therapy Frequency (Swallow): PRN  Predicted Duration Therapy Intervention (Days): until discharge                              SLP GOALS     Row Name 04/09/21 1142             Oral Nutrition/Hydration Goal 1 (SLP)    Oral Nutrition/Hydration Goal 1, SLP  safe swallow - least restrictive diet  -       Time Frame (Oral Nutrition/Hydration Goal 1, SLP)  by discharge  -        User Key  (r) = Recorded By, (t) = Taken By, (c) = Cosigned By    Initials Name Provider Type    Yris Latham MS CCC-SLP Speech and Language Pathologist           SLP Outcome Measures (last 72 hours)      SLP Outcome Measures     Row Name 04/09/21 1150             SLP Outcome Measures    Outcome Measure Used?  Adult NOMS  -         Adult FCM Scores    FCM Chosen  Swallowing  -      Swallowing FCM Score  4  -        User Key  (r) = Recorded By, (t) = Taken By, (c) = Cosigned By    Initials Name Effective Dates    Yris Latham MS CCC-SLP 06/08/18 -            Time Calculation:   Time Calculation- SLP     Row Name 04/09/21 1150             Time Calculation- SLP    SLP Received On  04/09/21  -        User Key  (r) = Recorded By, (t) = Taken By, (c) = Cosigned By    Initials Name Provider Type    Yris Latham MS CCC-SLP Speech and Language Pathologist          Therapy Charges for Today     Code Description Service Date Service Provider Modifiers Qty    87058514308  ST EVAL ORAL PHARYNG SWALLOW 4 4/9/2021 Yris Solis MS CCC-SLP GN 1               Yris Solis MS CCC-SUELLEN  4/9/2021

## 2021-04-09 NOTE — PLAN OF CARE
Goal Outcome Evaluation:  Plan of Care Reviewed With: patient  Progress: improving  Outcome Summary: Pt up in recliner upon arrival, agreeable to participate in OT on this date. Pt declined adls on this date, stating he had already done them today. Pt required SBA/supervision for functional transfers utilzing rwx on this date. Pt completed 2 x 10 sets of BUE AROM/strengthening exercises utilizing yellow theraband to increase strength and activity tolerance with adls and functional transfers. Rest breaks throughout ther ex.    Pt wore face mask during this therapy encounter. Therapist wore appropriate PPE including face mask, gloves, and eye protection. Hand hygiene completed before and after this therapy session. Pt not in enhanced precautions.

## 2021-04-10 ENCOUNTER — APPOINTMENT (OUTPATIENT)
Dept: GENERAL RADIOLOGY | Facility: HOSPITAL | Age: 71
End: 2021-04-10

## 2021-04-10 LAB
ANION GAP SERPL CALCULATED.3IONS-SCNC: 8 MMOL/L (ref 5–15)
BASOPHILS # BLD AUTO: 0.05 10*3/MM3 (ref 0–0.2)
BASOPHILS NFR BLD AUTO: 0.8 % (ref 0–1.5)
BUN SERPL-MCNC: 14 MG/DL (ref 8–23)
BUN/CREAT SERPL: 18.4 (ref 7–25)
CALCIUM SPEC-SCNC: 9 MG/DL (ref 8.6–10.5)
CHLORIDE SERPL-SCNC: 101 MMOL/L (ref 98–107)
CO2 SERPL-SCNC: 30 MMOL/L (ref 22–29)
CREAT SERPL-MCNC: 0.76 MG/DL (ref 0.76–1.27)
DEPRECATED RDW RBC AUTO: 47.4 FL (ref 37–54)
EOSINOPHIL # BLD AUTO: 0.16 10*3/MM3 (ref 0–0.4)
EOSINOPHIL NFR BLD AUTO: 2.4 % (ref 0.3–6.2)
ERYTHROCYTE [DISTWIDTH] IN BLOOD BY AUTOMATED COUNT: 13.2 % (ref 12.3–15.4)
GFR SERPL CREATININE-BSD FRML MDRD: 101 ML/MIN/1.73
GLUCOSE SERPL-MCNC: 97 MG/DL (ref 65–99)
HCT VFR BLD AUTO: 37.3 % (ref 37.5–51)
HGB BLD-MCNC: 12.8 G/DL (ref 13–17.7)
IMM GRANULOCYTES # BLD AUTO: 0.02 10*3/MM3 (ref 0–0.05)
IMM GRANULOCYTES NFR BLD AUTO: 0.3 % (ref 0–0.5)
INR PPP: 1.99 (ref 0.9–1.1)
LYMPHOCYTES # BLD AUTO: 1.16 10*3/MM3 (ref 0.7–3.1)
LYMPHOCYTES NFR BLD AUTO: 17.7 % (ref 19.6–45.3)
MCH RBC QN AUTO: 34 PG (ref 26.6–33)
MCHC RBC AUTO-ENTMCNC: 34.3 G/DL (ref 31.5–35.7)
MCV RBC AUTO: 99.2 FL (ref 79–97)
MONOCYTES # BLD AUTO: 0.41 10*3/MM3 (ref 0.1–0.9)
MONOCYTES NFR BLD AUTO: 6.3 % (ref 5–12)
NEUTROPHILS NFR BLD AUTO: 4.76 10*3/MM3 (ref 1.7–7)
NEUTROPHILS NFR BLD AUTO: 72.5 % (ref 42.7–76)
NRBC BLD AUTO-RTO: 0 /100 WBC (ref 0–0.2)
PLATELET # BLD AUTO: 181 10*3/MM3 (ref 140–450)
PMV BLD AUTO: 9.8 FL (ref 6–12)
POTASSIUM SERPL-SCNC: 3.9 MMOL/L (ref 3.5–5.2)
PROTHROMBIN TIME: 22.3 SECONDS (ref 11.7–14.2)
RBC # BLD AUTO: 3.76 10*6/MM3 (ref 4.14–5.8)
SODIUM SERPL-SCNC: 139 MMOL/L (ref 136–145)
WBC # BLD AUTO: 6.56 10*3/MM3 (ref 3.4–10.8)

## 2021-04-10 PROCEDURE — 94799 UNLISTED PULMONARY SVC/PX: CPT

## 2021-04-10 PROCEDURE — 74230 X-RAY XM SWLNG FUNCJ C+: CPT

## 2021-04-10 PROCEDURE — 85025 COMPLETE CBC W/AUTO DIFF WBC: CPT | Performed by: INTERNAL MEDICINE

## 2021-04-10 PROCEDURE — 92611 MOTION FLUOROSCOPY/SWALLOW: CPT

## 2021-04-10 PROCEDURE — 85610 PROTHROMBIN TIME: CPT | Performed by: INTERNAL MEDICINE

## 2021-04-10 PROCEDURE — 80048 BASIC METABOLIC PNL TOTAL CA: CPT | Performed by: INTERNAL MEDICINE

## 2021-04-10 RX ADMIN — AMOXICILLIN AND CLAVULANATE POTASSIUM 1 TABLET: 875; 125 TABLET, FILM COATED ORAL at 09:37

## 2021-04-10 RX ADMIN — AMOXICILLIN AND CLAVULANATE POTASSIUM 1 TABLET: 875; 125 TABLET, FILM COATED ORAL at 00:31

## 2021-04-10 RX ADMIN — SODIUM CHLORIDE, PRESERVATIVE FREE 10 ML: 5 INJECTION INTRAVENOUS at 09:39

## 2021-04-10 RX ADMIN — FINASTERIDE 5 MG: 5 TABLET, FILM COATED ORAL at 09:37

## 2021-04-10 RX ADMIN — HYDROCODONE BITARTRATE AND ACETAMINOPHEN 2 TABLET: 5; 325 TABLET ORAL at 14:35

## 2021-04-10 RX ADMIN — METOPROLOL SUCCINATE 12.5 MG: 25 TABLET, EXTENDED RELEASE ORAL at 00:29

## 2021-04-10 RX ADMIN — BUMETANIDE 1 MG: 1 TABLET ORAL at 09:37

## 2021-04-10 RX ADMIN — METOPROLOL SUCCINATE 12.5 MG: 25 TABLET, EXTENDED RELEASE ORAL at 09:37

## 2021-04-10 RX ADMIN — DOCUSATE SODIUM 50MG AND SENNOSIDES 8.6MG 2 TABLET: 8.6; 5 TABLET, FILM COATED ORAL at 00:12

## 2021-04-10 RX ADMIN — HYDROCODONE BITARTRATE AND ACETAMINOPHEN 2 TABLET: 5; 325 TABLET ORAL at 20:48

## 2021-04-10 RX ADMIN — NYSTATIN: 100000 POWDER TOPICAL at 00:11

## 2021-04-10 RX ADMIN — GABAPENTIN 300 MG: 300 CAPSULE ORAL at 00:11

## 2021-04-10 RX ADMIN — GABAPENTIN 300 MG: 300 CAPSULE ORAL at 22:00

## 2021-04-10 RX ADMIN — BUDESONIDE AND FORMOTEROL FUMARATE DIHYDRATE 2 PUFF: 160; 4.5 AEROSOL RESPIRATORY (INHALATION) at 08:05

## 2021-04-10 RX ADMIN — IPRATROPIUM BROMIDE AND ALBUTEROL SULFATE 3 ML: 2.5; .5 SOLUTION RESPIRATORY (INHALATION) at 11:50

## 2021-04-10 RX ADMIN — NYSTATIN 1 APPLICATION: 100000 POWDER TOPICAL at 20:54

## 2021-04-10 RX ADMIN — SODIUM CHLORIDE, PRESERVATIVE FREE 10 ML: 5 INJECTION INTRAVENOUS at 00:13

## 2021-04-10 RX ADMIN — HYDROCODONE BITARTRATE AND ACETAMINOPHEN 2 TABLET: 5; 325 TABLET ORAL at 08:15

## 2021-04-10 RX ADMIN — NYSTATIN: 100000 POWDER TOPICAL at 09:37

## 2021-04-10 RX ADMIN — SODIUM CHLORIDE, PRESERVATIVE FREE 10 ML: 5 INJECTION INTRAVENOUS at 09:37

## 2021-04-10 RX ADMIN — LOSARTAN POTASSIUM 12.5 MG: 25 TABLET, FILM COATED ORAL at 09:37

## 2021-04-10 RX ADMIN — BUDESONIDE AND FORMOTEROL FUMARATE DIHYDRATE 2 PUFF: 160; 4.5 AEROSOL RESPIRATORY (INHALATION) at 21:00

## 2021-04-10 RX ADMIN — DOCUSATE SODIUM 50MG AND SENNOSIDES 8.6MG 2 TABLET: 8.6; 5 TABLET, FILM COATED ORAL at 20:48

## 2021-04-10 RX ADMIN — IPRATROPIUM BROMIDE AND ALBUTEROL SULFATE 3 ML: 2.5; .5 SOLUTION RESPIRATORY (INHALATION) at 16:17

## 2021-04-10 RX ADMIN — METOPROLOL SUCCINATE 12.5 MG: 25 TABLET, EXTENDED RELEASE ORAL at 20:48

## 2021-04-10 RX ADMIN — AMOXICILLIN AND CLAVULANATE POTASSIUM 1 TABLET: 875; 125 TABLET, FILM COATED ORAL at 20:48

## 2021-04-10 RX ADMIN — BUMETANIDE 1 MG: 1 TABLET ORAL at 16:42

## 2021-04-10 RX ADMIN — GABAPENTIN 300 MG: 300 CAPSULE ORAL at 08:15

## 2021-04-10 RX ADMIN — GABAPENTIN 300 MG: 300 CAPSULE ORAL at 14:35

## 2021-04-10 RX ADMIN — ATORVASTATIN CALCIUM 40 MG: 20 TABLET, FILM COATED ORAL at 20:47

## 2021-04-10 RX ADMIN — ATORVASTATIN CALCIUM 40 MG: 20 TABLET, FILM COATED ORAL at 00:09

## 2021-04-10 RX ADMIN — HYDROCODONE BITARTRATE AND ACETAMINOPHEN 2 TABLET: 5; 325 TABLET ORAL at 00:12

## 2021-04-10 NOTE — PROGRESS NOTES
[facility]     Mar/chart reviewed  Follow up hemoptysis  some hemoptysis  Some cough still    Vital Sign Min/Max for last 24 hours  Temp  Min: 97.8 °F (36.6 °C)  Max: 99.1 °F (37.3 °C)   BP  Min: 112/73  Max: 127/70   Pulse  Min: 60  Max: 88   Resp  Min: 18  Max: 20   SpO2  Min: 92 %  Max: 96 %   Flow (L/min)  Min: 2  Max: 2   Weight  Min: 140 kg (308 lb 4.8 oz)  Max: 140 kg (308 lb 4.8 oz)     Appears ill, axox3,   perrl, eomi, no icterus,  mmm, no jvd, trachea midline, neck supple,  chest decreased ae bilaterally, faint crackles, no wheezes,   Irrg, rate ok   soft, nt, nd +bs,  no c/c/ e  Skin warm, dry no rashes    Labs: 4/10: reviewed:  Glucose 98  Bun 15  Cr 0.82  Na 137  Bicarb 27  Wbc 7.7  hgb 13  plts 197  inr 1.92    Ct angio: 4/9: reviewed: some nodules and mucuos plugging    ASSESSMENT  /  PLAN:  Severe sepsis improved  Hypotension improved   UTI s/p treatment  Altered mental status/metabolic encephalopathy improved  Hyponatremia improved  Lactic acidosis improved  Atrial fibrillation with RVR improved  COPD: Currently without exacerbation  Chronic venous insufficiency  History of gastroparesis  History of MRSA soft tissue skin infection  Sleep apnea  Current daily smoker  Daily alcohol use  CAD  NSTEMI  Acute systolic heart failure with regional wall motion abn  Anticoagulated with coumadin  Hemoptysis - scant.    Continue antibiotics  Can do diagnostic bronch on coumadin would just not be able to peform any biopsy should they be needed.   Will plan for bronch tomorrow.    Patient is new to me today

## 2021-04-10 NOTE — MBS/VFSS/FEES
Acute Care - Speech Language Pathology   Swallow Initial Evaluation [facility]     Patient Name: Jalen Lockwood  : 1950  MRN: [id number]  Today's Date: 4/10/2021               Admit Date: 3/30/2021    Visit Dx:     ICD-10-CM ICD-9-CM   1. Severe sepsis (CMS/HCC)  A41.9 038.9    R65.20 995.92   2. Acute UTI  N39.0 599.0   3. Acute abdominal pain  R10.9 789.00     338.19   4. Acute respiratory alkalosis  E87.3 276.3   5. Hyponatremia  E87.1 276.1   6. Atrial fibrillation with RVR (CMS/HCC)  I48.91 427.31   7. Fever in adult  R50.9 780.60   8. Acute metabolic encephalopathy  G93.41 348.31   9. Hemoptysis  R04.2 786.30     Patient Active Problem List   Diagnosis   • Chronic osteomyelitis (CMS/HCC)   • Foot pain   • Peripheral neuropathy   • Lymphedema of both lower extremities   • Permanent atrial fibrillation (CMS/HCC)   • Sleep apnea   • Chronic edema   • Class 2 severe obesity due to excess calories with serious comorbidity and body mass index (BMI) of 36.0 to 36.9 in adult (CMS/HCC)   • COPD (chronic obstructive pulmonary disease) (CMS/HCC)   • Nonocclusive coronary atherosclerosis of native coronary artery   • Atrial flutter (CMS/HCC)   • 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CMS/HCC)   • Aortectasia (CMS/HCC)   • Popliteal artery aneurysm (CMS/HCC)   • Colon polyps   • Gastroparesis   • Insomnia   • Adenomatous polyp of colon   • Essential hypertension   • ETOH abuse   • Chronic anticoagulation   • Oropharyngeal dysphagia   • Tobacco abuse   • Thyromegaly   • Adrenal adenoma, left   • Retroperitoneal lymphadenopathy   • Anemia of chronic disease   • Thyroid nodule   • Charcot's joint of foot   • Abnormal CT scan, lumbar spine   • Alcohol dependence, in remission (CMS/HCC)   • Severe sepsis (CMS/HCC)   • Hyponatremia   • Thrombocytopenia (CMS/HCC)   • Ischemic cardiomyopathy   • Acute systolic (congestive) heart failure (CMS/HCC)   • Cellulitis of lower extremity     Past Medical History:   Diagnosis Date    • Allergic rhinitis    • Anxiety    • Aortectasia (CMS/HCC)     3cm infrarenal abdominal aorta   • Arthritis    • Atrial flutter (CMS/HCC) 2010    s/p ablation    • Charcot's joint of foot    • Chronic edema     both legs and sees wound care center at [place]    • Chronic venous insufficiency    • COPD (chronic obstructive pulmonary disease) (CMS/HCC)    • Coronary atherosclerosis     Cath 2010: diffuse 40-50% disease   • Diverticulosis    • Duodenitis    • Fatty liver    • Gastritis    • Gastroparesis    • Hematoma     post-operative; After catheterization, right groin, required surgical exploration   • Hyperlipidemia    • Hypertension    • Insomnia    • Internal hemorrhoids    • Open wound     izzy legs has drsg chg weekly at wound care center at [place]  pt does second dressing on left leg another time during week   • Osteomyelitis (CMS/HCC)    • Paroxysmal atrial fibrillation (CMS/HCC)    • Peripheral neuropathy    • Popliteal artery aneurysm (CMS/HCC)     left, s/p stenting by Dr. Boston   • Skin cancer    • Sleep apnea     o2   • 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CMS/HCC)     due to flutter and afib; cath 2010 with nonobstructive disease   • Venous stasis    • Venous stasis ulcer (CMS/HCC)     bilateral legs      Past Surgical History:   Procedure Laterality Date   • CARDIAC CATHETERIZATION     • CATARACT EXTRACTION     • COLONOSCOPY  09/28/2015    NBIH, diverticulosis, polyps   • COLONOSCOPY N/A 9/11/2018    Procedure: COLONOSCOPY TO CECUM  AND TERM. ILEUM WITH COLD SNARE POLYPECTOMIES;  Surgeon: Kane Lagunas MD;  [facility];  Service: Gastroenterology   • COLONOSCOPY N/A 10/29/2019    Procedure: COLONOSCOPY TO TO CECUM AND TERMINAL ILEUM WITH HOT AND COLD SNARE POLYPECTOMIES;  Surgeon: Kane Lagunas MD;  Location: [facility] ENDOSCOPY;  Service: Gastroenterology   • HIP ARTHROPLASTY Right 2017   • JOINT REPLACEMENT Left    • OTHER SURGICAL HISTORY      Catheter ablation atrial flutter    • REPAIR ANEURYSM / PSEUDO ANEURYSM / RUPTURED ANEURYSM POPLITEAL ARTERY      Stent-Graft of the the left popliteal artery   • REPAIR KNEE LIGAMENT      Primary repair of knee ligament cruciate anterior right   • TONSILLECTOMY  1958   • TOTAL KNEE ARTHROPLASTY Bilateral    • UPPER GASTROINTESTINAL ENDOSCOPY  09/16/2014    acute gastritis, acute duodenitis        SWALLOW EVALUATION (last 72 hours)      [facility] Adult Swallow Evaluation     Row Name 04/10/21 1708       Rehab Evaluation    Document Type  evaluation  -AB    Subjective Information  no complaints  -AB    Patient Observations  alert;cooperative;agree to therapy  -AB    Patient/Family/Caregiver Comments/Observations  --    Care Plan Review  evaluation/treatment results reviewed;care plan/treatment goals reviewed;risks/benefits reviewed;current/potential barriers reviewed;patient/other agree to care plan  -AB    Patient Effort  good  -AB    Symptoms Noted During/After Treatment  none  -AB       General Information    Patient Profile Reviewed  yes  -AB    Pertinent History Of Current Problem  --    Current Method of Nutrition  NPO  -AB    Precautions/Limitations, Vision  WFL;for purposes of eval  -AB    Precautions/Limitations, Hearing  WFL;for purposes of eval  -AB    Prior Level of Function-Communication  WFL  -AB    Prior Level of Function-Swallowing  no diet consistency restrictions  -AB    Plans/Goals Discussed with  patient;agreed upon  -AB    Barriers to Rehab  medically complex  -AB    Patient's Goals for Discharge  --       Pain    Additional Documentation  Pain Scale: Numbers Pre/Post-Treatment (Group)  -AB       Pain Scale: Numbers Pre/Post-Treatment    Pretreatment Pain Rating  0/10 - no pain  -AB    Posttreatment Pain Rating  0/10 - no pain  -AB       Oral Motor Structure and Function    Dentition Assessment  --    Secretion Management  --    Mucosal Quality  --    Volitional Swallow  --       Oral Musculature and Cranial Nerve Assessment    Oral  Motor General Assessment  --       MBS/VFSS Interpretation    VFSS Summary  Video swallow study completed inlateral view with pt positioned at 90 degree angle, self fed for all presentations. Assessment completed with: thin liquids by cup, nectar thick liquids by cup nectar thick liquids by straw, puree, mechanical soft solids, mild impulsivity in bolus size. Base of tongue retraction is mildly weak. Poor epiglottic inversion with incomplete laryngeal vestibular closure. For initial trial of thin liquids, pt exhibits penetration and subsequent aspiration for second of two large consecutive sips. For single sips thin liquids, penetration during the swallow, deep but above level of vocal folds, not entirely ejected from the airway. Nectar thick liquid with shallow penetration, lessened in quantity on underside of epiglottis, not entirely ejected from the airway. Puree with some lingual pumping, weak retraction of tongue base, moderate residuals remain on tongue, palate, minimal in valleculae, pyriforms. NTL by straw wash with penetration from laryngeal vestibule and from posterior wall from residuals, above VF, not entirely ejected, question small amount of aspirate material. Puree trial two with some transient shallow penetration and residue as aforementioned. Thin wash s/p puree with large quantity of penetrate material from laryngeal vestibule during the swallow, not entirely ejected. Mechanical soft solids/mixed consistencies, first and second trials, with premature spillage to pyriform, minimal residuals tongue base, lining valleculae and pyriform. NTL wash with transient penetration during the swallow from laryngeal vestibule and posterior residuals. Thin liquid wash, first and second trials, with deep penetration, near level of vocal folds from laryngeal vestibule and posterior residuals, question small amount of aspirate material posteriorly post prandial. No airway responses appreciated at any time. Cued  cough cannot eject material.   -AB       SLP Communication to Radiology    Severity Level of Dysphagia  mild-moderate dysphagia;oral dysfunction;pharyngeal dysfunction  -AB    Summary Statement  Video swallow study completed inlateral view with pt positioned at 90 degree angle, self fed for all presentations. Assessment completed with: thin liquids by cup, nectar thick liquids by cup nectar thick liquids by straw, puree, mechanical soft solids, mild impulsivity in bolus size. Base of tongue retraction is mildly weak. Poor epiglottic inversion with incomplete laryngeal vestibular closure. For initial trial of thin liquids, pt exhibits penetration and subsequent aspiration for second of two large consecutive sips. For single sips thin liquids, penetration during the swallow, deep but above level of vocal folds, not entirely ejected from the airway. Nectar thick liquid with shallow penetration, lessened in quantity on underside of epiglottis, not entirely ejected from the airway. Puree with some lingual pumping, weak retraction of tongue base, moderate residuals remain on tongue, palate, minimal in valleculae, pyriforms. NTL by straw wash with penetration from laryngeal vestibule and from posterior wall from residuals, above VF, not entirely ejected, question small amount of aspirate material. Puree trial two with some transient shallow penetration and residue as aforementioned. Thin wash s/p puree with large quantity of penetrate material from laryngeal vestibule during the swallow, not entirely ejected. Mechanical soft solids/mixed consistencies, first and second trials, with premature spillage to pyriform, minimal residuals tongue base, lining valleculae and pyriform. NTL wash with transient penetration during the swallow from laryngeal vestibule and posterior residuals. Thin liquid wash, first and second trials, with deep penetration, near level of vocal folds from laryngeal vestibule and posterior residuals,  question small amount of aspirate material posteriorly post prandial. No airway responses appreciated at any time. Cued cough cannot eject material.   -AB       Clinical Impression    SLP Swallowing Diagnosis  mild-moderate;oral dysphagia;pharyngeal dysphagia  -AB    Functional Impact  risk of aspiration/pneumonia  -AB    Rehab Potential/Prognosis, Swallowing  good, to achieve stated therapy goals  -AB    Swallow Criteria for Skilled Therapeutic Interventions Met  demonstrates skilled criteria  -AB       Recommendations    Therapy Frequency (Swallow)  PRN dependent on clinical course  -AB    Predicted Duration Therapy Intervention (Days)  until discharge  -AB    SLP Diet Recommendation  chopped;nectar thick liquids  -AB    Recommended Diagnostics  -- dysphagia therapy   -AB    Recommended Precautions and Strategies  upright posture during/after eating;small bites of food and sips of liquid;multiple swallows per bite of food;no straw intermittent throat clear   -AB    Oral Care Recommendations  Oral Care before breakfast, after meals and PRN  -AB    SLP Rec. for Method of Medication Administration  meds whole;with thick liquids  -AB    Monitor for Signs of Aspiration  none - silent aspiration present  -AB    Anticipated Discharge Disposition (SLP)  anticipate therapy at next level of care  -AB       Swallow Goals (SLP)    Oral Nutrition/Hydration Goal Selection (SLP)  oral nutrition/hydration, SLP goal 1  -AB    Pharyngeal Strengthening Exercise Goal Selection (SLP)  pharyngeal strengthening exercise, SLP goal 1;pharyngeal strengthening exercise, SLP goal 2  -AB    Swallow Management Recall Goal Selection (SLP)  swallow management recall, SLP goal 1  -AB    Additional Documentation  pharyngeal strengthening exercise goal selection (SLP);swallow management recall goal selection (SLP)  -AB       Oral Nutrition/Hydration Goal 1 (SLP)    Oral Nutrition/Hydration Goal 1, SLP  safe swallow - least restrictive diet  -AB     Time Frame (Oral Nutrition/Hydration Goal 1, SLP)  by discharge  -AB       Pharyngeal Strengthening Exercise Goal 1 (SLP)    Activity (Pharyngeal Strengthening Goal 1, SLP)  increase epiglottic inversion and retroflexion  -AB    Increase Epiglottic Inversion and Retroflexion  chin tuck against resistance (CTAR);effortful pitch glide (falsetto + pharyngeal squeeze)  -AB    Barriers (Pharyngeal Strengthening Goal 1, SLP)  for improvements in pyriform clearance and laryngeal vestibular closure   -AB       Pharyngeal Strengthening Exercise Goal 2 (SLP)    Activity (Pharyngeal Strengthening Goal 1, SLP)  increase tongue base retraction  -AB    Increase Tongue Base Retraction  hard effortful swallow  -AB       Swallow Management Recall Goal 1 (SLP)    Activity (Swallow Management Recall Goal 1, SLP)  independent recall of;safe diet/liquid level;safe diet level/texture;compensatory swallow strategies/techniques  -AB    [place]/Accuracy (Swallow Management Recall Goal 1, SLP)  independently (over 90% accuracy)  -AB    Time Frame (Swallow Management Recall Goal 1, SLP)  by discharge  -AB      User Key  (r) = Recorded By, (t) = Taken By, (c) = Cosigned By    Initials Name Effective Dates    Aurea Arguello, MS CCC-SLP 10/05/20 -           EDUCATION  The patient has been educated in the following areas:   Dysphagia (Swallowing Impairment) Modified Diet Instruction - needs reinforcement.    SLP Recommendation and Plan  Recommend: mechanical soft and NTL diet. Medications: with NTL. Compensations: no straws, dry swallow s/p solids, small bites/sips, upright for all PO. SLP recommends follow up for carryover of strategies, initiation of dysphagia therapy within next 2-4 days as able. Reviewed video images via [employer] system, with pt demonstrating significant confusion “I don’t know how I’m supposed to keep up with this.” Following multiple trials, pt is able to demonstrate understanding of diet and liquid level.    SLP  GOALS     Row Name 04/10/21 1700       Oral Nutrition/Hydration Goal 1 (SLP)    Oral Nutrition/Hydration Goal 1, SLP  safe swallow - least restrictive diet  -AB    Time Frame (Oral Nutrition/Hydration Goal 1, SLP)  by discharge  -AB       Pharyngeal Strengthening Exercise Goal 1 (SLP)    Activity (Pharyngeal Strengthening Goal 1, SLP)  increase epiglottic inversion and retroflexion  -AB    Increase Epiglottic Inversion and Retroflexion  chin tuck against resistance (CTAR);effortful pitch glide (falsetto + pharyngeal squeeze)  -AB    Barriers (Pharyngeal Strengthening Goal 1, SLP)  for improvements in pyriform clearance and laryngeal vestibular closure   -AB       Pharyngeal Strengthening Exercise Goal 2 (SLP)    Activity (Pharyngeal Strengthening Goal 1, SLP)  increase tongue base retraction  -AB    Increase Tongue Base Retraction  hard effortful swallow  -AB       Swallow Management Recall Goal 1 (SLP)    Activity (Swallow Management Recall Goal 1, SLP)  independent recall of;safe diet/liquid level;safe diet level/texture;compensatory swallow strategies/techniques  -AB    [place]/Accuracy (Swallow Management Recall Goal 1, SLP)  independently (over 90% accuracy)  -AB    Time Frame (Swallow Management Recall Goal 1, SLP)  by discharge  -AB      User Key  (r) = Recorded By, (t) = Taken By, (c) = Cosigned By    Initials Name Provider Type    Aurea Arguello MS CCC-SLP Speech and Language Pathologist           SLP Outcome Measures (last 72 hours)      SLP Outcome Measures     Row Name 04/10/21 1700       SLP Outcome Measures    Outcome Measure Used?  Adult NOMS  -AB       Adult FCM Scores    FCM Chosen  Swallowing  -AB    Swallowing FCM Score  4  -AB      User Key  (r) = Recorded By, (t) = Taken By, (c) = Cosigned By    Initials Name Effective Dates    Aurea Arguello MS CCC-SLP 10/05/20 -            Time Calculation:   Time Calculation- SLP     Row Name 04/10/21 4703             Time Calculation- SLP     SLP Start Time  1322  -AB      SLP Received On  04/10/21  -AB        User Key  (r) = Recorded By, (t) = Taken By, (c) = Cosigned By    Initials Name Provider Type    Aurea Arguello, MS CCC-SLP Speech and Language Pathologist          Therapy Charges for Today     Code Description Service Date Service Provider Modifiers Qty    [id number] HC ST MOTION FLUORO EVAL SWALLOW 6 4/10/2021 Aurea Baldwin MS CCC-SLP GN 1            PPE: Patient was wearing a face mask during this therapy encounter. The patient's mask was removed to allow po trials to be administered for purposes of this assessment. The patient's mask was replaced prior to being transported back up to their room. Therapist used appropriate personal protective equipment including mask, eye protection and gloves.  Mask used was standard procedure mask. Appropriate PPE was worn during the entire therapy session. Hand hygiene was completed before and after therapy session. Patient is not in enhanced droplet precautions.          Aurea Baldwin MS CCC-SLP  4/10/2021

## 2021-04-10 NOTE — PLAN OF CARE
Goal Outcome Evaluation:  Plan of Care Reviewed With: patient  Progress: no change  Outcome Summary: Pt VSS on 2 L oxygen by NC. still coughing blood sputum.Video swallow study has done. the study shows mild to moderate dysphagia. changed diet to mechinial soft and nectar thick liquids.continue p.o Bumex.strick in &out.will continue to monitor.

## 2021-04-10 NOTE — PROGRESS NOTES
OPEP device was not administered due to patient spitting up blood.  It is contraindicated for the device.  JAYANT Everett was notified.

## 2021-04-10 NOTE — PLAN OF CARE
"Goal Outcome Evaluation:  Plan of Care Reviewed With: patient  No coughing with blood on this shift, Slept in chair all night per his wishes, placing call to cardiologists, patient running sb and has experienced a \"pause\" of 3.5 secs.  Patient has had this happen several times since he has been ill, but this is the longest.  He has been NPO all night.  Patient has been accepted at [facility]        "

## 2021-04-10 NOTE — PLAN OF CARE
Goal Outcome Evaluation:  Plan of Care Reviewed With: patient  Progress: no change  Outcome Summary: Video swallow study completed this date. Pt with a mild to moderate oropharyngeal dysphagia, Deep penetration and silent aspiration noted with thin liquids. Shallow penetration, majority ejected from the airway noted with nectar thick liquids and puree. Appears small amount of aspirate material from posterior wall with NTL by straw/wash s/p puree, with no airway response appreciated. Minimal pharyngeal residuals clear with a dry swallow or liquid wash.      Recommend: mechanical soft and NTL diet. Medications: with NTL. Compensations: no straws, dry swallow s/p solids, small bites/sips, upright for all PO and at least 30 min s/p.    SLP recommends follow up for carryover of strategies, initiation of dysphagia therapy within next 2-4 days as able. Reviewed video images via [employer] system, with pt demonstrating significant confusion “I don’t know how I’m supposed to keep up with this.” Following multiple trials, pt is able to demonstrate understanding of diet and liquid level.

## 2021-04-10 NOTE — PROGRESS NOTES
Pharmacy Consult: Warfarin Dosing/ Monitoring    Jalen Lockwood is a 71 y.o. male, estimated creatinine clearance is 131.3 mL/min (by C-G formula based on SCr of 0.76 mg/dL). weighing (!) 140 kg (308 lb 4.8 oz).    PMH: Allergic rhinitis, Anxiety, Aortectasia, Arthritis, Atrial flutter (2010), Charcot's joint of foot, Chronic edema, Chronic venous insufficiency, COPD, Coronary atherosclerosis, Diverticulosis, Duodenitis, Fatty liver, Gastritis, Gastroparesis, Hematoma, Hyperlipidemia, Hypertension, Insomnia, Internal hemorrhoids, Open wound, Osteomyelitis, Paroxysmal atrial fibrillation, Peripheral neuropathy, Popliteal artery aneurysm, Skin cancer, Sleep apnea, Tachycardia induced cardiomyopathy, Venous stasis, and Venous stasis ulcer    Social History     Tobacco Use    Smoking status: Current Every Day Smoker     Packs/day: 0.25     Years: 45.00     Pack years: 11.25     Types: Cigarettes    Smokeless tobacco: Never Used   Vaping Use    Vaping Use: Never used   Substance Use Topics    Alcohol use: Yes     Alcohol/week: 8.0 - 9.0 standard drinks     Types: 1 - 2 Shots of liquor, 7 Standard drinks or equivalent per week     Comment: 2 rum a day//Caffeine use: 3 cups daily    Drug use: No     Results from last 7 days   Lab Units 04/10/21  0832 04/09/21  1050 04/08/21  0419 04/07/21  0423 04/06/21  0417 04/05/21  0438 04/04/21  0442   INR  1.99* 1.92* 1.81* 1.98* 2.03* 2.17* 1.99*   HEMOGLOBIN g/dL 12.8* 13.0 12.3* 12.4* 14.1 12.6* 12.7*   HEMATOCRIT % 37.3* 36.5* 34.4* 35.3* 40.9 37.0* 36.0*   PLATELETS 10*3/mm3 181 197 175 172 178 146 128*     Results from last 7 days   Lab Units 04/10/21  0832 04/09/21  1050 04/08/21  0419   SODIUM mmol/L 139 137 141   POTASSIUM mmol/L 3.9 4.0 3.7   CHLORIDE mmol/L 101 100 100   CO2 mmol/L 30.0* 27.5 29.8*   BUN mg/dL 14 15 13   CREATININE mg/dL 0.76 0.82 0.83   CALCIUM mg/dL 9.0 9.2 8.6   GLUCOSE mg/dL 97 98 100*     Anticoagulation history: Managed by [facility] anti-coag clinic:  Warfarin 5 mg po qMF + 7.5 mg po all other days    Hospital Anticoagulation:  Consulting provider: Dr. Brad Lepe  Start date: Home med continued on 4/1/2021  Indication: Atrial fibrillation  Target INR: 2-3  Expected duration: Lifetime   Bridge Therapy: No, Enoxaparin - d/c'd on 4/3/21                Date 4/1 4/2 4/3  4/4 4/5 4/6 4/7 4/8 4/9 4/10   INR 1.66 1.60 1.99 1.99 2.17 2.03 1.98 1.81 1.92 1.99   Warfarin dose 7.5 mg 7.5 mg 5 mg 7.5 mg 5 mg 7.5 mg 7.5 mg 7.5mg HELD      Potential drug interactions:   Aspirin-new med-increased risk of bleeding  Piperacillin/tazobactam-increased risk of bleeding (D/C'd on 4/3/21)  Vancomycin-increased risk of bleeding (D/C'd on 43/21)  Ampicillin-sulbactam - may result in an increased risk of bleeding.     Relevant nutrition status: Regular diet    Education complete?/ Date: Yes, on 4/1/21    Assessment/Plan:    1) Home regimen of warfarin had been restarted but held on 4/9 for hemoptysis x2-3d duration. Plans for bronch on 4/11 to investigate per MD notes. Will continue to hold warfarin and await instructions from MD for when to resume.  2) Follow up with INR daily    Pharmacy will continue to follow until discharge or discontinuation of warfarin.     Estrella Jackson, [facility]  Clinical Staff Pharmacist

## 2021-04-10 NOTE — PROGRESS NOTES
Name: Jalen Lockwood ADMIT: 3/30/2021   : 1950  PCP: Marc Ludwig MD    MRN: [id number] LOS: 11 days   AGE/SEX: 71 y.o. male  ROOM: [facility]/     Subjective   Subjective   CC: generalized weakness  No acute events. Patient has continued to have small amounts of hemoptysis overnight. Dyspnea is about the same. Denies CP/f/c/n/v/d.    No family at bedside during the time of my evaluation.    Objective   Objective   Vital Signs  Temp:  [97.6 °F (36.4 °C)-99.1 °F (37.3 °C)] 98.8 °F (37.1 °C)  Heart Rate:  [56-88] 75  Resp:  [16-20] 16  BP: (122-149)/(70-96) 149/89  SpO2:  [92 %-98 %] 95 %  on  Flow (L/min):  [2] 2;   Device (Oxygen Therapy): nasal cannula  Body mass index is 37.53 kg/m².  Physical Exam  Vitals and nursing note reviewed.   Constitutional:       General: He is not in acute distress.     Appearance: He is ill-appearing (chronically). He is not toxic-appearing.   HENT:      Head: Normocephalic and atraumatic.      Nose: Nose normal.      Mouth/Throat:      Mouth: Mucous membranes are moist.      Pharynx: Oropharynx is clear.   Eyes:      Extraocular Movements: Extraocular movements intact.      Conjunctiva/sclera: Conjunctivae normal.      Pupils: Pupils are equal, round, and reactive to light.   Cardiovascular:      Rate and Rhythm: Normal rate. Rhythm irregular.      Pulses: Normal pulses.   Pulmonary:      Effort: Pulmonary effort is normal.      Breath sounds: Normal breath sounds. No wheezing, rhonchi or rales.   Abdominal:      General: Bowel sounds are normal.      Palpations: Abdomen is soft.      Tenderness: There is no abdominal tenderness.   Musculoskeletal:         General: Swelling (2+ BLE) present. No tenderness.      Cervical back: Normal range of motion and neck supple.      Comments: BLE in compression wraps, c/d/i   Skin:     General: Skin is warm and dry.      Capillary Refill: Capillary refill takes less than 2 seconds.   Neurological:      General: No focal deficit present.       Mental Status: He is alert and oriented to person, place, and time.   Psychiatric:         Mood and Affect: Mood normal.         Behavior: Behavior normal.     Results Review     I reviewed the patient's new clinical results.  I reviewed the patient's telemetry.  Results from last 7 days   Lab Units 04/10/21  0832 04/09/21  1050 04/08/21 0419 04/07/21  0423   WBC 10*3/mm3 6.56 7.75 5.72 5.70   HEMOGLOBIN g/dL 12.8* 13.0 12.3* 12.4*   PLATELETS 10*3/mm3 181 197 175 172     Results from last 7 days   Lab Units 04/10/21  0832 04/09/21  1050 04/08/21 0419 04/07/21  0423   SODIUM mmol/L 139 137 141 139   POTASSIUM mmol/L 3.9 4.0 3.7 3.7   CHLORIDE mmol/L 101 100 100 100   CO2 mmol/L 30.0* 27.5 29.8* 31.4*   BUN mg/dL 14 15 13 10   CREATININE mg/dL 0.76 0.82 0.83 0.88   GLUCOSE mg/dL 97 98 100* 96   Estimated Creatinine Clearance: 131.3 mL/min (by C-G formula based on SCr of 0.76 mg/dL).      Results from last 7 days   Lab Units 04/10/21  0832 04/09/21  1050 04/08/21 0419 04/07/21  0423   CALCIUM mg/dL 9.0 9.2 8.6 8.6   MAGNESIUM mg/dL  --   --   --  2.1     Results from last 7 days   Lab Units 04/07/21 0423 04/05/21  0438 04/04/21  0442   PROCALCITONIN ng/mL 0.24 0.62* 0.97*     COVID19   Date Value Ref Range Status   03/30/2021 Not Detected Not Detected - Ref. Range Final     Glucose   Date/Time Value Ref Range Status   04/07/2021 1616 105 70 - 130 mg/dL Final       CT Angiogram Chest With & Without Contrast  Narrative: PROCEDURE:CT ANGIOGRAM CHEST W WO CONTRAST-     HISTORY:  Hemoptysis.     TECHNIQUE:  CT images of the chest were obtained before and following  the administration of intravenous contrast using an angiography  protocol. Reformatted images were reviewed.  Radiation dose reduction  techniques were utilized, including automated exposure control and  exposure modulation based on body size.     COMPARISON:  Chest radiograph 04/05/2021. CT abdomen and pelvis with  contrast 03/30/2021. CT chest with  contrast 06/18/2019.        FINDINGS CT ANGIOGRAPHY CHEST:   There is a right PICC with the tip in the upper SVC. The left thyroid  lobe is asymmetrically enlarged and contains a subtle hypodense thyroid  nodule, similar to prior CT chest.   There is a mildly enlarged 1.4 cm short axis right hilar lymph node,  previously 1.3 cm. There is a 1.5 cm short axis right paratracheal lymph  node, previously 0.9 cm. There are calcified right hilar lymph nodes.  Heart size is normal. There is no pericardial effusion. There is  calcific coronary artery atherosclerosis, incompletely assessed. There  is moderate calcific aortic and branch vessel atherosclerosis. The  ascending thoracic aorta is dilated to 4.2 cm at the level of the  pulmonary trunk, previously 4.2 cm when remeasured. The pulmonary trunk  is dilated to 3.7 cm, previously 3.4 cm. There is a small right pleural  effusion, new from 03/30/2021.     Limited imaging through the upper abdomen demonstrates a nodular hepatic  contour, which can be seen with cirrhosis. There is suspected  cholelithiasis. There is nodular thickening of the left adrenal gland,  not significantly changed. There is calcific atherosclerosis of the  visualized upper abdominal aorta and its branch vessels. There are  collateral vessels in the anterior chest wall. There is left  glenohumeral osteoarthritis with a corresponding joint effusion. There  is multilevel degenerative disc disease.     The trachea is clear. There are dependent secretions in the right  mainstem bronchus there is multifocal mucous plugging in right middle  and lower lobe bronchi. There is patchy consolidation and groundglass  opacity in the right middle lobe and right lower lobe. There is a 2.2 x  1.7 cm nodule in the medial right lung base, which is new from  03/30/2021. There is a 0.6 cm nodule in the left lower lobe and a  punctate nodule in the right upper lobe, which are not significantly  changed dating back to 2010  and likely benign. A punctate nodule in the  right upper lobe (image 72) is not clearly seen on prior CT and is  nonspecific.        Impression:    1.  New small right pleural effusion with patchy consolidation in the  right middle and lower lobes, which could be due to atelectasis from  mucous plugging within the right middle and lower lobe bronchi or could  be due to multifocal aspiration and/or bronchopneumonia. Given the  reported history of hemoptysis, bronchoscopy with attention to the right  middle and lower lobe should be considered. Short-term follow-up CT is  recommended in 1-3 months.  2.  A 1.9 cm average diameter nodule in the right lower lobe is new from  03/30/2021, favoring a benign process. A punctate nodule in the right  upper lobe is not clearly seen on prior CT chest and is nonspecific.  Attention on follow-up CT chest is recommended.   3.  Mildly enlarged mediastinal and right hilar lymph nodes are  nonspecific and could be reactive. Attention on follow-up CT chest is  recommended.  4.  Ascending thoracic aortic aneurysm to 4.2 cm, not significantly  changed.     This report was finalized on 4/9/2021 9:14 AM by Dr. Chica Gutierrez M.D.       Scheduled Medications  amoxicillin-clavulanate, 1 tablet, Oral, Q12H  atorvastatin, 40 mg, Oral, Nightly  budesonide-formoterol, 2 puff, Inhalation, BID - RT  bumetanide, 1 mg, Oral, BID  finasteride, 5 mg, Oral, Daily  gabapentin, 300 mg, Oral, Q8H  ipratropium-albuterol, 3 mL, Nebulization, 4x Daily - RT  losartan, 12.5 mg, Oral, Q24H  metoprolol succinate XL, 12.5 mg, Oral, Q12H  nystatin, , Topical, Q12H  senna-docusate sodium, 2 tablet, Oral, Nightly  sodium chloride, 10 mL, Intravenous, Q12H  sodium chloride, 10 mL, Intravenous, Q12H  sodium chloride, 10 mL, Intravenous, Q12H    Infusions  Pharmacy to dose warfarin,     Diet  Diet Regular  NPO Diet  NPO Diet       Assessment/Plan     Active Hospital Problems    Diagnosis  POA   • Hyponatremia [E87.1]   Yes   • Thrombocytopenia (CMS/HCC) [D69.6]  Yes   • Ischemic cardiomyopathy [I25.5]  Yes   • Acute systolic (congestive) heart failure (CMS/HCC) [I50.21]  No   • Cellulitis of lower extremity [L03.119]  Yes   • Severe sepsis (CMS/HCC) [A41.9, R65.20]  Yes   • Alcohol dependence, 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]  Yes   • Essential hypertension [I10]  Yes   • COPD (chronic obstructive pulmonary disease) (CMS/HCC) [J44.9]  Yes   • Permanent atrial fibrillation (CMS/HCC) [I48.21]  Yes   • Lymphedema of both lower extremities [I89.0]  Yes      Resolved Hospital Problems    Diagnosis Date Resolved POA   • Metabolic encephalopathy [G93.41] 04/03/2021 Yes   Severe Sepsis with Metabolic Encephalopathy, Present on Admission  - most likely source seems to be UTI vs LLE cellulitis but the latter is not really apparent on my exam with wraps off  - blood cx's NGTD, urine culture with mixed darian  - no purulence noted associated on LLE-abx narrowed from vanc/zosyn to unasyn-inflammatory markers and procalcitonin improved-finish abx course with augmentin    Cough/Hemoptysis  - CTA chest showed RML possible bronchopneumonia as well as nodule  - pulmonology following planning bronchoscopy tomorrow, appreciate recs  - ASA and warfarin held    NSTEMI/ICM/Acute Systolic CHF  - likely LAD disease per cardiology, recommending continuing medical therapy in the setting of increased bleeding risk with DAPT considering the need for chronic warfarin  - volume status is much better-continue on oral bumex  - cardiology following, appreciate recs    Permanent Afib  - on AC with warfarin but this is now held due to hemoptysis  - continue metoprolol    COPD  - no exacerbation  - continue on symbicort  - duo-nebs    BLE Lymphedema  - continue compression wraps  - would benefit from follow up in the lymphedema clinic    SCDs for DVT prophylaxis.  Full code.  Discussed with patient and nursing staff.  Anticipate discharge home with HH vs SNU facility  timing yet to be determined.      Camilo Blanco MD  [facility]  04/10/21  14:32 EDT

## 2021-04-11 PROBLEM — R04.2 HEMOPTYSIS: Status: ACTIVE | Noted: 2021-03-30

## 2021-04-11 PROBLEM — N39.0 UTI (URINARY TRACT INFECTION): Status: ACTIVE | Noted: 2021-03-30

## 2021-04-11 PROBLEM — E87.1 HYPONATREMIA: Status: RESOLVED | Noted: 2021-04-03 | Resolved: 2021-04-11

## 2021-04-11 PROBLEM — N39.0 UTI (URINARY TRACT INFECTION): Status: RESOLVED | Noted: 2021-03-30 | Resolved: 2021-04-11

## 2021-04-11 PROBLEM — D69.6 THROMBOCYTOPENIA (HCC): Status: RESOLVED | Noted: 2021-04-03 | Resolved: 2021-04-11

## 2021-04-11 LAB
ANION GAP SERPL CALCULATED.3IONS-SCNC: 12.1 MMOL/L (ref 5–15)
BACTERIA SPEC RESP CULT: ABNORMAL
BACTERIA SPEC RESP CULT: ABNORMAL
BASOPHILS # BLD AUTO: 0.07 10*3/MM3 (ref 0–0.2)
BASOPHILS NFR BLD AUTO: 0.7 % (ref 0–1.5)
BUN SERPL-MCNC: 14 MG/DL (ref 8–23)
BUN/CREAT SERPL: 19.7 (ref 7–25)
CALCIUM SPEC-SCNC: 9 MG/DL (ref 8.6–10.5)
CHLORIDE SERPL-SCNC: 100 MMOL/L (ref 98–107)
CO2 SERPL-SCNC: 26.9 MMOL/L (ref 22–29)
CREAT SERPL-MCNC: 0.71 MG/DL (ref 0.76–1.27)
DEPRECATED RDW RBC AUTO: 48.4 FL (ref 37–54)
EOSINOPHIL # BLD AUTO: 0.19 10*3/MM3 (ref 0–0.4)
EOSINOPHIL NFR BLD AUTO: 2 % (ref 0.3–6.2)
ERYTHROCYTE [DISTWIDTH] IN BLOOD BY AUTOMATED COUNT: 13.4 % (ref 12.3–15.4)
GFR SERPL CREATININE-BSD FRML MDRD: 110 ML/MIN/1.73
GLUCOSE BLDC GLUCOMTR-MCNC: 101 MG/DL (ref 70–130)
GLUCOSE SERPL-MCNC: 98 MG/DL (ref 65–99)
GRAM STN SPEC: ABNORMAL
HCT VFR BLD AUTO: 34.6 % (ref 37.5–51)
HGB BLD-MCNC: 11.7 G/DL (ref 13–17.7)
IMM GRANULOCYTES # BLD AUTO: 0.05 10*3/MM3 (ref 0–0.05)
IMM GRANULOCYTES NFR BLD AUTO: 0.5 % (ref 0–0.5)
INR PPP: 1.97 (ref 0.9–1.1)
LYMPHOCYTES # BLD AUTO: 1.08 10*3/MM3 (ref 0.7–3.1)
LYMPHOCYTES NFR BLD AUTO: 11.4 % (ref 19.6–45.3)
MCH RBC QN AUTO: 33.3 PG (ref 26.6–33)
MCHC RBC AUTO-ENTMCNC: 33.8 G/DL (ref 31.5–35.7)
MCV RBC AUTO: 98.6 FL (ref 79–97)
MONOCYTES # BLD AUTO: 0.43 10*3/MM3 (ref 0.1–0.9)
MONOCYTES NFR BLD AUTO: 4.5 % (ref 5–12)
NEUTROPHILS NFR BLD AUTO: 7.68 10*3/MM3 (ref 1.7–7)
NEUTROPHILS NFR BLD AUTO: 80.9 % (ref 42.7–76)
NRBC BLD AUTO-RTO: 0 /100 WBC (ref 0–0.2)
PLATELET # BLD AUTO: 159 10*3/MM3 (ref 140–450)
PMV BLD AUTO: 10.8 FL (ref 6–12)
POTASSIUM SERPL-SCNC: 3.7 MMOL/L (ref 3.5–5.2)
PROTHROMBIN TIME: 22.2 SECONDS (ref 11.7–14.2)
QT INTERVAL: 535 MS
RBC # BLD AUTO: 3.51 10*6/MM3 (ref 4.14–5.8)
SARS-COV-2 ORF1AB RESP QL NAA+PROBE: NOT DETECTED
SODIUM SERPL-SCNC: 139 MMOL/L (ref 136–145)
WBC # BLD AUTO: 9.5 10*3/MM3 (ref 3.4–10.8)

## 2021-04-11 PROCEDURE — 80048 BASIC METABOLIC PNL TOTAL CA: CPT | Performed by: INTERNAL MEDICINE

## 2021-04-11 PROCEDURE — 85025 COMPLETE CBC W/AUTO DIFF WBC: CPT | Performed by: INTERNAL MEDICINE

## 2021-04-11 PROCEDURE — 94799 UNLISTED PULMONARY SVC/PX: CPT

## 2021-04-11 PROCEDURE — 93005 ELECTROCARDIOGRAM TRACING: CPT | Performed by: INTERNAL MEDICINE

## 2021-04-11 PROCEDURE — 93010 ELECTROCARDIOGRAM REPORT: CPT | Performed by: INTERNAL MEDICINE

## 2021-04-11 PROCEDURE — 85610 PROTHROMBIN TIME: CPT | Performed by: INTERNAL MEDICINE

## 2021-04-11 PROCEDURE — 82962 GLUCOSE BLOOD TEST: CPT

## 2021-04-11 PROCEDURE — 97110 THERAPEUTIC EXERCISES: CPT

## 2021-04-11 PROCEDURE — U0004 COV-19 TEST NON-CDC HGH THRU: HCPCS | Performed by: INTERNAL MEDICINE

## 2021-04-11 RX ORDER — CARVEDILOL 3.12 MG/1
3.12 TABLET ORAL 2 TIMES DAILY
Status: DISCONTINUED | OUTPATIENT
Start: 2021-04-11 | End: 2021-04-13 | Stop reason: HOSPADM

## 2021-04-11 RX ADMIN — DOCUSATE SODIUM 50MG AND SENNOSIDES 8.6MG 2 TABLET: 8.6; 5 TABLET, FILM COATED ORAL at 20:19

## 2021-04-11 RX ADMIN — HYDROCODONE BITARTRATE AND ACETAMINOPHEN 2 TABLET: 5; 325 TABLET ORAL at 03:07

## 2021-04-11 RX ADMIN — IPRATROPIUM BROMIDE AND ALBUTEROL SULFATE 3 ML: 2.5; .5 SOLUTION RESPIRATORY (INHALATION) at 11:12

## 2021-04-11 RX ADMIN — NYSTATIN: 100000 POWDER TOPICAL at 08:35

## 2021-04-11 RX ADMIN — GABAPENTIN 300 MG: 300 CAPSULE ORAL at 06:00

## 2021-04-11 RX ADMIN — IPRATROPIUM BROMIDE AND ALBUTEROL SULFATE 3 ML: 2.5; .5 SOLUTION RESPIRATORY (INHALATION) at 16:21

## 2021-04-11 RX ADMIN — BUDESONIDE AND FORMOTEROL FUMARATE DIHYDRATE 2 PUFF: 160; 4.5 AEROSOL RESPIRATORY (INHALATION) at 07:53

## 2021-04-11 RX ADMIN — NYSTATIN: 100000 POWDER TOPICAL at 20:22

## 2021-04-11 RX ADMIN — SODIUM CHLORIDE, PRESERVATIVE FREE 10 ML: 5 INJECTION INTRAVENOUS at 08:35

## 2021-04-11 RX ADMIN — SODIUM CHLORIDE, PRESERVATIVE FREE 10 ML: 5 INJECTION INTRAVENOUS at 03:08

## 2021-04-11 RX ADMIN — GABAPENTIN 300 MG: 300 CAPSULE ORAL at 20:21

## 2021-04-11 RX ADMIN — BUMETANIDE 1 MG: 1 TABLET ORAL at 16:04

## 2021-04-11 RX ADMIN — BUMETANIDE 1 MG: 1 TABLET ORAL at 08:34

## 2021-04-11 RX ADMIN — FINASTERIDE 5 MG: 5 TABLET, FILM COATED ORAL at 08:34

## 2021-04-11 RX ADMIN — HYDROCODONE BITARTRATE AND ACETAMINOPHEN 2 TABLET: 5; 325 TABLET ORAL at 19:49

## 2021-04-11 RX ADMIN — LOSARTAN POTASSIUM 12.5 MG: 25 TABLET, FILM COATED ORAL at 08:34

## 2021-04-11 RX ADMIN — HYDROCODONE BITARTRATE AND ACETAMINOPHEN 2 TABLET: 5; 325 TABLET ORAL at 08:40

## 2021-04-11 RX ADMIN — AMOXICILLIN AND CLAVULANATE POTASSIUM 1 TABLET: 875; 125 TABLET, FILM COATED ORAL at 08:34

## 2021-04-11 RX ADMIN — SODIUM CHLORIDE, PRESERVATIVE FREE 10 ML: 5 INJECTION INTRAVENOUS at 20:35

## 2021-04-11 RX ADMIN — SODIUM CHLORIDE, PRESERVATIVE FREE 10 ML: 5 INJECTION INTRAVENOUS at 03:07

## 2021-04-11 RX ADMIN — SODIUM CHLORIDE, PRESERVATIVE FREE 10 ML: 5 INJECTION INTRAVENOUS at 20:22

## 2021-04-11 RX ADMIN — GABAPENTIN 300 MG: 300 CAPSULE ORAL at 14:25

## 2021-04-11 RX ADMIN — SODIUM CHLORIDE, PRESERVATIVE FREE 10 ML: 5 INJECTION INTRAVENOUS at 08:36

## 2021-04-11 RX ADMIN — ATORVASTATIN CALCIUM 40 MG: 20 TABLET, FILM COATED ORAL at 20:19

## 2021-04-11 RX ADMIN — CARVEDILOL 3.12 MG: 3.12 TABLET, FILM COATED ORAL at 18:27

## 2021-04-11 RX ADMIN — SODIUM CHLORIDE, PRESERVATIVE FREE 10 ML: 5 INJECTION INTRAVENOUS at 20:21

## 2021-04-11 RX ADMIN — BUDESONIDE AND FORMOTEROL FUMARATE DIHYDRATE 2 PUFF: 160; 4.5 AEROSOL RESPIRATORY (INHALATION) at 21:22

## 2021-04-11 NOTE — THERAPY TREATMENT NOTE
Inpatient Rehabilitation - Physical Therapy Treatment Note  [facility]     Patient Name: Jalen Lockwood  : 1950  MRN: [id number]  Today's Date: 2021           PT Assessment (last 12 hours)      PT Evaluation and Treatment    No documentation.       Physical Therapy Education                 Title: PT OT SLP Therapies (Done)     Topic: Physical Therapy (Done)     Point: Mobility training (Done)     Learning Progress Summary           Patient Acceptance, E, VU by AL at 2021 1333    Acceptance, E, VU by CL at 4/10/2021 1829    Acceptance, TB, DU by YEFRI at 2021 1429    Acceptance, E,TB,D, VU,NR by  at 2021 1551    Acceptance, E,D, VU,NR by EB at 2021 1631    Acceptance, E,TB,D, VU,NR by  at 2021 1558    Acceptance, E, VU by ME at 2021 1502                   Point: Home exercise program (Done)     Learning Progress Summary           Patient Acceptance, E, VU by AL at 2021 1333    Acceptance, E, VU by CL at 4/10/2021 1829    Acceptance, TB, DU by YEFRI at 2021 1429    Acceptance, E,TB,D, VU,NR by  at 2021 1551    Acceptance, E,D, VU,NR by EB at 2021 1631    Acceptance, E,TB,D, VU,NR by  at 2021 1558                   Point: Body mechanics (Done)     Learning Progress Summary           Patient Acceptance, E, VU by AL at 2021 1333    Acceptance, E, VU by CL at 4/10/2021 1829    Acceptance, TB, DU by YEFRI at 2021 1429    Acceptance, E,TB,D, VU,NR by  at 2021 1551    Acceptance, E,D, VU,NR by EB at 2021 1631    Acceptance, E, VU by ME at 2021 1502                   Point: Precautions (Done)     Learning Progress Summary           Patient Acceptance, E, VU by AL at 2021 1333    Acceptance, E, VU by BRAYAN at 4/10/2021 1829    Acceptance, TB, DU by YEFRI at 2021 1429    Acceptance, E,TB,D, VU,NR by BH at 2021 1551    Acceptance, E,D, VU,NR by EB at 2021 1631    Acceptance, E, VU by ME at 2021 1502                                User Key     Initials Effective Dates Name Provider Type Discipline    AL 04/03/18 -  Payal Ricardo, PT Physical Therapist PT    SV 04/03/18 -  Yolanda Batista, PT Physical Therapist PT    EB 03/10/21 -  Leslie Magana, PTA Physical Therapy Assistant PT    CL 05/30/19 -  Karlos Anthony, RN Registered Nurse Nurse    DJ 10/25/19 -  Dayana Wilkins, PT Physical Therapist PT    BH 02/01/21 -  Madonna Landa, PT Student PT Student PT    ME 03/12/21 -  Сергей Ryder, PT Student PT Student PT              PT Recommendation and Plan     Plan of Care Reviewed With: patient  Progress: improving  Outcome Summary: Patient participated well with skilled PT. Increased ambulation distance this visit, no LOB; used Rwx. Will continue to progress patient towards goals. PT wore mask, gloves, and face shield.       Time Calculation:   PT Charges     Row Name 04/11/21 1332             Time Calculation    Start Time  1257  -AL      Stop Time  1307  -AL      Time Calculation (min)  10 min  -AL      PT Received On  04/11/21  -AL      PT - Next Appointment  04/12/21  -AL        User Key  (r) = Recorded By, (t) = Taken By, (c) = Cosigned By    Initials Name Provider Type    AL Payal Ricardo, PT Physical Therapist        Therapy Charges for Today     Code Description Service Date Service Provider Modifiers Qty    96065576385 HC PT THER PROC EA 15 MIN 4/11/2021 Payal Ricardo, PT GP 1          PT G-Codes  Outcome Measure Options: AM-PAC 6 Clicks Basic Mobility (PT)  AM-PAC 6 Clicks Score (PT): 17  AM-PAC 6 Clicks Score (OT): 16    Payal Ricardo, PT  4/11/2021

## 2021-04-11 NOTE — PLAN OF CARE
Goal Outcome Evaluation:  Plan of Care Reviewed With: patient  Progress: improving  Outcome Summary: Patient participated well with skilled PT. Increased ambulation distance this visit, no LOB; used Rwx. Will continue to progress patient towards goals. PT wore mask, gloves, and face shield.

## 2021-04-11 NOTE — NURSING NOTE
Patients heart rate is dropping down into the 20's, then back to 40's or 50's.  He is also having multiple pauses of 3.2 secs.  He is asleep in chair, wakens easily to name, Call to Hospitalists.  Order placed for EKG

## 2021-04-11 NOTE — PROGRESS NOTES
Pharmacy Consult: Warfarin Dosing/ Monitoring    Jalen Lockwood is a 71 y.o. male, estimated creatinine clearance is 131.3 mL/min (A) (by C-G formula based on SCr of 0.71 mg/dL (L)). weighing (!) 141 kg (309 lb 12.8 oz).    PMH: Allergic rhinitis, Anxiety, Aortectasia, Arthritis, Atrial flutter (2010), Charcot's joint of foot, Chronic edema, Chronic venous insufficiency, COPD, Coronary atherosclerosis, Diverticulosis, Duodenitis, Fatty liver, Gastritis, Gastroparesis, Hematoma, Hyperlipidemia, Hypertension, Insomnia, Internal hemorrhoids, Open wound, Osteomyelitis, Paroxysmal atrial fibrillation, Peripheral neuropathy, Popliteal artery aneurysm, Skin cancer, Sleep apnea, Tachycardia induced cardiomyopathy, Venous stasis, and Venous stasis ulcer    Social History     Tobacco Use    Smoking status: Current Every Day Smoker     Packs/day: 0.25     Years: 45.00     Pack years: 11.25     Types: Cigarettes    Smokeless tobacco: Never Used   Vaping Use    Vaping Use: Never used   Substance Use Topics    Alcohol use: Yes     Alcohol/week: 8.0 - 9.0 standard drinks     Types: 1 - 2 Shots of liquor, 7 Standard drinks or equivalent per week     Comment: 2 rum a day//Caffeine use: 3 cups daily    Drug use: No     Results from last 7 days   Lab Units 04/11/21  0500 04/10/21  0832 04/09/21  1050 04/08/21  0419 04/07/21  0423 04/06/21  0417 04/05/21  0438   INR  1.97* 1.99* 1.92* 1.81* 1.98* 2.03* 2.17*   HEMOGLOBIN g/dL 11.7* 12.8* 13.0 12.3* 12.4* 14.1 12.6*   HEMATOCRIT % 34.6* 37.3* 36.5* 34.4* 35.3* 40.9 37.0*   PLATELETS 10*3/mm3 159 181 197 175 172 178 146     Results from last 7 days   Lab Units 04/11/21  0500 04/10/21  0832 04/09/21  1050   SODIUM mmol/L 139 139 137   POTASSIUM mmol/L 3.7 3.9 4.0   CHLORIDE mmol/L 100 101 100   CO2 mmol/L 26.9 30.0* 27.5   BUN mg/dL 14 14 15   CREATININE mg/dL 0.71* 0.76 0.82   CALCIUM mg/dL 9.0 9.0 9.2   GLUCOSE mg/dL 98 97 98     Anticoagulation history: Managed by [facility]: Warfarin 5 mg po qMF + 7.5 mg po all other days    Hospital Anticoagulation:  Consulting provider: Dr. Brad Lepe  Start date: Home med continued on 4/1/2021  Indication: Atrial fibrillation  Target INR: 2-3  Expected duration: Lifetime   Bridge Therapy: No, Enoxaparin - d/c'd on 4/3/21                Date 4/1 4/2 4/3  4/4 4/5 4/6 4/7 4/8 4/9 4/10   INR 1.66 1.60 1.99 1.99 2.17 2.03 1.98 1.81 1.92 1.99   Warfarin dose 7.5 mg 7.5 mg 5 mg 7.5 mg 5 mg 7.5 mg 7.5 mg 7.5mg HELD Held     Date 4/11            INR 1.97            Warfarin dose Hold                Potential drug interactions:   Aspirin-new med-increased risk of bleeding  Antimicrobials (potential increase in INR) -- ended 4/11    Relevant nutrition status: Regular diet    Education complete?/ Date: Yes, on 4/1/21    Assessment/Plan:    1) Home regimen of warfarin had been restarted but held on 4/9 for hemoptysis x2-3d duration (last occurrence 4/10 afternoon). Plans for bronch when INR drops further per pulmonology. Will continue to hold warfarin and await instructions from MD for when to resume.  2) Follow up with INR daily    Pharmacy will continue to follow until discharge or discontinuation of warfarin.     Estrella Jackson, [facility]  Clinical Staff Pharmacist

## 2021-04-11 NOTE — PLAN OF CARE
Goal Outcome Evaluation:  Plan of Care Reviewed With: patient  Patient is waiting on decision from pulmonology to see if he will be going for a bronch today or not.  I have not witnessed any cough with blood, he states he hasnt coughed since yesterday.  Patient continued to have pauses throughout the shift.  Not only did he hae pauses, he is now showing a bradycardia dipping into the 20's.  He was sleepin when this happened last night, I woke him up from sleep,,, he was not exhibiting any sx, denies any lightheadedness, denies any chest pain.He was given pain meds with sip of thickened water.  He had a swallow study done and he failed, so he is now on thickened liquids.

## 2021-04-11 NOTE — PROGRESS NOTES
[facility]      Mar/chart reviewed  Follow up hemoptysis  No further hemoptysis since yesterday afternoon  Some cough still  Has had some bradycardia down to the 20's    Vital Sign Min/Max for last 24 hours  Temp  Min: 97.6 °F (36.4 °C)  Max: 98.8 °F (37.1 °C)   BP  Min: 113/77  Max: 149/89   Pulse  Min: 56  Max: 87   Resp  Min: 16  Max: 18   SpO2  Min: 90 %  Max: 98 %   Flow (L/min)  Min: 1  Max: 2   Weight  Min: 141 kg (309 lb 12.8 oz)  Max: 141 kg (309 lb 12.8 oz)     Appears ill, axox3,   perrl, eomi, no icterus,  mmm, no jvd, trachea midline, neck supple,  chest decreased ae bilaterally, faint crackles, no wheezes,   Irrg, rate ok   soft, nt, nd +bs,  no c/c/ +e  Skin warm, dry no rashes     Labs: 4/11: reviewed:  inr 1.97  Bicarb 26.9  Cr 0.7  Wbc 9.5  hgb 11.7  plts 159    ASSESSMENT  /  PLAN:  Severe sepsis improved  Hypotension improved   UTI s/p treatment  Altered mental status/metabolic encephalopathy improved  Hyponatremia improved  Lactic acidosis improved  Atrial fibrillation with RVR improved  COPD: Currently without exacerbation  Chronic venous insufficiency  History of gastroparesis  History of MRSA soft tissue skin infection  Sleep apnea  Current daily smoker  Daily alcohol use  CAD  NSTEMI  Acute systolic heart failure with regional wall motion abn  Anticoagulated with coumadin  Hemoptysis - scant.     Continue antibiotics  INR still too high for bronch today  Also patient needs covid testing prior to bronch  Might be good idea to hold metoprolol if hr dipping into the 20's

## 2021-04-11 NOTE — PLAN OF CARE
Goal Outcome Evaluation:  Plan of Care Reviewed With: patient  Progress: improving  Outcome Summary: Pt is alert and oriented. VSS on 2L Oxygen by NC. pt states cough better than yesterday. complains can not urinate since this a.m. bladder scan was over 700cc at 1630PM. straight cath 600cc urine output.Heart rate remains on A.fib over 120's without symptom.Notified Dr. Gonazlez and Pulmonary MD. Cardiology reconsulted. Order received from cardiology Dr. Nogueira.See MAR. NPO MN. planing Bronch tomorrow.will continue to monitor.

## 2021-04-12 ENCOUNTER — ANESTHESIA EVENT (OUTPATIENT)
Dept: GASTROENTEROLOGY | Facility: HOSPITAL | Age: 71
End: 2021-04-12

## 2021-04-12 ENCOUNTER — ANESTHESIA (OUTPATIENT)
Dept: GASTROENTEROLOGY | Facility: HOSPITAL | Age: 71
End: 2021-04-12

## 2021-04-12 LAB
APPEARANCE FLD: ABNORMAL
B PARAPERT DNA SPEC QL NAA+PROBE: NOT DETECTED
B PERT DNA SPEC QL NAA+PROBE: NOT DETECTED
C PNEUM DNA NPH QL NAA+NON-PROBE: NOT DETECTED
COLOR FLD: ABNORMAL
FLUAV SUBTYP SPEC NAA+PROBE: NOT DETECTED
FLUBV RNA ISLT QL NAA+PROBE: NOT DETECTED
GIE STN SPEC: NORMAL
HADV DNA SPEC NAA+PROBE: NOT DETECTED
HCOV 229E RNA SPEC QL NAA+PROBE: NOT DETECTED
HCOV HKU1 RNA SPEC QL NAA+PROBE: NOT DETECTED
HCOV NL63 RNA SPEC QL NAA+PROBE: NOT DETECTED
HCOV OC43 RNA SPEC QL NAA+PROBE: NOT DETECTED
HMPV RNA NPH QL NAA+NON-PROBE: NOT DETECTED
HPIV1 RNA SPEC QL NAA+PROBE: NOT DETECTED
HPIV2 RNA SPEC QL NAA+PROBE: NOT DETECTED
HPIV3 RNA NPH QL NAA+PROBE: NOT DETECTED
HPIV4 P GENE NPH QL NAA+PROBE: NOT DETECTED
INR PPP: 1.58 (ref 0.9–1.1)
LYMPHOCYTES NFR FLD MANUAL: 7 %
M PNEUMO IGG SER IA-ACNC: NOT DETECTED
METHOD: ABNORMAL
MONOS+MACROS NFR FLD: 28 %
NEUTROPHILS NFR FLD MANUAL: 65 %
NUC CELL # FLD: 70 /MM3
PROTHROMBIN TIME: 18.6 SECONDS (ref 11.7–14.2)
RBC # FLD AUTO: 2900 /MM3
RHINOVIRUS RNA SPEC NAA+PROBE: NOT DETECTED
RSV RNA NPH QL NAA+NON-PROBE: NOT DETECTED

## 2021-04-12 PROCEDURE — 87102 FUNGUS ISOLATION CULTURE: CPT | Performed by: INTERNAL MEDICINE

## 2021-04-12 PROCEDURE — 85610 PROTHROMBIN TIME: CPT | Performed by: INTERNAL MEDICINE

## 2021-04-12 PROCEDURE — 87633 RESP VIRUS 12-25 TARGETS: CPT | Performed by: INTERNAL MEDICINE

## 2021-04-12 PROCEDURE — 88312 SPECIAL STAINS GROUP 1: CPT | Performed by: INTERNAL MEDICINE

## 2021-04-12 PROCEDURE — 88305 TISSUE EXAM BY PATHOLOGIST: CPT | Performed by: INTERNAL MEDICINE

## 2021-04-12 PROCEDURE — 99233 SBSQ HOSP IP/OBS HIGH 50: CPT | Performed by: INTERNAL MEDICINE

## 2021-04-12 PROCEDURE — 97530 THERAPEUTIC ACTIVITIES: CPT

## 2021-04-12 PROCEDURE — 94799 UNLISTED PULMONARY SVC/PX: CPT

## 2021-04-12 PROCEDURE — 87205 SMEAR GRAM STAIN: CPT | Performed by: INTERNAL MEDICINE

## 2021-04-12 PROCEDURE — 94760 N-INVAS EAR/PLS OXIMETRY 1: CPT

## 2021-04-12 PROCEDURE — 87206 SMEAR FLUORESCENT/ACID STAI: CPT | Performed by: INTERNAL MEDICINE

## 2021-04-12 PROCEDURE — 0100U HC BIOFIRE FILMARRAY RESP PANEL 2: CPT | Performed by: INTERNAL MEDICINE

## 2021-04-12 PROCEDURE — 0B9F8ZX DRAINAGE OF RIGHT LOWER LUNG LOBE, VIA NATURAL OR ARTIFICIAL OPENING ENDOSCOPIC, DIAGNOSTIC: ICD-10-PCS | Performed by: INTERNAL MEDICINE

## 2021-04-12 PROCEDURE — 87071 CULTURE AEROBIC QUANT OTHER: CPT | Performed by: INTERNAL MEDICINE

## 2021-04-12 PROCEDURE — 0B9D8ZX DRAINAGE OF RIGHT MIDDLE LUNG LOBE, VIA NATURAL OR ARTIFICIAL OPENING ENDOSCOPIC, DIAGNOSTIC: ICD-10-PCS | Performed by: INTERNAL MEDICINE

## 2021-04-12 PROCEDURE — 25010000002 PROPOFOL 10 MG/ML EMULSION: Performed by: ANESTHESIOLOGY

## 2021-04-12 PROCEDURE — 87116 MYCOBACTERIA CULTURE: CPT | Performed by: INTERNAL MEDICINE

## 2021-04-12 PROCEDURE — 88112 CYTOPATH CELL ENHANCE TECH: CPT | Performed by: INTERNAL MEDICINE

## 2021-04-12 PROCEDURE — 89051 BODY FLUID CELL COUNT: CPT | Performed by: INTERNAL MEDICINE

## 2021-04-12 RX ORDER — WARFARIN SODIUM 7.5 MG/1
7.5 TABLET ORAL
Status: COMPLETED | OUTPATIENT
Start: 2021-04-12 | End: 2021-04-12

## 2021-04-12 RX ORDER — FUROSEMIDE 40 MG/1
40 TABLET ORAL DAILY
Status: DISCONTINUED | OUTPATIENT
Start: 2021-04-13 | End: 2021-04-13 | Stop reason: HOSPADM

## 2021-04-12 RX ORDER — LIDOCAINE HYDROCHLORIDE 10 MG/ML
INJECTION, SOLUTION EPIDURAL; INFILTRATION; INTRACAUDAL; PERINEURAL AS NEEDED
Status: DISCONTINUED | OUTPATIENT
Start: 2021-04-12 | End: 2021-04-12 | Stop reason: HOSPADM

## 2021-04-12 RX ORDER — PROPOFOL 10 MG/ML
VIAL (ML) INTRAVENOUS CONTINUOUS PRN
Status: DISCONTINUED | OUTPATIENT
Start: 2021-04-12 | End: 2021-04-12 | Stop reason: SURG

## 2021-04-12 RX ORDER — LISINOPRIL 20 MG/1
20 TABLET ORAL
Status: DISCONTINUED | OUTPATIENT
Start: 2021-04-13 | End: 2021-04-13 | Stop reason: HOSPADM

## 2021-04-12 RX ORDER — LIDOCAINE HYDROCHLORIDE 20 MG/ML
INJECTION, SOLUTION EPIDURAL; INFILTRATION; INTRACAUDAL; PERINEURAL AS NEEDED
Status: DISCONTINUED | OUTPATIENT
Start: 2021-04-12 | End: 2021-04-12 | Stop reason: HOSPADM

## 2021-04-12 RX ORDER — SODIUM CHLORIDE, SODIUM LACTATE, POTASSIUM CHLORIDE, CALCIUM CHLORIDE 600; 310; 30; 20 MG/100ML; MG/100ML; MG/100ML; MG/100ML
30 INJECTION, SOLUTION INTRAVENOUS CONTINUOUS PRN
Status: DISCONTINUED | OUTPATIENT
Start: 2021-04-12 | End: 2021-04-13 | Stop reason: HOSPADM

## 2021-04-12 RX ORDER — ASPIRIN 81 MG/1
81 TABLET ORAL DAILY
Status: DISCONTINUED | OUTPATIENT
Start: 2021-04-12 | End: 2021-04-13 | Stop reason: HOSPADM

## 2021-04-12 RX ORDER — LIDOCAINE HYDROCHLORIDE 20 MG/ML
INJECTION, SOLUTION INFILTRATION; PERINEURAL AS NEEDED
Status: DISCONTINUED | OUTPATIENT
Start: 2021-04-12 | End: 2021-04-12 | Stop reason: SURG

## 2021-04-12 RX ADMIN — SODIUM CHLORIDE, PRESERVATIVE FREE 10 ML: 5 INJECTION INTRAVENOUS at 20:55

## 2021-04-12 RX ADMIN — GABAPENTIN 300 MG: 300 CAPSULE ORAL at 21:48

## 2021-04-12 RX ADMIN — HYDROCODONE BITARTRATE AND ACETAMINOPHEN 2 TABLET: 5; 325 TABLET ORAL at 03:48

## 2021-04-12 RX ADMIN — SODIUM CHLORIDE, PRESERVATIVE FREE 10 ML: 5 INJECTION INTRAVENOUS at 11:58

## 2021-04-12 RX ADMIN — BUDESONIDE AND FORMOTEROL FUMARATE DIHYDRATE 2 PUFF: 160; 4.5 AEROSOL RESPIRATORY (INHALATION) at 11:35

## 2021-04-12 RX ADMIN — SODIUM CHLORIDE, POTASSIUM CHLORIDE, SODIUM LACTATE AND CALCIUM CHLORIDE 30 ML/HR: 600; 310; 30; 20 INJECTION, SOLUTION INTRAVENOUS at 08:10

## 2021-04-12 RX ADMIN — BUDESONIDE AND FORMOTEROL FUMARATE DIHYDRATE 2 PUFF: 160; 4.5 AEROSOL RESPIRATORY (INHALATION) at 20:25

## 2021-04-12 RX ADMIN — CARVEDILOL 3.12 MG: 3.12 TABLET, FILM COATED ORAL at 20:54

## 2021-04-12 RX ADMIN — LIDOCAINE HYDROCHLORIDE 60 MG: 20 INJECTION, SOLUTION INFILTRATION; PERINEURAL at 08:28

## 2021-04-12 RX ADMIN — SODIUM CHLORIDE, PRESERVATIVE FREE 10 ML: 5 INJECTION INTRAVENOUS at 11:57

## 2021-04-12 RX ADMIN — WARFARIN 7.5 MG: 7.5 TABLET ORAL at 19:08

## 2021-04-12 RX ADMIN — HYDROCODONE BITARTRATE AND ACETAMINOPHEN 2 TABLET: 5; 325 TABLET ORAL at 21:48

## 2021-04-12 RX ADMIN — GABAPENTIN 300 MG: 300 CAPSULE ORAL at 15:37

## 2021-04-12 RX ADMIN — GABAPENTIN 300 MG: 300 CAPSULE ORAL at 05:09

## 2021-04-12 RX ADMIN — FINASTERIDE 5 MG: 5 TABLET, FILM COATED ORAL at 11:55

## 2021-04-12 RX ADMIN — NYSTATIN: 100000 POWDER TOPICAL at 11:54

## 2021-04-12 RX ADMIN — DOCUSATE SODIUM 50MG AND SENNOSIDES 8.6MG 2 TABLET: 8.6; 5 TABLET, FILM COATED ORAL at 20:54

## 2021-04-12 RX ADMIN — IPRATROPIUM BROMIDE AND ALBUTEROL SULFATE 3 ML: 2.5; .5 SOLUTION RESPIRATORY (INHALATION) at 15:25

## 2021-04-12 RX ADMIN — CARVEDILOL 3.12 MG: 3.12 TABLET, FILM COATED ORAL at 11:54

## 2021-04-12 RX ADMIN — PROPOFOL 100 MCG/KG/MIN: 10 INJECTION, EMULSION INTRAVENOUS at 08:29

## 2021-04-12 RX ADMIN — HYDROCODONE BITARTRATE AND ACETAMINOPHEN 2 TABLET: 5; 325 TABLET ORAL at 15:36

## 2021-04-12 RX ADMIN — PROPOFOL 200 MG: 10 INJECTION, EMULSION INTRAVENOUS at 08:28

## 2021-04-12 RX ADMIN — BUMETANIDE 1 MG: 1 TABLET ORAL at 11:55

## 2021-04-12 RX ADMIN — ATORVASTATIN CALCIUM 40 MG: 20 TABLET, FILM COATED ORAL at 20:54

## 2021-04-12 RX ADMIN — ASPIRIN 81 MG: 81 TABLET, COATED ORAL at 15:37

## 2021-04-12 RX ADMIN — LOSARTAN POTASSIUM 12.5 MG: 25 TABLET, FILM COATED ORAL at 11:54

## 2021-04-12 NOTE — ADDENDUM NOTE
Addendum  created 04/12/21 0904 by Susan Latham MD    Review and Sign - Ready for Procedure, Review and Sign - Signed

## 2021-04-12 NOTE — ANESTHESIA PROCEDURE NOTES
Airway  Urgency: elective    Airway not difficult    General Information and Staff    Patient location during procedure: OR  Anesthesiologist: Susan Latham MD    Indications and Patient Condition  Indications for airway management: airway protection    Preoxygenated: yes  MILS maintained throughout  Mask difficulty assessment: 1 - vent by mask    Final Airway Details  Final airway type: supraglottic airway      Successful airway: classic  Size 5    Number of attempts at approach: 1  Assessment: lips, teeth, and gum same as pre-op and atraumatic intubation

## 2021-04-12 NOTE — ANESTHESIA POSTPROCEDURE EVALUATION
Patient: Jalen Lockwood    Procedure Summary     Date: 04/12/21 Room / Location:  [facility]    Anesthesia Start: 0821 Anesthesia Stop: 0902    Procedure: BRONCHOSCOPY WITH BAL (N/A Bronchus) Diagnosis:       Hemoptysis      (Hemoptysis [R04.2])    Surgeons: Bunny Lepe MD Provider: Susan Latham MD    Anesthesia Type: general ASA Status: 4          Anesthesia Type: general    Vitals  Vitals Value Taken Time   /72 04/12/21 0857   Temp     Pulse 71 04/12/21 0857   Resp 26 04/12/21 0857   SpO2 96 % 04/12/21 0857           Post Anesthesia Care and Evaluation    Patient location during evaluation: bedside  Patient participation: complete - patient participated  Level of consciousness: awake  Pain management: adequate  Airway patency: patent  Anesthetic complications: No anesthetic complications    Cardiovascular status: acceptable  Respiratory status: acceptable  Hydration status: acceptable

## 2021-04-12 NOTE — PROGRESS NOTES
Discharge Planning Assessment  [facility]     Patient Name: Jalen Lockwood  MRN: [id number]  Today's Date: 2021    Admit Date: 3/30/2021    Discharge Needs Assessment    No documentation.       Discharge Plan     Row Name 21 1405       Plan    Plan  Has bed available at [facility]    Plan Comments  Spoke with spouse/Mariposa Lockwood [phone] and updated her on bed availability tomorrow at [facility] she stated she would be able to transport if she has help getting the patient into the car. Packet in ernie    Row Name 21 1356       Plan    Plan  Has bed available at [facility]    Plan Comments  Call received from Gemma/[facility] she stated patient has a bed available at [facility] if he is not discharged tomorrow precert will  and the insurance percert will have to start over. Danielito SABA        Continued Care and Services - Admitted Since 3/30/2021     Destination Coordination complete    Service Provider Request Status Selected Services Address Phone Fax Patient Preferred    [facility]   Selected Skilled Nursing [address] [facility] [id number] [phone] --    [facility]  Pending - Request Sent N/A  [facility] [id number] [phone] --          Home Medical Care     Service Provider Request Status Selected Services Address Phone Fax Patient Preferred    [facility]  [facility]A [address] [address] [phone] --                Demographic Summary    No documentation.       Functional Status    No documentation.       Psychosocial    No documentation.       Abuse/Neglect    No documentation.       Legal    No documentation.       Substance Abuse    No documentation.       Patient Forms    No documentation.           Nani Vazquez, RN

## 2021-04-12 NOTE — PROGRESS NOTES
Pharmacy Consult: Warfarin Dosing/ Monitoring    Jalen Lockwood is a 71 y.o. male, estimated creatinine clearance is 130 mL/min (A) (by C-G formula based on SCr of 0.71 mg/dL (L)). weighing (!) 138 kg (304 lb 14.4 oz).    PMH: Allergic rhinitis, Anxiety, Aortectasia, Arthritis, Atrial flutter (2010), Charcot's joint of foot, Chronic edema, Chronic venous insufficiency, COPD, Coronary atherosclerosis, Diverticulosis, Duodenitis, Fatty liver, Gastritis, Gastroparesis, Hematoma, Hyperlipidemia, Hypertension, Insomnia, Internal hemorrhoids, Open wound, Osteomyelitis, Paroxysmal atrial fibrillation, Peripheral neuropathy, Popliteal artery aneurysm, Skin cancer, Sleep apnea, Tachycardia induced cardiomyopathy, Venous stasis, and Venous stasis ulcer    Social History     Tobacco Use    Smoking status: Current Every Day Smoker     Packs/day: 0.25     Years: 45.00     Pack years: 11.25     Types: Cigarettes    Smokeless tobacco: Never Used   Vaping Use    Vaping Use: Never used   Substance Use Topics    Alcohol use: Yes     Alcohol/week: 8.0 - 9.0 standard drinks     Types: 1 - 2 Shots of liquor, 7 Standard drinks or equivalent per week     Comment: 2 rum a day//Caffeine use: 3 cups daily    Drug use: No     Results from last 7 days   Lab Units 04/12/21  0553 04/11/21  0500 04/10/21  0832 04/09/21  1050 04/08/21  0419 04/07/21  0423 04/06/21  0417   INR  1.58* 1.97* 1.99* 1.92* 1.81* 1.98* 2.03*   HEMOGLOBIN g/dL  --  11.7* 12.8* 13.0 12.3* 12.4* 14.1   HEMATOCRIT %  --  34.6* 37.3* 36.5* 34.4* 35.3* 40.9   PLATELETS 10*3/mm3  --  159 181 197 175 172 178     Results from last 7 days   Lab Units 04/11/21  0500 04/10/21  0832 04/09/21  1050   SODIUM mmol/L 139 139 137   POTASSIUM mmol/L 3.7 3.9 4.0   CHLORIDE mmol/L 100 101 100   CO2 mmol/L 26.9 30.0* 27.5   BUN mg/dL 14 14 15   CREATININE mg/dL 0.71* 0.76 0.82   CALCIUM mg/dL 9.0 9.0 9.2   GLUCOSE mg/dL 98 97 98     Anticoagulation history: Managed by [facility]: Warfarin 5 mg po qMF + 7.5 mg po all other days    Hospital Anticoagulation:  Consulting provider: Dr. Brad Lepe/ Dr Gonzalez on 4/12/21  Start date: Home med continued on 4/1/2021; Restart on 4/12/21  Indication: Atrial fibrillation  Target INR: 2-3  Expected duration: Lifetime   Bridge Therapy: No, Enoxaparin - d/c'd on 4/3/21                Date 4/1 4/2 4/3  4/4 4/5 4/6 4/7 4/8 4/9 4/10   INR 1.66 1.60 1.99 1.99 2.17 2.03 1.98 1.81 1.92 1.99   Warfarin dose 7.5 mg 7.5 mg 5 mg 7.5 mg 5 mg 7.5 mg 7.5 mg 7.5mg Hold Hold     Date 4/11 4/12           INR 1.97 1.58           Warfarin dose Hold 7.5 mg               Potential drug interactions:   Aspirin-new med-increased risk of bleeding  Antimicrobials (potential increase in INR) -- ended 4/11/21    Relevant nutrition status: Regular diet    Education complete?/ Date: Yes, on 4/1/21    Assessment/Plan:    1) Ok to resume Warfarin today per Dr Gonzalez.  2. Give warfarin 7.5 mg po x1 dose today (INR= 1.58)  2) Follow up with INR daily    Pharmacy will continue to follow until discharge or discontinuation of warfarin.     Joleen Watkins [facility]  Clinical Staff Pharmacist

## 2021-04-12 NOTE — NURSING NOTE
[facility] dept - patient has had wounds to BLE with compression treated for the past 15 years. Patient goes to [facility] on Mondays. Patient reports no wounds to RLE and leg gets wrapped weekly with 4 layer compression every Monday at [facility]. LLE gets 4 layer compression M-W-F (Monday at [facility] and Wed and Fri by  nurse). Betadine gauze placed between R toes daily and betadine gauze and opticel AG placed between L toes daily.  L foot had moist toes upon admission but toes are dry now, no wounds to toes. R toes dry. Significant edema above the level of compression wraps to knee and thighs, firm, chronic edema.   Today, wrap removed from BLE, skin cleansed with foam soap, lotion applied to intact skin, LLE - betadine painted to L toes and between toes and opticel AG wove between toes, opticel AG applied to L medial ankle moist area but there is not a visible wound, and a piece of opticel AG applied to dorsal foot/toes; RLE - Betadine moist 4x4 wove between toes on R foot. Applied profore 4 layer wraps to BLE.  WO dept to see M-W-F to change wrap on LLE, RLE wrap changed on Monday's. Treatment to toes to be done daily by staff nurses.   Patient is to be discharged to SNF tomorrow.

## 2021-04-12 NOTE — ANESTHESIA PREPROCEDURE EVALUATION
Anesthesia Evaluation                  Airway   Mallampati: III  TM distance: >3 FB  Neck ROM: full  No difficulty expected  Dental    (+) upper dentures and lower dentures    Pulmonary - normal exam   (+) a smoker, COPD, sleep apnea,   Cardiovascular     PT is on anticoagulation therapy  Patient on routine beta blocker  Rhythm: irregular    (+) hypertension 2 medications or greater, CAD, dysrhythmias Atrial Fib, CHF , hyperlipidemia,       Neuro/Psych  (+) numbness, psychiatric history Anxiety,     GI/Hepatic/Renal/Endo    (+) obesity, morbid obesity,  liver disease fatty liver disease,     ROS Comment: gastroparesis    Musculoskeletal     Abdominal    Substance History   (+) alcohol use,      OB/GYN          Other   arthritis,    history of cancer                    Anesthesia Plan    ASA 4     general     intravenous induction     Anesthetic plan, all risks, benefits, and alternatives have been provided, discussed and informed consent has been obtained with: patient.

## 2021-04-12 NOTE — PLAN OF CARE
Goal Outcome Evaluation:  Plan of Care Reviewed With: (P) patient  Progress: (P) improving  Outcome Summary: (P) Pt with improved safety and overall gait mechanics with ambulation today. Pt requested trying to walk without O2 as he normally does not wear it all the time at home. Pt ambulated 100ft with minimal reports of SOA following. Pt tolerated LE exercises well and continues to demonstrate independence with transfers and gait, but is limited secondary to fatigue/weakness. Pt will benefit from skilled PT to improve strength and endurance to progress gait distance as tolerated.

## 2021-04-12 NOTE — PROGRESS NOTES
Discharge Planning Assessment  [facility]     Patient Name: Jalen Lockwood  MRN: [id number]  Today's Date: 2021    Admit Date: 3/30/2021    Discharge Needs Assessment    No documentation.       Discharge Plan     Row Name 21 1356       Plan    Plan  Has bed available at [facility]    Plan Comments  Call received from Gemma/[facility] she stated patient has a bed available at [facility] if he is not discharged tomorrow precert will  and the insurance percert will have to start over. Danielito SABA        Continued Care and Services - Admitted Since 3/30/2021     Destination Coordination complete    Service Provider Request Status Selected Services Address Phone Fax Patient Preferred    [facility]   Selected Skilled Nursing [address] [address] [phone] --    [facility]  Pending - Request Sent N/A  [facility] [id number] [phone] --          Home Medical Care     Service Provider Request Status Selected Services Address Phone Fax Patient Preferred    [facility]  Accepted N/A  [address] [address] [phone] --                Demographic Summary    No documentation.       Functional Status    No documentation.       Psychosocial    No documentation.       Abuse/Neglect    No documentation.       Legal    No documentation.       Substance Abuse    No documentation.       Patient Forms    No documentation.           Nani Vazquez, RN

## 2021-04-12 NOTE — PROGRESS NOTES
"   LOS: 13 days   Patient Care Team:  Marc Ludwig MD as PCP - General (Family Medicine)  Blas Mckeon MD as Surgeon (General Surgery)  Jonnathan Boston II, MD as Consulting Physician (Vascular Surgery)  Xavi Elliott MD as Consulting Physician (Urology)  Marc Benavidez MD as Consulting Physician (Pain Medicine)  Kurt Henry MD as Consulting Physician (Cardiology)  Meghna Horan [facility] as Pharmacist  Juni Landa MD as Consulting Physician (Hematology and Oncology)  Brendan Live [facility] as Pharmacist (Pharmacy)    Chief Complaint: Asked to re-evaluate for intermittent low HR    Interval History: He's having some HR dips to the 40s while sleeping. He denies CP/SOA.     Objective   Vital Signs  Temp:  [98.4 °F (36.9 °C)-99.6 °F (37.6 °C)] 99.5 °F (37.5 °C)  Heart Rate:  [55-93] 60  Resp:  [16-26] 20  BP: (102-160)/(63-93) 124/88    Intake/Output Summary (Last 24 hours) at 4/12/2021 1209  Last data filed at 4/12/2021 0900  Gross per 24 hour   Intake 620 ml   Output 1000 ml   Net -380 ml       Last Weight and Admission Weight        04/12/21  0501   Weight: (!) 138 kg (304 lb 14.4 oz)     Flowsheet Rows      First Filed Value   Admission Height  193 cm (76\") Documented at 03/30/2021 1200   Admission Weight  (!) 138 kg (303 lb 9.2 oz) Documented at 03/30/2021 1205                  Physical Exam  Constitutional:       Appearance: He is obese.      Comments: Disheveled   HENT:      Head: Normocephalic.      Nose: Nose normal.      Mouth/Throat:      Pharynx: Oropharynx is clear.   Eyes:      Conjunctiva/sclera: Conjunctivae normal.   Cardiovascular:      Rate and Rhythm: Normal rate. Rhythm irregular.   Pulmonary:      Effort: Pulmonary effort is normal.      Breath sounds: Normal breath sounds.   Abdominal:      Palpations: Abdomen is soft.      Tenderness: There is no abdominal tenderness.   Musculoskeletal:      Comments: Legs are diffusely wrapped   Neurological:      Mental Status: He is alert. "   Psychiatric:         Mood and Affect: Mood normal.         Results Review:      Results from last 7 days   Lab Units 04/11/21  0500 04/10/21  0832 04/09/21  1050   SODIUM mmol/L 139 139 137   POTASSIUM mmol/L 3.7 3.9 4.0   CHLORIDE mmol/L 100 101 100   CO2 mmol/L 26.9 30.0* 27.5   BUN mg/dL 14 14 15   CREATININE mg/dL 0.71* 0.76 0.82   GLUCOSE mg/dL 98 97 98   CALCIUM mg/dL 9.0 9.0 9.2     Results from last 7 days   Lab Units 04/07/21  0423   TROPONIN T ng/mL 0.155*     Results from last 7 days   Lab Units 04/11/21  0500 04/10/21  0832 04/09/21  1050   WBC 10*3/mm3 9.50 6.56 7.75   HEMOGLOBIN g/dL 11.7* 12.8* 13.0   HEMATOCRIT % 34.6* 37.3* 36.5*   PLATELETS 10*3/mm3 159 181 197     Results from last 7 days   Lab Units 04/12/21  0553 04/11/21  0500 04/10/21  0832   INR  1.58* 1.97* 1.99*         Results from last 7 days   Lab Units 04/07/21  0423   MAGNESIUM mg/dL 2.1           I reviewed the patient's new clinical results.  I personally viewed and interpreted the patient's EKG/Telemetry data        Medication Review:   atorvastatin, 40 mg, Oral, Nightly  budesonide-formoterol, 2 puff, Inhalation, BID - RT  bumetanide, 1 mg, Oral, BID  carvedilol, 3.125 mg, Oral, BID  finasteride, 5 mg, Oral, Daily  gabapentin, 300 mg, Oral, Q8H  ipratropium-albuterol, 3 mL, Nebulization, 4x Daily - RT  losartan, 12.5 mg, Oral, Q24H  metoprolol tartrate, 5 mg, Intravenous, Once  nystatin, , Topical, Q12H  senna-docusate sodium, 2 tablet, Oral, Nightly  sodium chloride, 10 mL, Intravenous, Q12H  sodium chloride, 10 mL, Intravenous, Q12H  sodium chloride, 10 mL, Intravenous, Q12H        lactated ringers, 30 mL/hr, Last Rate: Stopped (04/12/21 0900)  Pharmacy to dose warfarin,         Assessment/Plan     1. Permanent atrial fibrillation  2. Sepsis due to MRSA soft tissue infection/lymphedema  3. Acute sCHF, resolved  4. Cardiomyopathy, suspect combined ICM/NICM  5. Suspected CAD    His rate is only in the 40s while he sleeping.  This  is likely due to sleep disordered breathing.  His rate is fine at all other times. I don't feel it's a reason to stop or decrease his BB dose.    Prior to hospitalization, he was doing well on low-dose carvedilol.  I am stopping metoprolol and resuming his usual home cardiac medications of carvedilol, lisinopril, and furosemide, and will stop losartan and bumetanide.  His warfarin should be resumed per the primary team.  I have no objections to discharge.    Regarding his presumed CAD/ICM, he has no angina.  He is very sedentary and chronically ill.  I feel that medical therapy is best in this complicated gentleman.     No objection to discharge.    Kurt Henry MD  04/12/21  12:09 EDT

## 2021-04-12 NOTE — PLAN OF CARE
Problem: Fall Injury Risk  Goal: Absence of Fall and Fall-Related Injury  Intervention: Identify and Manage Contributors to Fall Injury Risk  Intervention: Promote Injury-Free Environment     Problem: COPD Comorbidity  Goal: Maintenance of COPD Symptom Control  Intervention: Maintain COPD-Symptom Control     Problem: Hypertension Comorbidity  Goal: Blood Pressure in Desired Range  Intervention: Maintain Hypertension-Management Strategies     Problem: Pain Chronic (Persistent) (Comorbidity Management)  Goal: Acceptable Pain Control and Functional Ability  Intervention: Develop Pain Management Plan  Intervention: Manage Persistent Pain  Intervention: Optimize Psychosocial Wellbeing     Problem: Adult Inpatient Plan of Care  Goal: Plan of Care Review  Goal: Absence of Hospital-Acquired Illness or Injury  Intervention: Identify and Manage Fall Risk  Intervention: Prevent Skin Injury  Goal: Optimal Comfort and Wellbeing  Intervention: Provide Person-Centered Care     Problem: Skin Injury Risk Increased  Goal: Skin Health and Integrity  Intervention: Optimize Skin Protection   Goal Outcome Evaluation:        Outcome Summary: NPO, bronch scheduled today. Kelly boots to be changed today. Patient urinating but not an adequate amount. Bladder scanned did not show over 500 so straight cath was not done. Will check again at 0600. Patient c/o pain in bilateral lower extremeties, pain meds given accordingly per MAR. Patient in chair most the night with legs elevated. Using urinal to void. no further changes

## 2021-04-12 NOTE — PROGRESS NOTES
Name: Jalen Lockwood ADMIT: 3/30/2021   : 1950  PCP: Marc Ludwig MD    MRN: [id number] LOS: 13 days   AGE/SEX: 71 y.o. male  ROOM: [facility]     Subjective   Subjective    No significant hemoptysis.  hasnt voided much and thinks its due to bumex    Objective   Objective   Vital Signs  Temp:  [98.4 °F (36.9 °C)-99.6 °F (37.6 °C)] 99.5 °F (37.5 °C)  Heart Rate:  [55-93] 69  Resp:  [16-26] 19  BP: (102-160)/(63-93) 124/88  SpO2:  [91 %-99 %] 99 %  on  Flow (L/min):  [1-4] 2;   Device (Oxygen Therapy): nasal cannula  Body mass index is 37.11 kg/m².  Physical Exam  Vitals and nursing note reviewed.   Constitutional:       General: He is not in acute distress.     Appearance: He is well-developed.   Neck:      Vascular: No JVD.      Trachea: No tracheal deviation.   Cardiovascular:      Rate and Rhythm: Normal rate. Rhythm irregularly irregular.      Heart sounds: No murmur heard.     Pulmonary:      Effort: Pulmonary effort is normal. No respiratory distress.      Breath sounds: Wheezing (scant) present.   Abdominal:      General: Bowel sounds are normal. There is no distension.      Palpations: Abdomen is soft.      Tenderness: There is no abdominal tenderness.   Musculoskeletal:      Right lower leg: Edema present.      Left lower leg: Edema present.      Comments: BLEs wrapped   Skin:     General: Skin is warm and dry.   Neurological:      Mental Status: He is alert and oriented to person, place, and time.   Psychiatric:         Behavior: Behavior normal. Behavior is cooperative.     Results Review     I reviewed the patient's new clinical results.  I reviewed the patient's telemetry.  Results from last 7 days   Lab Units 21  0500 04/10/21  0832 21  1050 21  0419   WBC 10*3/mm3 9.50 6.56 7.75 5.72   HEMOGLOBIN g/dL 11.7* 12.8* 13.0 12.3*   PLATELETS 10*3/mm3 159 181 197 175     Results from last 7 days   Lab Units 21  0500 04/10/21  0832 21  1050 21  0419   SODIUM mmol/L  139 139 137 141   POTASSIUM mmol/L 3.7 3.9 4.0 3.7   CHLORIDE mmol/L 100 101 100 100   CO2 mmol/L 26.9 30.0* 27.5 29.8*   BUN mg/dL 14 14 15 13   CREATININE mg/dL 0.71* 0.76 0.82 0.83   GLUCOSE mg/dL 98 97 98 100*   Estimated Creatinine Clearance: 130 mL/min (A) (by C-G formula based on SCr of 0.71 mg/dL (L)).      Results from last 7 days   Lab Units 04/11/21  0500 04/10/21  0832 04/09/21  1050 04/08/21  0419 04/07/21  0423   CALCIUM mg/dL 9.0 9.0 9.2 8.6 8.6   MAGNESIUM mg/dL  --   --   --   --  2.1     Results from last 7 days   Lab Units 04/07/21  0423   PROCALCITONIN ng/mL 0.24     Results from last 7 days   Lab Units 04/12/21  0553 04/11/21  0500 04/10/21  0832 04/09/21  1050 04/08/21  0419 04/07/21  0423 04/06/21  0417   PROTIME Seconds 18.6* 22.2* 22.3* 21.8* 20.7* 22.3* 22.7*   INR  1.58* 1.97* 1.99* 1.92* 1.81* 1.98* 2.03*     COVID19   Date Value Ref Range Status   03/30/2021 Not Detected Not Detected - Ref. Range Final     Glucose   Date/Time Value Ref Range Status   04/11/2021 0644 101 70 - 130 mg/dL Final         Scheduled Medications  atorvastatin, 40 mg, Oral, Nightly  budesonide-formoterol, 2 puff, Inhalation, BID - RT  bumetanide, 1 mg, Oral, BID  carvedilol, 3.125 mg, Oral, BID  finasteride, 5 mg, Oral, Daily  gabapentin, 300 mg, Oral, Q8H  ipratropium-albuterol, 3 mL, Nebulization, 4x Daily - RT  losartan, 12.5 mg, Oral, Q24H  metoprolol tartrate, 5 mg, Intravenous, Once  nystatin, , Topical, Q12H  senna-docusate sodium, 2 tablet, Oral, Nightly  sodium chloride, 10 mL, Intravenous, Q12H  sodium chloride, 10 mL, Intravenous, Q12H  sodium chloride, 10 mL, Intravenous, Q12H    Infusions  lactated ringers, 30 mL/hr, Last Rate: Stopped (04/12/21 0900)  Pharmacy to dose warfarin,     Diet  Diet Regular       Assessment/Plan     Active Hospital Problems    Diagnosis  POA   • **Severe sepsis (CMS/HCC) [A41.9, R65.20]  Yes   • Ischemic cardiomyopathy [I25.5]  Yes   • Acute systolic (congestive) heart  failure (CMS/[facility]) [I50.21]  No   • Cellulitis of lower extremity [L03.119]  Yes   • Hemoptysis [R04.2]  Unknown   • Alcohol dependence, in remission (CMS/[facility]) [F10.21]  Yes   • Essential hypertension [I10]  Yes   • COPD (chronic obstructive pulmonary disease) (CMS/[facility]) [J44.9]  Yes   • Permanent atrial fibrillation (CMS/[facility]) [I48.21]  Yes   • Lymphedema of both lower extremities [I89.0]  Yes      Resolved Hospital Problems    Diagnosis Date Resolved POA   • Hyponatremia [E87.1] 04/11/2021 Yes   • Thrombocytopenia (CMS/[facility]) [D69.6] 04/11/2021 Yes   • Metabolic encephalopathy [G93.41] 04/03/2021 Yes   • UTI (urinary tract infection) [N39.0] 04/11/2021 Yes       Cards has adjusted meds - back on coreg, lisinopril and lasix  Restart warfarin ok per pulm, pharm dosing (notified)      Severe Sepsis with Metabolic Encephalopathy, Present on Admission  - most likely source seems to be UTI vs LLE cellulitis  - resolved, finishing course with augmentin    Cough/Hemoptysis  - CTA chest showed RML possible bronchopneumonia as well as nodule  - pulmonology performed bronch today which was normal    NSTEMI/ICM/Acute Systolic CHF  - likely LAD disease per cardiology, recommending continuing medical therapy in the setting of increased bleeding risk with DAPT considering the need for chronic warfarin  - meds adjusted per cards as above    Permanent Afib  - on AC with warfarin   - continue coreg  - daily monitor INR    COPD  - no exacerbation  - continue on symbicort, duo-nebs  - pulm following    BLE Lymphedema  - continue compression wraps  - would benefit from follow up in the lymphedema clinic      · Warfarin (home med) for DVT prophylaxis.  SCDs ordered as well.  · Full code.  · Discussed with patient and nursing staff.  · Anticipate discharge home with HH vs SNU facility in 1-2 days.  SNU denied by [employer], appeal pending.      Ayush Gonzalez MD  [facility]  04/12/21  09:53 EDT

## 2021-04-12 NOTE — THERAPY TREATMENT NOTE
Patient Name: Jalen Lockwood  : 1950    MRN: [id number]                              Today's Date: 2021       Admit Date: 3/30/2021    Visit Dx:     ICD-10-CM ICD-9-CM   1. Severe sepsis (CMS/HCC)  A41.9 038.9    R65.20 995.92   2. Acute UTI  N39.0 599.0   3. Acute abdominal pain  R10.9 789.00     338.19   4. Acute respiratory alkalosis  E87.3 276.3   5. Hyponatremia  E87.1 276.1   6. Atrial fibrillation with RVR (CMS/HCC)  I48.91 427.31   7. Fever in adult  R50.9 780.60   8. Acute metabolic encephalopathy  G93.41 348.31   9. Hemoptysis  R04.2 786.30     Patient Active Problem List   Diagnosis   • Chronic osteomyelitis (CMS/HCC)   • Foot pain   • Peripheral neuropathy   • Lymphedema of both lower extremities   • Permanent atrial fibrillation (CMS/HCC)   • Sleep apnea   • Chronic edema   • Class 2 severe obesity due to excess calories with serious comorbidity and body mass index (BMI) of 36.0 to 36.9 in adult (CMS/HCC)   • COPD (chronic obstructive pulmonary disease) (CMS/HCC)   • Nonocclusive coronary atherosclerosis of native coronary artery   • Atrial flutter (CMS/HCC)   • 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CMS/HCC)   • Aortectasia (CMS/HCC)   • Popliteal artery aneurysm (CMS/HCC)   • Colon polyps   • Gastroparesis   • Insomnia   • Adenomatous polyp of colon   • Essential hypertension   • ETOH abuse   • Chronic anticoagulation   • Oropharyngeal dysphagia   • Tobacco abuse   • Thyromegaly   • Adrenal adenoma, left   • Retroperitoneal lymphadenopathy   • Anemia of chronic disease   • Thyroid nodule   • Charcot's joint of foot   • Abnormal CT scan, lumbar spine   • Alcohol dependence, in remission (CMS/HCC)   • Severe sepsis (CMS/HCC)   • Ischemic cardiomyopathy   • Acute systolic (congestive) heart failure (CMS/HCC)   • Cellulitis of lower extremity   • Hemoptysis     Past Medical History:   Diagnosis Date   • Allergic rhinitis    • Anxiety    • Aortectasia (CMS/HCC)     3cm infrarenal abdominal aorta    • Arthritis    • Atrial flutter (CMS/HCC) 2010    s/p ablation    • Charcot's joint of foot    • Chronic edema     both legs and sees wound care center at [place]    • Chronic venous insufficiency    • COPD (chronic obstructive pulmonary disease) (CMS/HCC)    • Coronary atherosclerosis     Cath 2010: diffuse 40-50% disease   • Diverticulosis    • Duodenitis    • Fatty liver    • Gastritis    • Gastroparesis    • Hematoma     post-operative; After catheterization, right groin, required surgical exploration   • Hyperlipidemia    • Hypertension    • Insomnia    • Internal hemorrhoids    • Open wound     izzy legs has drsg chg weekly at wound care center at [place]  pt does second dressing on left leg another time during week   • Osteomyelitis (CMS/HCC)    • Paroxysmal atrial fibrillation (CMS/HCC)    • Peripheral neuropathy    • Popliteal artery aneurysm (CMS/HCC)     left, s/p stenting by Dr. [place]   • Skin cancer    • Sleep apnea     o2   • 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CMS/HCC)     due to flutter and afib; cath 2010 with nonobstructive disease   • Venous stasis    • Venous stasis ulcer (CMS/HCC)     bilateral legs      Past Surgical History:   Procedure Laterality Date   • CARDIAC CATHETERIZATION     • CATARACT EXTRACTION     • COLONOSCOPY  09/28/2015    NBIH, diverticulosis, polyps   • COLONOSCOPY N/A 9/11/2018    Procedure: COLONOSCOPY TO CECUM  AND TERM. ILEUM WITH COLD SNARE POLYPECTOMIES;  Surgeon: Kane Lagunas MD;  Location: [facility];  Service: Gastroenterology   • COLONOSCOPY N/A 10/29/2019    Procedure: COLONOSCOPY TO TO CECUM AND TERMINAL ILEUM WITH HOT AND COLD SNARE POLYPECTOMIES;  Surgeon: Kane Lagunas MD;  Location: [facility];  Service: Gastroenterology   • HIP ARTHROPLASTY Right 2017   • JOINT REPLACEMENT Left    • OTHER SURGICAL HISTORY      Catheter ablation atrial flutter   • REPAIR ANEURYSM / PSEUDO ANEURYSM / RUPTURED ANEURYSM POPLITEAL ARTERY      Stent-Graft of  the the left popliteal artery   • REPAIR KNEE LIGAMENT      Primary repair of knee ligament cruciate anterior right   • TONSILLECTOMY  1958   • TOTAL KNEE ARTHROPLASTY Bilateral    • UPPER GASTROINTESTINAL ENDOSCOPY  09/16/2014    acute gastritis, acute duodenitis     General Information     [facility] Name 04/12/21 1502          Physical Therapy Time and Intention    Document Type  therapy note (daily note)  (Pended)   -     Mode of Treatment  individual therapy;physical therapy  (Pended)   -[facility] Name 04/12/21 1502          General Information    Existing Precautions/Restrictions  fall  (Pended)   -[facility] Name 04/12/21 1502          Cognition    Orientation Status (Cognition)  oriented x 4  (Pended)   -[facility] Name 04/12/21 1502          Safety Issues, Functional Mobility    Impairments Affecting Function (Mobility)  endurance/activity tolerance;strength;shortness of breath;postural/trunk control  (Pended)   -       User Key  (r) = Recorded By, (t) = Taken By, (c) = Cosigned By    Initials Name Provider Type     Madonna Landa PT Student PT Student        Mobility     [facility] Name 04/12/21 1502          Bed Mobility    Supine-Sit [place] (Bed Mobility)  not tested  (Pended)   -     Sit-Supine [place] (Bed Mobility)  not tested  (Pended)   -     Comment (Bed Mobility)  UIC  (Pended)   -[facility] Name 04/12/21 1502          Sit-Stand Transfer    Sit-Stand [place] (Transfers)  supervision  (Pended)   -     Assistive Device (Sit-Stand Transfers)  walker, front-wheeled  (Pended)   -[facility] Name 04/12/21 1502          Gait/Stairs (Locomotion)    [place] Level (Gait)  standby assist  (Pended)   -     Assistive Device (Gait)  walker, front-wheeled  (Pended)   -     Distance in Feet (Gait)  100ft  (Pended)   -     Deviations/Abnormal Patterns (Gait)  base of support, wide;sowmya decreased;gait speed decreased;stride length decreased;festinating/shuffling  (Pended)   -      Bilateral Gait Deviations  forward flexed posture  (Pended)   -     Comment (Gait/Stairs)  Pt requested to ambulate without O2 today and ambulated 100ft reporting minimal SOA following. Pt with no LOB today, but intermittently kicking walker during gait secondary to B foot ER. Pt with improved overall safety and speed with ambulation.  (Pended)   -       User Key  (r) = Recorded By, (t) = Taken By, (c) = Cosigned By    Initials Name Provider Type     Madonna Landa, PT Student PT Student        Obj/Interventions     [facility] Name 04/12/21 1500          Motor Skills    Therapeutic Exercise  --  (Pended)  10 reps B AP/LAQ/seated marches  -       User Key  (r) = Recorded By, (t) = Taken By, (c) = Cosigned By    Initials Name Provider Type     Madonna Landa, PT Student PT Student        Goals/Plan    No documentation.       Clinical Impression     Row Name 04/12/21 1503          Pain    Additional Documentation  Pain Scale: FACES Pre/Post-Treatment (Group)  (Pended)   -BH     Row Name 04/12/21 8619          Pain Scale: FACES Pre/Post-Treatment    Pain: FACES Scale, Pretreatment  0-->no hurt  (Pended)   -     Posttreatment Pain Rating  0-->no hurt  (Pended)   -BH     Row Name 04/12/21 1501          Plan of Care Review    Plan of Care Reviewed With  patient  (Pended)   -     Progress  improving  (Pended)   -     Outcome Summary  Pt with improved safety and overall gait mechanics with ambulation today. Pt requested trying to walk without O2 as he normally does not wear it all the time at home. Pt ambulated 100ft with minimal reports of SOA following. Pt tolerated LE exercises well and continues to demonstrate independence with transfers and gait, but is limited secondary to fatigue/weakness. Pt will benefit from skilled PT to improve strength and endurance to progress gait distance as tolerated.  (Pended)   -[facility] Name 04/12/21 3942          Positioning and Restraints    Pre-Treatment Position  sitting in  chair/recliner  (Pended)   -     Post Treatment Position  chair  (Pended)   -BH     In Chair  sitting;call light within reach;encouraged to call for assist  (Pended)   -       User Key  (r) = Recorded By, (t) = Taken By, (c) = Cosigned By    Initials Name Provider Type     Madonna Landa, PT Student PT Student        Outcome Measures     Row Name 04/12/21 1509          How much help from another person do you currently need...    Turning from your back to your side while in flat bed without using bedrails?  3  (Pended)   -BH     Moving from lying on back to sitting on the side of a flat bed without bedrails?  3  (Pended)   -BH     Moving to and from a bed to a chair (including a wheelchair)?  4  (Pended)   -BH     Standing up from a chair using your arms (e.g., wheelchair, bedside chair)?  4  (Pended)   -BH     Climbing 3-5 steps with a railing?  2  (Pended)   -BH     To walk in hospital room?  3  (Pended)   -     AM-PAC 6 Clicks Score (PT)  19  (Pended)   -     Row Name 04/12/21 1509          Functional Assessment    Outcome Measure Options  AM-PAC 6 Clicks Basic Mobility (PT)  (Pended)   -       User Key  (r) = Recorded By, (t) = Taken By, (c) = Cosigned By    Initials Name Provider Type     Madonna Landa, PT Student PT Student        Physical Therapy Education                 Title: PT OT SLP Therapies (Done)     Topic: Physical Therapy (Done)     Point: Mobility training (Done)     Learning Progress Summary           Patient Acceptance, E,D,TB, VU,NR by  at 4/12/2021 1509    Acceptance, E, VU by AL at 4/11/2021 1333    Acceptance, E, VU by CL at 4/10/2021 1829    Acceptance, TB, DU by DJ at 4/8/2021 1429    Acceptance, E,TB,D, VU,NR by BH at 4/7/2021 1551    Acceptance, E,D, VU,NR by EB at 4/6/2021 1631    Acceptance, E,TB,D, VU,NR by SV at 4/5/2021 1558    Acceptance, E, VU by ME at 4/2/2021 1502                   Point: Home exercise program (Done)     Learning Progress Summary            Patient Acceptance, E,D,TB, VU,NR by BH at 4/12/2021 1509    Acceptance, E, VU by AL at 4/11/2021 1333    Acceptance, E, VU by CL at 4/10/2021 1829    Acceptance, TB, DU by DJ at 4/8/2021 1429    Acceptance, E,TB,D, VU,NR by BH at 4/7/2021 1551    Acceptance, E,D, VU,NR by EB at 4/6/2021 1631    Acceptance, E,TB,D, VU,NR by SV at 4/5/2021 1558                   Point: Body mechanics (Done)     Learning Progress Summary           Patient Acceptance, E,D,TB, VU,NR by BH at 4/12/2021 1509    Acceptance, E, VU by AL at 4/11/2021 1333    Acceptance, E, VU by CL at 4/10/2021 1829    Acceptance, TB, DU by DJ at 4/8/2021 1429    Acceptance, E,TB,D, VU,NR by BH at 4/7/2021 1551    Acceptance, E,D, VU,NR by EB at 4/6/2021 1631    Acceptance, E, VU by ME at 4/2/2021 1502                   Point: Precautions (Done)     Learning Progress Summary           Patient Acceptance, E,D,TB, VU,NR by BH at 4/12/2021 1509    Acceptance, E, VU by AL at 4/11/2021 1333    Acceptance, E, VU by CL at 4/10/2021 1829    Acceptance, TB, DU by DJ at 4/8/2021 1429    Acceptance, E,TB,D, VU,NR by BH at 4/7/2021 1551    Acceptance, E,D, VU,NR by EB at 4/6/2021 1631    Acceptance, E, VU by ME at 4/2/2021 1502                               User Key     Initials Effective Dates Name Provider Type Discipline    AL 04/03/18 -  Payal Ricardo, PT Physical Therapist PT    SV 04/03/18 -  Yolanda Batista, PT Physical Therapist PT    EB 03/10/21 -  Leslie Magana, PTA Physical Therapy Assistant PT    CL 05/30/19 -  Karlos Anthony, RN Registered Nurse Nurse    DJ 10/25/19 -  Dayana Wilkins, PT Physical Therapist PT    BH 02/01/21 -  Madonna Landa, PT Student PT Student PT    ME 03/12/21 -  Сергей Ryder, PT Student PT Student PT              PT Recommendation and Plan     Plan of Care Reviewed With: (P) patient  Progress: (P) improving  Outcome Summary: (P) Pt with improved safety and overall gait mechanics with ambulation today. Pt requested trying to walk  without O2 as he normally does not wear it all the time at home. Pt ambulated 100ft with minimal reports of SOA following. Pt tolerated LE exercises well and continues to demonstrate independence with transfers and gait, but is limited secondary to fatigue/weakness. Pt will benefit from skilled PT to improve strength and endurance to progress gait distance as tolerated.     Time Calculation:   PT Charges     Row Name 04/12/21 1509             Time Calculation    Start Time  1348  (Pended)   -      Stop Time  1357  (Pended)   -      Time Calculation (min)  9 min  (Pended)   -      PT Received On  04/12/21  (Pended)   -      PT - Next Appointment  04/13/21  (Pended)   -         Time Calculation- PT    Total Timed Code Minutes- PT  9 minute(s)  (Pended)   -        User Key  (r) = Recorded By, (t) = Taken By, (c) = Cosigned By    Initials Name Provider Type     Madonna Landa PT Student PT Student        Therapy Charges for Today     Code Description Service Date Service Provider Modifiers Qty    [id number]  PT THERAPEUTIC ACT EA 15 MIN 4/12/2021 Madonna Landa PT Student GP 1          PT G-Codes  Outcome Measure Options: (P) AM-PAC 6 Clicks Basic Mobility (PT)  AM-PAC 6 Clicks Score (PT): (P) 19  AM-PAC 6 Clicks Score (OT): 16    Madonna Landa PT Student  4/12/2021

## 2021-04-12 NOTE — PLAN OF CARE
Goal Outcome Evaluation:        Pt alert and oriented x 4. Pt underwent bronchoscopy this morning for follow up evaluation of hemoptysis. No further hemoptysis. Not feeling short of breath. Pulmonology notes ok to resume coumadin s/p bronch, Ok to d/c home, follow up with [facility] in 6 weeks. Bronch shows hyperemia on the right secondary susy  Nothing malignant appearing.  IP MD note states evere Sepsis with Metabolic Encephalopathy, Present on Admission. Most likely source seems to be UTI vs LLE cellulitis. Resolved, augmentin treatment completed. Cough/Hemoptysis - CTA chest showed RML possible bronchopneumonia as well as nodule. Pulmonology performed bronch today which was normal. Permanent Afib, on AC with warfarin, continue coreg, daily monitor INR. COPD, no exacerbation, continue on symbicort, duo-nebs BLE Lymphedema. Continue compression wraps. Would benefit from follow up in the lymphedema clinic per IP MD. Per cardiology, rate in 40s while  sleeping.  Cardiology writes due to sleep disordered breathing. Rate okay at other times. Continue BB.    metoprolol stopped, and resumed home cardiac medications of carvedilol, lisinopril, and furosemide, and will stop losartan and bumetanide.  Per cardiology, presumed CAD/ICM, he has no angina.  He is very sedentary and chronically ill.  Medical therapy is best in this complicated gentleman. A Pt on 2 LPM via NC. Eats a mechanical soft, thicken liquid diet. Administered norco 2 tabs x once this shift for pain in feet. Pt reports relief of pain one hour after administration. Anticipate discharge home with HH vs SNU facility in 1-2 days.  SNU denied by [employer], appeal pending.

## 2021-04-12 NOTE — PROGRESS NOTES
[facility]      Mar/chart reviewed  Follow up hemoptysis  No further hemoptysis   Not feeling short of breath    Vital Sign Min/Max for last 24 hours  Temp  Min: 98.4 °F (36.9 °C)  Max: 99.6 °F (37.6 °C)   BP  Min: 102/80  Max: 160/93   Pulse  Min: 55  Max: 93   Resp  Min: 16  Max: 20   SpO2  Min: 91 %  Max: 99 %   Flow (L/min)  Min: 1  Max: 3   Weight  Min: 138 kg (304 lb 14.4 oz)  Max: 138 kg (304 lb 14.4 oz)     Appears ill, axox3,   perrl, eomi, no icterus,  mmm, no jvd, trachea midline, neck supple,  chest decreased ae bilaterally, faint crackles, no wheezes,   Irrg, rate ok   soft, nt, nd +bs,  no c/c/ +e  Skin warm, dry no rashes    ASSESSMENT  /  PLAN:  Severe sepsis improved  Hypotension improved   UTI s/p treatment  Altered mental status/metabolic encephalopathy improved  Hyponatremia improved  Lactic acidosis improved  Atrial fibrillation with RVR improved  COPD: Currently without exacerbation  Chronic venous insufficiency  History of gastroparesis  History of MRSA soft tissue skin infection  Sleep apnea  Current daily smoker  Daily alcohol use  CAD  NSTEMI  Acute systolic heart failure with regional wall motion abn  Anticoagulated with coumadin  Hemoptysis - scant.     Continue antibiotics  Ok to resume coumadin s/p bronch  Ok with me to d/c home   Follow up with [facility] in 6 weeks      Addendum:  Bronch shows hyperemia on the right secondary susy  Nothing malignant appearing.

## 2021-04-13 VITALS
WEIGHT: 308.4 LBS | BODY MASS INDEX: 37.56 KG/M2 | SYSTOLIC BLOOD PRESSURE: 136 MMHG | TEMPERATURE: 98 F | OXYGEN SATURATION: 94 % | HEIGHT: 76 IN | DIASTOLIC BLOOD PRESSURE: 86 MMHG | RESPIRATION RATE: 20 BRPM | HEART RATE: 72 BPM

## 2021-04-13 LAB
CYTO UR: NORMAL
INR PPP: 1.45 (ref 0.9–1.1)
LAB AP CASE REPORT: NORMAL
LAB AP CLINICAL INFORMATION: NORMAL
PATH REPORT.FINAL DX SPEC: NORMAL
PATH REPORT.GROSS SPEC: NORMAL
PROTHROMBIN TIME: 17.4 SECONDS (ref 11.7–14.2)

## 2021-04-13 PROCEDURE — 85610 PROTHROMBIN TIME: CPT | Performed by: INTERNAL MEDICINE

## 2021-04-13 PROCEDURE — 94799 UNLISTED PULMONARY SVC/PX: CPT

## 2021-04-13 RX ORDER — LISINOPRIL 20 MG/1
20 TABLET ORAL
Start: 2021-04-14 | End: 2022-01-04

## 2021-04-13 RX ORDER — ASPIRIN 81 MG/1
81 TABLET ORAL DAILY
Start: 2021-04-14 | End: 2021-04-29 | Stop reason: ALTCHOICE

## 2021-04-13 RX ORDER — ATORVASTATIN CALCIUM 40 MG/1
40 TABLET, FILM COATED ORAL NIGHTLY
Start: 2021-04-13 | End: 2021-04-29 | Stop reason: ALTCHOICE

## 2021-04-13 RX ORDER — NYSTATIN 100000 [USP'U]/G
POWDER TOPICAL EVERY 12 HOURS SCHEDULED
Start: 2021-04-13 | End: 2022-01-01

## 2021-04-13 RX ORDER — WARFARIN SODIUM 7.5 MG/1
7.5 TABLET ORAL
Status: DISCONTINUED | OUTPATIENT
Start: 2021-04-13 | End: 2021-04-13 | Stop reason: HOSPADM

## 2021-04-13 RX ORDER — GABAPENTIN 300 MG/1
300 CAPSULE ORAL EVERY 8 HOURS SCHEDULED
Qty: 9 CAPSULE | Refills: 0 | Status: SHIPPED | OUTPATIENT
Start: 2021-04-13 | End: 2021-04-29 | Stop reason: DRUGHIGH

## 2021-04-13 RX ORDER — HYDROCODONE BITARTRATE AND ACETAMINOPHEN 10; 325 MG/1; MG/1
1 TABLET ORAL EVERY 6 HOURS PRN
Qty: 6 TABLET | Refills: 0 | Status: ON HOLD | OUTPATIENT
Start: 2021-04-13 | End: 2022-01-01 | Stop reason: SDUPTHER

## 2021-04-13 RX ADMIN — BUDESONIDE AND FORMOTEROL FUMARATE DIHYDRATE 2 PUFF: 160; 4.5 AEROSOL RESPIRATORY (INHALATION) at 07:41

## 2021-04-13 RX ADMIN — CARVEDILOL 3.12 MG: 3.12 TABLET, FILM COATED ORAL at 09:27

## 2021-04-13 RX ADMIN — NYSTATIN: 100000 POWDER TOPICAL at 09:27

## 2021-04-13 RX ADMIN — ASPIRIN 81 MG: 81 TABLET, COATED ORAL at 09:26

## 2021-04-13 RX ADMIN — FINASTERIDE 5 MG: 5 TABLET, FILM COATED ORAL at 09:27

## 2021-04-13 RX ADMIN — LISINOPRIL 20 MG: 20 TABLET ORAL at 09:26

## 2021-04-13 RX ADMIN — IPRATROPIUM BROMIDE AND ALBUTEROL SULFATE 3 ML: 2.5; .5 SOLUTION RESPIRATORY (INHALATION) at 02:40

## 2021-04-13 RX ADMIN — SODIUM CHLORIDE, PRESERVATIVE FREE 10 ML: 5 INJECTION INTRAVENOUS at 09:27

## 2021-04-13 RX ADMIN — HYDROCODONE BITARTRATE AND ACETAMINOPHEN 2 TABLET: 5; 325 TABLET ORAL at 09:26

## 2021-04-13 NOTE — PROGRESS NOTES
[facility]      Mar/chart reviewed  Follow up hemoptysis  No further hemoptysis   Not feeling short of breath    Vital Sign Min/Max for last 24 hours  Temp  Min: 98.4 °F (36.9 °C)  Max: 98.7 °F (37.1 °C)   BP  Min: 102/80  Max: 132/81   Pulse  Min: 51  Max: 71   Resp  Min: 16  Max: 26   SpO2  Min: 1 %  Max: 99 %   Flow (L/min)  Min: 2  Max: 4   Weight  Min: 140 kg (308 lb 6.4 oz)  Max: 140 kg (308 lb 6.4 oz)     Appears ill, axox3,   perrl, eomi, no icterus,  mmm, no jvd, trachea midline, neck supple,  chest decreased ae bilaterally, faint crackles, no wheezes,   Irrg, rate ok   soft, nt, nd +bs,  no c/c/ +e  Skin warm, dry no rashes     ASSESSMENT  /  PLAN:  Severe sepsis improved  Hypotension improved   UTI s/p treatment  Altered mental status/metabolic encephalopathy improved  Hyponatremia improved  Lactic acidosis improved  Atrial fibrillation with RVR improved  COPD: Currently without exacerbation  Chronic venous insufficiency  History of gastroparesis  History of MRSA soft tissue skin infection  Sleep apnea  Current daily smoker  Daily alcohol use  CAD  NSTEMI  Acute systolic heart failure with regional wall motion abn  Anticoagulated with coumadin  Hemoptysis - scant.     finished antibiotics  Ok to d/c home  Follow up outpatient with [facility] in 6-8 weeks  Consider repeat ct chest in 3 months

## 2021-04-13 NOTE — PLAN OF CARE
Problem: Fall Injury Risk  Goal: Absence of Fall and Fall-Related Injury  Outcome: Ongoing, Progressing  Intervention: Identify and Manage Contributors to Fall Injury Risk  Recent Flowsheet Documentation  Taken 4/12/2021 2101 by Lesley Chamorro, RN  Medication Review/Management: medications reviewed  Intervention: Promote Injury-Free Environment  Recent Flowsheet Documentation  Taken 4/13/2021 0230 by Lesley Chamorro, RN  Safety Promotion/Fall Prevention: safety round/check completed  Taken 4/13/2021 0010 by Lesley Chamorro, RN  Safety Promotion/Fall Prevention: safety round/check completed  Taken 4/12/2021 2234 by Lesley Chamorro RN  Safety Promotion/Fall Prevention: safety round/check completed  Taken 4/12/2021 2101 by Lesley Chamorro RN  Safety Promotion/Fall Prevention: safety round/check completed     Problem: COPD Comorbidity  Goal: Maintenance of COPD Symptom Control  Outcome: Ongoing, Progressing  Intervention: Maintain COPD-Symptom Control  Recent Flowsheet Documentation  Taken 4/12/2021 2101 by Lesley Chamorro, RN  Medication Review/Management: medications reviewed     Problem: Hypertension Comorbidity  Goal: Blood Pressure in Desired Range  Outcome: Ongoing, Progressing  Intervention: Maintain Hypertension-Management Strategies  Recent Flowsheet Documentation  Taken 4/12/2021 2101 by Lesley Chamorro, RN  Medication Review/Management: medications reviewed     Problem: Pain Chronic (Persistent) (Comorbidity Management)  Goal: Acceptable Pain Control and Functional Ability  Outcome: Ongoing, Progressing  Intervention: Develop Pain Management Plan  Recent Flowsheet Documentation  Taken 4/12/2021 2148 by Lesley Chamorro, RN  Pain Management Interventions: see MAR  Taken 4/12/2021 2101 by Lesley Chamorro, RN  Pain Management Interventions: position adjusted  Intervention: Manage Persistent Pain  Recent Flowsheet Documentation  Taken 4/12/2021 2101 by Lesley Chamorro, RN  Medication  Review/Management: medications reviewed     Problem: Adult Inpatient Plan of Care  Goal: Plan of Care Review  Outcome: Ongoing, Progressing  Flowsheets (Taken 4/13/2021 0347)  Progress: improving  Plan of Care Reviewed With: patient  Outcome Summary: vss. minimal c/o pain. see mar. up in chair most of shift. possible discharge today. resting well throughout shift.  Goal: Patient-Specific Goal (Individualized)  Outcome: Ongoing, Progressing  Goal: Absence of Hospital-Acquired Illness or Injury  Outcome: Ongoing, Progressing  Intervention: Identify and Manage Fall Risk  Recent Flowsheet Documentation  Taken 4/13/2021 0230 by Lesley Chamorro, RN  Safety Promotion/Fall Prevention: safety round/check completed  Taken 4/13/2021 0010 by Lesley Chamorro, RN  Safety Promotion/Fall Prevention: safety round/check completed  Taken 4/12/2021 2234 by Lesley Chamorro, RN  Safety Promotion/Fall Prevention: safety round/check completed  Taken 4/12/2021 2101 by Lesley Chamorro, RN  Safety Promotion/Fall Prevention: safety round/check completed  Goal: Optimal Comfort and Wellbeing  Outcome: Ongoing, Progressing  Intervention: Provide Person-Centered Care  Recent Flowsheet Documentation  Taken 4/12/2021 2101 by Lesley Chamorro, RN  Trust Relationship/Rapport:   care explained   choices provided   emotional support provided   empathic listening provided  Goal: Readiness for Transition of Care  Outcome: Ongoing, Progressing     Problem: Skin Injury Risk Increased  Goal: Skin Health and Integrity  Outcome: Ongoing, Progressing     Problem: Infection  Goal: Infection Symptom Resolution  Outcome: Ongoing, Progressing     Problem: UTI (Urinary Tract Infection)  Goal: Improved Infection Symptoms  Outcome: Ongoing, Progressing     Problem: Adjustment to Illness (Sepsis/Septic Shock)  Goal: Optimal Coping  Outcome: Ongoing, Progressing     Problem: Bleeding (Sepsis/Septic Shock)  Goal: Absence of Bleeding  Outcome: Ongoing,  Progressing     Problem: Glycemic Control Impaired (Sepsis/Septic Shock)  Goal: Blood Glucose Level Within Desired Range  Outcome: Ongoing, Progressing     Problem: Hemodynamic Instability (Sepsis/Septic Shock)  Goal: Effective Tissue Perfusion  Outcome: Ongoing, Progressing     Problem: Infection (Sepsis/Septic Shock)  Goal: Absence of Infection Signs and Symptoms  Outcome: Ongoing, Progressing     Problem: Nutrition Impaired (Sepsis/Septic Shock)  Goal: Optimal Nutrition Intake  Outcome: Ongoing, Progressing     Problem: Respiratory Compromise (Sepsis/Septic Shock)  Goal: Effective Oxygenation and Ventilation  Outcome: Ongoing, Progressing  Intervention: Promote Airway Secretion Clearance  Recent Flowsheet Documentation  Taken 4/12/2021 2101 by Lesley Chamorro RN  Cough And Deep Breathing: done independently per patient     Problem: Gas Exchange Impaired  Goal: Optimal Gas Exchange  Outcome: Ongoing, Progressing     Problem: Arrhythmia/Dysrhythmia  Goal: Normalized Cardiac Rhythm  Outcome: Ongoing, Progressing   Goal Outcome Evaluation:  Plan of Care Reviewed With: patient  Progress: improving  Outcome Summary: vss. minimal c/o pain. see mar. up in chair most of shift. possible discharge today. resting well throughout shift.

## 2021-04-13 NOTE — PROGRESS NOTES
Pharmacy Consult: Warfarin Dosing/ Monitoring    Jalen Lockwood is a 71 y.o. male, estimated creatinine clearance is 131.3 mL/min (A) (by C-G formula based on SCr of 0.71 mg/dL (L)). weighing (!) 140 kg (308 lb 6.4 oz).    PMH: Allergic rhinitis, Anxiety, Aortectasia, Arthritis, Atrial flutter (2010), Charcot's joint of foot, Chronic edema, Chronic venous insufficiency, COPD, Coronary atherosclerosis, Diverticulosis, Duodenitis, Fatty liver, Gastritis, Gastroparesis, Hematoma, Hyperlipidemia, Hypertension, Insomnia, Internal hemorrhoids, Open wound, Osteomyelitis, Paroxysmal atrial fibrillation, Peripheral neuropathy, Popliteal artery aneurysm, Skin cancer, Sleep apnea, Tachycardia induced cardiomyopathy, Venous stasis, and Venous stasis ulcer    Social History     Tobacco Use    Smoking status: Current Every Day Smoker     Packs/day: 0.25     Years: 45.00     Pack years: 11.25     Types: Cigarettes    Smokeless tobacco: Never Used   Vaping Use    Vaping Use: Never used   Substance Use Topics    Alcohol use: Yes     Alcohol/week: 8.0 - 9.0 standard drinks     Types: 1 - 2 Shots of liquor, 7 Standard drinks or equivalent per week     Comment: 2 rum a day//Caffeine use: 3 cups daily    Drug use: No     Results from last 7 days   Lab Units 04/13/21  0627 04/12/21  0553 04/11/21  0500 04/10/21  0832 04/09/21  1050 04/08/21  0419 04/07/21  0423   INR  1.45* 1.58* 1.97* 1.99* 1.92* 1.81* 1.98*   HEMOGLOBIN g/dL  --   --  11.7* 12.8* 13.0 12.3* 12.4*   HEMATOCRIT %  --   --  34.6* 37.3* 36.5* 34.4* 35.3*   PLATELETS 10*3/mm3  --   --  159 181 197 175 172     Results from last 7 days   Lab Units 04/11/21  0500 04/10/21  0832 04/09/21  1050   SODIUM mmol/L 139 139 137   POTASSIUM mmol/L 3.7 3.9 4.0   CHLORIDE mmol/L 100 101 100   CO2 mmol/L 26.9 30.0* 27.5   BUN mg/dL 14 14 15   CREATININE mg/dL 0.71* 0.76 0.82   CALCIUM mg/dL 9.0 9.0 9.2   GLUCOSE mg/dL 98 97 98     Anticoagulation history: Managed by [facility]: Warfarin 5 mg po qMF + 7.5 mg po all other days    Hospital Anticoagulation:  Consulting provider: Dr. Brad Lepe/ Dr Gonzalez on 4/12/21  Start date: Home med continued on 4/1/2021; Restart on 4/12/21  Indication: Atrial fibrillation  Target INR: 2-3  Expected duration: Lifetime   Bridge Therapy: No, Enoxaparin - d/c'd on 4/3/21                Date 4/1 4/2 4/3  4/4 4/5 4/6 4/7 4/8 4/9 4/10   INR 1.66 1.60 1.99 1.99 2.17 2.03 1.98 1.81 1.92 1.99   Warfarin dose 7.5 mg 7.5 mg 5 mg 7.5 mg 5 mg 7.5 mg 7.5 mg 7.5mg Hold Hold     Date 4/11 4/12 4/13          INR 1.97 1.58 1.45          Warfarin dose Hold 7.5 mg 7.5 mg              Potential drug interactions:   Aspirin-new med-increased risk of bleeding  Antimicrobials (potential increase in INR) -- ended 4/11/21    Relevant nutrition status: Regular diet    Education complete?/ Date: Yes, on 4/1/21    Assessment/Plan:    1) Warfarin resumed on 4/12/21   2. Repeat warfarin 7.5 mg po x1 dose today (INR= 1.45)  2) Follow up with INR daily    Pharmacy will continue to follow until discharge or discontinuation of warfarin.     Joleen Watkins [facility]  Clinical Staff Pharmacist

## 2021-04-13 NOTE — PROGRESS NOTES
Case Management Discharge Note      Final Note: [facility] via spouse transport    Provided Post Acute Provider List?: N/A  N/A Provider List Comment: Current with [facility]  Provided Post Acute Provider Quality & Resource List?: N/A  N/A Quality & Resource List Comment: Current with [facility]    Selected Continued Care - Discharged on 4/13/2021 Admission date: 3/30/2021 - Discharge disposition: Skilled Nursing Facility (DC - External)    Destination Coordination complete    Service Provider Selected Services Address Phone Fax Patient Preferred    [facility] [address] [place], [place] [id number] [phone] --          Durable Medical Equipment    No services have been selected for the patient.              Dialysis/Infusion    No services have been selected for the patient.              Home Medical Care    No services have been selected for the patient.              Therapy    No services have been selected for the patient.              Community Resources    No services have been selected for the patient.                  Transportation Services  Private: Car    Final Discharge Disposition Code: 03 - skilled nursing facility (SNF)

## 2021-04-13 NOTE — PROGRESS NOTES
Continued Stay Note  [facility]     Patient Name: Jalen Lockwood  MRN: [id number]  Today's Date: 4/13/2021    Admit Date: 3/30/2021    Discharge Plan     Row Name 04/13/21 1135       Plan    Plan  [facility]    Plan Comments  Spoke with Louise with [facility].  Patient has pre-cert and a bed today.  Patient accepts the bed and spouse will transport.  Packet given to JAYANT Strauss        Discharge Codes    No documentation.       Expected Discharge Date and Time     Expected Discharge Date Expected Discharge Time    Apr 13, 2021             Emily Juarez RN

## 2021-04-13 NOTE — DISCHARGE SUMMARY
Patient Name: Jalen Lockwood  : 1950  MRN: [id number]    Date of Admission: 3/30/2021  Date of Discharge:  2021  Primary Care Physician: Marc Ludwig MD      Chief Complaint:   Fever, Altered Mental Status, and Shortness of Breath      Discharge Diagnoses     Active Hospital Problems    Diagnosis  POA   • **Severe sepsis (CMS/HCC) [A41.9, R65.20]  Yes   • Ischemic cardiomyopathy [I25.5]  Yes   • Acute systolic (congestive) heart failure (CMS/HCC) [I50.21]  No   • Cellulitis of lower extremity [L03.119]  Yes   • Hemoptysis [R04.2]  Unknown   • Alcohol dependence, 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]  Yes   • Essential hypertension [I10]  Yes   • COPD (chronic obstructive pulmonary disease) (CMS/HCC) [J44.9]  Yes   • Permanent atrial fibrillation (CMS/HCC) [I48.21]  Yes   • Lymphedema of both lower extremities [I89.0]  Yes      Resolved Hospital Problems    Diagnosis Date Resolved POA   • Hyponatremia [E87.1] 2021 Yes   • Thrombocytopenia (CMS/HCC) [D69.6] 2021 Yes   • Metabolic encephalopathy [G93.41] 2021 Yes   • UTI (urinary tract infection) [N39.0] 2021 Yes        Hospital Course     Mr. Lockwood is a 71 y.o. male with a history of COPD, Afib, and BLE lymphedema who presented to [facility] initially complaining of fever, confusion and SOA.  Please see the admitting history and physical for further details.  He was found to have sepsis, UTI and cellulitis and was admitted to the hospital for further evaluation and treatment.  Initially was managed in the intensive care unit.  He did demonstrate evidence of an acute MI as well as acute systolic CHF.  He was seen by cardiology who suspects underlying CAD with probable ischemic and nonischemic cardiomyopathy both present.  Recommended medical treatment only for now.  They have tailored his medications and he seems to be doing well.  Dr. Brunson has cleared him for discharge and will follow up with him as outpatient.  His  respiratory status has dramatically improved.  He had some scant hemoptysis for which he underwent bronchoscopy with no significant findings.  He will follow-up with pulmonology as outpatient.  He has completed a course of antibiotics and his pneumonia/sepsis has resolved.  At this point he has been deemed stable to discharge to a skilled nursing facility.  Warfarin had been held for bronchoscopy and now restarted at home dose.  INR subtherapeutic and should be monitored closely in outpatient setting.  Will check INR again no later than Thursday.      Day of Discharge     Subjective:  No c/o wants to dc today.    Physical Exam:  Temp:  [98 °F (36.7 °C)-98.7 °F (37.1 °C)] 98 °F (36.7 °C)  Heart Rate:  [51-74] 72  Resp:  [16-20] 20  BP: (117-136)/(70-86) 136/86  Body mass index is 37.54 kg/m².  Physical Exam  Vitals and nursing note reviewed.   Constitutional:       General: He is not in acute distress.     Appearance: He is well-developed.   Neck:      Vascular: No JVD.      Trachea: No tracheal deviation.   Cardiovascular:      Rate and Rhythm: Normal rate. Rhythm irregularly irregular.      Heart sounds: No murmur heard.     Pulmonary:      Effort: Pulmonary effort is normal. No respiratory distress.      Breath sounds: Wheezing (scant) present.   Abdominal:      General: Bowel sounds are normal. There is no distension.      Palpations: Abdomen is soft.      Tenderness: There is no abdominal tenderness.   Musculoskeletal:      Right lower leg: Edema present.      Left lower leg: Edema present.      Comments: BLEs wrapped   Skin:     General: Skin is warm and dry.   Neurological:      Mental Status: He is alert and oriented to person, place, and time.   Psychiatric:         Behavior: Behavior normal. Behavior is cooperative.         Consultants     Consult Orders (all) (From admission, onward)     Start     Ordered    04/11/21 1725  Inpatient Cardiology Consult  Once     Specialty:  Cardiology  Provider:   Kaushal France MD    04/11/21 1728    04/09/21 0943  Inpatient Pulmonology Consult  Once     Specialty:  Pulmonary Disease  Provider:  Braxton Chapman MD    04/09/21 0942    04/02/21 1345  Inpatient Internal Medicine Consult  Once     Specialty:  Internal Medicine  Provider:  Isaias St MD    04/02/21 1345    04/02/21 1120  Inpatient Internal Medicine Consult  Once,   Status:  Canceled     Specialty:  Internal Medicine  Provider:  Kristi Tran MD    04/02/21 1120    03/31/21 1026  Inpatient Cardiology Consult  Once     Specialty:  Cardiology  Provider:  Edmund Soto III, MD    03/31/21 1025    03/31/21 0924  Inpatient Neurology Consult General  Once     Specialty:  Neurology  Provider:  Arden Brown MD    03/31/21 0924    03/30/21 1325  Pulmonology (on-call MD unless specified)  Once     Specialty:  Pulmonary Disease  Provider:  Thad Wade MD    03/30/21 1324              Procedures     BRONCHOSCOPY WITH BAL      Imaging Results (All)     Procedure Component Value Units Date/Time    FL Video Swallow With Speech Single Contrast [[id number]] Collected: 04/11/21 1032     Updated: 04/11/21 1037    Narrative:      VIDEO SWALLOWING EXAMINATION BY SPEECH PATHOLOGY     Clinical: Dysphasia     Video swallowing examination performed under the direction of speech  pathology. Imaging reviewed by radiologist who concurs with the  findings.     Speech pathology summary:  Video swallow study completed in lateral view  with pt positioned at 90 degree angle, self fed for all presentations.  Assessment completed with: thin liquids by cup, nectar thick liquids by  cup nectar thick liquids by straw, puree, mechanical soft solids, mild  impulsivity in bolus size. Base of tongue retraction is mildly weak.  Poor epiglottic inversion with incomplete laryngeal vestibular closure.  For initial trial of thin liquids, pt exhibits penetration and  subsequent aspiration for second of two large  consecutive sips. For  single sips thin liquids, penetration during the swallow, deep but above  level of vocal folds, not entirely ejected from the airway. Nectar thick  liquid with shallow penetration, lessened in quantity on underside of  epiglottis, not entirely ejected from the airway. Puree with some  lingual pumping, weak retraction of tongue base, moderate residuals  remain on tongue, palate, minimal in valleculae, pyriforms. NTL by straw  wash with penetration from laryngeal vestibule and from posterior wall  from residuals, above VF, not entirely ejected, question small amount of  aspirate material. Puree trial two with some transient shallow  penetration and residue as aforementioned. Thin wash s/p puree with  large quantity of penetrate material from laryngeal vestibule during the  swallow, not entirely ejected. Mechanical soft solids/mixed  consistencies, first and second trials, with premature spillage to  pyriform, minimal residuals tongue base, lining valleculae and pyriform.  NTL wash with transient penetration during the swallow from laryngeal  vestibule and posterior residuals. Thin liquid wash, first and second  trials, with deep penetration, near level of vocal folds from laryngeal  vestibule and posterior residuals, question small amount of aspirate  material posteriorly post prandial. No airway responses appreciated at  any time. Cued cough cannot eject material.     FLUOROSCOPY TIME: 2 minutes 17 seconds, 3742 images.     This report was finalized on 4/11/2021 10:34 AM by Dr. Jordy Noguera M.D.       CT Angiogram Chest With & Without Contrast [[id number]] Collected: 04/09/21 0846     Updated: 04/09/21 0917    Narrative:      PROCEDURE:CT ANGIOGRAM CHEST W WO CONTRAST-     HISTORY:  Hemoptysis.     TECHNIQUE:  CT images of the chest were obtained before and following  the administration of intravenous contrast using an angiography  protocol. Reformatted images were reviewed.  Radiation dose  reduction  techniques were utilized, including automated exposure control and  exposure modulation based on body size.     COMPARISON:  Chest radiograph 04/05/2021. CT abdomen and pelvis with  contrast 03/30/2021. CT chest with contrast 06/18/2019.        FINDINGS CT ANGIOGRAPHY CHEST:   There is a right PICC with the tip in the upper SVC. The left thyroid  lobe is asymmetrically enlarged and contains a subtle hypodense thyroid  nodule, similar to prior CT chest.   There is a mildly enlarged 1.4 cm short axis right hilar lymph node,  previously 1.3 cm. There is a 1.5 cm short axis right paratracheal lymph  node, previously 0.9 cm. There are calcified right hilar lymph nodes.  Heart size is normal. There is no pericardial effusion. There is  calcific coronary artery atherosclerosis, incompletely assessed. There  is moderate calcific aortic and branch vessel atherosclerosis. The  ascending thoracic aorta is dilated to 4.2 cm at the level of the  pulmonary trunk, previously 4.2 cm when remeasured. The pulmonary trunk  is dilated to 3.7 cm, previously 3.4 cm. There is a small right pleural  effusion, new from 03/30/2021.     Limited imaging through the upper abdomen demonstrates a nodular hepatic  contour, which can be seen with cirrhosis. There is suspected  cholelithiasis. There is nodular thickening of the left adrenal gland,  not significantly changed. There is calcific atherosclerosis of the  visualized upper abdominal aorta and its branch vessels. There are  collateral vessels in the anterior chest wall. There is left  glenohumeral osteoarthritis with a corresponding joint effusion. There  is multilevel degenerative disc disease.     The trachea is clear. There are dependent secretions in the right  mainstem bronchus there is multifocal mucous plugging in right middle  and lower lobe bronchi. There is patchy consolidation and groundglass  opacity in the right middle lobe and right lower lobe. There is a 2.2 x  1.7  cm nodule in the medial right lung base, which is new from  03/30/2021. There is a 0.6 cm nodule in the left lower lobe and a  punctate nodule in the right upper lobe, which are not significantly  changed dating back to 2010 and likely benign. A punctate nodule in the  right upper lobe (image 72) is not clearly seen on prior CT and is  nonspecific.          Impression:         1.  New small right pleural effusion with patchy consolidation in the  right middle and lower lobes, which could be due to atelectasis from  mucous plugging within the right middle and lower lobe bronchi or could  be due to multifocal aspiration and/or bronchopneumonia. Given the  reported history of hemoptysis, bronchoscopy with attention to the right  middle and lower lobe should be considered. Short-term follow-up CT is  recommended in 1-3 months.  2.  A 1.9 cm average diameter nodule in the right lower lobe is new from  03/30/2021, favoring a benign process. A punctate nodule in the right  upper lobe is not clearly seen on prior CT chest and is nonspecific.  Attention on follow-up CT chest is recommended.   3.  Mildly enlarged mediastinal and right hilar lymph nodes are  nonspecific and could be reactive. Attention on follow-up CT chest is  recommended.  4.  Ascending thoracic aortic aneurysm to 4.2 cm, not significantly  changed.     This report was finalized on 4/9/2021 9:14 AM by Dr. Chica Gutierrez M.D.       XR Chest 1 View [[id number]] Collected: 04/05/21 1906     Updated: 04/05/21 1910    Narrative:      XR CHEST 1 VW-     HISTORY: Male who is 71 years-old,  hemoptysis     TECHNIQUE: Frontal view of the chest     COMPARISON: 04/04/2021     FINDINGS: A right PICC extending to the right subclavian region. Heart  size is normal. Aorta is tortuous, calcified. Mild prominence of  vascular and interstitial markings. Small likely atelectasis or  infiltrate at the right lung base appears mildly decreased. No pleural  effusion, or pneumothorax.  No acute osseous process.       Impression:      Small likely atelectasis or infiltrate at the right lung  base appears mildly decreased.     This report was finalized on 4/5/2021 7:07 PM by Dr. Khurram Chambers M.D.       XR Chest 1 View [[id number]] Collected: 04/04/21 0936     Updated: 04/04/21 0944    Narrative:      ONE VIEW PORTABLE CHEST AT 9:13 AM     HISTORY: Shortness of breath. Fever. Hemoptysis.     FINDINGS: There is cardiomegaly with slight vascular prominence. In  addition, there is now increased density in the right lower lobe most  consistent with a combination of atelectasis and pneumonia occurring  since 3 days ago.     This report was finalized on 4/4/2021 9:40 AM by Dr. Gokul Garcia M.D.       XR Chest 1 View [[id number]] Collected: 04/01/21 1206     Updated: 04/02/21 0805    Narrative:      PORTABLE CHEST     HISTORY: Cough, wheezing.     COMPARISON: 03/30/2021     FINDINGS: The heart is at the upper limits of normal in size. There is  mild interstitial prominence in the perihilar regions bilaterally and at  the left lung base increased slightly as compared to the examination of  03/30/2021. There is no evidence of consolidation or of gross effusion.     This report was finalized on 4/2/2021 8:02 AM by Dr. Isaias Gonzalez M.D.       XR Chest Post CVA Port [[id number]] Collected: 04/01/21 1533     Updated: 04/01/21 1537    Narrative:      XR CHEST POST CVA PORT-  04/01/2021     HISTORY: PICC line placement.     Right upper extremity PICC line is seen with its tip overlying the SVC  near the junction with the right innominate vein.     Heart size is mildly enlarged. There are some minimal patchy increased  density in the lung bases right greater than left.       Impression:      Right upper extremity PICC line tip near the junction of the  right innominate vein and SVC.     This report was finalized on 4/1/2021 3:34 PM by Dr. Shaan Nam M.D.       CT Abdomen Pelvis With Contrast  [[id number]] Collected: 03/30/21 1501     Updated: 03/30/21 1620    Narrative:      CT OF THE ABDOMEN AND PELVIS WITH CONTRAST 03/30/2021     HISTORY: Abdominal discomfort. Sepsis.     TECHNIQUE: Spiral images were obtained from the lung bases to the  symphysis pubis after intravenous contrast. No oral contrast was given.     Some of the images are degraded by patient motion.     FINDINGS:  In the subpleural region of the right lower lobe there is an  ill-defined area of dependent edema, atelectasis or mild inflammatory  infiltrate.     The liver appears somewhat small but no focal hepatic lesions are seen.  The gallbladder, spleen, pancreas, and adrenals appear unremarkable.     There is an approximately 7 mm nonobstructing right renal stone.     There is some aortoiliac calcification. There is colonic diverticulosis.  Right hip prosthesis is seen.     No abscess or abnormal fluid collections are seen. No free air is seen.       Impression:      1. Some of the images are degraded by patient motion.  2. Mild dependent edema, atelectasis or inflammatory infiltrate in the  posterior right lower lobe.  3. Nonobstructing right renal stone.  4. Mild colonic diverticulosis.  5. No abscess, free air or free fluid is seen.     Radiation dose reduction techniques were utilized, including automated  exposure control and exposure modulation based on body size.     This report was finalized on 3/30/2021 4:17 PM by Dr. Shaan Nam M.D.       XR Chest 1 View [[id number]] Collected: 03/30/21 1401     Updated: 03/30/21 1442    Narrative:      ONE VIEW PORTABLE CHEST     HISTORY: Shortness of breath and chest tightness.     FINDINGS: The lungs are well-expanded and clear. There is mild  cardiomegaly unchanged from 04/30/2019.     This report was finalized on 3/30/2021 2:39 PM by Dr. Gokul Garcia M.D.             Results for orders placed during the hospital encounter of 03/30/21    Adult Transthoracic Echo Complete W/ Cont  if Necessary Per Protocol    Interpretation Summary  · The left ventricular cavity is moderately dilated.  · There is akinesis of the entire septum, distal anterior wall, distal inferior wall, and apex  · Left ventricular ejection fraction appears to be 31 - 35%.  · Left ventricular diastolic function was indeterminate.  · The right ventricular cavity is moderate to severely dilated. Normal right ventricular systolic function noted.  · The left atrial cavity is moderately dilated.  · The right atrial cavity is severely dilated.  · Moderate tricuspid valve regurgitation is present.  · Calculated right ventricular systolic pressure from tricuspid regurgitation is 40 mmHg.  · The IVC is grossly dilated, measuring over 3 cm  · There is no evidence of pericardial effusion    Pertinent Labs     Results from last 7 days   Lab Units 04/11/21  0500 04/10/21  0832 04/09/21  1050 04/08/21  0419   WBC 10*3/mm3 9.50 6.56 7.75 5.72   HEMOGLOBIN g/dL 11.7* 12.8* 13.0 12.3*   PLATELETS 10*3/mm3 159 181 197 175     Results from last 7 days   Lab Units 04/11/21  0500 04/10/21  0832 04/09/21  1050 04/08/21  0419   SODIUM mmol/L 139 139 137 141   POTASSIUM mmol/L 3.7 3.9 4.0 3.7   CHLORIDE mmol/L 100 101 100 100   CO2 mmol/L 26.9 30.0* 27.5 29.8*   BUN mg/dL 14 14 15 13   CREATININE mg/dL 0.71* 0.76 0.82 0.83   GLUCOSE mg/dL 98 97 98 100*   Estimated Creatinine Clearance: 131.3 mL/min (A) (by C-G formula based on SCr of 0.71 mg/dL (L)).    Results from last 7 days   Lab Units 04/11/21  0500 04/10/21  0832 04/09/21  1050 04/08/21  0419 04/07/21  0423   CALCIUM mg/dL 9.0 9.0 9.2 8.6 8.6   MAGNESIUM mg/dL  --   --   --   --  2.1       Results from last 7 days   Lab Units 04/07/21  0423   TROPONIN T ng/mL 0.155*           Invalid input(s): LDLCALC  Results from last 7 days   Lab Units 04/09/21  1823   RESPCX  Scant growth (1+) Candida albicans*  Rare Normal Respiratory Loulou: NO S.aureus/MRSA or Pseudomonas aeruginosa     Results from  last 7 days   Lab Units 04/11/21  0937   COVID19  Not Detected       Test Results Pending at Discharge     Pending Labs     Order Current Status    AFB Culture - Lavage, Lung, Right Lower Lobe In process    Fungus Culture - Lavage, Lung, Right Lower Lobe In process    Non-gynecologic Cytology In process    BAL Culture, Quantitative - Lavage, Lung, Right Lower Lobe Preliminary result          Discharge Details        Discharge Medications      New Medications      Instructions Start Date   aspirin 81 MG EC tablet   81 mg, Oral, Daily   Start Date: April 14, 2021     atorvastatin 40 MG tablet  Commonly known as: LIPITOR   40 mg, Oral, Nightly      gabapentin 300 MG capsule  Commonly known as: NEURONTIN  Replaces: gabapentin 600 MG tablet   300 mg, Oral, Every 8 Hours Scheduled      nystatin 034949 UNIT/GM powder  Commonly known as: MYCOSTATIN   Topical, Every 12 Hours Scheduled         Changes to Medications      Instructions Start Date   HYDROcodone-acetaminophen  MG per tablet  Commonly known as: NORCO  What changed:   · how to take this  · reasons to take this   1 tablet, Oral, Every 6 Hours PRN      lisinopril 20 MG tablet  Commonly known as: PRINIVIL,ZESTRIL  What changed:   · how much to take  · when to take this   20 mg, Oral, Every 24 Hours Scheduled   Start Date: April 14, 2021        Continue These Medications      Instructions Start Date   albuterol sulfate  (90 Base) MCG/ACT inhaler  Commonly known as: Ventolin HFA   2 puffs, Inhalation, Every 4 Hours PRN      Atrovent HFA 17 MCG/ACT inhaler  Generic drug: ipratropium   2 puffs, Inhalation, 4 Times Daily - RT      Breo Ellipta 200-25 MCG/INH inhaler  Generic drug: Fluticasone Furoate-Vilanterol   1 puff, Inhalation, Daily - RT      carvedilol 3.125 MG tablet  Commonly known as: COREG   TAKE 1 TABLET BY MOUTH TWICE A DAY WITH MEALS      docusate sodium 100 MG capsule  Commonly known as: COLACE   100 mg, Oral, Daily      finasteride 5 MG  tablet  Commonly known as: PROSCAR   1 tablet, Oral, Daily      furosemide 40 MG tablet  Commonly known as: LASIX   40 mg, Oral, Daily      silodosin 8 MG capsule capsule  Commonly known as: RAPAFLO   1 capsule, Oral, Daily, With food.       warfarin 5 MG tablet  Commonly known as: COUMADIN   TAKE 1 TABLET ON MON & FRI AND 1.5 TABLETS ON ALL OTHER DAYS OR AS DIRECTED BY MED MANAGEMENT CLINIC         Stop These Medications    ALPRAZolam 0.5 MG tablet  Commonly known as: XANAX     gabapentin 600 MG tablet  Commonly known as: NEURONTIN  Replaced by: gabapentin 300 MG capsule     metoclopramide 10 MG tablet  Commonly known as: REGLAN     pravastatin 20 MG tablet  Commonly known as: PRAVACHOL            Allergies   Allergen Reactions   • Cephalexin Hives   • Codeine Nausea Only         Discharge Disposition:  Skilled Nursing Facility (DC - External)    Discharge Diet:  Diet Order   Procedures   • Diet Soft Texture; Chopped; Nectar / Syrup Thick       Discharge Activity:   Activity Instructions     Activity as Tolerated            CODE STATUS:    Code Status and Medical Interventions:   Ordered at: 03/30/21 1351     Code Status:    CPR     Medical Interventions (Level of Support Prior to Arrest):    Full       Future Appointments   Date Time Provider Department Center   4/15/2021 10:30 AM Bonita Lua APRN [facility] None   4/27/2021 12:40 PM LAB CHAIR 4 [facility]  LAB [facility] [place]   4/27/2021  1:20 PM Juni Landa MD [facility] [place]   12/14/2021  1:30 PM Katie Hurt APRN [facility] None     Additional Instructions for the Follow-ups that You Need to Schedule     Protime-INR    Apr 15, 2021 (Approximate)      Release to patient: Immediate            Contact information for follow-up providers     Marc Ludwig MD Follow up in 2 week(s).    Specialty: Family Medicine  Contact information:  [facility] [address] [address]  [phone]             Bunny Lepe MD Follow up.     Specialty: Pulmonary Disease  Contact information:  [address]  [address] [address]  [phone]                   Contact information for after-discharge care     Destination     [facility] .    Service: Skilled Nursing  Contact information:  [address] [facility] [address]  [phone]                             Additional Instructions for the Follow-ups that You Need to Schedule     Protime-INR    Apr 15, 2021 (Approximate)      Release to patient: Immediate           Time Spent on Discharge:  Greater than 30 minutes      Ayush Gonzalez MD  [facility]  04/13/21  09:58 EDT

## 2021-04-13 NOTE — PLAN OF CARE
Goal Outcome Evaluation:  Plan of Care Reviewed With: patient  Progress: improving  Outcome Summary: VSS, on RA. A. fib controlled. C/o pain in bilateral feet treated with PRN norco. Patient stating he is ready to be d/c'd to SNF. Wond care as ordered. will continue to monitor

## 2021-04-14 LAB
BACTERIA SPEC AEROBE CULT: NO GROWTH
GRAM STN SPEC: NORMAL
GRAM STN SPEC: NORMAL

## 2021-04-26 NOTE — PROGRESS NOTES
[facility] GROUP OUTPATIENT FOLLOW UP CLINIC VISIT    REASON FOR FOLLOW-UP:    Lymphadenopathy and bone lesions    HISTORY OF PRESENT ILLNESS:  Jalen Lockwood is a 71 y.o. male who returns today for follow up of the above issues.    Recent admission with fever, shortness of breath, altered mental status.  He was found to be septic with a urinary tract infection and cellulitis.  He was treated with antibiotics.  He was discharged to a skilled nursing facility for rehabilitation.  He was discharged from there a couple of days ago.    He remains weak but is improving.  Lower extremity edema persists.  He denies shortness of breath or chest pain.  No bleeding.    REVIEW OF SYSTEMS:  As per the HPI    There were no vitals filed for this visit.  General:  No acute distress, awake, alert and oriented.  Chronically ill-appearing.  Sitting in a wheelchair.  Skin:  Warm and dry, no visible rash  HEENT:  Normocephalic/atraumatic.  Wearing a facemask.  Chest:  Normal respiratory effort.  Lungs clear to auscultation bilaterally.  Heart: Regular rate and rhythm, distant S1 and S2  Extremities: Both legs are wrapped.  Chronic edema, left greater than right.  Feet are in boots.    Neuro/psych:  Grossly non-focal.  Normal mood and affect.        DIAGNOSTIC DATA:  CBC & Differential (04/27/2021 12:51)      IMAGING:   CT Angiogram Chest With & Without Contrast (04/09/2021 08:00)  CT Abdomen Pelvis With Contrast (03/30/2021 14:20)    I personally viewed CT images from 4/9 and 3/30/2021.  Stable adenopathy.      ASSESSMENT:  This is a 71 y.o. male with:  *Lucent bone lesions on imaging at L2, L4, and the pubis.    · These were visualized on CT imaging but not necessarily on the skeletal survey.  MRI was suggested by radiology.  We opted for a PET scan due to the lymphadenopathy.  However, this was not approved by insurance.  Serum protein studies did not indicate a monoclonal protein.  · Not well visualized on MRI imaging.   Likely benign.  Follow-up on repeat CT imaging 5/19/2020 stable.      *Lymphadenopathy:   · Small and likely reactive  · Follow-up imaging 6/18/2019 stable.  · Follow-up imaging 12/3/2019 with a mild increase in size of lymphadenopathy.  · Stable on 5/19/2020 imaging.    · Suspected to be stable but no formal comment on this on recent CT imaging on 3/30 and 4/9/2021      *History of thrombocytopenia: Platelets normal today    *History of alcohol use    *History of normocytic to macrocytic anemia  · Mild anemia persists with a hemoglobin of 12.0.  MCV normal at this point.  · Likely anemia of chronic disease    *Purpura associated with anticoagulation    *Atrial fibrillation/flutter anticoagulated with warfarin.  Stable bruising.  No bleeding.    *Stage III chronic kidney disease: We did not check a creatinine today.    *History of significant hyponatremia: Last sodium was normal.    PLAN:  1. Follow-up in 6 months with a CBC  2. He will continue to follow with other physicians for his multiple other issues.

## 2021-04-27 ENCOUNTER — TRANSITIONAL CARE MANAGEMENT TELEPHONE ENCOUNTER (OUTPATIENT)
Dept: CALL CENTER | Facility: HOSPITAL | Age: 71
End: 2021-04-27

## 2021-04-27 ENCOUNTER — OFFICE VISIT (OUTPATIENT)
Dept: ONCOLOGY | Facility: CLINIC | Age: 71
End: 2021-04-27

## 2021-04-27 ENCOUNTER — LAB (OUTPATIENT)
Dept: LAB | Facility: HOSPITAL | Age: 71
End: 2021-04-27

## 2021-04-27 ENCOUNTER — READMISSION MANAGEMENT (OUTPATIENT)
Dept: CALL CENTER | Facility: HOSPITAL | Age: 71
End: 2021-04-27

## 2021-04-27 VITALS
RESPIRATION RATE: 16 BRPM | BODY MASS INDEX: 40.56 KG/M2 | HEIGHT: 73 IN | TEMPERATURE: 97.6 F | DIASTOLIC BLOOD PRESSURE: 70 MMHG | HEART RATE: 82 BPM | SYSTOLIC BLOOD PRESSURE: 117 MMHG | OXYGEN SATURATION: 97 % | WEIGHT: 306 LBS

## 2021-04-27 DIAGNOSIS — R59.0 RETROPERITONEAL LYMPHADENOPATHY: ICD-10-CM

## 2021-04-27 DIAGNOSIS — D64.9 NORMOCYTIC ANEMIA: Primary | ICD-10-CM

## 2021-04-27 DIAGNOSIS — R59.0 INGUINAL ADENOPATHY: ICD-10-CM

## 2021-04-27 DIAGNOSIS — D63.8 ANEMIA OF CHRONIC DISEASE: ICD-10-CM

## 2021-04-27 LAB
BASOPHILS # BLD AUTO: 0.05 10*3/MM3 (ref 0–0.2)
BASOPHILS NFR BLD AUTO: 0.7 % (ref 0–1.5)
DEPRECATED RDW RBC AUTO: 45.6 FL (ref 37–54)
EOSINOPHIL # BLD AUTO: 0.24 10*3/MM3 (ref 0–0.4)
EOSINOPHIL NFR BLD AUTO: 3.3 % (ref 0.3–6.2)
ERYTHROCYTE [DISTWIDTH] IN BLOOD BY AUTOMATED COUNT: 12.9 % (ref 12.3–15.4)
HCT VFR BLD AUTO: 34.8 % (ref 37.5–51)
HGB BLD-MCNC: 12 G/DL (ref 13–17.7)
IMM GRANULOCYTES # BLD AUTO: 0.03 10*3/MM3 (ref 0–0.05)
IMM GRANULOCYTES NFR BLD AUTO: 0.4 % (ref 0–0.5)
LYMPHOCYTES # BLD AUTO: 1.23 10*3/MM3 (ref 0.7–3.1)
LYMPHOCYTES NFR BLD AUTO: 17 % (ref 19.6–45.3)
MCH RBC QN AUTO: 33 PG (ref 26.6–33)
MCHC RBC AUTO-ENTMCNC: 34.5 G/DL (ref 31.5–35.7)
MCV RBC AUTO: 95.6 FL (ref 79–97)
MONOCYTES # BLD AUTO: 0.5 10*3/MM3 (ref 0.1–0.9)
MONOCYTES NFR BLD AUTO: 6.9 % (ref 5–12)
NEUTROPHILS NFR BLD AUTO: 5.19 10*3/MM3 (ref 1.7–7)
NEUTROPHILS NFR BLD AUTO: 71.7 % (ref 42.7–76)
NRBC BLD AUTO-RTO: 0 /100 WBC (ref 0–0.2)
PLATELET # BLD AUTO: 152 10*3/MM3 (ref 140–450)
PMV BLD AUTO: 11.1 FL (ref 6–12)
RBC # BLD AUTO: 3.64 10*6/MM3 (ref 4.14–5.8)
WBC # BLD AUTO: 7.24 10*3/MM3 (ref 3.4–10.8)

## 2021-04-27 PROCEDURE — 85025 COMPLETE CBC W/AUTO DIFF WBC: CPT

## 2021-04-27 PROCEDURE — 36415 COLL VENOUS BLD VENIPUNCTURE: CPT

## 2021-04-27 PROCEDURE — 99213 OFFICE O/P EST LOW 20 MIN: CPT | Performed by: INTERNAL MEDICINE

## 2021-04-27 RX ORDER — SENNA PLUS 8.6 MG/1
1 TABLET ORAL DAILY
COMMUNITY
End: 2021-04-29 | Stop reason: ALTCHOICE

## 2021-04-27 RX ORDER — METOCLOPRAMIDE 10 MG/1
10 TABLET ORAL
COMMUNITY
End: 2021-11-14

## 2021-04-27 RX ORDER — L. ACIDOPHILUS/L.BULGARICUS 1MM CELL
TABLET ORAL
COMMUNITY
Start: 2021-04-26 | End: 2022-01-01 | Stop reason: ALTCHOICE

## 2021-04-27 NOTE — OUTREACH NOTE
Call Center TCM Note      Responses   [facility] patient discharged from?  Non-BH   Does the patient have one of the following disease processes/diagnoses(primary or secondary)?  Other   TCM attempt successful?  No   Unsuccessful attempts  Attempt 1          Sharita Glasgow MA    4/27/2021, 13:53 EDT

## 2021-04-27 NOTE — OUTREACH NOTE
Prep Survey      Responses   [facility] facility patient discharged from?  Non-BH   Is LACE score < 7 ?  Non-BH Discharge   Emergency Room discharge w/ pulse ox?  No   Eligibility  [facility]   Date of Discharge  04/26/21   Discharge diagnosis  unknown   Does the patient have one of the following disease processes/diagnoses(primary or secondary)?  Other   Prep survey completed?  Yes          Deidra Soto RN

## 2021-04-27 NOTE — OUTREACH NOTE
Call Center TCM Note      Responses   [facility] patient discharged from?  Non-   Does the patient have one of the following disease processes/diagnoses(primary or secondary)?  Other   TCM attempt successful?  Yes   Meds reviewed with patient/caregiver?  Yes   Is the patient having any side effects they believe may be caused by any medication additions or changes?  No   Does the patient have all medications ordered at discharge?  Yes   Is the patient taking all medications as directed (includes completed medication regime)?  Yes   Does the patient have a primary care provider?   Yes   Does the patient have an appointment with their PCP within 7 days of discharge?  Yes   Comments regarding PCP  TCM FWP with PCP is 04/30/2021   Has the patient kept scheduled appointments due by today?  Yes   Home health comments  [facility] up HH with [facility] per pt.    Psychosocial issues?  No   Did the patient receive a copy of their discharge instructions?  Yes   Nursing interventions  Reviewed instructions with patient   What is the patient's perception of their health status since discharge?  Improving   Is the patient/caregiver able to teach back signs and symptoms related to disease process for when to call PCP?  Yes   Is the patient/caregiver able to teach back signs and symptoms related to disease process for when to call 911?  Yes   Is the patient/caregiver able to teach back the hierarchy of who to call/visit for symptoms/problems? PCP, Specialist, Home health nurse, Urgent Care, ED, 911  Yes   If the patient is a current smoker, are they able to teach back resources for cessation?  Not a smoker   TCM call completed?  Yes   Wrap up additional comments  Pt glad to be home, pt was at [facility] 03/30-04/14/2021, then SNF 04/14-04/26/2021. Pt has all meds, no concerns at this time. Waiting on call back from [facility]. TCM FWP with PCP is 04/30/2021 and will see CArdio MD later same day.          Sharita Glasgow MA    4/27/2021, 16:15  EDT

## 2021-04-29 ENCOUNTER — OFFICE VISIT (OUTPATIENT)
Dept: CARDIOLOGY | Facility: CLINIC | Age: 71
End: 2021-04-29

## 2021-04-29 ENCOUNTER — HOSPITAL ENCOUNTER (OUTPATIENT)
Dept: CARDIOLOGY | Facility: HOSPITAL | Age: 71
Discharge: HOME | End: 2021-04-29
Admitting: NURSE PRACTITIONER

## 2021-04-29 ENCOUNTER — TELEPHONE (OUTPATIENT)
Dept: CARDIOLOGY | Facility: CLINIC | Age: 71
End: 2021-04-29

## 2021-04-29 VITALS
DIASTOLIC BLOOD PRESSURE: 60 MMHG | HEIGHT: 73 IN | BODY MASS INDEX: 40.56 KG/M2 | HEART RATE: 69 BPM | WEIGHT: 306 LBS | SYSTOLIC BLOOD PRESSURE: 118 MMHG

## 2021-04-29 DIAGNOSIS — I48.21 PERMANENT ATRIAL FIBRILLATION (HCC): Primary | ICD-10-CM

## 2021-04-29 DIAGNOSIS — I10 ESSENTIAL HYPERTENSION: ICD-10-CM

## 2021-04-29 DIAGNOSIS — R06.09 DOE (DYSPNEA ON EXERTION): ICD-10-CM

## 2021-04-29 DIAGNOSIS — I43 TACHYCARDIA INDUCED CARDIOMYOPATHY (HCC): ICD-10-CM

## 2021-04-29 DIAGNOSIS — R00.0 TACHYCARDIA INDUCED CARDIOMYOPATHY (HCC): ICD-10-CM

## 2021-04-29 DIAGNOSIS — Z72.0 TOBACCO ABUSE: ICD-10-CM

## 2021-04-29 DIAGNOSIS — G47.33 OBSTRUCTIVE SLEEP APNEA SYNDROME: ICD-10-CM

## 2021-04-29 LAB — NT-PROBNP SERPL-MCNC: 1632 PG/ML (ref 0–900)

## 2021-04-29 PROCEDURE — 36415 COLL VENOUS BLD VENIPUNCTURE: CPT

## 2021-04-29 PROCEDURE — 93000 ELECTROCARDIOGRAM COMPLETE: CPT | Performed by: NURSE PRACTITIONER

## 2021-04-29 PROCEDURE — 83880 ASSAY OF NATRIURETIC PEPTIDE: CPT | Performed by: NURSE PRACTITIONER

## 2021-04-29 PROCEDURE — 99214 OFFICE O/P EST MOD 30 MIN: CPT | Performed by: NURSE PRACTITIONER

## 2021-04-29 RX ORDER — GABAPENTIN 600 MG/1
600 TABLET ORAL 3 TIMES DAILY
Status: ON HOLD | COMMUNITY
End: 2022-01-01 | Stop reason: SDUPTHER

## 2021-04-29 RX ORDER — CARVEDILOL 6.25 MG/1
6.25 TABLET ORAL 2 TIMES DAILY WITH MEALS
COMMUNITY
End: 2021-06-07 | Stop reason: SDUPTHER

## 2021-04-29 NOTE — TELEPHONE ENCOUNTER
Pt was in the office to see Bonita and sustained a skin tear getting out of his car.  I cleaned the are with NS and applied a transparent dressing.  Pt stated home health will be starting today.  Recommended they have home health change bandage in the next couple of days or sooner if draining.  Wound was slightly bleeding and the open are beefy red in color.  Thanks  Susan Mason RN  Triage nurse

## 2021-04-29 NOTE — TELEPHONE ENCOUNTER
Patient called to report [facility] is [facility]  He is currently taking Warfarin.  Please dictate a clearance letter and advise re holding medication. Thank you.  / SHIRA

## 2021-04-29 NOTE — TELEPHONE ENCOUNTER
He did not mention this during office visit today. He is without CP and has improved CHF symptoms. I will clear him stating he is intermediate-high risk.

## 2021-04-29 NOTE — PROGRESS NOTES
Date of Office Visit: 21  Encounter Provider: ESTEFANY Briones  Place of Service: [facility] CARDIOLOGY  Patient Name: Jalen Lockwood  :1950    Chief Complaint   Patient presents with   • Permanent atrial fibrillation   • Follow-up   :     HPI: Jalen Lockwood is a 71 y.o. male  with history of hypertension, coronary artery disease, pulmonary atrial fibrillation, COPD, atrial flutter, tachycardia induced cardiomyopathy, and venous stasis.     He is followed by Dr. Henry. I will visit with him for the first time today and have reviewed his medical record.   He had rapid atrial flutter in 2010 hours with tachycardia mediated cardiomyopathy.  His ejection fraction was down to 35% once his atrial fibrillation resolved his ejection fraction improved.  He said recurrent cellulitis secondary to severe venous stasis.  He also has chronic edema.  In 2016, he was found to have a left popliteal artery aneurysm which was stented by Dr. Boston.  He had complications with wound infection.     He was admitted with fever, confusion and shortness of breath.  He was found to have sepsis, pneumonia, UTI and cellulitis.  He was treated for that.  He had elevated troponin as well as acute systolic CHF.  He was treated medically and pravastatin was stopped for atorvastatin.  He was started on aspirin.  He underwent bronchoscopy with no significant findings.  He was noted to have some heart rates in the 40s while sleeping felt to be secondary to sleep disordered breathing.  There was no change to the beta-blocker.  He was discharged on furosemide 40 mg daily.    He presents for hospital discharge follow-up.  He was released from the [facility] on Monday.  He feels much better than he has in approximately 3 to 4 months reportedly.  He has a venous ulcer on his left foot being followed by the wound clinic.  He is also having wraps for lymphedema.  He is in physical therapy and doing well with  "that.  He continues to have lower extremity swelling and shortness of breath but again that is much better.  He wears oxygen every evening for history of sleep apnea.  He does not have CPAP or BiPAP.  His blood pressures been well controlled.  He denies chest pain tightness pressure, near-syncope, syncope, dizziness or lightheadedness.  He is accompanied by his wife today            Allergies   Allergen Reactions   • Cephalexin Hives   • Codeine Nausea Only           Family and social history reviewed.     ROS  All other systems were reviewed and are negative          Objective:     Vitals:    04/29/21 1048   BP: 118/60   BP Location: Left arm   Patient Position: Sitting   Pulse: 69   Weight: (!) 139 kg (306 lb)   Height: 185.4 cm (73\")     Body mass index is 40.37 kg/m².    PHYSICAL EXAM:  Constitutional:       General: Not in acute distress.     Appearance: Well-developed. Not diaphoretic.   HENT:      Head: Normocephalic.   Pulmonary:      Effort: Pulmonary effort is normal. No respiratory distress.      Breath sounds: Normal breath sounds. No wheezing. No rhonchi. No rales.   Cardiovascular:      Normal rate. Irregularly irregular rhythm.      Comments: Bilateral Ace wrap to knee  Pulses:     Radial: 2+ bilaterally.  Edema:     Thigh: bilateral 1+ edema of the thigh.     Pretibial: bilateral 1+ edema of the pretibial area.     Ankle: bilateral 1+ edema of the ankle.  Skin:     General: Skin is warm and dry. There is no cyanosis.      Findings: No rash.   Neurological:      Mental Status: Alert and oriented to person, place, and time.   Psychiatric:         Behavior: Behavior normal.         Thought Content: Thought content normal.         Judgment: Judgment normal.           ECG 12 Lead    Date/Time: 4/29/2021 1:28 PM  Performed by: Bonita Lua APRN  Authorized by: Bonita Lua APRN   Comparison: compared with previous ECG   Similar to previous ECG  Rhythm: atrial fibrillation  Conduction: incomplete right " bundle branch block  T inversion: V3, V4, V5, V6, II, III and aVF    Clinical impression: abnormal EKG              Current Outpatient Medications   Medication Sig Dispense Refill   • acidophilus (FLORANEX) tablet tablet      • albuterol sulfate HFA (Ventolin HFA) 108 (90 Base) MCG/ACT inhaler Inhale 2 puffs Every 4 (Four) Hours As Needed for Wheezing. 18 g 3   • ALPRAZOLAM PO Take  by mouth Daily.     • Atrovent HFA 17 MCG/ACT inhaler Inhale 2 puffs 4 (Four) Times a Day. 1 each 3   • carvedilol (COREG) 6.25 MG tablet Take 6.25 mg by mouth 2 (Two) Times a Day With Meals.     • docusate sodium (COLACE) 100 MG capsule Take 100 mg by mouth Daily.     • finasteride (PROSCAR) 5 MG tablet Take 1 tablet by mouth daily.     • Fluticasone Furoate-Vilanterol (Breo Ellipta) 200-25 MCG/INH inhaler Inhale 1 puff Daily. 1 each 1   • furosemide (LASIX) 40 MG tablet Take 40 mg by mouth Daily.     • gabapentin (NEURONTIN) 600 MG tablet Take 600 mg by mouth 3 (Three) Times a Day.     • HYDROcodone-acetaminophen (NORCO)  MG per tablet Take 1 tablet by mouth Every 6 (Six) Hours As Needed for Moderate Pain . 6 tablet 0   • lisinopril (PRINIVIL,ZESTRIL) 20 MG tablet Take 1 tablet by mouth Daily. (Patient taking differently: Take 20 mg by mouth 2 (two) times a day.)     • metoclopramide (REGLAN) 10 MG tablet Take 10 mg by mouth 3 (Three) Times a Day Before Meals.     • nystatin (MYCOSTATIN) 310477 UNIT/GM powder Apply  topically to the appropriate area as directed Every 12 (Twelve) Hours.     • PRAVASTATIN SODIUM PO Take  by mouth Daily.     • silodosin (RAPAFLO) 8 MG capsule capsule Take 1 capsule by mouth daily. With food.     • warfarin (COUMADIN) 5 MG tablet TAKE 1 TABLET ON MON & FRI AND 1.5 TABLETS ON ALL OTHER DAYS OR AS DIRECTED BY MED MANAGEMENT CLINIC 130 tablet 1     No current facility-administered medications for this visit.     Assessment:       Diagnosis Plan   1. Permanent atrial fibrillation (CMS/HCC)     2.  Essential hypertension     3. Obstructive sleep apnea syndrome     4. INGRAM (dyspnea on exertion)  proBNP   5. Tobacco abuse     6. 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CMS/HCC)          Orders Placed This Encounter   Procedures   • proBNP     Order Specific Question:   Release to patient     Answer:   Immediate   • ECG 12 Lead     This order was created via procedure documentation     Order Specific Question:   Release to patient     Answer:   Immediate         Plan:         1.  71-year-old gentleman with history of atrial flutter as well as atrial fibrillation.  He has had prior flutter ablation but has maintained chronic atrial fibrillation.  He is rate controlled and anticoagulated with warfarin  2.  History of tachycardia mediated cardiomyopathy with ejection fraction as low as 35% in 2010 but returned to normal.  but then back low at 31-35 %  On 3/31/2021He has been maintained on carvedilol, furosemide and lisinopril. Need to consider follow up echo. He has no angina. He's not had an ischemic evaluation. Medical therapy continues   - PROBNP still up I am going to increase lasix x 3 days. He will take 40 mg twice daily x 3 days only and resume 40 mg daily dosing.   3.  Hypertension- BP is good  4.  Moderate coronary artery disease.  5.  Hyperlipidemia on pravastatin  6.  Severe venous insufficiency and chronic lower extremity edema as well as lymphedema-he has been followed by wound care clinic  7.Infrarenal ectasia  8. left popliteal artery aneurysm status post stent in 2016  9. Recurrent cellulitis  10.  COPD with recent pneumonia and bronchoscopy.  Bronchoscopy showed no significant findings.  He follows with pulmonary  11.  Probable ischemic cardiomyopathy ejection fraction recently 31-35% in the setting of severe sepsis, pneumonia, cellulitis 03/30/2021  12. BECKI on oxygen at night      Follow up in 6 weeks with Dr. Henry            It has been a pleasure to participate in this patient's care.      Thank  you,  ESTEFANY Briones      **I used Dragon to dictate this note:**

## 2021-04-30 ENCOUNTER — TELEPHONE (OUTPATIENT)
Dept: PHARMACY | Facility: HOSPITAL | Age: 71
End: 2021-04-30

## 2021-04-30 ENCOUNTER — OFFICE VISIT (OUTPATIENT)
Dept: FAMILY MEDICINE CLINIC | Facility: CLINIC | Age: 71
End: 2021-04-30

## 2021-04-30 VITALS
SYSTOLIC BLOOD PRESSURE: 130 MMHG | TEMPERATURE: 97.4 F | OXYGEN SATURATION: 93 % | RESPIRATION RATE: 22 BRPM | HEART RATE: 73 BPM | HEIGHT: 73 IN | DIASTOLIC BLOOD PRESSURE: 64 MMHG | BODY MASS INDEX: 41.48 KG/M2 | WEIGHT: 313 LBS

## 2021-04-30 DIAGNOSIS — G47.33 OBSTRUCTIVE SLEEP APNEA SYNDROME: ICD-10-CM

## 2021-04-30 DIAGNOSIS — R65.20 SEVERE SEPSIS (HCC): ICD-10-CM

## 2021-04-30 DIAGNOSIS — J44.9 CHRONIC OBSTRUCTIVE PULMONARY DISEASE, UNSPECIFIED COPD TYPE (HCC): ICD-10-CM

## 2021-04-30 DIAGNOSIS — I50.21 ACUTE SYSTOLIC (CONGESTIVE) HEART FAILURE (HCC): Primary | ICD-10-CM

## 2021-04-30 DIAGNOSIS — I10 ESSENTIAL HYPERTENSION: ICD-10-CM

## 2021-04-30 DIAGNOSIS — N39.0 ACUTE UTI: ICD-10-CM

## 2021-04-30 DIAGNOSIS — I89.0 LYMPHEDEMA OF BOTH LOWER EXTREMITIES: ICD-10-CM

## 2021-04-30 DIAGNOSIS — A41.9 SEVERE SEPSIS (HCC): ICD-10-CM

## 2021-04-30 DIAGNOSIS — I48.21 PERMANENT ATRIAL FIBRILLATION (HCC): ICD-10-CM

## 2021-04-30 DIAGNOSIS — Z79.01 CHRONIC ANTICOAGULATION: Chronic | ICD-10-CM

## 2021-04-30 LAB
BACTERIA UR QL AUTO: ABNORMAL /HPF
BILIRUB UR QL STRIP: NEGATIVE
CLARITY UR: CLEAR
COLOR UR: YELLOW
GLUCOSE UR STRIP-MCNC: NEGATIVE MG/DL
HGB UR QL STRIP.AUTO: ABNORMAL
KETONES UR QL STRIP: NEGATIVE
LEUKOCYTE ESTERASE UR QL STRIP.AUTO: NEGATIVE
NITRITE UR QL STRIP: NEGATIVE
PH UR STRIP.AUTO: 7 [PH] (ref 4.6–8)
PROT UR QL STRIP: NEGATIVE
RBC # UR: ABNORMAL /HPF
REF LAB TEST METHOD: ABNORMAL
SP GR UR STRIP: 1.01 (ref 1–1.03)
SQUAMOUS #/AREA URNS HPF: ABNORMAL /HPF
UROBILINOGEN UR QL STRIP: ABNORMAL
WBC UR QL AUTO: ABNORMAL /HPF

## 2021-04-30 PROCEDURE — 81001 URINALYSIS AUTO W/SCOPE: CPT | Performed by: NURSE PRACTITIONER

## 2021-04-30 PROCEDURE — 87086 URINE CULTURE/COLONY COUNT: CPT | Performed by: NURSE PRACTITIONER

## 2021-04-30 PROCEDURE — 99214 OFFICE O/P EST MOD 30 MIN: CPT | Performed by: NURSE PRACTITIONER

## 2021-04-30 NOTE — TELEPHONE ENCOUNTER
After speaking with another one of our patients who has the same name, it appears that it is a different Jalen Lockwood who is scheduled for colonoscopy on 5/6/21. I will send a message from the correct patient's chart for review and recommendation.     I contacted Mr. Jalen Lockwood, and he reports he is not due for another colonoscopy for >1 yr, and [facility] not [facility].

## 2021-04-30 NOTE — TELEPHONE ENCOUNTER
Mr. Lockwood was discharged from [facility] on 4/26/21, and he currently has [facility]. Contacted Ashley at [facility], and provided order for INR check with the next nursing visit with the result to be called into the [facility] for resumption of care.

## 2021-04-30 NOTE — PROGRESS NOTES
"Chief Complaint  Hospital Follow Up Visit (d/c 4/13 sent to Rehab went home Monday/cardio seen yesterday advised extra 40 mg Lasix x 3 days/swelling worse today)    Subjective          Jalen Lockwood presents to [facility]  History of Present Illness  Here today for hospital f/u, he was admitted to [facility] 3/30/2021 for fever, SOA, confusion, please refer to H&P and d/c chandu for details, he was found to have sepsis, cellulitis, UTI, he was also found to have acute MI and acute CHF, saw cardiology, medically managed, he also had bronchoscopy per pulm for scant hemoptysis, that was normal, he has outpatient f/u with pulm. He does not yet have appt. Denies any recent blood. He has home health, checking coumadin level per cardiology. He wears home 02 during day and at night. Does not have cpap. Denies CP or SOA today. States swelling improved some.  He was d/c to subacute rehab, d/c Monday, he had f/u with cardiology yesterday, increased lasix to bid for 3 days, he is also follow with wound care clinic at [place], sees again next week. He states he is feeling much better. His wife is in the room with him today. He does have UTI often. Does not usually have symptoms until severe UTI. He does see urology Dr. Elliott, has fu scheduled in fall.       Objective   Vital Signs:   /64 (BP Location: Left arm, Patient Position: Sitting)   Pulse 73   Temp 97.4 °F (36.3 °C) (Infrared)   Resp 22   Ht 185.4 cm (73\")   Wt (!) 142 kg (313 lb)   SpO2 93%   BMI 41.30 kg/m²     Physical Exam  Vitals and nursing note reviewed.   Constitutional:       Appearance: He is well-developed.   HENT:      Head: Normocephalic.   Eyes:      Pupils: Pupils are equal, round, and reactive to light.   Cardiovascular:      Rate and Rhythm: Normal rate and regular rhythm.      Heart sounds: Normal heart sounds.      Comments: Leg swelling noted bilaterally, leg wraps in place.   Pulmonary:      Effort: " Pulmonary effort is normal. No respiratory distress.      Breath sounds: Normal breath sounds. No wheezing.   Musculoskeletal:      Right lower le+ Pitting Edema present.      Left lower le+ Pitting Edema present.   Skin:     General: Skin is warm and dry.   Neurological:      Mental Status: He is alert and oriented to person, place, and time.   Psychiatric:         Behavior: Behavior normal.         Judgment: Judgment normal.        Result Review :                 Assessment and Plan    Diagnoses and all orders for this visit:    1. Acute systolic (congestive) heart failure (CMS/HCC) (Primary)    2. Essential hypertension    3. Permanent atrial fibrillation (CMS/[facility])    4. Chronic anticoagulation    5. Severe sepsis (CMS/[facility])  -     Ambulatory Referral to Pulmonology    6. Chronic obstructive pulmonary disease, unspecified COPD type (CMS/[facility])  -     Ambulatory Referral to Pulmonology    7. Obstructive sleep apnea syndrome  -     Ambulatory Referral to Pulmonology    8. Lymphedema of both lower extremities    9. Acute UTI  -     Urine Culture - Urine, Urine, Clean Catch  -     Urinalysis With Microscopic - Urine, Clean Catch        Follow Up   Return if symptoms worsen or fail to improve.  Patient was given instructions and counseling regarding his condition or for health maintenance advice. Please see specific information pulled into the AVS if appropriate.     Current outpatient and discharge medications have been reconciled for the patient.  Reviewed by: ESTEFANY Lakhani      Hospital records reviewed.   Repeat UA and culture today will call with results.   He will call to make f/u with urology as he is established.   Drink plenty of H2O,    Avoid bladder irritants- coffee, caffeine, carbonation.  Educated about typical s/s UTI.   Cont f/u with cardiology and wound care as scheduled.   Refer to pulm will call with appt.   If any worsening symptoms, SOA, confusion advise ER.   Patient agrees with plan  of care and understands instructions. Call if worsening symptoms or any problems or concerns.

## 2021-04-30 NOTE — PATIENT INSTRUCTIONS
Hospital records reviewed.   Repeat UA and culture today will call with results.   He will call to make f/u with urology as he is established.   Drink plenty of H2O,    Avoid bladder irritants- coffee, caffeine, carbonation.  Educated about typical s/s UTI.   Cont f/u with cardiology and wound care as scheduled.   Refer to pulm will call with appt.   If any worsening symptoms, SOA, confusion advise ER.   Patient agrees with plan of care and understands instructions. Call if worsening symptoms or any problems or concerns.

## 2021-04-30 NOTE — TELEPHONE ENCOUNTER
Clearance request was for another patient with the same name.  Discovered by Brendan Live RPH.  Bonita is aware and we will take care of the clearance for the correct patient./ SHIRA

## 2021-05-01 LAB — BACTERIA SPEC AEROBE CULT: NORMAL

## 2021-05-09 LAB — FUNGUS WND CULT: ABNORMAL

## 2021-05-12 ENCOUNTER — ANTICOAGULATION VISIT (OUTPATIENT)
Dept: PHARMACY | Facility: HOSPITAL | Age: 71
End: 2021-05-12

## 2021-05-12 DIAGNOSIS — I48.21 PERMANENT ATRIAL FIBRILLATION (HCC): Primary | ICD-10-CM

## 2021-05-12 LAB — INR PPP: 1.4

## 2021-05-12 NOTE — PROGRESS NOTES
Anticoagulation Clinic Progress Note    Anticoagulation Summary  As of 2021    INR goal:  2.0-3.0   TTR:  63.8 % (2.4 y)   INR used for dosin.40 (2021)   Warfarin maintenance plan:  5 mg every Mon, Fri; 7.5 mg all other days   Weekly warfarin total:  47.5 mg   Plan last modified:  Brendan Live RPH (2020)   Next INR check:  2021   Priority:  Maintenance   Target end date:  Indefinite    Indications    Permanent atrial fibrillation (CMS/HCC) [I48.21]             Anticoagulation Episode Summary     INR check location:      Preferred lab:      Send INR reminders to:  [facility]    Comments:        Anticoagulation Care Providers     Provider Role Specialty Phone number    Kurt Henry MD Referring Cardiology [phone]        Findings:  Hospital admission 21. Discharged 21 to [facility]. Now with [facility] since <2 wks ago.     INR History:  Anticoagulation Monitoring 2021 3/3/2021 2021   INR 2.9 2.8 1.40   INR Date 2021 3/3/2021 2021   INR Goal 2.0-3.0 2.0-3.0 2.0-3.0   Trend Same Same Same   Last Week Total 47.5 mg 47.5 mg 47.5 mg   Next Week Total 47.5 mg 47.5 mg 50 mg   Sun 7.5 mg 7.5 mg 7.5 mg   Mon 5 mg 5 mg 5 mg   Tue 7.5 mg 7.5 mg 7.5 mg   Wed 7.5 mg 7.5 mg 10 mg ()   Thu 7.5 mg 7.5 mg 7.5 mg   Fri 5 mg 5 mg 5 mg   Sat 7.5 mg 7.5 mg 7.5 mg   Visit Report - - -   Some recent data might be hidden       Plan:  1. INR is Subtherapeutic today- see above in Anticoagulation Summary.   Provided instructions to Estefania with [facility] to Change their warfarin regimen- see above in Anticoagulation Summary.  2. Follow up in 1 week      Brendan Live RPH

## 2021-05-19 ENCOUNTER — ANTICOAGULATION VISIT (OUTPATIENT)
Dept: PHARMACY | Facility: HOSPITAL | Age: 71
End: 2021-05-19

## 2021-05-19 DIAGNOSIS — I48.21 PERMANENT ATRIAL FIBRILLATION (HCC): Primary | ICD-10-CM

## 2021-05-19 LAB — INR PPP: 1.9

## 2021-05-19 NOTE — PROGRESS NOTES
Anticoagulation Clinic Progress Note    Anticoagulation Summary  As of 2021    INR goal:  2.0-3.0   TTR:  63.3 % (2.4 y)   INR used for dosin.90 (2021)   Warfarin maintenance plan:  5 mg every Mon; 7.5 mg all other days   Weekly warfarin total:  50 mg   Plan last modified:  Omid Galeas RPH (2021)   Next INR check:  2021   Priority:  Maintenance   Target end date:  Indefinite    Indications    Permanent atrial fibrillation (CMS/HCC) [I48.21]             Anticoagulation Episode Summary     INR check location:      Preferred lab:      Send INR reminders to:   [facility]    Comments:        Anticoagulation Care Providers     Provider Role Specialty Phone number    Kurt Henry MD Referring Cardiology [phone]          Clinic Interview:      INR History:  Anticoagulation Monitoring 3/3/2021 2021 2021   INR 2.8 1.40 1.90   INR Date 3/3/2021 2021 2021   INR Goal 2.0-3.0 2.0-3.0 2.0-3.0   Trend Same Same Up   Last Week Total 47.5 mg 47.5 mg 50 mg   Next Week Total 47.5 mg 50 mg 50 mg   Sun 7.5 mg 7.5 mg 7.5 mg   Mon 5 mg 5 mg 5 mg   Tue 7.5 mg 7.5 mg 7.5 mg   Wed 7.5 mg 10 mg () 7.5 mg   Thu 7.5 mg 7.5 mg 7.5 mg   Fri 5 mg 5 mg 7.5 mg   Sat 7.5 mg 7.5 mg 7.5 mg   Visit Report - - -   Some recent data might be hidden       Plan:  1. INR is Subtherapeutic today- see above in Anticoagulation Summary.   Will instruct Jalen Lockwood to Increase their warfarin regimen to 7.5 mg daily except for 5 mg on - see above in Anticoagulation Summary.  2. Follow up in 1 week  3.  Pt has agreed to only be called if INR out of range. They have been instructed to call if any changes in medications, doses, concerns, etc. Patient expresses understanding and has no further questions at this time.    Omid Galeas RPH  
Simple: Patient demonstrates quick and easy understanding

## 2021-05-24 LAB
MYCOBACTERIUM SPEC CULT: NORMAL
NIGHT BLUE STAIN TISS: NORMAL

## 2021-05-26 ENCOUNTER — ANTICOAGULATION VISIT (OUTPATIENT)
Dept: PHARMACY | Facility: HOSPITAL | Age: 71
End: 2021-05-26

## 2021-05-26 DIAGNOSIS — I48.21 PERMANENT ATRIAL FIBRILLATION (HCC): Primary | ICD-10-CM

## 2021-05-26 LAB — INR PPP: 2.5

## 2021-05-26 NOTE — PROGRESS NOTES
Anticoagulation Clinic Progress Note    Anticoagulation Summary  As of 2021    INR goal:  2.0-3.0   TTR:  63.5 % (2.4 y)   INR used for dosin.50 (2021)   Warfarin maintenance plan:  5 mg every Mon; 7.5 mg all other days   Weekly warfarin total:  50 mg   Plan last modified:  Omid Galeas [facility] (2021)   Next INR check:  2021   Priority:  Maintenance   Target end date:  Indefinite    Indications    Permanent atrial fibrillation (CMS/HCC) [I48.21]             Anticoagulation Episode Summary     INR check location:      Preferred lab:      [facility]    Comments:        Anticoagulation Care Providers     Provider Role Specialty Phone number    Kurt Henry MD Referring Cardiology [phone]          INR History:  Anticoagulation Monitoring 2021   INR 1.40 1.90 2.50   INR Date 2021   INR Goal 2.0-3.0 2.0-3.0 2.0-3.0   Trend Same Up Same   Last Week Total 47.5 mg 50 mg 50 mg   Next Week Total 50 mg 50 mg 50 mg   Sun 7.5 mg 7.5 mg 7.5 mg   Mon 5 mg 5 mg 5 mg   Tue 7.5 mg 7.5 mg 7.5 mg   Wed 10 mg () 7.5 mg 7.5 mg   Thu 7.5 mg 7.5 mg 7.5 mg   Fri 5 mg 7.5 mg 7.5 mg   Sat 7.5 mg 7.5 mg 7.5 mg   Visit Report - - -   Some recent data might be hidden       Plan:  1. INR is Therapeutic today- see above in Anticoagulation Summary.   Provided instructions to Estefania with [facility] to Continue their warfarin regimen- see above in Anticoagulation Summary.  2. Follow up in 1.5 weeks      Chaitanya Tello RPH

## 2021-06-04 ENCOUNTER — ANTICOAGULATION VISIT (OUTPATIENT)
Dept: PHARMACY | Facility: HOSPITAL | Age: 71
End: 2021-06-04

## 2021-06-04 DIAGNOSIS — I48.21 PERMANENT ATRIAL FIBRILLATION (HCC): Primary | ICD-10-CM

## 2021-06-04 LAB — INR PPP: 2.4

## 2021-06-04 NOTE — PROGRESS NOTES
Anticoagulation Clinic Progress Note    Anticoagulation Summary  As of 2021    INR goal:  2.0-3.0   TTR:  63.8 % (2.5 y)   INR used for dosin.40 (2021)   Warfarin maintenance plan:  5 mg every Mon; 7.5 mg all other days   Weekly warfarin total:  50 mg   No change documented:  Rosy Newman, [place]   Plan last modified:  Omid Galeas, [facility] (2021)   Next INR check:  2021   Priority:  Maintenance   Target end date:  Indefinite    Indications    Permanent atrial fibrillation (CMS/HCC) [I48.21]             Anticoagulation Episode Summary     INR check location:      Preferred lab:      Send INR reminders to:   [facility] CLINICAL POOL    Comments:        Anticoagulation Care Providers     Provider Role Specialty Phone number    Kurt Henry MD Referring Cardiology [phone]          Clinic Interview:  Patient Findings     Negatives:  Signs/symptoms of thrombosis, Signs/symptoms of bleeding,   Laboratory test error suspected, Change in health, Change in alcohol use,   Change in activity, Upcoming invasive procedure, Emergency department   visit, Upcoming dental procedure, Missed doses, Extra doses, Change in   medications, Change in diet/appetite, Hospital admission, Bruising, Other   complaints      Clinical Outcomes     Negatives:  Major bleeding event, Thromboembolic event,   Anticoagulation-related hospital admission, Anticoagulation-related ED   visit, Anticoagulation-related fatality        INR History:  Anticoagulation Monitoring 2021   INR 1.90 2.50 2.40   INR Date 2021   INR Goal 2.0-3.0 2.0-3.0 2.0-3.0   Trend Up Same Same   Last Week Total 50 mg 50 mg 50 mg   Next Week Total 50 mg 50 mg 50 mg   Sun 7.5 mg 7.5 mg 7.5 mg   Mon 5 mg 5 mg 5 mg   Tue 7.5 mg 7.5 mg 7.5 mg   Wed 7.5 mg 7.5 mg 7.5 mg   Thu 7.5 mg 7.5 mg 7.5 mg   Fri 7.5 mg 7.5 mg 7.5 mg   Sat 7.5 mg 7.5 mg 7.5 mg   Visit Report - - -   Some recent data might be hidden        Plan:  1. INR is Therapeutic today- see above in Anticoagulation Summary.   Spoke with MAGALIE Mac home health RN who will instruct Jalen Lockwood to Continue their warfarin regimen- see above in Anticoagulation Summary.  2. Follow up in 1 week with Friday home health visit.   3. Pt has agreed to only be called if INR out of range. They have been instructed to call if any changes in medications, doses, concerns, etc. Patient expresses understanding and has no further questions at this time.    Rosy Newman, AlexD

## 2021-06-07 RX ORDER — CARVEDILOL 6.25 MG/1
6.25 TABLET ORAL 2 TIMES DAILY WITH MEALS
Qty: 180 TABLET | Refills: 3 | Status: SHIPPED | OUTPATIENT
Start: 2021-06-07 | End: 2022-01-01

## 2021-06-07 NOTE — TELEPHONE ENCOUNTER
Dr. Henry Pt:  Out of office:   Returnin21  [phone]  Please advise.    Est APRN: Katie Hurt.     Received a refill request for pt's Carvedilol.  Pt has an upcoming appt with Dr. Henry on  and Katie Hurt, APRN (2021).     Please see pending rx.

## 2021-06-11 ENCOUNTER — ANTICOAGULATION VISIT (OUTPATIENT)
Dept: PHARMACY | Facility: HOSPITAL | Age: 71
End: 2021-06-11

## 2021-06-11 DIAGNOSIS — I48.21 PERMANENT ATRIAL FIBRILLATION (HCC): Primary | ICD-10-CM

## 2021-06-11 LAB — INR PPP: 2.1

## 2021-06-11 NOTE — PROGRESS NOTES
Anticoagulation Clinic Progress Note    Anticoagulation Summary  As of 2021    INR goal:  2.0-3.0   TTR:  64.1 % (2.5 y)   INR used for dosin.10 (2021)   Warfarin maintenance plan:  5 mg every Mon; 7.5 mg all other days   Weekly warfarin total:  50 mg   No change documented:  Brendan Live RPH   Plan last modified:  Omid Galeas RPH (2021)   Next INR check:  2021   Priority:  Maintenance   Target end date:  Indefinite    Indications    Permanent atrial fibrillation (CMS/HCC) [I48.21]             Anticoagulation Episode Summary     INR check location:      Preferred lab:      Send INR reminders to:  [facility] CLINICAL [place]    Comments:        Anticoagulation Care Providers     Provider Role Specialty Phone number    Kurt Henry MD Referring Cardiology [phone]          INR History:  Anticoagulation Monitoring 2021   INR 2.50 2.40 2.10   INR Date 2021   INR Goal 2.0-3.0 2.0-3.0 2.0-3.0   Trend Same Same Same   Last Week Total 50 mg 50 mg 50 mg   Next Week Total 50 mg 50 mg 50 mg   Sun 7.5 mg 7.5 mg 7.5 mg   Mon 5 mg 5 mg 5 mg   Tue 7.5 mg 7.5 mg 7.5 mg   Wed 7.5 mg 7.5 mg 7.5 mg   Thu 7.5 mg 7.5 mg 7.5 mg   Fri 7.5 mg 7.5 mg 7.5 mg   Sat 7.5 mg 7.5 mg 7.5 mg   Visit Report - - -   Some recent data might be hidden       Plan:  1. INR is Therapeutic today- see above in Anticoagulation Summary.   Provided instructions to Estefania (secure ) with [facility] to Continue their warfarin regimen- see above in Anticoagulation Summary.  2. Follow up in 1 week      Brendan Live RPH

## 2021-06-16 NOTE — PROGRESS NOTES
Date of Office Visit: 2021  Encounter Provider: Kurt Henry MD  Place of Service: [facility] CARDIOLOGY  Patient Name: Jalen Lockwood  :1950    Chief Complaint   Patient presents with   • Atrial Fibrillation   :     HPI: Jalen Lockwood is a 71 y.o. male who presents today for yearly follow up. I have reviewed prior notes and there are no changes except for any new updates described below. I have also reviewed any information entered into the medical record by the patient or by ancillary staff.     He presented with rapid atrial flutter in . His ejection fraction was low due to tachycardia-mediated cardiomyopathy. His catheterization revealed moderate nonobstructive coronary disease. Medical therapy only was recommended and his ejection fraction normalized. It was also found that he was drinking quite heavily and we recommended that he cut back.  Shortly after that, he presented with rapid atrial fibrillation and his ejection fraction declined again to 35%, but once his atrial fibrillation resolved, his ejection fraction improved again.    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 Boston.  This was complicated by a wound infection.  A non-contrasted CT of the abdomen/pelvis revealed aortectasia of the infrarenal abdominal aorta but no aneurysm elsewhere.     He has been admitted numerous times with cellulitis and sepsis. He was admitted in 2021; his troponin was quite elevated. An echo revealed an LVEF of 30-35% with apical hypokinesis. His EKG showed anterior T wave inversions.  Because of his numerous comorbidities and poor functional status, we decided to treat him medically.    He is now home. He denies chest pain, dyspnea, orthopnea, PND, palpitations, or syncope.  He still smokes, and he drinks a fair amount of alcohol. He has not had any bleeding. His chronic severe LE edema is unchanged.     Past Medical  History:   Diagnosis Date   • Allergic rhinitis    • Anxiety    • Aortectasia (CMS/HCC)     3cm infrarenal abdominal aorta   • Arthritis    • Atrial flutter (CMS/HCC) 2010    s/p ablation    • Charcot's joint of foot    • Chronic edema     both legs and sees wound care center at [place]    • Chronic venous insufficiency    • COPD (chronic obstructive pulmonary disease) (CMS/HCC)    • Coronary atherosclerosis     Cath 2010: diffuse 40-50% disease   • Diverticulosis    • Duodenitis    • Fatty liver    • Gastritis    • Gastroparesis    • Hematoma     post-operative; After catheterization, right groin, required surgical exploration   • Hyperlipidemia    • Hypertension    • Insomnia    • Internal hemorrhoids    • Open wound     izzy legs has drsg chg weekly at wound care center at [place]  pt does second dressing on left leg another time during week   • Osteomyelitis (CMS/HCC)    • Paroxysmal atrial fibrillation (CMS/HCC)    • Peripheral neuropathy    • Popliteal artery aneurysm (CMS/HCC)     left, s/p stenting by Dr. Boston   • Skin cancer    • Sleep apnea     o2   • 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CMS/HCC)     due to flutter and afib; cath 2010 with nonobstructive disease   • Venous stasis    • Venous stasis ulcer (CMS/HCC)     bilateral legs        Past Surgical History:   Procedure Laterality Date   • BRONCHOSCOPY N/A 4/12/2021    Procedure: BRONCHOSCOPY WITH BAL;  Surgeon: Bunny Lepe MD;  Location: [facility] ENDOSCOPY;  Service: Pulmonary;  Laterality: N/A;  PRE-HEMOPTYSIS  POST-SAME   • CARDIAC CATHETERIZATION     • CATARACT EXTRACTION     • COLONOSCOPY  09/28/2015    NBIH, diverticulosis, polyps   • COLONOSCOPY N/A 9/11/2018    Procedure: COLONOSCOPY TO CECUM  AND TERM. ILEUM WITH COLD SNARE POLYPECTOMIES;  Surgeon: Kane Lagunas MD;  Location: [facility] ENDOSCOPY;  Service: Gastroenterology   • COLONOSCOPY N/A 10/29/2019    Procedure: COLONOSCOPY TO TO CECUM AND TERMINAL ILEUM WITH HOT AND COLD SNARE  POLYPECTOMIES;  Surgeon: Kane Lagunas MD;  Location: [facility] ENDOSCOPY;  Service: Gastroenterology   • HIP ARTHROPLASTY Right 2017   • JOINT REPLACEMENT Left    • OTHER SURGICAL HISTORY      Catheter ablation atrial flutter   • REPAIR ANEURYSM / PSEUDO ANEURYSM / RUPTURED ANEURYSM POPLITEAL ARTERY      Stent-Graft of the the left popliteal artery   • REPAIR KNEE LIGAMENT      Primary repair of knee ligament cruciate anterior right   • TONSILLECTOMY     • TOTAL KNEE ARTHROPLASTY Bilateral    • UPPER GASTROINTESTINAL ENDOSCOPY  2014    acute gastritis, acute duodenitis       Social History     Socioeconomic History   • Marital status:      Spouse name: Mariposa   • Number of children: Not on file   • Years of education: Not on file   • Highest education level: Not on file   Tobacco Use   • Smoking status: Former Smoker     Packs/day: 0.25     Years: 45.00     Pack years: 11.25     Types: Cigarettes     Quit date: 3/30/2021     Years since quittin.2   • Smokeless tobacco: Never Used   • Tobacco comment: caffeine use - 1.5 cups coffee daily    Vaping Use   • Vaping Use: Never used   Substance and Sexual Activity   • Alcohol use: Yes     Alcohol/week: 4.0 - 5.0 standard drinks     Types: 1 - 2 Shots of liquor, 3 Standard drinks or equivalent per week     Comment: 2 rum a day   • Drug use: No   • Sexual activity: Defer       Family History   Problem Relation Age of Onset   • Emphysema Father    • Malig Hyperthermia Neg Hx        Review of Systems   Cardiovascular: Positive for leg swelling.   Skin: Positive for color change and poor wound healing.   Musculoskeletal: Positive for joint pain and joint swelling.   Genitourinary: Positive for decreased libido.   All other systems reviewed and are negative.      Allergies   Allergen Reactions   • Cephalexin Hives   • Codeine Nausea Only         Current Outpatient Medications:   •  acidophilus (FLORANEX) tablet tablet, , Disp: , Rfl:   •  albuterol sulfate  HFA (Ventolin HFA) 108 (90 Base) MCG/ACT inhaler, Inhale 2 puffs Every 4 (Four) Hours As Needed for Wheezing., Disp: 18 g, Rfl: 3  •  ALPRAZOLAM PO, Take  by mouth Daily., Disp: , Rfl:   •  Atrovent HFA 17 MCG/ACT inhaler, Inhale 2 puffs 4 (Four) Times a Day., Disp: 1 each, Rfl: 3  •  carvedilol (COREG) 6.25 MG tablet, Take 1 tablet by mouth 2 (Two) Times a Day With Meals., Disp: 180 tablet, Rfl: 3  •  docusate sodium (COLACE) 100 MG capsule, Take 100 mg by mouth Daily., Disp: , Rfl:   •  finasteride (PROSCAR) 5 MG tablet, Take 1 tablet by mouth daily., Disp: , Rfl:   •  Fluticasone Furoate-Vilanterol (Breo Ellipta) 200-25 MCG/INH inhaler, Inhale 1 puff Daily., Disp: 1 each, Rfl: 1  •  furosemide (LASIX) 40 MG tablet, Take 40 mg by mouth Daily., Disp: , Rfl:   •  gabapentin (NEURONTIN) 600 MG tablet, Take 600 mg by mouth 3 (Three) Times a Day., Disp: , Rfl:   •  HYDROcodone-acetaminophen (NORCO)  MG per tablet, Take 1 tablet by mouth Every 6 (Six) Hours As Needed for Moderate Pain ., Disp: 6 tablet, Rfl: 0  •  lisinopril (PRINIVIL,ZESTRIL) 20 MG tablet, Take 1 tablet by mouth Daily. (Patient taking differently: Take 20 mg by mouth 2 (two) times a day.), Disp: , Rfl:   •  metoclopramide (REGLAN) 10 MG tablet, Take 10 mg by mouth 3 (Three) Times a Day Before Meals., Disp: , Rfl:   •  nystatin (MYCOSTATIN) 017412 UNIT/GM powder, Apply  topically to the appropriate area as directed Every 12 (Twelve) Hours., Disp: , Rfl:   •  PRAVASTATIN SODIUM PO, Take  by mouth Daily., Disp: , Rfl:   •  silodosin (RAPAFLO) 8 MG capsule capsule, Take 1 capsule by mouth daily. With food., Disp: , Rfl:   •  warfarin (COUMADIN) 5 MG tablet, TAKE 1 TABLET ON MON & FRI AND 1.5 TABLETS ON ALL OTHER DAYS OR AS DIRECTED BY MED MANAGEMENT CLINIC, Disp: 130 tablet, Rfl: 1  •  ALPRAZolam (XANAX) 0.5 MG tablet, TAKE 1 TABLET BY MOUTH EVERY DAY AT NIGHT. NEEDS APPT, Disp: , Rfl:      Objective:     Vitals:    06/17/21 1353   BP: 134/82   BP  "Location: Left arm   Pulse: 66   Weight: 130 kg (287 lb)   Height: 185.4 cm (73\")     Body mass index is 37.87 kg/m².    Physical Exam  Vitals reviewed.   Constitutional:       Comments: Obese   HENT:      Head:      Comments: Numerous SK/AK, skin cancers on face/ears/neck     Nose: Nose normal.      Mouth/Throat:      Comments: masked  Eyes:      Conjunctiva/sclera: Conjunctivae normal.   Neck:      Vascular: No JVD.   Cardiovascular:      Rate and Rhythm: Normal rate. Rhythm irregularly irregular.      Heart sounds: Normal heart sounds. No murmur heard.     Pulmonary:      Effort: Pulmonary effort is normal.   Abdominal:      Palpations: Abdomen is soft.      Tenderness: There is no abdominal tenderness.      Comments: Obesity limits abdominal exam   Musculoskeletal:         General: Deformity (bilateral charcot foot) present. Normal range of motion.      Cervical back: Normal range of motion.      Comments: Marked BLE lymphedema, rightly wrapped legs   Skin:     General: Skin is warm and dry.      Findings: No rash.      Comments: Numerous seb kers and actinic kers     Neurological:      General: No focal deficit present.      Mental Status: He is alert.      Cranial Nerves: No cranial nerve deficit.   Psychiatric:         Mood and Affect: Mood normal.         Behavior: Behavior normal.         Thought Content: Thought content normal.           ECG 12 Lead    Date/Time: 6/17/2021 2:14 PM  Performed by: Kurt Henry MD  Authorized by: Kurt Henry MD   Comparison: compared with previous ECG   Comparison to previous ECG: TWI has resolved  Rhythm: atrial fibrillation  Conduction: non-specific intraventricular conduction delay  ST Segments: ST segments normal  T Waves: T waves normal  QRS axis: left  Other findings: poor R wave progression    Clinical impression: abnormal EKG            Assessment:       Diagnosis Plan   1. Ischemic cardiomyopathy  Adult Transthoracic Echo Complete W/ Cont if Necessary Per " Protocol   2. 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CMS/HCC)     3. Permanent atrial fibrillation (CMS/HCC)     4. Typical atrial flutter (CMS/HCC)     5. Essential hypertension     6. Aortectasia (CMS/HCC)     7. Chronic edema            Plan:       He had a previous history of tachycardia induced cardiomyopathy with moderate but nonobstructive coronary disease.  During his last hospitalization with cellulitis/sepsis/acute renal failure, he suffered a non-ST elevation myocardial infarction.  His ejection fraction was found to be approximately 30% with anteroapical wall motion abnormalities consistent with ischemia or infarct.  He also had anterior T wave inversions, which have resolved.  Ultimately, we felt that medical therapy was most appropriate given his obesity, recurrent cellulitis, ongoing tobacco and alcohol usage, and poor overall functional status.  He is not on an antiplatelet agent because he is on warfarin and his bleeding risk is too high.  He is on carvedilol and lisinopril.  He denies chest pain.  We will get a another echocardiogram in 3 months.  He will remain on furosemide.    He has had both flutter and fib in the past; the former has been ablated.  He will remain on carvedilol and warfarin.      His BP is generally within goal. At one pont he was on a thiazide but it caused hyponatremia.     Sincerely,       Kurt Henry MD

## 2021-06-17 ENCOUNTER — OFFICE VISIT (OUTPATIENT)
Dept: CARDIOLOGY | Facility: CLINIC | Age: 71
End: 2021-06-17

## 2021-06-17 ENCOUNTER — ANTICOAGULATION VISIT (OUTPATIENT)
Dept: PHARMACY | Facility: HOSPITAL | Age: 71
End: 2021-06-17

## 2021-06-17 VITALS
BODY MASS INDEX: 38.04 KG/M2 | WEIGHT: 287 LBS | HEIGHT: 73 IN | SYSTOLIC BLOOD PRESSURE: 134 MMHG | DIASTOLIC BLOOD PRESSURE: 82 MMHG | HEART RATE: 66 BPM

## 2021-06-17 DIAGNOSIS — R00.0 TACHYCARDIA INDUCED CARDIOMYOPATHY (HCC): ICD-10-CM

## 2021-06-17 DIAGNOSIS — I10 ESSENTIAL HYPERTENSION: ICD-10-CM

## 2021-06-17 DIAGNOSIS — I43 TACHYCARDIA INDUCED CARDIOMYOPATHY (HCC): ICD-10-CM

## 2021-06-17 DIAGNOSIS — I77.819 AORTECTASIA (HCC): ICD-10-CM

## 2021-06-17 DIAGNOSIS — I48.3 TYPICAL ATRIAL FLUTTER (HCC): ICD-10-CM

## 2021-06-17 DIAGNOSIS — I25.5 ISCHEMIC CARDIOMYOPATHY: Primary | ICD-10-CM

## 2021-06-17 DIAGNOSIS — I48.21 PERMANENT ATRIAL FIBRILLATION (HCC): Primary | ICD-10-CM

## 2021-06-17 DIAGNOSIS — R60.9 CHRONIC EDEMA: ICD-10-CM

## 2021-06-17 DIAGNOSIS — I48.21 PERMANENT ATRIAL FIBRILLATION (HCC): ICD-10-CM

## 2021-06-17 PROBLEM — I50.21 ACUTE SYSTOLIC (CONGESTIVE) HEART FAILURE: Status: RESOLVED | Noted: 2021-04-03 | Resolved: 2021-06-17

## 2021-06-17 PROBLEM — R04.2 HEMOPTYSIS: Status: RESOLVED | Noted: 2021-03-30 | Resolved: 2021-06-17

## 2021-06-17 PROBLEM — A41.9 SEVERE SEPSIS (HCC): Status: RESOLVED | Noted: 2021-03-30 | Resolved: 2021-06-17

## 2021-06-17 PROBLEM — R65.20 SEVERE SEPSIS (HCC): Status: RESOLVED | Noted: 2021-03-30 | Resolved: 2021-06-17

## 2021-06-17 PROBLEM — L03.119 CELLULITIS OF LOWER EXTREMITY: Status: RESOLVED | Noted: 2021-04-03 | Resolved: 2021-06-17

## 2021-06-17 LAB — INR PPP: 1.8

## 2021-06-17 PROCEDURE — 99214 OFFICE O/P EST MOD 30 MIN: CPT | Performed by: INTERNAL MEDICINE

## 2021-06-17 PROCEDURE — 93000 ELECTROCARDIOGRAM COMPLETE: CPT | Performed by: INTERNAL MEDICINE

## 2021-06-17 RX ORDER — ALPRAZOLAM 0.5 MG/1
TABLET ORAL
COMMUNITY
Start: 2021-06-01 | End: 2021-09-02

## 2021-06-17 NOTE — PROGRESS NOTES
Anticoagulation Clinic Progress Note    Anticoagulation Summary  As of 2021    INR goal:  2.0-3.0   TTR:  63.9 % (2.5 y)   INR used for dosin.80 (2021)   Warfarin maintenance plan:  5 mg every Mon; 7.5 mg all other days   Weekly warfarin total:  50 mg   Plan last modified:  Omid Galeas RPH (2021)   Next INR check:  2021   Priority:  Maintenance   Target end date:  Indefinite    Indications    Permanent atrial fibrillation (CMS/HCC) [I48.21]             Anticoagulation Episode Summary     INR check location:      Preferred lab:      Send INR reminders to:   [facility] CLINICAL POOL    Comments:        Anticoagulation Care Providers     Provider Role Specialty Phone number    Kurt Henry MD Referring Cardiology [phone]          Clinic Interview:      INR History:  Anticoagulation Monitoring 2021   INR 2.40 2.10 1.80   INR Date 2021   INR Goal 2.0-3.0 2.0-3.0 2.0-3.0   Trend Same Same Same   Last Week Total 50 mg 50 mg 50 mg   Next Week Total 50 mg 50 mg 50 mg   Sun 7.5 mg 7.5 mg 7.5 mg   Mon 5 mg 5 mg 5 mg   Tue 7.5 mg 7.5 mg 7.5 mg   Wed 7.5 mg 7.5 mg 7.5 mg   Thu 7.5 mg 7.5 mg 7.5 mg   Fri 7.5 mg 7.5 mg 7.5 mg   Sat 7.5 mg 7.5 mg 7.5 mg   Visit Report - - -   Some recent data might be hidden       Plan:  1. INR is Subtherapeutic today- see above in Anticoagulation Summary.   Will instruct Jalen Lockwood to Continue their warfarin regimen- see above in Anticoagulation Summary.  2. Follow up in 1 week  3.  Pt has agreed to only be called if INR out of range. They have been instructed to call if any changes in medications, doses, concerns, etc. Patient expresses understanding and has no further questions at this time.    Omid Galeas RPH

## 2021-06-18 RX ORDER — WARFARIN SODIUM 5 MG/1
TABLET ORAL
Qty: 130 TABLET | Refills: 1 | Status: SHIPPED | OUTPATIENT
Start: 2021-06-18 | End: 2021-08-30

## 2021-06-24 ENCOUNTER — ANTICOAGULATION VISIT (OUTPATIENT)
Dept: PHARMACY | Facility: HOSPITAL | Age: 71
End: 2021-06-24

## 2021-06-24 DIAGNOSIS — I48.21 PERMANENT ATRIAL FIBRILLATION (HCC): Primary | ICD-10-CM

## 2021-06-24 LAB — INR PPP: 2.4

## 2021-06-24 NOTE — PROGRESS NOTES
Anticoagulation Clinic Progress Note    Anticoagulation Summary  As of 2021    INR goal:  2.0-3.0   TTR:  63.9 % (2.5 y)   INR used for dosin.40 (2021)   Warfarin maintenance plan:  5 mg every Mon; 7.5 mg all other days   Weekly warfarin total:  50 mg   No change documented:  Brendan Live RPH   Plan last modified:  Omid Galeas RPH (2021)   Next INR check:  2021   Priority:  Maintenance   Target end date:  Indefinite    Indications    Permanent atrial fibrillation (CMS/HCC) [I48.21]             Anticoagulation Episode Summary     INR check location:      Preferred lab:      Send INR reminders to:  [facility]    Comments:        Anticoagulation Care Providers     Provider Role Specialty Phone number    Kurt Henry MD Referring Cardiology [phone]          INR History:  Anticoagulation Monitoring 2021   INR 2.10 1.80 2.40   INR Date 2021   INR Goal 2.0-3.0 2.0-3.0 2.0-3.0   Trend Same Same Same   Last Week Total 50 mg 50 mg 50 mg   Next Week Total 50 mg 50 mg 50 mg   Sun 7.5 mg 7.5 mg 7.5 mg   Mon 5 mg 5 mg 5 mg   Tue 7.5 mg 7.5 mg 7.5 mg   Wed 7.5 mg 7.5 mg 7.5 mg   Thu 7.5 mg 7.5 mg 7.5 mg   Fri 7.5 mg 7.5 mg 7.5 mg   Sat 7.5 mg 7.5 mg 7.5 mg   Visit Report - - -   Some recent data might be hidden       Plan:  1. INR is Therapeutic today- see above in Anticoagulation Summary.   Provided instructions to Estefania with [facility] to Continue their warfarin regimen- see above in Anticoagulation Summary.  2. Follow up in 1 week      Brendan Live RPH

## 2021-07-07 ENCOUNTER — OUTSIDE FACILITY SERVICE (OUTPATIENT)
Dept: FAMILY MEDICINE CLINIC | Facility: CLINIC | Age: 71
End: 2021-07-07

## 2021-07-07 PROCEDURE — G0180 MD CERTIFICATION HHA PATIENT: HCPCS | Performed by: FAMILY MEDICINE

## 2021-07-08 ENCOUNTER — ANTICOAGULATION VISIT (OUTPATIENT)
Dept: PHARMACY | Facility: HOSPITAL | Age: 71
End: 2021-07-08

## 2021-07-08 DIAGNOSIS — I48.21 PERMANENT ATRIAL FIBRILLATION (HCC): Primary | ICD-10-CM

## 2021-07-08 LAB — INR PPP: 2.1

## 2021-07-08 NOTE — PROGRESS NOTES
Anticoagulation Clinic Progress Note    Anticoagulation Summary  As of 2021    INR goal:  2.0-3.0   TTR:  64.5 % (2.6 y)   INR used for dosin.10 (2021)   Warfarin maintenance plan:  5 mg every Mon; 7.5 mg all other days   Weekly warfarin total:  50 mg   No change documented:  Brendan Live RPH   Plan last modified:  Omid Galeas RPH (2021)   Next INR check:  2021   Priority:  Maintenance   Target end date:  Indefinite    Indications    Permanent atrial fibrillation (CMS/HCC) [I48.21]             Anticoagulation Episode Summary     INR check location:      Preferred lab:      Send INR reminders to:  [facility]    Comments:        Anticoagulation Care Providers     Provider Role Specialty Phone number    Kurt Henry MD Referring Cardiology [phone]          INR History:  Anticoagulation Monitoring 2021   INR 1.80 2.40 2.10   INR Date 2021   INR Goal 2.0-3.0 2.0-3.0 2.0-3.0   Trend Same Same Same   Last Week Total 50 mg 50 mg 50 mg   Next Week Total 50 mg 50 mg 50 mg   Sun 7.5 mg 7.5 mg 7.5 mg   Mon 5 mg 5 mg 5 mg   Tue 7.5 mg 7.5 mg 7.5 mg   Wed 7.5 mg 7.5 mg 7.5 mg   Thu 7.5 mg 7.5 mg 7.5 mg   Fri 7.5 mg 7.5 mg 7.5 mg   Sat 7.5 mg 7.5 mg 7.5 mg   Visit Report - - -   Some recent data might be hidden       Plan:  1. INR is Therapeutic today- see above in Anticoagulation Summary.   Provided instructions to Estefania with [facility] to Continue their warfarin regimen- see above in Anticoagulation Summary.  2. Follow up in 2 weeks      Brendan Live RPH

## 2021-07-15 ENCOUNTER — ANTICOAGULATION VISIT (OUTPATIENT)
Dept: PHARMACY | Facility: HOSPITAL | Age: 71
End: 2021-07-15

## 2021-07-15 DIAGNOSIS — I48.21 PERMANENT ATRIAL FIBRILLATION (HCC): Primary | ICD-10-CM

## 2021-07-15 LAB — INR PPP: 2.6

## 2021-07-15 NOTE — PROGRESS NOTES
Anticoagulation Clinic Progress Note  Anticoagulation Summary  As of 7/15/2021    INR goal:  2.0-3.0   TTR:  64.7 % (2.6 y)   INR used for dosin.60 (7/15/2021)   Warfarin maintenance plan:  5 mg every Mon; 7.5 mg all other days   Weekly warfarin total:  50 mg   No change documented:  Arden Paulino, PharmD   Plan last modified:  Omid Galeas, [facility] (2021)   Next INR check:  2021   Priority:  Maintenance   Target end date:  Indefinite    Indications    Permanent atrial fibrillation (CMS/HCC) [I48.21]             Anticoagulation Episode Summary     INR check location:      Preferred lab:      Send INR reminders to:   [facility] [place]    Comments:        Anticoagulation Care Providers     Provider Role Specialty Phone number    Kurt Henry MD Referring Cardiology [phone]        Clinic Interview:  Patient Findings     Negatives:  Signs/symptoms of thrombosis, Signs/symptoms of bleeding,   Laboratory test error suspected, Change in health, Change in alcohol use,   Change in activity, Upcoming invasive procedure, Emergency department   visit, Upcoming dental procedure, Missed doses, Extra doses, Change in   medications, Change in diet/appetite, Hospital admission, Bruising, Other   complaints    Comments:  All info per MAGALIE Mac ([phone])      Clinical Outcomes     Negatives:  Major bleeding event, Thromboembolic event,   Anticoagulation-related hospital admission, Anticoagulation-related ED   visit, Anticoagulation-related fatality      INR History:  Anticoagulation Monitoring 2021 2021 7/15/2021   INR 2.40 2.10 2.60   INR Date 2021 2021 7/15/2021   INR Goal 2.0-3.0 2.0-3.0 2.0-3.0   Trend Same Same Same   Last Week Total 50 mg 50 mg 50 mg   Next Week Total 50 mg 50 mg 50 mg   Sun 7.5 mg 7.5 mg 7.5 mg   Mon 5 mg 5 mg 5 mg   Tue 7.5 mg 7.5 mg 7.5 mg   Wed 7.5 mg 7.5 mg 7.5 mg   Thu 7.5 mg 7.5 mg 7.5 mg   Fri 7.5 mg 7.5 mg 7.5 mg   Sat 7.5 mg 7.5 mg 7.5 mg    Visit Report - - -   Some recent data might be hidden     Plan:  1. INR is Therapeutic today- see above in Anticoagulation Summary.   Will instruct Jalen Lockwood to Continue their warfarin regimen- see above in Anticoagulation Summary.  2. Follow up in 1 week  3. They have been instructed to call if any changes in medications, doses, concerns, etc. Patient expresses understanding and has no further questions at this time.    Arden Paulino, AlexD

## 2021-07-22 RX ORDER — ALBUTEROL SULFATE 90 UG/1
AEROSOL, METERED RESPIRATORY (INHALATION)
Qty: 2 G | Refills: 6 | Status: SHIPPED | OUTPATIENT
Start: 2021-07-22 | End: 2022-01-01 | Stop reason: SDUPTHER

## 2021-07-28 ENCOUNTER — ANTICOAGULATION VISIT (OUTPATIENT)
Dept: PHARMACY | Facility: HOSPITAL | Age: 71
End: 2021-07-28

## 2021-07-28 DIAGNOSIS — I48.21 PERMANENT ATRIAL FIBRILLATION (HCC): Primary | ICD-10-CM

## 2021-07-28 LAB — INR PPP: 2

## 2021-07-28 NOTE — PROGRESS NOTES
Anticoagulation Clinic Progress Note    Anticoagulation Summary  As of 2021    INR goal:  2.0-3.0   TTR:  65.2 % (2.6 y)   INR used for dosin.00 (2021)   Warfarin maintenance plan:  5 mg every Mon; 7.5 mg all other days   Weekly warfarin total:  50 mg   No change documented:  Adeline Marie RPH   Plan last modified:  Omid Galeas RPH (2021)   Next INR check:  2021   Priority:  Maintenance   Target end date:  Indefinite    Indications    Permanent atrial fibrillation (CMS/HCC) [I48.21]             Anticoagulation Episode Summary     INR check location:      Preferred lab:      Send INR reminders to:  [facility]    Comments:        Anticoagulation Care Providers     Provider Role Specialty Phone number    Kurt Henry MD Referring Cardiology [phone]          INR History:  Anticoagulation Monitoring 2021 7/15/2021 2021   INR 2.10 2.60 2.00   INR Date 2021 7/15/2021 2021   INR Goal 2.0-3.0 2.0-3.0 2.0-3.0   Trend Same Same Same   Last Week Total 50 mg 50 mg 50 mg   Next Week Total 50 mg 50 mg 50 mg   Sun 7.5 mg 7.5 mg 7.5 mg   Mon 5 mg 5 mg 5 mg   Tue 7.5 mg 7.5 mg 7.5 mg   Wed 7.5 mg 7.5 mg 7.5 mg   Thu 7.5 mg 7.5 mg 7.5 mg   Fri 7.5 mg 7.5 mg 7.5 mg   Sat 7.5 mg 7.5 mg 7.5 mg   Visit Report - - -   Some recent data might be hidden       Plan:  1. INR is Therapeutic today- see above in Anticoagulation Summary.   Provided instructions to Estefania with [facility] [employer] [phone] to Continue their warfarin regimen- see above in Anticoagulation Summary.  2. Follow up in 2 weeks      Adeline Marie RPH

## 2021-08-13 ENCOUNTER — ANTICOAGULATION VISIT (OUTPATIENT)
Dept: PHARMACY | Facility: HOSPITAL | Age: 71
End: 2021-08-13

## 2021-08-13 DIAGNOSIS — I48.21 PERMANENT ATRIAL FIBRILLATION (HCC): Primary | ICD-10-CM

## 2021-08-13 LAB — INR PPP: 2.3

## 2021-08-13 NOTE — PROGRESS NOTES
Anticoagulation Clinic Progress Note    Anticoagulation Summary  As of 2021    INR goal:  2.0-3.0   TTR:  65.8 % (2.7 y)   INR used for dosin.30 (2021)   Warfarin maintenance plan:  5 mg every Mon; 7.5 mg all other days   Weekly warfarin total:  50 mg   Plan last modified:  Omid Galeas formerly [facility] (2021)   Next INR check:  2021   Priority:  Maintenance   Target end date:  Indefinite    Indications    Permanent atrial fibrillation (CMS/HCC) [I48.21]             Anticoagulation Episode Summary     INR check location:      Preferred lab:      Send INR reminders to:  [facility]    Comments:        Anticoagulation Care Providers     Provider Role Specialty Phone number    Kurt Henry MD Referring Cardiology [phone]          Clinic Interview:  Patient Findings     Negatives:  Signs/symptoms of thrombosis, Signs/symptoms of bleeding,   Laboratory test error suspected, Change in health, Change in alcohol use,   Change in activity, Upcoming invasive procedure, Emergency department   visit, Upcoming dental procedure, Missed doses, Extra doses, Change in   medications, Change in diet/appetite, Hospital admission, Bruising, Other   complaints      Clinical Outcomes     Negatives:  Major bleeding event, Thromboembolic event,   Anticoagulation-related hospital admission, Anticoagulation-related ED   visit, Anticoagulation-related fatality        INR History:  Anticoagulation Monitoring 7/15/2021 2021 2021   INR 2.60 2.00 2.30   INR Date 7/15/2021 2021 2021   INR Goal 2.0-3.0 2.0-3.0 2.0-3.0   Trend Same Same Same   Last Week Total 50 mg 50 mg 50 mg   Next Week Total 50 mg 50 mg 50 mg   Sun 7.5 mg 7.5 mg 7.5 mg   Mon 5 mg 5 mg 5 mg   Tue 7.5 mg 7.5 mg 7.5 mg   Wed 7.5 mg 7.5 mg 7.5 mg   Thu 7.5 mg 7.5 mg 7.5 mg   Fri 7.5 mg 7.5 mg 7.5 mg   Sat 7.5 mg 7.5 mg 7.5 mg   Visit Report - - -   Some recent data might be hidden       Plan:  1. INR is Therapeutic today-  see above in Anticoagulation Summary.   Will instruct Jalen Lockwood to Continue their warfarin regimen- see above in Anticoagulation Summary.  2. Follow up in 2 weeks  3. Pt has agreed to only be called if INR out of range. They have been instructed to call if any changes in medications, doses, concerns, etc. Patient expresses understanding and has no further questions at this time.    Sarah Friedman, [facility]

## 2021-08-27 ENCOUNTER — ANTICOAGULATION VISIT (OUTPATIENT)
Dept: PHARMACY | Facility: HOSPITAL | Age: 71
End: 2021-08-27

## 2021-08-27 ENCOUNTER — TELEPHONE (OUTPATIENT)
Dept: FAMILY MEDICINE CLINIC | Facility: CLINIC | Age: 71
End: 2021-08-27

## 2021-08-27 DIAGNOSIS — I48.21 PERMANENT ATRIAL FIBRILLATION (HCC): Primary | ICD-10-CM

## 2021-08-27 LAB — INR PPP: 3.4

## 2021-08-27 NOTE — PROGRESS NOTES
Anticoagulation Clinic Progress Note    Anticoagulation Summary  As of 8/27/2021    INR goal:  2.0-3.0   TTR:  65.7 % (2.7 y)   INR used for dosing:  3.40 (8/27/2021)   Warfarin maintenance plan:  5 mg every Mon; 7.5 mg all other days   Weekly warfarin total:  50 mg   Plan last modified:  Omid Galeas, [facility] (5/19/2021)   Next INR check:  9/3/2021   Priority:  Maintenance   Target end date:  Indefinite    Indications    Permanent atrial fibrillation (CMS/HCC) [I48.21]             Anticoagulation Episode Summary     INR check location:      Preferred lab:      Send INR reminders to:   [facility]    Comments:        Anticoagulation Care Providers     Provider Role Specialty Phone number    Kurt Henry MD Referring Cardiology [phone]          INR History:  Anticoagulation Monitoring 8/13/2021 8/25/2021 8/27/2021   INR 2.30 - 3.40   INR Date 8/13/2021 - 8/27/2021   INR Goal 2.0-3.0 2.0-3.0 2.0-3.0   Trend Same Same Same   Last Week Total 50 mg 50 mg 50 mg   Next Week Total 50 mg 50 mg 47.5 mg   Sun 7.5 mg 7.5 mg 7.5 mg   Mon 5 mg 5 mg 5 mg   Tue 7.5 mg 7.5 mg 7.5 mg   Wed 7.5 mg 7.5 mg 7.5 mg   Thu 7.5 mg 7.5 mg 7.5 mg   Fri 7.5 mg 7.5 mg 5 mg (8/27)   Sat 7.5 mg 7.5 mg 7.5 mg   Visit Report - - -   Some recent data might be hidden       Plan:  1. INR is Supratherapeutic today- see above in Anticoagulation Summary.   Provided instructions to Ashley with [facility] to Change their warfarin regimen- see above in Anticoagulation Summary.  2. Follow up in 1 week      Alex BejaranoD

## 2021-08-30 RX ORDER — WARFARIN SODIUM 5 MG/1
TABLET ORAL
Qty: 130 TABLET | Refills: 1 | Status: SHIPPED | OUTPATIENT
Start: 2021-08-30 | End: 2021-12-29

## 2021-09-01 DIAGNOSIS — F41.9 ANXIETY DISORDER, UNSPECIFIED: ICD-10-CM

## 2021-09-02 ENCOUNTER — ANTICOAGULATION VISIT (OUTPATIENT)
Dept: PHARMACY | Facility: HOSPITAL | Age: 71
End: 2021-09-02

## 2021-09-02 DIAGNOSIS — I48.21 PERMANENT ATRIAL FIBRILLATION (HCC): Primary | ICD-10-CM

## 2021-09-02 LAB — INR PPP: 1.8

## 2021-09-02 RX ORDER — ALPRAZOLAM 0.5 MG/1
TABLET ORAL
Qty: 30 TABLET | Refills: 0 | Status: SHIPPED | OUTPATIENT
Start: 2021-09-02 | End: 2021-10-04

## 2021-09-02 NOTE — PROGRESS NOTES
Anticoagulation Clinic Progress Note    Anticoagulation Summary  As of 2021    INR goal:  2.0-3.0   TTR:  65.7 % (2.7 y)   INR used for dosin.80 (2021)   Warfarin maintenance plan:  5 mg every Mon; 7.5 mg all other days   Weekly warfarin total:  50 mg   No change documented:  Brendan Live, [place]   Plan last modified:  Omid Galeas, [facility] (2021)   Next INR check:  2021   Priority:  Maintenance   Target end date:  Indefinite    Indications    Permanent atrial fibrillation (CMS/HCC) [I48.21]             Anticoagulation Episode Summary     INR check location:      Preferred lab:      Send INR reminders to:  [facility]    Comments:        Anticoagulation Care Providers     Provider Role Specialty Phone number    Kurt Henry MD Referring Cardiology [phone]        Findings:  Pt reports he has taken 5 mg daily since 21. Reports prior to that his appetite had been a little low, but now he is eating well.     INR History:  Anticoagulation Monitoring 2021   INR - 3.40 1.80   INR Date - 2021   INR Goal 2.0-3.0 2.0-3.0 2.0-3.0   Trend Same Same Same   Last Week Total 50 mg 50 mg 37.5 mg   Next Week Total 50 mg 47.5 mg 50 mg   Sun 7.5 mg 7.5 mg 7.5 mg   Mon 5 mg 5 mg 5 mg   Tue 7.5 mg 7.5 mg 7.5 mg   Wed 7.5 mg 7.5 mg 7.5 mg   Thu 7.5 mg 7.5 mg 7.5 mg   Fri 7.5 mg 5 mg () 7.5 mg   Sat 7.5 mg 7.5 mg 7.5 mg   Visit Report - - -   Some recent data might be hidden       Plan:  1. INR is Subtherapeutic today- see above in Anticoagulation Summary.   Provided instructions to patient and Estefania with [facility] to resume his previous warfarin regimen of 5 mg Mon, 7.5 mg AOD - see above in Anticoagulation Summary.  2. Follow up in 1 week      Brendan Live PharmD

## 2021-09-09 ENCOUNTER — ANTICOAGULATION VISIT (OUTPATIENT)
Dept: PHARMACY | Facility: HOSPITAL | Age: 71
End: 2021-09-09

## 2021-09-09 DIAGNOSIS — I48.21 PERMANENT ATRIAL FIBRILLATION (HCC): Primary | ICD-10-CM

## 2021-09-09 LAB — INR PPP: 2.3

## 2021-09-09 NOTE — PROGRESS NOTES
Anticoagulation Clinic Progress Note    Anticoagulation Summary  As of 2021    INR goal:  2.0-3.0   TTR:  65.7 % (2.7 y)   INR used for dosin.30 (2021)   Warfarin maintenance plan:  5 mg every Mon; 7.5 mg all other days   Weekly warfarin total:  50 mg   No change documented:  Brendan Live PharmD   Plan last modified:  Omid Galeas, [facility] (2021)   Next INR check:  2021   Priority:  Maintenance   Target end date:  Indefinite    Indications    Permanent atrial fibrillation (CMS/[facility]) [I48.21]             Anticoagulation Episode Summary     INR check location:      Preferred lab:      Send INR reminders to:  [facility]    Comments:        Anticoagulation Care Providers     Provider Role Specialty Phone number    Kurt Henry MD Referring Cardiology [phone]          INR History:  Anticoagulation Monitoring 2021   INR 3.40 1.80 2.30   INR Date 2021   INR Goal 2.0-3.0 2.0-3.0 2.0-3.0   Trend Same Same Same   Last Week Total 50 mg 37.5 mg 50 mg   Next Week Total 47.5 mg 50 mg 50 mg   Sun 7.5 mg 7.5 mg 7.5 mg   Mon 5 mg 5 mg 5 mg   Tue 7.5 mg 7.5 mg 7.5 mg   Wed 7.5 mg 7.5 mg 7.5 mg   Thu 7.5 mg 7.5 mg 7.5 mg   Fri 5 mg () 7.5 mg 7.5 mg   Sat 7.5 mg 7.5 mg 7.5 mg   Visit Report - - -   Some recent data might be hidden       Plan:  1. INR is Therapeutic today- see above in Anticoagulation Summary.   Provided instructions to Estefania with [facility] to Continue their warfarin regimen- see above in Anticoagulation Summary.  2. Follow up in 1 week      Brendan Live PharmD

## 2021-09-15 ENCOUNTER — OUTSIDE FACILITY SERVICE (OUTPATIENT)
Dept: FAMILY MEDICINE CLINIC | Facility: CLINIC | Age: 71
End: 2021-09-15

## 2021-09-15 PROCEDURE — OUTSIDEPOS PR OUTSIDE POS PLACEHOLDER: Performed by: FAMILY MEDICINE

## 2021-09-16 ENCOUNTER — ANTICOAGULATION VISIT (OUTPATIENT)
Dept: PHARMACY | Facility: HOSPITAL | Age: 71
End: 2021-09-16

## 2021-09-16 DIAGNOSIS — I48.21 PERMANENT ATRIAL FIBRILLATION (HCC): Primary | ICD-10-CM

## 2021-09-16 LAB — INR PPP: 1.8

## 2021-09-16 NOTE — PROGRESS NOTES
Anticoagulation Clinic Progress Note    Anticoagulation Summary  As of 2021    INR goal:  2.0-3.0   TTR:  65.6 % (2.8 y)   INR used for dosin.80 (2021)   Warfarin maintenance plan:  5 mg every Mon; 7.5 mg all other days   Weekly warfarin total:  50 mg   Plan last modified:  Omid Galeas RPH (2021)   Next INR check:  2021   Priority:  Maintenance   Target end date:  Indefinite    Indications    Permanent atrial fibrillation (CMS/HCC) [I48.21]             Anticoagulation Episode Summary     INR check location:      Preferred lab:      [facility] [place]    Comments:        Anticoagulation Care Providers     Provider Role Specialty Phone number    Kurt Henry MD Referring Cardiology [phone]          INR History:  Anticoagulation Monitoring 2021   INR 1.80 2.30 1.80   INR Date 2021   INR Goal 2.0-3.0 2.0-3.0 2.0-3.0   Trend Same Same Same   Last Week Total 37.5 mg 50 mg 50 mg   Next Week Total 50 mg 50 mg 52.5 mg   Sun 7.5 mg 7.5 mg 7.5 mg   Mon 5 mg 5 mg 5 mg   Tue 7.5 mg 7.5 mg 7.5 mg   Wed 7.5 mg 7.5 mg 7.5 mg   Thu 7.5 mg 7.5 mg 10 mg ()   Fri 7.5 mg 7.5 mg 7.5 mg   Sat 7.5 mg 7.5 mg 7.5 mg   Visit Report - - -   Some recent data might be hidden       Plan:  1. INR is Subtherapeutic today- see above in Anticoagulation Summary.   Provided instructions to Estefania with [facility] to Increase their warfarin regimen- see above in Anticoagulation Summary.  2. Follow up in 1 week      Adeline Marie RPH

## 2021-09-17 ENCOUNTER — OFFICE VISIT (OUTPATIENT)
Dept: CARDIOLOGY | Facility: CLINIC | Age: 71
End: 2021-09-17

## 2021-09-17 ENCOUNTER — HOSPITAL ENCOUNTER (OUTPATIENT)
Dept: CARDIOLOGY | Facility: HOSPITAL | Age: 71
Discharge: HOME | End: 2021-09-17
Admitting: INTERNAL MEDICINE

## 2021-09-17 VITALS
SYSTOLIC BLOOD PRESSURE: 118 MMHG | DIASTOLIC BLOOD PRESSURE: 70 MMHG | BODY MASS INDEX: 38.04 KG/M2 | HEIGHT: 73 IN | WEIGHT: 287 LBS | HEART RATE: 68 BPM

## 2021-09-17 VITALS
SYSTOLIC BLOOD PRESSURE: 112 MMHG | HEIGHT: 73 IN | DIASTOLIC BLOOD PRESSURE: 76 MMHG | HEART RATE: 71 BPM | WEIGHT: 297 LBS | BODY MASS INDEX: 39.36 KG/M2

## 2021-09-17 DIAGNOSIS — I48.21 PERMANENT ATRIAL FIBRILLATION (HCC): ICD-10-CM

## 2021-09-17 DIAGNOSIS — I10 ESSENTIAL HYPERTENSION: ICD-10-CM

## 2021-09-17 DIAGNOSIS — I48.3 TYPICAL ATRIAL FLUTTER (HCC): ICD-10-CM

## 2021-09-17 DIAGNOSIS — R00.0 TACHYCARDIA INDUCED CARDIOMYOPATHY (HCC): ICD-10-CM

## 2021-09-17 DIAGNOSIS — I77.819 AORTECTASIA (HCC): ICD-10-CM

## 2021-09-17 DIAGNOSIS — I25.5 ISCHEMIC CARDIOMYOPATHY: ICD-10-CM

## 2021-09-17 DIAGNOSIS — I43 TACHYCARDIA INDUCED CARDIOMYOPATHY (HCC): ICD-10-CM

## 2021-09-17 DIAGNOSIS — R60.9 CHRONIC EDEMA: ICD-10-CM

## 2021-09-17 DIAGNOSIS — I25.5 ISCHEMIC CARDIOMYOPATHY: Primary | ICD-10-CM

## 2021-09-17 LAB
AORTIC ARCH: 2.2 CM
ASCENDING AORTA: 4.2 CM
BH CV ECHO MEAS - ACS: 1.9 CM
BH CV ECHO MEAS - AO ARCH DIAM (PROXIMAL TRANS.): 2.2 CM
BH CV ECHO MEAS - AO MAX PG (FULL): 5.9 MMHG
BH CV ECHO MEAS - AO MAX PG: 8.3 MMHG
BH CV ECHO MEAS - AO MEAN PG (FULL): 2.1 MMHG
BH CV ECHO MEAS - AO MEAN PG: 3.7 MMHG
BH CV ECHO MEAS - AO ROOT AREA (BSA CORRECTED): 1.6
BH CV ECHO MEAS - AO ROOT AREA: 12.5 CM^2
BH CV ECHO MEAS - AO ROOT DIAM: 4 CM
BH CV ECHO MEAS - AO V2 MAX: 144.2 CM/SEC
BH CV ECHO MEAS - AO V2 MEAN: 88.3 CM/SEC
BH CV ECHO MEAS - AO V2 VTI: 25.4 CM
BH CV ECHO MEAS - ASC AORTA: 4.2 CM
BH CV ECHO MEAS - AVA(I,A): 2 CM^2
BH CV ECHO MEAS - AVA(I,D): 2 CM^2
BH CV ECHO MEAS - AVA(V,A): 1.7 CM^2
BH CV ECHO MEAS - AVA(V,D): 1.7 CM^2
BH CV ECHO MEAS - BSA(HAYCOCK): 2.6 M^2
BH CV ECHO MEAS - BSA: 2.5 M^2
BH CV ECHO MEAS - BZI_BMI: 37.9 KILOGRAMS/M^2
BH CV ECHO MEAS - BZI_METRIC_HEIGHT: 185.4 CM
BH CV ECHO MEAS - BZI_METRIC_WEIGHT: 130.2 KG
BH CV ECHO MEAS - EDV(CUBED): 157.3 ML
BH CV ECHO MEAS - EDV(MOD-SP2): 129 ML
BH CV ECHO MEAS - EDV(MOD-SP4): 98 ML
BH CV ECHO MEAS - EDV(TEICH): 141.2 ML
BH CV ECHO MEAS - EF(CUBED): 88.2 %
BH CV ECHO MEAS - EF(MOD-BP): 55.5 %
BH CV ECHO MEAS - EF(MOD-SP2): 52.7 %
BH CV ECHO MEAS - EF(MOD-SP4): 57.1 %
BH CV ECHO MEAS - EF(TEICH): 81.8 %
BH CV ECHO MEAS - ESV(CUBED): 18.5 ML
BH CV ECHO MEAS - ESV(MOD-SP2): 61 ML
BH CV ECHO MEAS - ESV(MOD-SP4): 42 ML
BH CV ECHO MEAS - ESV(TEICH): 25.7 ML
BH CV ECHO MEAS - FS: 51 %
BH CV ECHO MEAS - IVS/LVPW: 0.92
BH CV ECHO MEAS - IVSD: 1.2 CM
BH CV ECHO MEAS - LAT PEAK E' VEL: 11.6 CM/SEC
BH CV ECHO MEAS - LV DIASTOLIC VOL/BSA (35-75): 39.1 ML/M^2
BH CV ECHO MEAS - LV MASS(C)D: 265.6 GRAMS
BH CV ECHO MEAS - LV MASS(C)DI: 105.9 GRAMS/M^2
BH CV ECHO MEAS - LV MAX PG: 2.4 MMHG
BH CV ECHO MEAS - LV MEAN PG: 1.6 MMHG
BH CV ECHO MEAS - LV SYSTOLIC VOL/BSA (12-30): 16.7 ML/M^2
BH CV ECHO MEAS - LV V1 MAX: 78 CM/SEC
BH CV ECHO MEAS - LV V1 MEAN: 61.1 CM/SEC
BH CV ECHO MEAS - LV V1 VTI: 16.3 CM
BH CV ECHO MEAS - LVIDD: 5.4 CM
BH CV ECHO MEAS - LVIDS: 2.6 CM
BH CV ECHO MEAS - LVLD AP2: 9.1 CM
BH CV ECHO MEAS - LVLD AP4: 8 CM
BH CV ECHO MEAS - LVLS AP2: 7.9 CM
BH CV ECHO MEAS - LVLS AP4: 6.9 CM
BH CV ECHO MEAS - LVOT AREA (M): 3.1 CM^2
BH CV ECHO MEAS - LVOT AREA: 3.2 CM^2
BH CV ECHO MEAS - LVOT DIAM: 2 CM
BH CV ECHO MEAS - LVPWD: 1.3 CM
BH CV ECHO MEAS - MED PEAK E' VEL: 9.4 CM/SEC
BH CV ECHO MEAS - MR MAX PG: 84.3 MMHG
BH CV ECHO MEAS - MR MAX VEL: 459 CM/SEC
BH CV ECHO MEAS - MV DEC SLOPE: 512.2 CM/SEC^2
BH CV ECHO MEAS - MV DEC TIME: 0.16 SEC
BH CV ECHO MEAS - MV E MAX VEL: 93 CM/SEC
BH CV ECHO MEAS - MV MAX PG: 4.9 MMHG
BH CV ECHO MEAS - MV MEAN PG: 1.7 MMHG
BH CV ECHO MEAS - MV P1/2T MAX VEL: 115.4 CM/SEC
BH CV ECHO MEAS - MV P1/2T: 66 MSEC
BH CV ECHO MEAS - MV V2 MAX: 110.5 CM/SEC
BH CV ECHO MEAS - MV V2 MEAN: 59.6 CM/SEC
BH CV ECHO MEAS - MV V2 VTI: 26.9 CM
BH CV ECHO MEAS - MVA P1/2T LCG: 1.9 CM^2
BH CV ECHO MEAS - MVA(P1/2T): 3.3 CM^2
BH CV ECHO MEAS - MVA(VTI): 1.9 CM^2
BH CV ECHO MEAS - PA ACC TIME: 0.1 SEC
BH CV ECHO MEAS - PA MAX PG (FULL): 2.7 MMHG
BH CV ECHO MEAS - PA MAX PG: 3.8 MMHG
BH CV ECHO MEAS - PA PR(ACCEL): 35 MMHG
BH CV ECHO MEAS - PA V2 MAX: 97.7 CM/SEC
BH CV ECHO MEAS - PULM DIAS VEL: 49.3 CM/SEC
BH CV ECHO MEAS - PULM S/D: 0.45
BH CV ECHO MEAS - PULM SYS VEL: 22.4 CM/SEC
BH CV ECHO MEAS - PVA(V,A): 1.7 CM^2
BH CV ECHO MEAS - PVA(V,D): 1.7 CM^2
BH CV ECHO MEAS - QP/QS: 0.51
BH CV ECHO MEAS - RAP SYSTOLE: 8 MMHG
BH CV ECHO MEAS - RV MAX PG: 1.1 MMHG
BH CV ECHO MEAS - RV MEAN PG: 0.54 MMHG
BH CV ECHO MEAS - RV V1 MAX: 52.7 CM/SEC
BH CV ECHO MEAS - RV V1 MEAN: 34 CM/SEC
BH CV ECHO MEAS - RV V1 VTI: 8.4 CM
BH CV ECHO MEAS - RVOT AREA: 3.1 CM^2
BH CV ECHO MEAS - RVOT DIAM: 2 CM
BH CV ECHO MEAS - RVSP: 54 MMHG
BH CV ECHO MEAS - SI(AO): 126.9 ML/M^2
BH CV ECHO MEAS - SI(CUBED): 55.3 ML/M^2
BH CV ECHO MEAS - SI(LVOT): 20.7 ML/M^2
BH CV ECHO MEAS - SI(MOD-SP2): 27.1 ML/M^2
BH CV ECHO MEAS - SI(MOD-SP4): 22.3 ML/M^2
BH CV ECHO MEAS - SI(TEICH): 46.1 ML/M^2
BH CV ECHO MEAS - SUP REN AO DIAM: 2.2 CM
BH CV ECHO MEAS - SV(AO): 318.3 ML
BH CV ECHO MEAS - SV(CUBED): 138.8 ML
BH CV ECHO MEAS - SV(LVOT): 51.9 ML
BH CV ECHO MEAS - SV(MOD-SP2): 68 ML
BH CV ECHO MEAS - SV(MOD-SP4): 56 ML
BH CV ECHO MEAS - SV(RVOT): 26.6 ML
BH CV ECHO MEAS - SV(TEICH): 115.6 ML
BH CV ECHO MEAS - TAPSE (>1.6): 1.7 CM
BH CV ECHO MEAS - TR MAX VEL: 340.2 CM/SEC
BH CV ECHO MEASUREMENTS AVERAGE E/E' RATIO: 8.86
BH CV XLRA - RV BASE: 5.8 CM
BH CV XLRA - RV LENGTH: 8.5 CM
BH CV XLRA - RV MID: 3.4 CM
BH CV XLRA - TDI S': 9.9 CM/SEC
LEFT ATRIUM VOLUME INDEX: 34 ML/M2
MAXIMAL PREDICTED HEART RATE: 150 BPM
SINUS: 4.5 CM
STJ: 3.2 CM
STRESS TARGET HR: 128 BPM

## 2021-09-17 PROCEDURE — 93306 TTE W/DOPPLER COMPLETE: CPT | Performed by: INTERNAL MEDICINE

## 2021-09-17 PROCEDURE — 93306 TTE W/DOPPLER COMPLETE: CPT

## 2021-09-17 PROCEDURE — 99213 OFFICE O/P EST LOW 20 MIN: CPT | Performed by: INTERNAL MEDICINE

## 2021-09-17 PROCEDURE — 93000 ELECTROCARDIOGRAM COMPLETE: CPT | Performed by: INTERNAL MEDICINE

## 2021-09-17 RX ORDER — DOXYCYCLINE HYCLATE 100 MG
TABLET ORAL
COMMUNITY
Start: 2021-08-31 | End: 2022-01-12 | Stop reason: HOSPADM

## 2021-09-17 NOTE — PROGRESS NOTES
Date of Office Visit:2021  Encounter Provider: Kurt Henry MD  Place of Service: [facility] CARDIOLOGY  Patient Name: Jalen Lockwood  :1950    Chief Complaint   Patient presents with   • Atrial Fibrillation   • Cardiomyopathy   :     HPI: Jalen Lockwood is a 71 y.o. male who presents today for yearly follow up. I have reviewed prior notes and there are no changes except for any new updates described below. I have also reviewed any information entered into the medical record by the patient or by ancillary staff.     He presented with rapid atrial flutter in . His ejection fraction was low due to tachycardia-mediated cardiomyopathy. His catheterization revealed moderate nonobstructive coronary disease. Medical therapy only was recommended and his ejection fraction normalized. It was also found that he was drinking quite heavily and we recommended that he cut back.  Shortly after that, he presented with rapid atrial fibrillation and his ejection fraction declined again to 35%, but once his atrial fibrillation resolved, his ejection fraction improved again.    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 Boston.  This was complicated by a wound infection.  A non-contrasted CT of the abdomen/pelvis revealed aortectasia of the infrarenal abdominal aorta but no aneurysm elsewhere.     He has been admitted numerous times with cellulitis and sepsis. He was admitted in 2021; his troponin was quite elevated. An echo revealed an LVEF of 30-35% with apical hypokinesis. His EKG showed anterior T wave inversions.  Because of his numerous comorbidities and poor functional status, we decided to treat him medically. A follow up echo in 2021 showed normal LVSF (EF 55%), moderate RV dilation and moderate PHTN.     He is now home. He denies chest pain, dyspnea, orthopnea, PND, palpitations, or syncope.  He quit smoking; he  still drinks a fair amount of alcohol. He has not had any bleeding. His chronic severe LE edema is unchanged.     Past Medical History:   Diagnosis Date   • Allergic rhinitis    • Anxiety    • Aortectasia (CMS/HCC)     3cm infrarenal abdominal aorta   • Arthritis    • Atrial flutter (CMS/HCC) 2010    s/p ablation    • Charcot's joint of foot    • Chronic edema     both legs and sees wound care center at [place]    • Chronic venous insufficiency    • COPD (chronic obstructive pulmonary disease) (CMS/HCC)    • Coronary atherosclerosis     Cath 2010: diffuse 40-50% disease   • Diverticulosis    • Duodenitis    • Fatty liver    • Gastritis    • Gastroparesis    • Hematoma     post-operative; After catheterization, right groin, required surgical exploration   • Hyperlipidemia    • Hypertension    • Insomnia    • Internal hemorrhoids    • Open wound     izzy legs has drsg chg weekly at wound care center at [place]  pt does second dressing on left leg another time during week   • Osteomyelitis (CMS/HCC)    • Paroxysmal atrial fibrillation (CMS/HCC)    • Peripheral neuropathy    • Popliteal artery aneurysm (CMS/HCC)     left, s/p stenting by Dr. Boston   • Skin cancer    • Sleep apnea     o2   • 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CMS/HCC)     due to flutter and afib; cath 2010 with nonobstructive disease   • Venous stasis    • Venous stasis ulcer (CMS/HCC)     bilateral legs        Past Surgical History:   Procedure Laterality Date   • BRONCHOSCOPY N/A 4/12/2021    Procedure: BRONCHOSCOPY WITH BAL;  Surgeon: Bunny Lepe MD;  Location: [facility] ENDOSCOPY;  Service: Pulmonary;  Laterality: N/A;  PRE-HEMOPTYSIS  POST-SAME   • CARDIAC CATHETERIZATION     • CATARACT EXTRACTION     • COLONOSCOPY  09/28/2015    NBIH, diverticulosis, polyps   • COLONOSCOPY N/A 9/11/2018    Procedure: COLONOSCOPY TO CECUM  AND TERM. ILEUM WITH COLD SNARE POLYPECTOMIES;  Surgeon: Kane Lagunas MD;  Location: [facility] ENDOSCOPY;  Service:  Gastroenterology   • COLONOSCOPY N/A 10/29/2019    Procedure: COLONOSCOPY TO TO CECUM AND TERMINAL ILEUM WITH HOT AND COLD SNARE POLYPECTOMIES;  Surgeon: Kane Lagunas MD;  Location: [facility] ENDOSCOPY;  Service: Gastroenterology   • HIP ARTHROPLASTY Right 2017   • JOINT REPLACEMENT Left    • OTHER SURGICAL HISTORY      Catheter ablation atrial flutter   • REPAIR ANEURYSM / PSEUDO ANEURYSM / RUPTURED ANEURYSM POPLITEAL ARTERY      Stent-Graft of the the left popliteal artery   • REPAIR KNEE LIGAMENT      Primary repair of knee ligament cruciate anterior right   • TONSILLECTOMY     • TOTAL KNEE ARTHROPLASTY Bilateral    • UPPER GASTROINTESTINAL ENDOSCOPY  2014    acute gastritis, acute duodenitis       Social History     Socioeconomic History   • Marital status:      Spouse name: Mariposa   • Number of children: Not on file   • Years of education: Not on file   • Highest education level: Not on file   Tobacco Use   • Smoking status: Former Smoker     Packs/day: 0.25     Years: 45.00     Pack years: 11.25     Types: Cigarettes     Quit date: 3/30/2021     Years since quittin.4   • Smokeless tobacco: Never Used   • Tobacco comment: caffeine use - 1.5 cups coffee daily    Vaping Use   • Vaping Use: Never used   Substance and Sexual Activity   • Alcohol use: Yes     Alcohol/week: 4.0 - 5.0 standard drinks     Types: 1 - 2 Shots of liquor, 3 Standard drinks or equivalent per week     Comment: 2 rum a day   • Drug use: No   • Sexual activity: Defer       Family History   Problem Relation Age of Onset   • Emphysema Father    • Malig Hyperthermia Neg Hx        Review of Systems   Cardiovascular: Positive for leg swelling.   Skin: Positive for color change and poor wound healing.   Musculoskeletal: Positive for joint pain and joint swelling.   Genitourinary: Positive for decreased libido.   All other systems reviewed and are negative.      Allergies   Allergen Reactions   • Cephalexin Hives   • Codeine  Nausea Only         Current Outpatient Medications:   •  acidophilus (FLORANEX) tablet tablet, , Disp: , Rfl:   •  albuterol sulfate  (90 Base) MCG/ACT inhaler, INHALE 2 PUFFS BY MOUTH EVERY 4 HOURS AS NEEDED FOR WHEEZE, Disp: 2 g, Rfl: 6  •  ALPRAZolam (XANAX) 0.5 MG tablet, TAKE 1 TABLET BY MOUTH EVERY DAY AT NIGHT. NEEDS APPT, Disp: 30 tablet, Rfl: 0  •  ALPRAZOLAM PO, Take  by mouth Daily., Disp: , Rfl:   •  Atrovent HFA 17 MCG/ACT inhaler, Inhale 2 puffs 4 (Four) Times a Day., Disp: 1 each, Rfl: 3  •  Breo Ellipta 200-25 MCG/INH inhaler, TAKE 1 PUFF BY MOUTH EVERY DAY, Disp: 60 each, Rfl: 1  •  carvedilol (COREG) 6.25 MG tablet, Take 1 tablet by mouth 2 (Two) Times a Day With Meals., Disp: 180 tablet, Rfl: 3  •  docusate sodium (COLACE) 100 MG capsule, Take 100 mg by mouth Daily., Disp: , Rfl:   •  doxycycline (VIBRAMYICN) 100 MG tablet, TAKE 1 TABLET BY MOUTH TWICE A DAY START THE DAY BEFORE SURGERY., Disp: , Rfl:   •  finasteride (PROSCAR) 5 MG tablet, Take 1 tablet by mouth daily., Disp: , Rfl:   •  furosemide (LASIX) 40 MG tablet, Take 40 mg by mouth Daily., Disp: , Rfl:   •  gabapentin (NEURONTIN) 600 MG tablet, Take 600 mg by mouth 3 (Three) Times a Day., Disp: , Rfl:   •  HYDROcodone-acetaminophen (NORCO)  MG per tablet, Take 1 tablet by mouth Every 6 (Six) Hours As Needed for Moderate Pain ., Disp: 6 tablet, Rfl: 0  •  lisinopril (PRINIVIL,ZESTRIL) 20 MG tablet, Take 1 tablet by mouth Daily. (Patient taking differently: Take 20 mg by mouth 2 (two) times a day.), Disp: , Rfl:   •  metoclopramide (REGLAN) 10 MG tablet, Take 10 mg by mouth 3 (Three) Times a Day Before Meals., Disp: , Rfl:   •  mupirocin (BACTROBAN) 2 % ointment, APPLY AS DIRECTED TO BI LATERAL LOWER LEG 3 TIMES WEEKLY AS DIRECTED, Disp: , Rfl:   •  nystatin (MYCOSTATIN) 350546 UNIT/GM powder, Apply  topically to the appropriate area as directed Every 12 (Twelve) Hours., Disp: , Rfl:   •  PRAVASTATIN SODIUM PO, Take  by mouth  "Daily., Disp: , Rfl:   •  silodosin (RAPAFLO) 8 MG capsule capsule, Take 1 capsule by mouth daily. With food., Disp: , Rfl:   •  warfarin (COUMADIN) 5 MG tablet, TAKE ONE TABLET (5 MG) EVERY MONDAY AND ONE AND ONE-HALF TABLETS (7.5 MG) ON ALL OTHER DAYS OR AS DIRECTED BY MED MANAGEMENT CLINIC, Disp: 130 tablet, Rfl: 1     Objective:     Vitals:    09/17/21 1411   BP: 112/76   BP Location: Left arm   Pulse: 71   Weight: 135 kg (297 lb)   Height: 185.4 cm (73\")     Body mass index is 39.18 kg/m².    Physical Exam  Vitals reviewed.   Constitutional:       Comments: Obese   HENT:      Head:      Comments: Numerous SK/AK, skin cancers on face/ears/neck     Nose: Nose normal.      Mouth/Throat:      Comments: masked  Eyes:      Conjunctiva/sclera: Conjunctivae normal.   Neck:      Vascular: No JVD.   Cardiovascular:      Rate and Rhythm: Normal rate. Rhythm irregularly irregular.      Heart sounds: Normal heart sounds. No murmur heard.     Pulmonary:      Effort: Pulmonary effort is normal.   Abdominal:      Palpations: Abdomen is soft.      Tenderness: There is no abdominal tenderness.      Comments: Obesity limits abdominal exam   Musculoskeletal:         General: Deformity (bilateral charcot foot) present. Normal range of motion.      Cervical back: Normal range of motion.      Comments: Marked BLE lymphedema, rightly wrapped legs   Skin:     General: Skin is warm and dry.      Findings: No rash.      Comments: Numerous seb kers and actinic kers     Neurological:      General: No focal deficit present.      Mental Status: He is alert.      Cranial Nerves: No cranial nerve deficit.   Psychiatric:         Mood and Affect: Mood normal.         Behavior: Behavior normal.         Thought Content: Thought content normal.           ECG 12 Lead    Date/Time: 9/17/2021 2:24 PM  Performed by: Kurt Henry MD  Authorized by: Kurt Henry MD   Comparison: compared with previous ECG   Similar to previous ECG  Rhythm: atrial " fibrillation  Conduction: left anterior fascicular block  ST Segments: ST segments normal  T Waves: T waves normal  QRS axis: left  Other: no other findings    Clinical impression: abnormal EKG            Assessment:       Diagnosis Plan   1. Ischemic cardiomyopathy     2. 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CMS/HCC)     3. Permanent atrial fibrillation (CMS/HCC)     4. Typical atrial flutter (CMS/HCC)     5. Essential hypertension     6. Aortectasia (CMS/HCC)     7. Chronic edema            Plan:       He had a previous history of tachycardia induced cardiomyopathy with moderate but nonobstructive coronary disease.  During a hospitalization in April 2021 (cellulitis/sepsis/acute renal failure), he suffered a non-ST elevation myocardial infarction.  His ejection fraction was found to be approximately 30% with anteroapical wall motion abnormalities consistent with ischemia or infarct.  He also had anterior T wave inversions, which have resolved.  Ultimately, we felt that medical therapy was most appropriate given his obesity, recurrent cellulitis, ongoing tobacco and alcohol usage, and poor overall functional status.  He is not on an antiplatelet agent because he is on warfarin and his bleeding risk is too high.  He is on carvedilol and lisinopril.  He denies chest pain.  A repeat echo today showed normalization of LVEF (EF 55%) and wall motion.  He denies angina.     He has had both flutter and fib in the past; the former has been ablated.  He will remain on carvedilol and warfarin.      His BP is generally within goal. At one pont he was on a thiazide but it caused hyponatremia.     His legs are wrapped and are stable.     Sincerely,       Kurt Henry MD

## 2021-09-17 NOTE — PROGRESS NOTES
Discharge    Patient discharged to home via cab independently  Care plan note: VSS, weaned to RA, afebrile. Up independently. Pt demonstrated accurate use of inhaler, sent home with PRN.  this AM. PIV removed, discharge instructions reviewed with pt. Questions encouraged and answered.     Listed belongings gathered and returned to patient. Yes  Care Plan and Patient education resolved: Yes  Prescriptions if needed, hard copies sent with patient  NA  Home and hospital acquired medications returned to patient: Yes  Medication Bin checked and emptied on discharge Yes  Follow up appointment made for patient: Yes     Noted, seen in office today.

## 2021-09-19 RX ORDER — IPRATROPIUM BROMIDE 17 UG/1
AEROSOL, METERED RESPIRATORY (INHALATION)
Qty: 12.9 EACH | Refills: 3 | Status: SHIPPED | OUTPATIENT
Start: 2021-09-19 | End: 2021-12-09 | Stop reason: SDUPTHER

## 2021-09-23 ENCOUNTER — OUTSIDE FACILITY SERVICE (OUTPATIENT)
Dept: FAMILY MEDICINE CLINIC | Facility: CLINIC | Age: 71
End: 2021-09-23

## 2021-09-23 ENCOUNTER — ANTICOAGULATION VISIT (OUTPATIENT)
Dept: PHARMACY | Facility: HOSPITAL | Age: 71
End: 2021-09-23

## 2021-09-23 DIAGNOSIS — I48.21 PERMANENT ATRIAL FIBRILLATION (HCC): Primary | ICD-10-CM

## 2021-09-23 LAB — INR PPP: 2.5

## 2021-09-23 PROCEDURE — G0179 MD RECERTIFICATION HHA PT: HCPCS | Performed by: FAMILY MEDICINE

## 2021-09-23 NOTE — PROGRESS NOTES
Anticoagulation Clinic Progress Note    Anticoagulation Summary  As of 2021    INR goal:  2.0-3.0   TTR:  65.7 % (2.8 y)   INR used for dosin.50 (2021)   Warfarin maintenance plan:  5 mg every Mon; 7.5 mg all other days   Weekly warfarin total:  50 mg   No change documented:  Luci Correa, PharmD   Plan last modified:  Omid Galeas, [facility] (2021)   Next INR check:  2021   Priority:  Maintenance   Target end date:  Indefinite    Indications    Permanent atrial fibrillation (CMS/[facility]) [I48.21]             Anticoagulation Episode Summary     INR check location:      Preferred lab:      Send INR reminders to:   GAYATRI MCMULLEN [facility]    Comments:        Anticoagulation Care Providers     Provider Role Specialty Phone number    Kurt Henry MD Referring Cardiology [phone]          Clinic Interview:  Patient Findings     Positives:  Upcoming invasive procedure    Negatives:  Signs/symptoms of thrombosis, Signs/symptoms of bleeding,   Laboratory test error suspected, Change in health, Change in alcohol use,   Change in activity, Emergency department visit, Upcoming dental procedure,   Missed doses, Extra doses, Change in medications, Change in diet/appetite,   Hospital admission, Bruising, Other complaints    Comments:  Upcoming procedure on Oct 7th. Patient reports he was advised   to hold 3 days prior.       Clinical Outcomes     Negatives:  Major bleeding event, Thromboembolic event,   Anticoagulation-related hospital admission, Anticoagulation-related ED   visit, Anticoagulation-related fatality    Comments:  Upcoming procedure on Oct 7th. Patient reports he was advised   to hold 3 days prior.         INR History:  Anticoagulation Monitoring 2021   INR 2.30 1.80 2.50   INR Date 2021   INR Goal 2.0-3.0 2.0-3.0 2.0-3.0   Trend Same Same Same   Last Week Total 50 mg 50 mg 52.5 mg   Next Week Total 50 mg 52.5 mg 50 mg   Sun 7.5 mg 7.5  mg 7.5 mg   Mon 5 mg 5 mg 5 mg   Tue 7.5 mg 7.5 mg 7.5 mg   Wed 7.5 mg 7.5 mg 7.5 mg   Thu 7.5 mg 10 mg (9/16) 7.5 mg   Fri 7.5 mg 7.5 mg 7.5 mg   Sat 7.5 mg 7.5 mg 7.5 mg   Visit Report - - -   Some recent data might be hidden     Comment: Will need to call and discuss dosing plan next week in anticipation for his upcoming surgery on October 7th that the patient was advised to hold for 3 days prior.    Plan:  1. INR is Therapeutic today- see above in Anticoagulation Summary.   Will instruct Jalen Lockwood to Continue their warfarin regimen- see above in Anticoagulation Summary.  2. Follow up in 1 week  3. They have been instructed to call if any changes in medications, doses, concerns, etc. Patient expresses understanding and has no further questions at this time.    Luci Correa, PharmD

## 2021-09-28 ENCOUNTER — TRANSCRIBE ORDERS (OUTPATIENT)
Dept: ADMINISTRATIVE | Facility: HOSPITAL | Age: 71
End: 2021-09-28

## 2021-09-28 DIAGNOSIS — R91.8 LUNG NODULES: Primary | ICD-10-CM

## 2021-09-30 ENCOUNTER — ANTICOAGULATION VISIT (OUTPATIENT)
Dept: PHARMACY | Facility: HOSPITAL | Age: 71
End: 2021-09-30

## 2021-09-30 DIAGNOSIS — I48.21 PERMANENT ATRIAL FIBRILLATION (HCC): Primary | ICD-10-CM

## 2021-09-30 LAB — INR PPP: 2.9

## 2021-09-30 NOTE — PROGRESS NOTES
Anticoagulation Clinic Progress Note    Anticoagulation Summary  As of 2021    INR goal:  2.0-3.0   TTR:  65.9 % (2.8 y)   INR used for dosin.90 (2021)   Warfarin maintenance plan:  5 mg every Mon; 7.5 mg all other days   Weekly warfarin total:  50 mg   No change documented:  Ruth Orta, [facility]   Plan last modified:  Omid Galeas [facility] (2021)   Next INR check:  10/13/2021   Priority:  Maintenance   Target end date:  Indefinite    Indications    Permanent atrial fibrillation (CMS/HCC) [I48.21]             Anticoagulation Episode Summary     INR check location:      Preferred lab:      Send INR reminders to:   GAYATRI MCMULLEN [facility]    Comments:        Anticoagulation Care Providers     Provider Role Specialty Phone number    Kurt Henry MD Referring Cardiology [phone]          Clinic Interview:  No pertinent clinical findings have been reported.    INR History:  Anticoagulation Monitoring 2021   INR 1.80 2.50 2.90   INR Date 2021   INR Goal 2.0-3.0 2.0-3.0 2.0-3.0   Trend Same Same Same   Last Week Total 50 mg 52.5 mg 50 mg   Next Week Total 52.5 mg 50 mg 50 mg   Sun 7.5 mg 7.5 mg 7.5 mg   Mon 5 mg 5 mg 5 mg   Tue 7.5 mg 7.5 mg 7.5 mg   Wed 7.5 mg 7.5 mg 7.5 mg   Thu 10 mg () 7.5 mg 7.5 mg   Fri 7.5 mg 7.5 mg 7.5 mg   Sat 7.5 mg 7.5 mg 7.5 mg   Visit Report - - -   Some recent data might be hidden       Plan:  1. INR is therapeutic today- see above in Anticoagulation Summary.    MAGALIE Mac, -[id number] Cont same, ADDENDUM I just noticed 10/7 surgery w/ 3 day hold (10/4, 10/5, 10/6) of warfarin before procedure. Left VM to Estefania to call me back and verify this info and date of surgery   see above in Anticoagulation Summary.  2. Follow up in 2 weeks  3. Pt has agreed to only be called if INR out of range. They have been instructed to call if any changes in medications, doses, concerns, etc. Patient expresses understanding  and has no further questions at this time.    Ruth Orta, [facility]

## 2021-10-04 DIAGNOSIS — F41.9 ANXIETY DISORDER, UNSPECIFIED: ICD-10-CM

## 2021-10-04 RX ORDER — ALPRAZOLAM 0.5 MG/1
TABLET ORAL
Qty: 30 TABLET | Refills: 4 | Status: SHIPPED | OUTPATIENT
Start: 2021-10-04 | End: 2021-10-14 | Stop reason: SDUPTHER

## 2021-10-05 ENCOUNTER — TRANSCRIBE ORDERS (OUTPATIENT)
Dept: PULMONOLOGY | Facility: HOSPITAL | Age: 71
End: 2021-10-05

## 2021-10-05 ENCOUNTER — TRANSCRIBE ORDERS (OUTPATIENT)
Dept: ADMINISTRATIVE | Facility: HOSPITAL | Age: 71
End: 2021-10-05

## 2021-10-05 DIAGNOSIS — R91.8 LUNG NODULES: Primary | ICD-10-CM

## 2021-10-12 ENCOUNTER — OFFICE VISIT (OUTPATIENT)
Dept: ONCOLOGY | Facility: CLINIC | Age: 71
End: 2021-10-12

## 2021-10-12 ENCOUNTER — LAB (OUTPATIENT)
Dept: LAB | Facility: HOSPITAL | Age: 71
End: 2021-10-12

## 2021-10-12 VITALS
DIASTOLIC BLOOD PRESSURE: 81 MMHG | SYSTOLIC BLOOD PRESSURE: 130 MMHG | WEIGHT: 298.4 LBS | HEART RATE: 82 BPM | BODY MASS INDEX: 39.55 KG/M2 | RESPIRATION RATE: 16 BRPM | HEIGHT: 73 IN | OXYGEN SATURATION: 94 % | TEMPERATURE: 98.2 F

## 2021-10-12 DIAGNOSIS — D64.9 NORMOCYTIC ANEMIA: ICD-10-CM

## 2021-10-12 DIAGNOSIS — D64.9 NORMOCYTIC ANEMIA: Primary | ICD-10-CM

## 2021-10-12 LAB
BASOPHILS # BLD AUTO: 0.06 10*3/MM3 (ref 0–0.2)
BASOPHILS NFR BLD AUTO: 0.8 % (ref 0–1.5)
DEPRECATED RDW RBC AUTO: 54.1 FL (ref 37–54)
EOSINOPHIL # BLD AUTO: 0.21 10*3/MM3 (ref 0–0.4)
EOSINOPHIL NFR BLD AUTO: 2.7 % (ref 0.3–6.2)
ERYTHROCYTE [DISTWIDTH] IN BLOOD BY AUTOMATED COUNT: 15 % (ref 12.3–15.4)
HCT VFR BLD AUTO: 37.5 % (ref 37.5–51)
HGB BLD-MCNC: 12.5 G/DL (ref 13–17.7)
IMM GRANULOCYTES # BLD AUTO: 0.03 10*3/MM3 (ref 0–0.05)
IMM GRANULOCYTES NFR BLD AUTO: 0.4 % (ref 0–0.5)
LYMPHOCYTES # BLD AUTO: 1.51 10*3/MM3 (ref 0.7–3.1)
LYMPHOCYTES NFR BLD AUTO: 19.4 % (ref 19.6–45.3)
MCH RBC QN AUTO: 32.6 PG (ref 26.6–33)
MCHC RBC AUTO-ENTMCNC: 33.3 G/DL (ref 31.5–35.7)
MCV RBC AUTO: 97.7 FL (ref 79–97)
MONOCYTES # BLD AUTO: 0.6 10*3/MM3 (ref 0.1–0.9)
MONOCYTES NFR BLD AUTO: 7.7 % (ref 5–12)
NEUTROPHILS NFR BLD AUTO: 5.39 10*3/MM3 (ref 1.7–7)
NEUTROPHILS NFR BLD AUTO: 69 % (ref 42.7–76)
NRBC BLD AUTO-RTO: 0 /100 WBC (ref 0–0.2)
PLATELET # BLD AUTO: 167 10*3/MM3 (ref 140–450)
PMV BLD AUTO: 9.7 FL (ref 6–12)
RBC # BLD AUTO: 3.84 10*6/MM3 (ref 4.14–5.8)
WBC # BLD AUTO: 7.8 10*3/MM3 (ref 3.4–10.8)

## 2021-10-12 PROCEDURE — 85025 COMPLETE CBC W/AUTO DIFF WBC: CPT

## 2021-10-12 PROCEDURE — 99213 OFFICE O/P EST LOW 20 MIN: CPT | Performed by: INTERNAL MEDICINE

## 2021-10-12 PROCEDURE — 36415 COLL VENOUS BLD VENIPUNCTURE: CPT

## 2021-10-12 NOTE — PROGRESS NOTES
"[facility] GROUP OUTPATIENT FOLLOW UP CLINIC VISIT    REASON FOR FOLLOW-UP:    Lymphadenopathy and bone lesions    HISTORY OF PRESENT ILLNESS:  Jalen Lockwood is a 71 y.o. male who returns today for follow up of the above issues.    He continues to feel about the same.  Lower extremity edema persists.  Foot pain persists.  He has difficulty ambulating due to these issues.  Easy bruising but no bleeding.    REVIEW OF SYSTEMS:  As per the HPI    Vitals:    10/12/21 1236   BP: 130/81   Pulse: 82   Resp: 16   Temp: 98.2 °F (36.8 °C)   TempSrc: Temporal   SpO2: 94%   Weight: 135 kg (298 lb 6.4 oz)   Height: 185.4 cm (72.99\")   PainSc: 6  Comment: feet     General:  No acute distress, awake, alert and oriented.  Chronically ill-appearing.  Ambulatory with cane today.  Skin:  Warm and dry, no visible rash.  Scattered purpura.  HEENT: Bitemporal wasting is present.  Wearing a facemask.  Chest:  Normal respiratory effort.    Extremities: Both legs are wrapped.  Chronic edema, left greater than right.  Feet are in boots.    Neuro/psych:  Grossly non-focal.  Normal mood and affect.        DIAGNOSTIC DATA:  CBC & Differential (10/12/2021 11:56)      IMAGING:       ASSESSMENT:  This is a 71 y.o. male with:  *Lucent bone lesions on imaging at L2, L4, and the pubis.    · These were visualized on CT imaging but not necessarily on the skeletal survey.  MRI was suggested by radiology.  We opted for a PET scan due to the lymphadenopathy.  However, this was not approved by insurance.  Serum protein studies did not indicate a monoclonal protein.  · Not well visualized on MRI imaging.  Likely benign.  Follow-up on repeat CT imaging 5/19/2020 stable.    · No further imaging planned    *Lymphadenopathy:   · Small and likely reactive  · Follow-up imaging 6/18/2019 stable.  · Follow-up imaging 12/3/2019 with a mild increase in size of lymphadenopathy.  · Stable on 5/19/2020 imaging.    · Suspected to be stable but no formal comment " on this on recent CT imaging on 3/30 and 4/9/2021  · No further imaging planned    *History of thrombocytopenia: Platelets normal today at 167,000    *History of alcohol use  · He admits to about 2 drinks per day    *History of normocytic to macrocytic anemia  · Mildly anemic with a hemoglobin of 12.5.  MCV mildly elevated at 97.7  · Likely anemia of chronic disease  · Macrocytosis likely related to alcohol consumption    *Purpura associated with anticoagulation    *Atrial fibrillation/flutter anticoagulated with warfarin.  Stable bruising.  No bleeding.    *Stage III chronic kidney disease: Creatinine not checked today    *History of significant hyponatremia: Last sodium was normal.    PLAN:   1. At this point I do not think any follow-up with us is required.  I will be glad to see him back if needed.

## 2021-10-13 LAB — INR PPP: 1.9

## 2021-10-14 ENCOUNTER — TELEPHONE (OUTPATIENT)
Dept: FAMILY MEDICINE CLINIC | Facility: CLINIC | Age: 71
End: 2021-10-14

## 2021-10-14 DIAGNOSIS — F41.9 ANXIETY DISORDER, UNSPECIFIED: ICD-10-CM

## 2021-10-14 RX ORDER — ALPRAZOLAM 0.5 MG/1
TABLET ORAL
Qty: 30 TABLET | Refills: 4 | Status: CANCELLED | OUTPATIENT
Start: 2021-10-14

## 2021-10-14 RX ORDER — ALPRAZOLAM 0.5 MG/1
0.5 TABLET ORAL NIGHTLY PRN
Qty: 7 TABLET | Refills: 0 | Status: SHIPPED | OUTPATIENT
Start: 2021-10-14 | End: 2021-10-18 | Stop reason: SDUPTHER
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
## 2021-10-14 NOTE — TELEPHONE ENCOUNTER
Chief Complaint   Patient presents with   • Physical   • Blood Pressure       HISTORY    The patient is a 44 year old female who comes in for a complete physical exam. Patient is having uncontrolled blood pressures. She is also complaining of headache because of uncontrolled blood pressures. Had a detailed discussion with the patient about complications of hypertension and that she has to be treated for hypertension.    Health care maintenance issues were reviewed and updated.  Needed tests/procedures are as per orders.    MEDICATIONS  Current Outpatient Medications   Medication Sig Dispense Refill   • lisinopril-hydroCHLOROthiazide (PRINZIDE,ZESTORETIC) 10-12.5 MG per tablet Take 1 tablet by mouth daily. 30 tablet 3     No current facility-administered medications for this visit.        ALLERGIES  ALLERGIES:  No Known Allergies    HISTORIES  Past Medical History:   Diagnosis Date   • Anxiety and depression    • Chronic pain of right ankle 4/11/2017       Past Surgical History:   Procedure Laterality Date   • Laparoscopy,tubal cautery  over 15 years ago    Tubal Ligation   • Tubal ligation            Family History   Problem Relation Age of Onset   • Cancer Mother 50        kidney , posteria lungs    • Cancer Paternal Grandfather 50        stomach       Social History     Occupational History   • Not on file   Tobacco Use   • Smoking status: Never Smoker   • Smokeless tobacco: Never Used   Substance and Sexual Activity   • Alcohol use: Yes     Comment: social    • Drug use: No   • Sexual activity: Yes     Partners: Male       REVIEW OF SYSTEMS    Constitutional: Denies weight loss, generalized fatigue, chills, night sweats.  Eyes:  Denies change in visual acuity, denies diplopia, denies photophobia.   HENT: Denies hearing loss, tinnitus, chronic nasal congestion, sore throat, change in voice.  Respiratory: Denies shortness of breath, chronic cough, sputum production, hemoptysis.  Cardiovascular:  Denies chest pain,  Received a refill request for pt's Pravastatin 20 mg qd.  This is a historical med.    Please see pending rx.     Rx Refill Note  Requested Prescriptions     Pending Prescriptions Disp Refills   • pravastatin (PRAVACHOL) 20 MG tablet [Pharmacy Med Name: PRAVASTATIN SODIUM 20 MG TAB] 90 tablet 2     Sig: TAKE 1 TABLET BY MOUTH EVERY DAY      Last office visit with prescribing clinician: 9/17/2021      Next office visit with prescribing clinician: Visit date not found            Monica Zaldivar MA  10/14/21, 16:00 EDT     palpitations, dyspnea on exertion, claudication symptoms.  GI:  Denies difficulty swallowing, abdominal pain, diarrhea, constipation, melena, changes in bowel habits.  :  Denies dysuria, hematuria, frequency, urinary incontinence, hesitancy, weak stream.  Musculoskeletal:  Denies back pain or joint pain.   Integument:  Denies rash, changes in moles; no lesions seen.  Neurologic: Headache   Endocrine:  Denies polyuria or polydipsia, denies temperature intolerance.  Lymphatic:  Denies swollen glands.  Psychiatric:  Denies changes in mood, anxiety, insomnia, concerns about excess alcohol consumption or illicit drug use.      PHYSICAL EXAM  Vitals:    Visit Vitals  /85   Pulse 69   Temp 98.6 °F (37 °C) (Oral)   Resp 20   Ht 5' 2\" (1.575 m)   Wt 100 kg   LMP 07/22/2019   SpO2 95%   BMI 40.32 kg/m²     General appearance: Healthy, alert and in no distress.  Skin: Skin color, texture, turgor are normal. There are no bruises, rashes or lesions.  Head: Normocephalic. No masses, lesions, tenderness or abnormalities.  Eyes: Conjuctivae/sclerae normal. No erythema, edema or exudate.  Ears: External ears normal. Canals clear. TM's normal.  Nose/Sinuses: Nares normal. Septum midline. Mucosa normal. No drainage or sinus tenderness.  Oropharynx: Lips, mucosa, and tongue normal. Teeth and gums normal. Oropharynx clear.  Neck: Supple, carotid upstrokes 2+ bilaterally, no bruits, no thyromegaly or nodules, no cervical adenopathy and no supraclavicular adenopathy.  Back: Negative, back symmetric, no curvature. ROM normal. No costovertebral angle tenderness.  Lungs: Percussion normal. Good diaphragmatic excursion. Lungs clear.  Heart: Negative, PMI normal, regular rate and rhythm, S1 normal, S2 normal, no murmurs, clicks, or gallops.   Abdomen: Soft, nontender, bowel sounds normal, no masses or valhxx-wmybql-chjzqf noted.  Extremities: Full range of motion, no edema  Neuro: CN II-XII intact. Sensory and motor grossly intact.  Reflexes normal and symmetric.    ASSESSMENT/PLAN  Kenzie was seen today for physical and blood pressure.    Diagnoses and all orders for this visit:    General medical examination  -     CBC WITH DIFFERENTIAL; Future  -     COMPREHENSIVE METABOLIC PANEL; Future  -     LIPID PANEL WITH REFLEX; Future  -     THYROID STIMULATING HORMONE; Future  -     URINALYSIS WITH MICRO & CULTURE IF INDICATED; Future    Hypertension goal BP (blood pressure) < 140/90-low-sodium diet and risks, benefits, side effects of medication were explained to the patient  -     lisinopril-hydroCHLOROthiazide (PRINZIDE,ZESTORETIC) 10-12.5 MG per tablet; Take 1 tablet by mouth daily.    Other fatigue    Encounter for screening mammogram for malignant neoplasm of breast  -     MAMMO SCREENING BILATERAL; Future    Morbid obesity with BMI of 40.0-44.9, adult (CMS/[facility])-dietary modification and lifestyle modification.Body mass index is 40.32 kg/m².    BMI ASSESSMENT/PLAN:  Journal food intake daily, Join health club and Recommend nutrition support group (i.e. Weight Watchers, etc.)           Return in about 4 weeks (around 9/12/2019), or if symptoms worsen or fail to improve, for hypertension.

## 2021-10-15 ENCOUNTER — TELEPHONE (OUTPATIENT)
Dept: PHARMACY | Facility: HOSPITAL | Age: 71
End: 2021-10-15

## 2021-10-15 ENCOUNTER — TELEPHONE (OUTPATIENT)
Dept: FAMILY MEDICINE CLINIC | Facility: CLINIC | Age: 71
End: 2021-10-15

## 2021-10-15 ENCOUNTER — ANTICOAGULATION VISIT (OUTPATIENT)
Dept: PHARMACY | Facility: HOSPITAL | Age: 71
End: 2021-10-15

## 2021-10-15 DIAGNOSIS — I48.21 PERMANENT ATRIAL FIBRILLATION (HCC): Primary | ICD-10-CM

## 2021-10-15 NOTE — TELEPHONE ENCOUNTER
Cardiology refills his coumadin and manages his INR, they also refill his BP meds, home health should contact cardiology office for instruction with INR levels.
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
## 2021-10-18 ENCOUNTER — TELEPHONE (OUTPATIENT)
Dept: FAMILY MEDICINE CLINIC | Facility: CLINIC | Age: 71
End: 2021-10-18

## 2021-10-18 DIAGNOSIS — F41.9 ANXIETY DISORDER, UNSPECIFIED: ICD-10-CM

## 2021-10-18 RX ORDER — ALPRAZOLAM 0.5 MG/1
0.5 TABLET ORAL NIGHTLY PRN
Qty: 30 TABLET | Refills: 1 | Status: SHIPPED | OUTPATIENT
Start: 2021-10-18 | End: 2022-01-01

## 2021-10-18 NOTE — TELEPHONE ENCOUNTER
Caller: Jalen Lockwood    Relationship: Self    Best call back number: [phone] (H)    What is the best time to reach you: ANYTIME    Who are you requesting to speak with (clinical staff, provider,  specific staff member): CLINICAL STAFF    What was the call regarding: PATIENT STATES JUST RECEIVED A CALL FROM PHARMACY REGARDING MEDICATION ALPRAZolam (XANAX) 0.5 MG tablet PATIENT STATES WAS TOLD BY PHARMACY THAT THEY ARE UNABLE TO REFILL MEDICATION UNTIL November 1ST DUE TO STATE REGULATIONS    Do you require a callback: YES
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
## 2021-10-18 NOTE — TELEPHONE ENCOUNTER
PATIENT CALLING STATING THE PHARMACY WILL NOT REFILL THE MEDICATION DUE TO THE INSTRUCTION LISTED ON THE PRESCRIPTIONS HE HASNT HAD HIS MEDICATION IN THE LAST 3 DAYS HE STATED HE HASNT HAD ANY SLEEP AS WELL.    PLEASE ADVISE  [phone]

## 2021-10-19 RX ORDER — PRAVASTATIN SODIUM 20 MG
TABLET ORAL
Qty: 90 TABLET | Refills: 2 | OUTPATIENT
Start: 2021-10-19
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
## 2021-10-20 RX ORDER — PRAVASTATIN SODIUM 20 MG
TABLET ORAL
Qty: 90 TABLET | Refills: 2 | OUTPATIENT
Start: 2021-10-20

## 2021-10-21 ENCOUNTER — OFFICE VISIT (OUTPATIENT)
Dept: FAMILY MEDICINE CLINIC | Facility: CLINIC | Age: 71
End: 2021-10-21

## 2021-10-21 ENCOUNTER — ANTICOAGULATION VISIT (OUTPATIENT)
Dept: PHARMACY | Facility: HOSPITAL | Age: 71
End: 2021-10-21

## 2021-10-21 VITALS
SYSTOLIC BLOOD PRESSURE: 150 MMHG | BODY MASS INDEX: 39.23 KG/M2 | WEIGHT: 296 LBS | HEART RATE: 85 BPM | DIASTOLIC BLOOD PRESSURE: 100 MMHG | HEIGHT: 73 IN | OXYGEN SATURATION: 98 % | TEMPERATURE: 98.2 F

## 2021-10-21 DIAGNOSIS — I10 ESSENTIAL HYPERTENSION: Primary | ICD-10-CM

## 2021-10-21 DIAGNOSIS — Z79.01 CHRONIC ANTICOAGULATION: Chronic | ICD-10-CM

## 2021-10-21 DIAGNOSIS — I48.21 PERMANENT ATRIAL FIBRILLATION (HCC): Primary | ICD-10-CM

## 2021-10-21 DIAGNOSIS — I48.21 PERMANENT ATRIAL FIBRILLATION (HCC): ICD-10-CM

## 2021-10-21 DIAGNOSIS — I89.0 LYMPHEDEMA OF BOTH LOWER EXTREMITIES: ICD-10-CM

## 2021-10-21 DIAGNOSIS — E78.5 HYPERLIPIDEMIA, UNSPECIFIED HYPERLIPIDEMIA TYPE: ICD-10-CM

## 2021-10-21 LAB — INR PPP: 2.6

## 2021-10-21 PROCEDURE — 99214 OFFICE O/P EST MOD 30 MIN: CPT | Performed by: NURSE PRACTITIONER

## 2021-10-21 PROCEDURE — 90662 IIV NO PRSV INCREASED AG IM: CPT | Performed by: NURSE PRACTITIONER

## 2021-10-21 PROCEDURE — G0008 ADMIN INFLUENZA VIRUS VAC: HCPCS | Performed by: NURSE PRACTITIONER

## 2021-10-21 RX ORDER — PRAVASTATIN SODIUM 20 MG
20 TABLET ORAL DAILY
COMMUNITY
End: 2021-10-21

## 2021-10-21 RX ORDER — PRAVASTATIN SODIUM 20 MG
20 TABLET ORAL DAILY
Qty: 90 TABLET | Refills: 1 | Status: SHIPPED | OUTPATIENT
Start: 2021-10-21 | End: 2022-01-01

## 2021-10-21 RX ORDER — CLOTRIMAZOLE AND BETAMETHASONE DIPROPIONATE 10; .64 MG/G; MG/G
1 CREAM TOPICAL 2 TIMES DAILY
COMMUNITY
End: 2022-01-12 | Stop reason: HOSPADM

## 2021-10-21 NOTE — PATIENT INSTRUCTIONS
He will return for fasting labs.   Cont f/u with specialists as scheduled.   Patient agrees with plan of care and understands instructions. Call if worsening symptoms or any problems or concerns.

## 2021-10-21 NOTE — PROGRESS NOTES
Anticoagulation Clinic Progress Note    Anticoagulation Summary  As of 10/21/2021    INR goal:  2.0-3.0   TTR:  66.4 % (2.9 y)   INR used for dosin.60 (10/21/2021)   Warfarin maintenance plan:  5 mg every Mon; 7.5 mg all other days   Weekly warfarin total:  50 mg   No change documented:  Brendan Live PharmD   Plan last modified:  Omid Galeas, [facility] (2021)   Next INR check:  10/28/2021   Priority:  Maintenance   Target end date:  Indefinite    Indications    Permanent atrial fibrillation (HCC) [I48.21]             Anticoagulation Episode Summary     INR check location:      Preferred lab:      [facility]    Comments:        Anticoagulation Care Providers     Provider Role Specialty Phone number    Kurt Henry MD Referring Cardiology [phone]          INR History:  Anticoagulation Monitoring 2021 10/15/2021 10/21/2021   INR 2.90 1.90 2.60   INR Date 2021 10/13/2021 10/21/2021   INR Goal 2.0-3.0 2.0-3.0 2.0-3.0   Trend Same Same Same   Last Week Total 50 mg 42.5 mg 45 mg   Next Week Total 30 mg 52.5 mg 50 mg   Sun 7.5 mg 7.5 mg 7.5 mg   Mon Hold (10/4); Otherwise 5 mg 5 mg 5 mg   Tue Hold (10/5) 7.5 mg 7.5 mg   Wed Hold (10/6) 7.5 mg 7.5 mg   Thu 10 mg (10/7); Otherwise 7.5 mg - 7.5 mg   Fri 7.5 mg 10 mg (10/15) 7.5 mg   Sat 7.5 mg 7.5 mg 7.5 mg   Visit Report - - -   Some recent data might be hidden       Plan:  1. INR is Therapeutic today- see above in Anticoagulation Summary.   Provided instructions to Estefania (secure ) with [facility] to Continue their warfarin regimen- see above in Anticoagulation Summary.  2. Follow up in 1 week      Brendan Live PharmD

## 2021-10-21 NOTE — PROGRESS NOTES
"Chief Complaint  Med Refill    Subjective          Jalen Lockwood presents to [facility]  History of Present Illness  Here today for f/u, with afib, taking coreg 6.25mg bid, lasix 40mg daily, lisinopril 20mg daily, taking coumadin, managed by cardiology, Dr. Henry.   With chronic lymphedema, has leg wraps. Sees [facility].   With HLD, taking pravastatin unsure of dose, needs refill, states prev refilled by cardiology.       Objective   Vital Signs:   /100 (BP Location: Left arm, Patient Position: Sitting, Cuff Size: Large Adult)   Pulse 85   Temp 98.2 °F (36.8 °C) (Temporal)   Ht 185.4 cm (73\")   Wt 134 kg (296 lb)   SpO2 98%   BMI 39.05 kg/m²     Physical Exam  Vitals and nursing note reviewed.   Constitutional:       Appearance: He is well-developed.   HENT:      Head: Normocephalic.   Eyes:      Pupils: Pupils are equal, round, and reactive to light.   Cardiovascular:      Rate and Rhythm: Normal rate and regular rhythm.      Heart sounds: Normal heart sounds.   Pulmonary:      Effort: Pulmonary effort is normal.      Breath sounds: Normal breath sounds.   Skin:     General: Skin is warm and dry.   Neurological:      Mental Status: He is alert and oriented to person, place, and time.   Psychiatric:         Behavior: Behavior normal.         Judgment: Judgment normal.        Result Review :                 Assessment and Plan    Diagnoses and all orders for this visit:    1. Essential hypertension (Primary)  -     Comprehensive Metabolic Panel; Future  -     CBC & Differential; Future  -     Lipid Panel; Future  -     TSH; Future    2. Permanent atrial fibrillation (HCC)  -     Comprehensive Metabolic Panel; Future  -     CBC & Differential; Future  -     Lipid Panel; Future  -     TSH; Future    3. Chronic anticoagulation  -     Comprehensive Metabolic Panel; Future  -     CBC & Differential; Future  -     Lipid Panel; Future  -     TSH; Future    4. Lymphedema " of both lower extremities  -     Comprehensive Metabolic Panel; Future  -     CBC & Differential; Future  -     Lipid Panel; Future  -     TSH; Future    5. Hyperlipidemia, unspecified hyperlipidemia type  -     Comprehensive Metabolic Panel; Future  -     CBC & Differential; Future  -     Lipid Panel; Future  -     TSH; Future    Other orders  -     pravastatin (PRAVACHOL) 20 MG tablet; Take 1 tablet by mouth Daily.  Dispense: 90 tablet; Refill: 1  -     Fluzone High-Dose 65+yrs ([id number])        Follow Up   No follow-ups on file.  Patient was given instructions and counseling regarding his condition or for health maintenance advice. Please see specific information pulled into the AVS if appropriate.       He will return for fasting labs.   Cont f/u with specialists as scheduled.   Patient agrees with plan of care and understands instructions. Call if worsening symptoms or any problems or concerns.

## 2021-10-26 ENCOUNTER — APPOINTMENT (OUTPATIENT)
Dept: CT IMAGING | Facility: HOSPITAL | Age: 71
End: 2021-10-26

## 2021-10-28 ENCOUNTER — ANTICOAGULATION VISIT (OUTPATIENT)
Dept: PHARMACY | Facility: HOSPITAL | Age: 71
End: 2021-10-28

## 2021-10-28 DIAGNOSIS — I48.21 PERMANENT ATRIAL FIBRILLATION (HCC): Primary | ICD-10-CM

## 2021-10-28 LAB — INR PPP: 1.8

## 2021-11-05 ENCOUNTER — ANTICOAGULATION VISIT (OUTPATIENT)
Dept: PHARMACY | Facility: HOSPITAL | Age: 71
End: 2021-11-05

## 2021-11-05 DIAGNOSIS — I48.21 PERMANENT ATRIAL FIBRILLATION (HCC): Primary | ICD-10-CM

## 2021-11-05 LAB — INR PPP: 2.5

## 2021-11-05 NOTE — PROGRESS NOTES
Anticoagulation Clinic Progress Note    Anticoagulation Summary  As of 2021    INR goal:  2.0-3.0   TTR:  66.5 % (2.9 y)   INR used for dosin.50 (2021)   Warfarin maintenance plan:  5 mg every Mon; 7.5 mg all other days   Weekly warfarin total:  50 mg   No change documented:  Mal Vaughn III, PharmD   Plan last modified:  Omid Galeas, [facility] (2021)   Next INR check:  2021   Priority:  Maintenance   Target end date:  Indefinite    Indications    Permanent atrial fibrillation (HCC) [I48.21]             Anticoagulation Episode Summary     INR check location:      Preferred lab:      Send INR reminders to:   [facility] PO[facility]    Comments:        Anticoagulation Care Providers     Provider Role Specialty Phone number    Kurt Henry MD Referring Cardiology [phone]          Clinic Interview:  No pertinent clinical findings have been reported.    INR History:  Anticoagulation Monitoring 10/21/2021 10/28/2021 2021   INR 2.60 1.80 2.50   INR Date 10/21/2021 10/28/2021 2021   INR Goal 2.0-3.0 2.0-3.0 2.0-3.0   Trend Same Same Same   Last Week Total 45 mg 50 mg 50 mg   Next Week Total 50 mg 52.5 mg 50 mg   Sun 7.5 mg 7.5 mg 7.5 mg   Mon 5 mg 5 mg 5 mg   Tue 7.5 mg 7.5 mg 7.5 mg   Wed 7.5 mg 7.5 mg 7.5 mg   Thu 7.5 mg 10 mg (10/28) -   Fri 7.5 mg 7.5 mg 7.5 mg   Sat 7.5 mg 7.5 mg 7.5 mg   Visit Report - - -   Some recent data might be hidden       Plan:  1. INR is therapeutic today- see above in Anticoagulation Summary.   Provided instructions to Estefania with [facility] to continue Jalen Lockwood's warfarin regimen- see above in Anticoagulation Summary.  2. Follow up in 1 week    Mal Vaughn III, PharmD

## 2021-11-06 ENCOUNTER — IMMUNIZATION (OUTPATIENT)
Dept: VACCINE CLINIC | Facility: HOSPITAL | Age: 71
End: 2021-11-06

## 2021-11-06 PROCEDURE — 0004A ADM SARSCOV2 30MCG/0.3ML BOOSTER: CPT | Performed by: INTERNAL MEDICINE

## 2021-11-06 PROCEDURE — 91300 HC SARSCOV02 VAC 30MCG/0.3ML IM: CPT | Performed by: INTERNAL MEDICINE

## 2021-11-14 RX ORDER — METOCLOPRAMIDE 10 MG/1
TABLET ORAL
Qty: 360 TABLET | Refills: 3 | Status: SHIPPED | OUTPATIENT
Start: 2021-11-14 | End: 2022-01-01 | Stop reason: HOSPADM

## 2021-11-15 ENCOUNTER — TELEPHONE (OUTPATIENT)
Dept: PHARMACY | Facility: HOSPITAL | Age: 71
End: 2021-11-15

## 2021-11-15 NOTE — TELEPHONE ENCOUNTER
Estefania w/ [facility] called to report that she was unable to check pt's INR because she ran our of test strips. She is agreeable to checking INR on Thurs, 11/18/21.

## 2021-11-18 ENCOUNTER — HOSPITAL ENCOUNTER (OUTPATIENT)
Dept: CT IMAGING | Facility: HOSPITAL | Age: 71
Discharge: HOME | End: 2021-11-18
Admitting: INTERNAL MEDICINE

## 2021-11-18 ENCOUNTER — ANTICOAGULATION VISIT (OUTPATIENT)
Dept: PHARMACY | Facility: HOSPITAL | Age: 71
End: 2021-11-18

## 2021-11-18 DIAGNOSIS — R91.8 LUNG NODULES: ICD-10-CM

## 2021-11-18 DIAGNOSIS — I48.21 PERMANENT ATRIAL FIBRILLATION (HCC): Primary | ICD-10-CM

## 2021-11-18 LAB — INR PPP: 2.8

## 2021-11-18 PROCEDURE — 71250 CT THORAX DX C-: CPT

## 2021-11-18 NOTE — PROGRESS NOTES
Anticoagulation Clinic Progress Note    Anticoagulation Summary  As of 2021    INR goal:  2.0-3.0   TTR:  66.9 % (2.9 y)   INR used for dosin.80 (2021)   Warfarin maintenance plan:  5 mg every Mon; 7.5 mg all other days   Weekly warfarin total:  50 mg   No change documented:  Brendan Live PharmD   Plan last modified:  Omid Galeas, [facility] (2021)   Next INR check:  2021   Priority:  Maintenance   Target end date:  Indefinite    Indications    Permanent atrial fibrillation (HCC) [I48.21]             Anticoagulation Episode Summary     INR check location:      Preferred lab:      [facility]    Comments:        Anticoagulation Care Providers     Provider Role Specialty Phone number    Kurt Henry MD Referring Cardiology [phone]          INR History:  Anticoagulation Monitoring 10/28/2021 2021 2021   INR 1.80 2.50 2.80   INR Date 10/28/2021 2021 2021   INR Goal 2.0-3.0 2.0-3.0 2.0-3.0   Trend Same Same Same   Last Week Total 50 mg 50 mg 50 mg   Next Week Total 52.5 mg 50 mg 50 mg   Sun 7.5 mg 7.5 mg 7.5 mg   Mon 5 mg 5 mg 5 mg   Tue 7.5 mg 7.5 mg 7.5 mg   Wed 7.5 mg 7.5 mg 7.5 mg   Thu 10 mg (10/28) - 7.5 mg   Fri 7.5 mg 7.5 mg 7.5 mg   Sat 7.5 mg 7.5 mg 7.5 mg   Visit Report - - -   Some recent data might be hidden       Plan:  1. INR is Therapeutic today- see above in Anticoagulation Summary.   Provided instructions to Estefania (secure ) with [facility] to Continue their warfarin regimen- see above in Anticoagulation Summary.  2. Follow up in 2 weeks      Brendan Live PharmD

## 2021-11-24 ENCOUNTER — OUTSIDE FACILITY SERVICE (OUTPATIENT)
Dept: FAMILY MEDICINE CLINIC | Facility: CLINIC | Age: 71
End: 2021-11-24

## 2021-11-24 PROCEDURE — G0179 MD RECERTIFICATION HHA PT: HCPCS | Performed by: FAMILY MEDICINE

## 2021-12-03 ENCOUNTER — ANTICOAGULATION VISIT (OUTPATIENT)
Dept: PHARMACY | Facility: HOSPITAL | Age: 71
End: 2021-12-03

## 2021-12-03 DIAGNOSIS — I48.21 PERMANENT ATRIAL FIBRILLATION (HCC): Primary | ICD-10-CM

## 2021-12-03 LAB — INR PPP: 2.6

## 2021-12-03 NOTE — PROGRESS NOTES
Anticoagulation Clinic Progress Note    Anticoagulation Summary  As of 12/3/2021    INR goal:  2.0-3.0   TTR:  67.4 % (3 y)   INR used for dosin.60 (12/3/2021)   Warfarin maintenance plan:  5 mg every Mon; 7.5 mg all other days   Weekly warfarin total:  50 mg   No change documented:  Brendan Live, [place]   Plan last modified:  Omid Galeas, [facility] (2021)   Next INR check:  2021   Priority:  Maintenance   Target end date:  Indefinite    Indications    Permanent atrial fibrillation (HCC) [I48.21]             Anticoagulation Episode Summary     INR check location:      Preferred lab:      Send INR reminders to:  [facility] [place]    Comments:        Anticoagulation Care Providers     Provider Role Specialty Phone number    Kurt Henry MD Referring Cardiology [phone]          INR History:  Anticoagulation Monitoring 2021 2021 12/3/2021   INR 2.50 2.80 2.60   INR Date 2021 2021 12/3/2021   INR Goal 2.0-3.0 2.0-3.0 2.0-3.0   Trend Same Same Same   Last Week Total 50 mg 50 mg 50 mg   Next Week Total 50 mg 50 mg 50 mg   Sun 7.5 mg 7.5 mg 7.5 mg   Mon 5 mg 5 mg 5 mg   Tue 7.5 mg 7.5 mg 7.5 mg   Wed 7.5 mg 7.5 mg 7.5 mg   Thu - 7.5 mg 7.5 mg   Fri 7.5 mg 7.5 mg 7.5 mg   Sat 7.5 mg 7.5 mg 7.5 mg   Visit Report - - -   Some recent data might be hidden       Plan:  1. INR is Therapeutic today- see above in Anticoagulation Summary.   Provided instructions to Estefania (secure voicemail) with [facility] to Continue their warfarin regimen- see above in Anticoagulation Summary.  2. Follow up in 2 weeks      Brendan Live PharmD

## 2021-12-09 RX ORDER — IPRATROPIUM BROMIDE 17 UG/1
2 AEROSOL, METERED RESPIRATORY (INHALATION)
Qty: 12.9 EACH | Refills: 3 | Status: SHIPPED | OUTPATIENT
Start: 2021-12-09 | End: 2021-12-09 | Stop reason: SDUPTHER

## 2021-12-09 RX ORDER — IPRATROPIUM BROMIDE 17 UG/1
2 AEROSOL, METERED RESPIRATORY (INHALATION)
Qty: 12.9 EACH | Refills: 3 | Status: SHIPPED | OUTPATIENT
Start: 2021-12-09 | End: 2022-01-01

## 2021-12-09 NOTE — TELEPHONE ENCOUNTER
PATIENT CALLED FOR MEDICATION REFILL OF   Atrovent HFA 17 MCG/ACT inhaler    HE IS ALMOST OUT    [facility]/pharmacy #[id number] - [place], [place] - [address] AT IN [address] [place] - [phone] [facility] [phone]   [phone]    CALL BACK NUMBER [phone]

## 2021-12-10 ENCOUNTER — OFFICE VISIT (OUTPATIENT)
Dept: GASTROENTEROLOGY | Facility: CLINIC | Age: 71
End: 2021-12-10

## 2021-12-10 VITALS — HEIGHT: 78 IN | TEMPERATURE: 97.3 F | WEIGHT: 294 LBS | BODY MASS INDEX: 34.02 KG/M2

## 2021-12-10 DIAGNOSIS — Z86.010 PERSONAL HISTORY OF COLONIC POLYPS: ICD-10-CM

## 2021-12-10 DIAGNOSIS — K31.84 GASTROPARESIS: Primary | ICD-10-CM

## 2021-12-10 PROCEDURE — 99213 OFFICE O/P EST LOW 20 MIN: CPT | Performed by: NURSE PRACTITIONER

## 2021-12-10 NOTE — PROGRESS NOTES
Chief Complaint   Patient presents with   • Gastroparesis       HPI    Jalen Lockwood is a  71 y.o. male here for a follow up visit for gastroparesis.  This patient follows with Dr. Lagunas, new to me.  He has a history of gastroparesis maintained on Reglan 10 mg before meals and at bedtime x 8 years.     Today he is doing quite well regarding his GI symptoms.  No nausea, vomiting, acid reflux/heartburn, or dysphagia.  His appetite is good.  His weight is stable.  BMI 32.9.  On average he takes Reglan 10 mg 3 times a day.  He rarely has to take a bedtime dose.     No diarrhea, constipation, or rectal bleeding.     Reviewed colonoscopy dated 10/20/2019 with normal ileum, nonbleeding internal hemorrhoids, diverticulosis, and polyps.  Recall 3 years.    Pathology with tubular adenoma colon polyps.    Past Medical History:   Diagnosis Date   • Allergic rhinitis    • Anxiety    • Aortectasia (HCC)     3cm infrarenal abdominal aorta   • Arthritis    • Atrial flutter (HCC) 2010    s/p ablation    • Charcot's joint of foot    • Chronic edema     both legs and sees wound care center at [place]    • Chronic venous insufficiency    • COPD (chronic obstructive pulmonary disease) (HCC)    • Coronary atherosclerosis     Cath 2010: diffuse 40-50% disease   • Diverticulosis    • Duodenitis    • Fatty liver    • Gastritis    • Gastroparesis    • Hematoma     post-operative; After catheterization, right groin, required surgical exploration   • Hyperlipidemia    • Hypertension    • Insomnia    • Internal hemorrhoids    • Open wound     izzy legs has teddy chg weekly at wound care center at [place]  pt does second dressing on left leg another time during week   • Osteomyelitis (HCC)    • Paroxysmal atrial fibrillation (HCC)    • Peripheral neuropathy    • Popliteal artery aneurysm (HCC)     left, s/p stenting by Dr. Boston   • Skin cancer    • Sleep apnea     o2   • 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HCC)     due to flutter and afib;  cath  with nonobstructive disease   • Venous stasis    • Venous stasis ulcer (HCC)     bilateral legs        Past Surgical History:   Procedure Laterality Date   • BASAL CELL CARCINOMA EXCISION      ear and left side of face   • BRONCHOSCOPY N/A 2021    Procedure: BRONCHOSCOPY WITH BAL;  Surgeon: Bunny Lepe MD;  Location:  [facility];  Service: Pulmonary;  Laterality: N/A;  PRE-HEMOPTYSIS  POST-SAME   • CARDIAC CATHETERIZATION     • CATARACT EXTRACTION     • COLONOSCOPY  2015    NBIH, diverticulosis, polyps   • COLONOSCOPY N/A 2018    Procedure: COLONOSCOPY TO CECUM  AND TERM. ILEUM WITH COLD SNARE POLYPECTOMIES;  Surgeon: Kane Lagunas MD;  Location:  [facility];  Service: Gastroenterology   • COLONOSCOPY N/A 10/29/2019    Procedure: COLONOSCOPY TO TO CECUM AND TERMINAL ILEUM WITH HOT AND COLD SNARE POLYPECTOMIES;  Surgeon: Kane Lagunas MD;  Location:  [facility];  Service: Gastroenterology   • HIP ARTHROPLASTY Right 2017   • JOINT REPLACEMENT Left    • OTHER SURGICAL HISTORY      Catheter ablation atrial flutter   • REPAIR ANEURYSM / PSEUDO ANEURYSM / RUPTURED ANEURYSM POPLITEAL ARTERY      Stent-Graft of the the left popliteal artery   • REPAIR KNEE LIGAMENT      Primary repair of knee ligament cruciate anterior right   • TONSILLECTOMY     • TOTAL KNEE ARTHROPLASTY Bilateral    • UPPER GASTROINTESTINAL ENDOSCOPY  2014    acute gastritis, acute duodenitis       Scheduled Meds:     Continuous Infusions: No current facility-administered medications for this visit.      PRN Meds:     Allergies   Allergen Reactions   • Cephalexin Hives   • Codeine Nausea Only       Social History     Socioeconomic History   • Marital status:      Spouse name: Mariposa   Tobacco Use   • Smoking status: Former Smoker     Packs/day: 0.25     Years: 45.00     Pack years: 11.25     Types: Cigarettes     Quit date: 3/30/2021     Years since quittin.6   • Smokeless tobacco:  Never Used   • Tobacco comment: caffeine use - 1.5 cups coffee daily    Vaping Use   • Vaping Use: Never used   Substance and Sexual Activity   • Alcohol use: Yes     Alcohol/week: 4.0 - 5.0 standard drinks     Types: 1 - 2 Shots of liquor, 3 Standard drinks or equivalent per week     Comment: 2 rum a day   • Drug use: No   • Sexual activity: Defer       Family History   Problem Relation Age of Onset   • Emphysema Father    • Malig Hyperthermia Neg Hx        Review of Systems   Constitutional: Negative for activity change, appetite change, fatigue, fever and unexpected weight change.   HENT: Negative for trouble swallowing.    Respiratory: Negative for apnea, cough, choking, chest tightness, shortness of breath and wheezing.    Cardiovascular: Negative for chest pain, palpitations and leg swelling.   Gastrointestinal: Negative for abdominal distention, abdominal pain, anal bleeding, blood in stool, constipation, diarrhea, nausea, rectal pain and vomiting.       Vitals:    12/10/21 1314   Temp: 97.3 °F (36.3 °C)       Physical Exam  Constitutional:       Appearance: He is well-developed.   Abdominal:      General: Bowel sounds are normal. There is no distension.      Palpations: Abdomen is soft. There is no mass.      Tenderness: There is no abdominal tenderness. There is no guarding.      Hernia: No hernia is present.   Skin:     General: Skin is warm and dry.      Capillary Refill: Capillary refill takes less than 2 seconds.   Neurological:      Mental Status: He is alert and oriented to person, place, and time.   Psychiatric:         Behavior: Behavior normal.     Assessment    Diagnoses and all orders for this visit:    1. Gastroparesis (Primary)    2. Personal history of colonic polyps       Plan    Stable gastroparesis on Reglan.  Will continue.  We did discuss the very rare (<1%) risk of irreversible tardive dyskinesia with reglan use and he knows to discontinue the medication and call me should any tremors,  tongue rolling, etc, develop.  Asymptomatic on visit today.  We reviewed his last colonoscopy and discussed the fact that he will be due for a screening colonoscopy in the fall of next year.  Patient aware and will call back in the summer to schedule.  Return to clinic 1 year.  Call with any issues in the interim.         ESTEFANY Schmtiz  [facility], [facility]  [address] [place] [address]  Office: [phone]

## 2021-12-16 ENCOUNTER — ANTICOAGULATION VISIT (OUTPATIENT)
Dept: PHARMACY | Facility: HOSPITAL | Age: 71
End: 2021-12-16

## 2021-12-16 DIAGNOSIS — I48.21 PERMANENT ATRIAL FIBRILLATION (HCC): Primary | ICD-10-CM

## 2021-12-16 LAB — INR PPP: 3

## 2021-12-16 NOTE — PROGRESS NOTES
Anticoagulation Clinic Progress Note    Anticoagulation Summary  As of 12/16/2021    INR goal:  2.0-3.0   TTR:  67.7 % (3 y)   INR used for dosing:  3.00 (12/16/2021)   Warfarin maintenance plan:  5 mg every Mon; 7.5 mg all other days   Weekly warfarin total:  50 mg   No change documented:  Brendan Live PharmD   Plan last modified:  Omid Galeas, [facility] (5/19/2021)   Next INR check:  12/30/2021   Priority:  Maintenance   Target end date:  Indefinite    Indications    Permanent atrial fibrillation (HCC) [I48.21]             Anticoagulation Episode Summary     INR check location:      Preferred lab:      Send INR reminders to:  [facility] [place]    Comments:        Anticoagulation Care Providers     Provider Role Specialty Phone number    Kurt Henry MD Referring Cardiology [phone]          INR History:  Anticoagulation Monitoring 11/18/2021 12/3/2021 12/16/2021   INR 2.80 2.60 3.00   INR Date 11/18/2021 12/3/2021 12/16/2021   INR Goal 2.0-3.0 2.0-3.0 2.0-3.0   Trend Same Same Same   Last Week Total 50 mg 50 mg 50 mg   Next Week Total 50 mg 50 mg 50 mg   Sun 7.5 mg 7.5 mg 7.5 mg   Mon 5 mg 5 mg 5 mg   Tue 7.5 mg 7.5 mg 7.5 mg   Wed 7.5 mg 7.5 mg 7.5 mg   Thu 7.5 mg 7.5 mg 7.5 mg   Fri 7.5 mg 7.5 mg 7.5 mg   Sat 7.5 mg 7.5 mg 7.5 mg   Visit Report - - -   Some recent data might be hidden       Plan:  1. INR is Therapeutic today- see above in Anticoagulation Summary.   Provided instructions to Estefania with [facility] to Continue their warfarin regimen- see above in Anticoagulation Summary.  2. Follow up in 2 weeks      Brendan Live PharmD

## 2021-12-29 RX ORDER — WARFARIN SODIUM 5 MG/1
TABLET ORAL
Qty: 130 TABLET | Refills: 1 | Status: SHIPPED | OUTPATIENT
Start: 2021-12-29 | End: 2022-01-12 | Stop reason: HOSPADM

## 2021-12-30 ENCOUNTER — ANTICOAGULATION VISIT (OUTPATIENT)
Dept: PHARMACY | Facility: HOSPITAL | Age: 71
End: 2021-12-30

## 2021-12-30 DIAGNOSIS — I48.21 PERMANENT ATRIAL FIBRILLATION (HCC): Primary | ICD-10-CM

## 2021-12-30 LAB — INR PPP: 2.1

## 2021-12-30 NOTE — PROGRESS NOTES
Anticoagulation Clinic Progress Note    Anticoagulation Summary  As of 2021    INR goal:  2.0-3.0   TTR:  68.1 % (3 y)   INR used for dosin.10 (2021)   Warfarin maintenance plan:  5 mg every Mon; 7.5 mg all other days   Weekly warfarin total:  50 mg   No change documented:  Adeline Marie RPH   Plan last modified:  Omid Galeas RPH (2021)   Next INR check:  2022   Priority:  Maintenance   Target end date:  Indefinite    Indications    Permanent atrial fibrillation (HCC) [I48.21]             Anticoagulation Episode Summary     INR check location:      Preferred lab:      Send INR reminders to:  [facility] [place]    Comments:        Anticoagulation Care Providers     Provider Role Specialty Phone number    Kurt Henry MD Referring Cardiology [phone]          INR History:  Anticoagulation Monitoring 12/3/2021 2021 2021   INR 2.60 3.00 2.10   INR Date 12/3/2021 2021 2021   INR Goal 2.0-3.0 2.0-3.0 2.0-3.0   Trend Same Same Same   Last Week Total 50 mg 50 mg 50 mg   Next Week Total 50 mg 50 mg 50 mg   Sun 7.5 mg 7.5 mg 7.5 mg   Mon 5 mg 5 mg 5 mg   Tue 7.5 mg 7.5 mg 7.5 mg   Wed 7.5 mg 7.5 mg 7.5 mg   Thu 7.5 mg 7.5 mg 7.5 mg   Fri 7.5 mg 7.5 mg 7.5 mg   Sat 7.5 mg 7.5 mg 7.5 mg   Visit Report - - -   Some recent data might be hidden       Plan:  1. INR is Therapeutic today- see above in Anticoagulation Summary.   Provided instructions to Estefania with [facility] to Continue their warfarin regimen- see above in Anticoagulation Summary.  2. Follow up in 2 weeks      Adeline Marie RPH

## 2022-01-01 ENCOUNTER — TELEPHONE (OUTPATIENT)
Dept: CARDIOLOGY | Facility: CLINIC | Age: 72
End: 2022-01-01

## 2022-01-01 ENCOUNTER — TRANSCRIBE ORDERS (OUTPATIENT)
Dept: ADMINISTRATIVE | Facility: HOSPITAL | Age: 72
End: 2022-01-01

## 2022-01-01 ENCOUNTER — ANTICOAGULATION VISIT (OUTPATIENT)
Dept: PHARMACY | Facility: HOSPITAL | Age: 72
End: 2022-01-01

## 2022-01-01 ENCOUNTER — APPOINTMENT (OUTPATIENT)
Dept: CT IMAGING | Facility: HOSPITAL | Age: 72
DRG: 871 | End: 2022-01-01
Payer: MEDICARE

## 2022-01-01 ENCOUNTER — APPOINTMENT (OUTPATIENT)
Dept: CARDIOLOGY | Facility: HOSPITAL | Age: 72
End: 2022-01-01

## 2022-01-01 ENCOUNTER — HOSPITAL ENCOUNTER (EMERGENCY)
Facility: HOSPITAL | Age: 72
Discharge: HOME | End: 2022-10-28
Attending: EMERGENCY MEDICINE | Admitting: EMERGENCY MEDICINE

## 2022-01-01 ENCOUNTER — APPOINTMENT (OUTPATIENT)
Dept: PET IMAGING | Facility: HOSPITAL | Age: 72
End: 2022-01-01

## 2022-01-01 ENCOUNTER — TELEPHONE (OUTPATIENT)
Dept: FAMILY MEDICINE CLINIC | Facility: CLINIC | Age: 72
End: 2022-01-01

## 2022-01-01 ENCOUNTER — HOSPITAL ENCOUNTER (OUTPATIENT)
Dept: CT IMAGING | Facility: HOSPITAL | Age: 72
Discharge: HOME | End: 2022-10-21
Admitting: INTERNAL MEDICINE

## 2022-01-01 ENCOUNTER — APPOINTMENT (OUTPATIENT)
Dept: CT IMAGING | Facility: HOSPITAL | Age: 72
End: 2022-01-01

## 2022-01-01 ENCOUNTER — HOSPITAL ENCOUNTER (EMERGENCY)
Facility: HOSPITAL | Age: 72
Discharge: HOME | End: 2022-04-19
Attending: EMERGENCY MEDICINE | Admitting: EMERGENCY MEDICINE

## 2022-01-01 ENCOUNTER — APPOINTMENT (OUTPATIENT)
Dept: GENERAL RADIOLOGY | Facility: HOSPITAL | Age: 72
End: 2022-01-01

## 2022-01-01 ENCOUNTER — HOSPITAL ENCOUNTER (OUTPATIENT)
Facility: HOSPITAL | Age: 72
Setting detail: OBSERVATION
LOS: 3 days | Discharge: NURSING, REHAB OR LONG-TERM CARE | End: 2022-08-29
Attending: EMERGENCY MEDICINE | Admitting: HOSPITALIST

## 2022-01-01 ENCOUNTER — TRANSITIONAL CARE MANAGEMENT TELEPHONE ENCOUNTER (OUTPATIENT)
Dept: CALL CENTER | Facility: HOSPITAL | Age: 72
End: 2022-01-01

## 2022-01-01 ENCOUNTER — OFFICE VISIT (OUTPATIENT)
Dept: FAMILY MEDICINE CLINIC | Facility: CLINIC | Age: 72
End: 2022-01-01

## 2022-01-01 ENCOUNTER — PATIENT OUTREACH (OUTPATIENT)
Dept: CASE MANAGEMENT | Facility: OTHER | Age: 72
End: 2022-01-01

## 2022-01-01 ENCOUNTER — OUTSIDE FACILITY SERVICE (OUTPATIENT)
Dept: FAMILY MEDICINE CLINIC | Facility: CLINIC | Age: 72
End: 2022-01-01

## 2022-01-01 ENCOUNTER — OFFICE VISIT (OUTPATIENT)
Dept: CARDIOLOGY | Facility: CLINIC | Age: 72
End: 2022-01-01

## 2022-01-01 ENCOUNTER — HOSPITAL ENCOUNTER (INPATIENT)
Facility: HOSPITAL | Age: 72
LOS: 6 days | Discharge: NURSING, REHAB OR LONG-TERM CARE | End: 2022-11-30
Attending: EMERGENCY MEDICINE | Admitting: STUDENT IN AN ORGANIZED HEALTH CARE EDUCATION/TRAINING PROGRAM

## 2022-01-01 ENCOUNTER — DOCUMENTATION (OUTPATIENT)
Dept: GASTROENTEROLOGY | Facility: CLINIC | Age: 72
End: 2022-01-01

## 2022-01-01 ENCOUNTER — HOSPITAL ENCOUNTER (INPATIENT)
Facility: HOSPITAL | Age: 72
LOS: 5 days | Discharge: HOME HEALTH | End: 2022-11-05
Attending: EMERGENCY MEDICINE | Admitting: HOSPITALIST

## 2022-01-01 ENCOUNTER — READMISSION MANAGEMENT (OUTPATIENT)
Dept: CALL CENTER | Facility: HOSPITAL | Age: 72
End: 2022-01-01

## 2022-01-01 ENCOUNTER — HOSPITAL ENCOUNTER (OUTPATIENT)
Facility: HOSPITAL | Age: 72
Setting detail: OBSERVATION
LOS: 5 days | Discharge: NURSING, REHAB OR LONG-TERM CARE | End: 2022-06-24
Attending: EMERGENCY MEDICINE | Admitting: HOSPITALIST

## 2022-01-01 ENCOUNTER — TELEPHONE (OUTPATIENT)
Dept: INFECTIOUS DISEASES | Facility: CLINIC | Age: 72
End: 2022-01-01

## 2022-01-01 ENCOUNTER — APPOINTMENT (OUTPATIENT)
Dept: GENERAL RADIOLOGY | Facility: HOSPITAL | Age: 72
DRG: 871 | End: 2022-01-01
Payer: MEDICARE

## 2022-01-01 ENCOUNTER — OUTSIDE FACILITY SERVICE (OUTPATIENT)
Dept: FAMILY MEDICINE CLINIC | Facility: CLINIC | Age: 72
End: 2022-01-01
Payer: MEDICARE

## 2022-01-01 ENCOUNTER — HOSPITAL ENCOUNTER (INPATIENT)
Facility: HOSPITAL | Age: 72
LOS: 3 days | Discharge: HOME | DRG: 871 | End: 2022-07-27
Attending: EMERGENCY MEDICINE | Admitting: INTERNAL MEDICINE
Payer: MEDICARE

## 2022-01-01 ENCOUNTER — HOSPITAL ENCOUNTER (INPATIENT)
Facility: HOSPITAL | Age: 72
LOS: 9 days | Discharge: HOME HEALTH | End: 2022-07-19
Attending: EMERGENCY MEDICINE | Admitting: INTERNAL MEDICINE

## 2022-01-01 ENCOUNTER — APPOINTMENT (OUTPATIENT)
Dept: MRI IMAGING | Facility: HOSPITAL | Age: 72
End: 2022-01-01

## 2022-01-01 ENCOUNTER — HOSPITAL ENCOUNTER (EMERGENCY)
Facility: HOSPITAL | Age: 72
Discharge: HOME | End: 2022-04-10
Attending: EMERGENCY MEDICINE | Admitting: EMERGENCY MEDICINE

## 2022-01-01 ENCOUNTER — TELEPHONE (OUTPATIENT)
Dept: PHARMACY | Facility: HOSPITAL | Age: 72
End: 2022-01-01

## 2022-01-01 ENCOUNTER — HOSPITAL ENCOUNTER (OUTPATIENT)
Dept: CARDIOLOGY | Facility: HOSPITAL | Age: 72
Discharge: HOME | End: 2022-09-28
Admitting: NURSE PRACTITIONER

## 2022-01-01 VITALS
OXYGEN SATURATION: 95 % | DIASTOLIC BLOOD PRESSURE: 88 MMHG | SYSTOLIC BLOOD PRESSURE: 137 MMHG | HEIGHT: 76 IN | HEART RATE: 70 BPM | WEIGHT: 283 LBS | TEMPERATURE: 98.2 F | RESPIRATION RATE: 20 BRPM | BODY MASS INDEX: 34.46 KG/M2

## 2022-01-01 VITALS
HEART RATE: 66 BPM | WEIGHT: 290 LBS | SYSTOLIC BLOOD PRESSURE: 100 MMHG | BODY MASS INDEX: 35.31 KG/M2 | DIASTOLIC BLOOD PRESSURE: 58 MMHG | HEIGHT: 76 IN | OXYGEN SATURATION: 97 %

## 2022-01-01 VITALS
RESPIRATION RATE: 17 BRPM | DIASTOLIC BLOOD PRESSURE: 74 MMHG | SYSTOLIC BLOOD PRESSURE: 108 MMHG | HEIGHT: 76 IN | HEART RATE: 74 BPM | OXYGEN SATURATION: 95 % | BODY MASS INDEX: 34.63 KG/M2

## 2022-01-01 VITALS
OXYGEN SATURATION: 94 % | HEIGHT: 76 IN | SYSTOLIC BLOOD PRESSURE: 158 MMHG | TEMPERATURE: 98.9 F | RESPIRATION RATE: 20 BRPM | DIASTOLIC BLOOD PRESSURE: 93 MMHG | BODY MASS INDEX: 37.26 KG/M2 | HEART RATE: 70 BPM | WEIGHT: 306 LBS

## 2022-01-01 VITALS
SYSTOLIC BLOOD PRESSURE: 120 MMHG | WEIGHT: 269 LBS | HEART RATE: 58 BPM | HEIGHT: 74 IN | BODY MASS INDEX: 34.52 KG/M2 | DIASTOLIC BLOOD PRESSURE: 60 MMHG

## 2022-01-01 VITALS
BODY MASS INDEX: 33.68 KG/M2 | OXYGEN SATURATION: 95 % | RESPIRATION RATE: 18 BRPM | HEART RATE: 62 BPM | DIASTOLIC BLOOD PRESSURE: 61 MMHG | TEMPERATURE: 98.3 F | SYSTOLIC BLOOD PRESSURE: 103 MMHG | HEIGHT: 76 IN | WEIGHT: 276.6 LBS

## 2022-01-01 VITALS
BODY MASS INDEX: 37.26 KG/M2 | DIASTOLIC BLOOD PRESSURE: 80 MMHG | OXYGEN SATURATION: 95 % | SYSTOLIC BLOOD PRESSURE: 130 MMHG | TEMPERATURE: 98 F | WEIGHT: 306 LBS | RESPIRATION RATE: 20 BRPM | HEART RATE: 78 BPM | HEIGHT: 76 IN

## 2022-01-01 VITALS
OXYGEN SATURATION: 96 % | TEMPERATURE: 98.4 F | DIASTOLIC BLOOD PRESSURE: 87 MMHG | BODY MASS INDEX: 35.81 KG/M2 | WEIGHT: 279 LBS | RESPIRATION RATE: 18 BRPM | SYSTOLIC BLOOD PRESSURE: 139 MMHG | HEIGHT: 74 IN | HEART RATE: 81 BPM

## 2022-01-01 VITALS
WEIGHT: 279.2 LBS | DIASTOLIC BLOOD PRESSURE: 60 MMHG | OXYGEN SATURATION: 96 % | BODY MASS INDEX: 34 KG/M2 | SYSTOLIC BLOOD PRESSURE: 110 MMHG | HEIGHT: 76 IN | HEART RATE: 80 BPM | TEMPERATURE: 97.1 F

## 2022-01-01 VITALS
HEIGHT: 76 IN | WEIGHT: 284.4 LBS | BODY MASS INDEX: 34.63 KG/M2 | HEART RATE: 64 BPM | TEMPERATURE: 98.3 F | OXYGEN SATURATION: 93 % | RESPIRATION RATE: 20 BRPM | SYSTOLIC BLOOD PRESSURE: 134 MMHG | DIASTOLIC BLOOD PRESSURE: 78 MMHG

## 2022-01-01 VITALS
OXYGEN SATURATION: 96 % | BODY MASS INDEX: 34.46 KG/M2 | HEART RATE: 80 BPM | HEIGHT: 76 IN | TEMPERATURE: 98 F | RESPIRATION RATE: 18 BRPM | SYSTOLIC BLOOD PRESSURE: 110 MMHG | DIASTOLIC BLOOD PRESSURE: 72 MMHG

## 2022-01-01 VITALS
DIASTOLIC BLOOD PRESSURE: 76 MMHG | OXYGEN SATURATION: 91 % | SYSTOLIC BLOOD PRESSURE: 125 MMHG | RESPIRATION RATE: 16 BRPM | TEMPERATURE: 98.8 F | HEART RATE: 68 BPM

## 2022-01-01 VITALS
RESPIRATION RATE: 16 BRPM | TEMPERATURE: 98 F | WEIGHT: 284.1 LBS | BODY MASS INDEX: 34.6 KG/M2 | OXYGEN SATURATION: 96 % | DIASTOLIC BLOOD PRESSURE: 78 MMHG | SYSTOLIC BLOOD PRESSURE: 128 MMHG | HEIGHT: 76 IN | HEART RATE: 63 BPM

## 2022-01-01 VITALS
WEIGHT: 295 LBS | HEART RATE: 64 BPM | DIASTOLIC BLOOD PRESSURE: 84 MMHG | OXYGEN SATURATION: 90 % | RESPIRATION RATE: 16 BRPM | HEIGHT: 76 IN | SYSTOLIC BLOOD PRESSURE: 140 MMHG | TEMPERATURE: 97.7 F | BODY MASS INDEX: 35.92 KG/M2

## 2022-01-01 VITALS
HEART RATE: 74 BPM | SYSTOLIC BLOOD PRESSURE: 132 MMHG | RESPIRATION RATE: 18 BRPM | OXYGEN SATURATION: 98 % | HEIGHT: 76 IN | TEMPERATURE: 97.4 F | WEIGHT: 290 LBS | DIASTOLIC BLOOD PRESSURE: 82 MMHG | BODY MASS INDEX: 35.31 KG/M2

## 2022-01-01 VITALS — HEART RATE: 63 BPM | HEIGHT: 74 IN | BODY MASS INDEX: 35.81 KG/M2 | WEIGHT: 279 LBS

## 2022-01-01 VITALS
BODY MASS INDEX: 40.63 KG/M2 | DIASTOLIC BLOOD PRESSURE: 82 MMHG | SYSTOLIC BLOOD PRESSURE: 140 MMHG | HEIGHT: 72 IN | WEIGHT: 300 LBS | HEART RATE: 74 BPM

## 2022-01-01 VITALS
HEART RATE: 69 BPM | SYSTOLIC BLOOD PRESSURE: 132 MMHG | HEIGHT: 76 IN | BODY MASS INDEX: 34.46 KG/M2 | DIASTOLIC BLOOD PRESSURE: 70 MMHG | WEIGHT: 283 LBS

## 2022-01-01 DIAGNOSIS — I48.21 PERMANENT ATRIAL FIBRILLATION: Primary | ICD-10-CM

## 2022-01-01 DIAGNOSIS — I25.10 CORONARY ARTERY DISEASE INVOLVING NATIVE CORONARY ARTERY OF NATIVE HEART WITHOUT ANGINA PECTORIS: ICD-10-CM

## 2022-01-01 DIAGNOSIS — I27.20 PULMONARY HYPERTENSION: ICD-10-CM

## 2022-01-01 DIAGNOSIS — I10 ESSENTIAL HYPERTENSION: ICD-10-CM
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
## 2022-01-01 DIAGNOSIS — I25.5 ISCHEMIC CARDIOMYOPATHY: ICD-10-CM

## 2022-01-01 DIAGNOSIS — D63.8 ANEMIA OF CHRONIC DISEASE: Primary | ICD-10-CM

## 2022-01-01 DIAGNOSIS — G58.8 OTHER MONONEUROPATHY: ICD-10-CM

## 2022-01-01 DIAGNOSIS — A41.9 SEPSIS SECONDARY TO UTI: ICD-10-CM

## 2022-01-01 DIAGNOSIS — R94.31 ABNORMAL EKG: ICD-10-CM

## 2022-01-01 DIAGNOSIS — D64.9 ANEMIA, UNSPECIFIED TYPE: ICD-10-CM
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
## 2022-01-01 DIAGNOSIS — R33.8 ACUTE URINARY RETENTION: Primary | ICD-10-CM

## 2022-01-01 DIAGNOSIS — G93.40 ACUTE ENCEPHALOPATHY: ICD-10-CM

## 2022-01-01 DIAGNOSIS — I87.2 VENOUS STASIS DERMATITIS OF BOTH LOWER EXTREMITIES: ICD-10-CM

## 2022-01-01 DIAGNOSIS — F41.9 ANXIETY: ICD-10-CM

## 2022-01-01 DIAGNOSIS — G60.9 IDIOPATHIC PERIPHERAL NEUROPATHY: ICD-10-CM

## 2022-01-01 DIAGNOSIS — N39.0 ACUTE UTI: Primary | ICD-10-CM

## 2022-01-01 DIAGNOSIS — F41.9 ANXIETY DISORDER, UNSPECIFIED: ICD-10-CM

## 2022-01-01 DIAGNOSIS — I89.0 LYMPHEDEMA OF BOTH LOWER EXTREMITIES: ICD-10-CM

## 2022-01-01 DIAGNOSIS — J18.9 PNEUMONIA OF BOTH LOWER LOBES DUE TO INFECTIOUS ORGANISM: ICD-10-CM

## 2022-01-01 DIAGNOSIS — J96.11 CHRONIC RESPIRATORY FAILURE WITH HYPOXIA: ICD-10-CM

## 2022-01-01 DIAGNOSIS — Z74.09 IMMOBILITY: Primary | ICD-10-CM

## 2022-01-01 DIAGNOSIS — I43 TACHYCARDIA INDUCED CARDIOMYOPATHY: ICD-10-CM

## 2022-01-01 DIAGNOSIS — R60.9 CHRONIC EDEMA: ICD-10-CM

## 2022-01-01 DIAGNOSIS — J44.9 CHRONIC OBSTRUCTIVE PULMONARY DISEASE, UNSPECIFIED COPD TYPE: ICD-10-CM

## 2022-01-01 DIAGNOSIS — I48.21 PERMANENT ATRIAL FIBRILLATION: ICD-10-CM

## 2022-01-01 DIAGNOSIS — Z79.01 CHRONIC ANTICOAGULATION: ICD-10-CM

## 2022-01-01 DIAGNOSIS — T83.9XXA COMPLICATION OF FOLEY CATHETER, INITIAL ENCOUNTER: ICD-10-CM

## 2022-01-01 DIAGNOSIS — A41.50 SEPSIS DUE TO GRAM NEGATIVE BACTERIA: ICD-10-CM
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
## 2022-01-01 DIAGNOSIS — Z72.0 TOBACCO ABUSE: Chronic | ICD-10-CM

## 2022-01-01 DIAGNOSIS — R53.1 GENERALIZED WEAKNESS: ICD-10-CM

## 2022-01-01 DIAGNOSIS — R33.9 URINARY RETENTION: ICD-10-CM

## 2022-01-01 DIAGNOSIS — J69.0 ASPIRATION PNEUMONIA OF BOTH LOWER LOBES, UNSPECIFIED ASPIRATION PNEUMONIA TYPE: ICD-10-CM

## 2022-01-01 DIAGNOSIS — I50.23 ACUTE ON CHRONIC SYSTOLIC HEART FAILURE: ICD-10-CM

## 2022-01-01 DIAGNOSIS — Z74.09 IMMOBILITY: ICD-10-CM

## 2022-01-01 DIAGNOSIS — I10 PRIMARY HYPERTENSION: ICD-10-CM

## 2022-01-01 DIAGNOSIS — R91.1 PULMONARY NODULE: Primary | ICD-10-CM

## 2022-01-01 DIAGNOSIS — I50.22 CHRONIC SYSTOLIC HEART FAILURE: ICD-10-CM

## 2022-01-01 DIAGNOSIS — Z79.01 WARFARIN ANTICOAGULATION: ICD-10-CM

## 2022-01-01 DIAGNOSIS — K31.84 GASTROPARESIS: ICD-10-CM

## 2022-01-01 DIAGNOSIS — I87.2 VENOUS STASIS DERMATITIS OF BOTH LOWER EXTREMITIES: Primary | ICD-10-CM

## 2022-01-01 DIAGNOSIS — Z86.79 HISTORY OF CARDIOMYOPATHY: ICD-10-CM

## 2022-01-01 DIAGNOSIS — I89.0 LYMPHEDEMA: ICD-10-CM

## 2022-01-01 DIAGNOSIS — L03.116 CELLULITIS OF LEFT FOOT: ICD-10-CM

## 2022-01-01 DIAGNOSIS — E87.6 HYPOKALEMIA: Primary | ICD-10-CM

## 2022-01-01 DIAGNOSIS — E87.6 HYPOKALEMIA: ICD-10-CM

## 2022-01-01 DIAGNOSIS — N39.0 SEPSIS SECONDARY TO UTI: ICD-10-CM

## 2022-01-01 DIAGNOSIS — G47.33 OBSTRUCTIVE SLEEP APNEA SYNDROME: ICD-10-CM

## 2022-01-01 DIAGNOSIS — Z51.81 MEDICATION MONITORING ENCOUNTER: ICD-10-CM

## 2022-01-01 DIAGNOSIS — Z97.8 FOLEY CATHETER IN PLACE ON ADMISSION: ICD-10-CM

## 2022-01-01 DIAGNOSIS — M14.679 CHARCOT'S JOINT OF FOOT, UNSPECIFIED LATERALITY: ICD-10-CM

## 2022-01-01 DIAGNOSIS — R00.0 TACHYCARDIA INDUCED CARDIOMYOPATHY: ICD-10-CM

## 2022-01-01 DIAGNOSIS — R31.9 URINARY TRACT INFECTION WITH HEMATURIA, SITE UNSPECIFIED: ICD-10-CM

## 2022-01-01 DIAGNOSIS — E66.01 OBESITY, CLASS III, BMI 40-49.9 (MORBID OBESITY): ICD-10-CM

## 2022-01-01 DIAGNOSIS — M79.672 PAIN IN BOTH FEET: ICD-10-CM

## 2022-01-01 DIAGNOSIS — E66.9 OBESITY (BMI 30-39.9): ICD-10-CM

## 2022-01-01 DIAGNOSIS — R50.9 FEVER AND CHILLS: Primary | ICD-10-CM

## 2022-01-01 DIAGNOSIS — Z23 FLU VACCINE NEED: ICD-10-CM

## 2022-01-01 DIAGNOSIS — S51.811A SKIN TEAR OF RIGHT FOREARM WITHOUT COMPLICATION, INITIAL ENCOUNTER: ICD-10-CM

## 2022-01-01 DIAGNOSIS — Z23 NEED FOR COVID-19 VACCINE: ICD-10-CM

## 2022-01-01 DIAGNOSIS — N39.0 URINARY TRACT INFECTION WITH HEMATURIA, SITE UNSPECIFIED: ICD-10-CM

## 2022-01-01 DIAGNOSIS — R59.9 ADENOPATHY: Primary | ICD-10-CM

## 2022-01-01 DIAGNOSIS — D64.9 NORMOCYTIC ANEMIA: ICD-10-CM

## 2022-01-01 DIAGNOSIS — R91.1 PULMONARY NODULE: ICD-10-CM

## 2022-01-01 DIAGNOSIS — D63.8 ANEMIA OF CHRONIC DISEASE: ICD-10-CM

## 2022-01-01 DIAGNOSIS — R54 AGE-RELATED PHYSICAL DEBILITY: ICD-10-CM

## 2022-01-01 DIAGNOSIS — N39.0 ACUTE UTI: ICD-10-CM

## 2022-01-01 DIAGNOSIS — M79.671 PAIN IN BOTH FEET: ICD-10-CM

## 2022-01-01 DIAGNOSIS — R59.1 LYMPHADENOPATHY: ICD-10-CM

## 2022-01-01 DIAGNOSIS — I36.1 NONRHEUMATIC TRICUSPID VALVE REGURGITATION: ICD-10-CM

## 2022-01-01 DIAGNOSIS — S51.812A SKIN TEAR OF LEFT FOREARM WITHOUT COMPLICATION, INITIAL ENCOUNTER: ICD-10-CM

## 2022-01-01 DIAGNOSIS — M86.60 CHRONIC OSTEOMYELITIS: ICD-10-CM

## 2022-01-01 DIAGNOSIS — N13.9 ACUTE URINARY OBSTRUCTION: Primary | ICD-10-CM

## 2022-01-01 DIAGNOSIS — L03.116 CELLULITIS OF LEFT FOOT: Primary | ICD-10-CM

## 2022-01-01 DIAGNOSIS — I25.5 ISCHEMIC CARDIOMYOPATHY: Primary | ICD-10-CM

## 2022-01-01 DIAGNOSIS — G89.4 CHRONIC PAIN SYNDROME: ICD-10-CM

## 2022-01-01 DIAGNOSIS — L03.119 CELLULITIS OF LOWER EXTREMITY, UNSPECIFIED LATERALITY: ICD-10-CM

## 2022-01-01 DIAGNOSIS — G58.7 MONONEURITIS MULTIPLEX: ICD-10-CM

## 2022-01-01 DIAGNOSIS — A41.9 SEPSIS, DUE TO UNSPECIFIED ORGANISM, UNSPECIFIED WHETHER ACUTE ORGAN DYSFUNCTION PRESENT: Primary | ICD-10-CM

## 2022-01-01 DIAGNOSIS — I10 ESSENTIAL HYPERTENSION: Primary | ICD-10-CM

## 2022-01-01 DIAGNOSIS — I48.92 ATRIAL FLUTTER WITH CONTROLLED RESPONSE: ICD-10-CM

## 2022-01-01 DIAGNOSIS — I48.3 TYPICAL ATRIAL FLUTTER: ICD-10-CM

## 2022-01-01 DIAGNOSIS — R53.1 GENERALIZED WEAKNESS: Primary | ICD-10-CM

## 2022-01-01 DIAGNOSIS — D64.9 CHRONIC ANEMIA: ICD-10-CM

## 2022-01-01 DIAGNOSIS — Z09 HOSPITAL DISCHARGE FOLLOW-UP: Primary | ICD-10-CM

## 2022-01-01 DIAGNOSIS — S51.019A SKIN TEAR OF ELBOW WITHOUT COMPLICATION, UNSPECIFIED LATERALITY, INITIAL ENCOUNTER: ICD-10-CM
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
## 2022-01-01 DIAGNOSIS — J96.11 CHRONIC HYPOXEMIC RESPIRATORY FAILURE: ICD-10-CM

## 2022-01-01 DIAGNOSIS — R59.0 ENLARGED LYMPH NODES IN ARMPIT: Primary | ICD-10-CM

## 2022-01-01 DIAGNOSIS — N39.0: Primary | ICD-10-CM

## 2022-01-01 LAB
ABO GROUP BLD: NORMAL
ALBUMIN SERPL-MCNC: 3 G/DL (ref 3.5–5.2)
ALBUMIN SERPL-MCNC: 3.1 G/DL (ref 3.5–5.2)
ALBUMIN SERPL-MCNC: 3.2 G/DL (ref 3.5–5.2)
ALBUMIN SERPL-MCNC: 3.3 G/DL (ref 3.5–5.2)
ALBUMIN SERPL-MCNC: 3.3 G/DL (ref 3.5–5.2)
ALBUMIN SERPL-MCNC: 3.4 G/DL (ref 3.5–5.2)
ALBUMIN SERPL-MCNC: 3.5 G/DL (ref 3.5–5.2)
ALBUMIN SERPL-MCNC: 3.6 G/DL (ref 3.5–5.2)
ALBUMIN SERPL-MCNC: 3.7 G/DL (ref 3.5–5.2)
ALBUMIN SERPL-MCNC: 3.7 G/DL (ref 3.5–5.2)
ALBUMIN SERPL-MCNC: 3.8 G/DL (ref 3.5–5.2)
ALBUMIN SERPL-MCNC: 4 G/DL (ref 3.5–5.2)
ALBUMIN SERPL-MCNC: 4.2 G/DL (ref 3.5–5.2)
ALBUMIN/GLOB SERPL: 0.8 G/DL
ALBUMIN/GLOB SERPL: 0.9 G/DL
ALBUMIN/GLOB SERPL: 1 G/DL
ALBUMIN/GLOB SERPL: 1.1 G/DL
ALBUMIN/GLOB SERPL: 1.1 G/DL
ALP SERPL-CCNC: 102 U/L (ref 39–117)
ALP SERPL-CCNC: 110 U/L (ref 39–117)
ALP SERPL-CCNC: 112 U/L (ref 39–117)
ALP SERPL-CCNC: 114 U/L (ref 39–117)
ALP SERPL-CCNC: 114 U/L (ref 39–117)
ALP SERPL-CCNC: 121 U/L (ref 39–117)
ALP SERPL-CCNC: 132 U/L (ref 39–117)
ALP SERPL-CCNC: 83 U/L (ref 39–117)
ALP SERPL-CCNC: 90 U/L (ref 39–117)
ALP SERPL-CCNC: 90 U/L (ref 39–117)
ALP SERPL-CCNC: 93 U/L (ref 39–117)
ALP SERPL-CCNC: 94 U/L (ref 39–117)
ALP SERPL-CCNC: 95 U/L (ref 39–117)
ALP SERPL-CCNC: 97 U/L (ref 39–117)
ALP SERPL-CCNC: 98 U/L (ref 39–117)
ALPHA-FETOPROTEIN: <2 NG/ML (ref 0–8.3)
ALT SERPL W P-5'-P-CCNC: 10 U/L (ref 1–41)
ALT SERPL W P-5'-P-CCNC: 11 U/L (ref 1–41)
ALT SERPL W P-5'-P-CCNC: 11 U/L (ref 1–41)
ALT SERPL W P-5'-P-CCNC: 12 U/L (ref 1–41)
ALT SERPL W P-5'-P-CCNC: 6 U/L (ref 1–41)
ALT SERPL W P-5'-P-CCNC: 6 U/L (ref 1–41)
ALT SERPL W P-5'-P-CCNC: 7 U/L (ref 1–41)
ALT SERPL W P-5'-P-CCNC: 8 U/L (ref 1–41)
ALT SERPL W P-5'-P-CCNC: 9 U/L (ref 1–41)
ANION GAP SERPL CALCULATED.3IONS-SCNC: 10 MMOL/L (ref 5–15)
ANION GAP SERPL CALCULATED.3IONS-SCNC: 10 MMOL/L (ref 5–15)
ANION GAP SERPL CALCULATED.3IONS-SCNC: 10.3 MMOL/L (ref 5–15)
ANION GAP SERPL CALCULATED.3IONS-SCNC: 10.3 MMOL/L (ref 5–15)
ANION GAP SERPL CALCULATED.3IONS-SCNC: 11 MMOL/L (ref 5–15)
ANION GAP SERPL CALCULATED.3IONS-SCNC: 11.2 MMOL/L (ref 5–15)
ANION GAP SERPL CALCULATED.3IONS-SCNC: 13 MMOL/L (ref 5–15)
ANION GAP SERPL CALCULATED.3IONS-SCNC: 13.1 MMOL/L (ref 5–15)
ANION GAP SERPL CALCULATED.3IONS-SCNC: 13.2 MMOL/L (ref 5–15)
ANION GAP SERPL CALCULATED.3IONS-SCNC: 15.2 MMOL/L (ref 5–15)
ANION GAP SERPL CALCULATED.3IONS-SCNC: 5.2 MMOL/L (ref 5–15)
ANION GAP SERPL CALCULATED.3IONS-SCNC: 5.3 MMOL/L (ref 5–15)
ANION GAP SERPL CALCULATED.3IONS-SCNC: 6 MMOL/L (ref 5–15)
ANION GAP SERPL CALCULATED.3IONS-SCNC: 6.1 MMOL/L (ref 5–15)
ANION GAP SERPL CALCULATED.3IONS-SCNC: 6.6 MMOL/L (ref 5–15)
ANION GAP SERPL CALCULATED.3IONS-SCNC: 6.6 MMOL/L (ref 5–15)
ANION GAP SERPL CALCULATED.3IONS-SCNC: 6.9 MMOL/L (ref 5–15)
ANION GAP SERPL CALCULATED.3IONS-SCNC: 7 MMOL/L (ref 5–15)
ANION GAP SERPL CALCULATED.3IONS-SCNC: 7 MMOL/L (ref 5–15)
ANION GAP SERPL CALCULATED.3IONS-SCNC: 7.2 MMOL/L (ref 5–15)
ANION GAP SERPL CALCULATED.3IONS-SCNC: 8 MMOL/L (ref 5–15)
ANION GAP SERPL CALCULATED.3IONS-SCNC: 8.1 MMOL/L (ref 5–15)
ANION GAP SERPL CALCULATED.3IONS-SCNC: 8.5 MMOL/L (ref 5–15)
ANION GAP SERPL CALCULATED.3IONS-SCNC: 8.6 MMOL/L (ref 5–15)
ANION GAP SERPL CALCULATED.3IONS-SCNC: 8.7 MMOL/L (ref 5–15)
ANION GAP SERPL CALCULATED.3IONS-SCNC: 9 MMOL/L (ref 5–15)
APTT PPP: 115.4 SECONDS (ref 22.7–35.4)
APTT PPP: 48.4 SECONDS (ref 22.7–35.4)
APTT PPP: 49.2 SECONDS (ref 22.7–35.4)
APTT PPP: 64 SECONDS (ref 22.7–35.4)
ARTERIAL PATENCY WRIST A: POSITIVE
ASCENDING AORTA: 4 CM
ASCENDING AORTA: 4.1 CM
AST SERPL-CCNC: 11 U/L (ref 1–40)
AST SERPL-CCNC: 11 U/L (ref 1–40)
AST SERPL-CCNC: 13 U/L (ref 1–40)
AST SERPL-CCNC: 14 U/L (ref 1–40)
AST SERPL-CCNC: 15 U/L (ref 1–40)
AST SERPL-CCNC: 16 U/L (ref 1–40)
AST SERPL-CCNC: 17 U/L (ref 1–40)
AST SERPL-CCNC: 17 U/L (ref 1–40)
AST SERPL-CCNC: 18 U/L (ref 1–40)
AST SERPL-CCNC: 18 U/L (ref 1–40)
AST SERPL-CCNC: 22 U/L (ref 1–40)
AST SERPL-CCNC: 23 U/L (ref 1–40)
ATMOSPHERIC PRESS: 745.9 MMHG
ATMOSPHERIC PRESS: 751.8 MMHG
ATMOSPHERIC PRESS: 752.4 MMHG
ATMOSPHERIC PRESS: 754.3 MMHG
B PARAPERT DNA SPEC QL NAA+PROBE: NOT DETECTED
B PERT DNA SPEC QL NAA+PROBE: NOT DETECTED
BACTERIA BLD CULT: ABNORMAL
BACTERIA BLD CULT: ABNORMAL
BACTERIA ID TEST ISLT QL CULT: ABNORMAL
BACTERIA ID TEST ISLT QL CULT: ABNORMAL
BACTERIA ISLT: NORMAL
BACTERIA SPEC AEROBE CULT: ABNORMAL
BACTERIA SPEC AEROBE CULT: NO GROWTH
BACTERIA SPEC AEROBE CULT: NORMAL
BACTERIA SPEC RESP CULT: NORMAL
BACTERIA UR QL AUTO: ABNORMAL /HPF
BACTERIA UR QL AUTO: NORMAL /HPF
BASE EXCESS BLDA CALC-SCNC: -0.7 MMOL/L (ref 0–2)
BASE EXCESS BLDA CALC-SCNC: 0.3 MMOL/L (ref 0–2)
BASE EXCESS BLDA CALC-SCNC: 10.8 MMOL/L (ref 0–2)
BASE EXCESS BLDA CALC-SCNC: 5.2 MMOL/L (ref 0–2)
BASOPHILS # BLD AUTO: 0.01 10*3/MM3 (ref 0–0.2)
BASOPHILS # BLD AUTO: 0.02 10*3/MM3 (ref 0–0.2)
BASOPHILS # BLD AUTO: 0.03 10*3/MM3 (ref 0–0.2)
BASOPHILS # BLD AUTO: 0.04 10*3/MM3 (ref 0–0.2)
BASOPHILS # BLD AUTO: 0.05 10*3/MM3 (ref 0–0.2)
BASOPHILS # BLD AUTO: 0.06 10*3/MM3 (ref 0–0.2)
BASOPHILS # BLD AUTO: 0.06 10*3/MM3 (ref 0–0.2)
BASOPHILS # BLD AUTO: 0.07 10*3/MM3 (ref 0–0.2)
BASOPHILS NFR BLD AUTO: 0.2 % (ref 0–1.5)
BASOPHILS NFR BLD AUTO: 0.3 % (ref 0–1.5)
BASOPHILS NFR BLD AUTO: 0.4 % (ref 0–1.5)
BASOPHILS NFR BLD AUTO: 0.5 % (ref 0–1.5)
BASOPHILS NFR BLD AUTO: 0.6 % (ref 0–1.5)
BASOPHILS NFR BLD AUTO: 0.7 % (ref 0–1.5)
BASOPHILS NFR BLD AUTO: 0.7 % (ref 0–1.5)
BASOPHILS NFR BLD AUTO: 0.8 % (ref 0–1.5)
BASOPHILS NFR BLD AUTO: 0.8 % (ref 0–1.5)
BASOPHILS NFR BLD AUTO: 1.2 % (ref 0–1.5)
BDY SITE: ABNORMAL
BH CV ECHO MEAS - ACS: 1.4 CM
BH CV ECHO MEAS - ACS: 1.84 CM
BH CV ECHO MEAS - AO MAX PG: 7.2 MMHG
BH CV ECHO MEAS - AO MAX PG: 7.5 MMHG
BH CV ECHO MEAS - AO MEAN PG: 4 MMHG
BH CV ECHO MEAS - AO MEAN PG: 4.1 MMHG
BH CV ECHO MEAS - AO ROOT DIAM: 3.2 CM
BH CV ECHO MEAS - AO ROOT DIAM: 4 CM
BH CV ECHO MEAS - AO V2 MAX: 133.8 CM/SEC
BH CV ECHO MEAS - AO V2 MAX: 137.2 CM/SEC
BH CV ECHO MEAS - AO V2 VTI: 24.2 CM
BH CV ECHO MEAS - AO V2 VTI: 34.5 CM
BH CV ECHO MEAS - AVA(I,D): 2.06 CM2
BH CV ECHO MEAS - AVA(I,D): 2.8 CM2
BH CV ECHO MEAS - EDV(CUBED): 150.9 ML
BH CV ECHO MEAS - EDV(CUBED): 194.2 ML
BH CV ECHO MEAS - EDV(MOD-SP2): 163 ML
BH CV ECHO MEAS - EDV(MOD-SP2): 177 ML
BH CV ECHO MEAS - EDV(MOD-SP4): 137 ML
BH CV ECHO MEAS - EDV(MOD-SP4): 163 ML
BH CV ECHO MEAS - EF(MOD-BP): 38.9 %
BH CV ECHO MEAS - EF(MOD-BP): 58 %
BH CV ECHO MEAS - EF(MOD-SP2): 28.2 %
BH CV ECHO MEAS - EF(MOD-SP2): 58.8 %
BH CV ECHO MEAS - EF(MOD-SP4): 49.6 %
BH CV ECHO MEAS - EF(MOD-SP4): 55.2 %
BH CV ECHO MEAS - ESV(CUBED): 37.8 ML
BH CV ECHO MEAS - ESV(CUBED): 55.7 ML
BH CV ECHO MEAS - ESV(MOD-SP2): 117 ML
BH CV ECHO MEAS - ESV(MOD-SP2): 73 ML
BH CV ECHO MEAS - ESV(MOD-SP4): 69 ML
BH CV ECHO MEAS - ESV(MOD-SP4): 73 ML
BH CV ECHO MEAS - FS: 34 %
BH CV ECHO MEAS - FS: 37 %
BH CV ECHO MEAS - IVS/LVPW: 0.93 CM
BH CV ECHO MEAS - IVS/LVPW: 1.31 CM
BH CV ECHO MEAS - IVSD: 0.87 CM
BH CV ECHO MEAS - IVSD: 1.55 CM
BH CV ECHO MEAS - LAT PEAK E' VEL: 12.9 CM/SEC
BH CV ECHO MEAS - LAT PEAK E' VEL: 13.5 CM/SEC
BH CV ECHO MEAS - LV DIASTOLIC VOL/BSA (35-75): 53.4 CM2
BH CV ECHO MEAS - LV DIASTOLIC VOL/BSA (35-75): 65.1 CM2
BH CV ECHO MEAS - LV MASS(C)D: 176 GRAMS
BH CV ECHO MEAS - LV MASS(C)D: 355.1 GRAMS
BH CV ECHO MEAS - LV MAX PG: 2.21 MMHG
BH CV ECHO MEAS - LV MAX PG: 3.5 MMHG
BH CV ECHO MEAS - LV MEAN PG: 1.1 MMHG
BH CV ECHO MEAS - LV MEAN PG: 1.53 MMHG
BH CV ECHO MEAS - LV SYSTOLIC VOL/BSA (12-30): 26.9 CM2
BH CV ECHO MEAS - LV SYSTOLIC VOL/BSA (12-30): 29.2 CM2
BH CV ECHO MEAS - LV V1 MAX: 74.4 CM/SEC
BH CV ECHO MEAS - LV V1 MAX: 93.6 CM/SEC
BH CV ECHO MEAS - LV V1 VTI: 13.9 CM
BH CV ECHO MEAS - LV V1 VTI: 23.6 CM
BH CV ECHO MEAS - LVIDD: 5.3 CM
BH CV ECHO MEAS - LVIDD: 5.8 CM
BH CV ECHO MEAS - LVIDS: 3.4 CM
BH CV ECHO MEAS - LVIDS: 3.8 CM
BH CV ECHO MEAS - LVOT AREA: 3.6 CM2
BH CV ECHO MEAS - LVOT AREA: 4.1 CM2
BH CV ECHO MEAS - LVOT DIAM: 2.14 CM
BH CV ECHO MEAS - LVOT DIAM: 2.28 CM
BH CV ECHO MEAS - LVPWD: 0.93 CM
BH CV ECHO MEAS - LVPWD: 1.18 CM
BH CV ECHO MEAS - MED PEAK E' VEL: 5.8 CM/SEC
BH CV ECHO MEAS - MED PEAK E' VEL: 9.2 CM/SEC
BH CV ECHO MEAS - MR MAX PG: 103.9 MMHG
BH CV ECHO MEAS - MR MAX VEL: 509.8 CM/SEC
BH CV ECHO MEAS - MV DEC SLOPE: 441.1 CM/SEC2
BH CV ECHO MEAS - MV DEC SLOPE: 486.7 CM/SEC2
BH CV ECHO MEAS - MV DEC TIME: 0.15 MSEC
BH CV ECHO MEAS - MV DEC TIME: 0.23 MSEC
BH CV ECHO MEAS - MV E MAX VEL: 112 CM/SEC
BH CV ECHO MEAS - MV E MAX VEL: 119 CM/SEC
BH CV ECHO MEAS - MV MAX PG: 5.5 MMHG
BH CV ECHO MEAS - MV MAX PG: 7.9 MMHG
BH CV ECHO MEAS - MV MEAN PG: 2.39 MMHG
BH CV ECHO MEAS - MV MEAN PG: 2.7 MMHG
BH CV ECHO MEAS - MV P1/2T: 65.9 MSEC
BH CV ECHO MEAS - MV P1/2T: 88.1 MSEC
BH CV ECHO MEAS - MV V2 VTI: 29.8 CM
BH CV ECHO MEAS - MV V2 VTI: 32.5 CM
BH CV ECHO MEAS - MVA(P1/2T): 2.5 CM2
BH CV ECHO MEAS - MVA(P1/2T): 3.3 CM2
BH CV ECHO MEAS - MVA(VTI): 1.67 CM2
BH CV ECHO MEAS - MVA(VTI): 3 CM2
BH CV ECHO MEAS - PA ACC TIME: 0.1 SEC
BH CV ECHO MEAS - PA ACC TIME: 0.13 SEC
BH CV ECHO MEAS - PA PR(ACCEL): 18.4 MMHG
BH CV ECHO MEAS - PA PR(ACCEL): 33.8 MMHG
BH CV ECHO MEAS - PA V2 MAX: 107.2 CM/SEC
BH CV ECHO MEAS - PA V2 MAX: 96.4 CM/SEC
BH CV ECHO MEAS - PULM DIAS VEL: 64.7 CM/SEC
BH CV ECHO MEAS - PULM DIAS VEL: 94.4 CM/SEC
BH CV ECHO MEAS - PULM S/D: 0.44
BH CV ECHO MEAS - PULM S/D: 0.44
BH CV ECHO MEAS - PULM SYS VEL: 28.6 CM/SEC
BH CV ECHO MEAS - PULM SYS VEL: 41.1 CM/SEC
BH CV ECHO MEAS - QP/QS: 0.76
BH CV ECHO MEAS - RAP SYSTOLE: 15 MMHG
BH CV ECHO MEAS - RAP SYSTOLE: 15 MMHG
BH CV ECHO MEAS - RV MAX PG: 1.79 MMHG
BH CV ECHO MEAS - RV MAX PG: 2.35 MMHG
BH CV ECHO MEAS - RV V1 MAX: 66.8 CM/SEC
BH CV ECHO MEAS - RV V1 MAX: 76.7 CM/SEC
BH CV ECHO MEAS - RV V1 VTI: 13.1 CM
BH CV ECHO MEAS - RV V1 VTI: 13.9 CM
BH CV ECHO MEAS - RVOT DIAM: 2.7 CM
BH CV ECHO MEAS - RVSP: 53 MMHG
BH CV ECHO MEAS - RVSP: 55.1 MMHG
BH CV ECHO MEAS - SI(MOD-SP2): 17.9 ML/M2
BH CV ECHO MEAS - SI(MOD-SP2): 41.5 ML/M2
BH CV ECHO MEAS - SI(MOD-SP4): 26.5 ML/M2
BH CV ECHO MEAS - SI(MOD-SP4): 36 ML/M2
BH CV ECHO MEAS - SUP REN AO DIAM: 2 CM
BH CV ECHO MEAS - SUP REN AO DIAM: 2.2 CM
BH CV ECHO MEAS - SV(LVOT): 49.8 ML
BH CV ECHO MEAS - SV(LVOT): 96.3 ML
BH CV ECHO MEAS - SV(MOD-SP2): 104 ML
BH CV ECHO MEAS - SV(MOD-SP2): 46 ML
BH CV ECHO MEAS - SV(MOD-SP4): 68 ML
BH CV ECHO MEAS - SV(MOD-SP4): 90 ML
BH CV ECHO MEAS - SV(RVOT): 73.4 ML
BH CV ECHO MEAS - TAPSE (>1.6): 1.59 CM
BH CV ECHO MEAS - TAPSE (>1.6): 1.63 CM
BH CV ECHO MEAS - TR MAX PG: 38.6 MMHG
BH CV ECHO MEAS - TR MAX PG: 40.1 MMHG
BH CV ECHO MEAS - TR MAX VEL: 310.8 CM/SEC
BH CV ECHO MEAS - TR MAX VEL: 316.7 CM/SEC
BH CV ECHO MEASUREMENTS AVERAGE E/E' RATIO: 10.77
BH CV ECHO MEASUREMENTS AVERAGE E/E' RATIO: 11.61
BH CV LOWER ARTERIAL LEFT ABI RATIO: 1.18
BH CV LOWER ARTERIAL LEFT DORSALIS PEDIS SYS MAX: 140
BH CV LOWER ARTERIAL LEFT GREAT TOE SYS MAX: 113
BH CV LOWER ARTERIAL LEFT POST TIBIAL SYS MAX: 154
BH CV LOWER ARTERIAL LEFT TBI RATIO: 0.86
BH CV LOWER ARTERIAL RIGHT ABI RATIO: 1.02
BH CV LOWER ARTERIAL RIGHT DORSALIS PEDIS SYS MAX: 132
BH CV LOWER ARTERIAL RIGHT GREAT TOE SYS MAX: 104
BH CV LOWER ARTERIAL RIGHT POST TIBIAL SYS MAX: 134
BH CV LOWER ARTERIAL RIGHT TBI RATIO: 0.79
BH CV XLRA - RV BASE: 4.7 CM
BH CV XLRA - RV BASE: 5 CM
BH CV XLRA - RV LENGTH: 7.8 CM
BH CV XLRA - RV LENGTH: 9.3 CM
BH CV XLRA - RV MID: 4 CM
BH CV XLRA - RV MID: 5 CM
BH CV XLRA - TDI S': 14.1 CM/SEC
BH CV XLRA - TDI S': 9.8 CM/SEC
BILIRUB CONJ SERPL-MCNC: 0.4 MG/DL (ref 0–0.3)
BILIRUB INDIRECT SERPL-MCNC: 0.4 MG/DL
BILIRUB SERPL-MCNC: 0.6 MG/DL (ref 0–1.2)
BILIRUB SERPL-MCNC: 0.6 MG/DL (ref 0–1.2)
BILIRUB SERPL-MCNC: 0.7 MG/DL (ref 0–1.2)
BILIRUB SERPL-MCNC: 0.7 MG/DL (ref 0–1.2)
BILIRUB SERPL-MCNC: 0.8 MG/DL (ref 0–1.2)
BILIRUB SERPL-MCNC: 0.9 MG/DL (ref 0–1.2)
BILIRUB SERPL-MCNC: 1.1 MG/DL (ref 0–1.2)
BILIRUB SERPL-MCNC: 1.4 MG/DL (ref 0–1.2)
BILIRUB SERPL-MCNC: 1.7 MG/DL (ref 0–1.2)
BILIRUB UR QL STRIP: NEGATIVE
BLD GP AB SCN SERPL QL: NEGATIVE
BOTTLE TYPE: ABNORMAL
BOTTLE TYPE: ABNORMAL
BUN SERPL-MCNC: 10 MG/DL (ref 8–23)
BUN SERPL-MCNC: 11 MG/DL (ref 8–23)
BUN SERPL-MCNC: 12 MG/DL (ref 8–23)
BUN SERPL-MCNC: 13 MG/DL (ref 8–23)
BUN SERPL-MCNC: 14 MG/DL (ref 8–23)
BUN SERPL-MCNC: 15 MG/DL (ref 8–23)
BUN SERPL-MCNC: 16 MG/DL (ref 8–23)
BUN SERPL-MCNC: 16 MG/DL (ref 8–23)
BUN SERPL-MCNC: 17 MG/DL (ref 8–23)
BUN SERPL-MCNC: 17 MG/DL (ref 8–23)
BUN SERPL-MCNC: 20 MG/DL (ref 8–23)
BUN SERPL-MCNC: 8 MG/DL (ref 8–23)
BUN SERPL-MCNC: 9 MG/DL (ref 8–23)
BUN/CREAT SERPL: 10.5 (ref 7–25)
BUN/CREAT SERPL: 11 (ref 7–25)
BUN/CREAT SERPL: 12.1 (ref 7–25)
BUN/CREAT SERPL: 12.5 (ref 7–25)
BUN/CREAT SERPL: 12.5 (ref 7–25)
BUN/CREAT SERPL: 12.6 (ref 7–25)
BUN/CREAT SERPL: 12.6 (ref 7–25)
BUN/CREAT SERPL: 12.7 (ref 7–25)
BUN/CREAT SERPL: 12.9 (ref 7–25)
BUN/CREAT SERPL: 13.4 (ref 7–25)
BUN/CREAT SERPL: 13.5 (ref 7–25)
BUN/CREAT SERPL: 13.7 (ref 7–25)
BUN/CREAT SERPL: 13.9 (ref 7–25)
BUN/CREAT SERPL: 14.1 (ref 7–25)
BUN/CREAT SERPL: 14.8 (ref 7–25)
BUN/CREAT SERPL: 14.9 (ref 7–25)
BUN/CREAT SERPL: 14.9 (ref 7–25)
BUN/CREAT SERPL: 15.2 (ref 7–25)
BUN/CREAT SERPL: 15.3 (ref 7–25)
BUN/CREAT SERPL: 15.4 (ref 7–25)
BUN/CREAT SERPL: 15.4 (ref 7–25)
BUN/CREAT SERPL: 15.7 (ref 7–25)
BUN/CREAT SERPL: 15.8 (ref 7–25)
BUN/CREAT SERPL: 15.9 (ref 7–25)
BUN/CREAT SERPL: 16.3 (ref 7–25)
BUN/CREAT SERPL: 16.4 (ref 7–25)
BUN/CREAT SERPL: 17.5 (ref 7–25)
BUN/CREAT SERPL: 17.7 (ref 7–25)
BUN/CREAT SERPL: 17.7 (ref 7–25)
BUN/CREAT SERPL: 17.8 (ref 7–25)
BUN/CREAT SERPL: 18.2 (ref 7–25)
BUN/CREAT SERPL: 18.2 (ref 7–25)
BUN/CREAT SERPL: 18.3 (ref 7–25)
BUN/CREAT SERPL: 18.5 (ref 7–25)
BUN/CREAT SERPL: 18.8 (ref 7–25)
BUN/CREAT SERPL: 18.8 (ref 7–25)
BUN/CREAT SERPL: 20.5 (ref 7–25)
BUN/CREAT SERPL: 8.8 (ref 7–25)
C PNEUM DNA NPH QL NAA+NON-PROBE: NOT DETECTED
CALCIUM SPEC-SCNC: 7.3 MG/DL (ref 8.6–10.5)
CALCIUM SPEC-SCNC: 8.2 MG/DL (ref 8.6–10.5)
CALCIUM SPEC-SCNC: 8.6 MG/DL (ref 8.6–10.5)
CALCIUM SPEC-SCNC: 8.6 MG/DL (ref 8.6–10.5)
CALCIUM SPEC-SCNC: 8.7 MG/DL (ref 8.6–10.5)
CALCIUM SPEC-SCNC: 8.8 MG/DL (ref 8.6–10.5)
CALCIUM SPEC-SCNC: 8.9 MG/DL (ref 8.6–10.5)
CALCIUM SPEC-SCNC: 9 MG/DL (ref 8.6–10.5)
CALCIUM SPEC-SCNC: 9.1 MG/DL (ref 8.6–10.5)
CALCIUM SPEC-SCNC: 9.2 MG/DL (ref 8.6–10.5)
CALCIUM SPEC-SCNC: 9.3 MG/DL (ref 8.6–10.5)
CALCIUM SPEC-SCNC: 9.4 MG/DL (ref 8.6–10.5)
CALCIUM SPEC-SCNC: 9.5 MG/DL (ref 8.6–10.5)
CALCIUM SPEC-SCNC: 9.6 MG/DL (ref 8.6–10.5)
CALCIUM SPEC-SCNC: 9.6 MG/DL (ref 8.6–10.5)
CALCIUM SPEC-SCNC: 9.7 MG/DL (ref 8.6–10.5)
CALCIUM SPEC-SCNC: 9.8 MG/DL (ref 8.6–10.5)
CALCIUM SPEC-SCNC: 9.9 MG/DL (ref 8.6–10.5)
CALCIUM SPEC-SCNC: 9.9 MG/DL (ref 8.6–10.5)
CHLORIDE SERPL-SCNC: 100 MMOL/L (ref 98–107)
CHLORIDE SERPL-SCNC: 100 MMOL/L (ref 98–107)
CHLORIDE SERPL-SCNC: 101 MMOL/L (ref 98–107)
CHLORIDE SERPL-SCNC: 102 MMOL/L (ref 98–107)
CHLORIDE SERPL-SCNC: 103 MMOL/L (ref 98–107)
CHLORIDE SERPL-SCNC: 104 MMOL/L (ref 98–107)
CHLORIDE SERPL-SCNC: 105 MMOL/L (ref 98–107)
CHLORIDE SERPL-SCNC: 105 MMOL/L (ref 98–107)
CHLORIDE SERPL-SCNC: 106 MMOL/L (ref 98–107)
CHLORIDE SERPL-SCNC: 106 MMOL/L (ref 98–107)
CHLORIDE SERPL-SCNC: 108 MMOL/L (ref 98–107)
CHLORIDE SERPL-SCNC: 108 MMOL/L (ref 98–107)
CHLORIDE SERPL-SCNC: 109 MMOL/L (ref 98–107)
CHLORIDE SERPL-SCNC: 111 MMOL/L (ref 98–107)
CHLORIDE SERPL-SCNC: 95 MMOL/L (ref 98–107)
CHLORIDE SERPL-SCNC: 96 MMOL/L (ref 98–107)
CHLORIDE SERPL-SCNC: 97 MMOL/L (ref 98–107)
CHLORIDE SERPL-SCNC: 98 MMOL/L (ref 98–107)
CHLORIDE SERPL-SCNC: 98 MMOL/L (ref 98–107)
CHLORIDE SERPL-SCNC: 99 MMOL/L (ref 98–107)
CHLORIDE UR-SCNC: 21 MMOL/L
CHOLEST SERPL-MCNC: 127 MG/DL (ref 0–200)
CK SERPL-CCNC: 105 U/L (ref 20–200)
CLARITY UR: ABNORMAL
CLARITY UR: CLEAR
CO2 SERPL-SCNC: 20.3 MMOL/L (ref 22–29)
CO2 SERPL-SCNC: 22 MMOL/L (ref 22–29)
CO2 SERPL-SCNC: 23.4 MMOL/L (ref 22–29)
CO2 SERPL-SCNC: 24 MMOL/L (ref 22–29)
CO2 SERPL-SCNC: 24 MMOL/L (ref 22–29)
CO2 SERPL-SCNC: 24.4 MMOL/L (ref 22–29)
CO2 SERPL-SCNC: 24.8 MMOL/L (ref 22–29)
CO2 SERPL-SCNC: 25 MMOL/L (ref 22–29)
CO2 SERPL-SCNC: 25 MMOL/L (ref 22–29)
CO2 SERPL-SCNC: 25.4 MMOL/L (ref 22–29)
CO2 SERPL-SCNC: 25.7 MMOL/L (ref 22–29)
CO2 SERPL-SCNC: 25.8 MMOL/L (ref 22–29)
CO2 SERPL-SCNC: 25.8 MMOL/L (ref 22–29)
CO2 SERPL-SCNC: 26 MMOL/L (ref 22–29)
CO2 SERPL-SCNC: 26.1 MMOL/L (ref 22–29)
CO2 SERPL-SCNC: 26.7 MMOL/L (ref 22–29)
CO2 SERPL-SCNC: 27 MMOL/L (ref 22–29)
CO2 SERPL-SCNC: 27.9 MMOL/L (ref 22–29)
CO2 SERPL-SCNC: 28 MMOL/L (ref 22–29)
CO2 SERPL-SCNC: 28.5 MMOL/L (ref 22–29)
CO2 SERPL-SCNC: 29 MMOL/L (ref 22–29)
CO2 SERPL-SCNC: 29 MMOL/L (ref 22–29)
CO2 SERPL-SCNC: 29.8 MMOL/L (ref 22–29)
CO2 SERPL-SCNC: 29.9 MMOL/L (ref 22–29)
CO2 SERPL-SCNC: 30 MMOL/L (ref 22–29)
CO2 SERPL-SCNC: 31 MMOL/L (ref 22–29)
CO2 SERPL-SCNC: 31 MMOL/L (ref 22–29)
CO2 SERPL-SCNC: 31.7 MMOL/L (ref 22–29)
CO2 SERPL-SCNC: 32 MMOL/L (ref 22–29)
CO2 SERPL-SCNC: 33.8 MMOL/L (ref 22–29)
CO2 SERPL-SCNC: 33.9 MMOL/L (ref 22–29)
COLOR UR: ABNORMAL
COLOR UR: YELLOW
CREAT BLDA-MCNC: 0.9 MG/DL (ref 0.6–1.3)
CREAT SERPL-MCNC: 0.57 MG/DL (ref 0.76–1.27)
CREAT SERPL-MCNC: 0.6 MG/DL (ref 0.76–1.27)
CREAT SERPL-MCNC: 0.61 MG/DL (ref 0.76–1.27)
CREAT SERPL-MCNC: 0.62 MG/DL (ref 0.76–1.27)
CREAT SERPL-MCNC: 0.63 MG/DL (ref 0.76–1.27)
CREAT SERPL-MCNC: 0.63 MG/DL (ref 0.76–1.27)
CREAT SERPL-MCNC: 0.64 MG/DL (ref 0.76–1.27)
CREAT SERPL-MCNC: 0.64 MG/DL (ref 0.76–1.27)
CREAT SERPL-MCNC: 0.65 MG/DL (ref 0.76–1.27)
CREAT SERPL-MCNC: 0.65 MG/DL (ref 0.76–1.27)
CREAT SERPL-MCNC: 0.66 MG/DL (ref 0.76–1.27)
CREAT SERPL-MCNC: 0.67 MG/DL (ref 0.76–1.27)
CREAT SERPL-MCNC: 0.67 MG/DL (ref 0.76–1.27)
CREAT SERPL-MCNC: 0.7 MG/DL (ref 0.76–1.27)
CREAT SERPL-MCNC: 0.72 MG/DL (ref 0.76–1.27)
CREAT SERPL-MCNC: 0.72 MG/DL (ref 0.76–1.27)
CREAT SERPL-MCNC: 0.73 MG/DL (ref 0.76–1.27)
CREAT SERPL-MCNC: 0.74 MG/DL (ref 0.76–1.27)
CREAT SERPL-MCNC: 0.77 MG/DL (ref 0.76–1.27)
CREAT SERPL-MCNC: 0.77 MG/DL (ref 0.76–1.27)
CREAT SERPL-MCNC: 0.78 MG/DL (ref 0.76–1.27)
CREAT SERPL-MCNC: 0.78 MG/DL (ref 0.76–1.27)
CREAT SERPL-MCNC: 0.79 MG/DL (ref 0.76–1.27)
CREAT SERPL-MCNC: 0.81 MG/DL (ref 0.76–1.27)
CREAT SERPL-MCNC: 0.83 MG/DL (ref 0.76–1.27)
CREAT SERPL-MCNC: 0.86 MG/DL (ref 0.76–1.27)
CREAT SERPL-MCNC: 0.87 MG/DL (ref 0.76–1.27)
CREAT SERPL-MCNC: 0.87 MG/DL (ref 0.76–1.27)
CREAT SERPL-MCNC: 0.89 MG/DL (ref 0.76–1.27)
CREAT SERPL-MCNC: 0.9 MG/DL (ref 0.76–1.27)
CREAT SERPL-MCNC: 0.91 MG/DL (ref 0.76–1.27)
CREAT SERPL-MCNC: 0.91 MG/DL (ref 0.76–1.27)
CREAT SERPL-MCNC: 0.95 MG/DL (ref 0.76–1.27)
CREAT SERPL-MCNC: 0.95 MG/DL (ref 0.76–1.27)
CREAT SERPL-MCNC: 0.96 MG/DL (ref 0.76–1.27)
CREAT SERPL-MCNC: 1.02 MG/DL (ref 0.76–1.27)
CREAT SERPL-MCNC: 1.07 MG/DL (ref 0.76–1.27)
CREAT SERPL-MCNC: 1.14 MG/DL (ref 0.76–1.27)
CREAT SERPL-MCNC: 1.2 MG/DL (ref 0.76–1.27)
CREAT SERPL-MCNC: 1.24 MG/DL (ref 0.76–1.27)
CREAT UR-MCNC: 120.4 MG/DL
CRP SERPL-MCNC: 2.52 MG/DL (ref 0–0.5)
CRP SERPL-MCNC: 6.74 MG/DL (ref 0–0.5)
D-LACTATE SERPL-SCNC: 0.6 MMOL/L (ref 0.5–2)
D-LACTATE SERPL-SCNC: 0.9 MMOL/L (ref 0.5–2)
D-LACTATE SERPL-SCNC: 0.9 MMOL/L (ref 0.5–2)
D-LACTATE SERPL-SCNC: 1 MMOL/L (ref 0.5–2)
D-LACTATE SERPL-SCNC: 1.1 MMOL/L (ref 0.5–2)
D-LACTATE SERPL-SCNC: 1.2 MMOL/L (ref 0.5–2)
D-LACTATE SERPL-SCNC: 1.2 MMOL/L (ref 0.5–2)
D-LACTATE SERPL-SCNC: 1.3 MMOL/L (ref 0.5–2)
D-LACTATE SERPL-SCNC: 1.3 MMOL/L (ref 0.5–2)
D-LACTATE SERPL-SCNC: 1.4 MMOL/L (ref 0.5–2)
D-LACTATE SERPL-SCNC: 1.5 MMOL/L (ref 0.5–2)
D-LACTATE SERPL-SCNC: 1.7 MMOL/L (ref 0.5–2)
D-LACTATE SERPL-SCNC: 1.8 MMOL/L (ref 0.5–2)
D-LACTATE SERPL-SCNC: 1.8 MMOL/L (ref 0.5–2)
D-LACTATE SERPL-SCNC: 1.9 MMOL/L (ref 0.5–2)
D-LACTATE SERPL-SCNC: 2 MMOL/L (ref 0.5–2)
D-LACTATE SERPL-SCNC: 2.1 MMOL/L (ref 0.5–2)
D-LACTATE SERPL-SCNC: 2.1 MMOL/L (ref 0.5–2)
D-LACTATE SERPL-SCNC: 2.3 MMOL/L (ref 0.5–2)
D-LACTATE SERPL-SCNC: 2.3 MMOL/L (ref 0.5–2)
D-LACTATE SERPL-SCNC: 2.5 MMOL/L (ref 0.5–2)
D-LACTATE SERPL-SCNC: 3 MMOL/L (ref 0.5–2)
D-LACTATE SERPL-SCNC: 3.4 MMOL/L (ref 0.5–2)
D-LACTATE SERPL-SCNC: 3.6 MMOL/L (ref 0.5–2)
D-LACTATE SERPL-SCNC: 4.5 MMOL/L (ref 0.5–2)
DEPRECATED RDW RBC AUTO: 43 FL (ref 37–54)
DEPRECATED RDW RBC AUTO: 43 FL (ref 37–54)
DEPRECATED RDW RBC AUTO: 43.1 FL (ref 37–54)
DEPRECATED RDW RBC AUTO: 43.2 FL (ref 37–54)
DEPRECATED RDW RBC AUTO: 43.2 FL (ref 37–54)
DEPRECATED RDW RBC AUTO: 43.4 FL (ref 37–54)
DEPRECATED RDW RBC AUTO: 43.7 FL (ref 37–54)
DEPRECATED RDW RBC AUTO: 44.3 FL (ref 37–54)
DEPRECATED RDW RBC AUTO: 44.5 FL (ref 37–54)
DEPRECATED RDW RBC AUTO: 44.8 FL (ref 37–54)
DEPRECATED RDW RBC AUTO: 44.9 FL (ref 37–54)
DEPRECATED RDW RBC AUTO: 45 FL (ref 37–54)
DEPRECATED RDW RBC AUTO: 45.1 FL (ref 37–54)
DEPRECATED RDW RBC AUTO: 45.2 FL (ref 37–54)
DEPRECATED RDW RBC AUTO: 45.3 FL (ref 37–54)
DEPRECATED RDW RBC AUTO: 45.4 FL (ref 37–54)
DEPRECATED RDW RBC AUTO: 45.7 FL (ref 37–54)
DEPRECATED RDW RBC AUTO: 45.7 FL (ref 37–54)
DEPRECATED RDW RBC AUTO: 46.2 FL (ref 37–54)
DEPRECATED RDW RBC AUTO: 46.3 FL (ref 37–54)
DEPRECATED RDW RBC AUTO: 46.4 FL (ref 37–54)
DEPRECATED RDW RBC AUTO: 46.4 FL (ref 37–54)
DEPRECATED RDW RBC AUTO: 46.7 FL (ref 37–54)
DEPRECATED RDW RBC AUTO: 46.8 FL (ref 37–54)
DEPRECATED RDW RBC AUTO: 47.1 FL (ref 37–54)
DEPRECATED RDW RBC AUTO: 47.3 FL (ref 37–54)
DEPRECATED RDW RBC AUTO: 47.3 FL (ref 37–54)
DEPRECATED RDW RBC AUTO: 47.4 FL (ref 37–54)
DEPRECATED RDW RBC AUTO: 47.5 FL (ref 37–54)
DEPRECATED RDW RBC AUTO: 47.7 FL (ref 37–54)
DEPRECATED RDW RBC AUTO: 48 FL (ref 37–54)
DEPRECATED RDW RBC AUTO: 48.1 FL (ref 37–54)
DEPRECATED RDW RBC AUTO: 48.4 FL (ref 37–54)
DEPRECATED RDW RBC AUTO: 48.6 FL (ref 37–54)
DEPRECATED RDW RBC AUTO: 48.6 FL (ref 37–54)
DEPRECATED RDW RBC AUTO: 48.8 FL (ref 37–54)
DEPRECATED RDW RBC AUTO: 48.8 FL (ref 37–54)
DEPRECATED RDW RBC AUTO: 49.2 FL (ref 37–54)
DEPRECATED RDW RBC AUTO: 49.4 FL (ref 37–54)
DRUGS UR: NORMAL
EGFRCR SERPLBLD CKD-EPI 2021: 100.3 ML/MIN/1.73
EGFRCR SERPLBLD CKD-EPI 2021: 100.7 ML/MIN/1.73
EGFRCR SERPLBLD CKD-EPI 2021: 100.7 ML/MIN/1.73
EGFRCR SERPLBLD CKD-EPI 2021: 101.2 ML/MIN/1.73
EGFRCR SERPLBLD CKD-EPI 2021: 101.2 ML/MIN/1.73
EGFRCR SERPLBLD CKD-EPI 2021: 101.7 ML/MIN/1.73
EGFRCR SERPLBLD CKD-EPI 2021: 101.7 ML/MIN/1.73
EGFRCR SERPLBLD CKD-EPI 2021: 102.2 ML/MIN/1.73
EGFRCR SERPLBLD CKD-EPI 2021: 102.7 ML/MIN/1.73
EGFRCR SERPLBLD CKD-EPI 2021: 103.2 ML/MIN/1.73
EGFRCR SERPLBLD CKD-EPI 2021: 104.8 ML/MIN/1.73
EGFRCR SERPLBLD CKD-EPI 2021: 62.2 ML/MIN/1.73
EGFRCR SERPLBLD CKD-EPI 2021: 64.7 ML/MIN/1.73
EGFRCR SERPLBLD CKD-EPI 2021: 68.8 ML/MIN/1.73
EGFRCR SERPLBLD CKD-EPI 2021: 74.2 ML/MIN/1.73
EGFRCR SERPLBLD CKD-EPI 2021: 78.6 ML/MIN/1.73
EGFRCR SERPLBLD CKD-EPI 2021: 84.5 ML/MIN/1.73
EGFRCR SERPLBLD CKD-EPI 2021: 85.6 ML/MIN/1.73
EGFRCR SERPLBLD CKD-EPI 2021: 85.6 ML/MIN/1.73
EGFRCR SERPLBLD CKD-EPI 2021: 90.1 ML/MIN/1.73
EGFRCR SERPLBLD CKD-EPI 2021: 90.1 ML/MIN/1.73
EGFRCR SERPLBLD CKD-EPI 2021: 91.3 ML/MIN/1.73
EGFRCR SERPLBLD CKD-EPI 2021: 91.6 ML/MIN/1.73
EGFRCR SERPLBLD CKD-EPI 2021: 92.2 ML/MIN/1.73
EGFRCR SERPLBLD CKD-EPI 2021: 92.2 ML/MIN/1.73
EGFRCR SERPLBLD CKD-EPI 2021: 92.6 ML/MIN/1.73
EGFRCR SERPLBLD CKD-EPI 2021: 93.6 ML/MIN/1.73
EGFRCR SERPLBLD CKD-EPI 2021: 94.3 ML/MIN/1.73
EGFRCR SERPLBLD CKD-EPI 2021: 95 ML/MIN/1.73
EGFRCR SERPLBLD CKD-EPI 2021: 95.3 ML/MIN/1.73
EGFRCR SERPLBLD CKD-EPI 2021: 95.3 ML/MIN/1.73
EGFRCR SERPLBLD CKD-EPI 2021: 95.7 ML/MIN/1.73
EGFRCR SERPLBLD CKD-EPI 2021: 95.7 ML/MIN/1.73
EGFRCR SERPLBLD CKD-EPI 2021: 96.9 ML/MIN/1.73
EGFRCR SERPLBLD CKD-EPI 2021: 97.3 ML/MIN/1.73
EGFRCR SERPLBLD CKD-EPI 2021: 97.7 ML/MIN/1.73
EGFRCR SERPLBLD CKD-EPI 2021: 97.7 ML/MIN/1.73
EGFRCR SERPLBLD CKD-EPI 2021: 98.5 ML/MIN/1.73
EGFRCR SERPLBLD CKD-EPI 2021: 99.8 ML/MIN/1.73
EGFRCR SERPLBLD CKD-EPI 2021: 99.8 ML/MIN/1.73
EOSINOPHIL # BLD AUTO: 0.01 10*3/MM3 (ref 0–0.4)
EOSINOPHIL # BLD AUTO: 0.02 10*3/MM3 (ref 0–0.4)
EOSINOPHIL # BLD AUTO: 0.03 10*3/MM3 (ref 0–0.4)
EOSINOPHIL # BLD AUTO: 0.04 10*3/MM3 (ref 0–0.4)
EOSINOPHIL # BLD AUTO: 0.06 10*3/MM3 (ref 0–0.4)
EOSINOPHIL # BLD AUTO: 0.07 10*3/MM3 (ref 0–0.4)
EOSINOPHIL # BLD AUTO: 0.09 10*3/MM3 (ref 0–0.4)
EOSINOPHIL # BLD AUTO: 0.1 10*3/MM3 (ref 0–0.4)
EOSINOPHIL # BLD AUTO: 0.1 10*3/MM3 (ref 0–0.4)
EOSINOPHIL # BLD AUTO: 0.12 10*3/MM3 (ref 0–0.4)
EOSINOPHIL # BLD AUTO: 0.15 10*3/MM3 (ref 0–0.4)
EOSINOPHIL # BLD AUTO: 0.17 10*3/MM3 (ref 0–0.4)
EOSINOPHIL # BLD AUTO: 0.19 10*3/MM3 (ref 0–0.4)
EOSINOPHIL # BLD AUTO: 0.21 10*3/MM3 (ref 0–0.4)
EOSINOPHIL # BLD AUTO: 0.24 10*3/MM3 (ref 0–0.4)
EOSINOPHIL # BLD AUTO: 0.26 10*3/MM3 (ref 0–0.4)
EOSINOPHIL # BLD AUTO: 0.27 10*3/MM3 (ref 0–0.4)
EOSINOPHIL # BLD AUTO: 0.27 10*3/MM3 (ref 0–0.4)
EOSINOPHIL # BLD AUTO: 0.29 10*3/MM3 (ref 0–0.4)
EOSINOPHIL # BLD AUTO: 0.3 10*3/MM3 (ref 0–0.4)
EOSINOPHIL # BLD MANUAL: 0.14 10*3/MM3 (ref 0–0.4)
EOSINOPHIL NFR BLD AUTO: 0.1 % (ref 0.3–6.2)
EOSINOPHIL NFR BLD AUTO: 0.2 % (ref 0.3–6.2)
EOSINOPHIL NFR BLD AUTO: 0.3 % (ref 0.3–6.2)
EOSINOPHIL NFR BLD AUTO: 0.3 % (ref 0.3–6.2)
EOSINOPHIL NFR BLD AUTO: 0.4 % (ref 0.3–6.2)
EOSINOPHIL NFR BLD AUTO: 0.5 % (ref 0.3–6.2)
EOSINOPHIL NFR BLD AUTO: 0.6 % (ref 0.3–6.2)
EOSINOPHIL NFR BLD AUTO: 0.8 % (ref 0.3–6.2)
EOSINOPHIL NFR BLD AUTO: 1.3 % (ref 0.3–6.2)
EOSINOPHIL NFR BLD AUTO: 1.5 % (ref 0.3–6.2)
EOSINOPHIL NFR BLD AUTO: 1.6 % (ref 0.3–6.2)
EOSINOPHIL NFR BLD AUTO: 1.7 % (ref 0.3–6.2)
EOSINOPHIL NFR BLD AUTO: 2 % (ref 0.3–6.2)
EOSINOPHIL NFR BLD AUTO: 2 % (ref 0.3–6.2)
EOSINOPHIL NFR BLD AUTO: 2.1 % (ref 0.3–6.2)
EOSINOPHIL NFR BLD AUTO: 3.1 % (ref 0.3–6.2)
EOSINOPHIL NFR BLD AUTO: 4.2 % (ref 0.3–6.2)
EOSINOPHIL NFR BLD AUTO: 4.4 % (ref 0.3–6.2)
EOSINOPHIL NFR BLD AUTO: 4.5 % (ref 0.3–6.2)
EOSINOPHIL NFR BLD AUTO: 4.6 % (ref 0.3–6.2)
EOSINOPHIL NFR BLD AUTO: 4.6 % (ref 0.3–6.2)
EOSINOPHIL NFR BLD AUTO: 4.9 % (ref 0.3–6.2)
EOSINOPHIL NFR BLD AUTO: 6.2 % (ref 0.3–6.2)
EOSINOPHIL NFR BLD MANUAL: 2 % (ref 0.3–6.2)
ERYTHROCYTE [DISTWIDTH] IN BLOOD BY AUTOMATED COUNT: 13 % (ref 12.3–15.4)
ERYTHROCYTE [DISTWIDTH] IN BLOOD BY AUTOMATED COUNT: 13.1 % (ref 12.3–15.4)
ERYTHROCYTE [DISTWIDTH] IN BLOOD BY AUTOMATED COUNT: 13.2 % (ref 12.3–15.4)
ERYTHROCYTE [DISTWIDTH] IN BLOOD BY AUTOMATED COUNT: 13.3 % (ref 12.3–15.4)
ERYTHROCYTE [DISTWIDTH] IN BLOOD BY AUTOMATED COUNT: 13.4 % (ref 12.3–15.4)
ERYTHROCYTE [DISTWIDTH] IN BLOOD BY AUTOMATED COUNT: 13.5 % (ref 12.3–15.4)
ERYTHROCYTE [DISTWIDTH] IN BLOOD BY AUTOMATED COUNT: 13.6 % (ref 12.3–15.4)
ERYTHROCYTE [DISTWIDTH] IN BLOOD BY AUTOMATED COUNT: 13.6 % (ref 12.3–15.4)
ERYTHROCYTE [DISTWIDTH] IN BLOOD BY AUTOMATED COUNT: 13.7 % (ref 12.3–15.4)
ERYTHROCYTE [DISTWIDTH] IN BLOOD BY AUTOMATED COUNT: 13.9 % (ref 12.3–15.4)
ERYTHROCYTE [DISTWIDTH] IN BLOOD BY AUTOMATED COUNT: 14 % (ref 12.3–15.4)
ERYTHROCYTE [DISTWIDTH] IN BLOOD BY AUTOMATED COUNT: 14 % (ref 12.3–15.4)
ERYTHROCYTE [DISTWIDTH] IN BLOOD BY AUTOMATED COUNT: 14.1 % (ref 12.3–15.4)
ERYTHROCYTE [DISTWIDTH] IN BLOOD BY AUTOMATED COUNT: 14.2 % (ref 12.3–15.4)
ERYTHROCYTE [DISTWIDTH] IN BLOOD BY AUTOMATED COUNT: 14.3 % (ref 12.3–15.4)
ERYTHROCYTE [DISTWIDTH] IN BLOOD BY AUTOMATED COUNT: 14.4 % (ref 12.3–15.4)
ERYTHROCYTE [DISTWIDTH] IN BLOOD BY AUTOMATED COUNT: 14.5 % (ref 12.3–15.4)
ERYTHROCYTE [DISTWIDTH] IN BLOOD BY AUTOMATED COUNT: 15.2 % (ref 12.3–15.4)
ERYTHROCYTE [DISTWIDTH] IN BLOOD BY AUTOMATED COUNT: 15.5 % (ref 12.3–15.4)
ERYTHROCYTE [SEDIMENTATION RATE] IN BLOOD: 51 MM/HR (ref 0–20)
ERYTHROCYTE [SEDIMENTATION RATE] IN BLOOD: 67 MM/HR (ref 0–20)
FERRITIN SERPL-MCNC: 443 NG/ML (ref 30–400)
FERRITIN SERPL-MCNC: 484 NG/ML (ref 30–400)
FLUAV SUBTYP SPEC NAA+PROBE: NOT DETECTED
FLUBV RNA ISLT QL NAA+PROBE: NOT DETECTED
FOLATE SERPL-MCNC: 4.68 NG/ML (ref 4.78–24.2)
FOLATE SERPL-MCNC: 7.83 NG/ML (ref 4.78–24.2)
GAS FLOW AIRWAY: 2 LPM
GAS FLOW AIRWAY: 3 LPM
GLOBULIN UR ELPH-MCNC: 3.3 GM/DL
GLOBULIN UR ELPH-MCNC: 3.5 GM/DL
GLOBULIN UR ELPH-MCNC: 3.5 GM/DL
GLOBULIN UR ELPH-MCNC: 3.7 GM/DL
GLOBULIN UR ELPH-MCNC: 3.7 GM/DL
GLOBULIN UR ELPH-MCNC: 3.8 GM/DL
GLOBULIN UR ELPH-MCNC: 3.8 GM/DL
GLOBULIN UR ELPH-MCNC: 3.9 GM/DL
GLOBULIN UR ELPH-MCNC: 4 GM/DL
GLOBULIN UR ELPH-MCNC: 4.1 GM/DL
GLOBULIN UR ELPH-MCNC: 4.2 GM/DL
GLOBULIN UR ELPH-MCNC: 4.5 GM/DL
GLUCOSE BLDC GLUCOMTR-MCNC: 116 MG/DL (ref 70–130)
GLUCOSE BLDC GLUCOMTR-MCNC: 144 MG/DL (ref 70–130)
GLUCOSE SERPL-MCNC: 100 MG/DL (ref 65–99)
GLUCOSE SERPL-MCNC: 100 MG/DL (ref 65–99)
GLUCOSE SERPL-MCNC: 101 MG/DL (ref 65–99)
GLUCOSE SERPL-MCNC: 103 MG/DL (ref 65–99)
GLUCOSE SERPL-MCNC: 105 MG/DL (ref 65–99)
GLUCOSE SERPL-MCNC: 106 MG/DL (ref 65–99)
GLUCOSE SERPL-MCNC: 107 MG/DL (ref 65–99)
GLUCOSE SERPL-MCNC: 107 MG/DL (ref 65–99)
GLUCOSE SERPL-MCNC: 108 MG/DL (ref 65–99)
GLUCOSE SERPL-MCNC: 109 MG/DL (ref 65–99)
GLUCOSE SERPL-MCNC: 110 MG/DL (ref 65–99)
GLUCOSE SERPL-MCNC: 110 MG/DL (ref 65–99)
GLUCOSE SERPL-MCNC: 112 MG/DL (ref 65–99)
GLUCOSE SERPL-MCNC: 116 MG/DL (ref 65–99)
GLUCOSE SERPL-MCNC: 116 MG/DL (ref 65–99)
GLUCOSE SERPL-MCNC: 126 MG/DL (ref 65–99)
GLUCOSE SERPL-MCNC: 131 MG/DL (ref 65–99)
GLUCOSE SERPL-MCNC: 76 MG/DL (ref 65–99)
GLUCOSE SERPL-MCNC: 85 MG/DL (ref 65–99)
GLUCOSE SERPL-MCNC: 89 MG/DL (ref 65–99)
GLUCOSE SERPL-MCNC: 90 MG/DL (ref 65–99)
GLUCOSE SERPL-MCNC: 91 MG/DL (ref 65–99)
GLUCOSE SERPL-MCNC: 91 MG/DL (ref 65–99)
GLUCOSE SERPL-MCNC: 92 MG/DL (ref 65–99)
GLUCOSE SERPL-MCNC: 93 MG/DL (ref 65–99)
GLUCOSE SERPL-MCNC: 94 MG/DL (ref 65–99)
GLUCOSE SERPL-MCNC: 95 MG/DL (ref 65–99)
GLUCOSE SERPL-MCNC: 96 MG/DL (ref 65–99)
GLUCOSE SERPL-MCNC: 96 MG/DL (ref 65–99)
GLUCOSE SERPL-MCNC: 97 MG/DL (ref 65–99)
GLUCOSE SERPL-MCNC: 98 MG/DL (ref 65–99)
GLUCOSE SERPL-MCNC: 98 MG/DL (ref 65–99)
GLUCOSE SERPL-MCNC: 99 MG/DL (ref 65–99)
GLUCOSE UR STRIP-MCNC: NEGATIVE MG/DL
GRAM STN SPEC: ABNORMAL
GRAM STN SPEC: NORMAL
GRAM STN SPEC: NORMAL
HADV DNA SPEC NAA+PROBE: NOT DETECTED
HAPTOGLOB SERPL-MCNC: 130 MG/DL (ref 30–200)
HCO3 BLDA-SCNC: 21.5 MMOL/L (ref 22–28)
HCO3 BLDA-SCNC: 21.8 MMOL/L (ref 22–28)
HCO3 BLDA-SCNC: 29.8 MMOL/L (ref 22–28)
HCO3 BLDA-SCNC: 34.9 MMOL/L (ref 22–28)
HCOV 229E RNA SPEC QL NAA+PROBE: NOT DETECTED
HCOV HKU1 RNA SPEC QL NAA+PROBE: NOT DETECTED
HCOV NL63 RNA SPEC QL NAA+PROBE: NOT DETECTED
HCOV OC43 RNA SPEC QL NAA+PROBE: NOT DETECTED
HCT VFR BLD AUTO: 25.5 % (ref 37.5–51)
HCT VFR BLD AUTO: 27.1 % (ref 37.5–51)
HCT VFR BLD AUTO: 27.7 % (ref 37.5–51)
HCT VFR BLD AUTO: 27.7 % (ref 37.5–51)
HCT VFR BLD AUTO: 27.8 % (ref 37.5–51)
HCT VFR BLD AUTO: 28 % (ref 37.5–51)
HCT VFR BLD AUTO: 28.2 % (ref 37.5–51)
HCT VFR BLD AUTO: 28.3 % (ref 37.5–51)
HCT VFR BLD AUTO: 28.6 % (ref 37.5–51)
HCT VFR BLD AUTO: 28.7 % (ref 37.5–51)
HCT VFR BLD AUTO: 28.8 % (ref 37.5–51)
HCT VFR BLD AUTO: 29.3 % (ref 37.5–51)
HCT VFR BLD AUTO: 29.3 % (ref 37.5–51)
HCT VFR BLD AUTO: 29.6 % (ref 37.5–51)
HCT VFR BLD AUTO: 29.7 % (ref 37.5–51)
HCT VFR BLD AUTO: 29.8 % (ref 37.5–51)
HCT VFR BLD AUTO: 29.8 % (ref 37.5–51)
HCT VFR BLD AUTO: 29.9 % (ref 37.5–51)
HCT VFR BLD AUTO: 30.4 % (ref 37.5–51)
HCT VFR BLD AUTO: 30.5 % (ref 37.5–51)
HCT VFR BLD AUTO: 30.5 % (ref 37.5–51)
HCT VFR BLD AUTO: 30.7 % (ref 37.5–51)
HCT VFR BLD AUTO: 30.8 % (ref 37.5–51)
HCT VFR BLD AUTO: 31.5 % (ref 37.5–51)
HCT VFR BLD AUTO: 32 % (ref 37.5–51)
HCT VFR BLD AUTO: 32.1 % (ref 37.5–51)
HCT VFR BLD AUTO: 32.3 % (ref 37.5–51)
HCT VFR BLD AUTO: 32.5 % (ref 37.5–51)
HCT VFR BLD AUTO: 32.7 % (ref 37.5–51)
HCT VFR BLD AUTO: 33.1 % (ref 37.5–51)
HCT VFR BLD AUTO: 33.2 % (ref 37.5–51)
HCT VFR BLD AUTO: 33.3 % (ref 37.5–51)
HCT VFR BLD AUTO: 33.4 % (ref 37.5–51)
HCT VFR BLD AUTO: 33.5 % (ref 37.5–51)
HCT VFR BLD AUTO: 33.5 % (ref 37.5–51)
HCT VFR BLD AUTO: 33.6 % (ref 37.5–51)
HCT VFR BLD AUTO: 34.6 % (ref 37.5–51)
HCT VFR BLD AUTO: 36.5 % (ref 37.5–51)
HCT VFR BLD AUTO: 37.6 % (ref 37.5–51)
HDLC SERPL-MCNC: 45 MG/DL (ref 40–60)
HGB BLD-MCNC: 10.1 G/DL (ref 13–17.7)
HGB BLD-MCNC: 10.1 G/DL (ref 13–17.7)
HGB BLD-MCNC: 10.2 G/DL (ref 13–17.7)
HGB BLD-MCNC: 10.3 G/DL (ref 13–17.7)
HGB BLD-MCNC: 10.3 G/DL (ref 13–17.7)
HGB BLD-MCNC: 10.5 G/DL (ref 13–17.7)
HGB BLD-MCNC: 10.6 G/DL (ref 13–17.7)
HGB BLD-MCNC: 10.7 G/DL (ref 13–17.7)
HGB BLD-MCNC: 10.8 G/DL (ref 13–17.7)
HGB BLD-MCNC: 10.8 G/DL (ref 13–17.7)
HGB BLD-MCNC: 10.9 G/DL (ref 13–17.7)
HGB BLD-MCNC: 11.2 G/DL (ref 13–17.7)
HGB BLD-MCNC: 11.3 G/DL (ref 13–17.7)
HGB BLD-MCNC: 11.4 G/DL (ref 13–17.7)
HGB BLD-MCNC: 11.4 G/DL (ref 13–17.7)
HGB BLD-MCNC: 11.7 G/DL (ref 13–17.7)
HGB BLD-MCNC: 12.2 G/DL (ref 13–17.7)
HGB BLD-MCNC: 12.6 G/DL (ref 13–17.7)
HGB BLD-MCNC: 8.1 G/DL (ref 13–17.7)
HGB BLD-MCNC: 8.9 G/DL (ref 13–17.7)
HGB BLD-MCNC: 9 G/DL (ref 13–17.7)
HGB BLD-MCNC: 9.2 G/DL (ref 13–17.7)
HGB BLD-MCNC: 9.2 G/DL (ref 13–17.7)
HGB BLD-MCNC: 9.3 G/DL (ref 13–17.7)
HGB BLD-MCNC: 9.3 G/DL (ref 13–17.7)
HGB BLD-MCNC: 9.6 G/DL (ref 13–17.7)
HGB BLD-MCNC: 9.7 G/DL (ref 13–17.7)
HGB BLD-MCNC: 9.7 G/DL (ref 13–17.7)
HGB BLD-MCNC: 9.8 G/DL (ref 13–17.7)
HGB BLD-MCNC: 9.8 G/DL (ref 13–17.7)
HGB BLD-MCNC: 9.9 G/DL (ref 13–17.7)
HGB UR QL STRIP.AUTO: ABNORMAL
HMPV RNA NPH QL NAA+NON-PROBE: NOT DETECTED
HOLD SPECIMEN: NORMAL
HPIV1 RNA ISLT QL NAA+PROBE: NOT DETECTED
HPIV2 RNA SPEC QL NAA+PROBE: NOT DETECTED
HPIV3 RNA NPH QL NAA+PROBE: NOT DETECTED
HPIV4 P GENE NPH QL NAA+PROBE: NOT DETECTED
HYALINE CASTS UR QL AUTO: ABNORMAL /LPF
HYALINE CASTS UR QL AUTO: NORMAL /LPF
IMM GRANULOCYTES # BLD AUTO: 0.01 10*3/MM3 (ref 0–0.05)
IMM GRANULOCYTES # BLD AUTO: 0.02 10*3/MM3 (ref 0–0.05)
IMM GRANULOCYTES # BLD AUTO: 0.03 10*3/MM3 (ref 0–0.05)
IMM GRANULOCYTES # BLD AUTO: 0.04 10*3/MM3 (ref 0–0.05)
IMM GRANULOCYTES # BLD AUTO: 0.05 10*3/MM3 (ref 0–0.05)
IMM GRANULOCYTES # BLD AUTO: 0.08 10*3/MM3 (ref 0–0.05)
IMM GRANULOCYTES # BLD AUTO: 0.08 10*3/MM3 (ref 0–0.05)
IMM GRANULOCYTES # BLD AUTO: 0.1 10*3/MM3 (ref 0–0.05)
IMM GRANULOCYTES # BLD AUTO: 0.31 10*3/MM3 (ref 0–0.05)
IMM GRANULOCYTES NFR BLD AUTO: 0.1 % (ref 0–0.5)
IMM GRANULOCYTES NFR BLD AUTO: 0.2 % (ref 0–0.5)
IMM GRANULOCYTES NFR BLD AUTO: 0.3 % (ref 0–0.5)
IMM GRANULOCYTES NFR BLD AUTO: 0.4 % (ref 0–0.5)
IMM GRANULOCYTES NFR BLD AUTO: 0.4 % (ref 0–0.5)
IMM GRANULOCYTES NFR BLD AUTO: 0.5 % (ref 0–0.5)
IMM GRANULOCYTES NFR BLD AUTO: 0.6 % (ref 0–0.5)
IMM GRANULOCYTES NFR BLD AUTO: 0.7 % (ref 0–0.5)
IMM GRANULOCYTES NFR BLD AUTO: 1.6 % (ref 0–0.5)
INR PPP: 1.3
INR PPP: 1.3
INR PPP: 1.4
INR PPP: 1.47 (ref 0.9–1.1)
INR PPP: 1.5
INR PPP: 1.54 (ref 0.9–1.1)
INR PPP: 1.57 (ref 0.9–1.1)
INR PPP: 1.6 (ref 0.9–1.1)
INR PPP: 1.62 (ref 0.9–1.1)
INR PPP: 1.63 (ref 0.9–1.1)
INR PPP: 1.69 (ref 0.9–1.1)
INR PPP: 1.7
INR PPP: 1.7 (ref 0.9–1.1)
INR PPP: 1.71 (ref 0.9–1.1)
INR PPP: 1.72 (ref 0.9–1.1)
INR PPP: 1.74 (ref 0.9–1.1)
INR PPP: 1.76 (ref 0.9–1.1)
INR PPP: 1.78 (ref 0.9–1.1)
INR PPP: 1.85 (ref 0.9–1.1)
INR PPP: 1.87 (ref 0.9–1.1)
INR PPP: 1.89 (ref 0.9–1.1)
INR PPP: 1.92 (ref 0.9–1.1)
INR PPP: 1.94 (ref 0.9–1.1)
INR PPP: 1.97 (ref 0.9–1.1)
INR PPP: 2
INR PPP: 2
INR PPP: 2.01 (ref 0.9–1.1)
INR PPP: 2.01 (ref 0.9–1.1)
INR PPP: 2.03 (ref 0.9–1.1)
INR PPP: 2.06 (ref 0.9–1.1)
INR PPP: 2.09 (ref 0.9–1.1)
INR PPP: 2.1
INR PPP: 2.1 (ref 0.9–1.1)
INR PPP: 2.16 (ref 0.9–1.1)
INR PPP: 2.2
INR PPP: 2.2 (ref 0.9–1.1)
INR PPP: 2.22 (ref 0.9–1.1)
INR PPP: 2.3
INR PPP: 2.3
INR PPP: 2.37 (ref 0.9–1.1)
INR PPP: 2.49 (ref 0.9–1.1)
INR PPP: 2.5
INR PPP: 2.5
INR PPP: 2.55 (ref 0.9–1.1)
INR PPP: 2.6
INR PPP: 2.65 (ref 0.9–1.1)
INR PPP: 2.68 (ref 0.9–1.1)
INR PPP: 2.7
INR PPP: 2.79 (ref 0.9–1.1)
INR PPP: 2.8
INR PPP: 2.8
INR PPP: 2.8 (ref 0.9–1.1)
INR PPP: 2.8 (ref 0.9–1.1)
INR PPP: 2.82 (ref 0.9–1.1)
INR PPP: 3.1
INR PPP: 3.1 (ref 0.9–1.1)
INR PPP: 3.4
INR PPP: 3.8
INR PPP: 3.9
INR PPP: 4
INR PPP: 4.3
INR PPP: 4.5 (ref 0.9–1.1)
INR PPP: 4.7
INR PPP: 8
IRON 24H UR-MRATE: 22 MCG/DL (ref 59–158)
IRON 24H UR-MRATE: 29 MCG/DL (ref 59–158)
IRON SATN MFR SERPL: 10 % (ref 20–50)
IRON SATN MFR SERPL: 12 % (ref 20–50)
ISOLATED FROM: ABNORMAL
KETONES UR QL STRIP: ABNORMAL
KETONES UR QL STRIP: NEGATIVE
L PNEUMO1 AG UR QL IA: NEGATIVE
LDH SERPL-CCNC: 147 U/L (ref 135–225)
LDLC SERPL CALC-MCNC: 69 MG/DL (ref 0–100)
LDLC/HDLC SERPL: 1.56 {RATIO}
LEFT ATRIUM VOLUME INDEX: 43.2 ML/M2
LEFT ATRIUM VOLUME INDEX: 66.8 ML/M2
LEUKOCYTE ESTERASE UR QL STRIP.AUTO: ABNORMAL
LEUKOCYTE ESTERASE UR QL STRIP.AUTO: NEGATIVE
LEUKOCYTE ESTERASE UR QL STRIP.AUTO: NEGATIVE
LIPASE SERPL-CCNC: 12 U/L (ref 13–60)
LYMPHOCYTES # BLD AUTO: 0.37 10*3/MM3 (ref 0.7–3.1)
LYMPHOCYTES # BLD AUTO: 0.41 10*3/MM3 (ref 0.7–3.1)
LYMPHOCYTES # BLD AUTO: 0.44 10*3/MM3 (ref 0.7–3.1)
LYMPHOCYTES # BLD AUTO: 0.5 10*3/MM3 (ref 0.7–3.1)
LYMPHOCYTES # BLD AUTO: 0.5 10*3/MM3 (ref 0.7–3.1)
LYMPHOCYTES # BLD AUTO: 0.54 10*3/MM3 (ref 0.7–3.1)
LYMPHOCYTES # BLD AUTO: 0.56 10*3/MM3 (ref 0.7–3.1)
LYMPHOCYTES # BLD AUTO: 0.57 10*3/MM3 (ref 0.7–3.1)
LYMPHOCYTES # BLD AUTO: 0.62 10*3/MM3 (ref 0.7–3.1)
LYMPHOCYTES # BLD AUTO: 0.63 10*3/MM3 (ref 0.7–3.1)
LYMPHOCYTES # BLD AUTO: 0.63 10*3/MM3 (ref 0.7–3.1)
LYMPHOCYTES # BLD AUTO: 0.64 10*3/MM3 (ref 0.7–3.1)
LYMPHOCYTES # BLD AUTO: 0.66 10*3/MM3 (ref 0.7–3.1)
LYMPHOCYTES # BLD AUTO: 0.7 10*3/MM3 (ref 0.7–3.1)
LYMPHOCYTES # BLD AUTO: 0.73 10*3/MM3 (ref 0.7–3.1)
LYMPHOCYTES # BLD AUTO: 0.74 10*3/MM3 (ref 0.7–3.1)
LYMPHOCYTES # BLD AUTO: 0.76 10*3/MM3 (ref 0.7–3.1)
LYMPHOCYTES # BLD AUTO: 0.82 10*3/MM3 (ref 0.7–3.1)
LYMPHOCYTES # BLD AUTO: 0.84 10*3/MM3 (ref 0.7–3.1)
LYMPHOCYTES # BLD AUTO: 0.86 10*3/MM3 (ref 0.7–3.1)
LYMPHOCYTES # BLD AUTO: 0.86 10*3/MM3 (ref 0.7–3.1)
LYMPHOCYTES # BLD AUTO: 0.88 10*3/MM3 (ref 0.7–3.1)
LYMPHOCYTES # BLD AUTO: 0.92 10*3/MM3 (ref 0.7–3.1)
LYMPHOCYTES # BLD AUTO: 0.94 10*3/MM3 (ref 0.7–3.1)
LYMPHOCYTES # BLD AUTO: 0.95 10*3/MM3 (ref 0.7–3.1)
LYMPHOCYTES # BLD AUTO: 1.02 10*3/MM3 (ref 0.7–3.1)
LYMPHOCYTES # BLD AUTO: 1.08 10*3/MM3 (ref 0.7–3.1)
LYMPHOCYTES # BLD AUTO: 1.2 10*3/MM3 (ref 0.7–3.1)
LYMPHOCYTES # BLD AUTO: 1.3 10*3/MM3 (ref 0.7–3.1)
LYMPHOCYTES # BLD MANUAL: 1.24 10*3/MM3 (ref 0.7–3.1)
LYMPHOCYTES NFR BLD AUTO: 10.3 % (ref 19.6–45.3)
LYMPHOCYTES NFR BLD AUTO: 11 % (ref 19.6–45.3)
LYMPHOCYTES NFR BLD AUTO: 11.2 % (ref 19.6–45.3)
LYMPHOCYTES NFR BLD AUTO: 11.7 % (ref 19.6–45.3)
LYMPHOCYTES NFR BLD AUTO: 12.5 % (ref 19.6–45.3)
LYMPHOCYTES NFR BLD AUTO: 12.6 % (ref 19.6–45.3)
LYMPHOCYTES NFR BLD AUTO: 12.7 % (ref 19.6–45.3)
LYMPHOCYTES NFR BLD AUTO: 14.2 % (ref 19.6–45.3)
LYMPHOCYTES NFR BLD AUTO: 15.1 % (ref 19.6–45.3)
LYMPHOCYTES NFR BLD AUTO: 15.3 % (ref 19.6–45.3)
LYMPHOCYTES NFR BLD AUTO: 16.1 % (ref 19.6–45.3)
LYMPHOCYTES NFR BLD AUTO: 16.2 % (ref 19.6–45.3)
LYMPHOCYTES NFR BLD AUTO: 16.3 % (ref 19.6–45.3)
LYMPHOCYTES NFR BLD AUTO: 16.5 % (ref 19.6–45.3)
LYMPHOCYTES NFR BLD AUTO: 17.4 % (ref 19.6–45.3)
LYMPHOCYTES NFR BLD AUTO: 19.4 % (ref 19.6–45.3)
LYMPHOCYTES NFR BLD AUTO: 2.6 % (ref 19.6–45.3)
LYMPHOCYTES NFR BLD AUTO: 2.7 % (ref 19.6–45.3)
LYMPHOCYTES NFR BLD AUTO: 3.9 % (ref 19.6–45.3)
LYMPHOCYTES NFR BLD AUTO: 4.1 % (ref 19.6–45.3)
LYMPHOCYTES NFR BLD AUTO: 4.6 % (ref 19.6–45.3)
LYMPHOCYTES NFR BLD AUTO: 4.7 % (ref 19.6–45.3)
LYMPHOCYTES NFR BLD AUTO: 5.5 % (ref 19.6–45.3)
LYMPHOCYTES NFR BLD AUTO: 5.7 % (ref 19.6–45.3)
LYMPHOCYTES NFR BLD AUTO: 5.7 % (ref 19.6–45.3)
LYMPHOCYTES NFR BLD AUTO: 6.2 % (ref 19.6–45.3)
LYMPHOCYTES NFR BLD AUTO: 7 % (ref 19.6–45.3)
LYMPHOCYTES NFR BLD AUTO: 7 % (ref 19.6–45.3)
LYMPHOCYTES NFR BLD AUTO: 8.2 % (ref 19.6–45.3)
LYMPHOCYTES NFR BLD MANUAL: 4 % (ref 5–12)
M PNEUMO IGG SER IA-ACNC: NOT DETECTED
MAGNESIUM SERPL-MCNC: 1.6 MG/DL (ref 1.6–2.4)
MAGNESIUM SERPL-MCNC: 1.8 MG/DL (ref 1.6–2.4)
MAGNESIUM SERPL-MCNC: 1.9 MG/DL (ref 1.6–2.4)
MAGNESIUM SERPL-MCNC: 2 MG/DL (ref 1.6–2.4)
MAGNESIUM SERPL-MCNC: 2.1 MG/DL (ref 1.6–2.4)
MAGNESIUM SERPL-MCNC: 2.2 MG/DL (ref 1.6–2.4)
MAGNESIUM SERPL-MCNC: 2.2 MG/DL (ref 1.6–2.4)
MAGNESIUM SERPL-MCNC: 2.3 MG/DL (ref 1.6–2.4)
MAGNESIUM SERPL-MCNC: 2.4 MG/DL (ref 1.6–2.4)
MAGNESIUM SERPL-MCNC: 2.4 MG/DL (ref 1.6–2.4)
MAXIMAL PREDICTED HEART RATE: 149 BPM
MCH RBC QN AUTO: 26.9 PG (ref 26.6–33)
MCH RBC QN AUTO: 28.4 PG (ref 26.6–33)
MCH RBC QN AUTO: 28.5 PG (ref 26.6–33)
MCH RBC QN AUTO: 28.6 PG (ref 26.6–33)
MCH RBC QN AUTO: 29.2 PG (ref 26.6–33)
MCH RBC QN AUTO: 29.3 PG (ref 26.6–33)
MCH RBC QN AUTO: 29.9 PG (ref 26.6–33)
MCH RBC QN AUTO: 30.1 PG (ref 26.6–33)
MCH RBC QN AUTO: 30.3 PG (ref 26.6–33)
MCH RBC QN AUTO: 30.3 PG (ref 26.6–33)
MCH RBC QN AUTO: 30.4 PG (ref 26.6–33)
MCH RBC QN AUTO: 30.5 PG (ref 26.6–33)
MCH RBC QN AUTO: 30.7 PG (ref 26.6–33)
MCH RBC QN AUTO: 30.8 PG (ref 26.6–33)
MCH RBC QN AUTO: 30.9 PG (ref 26.6–33)
MCH RBC QN AUTO: 30.9 PG (ref 26.6–33)
MCH RBC QN AUTO: 31 PG (ref 26.6–33)
MCH RBC QN AUTO: 31.1 PG (ref 26.6–33)
MCH RBC QN AUTO: 31.1 PG (ref 26.6–33)
MCH RBC QN AUTO: 31.2 PG (ref 26.6–33)
MCH RBC QN AUTO: 31.2 PG (ref 26.6–33)
MCH RBC QN AUTO: 31.3 PG (ref 26.6–33)
MCH RBC QN AUTO: 31.4 PG (ref 26.6–33)
MCH RBC QN AUTO: 31.4 PG (ref 26.6–33)
MCH RBC QN AUTO: 31.5 PG (ref 26.6–33)
MCH RBC QN AUTO: 31.8 PG (ref 26.6–33)
MCH RBC QN AUTO: 32 PG (ref 26.6–33)
MCH RBC QN AUTO: 32.5 PG (ref 26.6–33)
MCH RBC QN AUTO: 33.7 PG (ref 26.6–33)
MCHC RBC AUTO-ENTMCNC: 30.5 G/DL (ref 31.5–35.7)
MCHC RBC AUTO-ENTMCNC: 31.8 G/DL (ref 31.5–35.7)
MCHC RBC AUTO-ENTMCNC: 32.1 G/DL (ref 31.5–35.7)
MCHC RBC AUTO-ENTMCNC: 32.2 G/DL (ref 31.5–35.7)
MCHC RBC AUTO-ENTMCNC: 32.3 G/DL (ref 31.5–35.7)
MCHC RBC AUTO-ENTMCNC: 32.4 G/DL (ref 31.5–35.7)
MCHC RBC AUTO-ENTMCNC: 32.6 G/DL (ref 31.5–35.7)
MCHC RBC AUTO-ENTMCNC: 32.8 G/DL (ref 31.5–35.7)
MCHC RBC AUTO-ENTMCNC: 32.8 G/DL (ref 31.5–35.7)
MCHC RBC AUTO-ENTMCNC: 33.1 G/DL (ref 31.5–35.7)
MCHC RBC AUTO-ENTMCNC: 33.2 G/DL (ref 31.5–35.7)
MCHC RBC AUTO-ENTMCNC: 33.2 G/DL (ref 31.5–35.7)
MCHC RBC AUTO-ENTMCNC: 33.3 G/DL (ref 31.5–35.7)
MCHC RBC AUTO-ENTMCNC: 33.4 G/DL (ref 31.5–35.7)
MCHC RBC AUTO-ENTMCNC: 33.5 G/DL (ref 31.5–35.7)
MCHC RBC AUTO-ENTMCNC: 33.5 G/DL (ref 31.5–35.7)
MCHC RBC AUTO-ENTMCNC: 33.6 G/DL (ref 31.5–35.7)
MCHC RBC AUTO-ENTMCNC: 33.7 G/DL (ref 31.5–35.7)
MCHC RBC AUTO-ENTMCNC: 33.7 G/DL (ref 31.5–35.7)
MCHC RBC AUTO-ENTMCNC: 33.8 G/DL (ref 31.5–35.7)
MCHC RBC AUTO-ENTMCNC: 33.8 G/DL (ref 31.5–35.7)
MCHC RBC AUTO-ENTMCNC: 33.9 G/DL (ref 31.5–35.7)
MCHC RBC AUTO-ENTMCNC: 33.9 G/DL (ref 31.5–35.7)
MCHC RBC AUTO-ENTMCNC: 34.3 G/DL (ref 31.5–35.7)
MCHC RBC AUTO-ENTMCNC: 34.3 G/DL (ref 31.5–35.7)
MCHC RBC AUTO-ENTMCNC: 34.4 G/DL (ref 31.5–35.7)
MCHC RBC AUTO-ENTMCNC: 34.4 G/DL (ref 31.5–35.7)
MCHC RBC AUTO-ENTMCNC: 34.5 G/DL (ref 31.5–35.7)
MCV RBC AUTO: 88.3 FL (ref 79–97)
MCV RBC AUTO: 88.3 FL (ref 79–97)
MCV RBC AUTO: 88.4 FL (ref 79–97)
MCV RBC AUTO: 88.5 FL (ref 79–97)
MCV RBC AUTO: 89.5 FL (ref 79–97)
MCV RBC AUTO: 89.8 FL (ref 79–97)
MCV RBC AUTO: 90 FL (ref 79–97)
MCV RBC AUTO: 90.3 FL (ref 79–97)
MCV RBC AUTO: 90.3 FL (ref 79–97)
MCV RBC AUTO: 90.5 FL (ref 79–97)
MCV RBC AUTO: 90.7 FL (ref 79–97)
MCV RBC AUTO: 91.1 FL (ref 79–97)
MCV RBC AUTO: 91.1 FL (ref 79–97)
MCV RBC AUTO: 91.2 FL (ref 79–97)
MCV RBC AUTO: 91.2 FL (ref 79–97)
MCV RBC AUTO: 91.3 FL (ref 79–97)
MCV RBC AUTO: 91.3 FL (ref 79–97)
MCV RBC AUTO: 91.7 FL (ref 79–97)
MCV RBC AUTO: 91.8 FL (ref 79–97)
MCV RBC AUTO: 91.9 FL (ref 79–97)
MCV RBC AUTO: 92 FL (ref 79–97)
MCV RBC AUTO: 92 FL (ref 79–97)
MCV RBC AUTO: 92.5 FL (ref 79–97)
MCV RBC AUTO: 92.8 FL (ref 79–97)
MCV RBC AUTO: 92.8 FL (ref 79–97)
MCV RBC AUTO: 92.9 FL (ref 79–97)
MCV RBC AUTO: 93.3 FL (ref 79–97)
MCV RBC AUTO: 93.3 FL (ref 79–97)
MCV RBC AUTO: 93.5 FL (ref 79–97)
MCV RBC AUTO: 94 FL (ref 79–97)
MCV RBC AUTO: 94 FL (ref 79–97)
MCV RBC AUTO: 94.1 FL (ref 79–97)
MCV RBC AUTO: 94.2 FL (ref 79–97)
MCV RBC AUTO: 94.3 FL (ref 79–97)
MCV RBC AUTO: 94.4 FL (ref 79–97)
MCV RBC AUTO: 94.6 FL (ref 79–97)
MCV RBC AUTO: 94.8 FL (ref 79–97)
MCV RBC AUTO: 96.7 FL (ref 79–97)
MCV RBC AUTO: 96.9 FL (ref 79–97)
MCV RBC AUTO: 97.1 FL (ref 79–97)
MCV RBC AUTO: 97.9 FL (ref 79–97)
MODALITY: ABNORMAL
MONOCYTES # BLD AUTO: 0.21 10*3/MM3 (ref 0.1–0.9)
MONOCYTES # BLD AUTO: 0.28 10*3/MM3 (ref 0.1–0.9)
MONOCYTES # BLD AUTO: 0.3 10*3/MM3 (ref 0.1–0.9)
MONOCYTES # BLD AUTO: 0.3 10*3/MM3 (ref 0.1–0.9)
MONOCYTES # BLD AUTO: 0.31 10*3/MM3 (ref 0.1–0.9)
MONOCYTES # BLD AUTO: 0.32 10*3/MM3 (ref 0.1–0.9)
MONOCYTES # BLD AUTO: 0.32 10*3/MM3 (ref 0.1–0.9)
MONOCYTES # BLD AUTO: 0.34 10*3/MM3 (ref 0.1–0.9)
MONOCYTES # BLD AUTO: 0.38 10*3/MM3 (ref 0.1–0.9)
MONOCYTES # BLD AUTO: 0.4 10*3/MM3 (ref 0.1–0.9)
MONOCYTES # BLD AUTO: 0.44 10*3/MM3 (ref 0.1–0.9)
MONOCYTES # BLD AUTO: 0.47 10*3/MM3 (ref 0.1–0.9)
MONOCYTES # BLD AUTO: 0.48 10*3/MM3 (ref 0.1–0.9)
MONOCYTES # BLD AUTO: 0.48 10*3/MM3 (ref 0.1–0.9)
MONOCYTES # BLD AUTO: 0.52 10*3/MM3 (ref 0.1–0.9)
MONOCYTES # BLD AUTO: 0.53 10*3/MM3 (ref 0.1–0.9)
MONOCYTES # BLD AUTO: 0.54 10*3/MM3 (ref 0.1–0.9)
MONOCYTES # BLD AUTO: 0.57 10*3/MM3 (ref 0.1–0.9)
MONOCYTES # BLD AUTO: 0.58 10*3/MM3 (ref 0.1–0.9)
MONOCYTES # BLD AUTO: 0.6 10*3/MM3 (ref 0.1–0.9)
MONOCYTES # BLD AUTO: 0.61 10*3/MM3 (ref 0.1–0.9)
MONOCYTES # BLD AUTO: 0.61 10*3/MM3 (ref 0.1–0.9)
MONOCYTES # BLD AUTO: 0.7 10*3/MM3 (ref 0.1–0.9)
MONOCYTES # BLD AUTO: 0.71 10*3/MM3 (ref 0.1–0.9)
MONOCYTES # BLD AUTO: 0.78 10*3/MM3 (ref 0.1–0.9)
MONOCYTES # BLD AUTO: 0.78 10*3/MM3 (ref 0.1–0.9)
MONOCYTES # BLD AUTO: 0.8 10*3/MM3 (ref 0.1–0.9)
MONOCYTES # BLD: 0.29 10*3/MM3 (ref 0.1–0.9)
MONOCYTES NFR BLD AUTO: 10.9 % (ref 5–12)
MONOCYTES NFR BLD AUTO: 2.4 % (ref 5–12)
MONOCYTES NFR BLD AUTO: 3.2 % (ref 5–12)
MONOCYTES NFR BLD AUTO: 3.2 % (ref 5–12)
MONOCYTES NFR BLD AUTO: 3.3 % (ref 5–12)
MONOCYTES NFR BLD AUTO: 3.5 % (ref 5–12)
MONOCYTES NFR BLD AUTO: 3.7 % (ref 5–12)
MONOCYTES NFR BLD AUTO: 4 % (ref 5–12)
MONOCYTES NFR BLD AUTO: 4 % (ref 5–12)
MONOCYTES NFR BLD AUTO: 4.4 % (ref 5–12)
MONOCYTES NFR BLD AUTO: 4.4 % (ref 5–12)
MONOCYTES NFR BLD AUTO: 5 % (ref 5–12)
MONOCYTES NFR BLD AUTO: 5.3 % (ref 5–12)
MONOCYTES NFR BLD AUTO: 5.4 % (ref 5–12)
MONOCYTES NFR BLD AUTO: 6 % (ref 5–12)
MONOCYTES NFR BLD AUTO: 6.4 % (ref 5–12)
MONOCYTES NFR BLD AUTO: 6.5 % (ref 5–12)
MONOCYTES NFR BLD AUTO: 7 % (ref 5–12)
MONOCYTES NFR BLD AUTO: 7.1 % (ref 5–12)
MONOCYTES NFR BLD AUTO: 7.5 % (ref 5–12)
MONOCYTES NFR BLD AUTO: 8.1 % (ref 5–12)
MONOCYTES NFR BLD AUTO: 8.4 % (ref 5–12)
MONOCYTES NFR BLD AUTO: 8.4 % (ref 5–12)
MONOCYTES NFR BLD AUTO: 8.5 % (ref 5–12)
MONOCYTES NFR BLD AUTO: 8.7 % (ref 5–12)
MONOCYTES NFR BLD AUTO: 9.3 % (ref 5–12)
MONOCYTES NFR BLD AUTO: 9.3 % (ref 5–12)
MONOCYTES NFR BLD AUTO: 9.6 % (ref 5–12)
MONOCYTES NFR BLD AUTO: 9.9 % (ref 5–12)
MRSA DNA SPEC QL NAA+PROBE: ABNORMAL
MRSA DNA SPEC QL NAA+PROBE: ABNORMAL
NEUTROPHILS # BLD AUTO: 5.52 10*3/MM3 (ref 1.7–7)
NEUTROPHILS NFR BLD AUTO: 10.61 10*3/MM3 (ref 1.7–7)
NEUTROPHILS NFR BLD AUTO: 10.97 10*3/MM3 (ref 1.7–7)
NEUTROPHILS NFR BLD AUTO: 14.83 10*3/MM3 (ref 1.7–7)
NEUTROPHILS NFR BLD AUTO: 16.23 10*3/MM3 (ref 1.7–7)
NEUTROPHILS NFR BLD AUTO: 17.76 10*3/MM3 (ref 1.7–7)
NEUTROPHILS NFR BLD AUTO: 18.09 10*3/MM3 (ref 1.7–7)
NEUTROPHILS NFR BLD AUTO: 2.98 10*3/MM3 (ref 1.7–7)
NEUTROPHILS NFR BLD AUTO: 3.21 10*3/MM3 (ref 1.7–7)
NEUTROPHILS NFR BLD AUTO: 3.42 10*3/MM3 (ref 1.7–7)
NEUTROPHILS NFR BLD AUTO: 3.89 10*3/MM3 (ref 1.7–7)
NEUTROPHILS NFR BLD AUTO: 3.92 10*3/MM3 (ref 1.7–7)
NEUTROPHILS NFR BLD AUTO: 3.97 10*3/MM3 (ref 1.7–7)
NEUTROPHILS NFR BLD AUTO: 4.14 10*3/MM3 (ref 1.7–7)
NEUTROPHILS NFR BLD AUTO: 4.45 10*3/MM3 (ref 1.7–7)
NEUTROPHILS NFR BLD AUTO: 4.49 10*3/MM3 (ref 1.7–7)
NEUTROPHILS NFR BLD AUTO: 4.52 10*3/MM3 (ref 1.7–7)
NEUTROPHILS NFR BLD AUTO: 4.57 10*3/MM3 (ref 1.7–7)
NEUTROPHILS NFR BLD AUTO: 4.64 10*3/MM3 (ref 1.7–7)
NEUTROPHILS NFR BLD AUTO: 5.45 10*3/MM3 (ref 1.7–7)
NEUTROPHILS NFR BLD AUTO: 5.84 10*3/MM3 (ref 1.7–7)
NEUTROPHILS NFR BLD AUTO: 6.14 10*3/MM3 (ref 1.7–7)
NEUTROPHILS NFR BLD AUTO: 6.52 10*3/MM3 (ref 1.7–7)
NEUTROPHILS NFR BLD AUTO: 6.9 10*3/MM3 (ref 1.7–7)
NEUTROPHILS NFR BLD AUTO: 66.4 % (ref 42.7–76)
NEUTROPHILS NFR BLD AUTO: 68.6 % (ref 42.7–76)
NEUTROPHILS NFR BLD AUTO: 69.9 % (ref 42.7–76)
NEUTROPHILS NFR BLD AUTO: 7.18 10*3/MM3 (ref 1.7–7)
NEUTROPHILS NFR BLD AUTO: 7.77 10*3/MM3 (ref 1.7–7)
NEUTROPHILS NFR BLD AUTO: 70 % (ref 42.7–76)
NEUTROPHILS NFR BLD AUTO: 70.1 % (ref 42.7–76)
NEUTROPHILS NFR BLD AUTO: 71.7 % (ref 42.7–76)
NEUTROPHILS NFR BLD AUTO: 71.8 % (ref 42.7–76)
NEUTROPHILS NFR BLD AUTO: 72 % (ref 42.7–76)
NEUTROPHILS NFR BLD AUTO: 72.9 % (ref 42.7–76)
NEUTROPHILS NFR BLD AUTO: 78 % (ref 42.7–76)
NEUTROPHILS NFR BLD AUTO: 79.3 % (ref 42.7–76)
NEUTROPHILS NFR BLD AUTO: 79.4 % (ref 42.7–76)
NEUTROPHILS NFR BLD AUTO: 79.7 % (ref 42.7–76)
NEUTROPHILS NFR BLD AUTO: 8.2 10*3/MM3 (ref 1.7–7)
NEUTROPHILS NFR BLD AUTO: 8.46 10*3/MM3 (ref 1.7–7)
NEUTROPHILS NFR BLD AUTO: 8.91 10*3/MM3 (ref 1.7–7)
NEUTROPHILS NFR BLD AUTO: 80.1 % (ref 42.7–76)
NEUTROPHILS NFR BLD AUTO: 80.1 % (ref 42.7–76)
NEUTROPHILS NFR BLD AUTO: 84.1 % (ref 42.7–76)
NEUTROPHILS NFR BLD AUTO: 84.6 % (ref 42.7–76)
NEUTROPHILS NFR BLD AUTO: 84.7 % (ref 42.7–76)
NEUTROPHILS NFR BLD AUTO: 84.9 % (ref 42.7–76)
NEUTROPHILS NFR BLD AUTO: 85.1 % (ref 42.7–76)
NEUTROPHILS NFR BLD AUTO: 85.5 % (ref 42.7–76)
NEUTROPHILS NFR BLD AUTO: 87.2 % (ref 42.7–76)
NEUTROPHILS NFR BLD AUTO: 88.4 % (ref 42.7–76)
NEUTROPHILS NFR BLD AUTO: 9.45 10*3/MM3 (ref 1.7–7)
NEUTROPHILS NFR BLD AUTO: 90.3 % (ref 42.7–76)
NEUTROPHILS NFR BLD AUTO: 90.9 % (ref 42.7–76)
NEUTROPHILS NFR BLD AUTO: 91 % (ref 42.7–76)
NEUTROPHILS NFR BLD AUTO: 91.7 % (ref 42.7–76)
NEUTROPHILS NFR BLD AUTO: 92 % (ref 42.7–76)
NEUTROPHILS NFR BLD AUTO: 92.4 % (ref 42.7–76)
NEUTROPHILS NFR BLD MANUAL: 76.8 % (ref 42.7–76)
NITRITE UR QL STRIP: NEGATIVE
NITRITE UR QL STRIP: POSITIVE
NRBC BLD AUTO-RTO: 0 /100 WBC (ref 0–0.2)
NRBC BLD AUTO-RTO: 0.2 /100 WBC (ref 0–0.2)
NT-PROBNP SERPL-MCNC: 2267 PG/ML (ref 0–900)
OSMOLALITY UR: 468 MOSM/KG
PCO2 BLDA: 25.1 MM HG (ref 35–45)
PCO2 BLDA: 28.9 MM HG (ref 35–45)
PCO2 BLDA: 43.1 MM HG (ref 35–45)
PCO2 BLDA: 43.1 MM HG (ref 35–45)
PH BLDA: 7.45 PH UNITS (ref 7.35–7.45)
PH BLDA: 7.49 PH UNITS (ref 7.35–7.45)
PH BLDA: 7.52 PH UNITS (ref 7.35–7.45)
PH BLDA: 7.54 PH UNITS (ref 7.35–7.45)
PH UR STRIP.AUTO: 5.5 [PH] (ref 5–8)
PH UR STRIP.AUTO: 6 [PH] (ref 5–8)
PH UR STRIP.AUTO: 6.5 [PH] (ref 5–8)
PH UR STRIP.AUTO: 6.5 [PH] (ref 5–8)
PH UR STRIP.AUTO: 7 [PH] (ref 4.6–8)
PH UR STRIP.AUTO: 7.5 [PH] (ref 5–8)
PH UR STRIP.AUTO: 8.5 [PH] (ref 5–8)
PHOSPHATE SERPL-MCNC: 1.6 MG/DL (ref 2.5–4.5)
PHOSPHATE SERPL-MCNC: 1.7 MG/DL (ref 2.5–4.5)
PHOSPHATE SERPL-MCNC: 1.9 MG/DL (ref 2.5–4.5)
PHOSPHATE SERPL-MCNC: 2 MG/DL (ref 2.5–4.5)
PHOSPHATE SERPL-MCNC: 2.4 MG/DL (ref 2.5–4.5)
PHOSPHATE SERPL-MCNC: 2.5 MG/DL (ref 2.5–4.5)
PHOSPHATE SERPL-MCNC: 2.5 MG/DL (ref 2.5–4.5)
PHOSPHATE SERPL-MCNC: 2.6 MG/DL (ref 2.5–4.5)
PHOSPHATE SERPL-MCNC: 2.9 MG/DL (ref 2.5–4.5)
PHOSPHATE SERPL-MCNC: 2.9 MG/DL (ref 2.5–4.5)
PLAT MORPH BLD: NORMAL
PLATELET # BLD AUTO: 109 10*3/MM3 (ref 140–450)
PLATELET # BLD AUTO: 109 10*3/MM3 (ref 140–450)
PLATELET # BLD AUTO: 111 10*3/MM3 (ref 140–450)
PLATELET # BLD AUTO: 118 10*3/MM3 (ref 140–450)
PLATELET # BLD AUTO: 118 10*3/MM3 (ref 140–450)
PLATELET # BLD AUTO: 120 10*3/MM3 (ref 140–450)
PLATELET # BLD AUTO: 123 10*3/MM3 (ref 140–450)
PLATELET # BLD AUTO: 124 10*3/MM3 (ref 140–450)
PLATELET # BLD AUTO: 125 10*3/MM3 (ref 140–450)
PLATELET # BLD AUTO: 127 10*3/MM3 (ref 140–450)
PLATELET # BLD AUTO: 128 10*3/MM3 (ref 140–450)
PLATELET # BLD AUTO: 130 10*3/MM3 (ref 140–450)
PLATELET # BLD AUTO: 131 10*3/MM3 (ref 140–450)
PLATELET # BLD AUTO: 134 10*3/MM3 (ref 140–450)
PLATELET # BLD AUTO: 140 10*3/MM3 (ref 140–450)
PLATELET # BLD AUTO: 143 10*3/MM3 (ref 140–450)
PLATELET # BLD AUTO: 144 10*3/MM3 (ref 140–450)
PLATELET # BLD AUTO: 145 10*3/MM3 (ref 140–450)
PLATELET # BLD AUTO: 145 10*3/MM3 (ref 140–450)
PLATELET # BLD AUTO: 146 10*3/MM3 (ref 140–450)
PLATELET # BLD AUTO: 151 10*3/MM3 (ref 140–450)
PLATELET # BLD AUTO: 154 10*3/MM3 (ref 140–450)
PLATELET # BLD AUTO: 155 10*3/MM3 (ref 140–450)
PLATELET # BLD AUTO: 158 10*3/MM3 (ref 140–450)
PLATELET # BLD AUTO: 159 10*3/MM3 (ref 140–450)
PLATELET # BLD AUTO: 166 10*3/MM3 (ref 140–450)
PLATELET # BLD AUTO: 168 10*3/MM3 (ref 140–450)
PLATELET # BLD AUTO: 178 10*3/MM3 (ref 140–450)
PLATELET # BLD AUTO: 179 10*3/MM3 (ref 140–450)
PLATELET # BLD AUTO: 181 10*3/MM3 (ref 140–450)
PLATELET # BLD AUTO: 184 10*3/MM3 (ref 140–450)
PLATELET # BLD AUTO: 186 10*3/MM3 (ref 140–450)
PLATELET # BLD AUTO: 187 10*3/MM3 (ref 140–450)
PLATELET # BLD AUTO: 194 10*3/MM3 (ref 140–450)
PLATELET # BLD AUTO: 207 10*3/MM3 (ref 140–450)
PLATELET # BLD AUTO: 212 10*3/MM3 (ref 140–450)
PLATELET # BLD AUTO: 223 10*3/MM3 (ref 140–450)
PLATELET # BLD AUTO: 244 10*3/MM3 (ref 140–450)
PLATELET # BLD AUTO: 255 10*3/MM3 (ref 140–450)
PLATELET # BLD AUTO: 281 10*3/MM3 (ref 140–450)
PLATELET # BLD AUTO: 431 10*3/MM3 (ref 140–450)
PMV BLD AUTO: 10 FL (ref 6–12)
PMV BLD AUTO: 10.1 FL (ref 6–12)
PMV BLD AUTO: 10.2 FL (ref 6–12)
PMV BLD AUTO: 10.3 FL (ref 6–12)
PMV BLD AUTO: 10.4 FL (ref 6–12)
PMV BLD AUTO: 10.5 FL (ref 6–12)
PMV BLD AUTO: 10.5 FL (ref 6–12)
PMV BLD AUTO: 10.6 FL (ref 6–12)
PMV BLD AUTO: 10.8 FL (ref 6–12)
PMV BLD AUTO: 11 FL (ref 6–12)
PMV BLD AUTO: 6.8 FL (ref 6–12)
PMV BLD AUTO: 7.3 FL (ref 6–12)
PMV BLD AUTO: 9.2 FL (ref 6–12)
PMV BLD AUTO: 9.3 FL (ref 6–12)
PMV BLD AUTO: 9.3 FL (ref 6–12)
PMV BLD AUTO: 9.4 FL (ref 6–12)
PMV BLD AUTO: 9.5 FL (ref 6–12)
PMV BLD AUTO: 9.5 FL (ref 6–12)
PMV BLD AUTO: 9.6 FL (ref 6–12)
PMV BLD AUTO: 9.7 FL (ref 6–12)
PO2 BLDA: 66.6 MM HG (ref 80–100)
PO2 BLDA: 72.9 MM HG (ref 80–100)
PO2 BLDA: 73.7 MM HG (ref 80–100)
PO2 BLDA: 74.2 MM HG (ref 80–100)
POTASSIUM SERPL-SCNC: 2.3 MMOL/L (ref 3.5–5.2)
POTASSIUM SERPL-SCNC: 2.3 MMOL/L (ref 3.5–5.2)
POTASSIUM SERPL-SCNC: 2.9 MMOL/L (ref 3.5–5.2)
POTASSIUM SERPL-SCNC: 2.9 MMOL/L (ref 3.5–5.2)
POTASSIUM SERPL-SCNC: 3 MMOL/L (ref 3.5–5.2)
POTASSIUM SERPL-SCNC: 3.1 MMOL/L (ref 3.5–5.2)
POTASSIUM SERPL-SCNC: 3.1 MMOL/L (ref 3.5–5.2)
POTASSIUM SERPL-SCNC: 3.3 MMOL/L (ref 3.5–5.2)
POTASSIUM SERPL-SCNC: 3.4 MMOL/L (ref 3.5–5.2)
POTASSIUM SERPL-SCNC: 3.4 MMOL/L (ref 3.5–5.2)
POTASSIUM SERPL-SCNC: 3.5 MMOL/L (ref 3.5–5.2)
POTASSIUM SERPL-SCNC: 3.5 MMOL/L (ref 3.5–5.2)
POTASSIUM SERPL-SCNC: 3.6 MMOL/L (ref 3.5–5.2)
POTASSIUM SERPL-SCNC: 3.6 MMOL/L (ref 3.5–5.2)
POTASSIUM SERPL-SCNC: 3.7 MMOL/L (ref 3.5–5.2)
POTASSIUM SERPL-SCNC: 3.8 MMOL/L (ref 3.5–5.2)
POTASSIUM SERPL-SCNC: 3.9 MMOL/L (ref 3.5–5.2)
POTASSIUM SERPL-SCNC: 4 MMOL/L (ref 3.5–5.2)
POTASSIUM SERPL-SCNC: 4.1 MMOL/L (ref 3.5–5.2)
POTASSIUM SERPL-SCNC: 4.1 MMOL/L (ref 3.5–5.2)
POTASSIUM SERPL-SCNC: 4.2 MMOL/L (ref 3.5–5.2)
POTASSIUM SERPL-SCNC: 4.3 MMOL/L (ref 3.5–5.2)
POTASSIUM SERPL-SCNC: 4.4 MMOL/L (ref 3.5–5.2)
POTASSIUM SERPL-SCNC: 4.5 MMOL/L (ref 3.5–5.2)
PROCALCITONIN SERPL-MCNC: 0.1 NG/ML (ref 0–0.25)
PROCALCITONIN SERPL-MCNC: 0.12 NG/ML (ref 0–0.25)
PROCALCITONIN SERPL-MCNC: 0.12 NG/ML (ref 0–0.25)
PROCALCITONIN SERPL-MCNC: 0.36 NG/ML (ref 0–0.25)
PROCALCITONIN SERPL-MCNC: 0.38 NG/ML (ref 0–0.25)
PROCALCITONIN SERPL-MCNC: 1.72 NG/ML (ref 0–0.25)
PROCALCITONIN SERPL-MCNC: 11 NG/ML (ref 0–0.25)
PROCALCITONIN SERPL-MCNC: 2.3 NG/ML (ref 0–0.25)
PROCALCITONIN SERPL-MCNC: 3.59 NG/ML (ref 0–0.25)
PROCALCITONIN SERPL-MCNC: 7.79 NG/ML (ref 0–0.25)
PROT SERPL-MCNC: 6.6 G/DL (ref 6–8.5)
PROT SERPL-MCNC: 6.6 G/DL (ref 6–8.5)
PROT SERPL-MCNC: 6.9 G/DL (ref 6–8.5)
PROT SERPL-MCNC: 7.3 G/DL (ref 6–8.5)
PROT SERPL-MCNC: 7.4 G/DL (ref 6–8.5)
PROT SERPL-MCNC: 7.6 G/DL (ref 6–8.5)
PROT SERPL-MCNC: 7.6 G/DL (ref 6–8.5)
PROT SERPL-MCNC: 7.7 G/DL (ref 6–8.5)
PROT SERPL-MCNC: 7.7 G/DL (ref 6–8.5)
PROT SERPL-MCNC: 7.9 G/DL (ref 6–8.5)
PROT SERPL-MCNC: 8 G/DL (ref 6–8.5)
PROT SERPL-MCNC: 8.1 G/DL (ref 6–8.5)
PROT UR QL STRIP: ABNORMAL
PROT UR QL STRIP: NEGATIVE
PROT UR QL STRIP: NEGATIVE
PROTHROMBIN TIME: 17.6 SECONDS (ref 11.7–14.2)
PROTHROMBIN TIME: 18.3 SECONDS (ref 11.7–14.2)
PROTHROMBIN TIME: 18.8 SECONDS (ref 11.7–14.2)
PROTHROMBIN TIME: 19 SECONDS (ref 11.7–14.2)
PROTHROMBIN TIME: 19.5 SECONDS (ref 11.7–14.2)
PROTHROMBIN TIME: 19.6 SECONDS (ref 11.7–14.2)
PROTHROMBIN TIME: 19.6 SECONDS (ref 11.7–14.2)
PROTHROMBIN TIME: 19.8 SECONDS (ref 11.7–14.2)
PROTHROMBIN TIME: 20 SECONDS (ref 11.7–14.2)
PROTHROMBIN TIME: 20.1 SECONDS (ref 11.7–14.2)
PROTHROMBIN TIME: 20.2 SECONDS (ref 11.7–14.2)
PROTHROMBIN TIME: 20.2 SECONDS (ref 11.7–14.2)
PROTHROMBIN TIME: 20.3 SECONDS (ref 11.7–14.2)
PROTHROMBIN TIME: 20.4 SECONDS (ref 11.7–14.2)
PROTHROMBIN TIME: 21 SECONDS (ref 11.7–14.2)
PROTHROMBIN TIME: 21.2 SECONDS (ref 11.7–14.2)
PROTHROMBIN TIME: 21.3 SECONDS (ref 11.7–14.2)
PROTHROMBIN TIME: 21.7 SECONDS (ref 11.7–14.2)
PROTHROMBIN TIME: 22 SECONDS (ref 11.7–14.2)
PROTHROMBIN TIME: 22.2 SECONDS (ref 11.7–14.2)
PROTHROMBIN TIME: 22.3 SECONDS (ref 11.7–14.2)
PROTHROMBIN TIME: 22.7 SECONDS (ref 11.7–14.2)
PROTHROMBIN TIME: 23 SECONDS (ref 11.7–14.2)
PROTHROMBIN TIME: 23.1 SECONDS (ref 11.7–14.2)
PROTHROMBIN TIME: 23.3 SECONDS (ref 11.7–14.2)
PROTHROMBIN TIME: 23.6 SECONDS (ref 11.7–14.2)
PROTHROMBIN TIME: 23.8 SECONDS (ref 11.7–14.2)
PROTHROMBIN TIME: 23.9 SECONDS (ref 11.7–14.2)
PROTHROMBIN TIME: 25 SECONDS (ref 11.7–14.2)
PROTHROMBIN TIME: 25.3 SECONDS (ref 11.7–14.2)
PROTHROMBIN TIME: 27 SECONDS (ref 11.7–14.2)
PROTHROMBIN TIME: 27.5 SECONDS (ref 11.7–14.2)
PROTHROMBIN TIME: 27.6 SECONDS (ref 11.7–14.2)
PROTHROMBIN TIME: 27.8 SECONDS (ref 11.7–14.2)
PROTHROMBIN TIME: 28.6 SECONDS (ref 11.7–14.2)
PROTHROMBIN TIME: 28.7 SECONDS (ref 11.7–14.2)
PROTHROMBIN TIME: 28.9 SECONDS (ref 11.7–14.2)
PROTHROMBIN TIME: 29.9 SECONDS (ref 11.7–14.2)
PROTHROMBIN TIME: 31.1 SECONDS (ref 11.7–14.2)
PROTHROMBIN TIME: 43.4 SECONDS (ref 11.7–14.2)
QT INTERVAL: 295 MS
QT INTERVAL: 334 MS
QT INTERVAL: 349 MS
QT INTERVAL: 350 MS
QT INTERVAL: 396 MS
QT INTERVAL: 474 MS
QT INTERVAL: 486 MS
QT INTERVAL: 552 MS
QT INTERVAL: 569 MS
QT INTERVAL: 580 MS
RBC # BLD AUTO: 2.84 10*6/MM3 (ref 4.14–5.8)
RBC # BLD AUTO: 2.97 10*6/MM3 (ref 4.14–5.8)
RBC # BLD AUTO: 2.97 10*6/MM3 (ref 4.14–5.8)
RBC # BLD AUTO: 2.98 10*6/MM3 (ref 4.14–5.8)
RBC # BLD AUTO: 3.1 10*6/MM3 (ref 4.14–5.8)
RBC # BLD AUTO: 3.11 10*6/MM3 (ref 4.14–5.8)
RBC # BLD AUTO: 3.13 10*6/MM3 (ref 4.14–5.8)
RBC # BLD AUTO: 3.15 10*6/MM3 (ref 4.14–5.8)
RBC # BLD AUTO: 3.17 10*6/MM3 (ref 4.14–5.8)
RBC # BLD AUTO: 3.19 10*6/MM3 (ref 4.14–5.8)
RBC # BLD AUTO: 3.21 10*6/MM3 (ref 4.14–5.8)
RBC # BLD AUTO: 3.22 10*6/MM3 (ref 4.14–5.8)
RBC # BLD AUTO: 3.22 10*6/MM3 (ref 4.14–5.8)
RBC # BLD AUTO: 3.23 10*6/MM3 (ref 4.14–5.8)
RBC # BLD AUTO: 3.24 10*6/MM3 (ref 4.14–5.8)
RBC # BLD AUTO: 3.25 10*6/MM3 (ref 4.14–5.8)
RBC # BLD AUTO: 3.26 10*6/MM3 (ref 4.14–5.8)
RBC # BLD AUTO: 3.26 10*6/MM3 (ref 4.14–5.8)
RBC # BLD AUTO: 3.29 10*6/MM3 (ref 4.14–5.8)
RBC # BLD AUTO: 3.36 10*6/MM3 (ref 4.14–5.8)
RBC # BLD AUTO: 3.38 10*6/MM3 (ref 4.14–5.8)
RBC # BLD AUTO: 3.44 10*6/MM3 (ref 4.14–5.8)
RBC # BLD AUTO: 3.45 10*6/MM3 (ref 4.14–5.8)
RBC # BLD AUTO: 3.45 10*6/MM3 (ref 4.14–5.8)
RBC # BLD AUTO: 3.47 10*6/MM3 (ref 4.14–5.8)
RBC # BLD AUTO: 3.48 10*6/MM3 (ref 4.14–5.8)
RBC # BLD AUTO: 3.49 10*6/MM3 (ref 4.14–5.8)
RBC # BLD AUTO: 3.51 10*6/MM3 (ref 4.14–5.8)
RBC # BLD AUTO: 3.56 10*6/MM3 (ref 4.14–5.8)
RBC # BLD AUTO: 3.62 10*6/MM3 (ref 4.14–5.8)
RBC # BLD AUTO: 3.67 10*6/MM3 (ref 4.14–5.8)
RBC # BLD AUTO: 3.72 10*6/MM3 (ref 4.14–5.8)
RBC # BLD AUTO: 3.79 10*6/MM3 (ref 4.14–5.8)
RBC # BLD AUTO: 3.8 10*6/MM3 (ref 4.14–5.8)
RBC # BLD AUTO: 3.88 10*6/MM3 (ref 4.14–5.8)
RBC # BLD AUTO: 3.97 10*6/MM3 (ref 4.14–5.8)
RBC # UR STRIP: ABNORMAL /HPF
RBC # UR STRIP: NORMAL /HPF
RBC MORPH BLD: NORMAL
REF LAB TEST METHOD: ABNORMAL
REF LAB TEST METHOD: NORMAL
RETICS # AUTO: 0.03 10*6/MM3 (ref 0.02–0.13)
RETICS/RBC NFR AUTO: 0.97 % (ref 0.7–1.9)
RH BLD: POSITIVE
RHINOVIRUS RNA SPEC NAA+PROBE: NOT DETECTED
RSV RNA NPH QL NAA+NON-PROBE: NOT DETECTED
S PNEUM AG SPEC QL LA: NEGATIVE
SAO2 % BLDCOA: 93.6 % (ref 92–99)
SAO2 % BLDCOA: 95.8 % (ref 92–99)
SAO2 % BLDCOA: 96 % (ref 92–99)
SAO2 % BLDCOA: 96.7 % (ref 92–99)
SARS-COV-2 ORF1AB RESP QL NAA+PROBE: NOT DETECTED
SARS-COV-2 ORF1AB RESP QL NAA+PROBE: NOT DETECTED
SARS-COV-2 RNA NPH QL NAA+NON-PROBE: NOT DETECTED
SARS-COV-2 RNA PNL SPEC NAA+PROBE: NOT DETECTED
SARS-COV-2 RNA RESP QL NAA+PROBE: NOT DETECTED
SINUS: 3.9 CM
SINUS: 4 CM
SODIUM SERPL-SCNC: 129 MMOL/L (ref 136–145)
SODIUM SERPL-SCNC: 131 MMOL/L (ref 136–145)
SODIUM SERPL-SCNC: 133 MMOL/L (ref 136–145)
SODIUM SERPL-SCNC: 134 MMOL/L (ref 136–145)
SODIUM SERPL-SCNC: 135 MMOL/L (ref 136–145)
SODIUM SERPL-SCNC: 136 MMOL/L (ref 136–145)
SODIUM SERPL-SCNC: 137 MMOL/L (ref 136–145)
SODIUM SERPL-SCNC: 138 MMOL/L (ref 136–145)
SODIUM SERPL-SCNC: 139 MMOL/L (ref 136–145)
SODIUM SERPL-SCNC: 139 MMOL/L (ref 136–145)
SODIUM SERPL-SCNC: 140 MMOL/L (ref 136–145)
SODIUM SERPL-SCNC: 141 MMOL/L (ref 136–145)
SODIUM SERPL-SCNC: 142 MMOL/L (ref 136–145)
SODIUM SERPL-SCNC: 143 MMOL/L (ref 136–145)
SODIUM SERPL-SCNC: 145 MMOL/L (ref 136–145)
SODIUM UR-SCNC: <20 MMOL/L
SP GR UR STRIP: 1.01 (ref 1–1.03)
SP GR UR STRIP: 1.02 (ref 1–1.03)
SP GR UR STRIP: 1.03 (ref 1–1.03)
SP GR UR STRIP: <1.005 (ref 1–1.03)
SQUAMOUS #/AREA URNS HPF: ABNORMAL /HPF
SQUAMOUS #/AREA URNS HPF: NORMAL /HPF
STJ: 3.5 CM
STJ: 3.5 CM
STRESS TARGET HR: 127 BPM
T&S EXPIRATION DATE: NORMAL
T3 SERPL-MCNC: 81 NG/DL (ref 71–180)
T3FREE SERPL-MCNC: 2.3 PG/ML (ref 2–4.4)
T4 FREE SERPL-MCNC: 1.12 NG/DL (ref 0.93–1.7)
TIBC SERPL-MCNC: 229 MCG/DL (ref 298–536)
TIBC SERPL-MCNC: 250 MCG/DL (ref 298–536)
TOTAL RATE: 18 BREATHS/MINUTE
TOTAL RATE: 18 BREATHS/MINUTE
TOTAL RATE: 20 BREATHS/MINUTE
TOTAL RATE: 38 BREATHS/MINUTE
TRANSFERRIN SERPL-MCNC: 154 MG/DL (ref 200–360)
TRANSFERRIN SERPL-MCNC: 168 MG/DL (ref 200–360)
TRIGL SERPL-MCNC: 59 MG/DL (ref 0–150)
TROPONIN T SERPL-MCNC: 0.01 NG/ML (ref 0–0.03)
TROPONIN T SERPL-MCNC: 0.01 NG/ML (ref 0–0.03)
TROPONIN T SERPL-MCNC: 0.02 NG/ML (ref 0–0.03)
TROPONIN T SERPL-MCNC: 0.02 NG/ML (ref 0–0.03)
TROPONIN T SERPL-MCNC: 0.04 NG/ML (ref 0–0.03)
TROPONIN T SERPL-MCNC: <0.01 NG/ML (ref 0–0.03)
TSH SERPL DL<=0.05 MIU/L-ACNC: 0.16 UIU/ML (ref 0.27–4.2)
UPPER ARTERIAL LEFT ARM BRACHIAL SYS MAX: 130 MMHG
UPPER ARTERIAL RIGHT ARM BRACHIAL SYS MAX: 131 MMHG
URATE SERPL-MCNC: 4.3 MG/DL (ref 3.4–7)
UROBILINOGEN UR QL STRIP: ABNORMAL
UUN 24H UR-MCNC: 729 MG/DL
VANCOMYCIN TROUGH SERPL-MCNC: 15.9 MCG/ML (ref 5–20)
VANCOMYCIN TROUGH SERPL-MCNC: 22.9 MCG/ML (ref 5–20)
VARIANT LYMPHS NFR BLD MANUAL: 17.2 % (ref 19.6–45.3)
VENTILATOR MODE: ABNORMAL
VIT B12 BLD-MCNC: 436 PG/ML (ref 211–946)
VIT B12 BLD-MCNC: 658 PG/ML (ref 211–946)
VLDLC SERPL-MCNC: 13 MG/DL (ref 5–40)
WBC # UR STRIP: ABNORMAL /HPF
WBC # UR STRIP: NORMAL /HPF
WBC MORPH BLD: NORMAL
WBC NRBC COR # BLD: 10.21 10*3/MM3 (ref 3.4–10.8)
WBC NRBC COR # BLD: 10.6 10*3/MM3 (ref 3.4–10.8)
WBC NRBC COR # BLD: 11.04 10*3/MM3 (ref 3.4–10.8)
WBC NRBC COR # BLD: 12 10*3/MM3 (ref 3.4–10.8)
WBC NRBC COR # BLD: 12.14 10*3/MM3 (ref 3.4–10.8)
WBC NRBC COR # BLD: 16.13 10*3/MM3 (ref 3.4–10.8)
WBC NRBC COR # BLD: 17.71 10*3/MM3 (ref 3.4–10.8)
WBC NRBC COR # BLD: 19.49 10*3/MM3 (ref 3.4–10.8)
WBC NRBC COR # BLD: 19.56 10*3/MM3 (ref 3.4–10.8)
WBC NRBC COR # BLD: 4.26 10*3/MM3 (ref 3.4–10.8)
WBC NRBC COR # BLD: 4.84 10*3/MM3 (ref 3.4–10.8)
WBC NRBC COR # BLD: 4.88 10*3/MM3 (ref 3.4–10.8)
WBC NRBC COR # BLD: 4.9 10*3/MM3 (ref 3.4–10.8)
WBC NRBC COR # BLD: 5.21 10*3/MM3 (ref 3.4–10.8)
WBC NRBC COR # BLD: 5.42 10*3/MM3 (ref 3.4–10.8)
WBC NRBC COR # BLD: 5.44 10*3/MM3 (ref 3.4–10.8)
WBC NRBC COR # BLD: 5.62 10*3/MM3 (ref 3.4–10.8)
WBC NRBC COR # BLD: 5.71 10*3/MM3 (ref 3.4–10.8)
WBC NRBC COR # BLD: 5.91 10*3/MM3 (ref 3.4–10.8)
WBC NRBC COR # BLD: 6.08 10*3/MM3 (ref 3.4–10.8)
WBC NRBC COR # BLD: 6.12 10*3/MM3 (ref 3.4–10.8)
WBC NRBC COR # BLD: 6.25 10*3/MM3 (ref 3.4–10.8)
WBC NRBC COR # BLD: 6.27 10*3/MM3 (ref 3.4–10.8)
WBC NRBC COR # BLD: 6.36 10*3/MM3 (ref 3.4–10.8)
WBC NRBC COR # BLD: 6.46 10*3/MM3 (ref 3.4–10.8)
WBC NRBC COR # BLD: 6.55 10*3/MM3 (ref 3.4–10.8)
WBC NRBC COR # BLD: 6.86 10*3/MM3 (ref 3.4–10.8)
WBC NRBC COR # BLD: 6.91 10*3/MM3 (ref 3.4–10.8)
WBC NRBC COR # BLD: 7.16 10*3/MM3 (ref 3.4–10.8)
WBC NRBC COR # BLD: 7.19 10*3/MM3 (ref 3.4–10.8)
WBC NRBC COR # BLD: 7.21 10*3/MM3 (ref 3.4–10.8)
WBC NRBC COR # BLD: 7.47 10*3/MM3 (ref 3.4–10.8)
WBC NRBC COR # BLD: 7.75 10*3/MM3 (ref 3.4–10.8)
WBC NRBC COR # BLD: 7.89 10*3/MM3 (ref 3.4–10.8)
WBC NRBC COR # BLD: 8.36 10*3/MM3 (ref 3.4–10.8)
WBC NRBC COR # BLD: 8.6 10*3/MM3 (ref 3.4–10.8)
WBC NRBC COR # BLD: 8.69 10*3/MM3 (ref 3.4–10.8)
WBC NRBC COR # BLD: 9.16 10*3/MM3 (ref 3.4–10.8)
WBC NRBC COR # BLD: 9.64 10*3/MM3 (ref 3.4–10.8)
WBC NRBC COR # BLD: 9.8 10*3/MM3 (ref 3.4–10.8)
WBC NRBC COR # BLD: 9.99 10*3/MM3 (ref 3.4–10.8)
WHOLE BLOOD HOLD COAG: NORMAL
WHOLE BLOOD HOLD SPECIMEN: NORMAL

## 2022-01-01 PROCEDURE — 87040 BLOOD CULTURE FOR BACTERIA: CPT | Performed by: INTERNAL MEDICINE

## 2022-01-01 PROCEDURE — 94799 UNLISTED PULMONARY SVC/PX: CPT

## 2022-01-01 PROCEDURE — OUTSIDEPOS PR OUTSIDE POS PLACEHOLDER: Performed by: FAMILY MEDICINE

## 2022-01-01 PROCEDURE — 85610 PROTHROMBIN TIME: CPT | Performed by: HOSPITALIST

## 2022-01-01 PROCEDURE — 36415 COLL VENOUS BLD VENIPUNCTURE: CPT | Performed by: INTERNAL MEDICINE

## 2022-01-01 PROCEDURE — 80048 BASIC METABOLIC PNL TOTAL CA: CPT | Performed by: STUDENT IN AN ORGANIZED HEALTH CARE EDUCATION/TRAINING PROGRAM

## 2022-01-01 PROCEDURE — 94664 DEMO&/EVAL PT USE INHALER: CPT

## 2022-01-01 PROCEDURE — 84100 ASSAY OF PHOSPHORUS: CPT | Performed by: STUDENT IN AN ORGANIZED HEALTH CARE EDUCATION/TRAINING PROGRAM

## 2022-01-01 PROCEDURE — 99285 EMERGENCY DEPT VISIT HI MDM: CPT

## 2022-01-01 PROCEDURE — G0378 HOSPITAL OBSERVATION PER HR: HCPCS

## 2022-01-01 PROCEDURE — 85027 COMPLETE CBC AUTOMATED: CPT | Performed by: NURSE PRACTITIONER

## 2022-01-01 PROCEDURE — 80048 BASIC METABOLIC PNL TOTAL CA: CPT | Performed by: NURSE PRACTITIONER

## 2022-01-01 PROCEDURE — 97530 THERAPEUTIC ACTIVITIES: CPT

## 2022-01-01 PROCEDURE — 83605 ASSAY OF LACTIC ACID: CPT | Performed by: STUDENT IN AN ORGANIZED HEALTH CARE EDUCATION/TRAINING PROGRAM

## 2022-01-01 PROCEDURE — 94761 N-INVAS EAR/PLS OXIMETRY MLT: CPT

## 2022-01-01 PROCEDURE — 85652 RBC SED RATE AUTOMATED: CPT | Performed by: STUDENT IN AN ORGANIZED HEALTH CARE EDUCATION/TRAINING PROGRAM

## 2022-01-01 PROCEDURE — 0 GADOBENATE DIMEGLUMINE 529 MG/ML SOLUTION: Performed by: HOSPITALIST

## 2022-01-01 PROCEDURE — 80048 BASIC METABOLIC PNL TOTAL CA: CPT | Performed by: FAMILY MEDICINE

## 2022-01-01 PROCEDURE — 99214 OFFICE O/P EST MOD 30 MIN: CPT | Performed by: INTERNAL MEDICINE

## 2022-01-01 PROCEDURE — 87040 BLOOD CULTURE FOR BACTERIA: CPT

## 2022-01-01 PROCEDURE — 25010000002 CEFEPIME PER 500 MG: Performed by: STUDENT IN AN ORGANIZED HEALTH CARE EDUCATION/TRAINING PROGRAM

## 2022-01-01 PROCEDURE — 80048 BASIC METABOLIC PNL TOTAL CA: CPT | Performed by: INTERNAL MEDICINE

## 2022-01-01 PROCEDURE — 85025 COMPLETE CBC W/AUTO DIFF WBC: CPT | Performed by: STUDENT IN AN ORGANIZED HEALTH CARE EDUCATION/TRAINING PROGRAM

## 2022-01-01 PROCEDURE — 83690 ASSAY OF LIPASE: CPT | Performed by: EMERGENCY MEDICINE

## 2022-01-01 PROCEDURE — 0 GADOBENATE DIMEGLUMINE 529 MG/ML SOLUTION: Performed by: STUDENT IN AN ORGANIZED HEALTH CARE EDUCATION/TRAINING PROGRAM

## 2022-01-01 PROCEDURE — 97161 PT EVAL LOW COMPLEX 20 MIN: CPT

## 2022-01-01 PROCEDURE — 82570 ASSAY OF URINE CREATININE: CPT | Performed by: STUDENT IN AN ORGANIZED HEALTH CARE EDUCATION/TRAINING PROGRAM

## 2022-01-01 PROCEDURE — 85610 PROTHROMBIN TIME: CPT | Performed by: EMERGENCY MEDICINE

## 2022-01-01 PROCEDURE — 25010000002 IOPAMIDOL 61 % SOLUTION: Performed by: INTERNAL MEDICINE

## 2022-01-01 PROCEDURE — 81001 URINALYSIS AUTO W/SCOPE: CPT | Performed by: EMERGENCY MEDICINE

## 2022-01-01 PROCEDURE — 70450 CT HEAD/BRAIN W/O DYE: CPT

## 2022-01-01 PROCEDURE — 93005 ELECTROCARDIOGRAM TRACING: CPT | Performed by: INTERNAL MEDICINE

## 2022-01-01 PROCEDURE — 93306 TTE W/DOPPLER COMPLETE: CPT | Performed by: INTERNAL MEDICINE

## 2022-01-01 PROCEDURE — 97535 SELF CARE MNGMENT TRAINING: CPT

## 2022-01-01 PROCEDURE — 85610 PROTHROMBIN TIME: CPT | Performed by: INTERNAL MEDICINE

## 2022-01-01 PROCEDURE — 99232 SBSQ HOSP IP/OBS MODERATE 35: CPT | Performed by: STUDENT IN AN ORGANIZED HEALTH CARE EDUCATION/TRAINING PROGRAM

## 2022-01-01 PROCEDURE — 71045 X-RAY EXAM CHEST 1 VIEW: CPT

## 2022-01-01 PROCEDURE — 93000 ELECTROCARDIOGRAM COMPLETE: CPT | Performed by: INTERNAL MEDICINE

## 2022-01-01 PROCEDURE — 74230 X-RAY XM SWLNG FUNCJ C+: CPT

## 2022-01-01 PROCEDURE — 25010000002 FUROSEMIDE PER 20 MG: Performed by: NURSE PRACTITIONER

## 2022-01-01 PROCEDURE — 51702 INSERT TEMP BLADDER CATH: CPT

## 2022-01-01 PROCEDURE — G0179 MD RECERTIFICATION HHA PT: HCPCS | Performed by: FAMILY MEDICINE

## 2022-01-01 PROCEDURE — 85610 PROTHROMBIN TIME: CPT | Performed by: STUDENT IN AN ORGANIZED HEALTH CARE EDUCATION/TRAINING PROGRAM

## 2022-01-01 PROCEDURE — 82105 ALPHA-FETOPROTEIN SERUM: CPT | Performed by: HOSPITALIST

## 2022-01-01 PROCEDURE — 84132 ASSAY OF SERUM POTASSIUM: CPT | Performed by: INTERNAL MEDICINE

## 2022-01-01 PROCEDURE — 84145 PROCALCITONIN (PCT): CPT | Performed by: STUDENT IN AN ORGANIZED HEALTH CARE EDUCATION/TRAINING PROGRAM

## 2022-01-01 PROCEDURE — 85027 COMPLETE CBC AUTOMATED: CPT | Performed by: STUDENT IN AN ORGANIZED HEALTH CARE EDUCATION/TRAINING PROGRAM

## 2022-01-01 PROCEDURE — 87086 URINE CULTURE/COLONY COUNT: CPT | Performed by: EMERGENCY MEDICINE

## 2022-01-01 PROCEDURE — 99223 1ST HOSP IP/OBS HIGH 75: CPT | Performed by: STUDENT IN AN ORGANIZED HEALTH CARE EDUCATION/TRAINING PROGRAM

## 2022-01-01 PROCEDURE — 71250 CT THORAX DX C-: CPT

## 2022-01-01 PROCEDURE — 36600 WITHDRAWAL OF ARTERIAL BLOOD: CPT

## 2022-01-01 PROCEDURE — 96374 THER/PROPH/DIAG INJ IV PUSH: CPT

## 2022-01-01 PROCEDURE — 36410 VNPNXR 3YR/> PHY/QHP DX/THER: CPT

## 2022-01-01 PROCEDURE — 93010 ELECTROCARDIOGRAM REPORT: CPT | Performed by: STUDENT IN AN ORGANIZED HEALTH CARE EDUCATION/TRAINING PROGRAM

## 2022-01-01 PROCEDURE — 80048 BASIC METABOLIC PNL TOTAL CA: CPT | Performed by: HOSPITALIST

## 2022-01-01 PROCEDURE — 99223 1ST HOSP IP/OBS HIGH 75: CPT | Performed by: INTERNAL MEDICINE

## 2022-01-01 PROCEDURE — 85025 COMPLETE CBC W/AUTO DIFF WBC: CPT

## 2022-01-01 PROCEDURE — 94760 N-INVAS EAR/PLS OXIMETRY 1: CPT

## 2022-01-01 PROCEDURE — 25010000002 VANCOMYCIN 10 G RECONSTITUTED SOLUTION: Performed by: STUDENT IN AN ORGANIZED HEALTH CARE EDUCATION/TRAINING PROGRAM

## 2022-01-01 PROCEDURE — 25010000002 DIGOXIN PER 500 MCG: Performed by: INTERNAL MEDICINE

## 2022-01-01 PROCEDURE — 72100 X-RAY EXAM L-S SPINE 2/3 VWS: CPT

## 2022-01-01 PROCEDURE — 80053 COMPREHEN METABOLIC PANEL: CPT | Performed by: INTERNAL MEDICINE

## 2022-01-01 PROCEDURE — C1751 CATH, INF, PER/CENT/MIDLINE: HCPCS

## 2022-01-01 PROCEDURE — 93000 ELECTROCARDIOGRAM COMPLETE: CPT | Performed by: NURSE PRACTITIONER

## 2022-01-01 PROCEDURE — 96361 HYDRATE IV INFUSION ADD-ON: CPT

## 2022-01-01 PROCEDURE — 25010000002 PERFLUTREN (DEFINITY) 8.476 MG IN SODIUM CHLORIDE (PF) 0.9 % 10 ML INJECTION: Performed by: INTERNAL MEDICINE

## 2022-01-01 PROCEDURE — 82550 ASSAY OF CK (CPK): CPT | Performed by: INTERNAL MEDICINE

## 2022-01-01 PROCEDURE — 87086 URINE CULTURE/COLONY COUNT: CPT | Performed by: FAMILY MEDICINE

## 2022-01-01 PROCEDURE — 85025 COMPLETE CBC W/AUTO DIFF WBC: CPT | Performed by: HOSPITALIST

## 2022-01-01 PROCEDURE — 93010 ELECTROCARDIOGRAM REPORT: CPT | Performed by: INTERNAL MEDICINE

## 2022-01-01 PROCEDURE — 80076 HEPATIC FUNCTION PANEL: CPT | Performed by: INTERNAL MEDICINE

## 2022-01-01 PROCEDURE — 99232 SBSQ HOSP IP/OBS MODERATE 35: CPT | Performed by: INTERNAL MEDICINE

## 2022-01-01 PROCEDURE — 74177 CT ABD & PELVIS W/CONTRAST: CPT

## 2022-01-01 PROCEDURE — P9612 CATHETERIZE FOR URINE SPEC: HCPCS

## 2022-01-01 PROCEDURE — 82746 ASSAY OF FOLIC ACID SERUM: CPT | Performed by: INTERNAL MEDICINE

## 2022-01-01 PROCEDURE — 25010000002 IOPAMIDOL 61 % SOLUTION: Performed by: STUDENT IN AN ORGANIZED HEALTH CARE EDUCATION/TRAINING PROGRAM

## 2022-01-01 PROCEDURE — 87641 MR-STAPH DNA AMP PROBE: CPT | Performed by: STUDENT IN AN ORGANIZED HEALTH CARE EDUCATION/TRAINING PROGRAM

## 2022-01-01 PROCEDURE — 25010000002 ERTAPENEM PER 500 MG: Performed by: STUDENT IN AN ORGANIZED HEALTH CARE EDUCATION/TRAINING PROGRAM

## 2022-01-01 PROCEDURE — 85025 COMPLETE CBC W/AUTO DIFF WBC: CPT | Performed by: EMERGENCY MEDICINE

## 2022-01-01 PROCEDURE — 84132 ASSAY OF SERUM POTASSIUM: CPT | Performed by: HOSPITALIST

## 2022-01-01 PROCEDURE — 83735 ASSAY OF MAGNESIUM: CPT | Performed by: NURSE PRACTITIONER

## 2022-01-01 PROCEDURE — 80202 ASSAY OF VANCOMYCIN: CPT | Performed by: HOSPITALIST

## 2022-01-01 PROCEDURE — 87186 SC STD MICRODIL/AGAR DIL: CPT | Performed by: EMERGENCY MEDICINE

## 2022-01-01 PROCEDURE — 84484 ASSAY OF TROPONIN QUANT: CPT | Performed by: INTERNAL MEDICINE

## 2022-01-01 PROCEDURE — 71260 CT THORAX DX C+: CPT

## 2022-01-01 PROCEDURE — 80053 COMPREHEN METABOLIC PANEL: CPT | Performed by: EMERGENCY MEDICINE

## 2022-01-01 PROCEDURE — 25010000002 VANCOMYCIN 10 G RECONSTITUTED SOLUTION: Performed by: INTERNAL MEDICINE

## 2022-01-01 PROCEDURE — 83735 ASSAY OF MAGNESIUM: CPT | Performed by: STUDENT IN AN ORGANIZED HEALTH CARE EDUCATION/TRAINING PROGRAM

## 2022-01-01 PROCEDURE — 25010000002 ERTAPENEM PER 500 MG: Performed by: INTERNAL MEDICINE

## 2022-01-01 PROCEDURE — 82803 BLOOD GASES ANY COMBINATION: CPT

## 2022-01-01 PROCEDURE — 99232 SBSQ HOSP IP/OBS MODERATE 35: CPT | Performed by: NURSE PRACTITIONER

## 2022-01-01 PROCEDURE — 94640 AIRWAY INHALATION TREATMENT: CPT

## 2022-01-01 PROCEDURE — 0202U NFCT DS 22 TRGT SARS-COV-2: CPT | Performed by: EMERGENCY MEDICINE

## 2022-01-01 PROCEDURE — 51798 US URINE CAPACITY MEASURE: CPT

## 2022-01-01 PROCEDURE — 87040 BLOOD CULTURE FOR BACTERIA: CPT | Performed by: NURSE PRACTITIONER

## 2022-01-01 PROCEDURE — U0004 COV-19 TEST NON-CDC HGH THRU: HCPCS | Performed by: HOSPITALIST

## 2022-01-01 PROCEDURE — 86140 C-REACTIVE PROTEIN: CPT | Performed by: STUDENT IN AN ORGANIZED HEALTH CARE EDUCATION/TRAINING PROGRAM

## 2022-01-01 PROCEDURE — 83605 ASSAY OF LACTIC ACID: CPT | Performed by: INTERNAL MEDICINE

## 2022-01-01 PROCEDURE — 85610 PROTHROMBIN TIME: CPT | Performed by: NURSE PRACTITIONER

## 2022-01-01 PROCEDURE — 25010000002 AMPICILLIN-SULBACTAM PER 1.5 G: Performed by: STUDENT IN AN ORGANIZED HEALTH CARE EDUCATION/TRAINING PROGRAM

## 2022-01-01 PROCEDURE — 87635 SARS-COV-2 COVID-19 AMP PRB: CPT | Performed by: STUDENT IN AN ORGANIZED HEALTH CARE EDUCATION/TRAINING PROGRAM

## 2022-01-01 PROCEDURE — 99214 OFFICE O/P EST MOD 30 MIN: CPT | Performed by: STUDENT IN AN ORGANIZED HEALTH CARE EDUCATION/TRAINING PROGRAM

## 2022-01-01 PROCEDURE — U0004 COV-19 TEST NON-CDC HGH THRU: HCPCS | Performed by: EMERGENCY MEDICINE

## 2022-01-01 PROCEDURE — 36415 COLL VENOUS BLD VENIPUNCTURE: CPT | Performed by: STUDENT IN AN ORGANIZED HEALTH CARE EDUCATION/TRAINING PROGRAM

## 2022-01-01 PROCEDURE — 92610 EVALUATE SWALLOWING FUNCTION: CPT

## 2022-01-01 PROCEDURE — 83880 ASSAY OF NATRIURETIC PEPTIDE: CPT | Performed by: NURSE PRACTITIONER

## 2022-01-01 PROCEDURE — 84145 PROCALCITONIN (PCT): CPT | Performed by: NURSE PRACTITIONER

## 2022-01-01 PROCEDURE — 80053 COMPREHEN METABOLIC PANEL: CPT | Performed by: NURSE PRACTITIONER

## 2022-01-01 PROCEDURE — 25010000002 CEFEPIME PER 500 MG: Performed by: EMERGENCY MEDICINE

## 2022-01-01 PROCEDURE — 84484 ASSAY OF TROPONIN QUANT: CPT | Performed by: NURSE PRACTITIONER

## 2022-01-01 PROCEDURE — 25010000002 PIPERACILLIN SOD-TAZOBACTAM PER 1 G: Performed by: EMERGENCY MEDICINE

## 2022-01-01 PROCEDURE — 84466 ASSAY OF TRANSFERRIN: CPT | Performed by: INTERNAL MEDICINE

## 2022-01-01 PROCEDURE — 83735 ASSAY OF MAGNESIUM: CPT | Performed by: INTERNAL MEDICINE

## 2022-01-01 PROCEDURE — 93005 ELECTROCARDIOGRAM TRACING: CPT | Performed by: NURSE PRACTITIONER

## 2022-01-01 PROCEDURE — 85045 AUTOMATED RETICULOCYTE COUNT: CPT | Performed by: INTERNAL MEDICINE

## 2022-01-01 PROCEDURE — 83605 ASSAY OF LACTIC ACID: CPT | Performed by: EMERGENCY MEDICINE

## 2022-01-01 PROCEDURE — C9803 HOPD COVID-19 SPEC COLLECT: HCPCS

## 2022-01-01 PROCEDURE — 84132 ASSAY OF SERUM POTASSIUM: CPT | Performed by: STUDENT IN AN ORGANIZED HEALTH CARE EDUCATION/TRAINING PROGRAM

## 2022-01-01 PROCEDURE — 87040 BLOOD CULTURE FOR BACTERIA: CPT | Performed by: EMERGENCY MEDICINE

## 2022-01-01 PROCEDURE — 87077 CULTURE AEROBIC IDENTIFY: CPT | Performed by: EMERGENCY MEDICINE

## 2022-01-01 PROCEDURE — 96366 THER/PROPH/DIAG IV INF ADDON: CPT

## 2022-01-01 PROCEDURE — 85730 THROMBOPLASTIN TIME PARTIAL: CPT | Performed by: STUDENT IN AN ORGANIZED HEALTH CARE EDUCATION/TRAINING PROGRAM

## 2022-01-01 PROCEDURE — 99214 OFFICE O/P EST MOD 30 MIN: CPT | Performed by: NURSE PRACTITIONER

## 2022-01-01 PROCEDURE — 99215 OFFICE O/P EST HI 40 MIN: CPT | Performed by: STUDENT IN AN ORGANIZED HEALTH CARE EDUCATION/TRAINING PROGRAM

## 2022-01-01 PROCEDURE — 87070 CULTURE OTHR SPECIMN AEROBIC: CPT | Performed by: NURSE PRACTITIONER

## 2022-01-01 PROCEDURE — 97116 GAIT TRAINING THERAPY: CPT

## 2022-01-01 PROCEDURE — 87077 CULTURE AEROBIC IDENTIFY: CPT | Performed by: NURSE PRACTITIONER

## 2022-01-01 PROCEDURE — 36415 COLL VENOUS BLD VENIPUNCTURE: CPT

## 2022-01-01 PROCEDURE — 99233 SBSQ HOSP IP/OBS HIGH 50: CPT | Performed by: INTERNAL MEDICINE

## 2022-01-01 PROCEDURE — 84145 PROCALCITONIN (PCT): CPT | Performed by: EMERGENCY MEDICINE

## 2022-01-01 PROCEDURE — 99214 OFFICE O/P EST MOD 30 MIN: CPT | Performed by: FAMILY MEDICINE

## 2022-01-01 PROCEDURE — 84540 ASSAY OF URINE/UREA-N: CPT | Performed by: STUDENT IN AN ORGANIZED HEALTH CARE EDUCATION/TRAINING PROGRAM

## 2022-01-01 PROCEDURE — 93922 UPR/L XTREMITY ART 2 LEVELS: CPT

## 2022-01-01 PROCEDURE — 83010 ASSAY OF HAPTOGLOBIN QUANT: CPT | Performed by: INTERNAL MEDICINE

## 2022-01-01 PROCEDURE — 85730 THROMBOPLASTIN TIME PARTIAL: CPT | Performed by: NURSE PRACTITIONER

## 2022-01-01 PROCEDURE — 85025 COMPLETE CBC W/AUTO DIFF WBC: CPT | Performed by: INTERNAL MEDICINE

## 2022-01-01 PROCEDURE — 36415 COLL VENOUS BLD VENIPUNCTURE: CPT | Performed by: FAMILY MEDICINE

## 2022-01-01 PROCEDURE — 80061 LIPID PANEL: CPT | Performed by: FAMILY MEDICINE

## 2022-01-01 PROCEDURE — 25010000002 FENTANYL CITRATE (PF) 50 MCG/ML SOLUTION: Performed by: EMERGENCY MEDICINE

## 2022-01-01 PROCEDURE — 97110 THERAPEUTIC EXERCISES: CPT

## 2022-01-01 PROCEDURE — 82962 GLUCOSE BLOOD TEST: CPT

## 2022-01-01 PROCEDURE — A9577 INJ MULTIHANCE: HCPCS | Performed by: HOSPITALIST

## 2022-01-01 PROCEDURE — 99233 SBSQ HOSP IP/OBS HIGH 50: CPT | Performed by: STUDENT IN AN ORGANIZED HEALTH CARE EDUCATION/TRAINING PROGRAM

## 2022-01-01 PROCEDURE — 25010000002 MEROPENEM PER 100 MG: Performed by: STUDENT IN AN ORGANIZED HEALTH CARE EDUCATION/TRAINING PROGRAM

## 2022-01-01 PROCEDURE — 93923 UPR/LXTR ART STDY 3+ LVLS: CPT

## 2022-01-01 PROCEDURE — 82728 ASSAY OF FERRITIN: CPT | Performed by: STUDENT IN AN ORGANIZED HEALTH CARE EDUCATION/TRAINING PROGRAM

## 2022-01-01 PROCEDURE — 86900 BLOOD TYPING SEROLOGIC ABO: CPT

## 2022-01-01 PROCEDURE — 0 DIATRIZOATE MEGLUMINE & SODIUM PER 1 ML: Performed by: INTERNAL MEDICINE

## 2022-01-01 PROCEDURE — 25010000002 ENOXAPARIN PER 10 MG: Performed by: INTERNAL MEDICINE

## 2022-01-01 PROCEDURE — 96367 TX/PROPH/DG ADDL SEQ IV INF: CPT

## 2022-01-01 PROCEDURE — 82565 ASSAY OF CREATININE: CPT

## 2022-01-01 PROCEDURE — 25010000002 VANCOMYCIN 10 G RECONSTITUTED SOLUTION: Performed by: NURSE PRACTITIONER

## 2022-01-01 PROCEDURE — 99283 EMERGENCY DEPT VISIT LOW MDM: CPT

## 2022-01-01 PROCEDURE — 0 POTASSIUM CHLORIDE 10 MEQ/100ML SOLUTION: Performed by: STUDENT IN AN ORGANIZED HEALTH CARE EDUCATION/TRAINING PROGRAM

## 2022-01-01 PROCEDURE — 99221 1ST HOSP IP/OBS SF/LOW 40: CPT | Performed by: NURSE PRACTITIONER

## 2022-01-01 PROCEDURE — 81001 URINALYSIS AUTO W/SCOPE: CPT

## 2022-01-01 PROCEDURE — 97535 SELF CARE MNGMENT TRAINING: CPT | Performed by: OCCUPATIONAL THERAPIST

## 2022-01-01 PROCEDURE — 0124A COVID-19 (PFIZER) BIVALENT BOOSTER 12+YRS: CPT | Performed by: FAMILY MEDICINE

## 2022-01-01 PROCEDURE — 25010000002 VANCOMYCIN 10 G RECONSTITUTED SOLUTION: Performed by: EMERGENCY MEDICINE

## 2022-01-01 PROCEDURE — 83540 ASSAY OF IRON: CPT | Performed by: STUDENT IN AN ORGANIZED HEALTH CARE EDUCATION/TRAINING PROGRAM

## 2022-01-01 PROCEDURE — 90662 IIV NO PRSV INCREASED AG IM: CPT | Performed by: FAMILY MEDICINE

## 2022-01-01 PROCEDURE — 97162 PT EVAL MOD COMPLEX 30 MIN: CPT

## 2022-01-01 PROCEDURE — 81001 URINALYSIS AUTO W/SCOPE: CPT | Performed by: NURSE PRACTITIONER

## 2022-01-01 PROCEDURE — 25010000002 MAGNESIUM SULFATE IN D5W 1G/100ML (PREMIX) 1-5 GM/100ML-% SOLUTION: Performed by: HOSPITALIST

## 2022-01-01 PROCEDURE — 25010000002 VANCOMYCIN PER 500 MG: Performed by: INTERNAL MEDICINE

## 2022-01-01 PROCEDURE — 93306 TTE W/DOPPLER COMPLETE: CPT

## 2022-01-01 PROCEDURE — 82746 ASSAY OF FOLIC ACID SERUM: CPT | Performed by: STUDENT IN AN ORGANIZED HEALTH CARE EDUCATION/TRAINING PROGRAM

## 2022-01-01 PROCEDURE — 96360 HYDRATION IV INFUSION INIT: CPT

## 2022-01-01 PROCEDURE — 99222 1ST HOSP IP/OBS MODERATE 55: CPT | Performed by: INTERNAL MEDICINE

## 2022-01-01 PROCEDURE — 93005 ELECTROCARDIOGRAM TRACING: CPT | Performed by: EMERGENCY MEDICINE

## 2022-01-01 PROCEDURE — 85025 COMPLETE CBC W/AUTO DIFF WBC: CPT | Performed by: NURSE PRACTITIONER

## 2022-01-01 PROCEDURE — G0008 ADMIN INFLUENZA VIRUS VAC: HCPCS | Performed by: FAMILY MEDICINE

## 2022-01-01 PROCEDURE — 93005 ELECTROCARDIOGRAM TRACING: CPT

## 2022-01-01 PROCEDURE — 0202U NFCT DS 22 TRGT SARS-COV-2: CPT | Performed by: NURSE PRACTITIONER

## 2022-01-01 PROCEDURE — 84466 ASSAY OF TRANSFERRIN: CPT | Performed by: STUDENT IN AN ORGANIZED HEALTH CARE EDUCATION/TRAINING PROGRAM

## 2022-01-01 PROCEDURE — A9577 INJ MULTIHANCE: HCPCS | Performed by: STUDENT IN AN ORGANIZED HEALTH CARE EDUCATION/TRAINING PROGRAM

## 2022-01-01 PROCEDURE — 25010000002 CEFEPIME PER 500 MG: Performed by: INTERNAL MEDICINE

## 2022-01-01 PROCEDURE — 84484 ASSAY OF TROPONIN QUANT: CPT | Performed by: EMERGENCY MEDICINE

## 2022-01-01 PROCEDURE — 93005 ELECTROCARDIOGRAM TRACING: CPT | Performed by: STUDENT IN AN ORGANIZED HEALTH CARE EDUCATION/TRAINING PROGRAM

## 2022-01-01 PROCEDURE — 84550 ASSAY OF BLOOD/URIC ACID: CPT | Performed by: NURSE PRACTITIONER

## 2022-01-01 PROCEDURE — G0180 MD CERTIFICATION HHA PATIENT: HCPCS | Performed by: FAMILY MEDICINE

## 2022-01-01 PROCEDURE — 87899 AGENT NOS ASSAY W/OPTIC: CPT | Performed by: STUDENT IN AN ORGANIZED HEALTH CARE EDUCATION/TRAINING PROGRAM

## 2022-01-01 PROCEDURE — 87150 DNA/RNA AMPLIFIED PROBE: CPT | Performed by: EMERGENCY MEDICINE

## 2022-01-01 PROCEDURE — 83735 ASSAY OF MAGNESIUM: CPT | Performed by: EMERGENCY MEDICINE

## 2022-01-01 PROCEDURE — 85025 COMPLETE CBC W/AUTO DIFF WBC: CPT | Performed by: FAMILY MEDICINE

## 2022-01-01 PROCEDURE — 73720 MRI LWR EXTREMITY W/O&W/DYE: CPT

## 2022-01-01 PROCEDURE — 87449 NOS EACH ORGANISM AG IA: CPT | Performed by: STUDENT IN AN ORGANIZED HEALTH CARE EDUCATION/TRAINING PROGRAM

## 2022-01-01 PROCEDURE — 92526 ORAL FUNCTION THERAPY: CPT

## 2022-01-01 PROCEDURE — 83540 ASSAY OF IRON: CPT | Performed by: INTERNAL MEDICINE

## 2022-01-01 PROCEDURE — 84481 FREE ASSAY (FT-3): CPT | Performed by: INTERNAL MEDICINE

## 2022-01-01 PROCEDURE — 83605 ASSAY OF LACTIC ACID: CPT | Performed by: NURSE PRACTITIONER

## 2022-01-01 PROCEDURE — U0003 INFECTIOUS AGENT DETECTION BY NUCLEIC ACID (DNA OR RNA); SEVERE ACUTE RESPIRATORY SYNDROME CORONAVIRUS 2 (SARS-COV-2) (CORONAVIRUS DISEASE [COVID-19]), AMPLIFIED PROBE TECHNIQUE, MAKING USE OF HIGH THROUGHPUT TECHNOLOGIES AS DESCRIBED BY CMS-2020-01-R: HCPCS | Performed by: HOSPITALIST

## 2022-01-01 PROCEDURE — 85730 THROMBOPLASTIN TIME PARTIAL: CPT | Performed by: EMERGENCY MEDICINE

## 2022-01-01 PROCEDURE — 91312 COVID-19 (PFIZER) BIVALENT BOOSTER 12+YRS: CPT | Performed by: FAMILY MEDICINE

## 2022-01-01 PROCEDURE — 87086 URINE CULTURE/COLONY COUNT: CPT

## 2022-01-01 PROCEDURE — 84145 PROCALCITONIN (PCT): CPT | Performed by: INTERNAL MEDICINE

## 2022-01-01 PROCEDURE — 82728 ASSAY OF FERRITIN: CPT | Performed by: INTERNAL MEDICINE

## 2022-01-01 PROCEDURE — 0 POTASSIUM CHLORIDE 10 MEQ/100ML SOLUTION: Performed by: EMERGENCY MEDICINE

## 2022-01-01 PROCEDURE — 97166 OT EVAL MOD COMPLEX 45 MIN: CPT

## 2022-01-01 PROCEDURE — 92611 MOTION FLUOROSCOPY/SWALLOW: CPT

## 2022-01-01 PROCEDURE — 99213 OFFICE O/P EST LOW 20 MIN: CPT | Performed by: NURSE PRACTITIONER

## 2022-01-01 PROCEDURE — 80053 COMPREHEN METABOLIC PANEL: CPT | Performed by: FAMILY MEDICINE

## 2022-01-01 PROCEDURE — 87102 FUNGUS ISOLATION CULTURE: CPT | Performed by: STUDENT IN AN ORGANIZED HEALTH CARE EDUCATION/TRAINING PROGRAM

## 2022-01-01 PROCEDURE — 25010000002 VANCOMYCIN PER 500 MG: Performed by: STUDENT IN AN ORGANIZED HEALTH CARE EDUCATION/TRAINING PROGRAM

## 2022-01-01 PROCEDURE — 25010000002 CEFAZOLIN IN DEXTROSE 2-4 GM/100ML-% SOLUTION: Performed by: STUDENT IN AN ORGANIZED HEALTH CARE EDUCATION/TRAINING PROGRAM

## 2022-01-01 PROCEDURE — 82607 VITAMIN B-12: CPT | Performed by: INTERNAL MEDICINE

## 2022-01-01 PROCEDURE — 87186 SC STD MICRODIL/AGAR DIL: CPT | Performed by: NURSE PRACTITIONER

## 2022-01-01 PROCEDURE — 97165 OT EVAL LOW COMPLEX 30 MIN: CPT

## 2022-01-01 PROCEDURE — 85007 BL SMEAR W/DIFF WBC COUNT: CPT | Performed by: INTERNAL MEDICINE

## 2022-01-01 PROCEDURE — 87205 SMEAR GRAM STAIN: CPT | Performed by: NURSE PRACTITIONER

## 2022-01-01 PROCEDURE — 87205 SMEAR GRAM STAIN: CPT | Performed by: STUDENT IN AN ORGANIZED HEALTH CARE EDUCATION/TRAINING PROGRAM

## 2022-01-01 PROCEDURE — 72158 MRI LUMBAR SPINE W/O & W/DYE: CPT

## 2022-01-01 PROCEDURE — 25010000002 CEFTRIAXONE PER 250 MG: Performed by: HOSPITALIST

## 2022-01-01 PROCEDURE — 81001 URINALYSIS AUTO W/SCOPE: CPT | Performed by: FAMILY MEDICINE

## 2022-01-01 PROCEDURE — 71046 X-RAY EXAM CHEST 2 VIEWS: CPT

## 2022-01-01 PROCEDURE — 82436 ASSAY OF URINE CHLORIDE: CPT | Performed by: STUDENT IN AN ORGANIZED HEALTH CARE EDUCATION/TRAINING PROGRAM

## 2022-01-01 PROCEDURE — 25010000002 IOPAMIDOL 61 % SOLUTION: Performed by: HOSPITALIST

## 2022-01-01 PROCEDURE — 85610 PROTHROMBIN TIME: CPT

## 2022-01-01 PROCEDURE — 83935 ASSAY OF URINE OSMOLALITY: CPT | Performed by: STUDENT IN AN ORGANIZED HEALTH CARE EDUCATION/TRAINING PROGRAM

## 2022-01-01 PROCEDURE — 84300 ASSAY OF URINE SODIUM: CPT | Performed by: STUDENT IN AN ORGANIZED HEALTH CARE EDUCATION/TRAINING PROGRAM

## 2022-01-01 PROCEDURE — 86850 RBC ANTIBODY SCREEN: CPT

## 2022-01-01 PROCEDURE — 85027 COMPLETE CBC AUTOMATED: CPT | Performed by: INTERNAL MEDICINE

## 2022-01-01 PROCEDURE — 86901 BLOOD TYPING SEROLOGIC RH(D): CPT

## 2022-01-01 PROCEDURE — 87086 URINE CULTURE/COLONY COUNT: CPT | Performed by: NURSE PRACTITIONER

## 2022-01-01 PROCEDURE — 25010000002 CEFEPIME PER 500 MG: Performed by: NURSE PRACTITIONER

## 2022-01-01 PROCEDURE — 25010000002 ERTAPENEM PER 500 MG: Performed by: NURSE PRACTITIONER

## 2022-01-01 PROCEDURE — 93005 ELECTROCARDIOGRAM TRACING: CPT | Performed by: HOSPITALIST

## 2022-01-01 PROCEDURE — 96365 THER/PROPH/DIAG IV INF INIT: CPT

## 2022-01-01 PROCEDURE — 84132 ASSAY OF SERUM POTASSIUM: CPT | Performed by: NURSE PRACTITIONER

## 2022-01-01 PROCEDURE — 82607 VITAMIN B-12: CPT | Performed by: STUDENT IN AN ORGANIZED HEALTH CARE EDUCATION/TRAINING PROGRAM

## 2022-01-01 PROCEDURE — 25010000002 VANCOMYCIN 5 G RECONSTITUTED SOLUTION: Performed by: STUDENT IN AN ORGANIZED HEALTH CARE EDUCATION/TRAINING PROGRAM

## 2022-01-01 PROCEDURE — 83615 LACTATE (LD) (LDH) ENZYME: CPT | Performed by: INTERNAL MEDICINE

## 2022-01-01 PROCEDURE — 80202 ASSAY OF VANCOMYCIN: CPT | Performed by: NURSE PRACTITIONER

## 2022-01-01 PROCEDURE — 86140 C-REACTIVE PROTEIN: CPT | Performed by: INTERNAL MEDICINE

## 2022-01-01 PROCEDURE — 84484 ASSAY OF TROPONIN QUANT: CPT | Performed by: STUDENT IN AN ORGANIZED HEALTH CARE EDUCATION/TRAINING PROGRAM

## 2022-01-01 PROCEDURE — 02HV33Z INSERTION OF INFUSION DEVICE INTO SUPERIOR VENA CAVA, PERCUTANEOUS APPROACH: ICD-10-PCS | Performed by: STUDENT IN AN ORGANIZED HEALTH CARE EDUCATION/TRAINING PROGRAM

## 2022-01-01 PROCEDURE — 84439 ASSAY OF FREE THYROXINE: CPT | Performed by: INTERNAL MEDICINE

## 2022-01-01 PROCEDURE — 84443 ASSAY THYROID STIM HORMONE: CPT | Performed by: INTERNAL MEDICINE

## 2022-01-01 PROCEDURE — 97166 OT EVAL MOD COMPLEX 45 MIN: CPT | Performed by: OCCUPATIONAL THERAPIST

## 2022-01-01 PROCEDURE — 73620 X-RAY EXAM OF FOOT: CPT

## 2022-01-01 RX ORDER — SODIUM CHLORIDE 9 MG/ML
40 INJECTION, SOLUTION INTRAVENOUS AS NEEDED
Status: DISCONTINUED | OUTPATIENT
Start: 2022-01-01 | End: 2022-01-01 | Stop reason: HOSPADM

## 2022-01-01 RX ORDER — METOCLOPRAMIDE 10 MG/1
10 TABLET ORAL
COMMUNITY
Start: 2022-01-01

## 2022-01-01 RX ORDER — WARFARIN SODIUM 5 MG/1
TABLET ORAL
Start: 2022-01-01 | End: 2022-01-01

## 2022-01-01 RX ORDER — POTASSIUM CHLORIDE 7.45 MG/ML
10 INJECTION INTRAVENOUS ONCE
Status: COMPLETED | OUTPATIENT
Start: 2022-01-01 | End: 2022-01-01

## 2022-01-01 RX ORDER — DIGOXIN 0.25 MG/ML
500 INJECTION INTRAMUSCULAR; INTRAVENOUS ONCE
Status: COMPLETED | OUTPATIENT
Start: 2022-01-01 | End: 2022-01-01

## 2022-01-01 RX ORDER — WARFARIN SODIUM 5 MG/1
5 TABLET ORAL
Status: COMPLETED | OUTPATIENT
Start: 2022-01-01 | End: 2022-01-01

## 2022-01-01 RX ORDER — CARVEDILOL 6.25 MG/1
TABLET ORAL
Qty: 180 TABLET | Refills: 0 | Status: SHIPPED | OUTPATIENT
Start: 2022-01-01 | End: 2022-01-01 | Stop reason: SDUPTHER

## 2022-01-01 RX ORDER — POTASSIUM CHLORIDE 7.45 MG/ML
10 INJECTION INTRAVENOUS
Status: DISCONTINUED | OUTPATIENT
Start: 2022-01-01 | End: 2022-01-01 | Stop reason: HOSPADM

## 2022-01-01 RX ORDER — HYDROCODONE BITARTRATE AND ACETAMINOPHEN 10; 325 MG/1; MG/1
1 TABLET ORAL EVERY 6 HOURS PRN
Qty: 12 TABLET | Refills: 0 | Status: SHIPPED | OUTPATIENT
Start: 2022-01-01 | End: 2022-01-01

## 2022-01-01 RX ORDER — TAMSULOSIN HYDROCHLORIDE 0.4 MG/1
0.4 CAPSULE ORAL NIGHTLY
Status: DISCONTINUED | OUTPATIENT
Start: 2022-01-01 | End: 2022-01-01 | Stop reason: HOSPADM

## 2022-01-01 RX ORDER — CIPROFLOXACIN 500 MG/1
TABLET, FILM COATED ORAL
COMMUNITY
Start: 2022-01-01 | End: 2022-01-01 | Stop reason: HOSPADM

## 2022-01-01 RX ORDER — SODIUM CHLORIDE 0.9 % (FLUSH) 0.9 %
10 SYRINGE (ML) INJECTION AS NEEDED
Status: DISCONTINUED | OUTPATIENT
Start: 2022-01-01 | End: 2022-01-01 | Stop reason: HOSPADM

## 2022-01-01 RX ORDER — WARFARIN SODIUM 10 MG/1
10 TABLET ORAL ONCE
Status: COMPLETED | OUTPATIENT
Start: 2022-01-01 | End: 2022-01-01

## 2022-01-01 RX ORDER — METOPROLOL SUCCINATE 25 MG/1
25 TABLET, EXTENDED RELEASE ORAL DAILY
Qty: 90 TABLET | Refills: 3 | Status: SHIPPED | OUTPATIENT
Start: 2022-01-01 | End: 2022-01-01 | Stop reason: SDUPTHER

## 2022-01-01 RX ORDER — FLUTICASONE PROPIONATE 50 MCG
2 SPRAY, SUSPENSION (ML) NASAL DAILY
Status: DISCONTINUED | OUTPATIENT
Start: 2022-01-01 | End: 2022-01-01 | Stop reason: HOSPADM

## 2022-01-01 RX ORDER — WARFARIN SODIUM 10 MG/1
10 TABLET ORAL
Status: COMPLETED | OUTPATIENT
Start: 2022-01-01 | End: 2022-01-01

## 2022-01-01 RX ORDER — L. ACIDOPHILUS/L.BULGARICUS 1MM CELL
1 TABLET ORAL 2 TIMES DAILY
COMMUNITY
Start: 2022-01-01

## 2022-01-01 RX ORDER — SODIUM CHLORIDE 9 MG/ML
125 INJECTION, SOLUTION INTRAVENOUS CONTINUOUS
Status: DISCONTINUED | OUTPATIENT
Start: 2022-01-01 | End: 2022-01-01

## 2022-01-01 RX ORDER — WARFARIN SODIUM 2 MG/1
2 TABLET ORAL
Status: COMPLETED | OUTPATIENT
Start: 2022-01-01 | End: 2022-01-01

## 2022-01-01 RX ORDER — HYDROCODONE BITARTRATE AND ACETAMINOPHEN 5; 325 MG/1; MG/1
1 TABLET ORAL ONCE
Status: COMPLETED | OUTPATIENT
Start: 2022-01-01 | End: 2022-01-01

## 2022-01-01 RX ORDER — IPRATROPIUM BROMIDE AND ALBUTEROL SULFATE 2.5; .5 MG/3ML; MG/3ML
3 SOLUTION RESPIRATORY (INHALATION)
Status: DISCONTINUED | OUTPATIENT
Start: 2022-01-01 | End: 2022-01-01 | Stop reason: HOSPADM

## 2022-01-01 RX ORDER — MAGNESIUM SULFATE HEPTAHYDRATE 40 MG/ML
2 INJECTION, SOLUTION INTRAVENOUS AS NEEDED
Status: DISCONTINUED | OUTPATIENT
Start: 2022-01-01 | End: 2022-01-01 | Stop reason: HOSPADM

## 2022-01-01 RX ORDER — FINASTERIDE 5 MG/1
5 TABLET, FILM COATED ORAL DAILY
Status: DISCONTINUED | OUTPATIENT
Start: 2022-01-01 | End: 2022-01-01 | Stop reason: HOSPADM

## 2022-01-01 RX ORDER — METOPROLOL SUCCINATE 25 MG/1
25 TABLET, EXTENDED RELEASE ORAL
Status: DISCONTINUED | OUTPATIENT
Start: 2022-01-01 | End: 2022-01-01

## 2022-01-01 RX ORDER — HYDROCODONE BITARTRATE AND ACETAMINOPHEN 10; 325 MG/1; MG/1
1 TABLET ORAL EVERY 6 HOURS PRN
Qty: 12 TABLET | Refills: 0 | Status: SHIPPED | OUTPATIENT
Start: 2022-01-01 | End: 2022-01-01 | Stop reason: HOSPADM

## 2022-01-01 RX ORDER — WARFARIN SODIUM 7.5 MG/1
7.5 TABLET ORAL
Status: DISCONTINUED | OUTPATIENT
Start: 2022-01-01 | End: 2022-01-01 | Stop reason: HOSPADM

## 2022-01-01 RX ORDER — WARFARIN SODIUM 5 MG/1
5 TABLET ORAL
Status: DISCONTINUED | OUTPATIENT
Start: 2022-01-01 | End: 2022-01-01 | Stop reason: HOSPADM

## 2022-01-01 RX ORDER — POTASSIUM CHLORIDE 750 MG/1
10 TABLET, FILM COATED, EXTENDED RELEASE ORAL DAILY
Status: DISCONTINUED | OUTPATIENT
Start: 2022-01-01 | End: 2022-01-01

## 2022-01-01 RX ORDER — OLANZAPINE 10 MG/1
5 INJECTION, POWDER, LYOPHILIZED, FOR SOLUTION INTRAMUSCULAR EVERY 8 HOURS PRN
Status: DISCONTINUED | OUTPATIENT
Start: 2022-01-01 | End: 2022-01-01 | Stop reason: HOSPADM

## 2022-01-01 RX ORDER — HYDROCODONE BITARTRATE AND ACETAMINOPHEN 10; 325 MG/1; MG/1
1 TABLET ORAL
Status: DISCONTINUED | OUTPATIENT
Start: 2022-01-01 | End: 2022-01-01

## 2022-01-01 RX ORDER — SODIUM CHLORIDE 9 MG/ML
75 INJECTION, SOLUTION INTRAVENOUS CONTINUOUS
Status: DISCONTINUED | OUTPATIENT
Start: 2022-01-01 | End: 2022-01-01

## 2022-01-01 RX ORDER — ALPRAZOLAM 0.5 MG/1
0.5 TABLET ORAL NIGHTLY PRN
Status: DISCONTINUED | OUTPATIENT
Start: 2022-01-01 | End: 2022-01-01 | Stop reason: HOSPADM

## 2022-01-01 RX ORDER — METOPROLOL SUCCINATE 25 MG/1
25 TABLET, EXTENDED RELEASE ORAL DAILY
Status: DISCONTINUED | OUTPATIENT
Start: 2022-01-01 | End: 2022-01-01 | Stop reason: HOSPADM

## 2022-01-01 RX ORDER — ACETAMINOPHEN 325 MG/1
650 TABLET ORAL EVERY 4 HOURS PRN
Status: DISCONTINUED | OUTPATIENT
Start: 2022-01-01 | End: 2022-01-01 | Stop reason: HOSPADM

## 2022-01-01 RX ORDER — CHOLECALCIFEROL (VITAMIN D3) 125 MCG
5 CAPSULE ORAL NIGHTLY PRN
Status: DISCONTINUED | OUTPATIENT
Start: 2022-01-01 | End: 2022-01-01 | Stop reason: HOSPADM

## 2022-01-01 RX ORDER — AMOXICILLIN AND CLAVULANATE POTASSIUM 875; 125 MG/1; MG/1
1 TABLET, FILM COATED ORAL EVERY 12 HOURS SCHEDULED
Qty: 7 TABLET | Refills: 0 | Status: SHIPPED | OUTPATIENT
Start: 2022-01-01 | End: 2022-01-01

## 2022-01-01 RX ORDER — CARVEDILOL 12.5 MG/1
12.5 TABLET ORAL 2 TIMES DAILY WITH MEALS
Status: DISCONTINUED | OUTPATIENT
Start: 2022-01-01 | End: 2022-01-01 | Stop reason: HOSPADM

## 2022-01-01 RX ORDER — L.ACID,PARA/B.BIFIDUM/S.THERM 8B CELL
1 CAPSULE ORAL DAILY
Status: DISCONTINUED | OUTPATIENT
Start: 2022-01-01 | End: 2022-01-01 | Stop reason: HOSPADM

## 2022-01-01 RX ORDER — SODIUM CHLORIDE 0.9 % (FLUSH) 0.9 %
10 SYRINGE (ML) INJECTION EVERY 12 HOURS SCHEDULED
Status: DISCONTINUED | OUTPATIENT
Start: 2022-01-01 | End: 2022-01-01 | Stop reason: HOSPADM

## 2022-01-01 RX ORDER — BUDESONIDE AND FORMOTEROL FUMARATE DIHYDRATE 160; 4.5 UG/1; UG/1
2 AEROSOL RESPIRATORY (INHALATION)
Status: DISCONTINUED | OUTPATIENT
Start: 2022-01-01 | End: 2022-01-01 | Stop reason: HOSPADM

## 2022-01-01 RX ORDER — TAMSULOSIN HYDROCHLORIDE 0.4 MG/1
0.4 CAPSULE ORAL NIGHTLY
Status: DISCONTINUED | OUTPATIENT
Start: 2022-01-01 | End: 2022-01-01

## 2022-01-01 RX ORDER — GUAIFENESIN AND DEXTROMETHORPHAN HYDROBROMIDE 600; 30 MG/1; MG/1
2 TABLET, EXTENDED RELEASE ORAL 2 TIMES DAILY
Status: DISCONTINUED | OUTPATIENT
Start: 2022-01-01 | End: 2022-01-01

## 2022-01-01 RX ORDER — FAMOTIDINE 20 MG/1
20 TABLET, FILM COATED ORAL
Status: DISCONTINUED | OUTPATIENT
Start: 2022-01-01 | End: 2022-01-01 | Stop reason: HOSPADM

## 2022-01-01 RX ORDER — PRAVASTATIN SODIUM 20 MG
20 TABLET ORAL DAILY
Status: DISCONTINUED | OUTPATIENT
Start: 2022-01-01 | End: 2022-01-01 | Stop reason: HOSPADM

## 2022-01-01 RX ORDER — ACETAMINOPHEN 650 MG/1
650 SUPPOSITORY RECTAL EVERY 4 HOURS PRN
Status: DISCONTINUED | OUTPATIENT
Start: 2022-01-01 | End: 2022-01-01 | Stop reason: HOSPADM

## 2022-01-01 RX ORDER — AMOXICILLIN AND CLAVULANATE POTASSIUM 875; 125 MG/1; MG/1
1 TABLET, FILM COATED ORAL EVERY 12 HOURS SCHEDULED
Status: DISCONTINUED | OUTPATIENT
Start: 2022-01-01 | End: 2022-01-01 | Stop reason: HOSPADM

## 2022-01-01 RX ORDER — WARFARIN SODIUM 4 MG/1
8 TABLET ORAL
Status: COMPLETED | OUTPATIENT
Start: 2022-01-01 | End: 2022-01-01

## 2022-01-01 RX ORDER — POTASSIUM CHLORIDE 1.5 G/1.77G
40 POWDER, FOR SOLUTION ORAL AS NEEDED
Status: DISCONTINUED | OUTPATIENT
Start: 2022-01-01 | End: 2022-01-01 | Stop reason: HOSPADM

## 2022-01-01 RX ORDER — ONDANSETRON 4 MG/1
4 TABLET, FILM COATED ORAL EVERY 6 HOURS PRN
Status: DISCONTINUED | OUTPATIENT
Start: 2022-01-01 | End: 2022-01-01 | Stop reason: HOSPADM

## 2022-01-01 RX ORDER — FUROSEMIDE 40 MG/1
40 TABLET ORAL DAILY
Status: ON HOLD | COMMUNITY
End: 2022-01-01 | Stop reason: SDUPTHER

## 2022-01-01 RX ORDER — GABAPENTIN 300 MG/1
600 CAPSULE ORAL EVERY 8 HOURS SCHEDULED
Refills: 0 | Status: DISCONTINUED | OUTPATIENT
Start: 2022-01-01 | End: 2022-01-01 | Stop reason: HOSPADM

## 2022-01-01 RX ORDER — METOCLOPRAMIDE 10 MG/1
10 TABLET ORAL
Status: DISCONTINUED | OUTPATIENT
Start: 2022-01-01 | End: 2022-01-01 | Stop reason: HOSPADM

## 2022-01-01 RX ORDER — FERROUS SULFATE 325(65) MG
TABLET ORAL
COMMUNITY
Start: 2022-01-01 | End: 2022-01-01 | Stop reason: HOSPADM

## 2022-01-01 RX ORDER — ACETAMINOPHEN 325 MG/1
650 TABLET ORAL EVERY 6 HOURS PRN
Status: DISCONTINUED | OUTPATIENT
Start: 2022-01-01 | End: 2022-01-01 | Stop reason: HOSPADM

## 2022-01-01 RX ORDER — FENTANYL CITRATE 50 UG/ML
50 INJECTION, SOLUTION INTRAMUSCULAR; INTRAVENOUS ONCE
Status: COMPLETED | OUTPATIENT
Start: 2022-01-01 | End: 2022-01-01

## 2022-01-01 RX ORDER — THIAMINE HCL 100 MG
100 TABLET ORAL 2 TIMES DAILY
Qty: 180 TABLET | Refills: 1 | Status: SHIPPED | OUTPATIENT
Start: 2022-01-01

## 2022-01-01 RX ORDER — AMOXICILLIN AND CLAVULANATE POTASSIUM 875; 125 MG/1; MG/1
1 TABLET, FILM COATED ORAL ONCE
Status: COMPLETED | OUTPATIENT
Start: 2022-01-01 | End: 2022-01-01

## 2022-01-01 RX ORDER — ASCORBIC ACID 500 MG
500 TABLET ORAL DAILY
Status: DISCONTINUED | OUTPATIENT
Start: 2022-01-01 | End: 2022-01-01

## 2022-01-01 RX ORDER — WARFARIN SODIUM 7.5 MG/1
7.5 TABLET ORAL
Status: DISCONTINUED | OUTPATIENT
Start: 2022-01-01 | End: 2022-01-01

## 2022-01-01 RX ORDER — METOPROLOL SUCCINATE 25 MG/1
25 TABLET, EXTENDED RELEASE ORAL NIGHTLY
Start: 2022-01-01 | End: 2022-01-01 | Stop reason: SDUPTHER

## 2022-01-01 RX ORDER — ONDANSETRON 2 MG/ML
4 INJECTION INTRAMUSCULAR; INTRAVENOUS EVERY 6 HOURS PRN
Status: DISCONTINUED | OUTPATIENT
Start: 2022-01-01 | End: 2022-01-01 | Stop reason: HOSPADM

## 2022-01-01 RX ORDER — WARFARIN SODIUM 2.5 MG/1
2.5 TABLET ORAL
Status: DISCONTINUED | OUTPATIENT
Start: 2022-01-01 | End: 2022-01-01

## 2022-01-01 RX ORDER — GABAPENTIN 600 MG/1
600 TABLET ORAL 4 TIMES DAILY
Qty: 12 TABLET | Refills: 0 | Status: SHIPPED | OUTPATIENT
Start: 2022-01-01 | End: 2022-01-01

## 2022-01-01 RX ORDER — METRONIDAZOLE 500 MG/100ML
500 INJECTION, SOLUTION INTRAVENOUS ONCE
Status: DISCONTINUED | OUTPATIENT
Start: 2022-01-01 | End: 2022-01-01

## 2022-01-01 RX ORDER — GUAIFENESIN AND DEXTROMETHORPHAN HYDROBROMIDE 600; 30 MG/1; MG/1
2 TABLET, EXTENDED RELEASE ORAL 2 TIMES DAILY PRN
Status: DISCONTINUED | OUTPATIENT
Start: 2022-01-01 | End: 2022-01-01

## 2022-01-01 RX ORDER — NITROGLYCERIN 0.4 MG/1
0.4 TABLET SUBLINGUAL
Status: DISCONTINUED | OUTPATIENT
Start: 2022-01-01 | End: 2022-01-01 | Stop reason: HOSPADM

## 2022-01-01 RX ORDER — WARFARIN SODIUM 2.5 MG/1
2.5 TABLET ORAL
Status: CANCELLED | OUTPATIENT
Start: 2022-01-01

## 2022-01-01 RX ORDER — ALPRAZOLAM 0.5 MG/1
0.5 TABLET ORAL NIGHTLY PRN
Qty: 30 TABLET | Refills: 0 | Status: SHIPPED | OUTPATIENT
Start: 2022-01-01 | End: 2022-01-01 | Stop reason: SDUPTHER

## 2022-01-01 RX ORDER — HYDROCODONE BITARTRATE AND ACETAMINOPHEN 10; 325 MG/1; MG/1
1 TABLET ORAL EVERY 6 HOURS PRN
Status: DISCONTINUED | OUTPATIENT
Start: 2022-01-01 | End: 2022-01-01 | Stop reason: HOSPADM

## 2022-01-01 RX ORDER — BUDESONIDE AND FORMOTEROL FUMARATE DIHYDRATE 80; 4.5 UG/1; UG/1
2 AEROSOL RESPIRATORY (INHALATION)
Status: DISCONTINUED | OUTPATIENT
Start: 2022-01-01 | End: 2022-01-01 | Stop reason: HOSPADM

## 2022-01-01 RX ORDER — PRAVASTATIN SODIUM 20 MG
TABLET ORAL
Qty: 90 TABLET | Refills: 1 | Status: SHIPPED | OUTPATIENT
Start: 2022-01-01

## 2022-01-01 RX ORDER — FUROSEMIDE 20 MG/1
40 TABLET ORAL DAILY
Status: DISCONTINUED | OUTPATIENT
Start: 2022-01-01 | End: 2022-01-01

## 2022-01-01 RX ORDER — IPRATROPIUM BROMIDE 17 UG/1
AEROSOL, METERED RESPIRATORY (INHALATION)
Qty: 12.9 EACH | Refills: 3 | Status: SHIPPED | OUTPATIENT
Start: 2022-01-01

## 2022-01-01 RX ORDER — ALPRAZOLAM 0.5 MG/1
TABLET ORAL
Qty: 30 TABLET | Refills: 0 | Status: SHIPPED | OUTPATIENT
Start: 2022-01-01 | End: 2022-01-01

## 2022-01-01 RX ORDER — PRAVASTATIN SODIUM 20 MG
TABLET ORAL
Qty: 90 TABLET | Refills: 1 | Status: SHIPPED | OUTPATIENT
Start: 2022-01-01 | End: 2022-01-01

## 2022-01-01 RX ORDER — METOPROLOL SUCCINATE 25 MG/1
25 TABLET, EXTENDED RELEASE ORAL DAILY
Qty: 30 TABLET | Refills: 0 | Status: SHIPPED | OUTPATIENT
Start: 2022-01-01 | End: 2022-01-01 | Stop reason: SDUPTHER

## 2022-01-01 RX ORDER — FUROSEMIDE 40 MG/1
40 TABLET ORAL
Status: DISCONTINUED | OUTPATIENT
Start: 2022-01-01 | End: 2022-01-01 | Stop reason: HOSPADM

## 2022-01-01 RX ORDER — POTASSIUM CHLORIDE 750 MG/1
10 TABLET, FILM COATED, EXTENDED RELEASE ORAL 2 TIMES DAILY
Status: DISCONTINUED | OUTPATIENT
Start: 2022-01-01 | End: 2022-01-01 | Stop reason: HOSPADM

## 2022-01-01 RX ORDER — POTASSIUM CHLORIDE 750 MG/1
10 CAPSULE, EXTENDED RELEASE ORAL 2 TIMES DAILY
Qty: 180 CAPSULE | Refills: 3 | Status: SHIPPED | OUTPATIENT
Start: 2022-01-01

## 2022-01-01 RX ORDER — CEFPODOXIME PROXETIL 200 MG/1
400 TABLET, FILM COATED ORAL EVERY 12 HOURS
Qty: 20 TABLET | Refills: 0 | Status: SHIPPED | OUTPATIENT
Start: 2022-01-01 | End: 2022-01-01

## 2022-01-01 RX ORDER — WARFARIN SODIUM 7.5 MG/1
TABLET ORAL
Start: 2022-01-01 | End: 2022-01-01 | Stop reason: HOSPADM

## 2022-01-01 RX ORDER — PRAVASTATIN SODIUM 20 MG
20 TABLET ORAL DAILY
Status: DISCONTINUED | OUTPATIENT
Start: 2022-01-01 | End: 2022-01-01

## 2022-01-01 RX ORDER — GABAPENTIN 600 MG/1
TABLET ORAL
COMMUNITY
Start: 2022-01-01 | End: 2022-01-01 | Stop reason: HOSPADM

## 2022-01-01 RX ORDER — THIAMINE HCL 100 MG
TABLET ORAL
Qty: 180 TABLET | Refills: 1 | Status: SHIPPED | OUTPATIENT
Start: 2022-01-01 | End: 2022-01-01 | Stop reason: SDUPTHER

## 2022-01-01 RX ORDER — IPRATROPIUM BROMIDE AND ALBUTEROL SULFATE 2.5; .5 MG/3ML; MG/3ML
3 SOLUTION RESPIRATORY (INHALATION)
Status: DISCONTINUED | OUTPATIENT
Start: 2022-01-01 | End: 2022-01-01

## 2022-01-01 RX ORDER — CEFPODOXIME PROXETIL 200 MG/1
TABLET, FILM COATED ORAL
Status: ON HOLD | COMMUNITY
Start: 2022-01-01 | End: 2022-01-01

## 2022-01-01 RX ORDER — HYDROCODONE BITARTRATE AND ACETAMINOPHEN 10; 325 MG/1; MG/1
1 TABLET ORAL EVERY 6 HOURS PRN
Status: DISCONTINUED | OUTPATIENT
Start: 2022-01-01 | End: 2022-01-01

## 2022-01-01 RX ORDER — LORATADINE 10 MG
TABLET ORAL
Qty: 90 TABLET | Refills: 3 | Status: SHIPPED | OUTPATIENT
Start: 2022-01-01 | End: 2022-01-01 | Stop reason: SDUPTHER

## 2022-01-01 RX ORDER — ALPRAZOLAM 0.5 MG/1
TABLET ORAL
Qty: 30 TABLET | Refills: 0 | OUTPATIENT
Start: 2022-01-01

## 2022-01-01 RX ORDER — FERROUS SULFATE 325(65) MG
325 TABLET ORAL
Status: DISCONTINUED | OUTPATIENT
Start: 2022-01-01 | End: 2022-01-01 | Stop reason: HOSPADM

## 2022-01-01 RX ORDER — FERROUS SULFATE 325(65) MG
325 TABLET ORAL
Qty: 90 TABLET | Refills: 0 | Status: CANCELLED | OUTPATIENT
Start: 2022-01-01 | End: 2022-01-01

## 2022-01-01 RX ORDER — DOCUSATE SODIUM 100 MG/1
100 CAPSULE, LIQUID FILLED ORAL DAILY
Status: DISCONTINUED | OUTPATIENT
Start: 2022-01-01 | End: 2022-01-01 | Stop reason: HOSPADM

## 2022-01-01 RX ORDER — ALBUTEROL SULFATE 2.5 MG/3ML
2.5 SOLUTION RESPIRATORY (INHALATION) EVERY 4 HOURS PRN
Refills: 6 | Status: DISCONTINUED | OUTPATIENT
Start: 2022-01-01 | End: 2022-01-01 | Stop reason: HOSPADM

## 2022-01-01 RX ORDER — ACETAMINOPHEN 500 MG
1000 TABLET ORAL ONCE
Status: COMPLETED | OUTPATIENT
Start: 2022-01-01 | End: 2022-01-01

## 2022-01-01 RX ORDER — ALPRAZOLAM 0.5 MG/1
0.5 TABLET ORAL NIGHTLY PRN
Qty: 30 TABLET | Refills: 0 | Status: SHIPPED | OUTPATIENT
Start: 2022-01-01 | End: 2022-01-01

## 2022-01-01 RX ORDER — GABAPENTIN 400 MG/1
400 CAPSULE ORAL EVERY 8 HOURS SCHEDULED
Status: DISCONTINUED | OUTPATIENT
Start: 2022-01-01 | End: 2022-01-01 | Stop reason: HOSPADM

## 2022-01-01 RX ORDER — HYDROCODONE BITARTRATE AND ACETAMINOPHEN 10; 325 MG/1; MG/1
1 TABLET ORAL EVERY 4 HOURS PRN
Status: DISCONTINUED | OUTPATIENT
Start: 2022-01-01 | End: 2022-01-01 | Stop reason: HOSPADM

## 2022-01-01 RX ORDER — WARFARIN SODIUM 7.5 MG/1
7.5 TABLET ORAL
Status: DISCONTINUED | OUTPATIENT
Start: 2022-01-01 | End: 2022-01-01 | Stop reason: DRUGHIGH

## 2022-01-01 RX ORDER — FLUTICASONE PROPIONATE 50 MCG
2 SPRAY, SUSPENSION (ML) NASAL DAILY
Start: 2022-01-01

## 2022-01-01 RX ORDER — SODIUM CHLORIDE 0.9 % (FLUSH) 0.9 %
20 SYRINGE (ML) INJECTION AS NEEDED
Status: DISCONTINUED | OUTPATIENT
Start: 2022-01-01 | End: 2022-01-01 | Stop reason: HOSPADM

## 2022-01-01 RX ORDER — CARVEDILOL 12.5 MG/1
12.5 TABLET ORAL 2 TIMES DAILY WITH MEALS
Qty: 180 TABLET | Refills: 3 | Status: SHIPPED | OUTPATIENT
Start: 2022-01-01 | End: 2022-01-01 | Stop reason: HOSPADM

## 2022-01-01 RX ORDER — ALBUTEROL SULFATE 90 UG/1
2 AEROSOL, METERED RESPIRATORY (INHALATION) EVERY 4 HOURS PRN
Qty: 2 G | Refills: 6 | Status: SHIPPED | OUTPATIENT
Start: 2022-01-01

## 2022-01-01 RX ORDER — POTASSIUM CHLORIDE 750 MG/1
40 TABLET, FILM COATED, EXTENDED RELEASE ORAL AS NEEDED
Status: DISCONTINUED | OUTPATIENT
Start: 2022-01-01 | End: 2022-01-01 | Stop reason: HOSPADM

## 2022-01-01 RX ORDER — SODIUM CHLORIDE 9 MG/ML
100 INJECTION, SOLUTION INTRAVENOUS CONTINUOUS
Status: DISCONTINUED | OUTPATIENT
Start: 2022-01-01 | End: 2022-01-01

## 2022-01-01 RX ORDER — WARFARIN SODIUM 2.5 MG/1
2.5 TABLET ORAL
Status: DISCONTINUED | OUTPATIENT
Start: 2022-01-01 | End: 2022-01-01 | Stop reason: HOSPADM

## 2022-01-01 RX ORDER — SACCHAROMYCES BOULARDII 250 MG
250 CAPSULE ORAL 2 TIMES DAILY
Qty: 180 CAPSULE | Refills: 3 | Status: SHIPPED | OUTPATIENT
Start: 2022-01-01

## 2022-01-01 RX ORDER — GENTAMICIN SULFATE 1 MG/G
1 OINTMENT TOPICAL AS NEEDED
COMMUNITY
Start: 2022-01-01 | End: 2022-01-01 | Stop reason: HOSPADM

## 2022-01-01 RX ORDER — WARFARIN SODIUM 5 MG/1
5 TABLET ORAL
Status: DISCONTINUED | OUTPATIENT
Start: 2022-01-01 | End: 2022-01-01

## 2022-01-01 RX ORDER — SODIUM CHLORIDE 9 MG/ML
125 INJECTION, SOLUTION INTRAVENOUS CONTINUOUS
Status: ACTIVE | OUTPATIENT
Start: 2022-01-01 | End: 2022-01-01

## 2022-01-01 RX ORDER — WARFARIN SODIUM 5 MG/1
TABLET ORAL
Qty: 105 TABLET | Refills: 1 | Status: SHIPPED | OUTPATIENT
Start: 2022-01-01 | End: 2022-01-01 | Stop reason: HOSPADM

## 2022-01-01 RX ORDER — FUROSEMIDE 20 MG/1
20 TABLET ORAL DAILY
Status: DISCONTINUED | OUTPATIENT
Start: 2022-01-01 | End: 2022-01-01 | Stop reason: HOSPADM

## 2022-01-01 RX ORDER — ALBUTEROL SULFATE 2.5 MG/3ML
2.5 SOLUTION RESPIRATORY (INHALATION) EVERY 6 HOURS PRN
Refills: 6 | Status: DISCONTINUED | OUTPATIENT
Start: 2022-01-01 | End: 2022-01-01 | Stop reason: HOSPADM

## 2022-01-01 RX ORDER — CEFPODOXIME PROXETIL 200 MG/1
400 TABLET, FILM COATED ORAL EVERY 12 HOURS
Qty: 24 TABLET | Refills: 0 | Status: SHIPPED | OUTPATIENT
Start: 2022-01-01 | End: 2022-01-01 | Stop reason: SDUPTHER

## 2022-01-01 RX ORDER — WARFARIN SODIUM 5 MG/1
5 TABLET ORAL
Status: DISCONTINUED | OUTPATIENT
Start: 2022-01-01 | End: 2022-01-01 | Stop reason: DRUGHIGH

## 2022-01-01 RX ORDER — NITROGLYCERIN 0.4 MG/1
0.4 TABLET SUBLINGUAL
Refills: 12
Start: 2022-01-01

## 2022-01-01 RX ORDER — IPRATROPIUM BROMIDE 17 UG/1
AEROSOL, METERED RESPIRATORY (INHALATION)
COMMUNITY
Start: 2022-01-01 | End: 2022-01-01 | Stop reason: HOSPADM

## 2022-01-01 RX ORDER — IPRATROPIUM BROMIDE 17 UG/1
AEROSOL, METERED RESPIRATORY (INHALATION)
Qty: 12.9 EACH | Refills: 3 | Status: SHIPPED | OUTPATIENT
Start: 2022-01-01 | End: 2022-01-01

## 2022-01-01 RX ORDER — UREA 10 %
3 LOTION (ML) TOPICAL NIGHTLY PRN
Status: DISCONTINUED | OUTPATIENT
Start: 2022-01-01 | End: 2022-01-01 | Stop reason: HOSPADM

## 2022-01-01 RX ORDER — WARFARIN SODIUM 1 MG/1
1 TABLET ORAL
Status: COMPLETED | OUTPATIENT
Start: 2022-01-01 | End: 2022-01-01

## 2022-01-01 RX ORDER — BUDESONIDE AND FORMOTEROL FUMARATE DIHYDRATE 160; 4.5 UG/1; UG/1
2 AEROSOL RESPIRATORY (INHALATION)
Refills: 4 | Status: DISCONTINUED | OUTPATIENT
Start: 2022-01-01 | End: 2022-01-01 | Stop reason: HOSPADM

## 2022-01-01 RX ORDER — LORATADINE 10 MG
500 TABLET ORAL DAILY
COMMUNITY
Start: 2022-01-01 | End: 2022-01-01

## 2022-01-01 RX ORDER — FUROSEMIDE 40 MG/1
40 TABLET ORAL DAILY
Status: DISCONTINUED | OUTPATIENT
Start: 2022-01-01 | End: 2022-01-01 | Stop reason: HOSPADM

## 2022-01-01 RX ORDER — ACETAMINOPHEN 160 MG/5ML
650 SOLUTION ORAL EVERY 4 HOURS PRN
Status: DISCONTINUED | OUTPATIENT
Start: 2022-01-01 | End: 2022-01-01 | Stop reason: HOSPADM

## 2022-01-01 RX ORDER — FUROSEMIDE 40 MG/1
40 TABLET ORAL
Status: DISCONTINUED | OUTPATIENT
Start: 2022-01-01 | End: 2022-01-01

## 2022-01-01 RX ORDER — WARFARIN SODIUM 4 MG/1
8 TABLET ORAL
Status: DISCONTINUED | OUTPATIENT
Start: 2022-01-01 | End: 2022-01-01

## 2022-01-01 RX ORDER — FERROUS SULFATE 325(65) MG
325 TABLET ORAL
Qty: 30 TABLET | Refills: 2 | Status: SHIPPED | OUTPATIENT
Start: 2022-01-01 | End: 2022-01-01

## 2022-01-01 RX ORDER — ALPRAZOLAM 0.5 MG/1
0.5 TABLET ORAL NIGHTLY PRN
Qty: 30 TABLET | Refills: 0 | Status: ON HOLD | OUTPATIENT
Start: 2022-01-01 | End: 2022-01-01 | Stop reason: SDUPTHER

## 2022-01-01 RX ORDER — LIDOCAINE HYDROCHLORIDE 20 MG/ML
JELLY TOPICAL ONCE
Status: COMPLETED | OUTPATIENT
Start: 2022-01-01 | End: 2022-01-01

## 2022-01-01 RX ORDER — GENTAMICIN SULFATE 1 MG/G
OINTMENT TOPICAL
COMMUNITY
Start: 2022-01-01 | End: 2022-01-01 | Stop reason: HOSPADM

## 2022-01-01 RX ORDER — FUROSEMIDE 40 MG/1
40 TABLET ORAL 2 TIMES DAILY
Status: DISCONTINUED | OUTPATIENT
Start: 2022-01-01 | End: 2022-01-01

## 2022-01-01 RX ORDER — METOPROLOL SUCCINATE 25 MG/1
25 TABLET, EXTENDED RELEASE ORAL NIGHTLY
Status: DISCONTINUED | OUTPATIENT
Start: 2022-01-01 | End: 2022-01-01 | Stop reason: HOSPADM

## 2022-01-01 RX ORDER — GUAIFENESIN AND DEXTROMETHORPHAN HYDROBROMIDE 600; 30 MG/1; MG/1
2 TABLET, EXTENDED RELEASE ORAL 2 TIMES DAILY
Status: DISCONTINUED | OUTPATIENT
Start: 2022-01-01 | End: 2022-01-01 | Stop reason: HOSPADM

## 2022-01-01 RX ORDER — LISINOPRIL 20 MG/1
20 TABLET ORAL 2 TIMES DAILY
COMMUNITY
End: 2022-01-01

## 2022-01-01 RX ORDER — FUROSEMIDE 40 MG/1
20 TABLET ORAL DAILY
Start: 2022-01-01

## 2022-01-01 RX ORDER — VANCOMYCIN HYDROCHLORIDE 1 G/200ML
1000 INJECTION, SOLUTION INTRAVENOUS EVERY 12 HOURS
Status: DISCONTINUED | OUTPATIENT
Start: 2022-01-01 | End: 2022-01-01

## 2022-01-01 RX ORDER — FLUCONAZOLE 200 MG/1
200 TABLET ORAL DAILY
COMMUNITY
Start: 2022-01-01 | End: 2022-01-01 | Stop reason: HOSPADM

## 2022-01-01 RX ORDER — WARFARIN SODIUM 7.5 MG/1
7.5 TABLET ORAL
Status: COMPLETED | OUTPATIENT
Start: 2022-01-01 | End: 2022-01-01

## 2022-01-01 RX ORDER — WARFARIN SODIUM 5 MG/1
5 TABLET ORAL TAKE AS DIRECTED
COMMUNITY

## 2022-01-01 RX ORDER — CARVEDILOL 6.25 MG/1
TABLET ORAL
Qty: 180 TABLET | Refills: 0 | OUTPATIENT
Start: 2022-01-01

## 2022-01-01 RX ORDER — POTASSIUM CHLORIDE 750 MG/1
10 CAPSULE, EXTENDED RELEASE ORAL 2 TIMES DAILY
Qty: 180 CAPSULE | Refills: 3 | Status: SHIPPED | OUTPATIENT
Start: 2022-01-01 | End: 2022-01-01 | Stop reason: SDUPTHER

## 2022-01-01 RX ORDER — POTASSIUM CHLORIDE 7.45 MG/ML
10 INJECTION INTRAVENOUS ONCE
Status: DISCONTINUED | OUTPATIENT
Start: 2022-01-01 | End: 2022-01-01 | Stop reason: HOSPADM

## 2022-01-01 RX ORDER — GABAPENTIN 600 MG/1
600 TABLET ORAL 3 TIMES DAILY
Qty: 90 TABLET | Refills: 0 | Status: SHIPPED | OUTPATIENT
Start: 2022-01-01 | End: 2022-01-01 | Stop reason: HOSPADM

## 2022-01-01 RX ORDER — AMOXICILLIN 250 MG
1 CAPSULE ORAL 2 TIMES DAILY
Status: DISCONTINUED | OUTPATIENT
Start: 2022-01-01 | End: 2022-01-01 | Stop reason: HOSPADM

## 2022-01-01 RX ORDER — POTASSIUM CHLORIDE 750 MG/1
10 TABLET, EXTENDED RELEASE ORAL DAILY
COMMUNITY
Start: 2022-01-01 | End: 2022-01-01 | Stop reason: SDUPTHER

## 2022-01-01 RX ORDER — HYDROCODONE BITARTRATE AND ACETAMINOPHEN 10; 325 MG/1; MG/1
1 TABLET ORAL EVERY 6 HOURS PRN
Status: ON HOLD | COMMUNITY
End: 2022-01-01 | Stop reason: SDUPTHER

## 2022-01-01 RX ORDER — METOPROLOL SUCCINATE 25 MG/1
25 TABLET, EXTENDED RELEASE ORAL DAILY
Qty: 90 TABLET | Refills: 3 | Status: SHIPPED | OUTPATIENT
Start: 2022-01-01

## 2022-01-01 RX ORDER — FUROSEMIDE 10 MG/ML
40 INJECTION INTRAMUSCULAR; INTRAVENOUS EVERY 12 HOURS
Status: DISCONTINUED | OUTPATIENT
Start: 2022-01-01 | End: 2022-01-01

## 2022-01-01 RX ORDER — CALCIUM CARBONATE 200(500)MG
2 TABLET,CHEWABLE ORAL 3 TIMES DAILY PRN
Status: DISCONTINUED | OUTPATIENT
Start: 2022-01-01 | End: 2022-01-01 | Stop reason: HOSPADM

## 2022-01-01 RX ORDER — ALBUTEROL SULFATE 2.5 MG/3ML
2.5 SOLUTION RESPIRATORY (INHALATION) EVERY 4 HOURS PRN
Status: DISCONTINUED | OUTPATIENT
Start: 2022-01-01 | End: 2022-01-01

## 2022-01-01 RX ORDER — ALPRAZOLAM 0.5 MG/1
0.5 TABLET ORAL NIGHTLY PRN
Qty: 3 TABLET | Refills: 0 | Status: SHIPPED | OUTPATIENT
Start: 2022-01-01 | End: 2022-01-01

## 2022-01-01 RX ORDER — METOPROLOL SUCCINATE 25 MG/1
25 TABLET, EXTENDED RELEASE ORAL
Qty: 90 TABLET | Refills: 0 | OUTPATIENT
Start: 2022-01-01

## 2022-01-01 RX ORDER — METOPROLOL SUCCINATE 25 MG/1
25 TABLET, EXTENDED RELEASE ORAL DAILY
Status: DISCONTINUED | OUTPATIENT
Start: 2022-01-01 | End: 2022-01-01

## 2022-01-01 RX ORDER — IPRATROPIUM BROMIDE AND ALBUTEROL SULFATE 2.5; .5 MG/3ML; MG/3ML
3 SOLUTION RESPIRATORY (INHALATION) EVERY 4 HOURS PRN
Status: DISCONTINUED | OUTPATIENT
Start: 2022-01-01 | End: 2022-01-01

## 2022-01-01 RX ORDER — LISINOPRIL 20 MG/1
TABLET ORAL
Qty: 180 TABLET | Refills: 0 | Status: SHIPPED | OUTPATIENT
Start: 2022-01-01 | End: 2022-01-01 | Stop reason: HOSPADM

## 2022-01-01 RX ORDER — LISINOPRIL 20 MG/1
TABLET ORAL
Qty: 180 TABLET | Refills: 0 | Status: SHIPPED | OUTPATIENT
Start: 2022-01-01 | End: 2022-01-01

## 2022-01-01 RX ORDER — WARFARIN SODIUM 2.5 MG/1
2.5 TABLET ORAL NIGHTLY
Start: 2022-01-01 | End: 2022-01-01 | Stop reason: SDUPTHER

## 2022-01-01 RX ORDER — LISINOPRIL 2.5 MG/1
2.5 TABLET ORAL
Status: DISCONTINUED | OUTPATIENT
Start: 2022-01-01 | End: 2022-01-01

## 2022-01-01 RX ORDER — CEFAZOLIN SODIUM 2 G/100ML
2 INJECTION, SOLUTION INTRAVENOUS EVERY 8 HOURS
Status: DISCONTINUED | OUTPATIENT
Start: 2022-01-01 | End: 2022-01-01 | Stop reason: HOSPADM

## 2022-01-01 RX ORDER — HYDROCODONE BITARTRATE AND ACETAMINOPHEN 10; 325 MG/1; MG/1
1 TABLET ORAL EVERY 4 HOURS PRN
Qty: 10 TABLET | Refills: 0 | Status: SHIPPED | OUTPATIENT
Start: 2022-01-01 | End: 2022-01-01

## 2022-01-01 RX ORDER — WARFARIN SODIUM 5 MG/1
5 TABLET ORAL
Status: DISCONTINUED | OUTPATIENT
Start: 2022-01-01 | End: 2022-01-01 | Stop reason: SDUPTHER

## 2022-01-01 RX ORDER — FUROSEMIDE 40 MG/1
40 TABLET ORAL DAILY
Status: DISCONTINUED | OUTPATIENT
Start: 2022-01-01 | End: 2022-01-01

## 2022-01-01 RX ORDER — ALPRAZOLAM 0.5 MG/1
0.5 TABLET ORAL NIGHTLY PRN
Status: DISPENSED | OUTPATIENT
Start: 2022-01-01 | End: 2022-01-01

## 2022-01-01 RX ORDER — LISINOPRIL 5 MG/1
5 TABLET ORAL DAILY
Qty: 90 TABLET | Refills: 0 | Status: SHIPPED | OUTPATIENT
Start: 2022-01-01 | End: 2022-01-01 | Stop reason: HOSPADM

## 2022-01-01 RX ORDER — GENTAMICIN SULFATE 1 MG/G
OINTMENT TOPICAL DAILY
Status: DISCONTINUED | OUTPATIENT
Start: 2022-01-01 | End: 2022-01-01

## 2022-01-01 RX ORDER — ENOXAPARIN SODIUM 150 MG/ML
1 INJECTION SUBCUTANEOUS ONCE
Status: COMPLETED | OUTPATIENT
Start: 2022-01-01 | End: 2022-01-01

## 2022-01-01 RX ORDER — FUROSEMIDE 40 MG/1
40 TABLET ORAL 2 TIMES DAILY
Qty: 60 TABLET | Refills: 0 | Status: SHIPPED | OUTPATIENT
Start: 2022-01-01 | End: 2022-01-01 | Stop reason: HOSPADM

## 2022-01-01 RX ORDER — MAGNESIUM SULFATE 1 G/100ML
1 INJECTION INTRAVENOUS AS NEEDED
Status: DISCONTINUED | OUTPATIENT
Start: 2022-01-01 | End: 2022-01-01 | Stop reason: HOSPADM

## 2022-01-01 RX ORDER — NITROFURANTOIN 25; 75 MG/1; MG/1
100 CAPSULE ORAL
Qty: 14 CAPSULE | Refills: 0 | Status: SHIPPED | OUTPATIENT
Start: 2022-01-01 | End: 2022-01-01 | Stop reason: HOSPADM

## 2022-01-01 RX ORDER — ALBUTEROL SULFATE 2.5 MG/3ML
2.5 SOLUTION RESPIRATORY (INHALATION) EVERY 6 HOURS PRN
Status: DISCONTINUED | OUTPATIENT
Start: 2022-01-01 | End: 2022-01-01 | Stop reason: HOSPADM

## 2022-01-01 RX ORDER — NITROGLYCERIN 0.4 MG/1
0.4 TABLET SUBLINGUAL
Status: DISCONTINUED | OUTPATIENT
Start: 2022-01-01 | End: 2022-01-01 | Stop reason: SDUPTHER

## 2022-01-01 RX ORDER — EPINEPHRINE 0.3 MG/.3ML
INJECTION SUBCUTANEOUS
Status: ON HOLD | COMMUNITY
Start: 2022-01-01 | End: 2022-01-01

## 2022-01-01 RX ORDER — GABAPENTIN 300 MG/1
600 CAPSULE ORAL EVERY 8 HOURS SCHEDULED
Status: DISCONTINUED | OUTPATIENT
Start: 2022-01-01 | End: 2022-01-01 | Stop reason: HOSPADM

## 2022-01-01 RX ORDER — METOPROLOL SUCCINATE 25 MG/1
25 TABLET, EXTENDED RELEASE ORAL DAILY
Start: 2022-01-01 | End: 2022-01-01 | Stop reason: SDUPTHER

## 2022-01-01 RX ORDER — THIAMINE HCL 100 MG
100 TABLET ORAL 2 TIMES DAILY
COMMUNITY
Start: 2022-01-01 | End: 2022-01-01

## 2022-01-01 RX ORDER — HYDROCODONE BITARTRATE AND ACETAMINOPHEN 10; 325 MG/1; MG/1
1 TABLET ORAL EVERY 4 HOURS PRN
Status: DISCONTINUED | OUTPATIENT
Start: 2022-01-01 | End: 2022-01-01

## 2022-01-01 RX ORDER — WARFARIN SODIUM 6 MG/1
6 TABLET ORAL ONCE
Status: COMPLETED | OUTPATIENT
Start: 2022-01-01 | End: 2022-01-01

## 2022-01-01 RX ORDER — LISINOPRIL 5 MG/1
TABLET ORAL
Qty: 90 TABLET | Refills: 0 | OUTPATIENT
Start: 2022-01-01

## 2022-01-01 RX ORDER — ACETAMINOPHEN 325 MG/1
650 TABLET ORAL EVERY 6 HOURS PRN
Start: 2022-01-01 | End: 2022-01-01

## 2022-01-01 RX ORDER — METOPROLOL SUCCINATE 25 MG/1
25 TABLET, EXTENDED RELEASE ORAL DAILY
Qty: 90 TABLET | Refills: 3 | Status: ON HOLD | OUTPATIENT
Start: 2022-01-01 | End: 2022-01-01 | Stop reason: SDUPTHER

## 2022-01-01 RX ORDER — ASCORBIC ACID 500 MG
500 TABLET ORAL DAILY
Qty: 90 TABLET | Refills: 3 | Status: SHIPPED | OUTPATIENT
Start: 2022-01-01

## 2022-01-01 RX ORDER — GABAPENTIN 300 MG/1
600 CAPSULE ORAL 4 TIMES DAILY
Status: DISCONTINUED | OUTPATIENT
Start: 2022-01-01 | End: 2022-01-01 | Stop reason: HOSPADM

## 2022-01-01 RX ORDER — CLOTRIMAZOLE 1 %
CREAM (GRAM) TOPICAL
COMMUNITY
Start: 2022-01-01

## 2022-01-01 RX ORDER — FERROUS SULFATE 325(65) MG
325 TABLET ORAL
Qty: 30 TABLET | Refills: 0 | Status: SHIPPED | OUTPATIENT
Start: 2022-01-01 | End: 2022-01-01 | Stop reason: SDUPTHER

## 2022-01-01 RX ORDER — HYDROCODONE BITARTRATE AND ACETAMINOPHEN 10; 325 MG/1; MG/1
1 TABLET ORAL
Status: DISCONTINUED | OUTPATIENT
Start: 2022-01-01 | End: 2022-01-01 | Stop reason: HOSPADM

## 2022-01-01 RX ORDER — CARVEDILOL 12.5 MG/1
12.5 TABLET ORAL 2 TIMES DAILY WITH MEALS
Status: DISCONTINUED | OUTPATIENT
Start: 2022-01-01 | End: 2022-01-01

## 2022-01-01 RX ORDER — MAGNESIUM SULFATE HEPTAHYDRATE 40 MG/ML
4 INJECTION, SOLUTION INTRAVENOUS AS NEEDED
Status: DISCONTINUED | OUTPATIENT
Start: 2022-01-01 | End: 2022-01-01 | Stop reason: HOSPADM

## 2022-01-01 RX ORDER — GABAPENTIN 600 MG/1
600 TABLET ORAL 4 TIMES DAILY
Status: ON HOLD | COMMUNITY
Start: 2022-01-01 | End: 2022-01-01 | Stop reason: SDUPTHER

## 2022-01-01 RX ORDER — ALBUTEROL SULFATE 2.5 MG/3ML
2.5 SOLUTION RESPIRATORY (INHALATION) EVERY 4 HOURS PRN
Status: DISCONTINUED | OUTPATIENT
Start: 2022-01-01 | End: 2022-01-01 | Stop reason: HOSPADM

## 2022-01-01 RX ORDER — ASPIRIN 325 MG
325 TABLET, DELAYED RELEASE (ENTERIC COATED) ORAL ONCE
Status: COMPLETED | OUTPATIENT
Start: 2022-01-01 | End: 2022-01-01

## 2022-01-01 RX ORDER — METOPROLOL SUCCINATE 50 MG/1
50 TABLET, EXTENDED RELEASE ORAL
Status: DISCONTINUED | OUTPATIENT
Start: 2022-01-01 | End: 2022-01-01

## 2022-01-01 RX ORDER — IPRATROPIUM BROMIDE AND ALBUTEROL SULFATE 2.5; .5 MG/3ML; MG/3ML
3 SOLUTION RESPIRATORY (INHALATION) EVERY 4 HOURS PRN
Status: DISCONTINUED | OUTPATIENT
Start: 2022-01-01 | End: 2022-01-01 | Stop reason: HOSPADM

## 2022-01-01 RX ORDER — HYDROCODONE BITARTRATE AND ACETAMINOPHEN 10; 325 MG/1; MG/1
1 TABLET ORAL ONCE
Status: COMPLETED | OUTPATIENT
Start: 2022-01-01 | End: 2022-01-01

## 2022-01-01 RX ADMIN — Medication 100 MG: at 10:10

## 2022-01-01 RX ADMIN — POTASSIUM CHLORIDE 10 MEQ: 7.46 INJECTION, SOLUTION INTRAVENOUS at 21:41

## 2022-01-01 RX ADMIN — FLUTICASONE PROPIONATE 2 SPRAY: 50 SPRAY, METERED NASAL at 16:57

## 2022-01-01 RX ADMIN — HYDROCODONE BITARTRATE AND ACETAMINOPHEN 1 TABLET: 10; 325 TABLET ORAL at 00:02

## 2022-01-01 RX ADMIN — METOPROLOL TARTRATE 25 MG: 25 TABLET, FILM COATED ORAL at 12:20

## 2022-01-01 RX ADMIN — HYDROCODONE BITARTRATE AND ACETAMINOPHEN 1 TABLET: 10; 325 TABLET ORAL at 21:43

## 2022-01-01 RX ADMIN — HYDROCODONE BITARTRATE AND ACETAMINOPHEN 1 TABLET: 10; 325 TABLET ORAL at 18:21

## 2022-01-01 RX ADMIN — CEFEPIME 2 G: 2 INJECTION, POWDER, FOR SOLUTION INTRAVENOUS at 23:14

## 2022-01-01 RX ADMIN — WARFARIN 6 MG: 6 TABLET ORAL at 18:50

## 2022-01-01 RX ADMIN — TAMSULOSIN HYDROCHLORIDE 0.4 MG: 0.4 CAPSULE ORAL at 20:06

## 2022-01-01 RX ADMIN — GABAPENTIN 400 MG: 400 CAPSULE ORAL at 05:30

## 2022-01-01 RX ADMIN — HYDROCODONE BITARTRATE AND ACETAMINOPHEN 1 TABLET: 10; 325 TABLET ORAL at 13:06

## 2022-01-01 RX ADMIN — ERTAPENEM SODIUM 1 G: 1 INJECTION, POWDER, LYOPHILIZED, FOR SOLUTION INTRAMUSCULAR; INTRAVENOUS at 20:50

## 2022-01-01 RX ADMIN — IPRATROPIUM BROMIDE 0.5 MG: 0.5 SOLUTION RESPIRATORY (INHALATION) at 07:45

## 2022-01-01 RX ADMIN — HYDROCODONE BITARTRATE AND ACETAMINOPHEN 1 TABLET: 10; 325 TABLET ORAL at 15:20

## 2022-01-01 RX ADMIN — HYDROCODONE BITARTRATE AND ACETAMINOPHEN 1 TABLET: 10; 325 TABLET ORAL at 08:56

## 2022-01-01 RX ADMIN — ALPRAZOLAM 0.5 MG: 0.5 TABLET ORAL at 00:41

## 2022-01-01 RX ADMIN — METOPROLOL SUCCINATE 25 MG: 25 TABLET, EXTENDED RELEASE ORAL at 10:08

## 2022-01-01 RX ADMIN — MEROPENEM 1 G: 1 INJECTION, POWDER, FOR SOLUTION INTRAVENOUS at 17:13

## 2022-01-01 RX ADMIN — MICONAZOLE NITRATE: 2 POWDER TOPICAL at 14:18

## 2022-01-01 RX ADMIN — BUDESONIDE AND FORMOTEROL FUMARATE DIHYDRATE 2 PUFF: 160; 4.5 AEROSOL RESPIRATORY (INHALATION) at 07:47

## 2022-01-01 RX ADMIN — MICONAZOLE NITRATE 1 APPLICATION: 20 CREAM TOPICAL at 09:07

## 2022-01-01 RX ADMIN — GABAPENTIN 600 MG: 300 CAPSULE ORAL at 13:11

## 2022-01-01 RX ADMIN — CEFEPIME 2 G: 2 INJECTION, POWDER, FOR SOLUTION INTRAVENOUS at 05:25

## 2022-01-01 RX ADMIN — DOCUSATE SODIUM 50MG AND SENNOSIDES 8.6MG 1 TABLET: 8.6; 5 TABLET, FILM COATED ORAL at 20:49

## 2022-01-01 RX ADMIN — PRAVASTATIN SODIUM 20 MG: 20 TABLET ORAL at 08:08

## 2022-01-01 RX ADMIN — METOCLOPRAMIDE 10 MG: 10 TABLET ORAL at 21:34

## 2022-01-01 RX ADMIN — METOCLOPRAMIDE 10 MG: 10 TABLET ORAL at 05:30

## 2022-01-01 RX ADMIN — ANTACID TABLETS 2 TABLET: 500 TABLET, CHEWABLE ORAL at 17:35

## 2022-01-01 RX ADMIN — ACETAMINOPHEN 1000 MG: 500 TABLET ORAL at 19:53

## 2022-01-01 RX ADMIN — HYDROCODONE BITARTRATE AND ACETAMINOPHEN 1 TABLET: 10; 325 TABLET ORAL at 22:25

## 2022-01-01 RX ADMIN — POTASSIUM CHLORIDE 10 MEQ: 750 TABLET, EXTENDED RELEASE ORAL at 08:45

## 2022-01-01 RX ADMIN — DOCUSATE SODIUM 100 MG: 100 CAPSULE, LIQUID FILLED ORAL at 08:15

## 2022-01-01 RX ADMIN — GABAPENTIN 600 MG: 300 CAPSULE ORAL at 14:31

## 2022-01-01 RX ADMIN — HYDROCODONE BITARTRATE AND ACETAMINOPHEN 1 TABLET: 10; 325 TABLET ORAL at 15:07

## 2022-01-01 RX ADMIN — Medication 100 MG: at 20:06

## 2022-01-01 RX ADMIN — BUDESONIDE AND FORMOTEROL FUMARATE DIHYDRATE 2 PUFF: 160; 4.5 AEROSOL RESPIRATORY (INHALATION) at 07:16

## 2022-01-01 RX ADMIN — HYDROCODONE BITARTRATE AND ACETAMINOPHEN 1 TABLET: 10; 325 TABLET ORAL at 03:34

## 2022-01-01 RX ADMIN — Medication 10 ML: at 20:41

## 2022-01-01 RX ADMIN — FUROSEMIDE 40 MG: 40 TABLET ORAL at 09:12

## 2022-01-01 RX ADMIN — HYDROCODONE BITARTRATE AND ACETAMINOPHEN 1 TABLET: 10; 325 TABLET ORAL at 15:09

## 2022-01-01 RX ADMIN — Medication 10 ML: at 20:29

## 2022-01-01 RX ADMIN — WARFARIN 7.5 MG: 7.5 TABLET ORAL at 22:06

## 2022-01-01 RX ADMIN — SODIUM CHLORIDE 75 ML/HR: 9 INJECTION, SOLUTION INTRAVENOUS at 14:27

## 2022-01-01 RX ADMIN — POTASSIUM CHLORIDE 10 MEQ: 750 TABLET, EXTENDED RELEASE ORAL at 09:06

## 2022-01-01 RX ADMIN — Medication 10 ML: at 08:23

## 2022-01-01 RX ADMIN — FLUTICASONE PROPIONATE 2 SPRAY: 50 SPRAY, METERED NASAL at 08:46

## 2022-01-01 RX ADMIN — FUROSEMIDE 40 MG: 20 TABLET ORAL at 09:30

## 2022-01-01 RX ADMIN — GUAIFENESIN AND DEXTROMETHORPHAN HYDROBROMIDE 2 TABLET: 600; 30 TABLET, EXTENDED RELEASE ORAL at 21:36

## 2022-01-01 RX ADMIN — POTASSIUM CHLORIDE 40 MEQ: 750 TABLET, EXTENDED RELEASE ORAL at 13:26

## 2022-01-01 RX ADMIN — IPRATROPIUM BROMIDE 0.5 MG: 0.5 SOLUTION RESPIRATORY (INHALATION) at 19:29

## 2022-01-01 RX ADMIN — Medication 10 ML: at 21:29

## 2022-01-01 RX ADMIN — PRAVASTATIN SODIUM 20 MG: 20 TABLET ORAL at 10:01

## 2022-01-01 RX ADMIN — WARFARIN SODIUM 1 MG: 1 TABLET ORAL at 17:15

## 2022-01-01 RX ADMIN — METOCLOPRAMIDE 10 MG: 10 TABLET ORAL at 06:33

## 2022-01-01 RX ADMIN — IPRATROPIUM BROMIDE 0.5 MG: 0.5 SOLUTION RESPIRATORY (INHALATION) at 20:50

## 2022-01-01 RX ADMIN — DOCUSATE SODIUM 100 MG: 100 CAPSULE, LIQUID FILLED ORAL at 08:09

## 2022-01-01 RX ADMIN — CEFEPIME 2 G: 2 INJECTION, POWDER, FOR SOLUTION INTRAVENOUS at 08:33

## 2022-01-01 RX ADMIN — Medication 10 ML: at 21:43

## 2022-01-01 RX ADMIN — BUDESONIDE AND FORMOTEROL FUMARATE DIHYDRATE 2 PUFF: 160; 4.5 AEROSOL RESPIRATORY (INHALATION) at 20:04

## 2022-01-01 RX ADMIN — HYDROCODONE BITARTRATE AND ACETAMINOPHEN 1 TABLET: 10; 325 TABLET ORAL at 21:42

## 2022-01-01 RX ADMIN — IPRATROPIUM BROMIDE AND ALBUTEROL SULFATE 3 ML: 2.5; .5 SOLUTION RESPIRATORY (INHALATION) at 11:47

## 2022-01-01 RX ADMIN — HYDROCODONE BITARTRATE AND ACETAMINOPHEN 1 TABLET: 10; 325 TABLET ORAL at 03:45

## 2022-01-01 RX ADMIN — POTASSIUM CHLORIDE 10 MEQ: 750 TABLET, EXTENDED RELEASE ORAL at 20:41

## 2022-01-01 RX ADMIN — HYDROCODONE BITARTRATE AND ACETAMINOPHEN 1 TABLET: 10; 325 TABLET ORAL at 12:06

## 2022-01-01 RX ADMIN — IPRATROPIUM BROMIDE 0.5 MG: 0.5 SOLUTION RESPIRATORY (INHALATION) at 07:27

## 2022-01-01 RX ADMIN — ERTAPENEM SODIUM 1 G: 1 INJECTION, POWDER, LYOPHILIZED, FOR SOLUTION INTRAMUSCULAR; INTRAVENOUS at 05:30

## 2022-01-01 RX ADMIN — SODIUM CHLORIDE 125 ML/HR: 9 INJECTION, SOLUTION INTRAVENOUS at 21:37

## 2022-01-01 RX ADMIN — HYDROCODONE BITARTRATE AND ACETAMINOPHEN 1 TABLET: 10; 325 TABLET ORAL at 13:10

## 2022-01-01 RX ADMIN — ACETAMINOPHEN 1000 MG: 500 TABLET ORAL at 05:04

## 2022-01-01 RX ADMIN — DOCUSATE SODIUM 100 MG: 100 CAPSULE, LIQUID FILLED ORAL at 08:56

## 2022-01-01 RX ADMIN — FINASTERIDE 5 MG: 5 TABLET, FILM COATED ORAL at 12:12

## 2022-01-01 RX ADMIN — Medication 1 CAPSULE: at 12:12

## 2022-01-01 RX ADMIN — DOCUSATE SODIUM 50MG AND SENNOSIDES 8.6MG 1 TABLET: 8.6; 5 TABLET, FILM COATED ORAL at 10:07

## 2022-01-01 RX ADMIN — FLUTICASONE PROPIONATE 2 SPRAY: 50 SPRAY, METERED NASAL at 08:08

## 2022-01-01 RX ADMIN — METOCLOPRAMIDE 10 MG: 10 TABLET ORAL at 06:23

## 2022-01-01 RX ADMIN — ALPRAZOLAM 0.5 MG: 0.5 TABLET ORAL at 01:48

## 2022-01-01 RX ADMIN — IPRATROPIUM BROMIDE 0.5 MG: 0.5 SOLUTION RESPIRATORY (INHALATION) at 19:40

## 2022-01-01 RX ADMIN — HYDROCODONE BITARTRATE AND ACETAMINOPHEN 1 TABLET: 10; 325 TABLET ORAL at 01:00

## 2022-01-01 RX ADMIN — DIATRIZOATE MEGLUMINE AND DIATRIZOATE SODIUM 30 ML: 600; 100 SOLUTION ORAL; RECTAL at 10:40

## 2022-01-01 RX ADMIN — FINASTERIDE 5 MG: 5 TABLET, FILM COATED ORAL at 09:46

## 2022-01-01 RX ADMIN — IPRATROPIUM BROMIDE AND ALBUTEROL SULFATE 3 ML: .5; 3 SOLUTION RESPIRATORY (INHALATION) at 15:48

## 2022-01-01 RX ADMIN — Medication 10 ML: at 21:10

## 2022-01-01 RX ADMIN — GUAIFENESIN AND DEXTROMETHORPHAN HYDROBROMIDE 2 TABLET: 600; 30 TABLET, EXTENDED RELEASE ORAL at 22:12

## 2022-01-01 RX ADMIN — DOCUSATE SODIUM 100 MG: 100 CAPSULE, LIQUID FILLED ORAL at 08:52

## 2022-01-01 RX ADMIN — HYDROCODONE BITARTRATE AND ACETAMINOPHEN 1 TABLET: 10; 325 TABLET ORAL at 21:39

## 2022-01-01 RX ADMIN — METOCLOPRAMIDE 10 MG: 10 TABLET ORAL at 11:40

## 2022-01-01 RX ADMIN — TAMSULOSIN HYDROCHLORIDE 0.4 MG: 0.4 CAPSULE ORAL at 22:37

## 2022-01-01 RX ADMIN — ALPRAZOLAM 0.5 MG: 0.5 TABLET ORAL at 21:25

## 2022-01-01 RX ADMIN — FUROSEMIDE 40 MG: 10 INJECTION, SOLUTION INTRAMUSCULAR; INTRAVENOUS at 01:00

## 2022-01-01 RX ADMIN — IPRATROPIUM BROMIDE 0.5 MG: 0.5 SOLUTION RESPIRATORY (INHALATION) at 07:08

## 2022-01-01 RX ADMIN — HYDROCORTISONE 1 APPLICATION: 1 OINTMENT TOPICAL at 22:00

## 2022-01-01 RX ADMIN — FUROSEMIDE 40 MG: 40 TABLET ORAL at 09:32

## 2022-01-01 RX ADMIN — HYDROCODONE BITARTRATE AND ACETAMINOPHEN 1 TABLET: 10; 325 TABLET ORAL at 12:03

## 2022-01-01 RX ADMIN — METOCLOPRAMIDE 10 MG: 10 TABLET ORAL at 21:19

## 2022-01-01 RX ADMIN — HYDROCODONE BITARTRATE AND ACETAMINOPHEN 1 TABLET: 10; 325 TABLET ORAL at 12:08

## 2022-01-01 RX ADMIN — ACETAMINOPHEN 650 MG: 325 TABLET, FILM COATED ORAL at 05:30

## 2022-01-01 RX ADMIN — Medication 1 CAPSULE: at 08:18

## 2022-01-01 RX ADMIN — HYDROCODONE BITARTRATE AND ACETAMINOPHEN 1 TABLET: 10; 325 TABLET ORAL at 03:40

## 2022-01-01 RX ADMIN — MEROPENEM 1 G: 1 INJECTION, POWDER, FOR SOLUTION INTRAVENOUS at 15:46

## 2022-01-01 RX ADMIN — PRAVASTATIN SODIUM 20 MG: 20 TABLET ORAL at 08:10

## 2022-01-01 RX ADMIN — FINASTERIDE 5 MG: 5 TABLET, FILM COATED ORAL at 09:39

## 2022-01-01 RX ADMIN — METOCLOPRAMIDE 10 MG: 10 TABLET ORAL at 08:00

## 2022-01-01 RX ADMIN — BUDESONIDE AND FORMOTEROL FUMARATE DIHYDRATE 2 PUFF: 160; 4.5 AEROSOL RESPIRATORY (INHALATION) at 11:55

## 2022-01-01 RX ADMIN — MICONAZOLE NITRATE 1 APPLICATION: 20 CREAM TOPICAL at 00:00

## 2022-01-01 RX ADMIN — GABAPENTIN 600 MG: 300 CAPSULE ORAL at 06:06

## 2022-01-01 RX ADMIN — FINASTERIDE 5 MG: 5 TABLET, FILM COATED ORAL at 20:17

## 2022-01-01 RX ADMIN — Medication 10 ML: at 21:19

## 2022-01-01 RX ADMIN — METOCLOPRAMIDE 10 MG: 10 TABLET ORAL at 16:57

## 2022-01-01 RX ADMIN — MEROPENEM 1 G: 1 INJECTION, POWDER, FOR SOLUTION INTRAVENOUS at 22:37

## 2022-01-01 RX ADMIN — FINASTERIDE 5 MG: 5 TABLET, FILM COATED ORAL at 08:44

## 2022-01-01 RX ADMIN — METOPROLOL TARTRATE 12.5 MG: 25 TABLET, FILM COATED ORAL at 08:09

## 2022-01-01 RX ADMIN — BUDESONIDE AND FORMOTEROL FUMARATE DIHYDRATE 2 PUFF: 80; 4.5 AEROSOL RESPIRATORY (INHALATION) at 19:31

## 2022-01-01 RX ADMIN — IPRATROPIUM BROMIDE AND ALBUTEROL SULFATE 3 ML: 2.5; .5 SOLUTION RESPIRATORY (INHALATION) at 10:42

## 2022-01-01 RX ADMIN — METOCLOPRAMIDE 10 MG: 10 TABLET ORAL at 11:41

## 2022-01-01 RX ADMIN — FUROSEMIDE 40 MG: 20 TABLET ORAL at 09:46

## 2022-01-01 RX ADMIN — METOCLOPRAMIDE 10 MG: 10 TABLET ORAL at 12:12

## 2022-01-01 RX ADMIN — METOCLOPRAMIDE 10 MG: 10 TABLET ORAL at 16:45

## 2022-01-01 RX ADMIN — Medication 100 MG: at 08:43

## 2022-01-01 RX ADMIN — METOPROLOL TARTRATE 12.5 MG: 25 TABLET, FILM COATED ORAL at 21:15

## 2022-01-01 RX ADMIN — SODIUM CHLORIDE 125 ML/HR: 9 INJECTION, SOLUTION INTRAVENOUS at 13:26

## 2022-01-01 RX ADMIN — GABAPENTIN 600 MG: 300 CAPSULE ORAL at 17:13

## 2022-01-01 RX ADMIN — POTASSIUM CHLORIDE 10 MEQ: 750 TABLET, EXTENDED RELEASE ORAL at 10:15

## 2022-01-01 RX ADMIN — IPRATROPIUM BROMIDE 0.5 MG: 0.5 SOLUTION RESPIRATORY (INHALATION) at 07:41

## 2022-01-01 RX ADMIN — MEROPENEM 1 G: 1 INJECTION, POWDER, FOR SOLUTION INTRAVENOUS at 08:23

## 2022-01-01 RX ADMIN — METOCLOPRAMIDE 10 MG: 10 TABLET ORAL at 17:17

## 2022-01-01 RX ADMIN — FUROSEMIDE 40 MG: 20 TABLET ORAL at 20:44

## 2022-01-01 RX ADMIN — GABAPENTIN 600 MG: 300 CAPSULE ORAL at 11:33

## 2022-01-01 RX ADMIN — AMPICILLIN AND SULBACTAM 3 G: 2; 1 INJECTION, POWDER, FOR SOLUTION INTRAMUSCULAR; INTRAVENOUS at 19:54

## 2022-01-01 RX ADMIN — CARVEDILOL 12.5 MG: 12.5 TABLET, FILM COATED ORAL at 17:54

## 2022-01-01 RX ADMIN — Medication 100 MG: at 17:34

## 2022-01-01 RX ADMIN — METOCLOPRAMIDE 10 MG: 10 TABLET ORAL at 13:18

## 2022-01-01 RX ADMIN — METOPROLOL SUCCINATE 25 MG: 25 TABLET, EXTENDED RELEASE ORAL at 08:44

## 2022-01-01 RX ADMIN — GABAPENTIN 600 MG: 300 CAPSULE ORAL at 21:01

## 2022-01-01 RX ADMIN — GABAPENTIN 400 MG: 400 CAPSULE ORAL at 13:10

## 2022-01-01 RX ADMIN — CARVEDILOL 12.5 MG: 12.5 TABLET, FILM COATED ORAL at 09:11

## 2022-01-01 RX ADMIN — SODIUM CHLORIDE 125 ML/HR: 9 INJECTION, SOLUTION INTRAVENOUS at 20:35

## 2022-01-01 RX ADMIN — METOCLOPRAMIDE 10 MG: 10 TABLET ORAL at 19:25

## 2022-01-01 RX ADMIN — POTASSIUM CHLORIDE 10 MEQ: 750 TABLET, EXTENDED RELEASE ORAL at 08:19

## 2022-01-01 RX ADMIN — VANCOMYCIN HYDROCHLORIDE 1750 MG: 10 INJECTION, POWDER, LYOPHILIZED, FOR SOLUTION INTRAVENOUS at 05:02

## 2022-01-01 RX ADMIN — SODIUM CHLORIDE 75 ML/HR: 9 INJECTION, SOLUTION INTRAVENOUS at 23:39

## 2022-01-01 RX ADMIN — PRAVASTATIN SODIUM 20 MG: 20 TABLET ORAL at 08:21

## 2022-01-01 RX ADMIN — HYDROCODONE BITARTRATE AND ACETAMINOPHEN 1 TABLET: 10; 325 TABLET ORAL at 09:17

## 2022-01-01 RX ADMIN — METOPROLOL TARTRATE 5 MG: 1 INJECTION, SOLUTION INTRAVENOUS at 11:23

## 2022-01-01 RX ADMIN — WARFARIN SODIUM 5 MG: 5 TABLET ORAL at 17:00

## 2022-01-01 RX ADMIN — HYDROCODONE BITARTRATE AND ACETAMINOPHEN 1 TABLET: 10; 325 TABLET ORAL at 06:23

## 2022-01-01 RX ADMIN — Medication 10 ML: at 08:36

## 2022-01-01 RX ADMIN — VANCOMYCIN HYDROCHLORIDE 1250 MG: 10 INJECTION, POWDER, LYOPHILIZED, FOR SOLUTION INTRAVENOUS at 00:09

## 2022-01-01 RX ADMIN — GADOBENATE DIMEGLUMINE 20 ML: 529 INJECTION, SOLUTION INTRAVENOUS at 14:33

## 2022-01-01 RX ADMIN — POTASSIUM CHLORIDE 10 MEQ: 750 TABLET, EXTENDED RELEASE ORAL at 22:00

## 2022-01-01 RX ADMIN — VANCOMYCIN HYDROCHLORIDE 2750 MG: 10 INJECTION, POWDER, LYOPHILIZED, FOR SOLUTION INTRAVENOUS at 21:12

## 2022-01-01 RX ADMIN — SODIUM CHLORIDE 75 ML/HR: 9 INJECTION, SOLUTION INTRAVENOUS at 01:47

## 2022-01-01 RX ADMIN — FINASTERIDE 5 MG: 5 TABLET, FILM COATED ORAL at 08:36

## 2022-01-01 RX ADMIN — Medication 2 PACKET: at 09:55

## 2022-01-01 RX ADMIN — METOCLOPRAMIDE 10 MG: 10 TABLET ORAL at 08:10

## 2022-01-01 RX ADMIN — WARFARIN 7.5 MG: 7.5 TABLET ORAL at 18:46

## 2022-01-01 RX ADMIN — IPRATROPIUM BROMIDE 0.5 MG: 0.5 SOLUTION RESPIRATORY (INHALATION) at 14:58

## 2022-01-01 RX ADMIN — IPRATROPIUM BROMIDE 0.5 MG: 0.5 SOLUTION RESPIRATORY (INHALATION) at 11:55

## 2022-01-01 RX ADMIN — GABAPENTIN 400 MG: 400 CAPSULE ORAL at 06:07

## 2022-01-01 RX ADMIN — BUDESONIDE AND FORMOTEROL FUMARATE DIHYDRATE 2 PUFF: 160; 4.5 AEROSOL RESPIRATORY (INHALATION) at 20:26

## 2022-01-01 RX ADMIN — METOCLOPRAMIDE 10 MG: 10 TABLET ORAL at 09:12

## 2022-01-01 RX ADMIN — METOPROLOL SUCCINATE 25 MG: 25 TABLET, EXTENDED RELEASE ORAL at 08:43

## 2022-01-01 RX ADMIN — IPRATROPIUM BROMIDE 0.5 MG: 0.5 SOLUTION RESPIRATORY (INHALATION) at 11:17

## 2022-01-01 RX ADMIN — MICONAZOLE NITRATE 1 APPLICATION: 20 CREAM TOPICAL at 08:45

## 2022-01-01 RX ADMIN — FUROSEMIDE 40 MG: 40 TABLET ORAL at 17:07

## 2022-01-01 RX ADMIN — Medication 2 PACKET: at 11:32

## 2022-01-01 RX ADMIN — Medication 10 ML: at 20:24

## 2022-01-01 RX ADMIN — ALPRAZOLAM 0.5 MG: 0.5 TABLET ORAL at 01:35

## 2022-01-01 RX ADMIN — METOCLOPRAMIDE 10 MG: 10 TABLET ORAL at 18:28

## 2022-01-01 RX ADMIN — FINASTERIDE 5 MG: 5 TABLET, FILM COATED ORAL at 11:38

## 2022-01-01 RX ADMIN — FINASTERIDE 5 MG: 5 TABLET, FILM COATED ORAL at 09:41

## 2022-01-01 RX ADMIN — GABAPENTIN 600 MG: 300 CAPSULE ORAL at 10:07

## 2022-01-01 RX ADMIN — FERROUS SULFATE TAB 325 MG (65 MG ELEMENTAL FE) 325 MG: 325 (65 FE) TAB at 08:23

## 2022-01-01 RX ADMIN — FLUTICASONE PROPIONATE 2 SPRAY: 50 SPRAY, METERED NASAL at 08:44

## 2022-01-01 RX ADMIN — IPRATROPIUM BROMIDE 0.5 MG: 0.5 SOLUTION RESPIRATORY (INHALATION) at 10:58

## 2022-01-01 RX ADMIN — HYDROCODONE BITARTRATE AND ACETAMINOPHEN 1 TABLET: 10; 325 TABLET ORAL at 08:49

## 2022-01-01 RX ADMIN — ASPIRIN 325 MG: 325 TABLET, COATED ORAL at 18:21

## 2022-01-01 RX ADMIN — IPRATROPIUM BROMIDE AND ALBUTEROL SULFATE 3 ML: .5; 3 SOLUTION RESPIRATORY (INHALATION) at 14:47

## 2022-01-01 RX ADMIN — IPRATROPIUM BROMIDE 0.5 MG: 0.5 SOLUTION RESPIRATORY (INHALATION) at 19:55

## 2022-01-01 RX ADMIN — METOPROLOL SUCCINATE 25 MG: 25 TABLET, EXTENDED RELEASE ORAL at 09:06

## 2022-01-01 RX ADMIN — ALPRAZOLAM 0.5 MG: 0.5 TABLET ORAL at 00:50

## 2022-01-01 RX ADMIN — POTASSIUM CHLORIDE 10 MEQ: 7.46 INJECTION, SOLUTION INTRAVENOUS at 13:38

## 2022-01-01 RX ADMIN — MICONAZOLE NITRATE: 2 POWDER TOPICAL at 21:00

## 2022-01-01 RX ADMIN — IPRATROPIUM BROMIDE AND ALBUTEROL SULFATE 3 ML: .5; 3 SOLUTION RESPIRATORY (INHALATION) at 10:50

## 2022-01-01 RX ADMIN — IPRATROPIUM BROMIDE 0.5 MG: 0.5 SOLUTION RESPIRATORY (INHALATION) at 14:56

## 2022-01-01 RX ADMIN — MEROPENEM 1 G: 1 INJECTION, POWDER, FOR SOLUTION INTRAVENOUS at 15:30

## 2022-01-01 RX ADMIN — FINASTERIDE 5 MG: 5 TABLET, FILM COATED ORAL at 08:40

## 2022-01-01 RX ADMIN — HYDROCODONE BITARTRATE AND ACETAMINOPHEN 1 TABLET: 10; 325 TABLET ORAL at 05:13

## 2022-01-01 RX ADMIN — METOCLOPRAMIDE 10 MG: 10 TABLET ORAL at 12:10

## 2022-01-01 RX ADMIN — ENOXAPARIN SODIUM 130 MG: 150 INJECTION SUBCUTANEOUS at 18:21

## 2022-01-01 RX ADMIN — POTASSIUM CHLORIDE 40 MEQ: 750 TABLET, EXTENDED RELEASE ORAL at 15:20

## 2022-01-01 RX ADMIN — Medication 1 CAPSULE: at 08:43

## 2022-01-01 RX ADMIN — IPRATROPIUM BROMIDE AND ALBUTEROL SULFATE 3 ML: .5; 3 SOLUTION RESPIRATORY (INHALATION) at 10:36

## 2022-01-01 RX ADMIN — Medication 2 PACKET: at 17:35

## 2022-01-01 RX ADMIN — IPRATROPIUM BROMIDE AND ALBUTEROL SULFATE 3 ML: 2.5; .5 SOLUTION RESPIRATORY (INHALATION) at 11:16

## 2022-01-01 RX ADMIN — MICONAZOLE NITRATE 1 APPLICATION: 20 CREAM TOPICAL at 08:09

## 2022-01-01 RX ADMIN — Medication 10 ML: at 08:45

## 2022-01-01 RX ADMIN — IPRATROPIUM BROMIDE AND ALBUTEROL SULFATE 3 ML: .5; 3 SOLUTION RESPIRATORY (INHALATION) at 14:40

## 2022-01-01 RX ADMIN — Medication 2 PACKET: at 09:17

## 2022-01-01 RX ADMIN — FAMOTIDINE 20 MG: 20 TABLET ORAL at 17:13

## 2022-01-01 RX ADMIN — CEFTRIAXONE 2 G: 2 INJECTION, POWDER, FOR SOLUTION INTRAMUSCULAR; INTRAVENOUS at 12:06

## 2022-01-01 RX ADMIN — Medication 10 ML: at 21:36

## 2022-01-01 RX ADMIN — GABAPENTIN 600 MG: 300 CAPSULE ORAL at 12:06

## 2022-01-01 RX ADMIN — CEFAZOLIN SODIUM 2 G: 2 INJECTION, SOLUTION INTRAVENOUS at 11:41

## 2022-01-01 RX ADMIN — FINASTERIDE 5 MG: 5 TABLET, FILM COATED ORAL at 08:23

## 2022-01-01 RX ADMIN — HYDROCODONE BITARTRATE AND ACETAMINOPHEN 1 TABLET: 10; 325 TABLET ORAL at 17:35

## 2022-01-01 RX ADMIN — METOCLOPRAMIDE 10 MG: 10 TABLET ORAL at 12:09

## 2022-01-01 RX ADMIN — Medication 100 MG: at 09:06

## 2022-01-01 RX ADMIN — DOCUSATE SODIUM 100 MG: 100 CAPSULE, LIQUID FILLED ORAL at 08:31

## 2022-01-01 RX ADMIN — BUDESONIDE AND FORMOTEROL FUMARATE DIHYDRATE 2 PUFF: 160; 4.5 AEROSOL RESPIRATORY (INHALATION) at 07:20

## 2022-01-01 RX ADMIN — DOCUSATE SODIUM 100 MG: 100 CAPSULE, LIQUID FILLED ORAL at 08:10

## 2022-01-01 RX ADMIN — FUROSEMIDE 40 MG: 40 TABLET ORAL at 09:02

## 2022-01-01 RX ADMIN — Medication 10 ML: at 22:27

## 2022-01-01 RX ADMIN — FERROUS SULFATE TAB 325 MG (65 MG ELEMENTAL FE) 325 MG: 325 (65 FE) TAB at 09:31

## 2022-01-01 RX ADMIN — HYDROCODONE BITARTRATE AND ACETAMINOPHEN 1 TABLET: 10; 325 TABLET ORAL at 05:30

## 2022-01-01 RX ADMIN — ALPRAZOLAM 0.5 MG: 0.5 TABLET ORAL at 02:03

## 2022-01-01 RX ADMIN — IPRATROPIUM BROMIDE AND ALBUTEROL SULFATE 3 ML: .5; 3 SOLUTION RESPIRATORY (INHALATION) at 14:33

## 2022-01-01 RX ADMIN — HYDROCODONE BITARTRATE AND ACETAMINOPHEN 1 TABLET: 10; 325 TABLET ORAL at 02:29

## 2022-01-01 RX ADMIN — ALPRAZOLAM 0.5 MG: 0.5 TABLET ORAL at 00:19

## 2022-01-01 RX ADMIN — ALPRAZOLAM 0.5 MG: 0.5 TABLET ORAL at 01:51

## 2022-01-01 RX ADMIN — GABAPENTIN 600 MG: 300 CAPSULE ORAL at 17:49

## 2022-01-01 RX ADMIN — MEROPENEM 1 G: 1 INJECTION, POWDER, FOR SOLUTION INTRAVENOUS at 16:58

## 2022-01-01 RX ADMIN — PRAVASTATIN SODIUM 20 MG: 20 TABLET ORAL at 09:34

## 2022-01-01 RX ADMIN — Medication 1 CAPSULE: at 09:06

## 2022-01-01 RX ADMIN — Medication 10 ML: at 09:07

## 2022-01-01 RX ADMIN — CEFEPIME HYDROCHLORIDE 2 G: 2 INJECTION, POWDER, FOR SOLUTION INTRAVENOUS at 23:50

## 2022-01-01 RX ADMIN — METOCLOPRAMIDE 10 MG: 10 TABLET ORAL at 18:18

## 2022-01-01 RX ADMIN — SODIUM CHLORIDE 1000 ML: 9 INJECTION, SOLUTION INTRAVENOUS at 04:58

## 2022-01-01 RX ADMIN — ERTAPENEM SODIUM 1 G: 1 INJECTION, POWDER, LYOPHILIZED, FOR SOLUTION INTRAMUSCULAR; INTRAVENOUS at 22:47

## 2022-01-01 RX ADMIN — IPRATROPIUM BROMIDE AND ALBUTEROL SULFATE 3 ML: .5; 3 SOLUTION RESPIRATORY (INHALATION) at 10:30

## 2022-01-01 RX ADMIN — IPRATROPIUM BROMIDE AND ALBUTEROL SULFATE 3 ML: .5; 3 SOLUTION RESPIRATORY (INHALATION) at 12:01

## 2022-01-01 RX ADMIN — TAMSULOSIN HYDROCHLORIDE 0.4 MG: 0.4 CAPSULE ORAL at 22:06

## 2022-01-01 RX ADMIN — CARVEDILOL 12.5 MG: 12.5 TABLET, FILM COATED ORAL at 18:49

## 2022-01-01 RX ADMIN — METOCLOPRAMIDE 10 MG: 10 TABLET ORAL at 07:46

## 2022-01-01 RX ADMIN — METOCLOPRAMIDE 10 MG: 10 TABLET ORAL at 12:59

## 2022-01-01 RX ADMIN — POTASSIUM CHLORIDE 40 MEQ: 750 TABLET, EXTENDED RELEASE ORAL at 15:33

## 2022-01-01 RX ADMIN — FLUTICASONE PROPIONATE 2 SPRAY: 50 SPRAY, METERED NASAL at 08:19

## 2022-01-01 RX ADMIN — IPRATROPIUM BROMIDE AND ALBUTEROL SULFATE 3 ML: .5; 3 SOLUTION RESPIRATORY (INHALATION) at 10:49

## 2022-01-01 RX ADMIN — GABAPENTIN 400 MG: 400 CAPSULE ORAL at 21:19

## 2022-01-01 RX ADMIN — HYDROCODONE BITARTRATE AND ACETAMINOPHEN 1 TABLET: 10; 325 TABLET ORAL at 14:52

## 2022-01-01 RX ADMIN — HYDROCODONE BITARTRATE AND ACETAMINOPHEN 1 TABLET: 10; 325 TABLET ORAL at 16:27

## 2022-01-01 RX ADMIN — SODIUM CHLORIDE 125 ML/HR: 9 INJECTION, SOLUTION INTRAVENOUS at 18:07

## 2022-01-01 RX ADMIN — VANCOMYCIN HYDROCHLORIDE 1250 MG: 10 INJECTION, POWDER, LYOPHILIZED, FOR SOLUTION INTRAVENOUS at 22:12

## 2022-01-01 RX ADMIN — METOCLOPRAMIDE 10 MG: 10 TABLET ORAL at 12:27

## 2022-01-01 RX ADMIN — GABAPENTIN 400 MG: 400 CAPSULE ORAL at 13:03

## 2022-01-01 RX ADMIN — SODIUM CHLORIDE 1000 ML: 9 INJECTION, SOLUTION INTRAVENOUS at 19:02

## 2022-01-01 RX ADMIN — METOCLOPRAMIDE 10 MG: 10 TABLET ORAL at 08:45

## 2022-01-01 RX ADMIN — ALBUTEROL SULFATE 2.5 MG: 2.5 SOLUTION RESPIRATORY (INHALATION) at 02:10

## 2022-01-01 RX ADMIN — CEFEPIME HYDROCHLORIDE 2 G: 2 INJECTION, POWDER, FOR SOLUTION INTRAVENOUS at 15:08

## 2022-01-01 RX ADMIN — BUDESONIDE AND FORMOTEROL FUMARATE DIHYDRATE 2 PUFF: 160; 4.5 AEROSOL RESPIRATORY (INHALATION) at 08:05

## 2022-01-01 RX ADMIN — HYDROCODONE BITARTRATE AND ACETAMINOPHEN 1 TABLET: 10; 325 TABLET ORAL at 15:29

## 2022-01-01 RX ADMIN — DOCUSATE SODIUM 50MG AND SENNOSIDES 8.6MG 1 TABLET: 8.6; 5 TABLET, FILM COATED ORAL at 21:23

## 2022-01-01 RX ADMIN — METOCLOPRAMIDE 10 MG: 10 TABLET ORAL at 20:23

## 2022-01-01 RX ADMIN — GUAIFENESIN AND DEXTROMETHORPHAN HYDROBROMIDE 2 TABLET: 600; 30 TABLET, EXTENDED RELEASE ORAL at 01:54

## 2022-01-01 RX ADMIN — HYDROCORTISONE 1 APPLICATION: 1 OINTMENT TOPICAL at 20:50

## 2022-01-01 RX ADMIN — CEFEPIME 2 G: 2 INJECTION, POWDER, FOR SOLUTION INTRAVENOUS at 00:10

## 2022-01-01 RX ADMIN — Medication 10 ML: at 21:00

## 2022-01-01 RX ADMIN — HYDROCORTISONE 1 APPLICATION: 1 OINTMENT TOPICAL at 00:00

## 2022-01-01 RX ADMIN — MEROPENEM 1 G: 1 INJECTION, POWDER, FOR SOLUTION INTRAVENOUS at 00:47

## 2022-01-01 RX ADMIN — FINASTERIDE 5 MG: 5 TABLET, FILM COATED ORAL at 08:00

## 2022-01-01 RX ADMIN — ERTAPENEM SODIUM 1 G: 1 INJECTION, POWDER, LYOPHILIZED, FOR SOLUTION INTRAMUSCULAR; INTRAVENOUS at 17:34

## 2022-01-01 RX ADMIN — BUDESONIDE AND FORMOTEROL FUMARATE DIHYDRATE 2 PUFF: 80; 4.5 AEROSOL RESPIRATORY (INHALATION) at 07:41

## 2022-01-01 RX ADMIN — BUDESONIDE AND FORMOTEROL FUMARATE DIHYDRATE 2 PUFF: 160; 4.5 AEROSOL RESPIRATORY (INHALATION) at 19:13

## 2022-01-01 RX ADMIN — WARFARIN SODIUM 5 MG: 5 TABLET ORAL at 17:37

## 2022-01-01 RX ADMIN — METOPROLOL SUCCINATE 25 MG: 25 TABLET, EXTENDED RELEASE ORAL at 09:02

## 2022-01-01 RX ADMIN — CEFEPIME HYDROCHLORIDE 2 G: 2 INJECTION, POWDER, FOR SOLUTION INTRAVENOUS at 23:36

## 2022-01-01 RX ADMIN — ALPRAZOLAM 0.5 MG: 0.5 TABLET ORAL at 21:40

## 2022-01-01 RX ADMIN — CEFAZOLIN SODIUM 2 G: 2 INJECTION, SOLUTION INTRAVENOUS at 11:45

## 2022-01-01 RX ADMIN — IPRATROPIUM BROMIDE AND ALBUTEROL SULFATE 3 ML: 2.5; .5 SOLUTION RESPIRATORY (INHALATION) at 16:05

## 2022-01-01 RX ADMIN — HYDROCODONE BITARTRATE AND ACETAMINOPHEN 1 TABLET: 10; 325 TABLET ORAL at 12:27

## 2022-01-01 RX ADMIN — FUROSEMIDE 40 MG: 20 TABLET ORAL at 08:42

## 2022-01-01 RX ADMIN — GABAPENTIN 400 MG: 400 CAPSULE ORAL at 06:03

## 2022-01-01 RX ADMIN — IPRATROPIUM BROMIDE 0.5 MG: 0.5 SOLUTION RESPIRATORY (INHALATION) at 14:26

## 2022-01-01 RX ADMIN — TAMSULOSIN HYDROCHLORIDE 0.4 MG: 0.4 CAPSULE ORAL at 20:29

## 2022-01-01 RX ADMIN — HYDROCODONE BITARTRATE AND ACETAMINOPHEN 1 TABLET: 10; 325 TABLET ORAL at 00:00

## 2022-01-01 RX ADMIN — GABAPENTIN 600 MG: 300 CAPSULE ORAL at 17:34

## 2022-01-01 RX ADMIN — GABAPENTIN 600 MG: 300 CAPSULE ORAL at 20:06

## 2022-01-01 RX ADMIN — ACETAMINOPHEN 650 MG: 325 TABLET, FILM COATED ORAL at 23:56

## 2022-01-01 RX ADMIN — DOCUSATE SODIUM 100 MG: 100 CAPSULE, LIQUID FILLED ORAL at 09:46

## 2022-01-01 RX ADMIN — MICONAZOLE NITRATE: 2 POWDER TOPICAL at 08:36

## 2022-01-01 RX ADMIN — FERROUS SULFATE TAB 325 MG (65 MG ELEMENTAL FE) 325 MG: 325 (65 FE) TAB at 08:36

## 2022-01-01 RX ADMIN — METOCLOPRAMIDE 10 MG: 10 TABLET ORAL at 21:08

## 2022-01-01 RX ADMIN — Medication 10 ML: at 21:37

## 2022-01-01 RX ADMIN — ALBUTEROL SULFATE 2.5 MG: 2.5 SOLUTION RESPIRATORY (INHALATION) at 00:27

## 2022-01-01 RX ADMIN — WARFARIN 7.5 MG: 7.5 TABLET ORAL at 18:14

## 2022-01-01 RX ADMIN — METOCLOPRAMIDE 10 MG: 10 TABLET ORAL at 08:07

## 2022-01-01 RX ADMIN — PRAVASTATIN SODIUM 20 MG: 20 TABLET ORAL at 08:42

## 2022-01-01 RX ADMIN — HYDROCODONE BITARTRATE AND ACETAMINOPHEN 1 TABLET: 5; 325 TABLET ORAL at 22:02

## 2022-01-01 RX ADMIN — CEFEPIME 2 G: 2 INJECTION, POWDER, FOR SOLUTION INTRAVENOUS at 16:45

## 2022-01-01 RX ADMIN — WARFARIN 7.5 MG: 7.5 TABLET ORAL at 21:17

## 2022-01-01 RX ADMIN — METOCLOPRAMIDE 10 MG: 10 TABLET ORAL at 17:35

## 2022-01-01 RX ADMIN — HYDROCODONE BITARTRATE AND ACETAMINOPHEN 1 TABLET: 10; 325 TABLET ORAL at 10:14

## 2022-01-01 RX ADMIN — SODIUM CHLORIDE 125 ML/HR: 9 INJECTION, SOLUTION INTRAVENOUS at 15:34

## 2022-01-01 RX ADMIN — CEFEPIME 2 G: 2 INJECTION, POWDER, FOR SOLUTION INTRAVENOUS at 20:29

## 2022-01-01 RX ADMIN — HYDROCODONE BITARTRATE AND ACETAMINOPHEN 1 TABLET: 10; 325 TABLET ORAL at 05:25

## 2022-01-01 RX ADMIN — Medication 10 ML: at 20:50

## 2022-01-01 RX ADMIN — CEFEPIME 2 G: 2 INJECTION, POWDER, FOR SOLUTION INTRAVENOUS at 21:51

## 2022-01-01 RX ADMIN — METOCLOPRAMIDE 10 MG: 10 TABLET ORAL at 12:32

## 2022-01-01 RX ADMIN — HYDROCODONE BITARTRATE AND ACETAMINOPHEN 1 TABLET: 10; 325 TABLET ORAL at 20:26

## 2022-01-01 RX ADMIN — FUROSEMIDE 40 MG: 40 TABLET ORAL at 09:10

## 2022-01-01 RX ADMIN — METOCLOPRAMIDE 10 MG: 10 TABLET ORAL at 12:06

## 2022-01-01 RX ADMIN — HYDROCODONE BITARTRATE AND ACETAMINOPHEN 1 TABLET: 10; 325 TABLET ORAL at 09:01

## 2022-01-01 RX ADMIN — METOPROLOL SUCCINATE 25 MG: 25 TABLET, EXTENDED RELEASE ORAL at 09:30

## 2022-01-01 RX ADMIN — IPRATROPIUM BROMIDE AND ALBUTEROL SULFATE 3 ML: 2.5; .5 SOLUTION RESPIRATORY (INHALATION) at 11:36

## 2022-01-01 RX ADMIN — TAMSULOSIN HYDROCHLORIDE 0.4 MG: 0.4 CAPSULE ORAL at 21:43

## 2022-01-01 RX ADMIN — HYDROCODONE BITARTRATE AND ACETAMINOPHEN 1 TABLET: 10; 325 TABLET ORAL at 10:12

## 2022-01-01 RX ADMIN — PERFLUTREN 2 ML: 6.52 INJECTION, SUSPENSION INTRAVENOUS at 15:20

## 2022-01-01 RX ADMIN — Medication 10 ML: at 21:16

## 2022-01-01 RX ADMIN — CEFAZOLIN SODIUM 2 G: 2 INJECTION, SOLUTION INTRAVENOUS at 17:34

## 2022-01-01 RX ADMIN — METOCLOPRAMIDE 10 MG: 10 TABLET ORAL at 18:21

## 2022-01-01 RX ADMIN — CEFEPIME HYDROCHLORIDE 1 G: 1 INJECTION, POWDER, FOR SOLUTION INTRAMUSCULAR; INTRAVENOUS at 23:55

## 2022-01-01 RX ADMIN — HYDROCODONE BITARTRATE AND ACETAMINOPHEN 1 TABLET: 10; 325 TABLET ORAL at 06:03

## 2022-01-01 RX ADMIN — Medication 10 ML: at 09:01

## 2022-01-01 RX ADMIN — FUROSEMIDE 40 MG: 40 TABLET ORAL at 08:09

## 2022-01-01 RX ADMIN — METOCLOPRAMIDE 10 MG: 10 TABLET ORAL at 20:58

## 2022-01-01 RX ADMIN — Medication 100 MG: at 12:12

## 2022-01-01 RX ADMIN — Medication 1 CAPSULE: at 10:09

## 2022-01-01 RX ADMIN — FERROUS SULFATE TAB 325 MG (65 MG ELEMENTAL FE) 325 MG: 325 (65 FE) TAB at 09:06

## 2022-01-01 RX ADMIN — AMOXICILLIN AND CLAVULANATE POTASSIUM 1 TABLET: 875; 125 TABLET, FILM COATED ORAL at 20:11

## 2022-01-01 RX ADMIN — METOCLOPRAMIDE 10 MG: 10 TABLET ORAL at 20:45

## 2022-01-01 RX ADMIN — VANCOMYCIN HYDROCHLORIDE 1250 MG: 10 INJECTION, POWDER, LYOPHILIZED, FOR SOLUTION INTRAVENOUS at 18:28

## 2022-01-01 RX ADMIN — IPRATROPIUM BROMIDE AND ALBUTEROL SULFATE 3 ML: .5; 3 SOLUTION RESPIRATORY (INHALATION) at 15:19

## 2022-01-01 RX ADMIN — Medication 10 ML: at 08:09

## 2022-01-01 RX ADMIN — IPRATROPIUM BROMIDE AND ALBUTEROL SULFATE 3 ML: .5; 3 SOLUTION RESPIRATORY (INHALATION) at 10:56

## 2022-01-01 RX ADMIN — Medication 10 ML: at 09:58

## 2022-01-01 RX ADMIN — VANCOMYCIN HYDROCHLORIDE 1250 MG: 10 INJECTION, POWDER, LYOPHILIZED, FOR SOLUTION INTRAVENOUS at 16:21

## 2022-01-01 RX ADMIN — IOPAMIDOL 85 ML: 612 INJECTION, SOLUTION INTRAVENOUS at 19:59

## 2022-01-01 RX ADMIN — HYDROCODONE BITARTRATE AND ACETAMINOPHEN 1 TABLET: 10; 325 TABLET ORAL at 20:50

## 2022-01-01 RX ADMIN — HYDROCODONE BITARTRATE AND ACETAMINOPHEN 1 TABLET: 10; 325 TABLET ORAL at 23:37

## 2022-01-01 RX ADMIN — Medication 10 ML: at 09:18

## 2022-01-01 RX ADMIN — MEROPENEM 1 G: 1 INJECTION, POWDER, FOR SOLUTION INTRAVENOUS at 09:07

## 2022-01-01 RX ADMIN — HYDROCODONE BITARTRATE AND ACETAMINOPHEN 1 TABLET: 10; 325 TABLET ORAL at 02:52

## 2022-01-01 RX ADMIN — METOPROLOL TARTRATE 12.5 MG: 25 TABLET, FILM COATED ORAL at 13:35

## 2022-01-01 RX ADMIN — GABAPENTIN 600 MG: 300 CAPSULE ORAL at 05:58

## 2022-01-01 RX ADMIN — BUDESONIDE AND FORMOTEROL FUMARATE DIHYDRATE 2 PUFF: 160; 4.5 AEROSOL RESPIRATORY (INHALATION) at 06:23

## 2022-01-01 RX ADMIN — GABAPENTIN 600 MG: 300 CAPSULE ORAL at 14:03

## 2022-01-01 RX ADMIN — ALPRAZOLAM 0.5 MG: 0.5 TABLET ORAL at 01:49

## 2022-01-01 RX ADMIN — CEFAZOLIN SODIUM 2 G: 2 INJECTION, SOLUTION INTRAVENOUS at 19:25

## 2022-01-01 RX ADMIN — Medication 10 ML: at 09:40

## 2022-01-01 RX ADMIN — METOCLOPRAMIDE 10 MG: 10 TABLET ORAL at 20:44

## 2022-01-01 RX ADMIN — DOCUSATE SODIUM 100 MG: 100 CAPSULE, LIQUID FILLED ORAL at 17:32

## 2022-01-01 RX ADMIN — METOCLOPRAMIDE 10 MG: 10 TABLET ORAL at 11:03

## 2022-01-01 RX ADMIN — FINASTERIDE 5 MG: 5 TABLET, FILM COATED ORAL at 09:30

## 2022-01-01 RX ADMIN — SODIUM CHLORIDE 500 ML: 9 INJECTION, SOLUTION INTRAVENOUS at 20:51

## 2022-01-01 RX ADMIN — FINASTERIDE 5 MG: 5 TABLET, FILM COATED ORAL at 08:43

## 2022-01-01 RX ADMIN — METOCLOPRAMIDE 10 MG: 10 TABLET ORAL at 21:40

## 2022-01-01 RX ADMIN — FINASTERIDE 5 MG: 5 TABLET, FILM COATED ORAL at 09:47

## 2022-01-01 RX ADMIN — BUDESONIDE AND FORMOTEROL FUMARATE DIHYDRATE 2 PUFF: 80; 4.5 AEROSOL RESPIRATORY (INHALATION) at 07:02

## 2022-01-01 RX ADMIN — WARFARIN 7.5 MG: 7.5 TABLET ORAL at 17:54

## 2022-01-01 RX ADMIN — GUAIFENESIN AND DEXTROMETHORPHAN HYDROBROMIDE 2 TABLET: 600; 30 TABLET, EXTENDED RELEASE ORAL at 20:25

## 2022-01-01 RX ADMIN — FINASTERIDE 5 MG: 5 TABLET, FILM COATED ORAL at 09:02

## 2022-01-01 RX ADMIN — WARFARIN 7.5 MG: 7.5 TABLET ORAL at 17:19

## 2022-01-01 RX ADMIN — ERTAPENEM SODIUM 1 G: 1 INJECTION, POWDER, LYOPHILIZED, FOR SOLUTION INTRAMUSCULAR; INTRAVENOUS at 23:39

## 2022-01-01 RX ADMIN — PRAVASTATIN SODIUM 20 MG: 20 TABLET ORAL at 08:52

## 2022-01-01 RX ADMIN — METOPROLOL TARTRATE 12.5 MG: 25 TABLET ORAL at 09:07

## 2022-01-01 RX ADMIN — MEROPENEM 1 G: 1 INJECTION, POWDER, FOR SOLUTION INTRAVENOUS at 10:02

## 2022-01-01 RX ADMIN — METOCLOPRAMIDE 10 MG: 10 TABLET ORAL at 20:11

## 2022-01-01 RX ADMIN — ALBUTEROL SULFATE 2.5 MG: 2.5 SOLUTION RESPIRATORY (INHALATION) at 00:22

## 2022-01-01 RX ADMIN — GABAPENTIN 400 MG: 400 CAPSULE ORAL at 13:35

## 2022-01-01 RX ADMIN — VANCOMYCIN HYDROCHLORIDE 1250 MG: 10 INJECTION, POWDER, LYOPHILIZED, FOR SOLUTION INTRAVENOUS at 09:45

## 2022-01-01 RX ADMIN — HYDROCODONE BITARTRATE AND ACETAMINOPHEN 1 TABLET: 10; 325 TABLET ORAL at 02:01

## 2022-01-01 RX ADMIN — GABAPENTIN 600 MG: 300 CAPSULE ORAL at 20:58

## 2022-01-01 RX ADMIN — FUROSEMIDE 40 MG: 20 TABLET ORAL at 08:56

## 2022-01-01 RX ADMIN — Medication 100 MG: at 21:22

## 2022-01-01 RX ADMIN — HYDROCODONE BITARTRATE AND ACETAMINOPHEN 1 TABLET: 10; 325 TABLET ORAL at 10:27

## 2022-01-01 RX ADMIN — BUDESONIDE AND FORMOTEROL FUMARATE DIHYDRATE 2 PUFF: 160; 4.5 AEROSOL RESPIRATORY (INHALATION) at 07:24

## 2022-01-01 RX ADMIN — METOCLOPRAMIDE 10 MG: 10 TABLET ORAL at 20:41

## 2022-01-01 RX ADMIN — FUROSEMIDE 40 MG: 40 TABLET ORAL at 10:01

## 2022-01-01 RX ADMIN — PRAVASTATIN SODIUM 20 MG: 20 TABLET ORAL at 09:01

## 2022-01-01 RX ADMIN — HYDROCODONE BITARTRATE AND ACETAMINOPHEN 1 TABLET: 10; 325 TABLET ORAL at 01:48

## 2022-01-01 RX ADMIN — AMPICILLIN AND SULBACTAM 3 G: 2; 1 INJECTION, POWDER, FOR SOLUTION INTRAMUSCULAR; INTRAVENOUS at 06:27

## 2022-01-01 RX ADMIN — HYDROCODONE BITARTRATE AND ACETAMINOPHEN 1 TABLET: 10; 325 TABLET ORAL at 00:20

## 2022-01-01 RX ADMIN — METOCLOPRAMIDE 10 MG: 10 TABLET ORAL at 11:32

## 2022-01-01 RX ADMIN — ANTACID TABLETS 2 TABLET: 500 TABLET, CHEWABLE ORAL at 04:28

## 2022-01-01 RX ADMIN — METOCLOPRAMIDE 10 MG: 10 TABLET ORAL at 16:46

## 2022-01-01 RX ADMIN — METOCLOPRAMIDE 10 MG: 10 TABLET ORAL at 20:06

## 2022-01-01 RX ADMIN — GABAPENTIN 600 MG: 300 CAPSULE ORAL at 13:19

## 2022-01-01 RX ADMIN — GABAPENTIN 600 MG: 300 CAPSULE ORAL at 20:23

## 2022-01-01 RX ADMIN — HYDROCODONE BITARTRATE AND ACETAMINOPHEN 1 TABLET: 10; 325 TABLET ORAL at 11:37

## 2022-01-01 RX ADMIN — POTASSIUM CHLORIDE 10 MEQ: 7.46 INJECTION, SOLUTION INTRAVENOUS at 17:49

## 2022-01-01 RX ADMIN — HYDROCODONE BITARTRATE AND ACETAMINOPHEN 1 TABLET: 10; 325 TABLET ORAL at 10:35

## 2022-01-01 RX ADMIN — IPRATROPIUM BROMIDE AND ALBUTEROL SULFATE 3 ML: 2.5; .5 SOLUTION RESPIRATORY (INHALATION) at 07:02

## 2022-01-01 RX ADMIN — VANCOMYCIN HYDROCHLORIDE 1000 MG: 1 INJECTION, SOLUTION INTRAVENOUS at 06:46

## 2022-01-01 RX ADMIN — HYDROCODONE BITARTRATE AND ACETAMINOPHEN 1 TABLET: 10; 325 TABLET ORAL at 10:30

## 2022-01-01 RX ADMIN — POTASSIUM CHLORIDE 10 MEQ: 750 TABLET, EXTENDED RELEASE ORAL at 17:34

## 2022-01-01 RX ADMIN — BUDESONIDE AND FORMOTEROL FUMARATE DIHYDRATE 2 PUFF: 80; 4.5 AEROSOL RESPIRATORY (INHALATION) at 08:42

## 2022-01-01 RX ADMIN — POTASSIUM CHLORIDE 40 MEQ: 750 TABLET, EXTENDED RELEASE ORAL at 01:25

## 2022-01-01 RX ADMIN — FUROSEMIDE 40 MG: 40 TABLET ORAL at 09:01

## 2022-01-01 RX ADMIN — PRAVASTATIN SODIUM 20 MG: 20 TABLET ORAL at 08:00

## 2022-01-01 RX ADMIN — FLUTICASONE PROPIONATE 2 SPRAY: 50 SPRAY, METERED NASAL at 08:04

## 2022-01-01 RX ADMIN — GABAPENTIN 400 MG: 400 CAPSULE ORAL at 14:36

## 2022-01-01 RX ADMIN — ACETAMINOPHEN 650 MG: 325 TABLET, FILM COATED ORAL at 21:53

## 2022-01-01 RX ADMIN — FINASTERIDE 5 MG: 5 TABLET, FILM COATED ORAL at 09:11

## 2022-01-01 RX ADMIN — HYDROCODONE BITARTRATE AND ACETAMINOPHEN 1 TABLET: 10; 325 TABLET ORAL at 10:18

## 2022-01-01 RX ADMIN — Medication 2 PACKET: at 11:28

## 2022-01-01 RX ADMIN — HYDROCODONE BITARTRATE AND ACETAMINOPHEN 1 TABLET: 10; 325 TABLET ORAL at 19:34

## 2022-01-01 RX ADMIN — METOCLOPRAMIDE 10 MG: 10 TABLET ORAL at 20:25

## 2022-01-01 RX ADMIN — TAMSULOSIN HYDROCHLORIDE 0.4 MG: 0.4 CAPSULE ORAL at 21:51

## 2022-01-01 RX ADMIN — Medication 10 ML: at 21:42

## 2022-01-01 RX ADMIN — FINASTERIDE 5 MG: 5 TABLET, FILM COATED ORAL at 08:04

## 2022-01-01 RX ADMIN — CARVEDILOL 12.5 MG: 12.5 TABLET, FILM COATED ORAL at 07:46

## 2022-01-01 RX ADMIN — ACETAMINOPHEN 1000 MG: 500 TABLET ORAL at 18:55

## 2022-01-01 RX ADMIN — BUDESONIDE AND FORMOTEROL FUMARATE DIHYDRATE 2 PUFF: 160; 4.5 AEROSOL RESPIRATORY (INHALATION) at 07:46

## 2022-01-01 RX ADMIN — Medication 10 ML: at 20:51

## 2022-01-01 RX ADMIN — METOCLOPRAMIDE 10 MG: 10 TABLET ORAL at 12:20

## 2022-01-01 RX ADMIN — HYDROCODONE BITARTRATE AND ACETAMINOPHEN 1 TABLET: 10; 325 TABLET ORAL at 17:06

## 2022-01-01 RX ADMIN — IOPAMIDOL 100 ML: 612 INJECTION, SOLUTION INTRAVENOUS at 21:08

## 2022-01-01 RX ADMIN — MEROPENEM 1 G: 1 INJECTION, POWDER, FOR SOLUTION INTRAVENOUS at 08:36

## 2022-01-01 RX ADMIN — TAMSULOSIN HYDROCHLORIDE 0.4 MG: 0.4 CAPSULE ORAL at 22:12

## 2022-01-01 RX ADMIN — BUDESONIDE AND FORMOTEROL FUMARATE DIHYDRATE 2 PUFF: 160; 4.5 AEROSOL RESPIRATORY (INHALATION) at 20:11

## 2022-01-01 RX ADMIN — ALPRAZOLAM 0.5 MG: 0.5 TABLET ORAL at 02:01

## 2022-01-01 RX ADMIN — IPRATROPIUM BROMIDE AND ALBUTEROL SULFATE 3 ML: .5; 3 SOLUTION RESPIRATORY (INHALATION) at 15:12

## 2022-01-01 RX ADMIN — SODIUM CHLORIDE 125 ML/HR: 9 INJECTION, SOLUTION INTRAVENOUS at 05:49

## 2022-01-01 RX ADMIN — MICONAZOLE NITRATE: 2 POWDER TOPICAL at 09:40

## 2022-01-01 RX ADMIN — CEFEPIME 2 G: 2 INJECTION, POWDER, FOR SOLUTION INTRAVENOUS at 12:12

## 2022-01-01 RX ADMIN — METOPROLOL SUCCINATE 25 MG: 25 TABLET, EXTENDED RELEASE ORAL at 12:12

## 2022-01-01 RX ADMIN — WARFARIN 2 MG: 2 TABLET ORAL at 18:46

## 2022-01-01 RX ADMIN — GABAPENTIN 400 MG: 400 CAPSULE ORAL at 06:23

## 2022-01-01 RX ADMIN — HYDROCODONE BITARTRATE AND ACETAMINOPHEN 1 TABLET: 10; 325 TABLET ORAL at 16:20

## 2022-01-01 RX ADMIN — SODIUM CHLORIDE 125 ML/HR: 9 INJECTION, SOLUTION INTRAVENOUS at 09:11

## 2022-01-01 RX ADMIN — IPRATROPIUM BROMIDE 0.5 MG: 0.5 SOLUTION RESPIRATORY (INHALATION) at 20:41

## 2022-01-01 RX ADMIN — METOCLOPRAMIDE 10 MG: 10 TABLET ORAL at 17:37

## 2022-01-01 RX ADMIN — POTASSIUM CHLORIDE 40 MEQ: 750 TABLET, EXTENDED RELEASE ORAL at 19:25

## 2022-01-01 RX ADMIN — CEFEPIME HYDROCHLORIDE 2 G: 2 INJECTION, POWDER, FOR SOLUTION INTRAVENOUS at 15:24

## 2022-01-01 RX ADMIN — BUDESONIDE AND FORMOTEROL FUMARATE DIHYDRATE 2 PUFF: 160; 4.5 AEROSOL RESPIRATORY (INHALATION) at 07:52

## 2022-01-01 RX ADMIN — IPRATROPIUM BROMIDE 0.5 MG: 0.5 SOLUTION RESPIRATORY (INHALATION) at 10:57

## 2022-01-01 RX ADMIN — ALPRAZOLAM 0.5 MG: 0.5 TABLET ORAL at 01:01

## 2022-01-01 RX ADMIN — Medication 10 ML: at 08:46

## 2022-01-01 RX ADMIN — HYDROCODONE BITARTRATE AND ACETAMINOPHEN 1 TABLET: 10; 325 TABLET ORAL at 09:39

## 2022-01-01 RX ADMIN — POTASSIUM CHLORIDE 40 MEQ: 750 TABLET, EXTENDED RELEASE ORAL at 06:41

## 2022-01-01 RX ADMIN — FUROSEMIDE 40 MG: 40 TABLET ORAL at 17:17

## 2022-01-01 RX ADMIN — METOCLOPRAMIDE 10 MG: 10 TABLET ORAL at 08:56

## 2022-01-01 RX ADMIN — IPRATROPIUM BROMIDE 0.5 MG: 0.5 SOLUTION RESPIRATORY (INHALATION) at 20:34

## 2022-01-01 RX ADMIN — DOCUSATE SODIUM 50MG AND SENNOSIDES 8.6MG 1 TABLET: 8.6; 5 TABLET, FILM COATED ORAL at 08:45

## 2022-01-01 RX ADMIN — Medication 2 PACKET: at 10:27

## 2022-01-01 RX ADMIN — METOCLOPRAMIDE 10 MG: 10 TABLET ORAL at 21:29

## 2022-01-01 RX ADMIN — METOCLOPRAMIDE 10 MG: 10 TABLET ORAL at 17:34

## 2022-01-01 RX ADMIN — GABAPENTIN 600 MG: 300 CAPSULE ORAL at 22:12

## 2022-01-01 RX ADMIN — METOPROLOL SUCCINATE 25 MG: 25 TABLET, EXTENDED RELEASE ORAL at 08:21

## 2022-01-01 RX ADMIN — WARFARIN 2.5 MG: 2.5 TABLET ORAL at 18:18

## 2022-01-01 RX ADMIN — ALPRAZOLAM 0.5 MG: 0.5 TABLET ORAL at 21:39

## 2022-01-01 RX ADMIN — BUDESONIDE AND FORMOTEROL FUMARATE DIHYDRATE 2 PUFF: 80; 4.5 AEROSOL RESPIRATORY (INHALATION) at 10:41

## 2022-01-01 RX ADMIN — GENTAMICIN SULFATE: 1 OINTMENT TOPICAL at 08:44

## 2022-01-01 RX ADMIN — IPRATROPIUM BROMIDE AND ALBUTEROL SULFATE 3 ML: .5; 3 SOLUTION RESPIRATORY (INHALATION) at 15:13

## 2022-01-01 RX ADMIN — HYDROCODONE BITARTRATE AND ACETAMINOPHEN 1 TABLET: 10; 325 TABLET ORAL at 09:30

## 2022-01-01 RX ADMIN — PRAVASTATIN SODIUM 20 MG: 20 TABLET ORAL at 20:16

## 2022-01-01 RX ADMIN — METOPROLOL SUCCINATE 25 MG: 25 TABLET, EXTENDED RELEASE ORAL at 08:40

## 2022-01-01 RX ADMIN — SODIUM CHLORIDE 75 ML/HR: 9 INJECTION, SOLUTION INTRAVENOUS at 13:17

## 2022-01-01 RX ADMIN — HYDROCODONE BITARTRATE AND ACETAMINOPHEN 1 TABLET: 10; 325 TABLET ORAL at 08:09

## 2022-01-01 RX ADMIN — BUDESONIDE AND FORMOTEROL FUMARATE DIHYDRATE 2 PUFF: 160; 4.5 AEROSOL RESPIRATORY (INHALATION) at 07:59

## 2022-01-01 RX ADMIN — SODIUM CHLORIDE 1000 ML: 9 INJECTION, SOLUTION INTRAVENOUS at 11:54

## 2022-01-01 RX ADMIN — TAZOBACTAM SODIUM AND PIPERACILLIN SODIUM 4.5 G: 500; 4 INJECTION, SOLUTION INTRAVENOUS at 06:35

## 2022-01-01 RX ADMIN — PRAVASTATIN SODIUM 20 MG: 20 TABLET ORAL at 08:43

## 2022-01-01 RX ADMIN — CEFAZOLIN SODIUM 2 G: 2 INJECTION, SOLUTION INTRAVENOUS at 01:48

## 2022-01-01 RX ADMIN — HYDROCODONE BITARTRATE AND ACETAMINOPHEN 1 TABLET: 10; 325 TABLET ORAL at 08:23

## 2022-01-01 RX ADMIN — Medication 10 ML: at 21:09

## 2022-01-01 RX ADMIN — SODIUM CHLORIDE 125 ML/HR: 9 INJECTION, SOLUTION INTRAVENOUS at 01:20

## 2022-01-01 RX ADMIN — FINASTERIDE 5 MG: 5 TABLET, FILM COATED ORAL at 08:52

## 2022-01-01 RX ADMIN — MAGNESIUM SULFATE 1 G: 1 INJECTION INTRAVENOUS at 11:30

## 2022-01-01 RX ADMIN — IPRATROPIUM BROMIDE AND ALBUTEROL SULFATE 3 ML: .5; 3 SOLUTION RESPIRATORY (INHALATION) at 00:28

## 2022-01-01 RX ADMIN — BUDESONIDE AND FORMOTEROL FUMARATE DIHYDRATE 2 PUFF: 80; 4.5 AEROSOL RESPIRATORY (INHALATION) at 20:38

## 2022-01-01 RX ADMIN — GABAPENTIN 600 MG: 300 CAPSULE ORAL at 06:03

## 2022-01-01 RX ADMIN — PRAVASTATIN SODIUM 20 MG: 20 TABLET ORAL at 09:32

## 2022-01-01 RX ADMIN — MICONAZOLE NITRATE: 2 POWDER TOPICAL at 09:08

## 2022-01-01 RX ADMIN — AMOXICILLIN AND CLAVULANATE POTASSIUM 1 TABLET: 875; 125 TABLET, FILM COATED ORAL at 09:02

## 2022-01-01 RX ADMIN — VANCOMYCIN HYDROCHLORIDE 1250 MG: 10 INJECTION, POWDER, LYOPHILIZED, FOR SOLUTION INTRAVENOUS at 03:35

## 2022-01-01 RX ADMIN — HYDROCODONE BITARTRATE AND ACETAMINOPHEN 1 TABLET: 10; 325 TABLET ORAL at 17:13

## 2022-01-01 RX ADMIN — GABAPENTIN 600 MG: 300 CAPSULE ORAL at 17:35

## 2022-01-01 RX ADMIN — METOCLOPRAMIDE 10 MG: 10 TABLET ORAL at 17:49

## 2022-01-01 RX ADMIN — METOCLOPRAMIDE 10 MG: 10 TABLET ORAL at 06:06

## 2022-01-01 RX ADMIN — ANTACID TABLETS 2 TABLET: 500 TABLET, CHEWABLE ORAL at 15:23

## 2022-01-01 RX ADMIN — TAMSULOSIN HYDROCHLORIDE 0.4 MG: 0.4 CAPSULE ORAL at 20:49

## 2022-01-01 RX ADMIN — GABAPENTIN 400 MG: 400 CAPSULE ORAL at 21:01

## 2022-01-01 RX ADMIN — BUDESONIDE AND FORMOTEROL FUMARATE DIHYDRATE 2 PUFF: 80; 4.5 AEROSOL RESPIRATORY (INHALATION) at 21:46

## 2022-01-01 RX ADMIN — GABAPENTIN 400 MG: 400 CAPSULE ORAL at 15:33

## 2022-01-01 RX ADMIN — IPRATROPIUM BROMIDE 0.5 MG: 0.5 SOLUTION RESPIRATORY (INHALATION) at 16:18

## 2022-01-01 RX ADMIN — ALPRAZOLAM 0.5 MG: 0.5 TABLET ORAL at 21:23

## 2022-01-01 RX ADMIN — METOCLOPRAMIDE 10 MG: 10 TABLET ORAL at 18:14

## 2022-01-01 RX ADMIN — PRAVASTATIN SODIUM 20 MG: 20 TABLET ORAL at 08:46

## 2022-01-01 RX ADMIN — HYDROCODONE BITARTRATE AND ACETAMINOPHEN 1 TABLET: 10; 325 TABLET ORAL at 09:19

## 2022-01-01 RX ADMIN — BUDESONIDE AND FORMOTEROL FUMARATE DIHYDRATE 2 PUFF: 160; 4.5 AEROSOL RESPIRATORY (INHALATION) at 07:09

## 2022-01-01 RX ADMIN — MEROPENEM 1 G: 1 INJECTION, POWDER, FOR SOLUTION INTRAVENOUS at 00:09

## 2022-01-01 RX ADMIN — METOPROLOL SUCCINATE 25 MG: 25 TABLET, EXTENDED RELEASE ORAL at 20:16

## 2022-01-01 RX ADMIN — ACETAMINOPHEN 1000 MG: 500 TABLET ORAL at 14:23

## 2022-01-01 RX ADMIN — GABAPENTIN 600 MG: 300 CAPSULE ORAL at 18:18

## 2022-01-01 RX ADMIN — IPRATROPIUM BROMIDE 0.5 MG: 0.5 SOLUTION RESPIRATORY (INHALATION) at 15:36

## 2022-01-01 RX ADMIN — HYDROCODONE BITARTRATE AND ACETAMINOPHEN 1 TABLET: 10; 325 TABLET ORAL at 20:59

## 2022-01-01 RX ADMIN — FINASTERIDE 5 MG: 5 TABLET, FILM COATED ORAL at 08:42

## 2022-01-01 RX ADMIN — DOCUSATE SODIUM 100 MG: 100 CAPSULE, LIQUID FILLED ORAL at 08:04

## 2022-01-01 RX ADMIN — POTASSIUM CHLORIDE 40 MEQ: 750 TABLET, EXTENDED RELEASE ORAL at 01:59

## 2022-01-01 RX ADMIN — MICONAZOLE NITRATE: 2 POWDER TOPICAL at 08:24

## 2022-01-01 RX ADMIN — METOCLOPRAMIDE 10 MG: 10 TABLET ORAL at 17:05

## 2022-01-01 RX ADMIN — Medication 10 ML: at 20:49

## 2022-01-01 RX ADMIN — Medication 10 ML: at 12:13

## 2022-01-01 RX ADMIN — HYDROCODONE BITARTRATE AND ACETAMINOPHEN 1 TABLET: 10; 325 TABLET ORAL at 21:19

## 2022-01-01 RX ADMIN — FUROSEMIDE 40 MG: 40 TABLET ORAL at 17:22

## 2022-01-01 RX ADMIN — GABAPENTIN 400 MG: 400 CAPSULE ORAL at 13:52

## 2022-01-01 RX ADMIN — ALPRAZOLAM 0.5 MG: 0.5 TABLET ORAL at 01:59

## 2022-01-01 RX ADMIN — BUDESONIDE AND FORMOTEROL FUMARATE DIHYDRATE 2 PUFF: 160; 4.5 AEROSOL RESPIRATORY (INHALATION) at 06:41

## 2022-01-01 RX ADMIN — IPRATROPIUM BROMIDE AND ALBUTEROL SULFATE 3 ML: 2.5; .5 SOLUTION RESPIRATORY (INHALATION) at 14:49

## 2022-01-01 RX ADMIN — METOPROLOL TARTRATE 12.5 MG: 25 TABLET ORAL at 13:42

## 2022-01-01 RX ADMIN — GABAPENTIN 400 MG: 400 CAPSULE ORAL at 20:45

## 2022-01-01 RX ADMIN — GABAPENTIN 600 MG: 300 CAPSULE ORAL at 22:40

## 2022-01-01 RX ADMIN — PRAVASTATIN SODIUM 20 MG: 20 TABLET ORAL at 08:09

## 2022-01-01 RX ADMIN — TAMSULOSIN HYDROCHLORIDE 0.4 MG: 0.4 CAPSULE ORAL at 20:58

## 2022-01-01 RX ADMIN — METOCLOPRAMIDE 10 MG: 10 TABLET ORAL at 11:33

## 2022-01-01 RX ADMIN — POTASSIUM CHLORIDE 40 MEQ: 750 TABLET, EXTENDED RELEASE ORAL at 08:21

## 2022-01-01 RX ADMIN — DOCUSATE SODIUM 100 MG: 100 CAPSULE, LIQUID FILLED ORAL at 10:02

## 2022-01-01 RX ADMIN — ALPRAZOLAM 0.5 MG: 0.5 TABLET ORAL at 00:00

## 2022-01-01 RX ADMIN — MICONAZOLE NITRATE 1 APPLICATION: 20 CREAM TOPICAL at 22:06

## 2022-01-01 RX ADMIN — WARFARIN 10 MG: 10 TABLET ORAL at 17:37

## 2022-01-01 RX ADMIN — BUDESONIDE AND FORMOTEROL FUMARATE DIHYDRATE 2 PUFF: 160; 4.5 AEROSOL RESPIRATORY (INHALATION) at 20:55

## 2022-01-01 RX ADMIN — IPRATROPIUM BROMIDE 0.5 MG: 0.5 SOLUTION RESPIRATORY (INHALATION) at 21:09

## 2022-01-01 RX ADMIN — BUDESONIDE AND FORMOTEROL FUMARATE DIHYDRATE 2 PUFF: 160; 4.5 AEROSOL RESPIRATORY (INHALATION) at 07:38

## 2022-01-01 RX ADMIN — BUDESONIDE AND FORMOTEROL FUMARATE DIHYDRATE 2 PUFF: 160; 4.5 AEROSOL RESPIRATORY (INHALATION) at 10:18

## 2022-01-01 RX ADMIN — Medication 10 ML: at 08:32

## 2022-01-01 RX ADMIN — GABAPENTIN 600 MG: 300 CAPSULE ORAL at 21:43

## 2022-01-01 RX ADMIN — ERTAPENEM SODIUM 1 G: 1 INJECTION, POWDER, LYOPHILIZED, FOR SOLUTION INTRAMUSCULAR; INTRAVENOUS at 22:01

## 2022-01-01 RX ADMIN — FUROSEMIDE 40 MG: 40 TABLET ORAL at 18:29

## 2022-01-01 RX ADMIN — BUDESONIDE AND FORMOTEROL FUMARATE DIHYDRATE 2 PUFF: 160; 4.5 AEROSOL RESPIRATORY (INHALATION) at 07:23

## 2022-01-01 RX ADMIN — PRAVASTATIN SODIUM 20 MG: 20 TABLET ORAL at 08:56

## 2022-01-01 RX ADMIN — CEFEPIME 2 G: 2 INJECTION, POWDER, FOR SOLUTION INTRAVENOUS at 04:46

## 2022-01-01 RX ADMIN — IPRATROPIUM BROMIDE 0.5 MG: 0.5 SOLUTION RESPIRATORY (INHALATION) at 15:28

## 2022-01-01 RX ADMIN — HYDROCODONE BITARTRATE AND ACETAMINOPHEN 1 TABLET: 10; 325 TABLET ORAL at 23:55

## 2022-01-01 RX ADMIN — FINASTERIDE 5 MG: 5 TABLET, FILM COATED ORAL at 08:46

## 2022-01-01 RX ADMIN — HYDROCORTISONE 1 APPLICATION: 1 OINTMENT TOPICAL at 09:07

## 2022-01-01 RX ADMIN — HYDROCODONE BITARTRATE AND ACETAMINOPHEN 1 TABLET: 10; 325 TABLET ORAL at 12:43

## 2022-01-01 RX ADMIN — FERROUS SULFATE TAB 325 MG (65 MG ELEMENTAL FE) 325 MG: 325 (65 FE) TAB at 08:52

## 2022-01-01 RX ADMIN — ALPRAZOLAM 0.5 MG: 0.5 TABLET ORAL at 21:18

## 2022-01-01 RX ADMIN — ACETAMINOPHEN 650 MG: 325 TABLET ORAL at 09:44

## 2022-01-01 RX ADMIN — SODIUM CHLORIDE 125 ML/HR: 9 INJECTION, SOLUTION INTRAVENOUS at 09:44

## 2022-01-01 RX ADMIN — CEFEPIME HYDROCHLORIDE 1 G: 1 INJECTION, POWDER, FOR SOLUTION INTRAMUSCULAR; INTRAVENOUS at 08:43

## 2022-01-01 RX ADMIN — HYDROCORTISONE 1 APPLICATION: 1 OINTMENT TOPICAL at 08:08

## 2022-01-01 RX ADMIN — DOCUSATE SODIUM 50MG AND SENNOSIDES 8.6MG 1 TABLET: 8.6; 5 TABLET, FILM COATED ORAL at 20:06

## 2022-01-01 RX ADMIN — ALPRAZOLAM 0.5 MG: 0.5 TABLET ORAL at 01:29

## 2022-01-01 RX ADMIN — METOCLOPRAMIDE 10 MG: 10 TABLET ORAL at 12:16

## 2022-01-01 RX ADMIN — Medication 10 ML: at 09:48

## 2022-01-01 RX ADMIN — HYDROCODONE BITARTRATE AND ACETAMINOPHEN 1 TABLET: 10; 325 TABLET ORAL at 14:13

## 2022-01-01 RX ADMIN — PRAVASTATIN SODIUM 20 MG: 20 TABLET ORAL at 09:47

## 2022-01-01 RX ADMIN — SODIUM CHLORIDE 125 ML/HR: 9 INJECTION, SOLUTION INTRAVENOUS at 01:38

## 2022-01-01 RX ADMIN — HYDROCODONE BITARTRATE AND ACETAMINOPHEN 1 TABLET: 10; 325 TABLET ORAL at 02:44

## 2022-01-01 RX ADMIN — Medication 10 ML: at 10:03

## 2022-01-01 RX ADMIN — VANCOMYCIN HYDROCHLORIDE 1250 MG: 10 INJECTION, POWDER, LYOPHILIZED, FOR SOLUTION INTRAVENOUS at 06:07

## 2022-01-01 RX ADMIN — HYDROCODONE BITARTRATE AND ACETAMINOPHEN 1 TABLET: 10; 325 TABLET ORAL at 02:55

## 2022-01-01 RX ADMIN — VANCOMYCIN HYDROCHLORIDE 2750 MG: 10 INJECTION, POWDER, LYOPHILIZED, FOR SOLUTION INTRAVENOUS at 15:59

## 2022-01-01 RX ADMIN — HYDROCODONE BITARTRATE AND ACETAMINOPHEN 1 TABLET: 10; 325 TABLET ORAL at 03:35

## 2022-01-01 RX ADMIN — HYDROCODONE BITARTRATE AND ACETAMINOPHEN 1 TABLET: 10; 325 TABLET ORAL at 19:05

## 2022-01-01 RX ADMIN — GENTAMICIN SULFATE: 1 OINTMENT TOPICAL at 12:12

## 2022-01-01 RX ADMIN — HYDROCODONE BITARTRATE AND ACETAMINOPHEN 1 TABLET: 10; 325 TABLET ORAL at 12:31

## 2022-01-01 RX ADMIN — PRAVASTATIN SODIUM 20 MG: 20 TABLET ORAL at 08:15

## 2022-01-01 RX ADMIN — PRAVASTATIN SODIUM 20 MG: 20 TABLET ORAL at 09:10

## 2022-01-01 RX ADMIN — Medication 1 CAPSULE: at 08:07

## 2022-01-01 RX ADMIN — METOCLOPRAMIDE 10 MG: 10 TABLET ORAL at 22:12

## 2022-01-01 RX ADMIN — FINASTERIDE 5 MG: 5 TABLET, FILM COATED ORAL at 09:06

## 2022-01-01 RX ADMIN — Medication 10 ML: at 08:15

## 2022-01-01 RX ADMIN — FLUTICASONE PROPIONATE 2 SPRAY: 50 SPRAY, METERED NASAL at 09:07

## 2022-01-01 RX ADMIN — MICONAZOLE NITRATE 1 APPLICATION: 20 CREAM TOPICAL at 20:23

## 2022-01-01 RX ADMIN — CEFAZOLIN SODIUM 2 G: 2 INJECTION, SOLUTION INTRAVENOUS at 02:30

## 2022-01-01 RX ADMIN — HYDROCODONE BITARTRATE AND ACETAMINOPHEN 1 TABLET: 10; 325 TABLET ORAL at 17:22

## 2022-01-01 RX ADMIN — METOPROLOL SUCCINATE 25 MG: 25 TABLET, EXTENDED RELEASE ORAL at 08:52

## 2022-01-01 RX ADMIN — WARFARIN SODIUM 5 MG: 5 TABLET ORAL at 00:00

## 2022-01-01 RX ADMIN — HYDROCODONE BITARTRATE AND ACETAMINOPHEN 1 TABLET: 10; 325 TABLET ORAL at 07:43

## 2022-01-01 RX ADMIN — IPRATROPIUM BROMIDE 0.5 MG: 0.5 SOLUTION RESPIRATORY (INHALATION) at 15:11

## 2022-01-01 RX ADMIN — HYDROCODONE BITARTRATE AND ACETAMINOPHEN 1 TABLET: 10; 325 TABLET ORAL at 05:01

## 2022-01-01 RX ADMIN — CARVEDILOL 12.5 MG: 12.5 TABLET, FILM COATED ORAL at 17:35

## 2022-01-01 RX ADMIN — HYDROCODONE BITARTRATE AND ACETAMINOPHEN 1 TABLET: 10; 325 TABLET ORAL at 01:49

## 2022-01-01 RX ADMIN — IPRATROPIUM BROMIDE AND ALBUTEROL SULFATE 3 ML: .5; 3 SOLUTION RESPIRATORY (INHALATION) at 07:30

## 2022-01-01 RX ADMIN — POTASSIUM CHLORIDE 40 MEQ: 750 TABLET, EXTENDED RELEASE ORAL at 21:51

## 2022-01-01 RX ADMIN — BUDESONIDE AND FORMOTEROL FUMARATE DIHYDRATE 2 PUFF: 160; 4.5 AEROSOL RESPIRATORY (INHALATION) at 20:40

## 2022-01-01 RX ADMIN — FUROSEMIDE 40 MG: 40 TABLET ORAL at 08:00

## 2022-01-01 RX ADMIN — HYDROCODONE BITARTRATE AND ACETAMINOPHEN 1 TABLET: 10; 325 TABLET ORAL at 02:37

## 2022-01-01 RX ADMIN — CARVEDILOL 12.5 MG: 12.5 TABLET, FILM COATED ORAL at 09:12

## 2022-01-01 RX ADMIN — Medication 10 ML: at 09:56

## 2022-01-01 RX ADMIN — METOCLOPRAMIDE 10 MG: 10 TABLET ORAL at 06:22

## 2022-01-01 RX ADMIN — BARIUM SULFATE 50 ML: 400 SUSPENSION ORAL at 09:21

## 2022-01-01 RX ADMIN — GABAPENTIN 600 MG: 300 CAPSULE ORAL at 05:26

## 2022-01-01 RX ADMIN — Medication 100 MG: at 08:07

## 2022-01-01 RX ADMIN — GUAIFENESIN AND DEXTROMETHORPHAN HYDROBROMIDE 2 TABLET: 600; 30 TABLET, EXTENDED RELEASE ORAL at 09:47

## 2022-01-01 RX ADMIN — PRAVASTATIN SODIUM 20 MG: 20 TABLET ORAL at 09:12

## 2022-01-01 RX ADMIN — BUDESONIDE AND FORMOTEROL FUMARATE DIHYDRATE 2 PUFF: 160; 4.5 AEROSOL RESPIRATORY (INHALATION) at 19:51

## 2022-01-01 RX ADMIN — BUDESONIDE AND FORMOTEROL FUMARATE DIHYDRATE 2 PUFF: 160; 4.5 AEROSOL RESPIRATORY (INHALATION) at 19:53

## 2022-01-01 RX ADMIN — BUDESONIDE AND FORMOTEROL FUMARATE DIHYDRATE 2 PUFF: 160; 4.5 AEROSOL RESPIRATORY (INHALATION) at 07:01

## 2022-01-01 RX ADMIN — METOCLOPRAMIDE 10 MG: 10 TABLET ORAL at 16:38

## 2022-01-01 RX ADMIN — METOCLOPRAMIDE 10 MG: 10 TABLET ORAL at 21:15

## 2022-01-01 RX ADMIN — WARFARIN 7.5 MG: 7.5 TABLET ORAL at 17:16

## 2022-01-01 RX ADMIN — DOCUSATE SODIUM 100 MG: 100 CAPSULE, LIQUID FILLED ORAL at 20:44

## 2022-01-01 RX ADMIN — METOPROLOL SUCCINATE 25 MG: 25 TABLET, EXTENDED RELEASE ORAL at 20:18

## 2022-01-01 RX ADMIN — GABAPENTIN 600 MG: 300 CAPSULE ORAL at 08:45

## 2022-01-01 RX ADMIN — FINASTERIDE 5 MG: 5 TABLET, FILM COATED ORAL at 08:09

## 2022-01-01 RX ADMIN — WARFARIN 10 MG: 10 TABLET ORAL at 18:21

## 2022-01-01 RX ADMIN — POTASSIUM CHLORIDE 40 MEQ: 750 TABLET, EXTENDED RELEASE ORAL at 06:26

## 2022-01-01 RX ADMIN — IPRATROPIUM BROMIDE 0.5 MG: 0.5 SOLUTION RESPIRATORY (INHALATION) at 08:02

## 2022-01-01 RX ADMIN — GUAIFENESIN AND DEXTROMETHORPHAN HYDROBROMIDE 2 TABLET: 600; 30 TABLET, EXTENDED RELEASE ORAL at 09:42

## 2022-01-01 RX ADMIN — METOCLOPRAMIDE 10 MG: 10 TABLET ORAL at 21:23

## 2022-01-01 RX ADMIN — SODIUM CHLORIDE 125 ML/HR: 9 INJECTION, SOLUTION INTRAVENOUS at 20:46

## 2022-01-01 RX ADMIN — POTASSIUM CHLORIDE 10 MEQ: 7.46 INJECTION, SOLUTION INTRAVENOUS at 15:03

## 2022-01-01 RX ADMIN — FAMOTIDINE 20 MG: 20 TABLET ORAL at 09:07

## 2022-01-01 RX ADMIN — DOCUSATE SODIUM 50MG AND SENNOSIDES 8.6MG 1 TABLET: 8.6; 5 TABLET, FILM COATED ORAL at 20:41

## 2022-01-01 RX ADMIN — GABAPENTIN 600 MG: 300 CAPSULE ORAL at 10:35

## 2022-01-01 RX ADMIN — GABAPENTIN 600 MG: 300 CAPSULE ORAL at 08:08

## 2022-01-01 RX ADMIN — HYDROCODONE BITARTRATE AND ACETAMINOPHEN 1 TABLET: 10; 325 TABLET ORAL at 17:37

## 2022-01-01 RX ADMIN — FINASTERIDE 5 MG: 5 TABLET, FILM COATED ORAL at 09:12

## 2022-01-01 RX ADMIN — Medication 2 PACKET: at 00:46

## 2022-01-01 RX ADMIN — FINASTERIDE 5 MG: 5 TABLET, FILM COATED ORAL at 08:15

## 2022-01-01 RX ADMIN — WARFARIN SODIUM 5 MG: 5 TABLET ORAL at 17:32

## 2022-01-01 RX ADMIN — Medication 10 ML: at 08:12

## 2022-01-01 RX ADMIN — IPRATROPIUM BROMIDE AND ALBUTEROL SULFATE 3 ML: 2.5; .5 SOLUTION RESPIRATORY (INHALATION) at 18:41

## 2022-01-01 RX ADMIN — WARFARIN 10 MG: 10 TABLET ORAL at 17:05

## 2022-01-01 RX ADMIN — BARIUM SULFATE 4 ML: 980 POWDER, FOR SUSPENSION ORAL at 09:21

## 2022-01-01 RX ADMIN — METOCLOPRAMIDE 10 MG: 10 TABLET ORAL at 06:32

## 2022-01-01 RX ADMIN — BUDESONIDE AND FORMOTEROL FUMARATE DIHYDRATE 2 PUFF: 160; 4.5 AEROSOL RESPIRATORY (INHALATION) at 20:49

## 2022-01-01 RX ADMIN — SODIUM CHLORIDE 125 ML/HR: 9 INJECTION, SOLUTION INTRAVENOUS at 13:38

## 2022-01-01 RX ADMIN — WARFARIN SODIUM 5 MG: 5 TABLET ORAL at 17:34

## 2022-01-01 RX ADMIN — Medication 10 ML: at 08:26

## 2022-01-01 RX ADMIN — Medication 10 ML: at 11:28

## 2022-01-01 RX ADMIN — METOCLOPRAMIDE 10 MG: 10 TABLET ORAL at 12:49

## 2022-01-01 RX ADMIN — AMPICILLIN AND SULBACTAM 3 G: 2; 1 INJECTION, POWDER, FOR SOLUTION INTRAMUSCULAR; INTRAVENOUS at 00:16

## 2022-01-01 RX ADMIN — GABAPENTIN 400 MG: 400 CAPSULE ORAL at 05:59

## 2022-01-01 RX ADMIN — HYDROCODONE BITARTRATE AND ACETAMINOPHEN 1 TABLET: 10; 325 TABLET ORAL at 19:56

## 2022-01-01 RX ADMIN — AMPICILLIN AND SULBACTAM 3 G: 2; 1 INJECTION, POWDER, FOR SOLUTION INTRAMUSCULAR; INTRAVENOUS at 13:00

## 2022-01-01 RX ADMIN — DOCUSATE SODIUM 50MG AND SENNOSIDES 8.6MG 1 TABLET: 8.6; 5 TABLET, FILM COATED ORAL at 08:18

## 2022-01-01 RX ADMIN — FERROUS SULFATE TAB 325 MG (65 MG ELEMENTAL FE) 325 MG: 325 (65 FE) TAB at 09:39

## 2022-01-01 RX ADMIN — IPRATROPIUM BROMIDE AND ALBUTEROL SULFATE 3 ML: 2.5; .5 SOLUTION RESPIRATORY (INHALATION) at 08:03

## 2022-01-01 RX ADMIN — ALPRAZOLAM 0.5 MG: 0.5 TABLET ORAL at 02:55

## 2022-01-01 RX ADMIN — GABAPENTIN 600 MG: 300 CAPSULE ORAL at 08:44

## 2022-01-01 RX ADMIN — CARVEDILOL 12.5 MG: 12.5 TABLET, FILM COATED ORAL at 08:42

## 2022-01-01 RX ADMIN — ACETAMINOPHEN 650 MG: 325 TABLET, FILM COATED ORAL at 15:36

## 2022-01-01 RX ADMIN — HYDROCODONE BITARTRATE AND ACETAMINOPHEN 1 TABLET: 10; 325 TABLET ORAL at 22:35

## 2022-01-01 RX ADMIN — FERROUS SULFATE TAB 325 MG (65 MG ELEMENTAL FE) 325 MG: 325 (65 FE) TAB at 09:01

## 2022-01-01 RX ADMIN — HYDROCODONE BITARTRATE AND ACETAMINOPHEN 1 TABLET: 10; 325 TABLET ORAL at 08:18

## 2022-01-01 RX ADMIN — MICONAZOLE NITRATE: 2 POWDER TOPICAL at 21:15

## 2022-01-01 RX ADMIN — METOPROLOL SUCCINATE 25 MG: 25 TABLET, EXTENDED RELEASE ORAL at 09:32

## 2022-01-01 RX ADMIN — METOPROLOL SUCCINATE 25 MG: 25 TABLET, EXTENDED RELEASE ORAL at 08:04

## 2022-01-01 RX ADMIN — CEFEPIME 2 G: 2 INJECTION, POWDER, FOR SOLUTION INTRAVENOUS at 09:12

## 2022-01-01 RX ADMIN — OXYCODONE HYDROCHLORIDE AND ACETAMINOPHEN 500 MG: 500 TABLET ORAL at 09:01

## 2022-01-01 RX ADMIN — POTASSIUM CHLORIDE 40 MEQ: 750 TABLET, EXTENDED RELEASE ORAL at 18:22

## 2022-01-01 RX ADMIN — FUROSEMIDE 40 MG: 10 INJECTION, SOLUTION INTRAMUSCULAR; INTRAVENOUS at 13:30

## 2022-01-01 RX ADMIN — IOPAMIDOL 85 ML: 612 INJECTION, SOLUTION INTRAVENOUS at 16:16

## 2022-01-01 RX ADMIN — POTASSIUM CHLORIDE 10 MEQ: 750 TABLET, EXTENDED RELEASE ORAL at 20:06

## 2022-01-01 RX ADMIN — VANCOMYCIN HYDROCHLORIDE 1250 MG: 10 INJECTION, POWDER, LYOPHILIZED, FOR SOLUTION INTRAVENOUS at 06:19

## 2022-01-01 RX ADMIN — TAZOBACTAM SODIUM AND PIPERACILLIN SODIUM 4.5 G: 500; 4 INJECTION, SOLUTION INTRAVENOUS at 20:50

## 2022-01-01 RX ADMIN — Medication 100 MG: at 20:23

## 2022-01-01 RX ADMIN — FINASTERIDE 5 MG: 5 TABLET, FILM COATED ORAL at 09:32

## 2022-01-01 RX ADMIN — SODIUM CHLORIDE 100 ML/HR: 9 INJECTION, SOLUTION INTRAVENOUS at 12:59

## 2022-01-01 RX ADMIN — GABAPENTIN 600 MG: 300 CAPSULE ORAL at 12:58

## 2022-01-01 RX ADMIN — WARFARIN 10 MG: 10 TABLET ORAL at 20:43

## 2022-01-01 RX ADMIN — METOCLOPRAMIDE 10 MG: 10 TABLET ORAL at 11:23

## 2022-01-01 RX ADMIN — DOCUSATE SODIUM 100 MG: 100 CAPSULE, LIQUID FILLED ORAL at 09:12

## 2022-01-01 RX ADMIN — CEFTRIAXONE 2 G: 2 INJECTION, POWDER, FOR SOLUTION INTRAMUSCULAR; INTRAVENOUS at 11:41

## 2022-01-01 RX ADMIN — METOCLOPRAMIDE 10 MG: 10 TABLET ORAL at 08:43

## 2022-01-01 RX ADMIN — IPRATROPIUM BROMIDE AND ALBUTEROL SULFATE 3 ML: .5; 3 SOLUTION RESPIRATORY (INHALATION) at 19:19

## 2022-01-01 RX ADMIN — METOCLOPRAMIDE 10 MG: 10 TABLET ORAL at 11:47

## 2022-01-01 RX ADMIN — HYDROCODONE BITARTRATE AND ACETAMINOPHEN 1 TABLET: 10; 325 TABLET ORAL at 21:28

## 2022-01-01 RX ADMIN — METOCLOPRAMIDE 10 MG: 10 TABLET ORAL at 20:49

## 2022-01-01 RX ADMIN — FUROSEMIDE 40 MG: 40 TABLET ORAL at 09:41

## 2022-01-01 RX ADMIN — POTASSIUM CHLORIDE 10 MEQ: 750 TABLET, EXTENDED RELEASE ORAL at 21:23

## 2022-01-01 RX ADMIN — Medication 10 ML: at 08:10

## 2022-01-01 RX ADMIN — IPRATROPIUM BROMIDE 0.5 MG: 0.5 SOLUTION RESPIRATORY (INHALATION) at 14:57

## 2022-01-01 RX ADMIN — HYDROCODONE BITARTRATE AND ACETAMINOPHEN 1 TABLET: 10; 325 TABLET ORAL at 17:34

## 2022-01-01 RX ADMIN — BUDESONIDE AND FORMOTEROL FUMARATE DIHYDRATE 2 PUFF: 160; 4.5 AEROSOL RESPIRATORY (INHALATION) at 19:59

## 2022-01-01 RX ADMIN — POTASSIUM CHLORIDE 40 MEQ: 750 TABLET, EXTENDED RELEASE ORAL at 11:23

## 2022-01-01 RX ADMIN — GABAPENTIN 400 MG: 400 CAPSULE ORAL at 21:15

## 2022-01-01 RX ADMIN — TAMSULOSIN HYDROCHLORIDE 0.4 MG: 0.4 CAPSULE ORAL at 21:23

## 2022-01-01 RX ADMIN — WARFARIN SODIUM 5 MG: 5 TABLET ORAL at 17:22

## 2022-01-01 RX ADMIN — ALPRAZOLAM 0.5 MG: 0.5 TABLET ORAL at 01:34

## 2022-01-01 RX ADMIN — WARFARIN SODIUM 5 MG: 5 TABLET ORAL at 17:31

## 2022-01-01 RX ADMIN — Medication 10 ML: at 20:06

## 2022-01-01 RX ADMIN — HYDROCODONE BITARTRATE AND ACETAMINOPHEN 1 TABLET: 10; 325 TABLET ORAL at 19:06

## 2022-01-01 RX ADMIN — SODIUM CHLORIDE 500 ML: 9 INJECTION, SOLUTION INTRAVENOUS at 12:14

## 2022-01-01 RX ADMIN — TAMSULOSIN HYDROCHLORIDE 0.4 MG: 0.4 CAPSULE ORAL at 20:25

## 2022-01-01 RX ADMIN — CEFEPIME 2 G: 2 INJECTION, POWDER, FOR SOLUTION INTRAVENOUS at 16:27

## 2022-01-01 RX ADMIN — METOCLOPRAMIDE 10 MG: 10 TABLET ORAL at 06:16

## 2022-01-01 RX ADMIN — MICONAZOLE NITRATE 1 APPLICATION: 20 CREAM TOPICAL at 08:48

## 2022-01-01 RX ADMIN — WARFARIN SODIUM 5 MG: 5 TABLET ORAL at 17:28

## 2022-01-01 RX ADMIN — METOPROLOL TARTRATE 5 MG: 1 INJECTION, SOLUTION INTRAVENOUS at 22:42

## 2022-01-01 RX ADMIN — CEFEPIME 2 G: 2 INJECTION, POWDER, FOR SOLUTION INTRAVENOUS at 13:29

## 2022-01-01 RX ADMIN — WARFARIN 7.5 MG: 7.5 TABLET ORAL at 17:33

## 2022-01-01 RX ADMIN — SODIUM CHLORIDE 2604 ML: 9 INJECTION, SOLUTION INTRAVENOUS at 14:23

## 2022-01-01 RX ADMIN — VANCOMYCIN HYDROCHLORIDE 2000 MG: 10 INJECTION, POWDER, LYOPHILIZED, FOR SOLUTION INTRAVENOUS at 15:58

## 2022-01-01 RX ADMIN — BUDESONIDE AND FORMOTEROL FUMARATE DIHYDRATE 2 PUFF: 160; 4.5 AEROSOL RESPIRATORY (INHALATION) at 19:39

## 2022-01-01 RX ADMIN — PRAVASTATIN SODIUM 20 MG: 20 TABLET ORAL at 08:31

## 2022-01-01 RX ADMIN — ALBUTEROL SULFATE 2.5 MG: 2.5 SOLUTION RESPIRATORY (INHALATION) at 01:47

## 2022-01-01 RX ADMIN — METOCLOPRAMIDE 10 MG: 10 TABLET ORAL at 22:00

## 2022-01-01 RX ADMIN — AMOXICILLIN AND CLAVULANATE POTASSIUM 1 TABLET: 875; 125 TABLET, FILM COATED ORAL at 20:40

## 2022-01-01 RX ADMIN — GABAPENTIN 600 MG: 300 CAPSULE ORAL at 12:16

## 2022-01-01 RX ADMIN — METOCLOPRAMIDE 10 MG: 10 TABLET ORAL at 17:13

## 2022-01-01 RX ADMIN — IPRATROPIUM BROMIDE AND ALBUTEROL SULFATE 3 ML: .5; 3 SOLUTION RESPIRATORY (INHALATION) at 15:30

## 2022-01-01 RX ADMIN — FINASTERIDE 5 MG: 5 TABLET, FILM COATED ORAL at 08:31

## 2022-01-01 RX ADMIN — HYDROCODONE BITARTRATE AND ACETAMINOPHEN 1 TABLET: 10; 325 TABLET ORAL at 17:17

## 2022-01-01 RX ADMIN — WARFARIN 7.5 MG: 7.5 TABLET ORAL at 17:08

## 2022-01-01 RX ADMIN — METOCLOPRAMIDE 10 MG: 10 TABLET ORAL at 17:33

## 2022-01-01 RX ADMIN — METOPROLOL TARTRATE 25 MG: 25 TABLET, FILM COATED ORAL at 20:50

## 2022-01-01 RX ADMIN — GABAPENTIN 600 MG: 300 CAPSULE ORAL at 20:50

## 2022-01-01 RX ADMIN — BARIUM SULFATE 55 ML: 0.81 POWDER, FOR SUSPENSION ORAL at 09:20

## 2022-01-01 RX ADMIN — METOCLOPRAMIDE 10 MG: 10 TABLET ORAL at 17:22

## 2022-01-01 RX ADMIN — METOPROLOL TARTRATE 12.5 MG: 25 TABLET ORAL at 21:26

## 2022-01-01 RX ADMIN — DOCUSATE SODIUM 100 MG: 100 CAPSULE, LIQUID FILLED ORAL at 09:32

## 2022-01-01 RX ADMIN — METOPROLOL TARTRATE 12.5 MG: 25 TABLET, FILM COATED ORAL at 08:00

## 2022-01-01 RX ADMIN — FUROSEMIDE 20 MG: 20 TABLET ORAL at 10:09

## 2022-01-01 RX ADMIN — BUDESONIDE AND FORMOTEROL FUMARATE DIHYDRATE 2 PUFF: 80; 4.5 AEROSOL RESPIRATORY (INHALATION) at 07:57

## 2022-01-01 RX ADMIN — HYDROCODONE BITARTRATE AND ACETAMINOPHEN 1 TABLET: 10; 325 TABLET ORAL at 09:35

## 2022-01-01 RX ADMIN — BUDESONIDE AND FORMOTEROL FUMARATE DIHYDRATE 2 PUFF: 160; 4.5 AEROSOL RESPIRATORY (INHALATION) at 07:07

## 2022-01-01 RX ADMIN — GABAPENTIN 400 MG: 400 CAPSULE ORAL at 05:05

## 2022-01-01 RX ADMIN — ALPRAZOLAM 0.5 MG: 0.5 TABLET ORAL at 02:53

## 2022-01-01 RX ADMIN — METOPROLOL TARTRATE 25 MG: 25 TABLET, FILM COATED ORAL at 09:12

## 2022-01-01 RX ADMIN — GABAPENTIN 600 MG: 300 CAPSULE ORAL at 17:37

## 2022-01-01 RX ADMIN — FINASTERIDE 5 MG: 5 TABLET, FILM COATED ORAL at 08:07

## 2022-01-01 RX ADMIN — IPRATROPIUM BROMIDE AND ALBUTEROL SULFATE 3 ML: 2.5; .5 SOLUTION RESPIRATORY (INHALATION) at 15:18

## 2022-01-01 RX ADMIN — METOCLOPRAMIDE 10 MG: 10 TABLET ORAL at 09:30

## 2022-01-01 RX ADMIN — CEFEPIME 2 G: 2 INJECTION, POWDER, FOR SOLUTION INTRAVENOUS at 16:05

## 2022-01-01 RX ADMIN — HYDROCODONE BITARTRATE AND ACETAMINOPHEN 1 TABLET: 10; 325 TABLET ORAL at 19:02

## 2022-01-01 RX ADMIN — IPRATROPIUM BROMIDE 0.5 MG: 0.5 SOLUTION RESPIRATORY (INHALATION) at 11:08

## 2022-01-01 RX ADMIN — GABAPENTIN 400 MG: 400 CAPSULE ORAL at 05:12

## 2022-01-01 RX ADMIN — POTASSIUM CHLORIDE 40 MEQ: 750 TABLET, EXTENDED RELEASE ORAL at 09:45

## 2022-01-01 RX ADMIN — ALPRAZOLAM 0.5 MG: 0.5 TABLET ORAL at 03:45

## 2022-01-01 RX ADMIN — HYDROCODONE BITARTRATE AND ACETAMINOPHEN 1 TABLET: 10; 325 TABLET ORAL at 16:37

## 2022-01-01 RX ADMIN — METOPROLOL SUCCINATE 25 MG: 25 TABLET, EXTENDED RELEASE ORAL at 08:18

## 2022-01-01 RX ADMIN — HYDROCODONE BITARTRATE AND ACETAMINOPHEN 1 TABLET: 10; 325 TABLET ORAL at 01:51

## 2022-01-01 RX ADMIN — GABAPENTIN 400 MG: 400 CAPSULE ORAL at 21:08

## 2022-01-01 RX ADMIN — HYDROCODONE BITARTRATE AND ACETAMINOPHEN 1 TABLET: 10; 325 TABLET ORAL at 12:20

## 2022-01-01 RX ADMIN — Medication 10 ML: at 21:32

## 2022-01-01 RX ADMIN — HYDROCODONE BITARTRATE AND ACETAMINOPHEN 1 TABLET: 10; 325 TABLET ORAL at 13:40

## 2022-01-01 RX ADMIN — Medication 10 ML: at 21:15

## 2022-01-01 RX ADMIN — HYDROCODONE BITARTRATE AND ACETAMINOPHEN 1 TABLET: 10; 325 TABLET ORAL at 23:22

## 2022-01-01 RX ADMIN — Medication 10 ML: at 09:17

## 2022-01-01 RX ADMIN — METOPROLOL TARTRATE 12.5 MG: 25 TABLET, FILM COATED ORAL at 09:46

## 2022-01-01 RX ADMIN — Medication 2500 MG: at 18:53

## 2022-01-01 RX ADMIN — HYDROCODONE BITARTRATE AND ACETAMINOPHEN 1 TABLET: 10; 325 TABLET ORAL at 16:46

## 2022-01-01 RX ADMIN — Medication 10 ML: at 20:59

## 2022-01-01 RX ADMIN — METOCLOPRAMIDE 10 MG: 10 TABLET ORAL at 00:09

## 2022-01-01 RX ADMIN — IPRATROPIUM BROMIDE AND ALBUTEROL SULFATE 3 ML: .5; 3 SOLUTION RESPIRATORY (INHALATION) at 15:51

## 2022-01-01 RX ADMIN — HYDROCODONE BITARTRATE AND ACETAMINOPHEN 1 TABLET: 10; 325 TABLET ORAL at 19:35

## 2022-01-01 RX ADMIN — CEFEPIME 2 G: 2 INJECTION, POWDER, FOR SOLUTION INTRAVENOUS at 05:32

## 2022-01-01 RX ADMIN — FINASTERIDE 5 MG: 5 TABLET, FILM COATED ORAL at 08:18

## 2022-01-01 RX ADMIN — MICONAZOLE NITRATE 1 APPLICATION: 20 CREAM TOPICAL at 12:13

## 2022-01-01 RX ADMIN — BUDESONIDE AND FORMOTEROL FUMARATE DIHYDRATE 2 PUFF: 160; 4.5 AEROSOL RESPIRATORY (INHALATION) at 21:12

## 2022-01-01 RX ADMIN — ALBUTEROL SULFATE 2.5 MG: 2.5 SOLUTION RESPIRATORY (INHALATION) at 04:56

## 2022-01-01 RX ADMIN — PRAVASTATIN SODIUM 20 MG: 20 TABLET ORAL at 08:04

## 2022-01-01 RX ADMIN — HYDROCODONE BITARTRATE AND ACETAMINOPHEN 1 TABLET: 10; 325 TABLET ORAL at 04:48

## 2022-01-01 RX ADMIN — METOCLOPRAMIDE 10 MG: 10 TABLET ORAL at 02:44

## 2022-01-01 RX ADMIN — GABAPENTIN 600 MG: 300 CAPSULE ORAL at 08:18

## 2022-01-01 RX ADMIN — Medication 10 ML: at 21:25

## 2022-01-01 RX ADMIN — METOCLOPRAMIDE 10 MG: 10 TABLET ORAL at 12:03

## 2022-01-01 RX ADMIN — VANCOMYCIN HYDROCHLORIDE 1250 MG: 10 INJECTION, POWDER, LYOPHILIZED, FOR SOLUTION INTRAVENOUS at 17:49

## 2022-01-01 RX ADMIN — GABAPENTIN 600 MG: 300 CAPSULE ORAL at 09:06

## 2022-01-01 RX ADMIN — BUDESONIDE AND FORMOTEROL FUMARATE DIHYDRATE 2 PUFF: 160; 4.5 AEROSOL RESPIRATORY (INHALATION) at 23:26

## 2022-01-01 RX ADMIN — Medication 10 ML: at 20:21

## 2022-01-01 RX ADMIN — FUROSEMIDE 20 MG: 20 TABLET ORAL at 08:46

## 2022-01-01 RX ADMIN — GUAIFENESIN AND DEXTROMETHORPHAN HYDROBROMIDE 2 TABLET: 600; 30 TABLET, EXTENDED RELEASE ORAL at 10:01

## 2022-01-01 RX ADMIN — IPRATROPIUM BROMIDE 0.5 MG: 0.5 SOLUTION RESPIRATORY (INHALATION) at 15:57

## 2022-01-01 RX ADMIN — POTASSIUM CHLORIDE 40 MEQ: 750 TABLET, EXTENDED RELEASE ORAL at 14:34

## 2022-01-01 RX ADMIN — METOPROLOL SUCCINATE 25 MG: 25 TABLET, EXTENDED RELEASE ORAL at 08:46

## 2022-01-01 RX ADMIN — CARVEDILOL 12.5 MG: 12.5 TABLET, FILM COATED ORAL at 20:44

## 2022-01-01 RX ADMIN — Medication 10 ML: at 20:26

## 2022-01-01 RX ADMIN — ALPRAZOLAM 0.5 MG: 0.5 TABLET ORAL at 03:05

## 2022-01-01 RX ADMIN — IPRATROPIUM BROMIDE 0.5 MG: 0.5 SOLUTION RESPIRATORY (INHALATION) at 19:33

## 2022-01-01 RX ADMIN — GABAPENTIN 600 MG: 300 CAPSULE ORAL at 14:27

## 2022-01-01 RX ADMIN — GABAPENTIN 400 MG: 400 CAPSULE ORAL at 21:29

## 2022-01-01 RX ADMIN — HYDROCODONE BITARTRATE AND ACETAMINOPHEN 1 TABLET: 10; 325 TABLET ORAL at 08:00

## 2022-01-01 RX ADMIN — MICONAZOLE NITRATE 1 APPLICATION: 20 CREAM TOPICAL at 08:28

## 2022-01-01 RX ADMIN — ERTAPENEM SODIUM 1 G: 1 INJECTION, POWDER, LYOPHILIZED, FOR SOLUTION INTRAMUSCULAR; INTRAVENOUS at 22:30

## 2022-01-01 RX ADMIN — GABAPENTIN 600 MG: 300 CAPSULE ORAL at 13:34

## 2022-01-01 RX ADMIN — FUROSEMIDE 40 MG: 40 TABLET ORAL at 09:47

## 2022-01-01 RX ADMIN — WARFARIN SODIUM 5 MG: 5 TABLET ORAL at 18:28

## 2022-01-01 RX ADMIN — METOPROLOL TARTRATE 12.5 MG: 25 TABLET, FILM COATED ORAL at 21:28

## 2022-01-01 RX ADMIN — VANCOMYCIN HYDROCHLORIDE 2500 MG: 10 INJECTION, POWDER, LYOPHILIZED, FOR SOLUTION INTRAVENOUS at 02:44

## 2022-01-01 RX ADMIN — Medication 10 ML: at 09:42

## 2022-01-01 RX ADMIN — HYDROCODONE BITARTRATE AND ACETAMINOPHEN 1 TABLET: 10; 325 TABLET ORAL at 21:33

## 2022-01-01 RX ADMIN — Medication 10 ML: at 08:43

## 2022-01-01 RX ADMIN — Medication 100 MG: at 22:00

## 2022-01-01 RX ADMIN — FUROSEMIDE 40 MG: 10 INJECTION, SOLUTION INTRAMUSCULAR; INTRAVENOUS at 14:36

## 2022-01-01 RX ADMIN — HYDROCODONE BITARTRATE AND ACETAMINOPHEN 1 TABLET: 10; 325 TABLET ORAL at 10:01

## 2022-01-01 RX ADMIN — ACETAMINOPHEN 650 MG: 325 TABLET, FILM COATED ORAL at 18:43

## 2022-01-01 RX ADMIN — PRAVASTATIN SODIUM 20 MG: 20 TABLET ORAL at 08:40

## 2022-01-01 RX ADMIN — HYDROCODONE BITARTRATE AND ACETAMINOPHEN 1 TABLET: 10; 325 TABLET ORAL at 16:58

## 2022-01-01 RX ADMIN — GUAIFENESIN AND DEXTROMETHORPHAN HYDROBROMIDE 2 TABLET: 600; 30 TABLET, EXTENDED RELEASE ORAL at 20:58

## 2022-01-01 RX ADMIN — CEFEPIME 2 G: 2 INJECTION, POWDER, FOR SOLUTION INTRAVENOUS at 07:58

## 2022-01-01 RX ADMIN — HYDROCODONE BITARTRATE AND ACETAMINOPHEN 1 TABLET: 10; 325 TABLET ORAL at 08:31

## 2022-01-01 RX ADMIN — MICONAZOLE NITRATE 1 APPLICATION: 20 CREAM TOPICAL at 13:26

## 2022-01-01 RX ADMIN — GABAPENTIN 600 MG: 300 CAPSULE ORAL at 14:11

## 2022-01-01 RX ADMIN — METOCLOPRAMIDE 10 MG: 10 TABLET ORAL at 13:11

## 2022-01-01 RX ADMIN — ACETAMINOPHEN 650 MG: 325 TABLET, FILM COATED ORAL at 15:56

## 2022-01-01 RX ADMIN — IPRATROPIUM BROMIDE AND ALBUTEROL SULFATE 3 ML: .5; 3 SOLUTION RESPIRATORY (INHALATION) at 15:16

## 2022-01-01 RX ADMIN — WARFARIN 7.5 MG: 7.5 TABLET ORAL at 18:29

## 2022-01-01 RX ADMIN — CEFEPIME 2 G: 2 INJECTION, POWDER, FOR SOLUTION INTRAVENOUS at 21:50

## 2022-01-01 RX ADMIN — TAMSULOSIN HYDROCHLORIDE 0.4 MG: 0.4 CAPSULE ORAL at 17:34

## 2022-01-01 RX ADMIN — METOCLOPRAMIDE 10 MG: 10 TABLET ORAL at 06:27

## 2022-01-01 RX ADMIN — METOCLOPRAMIDE 10 MG: 10 TABLET ORAL at 20:50

## 2022-01-01 RX ADMIN — MEROPENEM 1 G: 1 INJECTION, POWDER, FOR SOLUTION INTRAVENOUS at 00:15

## 2022-01-01 RX ADMIN — FENTANYL CITRATE 50 MCG: 50 INJECTION INTRAMUSCULAR; INTRAVENOUS at 17:27

## 2022-01-01 RX ADMIN — PRAVASTATIN SODIUM 20 MG: 20 TABLET ORAL at 09:02

## 2022-01-01 RX ADMIN — POTASSIUM CHLORIDE 10 MEQ: 750 TABLET, EXTENDED RELEASE ORAL at 20:50

## 2022-01-01 RX ADMIN — PRAVASTATIN SODIUM 20 MG: 20 TABLET ORAL at 08:18

## 2022-01-01 RX ADMIN — VANCOMYCIN HYDROCHLORIDE 1250 MG: 10 INJECTION, POWDER, LYOPHILIZED, FOR SOLUTION INTRAVENOUS at 12:43

## 2022-01-01 RX ADMIN — FINASTERIDE 5 MG: 5 TABLET, FILM COATED ORAL at 08:56

## 2022-01-01 RX ADMIN — FERROUS SULFATE TAB 325 MG (65 MG ELEMENTAL FE) 325 MG: 325 (65 FE) TAB at 09:07

## 2022-01-01 RX ADMIN — GABAPENTIN 400 MG: 400 CAPSULE ORAL at 06:19

## 2022-01-01 RX ADMIN — Medication 10 ML: at 01:20

## 2022-01-01 RX ADMIN — TAMSULOSIN HYDROCHLORIDE 0.4 MG: 0.4 CAPSULE ORAL at 22:00

## 2022-01-01 RX ADMIN — SODIUM CHLORIDE 125 ML/HR: 9 INJECTION, SOLUTION INTRAVENOUS at 05:26

## 2022-01-01 RX ADMIN — HYDROCODONE BITARTRATE AND ACETAMINOPHEN 1 TABLET: 10; 325 TABLET ORAL at 18:04

## 2022-01-01 RX ADMIN — Medication 100 MG: at 08:18

## 2022-01-01 RX ADMIN — TAMSULOSIN HYDROCHLORIDE 0.4 MG: 0.4 CAPSULE ORAL at 20:23

## 2022-01-01 RX ADMIN — WARFARIN 7.5 MG: 7.5 TABLET ORAL at 17:25

## 2022-01-01 RX ADMIN — HYDROCODONE BITARTRATE AND ACETAMINOPHEN 1 TABLET: 10; 325 TABLET ORAL at 23:14

## 2022-01-01 RX ADMIN — HYDROCODONE BITARTRATE AND ACETAMINOPHEN 1 TABLET: 10; 325 TABLET ORAL at 08:07

## 2022-01-01 RX ADMIN — HYDROCODONE BITARTRATE AND ACETAMINOPHEN 1 TABLET: 10; 325 TABLET ORAL at 21:26

## 2022-01-01 RX ADMIN — WARFARIN SODIUM 5 MG: 5 TABLET ORAL at 17:40

## 2022-01-01 RX ADMIN — ALPRAZOLAM 0.5 MG: 0.5 TABLET ORAL at 02:44

## 2022-01-01 RX ADMIN — Medication 10 ML: at 08:48

## 2022-01-01 RX ADMIN — IPRATROPIUM BROMIDE 0.5 MG: 0.5 SOLUTION RESPIRATORY (INHALATION) at 14:33

## 2022-01-01 RX ADMIN — DOCUSATE SODIUM 100 MG: 100 CAPSULE, LIQUID FILLED ORAL at 09:41

## 2022-01-01 RX ADMIN — GUAIFENESIN AND DEXTROMETHORPHAN HYDROBROMIDE 2 TABLET: 600; 30 TABLET, EXTENDED RELEASE ORAL at 09:12

## 2022-01-01 RX ADMIN — WARFARIN 10 MG: 10 TABLET ORAL at 02:44

## 2022-01-01 RX ADMIN — CEFEPIME HYDROCHLORIDE 2 G: 2 INJECTION, POWDER, FOR SOLUTION INTRAVENOUS at 15:33

## 2022-01-01 RX ADMIN — Medication 100 MG: at 08:46

## 2022-01-01 RX ADMIN — IPRATROPIUM BROMIDE AND ALBUTEROL SULFATE 3 ML: .5; 3 SOLUTION RESPIRATORY (INHALATION) at 10:37

## 2022-01-01 RX ADMIN — PRAVASTATIN SODIUM 20 MG: 20 TABLET ORAL at 10:07

## 2022-01-01 RX ADMIN — Medication 1 APPLICATION: at 09:13

## 2022-01-01 RX ADMIN — VANCOMYCIN HYDROCHLORIDE 1000 MG: 1 INJECTION, SOLUTION INTRAVENOUS at 20:24

## 2022-01-01 RX ADMIN — ALPRAZOLAM 0.5 MG: 0.5 TABLET ORAL at 02:26

## 2022-01-01 RX ADMIN — PRAVASTATIN SODIUM 20 MG: 20 TABLET ORAL at 09:06

## 2022-01-01 RX ADMIN — SODIUM CHLORIDE 500 ML: 9 INJECTION, SOLUTION INTRAVENOUS at 23:00

## 2022-01-01 RX ADMIN — FINASTERIDE 5 MG: 5 TABLET, FILM COATED ORAL at 09:01

## 2022-01-01 RX ADMIN — FINASTERIDE 5 MG: 5 TABLET, FILM COATED ORAL at 10:01

## 2022-01-01 RX ADMIN — CEFEPIME 2 G: 2 INJECTION, POWDER, FOR SOLUTION INTRAVENOUS at 15:24

## 2022-01-01 RX ADMIN — METOCLOPRAMIDE 10 MG: 10 TABLET ORAL at 08:09

## 2022-01-01 RX ADMIN — BUDESONIDE AND FORMOTEROL FUMARATE DIHYDRATE 2 PUFF: 160; 4.5 AEROSOL RESPIRATORY (INHALATION) at 07:32

## 2022-01-01 RX ADMIN — HYDROCODONE BITARTRATE AND ACETAMINOPHEN 1 TABLET: 10; 325 TABLET ORAL at 00:19

## 2022-01-01 RX ADMIN — FUROSEMIDE 20 MG: 20 TABLET ORAL at 17:32

## 2022-01-01 RX ADMIN — ERTAPENEM SODIUM 1 G: 1 INJECTION, POWDER, LYOPHILIZED, FOR SOLUTION INTRAMUSCULAR; INTRAVENOUS at 21:23

## 2022-01-01 RX ADMIN — WARFARIN 8 MG: 4 TABLET ORAL at 17:06

## 2022-01-01 RX ADMIN — GABAPENTIN 600 MG: 300 CAPSULE ORAL at 21:23

## 2022-01-01 RX ADMIN — METOPROLOL TARTRATE 12.5 MG: 25 TABLET ORAL at 21:15

## 2022-01-01 RX ADMIN — BUDESONIDE AND FORMOTEROL FUMARATE DIHYDRATE 2 PUFF: 160; 4.5 AEROSOL RESPIRATORY (INHALATION) at 07:49

## 2022-01-01 RX ADMIN — TAMSULOSIN HYDROCHLORIDE 0.4 MG: 0.4 CAPSULE ORAL at 21:34

## 2022-01-01 RX ADMIN — CEFEPIME 2 G: 2 INJECTION, POWDER, FOR SOLUTION INTRAVENOUS at 01:00

## 2022-01-01 RX ADMIN — GABAPENTIN 600 MG: 300 CAPSULE ORAL at 12:49

## 2022-01-01 RX ADMIN — FUROSEMIDE 20 MG: 20 TABLET ORAL at 09:06

## 2022-01-01 RX ADMIN — PRAVASTATIN SODIUM 20 MG: 20 TABLET ORAL at 20:43

## 2022-01-01 RX ADMIN — CEFEPIME 2 G: 2 INJECTION, POWDER, FOR SOLUTION INTRAVENOUS at 00:02

## 2022-01-01 RX ADMIN — GABAPENTIN 400 MG: 400 CAPSULE ORAL at 21:00

## 2022-01-01 RX ADMIN — Medication 10 ML: at 09:06

## 2022-01-01 RX ADMIN — WARFARIN 10 MG: 10 TABLET ORAL at 20:58

## 2022-01-01 RX ADMIN — BUDESONIDE AND FORMOTEROL FUMARATE DIHYDRATE 2 PUFF: 160; 4.5 AEROSOL RESPIRATORY (INHALATION) at 19:33

## 2022-01-01 RX ADMIN — LIDOCAINE HYDROCHLORIDE: 20 JELLY TOPICAL at 17:28

## 2022-01-01 RX ADMIN — FINASTERIDE 5 MG: 5 TABLET, FILM COATED ORAL at 08:10

## 2022-01-01 RX ADMIN — IPRATROPIUM BROMIDE 0.5 MG: 0.5 SOLUTION RESPIRATORY (INHALATION) at 08:10

## 2022-01-01 RX ADMIN — Medication 100 MG: at 20:49

## 2022-01-01 RX ADMIN — IPRATROPIUM BROMIDE AND ALBUTEROL SULFATE 3 ML: 2.5; .5 SOLUTION RESPIRATORY (INHALATION) at 15:40

## 2022-01-01 RX ADMIN — HYDROCODONE BITARTRATE AND ACETAMINOPHEN 1 TABLET: 10; 325 TABLET ORAL at 10:58

## 2022-01-01 RX ADMIN — CEFEPIME 2 G: 2 INJECTION, POWDER, FOR SOLUTION INTRAVENOUS at 05:55

## 2022-01-01 RX ADMIN — HYDROCODONE BITARTRATE AND ACETAMINOPHEN 1 TABLET: 10; 325 TABLET ORAL at 22:46

## 2022-01-01 RX ADMIN — HYDROCODONE BITARTRATE AND ACETAMINOPHEN 1 TABLET: 10; 325 TABLET ORAL at 18:41

## 2022-01-01 RX ADMIN — ALPRAZOLAM 0.5 MG: 0.5 TABLET ORAL at 22:46

## 2022-01-01 RX ADMIN — HYDROCODONE BITARTRATE AND ACETAMINOPHEN 1 TABLET: 10; 325 TABLET ORAL at 09:06

## 2022-01-01 RX ADMIN — FLUTICASONE PROPIONATE 2 SPRAY: 50 SPRAY, METERED NASAL at 08:41

## 2022-01-01 RX ADMIN — HYDROCODONE BITARTRATE AND ACETAMINOPHEN 1 TABLET: 10; 325 TABLET ORAL at 13:00

## 2022-01-01 RX ADMIN — CEFEPIME 2 G: 2 INJECTION, POWDER, FOR SOLUTION INTRAVENOUS at 08:10

## 2022-01-01 RX ADMIN — MICONAZOLE NITRATE 1 APPLICATION: 20 CREAM TOPICAL at 20:46

## 2022-01-01 RX ADMIN — CEFEPIME 2 G: 2 INJECTION, POWDER, FOR SOLUTION INTRAVENOUS at 01:59

## 2022-01-01 RX ADMIN — CEFAZOLIN SODIUM 2 G: 2 INJECTION, SOLUTION INTRAVENOUS at 10:12

## 2022-01-01 RX ADMIN — GABAPENTIN 600 MG: 300 CAPSULE ORAL at 06:27

## 2022-01-01 RX ADMIN — BUDESONIDE AND FORMOTEROL FUMARATE DIHYDRATE 2 PUFF: 160; 4.5 AEROSOL RESPIRATORY (INHALATION) at 08:22

## 2022-01-01 RX ADMIN — GADOBENATE DIMEGLUMINE 20 ML: 529 INJECTION, SOLUTION INTRAVENOUS at 20:48

## 2022-01-01 RX ADMIN — METOCLOPRAMIDE 10 MG: 10 TABLET ORAL at 11:01

## 2022-01-01 RX ADMIN — Medication 10 ML: at 21:51

## 2022-01-01 RX ADMIN — BUDESONIDE AND FORMOTEROL FUMARATE DIHYDRATE 2 PUFF: 80; 4.5 AEROSOL RESPIRATORY (INHALATION) at 20:39

## 2022-01-01 RX ADMIN — SODIUM CHLORIDE 75 ML/HR: 9 INJECTION, SOLUTION INTRAVENOUS at 06:22

## 2022-01-01 RX ADMIN — PRAVASTATIN SODIUM 20 MG: 20 TABLET ORAL at 12:12

## 2022-01-01 RX ADMIN — METOCLOPRAMIDE 10 MG: 10 TABLET ORAL at 08:31

## 2022-01-01 RX ADMIN — FUROSEMIDE 20 MG: 20 TABLET ORAL at 08:18

## 2022-01-01 RX ADMIN — GUAIFENESIN AND DEXTROMETHORPHAN HYDROBROMIDE 2 TABLET: 600; 30 TABLET, EXTENDED RELEASE ORAL at 09:11

## 2022-01-01 RX ADMIN — IPRATROPIUM BROMIDE AND ALBUTEROL SULFATE 3 ML: .5; 3 SOLUTION RESPIRATORY (INHALATION) at 14:49

## 2022-01-01 RX ADMIN — GABAPENTIN 400 MG: 400 CAPSULE ORAL at 13:30

## 2022-01-01 RX ADMIN — HYDROCODONE BITARTRATE AND ACETAMINOPHEN 1 TABLET: 10; 325 TABLET ORAL at 19:27

## 2022-01-01 RX ADMIN — HYDROCODONE BITARTRATE AND ACETAMINOPHEN 1 TABLET: 10; 325 TABLET ORAL at 19:08

## 2022-01-01 RX ADMIN — AMOXICILLIN AND CLAVULANATE POTASSIUM 1 TABLET: 875; 125 TABLET, FILM COATED ORAL at 12:28

## 2022-01-01 RX ADMIN — DOCUSATE SODIUM 100 MG: 100 CAPSULE, LIQUID FILLED ORAL at 09:02

## 2022-01-01 RX ADMIN — CEFEPIME 2 G: 2 INJECTION, POWDER, FOR SOLUTION INTRAVENOUS at 20:49

## 2022-01-01 RX ADMIN — PRAVASTATIN SODIUM 20 MG: 20 TABLET ORAL at 08:44

## 2022-01-01 RX ADMIN — Medication 10 ML: at 08:19

## 2022-01-01 RX ADMIN — HYDROCORTISONE 1 APPLICATION: 1 OINTMENT TOPICAL at 08:47

## 2022-01-01 RX ADMIN — CEFEPIME HYDROCHLORIDE 2 G: 2 INJECTION, POWDER, FOR SOLUTION INTRAVENOUS at 18:14

## 2022-01-01 RX ADMIN — POTASSIUM CHLORIDE 10 MEQ: 7.46 INJECTION, SOLUTION INTRAVENOUS at 12:22

## 2022-01-01 RX ADMIN — POTASSIUM CHLORIDE 40 MEQ: 750 TABLET, EXTENDED RELEASE ORAL at 11:32

## 2022-01-01 RX ADMIN — CEFAZOLIN SODIUM 2 G: 2 INJECTION, SOLUTION INTRAVENOUS at 18:50

## 2022-01-01 RX ADMIN — CARVEDILOL 12.5 MG: 12.5 TABLET, FILM COATED ORAL at 18:14

## 2022-01-01 RX ADMIN — METOCLOPRAMIDE 10 MG: 10 TABLET ORAL at 16:59

## 2022-01-01 RX ADMIN — IPRATROPIUM BROMIDE 0.5 MG: 0.5 SOLUTION RESPIRATORY (INHALATION) at 19:24

## 2022-01-01 RX ADMIN — POTASSIUM CHLORIDE 10 MEQ: 750 TABLET, EXTENDED RELEASE ORAL at 10:35

## 2022-01-01 RX ADMIN — SODIUM CHLORIDE 125 ML/HR: 9 INJECTION, SOLUTION INTRAVENOUS at 12:15

## 2022-01-01 RX ADMIN — HYDROCODONE BITARTRATE AND ACETAMINOPHEN 1 TABLET: 10; 325 TABLET ORAL at 23:28

## 2022-01-01 RX ADMIN — IPRATROPIUM BROMIDE AND ALBUTEROL SULFATE 3 ML: .5; 3 SOLUTION RESPIRATORY (INHALATION) at 11:31

## 2022-01-01 RX ADMIN — TAMSULOSIN HYDROCHLORIDE 0.4 MG: 0.4 CAPSULE ORAL at 20:41

## 2022-01-01 RX ADMIN — DOCUSATE SODIUM 100 MG: 100 CAPSULE, LIQUID FILLED ORAL at 08:40

## 2022-01-01 RX ADMIN — IPRATROPIUM BROMIDE 0.5 MG: 0.5 SOLUTION RESPIRATORY (INHALATION) at 15:05

## 2022-01-01 RX ADMIN — IPRATROPIUM BROMIDE AND ALBUTEROL SULFATE 3 ML: 2.5; .5 SOLUTION RESPIRATORY (INHALATION) at 11:49

## 2022-01-01 RX ADMIN — FINASTERIDE 5 MG: 5 TABLET, FILM COATED ORAL at 09:07

## 2022-01-01 RX ADMIN — CARVEDILOL 12.5 MG: 12.5 TABLET, FILM COATED ORAL at 07:37

## 2022-01-01 RX ADMIN — GABAPENTIN 400 MG: 400 CAPSULE ORAL at 22:35

## 2022-01-01 RX ADMIN — GABAPENTIN 600 MG: 300 CAPSULE ORAL at 06:01

## 2022-01-01 RX ADMIN — HYDROCODONE BITARTRATE AND ACETAMINOPHEN 1 TABLET: 10; 325 TABLET ORAL at 14:34

## 2022-01-01 RX ADMIN — ACETAMINOPHEN 650 MG: 325 TABLET, FILM COATED ORAL at 17:32

## 2022-01-01 RX ADMIN — ALPRAZOLAM 0.5 MG: 0.5 TABLET ORAL at 00:20

## 2022-01-01 RX ADMIN — Medication 1 CAPSULE: at 08:45

## 2022-01-01 RX ADMIN — POTASSIUM CHLORIDE 10 MEQ: 750 TABLET, EXTENDED RELEASE ORAL at 20:23

## 2022-01-01 RX ADMIN — METOPROLOL TARTRATE 12.5 MG: 25 TABLET, FILM COATED ORAL at 21:18

## 2022-01-01 RX ADMIN — HYDROCODONE BITARTRATE AND ACETAMINOPHEN 1 TABLET: 10; 325 TABLET ORAL at 11:27

## 2022-01-01 RX ADMIN — HYDROCODONE BITARTRATE AND ACETAMINOPHEN 1 TABLET: 10; 325 TABLET ORAL at 17:54

## 2022-01-01 RX ADMIN — MICONAZOLE NITRATE: 2 POWDER TOPICAL at 21:27

## 2022-01-01 RX ADMIN — POTASSIUM CHLORIDE 40 MEQ: 750 TABLET, EXTENDED RELEASE ORAL at 18:03

## 2022-01-01 RX ADMIN — CEFEPIME 2 G: 2 INJECTION, POWDER, FOR SOLUTION INTRAVENOUS at 15:06

## 2022-01-01 RX ADMIN — POTASSIUM CHLORIDE 40 MEQ: 750 TABLET, EXTENDED RELEASE ORAL at 09:36

## 2022-01-01 RX ADMIN — METOPROLOL TARTRATE 12.5 MG: 25 TABLET ORAL at 09:39

## 2022-01-01 RX ADMIN — HYDROCODONE BITARTRATE AND ACETAMINOPHEN 1 TABLET: 10; 325 TABLET ORAL at 04:24

## 2022-01-01 RX ADMIN — FINASTERIDE 5 MG: 5 TABLET, FILM COATED ORAL at 08:21

## 2022-01-01 RX ADMIN — GABAPENTIN 600 MG: 300 CAPSULE ORAL at 17:02

## 2022-01-01 RX ADMIN — POTASSIUM CHLORIDE 10 MEQ: 750 TABLET, EXTENDED RELEASE ORAL at 10:07

## 2022-01-01 RX ADMIN — IPRATROPIUM BROMIDE 0.5 MG: 0.5 SOLUTION RESPIRATORY (INHALATION) at 09:17

## 2022-01-01 RX ADMIN — IPRATROPIUM BROMIDE 0.5 MG: 0.5 SOLUTION RESPIRATORY (INHALATION) at 23:51

## 2022-01-01 RX ADMIN — GABAPENTIN 400 MG: 400 CAPSULE ORAL at 21:40

## 2022-01-01 RX ADMIN — GABAPENTIN 400 MG: 400 CAPSULE ORAL at 15:06

## 2022-01-01 RX ADMIN — POTASSIUM CHLORIDE 10 MEQ: 750 TABLET, EXTENDED RELEASE ORAL at 08:07

## 2022-01-01 RX ADMIN — ACETAMINOPHEN 650 MG: 325 TABLET, FILM COATED ORAL at 03:31

## 2022-01-01 RX ADMIN — Medication 2 PACKET: at 17:23

## 2022-01-01 RX ADMIN — CEFEPIME HYDROCHLORIDE 2 G: 2 INJECTION, POWDER, FOR SOLUTION INTRAVENOUS at 08:56

## 2022-01-01 RX ADMIN — BUDESONIDE AND FORMOTEROL FUMARATE DIHYDRATE 2 PUFF: 160; 4.5 AEROSOL RESPIRATORY (INHALATION) at 20:10

## 2022-01-01 RX ADMIN — IPRATROPIUM BROMIDE AND ALBUTEROL SULFATE 3 ML: .5; 3 SOLUTION RESPIRATORY (INHALATION) at 10:16

## 2022-01-01 RX ADMIN — METOPROLOL SUCCINATE 25 MG: 25 TABLET, EXTENDED RELEASE ORAL at 08:15

## 2022-01-01 RX ADMIN — METOCLOPRAMIDE 10 MG: 10 TABLET ORAL at 13:42

## 2022-01-01 RX ADMIN — BUDESONIDE AND FORMOTEROL FUMARATE DIHYDRATE 2 PUFF: 160; 4.5 AEROSOL RESPIRATORY (INHALATION) at 20:15

## 2022-01-01 RX ADMIN — FLUTICASONE PROPIONATE 2 SPRAY: 50 SPRAY, METERED NASAL at 10:10

## 2022-01-01 RX ADMIN — HYDROCODONE BITARTRATE AND ACETAMINOPHEN 1 TABLET: 10; 325 TABLET ORAL at 14:28

## 2022-01-01 RX ADMIN — HYDROCODONE BITARTRATE AND ACETAMINOPHEN 1 TABLET: 10; 325 TABLET ORAL at 13:30

## 2022-01-01 RX ADMIN — HYDROCODONE BITARTRATE AND ACETAMINOPHEN 1 TABLET: 10; 325 TABLET ORAL at 03:43

## 2022-01-01 RX ADMIN — GABAPENTIN 600 MG: 300 CAPSULE ORAL at 20:41

## 2022-01-01 RX ADMIN — CEFEPIME 2 G: 2 INJECTION, POWDER, FOR SOLUTION INTRAVENOUS at 05:20

## 2022-01-01 RX ADMIN — FAMOTIDINE 20 MG: 20 TABLET ORAL at 09:39

## 2022-01-01 RX ADMIN — CEFEPIME HYDROCHLORIDE 2 G: 2 INJECTION, POWDER, FOR SOLUTION INTRAVENOUS at 09:30

## 2022-01-01 RX ADMIN — IPRATROPIUM BROMIDE AND ALBUTEROL SULFATE 3 ML: .5; 3 SOLUTION RESPIRATORY (INHALATION) at 16:49

## 2022-01-01 RX ADMIN — GABAPENTIN 400 MG: 400 CAPSULE ORAL at 05:01

## 2022-01-01 RX ADMIN — WARFARIN SODIUM 5 MG: 5 TABLET ORAL at 17:49

## 2022-01-01 RX ADMIN — IPRATROPIUM BROMIDE 0.5 MG: 0.5 SOLUTION RESPIRATORY (INHALATION) at 07:55

## 2022-01-01 RX ADMIN — CEFEPIME 2 G: 2 INJECTION, POWDER, FOR SOLUTION INTRAVENOUS at 21:33

## 2022-01-01 RX ADMIN — FLUTICASONE PROPIONATE 2 SPRAY: 50 SPRAY, METERED NASAL at 12:12

## 2022-01-01 RX ADMIN — CARVEDILOL 12.5 MG: 12.5 TABLET, FILM COATED ORAL at 19:25

## 2022-01-01 RX ADMIN — DOCUSATE SODIUM 100 MG: 100 CAPSULE, LIQUID FILLED ORAL at 09:30

## 2022-01-01 RX ADMIN — PRAVASTATIN SODIUM 20 MG: 20 TABLET ORAL at 09:41

## 2022-01-01 RX ADMIN — CEFEPIME HYDROCHLORIDE 2 G: 2 INJECTION, POWDER, FOR SOLUTION INTRAVENOUS at 23:31

## 2022-01-01 RX ADMIN — FINASTERIDE 5 MG: 5 TABLET, FILM COATED ORAL at 20:43

## 2022-01-01 RX ADMIN — TAMSULOSIN HYDROCHLORIDE 0.4 MG: 0.4 CAPSULE ORAL at 20:11

## 2022-01-01 RX ADMIN — CARVEDILOL 12.5 MG: 12.5 TABLET, FILM COATED ORAL at 09:47

## 2022-01-01 RX ADMIN — GABAPENTIN 600 MG: 300 CAPSULE ORAL at 06:16

## 2022-01-01 RX ADMIN — GABAPENTIN 600 MG: 300 CAPSULE ORAL at 21:36

## 2022-01-01 RX ADMIN — Medication 10 ML: at 11:40

## 2022-01-01 RX ADMIN — FAMOTIDINE 20 MG: 20 TABLET ORAL at 17:08

## 2022-01-01 RX ADMIN — Medication 10 ML: at 08:01

## 2022-01-01 RX ADMIN — VANCOMYCIN HYDROCHLORIDE 1250 MG: 10 INJECTION, POWDER, LYOPHILIZED, FOR SOLUTION INTRAVENOUS at 03:50

## 2022-01-01 RX ADMIN — METOCLOPRAMIDE 10 MG: 10 TABLET ORAL at 06:41

## 2022-01-01 RX ADMIN — BUDESONIDE AND FORMOTEROL FUMARATE DIHYDRATE 2 PUFF: 160; 4.5 AEROSOL RESPIRATORY (INHALATION) at 19:57

## 2022-01-01 RX ADMIN — BARIUM SULFATE 1 TEASPOON(S): 0.6 CREAM ORAL at 09:21

## 2022-01-01 RX ADMIN — GABAPENTIN 600 MG: 300 CAPSULE ORAL at 22:00

## 2022-01-01 RX ADMIN — ACETAMINOPHEN 650 MG: 325 TABLET ORAL at 00:02

## 2022-01-01 RX ADMIN — METOCLOPRAMIDE 10 MG: 10 TABLET ORAL at 07:37

## 2022-01-01 RX ADMIN — CEFEPIME HYDROCHLORIDE 2 G: 2 INJECTION, POWDER, FOR SOLUTION INTRAVENOUS at 09:47

## 2022-01-01 RX ADMIN — WARFARIN 7.5 MG: 7.5 TABLET ORAL at 02:03

## 2022-01-01 RX ADMIN — IPRATROPIUM BROMIDE AND ALBUTEROL SULFATE 3 ML: .5; 3 SOLUTION RESPIRATORY (INHALATION) at 10:47

## 2022-01-01 RX ADMIN — ANTACID TABLETS 2 TABLET: 500 TABLET, CHEWABLE ORAL at 12:34

## 2022-01-01 RX ADMIN — IPRATROPIUM BROMIDE AND ALBUTEROL SULFATE 3 ML: .5; 3 SOLUTION RESPIRATORY (INHALATION) at 19:09

## 2022-01-01 RX ADMIN — IOPAMIDOL 85 ML: 612 INJECTION, SOLUTION INTRAVENOUS at 12:08

## 2022-01-01 RX ADMIN — IPRATROPIUM BROMIDE 0.5 MG: 0.5 SOLUTION RESPIRATORY (INHALATION) at 08:52

## 2022-01-01 RX ADMIN — PRAVASTATIN SODIUM 20 MG: 20 TABLET ORAL at 09:46

## 2022-01-01 RX ADMIN — HYDROCODONE BITARTRATE AND ACETAMINOPHEN 1 TABLET: 10; 325 TABLET ORAL at 10:11

## 2022-01-01 RX ADMIN — Medication 10 ML: at 21:40

## 2022-01-01 RX ADMIN — HYDROCODONE BITARTRATE AND ACETAMINOPHEN 1 TABLET: 10; 325 TABLET ORAL at 08:36

## 2022-01-01 RX ADMIN — HYDROCODONE BITARTRATE AND ACETAMINOPHEN 1 TABLET: 10; 325 TABLET ORAL at 06:07

## 2022-01-01 RX ADMIN — Medication 10 ML: at 08:04

## 2022-01-01 RX ADMIN — MICONAZOLE NITRATE 1 APPLICATION: 20 CREAM TOPICAL at 20:51

## 2022-01-01 RX ADMIN — ALPRAZOLAM 0.5 MG: 0.5 TABLET ORAL at 02:29

## 2022-01-01 RX ADMIN — HYDROCODONE BITARTRATE AND ACETAMINOPHEN 1 TABLET: 10; 325 TABLET ORAL at 21:07

## 2022-01-01 RX ADMIN — Medication 10 ML: at 20:00

## 2022-01-01 RX ADMIN — HYDROCODONE BITARTRATE AND ACETAMINOPHEN 1 TABLET: 10; 325 TABLET ORAL at 08:04

## 2022-01-01 RX ADMIN — Medication 10 ML: at 22:00

## 2022-01-01 RX ADMIN — Medication 100 MG: at 20:41

## 2022-01-01 RX ADMIN — HYDROCORTISONE 1 APPLICATION: 1 OINTMENT TOPICAL at 10:30

## 2022-01-01 RX ADMIN — CEFEPIME 2 G: 2 INJECTION, POWDER, FOR SOLUTION INTRAVENOUS at 09:32

## 2022-01-01 RX ADMIN — IPRATROPIUM BROMIDE AND ALBUTEROL SULFATE 3 ML: .5; 3 SOLUTION RESPIRATORY (INHALATION) at 15:25

## 2022-01-01 RX ADMIN — DOCUSATE SODIUM 100 MG: 100 CAPSULE, LIQUID FILLED ORAL at 09:11

## 2022-01-01 RX ADMIN — BUDESONIDE AND FORMOTEROL FUMARATE DIHYDRATE 2 PUFF: 160; 4.5 AEROSOL RESPIRATORY (INHALATION) at 19:35

## 2022-01-01 RX ADMIN — HYDROCORTISONE 1 APPLICATION: 1 OINTMENT TOPICAL at 08:28

## 2022-01-01 RX ADMIN — GABAPENTIN 600 MG: 300 CAPSULE ORAL at 15:20

## 2022-01-01 RX ADMIN — VANCOMYCIN HYDROCHLORIDE 1750 MG: 10 INJECTION, POWDER, LYOPHILIZED, FOR SOLUTION INTRAVENOUS at 06:03

## 2022-01-01 RX ADMIN — FAMOTIDINE 20 MG: 20 TABLET ORAL at 16:58

## 2022-01-01 RX ADMIN — HYDROCODONE BITARTRATE AND ACETAMINOPHEN 1 TABLET: 10; 325 TABLET ORAL at 17:32

## 2022-01-01 RX ADMIN — METOCLOPRAMIDE 10 MG: 10 TABLET ORAL at 21:42

## 2022-01-01 RX ADMIN — FINASTERIDE 5 MG: 5 TABLET, FILM COATED ORAL at 10:08

## 2022-01-01 RX ADMIN — METOCLOPRAMIDE 10 MG: 10 TABLET ORAL at 09:47

## 2022-01-01 RX ADMIN — DIGOXIN 500 MCG: 0.25 INJECTION INTRAMUSCULAR; INTRAVENOUS at 17:17

## 2022-01-04 RX ORDER — LISINOPRIL 20 MG/1
20 TABLET ORAL 2 TIMES DAILY
Qty: 180 TABLET | Refills: 1 | Status: SHIPPED | OUTPATIENT
Start: 2022-01-04 | End: 2022-01-12 | Stop reason: HOSPADM

## 2022-01-05 ENCOUNTER — OUTSIDE FACILITY SERVICE (OUTPATIENT)
Dept: FAMILY MEDICINE CLINIC | Facility: CLINIC | Age: 72
End: 2022-01-05

## 2022-01-05 PROCEDURE — OUTSIDEPOS PR OUTSIDE POS PLACEHOLDER: Performed by: FAMILY MEDICINE

## 2022-01-09 ENCOUNTER — APPOINTMENT (OUTPATIENT)
Dept: GENERAL RADIOLOGY | Facility: HOSPITAL | Age: 72
End: 2022-01-09

## 2022-01-09 ENCOUNTER — HOSPITAL ENCOUNTER (OUTPATIENT)
Facility: HOSPITAL | Age: 72
Setting detail: OBSERVATION
LOS: 3 days | Discharge: HOME HEALTH | End: 2022-01-12
Attending: EMERGENCY MEDICINE | Admitting: HOSPITALIST

## 2022-01-09 DIAGNOSIS — R50.9 FEVER AND CHILLS: ICD-10-CM

## 2022-01-09 DIAGNOSIS — R53.1 GENERALIZED WEAKNESS: Primary | ICD-10-CM

## 2022-01-09 DIAGNOSIS — L03.116 CELLULITIS OF LEFT LOWER EXTREMITY: ICD-10-CM

## 2022-01-09 DIAGNOSIS — R79.89 ELEVATED LACTIC ACID LEVEL: ICD-10-CM

## 2022-01-09 DIAGNOSIS — L97.329 LOWER LIMB ULCER, ANKLE, LEFT, WITH UNSPECIFIED SEVERITY: ICD-10-CM

## 2022-01-09 DIAGNOSIS — M14.679 CHARCOT'S JOINT OF FOOT, UNSPECIFIED LATERALITY: Chronic | ICD-10-CM

## 2022-01-09 PROBLEM — L03.90 CELLULITIS: Status: ACTIVE | Noted: 2021-04-03

## 2022-01-09 LAB
ALBUMIN SERPL-MCNC: 4.2 G/DL (ref 3.5–5.2)
ALBUMIN/GLOB SERPL: 1.2 G/DL
ALP SERPL-CCNC: 91 U/L (ref 39–117)
ALT SERPL W P-5'-P-CCNC: 10 U/L (ref 1–41)
ANION GAP SERPL CALCULATED.3IONS-SCNC: 14.3 MMOL/L (ref 5–15)
APTT PPP: 47.9 SECONDS (ref 22.7–35.4)
AST SERPL-CCNC: 15 U/L (ref 1–40)
B PARAPERT DNA SPEC QL NAA+PROBE: NOT DETECTED
B PERT DNA SPEC QL NAA+PROBE: NOT DETECTED
BACTERIA UR QL AUTO: ABNORMAL /HPF
BASOPHILS # BLD AUTO: 0.06 10*3/MM3 (ref 0–0.2)
BASOPHILS NFR BLD AUTO: 0.5 % (ref 0–1.5)
BILIRUB SERPL-MCNC: 0.9 MG/DL (ref 0–1.2)
BILIRUB UR QL STRIP: NEGATIVE
BUN SERPL-MCNC: 12 MG/DL (ref 8–23)
BUN/CREAT SERPL: 11.9 (ref 7–25)
C PNEUM DNA NPH QL NAA+NON-PROBE: NOT DETECTED
CALCIUM SPEC-SCNC: 10 MG/DL (ref 8.6–10.5)
CHLORIDE SERPL-SCNC: 98 MMOL/L (ref 98–107)
CLARITY UR: CLEAR
CO2 SERPL-SCNC: 27.7 MMOL/L (ref 22–29)
COLOR UR: YELLOW
CREAT SERPL-MCNC: 1.01 MG/DL (ref 0.76–1.27)
D-LACTATE SERPL-SCNC: 1.7 MMOL/L (ref 0.5–2)
D-LACTATE SERPL-SCNC: 2.1 MMOL/L (ref 0.5–2)
DEPRECATED RDW RBC AUTO: 50.7 FL (ref 37–54)
EOSINOPHIL # BLD AUTO: 0.04 10*3/MM3 (ref 0–0.4)
EOSINOPHIL NFR BLD AUTO: 0.4 % (ref 0.3–6.2)
ERYTHROCYTE [DISTWIDTH] IN BLOOD BY AUTOMATED COUNT: 14 % (ref 12.3–15.4)
FLUAV SUBTYP SPEC NAA+PROBE: NOT DETECTED
FLUBV RNA ISLT QL NAA+PROBE: NOT DETECTED
GFR SERPL CREATININE-BSD FRML MDRD: 73 ML/MIN/1.73
GLOBULIN UR ELPH-MCNC: 3.4 GM/DL
GLUCOSE SERPL-MCNC: 111 MG/DL (ref 65–99)
GLUCOSE UR STRIP-MCNC: NEGATIVE MG/DL
HADV DNA SPEC NAA+PROBE: NOT DETECTED
HCOV 229E RNA SPEC QL NAA+PROBE: NOT DETECTED
HCOV HKU1 RNA SPEC QL NAA+PROBE: NOT DETECTED
HCOV NL63 RNA SPEC QL NAA+PROBE: NOT DETECTED
HCOV OC43 RNA SPEC QL NAA+PROBE: NOT DETECTED
HCT VFR BLD AUTO: 40.4 % (ref 37.5–51)
HGB BLD-MCNC: 14.2 G/DL (ref 13–17.7)
HGB UR QL STRIP.AUTO: ABNORMAL
HMPV RNA NPH QL NAA+NON-PROBE: NOT DETECTED
HOLD SPECIMEN: NORMAL
HPIV1 RNA ISLT QL NAA+PROBE: NOT DETECTED
HPIV2 RNA SPEC QL NAA+PROBE: NOT DETECTED
HPIV3 RNA NPH QL NAA+PROBE: NOT DETECTED
HPIV4 P GENE NPH QL NAA+PROBE: NOT DETECTED
HYALINE CASTS UR QL AUTO: ABNORMAL /LPF
IMM GRANULOCYTES # BLD AUTO: 0.05 10*3/MM3 (ref 0–0.05)
IMM GRANULOCYTES NFR BLD AUTO: 0.5 % (ref 0–0.5)
INR PPP: 2.2 (ref 0.9–1.1)
KETONES UR QL STRIP: NEGATIVE
LEUKOCYTE ESTERASE UR QL STRIP.AUTO: NEGATIVE
LYMPHOCYTES # BLD AUTO: 0.55 10*3/MM3 (ref 0.7–3.1)
LYMPHOCYTES NFR BLD AUTO: 5 % (ref 19.6–45.3)
M PNEUMO IGG SER IA-ACNC: NOT DETECTED
MAGNESIUM SERPL-MCNC: 1.9 MG/DL (ref 1.6–2.4)
MCH RBC QN AUTO: 34.5 PG (ref 26.6–33)
MCHC RBC AUTO-ENTMCNC: 35.1 G/DL (ref 31.5–35.7)
MCV RBC AUTO: 98.3 FL (ref 79–97)
MONOCYTES # BLD AUTO: 0.59 10*3/MM3 (ref 0.1–0.9)
MONOCYTES NFR BLD AUTO: 5.3 % (ref 5–12)
NEUTROPHILS NFR BLD AUTO: 88.3 % (ref 42.7–76)
NEUTROPHILS NFR BLD AUTO: 9.76 10*3/MM3 (ref 1.7–7)
NITRITE UR QL STRIP: NEGATIVE
NRBC BLD AUTO-RTO: 0 /100 WBC (ref 0–0.2)
NT-PROBNP SERPL-MCNC: 2694 PG/ML (ref 0–900)
PH UR STRIP.AUTO: 7 [PH] (ref 5–8)
PLATELET # BLD AUTO: 150 10*3/MM3 (ref 140–450)
PMV BLD AUTO: 10.1 FL (ref 6–12)
POTASSIUM SERPL-SCNC: 3.9 MMOL/L (ref 3.5–5.2)
PROT SERPL-MCNC: 7.6 G/DL (ref 6–8.5)
PROT UR QL STRIP: ABNORMAL
PROTHROMBIN TIME: 24.2 SECONDS (ref 11.7–14.2)
QT INTERVAL: 332 MS
RBC # BLD AUTO: 4.11 10*6/MM3 (ref 4.14–5.8)
RBC # UR STRIP: ABNORMAL /HPF
REF LAB TEST METHOD: ABNORMAL
RHINOVIRUS RNA SPEC NAA+PROBE: NOT DETECTED
RSV RNA NPH QL NAA+NON-PROBE: NOT DETECTED
SARS-COV-2 RNA NPH QL NAA+NON-PROBE: NOT DETECTED
SODIUM SERPL-SCNC: 140 MMOL/L (ref 136–145)
SP GR UR STRIP: 1.01 (ref 1–1.03)
SQUAMOUS #/AREA URNS HPF: ABNORMAL /HPF
TROPONIN T SERPL-MCNC: 0.02 NG/ML (ref 0–0.03)
UROBILINOGEN UR QL STRIP: ABNORMAL
WBC # UR STRIP: ABNORMAL /HPF
WBC NRBC COR # BLD: 11.05 10*3/MM3 (ref 3.4–10.8)
WHOLE BLOOD HOLD SPECIMEN: NORMAL
WHOLE BLOOD HOLD SPECIMEN: NORMAL

## 2022-01-09 PROCEDURE — 96367 TX/PROPH/DG ADDL SEQ IV INF: CPT

## 2022-01-09 PROCEDURE — 25010000002 CEFTRIAXONE PER 250 MG: Performed by: EMERGENCY MEDICINE

## 2022-01-09 PROCEDURE — P9612 CATHETERIZE FOR URINE SPEC: HCPCS

## 2022-01-09 PROCEDURE — 87147 CULTURE TYPE IMMUNOLOGIC: CPT | Performed by: NURSE PRACTITIONER

## 2022-01-09 PROCEDURE — 87147 CULTURE TYPE IMMUNOLOGIC: CPT | Performed by: EMERGENCY MEDICINE

## 2022-01-09 PROCEDURE — 93010 ELECTROCARDIOGRAM REPORT: CPT | Performed by: INTERNAL MEDICINE

## 2022-01-09 PROCEDURE — 73630 X-RAY EXAM OF FOOT: CPT

## 2022-01-09 PROCEDURE — 25010000002 MAGNESIUM SULFATE 2 GM/50ML SOLUTION: Performed by: EMERGENCY MEDICINE

## 2022-01-09 PROCEDURE — 87205 SMEAR GRAM STAIN: CPT | Performed by: NURSE PRACTITIONER

## 2022-01-09 PROCEDURE — 85025 COMPLETE CBC W/AUTO DIFF WBC: CPT | Performed by: EMERGENCY MEDICINE

## 2022-01-09 PROCEDURE — 87040 BLOOD CULTURE FOR BACTERIA: CPT

## 2022-01-09 PROCEDURE — 83735 ASSAY OF MAGNESIUM: CPT | Performed by: NURSE PRACTITIONER

## 2022-01-09 PROCEDURE — 94664 DEMO&/EVAL PT USE INHALER: CPT

## 2022-01-09 PROCEDURE — 87070 CULTURE OTHR SPECIMN AEROBIC: CPT | Performed by: NURSE PRACTITIONER

## 2022-01-09 PROCEDURE — 94640 AIRWAY INHALATION TREATMENT: CPT

## 2022-01-09 PROCEDURE — 85730 THROMBOPLASTIN TIME PARTIAL: CPT | Performed by: NURSE PRACTITIONER

## 2022-01-09 PROCEDURE — 83605 ASSAY OF LACTIC ACID: CPT | Performed by: EMERGENCY MEDICINE

## 2022-01-09 PROCEDURE — 93005 ELECTROCARDIOGRAM TRACING: CPT | Performed by: NURSE PRACTITIONER

## 2022-01-09 PROCEDURE — 87077 CULTURE AEROBIC IDENTIFY: CPT | Performed by: NURSE PRACTITIONER

## 2022-01-09 PROCEDURE — G0378 HOSPITAL OBSERVATION PER HR: HCPCS

## 2022-01-09 PROCEDURE — 83880 ASSAY OF NATRIURETIC PEPTIDE: CPT | Performed by: NURSE PRACTITIONER

## 2022-01-09 PROCEDURE — 0202U NFCT DS 22 TRGT SARS-COV-2: CPT | Performed by: EMERGENCY MEDICINE

## 2022-01-09 PROCEDURE — 87186 SC STD MICRODIL/AGAR DIL: CPT | Performed by: NURSE PRACTITIONER

## 2022-01-09 PROCEDURE — 25010000002 CEFTRIAXONE PER 250 MG: Performed by: NURSE PRACTITIONER

## 2022-01-09 PROCEDURE — 99285 EMERGENCY DEPT VISIT HI MDM: CPT

## 2022-01-09 PROCEDURE — 85610 PROTHROMBIN TIME: CPT | Performed by: NURSE PRACTITIONER

## 2022-01-09 PROCEDURE — 36415 COLL VENOUS BLD VENIPUNCTURE: CPT | Performed by: EMERGENCY MEDICINE

## 2022-01-09 PROCEDURE — 87150 DNA/RNA AMPLIFIED PROBE: CPT | Performed by: EMERGENCY MEDICINE

## 2022-01-09 PROCEDURE — 25010000002 VANCOMYCIN PER 500 MG: Performed by: NURSE PRACTITIONER

## 2022-01-09 PROCEDURE — 84484 ASSAY OF TROPONIN QUANT: CPT | Performed by: NURSE PRACTITIONER

## 2022-01-09 PROCEDURE — 96365 THER/PROPH/DIAG IV INF INIT: CPT

## 2022-01-09 PROCEDURE — 71045 X-RAY EXAM CHEST 1 VIEW: CPT

## 2022-01-09 PROCEDURE — 73610 X-RAY EXAM OF ANKLE: CPT

## 2022-01-09 PROCEDURE — 94761 N-INVAS EAR/PLS OXIMETRY MLT: CPT

## 2022-01-09 PROCEDURE — 81001 URINALYSIS AUTO W/SCOPE: CPT | Performed by: NURSE PRACTITIONER

## 2022-01-09 PROCEDURE — 25010000002 VANCOMYCIN 10 G RECONSTITUTED SOLUTION: Performed by: EMERGENCY MEDICINE

## 2022-01-09 PROCEDURE — 80053 COMPREHEN METABOLIC PANEL: CPT | Performed by: EMERGENCY MEDICINE

## 2022-01-09 PROCEDURE — 94799 UNLISTED PULMONARY SVC/PX: CPT

## 2022-01-09 RX ORDER — TAMSULOSIN HYDROCHLORIDE 0.4 MG/1
0.4 CAPSULE ORAL NIGHTLY
Status: DISCONTINUED | OUTPATIENT
Start: 2022-01-09 | End: 2022-01-12 | Stop reason: HOSPADM

## 2022-01-09 RX ORDER — ACETAMINOPHEN 500 MG
1000 TABLET ORAL ONCE
Status: DISCONTINUED | OUTPATIENT
Start: 2022-01-09 | End: 2022-01-09

## 2022-01-09 RX ORDER — SODIUM CHLORIDE 0.9 % (FLUSH) 0.9 %
10 SYRINGE (ML) INJECTION AS NEEDED
Status: DISCONTINUED | OUTPATIENT
Start: 2022-01-09 | End: 2022-01-12 | Stop reason: HOSPADM

## 2022-01-09 RX ORDER — BISACODYL 5 MG/1
5 TABLET, DELAYED RELEASE ORAL DAILY PRN
Status: DISCONTINUED | OUTPATIENT
Start: 2022-01-09 | End: 2022-01-12 | Stop reason: HOSPADM

## 2022-01-09 RX ORDER — NYSTATIN 100000 [USP'U]/G
POWDER TOPICAL EVERY 12 HOURS SCHEDULED
Status: DISCONTINUED | OUTPATIENT
Start: 2022-01-09 | End: 2022-01-12 | Stop reason: HOSPADM

## 2022-01-09 RX ORDER — GABAPENTIN 400 MG/1
400 CAPSULE ORAL EVERY 8 HOURS SCHEDULED
Status: DISCONTINUED | OUTPATIENT
Start: 2022-01-09 | End: 2022-01-12 | Stop reason: HOSPADM

## 2022-01-09 RX ORDER — HYDROCODONE BITARTRATE AND ACETAMINOPHEN 10; 325 MG/1; MG/1
1 TABLET ORAL EVERY 6 HOURS PRN
Status: DISCONTINUED | OUTPATIENT
Start: 2022-01-09 | End: 2022-01-12 | Stop reason: HOSPADM

## 2022-01-09 RX ORDER — ALBUTEROL SULFATE 2.5 MG/3ML
2.5 SOLUTION RESPIRATORY (INHALATION) EVERY 6 HOURS PRN
Status: DISCONTINUED | OUTPATIENT
Start: 2022-01-09 | End: 2022-01-12 | Stop reason: HOSPADM

## 2022-01-09 RX ORDER — BISACODYL 10 MG
10 SUPPOSITORY, RECTAL RECTAL DAILY PRN
Status: DISCONTINUED | OUTPATIENT
Start: 2022-01-09 | End: 2022-01-12 | Stop reason: HOSPADM

## 2022-01-09 RX ORDER — METOCLOPRAMIDE 10 MG/1
10 TABLET ORAL
Status: DISCONTINUED | OUTPATIENT
Start: 2022-01-09 | End: 2022-01-12 | Stop reason: HOSPADM

## 2022-01-09 RX ORDER — ACETAMINOPHEN 650 MG/1
650 SUPPOSITORY RECTAL EVERY 4 HOURS PRN
Status: DISCONTINUED | OUTPATIENT
Start: 2022-01-09 | End: 2022-01-12 | Stop reason: HOSPADM

## 2022-01-09 RX ORDER — AMOXICILLIN 250 MG
2 CAPSULE ORAL 2 TIMES DAILY
Status: DISCONTINUED | OUTPATIENT
Start: 2022-01-09 | End: 2022-01-12 | Stop reason: HOSPADM

## 2022-01-09 RX ORDER — ACETAMINOPHEN 325 MG/1
650 TABLET ORAL EVERY 4 HOURS PRN
Status: DISCONTINUED | OUTPATIENT
Start: 2022-01-09 | End: 2022-01-12 | Stop reason: HOSPADM

## 2022-01-09 RX ORDER — POLYETHYLENE GLYCOL 3350 17 G/17G
17 POWDER, FOR SOLUTION ORAL DAILY PRN
Status: DISCONTINUED | OUTPATIENT
Start: 2022-01-09 | End: 2022-01-12 | Stop reason: HOSPADM

## 2022-01-09 RX ORDER — BACITRACIN ZINC 500 [USP'U]/G
1 OINTMENT TOPICAL EVERY 12 HOURS SCHEDULED
Status: DISCONTINUED | OUTPATIENT
Start: 2022-01-09 | End: 2022-01-12 | Stop reason: SDUPTHER

## 2022-01-09 RX ORDER — PRAVASTATIN SODIUM 40 MG
20 TABLET ORAL NIGHTLY
Status: DISCONTINUED | OUTPATIENT
Start: 2022-01-09 | End: 2022-01-12 | Stop reason: HOSPADM

## 2022-01-09 RX ORDER — ONDANSETRON 2 MG/ML
4 INJECTION INTRAMUSCULAR; INTRAVENOUS EVERY 6 HOURS PRN
Status: DISCONTINUED | OUTPATIENT
Start: 2022-01-09 | End: 2022-01-12 | Stop reason: HOSPADM

## 2022-01-09 RX ORDER — VANCOMYCIN HYDROCHLORIDE 1 G/200ML
1000 INJECTION, SOLUTION INTRAVENOUS EVERY 12 HOURS
Status: DISCONTINUED | OUTPATIENT
Start: 2022-01-09 | End: 2022-01-12

## 2022-01-09 RX ORDER — ACETAMINOPHEN 500 MG
1000 TABLET ORAL ONCE
Status: COMPLETED | OUTPATIENT
Start: 2022-01-09 | End: 2022-01-09

## 2022-01-09 RX ORDER — GINSENG 100 MG
1 CAPSULE ORAL EVERY 12 HOURS SCHEDULED
Status: DISCONTINUED | OUTPATIENT
Start: 2022-01-09 | End: 2022-01-12 | Stop reason: HOSPADM

## 2022-01-09 RX ORDER — WARFARIN SODIUM 7.5 MG/1
7.5 TABLET ORAL
Status: DISCONTINUED | OUTPATIENT
Start: 2022-01-09 | End: 2022-01-12

## 2022-01-09 RX ORDER — MAGNESIUM SULFATE HEPTAHYDRATE 40 MG/ML
2 INJECTION, SOLUTION INTRAVENOUS ONCE
Status: COMPLETED | OUTPATIENT
Start: 2022-01-09 | End: 2022-01-09

## 2022-01-09 RX ORDER — CARVEDILOL 3.12 MG/1
6.25 TABLET ORAL 2 TIMES DAILY WITH MEALS
Status: DISCONTINUED | OUTPATIENT
Start: 2022-01-09 | End: 2022-01-12 | Stop reason: HOSPADM

## 2022-01-09 RX ORDER — FINASTERIDE 5 MG/1
5 TABLET, FILM COATED ORAL DAILY
Status: DISCONTINUED | OUTPATIENT
Start: 2022-01-09 | End: 2022-01-12 | Stop reason: HOSPADM

## 2022-01-09 RX ORDER — ACETAMINOPHEN 160 MG/5ML
650 SOLUTION ORAL EVERY 4 HOURS PRN
Status: DISCONTINUED | OUTPATIENT
Start: 2022-01-09 | End: 2022-01-12 | Stop reason: HOSPADM

## 2022-01-09 RX ORDER — ONDANSETRON 4 MG/1
4 TABLET, FILM COATED ORAL EVERY 6 HOURS PRN
Status: DISCONTINUED | OUTPATIENT
Start: 2022-01-09 | End: 2022-01-12 | Stop reason: HOSPADM

## 2022-01-09 RX ORDER — WARFARIN SODIUM 5 MG/1
5 TABLET ORAL WEEKLY
Status: DISCONTINUED | OUTPATIENT
Start: 2022-01-10 | End: 2022-01-12

## 2022-01-09 RX ORDER — ALPRAZOLAM 0.25 MG/1
0.5 TABLET ORAL NIGHTLY PRN
Status: DISCONTINUED | OUTPATIENT
Start: 2022-01-09 | End: 2022-01-12 | Stop reason: HOSPADM

## 2022-01-09 RX ORDER — BUDESONIDE AND FORMOTEROL FUMARATE DIHYDRATE 160; 4.5 UG/1; UG/1
2 AEROSOL RESPIRATORY (INHALATION)
Refills: 4 | Status: DISCONTINUED | OUTPATIENT
Start: 2022-01-09 | End: 2022-01-12 | Stop reason: HOSPADM

## 2022-01-09 RX ORDER — SODIUM CHLORIDE 0.9 % (FLUSH) 0.9 %
10 SYRINGE (ML) INJECTION EVERY 12 HOURS SCHEDULED
Status: DISCONTINUED | OUTPATIENT
Start: 2022-01-09 | End: 2022-01-12 | Stop reason: HOSPADM

## 2022-01-09 RX ORDER — WARFARIN SODIUM 7.5 MG/1
7.5 TABLET ORAL
Status: DISCONTINUED | OUTPATIENT
Start: 2022-01-09 | End: 2022-01-09

## 2022-01-09 RX ORDER — LISINOPRIL 20 MG/1
20 TABLET ORAL 2 TIMES DAILY
Status: DISCONTINUED | OUTPATIENT
Start: 2022-01-09 | End: 2022-01-09

## 2022-01-09 RX ADMIN — GABAPENTIN 400 MG: 400 CAPSULE ORAL at 16:27

## 2022-01-09 RX ADMIN — ALBUTEROL SULFATE 2.5 MG: 2.5 SOLUTION RESPIRATORY (INHALATION) at 14:03

## 2022-01-09 RX ADMIN — METOCLOPRAMIDE 10 MG: 10 TABLET ORAL at 20:28

## 2022-01-09 RX ADMIN — MUPIROCIN: 20 OINTMENT TOPICAL at 16:27

## 2022-01-09 RX ADMIN — GABAPENTIN 400 MG: 400 CAPSULE ORAL at 20:29

## 2022-01-09 RX ADMIN — MUPIROCIN: 20 OINTMENT TOPICAL at 20:31

## 2022-01-09 RX ADMIN — SODIUM CHLORIDE 500 ML: 9 INJECTION, SOLUTION INTRAVENOUS at 06:38

## 2022-01-09 RX ADMIN — ALPRAZOLAM 0.5 MG: 0.25 TABLET ORAL at 22:54

## 2022-01-09 RX ADMIN — SODIUM CHLORIDE 1 G: 9 INJECTION, SOLUTION INTRAVENOUS at 16:26

## 2022-01-09 RX ADMIN — ACETAMINOPHEN 1000 MG: 500 TABLET, FILM COATED ORAL at 06:13

## 2022-01-09 RX ADMIN — METOCLOPRAMIDE 10 MG: 10 TABLET ORAL at 16:25

## 2022-01-09 RX ADMIN — MUPIROCIN: 20 OINTMENT TOPICAL at 20:27

## 2022-01-09 RX ADMIN — HYDROCODONE BITARTRATE AND ACETAMINOPHEN 1 TABLET: 10; 325 TABLET ORAL at 16:25

## 2022-01-09 RX ADMIN — WARFARIN 7.5 MG: 7.5 TABLET ORAL at 20:29

## 2022-01-09 RX ADMIN — FINASTERIDE 5 MG: 5 TABLET, FILM COATED ORAL at 16:25

## 2022-01-09 RX ADMIN — BUDESONIDE AND FORMOTEROL FUMARATE DIHYDRATE 2 PUFF: 160; 4.5 AEROSOL RESPIRATORY (INHALATION) at 19:09

## 2022-01-09 RX ADMIN — CEFTRIAXONE SODIUM 1 G: 1 INJECTION, POWDER, FOR SOLUTION INTRAMUSCULAR; INTRAVENOUS at 09:41

## 2022-01-09 RX ADMIN — CARVEDILOL 6.25 MG: 3.12 TABLET, FILM COATED ORAL at 16:25

## 2022-01-09 RX ADMIN — HYDROCODONE BITARTRATE AND ACETAMINOPHEN 1 TABLET: 10; 325 TABLET ORAL at 22:54

## 2022-01-09 RX ADMIN — NYSTATIN: 100000 POWDER TOPICAL at 20:30

## 2022-01-09 RX ADMIN — VANCOMYCIN HYDROCHLORIDE 1000 MG: 1 INJECTION, SOLUTION INTRAVENOUS at 22:47

## 2022-01-09 RX ADMIN — IPRATROPIUM BROMIDE 0.5 MG: 0.5 SOLUTION RESPIRATORY (INHALATION) at 14:03

## 2022-01-09 RX ADMIN — Medication 2750 MG: at 10:05

## 2022-01-09 RX ADMIN — MAGNESIUM SULFATE HEPTAHYDRATE 2 G: 2 INJECTION, SOLUTION INTRAVENOUS at 08:19

## 2022-01-09 RX ADMIN — SODIUM CHLORIDE, PRESERVATIVE FREE 10 ML: 5 INJECTION INTRAVENOUS at 16:28

## 2022-01-09 RX ADMIN — NYSTATIN: 100000 POWDER TOPICAL at 16:28

## 2022-01-09 RX ADMIN — TAMSULOSIN HYDROCHLORIDE 0.4 MG: 0.4 CAPSULE ORAL at 20:27

## 2022-01-09 RX ADMIN — DOCUSATE SODIUM 50MG AND SENNOSIDES 8.6MG 2 TABLET: 8.6; 5 TABLET, FILM COATED ORAL at 20:28

## 2022-01-09 RX ADMIN — PRAVASTATIN SODIUM 20 MG: 40 TABLET ORAL at 20:27

## 2022-01-09 RX ADMIN — SODIUM CHLORIDE, PRESERVATIVE FREE 10 ML: 5 INJECTION INTRAVENOUS at 20:28

## 2022-01-09 NOTE — ED NOTES
Nursing report ED to floor  Jalen Lockwood  71 y.o.  male    HPI :   Chief Complaint   Patient presents with   • Fever   • Weakness - Generalized       Admitting doctor:   Tay Castellanos MD    Admitting diagnosis:   The primary encounter diagnosis was Generalized weakness. Diagnoses of Cellulitis of left lower extremity, Lower limb ulcer, ankle, left, with unspecified severity (HCC), Elevated lactic acid level, and Fever and chills were also pertinent to this visit.    Code status:   Current Code Status     Date Active Code Status Order ID Comments User Context       Prior    Advance Care Planning Activity          Allergies:   Cephalexin and Codeine    Intake and Output    Intake/Output Summary (Last 24 hours) at 1/9/2022 1111  Last data filed at 1/9/2022 1103  Gross per 24 hour   Intake 1440 ml   Output --   Net 1440 ml       Weight:       01/09/22  0904   Weight: 136 kg (299 lb)       Most recent vitals:   Vitals:    01/09/22 0707 01/09/22 0820 01/09/22 0901 01/09/22 0904   BP:  91/72 93/70    Pulse: 120 96 101    Resp:  18 18    Temp:  99 °F (37.2 °C)     TempSrc:       SpO2: 95% 94% 97%    Weight:    136 kg (299 lb)   Height:           Active LDAs/IV Access:   Lines, Drains & Airways     Active LDAs     Name Placement date Placement time Site Days    Peripheral IV 01/09/22 Right Antecubital 01/09/22  --  Antecubital  less than 1    Peripheral IV 01/09/22 0548 Left Antecubital 01/09/22  0548  Antecubital  less than 1                Labs (abnormal labs have a star):   Labs Reviewed   COMPREHENSIVE METABOLIC PANEL - Abnormal; Notable for the following components:       Result Value    Glucose 111 (*)     All other components within normal limits    Narrative:     GFR Normal >60  Chronic Kidney Disease <60  Kidney Failure <15     LACTIC ACID, PLASMA - Abnormal; Notable for the following components:    Lactate 2.1 (*)     All other components within normal limits   CBC WITH AUTO DIFFERENTIAL - Abnormal;  Notable for the following components:    WBC 11.05 (*)     RBC 4.11 (*)     MCV 98.3 (*)     MCH 34.5 (*)     Neutrophil % 88.3 (*)     Lymphocyte % 5.0 (*)     Neutrophils, Absolute 9.76 (*)     Lymphocytes, Absolute 0.55 (*)     All other components within normal limits   PROTIME-INR - Abnormal; Notable for the following components:    Protime 24.2 (*)     INR 2.20 (*)     All other components within normal limits   APTT - Abnormal; Notable for the following components:    PTT 47.9 (*)     All other components within normal limits   BNP (IN-HOUSE) - Abnormal; Notable for the following components:    proBNP 2,694.0 (*)     All other components within normal limits    Narrative:     Among patients with dyspnea, NT-proBNP is highly sensitive for the detection of acute congestive heart failure. In addition NT-proBNP of <300 pg/ml effectively rules out acute congestive heart failure with 99% negative predictive value.    Results may be falsely decreased if patient taking Biotin.     URINALYSIS W/ MICROSCOPIC IF INDICATED (NO CULTURE) - Abnormal; Notable for the following components:    Blood, UA Moderate (2+) (*)     Protein, UA 30 mg/dL (1+) (*)     All other components within normal limits   URINALYSIS, MICROSCOPIC ONLY - Abnormal; Notable for the following components:    RBC, UA 21-30 (*)     All other components within normal limits   RESPIRATORY PANEL PCR W/ COVID-19 (SARS-COV-2) [facility] IN-HOUSE, NP SWAB IN [facility], 3-4 HR TAT - Normal    Narrative:     In the setting of a positive respiratory panel with a viral infection PLUS a negative procalcitonin without other underlying concern for bacterial infection, consider observing off antibiotics or discontinuation of antibiotics and continue supportive care. If the respiratory panel is positive for atypical bacterial infection (Bordetella pertussis, Chlamydophila pneumoniae, or Mycoplasma pneumoniae), consider antibiotic de-escalation to target  atypical bacterial infection.   TROPONIN (IN-HOUSE) - Normal    Narrative:     Troponin T Reference Range:  <= 0.03 ng/mL-   Negative for AMI  >0.03 ng/mL-     Abnormal for myocardial necrosis.  Clinicians would have to utilize clinical acumen, EKG, Troponin and serial changes to determine if it is an Acute Myocardial Infarction or myocardial injury due to an underlying chronic condition.       Results may be falsely decreased if patient taking Biotin.     MAGNESIUM - Normal   BLOOD CULTURE   BLOOD CULTURE   RAINBOW DRAW    Narrative:     The following orders were created for panel order [place] Draw.  Procedure                               Abnormality         Status                     ---------                               -----------         ------                     Green Top (Gel)[[id number]]                                  Final result               Lavender Top[[id number]]                                     Final result               Gold Top - SST[[id number]]                                                              Light Blue Top[[id number]]                                   Final result                 Please view results for these tests on the individual orders.   LACTIC ACID, REFLEX   CBC AND DIFFERENTIAL    Narrative:     The following orders were created for panel order CBC & Differential.  Procedure                               Abnormality         Status                     ---------                               -----------         ------                     CBC Auto Differential[[id number]]        Abnormal            Final result                 Please view results for these tests on the individual orders.   GREEN TOP   LAVENDER TOP   LIGHT BLUE TOP       EKG:   ECG 12 Lead   Preliminary Result   HEART RATE= 127  bpm   RR Interval= 472  ms   CA Interval=   ms   P Horizontal Axis=   deg   P Front Axis=   deg   QRSD Interval= 130  ms   QT Interval= 332  ms   QRS Axis= -70  deg   T Wave Axis= 53   deg   - ABNORMAL ECG -   Atrial fibrillation   RBBB and LAFB   Electronically Signed By:    Date and Time of Study: 2022 06:56:18          Meds given in ED:   Medications   sodium chloride 0.9 % flush 10 mL (has no administration in time range)   hydrocortisone-bacitracin-zinc oxide-nystatin (MAGIC BARRIER) ointment 1 application (has no administration in time range)   acetaminophen (TYLENOL) tablet 1,000 mg (1,000 mg Oral Given 22 0613)   sodium chloride 0.9 % bolus 500 mL (0 mL Intravenous Stopped 22 0819)   magnesium sulfate 2g/50 mL (PREMIX) infusion (0 g Intravenous Stopped 22 0840)   vancomycin 2750 mg/1000 mL 0.9% NS IVPB (2,750 mg Intravenous Given 22 1005)   cefTRIAXone (ROCEPHIN) 1 g in sodium chloride 0.9 % 100 mL IVPB-VTB (0 g Intravenous Stopped 22 1103)       Imaging results:  No radiology results for the last day    Ambulatory status:   With assist    Social issues:   Social History     Socioeconomic History   • Marital status:      Spouse name: Mariposa   Tobacco Use   • Smoking status: Former Smoker     Packs/day: 0.25     Years: 45.00     Pack years: 11.25     Types: Cigarettes     Quit date: 3/30/2021     Years since quittin.7   • Smokeless tobacco: Never Used   • Tobacco comment: caffeine use - 1.5 cups coffee daily    Vaping Use   • Vaping Use: Never used   Substance and Sexual Activity   • Alcohol use: Yes     Alcohol/week: 4.0 - 5.0 standard drinks     Types: 1 - 2 Shots of liquor, 3 Standard drinks or equivalent per week     Comment: 2 rum a day   • Drug use: No   • Sexual activity: Defer       NIH Stroke Scale:        Nursing report ED to floor:     Kezia Henry, RN  22 1111

## 2022-01-09 NOTE — ED PROVIDER NOTES
MD ATTESTATION NOTE  I wore full protective equipment throughout this patient encounter including a N95 face mask, googles, gown and gloves. Hand hygiene was performed before donning protective equipment and after removal when leaving the room.    The CHRISTOS and I have discussed this patient's history, physical exam, and treatment plan. I have reviewed the documentation and personally had a face to face interaction with the patient. I affirm the CHRISTOS documentation and agree with their diagnostics, findings, treatment, plan, and disposition.  I provided a substantive portion of the care of this patient.  I personally performed the physical exam, in its entirety.  The attached note describes my personal findings.    Jalen Lockwood is a 71 y.o. male who presents to the ED c/o weakness.  Patient reports that he went to get up this morning was unable stand.  Patient also has fever that noticed today.  Patient reports he was at baseline when he went to sleep.  Patient denies any other complaints.  No runny nose congestion or cough.  No chest pain or shortness of breath.  Patient denies any vomiting or diarrhea.  No urinary symptoms.  Patient has history of COPD, is on oxygen at home.  Patient has history of A. fib, on Coumadin.  Patient has been vaccinated against Covid, denies any sick contacts.    On exam:  General: NAD.  Morbidly obese, poorly kempt  Head: NCAT.  ENT: EOMI, PERRLA, MMM.  Neck: Supple, trachea midline.  Cardiac: Tachycardic, regular regular  Lungs: CTAB.  Abdomen: Soft, NTTP, no rebound tenderness/guarding/rigidity.   Extremities: Moves all extremities well, no peripheral edema  Skin: Warm, dry.  Yeast infection underlying pannus on the right.  Chronic swelling of bilateral legs with wrapping in place.    Medical Decision Making:  After the initial H&P, I discussed pertinent information from history and physical exam with patient/family.  Discussed differential diagnosis.  Discussed plan for ED  evaluation/work-up/treatment.  All questions answered.  Patient/family is agreeable with plan.    ED Course as of 01/09/22 1010   Sun Jan 09, 2022   0637 Discussed pertinent information from history and physical exam with patient.  Discussed differential diagnosis and plan for ED evaluation/work-up and treatment including labs, chest x-ray to evaluate for pneumonia, Tylenol to treat temperature of 101.2.  All questions answered.  Patient is agreeable with this plan.     [MS]   0701 EKG independently viewed and contemporaneously interpreted by ED physician. Time: 6:56 AM.  Rate 127.  Interpretation: Atrial fibrillation with RVR, left axis deviation, right bundle branch block, left anterior fascicular block, nonspecific ST changes. [JG]   0727 WBC(!): 11.05 [MS]   0727 proBNP(!): 2,694.0 [MS]   0727 Lactate(!!): 2.1 [MS]   0800 Reviewed pt's history and workup with Dr. Julien.  After a bedside evaluation, they agree with the plan of care.       [MS]   0800 INR(!): 2.20 [MS]   0840 Chest x-ray negative, Covid negative, discontinuing isolation. [JG]   0840 Patient is meeting markers for sepsis with no clearly identified source.  Patient febrile, tachycardic, leukocytosis, lactic acid slightly elevated.  Patient is weak and unable stand, will require admission to hospital.  Initiating broad-spectrum antibiotic with ceftriaxone.  Blood cultures previously obtained.  Lactic acid not greater than 4, no hypotension, no indication for sepsis fluid bolus. [JG]   0846 Patient allergic to cephalosporins. [JG]   0859 Legs unwrapped, left foot has maceration with cellulitis and purulent discharge, ulceration on medial ankle.  Initiating antibiotics with vancomycin, will consult with pharmacy for recommendation for broad-spectrum antibiotic.  Obtain x-ray of the ankle.  Consulted hospitalist for admission. [JG]   0928 Consulted with pharmacy, they recommend proceeding with ceftriaxone despite Keflex allergy.  Will comply, closely  observing. [JG]   0948 Consult Note    Discussed care with Dr Castellanos  Reviewed patient's history, exam, results and need for admission secondary to generalized weakness, left lower extremity cellulitis  Dr. Castellanos accepts the patient to be admitted to telemetry inpatient bed.     [MS]      ED Course User Index  [JG] Eddie Julien MD  [MS] Glory Yousif, APRN       Diagnosis  Final diagnoses:   Generalized weakness   Cellulitis of left lower extremity   Lower limb ulcer, ankle, left, with unspecified severity (HCC)   Elevated lactic acid level   Fever and chills        Eddie Julien MD  01/09/22 1010

## 2022-01-09 NOTE — H&P
Patient Name:  Jalen Lockwood  YOB: 1950  MRN:  [id number]  Admit Date:  1/9/2022  Patient Care Team:  Marc Ludwig MD as PCP - General (Family Medicine)  Blas Mckeon MD as Surgeon (General Surgery)  Jonnathan Boston II, MD as Consulting Physician (Vascular Surgery)  Xavi Elliott MD as Consulting Physician (Urology)  Marc Benavidez MD as Consulting Physician (Pain Medicine)  Kurt Henry MD as Consulting Physician (Cardiology)  Meghna Horan SHELLEY as Pharmacist  Rip Samuel MD as Referring Physician (Family Medicine)  Juni Landa MD as Consulting Physician (Hematology and Oncology)  Brendan Live, AlexD as Pharmacist (Pharmacy)      Subjective   History Present Illness     Chief Complaint   Patient presents with   • Fever   • Weakness - Generalized       Mr. Lockwood is a 71 y.o. former smoker with a history of atrial fibrillation on chronic anticoagulation, COPD, hypertension, hyperlipidemia who presents to [facility] ER chief complaint of fever, weakness & admitted for cellulitis left lower extremity.    Patient answering all questions appropriately appears to be reliable historian reports adequate care at home without concerns for neglect and follows with Dr. Beasley at [facility] for wound care weekly.  States daily wound care at home with wife assisting--Betadine and iodoform applied topically to dorsal region of left foot.  Denies recent antibiotic use.  No collateral historian at bedside.    Progressive generalized weakness and fever at home; therefore, sought ER evaluation.    History of Present Illness  Review of Systems   Constitutional: Positive for fever. Negative for chills.   HENT: Negative for congestion and rhinorrhea.    Respiratory: Negative for cough and shortness of breath.    Cardiovascular: Negative for chest pain and leg swelling.   Gastrointestinal: Negative for abdominal pain, constipation, diarrhea, nausea and vomiting.   Endocrine:  Negative for polydipsia, polyphagia and polyuria.   Genitourinary: Negative for difficulty urinating and dysuria.   Musculoskeletal: Positive for gait problem (chronic pain in feet) and myalgias (BLE).   Skin: Positive for wound (LLE).   Neurological: Positive for weakness (chronic generalized). Negative for dizziness.   Psychiatric/Behavioral: Negative for confusion and hallucinations.        Personal History     Past Medical History:   Diagnosis Date   • Allergic rhinitis    • Anxiety    • Aortectasia (HCC)     3cm infrarenal abdominal aorta   • Arthritis    • Atrial flutter (HCC) 2010    s/p ablation    • Charcot's joint of foot    • Chronic edema     both legs and sees wound care center at [place]    • Chronic venous insufficiency    • COPD (chronic obstructive pulmonary disease) ([facility])    • Coronary atherosclerosis     Cath 2010: diffuse 40-50% disease   • Diverticulosis    • Duodenitis    • Fatty liver    • Gastritis    • Gastroparesis    • Hematoma     post-operative; After catheterization, right groin, required surgical exploration   • Hyperlipidemia    • Hypertension    • Insomnia    • Internal hemorrhoids    • Open wound     izzy legs has teddy chg weekly at wound care center at [place]  pt does second dressing on left leg another time during week   • Osteomyelitis (HCC)    • Paroxysmal atrial fibrillation (HCC)    • Peripheral neuropathy    • Popliteal artery aneurysm (HCC)     left, s/p stenting by Dr. Boston   • Skin cancer    • Sleep apnea     o2   • 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HCC)     due to flutter and afib; cath 2010 with nonobstructive disease   • Venous stasis    • Venous stasis ulcer (HCC)     bilateral legs      Past Surgical History:   Procedure Laterality Date   • BASAL CELL CARCINOMA EXCISION      ear and left side of face   • BRONCHOSCOPY N/A 4/12/2021    Procedure: BRONCHOSCOPY WITH BAL;  Surgeon: Bunny Lepe MD;  Location: [facility] ENDOSCOPY;  Service: Pulmonary;  Laterality:  N/A;  PRE-HEMOPTYSIS  POST-SAME   • CARDIAC CATHETERIZATION     • CATARACT EXTRACTION     • COLONOSCOPY  2015    NBIH, diverticulosis, polyps   • COLONOSCOPY N/A 2018    Procedure: COLONOSCOPY TO CECUM  AND TERM. ILEUM WITH COLD SNARE POLYPECTOMIES;  Surgeon: Kane Lagunas MD;  Location:  [facility]COPY;  Service: Gastroenterology   • COLONOSCOPY N/A 10/29/2019    Procedure: COLONOSCOPY TO TO CECUM AND TERMINAL ILEUM WITH HOT AND COLD SNARE POLYPECTOMIES;  Surgeon: Kane Lagunas MD;  Location:  [facility] ENDOSCOPY;  Service: Gastroenterology   • HIP ARTHROPLASTY Right 2017   • JOINT REPLACEMENT Left    • OTHER SURGICAL HISTORY      Catheter ablation atrial flutter   • REPAIR ANEURYSM / PSEUDO ANEURYSM / RUPTURED ANEURYSM POPLITEAL ARTERY      Stent-Graft of the the left popliteal artery   • REPAIR KNEE LIGAMENT      Primary repair of knee ligament cruciate anterior right   • TONSILLECTOMY     • TOTAL KNEE ARTHROPLASTY Bilateral    • UPPER GASTROINTESTINAL ENDOSCOPY  2014    acute gastritis, acute duodenitis     Family History   Problem Relation Age of Onset   • Emphysema Father    • Malig Hyperthermia Neg Hx      Social History     Tobacco Use   • Smoking status: Former Smoker     Packs/day: 0.25     Years: 45.00     Pack years: 11.25     Types: Cigarettes     Quit date: 3/30/2021     Years since quittin.7   • Smokeless tobacco: Never Used   • Tobacco comment: caffeine use - 1.5 cups coffee daily    Vaping Use   • Vaping Use: Never used   Substance Use Topics   • Alcohol use: Yes     Alcohol/week: 4.0 - 5.0 standard drinks     Types: 1 - 2 Shots of liquor, 3 Standard drinks or equivalent per week     Comment: 2 rum a day   • Drug use: No     No current facility-administered medications on file prior to encounter.     Current Outpatient Medications on File Prior to Encounter   Medication Sig Dispense Refill   • acidophilus (FLORANEX) tablet tablet      • albuterol sulfate  (90 Base)  MCG/ACT inhaler INHALE 2 PUFFS BY MOUTH EVERY 4 HOURS AS NEEDED FOR WHEEZE 2 g 6   • ALPRAZolam (XANAX) 0.5 MG tablet Take 1 tablet by mouth At Night As Needed for Anxiety. 30 tablet 1   • Breo Ellipta 200-25 MCG/INH inhaler INHALE 1 PUFF BY MOUTH EVERY DAY 60 each 4   • carvedilol (COREG) 6.25 MG tablet Take 1 tablet by mouth 2 (Two) Times a Day With Meals. 180 tablet 3   • clotrimazole-betamethasone (Lotrisone) 1-0.05 % cream Apply 1 application topically to the appropriate area as directed 2 (Two) Times a Day.     • docusate sodium (COLACE) 100 MG capsule Take 100 mg by mouth Daily.     • doxycycline (VIBRAMYICN) 100 MG tablet TAKE 1 TABLET BY MOUTH TWICE A DAY START THE DAY BEFORE SURGERY.     • finasteride (PROSCAR) 5 MG tablet Take 1 tablet by mouth daily.     • furosemide (LASIX) 40 MG tablet Take 40 mg by mouth Daily.     • gabapentin (NEURONTIN) 600 MG tablet Take 600 mg by mouth 3 (Three) Times a Day.     • HYDROcodone-acetaminophen (NORCO)  MG per tablet Take 1 tablet by mouth Every 6 (Six) Hours As Needed for Moderate Pain . 6 tablet 0   • ipratropium (Atrovent HFA) 17 MCG/ACT inhaler Inhale 2 puffs 4 (Four) Times a Day. 12.9 each 3   • lisinopril (PRINIVIL,ZESTRIL) 20 MG tablet Take 1 tablet by mouth 2 (Two) Times a Day. 180 tablet 1   • metoclopramide (REGLAN) 10 MG tablet TAKE 1 TABLET BY MOUTH 4 (FOUR) TIMES A DAY BEFORE MEALS & AT BEDTIME. 360 tablet 3   • mupirocin (BACTROBAN) 2 % ointment APPLY AS DIRECTED TO BI LATERAL LOWER LEG 3 TIMES WEEKLY AS DIRECTED     • nystatin (MYCOSTATIN) 072133 UNIT/GM powder Apply  topically to the appropriate area as directed Every 12 (Twelve) Hours.     • OXYGEN-HELIUM IN 2 L into the nostril(s) as directed by provider As Needed.     • pravastatin (PRAVACHOL) 20 MG tablet Take 1 tablet by mouth Daily. 90 tablet 1   • silodosin (RAPAFLO) 8 MG capsule capsule Take 1 capsule by mouth daily. With food.     • warfarin (COUMADIN) 5 MG tablet Take one tablet (5 mg)  by mouth on Mondays, and take one and one-half tablets (7.5 mg) by mouth all other days or as directed. 130 tablet 1     Allergies   Allergen Reactions   • Cephalexin Hives   • Codeine Nausea Only       Objective    Objective     Vital Signs  Temp:  [98 °F (36.7 °C)-101.2 °F (38.4 °C)] 98.6 °F (37 °C)  Heart Rate:  [] 95  Resp:  [18-22] 22  BP: ()/() 110/82  SpO2:  [90 %-97 %] 93 %  on  Flow (L/min):  [2-3] 3;   Device (Oxygen Therapy): nasal cannula  Body mass index is 40.55 kg/m².    Physical Exam  Constitutional:       General: He is not in acute distress.     Appearance: He is ill-appearing. He is not toxic-appearing.      Comments: Generally weak   HENT:      Head: Normocephalic and atraumatic.   Eyes:      Extraocular Movements: Extraocular movements intact.      Conjunctiva/sclera: Conjunctivae normal.   Cardiovascular:      Rate and Rhythm: Normal rate.      Heart sounds: Normal heart sounds.   Pulmonary:      Effort: Pulmonary effort is normal.      Breath sounds: Normal breath sounds.   Abdominal:      General: Bowel sounds are normal.      Palpations: Abdomen is soft.   Musculoskeletal:         General: Tenderness (BLE) and deformity (BLE) present.      Cervical back: Normal range of motion and neck supple.      Right lower leg: Edema present.      Left lower leg: Edema present.   Skin:     General: Skin is warm and dry.      Comments: Wound left foot, medial aspect   Neurological:      Mental Status: He is alert and oriented to person, place, and time.      Cranial Nerves: No cranial nerve deficit.      Motor: Weakness present.   Psychiatric:         Behavior: Behavior normal.         Thought Content: Thought content normal.         Results Review:  I reviewed the patient's new clinical results.  I reviewed the patient's new imaging results and agree with the interpretation.  I reviewed the patient's other test results and agree with the interpretation  I personally viewed and  interpreted the patient's EKG/Telemetry data  Discussed with ED provider.    Lab Results (last 24 hours)     Procedure Component Value Units Date/Time    CBC & Differential [[id number]]  (Abnormal) Collected: 01/09/22 0608    Specimen: Blood Updated: 01/09/22 0701    Narrative:      The following orders were created for panel order CBC & Differential.  Procedure                               Abnormality         Status                     ---------                               -----------         ------                     CBC Auto Differential[[id number]]        Abnormal            Final result                 Please view results for these tests on the individual orders.    Comprehensive Metabolic Panel [[id number]]  (Abnormal) Collected: 01/09/22 0608    Specimen: Blood Updated: 01/09/22 0729     Glucose 111 mg/dL      BUN 12 mg/dL      Creatinine 1.01 mg/dL      Sodium 140 mmol/L      Potassium 3.9 mmol/L      Chloride 98 mmol/L      CO2 27.7 mmol/L      Calcium 10.0 mg/dL      Total Protein 7.6 g/dL      Albumin 4.20 g/dL      ALT (SGPT) 10 U/L      AST (SGOT) 15 U/L      Alkaline Phosphatase 91 U/L      Total Bilirubin 0.9 mg/dL      eGFR Non African Amer 73 mL/min/1.73      Globulin 3.4 gm/dL      A/G Ratio 1.2 g/dL      BUN/Creatinine Ratio 11.9     Anion Gap 14.3 mmol/L     Narrative:      GFR Normal >60  Chronic Kidney Disease <60  Kidney Failure <15      Lactic Acid, Plasma [[id number]]  (Abnormal) Collected: 01/09/22 0608    Specimen: Blood Updated: 01/09/22 0720     Lactate 2.1 mmol/L     CBC Auto Differential [[id number]]  (Abnormal) Collected: 01/09/22 0608    Specimen: Blood Updated: 01/09/22 0701     WBC 11.05 10*3/mm3      RBC 4.11 10*6/mm3      Hemoglobin 14.2 g/dL      Hematocrit 40.4 %      MCV 98.3 fL      MCH 34.5 pg      MCHC 35.1 g/dL      RDW 14.0 %      RDW-SD 50.7 fl      MPV 10.1 fL      Platelets 150 10*3/mm3      Neutrophil % 88.3 %      Lymphocyte % 5.0 %      Monocyte % 5.3 %       Eosinophil % 0.4 %      Basophil % 0.5 %      Immature Grans % 0.5 %      Neutrophils, Absolute 9.76 10*3/mm3      Lymphocytes, Absolute 0.55 10*3/mm3      Monocytes, Absolute 0.59 10*3/mm3      Eosinophils, Absolute 0.04 10*3/mm3      Basophils, Absolute 0.06 10*3/mm3      Immature Grans, Absolute 0.05 10*3/mm3      nRBC 0.0 /100 WBC     BNP [[id number]]  (Abnormal) Collected: 01/09/22 0608    Specimen: Blood Updated: 01/09/22 0717     proBNP 2,694.0 pg/mL     Narrative:      Among patients with dyspnea, NT-proBNP is highly sensitive for the detection of acute congestive heart failure. In addition NT-proBNP of <300 pg/ml effectively rules out acute congestive heart failure with 99% negative predictive value.    Results may be falsely decreased if patient taking Biotin.      Troponin [[id number]]  (Normal) Collected: 01/09/22 0608    Specimen: Blood Updated: 01/09/22 0720     Troponin T 0.024 ng/mL     Narrative:      Troponin T Reference Range:  <= 0.03 ng/mL-   Negative for AMI  >0.03 ng/mL-     Abnormal for myocardial necrosis.  Clinicians would have to utilize clinical acumen, EKG, Troponin and serial changes to determine if it is an Acute Myocardial Infarction or myocardial injury due to an underlying chronic condition.       Results may be falsely decreased if patient taking Biotin.      Magnesium [[id number]]  (Normal) Collected: 01/09/22 0608    Specimen: Blood Updated: 01/09/22 0729     Magnesium 1.9 mg/dL     Blood Culture - Blood, Arm, Left [[id number]] Collected: 01/09/22 0609    Specimen: Blood from Arm, Left Updated: 01/09/22 0652    Blood Culture - Blood, Arm, Right [[id number]] Collected: 01/09/22 0609    Specimen: Blood from Arm, Right Updated: 01/09/22 0653    Protime-INR [[id number]]  (Abnormal) Collected: 01/09/22 0609    Specimen: Blood Updated: 01/09/22 0723     Protime 24.2 Seconds      INR 2.20    aPTT [[id number]]  (Abnormal) Collected: 01/09/22 0609    Specimen: Blood Updated: 01/09/22 0723      PTT 47.9 seconds     Urinalysis With Microscopic If Indicated (No Culture) - Urine, Catheter [[id number]]  (Abnormal) Collected: 01/09/22 0641    Specimen: Urine, Catheter Updated: 01/09/22 0658     Color, UA Yellow     Appearance, UA Clear     pH, UA 7.0     Specific Gravity, UA 1.014     Glucose, UA Negative     Ketones, UA Negative     Bilirubin, UA Negative     Blood, UA Moderate (2+)     Protein, UA 30 mg/dL (1+)     Leuk Esterase, UA Negative     Nitrite, UA Negative     Urobilinogen, UA 1.0 E.U./dL    Urinalysis, Microscopic Only - Urine, Catheter [[id number]]  (Abnormal) Collected: 01/09/22 0641    Specimen: Urine, Catheter Updated: 01/09/22 0705     RBC, UA 21-30 /HPF      WBC, UA 0-2 /HPF      Bacteria, UA None Seen /HPF      Squamous Epithelial Cells, UA 0-2 /HPF      Hyaline Casts, UA 0-2 /LPF      Methodology Automated Microscopy    Respiratory Panel PCR w/COVID-19(SARS-CoV-2) [facility] In-House, NP Swab in UTM/VTM, 3-4 HR TAT - Swab, Nasopharynx [[id number]]  (Normal) Collected: 01/09/22 0722    Specimen: Swab from Nasopharynx Updated: 01/09/22 0820     ADENOVIRUS, PCR Not Detected     Coronavirus 229E Not Detected     Coronavirus HKU1 Not Detected     Coronavirus NL63 Not Detected     Coronavirus OC43 Not Detected     COVID19 Not Detected     Human Metapneumovirus Not Detected     Human Rhinovirus/Enterovirus Not Detected     Influenza A PCR Not Detected     Influenza B PCR Not Detected     Parainfluenza Virus 1 Not Detected     Parainfluenza Virus 2 Not Detected     Parainfluenza Virus 3 Not Detected     Parainfluenza Virus 4 Not Detected     RSV, PCR Not Detected     Bordetella pertussis pcr Not Detected     Bordetella parapertussis PCR Not Detected     Chlamydophila pneumoniae PCR Not Detected     Mycoplasma pneumo by PCR Not Detected    Narrative:      In the setting of a positive respiratory panel with a viral infection PLUS a negative procalcitonin without other underlying concern  for bacterial infection, consider observing off antibiotics or discontinuation of antibiotics and continue supportive care. If the respiratory panel is positive for atypical bacterial infection (Bordetella pertussis, Chlamydophila pneumoniae, or Mycoplasma pneumoniae), consider antibiotic de-escalation to target atypical bacterial infection.    STAT Lactic Acid, Reflex [[id number]]  (Normal) Collected: 01/09/22 1145    Specimen: Blood Updated: 01/09/22 1211     Lactate 1.7 mmol/L           Imaging Results (Last 24 Hours)     Procedure Component Value Units Date/Time    XR Chest 1 View [[id number]] Collected: 01/09/22 0702     Updated: 01/09/22 1031    Narrative:      ONE VIEW PORTABLE CHEST     HISTORY: Generalized weakness. Cardiomyopathy.     FINDINGS: There is cardiomegaly, unchanged from 04/05/2021. The lungs  are clear and there is no evidence of overt CHF or focal infiltrate.     This report was finalized on 1/9/2022 10:28 AM by Dr. Gokul Garcia M.D.       XR Ankle 3+ View Left [[id number]] Collected: 01/09/22 0956     Updated: 01/09/22 1006    Narrative:      THREE-VIEW LEFT ANKLE AND THREE-VIEW LEFT FOOT     HISTORY: Charcot joint. Infection and pain.     FINDINGS: There is very extensive abnormality of the joints of the ankle  and throughout the foot consistent with history of Charcot joints and  much of this can also be seen on the CT scan of the left ankle and foot  dating back to 01/27/2015. This includes considerable periosteal  reaction and thickening and degenerative spurring and subchondral cystic  changes as also noted on the earlier CT scan. Given the extensive  periosteal changes, I cannot completely exclude a component of  superimposed osteomyelitis.     This report was finalized on 1/9/2022 10:02 AM by Dr. Gokul Garcia M.D.       XR Foot 3+ View Left [[id number]] Collected: 01/09/22 0956     Updated: 01/09/22 1006    Narrative:      THREE-VIEW LEFT ANKLE AND THREE-VIEW LEFT FOOT     HISTORY:  Charcot joint. Infection and pain.     FINDINGS: There is very extensive abnormality of the joints of the ankle  and throughout the foot consistent with history of Charcot joints and  much of this can also be seen on the CT scan of the left ankle and foot  dating back to 01/27/2015. This includes considerable periosteal  reaction and thickening and degenerative spurring and subchondral cystic  changes as also noted on the earlier CT scan. Given the extensive  periosteal changes, I cannot completely exclude a component of  superimposed osteomyelitis.     This report was finalized on 1/9/2022 10:02 AM by Dr. Gokul Garcia M.D.             Results for orders placed during the hospital encounter of 09/17/21    Adult Transthoracic Echo Complete W/ Cont if Necessary Per Protocol    Interpretation Summary  · Calculated left ventricular EF = 55.5% Estimated left ventricular EF was in agreement with the calculated left ventricular EF. Left ventricular systolic function is normal. Normal left ventricular cavity size noted. Left ventricular wall thickness is consistent with mild to moderate concentric hypertrophy. All left ventricular wall segments contract normally. Left ventricular diastolic function was indeterminate.  · Right ventricle not well visualized. The right ventricular cavity is moderately dilated.  · The left atrial cavity is moderately dilated.  · The right atrial cavity is severely dilated.  · Mild to moderate mitral valve regurgitation is present.  · Mild to moderate tricuspid valve regurgitation is present. Estimated right ventricular systolic pressure from tricuspid regurgitation is moderately elevated (45-55 mmHg). Calculated right ventricular systolic pressure from tricuspid regurgitation is 54 mmHg.      ECG 12 Lead   Preliminary Result   HEART RATE= 127  bpm   RR Interval= 472  ms   MD Interval=   ms   P Horizontal Axis=   deg   P Front Axis=   deg   QRSD Interval= 130  ms   QT Interval= 332  ms   QRS  Axis= -70  deg   T Wave Axis= 53  deg   - ABNORMAL ECG -   Atrial fibrillation   RBBB and LAFB   Electronically Signed By:    Date and Time of Study: 2022-01-09 06:56:18           Assessment/Plan     Active Hospital Problems    Diagnosis  POA   • **Cellulitis [L03.90]  Yes   • Generalized weakness [R53.1]  Yes   • Fever in adult [R50.9]  Yes   • Elevated lactic acid level [R79.89]  Yes   • Charcot's joint of foot [M14.679]  Yes   • Chronic anticoagulation [Z79.01]  Not Applicable   • Essential hypertension [I10]  Yes   • COPD (chronic obstructive pulmonary disease) ([facility]) [J44.9]  Yes   • Obesity, Class III, BMI 40-49.9 (morbid obesity) ([facility]) [E66.01]  Yes   • Permanent atrial fibrillation ([facility]) [I48.21]  Yes      Resolved Hospital Problems   No resolved problems to display.       Mr. Lockwood is a 71 y.o. former smoker with a history of atrial fibrillation on chronic anticoagulation, COPD, hypertension, hyperlipidemia who presents to [facility] ER chief complaint of fever, weakness & admitted for cellulitis left lower extremity.      Cellulitis /fever in adult  /elevated lactic acid level  /Charcot's joint of foot  Empiric antibiotic therapy IV vancomycin and cephalosporin (tolerated previously & discussed with pharmacy noting decreased concerns for rxn)  Blood cultures x2 pending results  Wound culture ordered  Consult wound care RN  Lactic acid greater than 2, reflex today  Ordering MRI left lower extremity to evaluate for osteomyelitis   Consult vascular surgery consult      Permanent atrial fibrillation (HCC) /   Chronic anticoagulation  INR 2.2  Consult pharmacy for warfarin management/INR monitoring  Initial tachycardia improved status post IV fluids  Chronic diuretic on hold for now--monitor for fluid volume overload  Beta-blocker continued on admission, hold parameters      Obesity, Class III, BMI 40-49.9 (morbid obesity) ([facility])  BMI 40.5  Complicating his problems      COPD (chronic obstructive pulmonary disease)  (HCC)  O2 saturation 93% on 3 L   Continue bronchodilators  Chest x-ray negative for acuity  Incentive spirometry      Essential hypertension  BP soft  Hold ACE inhibitor      Generalized weakness  Likely secondary #1  Consult PT/OT    I discussed the patient's findings and my recommendations with Dr. Castellanos.    VTE Prophylaxis - warfarin (home med)  Code Status - CPR       ESTEFANY Burnette  [facility]  01/09/22  15:09 EST

## 2022-01-09 NOTE — CONSULTS
Name: Jalen Lockwood ADMIT: 2022   : 1950  PCP: Marc Ludwig MD    MRN: [id number] LOS: 0 days   AGE/SEX: 71 y.o. male  ROOM: [address] [place] [address]      Patient Care Team:  Marc Ludwig MD as PCP - General (Family Medicine)  Blas Mckeon MD as Surgeon (General Surgery)  Jonnathan Boston II, MD as Consulting Physician (Vascular Surgery)  Xavi Elliott MD as Consulting Physician (Urology)  Marc Benavidez MD as Consulting Physician (Pain Medicine)  Kurt Henry MD as Consulting Physician (Cardiology)  Meghna Horan SHELLEY as Pharmacist  Rip Samuel MD as Referring Physician (Family Medicine)  Juni Landa MD as Consulting Physician (Hematology and Oncology)  Brendan Live, AlexD as Pharmacist (Pharmacy)  Chief Complaint   Patient presents with   • Fever   • Weakness - Generalized     CC: Foot wound evaluation.    Subjective     Inpatient Vascular Surgery Consult  Consult performed by: Holger Menendez MD  Consult ordered by: Kurt Enciso APRN  Reason for consult: Foot wound evaluation.        History generated from review of chart and verification of findings with patient at bedside.      Mr. Lockwood is a 71 y.o. former smoker with a history of atrial fibrillation on chronic anticoagulation, COPD, hypertension, hyperlipidemia who presents to [facility] ER chief complaint of fever, weakness & admitted for cellulitis left lower extremity.     Patient answering all questions appropriately appears to be reliable historian reports adequate care at home without concerns for neglect and follows with Dr. Beasley at [facility] for wound care weekly.  States daily wound care at home with wife assisting--Betadine and iodoform applied topically to dorsal region of left foot.  Denies recent antibiotic use.  No collateral historian at bedside.     Progressive generalized weakness and fever at home; therefore, sought ER evaluation.     History of Present Illness  Review of Systems    Constitutional: Positive for fever. Negative for chills.   HENT: Negative for congestion and rhinorrhea.    Respiratory: Negative for cough and shortness of breath.    Cardiovascular: Negative for chest pain and leg swelling.   Gastrointestinal: Negative for abdominal pain, constipation, diarrhea, nausea and vomiting.   Endocrine: Negative for polydipsia, polyphagia and polyuria.   Genitourinary: Negative for difficulty urinating and dysuria.   Musculoskeletal: Positive for gait problem (chronic pain in feet) and myalgias (BLE).   Skin: Positive for wound (LLE).   Neurological: Positive for weakness (chronic generalized). Negative for dizziness.   Psychiatric/Behavioral: Negative for confusion and hallucinations. 0      Patient has longstanding history of Charcot foot deformities.  He denies ever seeing an orthopedic surgical specialist.    Review of Systems    Past Medical History:   Diagnosis Date   • Allergic rhinitis    • Anxiety    • Aortectasia (HCC)     3cm infrarenal abdominal aorta   • Arthritis    • Atrial flutter (HCC) 2010    s/p ablation    • Charcot's joint of foot    • Chronic edema     both legs and sees wound care center at [place]    • Chronic venous insufficiency    • COPD (chronic obstructive pulmonary disease) (formerly [facility])    • Coronary atherosclerosis     Cath 2010: diffuse 40-50% disease   • Diverticulosis    • Duodenitis    • Fatty liver    • Gastritis    • Gastroparesis    • Hematoma     post-operative; After catheterization, right groin, required surgical exploration   • Hyperlipidemia    • Hypertension    • Insomnia    • Internal hemorrhoids    • Open wound     izzy legs has teddy chg weekly at wound care center at [place]  pt does second dressing on left leg another time during week   • Osteomyelitis (HCC)    • Paroxysmal atrial fibrillation (HCC)    • Peripheral neuropathy    • Popliteal artery aneurysm (HCC)     left, s/p stenting by Dr. Boston   • Skin cancer    • Sleep apnea     o2   •  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HCC)     due to flutter and afib; cath  with nonobstructive disease   • Venous stasis    • Venous stasis ulcer (HCC)     bilateral legs      Past Surgical History:   Procedure Laterality Date   • BASAL CELL CARCINOMA EXCISION      ear and left side of face   • BRONCHOSCOPY N/A 2021    Procedure: BRONCHOSCOPY WITH BAL;  Surgeon: Bunny Lepe MD;  Location:  [facility];  Service: Pulmonary;  Laterality: N/A;  PRE-HEMOPTYSIS  POST-SAME   • CARDIAC CATHETERIZATION     • CATARACT EXTRACTION     • COLONOSCOPY  2015    NBIH, diverticulosis, polyps   • COLONOSCOPY N/A 2018    Procedure: COLONOSCOPY TO CECUM  AND TERM. ILEUM WITH COLD SNARE POLYPECTOMIES;  Surgeon: Kane Lagunas MD;  Location:  [facility];  Service: Gastroenterology   • COLONOSCOPY N/A 10/29/2019    Procedure: COLONOSCOPY TO TO CECUM AND TERMINAL ILEUM WITH HOT AND COLD SNARE POLYPECTOMIES;  Surgeon: Knae Lagunas MD;  Location:  [facility];  Service: Gastroenterology   • HIP ARTHROPLASTY Right 2017   • JOINT REPLACEMENT Left    • OTHER SURGICAL HISTORY      Catheter ablation atrial flutter   • REPAIR ANEURYSM / PSEUDO ANEURYSM / RUPTURED ANEURYSM POPLITEAL ARTERY      Stent-Graft of the the left popliteal artery   • REPAIR KNEE LIGAMENT      Primary repair of knee ligament cruciate anterior right   • TONSILLECTOMY  8   • TOTAL KNEE ARTHROPLASTY Bilateral    • UPPER GASTROINTESTINAL ENDOSCOPY  2014    acute gastritis, acute duodenitis     Family History   Problem Relation Age of Onset   • Emphysema Father    • Malig Hyperthermia Neg Hx      Social History     Tobacco Use   • Smoking status: Former Smoker     Packs/day: 0.25     Years: 45.00     Pack years: 11.25     Types: Cigarettes     Quit date: 3/30/2021     Years since quittin.7   • Smokeless tobacco: Never Used   • Tobacco comment: caffeine use - 1.5 cups coffee daily    Vaping Use   • Vaping Use: Never used    Substance Use Topics   • Alcohol use: Yes     Alcohol/week: 4.0 - 5.0 standard drinks     Types: 1 - 2 Shots of liquor, 3 Standard drinks or equivalent per week     Comment: 2 rum a day   • Drug use: No     Medications Prior to Admission   Medication Sig Dispense Refill Last Dose   • acidophilus (FLORANEX) tablet tablet    1/8/2022 at Unknown time   • albuterol sulfate  (90 Base) MCG/ACT inhaler INHALE 2 PUFFS BY MOUTH EVERY 4 HOURS AS NEEDED FOR WHEEZE 2 g 6 1/8/2022 at Unknown time   • ALPRAZolam (XANAX) 0.5 MG tablet Take 1 tablet by mouth At Night As Needed for Anxiety. 30 tablet 1 1/8/2022 at Unknown time   • Breo Ellipta 200-25 MCG/INH inhaler INHALE 1 PUFF BY MOUTH EVERY DAY 60 each 4 1/8/2022 at Unknown time   • carvedilol (COREG) 6.25 MG tablet Take 1 tablet by mouth 2 (Two) Times a Day With Meals. 180 tablet 3 1/8/2022 at Unknown time   • clotrimazole-betamethasone (Lotrisone) 1-0.05 % cream Apply 1 application topically to the appropriate area as directed 2 (Two) Times a Day.   1/8/2022 at Unknown time   • docusate sodium (COLACE) 100 MG capsule Take 100 mg by mouth Daily.   1/8/2022 at Unknown time   • doxycycline (VIBRAMYICN) 100 MG tablet TAKE 1 TABLET BY MOUTH TWICE A DAY START THE DAY BEFORE SURGERY.   1/8/2022 at Unknown time   • finasteride (PROSCAR) 5 MG tablet Take 1 tablet by mouth daily.   1/8/2022 at Unknown time   • furosemide (LASIX) 40 MG tablet Take 40 mg by mouth Daily.   1/8/2022 at Unknown time   • gabapentin (NEURONTIN) 600 MG tablet Take 600 mg by mouth 3 (Three) Times a Day.   1/8/2022 at Unknown time   • HYDROcodone-acetaminophen (NORCO)  MG per tablet Take 1 tablet by mouth Every 6 (Six) Hours As Needed for Moderate Pain . 6 tablet 0 1/8/2022 at Unknown time   • ipratropium (Atrovent HFA) 17 MCG/ACT inhaler Inhale 2 puffs 4 (Four) Times a Day. 12.9 each 3 1/8/2022 at Unknown time   • lisinopril (PRINIVIL,ZESTRIL) 20 MG tablet Take 1 tablet by mouth 2 (Two) Times a  Day. 180 tablet 1 1/8/2022 at Unknown time   • metoclopramide (REGLAN) 10 MG tablet TAKE 1 TABLET BY MOUTH 4 (FOUR) TIMES A DAY BEFORE MEALS & AT BEDTIME. 360 tablet 3 1/8/2022 at Unknown time   • mupirocin (BACTROBAN) 2 % ointment APPLY AS DIRECTED TO BI LATERAL LOWER LEG 3 TIMES WEEKLY AS DIRECTED   1/8/2022 at Unknown time   • nystatin (MYCOSTATIN) 290908 UNIT/GM powder Apply  topically to the appropriate area as directed Every 12 (Twelve) Hours.   1/8/2022 at Unknown time   • OXYGEN-HELIUM IN 2 L into the nostril(s) as directed by provider As Needed.   1/8/2022 at Unknown time   • pravastatin (PRAVACHOL) 20 MG tablet Take 1 tablet by mouth Daily. 90 tablet 1 1/8/2022 at Unknown time   • silodosin (RAPAFLO) 8 MG capsule capsule Take 1 capsule by mouth daily. With food.   1/8/2022 at Unknown time   • warfarin (COUMADIN) 5 MG tablet Take one tablet (5 mg) by mouth on Mondays, and take one and one-half tablets (7.5 mg) by mouth all other days or as directed. 130 tablet 1 1/8/2022 at Unknown time     budesonide-formoterol, 2 puff, Inhalation, BID - RT  carvedilol, 6.25 mg, Oral, BID With Meals  [START ON 1/10/2022] cefTRIAXone, 2 g, Intravenous, Q24H  finasteride, 5 mg, Oral, Daily  gabapentin, 400 mg, Oral, Q8H  ipratropium, 0.5 mg, Nebulization, 4x Daily - RT  metoclopramide, 10 mg, Oral, 4x Daily AC & at Bedtime  mupirocin, , Topical, Q8H  nystatin, , Topical, Q12H  Pharmacy Consult for Antimicrobial Stewardship, , Does not apply, Once  pravastatin, 20 mg, Oral, Nightly  senna-docusate sodium, 2 tablet, Oral, BID  sodium chloride, 10 mL, Intravenous, Q12H  tamsulosin, 0.4 mg, Oral, Nightly  vancomycin, 1,000 mg, Intravenous, Q12H  [START ON 1/10/2022] warfarin, 5 mg, Oral, Weekly  warfarin, 7.5 mg, Oral, Once per day on Sun Tue Wed Thu Fri Sat      Pharmacy to dose vancomycin,   Pharmacy to dose warfarin,       •  acetaminophen **OR** acetaminophen **OR** acetaminophen  •  albuterol  •  ALPRAZolam  •   senna-docusate sodium **AND** polyethylene glycol **AND** bisacodyl **AND** bisacodyl  •  HYDROcodone-acetaminophen  •  hydrocortisone-bacitracin-zinc oxide-nystatin  •  ondansetron **OR** ondansetron  •  Pharmacy to dose vancomycin  •  Pharmacy to dose warfarin  •  sodium chloride  •  sodium chloride  Cephalexin and Codeine    Objective     Physical Exam:  Physical Exam  Vitals and nursing note reviewed. Exam conducted with a chaperone present.   Constitutional:       Appearance: Normal appearance.   HENT:      Head: Normocephalic and atraumatic.      Right Ear: External ear normal.      Left Ear: External ear normal.      Nose: Nose normal.      Mouth/Throat:      Mouth: Mucous membranes are moist.   Eyes:      Extraocular Movements: Extraocular movements intact.      Pupils: Pupils are equal, round, and reactive to light.   Cardiovascular:      Rate and Rhythm: Normal rate and regular rhythm.      Pulses: Normal pulses.      Heart sounds: Normal heart sounds.   Pulmonary:      Effort: Pulmonary effort is normal.      Breath sounds: Normal breath sounds.   Abdominal:      General: Abdomen is flat.      Palpations: Abdomen is soft.   Musculoskeletal:         General: Swelling and deformity present. Normal range of motion.      Cervical back: Normal range of motion and neck supple.      Right lower leg: Edema present.      Left lower leg: Edema present.   Skin:     General: Skin is warm and dry.      Capillary Refill: Capillary refill takes less than 2 seconds.      Findings: Erythema present.   Neurological:      General: No focal deficit present.      Mental Status: He is alert and oriented to person, place, and time.   Psychiatric:         Mood and Affect: Mood normal.         Behavior: Behavior normal.     Unable to palpate pedal pulses.    Vital Signs and Labs:  Vital Signs Patient Vitals for the past 24 hrs:   BP Temp Temp src Pulse Resp SpO2 Height Weight   01/09/22 1226 110/82 98.6 °F (37 °C) Oral -- 22 93 %  "-- --   01/09/22 1145 107/62 98 °F (36.7 °C) -- 95 20 97 % -- --   01/09/22 1130 -- -- -- 110 20 92 % -- --   01/09/22 1000 103/92 -- -- 102 -- 95 % -- --   01/09/22 0904 -- -- -- -- -- -- -- 136 kg (299 lb)   01/09/22 0901 93/70 -- -- 101 18 97 % -- --   01/09/22 0820 91/72 99 °F (37.2 °C) -- 96 18 94 % -- --   01/09/22 0707 -- -- -- 120 -- 95 % -- --   01/09/22 0701 (!) 158/129 -- -- (!) 158 -- 95 % -- --   01/09/22 0631 (!) 143/118 -- -- (!) 123 -- 90 % -- --   01/09/22 0630 -- -- -- (!) 151 -- 96 % -- --   01/09/22 0601 132/99 -- -- (!) 137 -- 97 % -- --   01/09/22 0522 125/82 (!) 101.2 °F (38.4 °C) Oral 93 20 95 % 182.9 cm (72\") --     I/O:  No intake/output data recorded.    CBC    Results from last 7 days   Lab Units 01/09/22  0608   WBC 10*3/mm3 11.05*   HEMOGLOBIN g/dL 14.2   PLATELETS 10*3/mm3 150     BMP   Results from last 7 days   Lab Units 01/09/22  0608   SODIUM mmol/L 140   POTASSIUM mmol/L 3.9   CHLORIDE mmol/L 98   CO2 mmol/L 27.7   BUN mg/dL 12   CREATININE mg/dL 1.01   GLUCOSE mg/dL 111*   MAGNESIUM mg/dL 1.9     Cr Clearance Estimated Creatinine Clearance: 97.2 mL/min (by C-G formula based on SCr of 1.01 mg/dL).  Coag   Results from last 7 days   Lab Units 01/09/22  0609   INR  2.20*   APTT seconds 47.9*     HbA1C   Lab Results   Component Value Date    HGBA1C 5.00 03/30/2021    HGBA1C 5.3 04/22/2016     Blood Glucose No results found for: POCGLU  Infection     CMP   Results from last 7 days   Lab Units 01/09/22  0608   SODIUM mmol/L 140   POTASSIUM mmol/L 3.9   CHLORIDE mmol/L 98   CO2 mmol/L 27.7   BUN mg/dL 12   CREATININE mg/dL 1.01   GLUCOSE mg/dL 111*   ALBUMIN g/dL 4.20   BILIRUBIN mg/dL 0.9   ALK PHOS U/L 91   AST (SGOT) U/L 15   ALT (SGPT) U/L 10     ABG      UA    Results from last 7 days   Lab Units 01/09/22  0641   NITRITE UA  Negative   WBC UA /HPF 0-2   BACTERIA UA /HPF None Seen   SQUAM EPITHEL UA /HPF 0-2     ANNIE  No results found for: POCMETH, POCAMPHET, POCBARBITUR, POCBENZO, " POCCOCAINE, POCOPIATES, POCOXYCODO, POCPHENCYC, POCPROPOXY, POCTHC, POCTRICYC  Radiology(recent) No radiology results for the last day    Active Hospital Problems    Diagnosis  POA   • **Cellulitis [L03.90]  Yes   • Generalized weakness [R53.1]  Yes   • Fever in adult [R50.9]  Yes   • Elevated lactic acid level [R79.89]  Yes   • Charcot's joint of foot [M14.679]  Yes   • Chronic anticoagulation [Z79.01]  Not Applicable   • Essential hypertension [I10]  Yes   • COPD (chronic obstructive pulmonary disease) ([facility]) [J44.9]  Yes   • Obesity, Class III, BMI 40-49.9 (morbid obesity) ([facility]) [E66.01]  Yes   • Permanent atrial fibrillation ([facility]) [I48.21]  Yes      Resolved Hospital Problems   No resolved problems to display.     Problem Points:  4:  Patient has a new problem, with additional work-up planned  Total problem points:4 or more    Data Points:  1:  I have reviewed or order clinical lab test  1:  I have reviewed or order radiology test (except heart catheterization or echo)  2:  I have personally and independently review of image, tracing, or specimen  Total data points:4 or more    Risk Points:  High:  Chronic illness with severe exacerbation or progression    MDM requires 2/3 (Problem points, Data points and Risk)  MDM Prob point Data point Risk   SF 1 1 Minimal   Low 2 2 Low   Mod 3 3 Moderate   High 4 4 High     Code requires 3/3 (MDM, History and Exam)  Code MDM History Exam Time   94721 SF/Low Detailed Detailed 30   92625 Mod Comprehensive Comprehensive 50   96308 High Comprehensive Comprehensive 70     Detailed history:  4 elements HPI or status of 3 chronic problems; 2-9 system ROS  Comprehensive:  4 elements HPI or status of 3 chronic problems;  10 system ROS    Detailed Exam:  12 findings from any organ system  Comprehensive Exam:  2 findings from each of 9 systems.   64372    Assessment/Plan       Cellulitis    Permanent atrial fibrillation (HCC)    Obesity, Class III, BMI 40-49.9 (morbid obesity) ([facility])     COPD (chronic obstructive pulmonary disease) (HCC)    Essential hypertension    Chronic anticoagulation    Charcot's joint of foot    Generalized weakness    Fever in adult    Elevated lactic acid level                    71 y.o. male significantly orthopedically deformed feet with lateral rotation bilaterally.  He states this has been ongoing for many years.  He denies ever seeing an orthopedic/foot and ankle specialist.  He comes in with worsening foot wounds with concerns of infection.  The dorsum of the left distal foot might be a little bit cellulitic.  His wounds themselves are actually not very impressive as far as requiring surgical debridement.  Agree with intravenous antibiotics.  We will start some basic wound care with bacitracin ointment.  We will also check lower extremity arterial Dopplers.  Patient's had a prior history of a popliteal artery stent graft placed by Dr. Boston at [facility] in 2016 for aneurysmal disease.  His last ABIs were normal.  He is an active surveillance with them.    Assuming his ABIs are reasonable we will have no vascular surgical plans.  I do think persistent follow-up with the [facility]'s vascular team will be important first popliteal aneurysm follow-up.  Would also recommend seeing an orthopedic surgeon who specializes in complex reconstructions of the ankles to see if anything can be surgically remedied to prevent further destruction of his bone architecture of his foot.    I discussed the patients findings and my recommendations with patient, family and nursing staff.    Holger Menendez MD  01/09/22  17:16 EST    Please call my office with any question: [phone]

## 2022-01-09 NOTE — PROGRESS NOTES
"[facility] Clinical Pharmacy Services: Vancomycin Pharmacokinetic Initial Consult Note    Jalen Lockwood is a 71 y.o. male who is on day 1 of pharmacy to dose vancomycin.    Indication: osteo  Consulting Provider: IWONA Enciso  Planned Duration of Therapy: 5d  Loading Dose Ordered or Given: 2750 mg on 1/9 at 1005  MRSA PCR performed:   Culture/Source: bcx pending   Target: -600 mg/L.hr   Other Antimicrobials: ctx    Vitals/Labs  Ht: 182.9 cm (72\"); Wt: 136 kg (299 lb)  Temp Readings from Last 1 Encounters:   01/09/22 98.6 °F (37 °C) (Oral)    Estimated Creatinine Clearance: 97.2 mL/min (by C-G formula based on SCr of 1.01 mg/dL).       Results from last 7 days   Lab Units 01/09/22  0608   CREATININE mg/dL 1.01   WBC 10*3/mm3 11.05*     Assessment/Plan:    Vancomycin Dose:   1000 mg IV every  12  hours  Predictive AUC level for the dose ordered is 553 mg/L.hr, which is within the target of 400-600 mg/L.hr  Vanc Trough has been ordered for 10/10 at 0930     Pharmacy will follow patient's kidney function and will adjust doses and obtain levels as necessary. Thank you for involving pharmacy in this patient's care. Please contact pharmacy with any questions or concerns.                           Twyla Ray [facility]  Clinical Pharmacist    "

## 2022-01-09 NOTE — ED NOTES
Pt's family member approached nurses desk and spoke with charge nurse upset about the wait time for an inpatient bed. Pt has a bed and is about to be transported to the room. Pt's family member responded angrily and returned to the room.      Kezia Henry, RN  01/09/22 7511

## 2022-01-09 NOTE — ED NOTES
"Pt spouse in the hallway roaming stating \"I'm gonna fucking kill that bitch\" and repeated this statement multiple times. This RN inquired about what room she was family for and who she was going to kill. This primary RN exited room from providing patient care, and family member reports \"That's who I'm talking about, her right there, they aren't taking care of him, I'm gonna kill that bitch\". This RN advised family member she was to leave the ER at this time for threats and would not be allowed to return, security and charge RN, and OBS charge RN notified.             Cristina Ludwig, RN  01/09/22 7352    "

## 2022-01-09 NOTE — PROGRESS NOTES
"Pharmacy Consult for Antimicrobial Stewardship   Source: SSTI vs Osteomyelitis of foot in patient without DM    Bcx: 2 sets pending    RVP negative   Imagin/9 xray foot-- This includes considerable periosteal  reaction and thickening and degenerative spurring and subchondral cystic changes as also noted on the earlier CT scan. Given the extensive periosteal changes, I cannot completely exclude a component of superimposed osteomyelitis.  Note from ER provider   Legs unwrapped, left foot has maceration with cellulitis and purulent discharge, ulceration on medial ankle.  Initiating antibiotics with vancomycin, will consult with pharmacy for recommendation for broad-spectrum antibiotic.  Obtain x-ray of the ankle.  Consulted hospitalist for admission. [JG]   0928 Consulted with pharmacy, they recommend proceeding with ceftriaxone despite Keflex allergy.  Will comply, closely observing. [JG]    Allergies as of 2022 - Reviewed 2022   Allergen Reaction Noted    Cephalexin Hives 2016    Codeine Nausea Only 2016     Estimated Creatinine Clearance: 97.2 mL/min (by C-G formula based on SCr of 1.01 mg/dL).  Blood pressure 110/82, pulse 95, temperature 98.6 °F (37 °C), temperature source Oral, resp. rate 22, height 182.9 cm (72\"), weight 136 kg (299 lb), SpO2 93 %.        Pt does have cephalexin allergy, but has tolerated amoxacillin the past which has a similar side chain. He was given a dose of ceftriaxone in the ER and tolerated well.     For empiric tx in this patient I recommend Ceftriaxone 2g q24 (obesity/sepsis in ER/osteo dosing) and vancomycin. Will put in 5d duration of therapy initially but of course this regimen can be tailored pending diagnosis, culture and susceptibility.    Please place a secondary AMS consult if you would like further input from pharmacy on antimicrobial selection     Twyla Ray, [place], BCPS  2022 15:22 EST         "

## 2022-01-09 NOTE — PROGRESS NOTES
[facility] Clinical Pharmacy Services: Warfarin Dosing/Monitoring Consult    Jalen Lockwood is a 71 y.o. male, estimated creatinine clearance is 97.2 mL/min (by C-G formula based on SCr of 1.01 mg/dL). weighing 136 kg (299 lb).    Results from last 7 days   Lab Units 01/09/22  0609 01/09/22  0608   INR  2.20*  --    APTT seconds 47.9*  --    HEMOGLOBIN g/dL  --  14.2   HEMATOCRIT %  --  40.4   PLATELETS 10*3/mm3  --  150     Prior to admission anticoagulation: 5 mg Monday 7.5 all other days, stable on dose for multiple months per pt     Hospital Anticoagulation:  Consulting provider: IWONA Enciso  Start date: 1/9  Indication: afib  Target INR: 2-3  Expected duration: life   Bridge Therapy: No      Date 1/9            INR 2.2            Dose 7.5 mg               Potential food or drug interactions: ctx and vanc    Education complete?/Date: Yes; 1/9    Assessment/Plan:  Warfarin ordered as home dose 5 mg Monday and 7.5 all other days   Monitor for any signs or symptoms of bleeding  Follow up daily INRs and dose adjustments    Pharmacy will continue to follow until discharge or discontinuation of warfarin.     Twyla Ray [facility]  Clinical Pharmacist

## 2022-01-09 NOTE — PLAN OF CARE
Goal Outcome Evaluation:  Plan of Care Reviewed With: patient        Progress: no change  Outcome Summary: Patient admitted from ER with cellulitis of LLE.  Wound culture sent. AOx3

## 2022-01-09 NOTE — ED PROVIDER NOTES
EMERGENCY DEPARTMENT ENCOUNTER    Room Number:  16/16  Date of encounter:  1/9/2022  PCP: Marc Ludwig MD  Historian: Patient      PPE    Patient was placed in face mask in first look. Patient was wearing facemask when I entered the room and throughout our encounter. I wore full protective equipment throughout this patient encounter including a N95 face mask, and gloves. Hand hygiene was performed before donning protective equipment and after removal when leaving the room.        HPI:  Chief Complaint: Patient  A complete HPI/ROS/PMH/PSH/SH/FH are unobtainable due to: Nothing    Context: Jalen Lockwood is a 71 y.o. male who arrives to the ED via EMS from home where he lives with his wife.  Patient presents with c/o generalized weakness that began this morning.  Patient states that he slept in his chair and when he went to get up he was unable to because he felt weak.  He states he has had this happen to him in the past.   Patient also complains of fever that he noticed today.  Patient denies shortness of breath, chest pain, nausea, vomiting, diarrhea, dysuria, abdominal pain, headache, dizziness or any other symptoms.  Patient states that seen makes the symptoms better and trying to get up worsens symptoms.  Patient states that he does have a history of COPD and A. fib.  He does take Coumadin.        PAST MEDICAL HISTORY  Active Ambulatory Problems     Diagnosis Date Noted   • Chronic osteomyelitis (formerly [facility]) 04/22/2016   • Foot pain 04/22/2016   • Peripheral neuropathy 04/22/2016   • Lymphedema of both lower extremities 04/22/2016   • Permanent atrial fibrillation (formerly [facility]) 04/22/2016   • Sleep apnea 04/22/2016   • Chronic edema 04/22/2016   • Class 2 severe obesity due to excess calories with serious comorbidity and body mass index (BMI) of 36.0 to 36.9 in adult (formerly [facility]) 04/22/2016   • COPD (chronic obstructive pulmonary disease) (formerly [facility]) 04/22/2016   • Atrial flutter (formerly [facility]) 01/01/2010   • Tachycardia induced cardiomyopathy  (HCC)    • Aortectasia (HCC)    • Popliteal artery aneurysm (HCC)    • Colon polyps 02/07/2017   • Gastroparesis 02/07/2017   • Insomnia 02/07/2017   • Adenomatous polyp of colon 05/23/2018   • Essential hypertension 11/15/2018   • ETOH abuse 04/24/2019   • Chronic anticoagulation 04/24/2019   • Oropharyngeal dysphagia 04/24/2019   • Tobacco abuse 04/24/2019   • Thyromegaly 04/25/2019   • Adrenal adenoma, left 04/25/2019   • Retroperitoneal lymphadenopathy 04/25/2019   • Anemia of chronic disease 04/27/2019   • Thyroid nodule 04/29/2019   • Charcot's joint of foot 04/29/2019   • Abnormal CT scan, lumbar spine 05/21/2019   • Alcohol dependence, in remission ([facility]) 05/21/2020   • Ischemic cardiomyopathy 04/03/2021   • Normocytic anemia 04/27/2021   • Inguinal adenopathy 04/27/2021     Resolved Ambulatory Problems     Diagnosis Date Noted   • Hyponatremia 04/24/2019   • Open wound 04/24/2019   • Adverse effect of thiazide diuretic 04/24/2019   • Weakness 04/24/2019   • Abnormal weight loss 04/24/2019   • Thrombocytopenia (HCC) 04/25/2019   • Candidal intertrigo 04/25/2019   • Left leg cellulitis 04/30/2019   • Sepsis (HCC) 05/01/2019   • Severe sepsis ([facility]) 03/30/2021   • Hyponatremia 04/03/2021   • Thrombocytopenia (HCC) 04/03/2021   • Metabolic encephalopathy 04/03/2021   • Acute systolic (congestive) heart failure (HCC) 04/03/2021   • Cellulitis of lower extremity 04/03/2021   • UTI (urinary tract infection) 03/30/2021   • Hemoptysis 03/30/2021     Past Medical History:   Diagnosis Date   • Allergic rhinitis    • Anxiety    • Arthritis    • Chronic venous insufficiency    • Coronary atherosclerosis    • Diverticulosis    • Duodenitis    • Fatty liver    • Gastritis    • Hematoma    • Hyperlipidemia    • Hypertension    • Internal hemorrhoids    • Osteomyelitis (HCC)    • Paroxysmal atrial fibrillation (HCC)    • Skin cancer    • Venous stasis    • Venous stasis ulcer (HCC)          PAST SURGICAL HISTORY  Past  Surgical History:   Procedure Laterality Date   • BASAL CELL CARCINOMA EXCISION      ear and left side of face   • BRONCHOSCOPY N/A 2021    Procedure: BRONCHOSCOPY WITH BAL;  Surgeon: Bunny Lepe MD;  Location: [facility] ENDOSCOPY;  Service: Pulmonary;  Laterality: N/A;  PRE-HEMOPTYSIS  POST-SAME   • CARDIAC CATHETERIZATION     • CATARACT EXTRACTION     • COLONOSCOPY  2015    NBIH, diverticulosis, polyps   • COLONOSCOPY N/A 2018    Procedure: COLONOSCOPY TO CECUM  AND TERM. ILEUM WITH COLD SNARE POLYPECTOMIES;  Surgeon: Kane Lagunas MD;  Location: [facility] ENDOSCOPY;  Service: Gastroenterology   • COLONOSCOPY N/A 10/29/2019    Procedure: COLONOSCOPY TO TO CECUM AND TERMINAL ILEUM WITH HOT AND COLD SNARE POLYPECTOMIES;  Surgeon: Kane Lagunas MD;  Location: [facility] ENDOSCOPY;  Service: Gastroenterology   • HIP ARTHROPLASTY Right 2017   • JOINT REPLACEMENT Left    • OTHER SURGICAL HISTORY      Catheter ablation atrial flutter   • REPAIR ANEURYSM / PSEUDO ANEURYSM / RUPTURED ANEURYSM POPLITEAL ARTERY      Stent-Graft of the the left popliteal artery   • REPAIR KNEE LIGAMENT      Primary repair of knee ligament cruciate anterior right   • TONSILLECTOMY     • TOTAL KNEE ARTHROPLASTY Bilateral    • UPPER GASTROINTESTINAL ENDOSCOPY  2014    acute gastritis, acute duodenitis         FAMILY HISTORY  Family History   Problem Relation Age of Onset   • Emphysema Father    • Malig Hyperthermia Neg Hx          SOCIAL HISTORY  Social History     Socioeconomic History   • Marital status:      Spouse name: Mariposa   Tobacco Use   • Smoking status: Former Smoker     Packs/day: 0.25     Years: 45.00     Pack years: 11.25     Types: Cigarettes     Quit date: 3/30/2021     Years since quittin.7   • Smokeless tobacco: Never Used   • Tobacco comment: caffeine use - 1.5 cups coffee daily    Vaping Use   • Vaping Use: Never used   Substance and Sexual Activity   • Alcohol use: Yes     Alcohol/week:  4.0 - 5.0 standard drinks     Types: 1 - 2 Shots of liquor, 3 Standard drinks or equivalent per week     Comment: 2 rum a day   • Drug use: No   • Sexual activity: Defer         ALLERGIES  Cephalexin and Codeine        REVIEW OF SYSTEMS  Review of Systems     All systems reviewed and negative except for those discussed in HPI.        PHYSICAL EXAM    ED Triage Vitals [01/09/22 0522]   Temp Heart Rate Resp BP SpO2   (!) 101.2 °F (38.4 °C) 93 20 125/82 95 %       Physical Exam  GENERAL: Disheveled appearance, nontoxic appearing, not distressed  HENT: normocephalic, atraumatic  EYES: no scleral icterus, PERRL, EOMI  CV: regular rhythm, regular rate, no murmur  RESPIRATORY: normal effort, wheezing  ABDOMEN: soft obese abdomen, normal bowel sounds, nontender  MUSCULOSKELETAL: Bilateral lower extremity lymphedema with wrapping to bilateral legs  NEURO: alert, moves all extremities, follows commands, mental status normal/baseline  SKIN: warm, dry, no rash   Psych: Appropriate mood and affect  Nursing notes and vital signs reviewed      LAB RESULTS  Recent Results (from the past 24 hour(s))   Comprehensive Metabolic Panel    Collection Time: 01/09/22  6:08 AM    Specimen: Blood   Result Value Ref Range    Glucose 111 (H) 65 - 99 mg/dL    BUN 12 8 - 23 mg/dL    Creatinine 1.01 0.76 - 1.27 mg/dL    Sodium 140 136 - 145 mmol/L    Potassium 3.9 3.5 - 5.2 mmol/L    Chloride 98 98 - 107 mmol/L    CO2 27.7 22.0 - 29.0 mmol/L    Calcium 10.0 8.6 - 10.5 mg/dL    Total Protein 7.6 6.0 - 8.5 g/dL    Albumin 4.20 3.50 - 5.20 g/dL    ALT (SGPT) 10 1 - 41 U/L    AST (SGOT) 15 1 - 40 U/L    Alkaline Phosphatase 91 39 - 117 U/L    Total Bilirubin 0.9 0.0 - 1.2 mg/dL    eGFR Non African Amer 73 >60 mL/min/1.73    Globulin 3.4 gm/dL    A/G Ratio 1.2 g/dL    BUN/Creatinine Ratio 11.9 7.0 - 25.0    Anion Gap 14.3 5.0 - 15.0 mmol/L   Lactic Acid, Plasma    Collection Time: 01/09/22  6:08 AM    Specimen: Blood   Result Value Ref Range    Lactate  2.1 (C) 0.5 - 2.0 mmol/L   CBC Auto Differential    Collection Time: 01/09/22  6:08 AM    Specimen: Blood   Result Value Ref Range    WBC 11.05 (H) 3.40 - 10.80 10*3/mm3    RBC 4.11 (L) 4.14 - 5.80 10*6/mm3    Hemoglobin 14.2 13.0 - 17.7 g/dL    Hematocrit 40.4 37.5 - 51.0 %    MCV 98.3 (H) 79.0 - 97.0 fL    MCH 34.5 (H) 26.6 - 33.0 pg    MCHC 35.1 31.5 - 35.7 g/dL    RDW 14.0 12.3 - 15.4 %    RDW-SD 50.7 37.0 - 54.0 fl    MPV 10.1 6.0 - 12.0 fL    Platelets 150 140 - 450 10*3/mm3    Neutrophil % 88.3 (H) 42.7 - 76.0 %    Lymphocyte % 5.0 (L) 19.6 - 45.3 %    Monocyte % 5.3 5.0 - 12.0 %    Eosinophil % 0.4 0.3 - 6.2 %    Basophil % 0.5 0.0 - 1.5 %    Immature Grans % 0.5 0.0 - 0.5 %    Neutrophils, Absolute 9.76 (H) 1.70 - 7.00 10*3/mm3    Lymphocytes, Absolute 0.55 (L) 0.70 - 3.10 10*3/mm3    Monocytes, Absolute 0.59 0.10 - 0.90 10*3/mm3    Eosinophils, Absolute 0.04 0.00 - 0.40 10*3/mm3    Basophils, Absolute 0.06 0.00 - 0.20 10*3/mm3    Immature Grans, Absolute 0.05 0.00 - 0.05 10*3/mm3    nRBC 0.0 0.0 - 0.2 /100 WBC   Green Top (Gel)    Collection Time: 01/09/22  6:08 AM   Result Value Ref Range    Extra Tube Hold for add-ons.    Lavender Top    Collection Time: 01/09/22  6:08 AM   Result Value Ref Range    Extra Tube hold for add-on    BNP    Collection Time: 01/09/22  6:08 AM    Specimen: Blood   Result Value Ref Range    proBNP 2,694.0 (H) 0.0 - 900.0 pg/mL   Troponin    Collection Time: 01/09/22  6:08 AM    Specimen: Blood   Result Value Ref Range    Troponin T 0.024 0.000 - 0.030 ng/mL   Magnesium    Collection Time: 01/09/22  6:08 AM    Specimen: Blood   Result Value Ref Range    Magnesium 1.9 1.6 - 2.4 mg/dL   Light Blue Top    Collection Time: 01/09/22  6:09 AM   Result Value Ref Range    Extra Tube hold for add-on    Protime-INR    Collection Time: 01/09/22  6:09 AM    Specimen: Blood   Result Value Ref Range    Protime 24.2 (H) 11.7 - 14.2 Seconds    INR 2.20 (H) 0.90 - 1.10   aPTT    Collection Time:  01/09/22  6:09 AM    Specimen: Blood   Result Value Ref Range    PTT 47.9 (H) 22.7 - 35.4 seconds   Urinalysis With Microscopic If Indicated (No Culture) - Urine, Catheter    Collection Time: 01/09/22  6:41 AM    Specimen: Urine, Catheter   Result Value Ref Range    Color, UA Yellow Yellow, Straw    Appearance, UA Clear Clear    pH, UA 7.0 5.0 - 8.0    Specific Gravity, UA 1.014 1.005 - 1.030    Glucose, UA Negative Negative    Ketones, UA Negative Negative    Bilirubin, UA Negative Negative    Blood, UA Moderate (2+) (A) Negative    Protein, UA 30 mg/dL (1+) (A) Negative    Leuk Esterase, UA Negative Negative    Nitrite, UA Negative Negative    Urobilinogen, UA 1.0 E.U./dL 0.2 - 1.0 E.U./dL   Urinalysis, Microscopic Only - Urine, Catheter    Collection Time: 01/09/22  6:41 AM    Specimen: Urine, Catheter   Result Value Ref Range    RBC, UA 21-30 (A) None Seen, 0-2 /HPF    WBC, UA 0-2 None Seen, 0-2 /HPF    Bacteria, UA None Seen None Seen /HPF    Squamous Epithelial Cells, UA 0-2 None Seen, 0-2 /HPF    Hyaline Casts, UA 0-2 None Seen /LPF    Methodology Automated Microscopy    ECG 12 Lead    Collection Time: 01/09/22  6:56 AM   Result Value Ref Range    QT Interval 332 ms   Respiratory Panel PCR w/COVID-19(SARS-CoV-2) [facility] In-House, NP Swab in UTM/VTM, 3-4 HR TAT - Swab, Nasopharynx    Collection Time: 01/09/22  7:22 AM    Specimen: Nasopharynx; Swab   Result Value Ref Range    ADENOVIRUS, PCR Not Detected Not Detected    Coronavirus 229E Not Detected Not Detected    Coronavirus HKU1 Not Detected Not Detected    Coronavirus NL63 Not Detected Not Detected    Coronavirus OC43 Not Detected Not Detected    COVID19 Not Detected Not Detected - Ref. Range    Human Metapneumovirus Not Detected Not Detected    Human Rhinovirus/Enterovirus Not Detected Not Detected    Influenza A PCR Not Detected Not Detected    Influenza B PCR Not Detected Not Detected    Parainfluenza Virus 1 Not Detected Not  Detected    Parainfluenza Virus 2 Not Detected Not Detected    Parainfluenza Virus 3 Not Detected Not Detected    Parainfluenza Virus 4 Not Detected Not Detected    RSV, PCR Not Detected Not Detected    Bordetella pertussis pcr Not Detected Not Detected    Bordetella parapertussis PCR Not Detected Not Detected    Chlamydophila pneumoniae PCR Not Detected Not Detected    Mycoplasma pneumo by PCR Not Detected Not Detected       Ordered the above labs and independently reviewed the results.      RADIOLOGY  XR Ankle 3+ View Left, XR Foot 3+ View Left    Result Date: 1/9/2022  THREE-VIEW LEFT ANKLE AND THREE-VIEW LEFT FOOT  HISTORY: Charcot joint. Infection and pain.  FINDINGS: There is very extensive abnormality of the joints of the ankle and throughout the foot consistent with history of Charcot joints and much of this can also be seen on the CT scan of the left ankle and foot dating back to 01/27/2015. This includes considerable periosteal reaction and thickening and degenerative spurring and subchondral cystic changes as also noted on the earlier CT scan. Given the extensive periosteal changes, I cannot completely exclude a component of superimposed osteomyelitis.  This report was finalized on 1/9/2022 10:02 AM by Dr. Gokul Garcia M.D.      XR Chest 1 View    Result Date: 1/9/2022  ONE VIEW PORTABLE CHEST  HISTORY: Generalized weakness. Cardiomyopathy.  FINDINGS: There is cardiomegaly, unchanged from 04/05/2021. The lungs are clear and there is no evidence of overt CHF or focal infiltrate.  This report was finalized on 1/9/2022 10:28 AM by Dr. Gokul Garcia M.D.        I ordered the above noted radiological studies and viewed the images on the PACS system.       EKG      Independently viewed by me and interpreted by Dr Julien         MEDICAL RECORD REVIEW  Medical records reviewed in epic, patient had a echocardiogram done March 31, 2021 showed an EF of 55.5.  Left ventricular wall thickness consistent with mild  to moderate concentric hypertrophy.  Mild to moderate mitral regurgitation present.  Mild to moderate tricuspid valve vegetation present.      PROCEDURES    Procedures        DIFFERENTIAL DIAGNOSIS  Differential Diagnosis for Weakness include but not limited to the following:  Generalized weakness, CVA, TIA, Acute MI, GI Bleed, UTI, Sepsis,      PROGRESS, DATA ANALYSIS, CONSULTS, AND MEDICAL DECISION MAKING        ED Course as of 01/09/22 1201   Sun Jan 09, 2022   0637 Discussed pertinent information from history and physical exam with patient.  Discussed differential diagnosis and plan for ED evaluation/work-up and treatment including labs, chest x-ray to evaluate for pneumonia, Tylenol to treat temperature of 101.2.  All questions answered.  Patient is agreeable with this plan.     [MS]   0701 EKG independently viewed and contemporaneously interpreted by ED physician. Time: 6:56 AM.  Rate 127.  Interpretation: Atrial fibrillation with RVR, left axis deviation, right bundle branch block, left anterior fascicular block, nonspecific ST changes. [JG]   0727 WBC(!): 11.05 [MS]   0727 proBNP(!): 2,694.0 [MS]   0727 Lactate(!!): 2.1 [MS]   0800 Reviewed pt's history and workup with Dr. Julien.  After a bedside evaluation, they agree with the plan of care.       [MS]   0800 INR(!): 2.20 [MS]   0840 Chest x-ray negative, Covid negative, discontinuing isolation. [JG]   0840 Patient is meeting markers for sepsis with no clearly identified source.  Patient febrile, tachycardic, leukocytosis, lactic acid slightly elevated.  Patient is weak and unable stand, will require admission to hospital.  Initiating broad-spectrum antibiotic with ceftriaxone.  Blood cultures previously obtained.  Lactic acid not greater than 4, no hypotension, no indication for sepsis fluid bolus. [JG]   0846 Patient allergic to cephalosporins. [JG]   0859 Legs unwrapped, left foot has maceration with cellulitis and purulent discharge, ulceration on  medial ankle.  Initiating antibiotics with vancomycin, will consult with pharmacy for recommendation for broad-spectrum antibiotic.  Obtain x-ray of the ankle.  Consulted hospitalist for admission. [JG]   7728 Consulted with pharmacy, they recommend proceeding with ceftriaxone despite Keflex allergy.  Will comply, closely observing. [JG]   6026 Consult Note    Discussed care with Dr Castellanos  Reviewed patient's history, exam, results and need for admission secondary to generalized weakness, left lower extremity cellulitis  Dr. Castellanos accepts the patient to be admitted to telemetry inpatient bed.     [MS]      ED Course User Index  [JG] Eddie Julien MD  [MS] Glory Yousif, ESTEFANY     ADMISSION    Discussed treatment plan and reason for admission with pt/family and admitting physician.  Pt/family voiced understanding of the plan for admission for further testing/treatment as needed.      DIAGNOSIS  Final diagnoses:   Generalized weakness   Cellulitis of left lower extremity   Lower limb ulcer, ankle, left, with unspecified severity (HCC)   Elevated lactic acid level   Fever and chills           MEDICATIONS GIVEN IN ED    Medications   sodium chloride 0.9 % flush 10 mL (has no administration in time range)   hydrocortisone-bacitracin-zinc oxide-nystatin (MAGIC BARRIER) ointment 1 application (has no administration in time range)   acetaminophen (TYLENOL) tablet 1,000 mg (1,000 mg Oral Given 1/9/22 0613)   sodium chloride 0.9 % bolus 500 mL (0 mL Intravenous Stopped 1/9/22 0819)   magnesium sulfate 2g/50 mL (PREMIX) infusion (0 g Intravenous Stopped 1/9/22 0840)   vancomycin 2750 mg/1000 mL 0.9% NS IVPB (2,750 mg Intravenous Given 1/9/22 1005)   cefTRIAXone (ROCEPHIN) 1 g in sodium chloride 0.9 % 100 mL IVPB-VTB (0 g Intravenous Stopped 1/9/22 1103)           COURSE & MEDICAL DECISION MAKING  Any/All labs and Any/All Imaging studies that were ordered were reviewed and are noted above.  Results were  reviewed/discussed with the patient and they were also made aware of online access.    Pt also made aware that some labs, such as cultures, will not be resulted during ER visit and followup with PMD is necessary.        Glory Yousif, APRN  01/09/22 120

## 2022-01-09 NOTE — ED NOTES
Pt to triage from home via [facility] [id number] with c/o weakness and fever since yesterday.  Pt normally active, wasn't able to get out of recliner this morning.  Pt history of uti's.  Denies covid exposure.  Pt wearing mask in triage. Triage personnel wore appropriate PPE       Corry Loja RN  01/09/22 0536

## 2022-01-10 ENCOUNTER — APPOINTMENT (OUTPATIENT)
Dept: CARDIOLOGY | Facility: HOSPITAL | Age: 72
End: 2022-01-10

## 2022-01-10 LAB
ANION GAP SERPL CALCULATED.3IONS-SCNC: 10.5 MMOL/L (ref 5–15)
BACTERIA BLD CULT: ABNORMAL
BH CV LOWER ARTERIAL LEFT ABI RATIO: 1.05
BH CV LOWER ARTERIAL LEFT DORSALIS PEDIS SYS MAX: 120 MMHG
BH CV LOWER ARTERIAL LEFT POST TIBIAL SYS MAX: 139 MMHG
BH CV LOWER ARTERIAL RIGHT ABI RATIO: 1.14
BH CV LOWER ARTERIAL RIGHT DORSALIS PEDIS SYS MAX: 144 MMHG
BH CV LOWER ARTERIAL RIGHT GREAT TOE SYS MAX: 123 MMHG
BH CV LOWER ARTERIAL RIGHT POST TIBIAL SYS MAX: 150 MMHG
BH CV LOWER ARTERIAL RIGHT TBI RATIO: 0.93
BOTTLE TYPE: ABNORMAL
BUN SERPL-MCNC: 16 MG/DL (ref 8–23)
BUN/CREAT SERPL: 20.5 (ref 7–25)
CALCIUM SPEC-SCNC: 9.8 MG/DL (ref 8.6–10.5)
CHLORIDE SERPL-SCNC: 93 MMOL/L (ref 98–107)
CO2 SERPL-SCNC: 26.5 MMOL/L (ref 22–29)
CREAT SERPL-MCNC: 0.78 MG/DL (ref 0.76–1.27)
DEPRECATED RDW RBC AUTO: 49.7 FL (ref 37–54)
ERYTHROCYTE [DISTWIDTH] IN BLOOD BY AUTOMATED COUNT: 14.1 % (ref 12.3–15.4)
GFR SERPL CREATININE-BSD FRML MDRD: 98 ML/MIN/1.73
GLUCOSE SERPL-MCNC: 118 MG/DL (ref 65–99)
HCT VFR BLD AUTO: 36.5 % (ref 37.5–51)
HGB BLD-MCNC: 13.1 G/DL (ref 13–17.7)
INR PPP: 2.34 (ref 0.9–1.1)
MAXIMAL PREDICTED HEART RATE: 150 BPM
MCH RBC QN AUTO: 35.1 PG (ref 26.6–33)
MCHC RBC AUTO-ENTMCNC: 35.9 G/DL (ref 31.5–35.7)
MCV RBC AUTO: 97.9 FL (ref 79–97)
PLATELET # BLD AUTO: 132 10*3/MM3 (ref 140–450)
PMV BLD AUTO: 11.1 FL (ref 6–12)
POTASSIUM SERPL-SCNC: 3.7 MMOL/L (ref 3.5–5.2)
PROTHROMBIN TIME: 25.4 SECONDS (ref 11.7–14.2)
RBC # BLD AUTO: 3.73 10*6/MM3 (ref 4.14–5.8)
SODIUM SERPL-SCNC: 130 MMOL/L (ref 136–145)
STRESS TARGET HR: 128 BPM
UPPER ARTERIAL LEFT ARM BRACHIAL SYS MAX: 119 MMHG
UPPER ARTERIAL RIGHT ARM BRACHIAL SYS MAX: 132 MMHG
WBC NRBC COR # BLD: 11.25 10*3/MM3 (ref 3.4–10.8)

## 2022-01-10 PROCEDURE — 94761 N-INVAS EAR/PLS OXIMETRY MLT: CPT

## 2022-01-10 PROCEDURE — 93922 UPR/L XTREMITY ART 2 LEVELS: CPT

## 2022-01-10 PROCEDURE — 85027 COMPLETE CBC AUTOMATED: CPT | Performed by: NURSE PRACTITIONER

## 2022-01-10 PROCEDURE — 86140 C-REACTIVE PROTEIN: CPT | Performed by: STUDENT IN AN ORGANIZED HEALTH CARE EDUCATION/TRAINING PROGRAM

## 2022-01-10 PROCEDURE — 85652 RBC SED RATE AUTOMATED: CPT | Performed by: STUDENT IN AN ORGANIZED HEALTH CARE EDUCATION/TRAINING PROGRAM

## 2022-01-10 PROCEDURE — 80048 BASIC METABOLIC PNL TOTAL CA: CPT | Performed by: NURSE PRACTITIONER

## 2022-01-10 PROCEDURE — 97165 OT EVAL LOW COMPLEX 30 MIN: CPT

## 2022-01-10 PROCEDURE — 97530 THERAPEUTIC ACTIVITIES: CPT

## 2022-01-10 PROCEDURE — G0378 HOSPITAL OBSERVATION PER HR: HCPCS

## 2022-01-10 PROCEDURE — 85610 PROTHROMBIN TIME: CPT | Performed by: NURSE PRACTITIONER

## 2022-01-10 PROCEDURE — 25010000002 VANCOMYCIN PER 500 MG: Performed by: NURSE PRACTITIONER

## 2022-01-10 PROCEDURE — 97162 PT EVAL MOD COMPLEX 30 MIN: CPT

## 2022-01-10 PROCEDURE — 87040 BLOOD CULTURE FOR BACTERIA: CPT | Performed by: NURSE PRACTITIONER

## 2022-01-10 PROCEDURE — 94799 UNLISTED PULMONARY SVC/PX: CPT

## 2022-01-10 PROCEDURE — 25010000002 CEFTRIAXONE PER 250 MG: Performed by: NURSE PRACTITIONER

## 2022-01-10 RX ORDER — LISINOPRIL 20 MG/1
20 TABLET ORAL EVERY 12 HOURS SCHEDULED
Status: DISCONTINUED | OUTPATIENT
Start: 2022-01-10 | End: 2022-01-11

## 2022-01-10 RX ADMIN — CEFTRIAXONE SODIUM 2 G: 2 INJECTION, POWDER, FOR SOLUTION INTRAMUSCULAR; INTRAVENOUS at 11:15

## 2022-01-10 RX ADMIN — MUPIROCIN: 20 OINTMENT TOPICAL at 05:39

## 2022-01-10 RX ADMIN — WARFARIN SODIUM 5 MG: 5 TABLET ORAL at 19:01

## 2022-01-10 RX ADMIN — HYDROCODONE BITARTRATE AND ACETAMINOPHEN 1 TABLET: 10; 325 TABLET ORAL at 05:38

## 2022-01-10 RX ADMIN — CARVEDILOL 6.25 MG: 3.12 TABLET, FILM COATED ORAL at 19:01

## 2022-01-10 RX ADMIN — DOCUSATE SODIUM 50MG AND SENNOSIDES 8.6MG 2 TABLET: 8.6; 5 TABLET, FILM COATED ORAL at 11:14

## 2022-01-10 RX ADMIN — SODIUM CHLORIDE, PRESERVATIVE FREE 10 ML: 5 INJECTION INTRAVENOUS at 11:17

## 2022-01-10 RX ADMIN — HYDROCODONE BITARTRATE AND ACETAMINOPHEN 1 TABLET: 10; 325 TABLET ORAL at 20:32

## 2022-01-10 RX ADMIN — IPRATROPIUM BROMIDE 0.5 MG: 0.5 SOLUTION RESPIRATORY (INHALATION) at 21:41

## 2022-01-10 RX ADMIN — ZINC OXIDE 1 APPLICATION: 200 OINTMENT TOPICAL at 20:31

## 2022-01-10 RX ADMIN — IPRATROPIUM BROMIDE 0.5 MG: 0.5 SOLUTION RESPIRATORY (INHALATION) at 13:17

## 2022-01-10 RX ADMIN — METOCLOPRAMIDE 10 MG: 10 TABLET ORAL at 19:01

## 2022-01-10 RX ADMIN — IPRATROPIUM BROMIDE 0.5 MG: 0.5 SOLUTION RESPIRATORY (INHALATION) at 16:41

## 2022-01-10 RX ADMIN — PRAVASTATIN SODIUM 20 MG: 40 TABLET ORAL at 20:32

## 2022-01-10 RX ADMIN — VANCOMYCIN HYDROCHLORIDE 1000 MG: 1 INJECTION, SOLUTION INTRAVENOUS at 22:10

## 2022-01-10 RX ADMIN — BUDESONIDE AND FORMOTEROL FUMARATE DIHYDRATE 2 PUFF: 160; 4.5 AEROSOL RESPIRATORY (INHALATION) at 21:46

## 2022-01-10 RX ADMIN — NYSTATIN: 100000 POWDER TOPICAL at 11:14

## 2022-01-10 RX ADMIN — TAMSULOSIN HYDROCHLORIDE 0.4 MG: 0.4 CAPSULE ORAL at 20:32

## 2022-01-10 RX ADMIN — FINASTERIDE 5 MG: 5 TABLET, FILM COATED ORAL at 11:14

## 2022-01-10 RX ADMIN — HYDROCODONE BITARTRATE AND ACETAMINOPHEN 1 TABLET: 10; 325 TABLET ORAL at 14:30

## 2022-01-10 RX ADMIN — NYSTATIN: 100000 POWDER TOPICAL at 20:33

## 2022-01-10 RX ADMIN — ALBUTEROL SULFATE 2.5 MG: 2.5 SOLUTION RESPIRATORY (INHALATION) at 05:49

## 2022-01-10 RX ADMIN — GABAPENTIN 400 MG: 400 CAPSULE ORAL at 14:30

## 2022-01-10 RX ADMIN — GABAPENTIN 400 MG: 400 CAPSULE ORAL at 05:38

## 2022-01-10 RX ADMIN — ALPRAZOLAM 0.5 MG: 0.25 TABLET ORAL at 20:32

## 2022-01-10 RX ADMIN — METOCLOPRAMIDE 10 MG: 10 TABLET ORAL at 11:15

## 2022-01-10 RX ADMIN — LISINOPRIL 20 MG: 20 TABLET ORAL at 14:30

## 2022-01-10 RX ADMIN — SODIUM CHLORIDE, PRESERVATIVE FREE 10 ML: 5 INJECTION INTRAVENOUS at 20:33

## 2022-01-10 RX ADMIN — IPRATROPIUM BROMIDE 0.5 MG: 0.5 SOLUTION RESPIRATORY (INHALATION) at 05:49

## 2022-01-10 RX ADMIN — ZINC OXIDE 1 APPLICATION: 200 OINTMENT TOPICAL at 11:14

## 2022-01-10 RX ADMIN — DOCUSATE SODIUM 50MG AND SENNOSIDES 8.6MG 2 TABLET: 8.6; 5 TABLET, FILM COATED ORAL at 20:33

## 2022-01-10 RX ADMIN — METOCLOPRAMIDE 10 MG: 10 TABLET ORAL at 20:32

## 2022-01-10 RX ADMIN — MUPIROCIN: 20 OINTMENT TOPICAL at 20:31

## 2022-01-10 RX ADMIN — CARVEDILOL 6.25 MG: 3.12 TABLET, FILM COATED ORAL at 11:14

## 2022-01-10 RX ADMIN — GABAPENTIN 400 MG: 400 CAPSULE ORAL at 20:33

## 2022-01-10 RX ADMIN — BUDESONIDE AND FORMOTEROL FUMARATE DIHYDRATE 2 PUFF: 160; 4.5 AEROSOL RESPIRATORY (INHALATION) at 07:54

## 2022-01-10 NOTE — NURSING NOTE
[facility] note: consult received for LE limb assessment. Pt has been seen by vascular and wound care orders have been placed by Dr Menendez. Will defer wound care to vascular. They are also recommending pt be evaluated by ortho.

## 2022-01-10 NOTE — CASE MANAGEMENT/SOCIAL WORK
Discharge Planning Assessment  [facility]     Patient Name: Jalen Lockwood  MRN: [id number]  Today's Date: 1/10/2022    Admit Date: 1/9/2022     Discharge Needs Assessment     Row Name 01/10/22 1559       Living Environment    Lives With spouse    Name(s) of Who Lives With Patient Mariposa (spouse)    Current Living Arrangements home/apartment/condo    Primary Care Provided by spouse/significant other    Provides Primary Care For no one, unable/limited ability to care for self    Family Caregiver if Needed spouse    Family Caregiver Names Mariposa (spouse)    Quality of Family Relationships helpful; involved; supportive    Able to Return to Prior Arrangements yes    Living Arrangement Comments Resides with spouse in 1 level home with 3 steps to enter. Denies environmental concerns.       Resource/Environmental Concerns    Resource/Environmental Concerns none    Transportation Concerns car, none       Transition Planning    Patient/Family Anticipates Transition to home with family; home with help/services    Patient/Family Anticipated Services at Transition home health care; rehabilitation services    Transportation Anticipated family or friend will provide       Discharge Needs Assessment    Readmission Within the Last 30 Days no previous admission in last 30 days    Current Outpatient/Agency/Support Group homecare agency  Current with [facility].    Equipment Currently Used at Home walker, rolling; cane, quad    Concerns to be Addressed discharge planning    Anticipated Changes Related to Illness none    Equipment Needed After Discharge none    Outpatient/Agency/Support Group Needs outpatient therapy  Patient current with [facility], but would benefit from PT/OT coming to work with him in the home.    Discharge Facility/Level of Care Needs home with home health  Current with [facility]. Will benefit from continued PT/OT in the home.    Current Discharge Risk physical impairment               Discharge Plan     Row Name 01/10/22 7510        Plan    Plan Patient plans to return home upon discharge, where he lives with his wife in ranch style home with 3 steps to enter. Denies environmental concerns. No recent falls. Owns/uses walker and cane in the home. Is current with the [facility] but has not had physical therapy services through them before. Will contact [facility] to inform of need for PT/OT in the home, per PT/OT evaluation completed here at hospital. Can arrange own transportation upon discharge.    Patient/Family in Agreement with Plan yes    Plan Comments Home with continued use of [facility]. Will benefit from PT/OT with [facility]. Denies DC needs.              Continued Care and Services - Admitted Since 1/9/2022    Coordination has not been started for this encounter.          Demographic Summary     Row Name 01/10/22 1554       General Information    Admission Type inpatient    Arrived From home    Required Notices Provided Important Message from Medicare    Referral Source admission list; emergency department    Reason for Consult discharge planning    Preferred Language English     Used During This Interaction no    General Information Comments Facesheet verified. Role of CCP explained. Appropriate PPE worn at all times.       Contact Information    Permission Granted to Share Info With family/designee    Contact Information Obtained for other (see comments)    Contact Information Comments Verified emergency contact information.               Functional Status     Row Name 01/10/22 1558       Functional Status    Usual Activity Tolerance moderate    Current Activity Tolerance poor       Functional Status, IADL    Medications independent    Meal Preparation independent    Housekeeping independent    Laundry independent    Shopping independent    IADL Comments Patient reports baseline IND with IADL's.       Mental Status    General Appearance WDL WDL       Mental Status Summary    Recent Changes in Mental Status/Cognitive Functioning no changes        Employment/    Employment Status retired               Psychosocial     Row Name 01/10/22 1554       Values/Beliefs    Spiritual, Cultural Beliefs, Holiness Practices, Values that Affect Care no       Behavior WDL    Behavior WDL WDL       Emotion Mood WDL    Emotion/Mood/Affect WDL WDL       Speech WDL    Speech WDL WDL       Perceptual State WDL    Perceptual State WDL WDL       Thought Process WDL    Thought Process WDL WDL       Intellectual Performance WDL    Intellectual Performance WDL WDL    Level of Consciousness Alert       Coping/Stress    Major Change/Loss/Stressor none    Patient Personal Strengths able to adapt; expressive of emotions; expressive of needs    Sources of Support spouse    Reaction to Health Status accepting; adjusting    Understanding of Condition and Treatment partial understanding of medical condition; partial understanding of treatment; needs additional information       Developmental Stage (Eriksson's)    Developmental Stage Stage 8 (66 years-death/Late Adulthood) Integrity vs. Despair       C-SSRS (Recent)    Q1 Wished to be Dead (Past Month) no    Q2 Suicidal Thoughts (Past Month) no    Q6 Suicide Behavior (Lifetime) no       Violence Risk    Feels Like Hurting Others no    Previous Attempt to Harm Others no               Abuse/Neglect     Row Name 01/10/22 7462       Personal Safety    Feels Unsafe at Home or Work/School no    Feels Threatened by Someone no    Does Anyone Try to Keep You From Having Contact with Others or Doing Things Outside Your Home? no    Physical Signs of Abuse Present no               Legal     Row Name 01/10/22 7909       Financial/Legal    Source of Income social security; pension/[employer]    Application for Public Assistance not applied       Legal    Criminal Activity/Legal Involvement none               Substance Abuse    No documentation.                Patient Forms    No documentation.                   Сергей Mares RN

## 2022-01-10 NOTE — NURSING NOTE
CWON note: pt consult received for lower extremity wounds and skin tears. Vascular is following for lower extremity wounds and they have recommended bacitracin ointment for the wounds. Will defer management of those wounds to vascular.     Skin tear standing orders placed for skin tears. Photos reviewed under media tab, magic barrier cream has been ordered for groin and skin folds. Please re-consult if wounds worsen with current treatment or if additional needs arise.

## 2022-01-10 NOTE — THERAPY EVALUATION
Patient Name: Jalen Lockwood  : 1950    MRN: [id number]                              Today's Date: 1/10/2022       Admit Date: 2022    Visit Dx:     ICD-10-CM ICD-9-CM   1. Generalized weakness  R53.1 780.79   2. Cellulitis of left lower extremity  L03.116 682.6   3. Lower limb ulcer, ankle, left, with unspecified severity (HCC)  L97.329 707.13   4. Elevated lactic acid level  R79.89 276.2   5. Fever and chills  R50.9 780.60     Patient Active Problem List   Diagnosis   • Chronic osteomyelitis (HCC)   • Foot pain   • Peripheral neuropathy   • Lymphedema of both lower extremities   • Permanent atrial fibrillation (HCC)   • Sleep apnea   • Chronic edema   • Obesity, Class III, BMI 40-49.9 (morbid obesity) (HCC)   • COPD (chronic obstructive pulmonary disease) (HCC)   • Atrial flutter (HCC)   • 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HCC)   • Aortectasia (HCC)   • Popliteal artery aneurysm (HCC)   • Colon polyps   • Gastroparesis   • Insomnia   • Adenomatous polyp of colon   • Essential hypertension   • ETOH abuse   • Chronic anticoagulation   • Oropharyngeal dysphagia   • Tobacco abuse   • Thyromegaly   • Adrenal adenoma, left   • Retroperitoneal lymphadenopathy   • Anemia of chronic disease   • Thyroid nodule   • Charcot's joint of foot   • Abnormal CT scan, lumbar spine   • Alcohol dependence, in remission (HCC)   • Ischemic cardiomyopathy   • Cellulitis   • Normocytic anemia   • Inguinal adenopathy   • Generalized weakness   • Fever in adult   • Elevated lactic acid level     Past Medical History:   Diagnosis Date   • Allergic rhinitis    • Anxiety    • Aortectasia (HCC)     3cm infrarenal abdominal aorta   • Arthritis    • Atrial flutter (HCC) 2010    s/p ablation    • Charcot's joint of foot    • Chronic edema     both legs and sees wound care center at [facility]    • Chronic venous insufficiency    • COPD (chronic obstructive pulmonary disease) (HCC)    • Coronary atherosclerosis     Cath 2010: diffuse  40-50% disease   • Diverticulosis    • Duodenitis    • Fatty liver    • Gastritis    • Gastroparesis    • Hematoma     post-operative; After catheterization, right groin, required surgical exploration   • Hyperlipidemia    • Hypertension    • Insomnia    • Internal hemorrhoids    • Open wound     izzy legs has drsg chg weekly at wound care center at [place]  pt does second dressing on left leg another time during week   • Osteomyelitis (HCC)    • Paroxysmal atrial fibrillation (HCC)    • Peripheral neuropathy    • Popliteal artery aneurysm (HCC)     left, s/p stenting by Dr. [place]   • Skin cancer    • Sleep apnea     o2   • 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HCC)     due to flutter and afib; cath 2010 with nonobstructive disease   • Venous stasis    • Venous stasis ulcer (HCC)     bilateral legs      Past Surgical History:   Procedure Laterality Date   • BASAL CELL CARCINOMA EXCISION      ear and left side of face   • BRONCHOSCOPY N/A 4/12/2021    Procedure: BRONCHOSCOPY WITH BAL;  Surgeon: Bunny Lepe MD;  Location: [facility] ENDOSCOPY;  Service: Pulmonary;  Laterality: N/A;  PRE-HEMOPTYSIS  POST-SAME   • CARDIAC CATHETERIZATION     • CATARACT EXTRACTION     • COLONOSCOPY  09/28/2015    NBIH, diverticulosis, polyps   • COLONOSCOPY N/A 9/11/2018    Procedure: COLONOSCOPY TO CECUM  AND TERM. ILEUM WITH COLD SNARE POLYPECTOMIES;  Surgeon: Kane Lagunas MD;  Location:  [facility] END[facility]COPY;  Service: Gastroenterology   • COLONOSCOPY N/A 10/29/2019    Procedure: COLONOSCOPY TO TO CECUM AND TERMINAL ILEUM WITH HOT AND COLD SNARE POLYPECTOMIES;  Surgeon: Kane Lagunas MD;  Location[facility] ENDOSCOPY;  Service: Gastroenterology   • HIP ARTHROPLASTY Right 2017   • JOINT REPLACEMENT Left    • OTHER SURGICAL HISTORY      Catheter ablation atrial flutter   • REPAIR ANEURYSM / PSEUDO ANEURYSM / RUPTURED ANEURYSM POPLITEAL ARTERY      Stent-Graft of the the left popliteal artery   • REPAIR KNEE LIGAMENT      Primary repair  of knee ligament cruciate anterior right   • TONSILLECTOMY  1958   • TOTAL KNEE ARTHROPLASTY Bilateral    • UPPER GASTROINTESTINAL ENDOSCOPY  09/16/2014    acute gastritis, acute duodenitis      General Information     Row Name 01/10/22 0842          Physical Therapy Time and Intention    Document Type evaluation (P)   -AY     Mode of Treatment physical therapy (P)   -AY     Row Name 01/10/22 0842          General Information    Patient Profile Reviewed yes (P)   -AY     Prior Level of Function independent:; gait; transfer; community mobility; all household mobility (P)   -AY     Existing Precautions/Restrictions oxygen therapy device and L/min (P)   -AY     Barriers to Rehab previous functional deficit (P)   -AY     Row Name 01/10/22 0842          Living Environment    Lives With spouse (P)   -AY     Row Name 01/10/22 0842          Home Main Entrance    Number of Stairs, Main Entrance three (P)   -AY     Stair Railings, Main Entrance railings safe and in good condition (P)   -AY     Row Name 01/10/22 0842          Cognition    Orientation Status (Cognition) oriented x 4 (P)   -AY     Row Name 01/10/22 0842          Safety Issues, Functional Mobility    Impairments Affecting Function (Mobility) balance; endurance/activity tolerance; coordination; pain; strength; range of motion (ROM); postural/trunk control (P)   -AY           User Key  (r) = Recorded By, (t) = Taken By, (c) = Cosigned By    Initials Name Provider Type    AY Luis Eller, PT Student PT Student               Mobility     Row Name 01/10/22 0843          Sit-Stand Transfer    Sit-Stand [place] (Transfers) standby assist (P)   -AY     Assistive Device (Sit-Stand Transfers) walker, front-wheeled (P)   -AY     Row Name 01/10/22 0843          Gait/Stairs (Locomotion)    [place] Level (Gait) standby assist; contact guard (P)   -AY     Assistive Device (Gait) walker, front-wheeled (P)   -AY     Distance in Feet (Gait) 60' (P)   -AY      Deviations/Abnormal Patterns (Gait) weight shifting decreased; stride length decreased; gait speed decreased (P)   -AY     Comment (Gait/Stairs) Pt demonstrates externally rotated and pes planus feet when ambulating. Pt stated he has custom made shoes to fit his feet. Fatigued and SOA limiting gait distance. (P)   -AY           User Key  (r) = Recorded By, (t) = Taken By, (c) = Cosigned By    Initials Name Provider Type    Luis Woods, PT Student PT Student               Obj/Interventions     Row Name 01/10/22 0849          Range of Motion Comprehensive    General Range of Motion lower extremity range of motion deficits identified (P)   -AY     Row Name 01/10/22 0849          Strength Comprehensive (MMT)    General Manual Muscle Testing (MMT) Assessment lower extremity strength deficits identified (P)   -AY     Comment, General Manual Muscle Testing (MMT) Assessment 3+/5 for hip flexion, 4/5 for knee ext, flex, and ankle DF (P)   -AY     Row Name 01/10/22 0849          Balance    Balance Assessment sitting static balance; standing static balance (P)   -AY     Static Sitting Balance WFL (P)   -AY     Static Standing Balance WFL (P)   -AY     Balance Interventions sitting; standing; sit to stand; static (P)   -AY     Row Name 01/10/22 0849          General Lower Extremity Assessment (Range of Motion)    Lower Extremity: Range of Motion ankle, left: LE ROM; ankle, right: LE ROM (P)   -AY     Row Name 01/10/22 0849          Left Ankle, Range of Motion    Left Ankle, Range of Motion dorsiflexion; plantarflexion (P)   decreased  -AY     Row Name 01/10/22 0849          Right Ankle, Range of Motion    Right Ankle, Range of Motion dorsiflexion; plantarflexion (P)   decreased.  -AY           User Key  (r) = Recorded By, (t) = Taken By, (c) = Cosigned By    Initials Name Provider Type    Luis Woods, PT Student PT Student               Goals/Plan     Row Name 01/10/22 0904          Bed Mobility Goal 1 (PT)     Activity/Assistive Device (Bed Mobility Goal 1, PT) bed mobility activities, all (P)   -AY     [place] Level/Cues Needed (Bed Mobility Goal 1, PT) independent (P)   -AY     Time Frame (Bed Mobility Goal 1, PT) long term goal (LTG) (P)   -AY     Row Name 01/10/22 0904          Transfer Goal 1 (PT)    Activity/Assistive Device (Transfer Goal 1, PT) transfers, all (P)   -AY     [place] Level/Cues Needed (Transfer Goal 1, PT) independent (P)   -AY     Time Frame (Transfer Goal 1, PT) long term goal (LTG) (P)   -AY     Row Name 01/10/22 0904          Gait Training Goal 1 (PT)    Activity/Assistive Device (Gait Training Goal 1, PT) gait (walking locomotion); assistive device use; walker, rolling (P)   -AY     [place] Level (Gait Training Goal 1, PT) modified independence (P)   -AY     Distance (Gait Training Goal 1, PT) 150' (P)   -AY     Time Frame (Gait Training Goal 1, PT) long term goal (LTG) (P)   -AY           User Key  (r) = Recorded By, (t) = Taken By, (c) = Cosigned By    Initials Name Provider Type    Luis Woods, PT Student PT Student               Clinical Impression     Row Name 01/10/22 7326          Pain    Additional Documentation Pain Scale: Numbers Pre/Post-Treatment (Group) (P)   -AY     Row Name 01/10/22 3156          Pain Scale: Numbers Pre/Post-Treatment    Pretreatment Pain Rating 4/10 (P)   -AY     Posttreatment Pain Rating 4/10 (P)   -AY     Pain Location - Side Left (P)   -AY     Pain Location - Orientation lower (P)   -AY     Pain Location extremity (P)   -AY     Pain Intervention(s) Repositioned; Ambulation/increased activity; Rest (P)   -AY     Row Name 01/10/22 5906          Plan of Care Review    Plan of Care Reviewed With patient (P)   -AY     Outcome Summary Pt is a 71 year old male that was admitted to the hospital for celluitis of (L) LE. Pt's PMH includes COPD, hypertension, hyperlipidemia.  Pt was AOx4. Pt lives with his wife in a ranch style home that has 3 steps  entering the house with bilateral handrails. Patient denies any previous history of falls. Pt has generalized weakness of the lower extremties globally. Pt uses a walker and cane to help ambulate around the house and in the community. Pt demonstrated ER and pes planus feet during standing and walking w/ FWW and pt has custom made shoes to help accomodate the deficits. Pt may benefit from skilled PT services acutely to improve on deficits in LE strength and endurance and to increase independence in transfers and functional mobility. Will follow peripherally and follow up at 1/12 if still inpatient.  Pt may benefit from home with homehealth after discharge. (P)   -AY     Row Name 01/10/22 0853          Therapy Assessment/Plan (PT)    Rehab Potential (PT) good, to achieve stated therapy goals (P)   -AY     Criteria for Skilled Interventions Met (PT) yes (P)   -AY     Row Name 01/10/22 0853          Vital Signs    O2 Delivery Pre Treatment nasal cannula (P)   -AY     Intra SpO2 (%) 97 (P)   -AY     Post SpO2 (%) 94 (P)   -AY     Row Name 01/10/22 0853          Positioning and Restraints    Pre-Treatment Position sitting in chair/recliner (P)   -AY     Post Treatment Position chair (P)   -AY     In Chair reclined; sitting; call light within reach; encouraged to call for assist (P)   -AY           User Key  (r) = Recorded By, (t) = Taken By, (c) = Cosigned By    Initials Name Provider Type    Luis Woods, PT Student PT Student               Outcome Measures     Row Name 01/10/22 0905          How much help from another person do you currently need...    Turning from your back to your side while in flat bed without using bedrails? 4 (P)   -AY     Moving to and from a bed to a chair (including a wheelchair)? 4 (P)   -AY     Standing up from a chair using your arms (e.g., wheelchair, bedside chair)? 4 (P)   -AY     Climbing 3-5 steps with a railing? 2 (P)   -AY     To walk in hospital room? 3 (P)   -AY     Row Name  01/10/22 0905          Functional Assessment    Outcome Measure Options AM-PAC 6 Clicks Basic Mobility (PT) (P)   -AY           User Key  (r) = Recorded By, (t) = Taken By, (c) = Cosigned By    Initials Name Provider Type    Luis Woods, PT Student PT Student                               PT Recommendation and Plan     Plan of Care Reviewed With: (P) patient  Outcome Summary: (P) Pt is a 71 year old male that was admitted to the hospital for celluitis of (L) LE. Pt's PMH includes COPD, hypertension, hyperlipidemia.  Pt was AOx4. Pt lives with his wife in a ranch style home that has 3 steps entering the house with bilateral handrails. Patient denies any previous history of falls. Pt has generalized weakness of the lower extremties globally. Pt uses a walker and cane to help ambulate around the house and in the community. Pt demonstrated ER and pes planus feet during standing and walking w/ FWW and pt has custom made shoes to help accomodate the deficits. Pt may benefit from skilled PT services acutely to improve on deficits in LE strength and endurance and to increase independence in transfers and functional mobility. Will follow peripherally and follow up at 1/12 if still inpatient.  Pt may benefit from home with homehealth after discharge.     Time Calculation:    PT Charges     Row Name 01/10/22 0913             Time Calculation    Start Time 0804 (P)   -AY      Stop Time 0835 (P)   -AY      Time Calculation (min) 31 min (P)   -AY      PT Received On 01/10/22 (P)   -AY      PT - Next Appointment 01/12/22 (P)   -AY      PT Goal Re-Cert Due Date 01/17/22 (P)   -AY              Time Calculation- PT    Total Timed Code Minutes- PT 15 minute(s) (P)   -AY              Timed Charges    58750 - PT Therapeutic Activity Minutes 15 (P)   -AY              Total Minutes    Timed Charges Total Minutes 15 (P)   -AY       Total Minutes 15 (P)   -AY            User Key  (r) = Recorded By, (t) = Taken By, (c) = Cosigned By     Initials Name Provider Type    AY Luis Eller, PT Student PT Student              Therapy Charges for Today     Code Description Service Date Service Provider Modifiers Qty    37375313194 HC PT EVAL MOD COMPLEXITY 2 1/10/2022 Luis Eller, PT Student GP 1    98739097948 HC PT THERAPEUTIC ACT EA 15 MIN 1/10/2022 Luis Eller, PT Student GP 1          PT G-Codes  Outcome Measure Options: (P) AM-PAC 6 Clicks Basic Mobility (PT)    Luis Eller PT Student  1/10/2022

## 2022-01-10 NOTE — PROGRESS NOTES
Name: Jalen Lockwood ADMIT: 2022   : 1950  PCP: Marc Ludwig MD    MRN: [id number] LOS: 1 days   AGE/SEX: 71 y.o. male  ROOM: 126/1     Subjective   Subjective   No new complaints or events overnight.  He is up sitting in his chair.    Review of Systems   Denies chest pain, palpitations, SOA, edema, fever, chills, nausea, vomiting and diarrhea.     Objective   Objective   Vital Signs  Temp:  [97.1 °F (36.2 °C)-97.5 °F (36.4 °C)] 97.5 °F (36.4 °C)  Heart Rate:  [81-90] 85  Resp:  [16-20] 16  BP: (114-133)/(81-85) 125/84  SpO2:  [90 %-98 %] 98 %  on  Flow (L/min):  [2-3] 2;   Device (Oxygen Therapy): nasal cannula  Body mass index is 40.55 kg/m².  Physical Exam  Vitals and nursing note reviewed.   Constitutional:       Appearance: He is obese. He is ill-appearing (Chronically).   Cardiovascular:      Rate and Rhythm: Normal rate and regular rhythm.   Pulmonary:      Effort: Pulmonary effort is normal. No respiratory distress.      Breath sounds: Normal breath sounds.   Abdominal:      General: Bowel sounds are normal. There is no distension.      Palpations: Abdomen is soft.      Tenderness: There is no abdominal tenderness.   Skin:     General: Skin is warm.      Comments: Lower extremity wounds, skin tears   Neurological:      Mental Status: He is alert and oriented to person, place, and time. Mental status is at baseline.   Psychiatric:         Mood and Affect: Mood normal.         Behavior: Behavior normal.         Results Review     I reviewed the patient's new clinical results.  Results from last 7 days   Lab Units 22  0608   WBC 10*3/mm3 11.05*   HEMOGLOBIN g/dL 14.2   PLATELETS 10*3/mm3 150     Results from last 7 days   Lab Units 22  0608   SODIUM mmol/L 140   POTASSIUM mmol/L 3.9   CHLORIDE mmol/L 98   CO2 mmol/L 27.7   BUN mg/dL 12   CREATININE mg/dL 1.01   GLUCOSE mg/dL 111*   EGFR IF NONAFRICN AM mL/min/1.73 73     Results from last 7 days   Lab Units 22  0608   ALBUMIN  g/dL 4.20   BILIRUBIN mg/dL 0.9   ALK PHOS U/L 91   AST (SGOT) U/L 15   ALT (SGPT) U/L 10     Results from last 7 days   Lab Units 01/09/22  0608   CALCIUM mg/dL 10.0   ALBUMIN g/dL 4.20   MAGNESIUM mg/dL 1.9     Results from last 7 days   Lab Units 01/09/22  1145 01/09/22  0608   LACTATE mmol/L 1.7 2.1*     COVID19   Date Value Ref Range Status   01/09/2022 Not Detected Not Detected - Ref. Range Final   04/11/2021 Not Detected Not Detected - Ref. Range Final     No results found for: HGBA1C, POCGLU    Doppler Ankle Brachial Index Single Level CAR  · Right Conclusion: The right MARIBELL is normal. Normal digital pressures.  · Left Conclusion: The left MARIBELL is normal. Unable to assess digital   ischemia.  · Left great toe pressure not obtained due to incompressibility, but   waveform indicates good perfusion.  · Conclusions: Left great toe pressure not obtained due to   incompressibility, but waveform indicates good perfusion..       Scheduled Medications  bacitracin, 1 application, Topical, Q12H  bacitracin, 1 application, Topical, Q12H  budesonide-formoterol, 2 puff, Inhalation, BID - RT  carvedilol, 6.25 mg, Oral, BID With Meals  cefTRIAXone, 2 g, Intravenous, Q24H  finasteride, 5 mg, Oral, Daily  gabapentin, 400 mg, Oral, Q8H  hydrocortisone-bacitracin-zinc oxide-nystatin, 1 application, Topical, BID  ipratropium, 0.5 mg, Nebulization, 4x Daily - RT  metoclopramide, 10 mg, Oral, 4x Daily AC & at Bedtime  mupirocin, , Topical, Q8H  nystatin, , Topical, Q12H  pravastatin, 20 mg, Oral, Nightly  senna-docusate sodium, 2 tablet, Oral, BID  sodium chloride, 10 mL, Intravenous, Q12H  tamsulosin, 0.4 mg, Oral, Nightly  vancomycin, 1,000 mg, Intravenous, Q12H  warfarin, 5 mg, Oral, Weekly  warfarin, 7.5 mg, Oral, Once per day on Sun Tue Wed Thu Fri Sat    Infusions  Pharmacy to dose vancomycin,   Pharmacy to dose warfarin,     Diet  Diet Regular; Cardiac, Consistent Carbohydrate       Assessment/Plan     Active Hospital  Problems    Diagnosis  POA   • **Cellulitis [L03.90]  Yes   • Generalized weakness [R53.1]  Yes   • Fever in adult [R50.9]  Yes   • Elevated lactic acid level [R79.89]  Yes   • Charcot's joint of foot [M14.679]  Yes   • Chronic anticoagulation [Z79.01]  Not Applicable   • Essential hypertension [I10]  Yes   • COPD (chronic obstructive pulmonary disease) ([facility]) [J44.9]  Yes   • Obesity, Class III, BMI 40-49.9 (morbid obesity) ([facility]) [E66.01]  Yes   • Permanent atrial fibrillation ([facility]) [I48.21]  Yes      Resolved Hospital Problems   No resolved problems to display.       Cellulitis/Charcot's joint of foot  continue iv vanc and rocephin  Blood cultures growing staph. redraw blood cultures and consider ID consult.  wound care RN following   Vascular surgery saw in consultation and have no plans for surgical debridement as ulcers are superficial and recommend continuing with wound care.  Ankle Doppler and wound culture pending.      Permanent atrial fibrillation   pharmacy dosing warfarin, check daily INR. Continue beta-blocker, rate controlled.     Obesity  BMI 40.5, complicating his problems     HTN  resume ACE  lasix still on hold, currently euvolemic     Generalized weakness  Likely secondary #1  PT/OT    Warfarin (home med) for DVT prophylaxis.  Full code.  Discussed with patient and nursing staff.  Anticipate discharge ESTEFANY Ulloa  [facility]  01/10/22  12:47 EST

## 2022-01-10 NOTE — PROGRESS NOTES
Name: Jalen Lockwood ADMIT: 2022   : 1950  PCP: Marc Ludwig MD    MRN: [id number] LOS: 1 days   AGE/SEX: 71 y.o. male  ROOM: [address] [place] [address]    [place], Subsequent Hospital Care    Chief Complaint   Patient presents with   • Fever   • Weakness - Generalized     CC: Foot wound follow-up.  Subjective     71 y.o. male patient resting comfortably this morning without new complaints.    Review of Systems    Objective     Scheduled Medications:   bacitracin, 1 application, Topical, Q12H  bacitracin, 1 application, Topical, Q12H  budesonide-formoterol, 2 puff, Inhalation, BID - RT  carvedilol, 6.25 mg, Oral, BID With Meals  cefTRIAXone, 2 g, Intravenous, Q24H  finasteride, 5 mg, Oral, Daily  gabapentin, 400 mg, Oral, Q8H  hydrocortisone-bacitracin-zinc oxide-nystatin, 1 application, Topical, BID  ipratropium, 0.5 mg, Nebulization, 4x Daily - RT  metoclopramide, 10 mg, Oral, 4x Daily AC & at Bedtime  mupirocin, , Topical, Q8H  nystatin, , Topical, Q12H  Pharmacy Consult for Antimicrobial Stewardship, , Does not apply, Once  pravastatin, 20 mg, Oral, Nightly  senna-docusate sodium, 2 tablet, Oral, BID  sodium chloride, 10 mL, Intravenous, Q12H  tamsulosin, 0.4 mg, Oral, Nightly  vancomycin, 1,000 mg, Intravenous, Q12H  warfarin, 5 mg, Oral, Weekly  warfarin, 7.5 mg, Oral, Once per day on Sun Tue Wed Thu Fri Sat        Active Infusions:  Pharmacy to dose vancomycin,   Pharmacy to dose warfarin,         As Needed Medications:  •  acetaminophen **OR** acetaminophen **OR** acetaminophen  •  albuterol  •  ALPRAZolam  •  senna-docusate sodium **AND** polyethylene glycol **AND** bisacodyl **AND** bisacodyl  •  HYDROcodone-acetaminophen  •  hydrocortisone-bacitracin-zinc oxide-nystatin  •  ondansetron **OR** ondansetron  •  Pharmacy to dose vancomycin  •  Pharmacy to dose warfarin  •  sodium chloride  •  sodium chloride    Vital Signs  Vital Signs Patient Vitals for the past 24 hrs:   BP Temp Temp src  Pulse Resp SpO2 Weight   01/10/22 0754 -- -- -- 90 20 98 % --   01/10/22 0549 -- -- -- 85 20 91 % --   01/09/22 2358 133/85 97.3 °F (36.3 °C) Skin 90 20 90 % --   01/09/22 2012 114/81 97.1 °F (36.2 °C) Oral 81 20 95 % --   01/09/22 1909 -- -- -- 81 20 97 % --   01/09/22 1226 110/82 98.6 °F (37 °C) Oral -- 22 93 % --   01/09/22 1145 107/62 98 °F (36.7 °C) -- 95 20 97 % --   01/09/22 1130 -- -- -- 110 20 92 % --   01/09/22 1000 103/92 -- -- 102 -- 95 % --   01/09/22 0904 -- -- -- -- -- -- 136 kg (299 lb)   01/09/22 0901 93/70 -- -- 101 18 97 % --   01/09/22 0820 91/72 99 °F (37.2 °C) -- 96 18 94 % --     I/O:  I/O last 3 completed shifts:  In: 1540 [I.V.:840; IV Piggyback:700]  Out: 320 [Urine:320]    Physical Exam:  Physical Exam  Constitutional:       Appearance: He is well-developed.   Pulmonary:      Effort: Pulmonary effort is normal. No respiratory distress.   Abdominal:      General: There is no distension.      Palpations: Abdomen is soft.   Skin:     General: Skin is warm.      Capillary Refill: Capillary refill takes less than 2 seconds.   Neurological:      General: No focal deficit present.      Mental Status: He is alert and oriented to person, place, and time.   Psychiatric:         Mood and Affect: Mood normal.         Behavior: Behavior normal.          Results Review:     CBC    Results from last 7 days   Lab Units 01/09/22  0608   WBC 10*3/mm3 11.05*   HEMOGLOBIN g/dL 14.2   PLATELETS 10*3/mm3 150     BMP   Results from last 7 days   Lab Units 01/09/22  0608   SODIUM mmol/L 140   POTASSIUM mmol/L 3.9   CHLORIDE mmol/L 98   CO2 mmol/L 27.7   BUN mg/dL 12   CREATININE mg/dL 1.01   GLUCOSE mg/dL 111*   MAGNESIUM mg/dL 1.9     Cr Clearance Estimated Creatinine Clearance: 97.2 mL/min (by C-G formula based on SCr of 1.01 mg/dL).  Coag   Results from last 7 days   Lab Units 01/09/22  0609   INR  2.20*   APTT seconds 47.9*     HbA1C   Lab Results   Component Value Date    HGBA1C 5.00 03/30/2021    HGBA1C 5.3  04/22/2016     Blood Glucose No results found for: POCGLU  Infection   Results from last 7 days   Lab Units 01/09/22  0609   BLOODCX  No growth at 24 hours  Abnormal Stain*   BCIDPCR  Staph spp, not aureus or lugdunesis. Identification by BCID2 PCR.*     CMP   Results from last 7 days   Lab Units 01/09/22  0608   SODIUM mmol/L 140   POTASSIUM mmol/L 3.9   CHLORIDE mmol/L 98   CO2 mmol/L 27.7   BUN mg/dL 12   CREATININE mg/dL 1.01   GLUCOSE mg/dL 111*   ALBUMIN g/dL 4.20   BILIRUBIN mg/dL 0.9   ALK PHOS U/L 91   AST (SGOT) U/L 15   ALT (SGPT) U/L 10     ABG      UA    Results from last 7 days   Lab Units 01/09/22  0641   NITRITE UA  Negative   WBC UA /HPF 0-2   BACTERIA UA /HPF None Seen   SQUAM EPITHEL UA /HPF 0-2     ANNIE  No results found for: POCMETH, POCAMPHET, POCBARBITUR, POCBENZO, POCCOCAINE, POCOPIATES, POCOXYCODO, POCPHENCYC, POCPROPOXY, POCTHC, POCTRICYC  Radiology(recent) No radiology results for the last day    Assessment/Plan     Assessment & Plan      Cellulitis    Permanent atrial fibrillation (HCC)    Obesity, Class III, BMI 40-49.9 (morbid obesity) (HCC)    COPD (chronic obstructive pulmonary disease) (HCC)    Essential hypertension    Chronic anticoagulation    Charcot's joint of foot    Generalized weakness    Fever in adult    Elevated lactic acid level                    71 y.o. male awaiting lower extremity arterial Dopplers.  Patient has a previous popliteal stent placed by Dr. Darvin Aguayo.  Would continue with superficial wound care with bacitracin ointment.  I have no plans for surgical debridement as I believe these ulcers are quite superficial and should continue with wound care.  Will defer cellulitis treatment to the medical services.      Once again he has significant orthopedic/podiatric deformities of the foot structure.  Should be eventually evaluated by a foot and ankle specialist.    Holger Menendez MD  01/10/22  08:18 EST    Please call my office with any question: [phone]    Active Hospital Problems    Diagnosis  POA   • **Cellulitis [L03.90]  Yes   • Generalized weakness [R53.1]  Yes   • Fever in adult [R50.9]  Yes   • Elevated lactic acid level [R79.89]  Yes   • Charcot's joint of foot [M14.679]  Yes   • Chronic anticoagulation [Z79.01]  Not Applicable   • Essential hypertension [I10]  Yes   • COPD (chronic obstructive pulmonary disease) ([facility]) [J44.9]  Yes   • Obesity, Class III, BMI 40-49.9 (morbid obesity) ([facility]) [E66.01]  Yes   • Permanent atrial fibrillation ([facility]) [I48.21]  Yes      Resolved Hospital Problems   No resolved problems to display.

## 2022-01-10 NOTE — PLAN OF CARE
Goal Outcome Evaluation:  Plan of Care Reviewed With: (P) patient           Outcome Summary: (P) Pt is a 71 year old male that was admitted to the hospital for celluitis of (L) LE. Pt's PMH includes COPD, hypertension, hyperlipidemia.  Pt was AOx4. Pt lives with his wife in a ranch style home that has 3 steps entering the house with bilateral handrails. Patient denies any previous history of falls. Pt has generalized weakness of the lower extremties globally. Pt uses a walker and cane to help ambulate around the house and in the community. Pt demonstrated ER and pes planus feet during standing and walking w/ FWW and pt has custom made shoes to help accomodate the deficits. Pt may benefit from skilled PT services acutely to improve on deficits in LE strength and endurance and to increase independence in transfers and functional mobility. Will follow peripherally and follow up at 1/12 if still inpatient.  Pt may benefit from home with homehealth after discharge.    Patient was wearing a face mask during this therapy encounter. Therapist used appropriate personal protective equipment including mask and gloves.  Mask used was standard procedure mask. Appropriate PPE was worn during the entire therapy session. Hand hygiene was completed before and after therapy session. Patient is not in enhanced droplet precautions.

## 2022-01-10 NOTE — DISCHARGE PLACEMENT REQUEST
"He Lockwood (71 y.o. 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)   Code Status: CPR   Advance Care Planning Activity    Ht: 182.9 cm (72\")   Wt: 136 kg (299 lb)    Admission Cmt: None   Principal Problem: Cellulitis [L03.90]                 Active Insurance as of 1/9/2022     Primary Coverage     Payor Plan Insurance Group Employer/Plan Group    HU[employer] MEDICARE REPLACEMENT HU[employer]NA MEDICARE REPLACEMENT [id number]     Payor Plan Address Payor Plan Phone Number Payor Plan Fax Number Effective Dates    [address] [address] [phone]  1/1/2018 - None Entered    [facility] [id number]       Subscriber Name Subscriber Birth Date Member ID       HE LOCKWOOD 1950 [id number]                 Emergency Contacts      (Rel.) Home Phone Work Phone Mobile Phone    Mariposa Lockwood (Spouse) [phone] -- [phone]    Carl Lozano (Friend) -- -- [phone]              "

## 2022-01-10 NOTE — PLAN OF CARE
Pt in hospital for L LE cellulitis. Wears unna boots at home for quite some years. Pt lives with his wife who assists with LB ADLs. Pt demos decreased endurance, strength and balance which is limiting his safe completion of ADLs at his PLOF. Pt may benefit from skilled OT services in the hospital prior to d/c home with wife.    Patient was not wearing a face mask during this therapy encounter. Therapist used appropriate personal protective equipment including mask, gloves, and eye protection.  Mask used was standard procedure mask. Appropriate PPE was worn during the entire therapy session. Hand hygiene was completed before and after therapy session. Patient is not in enhanced droplet precautions.

## 2022-01-10 NOTE — THERAPY EVALUATION
Patient Name: Jalen Lockwood  : 1950    MRN: [id number]                              Today's Date: 1/10/2022       Admit Date: 2022    Visit Dx:     ICD-10-CM ICD-9-CM   1. Generalized weakness  R53.1 780.79   2. Cellulitis of left lower extremity  L03.116 682.6   3. Lower limb ulcer, ankle, left, with unspecified severity (HCC)  L97.329 707.13   4. Elevated lactic acid level  R79.89 276.2   5. Fever and chills  R50.9 780.60     Patient Active Problem List   Diagnosis   • Chronic osteomyelitis (HCC)   • Foot pain   • Peripheral neuropathy   • Lymphedema of both lower extremities   • Permanent atrial fibrillation (HCC)   • Sleep apnea   • Chronic edema   • Obesity, Class III, BMI 40-49.9 (morbid obesity) (HCC)   • COPD (chronic obstructive pulmonary disease) (HCC)   • Atrial flutter (HCC)   • 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HCC)   • Aortectasia (HCC)   • Popliteal artery aneurysm (HCC)   • Colon polyps   • Gastroparesis   • Insomnia   • Adenomatous polyp of colon   • Essential hypertension   • ETOH abuse   • Chronic anticoagulation   • Oropharyngeal dysphagia   • Tobacco abuse   • Thyromegaly   • Adrenal adenoma, left   • Retroperitoneal lymphadenopathy   • Anemia of chronic disease   • Thyroid nodule   • Charcot's joint of foot   • Abnormal CT scan, lumbar spine   • Alcohol dependence, in remission (HCC)   • Ischemic cardiomyopathy   • Cellulitis   • Normocytic anemia   • Inguinal adenopathy   • Generalized weakness   • Fever in adult   • Elevated lactic acid level     Past Medical History:   Diagnosis Date   • Allergic rhinitis    • Anxiety    • Aortectasia (HCC)     3cm infrarenal abdominal aorta   • Arthritis    • Atrial flutter (HCC) 2010    s/p ablation    • Charcot's joint of foot    • Chronic edema     both legs and sees wound care center at [place]    • Chronic venous insufficiency    • COPD (chronic obstructive pulmonary disease) (HCC)    • Coronary atherosclerosis     Cath 2010: diffuse  40-50% disease   • Diverticulosis    • Duodenitis    • Fatty liver    • Gastritis    • Gastroparesis    • Hematoma     post-operative; After catheterization, right groin, required surgical exploration   • Hyperlipidemia    • Hypertension    • Insomnia    • Internal hemorrhoids    • Open wound     izzy legs has drsg chg weekly at wound care center at [place]  pt does second dressing on left leg another time during week   • Osteomyelitis (HCC)    • Paroxysmal atrial fibrillation (HCC)    • Peripheral neuropathy    • Popliteal artery aneurysm (HCC)     left, s/p stenting by Dr. Boston   • Skin cancer    • Sleep apnea     o2   • 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HCC)     due to flutter and afib; cath 2010 with nonobstructive disease   • Venous stasis    • Venous stasis ulcer (HCC)     bilateral legs      Past Surgical History:   Procedure Laterality Date   • BASAL CELL CARCINOMA EXCISION      ear and left side of face   • BRONCHOSCOPY N/A 4/12/2021    Procedure: BRONCHOSCOPY WITH BAL;  Surgeon: Bunny Lepe MD;  Location: [facility] ENDOSCOPY;  Service: Pulmonary;  Laterality: N/A;  PRE-HEMOPTYSIS  POST-SAME   • CARDIAC CATHETERIZATION     • CATARACT EXTRACTION     • COLONOSCOPY  09/28/2015    NBIH, diverticulosis, polyps   • COLONOSCOPY N/A 9/11/2018    Procedure: COLONOSCOPY TO CECUM  AND TERM. ILEUM WITH COLD SNARE POLYPECTOMIES;  Surgeon: Kane Lagunas MD;  Location:  [facility] ENDOSCOPY;  Service: Gastroenterology   • COLONOSCOPY N/A 10/29/2019    Procedure: COLONOSCOPY TO TO CECUM AND TERMINAL ILEUM WITH HOT AND COLD SNARE POLYPECTOMIES;  Surgeon: Kane Laugnas MD;  Location: [facility] ENDOSCOPY;  Service: Gastroenterology   • HIP ARTHROPLASTY Right 2017   • JOINT REPLACEMENT Left    • OTHER SURGICAL HISTORY      Catheter ablation atrial flutter   • REPAIR ANEURYSM / PSEUDO ANEURYSM / RUPTURED ANEURYSM POPLITEAL ARTERY      Stent-Graft of the the left popliteal artery   • REPAIR KNEE LIGAMENT      Primary repair  of knee ligament cruciate anterior right   • TONSILLECTOMY  1958   • TOTAL KNEE ARTHROPLASTY Bilateral    • UPPER GASTROINTESTINAL ENDOSCOPY  09/16/2014    acute gastritis, acute duodenitis      General Information     Row Name 01/10/22 1550          OT Time and Intention    Document Type evaluation  -AF     Mode of Treatment occupational therapy  -AF     Row Name 01/10/22 1550          General Information    Patient Profile Reviewed yes  -AF     Prior Level of Function min assist:; ADL's  -AF     Existing Precautions/Restrictions fall; oxygen therapy device and L/min  -AF     Row Name 01/10/22 1550          Living Environment    Lives With spouse  that helps with ADls, doesn't shower at home, has unna boots at all times wife assists with LB bathing and dressing tasks  -AF     Row Name 01/10/22 1550          Cognition    Orientation Status (Cognition) oriented x 4  -AF     Row Name 01/10/22 1550          Safety Issues, Functional Mobility    Impairments Affecting Function (Mobility) balance; endurance/activity tolerance; strength; sensation/sensory awareness; postural/trunk control  -AF           User Key  (r) = Recorded By, (t) = Taken By, (c) = Cosigned By    Initials Name Provider Type    AF Payal Christine, OTR Occupational Therapist                 Mobility/ADL's     Row Name 01/10/22 1552          Bed Mobility    Comment (Bed Mobility) up in w/c  -AF     Row Name 01/10/22 1552          Transfers    Transfers toilet transfer  -AF     Sit-Stand Sullivan (Transfers) contact guard  -AF     Sullivan Level (Toilet Transfer) minimum assist (75% patient effort); contact guard; verbal cues  -AF     Assistive Device (Toilet Transfer) walker, front-wheeled; grab bars/safety frame  -AF     Row Name 01/10/22 1552          Sit-Stand Transfer    Assistive Device (Sit-Stand Transfers) walker, front-wheeled  -AF     Row Name 01/10/22 1552          Toilet Transfer    Type (Toilet Transfer) sit-stand; stand-sit  -AF      Row Name 01/10/22 1552          Functional Mobility    Functional Mobility- Comment walked in and out of bathroom with RWX CGA, without wearing 02 dropped to 85% when return to recliner chair  -AF     Row Name 01/10/22 1552          Activities of Daily Living    BADL Assessment/Intervention lower body dressing  -AF     Row Name 01/10/22 1552          Lower Body Dressing Assessment/Training    Comment (Lower Body Dressing) dep with socks and was prior  -AF           User Key  (r) = Recorded By, (t) = Taken By, (c) = Cosigned By    Initials Name Provider Type    AF Payal Christine OTR Occupational Therapist               Obj/Interventions     Row Name 01/10/22 1553          Sensory Assessment (Somatosensory)    Sensory Assessment (Somatosensory) bilateral LE  -AF     Bilateral LE Sensory Assessment impaired  -AF     Row Name 01/10/22 1553          Vision Assessment/Intervention    Visual Impairment/Limitations WFL  -AF     Row Name 01/10/22 1553          Range of Motion Comprehensive    Comment, General Range of Motion BUE AROM in R shoulder 30% has rotator cuff injury. LUE AROM 7/8  -AF     Row Name 01/10/22 1553          Strength Comprehensive (MMT)    Comment, General Manual Muscle Testing (MMT) Assessment BUE 3+/5  -AF     [facility] Name 01/10/22 1553          Motor Skills    Motor Skills functional endurance  -AF     Functional Endurance fair - with walking to and from bathroom  -AF     Row Name 01/10/22 1553          Balance    Balance Assessment sitting static balance; standing static balance  -AF     Static Sitting Balance WFL  -AF     Static Standing Balance mild impairment  -AF           User Key  (r) = Recorded By, (t) = Taken By, (c) = Cosigned By    Initials Name Provider Type    Payal Scott OTR Occupational Therapist               Goals/Plan     Row Name 01/10/22 1556          Transfer Goal 1 (OT)    Activity/Assistive Device (Transfer Goal 1, OT) toilet  -AF     [place] Level/Cues Needed (Transfer  Goal 1, OT) standby assist  -AF     Time Frame (Transfer Goal 1, OT) long term goal (LTG)  -AF     Strategies/Barriers (Transfers Goal 1, OT) with RWX  -AF     Progress/Outcome (Transfer Goal 1, OT) goal ongoing  -AF     Row Name 01/10/22 1556          Toileting Goal 1 (OT)    Activity/Device (Toileting Goal 1, OT) toileting skills, all  -AF     [place] Level/Cues Needed (Toileting Goal 1, OT) minimum assist (75% or more patient effort)  -AF     Time Frame (Toileting Goal 1, OT) long term goal (LTG)  -AF     Progress/Outcome (Toileting Goal 1, OT) goal ongoing  -AF     Row Name 01/10/22 1556          Grooming Goal 1 (OT)    Activity/Device (Grooming Goal 1, OT) grooming skills, all  -AF     [place] (Grooming Goal 1, OT) set-up required  -AF     Time Frame (Grooming Goal 1, OT) long term goal (LTG)  -AF     Progress/Outcome (Grooming Goal 1, OT) goal ongoing  -AF     Row Name 01/10/22 1556          Strength Goal 1 (OT)    Strength Goal 1 (OT) BUE HEP with theraband 3 reps x 10 reps  -AF     Time Frame (Strength Goal 1, OT) long term goal (LTG)  -AF     Progress/Outcome (Strength Goal 1, OT) goal ongoing  -AF     Row Name 01/10/22 1556          Therapy Assessment/Plan (OT)    Planned Therapy Interventions (OT) activity tolerance training; BADL retraining; functional balance retraining; strengthening exercise; transfer/mobility retraining; patient/caregiver education/training  -AF           User Key  (r) = Recorded By, (t) = Taken By, (c) = Cosigned By    Initials Name Provider Type    AF Payal Christine OTR Occupational Therapist               Clinical Impression     Row Name 01/10/22 4175          Pain Scale: Numbers Pre/Post-Treatment    Pretreatment Pain Rating 7/10  -AF     Posttreatment Pain Rating 7/10  -AF     Pain Location - Orientation lower  -AF     Pain Location foot  -AF     Row Name 01/10/22 1555          Therapy Assessment/Plan (OT)    Criteria for Skilled Therapeutic Interventions Met (OT)  yes; skilled treatment is necessary  -AF     Therapy Frequency (OT) 5 times/wk  -AF     Row Name 01/10/22 1555          Therapy Plan Review/Discharge Plan (OT)    Anticipated Discharge Disposition (OT) home; home with 24/7 care; home with assist  -AF     Row Name 01/10/22 1555          Vital Signs    O2 Delivery Pre Treatment supplemental O2  -AF     O2 Delivery Intra Treatment supplemental O2  -AF     Row Name 01/10/22 1555          Positioning and Restraints    Pre-Treatment Position sitting in chair/recliner  -AF     Post Treatment Position chair  -AF     In Chair sitting; encouraged to call for assist; call light within reach; with nsg  -AF           User Key  (r) = Recorded By, (t) = Taken By, (c) = Cosigned By    Initials Name Provider Type    Payal Scott, OTR Occupational Therapist               Outcome Measures     Row Name 01/10/22 1557          How much help from another is currently needed...    Putting on and taking off regular lower body clothing? 1  -AF     Bathing (including washing, rinsing, and drying) 2  -AF     Toileting (which includes using toilet bed pan or urinal) 2  -AF     Putting on and taking off regular upper body clothing 3  -AF     Taking care of personal grooming (such as brushing teeth) 3  -AF     Eating meals 4  -AF     AM-PAC 6 Clicks Score (OT) 15  -AF     Row Name 01/10/22 0905          How much help from another person do you currently need...    Turning from your back to your side while in flat bed without using bedrails? 4  -MS (r) AY (t) MS (c)     Moving to and from a bed to a chair (including a wheelchair)? 4  -MS (r) AY (t) MS (c)     Standing up from a chair using your arms (e.g., wheelchair, bedside chair)? 4  -MS (r) AY (t) MS (c)     Climbing 3-5 steps with a railing? 2  -MS (r) AY (t) MS (c)     To walk in hospital room? 3  -MS (r) AY (t) MS (c)     Row Name 01/10/22 1557 01/10/22 0905       Functional Assessment    Outcome Measure Options AM-PAC 6 Clicks Daily  Activity (OT)  -AF AM-PAC 6 Clicks Basic Mobility (PT)  -MS (r) AY (t) MS (c)          User Key  (r) = Recorded By, (t) = Taken By, (c) = Cosigned By    Initials Name Provider Type    AF Payal Christine, OTR Occupational Therapist    MS Mirna Ferguson, PT Physical Therapist    Luis Woods, PT Student PT Student                Occupational Therapy Education                 Title: PT OT SLP Therapies (In Progress)     Topic: Occupational Therapy (In Progress)     Point: ADL training (Done)     Description:   Instruct learner(s) on proper safety adaptation and remediation techniques during self care or transfers.   Instruct in proper use of assistive devices.              Learning Progress Summary           Patient Acceptance, E, VU by  at 1/10/2022 7823    Comment: educated on transfers                   Point: Home exercise program (Not Started)     Description:   Instruct learner(s) on appropriate technique for monitoring, assisting and/or progressing therapeutic exercises/activities.              Learner Progress:  Not documented in this visit.          Point: Precautions (Not Started)     Description:   Instruct learner(s) on prescribed precautions during self-care and functional transfers.              Learner Progress:  Not documented in this visit.          Point: Body mechanics (Not Started)     Description:   Instruct learner(s) on proper positioning and spine alignment during self-care, functional mobility activities and/or exercises.              Learner Progress:  Not documented in this visit.                      User Key     Initials Effective Dates Name Provider Type Discipline     06/16/21 -  Payal Christine, OTR Occupational Therapist OT              OT Recommendation and Plan  Planned Therapy Interventions (OT): activity tolerance training, BADL retraining, functional balance retraining, strengthening exercise, transfer/mobility retraining, patient/caregiver education/training  Therapy  Frequency (OT): 5 times/wk        Time Calculation:    Time Calculation- OT     Row Name 01/10/22 1558             Time Calculation- OT    OT Start Time 1422  -AF      OT Stop Time 1433  -AF      OT Time Calculation (min) 11 min  -AF              Untimed Charges    OT Eval/Re-eval Minutes 11  -AF              Total Minutes    Untimed Charges Total Minutes 11  -AF       Total Minutes 11  -AF            User Key  (r) = Recorded By, (t) = Taken By, (c) = Cosigned By    Initials Name Provider Type    AF Payal Christine, OTSOCORRO Occupational Therapist              Therapy Charges for Today     Code Description Service Date Service Provider Modifiers Qty    [id number] HC OT EVAL LOW COMPLEXITY 2 1/10/2022 Payal Christine OTR GO 1               JONA Hutchison  1/10/2022

## 2022-01-11 LAB
BACTERIA SPEC AEROBE CULT: ABNORMAL
BASOPHILS # BLD AUTO: 0.03 10*3/MM3 (ref 0–0.2)
BASOPHILS NFR BLD AUTO: 0.4 % (ref 0–1.5)
CRP SERPL-MCNC: 8.37 MG/DL (ref 0–0.5)
DEPRECATED RDW RBC AUTO: 54.7 FL (ref 37–54)
EOSINOPHIL # BLD AUTO: 0.06 10*3/MM3 (ref 0–0.4)
EOSINOPHIL NFR BLD AUTO: 0.7 % (ref 0.3–6.2)
ERYTHROCYTE [DISTWIDTH] IN BLOOD BY AUTOMATED COUNT: 14.6 % (ref 12.3–15.4)
ERYTHROCYTE [SEDIMENTATION RATE] IN BLOOD: 18 MM/HR (ref 0–20)
GRAM STN SPEC: ABNORMAL
HCT VFR BLD AUTO: 39.4 % (ref 37.5–51)
HGB BLD-MCNC: 13.4 G/DL (ref 13–17.7)
IMM GRANULOCYTES # BLD AUTO: 0.03 10*3/MM3 (ref 0–0.05)
IMM GRANULOCYTES NFR BLD AUTO: 0.4 % (ref 0–0.5)
INR PPP: 1.83 (ref 0.9–1.1)
ISOLATED FROM: ABNORMAL
LYMPHOCYTES # BLD AUTO: 0.74 10*3/MM3 (ref 0.7–3.1)
LYMPHOCYTES NFR BLD AUTO: 9 % (ref 19.6–45.3)
MCH RBC QN AUTO: 34.7 PG (ref 26.6–33)
MCHC RBC AUTO-ENTMCNC: 34 G/DL (ref 31.5–35.7)
MCV RBC AUTO: 102.1 FL (ref 79–97)
MONOCYTES # BLD AUTO: 0.51 10*3/MM3 (ref 0.1–0.9)
MONOCYTES NFR BLD AUTO: 6.2 % (ref 5–12)
NEUTROPHILS NFR BLD AUTO: 6.84 10*3/MM3 (ref 1.7–7)
NEUTROPHILS NFR BLD AUTO: 83.3 % (ref 42.7–76)
NRBC BLD AUTO-RTO: 0 /100 WBC (ref 0–0.2)
PLATELET # BLD AUTO: 142 10*3/MM3 (ref 140–450)
PMV BLD AUTO: 10.8 FL (ref 6–12)
PROTHROMBIN TIME: 21 SECONDS (ref 11.7–14.2)
RBC # BLD AUTO: 3.86 10*6/MM3 (ref 4.14–5.8)
VANCOMYCIN TROUGH SERPL-MCNC: 11.5 MCG/ML (ref 5–20)
WBC NRBC COR # BLD: 8.21 10*3/MM3 (ref 3.4–10.8)

## 2022-01-11 PROCEDURE — 94799 UNLISTED PULMONARY SVC/PX: CPT

## 2022-01-11 PROCEDURE — G0378 HOSPITAL OBSERVATION PER HR: HCPCS

## 2022-01-11 PROCEDURE — 85610 PROTHROMBIN TIME: CPT | Performed by: NURSE PRACTITIONER

## 2022-01-11 PROCEDURE — 99222 1ST HOSP IP/OBS MODERATE 55: CPT | Performed by: STUDENT IN AN ORGANIZED HEALTH CARE EDUCATION/TRAINING PROGRAM

## 2022-01-11 PROCEDURE — 25010000002 VANCOMYCIN PER 500 MG: Performed by: NURSE PRACTITIONER

## 2022-01-11 PROCEDURE — 80202 ASSAY OF VANCOMYCIN: CPT | Performed by: NURSE PRACTITIONER

## 2022-01-11 PROCEDURE — 25010000002 CEFTRIAXONE PER 250 MG: Performed by: NURSE PRACTITIONER

## 2022-01-11 PROCEDURE — 94760 N-INVAS EAR/PLS OXIMETRY 1: CPT

## 2022-01-11 PROCEDURE — 85025 COMPLETE CBC W/AUTO DIFF WBC: CPT | Performed by: NURSE PRACTITIONER

## 2022-01-11 PROCEDURE — 94761 N-INVAS EAR/PLS OXIMETRY MLT: CPT

## 2022-01-11 RX ADMIN — HYDROCODONE BITARTRATE AND ACETAMINOPHEN 1 TABLET: 10; 325 TABLET ORAL at 17:13

## 2022-01-11 RX ADMIN — HYDROCODONE BITARTRATE AND ACETAMINOPHEN 1 TABLET: 10; 325 TABLET ORAL at 23:33

## 2022-01-11 RX ADMIN — METOCLOPRAMIDE 10 MG: 10 TABLET ORAL at 21:19

## 2022-01-11 RX ADMIN — METOCLOPRAMIDE 10 MG: 10 TABLET ORAL at 17:13

## 2022-01-11 RX ADMIN — GABAPENTIN 400 MG: 400 CAPSULE ORAL at 15:17

## 2022-01-11 RX ADMIN — CEFTRIAXONE SODIUM 2 G: 2 INJECTION, POWDER, FOR SOLUTION INTRAMUSCULAR; INTRAVENOUS at 08:42

## 2022-01-11 RX ADMIN — VANCOMYCIN HYDROCHLORIDE 1000 MG: 1 INJECTION, SOLUTION INTRAVENOUS at 11:00

## 2022-01-11 RX ADMIN — LISINOPRIL 20 MG: 20 TABLET ORAL at 08:40

## 2022-01-11 RX ADMIN — MUPIROCIN: 20 OINTMENT TOPICAL at 05:24

## 2022-01-11 RX ADMIN — ZINC OXIDE 1 APPLICATION: 200 OINTMENT TOPICAL at 21:19

## 2022-01-11 RX ADMIN — NYSTATIN: 100000 POWDER TOPICAL at 21:19

## 2022-01-11 RX ADMIN — IPRATROPIUM BROMIDE 0.5 MG: 0.5 SOLUTION RESPIRATORY (INHALATION) at 07:10

## 2022-01-11 RX ADMIN — PRAVASTATIN SODIUM 20 MG: 40 TABLET ORAL at 21:19

## 2022-01-11 RX ADMIN — ALPRAZOLAM 0.5 MG: 0.25 TABLET ORAL at 23:33

## 2022-01-11 RX ADMIN — DOCUSATE SODIUM 50MG AND SENNOSIDES 8.6MG 2 TABLET: 8.6; 5 TABLET, FILM COATED ORAL at 21:19

## 2022-01-11 RX ADMIN — SODIUM CHLORIDE, PRESERVATIVE FREE 10 ML: 5 INJECTION INTRAVENOUS at 23:34

## 2022-01-11 RX ADMIN — SODIUM CHLORIDE, PRESERVATIVE FREE 10 ML: 5 INJECTION INTRAVENOUS at 08:42

## 2022-01-11 RX ADMIN — BUDESONIDE AND FORMOTEROL FUMARATE DIHYDRATE 2 PUFF: 160; 4.5 AEROSOL RESPIRATORY (INHALATION) at 21:02

## 2022-01-11 RX ADMIN — GABAPENTIN 400 MG: 400 CAPSULE ORAL at 05:24

## 2022-01-11 RX ADMIN — IPRATROPIUM BROMIDE 0.5 MG: 0.5 SOLUTION RESPIRATORY (INHALATION) at 16:46

## 2022-01-11 RX ADMIN — IPRATROPIUM BROMIDE 0.5 MG: 0.5 SOLUTION RESPIRATORY (INHALATION) at 20:57

## 2022-01-11 RX ADMIN — MUPIROCIN: 20 OINTMENT TOPICAL at 15:18

## 2022-01-11 RX ADMIN — NYSTATIN: 100000 POWDER TOPICAL at 08:41

## 2022-01-11 RX ADMIN — MUPIROCIN: 20 OINTMENT TOPICAL at 21:19

## 2022-01-11 RX ADMIN — WARFARIN 7.5 MG: 7.5 TABLET ORAL at 17:13

## 2022-01-11 RX ADMIN — METOCLOPRAMIDE 10 MG: 10 TABLET ORAL at 08:41

## 2022-01-11 RX ADMIN — GABAPENTIN 400 MG: 400 CAPSULE ORAL at 21:19

## 2022-01-11 RX ADMIN — FINASTERIDE 5 MG: 5 TABLET, FILM COATED ORAL at 08:40

## 2022-01-11 RX ADMIN — ZINC OXIDE 1 APPLICATION: 200 OINTMENT TOPICAL at 08:41

## 2022-01-11 RX ADMIN — BUDESONIDE AND FORMOTEROL FUMARATE DIHYDRATE 2 PUFF: 160; 4.5 AEROSOL RESPIRATORY (INHALATION) at 07:09

## 2022-01-11 RX ADMIN — IPRATROPIUM BROMIDE 0.5 MG: 0.5 SOLUTION RESPIRATORY (INHALATION) at 11:06

## 2022-01-11 RX ADMIN — VANCOMYCIN HYDROCHLORIDE 1000 MG: 1 INJECTION, SOLUTION INTRAVENOUS at 21:19

## 2022-01-11 RX ADMIN — TAMSULOSIN HYDROCHLORIDE 0.4 MG: 0.4 CAPSULE ORAL at 21:19

## 2022-01-11 RX ADMIN — DOCUSATE SODIUM 50MG AND SENNOSIDES 8.6MG 2 TABLET: 8.6; 5 TABLET, FILM COATED ORAL at 08:41

## 2022-01-11 RX ADMIN — METOCLOPRAMIDE 10 MG: 10 TABLET ORAL at 11:00

## 2022-01-11 RX ADMIN — HYDROCODONE BITARTRATE AND ACETAMINOPHEN 1 TABLET: 10; 325 TABLET ORAL at 11:00

## 2022-01-11 NOTE — CONSULTS
Referring Provider: Tay Castellanos MD  Reason for Consultation: Cellulitis  Chief Complaint   Patient presents with   • Fever   • Weakness - Generalized         Subjective   History of present illness: Patient is a 71-year-old male with past medical history of peripheral artery disease, bilateral Charcot foot, bilateral lower extremity lymphedema and venous stasis who presents with redness of the left foot and fever.  ID was consulted for antibiotic recommendations.    Patient was originally started on broad-spectrum antibiotics with vancomycin and ceftriaxone and seen by vascular surgery.  Vascular surgery ordered lower extremity Dopplers and felt that wounds were largely superficial not requiring debridement.  Overall ABIs showed no specific abnormality.  They recommended local wound care.    X-rays of the left lower extremity revealed Charcot foot however no acute evidence of osteomyelitis however difficult to rule out given his extensive periosteal changes.  Fevers have improved on antibiotic therapy and leukocytosis has remained stable at 11.    Patient reports he is feeling well today.  Denies any fevers or chills.  Denies any nausea, vomiting, diarrhea.  States his lower extremities felt her baseline.  States he would like to leave the hospital within the next day or 2.  Denies any difficulties with his antibiotics.  Denies any itching or skin reactions with ceftriaxone.    Past Medical History:   Diagnosis Date   • Allergic rhinitis    • Anxiety    • Aortectasia (HCC)     3cm infrarenal abdominal aorta   • Arthritis    • Atrial flutter (HCC) 2010    s/p ablation    • Charcot's joint of foot    • Chronic edema     both legs and sees wound care center at [place]    • Chronic venous insufficiency    • COPD (chronic obstructive pulmonary disease) ([facility])    • Coronary atherosclerosis     Cath 2010: diffuse 40-50% disease   • Diverticulosis    • Duodenitis    • Fatty liver    • Gastritis    • Gastroparesis     • Hematoma     post-operative; After catheterization, right groin, required surgical exploration   • Hyperlipidemia    • Hypertension    • Insomnia    • Internal hemorrhoids    • Open wound     izzy legs has drsg chg weekly at wound care center at [place]  pt does second dressing on left leg another time during week   • Osteomyelitis (HCC)    • Paroxysmal atrial fibrillation (HCC)    • Peripheral neuropathy    • Popliteal artery aneurysm (HCC)     left, s/p stenting by Dr. Boston   • Skin cancer    • Sleep apnea     o2   • 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HCC)     due to flutter and afib; cath 2010 with nonobstructive disease   • Venous stasis    • Venous stasis ulcer (HCC)     bilateral legs        Past Surgical History:   Procedure Laterality Date   • BASAL CELL CARCINOMA EXCISION      ear and left side of face   • BRONCHOSCOPY N/A 4/12/2021    Procedure: BRONCHOSCOPY WITH BAL;  Surgeon: Bunny Lepe MD;  Location: [facility] ENDOSCOPY;  Service: Pulmonary;  Laterality: N/A;  PRE-HEMOPTYSIS  POST-SAME   • CARDIAC CATHETERIZATION     • CATARACT EXTRACTION     • COLONOSCOPY  09/28/2015    NBIH, diverticulosis, polyps   • COLONOSCOPY N/A 9/11/2018    Procedure: COLONOSCOPY TO CECUM  AND TERM. ILEUM WITH COLD SNARE POLYPECTOMIES;  Surgeon: Kane Lagunas MD;  Location: [facility] ENDOSCOPY;  Service: Gastroenterology   • COLONOSCOPY N/A 10/29/2019    Procedure: COLONOSCOPY TO TO CECUM AND TERMINAL ILEUM WITH HOT AND COLD SNARE POLYPECTOMIES;  Surgeon: Kane Lagunas MD;  Location: [facility] ENDOSCOPY;  Service: Gastroenterology   • HIP ARTHROPLASTY Right 2017   • JOINT REPLACEMENT Left    • OTHER SURGICAL HISTORY      Catheter ablation atrial flutter   • REPAIR ANEURYSM / PSEUDO ANEURYSM / RUPTURED ANEURYSM POPLITEAL ARTERY      Stent-Graft of the the left popliteal artery   • REPAIR KNEE LIGAMENT      Primary repair of knee ligament cruciate anterior right   • TONSILLECTOMY  1958   • TOTAL KNEE ARTHROPLASTY  Bilateral    • UPPER GASTROINTESTINAL ENDOSCOPY  09/16/2014    acute gastritis, acute duodenitis       family history includes Emphysema in his father.     reports that he quit smoking about 9 months ago. His smoking use included cigarettes. He has a 11.25 pack-year smoking history. He has never used smokeless tobacco. He reports current alcohol use of about 4.0 - 5.0 standard drinks of alcohol per week. He reports that he does not use drugs.     Allergies   Allergen Reactions   • Cephalexin Hives     Tolerated ceftriaxone Jan 2022   • Codeine Nausea Only       Medication:  Antibiotics:  Anti-Infectives (From admission, onward)    Ordered     Dose/Rate Route Frequency Start Stop    01/09/22 1437  cefTRIAXone (ROCEPHIN) 2 g in sodium chloride 0.9 % 100 mL IVPB-VTB        Note to Pharmacy: Possible osteomyelitis   Ordering Provider: Kurt Enciso APRN    2 g  200 mL/hr over 30 Minutes Intravenous Every 24 Hours 01/10/22 0900 01/14/22 0859    01/09/22 1525  vancomycin (VANCOCIN) in iso-osmotic dextrose IVPB 1 g (premix) 200 mL        Ordering Provider: Kurt Enciso APRN    1,000 mg  over 60 Minutes Intravenous Every 12 Hours 01/09/22 2200 01/14/22 0959    01/09/22 1442  cefTRIAXone (ROCEPHIN) 1 g in sodium chloride 0.9 % 100 mL IVPB-VTB        Ordering Provider: Kurt Enciso APRN    1 g  200 mL/hr over 30 Minutes Intravenous Once 01/09/22 1530 01/09/22 1656    01/09/22 1243  Pharmacy to dose vancomycin        Ordering Provider: Kurt Enciso APRN     Does not apply Continuous PRN 01/09/22 1241 01/14/22 1240    01/09/22 0929  cefTRIAXone (ROCEPHIN) 1 g in sodium chloride 0.9 % 100 mL IVPB-VTB        Ordering Provider: Eddie Julien MD    1 g  200 mL/hr over 30 Minutes Intravenous Once 01/09/22 0931 01/09/22 1103    01/09/22 0900  vancomycin 2750 mg/1000 mL 0.9% NS IVPB        Ordering Provider: Eddie Julien MD    20 mg/kg × 133 kg Intravenous Once 01/09/22 0902 01/09/22 1005          Review of  Systems  Pertinent items are noted in HPI, all other systems reviewed and negative    Objective     Physical Exam:   Vital Signs   Temp:  [98 °F (36.7 °C)-98.2 °F (36.8 °C)] 98 °F (36.7 °C)  Heart Rate:  [66-91] 84  Resp:  [16-20] 20  BP: (104-119)/(65-89) 119/88    GENERAL: Awake and alert, in no acute distress.   HEENT: Oropharynx is clear. Hearing is grossly normal.   EYES: PERRL. No conjunctival injection. No lid lag.   LYMPHATICS: No lymphadenopathy of the neck or inguinal regions.   HEART: Irregular rate and rhythm. No peripheral edema.   LUNGS: Clear to auscultation anteriorly with normal respiratory effort.   GI: Soft, nontender, nondistended. No appreciable organomegaly.   SKIN: Bilateral lower extremity chronic lymphedema and venous stasis changes.  Left foot with dorsal superficial ulcerative lesions with surrounding erythema.  MSK: Bilateral lower extremity changes consistent with Charcot foot.  PSYCHIATRIC: Appropriate mood, affect, insight, and judgment.     Results Review:   I reviewed the patient's new clinical results.  I reviewed the patient's new imaging results and agree with the interpretation.  I reviewed the patient's other test results and agree with the interpretation    Lab Results   Component Value Date    WBC 11.25 (H) 01/10/2022    HGB 13.1 01/10/2022    HCT 36.5 (L) 01/10/2022    MCV 97.9 (H) 01/10/2022     (L) 01/10/2022       Lab Results   Component Value Date    [facility]       Lab Results   Component Value Date    GLUCOSE 118 (H) 01/10/2022    BUN 16 01/10/2022    CREATININE 0.78 01/10/2022    EGFRIFNONA 98 01/10/2022    EGFRIFAFRI 102 11/20/2018    BCR 20.5 01/10/2022    CO2 26.5 01/10/2022    CALCIUM 9.8 01/10/2022    PROTENTOTREF 6.1 04/26/2019    ALBUMIN 4.20 01/09/2022    LABIL2 1.1 04/26/2019    AST 15 01/09/2022    ALT 10 01/09/2022         Estimated Creatinine Clearance: 122.7 mL/min (by C-G formula based on SCr of 0.78  mg/dL).      Microbiology:  1/9 blood cultures 1 of 2 coag negative staph  1/9 respiratory panel negative  1/9 wound culture in process  1/10 blood cultures no growth to date    Radiology:      Assessment/Plan   #Left lower extremity cellulitis  #Bilateral Charcot foot  #Chronic lymphedema and venous stasis  #Positive blood culture with coag negative staph, likely contamination  #Obesity complicating the above  #History of peripheral vascular disease    Given patient's bilateral Charcot foot evaluation for osteomyelitis is going to be difficult.  Given the superficial nature of patient's ulcers I feel it unlikely at this point that he has acute osteomyelitis.  Unfortunately MRI would likely again demonstrate his Charcot arthropathy with periosteal changes that would be difficult to differentiate between osteomyelitis versus Charcot arthropathy.  However given these anatomic abnormalities he is going to be a risk going forward.  I agree with evaluation by orthopedics as recommended by vascular.    Continue IV ceftriaxone 1 g every 24 hours and IV vancomycin.  Appreciate pharmacy's help with dosing.  I spoke with microbiology today and plans is for results from his wound culture tomorrow.  They are currently isolating to gram-negative rods and 1 GPC with identities to follow.  We will likely de-escalate to oral antibiotics for cellulitis once identification of the organisms.     Coag negative staph blood culture likely contamination.  Repeats are pending.    Thank you for this consult.  We will continue to follow along and tailor antibiotics as the patient's clinical course evolves.

## 2022-01-11 NOTE — PROGRESS NOTES
[facility] Clinical Pharmacy Services: Clinical Pharmacy Consult - Warfarin Dosing/Monitoring    Results from last 7 days   Lab Units 01/10/22  1847 01/09/22  0609   INR  2.34* 2.20*     Dose Ordered:  Continue warfarin 5 mg every Monday and 7.5 mg all other days (home regimen); patient has been stable on this regimen for multiple months.     Comments: There was a delay in obtaining labs today due to a phlebotomist shortage. INR was collected at 1847 today, warfarin 5 mg dose was administered at 1901 and INR resulted at 2030 which was luckily in therapeutic range.     Pharmacy will continue to follow until discharge or discontinuation of warfarin.    Rosy Newman, PharmD  Clinical Pharmacist, Emergency Medicine   Phone: [phone]

## 2022-01-11 NOTE — PROGRESS NOTES
Name: Jalen Lockwood ADMIT: 2022   : 1950  PCP: Marc Ludwig MD    MRN: [id number] LOS: 2 days   AGE/SEX: 71 y.o. male  ROOM: [address] [place] [address]    [place], Subsequent Hospital Care    Chief Complaint   Patient presents with   • Fever   • Weakness - Generalized     CC: Foot wound follow-up.  Subjective     71 y.o. male patient resting comfortably sitting in his chair this morning.  Denies any new complaints.    Review of Systems    Objective     Scheduled Medications:   bacitracin, 1 application, Topical, Q12H  bacitracin, 1 application, Topical, Q12H  budesonide-formoterol, 2 puff, Inhalation, BID - RT  carvedilol, 6.25 mg, Oral, BID With Meals  cefTRIAXone, 2 g, Intravenous, Q24H  finasteride, 5 mg, Oral, Daily  gabapentin, 400 mg, Oral, Q8H  hydrocortisone-bacitracin-zinc oxide-nystatin, 1 application, Topical, BID  ipratropium, 0.5 mg, Nebulization, 4x Daily - RT  lisinopril, 20 mg, Oral, Q12H  metoclopramide, 10 mg, Oral, 4x Daily AC & at Bedtime  mupirocin, , Topical, Q8H  nystatin, , Topical, Q12H  pravastatin, 20 mg, Oral, Nightly  senna-docusate sodium, 2 tablet, Oral, BID  sodium chloride, 10 mL, Intravenous, Q12H  tamsulosin, 0.4 mg, Oral, Nightly  vancomycin, 1,000 mg, Intravenous, Q12H  warfarin, 5 mg, Oral, Weekly  warfarin, 7.5 mg, Oral, Once per day on Sun Tue Wed Thu Fri Sat        Active Infusions:  Pharmacy to dose vancomycin,   Pharmacy to dose warfarin,         As Needed Medications:  •  acetaminophen **OR** acetaminophen **OR** acetaminophen  •  albuterol  •  ALPRAZolam  •  senna-docusate sodium **AND** polyethylene glycol **AND** bisacodyl **AND** bisacodyl  •  HYDROcodone-acetaminophen  •  hydrocortisone-bacitracin-zinc oxide-nystatin  •  ondansetron **OR** ondansetron  •  Pharmacy to dose vancomycin  •  Pharmacy to dose warfarin  •  sodium chloride  •  sodium chloride    Vital Signs  Vital Signs Patient Vitals for the past 24 hrs:   BP Temp Temp src Pulse Resp SpO2    01/11/22 1106 -- -- -- 86 20 95 %   01/11/22 0850 119/88 -- -- 84 -- --   01/11/22 0709 -- -- -- 66 20 95 %   01/11/22 0051 115/89 98 °F (36.7 °C) Oral -- 20 96 %   01/10/22 2147 -- -- -- -- 20 97 %   01/10/22 2146 -- -- -- -- 20 98 %   01/10/22 2141 -- -- -- -- 20 98 %   01/10/22 2049 113/83 98.2 °F (36.8 °C) Oral -- 18 100 %   01/10/22 1644 -- -- -- 86 16 --   01/10/22 1641 -- -- -- 74 16 92 %   01/10/22 1435 104/65 98.2 °F (36.8 °C) Oral -- 16 92 %   01/10/22 1317 -- -- -- 91 16 99 %     I/O:  I/O last 3 completed shifts:  In: 1060 [P.O.:960; IV Piggyback:100]  Out: 1620 [Urine:1620]    Physical Exam:  Physical Exam  Constitutional:       Appearance: He is well-developed.   Pulmonary:      Effort: Pulmonary effort is normal. No respiratory distress.   Abdominal:      General: There is no distension.      Palpations: Abdomen is soft.   Neurological:      Mental Status: He is alert and oriented to person, place, and time.          Results Review:     CBC    Results from last 7 days   Lab Units 01/10/22  1846 01/09/22  0608   WBC 10*3/mm3 11.25* 11.05*   HEMOGLOBIN g/dL 13.1 14.2   PLATELETS 10*3/mm3 132* 150     BMP   Results from last 7 days   Lab Units 01/10/22  1846 01/09/22  0608   SODIUM mmol/L 130* 140   POTASSIUM mmol/L 3.7 3.9   CHLORIDE mmol/L 93* 98   CO2 mmol/L 26.5 27.7   BUN mg/dL 16 12   CREATININE mg/dL 0.78 1.01   GLUCOSE mg/dL 118* 111*   MAGNESIUM mg/dL  --  1.9     Cr Clearance Estimated Creatinine Clearance: 122.7 mL/min (by C-G formula based on SCr of 0.78 mg/dL).  Coag   Results from last 7 days   Lab Units 01/11/22  0953 01/10/22  1847 01/09/22  0609   INR  1.83* 2.34* 2.20*   APTT seconds  --   --  47.9*     HbA1C   Lab Results   Component Value Date    HGBA1C 5.00 03/30/2021    HGBA1C 5.3 04/22/2016     Blood Glucose No results found for: POCGLU  Infection   Results from last 7 days   Lab Units 01/09/22  1634 01/09/22  0609   BLOODCX   --  Staphylococcus, coagulase negative*  No growth  at 2 days   WOUNDCX  Light growth (2+) Gram Negative Bacilli*  Light growth (2+) Gram Negative Bacilli*  Light growth (2+) Normal Skin Loulou  --    BCIDPCR   --  Staph spp, not aureus or lugdunesis. Identification by BCID2 PCR.*     CMP   Results from last 7 days   Lab Units 01/10/22  1846 01/09/22  0608   SODIUM mmol/L 130* 140   POTASSIUM mmol/L 3.7 3.9   CHLORIDE mmol/L 93* 98   CO2 mmol/L 26.5 27.7   BUN mg/dL 16 12   CREATININE mg/dL 0.78 1.01   GLUCOSE mg/dL 118* 111*   ALBUMIN g/dL  --  4.20   BILIRUBIN mg/dL  --  0.9   ALK PHOS U/L  --  91   AST (SGOT) U/L  --  15   ALT (SGPT) U/L  --  10     ABG      UA    Results from last 7 days   Lab Units 01/09/22  0641   NITRITE UA  Negative   WBC UA /HPF 0-2   BACTERIA UA /HPF None Seen   SQUAM EPITHEL UA /HPF 0-2     ANNIE  No results found for: POCMETH, POCAMPHET, POCBARBITUR, POCBENZO, POCCOCAINE, POCOPIATES, POCOXYCODO, POCPHENCYC, POCPROPOXY, POCTHC, POCTRICYC    Assessment/Plan     Assessment & Plan      Cellulitis    Permanent atrial fibrillation (HCC)    Obesity, Class III, BMI 40-49.9 (morbid obesity) (HCC)    COPD (chronic obstructive pulmonary disease) (HCC)    Essential hypertension    Chronic anticoagulation    Charcot's joint of foot    Generalized weakness    Fever in adult    Elevated lactic acid level                        71 y.o. male patient with Charcot type deformities of bilateral feet with foot wounds mainly on the left side.  His arterial perfusion looks good on lower extremity arterial Doppler done yesterday.  Ultimately I have no vascular surgical recommendations at this point.  He should continue to follow-up with the [facility] vascular team for ongoing popliteal stent surveillance.  As mentioned before he should have likely outpatient evaluation by a foot and ankle specialist.  We will sign off but be available for further questions.      Holger Menendez MD  01/11/22  12:53 EST    Please call my office with any question: [phone]    Active Hospital Problems    Diagnosis  POA   • **Cellulitis [L03.90]  Yes   • Generalized weakness [R53.1]  Yes   • Fever in adult [R50.9]  Yes   • Elevated lactic acid level [R79.89]  Yes   • Charcot's joint of foot [M14.679]  Yes   • Chronic anticoagulation [Z79.01]  Not Applicable   • Essential hypertension [I10]  Yes   • COPD (chronic obstructive pulmonary disease) ([facility]) [J44.9]  Yes   • Obesity, Class III, BMI 40-49.9 (morbid obesity) ([facility]) [E66.01]  Yes   • Permanent atrial fibrillation ([facility]) [I48.21]  Yes      Resolved Hospital Problems   No resolved problems to display.

## 2022-01-11 NOTE — PROGRESS NOTES
[facility] Services: Vancomycin Level Monitoring Note    Jalen Lockwood is a 71 y.o. male who is on day 3/7 of pharmacy to dose vancomycin for possible osteomyelitis/cellulitis    Estimated Creatinine Clearance: 122.7 mL/min (by C-G formula based on SCr of 0.78 mg/dL).    Current Vanc Dose: 1000 mg IV every  12  hours  Lab Results   Component Value Date    VANCOTROUGH 11.50 01/11/2022     Predicted AUC at current dose: 459 mg/L.hr  Next Level Date and Time: Not scheduled as - noted ID evaluation and may plan to de-escalate to oral antibiotics tomorrow once wound culture is finalized    No changes at this time. Pharmacy is continuing to monitor and will adjust as needed.    Constance Nunes, PharmD, BCPS  Clinical Pharmacy Specialist, Emergency Medicine   Phone: [phone]

## 2022-01-11 NOTE — PROGRESS NOTES
Name: Jalen Lockwood ADMIT: 2022   : 1950  PCP: Marc Ludwig MD    MRN: [id number] LOS: 2 days   AGE/SEX: 71 y.o. male  ROOM: 126/1     Subjective   Subjective   Sitting up in chair. Denies any nausea or vomiting. Feeling better. Denies any chest pain or dyspnea. Pain in foot improved. States he has seen ortho for other reasons, but not for his feet. Hopes to go home soon.     Objective   Objective   Vital Signs  Temp:  [98 °F (36.7 °C)-98.2 °F (36.8 °C)] 98 °F (36.7 °C)  Heart Rate:  [66-86] 86  Resp:  [18-20] 20  BP: ()/(72-89) 96/72  SpO2:  [95 %-100 %] 95 %  on  Flow (L/min):  [2] 2;   Device (Oxygen Therapy): nasal cannula  Body mass index is 40.55 kg/m².     Physical Exam  Vitals and nursing note reviewed.   Constitutional:       Appearance: He is obese. He is ill-appearing (Chronically).   Cardiovascular:      Rate and Rhythm: Normal rate and regular rhythm.   Pulmonary:      Effort: Pulmonary effort is normal. No respiratory distress.      Breath sounds: Normal breath sounds.   Abdominal:      General: Bowel sounds are normal. There is no distension.      Palpations: Abdomen is soft.      Tenderness: There is no abdominal tenderness.   Skin:     General: Skin is warm.      Comments: Lower extremity wounds, skin tears. Left ankle with superficial dime-sized ulcer. No drainage. Bilateral foot deformities.  Neurological:      Mental Status: He is alert and oriented to person, place, and time. Mental status is at baseline.   Psychiatric:         Mood and Affect: Mood normal.         Behavior: Behavior normal.     Results Review:       I reviewed the patient's new clinical results.  Results from last 7 days   Lab Units 22  1213 01/10/22  18422  0608   WBC 10*3/mm3 8.21 11.25* 11.05*   HEMOGLOBIN g/dL 13.4 13.1 14.2   PLATELETS 10*3/mm3 142 132* 150     Results from last 7 days   Lab Units 01/10/22  1846 22  0608   SODIUM mmol/L 130* 140   POTASSIUM mmol/L 3.7 3.9    CHLORIDE mmol/L 93* 98   CO2 mmol/L 26.5 27.7   BUN mg/dL 16 12   CREATININE mg/dL 0.78 1.01   GLUCOSE mg/dL 118* 111*   Estimated Creatinine Clearance: 122.7 mL/min (by C-G formula based on SCr of 0.78 mg/dL).  Results from last 7 days   Lab Units 01/09/22  0608   ALBUMIN g/dL 4.20   BILIRUBIN mg/dL 0.9   ALK PHOS U/L 91   AST (SGOT) U/L 15   ALT (SGPT) U/L 10     Results from last 7 days   Lab Units 01/10/22  1846 01/09/22  0608   CALCIUM mg/dL 9.8 10.0   ALBUMIN g/dL  --  4.20   MAGNESIUM mg/dL  --  1.9     Results from last 7 days   Lab Units 01/09/22  1145 01/09/22  0608   LACTATE mmol/L 1.7 2.1*     No results found for: HGBA1C, POCGLU    bacitracin, 1 application, Topical, Q12H  bacitracin, 1 application, Topical, Q12H  budesonide-formoterol, 2 puff, Inhalation, BID - RT  carvedilol, 6.25 mg, Oral, BID With Meals  cefTRIAXone, 2 g, Intravenous, Q24H  finasteride, 5 mg, Oral, Daily  gabapentin, 400 mg, Oral, Q8H  hydrocortisone-bacitracin-zinc oxide-nystatin, 1 application, Topical, BID  ipratropium, 0.5 mg, Nebulization, 4x Daily - RT  lisinopril, 20 mg, Oral, Q12H  metoclopramide, 10 mg, Oral, 4x Daily AC & at Bedtime  mupirocin, , Topical, Q8H  nystatin, , Topical, Q12H  pravastatin, 20 mg, Oral, Nightly  senna-docusate sodium, 2 tablet, Oral, BID  sodium chloride, 10 mL, Intravenous, Q12H  tamsulosin, 0.4 mg, Oral, Nightly  vancomycin, 1,000 mg, Intravenous, Q12H  warfarin, 5 mg, Oral, Weekly  warfarin, 7.5 mg, Oral, Once per day on Sun Tue Wed Thu Fri Sat      Pharmacy to dose vancomycin,   Pharmacy to dose warfarin,     Diet Regular; Cardiac, Consistent Carbohydrate       Assessment/Plan     Active Hospital Problems    Diagnosis  POA   • **Cellulitis [L03.90]  Yes   • Generalized weakness [R53.1]  Yes   • Fever in adult [R50.9]  Yes   • Elevated lactic acid level [R79.89]  Yes   • Charcot's joint of foot [M14.679]  Yes   • Chronic anticoagulation [Z79.01]  Not Applicable   • Essential hypertension  [I10]  Yes   • COPD (chronic obstructive pulmonary disease) ([facility]) [J44.9]  Yes   • Obesity, Class III, BMI 40-49.9 (morbid obesity) ([facility]) [E66.01]  Yes   • Permanent atrial fibrillation ([facility]) [I48.21]  Yes      Resolved Hospital Problems   No resolved problems to display.     Cellulitis/Charcot's joint of foot  Continue iv vanc and rocephin. ID following and agrees.   Blood cultures 1/2 positive for coag staph. Likely contaminant with repeat pending.  Wound cultures with gram negative bacilli growing.  Local wound care.  Vascular surgery saw in consultation and have no plans for surgical debridement as ulcers are superficial and recommend continuing with wound care.  Both they and ID recommend outpatient foot and ankle specialist to see.     Permanent atrial fibrillation   Pharmacy dosing warfarin, check daily INR. Continue beta-blocker, rate controlled.  INR 1.83 today.     Obesity  BMI 40.5, complicating his problems     HTN  BP soft. Stop ACE for now. Repeat BMP in AM.  Lasix still on hold. Await renal panel in AM for further resumption.     Generalized weakness  Likely secondary #1  PT/OT     · Warfarin (home med) for DVT prophylaxis.  · Full code.  · Discussed with patient and nursing staff.  · Anticipate discharge next 1-2 days. Likely home with HH.       ESTEFANY Kern  [facility]  01/11/22  17:32 EST

## 2022-01-11 NOTE — PLAN OF CARE
Goal Outcome Evaluation:  Plan of Care Reviewed With: patient           Outcome Summary: called would cause pt wants his legs wrapped and they said Vascular wants bacitracin on them bid so we should just wrap with kerlix and ace wrap. If pt has an issue with that then they will come see him tomorrow. willcont to monitor  Problem: Adult Inpatient Plan of Care  Goal: Plan of Care Review  Outcome: Ongoing, Progressing  Flowsheets (Taken 1/11/2022 1547)  Plan of Care Reviewed With: patient  Outcome Summary: called would cause pt wants his legs wrapped and they said Vascular wants bacitracin on them bid so we should just wrap with kerlix and ace wrap. If pt has an issue with that then they will come see him tomorrow. willcont to monitor  Goal: Patient-Specific Goal (Individualized)  Outcome: Ongoing, Progressing  Goal: Absence of Hospital-Acquired Illness or Injury  Outcome: Ongoing, Progressing  Intervention: Identify and Manage Fall Risk  Recent Flowsheet Documentation  Taken 1/11/2022 1439 by Patricia Mueller, RN  Safety Promotion/Fall Prevention:   activity supervised   assistive device/personal items within reach   clutter free environment maintained   fall prevention program maintained   nonskid shoes/slippers when out of bed   room organization consistent   safety round/check completed  Taken 1/11/2022 1200 by Patricia Mueller, RN  Safety Promotion/Fall Prevention:   activity supervised   assistive device/personal items within reach   clutter free environment maintained   fall prevention program maintained   nonskid shoes/slippers when out of bed   room organization consistent   safety round/check completed  Taken 1/11/2022 1047 by Patricia Mueller, RN  Safety Promotion/Fall Prevention:   activity supervised   assistive device/personal items within reach   clutter free environment maintained   fall prevention program maintained   nonskid shoes/slippers when out of bed   room organization consistent   safety  round/check completed  Taken 1/11/2022 0800 by Patricia Mueller RN  Safety Promotion/Fall Prevention:   activity supervised   assistive device/personal items within reach   clutter free environment maintained   fall prevention program maintained   nonskid shoes/slippers when out of bed   room organization consistent   safety round/check completed  Intervention: Prevent and Manage VTE (venous thromboembolism) Risk  Recent Flowsheet Documentation  Taken 1/11/2022 1439 by Patricia Mueller RN  VTE Prevention/Management:   bilateral   dorsiflexion/plantar flexion performed  Taken 1/11/2022 0800 by Patricia Mueller RN  VTE Prevention/Management:   bilateral   dorsiflexion/plantar flexion performed  Intervention: Prevent Infection  Recent Flowsheet Documentation  Taken 1/11/2022 1439 by Patricia Mueller RN  Infection Prevention: single patient room provided  Taken 1/11/2022 1200 by Patricia Mueller RN  Infection Prevention: single patient room provided  Taken 1/11/2022 1047 by Patricia Mueller RN  Infection Prevention: single patient room provided  Taken 1/11/2022 0800 by Patricia Mueller RN  Infection Prevention: single patient room provided  Goal: Optimal Comfort and Wellbeing  Outcome: Ongoing, Progressing  Intervention: Provide Person-Centered Care  Recent Flowsheet Documentation  Taken 1/11/2022 1439 by Patricia Mueller RN  Trust Relationship/Rapport:   care explained   thoughts/feelings acknowledged  Taken 1/11/2022 0800 by Patricia Mueller RN  Trust Relationship/Rapport:   care explained   thoughts/feelings acknowledged  Goal: Readiness for Transition of Care  Outcome: Ongoing, Progressing     Problem: Adult Inpatient Plan of Care  Goal: Plan of Care Review  Outcome: Ongoing, Progressing  Flowsheets (Taken 1/11/2022 1547)  Plan of Care Reviewed With: patient  Outcome Summary: called would cause pt wants his legs wrapped and they said Vascular wants bacitracin on them bid so we should just wrap with kerlix and ace  wrap. If pt has an issue with that then they will come see him tomorrow. willcont to monitor  Goal: Patient-Specific Goal (Individualized)  Outcome: Ongoing, Progressing  Goal: Absence of Hospital-Acquired Illness or Injury  Outcome: Ongoing, Progressing  Intervention: Identify and Manage Fall Risk  Recent Flowsheet Documentation  Taken 1/11/2022 1439 by Patricia Mueller RN  Safety Promotion/Fall Prevention:   activity supervised   assistive device/personal items within reach   clutter free environment maintained   fall prevention program maintained   nonskid shoes/slippers when out of bed   room organization consistent   safety round/check completed  Taken 1/11/2022 1200 by Patricia Mueller RN  Safety Promotion/Fall Prevention:   activity supervised   assistive device/personal items within reach   clutter free environment maintained   fall prevention program maintained   nonskid shoes/slippers when out of bed   room organization consistent   safety round/check completed  Taken 1/11/2022 1047 by Patricia Mueller RN  Safety Promotion/Fall Prevention:   activity supervised   assistive device/personal items within reach   clutter free environment maintained   fall prevention program maintained   nonskid shoes/slippers when out of bed   room organization consistent   safety round/check completed  Taken 1/11/2022 0800 by Patricia Mueller RN  Safety Promotion/Fall Prevention:   activity supervised   assistive device/personal items within reach   clutter free environment maintained   fall prevention program maintained   nonskid shoes/slippers when out of bed   room organization consistent   safety round/check completed  Intervention: Prevent and Manage VTE (venous thromboembolism) Risk  Recent Flowsheet Documentation  Taken 1/11/2022 1439 by Particia Mueller RN  VTE Prevention/Management:   bilateral   dorsiflexion/plantar flexion performed  Taken 1/11/2022 0800 by Patricia Mueller RN  VTE Prevention/Management:    bilateral   dorsiflexion/plantar flexion performed  Intervention: Prevent Infection  Recent Flowsheet Documentation  Taken 1/11/2022 1439 by Patricia Mueller RN  Infection Prevention: single patient room provided  Taken 1/11/2022 1200 by Patricia Mueller RN  Infection Prevention: single patient room provided  Taken 1/11/2022 1047 by Patricia Mueller RN  Infection Prevention: single patient room provided  Taken 1/11/2022 0800 by Patricia Mueller RN  Infection Prevention: single patient room provided  Goal: Optimal Comfort and Wellbeing  Outcome: Ongoing, Progressing  Intervention: Provide Person-Centered Care  Recent Flowsheet Documentation  Taken 1/11/2022 1439 by Patricia Mueller RN  Trust Relationship/Rapport:   care explained   thoughts/feelings acknowledged  Taken 1/11/2022 0800 by Patricia Mueller RN  Trust Relationship/Rapport:   care explained   thoughts/feelings acknowledged  Goal: Readiness for Transition of Care  Outcome: Ongoing, Progressing

## 2022-01-11 NOTE — PLAN OF CARE
Goal Outcome Evaluation:  Plan of Care Reviewed With: patient           Outcome Summary: pt has no new issues througout this  shift, pt able to stand and use urinal without difficulty, pt complains of no pain at this time, pt ble are quite swollen, will continue to monitor

## 2022-01-11 NOTE — PROGRESS NOTES
[facility] Clinical Pharmacy Services: Clinical Pharmacy Consult - Warfarin Dosing/Monitoring    Results from last 7 days   Lab Units 01/11/22  0953 01/10/22  1847 01/09/22  0609   INR  1.83* 2.34* 2.20*     Dose Ordered: Continue warfarin 5 mg every Monday and 7.5 mg all other days (home regimen); patient has been stable on this regimen for multiple months.   Comments: INR slightly subtherapeutic today. Yesterday was the 5mg dose, and given continuation of antibiotics and potential for drug interactions will keep dose the same and continue to monitor daily PT/INR.    Pharmacy will continue to follow until discharge or discontinuation of warfarin.    Constance Nunes, PharmD, BCPS  Clinical Pharmacy Specialist, Emergency Medicine   Phone: [phone]

## 2022-01-12 ENCOUNTER — READMISSION MANAGEMENT (OUTPATIENT)
Dept: CALL CENTER | Facility: HOSPITAL | Age: 72
End: 2022-01-12

## 2022-01-12 VITALS
HEART RATE: 72 BPM | RESPIRATION RATE: 18 BRPM | TEMPERATURE: 98 F | OXYGEN SATURATION: 95 % | HEIGHT: 72 IN | BODY MASS INDEX: 40.5 KG/M2 | WEIGHT: 299 LBS | DIASTOLIC BLOOD PRESSURE: 60 MMHG | SYSTOLIC BLOOD PRESSURE: 103 MMHG

## 2022-01-12 LAB
ALBUMIN SERPL-MCNC: 3.9 G/DL (ref 3.5–5.2)
ALBUMIN/GLOB SERPL: 1.1 G/DL
ALP SERPL-CCNC: 84 U/L (ref 39–117)
ALT SERPL W P-5'-P-CCNC: 17 U/L (ref 1–41)
ANION GAP SERPL CALCULATED.3IONS-SCNC: 9 MMOL/L (ref 5–15)
AST SERPL-CCNC: 39 U/L (ref 1–40)
BACTERIA SPEC AEROBE CULT: ABNORMAL
BILIRUB SERPL-MCNC: 0.6 MG/DL (ref 0–1.2)
BUN SERPL-MCNC: 11 MG/DL (ref 8–23)
BUN/CREAT SERPL: 14.7 (ref 7–25)
CALCIUM SPEC-SCNC: 9.8 MG/DL (ref 8.6–10.5)
CHLORIDE SERPL-SCNC: 95 MMOL/L (ref 98–107)
CO2 SERPL-SCNC: 33 MMOL/L (ref 22–29)
CREAT SERPL-MCNC: 0.75 MG/DL (ref 0.76–1.27)
DEPRECATED RDW RBC AUTO: 52.4 FL (ref 37–54)
ERYTHROCYTE [DISTWIDTH] IN BLOOD BY AUTOMATED COUNT: 14.2 % (ref 12.3–15.4)
GFR SERPL CREATININE-BSD FRML MDRD: 103 ML/MIN/1.73
GLOBULIN UR ELPH-MCNC: 3.5 GM/DL
GLUCOSE SERPL-MCNC: 103 MG/DL (ref 65–99)
GRAM STN SPEC: ABNORMAL
GRAM STN SPEC: ABNORMAL
HCT VFR BLD AUTO: 39 % (ref 37.5–51)
HGB BLD-MCNC: 13.3 G/DL (ref 13–17.7)
INR PPP: 2.05 (ref 0.9–1.1)
MCH RBC QN AUTO: 34.1 PG (ref 26.6–33)
MCHC RBC AUTO-ENTMCNC: 34.1 G/DL (ref 31.5–35.7)
MCV RBC AUTO: 100 FL (ref 79–97)
PLATELET # BLD AUTO: 156 10*3/MM3 (ref 140–450)
PMV BLD AUTO: 10.7 FL (ref 6–12)
POTASSIUM SERPL-SCNC: 3.7 MMOL/L (ref 3.5–5.2)
PROT SERPL-MCNC: 7.4 G/DL (ref 6–8.5)
PROTHROMBIN TIME: 22.9 SECONDS (ref 11.7–14.2)
RBC # BLD AUTO: 3.9 10*6/MM3 (ref 4.14–5.8)
SODIUM SERPL-SCNC: 137 MMOL/L (ref 136–145)
WBC NRBC COR # BLD: 6.35 10*3/MM3 (ref 3.4–10.8)

## 2022-01-12 PROCEDURE — 97535 SELF CARE MNGMENT TRAINING: CPT

## 2022-01-12 PROCEDURE — G0378 HOSPITAL OBSERVATION PER HR: HCPCS

## 2022-01-12 PROCEDURE — 94799 UNLISTED PULMONARY SVC/PX: CPT

## 2022-01-12 PROCEDURE — 80053 COMPREHEN METABOLIC PANEL: CPT | Performed by: NURSE PRACTITIONER

## 2022-01-12 PROCEDURE — 94760 N-INVAS EAR/PLS OXIMETRY 1: CPT

## 2022-01-12 PROCEDURE — 25010000002 CEFTRIAXONE PER 250 MG: Performed by: NURSE PRACTITIONER

## 2022-01-12 PROCEDURE — 94761 N-INVAS EAR/PLS OXIMETRY MLT: CPT

## 2022-01-12 PROCEDURE — 97530 THERAPEUTIC ACTIVITIES: CPT

## 2022-01-12 PROCEDURE — 85027 COMPLETE CBC AUTOMATED: CPT | Performed by: NURSE PRACTITIONER

## 2022-01-12 PROCEDURE — 85610 PROTHROMBIN TIME: CPT | Performed by: NURSE PRACTITIONER

## 2022-01-12 PROCEDURE — 99232 SBSQ HOSP IP/OBS MODERATE 35: CPT | Performed by: STUDENT IN AN ORGANIZED HEALTH CARE EDUCATION/TRAINING PROGRAM

## 2022-01-12 RX ORDER — SULFAMETHOXAZOLE AND TRIMETHOPRIM 800; 160 MG/1; MG/1
1 TABLET ORAL EVERY 12 HOURS SCHEDULED
Status: DISCONTINUED | OUTPATIENT
Start: 2022-01-12 | End: 2022-01-12 | Stop reason: HOSPADM

## 2022-01-12 RX ORDER — SULFAMETHOXAZOLE AND TRIMETHOPRIM 800; 160 MG/1; MG/1
1 TABLET ORAL EVERY 12 HOURS SCHEDULED
Qty: 14 TABLET | Refills: 0 | Status: SHIPPED | OUTPATIENT
Start: 2022-01-12 | End: 2022-01-19

## 2022-01-12 RX ORDER — GINSENG 100 MG
1 CAPSULE ORAL 2 TIMES DAILY
Qty: 14 G | Refills: 0 | Status: SHIPPED | OUTPATIENT
Start: 2022-01-12 | End: 2022-01-01

## 2022-01-12 RX ORDER — AMOXICILLIN 250 MG
2 CAPSULE ORAL 2 TIMES DAILY
Start: 2022-01-12 | End: 2022-01-01 | Stop reason: ALTCHOICE

## 2022-01-12 RX ORDER — WARFARIN SODIUM 5 MG/1
5 TABLET ORAL
Status: DISCONTINUED | OUTPATIENT
Start: 2022-01-12 | End: 2022-01-12 | Stop reason: HOSPADM

## 2022-01-12 RX ORDER — WARFARIN SODIUM 5 MG/1
TABLET ORAL
Start: 2022-01-12 | End: 2022-01-01 | Stop reason: HOSPADM

## 2022-01-12 RX ADMIN — SODIUM CHLORIDE, PRESERVATIVE FREE 10 ML: 5 INJECTION INTRAVENOUS at 08:09

## 2022-01-12 RX ADMIN — DOCUSATE SODIUM 50MG AND SENNOSIDES 8.6MG 2 TABLET: 8.6; 5 TABLET, FILM COATED ORAL at 08:08

## 2022-01-12 RX ADMIN — NYSTATIN: 100000 POWDER TOPICAL at 08:09

## 2022-01-12 RX ADMIN — ZINC OXIDE 1 APPLICATION: 200 OINTMENT TOPICAL at 08:08

## 2022-01-12 RX ADMIN — IPRATROPIUM BROMIDE 0.5 MG: 0.5 SOLUTION RESPIRATORY (INHALATION) at 12:04

## 2022-01-12 RX ADMIN — IPRATROPIUM BROMIDE 0.5 MG: 0.5 SOLUTION RESPIRATORY (INHALATION) at 07:46

## 2022-01-12 RX ADMIN — BUDESONIDE AND FORMOTEROL FUMARATE DIHYDRATE 2 PUFF: 160; 4.5 AEROSOL RESPIRATORY (INHALATION) at 07:46

## 2022-01-12 RX ADMIN — MUPIROCIN: 20 OINTMENT TOPICAL at 05:48

## 2022-01-12 RX ADMIN — CARVEDILOL 6.25 MG: 3.12 TABLET, FILM COATED ORAL at 08:07

## 2022-01-12 RX ADMIN — MUPIROCIN: 20 OINTMENT TOPICAL at 13:18

## 2022-01-12 RX ADMIN — GABAPENTIN 400 MG: 400 CAPSULE ORAL at 13:18

## 2022-01-12 RX ADMIN — METOCLOPRAMIDE 10 MG: 10 TABLET ORAL at 12:22

## 2022-01-12 RX ADMIN — FINASTERIDE 5 MG: 5 TABLET, FILM COATED ORAL at 08:08

## 2022-01-12 RX ADMIN — METOCLOPRAMIDE 10 MG: 10 TABLET ORAL at 08:08

## 2022-01-12 RX ADMIN — GABAPENTIN 400 MG: 400 CAPSULE ORAL at 05:48

## 2022-01-12 RX ADMIN — SULFAMETHOXAZOLE AND TRIMETHOPRIM 1 TABLET: 800; 160 TABLET ORAL at 13:18

## 2022-01-12 RX ADMIN — HYDROCODONE BITARTRATE AND ACETAMINOPHEN 1 TABLET: 10; 325 TABLET ORAL at 05:48

## 2022-01-12 RX ADMIN — CEFTRIAXONE SODIUM 2 G: 2 INJECTION, POWDER, FOR SOLUTION INTRAMUSCULAR; INTRAVENOUS at 08:08

## 2022-01-12 RX ADMIN — HYDROCODONE BITARTRATE AND ACETAMINOPHEN 1 TABLET: 10; 325 TABLET ORAL at 12:22

## 2022-01-12 NOTE — DISCHARGE SUMMARY
[facility]    Date of Admission: 1/9/2022  Date of Discharge:  1/12/2022    PCP: Marc Ludwig MD    Discharge Diagnosis:   Active Hospital Problems    Diagnosis  POA   • **Cellulitis [L03.90]  Yes   • Generalized weakness [R53.1]  Yes   • Fever in adult [R50.9]  Yes   • Elevated lactic acid level [R79.89]  Yes   • Charcot's joint of foot [M14.679]  Yes   • Chronic anticoagulation [Z79.01]  Not Applicable   • Essential hypertension [I10]  Yes   • COPD (chronic obstructive pulmonary disease) ([facility]) [J44.9]  Yes   • Obesity, Class III, BMI 40-49.9 (morbid obesity) ([facility]) [E66.01]  Yes   • Permanent atrial fibrillation ([facility]) [I48.21]  Yes      Resolved Hospital Problems   No resolved problems to display.     Procedures Performed  none     Consults     Date and Time Order Name Status Description    1/10/2022  4:40 PM Inpatient Infectious Diseases Consult Completed     1/9/2022  3:09 PM Inpatient Vascular Surgery Consult Completed     1/9/2022  9:15 AM [facility] (on-call MD unless specified) Details          Hospital Course  Please see history and physical for details. Patient is a 71 y.o. male that initially presented to the hospital with complaints of fever and weakness. He was found to have a left lower extremity cellulitis and admitted for further care.    The patient was placed on empiric Rocephin and Vancomycin and seen in consultation by ID as well as vascular surgery. 1/2 blood cultures grew positive for coag staph and were felt to be contaminant. Repeat blood cultures were negative. ID recommended transitioning to oral Bactrim for another 7 days as well as outpatient referral to orthopedics for his charcot foot. Vascular had arterial perfusion checked on his lower extremities which was showed normal ABIs. He was recommended to be seen outpatient by a foot and ankle specialist and should maintain outpatient follow up with [facility] for prior popliteal stent.  Local wound care was recommended and he is established with the wound care center for this reason and will go there on Mondays and have wraps placed on Thursdays with home health. Given his Bactrim use, we will decrease his warfarin dosing by 30% as recommended by pharmacy given the interaction. He will need repeat PT/INR this Friday with close monitoring.    The patient currently denies any chest pain,dyspnea, cough, fever or chills. He feels improved and is eager to go home with his wife and HH. He needs to keep his follow up appointments and follow up with his PCP in 1 week. He was educated on the increased PT/INR risks with Bactrim. I did hold his lisinopril for the time being and his BP should be monitored to see when this is appropriate to restart.    I discussed the patient's findings and my recommendations with patient, nursing staff and Dr. Bess.    Condition on Discharge: Improved.     Temp:  [97.5 °F (36.4 °C)-98 °F (36.7 °C)] 98 °F (36.7 °C)  Heart Rate:  [70-89] 72  Resp:  [18-20] 18  BP: (103-121)/(60-80) 103/60  Body mass index is 40.55 kg/m².    Physical Exam  Constitutional:       General: He is not in acute distress.     Appearance: He is ill-appearing. He is not toxic-appearing.      Comments: Generally weak   HENT:      Head: Normocephalic and atraumatic.   Eyes:      Extraocular Movements: Extraocular movements intact.      Conjunctiva/sclera: Conjunctivae normal.   Cardiovascular:      Rate and Rhythm: Normal rate.      Heart sounds: Normal heart sounds.   Pulmonary:      Effort: Pulmonary effort is normal.      Breath sounds: Normal breath sounds.   Abdominal:      General: Bowel sounds are normal.      Palpations: Abdomen is soft.   Musculoskeletal:         General: Tenderness (BLE) and deformity (BLE) present.      Cervical back: Normal range of motion and neck supple.      Right lower leg: Edema present.      Left lower leg: Edema present.   Skin:     General: Skin is warm and dry.       Comments: Wound left foot, medial aspect   Neurological:      Mental Status: He is alert and oriented to person, place, and time.      Cranial Nerves: No cranial nerve deficit.      Motor: Weakness present.   Psychiatric:         Behavior: Behavior normal.         Thought Content: Thought content normal.        Discharge Medications      New Medications      Instructions Start Date   bacitracin 500 UNIT/GM ointment   1 application, Topical, 2 Times Daily      hydrocortisone-bacitracin-zinc oxide-nystatin  Commonly known as: MAGIC BARRIER   1 application, Topical, 2 Times Daily      sennosides-docusate 8.6-50 MG per tablet  Commonly known as: PERICOLACE   2 tablets, Oral, 2 Times Daily      sulfamethoxazole-trimethoprim 800-160 MG per tablet  Commonly known as: BACTRIM DS,SEPTRA DS   1 tablet, Oral, Every 12 Hours Scheduled         Changes to Medications      Instructions Start Date   warfarin 5 MG tablet  Commonly known as: COUMADIN  What changed: additional instructions   Take 5 mg daily while on Bactrim therapy.         Continue These Medications      Instructions Start Date   acidophilus tablet tablet   No dose, route, or frequency recorded.      albuterol sulfate  (90 Base) MCG/ACT inhaler  Commonly known as: PROVENTIL HFA;VENTOLIN HFA;PROAIR HFA   INHALE 2 PUFFS BY MOUTH EVERY 4 HOURS AS NEEDED FOR WHEEZE      ALPRAZolam 0.5 MG tablet  Commonly known as: XANAX   0.5 mg, Oral, Nightly PRN      Atrovent HFA 17 MCG/ACT inhaler  Generic drug: ipratropium   2 puffs, Inhalation, 4 Times Daily - RT      Breo Ellipta 200-25 MCG/INH inhaler  Generic drug: Fluticasone Furoate-Vilanterol   INHALE 1 PUFF BY MOUTH EVERY DAY      carvedilol 6.25 MG tablet  Commonly known as: COREG   6.25 mg, Oral, 2 Times Daily With Meals      docusate sodium 100 MG capsule  Commonly known as: COLACE   100 mg, Oral, Daily      finasteride 5 MG tablet  Commonly known as: PROSCAR   1 tablet, Oral, Daily      furosemide 40 MG  tablet  Commonly known as: LASIX   40 mg, Oral, Daily      gabapentin 600 MG tablet  Commonly known as: NEURONTIN   600 mg, Oral, 3 Times Daily      HYDROcodone-acetaminophen  MG per tablet  Commonly known as: NORCO   1 tablet, Oral, Every 6 Hours PRN      metoclopramide 10 MG tablet  Commonly known as: REGLAN   TAKE 1 TABLET BY MOUTH 4 (FOUR) TIMES A DAY BEFORE MEALS & AT BEDTIME.      nystatin 471010 UNIT/GM powder  Commonly known as: MYCOSTATIN   Topical, Every 12 Hours Scheduled      OXYGEN-HELIUM IN   2 L, Nasal, As Needed      pravastatin 20 MG tablet  Commonly known as: PRAVACHOL   20 mg, Oral, Daily      silodosin 8 MG capsule capsule  Commonly known as: RAPAFLO   1 capsule, Oral, Daily, With food.          Stop These Medications    doxycycline 100 MG tablet  Commonly known as: VIBRAMYICN     lisinopril 20 MG tablet  Commonly known as: PRINIVIL,ZESTRIL     Lotrisone 1-0.05 % cream  Generic drug: clotrimazole-betamethasone     mupirocin 2 % ointment  Commonly known as: BACTROBAN           Diet Instructions     Diet: Regular, Consistent Carbohydrate, Cardiac      Discharge Diet:  Regular  Consistent Carbohydrate  Cardiac            Activity Instructions     Activity as Tolerated           Additional Instructions for the Follow-ups that You Need to Schedule     Ambulatory Referral to Home Health   As directed      Face to Face Visit Date: 1/12/2022    Follow-up provider for Plan of Care?: I treated the patient in an acute care facility and will not continue treatment after discharge.    Follow-up provider: NIDIA GANT [[id number]]    Reason/Clinical Findings: cellulitis    Describe mobility limitations that make leaving home difficult: weakness, charcot foot    Nursing/Therapeutic Services Requested: Skilled Nursing Physical Therapy    Skilled nursing orders: Wound care dressing/changes (Need PT/INR by 1/14. watch closely while on Bactrim) Neurovascular assessments Pain management Medication education    PT  orders: Therapeutic exercise Gait Training Transfer training    Weight Bearing Status: As Tolerated    Frequency: 1 Week 1         Discharge Follow-up with PCP   As directed       Currently Documented PCP:    Marc Ludwig MD    PCP Phone Number:    [phone]     Follow Up Details: see in 1 week         Discharge Follow-up with Specified Provider: Orthopedics; 1 Month   As directed      To: Orthopedics    Follow Up: 1 Month            Contact information for follow-up providers     Marc Ludwig MD .    Specialty: Family Medicine  Why: see in 1 week  Contact information:  [address] [facility] [address]  [place] [address]  [phone]             Rip Banerjee Jr., MD Follow up in 1 month(s).    Specialty: Orthopedic Surgery  Contact information:  [address]  [place] [address]  [phone]                   Contact information for after-discharge care     Home Medical Care     [facility]    Service: Home Health Services  Contact information:  [address] [facility] [address]  [place] [id number] [phone]                           Test Results Pending at Discharge  Pending Labs     Order Current Status    Blood Culture - Blood, Arm, Left Preliminary result    Blood Culture - Blood, Arm, Right Preliminary result    Blood Culture - Blood, Arm, Right Preliminary result         Lula Garrison, ESTEFANY  01/12/22  14:51 EST    Discharge time spent greater than 30 minutes.

## 2022-01-12 NOTE — OUTREACH NOTE
Prep Survey      Responses   [facility] patient discharged from? [place]   Is LACE score < 7 ? No   Emergency Room discharge w/ pulse ox? No   Eligibility [facility]   Date of Admission 01/09/22   Date of Discharge 01/12/22   Discharge Disposition Home or Self Care   Discharge diagnosis Cellulitis   Does the patient have one of the following disease processes/diagnoses(primary or secondary)? Other   Does the patient have Home health ordered? Yes   What is the Home health agency?  [facility]   Is there a DME ordered? No   Prep survey completed? Yes          Estrella Burdick RN

## 2022-01-12 NOTE — THERAPY TREATMENT NOTE
Patient Name: Jalen Lockwood  : 1950    MRN: [id number]                              Today's Date: 2022       Admit Date: 2022    Visit Dx:     ICD-10-CM ICD-9-CM   1. Generalized weakness  R53.1 780.79   2. Cellulitis of left lower extremity  L03.116 682.6   3. Lower limb ulcer, ankle, left, with unspecified severity (HCC)  L97.329 707.13   4. Elevated lactic acid level  R79.89 276.2   5. Fever and chills  R50.9 780.60     Patient Active Problem List   Diagnosis   • Chronic osteomyelitis (HCC)   • Foot pain   • Peripheral neuropathy   • Lymphedema of both lower extremities   • Permanent atrial fibrillation (HCC)   • Sleep apnea   • Chronic edema   • Obesity, Class III, BMI 40-49.9 (morbid obesity) (HCC)   • COPD (chronic obstructive pulmonary disease) (HCC)   • Atrial flutter (HCC)   • 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HCC)   • Aortectasia (HCC)   • Popliteal artery aneurysm (HCC)   • Colon polyps   • Gastroparesis   • Insomnia   • Adenomatous polyp of colon   • Essential hypertension   • ETOH abuse   • Chronic anticoagulation   • Oropharyngeal dysphagia   • Tobacco abuse   • Thyromegaly   • Adrenal adenoma, left   • Retroperitoneal lymphadenopathy   • Anemia of chronic disease   • Thyroid nodule   • Charcot's joint of foot   • Abnormal CT scan, lumbar spine   • Alcohol dependence, in remission (HCC)   • Ischemic cardiomyopathy   • Cellulitis   • Normocytic anemia   • Inguinal adenopathy   • Generalized weakness   • Fever in adult   • Elevated lactic acid level     Past Medical History:   Diagnosis Date   • Allergic rhinitis    • Anxiety    • Aortectasia (HCC)     3cm infrarenal abdominal aorta   • Arthritis    • Atrial flutter (HCC) 2010    s/p ablation    • Charcot's joint of foot    • Chronic edema     both legs and sees wound care center at [place]    • Chronic venous insufficiency    • COPD (chronic obstructive pulmonary disease) (HCC)    • Coronary atherosclerosis     Cath 2010: diffuse  40-50% disease   • Diverticulosis    • Duodenitis    • Fatty liver    • Gastritis    • Gastroparesis    • Hematoma     post-operative; After catheterization, right groin, required surgical exploration   • Hyperlipidemia    • Hypertension    • Insomnia    • Internal hemorrhoids    • Open wound     izzy legs has drsg chg weekly at wound care center at [place]  pt does second dressing on left leg another time during week   • Osteomyelitis (HCC)    • Paroxysmal atrial fibrillation (HCC)    • Peripheral neuropathy    • Popliteal artery aneurysm (HCC)     left, s/p stenting by Dr. Boston   • Skin cancer    • Sleep apnea     o2   • 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HCC)     due to flutter and afib; cath 2010 with nonobstructive disease   • Venous stasis    • Venous stasis ulcer (HCC)     bilateral legs      Past Surgical History:   Procedure Laterality Date   • BASAL CELL CARCINOMA EXCISION      ear and left side of face   • BRONCHOSCOPY N/A 4/12/2021    Procedure: BRONCHOSCOPY WITH BAL;  Surgeon: Bunny Lepe MD;  Location: [facility] ENDOSCOPY;  Service: Pulmonary;  Laterality: N/A;  PRE-HEMOPTYSIS  POST-SAME   • CARDIAC CATHETERIZATION     • CATARACT EXTRACTION     • COLONOSCOPY  09/28/2015    NBIH, diverticulosis, polyps   • COLONOSCOPY N/A 9/11/2018    Procedure: COLONOSCOPY TO CECUM  AND TERM. ILEUM WITH COLD SNARE POLYPECTOMIES;  Surgeon: Kane Lagunas MD;  Location:  [facility] ENDOSCOPY;  Service: Gastroenterology   • COLONOSCOPY N/A 10/29/2019    Procedure: COLONOSCOPY TO TO CECUM AND TERMINAL ILEUM WITH HOT AND COLD SNARE POLYPECTOMIES;  Surgeon: Kane Lagunas MD;  [facility];  Service: Gastroenterology   • HIP ARTHROPLASTY Right 2017   • JOINT REPLACEMENT Left    • OTHER SURGICAL HISTORY      Catheter ablation atrial flutter   • REPAIR ANEURYSM / PSEUDO ANEURYSM / RUPTURED ANEURYSM POPLITEAL ARTERY      Stent-Graft of the the left popliteal artery   • REPAIR KNEE LIGAMENT      Primary repair  of knee ligament cruciate anterior right   • TONSILLECTOMY  1958   • TOTAL KNEE ARTHROPLASTY Bilateral    • UPPER GASTROINTESTINAL ENDOSCOPY  09/16/2014    acute gastritis, acute duodenitis      General Information     Row Name 01/12/22 0958          Physical Therapy Time and Intention    Document Type therapy note (daily note)  -MS (r) AY (t) MS (c)     Mode of Treatment physical therapy  -MS (r) AY (t) MS (c)     Row Name 01/12/22 0958          General Information    Patient Profile Reviewed yes  -MS (r) AY (t) MS (c)     Existing Precautions/Restrictions fall; oxygen therapy device and L/min  -MS (r) AY (t) MS (c)     Row Name 01/12/22 0958          Cognition    Orientation Status (Cognition) oriented x 4  -MS (r) AY (t) MS (c)     Row Name 01/12/22 0958          Safety Issues, Functional Mobility    Impairments Affecting Function (Mobility) balance; endurance/activity tolerance; strength; sensation/sensory awareness; postural/trunk control  -MS (r) AY (t) MS (c)           User Key  (r) = Recorded By, (t) = Taken By, (c) = Cosigned By    Initials Name Provider Type    Mirna Caal, PT Physical Therapist    Luis Woods, PT Student PT Student               Mobility     Row Name 01/12/22 0959          Sit-Stand Transfer    Sit-Stand [place] (Transfers) supervision  -MS (r) AY (t) MS (c)     Assistive Device (Sit-Stand Transfers) walker, front-wheeled  -MS (r) AY (t) MS (c)     Row Name 01/12/22 0959          Gait/Stairs (Locomotion)    [place] Level (Gait) supervision  -MS (r) AY (t) MS (c)     Assistive Device (Gait) walker, front-wheeled  -MS (r) AY (t) MS (c)     Distance in Feet (Gait) 120'  -MS (r) AY (t) MS (c)           User Key  (r) = Recorded By, (t) = Taken By, (c) = Cosigned By    Initials Name Provider Type    MS MeadsMirna, PT Physical Therapist    Luis Woods, PT Student PT Student               Obj/Interventions     Row Name 01/12/22 1000          Balance    Balance  Interventions sitting; standing; sit to stand; supported; static  -MS (r) AY (t) MS (c)           User Key  (r) = Recorded By, (t) = Taken By, (c) = Cosigned By    Initials Name Provider Type    MS FergusonMirna, PT Physical Therapist    Luis Woods, PT Student PT Student               Goals/Plan    No documentation.                Clinical Impression     Row Name 01/12/22 1001          Pain    Additional Documentation Pain Scale: Numbers Pre/Post-Treatment (Group)  -MS (r) AY (t) MS (c)     Row Name 01/12/22 1001          Pain Scale: Numbers Pre/Post-Treatment    Pretreatment Pain Rating 3/10  -MS (r) AY (t) MS (c)     Posttreatment Pain Rating 3/10  -MS (r) AY (t) MS (c)     Pain Location - Side Bilateral  -MS (r) AY (t) MS (c)     Pain Location foot  -MS (r) AY (t) MS (c)     Pain Intervention(s) Repositioned; Ambulation/increased activity; Rest  -MS (r) AY (t) MS (c)     Row Name 01/12/22 1001          Plan of Care Review    Plan of Care Reviewed With patient  -MS (r) AY (t) MS (c)     Progress improving  -MS (r) AY (t) MS (c)     Outcome Summary pt agreed to pt treatment today. Pt was sitting in [facility] upon arrival and was AOx4.  Pt was able to perform transfers and gait with supervision w/ FWW. Pt's PLOF is at baseline function and doesn't need PT services and can be safely DC to home from PT perspectives after being medically cleared. Recommended home with home health after discharge.  -MS (r) AY (t) MS (c)     Row Name 01/12/22 1001          Positioning and Restraints    Pre-Treatment Position sitting in chair/recliner  -MS (r) AY (t) MS (c)     Post Treatment Position chair  -MS (r) AY (t) MS (c)     In Chair sitting; call light within reach; encouraged to call for assist; exit alarm on  -MS (r) AY (t) MS (c)           User Key  (r) = Recorded By, (t) = Taken By, (c) = Cosigned By    Initials Name Provider Type    MS FergusonMirna, PT Physical Therapist    Luis Woods, PT Student PT Student                Outcome Measures     Row Name 01/12/22 1006          How much help from another person do you currently need...    Turning from your back to your side while in flat bed without using bedrails? 4  -MS (r) AY (t) MS (c)     Moving from lying on back to sitting on the side of a flat bed without bedrails? 4  -MS (r) AY (t) MS (c)     Moving to and from a bed to a chair (including a wheelchair)? 4  -MS (r) AY (t) MS (c)     Standing up from a chair using your arms (e.g., wheelchair, bedside chair)? 4  -MS (r) AY (t) MS (c)     Climbing 3-5 steps with a railing? 3  -MS (r) AY (t) MS (c)     To walk in hospital room? 3  -MS (r) AY (t) MS (c)     AM-PAC 6 Clicks Score (PT) 22  -MS (r) AY (t)     Row Name 01/12/22 1006          Functional Assessment    Outcome Measure Options AM-PAC 6 Clicks Basic Mobility (PT)  -MS (r) AY (t) MS (c)           User Key  (r) = Recorded By, (t) = Taken By, (c) = Cosigned By    Initials Name Provider Type    MS PhyllisMirna, PT Physical Therapist    Luis Woods, PT Student PT Student                               PT Recommendation and Plan     Plan of Care Reviewed With: patient  Progress: improving  Outcome Summary: pt agreed to pt treatment today. Pt was sitting in [facility] upon arrival and was AOx4.  Pt was able to perform transfers and gait with supervision w/ FWW. Pt's PLOF is at baseline function and doesn't need PT services and can be safely DC to home from PT perspectives after being medically cleared. Recommended home with home health after discharge.     Time Calculation:    PT Charges     Row Name 01/12/22 1007             Time Calculation    Start Time 0934  -MS (r) AY (t) MS (c)      Stop Time 0948  -MS (r) AY (t) MS (c)      Time Calculation (min) 14 min  -MS (r) AY (t)      PT Received On 01/12/22  -MS (r) AY (t) MS (c)              Time Calculation- PT    Total Timed Code Minutes- PT 14 minute(s)  -MS (r) AY (t) MS (c)              Timed Charges    20834 - UZ  Therapeutic Activity Minutes 14  -MS (r) AY (t) MS (c)              Total Minutes    Timed Charges Total Minutes 14  -MS (r) AY (t)       Total Minutes 14  -MS (r) AY (t)            User Key  (r) = Recorded By, (t) = Taken By, (c) = Cosigned By    Initials Name Provider Type    Mirna Caal, PT Physical Therapist    Luis Woods, PT Student PT Student              Therapy Charges for Today     Code Description Service Date Service Provider Modifiers Qty    33191274902 HC PT THERAPEUTIC ACT EA 15 MIN 1/12/2022 Luis Eller, PT Student GP 1          PT G-Codes  Outcome Measure Options: AM-PAC 6 Clicks Basic Mobility (PT)  AM-PAC 6 Clicks Score (PT): 22  AM-PAC 6 Clicks Score (OT): 15    Luis Eller PT Student  1/12/2022

## 2022-01-12 NOTE — PROGRESS NOTES
Continued Stay Note  [facility]     Patient Name: Jalen Lockwood  MRN: [id number]  Today's Date: 1/12/2022    Admit Date: 1/9/2022     Discharge Plan     Row Name 01/12/22 1201       Plan    Final Discharge Disposition Code 06 - home with home health care    Final Note Pt to d/c home with [facility]                Discharge Codes    No documentation.               Expected Discharge Date and Time     Expected Discharge Date Expected Discharge Time    Jan 12, 2022             Jennifer Lr RN

## 2022-01-12 NOTE — PLAN OF CARE
Pt continues to make progress with OT. CGA for sit to stand transfers from the bed and regular commode. Pt able to complete grooming and self feeding with no hands on assist. Pt appears near his baseline. Requesting OT to see 1-2 more times until he is d/c from the hospital. Pt remains with BLE discomfort and edema. Pt with plans to d/c home with  therapy. Pt in agreement to use a walker at d/c.    Patient was not wearing a face mask during this therapy encounter. Therapist used appropriate personal protective equipment including mask, goggles and gloves. Mask used was standard procedure mask. Appropriate PPE was worn during the entire therapy session. Hand hygiene was completed before and after therapy session. Patient is not in enhanced droplet precautions.

## 2022-01-12 NOTE — PROGRESS NOTES
LOS: 3 days     Chief Complaint:  Cellulitis    Interval History: Patient reports she is doing well this morning.  Does report some increase in the swelling in his lower extremities due to sitting up in the chair.  Denies any fevers or chills.  States he is tolerating antibiotics well.  Denies any nausea, vomiting, diarrhea or fever/chills.    Fever curve remains within normal limits.  Leukocytosis resolved.  Repeat blood cultures no growth to date    Vital Signs  Temp:  [97.5 °F (36.4 °C)-97.9 °F (36.6 °C)] 97.5 °F (36.4 °C)  Heart Rate:  [70-89] 89  Resp:  [18-20] 18  BP: ()/(72-80) 118/80    Physical Exam:  General: In no acute distress  HEENT: Oropharynx clear, moist mucous membranes  Cardiovascular: RRR, normal S1 and S2, no M/R/G  Respiratory: Clear to ascultation bilaterally, no wheezing  GI: Soft, NT/ND, + bowel sounds bilaterally, no masses  Skin: Bilateral lower extremity skin changes consistent with chronic venous stasis.  Left foot with dorsal aspect erythema and small amount of purulence and superficial ulcers.  Extremities: Bilateral pitting edema and no cyanosis  Access: Peripheral IV    Antibiotics:  Anti-Infectives (From admission, onward)    Ordered     Dose/Rate Route Frequency Start Stop    01/09/22 1437  cefTRIAXone (ROCEPHIN) 2 g in sodium chloride 0.9 % 100 mL IVPB-VTB        Note to Pharmacy: Possible osteomyelitis   Ordering Provider: Kurt Enciso APRN    2 g  200 mL/hr over 30 Minutes Intravenous Every 24 Hours 01/10/22 0900 01/14/22 0859    01/09/22 1525  vancomycin (VANCOCIN) in iso-osmotic dextrose IVPB 1 g (premix) 200 mL        Ordering Provider: Kurt Enciso APRN    1,000 mg  over 60 Minutes Intravenous Every 12 Hours 01/09/22 2200 01/14/22 0959    01/09/22 1442  cefTRIAXone (ROCEPHIN) 1 g in sodium chloride 0.9 % 100 mL IVPB-VTB        Ordering Provider: Kurt Enciso APRN    1 g  200 mL/hr over 30 Minutes Intravenous Once 01/09/22 1530 01/09/22 1656    01/09/22  1243  Pharmacy to dose vancomycin        Ordering Provider: Kurt Enciso APRN     Does not apply Continuous PRN 01/09/22 1241 01/14/22 1240    01/09/22 0929  cefTRIAXone (ROCEPHIN) 1 g in sodium chloride 0.9 % 100 mL IVPB-VTB        Ordering Provider: Eddie Julien MD    1 g  200 mL/hr over 30 Minutes Intravenous Once 01/09/22 0931 01/09/22 1103    01/09/22 0900  vancomycin 2750 mg/1000 mL 0.9% NS IVPB        Ordering Provider: Eddie Julien MD    20 mg/kg × 133 kg Intravenous Once 01/09/22 0902 01/09/22 1005           Results Review:     I reviewed the patient's new clinical results.    Lab Results   Component Value Date    WBC 8.21 01/11/2022    HGB 13.4 01/11/2022    HCT 39.4 01/11/2022    .1 (H) 01/11/2022     01/11/2022     Lab Results   Component Value Date    GLUCOSE 118 (H) 01/10/2022    BUN 16 01/10/2022    CREATININE 0.78 01/10/2022    EGFRIFNONA 98 01/10/2022    EGFRIFAFRI 102 11/20/2018    BCR 20.5 01/10/2022    CO2 26.5 01/10/2022    CALCIUM 9.8 01/10/2022    PROTENTOTREF 6.1 04/26/2019    ALBUMIN 4.20 01/09/2022    LABIL2 1.1 04/26/2019    AST 15 01/09/2022    ALT 10 01/09/2022          Microbiology:  1/9 blood cultures 1 of 2 coag negative staph  1/9 respiratory panel negative  1/9 wound culture with E. coli and ESBL Klebsiella pneumoniae  1/10 blood cultures no growth to date    Assessment/Plan   #Left lower extremity cellulitis  #Bilateral Charcot foot  #Chronic lymphedema and venous stasis  #Positive blood culture with coag negative staph, likely contamination  #Obesity complicating the above  #History of peripheral vascular disease     Repeat blood cultures remain negative and at this point would consider coag negative staph isolated from blood cultures to be contaminant.    For patient's cellulitis he isolated to gram-negative organisms including Klebsiella and E. Coli.  Erythema somewhat improved today on antibiotics.  Will plan to stop IV vancomycin/ceftriaxone and  transition to oral Bactrim 1 double strength tablet twice daily to complete a 7-day course for cellulitis. He will need monitoring of INR in the setting of bactrim therapy.    Case was discussed at bedside with nursing.  Recommended wrapping his lower extremities to aid with his venous insufficiency given his significant edema.  He will need close follow-up with his outpatient wound care.    Thank you for allowing me to be involved in the care of this patient. Infectious diseases will sign off at this time with antibiotics plan in place, but please call me at [phone] if any further ID questions or new ID concerns.

## 2022-01-12 NOTE — THERAPY TREATMENT NOTE
Patient Name: Jalen Lockwood  : 1950    MRN: [id number]                              Today's Date: 2022       Admit Date: 2022    Visit Dx:     ICD-10-CM ICD-9-CM   1. Generalized weakness  R53.1 780.79   2. Cellulitis of left lower extremity  L03.116 682.6   3. Lower limb ulcer, ankle, left, with unspecified severity (HCC)  L97.329 707.13   4. Elevated lactic acid level  R79.89 276.2   5. Fever and chills  R50.9 780.60     Patient Active Problem List   Diagnosis   • Chronic osteomyelitis (HCC)   • Foot pain   • Peripheral neuropathy   • Lymphedema of both lower extremities   • Permanent atrial fibrillation (HCC)   • Sleep apnea   • Chronic edema   • Obesity, Class III, BMI 40-49.9 (morbid obesity) (HCC)   • COPD (chronic obstructive pulmonary disease) (HCC)   • Atrial flutter (HCC)   • 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HCC)   • Aortectasia (HCC)   • Popliteal artery aneurysm (HCC)   • Colon polyps   • Gastroparesis   • Insomnia   • Adenomatous polyp of colon   • Essential hypertension   • ETOH abuse   • Chronic anticoagulation   • Oropharyngeal dysphagia   • Tobacco abuse   • Thyromegaly   • Adrenal adenoma, left   • Retroperitoneal lymphadenopathy   • Anemia of chronic disease   • Thyroid nodule   • Charcot's joint of foot   • Abnormal CT scan, lumbar spine   • Alcohol dependence, in remission (HCC)   • Ischemic cardiomyopathy   • Cellulitis   • Normocytic anemia   • Inguinal adenopathy   • Generalized weakness   • Fever in adult   • Elevated lactic acid level     Past Medical History:   Diagnosis Date   • Allergic rhinitis    • Anxiety    • Aortectasia (HCC)     3cm infrarenal abdominal aorta   • Arthritis    • Atrial flutter (HCC) 2010    s/p ablation    • Charcot's joint of foot    • Chronic edema     both legs and sees wound care center at [place]    • Chronic venous insufficiency    • COPD (chronic obstructive pulmonary disease) (HCC)    • Coronary atherosclerosis     Cath 2010: diffuse  40-50% disease   • Diverticulosis    • Duodenitis    • Fatty liver    • Gastritis    • Gastroparesis    • Hematoma     post-operative; After catheterization, right groin, required surgical exploration   • Hyperlipidemia    • Hypertension    • Insomnia    • Internal hemorrhoids    • Open wound     izzy legs has drsg chg weekly at wound care center at [place]  pt does second dressing on left leg another time during week   • Osteomyelitis (HCC)    • Paroxysmal atrial fibrillation (HCC)    • Peripheral neuropathy    • Popliteal artery aneurysm (HCC)     left, s/p stenting by Dr. [place]   • Skin cancer    • Sleep apnea     o2   • 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HCC)     due to flutter and afib; cath 2010 with nonobstructive disease   • Venous stasis    • Venous stasis ulcer (HCC)     bilateral legs      Past Surgical History:   Procedure Laterality Date   • BASAL CELL CARCINOMA EXCISION      ear and left side of face   • BRONCHOSCOPY N/A 4/12/2021    Procedure: BRONCHOSCOPY WITH BAL;  Surgeon: Bunny Lepe MD;  Location: [facility] ENDOSCOPY;  Service: Pulmonary;  Laterality: N/A;  PRE-HEMOPTYSIS  POST-SAME   • CARDIAC CATHETERIZATION     • CATARACT EXTRACTION     • COLONOSCOPY  09/28/2015    NBIH, diverticulosis, polyps   • COLONOSCOPY N/A 9/11/2018    Procedure: COLONOSCOPY TO CECUM  AND TERM. ILEUM WITH COLD SNARE POLYPECTOMIES;  Surgeon: Kane Lagunas MD;  Location:  [facility];  Service: Gastroenterology   • COLONOSCOPY N/A 10/29/2019    Procedure: COLONOSCOPY TO TO CECUM AND TERMINAL ILEUM WITH HOT AND COLD SNARE POLYPECTOMIES;  Surgeon: Kane Lagunas MD;  [facility];  Service: Gastroenterology   • HIP ARTHROPLASTY Right 2017   • JOINT REPLACEMENT Left    • OTHER SURGICAL HISTORY      Catheter ablation atrial flutter   • REPAIR ANEURYSM / PSEUDO ANEURYSM / RUPTURED ANEURYSM POPLITEAL ARTERY      Stent-Graft of the the left popliteal artery   • REPAIR KNEE LIGAMENT      Primary repair  of knee ligament cruciate anterior right   • TONSILLECTOMY  1958   • TOTAL KNEE ARTHROPLASTY Bilateral    • UPPER GASTROINTESTINAL ENDOSCOPY  09/16/2014    acute gastritis, acute duodenitis      General Information     Row Name 01/12/22 0908          OT Time and Intention    Document Type therapy note (daily note)  -RB     Mode of Treatment individual therapy; occupational therapy  -RB     Row Name 01/12/22 0908          General Information    Patient Profile Reviewed yes  -RB     Existing Precautions/Restrictions fall; oxygen therapy device and L/min  -RB     Row Name 01/12/22 0908          Cognition    Orientation Status (Cognition) oriented x 4  -RB           User Key  (r) = Recorded By, (t) = Taken By, (c) = Cosigned By    Initials Name Provider Type    RB Ligia Reyes OT Occupational Therapist                 Mobility/ADL's     Row Name 01/12/22 0908          Bed Mobility    Comment (Bed Mobility) UIC  -RB     Row Name 01/12/22 0936          Transfers    Transfers toilet transfer  -RB     Sit-Stand [place] (Transfers) contact guard  -RB     [place] Level (Toilet Transfer) contact guard  -RB     Assistive Device (Toilet Transfer) commode; grab bars/safety frame  -RB     Row Name 01/12/22 0936          Sit-Stand Transfer    Assistive Device (Sit-Stand Transfers) walker, front-wheeled  -RB     Row Name 01/12/22 0936          Toilet Transfer    Type (Toilet Transfer) sit-stand; stand-sit  -RB     Row Name 01/12/22 0936          Functional Mobility    Functional Mobility- Ind. Level contact guard assist  -RB     Functional Mobility- Device rolling walker  -RB     Row Name 01/12/22 0936          Activities of Daily Living    BADL Assessment/Intervention grooming; feeding  -RB     Row Name 01/12/22 0936          Grooming Assessment/Training    [place] Level (Grooming) grooming skills; modified independence  -RB     Position (Grooming) supported sitting  -RB     Row Name 01/12/22 0936           Self-Feeding Assessment/Training    [place] Level (Feeding) feeding skills; modified independence  -RB     Position (Self-Feeding) supported sitting  -RB           User Key  (r) = Recorded By, (t) = Taken By, (c) = Cosigned By    Initials Name Provider Type    Ligia Eric OT Occupational Therapist               Obj/Interventions     Row Name 01/12/22 0937          Balance    Balance Assessment standing dynamic balance  -RB     Static Sitting Balance WFL; supported; sitting, edge of bed  -RB     Static Standing Balance supported; standing; WFL  -RB     Dynamic Standing Balance mild impairment; supported; standing  -RB     Balance Interventions occupation based/functional task  -RB           User Key  (r) = Recorded By, (t) = Taken By, (c) = Cosigned By    Initials Name Provider Type    Ligia Eric OT Occupational Therapist               Goals/Plan    No documentation.                Clinical Impression     Row Name 01/12/22 0937          Pain Scale: Numbers Pre/Post-Treatment    Pretreatment Pain Rating 3/10  -RB     Posttreatment Pain Rating 3/10  -RB     Pain Location - Side Bilateral  -RB     Pain Location foot  -RB     Pain Intervention(s) Repositioned; Rest  -RB     Row Name 01/12/22 0937          Plan of Care Review    Plan of Care Reviewed With patient  -RB     Progress improving  -RB     Row Name 01/12/22 0937          Therapy Assessment/Plan (OT)    Criteria for Skilled Therapeutic Interventions Met (OT) yes; skilled treatment is necessary  -RB     Therapy Frequency (OT) 5 times/wk  -RB     Row Name 01/12/22 0937          Therapy Plan Review/Discharge Plan (OT)    Anticipated Discharge Disposition (OT) home with 24/7 care; home with home health  -RB     Row Name 01/12/22 0937          Vital Signs    Pre SpO2 (%) 98  -RB     O2 Delivery Pre Treatment supplemental O2  -RB     Intra SpO2 (%) 95  -RB     O2 Delivery Intra Treatment room air  -RB     Post SpO2 (%) 95  -RB     O2 Delivery  Post Treatment room air  -RB     Pre Patient Position Sitting  -RB     Intra Patient Position Standing  -RB     Post Patient Position Sitting  -RB     Row Name 01/12/22 0937          Positioning and Restraints    Pre-Treatment Position sitting in chair/recliner  -RB     Post Treatment Position chair  -RB     In Chair notified nsg; reclined; sitting; call light within reach; encouraged to call for assist; exit alarm on; legs elevated  -RB           User Key  (r) = Recorded By, (t) = Taken By, (c) = Cosigned By    Initials Name Provider Type    Ligia Eric OT Occupational Therapist               Outcome Measures    No documentation.                 Occupational Therapy Education                 Title: PT OT SLP Therapies (In Progress)     Topic: Occupational Therapy (In Progress)     Point: ADL training (Done)     Description:   Instruct learner(s) on proper safety adaptation and remediation techniques during self care or transfers.   Instruct in proper use of assistive devices.              Learning Progress Summary           Patient Acceptance, E, VU by AF at 1/10/2022 1618    Comment: educated on transfers                   Point: Home exercise program (Not Started)     Description:   Instruct learner(s) on appropriate technique for monitoring, assisting and/or progressing therapeutic exercises/activities.              Learner Progress:  Not documented in this visit.          Point: Precautions (Not Started)     Description:   Instruct learner(s) on prescribed precautions during self-care and functional transfers.              Learner Progress:  Not documented in this visit.          Point: Body mechanics (Not Started)     Description:   Instruct learner(s) on proper positioning and spine alignment during self-care, functional mobility activities and/or exercises.              Learner Progress:  Not documented in this visit.                      User Key     Initials Effective Dates Name Provider Type  Discipline    AF 06/16/21 -  Payal Christine, OTR Occupational Therapist OT              OT Recommendation and Plan  Therapy Frequency (OT): 5 times/wk  Plan of Care Review  Plan of Care Reviewed With: patient  Progress: improving     Time Calculation:    Time Calculation- OT     Row Name 01/12/22 0908             Time Calculation- OT    OT Start Time 0832  -RB      OT Stop Time 0856  -RB      OT Time Calculation (min) 24 min  -RB      Total Timed Code Minutes- OT 24 minute(s)  -RB      OT Received On 01/12/22  -RB      OT - Next Appointment 01/13/22  -RB      OT Goal Re-Cert Due Date 01/26/22  -RB              Timed Charges    04218 - OT Self Care/Mgmt Minutes 24  -RB              Total Minutes    Timed Charges Total Minutes 24  -RB       Total Minutes 24  -RB            User Key  (r) = Recorded By, (t) = Taken By, (c) = Cosigned By    Initials Name Provider Type    RB Ligia Reyes OT Occupational Therapist              Therapy Charges for Today     Code Description Service Date Service Provider Modifiers Qty    29660607011 HC OT SELF CARE/MGMT/TRAIN EA 15 MIN 1/12/2022 Ligia Reyes OT GO 2               Ligia Reyes OT  1/12/2022

## 2022-01-12 NOTE — PLAN OF CARE
Goal Outcome Evaluation:  Plan of Care Reviewed With: patient        Progress: improving  Outcome Summary: pt agreed to pt treatment today. Pt was sitting in BSC upon arrival and was AOx4.  Pt was able to perform transfers and gait with supervision w/ FWW. Pt's PLOF is at baseline function and doesn't need PT services and can be safely DC to home from PT [facility] after being medically cleared. Recommended home with home health after discharge.    Patient was wearing a face mask during this therapy encounter. Therapist used appropriate personal protective equipment including mask and gloves.  Mask used was standard procedure mask. Appropriate PPE was worn during the entire therapy session. Hand hygiene was completed before and after therapy session. Patient is not in enhanced droplet precautions.

## 2022-01-12 NOTE — PROGRESS NOTES
[facility] Clinical Pharmacy Services: Warfarin Dosing/Monitoring Consult    Jalen Lockwood is a 71 y.o. male, estimated creatinine clearance is 122.7 mL/min (by C-G formula based on SCr of 0.78 mg/dL). weighing 136 kg (299 lb).    Results from last 7 days   Lab Units 01/12/22  0901 01/11/22  1213 01/11/22  0953 01/10/22  1847 01/10/22  1846 01/09/22  0609 01/09/22  0608   INR  2.05*  --  1.83* 2.34*  --  2.20*  --    APTT seconds  --   --   --   --   --  47.9*  --    HEMOGLOBIN g/dL 13.3 13.4  --   --  13.1  --  14.2   HEMATOCRIT % 39.0 39.4  --   --  36.5*  --  40.4   PLATELETS 10*3/mm3 156 142  --   --  132*  --  150     Prior to admission anticoagulation: 5 mg Monday 7.5 all other days, stable on dose for multiple months per pt     Hospital Anticoagulation:  Consulting provider: IWONA Enciso  Start date: 1/9  Indication: afib  Target INR: 2-3  Expected duration: life   Bridge Therapy: No      Potential food or drug interactions: Bactrim - this will certainly cause an increase in INR. Need to follow INR closely throughout duration of Bactrim therapy and following Bactrim course completion.    Education complete?/Date: Yes; 1/9    Assessment/Plan:  1. INR 2.05 today. Patient changed per ID from ceftriaxone/vancomycin to 7 day course of Bactrim. There is a significant drug interaction between bactrim and warfarin that will almost certainly cause an increase in INR. Proactively adjusting patient's warfarin regimen down to 5mg daily (30% weekly dose reduction). Dosing may need further adjustments and INR needs to be closely monitored even as an outpatient if patient discharged while still on Bactrim.  2. Monitor for any signs or symptoms of bleeding  3. Follow up daily INRs and dose adjustments    Pharmacy will continue to follow until discharge or discontinuation of warfarin.     Constance Nunes, PharmD, [facility]  Clinical Pharmacy Specialist, Emergency Medicine   Phone: [phone]

## 2022-01-13 ENCOUNTER — TRANSITIONAL CARE MANAGEMENT TELEPHONE ENCOUNTER (OUTPATIENT)
Dept: CALL CENTER | Facility: HOSPITAL | Age: 72
End: 2022-01-13

## 2022-01-13 ENCOUNTER — ANTICOAGULATION VISIT (OUTPATIENT)
Dept: PHARMACY | Facility: HOSPITAL | Age: 72
End: 2022-01-13

## 2022-01-13 ENCOUNTER — TELEPHONE (OUTPATIENT)
Dept: ORTHOPEDIC SURGERY | Facility: CLINIC | Age: 72
End: 2022-01-13

## 2022-01-13 DIAGNOSIS — I48.21 PERMANENT ATRIAL FIBRILLATION: Primary | ICD-10-CM

## 2022-01-13 LAB — INR PPP: 2.6

## 2022-01-13 NOTE — TELEPHONE ENCOUNTER
INTERNAL REFERRAL FROM [facility]- Oliver's joint of foot, unspecified laterality- NOTES: 01.09.22 AND 03.30.21- PROGRESS NOTES FROM 01.09.22 - IS THIS SOMETHING [facility] WILL SEE?

## 2022-01-13 NOTE — OUTREACH NOTE
Call Center TCM Note      Responses   [facility] patient discharged from? [place]   Does the patient have one of the following disease processes/diagnoses(primary or secondary)? Other   TCM attempt successful? No   Unsuccessful attempts Attempt 2          Sharita Glasgow MA    1/13/2022, 16:09 EST

## 2022-01-13 NOTE — PROGRESS NOTES
Anticoagulation Clinic Progress Note    Anticoagulation Summary  As of 2022    INR goal:  2.0-3.0   TTR:  68.5 % (3.1 y)   INR used for dosin.60 (2022)   Warfarin maintenance plan:  5 mg every Mon; 7.5 mg all other days   Weekly warfarin total:  50 mg   Plan last modified:  Omid Galeas, [facility] (2021)   Next INR check:  2022   Priority:  Maintenance   Target end date:  Indefinite    Indications    Permanent atrial fibrillation (HCC) [I48.21]             Anticoagulation Episode Summary     INR check location:      Preferred lab:      [facility] IN[facility] reminders [facility]:   [facility] [place]    Comments:        Anticoagulation Care Providers     Provider Role Specialty Phone number    Kurt Henry MD Referring Cardiology [phone]        Findings:  Admitted 22 - 22 for cellulitis. Discharged yesterday on TMP/SMX (Bactrim) x 7 days.     INR History:  Anticoagulation Monitoring 2021   INR 3.00 2.10 2.60   INR Date 2021   INR Goal 2.0-3.0 2.0-3.0 2.0-3.0   Trend Same Same Same   Last Week Total 50 mg 50 mg 50 mg   Next Week Total 50 mg 50 mg 40 mg   Sun 7.5 mg 7.5 mg 5 mg ()   Mon 5 mg 5 mg -   Tue 7.5 mg 7.5 mg -   Wed 7.5 mg 7.5 mg -   Thu 7.5 mg 7.5 mg 5 mg ()   Fri 7.5 mg 7.5 mg 5 mg ()   Sat 7.5 mg 7.5 mg 5 mg (1/15)   Visit Report - - -   Some recent data might be hidden       Plan:  1. INR is Therapeutic today- see above in Anticoagulation Summary.   Provided instructions to patient and Estefania with [facility] to Change their warfarin regimen- see above in Anticoagulation Summary.  2. Follow up in 4 days      Brendan Live, AlexD

## 2022-01-13 NOTE — OUTREACH NOTE
Call Center TCM Note      Responses   [facility], [place] patient discharged from? [place]   Does the patient have one of the following disease processes/diagnoses(primary or secondary)? Other   TCM attempt successful? No   Unsuccessful attempts Attempt 1          Sharita Glasgow MA    1/13/2022, 14:02 EST

## 2022-01-14 ENCOUNTER — TRANSITIONAL CARE MANAGEMENT TELEPHONE ENCOUNTER (OUTPATIENT)
Dept: CALL CENTER | Facility: HOSPITAL | Age: 72
End: 2022-01-14

## 2022-01-14 LAB — BACTERIA SPEC AEROBE CULT: NORMAL

## 2022-01-14 NOTE — OUTREACH NOTE
Call Center TCM Note      Responses   [facility] patient discharged from? [place]   Does the patient have one of the following disease processes/diagnoses(primary or secondary)? Other   TCM attempt successful? Yes   Call start time 1016   Call end time 1017   Discharge diagnosis Cellulitis   Meds reviewed with patient/caregiver? Yes   Is the patient having any side effects they believe may be caused by any medication additions or changes? No   Does the patient have all medications ordered at discharge? Yes   Is the patient taking all medications as directed (includes completed medication regime)? Yes   Does the patient have a primary care provider?  Yes   Does the patient have an appointment with their PCP within 7 days of discharge? Yes   Comments regarding PCP appt with ESTEFANY Loco on 1/21   Has the patient kept scheduled appointments due by today? N/A   What is the Home health agency?  [facility]    Has home health visited the patient within 72 hours of discharge? Yes   Psychosocial issues? No   Did the patient receive a copy of their discharge instructions? Yes   Nursing interventions Reviewed instructions with patient   What is the patient's perception of their health status since discharge? Improving   Is the patient/caregiver able to teach back signs and symptoms related to disease process for when to call PCP? Yes   Is the patient/caregiver able to teach back signs and symptoms related to disease process for when to call 911? Yes   Is the patient/caregiver able to teach back the hierarchy of who to call/visit for symptoms/problems? PCP, Specialist, Home health nurse, Urgent Care, ED, 911 Yes   TCM call completed? Yes   Wrap up additional comments Doing well, no questions or concerns at this time, confirmed appt with PCP office for 1/21.          Arline Warren RN    1/14/2022, 10:18 EST

## 2022-01-15 LAB
BACTERIA SPEC AEROBE CULT: NORMAL
BACTERIA SPEC AEROBE CULT: NORMAL

## 2022-01-21 ENCOUNTER — OFFICE VISIT (OUTPATIENT)
Dept: FAMILY MEDICINE CLINIC | Facility: CLINIC | Age: 72
End: 2022-01-21

## 2022-01-21 VITALS
TEMPERATURE: 98.4 F | HEART RATE: 100 BPM | DIASTOLIC BLOOD PRESSURE: 78 MMHG | OXYGEN SATURATION: 99 % | SYSTOLIC BLOOD PRESSURE: 130 MMHG | BODY MASS INDEX: 42.56 KG/M2 | WEIGHT: 314.2 LBS | HEIGHT: 72 IN

## 2022-01-21 DIAGNOSIS — J44.9 CHRONIC OBSTRUCTIVE PULMONARY DISEASE, UNSPECIFIED COPD TYPE: ICD-10-CM

## 2022-01-21 DIAGNOSIS — Z72.0 TOBACCO ABUSE: Chronic | ICD-10-CM

## 2022-01-21 DIAGNOSIS — L03.119 CELLULITIS OF LOWER EXTREMITY, UNSPECIFIED LATERALITY: Primary | ICD-10-CM

## 2022-01-21 DIAGNOSIS — R53.1 GENERALIZED WEAKNESS: ICD-10-CM

## 2022-01-21 DIAGNOSIS — R60.9 CHRONIC EDEMA: ICD-10-CM

## 2022-01-21 DIAGNOSIS — M14.679 CHARCOT'S JOINT OF FOOT, UNSPECIFIED LATERALITY: Chronic | ICD-10-CM

## 2022-01-21 DIAGNOSIS — I10 ESSENTIAL HYPERTENSION: ICD-10-CM

## 2022-01-21 DIAGNOSIS — I48.21 PERMANENT ATRIAL FIBRILLATION: ICD-10-CM

## 2022-01-21 DIAGNOSIS — I89.0 LYMPHEDEMA OF BOTH LOWER EXTREMITIES: ICD-10-CM

## 2022-01-21 PROCEDURE — 96160 PT-FOCUSED HLTH RISK ASSMT: CPT | Performed by: NURSE PRACTITIONER

## 2022-01-21 PROCEDURE — 1170F FXNL STATUS ASSESSED: CPT | Performed by: NURSE PRACTITIONER

## 2022-01-21 PROCEDURE — 99495 TRANSJ CARE MGMT MOD F2F 14D: CPT | Performed by: NURSE PRACTITIONER

## 2022-01-21 PROCEDURE — 1159F MED LIST DOCD IN RCRD: CPT | Performed by: NURSE PRACTITIONER

## 2022-01-21 PROCEDURE — G0439 PPPS, SUBSEQ VISIT: HCPCS | Performed by: NURSE PRACTITIONER

## 2022-01-21 PROCEDURE — 1125F AMNT PAIN NOTED PAIN PRSNT: CPT | Performed by: NURSE PRACTITIONER

## 2022-01-21 PROCEDURE — 1111F DSCHRG MED/CURRENT MED MERGE: CPT | Performed by: NURSE PRACTITIONER

## 2022-01-21 RX ORDER — SULFAMETHOXAZOLE AND TRIMETHOPRIM 400; 80 MG/1; MG/1
1 TABLET ORAL 2 TIMES DAILY
COMMUNITY
End: 2022-01-01

## 2022-01-21 NOTE — ASSESSMENT & PLAN NOTE
bp is stable pt takes lasix carvediolol. Patient denes cp soa  Disussed with patient the importance of maintaining a normal BMI.  Reviewed the Dash diet, dietary sodium restriction.  Encourage the patient to engage in 30 minutes of regular aerobic physical activity at least 3 days a week.  Limit alcohol consumption to no more than 2 drinks per day for men, 1 drink per day for women.  Patient voiced understanding all questions answered.

## 2022-01-21 NOTE — PATIENT INSTRUCTIONS
Medicare Wellness  Personal Prevention Plan of Service     Date of Office Visit:  2022  Encounter Provider:  ESTEFANY Loco  Place of Service:  [facility] PRIMARY [facility]ARE  Patient Name: Jalen Lockwood  :  1950    As part of the Medicare Wellness portion of your visit today, we are providing you with this personalized preventive plan of services (PPPS). This plan is based upon recommendations of the [place] Preventive Services Task Force (USPSTF) and the Advisory Committee on Immunization Practices (ACIP).    This lists the preventive care services that should be considered, and provides dates of when you are due. Items listed as completed are up-to-date and do not require any further intervention.    Health Maintenance   Topic Date Due   • Pneumococcal Vaccine 65+ (1 of 2 - PPSV23) Never done   • ZOSTER VACCINE (1 of 2) Never done   • HEPATITIS C SCREENING  Never done   • ANNUAL WELLNESS VISIT  2020   • LIPID PANEL  2022   • COLORECTAL CANCER SCREENING  10/29/2022   • TDAP/TD VACCINES (2 - Td or Tdap) 2026   • COVID-19 Vaccine  Completed   • INFLUENZA VACCINE  Completed   • AAA SCREEN (ONE-TIME)  Completed       No orders of the defined types were placed in this encounter.      Return in about 3 months (around 2022).

## 2022-02-11 NOTE — PROGRESS NOTES
Anticoagulation Clinic Progress Note    Anticoagulation Summary  As of 2022    INR goal:  2.0-3.0   TTR:  69.2 % (3.1 y)   INR used for dosin.00 (2/10/2022)   Warfarin maintenance plan:  5 mg every Mon; 7.5 mg all other days   Weekly warfarin total:  50 mg   No change documented:  Brendan Live PharmD   Plan last modified:  Omid Galeas, [facility] (2021)   Next INR check:  2022   Priority:  Maintenance   Target end date:  Indefinite    Indications    Permanent atrial fibrillation (HCC) [I48.21]             Anticoagulation Episode Summary     INR check location:      Preferred lab:      Send INR reminders to:  [facility] CLINICAL [place]    Comments:        Anticoagulation Care Providers     Provider Role Specialty Phone number    Kurt Henry MD Referring Cardiology [phone]        Findings:  Reports he finished TMP/SMX (Bactrim ~22); however, he has continued taking warfarin at lower dose of 5 mg daily.    INR History:  Anticoagulation Monitoring 2022   INR 2.60 2.20 2.00   INR Date 2022 2022 2/10/2022   INR Goal 2.0-3.0 2.0-3.0 2.0-3.0   Trend Same Same Same   Last Week Total 50 mg 35 mg 35 mg   Next Week Total 40 mg 35 mg 50 mg   Sun 5 mg () 5 mg () 7.5 mg   Mon - 5 mg 5 mg   Tue - 5 mg () 7.5 mg   Wed - 5 mg () 7.5 mg   Thu 5 mg () 5 mg () -   Fri 5 mg () 5 mg () 7.5 mg   Sat 5 mg (1/15) 5 mg () 7.5 mg   Visit Report - - -   Some recent data might be hidden       Plan:  1. INR is Therapeutic today- see above in Anticoagulation Summary.   Provided instructions to Mr. Asencio with [facility] to return to prior dose of warfarin 5 mg Mon, 7.5 mg all other days - see above in Anticoagulation Summary.  2. Follow up in 1 week      Brendan Live PharmD

## 2022-02-16 NOTE — PROGRESS NOTES
Anticoagulation Clinic Progress Note    Anticoagulation Summary  As of 2022    INR goal:  2.0-3.0   TTR:  69.4 % (3.2 y)   INR used for dosin.50 (2022)   Warfarin maintenance plan:  5 mg every Mon; 7.5 mg all other days   Weekly warfarin total:  50 mg   No change documented:  Adeline Marie RPH   Plan last modified:  Omid Galeas RPH (2021)   Next INR check:  3/2/2022   Priority:  Maintenance   Target end date:  Indefinite    Indications    Permanent atrial fibrillation (HCC) [I48.21]             Anticoagulation Episode Summary     INR check location:      Preferred lab:      [facility] [place]    Comments:        Anticoagulation Care Providers     Provider Role Specialty Phone number    Kurt Henry MD Referring Cardiology [phone]          INR History:  Anticoagulation Monitoring 2022   INR 2.20 2.00 2.50   INR Date 2022 2/10/2022 2022   INR Goal 2.0-3.0 2.0-3.0 2.0-3.0   Trend Same Same Same   Last Week Total 35 mg 35 mg 45 mg   Next Week Total 35 mg 50 mg 50 mg   Sun 5 mg () 7.5 mg 7.5 mg   Mon 5 mg 5 mg 5 mg   Tue 5 mg () 7.5 mg 7.5 mg   Wed 5 mg () 7.5 mg 7.5 mg   Thu 5 mg () - 7.5 mg   Fri 5 mg () 7.5 mg 7.5 mg   Sat 5 mg () 7.5 mg 7.5 mg   Visit Report - - -   Some recent data might be hidden       Plan:  1. INR is Therapeutic today- see above in Anticoagulation Summary.   Provided instructions to Estefania with [facility] to Continue their warfarin regimen- see above in Anticoagulation Summary. Also discussed dosing with patient and confirmed he went back to his normal dose.   2. Follow up in 2 weeks      Adeline Marie RPH

## 2022-03-07 NOTE — PROGRESS NOTES
Anticoagulation Clinic Progress Note    Anticoagulation Summary  As of 3/4/2022    INR goal:  2.0-3.0   TTR:  69.6 % (3.2 y)   INR used for dosing:  3.10 (3/4/2022)   Warfarin maintenance plan:  5 mg every Mon, Fri; 7.5 mg all other days   Weekly warfarin total:  47.5 mg   Plan last modified:  Brendan Live, [place] (3/4/2022)   Next INR check:  3/18/2022   Priority:  Maintenance   Target end date:  Indefinite    Indications    Permanent atrial fibrillation (HCC) [I48.21]             Anticoagulation Episode Summary     INR check location:      Preferred lab:      Send INR reminders to:  [facility] CLINICAL [place]    Comments:        Anticoagulation Care Providers     Provider Role Specialty Phone number    Kurt Henry MD Referring Cardiology [phone]          INR History:  Anticoagulation Monitoring 2/11/2022 2/16/2022 3/4/2022   INR 2.00 2.50 3.10   INR Date 2/10/2022 2/16/2022 3/4/2022   INR Goal 2.0-3.0 2.0-3.0 2.0-3.0   Trend Same Same Down   Last Week Total 35 mg 45 mg 50 mg   Next Week Total 50 mg 50 mg 47.5 mg   Sun 7.5 mg 7.5 mg 7.5 mg   Mon 5 mg 5 mg 5 mg   Tue 7.5 mg 7.5 mg 7.5 mg   Wed 7.5 mg 7.5 mg 7.5 mg   Thu - 7.5 mg 7.5 mg   Fri 7.5 mg 7.5 mg 5 mg   Sat 7.5 mg 7.5 mg 7.5 mg   Visit Report - - -   Some recent data might be hidden       Plan:  1. INR is Supratherapeutic today- see above in Anticoagulation Summary.   Provided instructions to Mr. Lockwood and Estefania with [facility] to Change their warfarin regimen- see above in Anticoagulation Summary.  2. Follow up in 2 weeks      Brendan Live PharmD

## 2022-03-14 NOTE — PROGRESS NOTES
Anticoagulation Clinic Progress Note    Anticoagulation Summary  As of 3/11/2022    INR goal:  2.0-3.0   TTR:  68.8 % (3.2 y)   INR used for dosing:  3.90 (3/11/2022)   Warfarin maintenance plan:  5 mg every Mon, Fri; 7.5 mg all other days   Weekly warfarin total:  47.5 mg   Plan last modified:  Brendan Live, [place] (3/4/2022)   Next INR check:  3/16/2022   Priority:  Maintenance   Target end date:  Indefinite    Indications    Permanent atrial fibrillation (HCC) [I48.21]             Anticoagulation Episode Summary     INR check location:      Preferred lab:      [facility] [place]    Comments:        Anticoagulation Care Providers     Provider Role Specialty Phone number    Kurt Henry MD Referring Cardiology [phone]          INR History:  Anticoagulation Monitoring 2/16/2022 3/4/2022 3/11/2022   INR 2.50 3.10 3.90   INR Date 2/16/2022 3/4/2022 3/11/2022   INR Goal 2.0-3.0 2.0-3.0 2.0-3.0   Trend Same Down Same   Last Week Total 45 mg 50 mg 47.5 mg   Next Week Total 50 mg 47.5 mg 42.5 mg   Sun 7.5 mg 7.5 mg 7.5 mg   Mon 5 mg 5 mg 5 mg   Tue 7.5 mg 7.5 mg 7.5 mg   Wed 7.5 mg 7.5 mg -   Thu 7.5 mg 7.5 mg -   Fri 7.5 mg 5 mg Hold (3/11)   Sat 7.5 mg 7.5 mg 7.5 mg   Visit Report - - -   Some recent data might be hidden       Plan:  1. INR is Supratherapeutic today- see above in Anticoagulation Summary.   Provided instructions to patient and Estefania with [facility] to Change their warfarin regimen- see above in Anticoagulation Summary.  2. Follow up in 5 days      Brendan Live PharmD

## 2022-03-17 NOTE — PROGRESS NOTES
Anticoagulation Clinic Progress Note    Anticoagulation Summary  As of 3/17/2022    INR goal:  2.0-3.0   TTR:  68.8 % (3.2 y)   INR used for dosin.10 (3/17/2022)   Warfarin maintenance plan:  5 mg every Mon, Fri; 7.5 mg all other days   Weekly warfarin total:  47.5 mg   No change documented:  Ruth Orta, [facility]   Plan last modified:  Brendan Live, PharmD (3/4/2022)   Next INR check:  3/24/2022   Priority:  Maintenance   Target end date:  Indefinite    Indications    Permanent atrial fibrillation (HCC) [I48.21]             Anticoagulation Episode Summary     INR check location:      Preferred lab:      Send INR reminders to:   [facility]    Comments:        Anticoagulation Care Providers     Provider Role Specialty Phone number    Kurt Henry MD Referring Cardiology [phone]          Drug interactions: has remained unchanged.  Diet: has remained unchanged.    Clinic Interview:  No pertinent clinical findings have been reported.    INR History:  Anticoagulation Monitoring 3/4/2022 3/11/2022 3/17/2022   INR 3.10 3.90 2.10   INR Date 3/4/2022 3/11/2022 3/17/2022   INR Goal 2.0-3.0 2.0-3.0 2.0-3.0   Trend Down Same Same   Last Week Total 50 mg 47.5 mg 42.5 mg   Next Week Total 47.5 mg 42.5 mg 47.5 mg   Sun 7.5 mg 7.5 mg 7.5 mg   Mon 5 mg 5 mg 5 mg   Tue 7.5 mg 7.5 mg 7.5 mg   Wed 7.5 mg - 7.5 mg   Thu 7.5 mg - 7.5 mg   Fri 5 mg Hold (3/11) 5 mg   Sat 7.5 mg 7.5 mg 7.5 mg   Visit Report - - -   Some recent data might be hidden       Plan:  1. INR is Therapeutic today- see above in Anticoagulation Summary.   Manav w/ pt and Estefania/HALINA [phone] cont same,- see above in Anticoagulation Summary.  2. Follow up in 1 weeks  3. Pt has agreed to only be called if INR out of range. They have been instructed to call if any changes in medications, doses, concerns, etc. Patient expresses understanding and has no further questions at this time.    Ruth Orta, [facility]

## 2022-03-17 NOTE — PATIENT INSTRUCTIONS
Increase carvedilol to 12.5 mg 1 tablet twice daily     Follow up in 6 months and have same day echocardiogram

## 2022-03-17 NOTE — PROGRESS NOTES
Date of Office Visit: 2022  Encounter Provider: ESTEFANY Dobson  Place of Service: [facility] CARDIOLOGY  Patient Name: Jalen Lockwood  :1950  Primary Cardiologist: Dr. Kurt Henry     Chief Complaint   Patient presents with   • Cardiomyopathy   • Follow-up   :     HPI: Jalen Lockwood is a pleasant 72 y.o. male who presents today for follow-up on cardiomyopathy and atrial fibrillation. I have reviewed the medical records.    In , he was diagnosed with rapid atrial flutter and tachycardic mediated cardiomyopathy.  Cardiac catheterization revealed moderate nonobstructive coronary artery disease with medical treatment recommended.  His ejection fraction then normalized.  He was also found that he was drinking alcohol quite heavily and recommended to decrease/abstain.  Shortly after that he presented with atrial fibrillation and his EF decreased to 35%.  Once his atrial fibrillation resolved his ejection fraction normalized.    In , he was found to have left popliteal artery aneurysm treated with stent placement by Dr. Boston.  This was complicated by a wound infection.  Noncontrast CT of the abdomen/pelvis revealed aortectasia of the infrarenal abdominal aorta with no aneurysm noted.    In 2021, he was admitted to the hospital with cellulitis and sepsis.  Troponin was elevated.  EF was decreased to 30-35% with apical hypokinesis.  EKG showed anterior T wave inversions.  Because of numerous comorbidities and poor functional status, medical treatment was recommended.  In 2021, 2D echocardiogram showed normalized EF of 55%, moderate RV dilation, and moderate pulmonary hypertension.  He reported that he quit smoking.    In 2022, he was hospitalized with left lower extremity cellulitis and treated with IV antibiotic.  Blood cultures are positive for staph.  He was recommended to have his legs wrapped.  His lisinopril was discontinued.   However, he is taking lisinopril 20 mg twice daily.    He presents today for follow-up visit.  He has chronic lower extremity edema and has his leg wrapped at the [facility] once per week.  He denies shortness of breath.  He had some mild crackles in his lower bases and they resolve with coughing.  He denies chest pain, dyspnea, PND, orthopnea, palpitations, dizziness, syncope, or bleeding.  He remains on warfarin with INRs followed at the [facility].  Blood pressure running 140-80s/90s at home.    I reviewed his blood work from January 2022: Hemoglobin 13.3 and hematocrit 39.  RBC low at 3.9.  BMP normal except for glucose of 103, chloride 95, and CO2 33.    Past Medical History:   Diagnosis Date   • Allergic rhinitis    • Anxiety    • Aortectasia (HCC)     3cm infrarenal abdominal aorta   • Arthritis    • Atrial flutter (HCC) 2010    s/p ablation    • Charcot's joint of foot    • Chronic edema     both legs and sees wound care center at [place]    • Chronic venous insufficiency    • COPD (chronic obstructive pulmonary disease) ([facility])    • Coronary atherosclerosis     Cath 2010: diffuse 40-50% disease   • Diverticulosis    • Duodenitis    • Fatty liver    • Gastritis    • Gastroparesis    • Hematoma     post-operative; After catheterization, right groin, required surgical exploration   • Hyperlipidemia    • Hypertension    • Insomnia    • Internal hemorrhoids    • Open wound     izzy legs has teddy gao weekly at wound care center at [place]  pt does second dressing on left leg another time during week   • Osteomyelitis (HCC)    • Paroxysmal atrial fibrillation (HCC)    • Peripheral neuropathy    • Popliteal artery aneurysm (HCC)     left, s/p stenting by Dr. Boston   • Skin cancer    • Sleep apnea     o2   • 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HCC)     due to flutter and afib; cath 2010 with nonobstructive disease   • Venous stasis    • Venous stasis ulcer (HCC)     bilateral legs         Past Surgical History:   Procedure Laterality Date   • BASAL CELL CARCINOMA EXCISION      ear and left side of face   • BRONCHOSCOPY N/A 4/12/2021    Procedure: BRONCHOSCOPY WITH BAL;  Surgeon: Bunny Lepe MD;  [facility];  Service: Pulmonary;  Laterality: N/A;  PRE-HEMOPTYSIS  POST-SAME   • CARDIAC CATHETERIZATION     • CATARACT EXTRACTION     • COLONOSCOPY  09/28/2015    NBIH, diverticulosis, polyps   • COLONOSCOPY N/A 9/11/2018    Procedure: COLONOSCOPY TO CECUM  AND TERM. ILEUM WITH COLD SNARE POLYPECTOMIES;  Surgeon: Kane Lagunas MD;  Location: [facility] ENDOSCOPY;  Service: Gastroenterology   • COLONOSCOPY N/A 10/29/2019    Procedure: COLONOSCOPY TO TO CECUM AND TERMINAL ILEUM WITH HOT AND COLD SNARE POLYPECTOMIES;  Surgeon: Kane Lagunas MD;  Location: [facility] ENDOSCOPY;  Service: Gastroenterology   • HIP ARTHROPLASTY Right 2017   • JOINT REPLACEMENT Left    • OTHER SURGICAL HISTORY      Catheter ablation atrial flutter   • REPAIR ANEURYSM / PSEUDO ANEURYSM / RUPTURED ANEURYSM POPLITEAL ARTERY      Stent-Graft of the the left popliteal artery   • REPAIR KNEE LIGAMENT      Primary repair of knee ligament cruciate anterior right   • TONSILLECTOMY  1958   • TOTAL KNEE ARTHROPLASTY Bilateral    • UPPER GASTROINTESTINAL ENDOSCOPY  09/16/2014    acute gastritis, acute duodenitis       Social History     Socioeconomic History   • Marital status:      Spouse name: Mariposa   Tobacco Use   • Smoking status: Current Every Day Smoker     Packs/day: 0.25     Types: Cigarettes     Start date: 1964   • Smokeless tobacco: Never Used   • Tobacco comment: caffeine use - 1.5 cups coffee daily    Vaping Use   • Vaping Use: Never used   Substance and Sexual Activity   • Alcohol use: Yes     Alcohol/week: 14.0 standard drinks     Types: 14 Shots of liquor per week   • Drug use: No   • Sexual activity: Defer       Family History   Problem Relation Age of Onset   • Emphysema Father    • Mal[place]usto  Hyperthermia Neg Hx        The following portion of the patient's history were reviewed and updated as appropriate: past medical history, past surgical history, past social history, past family history, allergies, current medications, and problem list.    Review of Systems   Constitutional: Negative.   Cardiovascular: Positive for leg swelling.   Respiratory: Negative.    Hematologic/Lymphatic: Negative.    Neurological: Negative.        Allergies   Allergen Reactions   • Cephalexin Hives     Tolerated ceftriaxone Jan 2022   • Codeine Nausea Only   • Silver Other (See Comments)         Current Outpatient Medications:   •  albuterol sulfate  (90 Base) MCG/ACT inhaler, INHALE 2 PUFFS BY MOUTH EVERY 4 HOURS AS NEEDED FOR WHEEZE, Disp: 2 g, Rfl: 6  •  ALPRAZolam (XANAX) 0.5 MG tablet, Take 1 tablet by mouth At Night As Needed for Anxiety., Disp: 30 tablet, Rfl: 1  •  Breo Ellipta 200-25 MCG/INH inhaler, INHALE 1 PUFF BY MOUTH EVERY DAY, Disp: 60 each, Rfl: 4  •  carvedilol (COREG) 6.25 MG tablet, TAKE 1 TABLET BY MOUTH TWICE A DAY WITH MEALS, Disp: 180 tablet, Rfl: 0  •  docusate sodium (COLACE) 100 MG capsule, Take 100 mg by mouth Daily., Disp: , Rfl:   •  finasteride (PROSCAR) 5 MG tablet, Take 1 tablet by mouth daily., Disp: , Rfl:   •  furosemide (LASIX) 40 MG tablet, Take 40 mg by mouth Daily., Disp: , Rfl:   •  gabapentin (NEURONTIN) 600 MG tablet, Take 600 mg by mouth 3 (Three) Times a Day., Disp: , Rfl:   •  HYDROcodone-acetaminophen (NORCO)  MG per tablet, Take 1 tablet by mouth Every 6 (Six) Hours As Needed for Moderate Pain ., Disp: 6 tablet, Rfl: 0  •  ipratropium (Atrovent HFA) 17 MCG/ACT inhaler, Inhale 2 puffs 4 (Four) Times a Day., Disp: 12.9 each, Rfl: 3  •  lisinopril (PRINIVIL,ZESTRIL) 20 MG tablet, Take 20 mg by mouth 2 (Two) Times a Day., Disp: , Rfl:   •  metoclopramide (REGLAN) 10 MG tablet, TAKE 1 TABLET BY MOUTH 4 (FOUR) TIMES A DAY BEFORE MEALS & AT BEDTIME., Disp: 360 tablet, Rfl:  "3  •  OXYGEN-HELIUM IN, 2 L into the nostril(s) as directed by provider As Needed., Disp: , Rfl:   •  pravastatin (PRAVACHOL) 20 MG tablet, Take 1 tablet by mouth Daily., Disp: 90 tablet, Rfl: 1  •  silodosin (RAPAFLO) 8 MG capsule capsule, Take 1 capsule by mouth daily. With food., Disp: , Rfl:   •  warfarin (COUMADIN) 5 MG tablet, Take 5 mg daily while on Bactrim therapy.  Indications: Atrial Fibrillation, Disp: , Rfl:         Objective:     Vitals:    03/17/22 1435 03/17/22 1451 03/17/22 1516   BP: 162/92 162/88 140/82   BP Location: Left arm Right arm Left arm   Patient Position:   Sitting   Pulse: 74     Weight: 136 kg (300 lb)     Height: 182.9 cm (72\")       Body mass index is 40.69 kg/m².    PHYSICAL EXAM:    Vitals Reviewed.   General Appearance: No acute distress, well developed and well nourished. Obese.   Eyes: Conjunctiva and lids: No erythema, swelling, or discharge. Sclera non-icteric.    HENT: Atraumatic, normocephalic. External eyes, ears, and nose normal. No hearing loss noted. Mucous membranes normal. Lips not cyanotic. Neck supple with no tenderness. Wearing mask.   Respiratory: No signs of respiratory distress. Respiration rhythm and depth normal.   Crackles noted at first when cleared with a cough.    Cardiovascular:  Jugular Venous Pressure: Normal  Heart Rate and Rhythm: Normal, Heart Sounds: Normal S1 and S2. No S3 or S4 noted.  Murmurs: No murmurs noted. No rubs, thrills, or gallops.   Lower Extremities: Bilateral lower extremity edema-legs wrapped bilaterally.  Gastrointestinal:  Abdomen soft, non-distended, non-tender.    Musculoskeletal: Normal movement of extremities.  Skin and Nails: General appearance normal. No pallor, cyanosis, diaphoresis. Skin temperature normal. No clubbing of fingernails.   Psychiatric: Patient alert and oriented to person, place, and time. Speech and behavior appropriate. Normal mood and affect.       ECG 12 Lead    Date/Time: 3/18/2022 2:50 PM  Performed by: " Katie Hurt APRN  Authorized by: Katie Hurt, ESTEFANY   Comparison: compared with previous ECG from 1/9/2022  Similar to previous ECG  Rhythm: atrial fibrillation  Rate: normal  BPM: 74  Conduction: non-specific intraventricular conduction delay  Q waves: V1 and V2    ST Segments: ST segments normal  T Waves: T waves normal  QRS axis: normal    Clinical impression: abnormal EKG              Assessment:       Diagnosis Plan   1. Permanent atrial fibrillation (HCC)  Adult Transthoracic Echo Complete W/ Cont if Necessary Per Protocol    ECG 12 Lead   2. Typical atrial flutter (HCC)     3. Coronary artery disease involving native coronary artery of native heart without angina pectoris     4. Ischemic cardiomyopathy  Adult Transthoracic Echo Complete W/ Cont if Necessary Per Protocol   5. 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HCC)     6. Essential hypertension  Adult Transthoracic Echo Complete W/ Cont if Necessary Per Protocol   7. Lymphedema of both lower extremities  Adult Transthoracic Echo Complete W/ Cont if Necessary Per Protocol   8. Obesity, Class III, BMI 40-49.9 (morbid obesity) (HCC)  Adult Transthoracic Echo Complete W/ Cont if Necessary Per Protocol   9. Tobacco abuse            Plan:       1/2.  Atrial Fibrillation/Atrial Flutter: Permanent atrial fibrillation and rate controlled on carvedilol.  Remains on warfarin with INRs followed at the [facility].    Atrial Fibrillation and Atrial Flutter  Assessment  • The patient has paroxysmal atrial fibrillation  • This is non-valvular in etiology  • The patient's CHADS2-VASc score is 4  • A [id number] score of 2 or more is considered a high risk for a thromboembolic event  • Warfarin prescribed      3.  Coronary Artery Disease: Non-STEMI and wall motion abnormalities on echocardiogram in 2021.  Medical treatment recommended due to his core morbidities and poor functional status.  Continue pravastatin.  He is not on aspirin  because of the warfarin.    4/5.  Cardiomyopathy: Initially had tachycardic mediated cardiomyopathy with atrial fibrillation in the past.  He then developed ischemic cardiomyopathy and his EF has normalized on carvedilol and lisinopril.  He has chronic lower extremity edema, but denies dyspnea.    6.  Hypertension: Blood pressure elevated today.  Increase carvedilol to 12.5 mg twice per day.    7.  Lymphedema: He is undergoing leg wrapping at the [place] [facility].    8.  Obesity: BMI is 40.7.    9.  Cigarette Smoking: He would highly benefit from abstaining from cigarette smoking.    10.  I recommend a 6-month follow-up visit in 6 months and same-day echocardiogram.  I will check on his home blood pressure and heart rate.    As always, it has been a pleasure to participate in your patient's care. Thank you.       Sincerely,         ESTEFANY Duckworth  [facility] Cardiology      · Dictated utilizing Dragon Dictation  · COVID-19 Precautions - Patient was compliant in wearing a mask. When I saw the patient, I used appropriate personal protective equipment (PPE) including mask and eye shield (standard procedure).  Additionally, I used gown and gloves if indicated.  Hand hygiene was completed before and after seeing the patient.

## 2022-03-20 PROBLEM — R50.9 FEVER IN ADULT: Status: RESOLVED | Noted: 2022-01-09 | Resolved: 2022-01-01

## 2022-03-20 PROBLEM — R53.1 GENERALIZED WEAKNESS: Status: RESOLVED | Noted: 2022-01-09 | Resolved: 2022-01-01

## 2022-03-20 PROBLEM — L03.119 CELLULITIS OF LOWER EXTREMITY: Status: RESOLVED | Noted: 2021-04-03 | Resolved: 2022-01-01

## 2022-03-30 NOTE — PROGRESS NOTES
Anticoagulation Clinic Progress Note    Anticoagulation Summary  As of 3/30/2022    INR goal:  2.0-3.0   TTR:  69.1 % (3.3 y)   INR used for dosin.80 (3/30/2022)   Warfarin maintenance plan:  5 mg every Mon, Fri; 7.5 mg all other days   Weekly warfarin total:  47.5 mg   No change documented:  Brendan Live PharmD   Plan last modified:  Brendan Live PharmD (3/4/2022)   Next INR check:  2022   Priority:  Maintenance   Target end date:  Indefinite    Indications    Permanent atrial fibrillation (HCC) [I48.21]             Anticoagulation Episode Summary     INR check location:      Preferred lab:      [facility]    Comments:        Anticoagulation Care Providers     Provider Role Specialty Phone number    Kurt Henry MD Referring Cardiology [phone]          INR History:  Anticoagulation Monitoring 3/11/2022 3/17/2022 3/30/2022   INR 3.90 2.10 2.80   INR Date 3/11/2022 3/17/2022 3/30/2022   INR Goal 2.0-3.0 2.0-3.0 2.0-3.0   Trend Same Same Same   Last Week Total 47.5 mg 42.5 mg 47.5 mg   Next Week Total 42.5 mg 47.5 mg 47.5 mg   Sun 7.5 mg 7.5 mg 7.5 mg   Mon 5 mg 5 mg 5 mg   Tue 7.5 mg 7.5 mg 7.5 mg   Wed - 7.5 mg 7.5 mg   Thu - 7.5 mg 7.5 mg   Fri Hold (3/11) 5 mg 5 mg   Sat 7.5 mg 7.5 mg 7.5 mg   Visit Report - - -   Some recent data might be hidden       Plan:  1. INR is Therapeutic today- see above in Anticoagulation Summary.   Provided instructions to Estefania with [facility] to Continue their warfarin regimen- see above in Anticoagulation Summary.  2. Follow up in 2 weeks      Brendan Live PharmD

## 2022-04-10 NOTE — ED PROVIDER NOTES
EMERGENCY DEPARTMENT ENCOUNTER    Room Number:  08/08  Date seen:  4/10/2022  Time seen: 23:22 EDT  PCP: Marc Ludwig MD  Historian: patient, EMS    HPI:  Chief complaint:needed help getting up  A complete HPI/ROS/PMH/PSH/SH/FH are unobtainable due to: n/a  Context:Jalen Lockwood is a 72 y.o. male multiple comorbidities including chronic osteomyelitis, foot pain, lymphedema of legs, permanent A. fib, COPD Charcot abnormality of feet chronic and anticoagulation who presents to the ED with c/o a near fall at home where he is able to stop himself from hitting the floor but states he was then unable to get back up so he called EMS. He has no complaints or injuries.     Patient was placed in face mask in first look. Patient was wearing facemask when I entered the room and throughout our encounter. I wore full protective equipment throughout this patient encounter including a N95 face mask, eye shield and gloves. Hand hygiene/washing of hands was performed before donning protective equipment and after removal when leaving the room.    MEDICAL RECORD REVIEW    ALLERGIES  Cephalexin, Codeine, and Silver    PAST MEDICAL HISTORY  Active Ambulatory Problems     Diagnosis Date Noted   • Chronic osteomyelitis (HCC) 04/22/2016   • Foot pain 04/22/2016   • Peripheral neuropathy 04/22/2016   • Lymphedema of both lower extremities 04/22/2016   • Permanent atrial fibrillation (HCC) 04/22/2016   • Sleep apnea 04/22/2016   • Chronic edema 04/22/2016   • Obesity, Class III, BMI 40-49.9 (morbid obesity) (HCC) 04/22/2016   • COPD (chronic obstructive pulmonary disease) (HCC) 04/22/2016   • Atrial flutter (HCC) 01/01/2010   • 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HCC)    • Aortectasia (HCC)    • Popliteal artery aneurysm (HCC)    • Colon polyps 02/07/2017   • Gastroparesis 02/07/2017   • Insomnia 02/07/2017   • Adenomatous polyp of colon 05/23/2018   • Essential hypertension 11/15/2018   • ETOH abuse 04/24/2019   • Chronic anticoagulation  04/24/2019   • Oropharyngeal dysphagia 04/24/2019   • Tobacco abuse 04/24/2019   • Thyromegaly 04/25/2019   • Adrenal adenoma, left 04/25/2019   • Retroperitoneal lymphadenopathy 04/25/2019   • Anemia of chronic disease 04/27/2019   • Thyroid nodule 04/29/2019   • Charcot's joint of foot 04/29/2019   • Abnormal CT scan, lumbar spine 05/21/2019   • Alcohol dependence, in remission ([facility]) 05/21/2020   • Ischemic cardiomyopathy 04/03/2021   • Normocytic anemia 04/27/2021   • Inguinal adenopathy 04/27/2021   • Elevated lactic acid level 01/09/2022     Resolved Ambulatory Problems     Diagnosis Date Noted   • Hyponatremia 04/24/2019   • Open wound 04/24/2019   • Adverse effect of thiazide diuretic 04/24/2019   • Weakness 04/24/2019   • Abnormal weight loss 04/24/2019   • Thrombocytopenia ([facility]) 04/25/2019   • Candidal intertrigo 04/25/2019   • Left leg cellulitis 04/30/2019   • Sepsis ([facility]) 05/01/2019   • Severe sepsis ([facility]) 03/30/2021   • Hyponatremia 04/03/2021   • Thrombocytopenia ([facility]) 04/03/2021   • Metabolic encephalopathy 04/03/2021   • Acute systolic (congestive) heart failure ([facility]) 04/03/2021   • Cellulitis of lower extremity 04/03/2021   • UTI (urinary tract infection) 03/30/2021   • Hemoptysis 03/30/2021   • Generalized weakness 01/09/2022   • Fever in adult 01/09/2022     Past Medical History:   Diagnosis Date   • Allergic rhinitis    • Anxiety    • Arthritis    • Chronic venous insufficiency    • Coronary atherosclerosis    • Diverticulosis    • Duodenitis    • Fatty liver    • Gastritis    • Hematoma    • Hyperlipidemia    • Hypertension    • Internal hemorrhoids    • Osteomyelitis (HCC)    • Paroxysmal atrial fibrillation (HCC)    • Skin cancer    • Venous stasis    • Venous stasis ulcer (HCC)        PAST SURGICAL HISTORY  Past Surgical History:   Procedure Laterality Date   • BASAL CELL CARCINOMA EXCISION      ear and left side of face   • BRONCHOSCOPY N/A 4/12/2021    Procedure: BRONCHOSCOPY WITH BAL;   Surgeon: Bunny Lepe MD;  Location:  [facility];  Service: Pulmonary;  Laterality: N/A;  PRE-HEMOPTYSIS  POST-SAME   • CARDIAC CATHETERIZATION     • CATARACT EXTRACTION     • COLONOSCOPY  09/28/2015    NBIH, diverticulosis, polyps   • COLONOSCOPY N/A 9/11/2018    Procedure: COLONOSCOPY TO CECUM  AND TERM. ILEUM WITH COLD SNARE POLYPECTOMIES;  Surgeon: Kane Lagunas MD;  Location:  [facility];  Service: Gastroenterology   • COLONOSCOPY N/A 10/29/2019    Procedure: COLONOSCOPY TO TO CECUM AND TERMINAL ILEUM WITH HOT AND COLD SNARE POLYPECTOMIES;  Surgeon: Kane Lagunas MD;  [facility];  Service: Gastroenterology   • HIP ARTHROPLASTY Right 2017   • JOINT REPLACEMENT Left    • OTHER SURGICAL HISTORY      Catheter ablation atrial flutter   • REPAIR ANEURYSM / PSEUDO ANEURYSM / RUPTURED ANEURYSM POPLITEAL ARTERY      Stent-Graft of the the left popliteal artery   • REPAIR KNEE LIGAMENT      Primary repair of knee ligament cruciate anterior right   • TONSILLECTOMY  1958   • TOTAL KNEE ARTHROPLASTY Bilateral    • UPPER GASTROINTESTINAL ENDOSCOPY  09/16/2014    acute gastritis, acute duodenitis       FAMILY HISTORY  Family History   Problem Relation Age of Onset   • Emphysema Father    • Malig Hyperthermia Neg Hx        SOCIAL HISTORY  Social History     Socioeconomic History   • Marital status:      Spouse name: Mariposa   Tobacco Use   • Smoking status: Current Every Day Smoker     Packs/day: 0.25     Types: Cigarettes     Start date: 1964   • Smokeless tobacco: Never Used   • Tobacco comment: caffeine use - 1.5 cups coffee daily    Vaping Use   • Vaping Use: Never used   Substance and Sexual Activity   • Alcohol use: Yes     Alcohol/week: 14.0 standard drinks     Types: 14 Shots of liquor per week   • Drug use: No   • Sexual activity: Defer       REVIEW OF SYSTEMS  Review of Systems    All systems reviewed and negative except for those discussed in HPI.     PHYSICAL EXAM    ED  Triage Vitals [04/09/22 2230]   Temp Heart Rate Resp BP SpO2   98.9 °F (37.2 °C) 71 20 125/81 97 %      Temp src Heart Rate Source Patient Position BP Location FiO2 (%)   Oral Monitor -- -- --     Physical Exam    I have reviewed the triage vital signs and nursing notes.      GENERAL: not distressed, chronically ill-appearing  HENT: nares patent  EYES: no scleral icterus  NECK: no ROM limitations  CV: regular rhythm, regular rate, no murmur, no rubs no gallop  RESPIRATORY: normal effort, to auscultate bilaterally  ABDOMEN: soft, no focal tenderness   : deferred, patient does have a left inguinal area of erythema which he has a yeast powder at home to treat.  MUSCULOSKELETAL: no deformity.  Patient with chronic appearing bilateral Charcot deformities to the feet.  He also has significant lymphedema to bilateral extremities which is known and not new.  NEURO: alert, moves all extremities, follows commands  SKIN: warm, dry, small skin tear to right forearm that was cleaned and dressed per our staff    LAB RESULTS  Recent Results (from the past 24 hour(s))   Urinalysis With Microscopic If Indicated (No Culture) - Urine, Clean Catch    Collection Time: 04/10/22 12:08 AM    Specimen: Urine, Clean Catch   Result Value Ref Range    Color, UA Yellow Yellow, Straw    Appearance, UA Clear Clear    pH, UA 7.5 5.0 - 8.0    Specific Gravity, UA <1.005 (L) 1.005 - 1.030    Glucose, UA Negative Negative    Ketones, UA Negative Negative    Bilirubin, UA Negative Negative    Blood, UA Small (1+) (A) Negative    Protein, UA Negative Negative    Leuk Esterase, UA Negative Negative    Nitrite, UA Negative Negative    Urobilinogen, UA 0.2 E.U./dL 0.2 - 1.0 E.U./dL   Protime-INR    Collection Time: 04/10/22 12:08 AM    Specimen: Blood   Result Value Ref Range    Protime 27.5 (H) 11.7 - 14.2 Seconds    INR 2.55 (H) 0.90 - 1.10   Urinalysis, Microscopic Only - Urine, Clean Catch    Collection Time: 04/10/22 12:08 AM    Specimen: Urine,  Clean Catch   Result Value Ref Range    RBC, UA 0-2 None Seen, 0-2 /HPF    WBC, UA None Seen None Seen, 0-2 /HPF    Bacteria, UA None Seen None Seen /HPF    Squamous Epithelial Cells, UA 0-2 None Seen, 0-2 /HPF    Hyaline Casts, UA None Seen None Seen /LPF    Methodology Manual Light Microscopy          RADIOLOGY RESULTS  No Radiology Exams Resulted Within Past 24 Hours       PROGRESS, DATA ANALYSIS, CONSULTS AND MEDICAL DECISION MAKING  All labs have been independently reviewed by me.  All radiology studies have been reviewed by me and discussed with radiologist dictating the report.  EKG's independently viewed and interpreted by me unless stated otherwise. Discussion below represents my analysis of pertinent findings related to patient's condition, differential diagnosis, treatment plan and final disposition.     ED Course as of 04/10/22 0100   Sun Apr 10, 2022   0030 Urine not infected.  [EW]   0040 INR is within normal limits at 2.55.    The nurses have ambulated the patient report he did great.  I will discharge him home. [EW]      ED Course User Index  [EW] Chica Meléndez Anisha, APRN     DDx: Fall at home and needed assistance getting up, right forearm skin tear, pedal deformities    MDM: INR normal, urine is not infected.  Patient ambulated without any problems.     Reviewed pt's history and workup with Dr. Galvan.  After a bedside evaluation, Dr. Galvan agrees with the plan of care.    The patient's history, physical exam, and lab findings were discussed with the physician, who also performed a face to face history and physical exam.  I discussed all results and noted any abnormalities with patient.  Discussed absoute need to recheck abnormalities with their family physician.  I answered any of the patient's questions.  Discussed plan for discharge, as there is no emergent indication for admission.  Pt is agreeable and understands need for follow up and repeat testing.  Pt is aware that discharge does not mean  "that nothing is wrong but it indicates no emergency is present and they must continue care with their family physician.  Pt is discharged with instructions to follow up with primary care doctor to have their blood pressure rechecked.           Disposition vitals:  /71   Pulse 59   Temp 98.9 °F (37.2 °C) (Oral)   Resp 20   Ht 193 cm (76\")   Wt (!) 139 kg (306 lb)   SpO2 92%   BMI 37.25 kg/m²       DIAGNOSIS  Final diagnoses:   Fall at home, initial encounter   Skin tear of right forearm without complication, initial encounter   Chronic anticoagulation       FOLLOW UP   Marc Ludwig MD  [address] [id number] [phone]    Schedule an appointment as soon as possible for a visit in 1 week  As needed         Chica Meléndez, APRN  04/10/22 0101    "

## 2022-04-10 NOTE — ED NOTES
Patient ambulated around room with minimal assistance. States he uses cane at home. Walker provided. Patient states he feels steady on his feet. ESTEFANY Meléndez notified.     Border dressing applied to skin tear on R forearm.     Pt noted to have mask on when this RN entered the room.  This RN wore appropriate PPE throughout our encounter. Hand hygiene performed upon entering and exiting room.

## 2022-04-15 NOTE — PROGRESS NOTES
Anticoagulation Clinic Progress Note    Anticoagulation Summary  As of 4/15/2022    INR goal:  2.0-3.0   TTR:  69.2 % (3.3 y)   INR used for dosin.30 (4/15/2022)   Warfarin maintenance plan:  5 mg every Mon, Fri; 7.5 mg all other days   Weekly warfarin total:  47.5 mg   Plan last modified:  Brendan Live, PharmD (3/4/2022)   Next INR check:  2022   Priority:  Maintenance   Target end date:  Indefinite    Indications    Permanent atrial fibrillation (HCC) [I48.21]             Anticoagulation Episode Summary     INR check location:      Preferred lab:      Send INR reminders to:   [facility]    Comments:        Anticoagulation Care Providers     Provider Role Specialty Phone number    Kurt Henry MD Referring Cardiology [phone]          INR History:  Anticoagulation Monitoring 3/17/2022 3/30/2022 4/15/2022   INR 2.10 2.80 4.30   INR Date 3/17/2022 3/30/2022 4/15/2022   INR Goal 2.0-3.0 2.0-3.0 2.0-3.0   Trend Same Same Same   Last Week Total 42.5 mg 47.5 mg 47.5 mg   Next Week Total 47.5 mg 47.5 mg 42.5 mg   Sun 7.5 mg 7.5 mg 7.5 mg   Mon 5 mg 5 mg 5 mg   Tue 7.5 mg 7.5 mg 7.5 mg   Wed 7.5 mg 7.5 mg -   Thu 7.5 mg 7.5 mg -   Fri 5 mg 5 mg Hold (4/15)   Sat 7.5 mg 7.5 mg 7.5 mg   Visit Report - - -   Some recent data might be hidden       Plan:  1. INR is Supratherapeutic today- see above in Anticoagulation Summary.   Provided instructions to Estefania with [facility] to Change their warfarin regimen- see above in Anticoagulation Summary.  2. Follow up in 5 days      Adeline Marie RPH   Received report from marguerite Koch. Pt appears comfortable in stretcher at this time, breathing even and unlabored, plan to DC pt upon reassessment, family at bedside, will continue to monitor.

## 2022-04-18 NOTE — TELEPHONE ENCOUNTER
I recently increased his carvedilol to 12.5 mg 1 tablet twice daily. Please call to reassess his home BP and HR. If /80 or less - continue with current medications and fu as scheduled. Thanks.

## 2022-04-19 NOTE — ED PROVIDER NOTES
EMERGENCY DEPARTMENT ENCOUNTER    Room Number:  16/16  Date of encounter:  4/19/2022  PCP: Marc Ludwig MD  Historian: Patient    Patient was placed in face mask during triage process. Patient was wearing facemask when I entered the room and throughout our encounter. I wore full protective equipment throughout this patient encounter including a face mask, eye protection, and gloves. Hand hygiene was performed before donning protective equipment and again following doffing of PPE after leaving the room.    HPI:  Chief Complaint: Urinary retention  A complete HPI/ROS/PMH/PSH/SH/FH are unobtainable due to: N/A   Context: Jalen Lockwood is a 72 y.o. male who presents to the ED c/o recurrent urinary retention.  Patient reports he was seen here just over a week ago for similar.  He received a catheterization, drained approximately 2 L, and was released home without indwelling Irvin at that time.  Since then he has had intermittent episodes where he feels like he can pass some urine but mostly he just dribbles.  He was seen at the urology office by PA yesterday and has a return visit scheduled May 4 for further studies.  Patient tells me that he reported this difficulty with urination at the time.  He has only dribbles a small amount this morning and feels a great deal of suprapubic pressure.  Otherwise the patient spouse notes that he is not been eating a great deal this week.  He reports no fevers, vomiting, chest pain or shortness of breath above and beyond baseline.  Moderate discomfort with no clear exacerbating relieving factors.      MEDICAL HISTORY REVIEW  EMR reviewed:    ED note from 4/9/2022 reveals that the patient was here for fall and was treated for skin tear.    PAST MEDICAL HISTORY  Active Ambulatory Problems     Diagnosis Date Noted   • Chronic osteomyelitis (HCC) 04/22/2016   • Foot pain 04/22/2016   • Peripheral neuropathy 04/22/2016   • Lymphedema of both lower extremities 04/22/2016   • Permanent  atrial fibrillation (HCC) 04/22/2016   • Sleep apnea 04/22/2016   • Chronic edema 04/22/2016   • Obesity, Class III, BMI 40-49.9 (morbid obesity) ([facility]) 04/22/2016   • COPD (chronic obstructive pulmonary disease) ([facility]) 04/22/2016   • Atrial flutter ([facility]) 01/01/2010   • 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[facility])    • Aortectasia ([facility])    • Popliteal artery aneurysm ([facility])    • Colon polyps 02/07/2017   • Gastroparesis 02/07/2017   • Insomnia 02/07/2017   • Adenomatous polyp of colon 05/23/2018   • Essential hypertension 11/15/2018   • ETOH abuse 04/24/2019   • Chronic anticoagulation 04/24/2019   • Oropharyngeal dysphagia 04/24/2019   • Tobacco abuse 04/24/2019   • Thyromegaly 04/25/2019   • Adrenal adenoma, left 04/25/2019   • Retroperitoneal lymphadenopathy 04/25/2019   • Anemia of chronic disease 04/27/2019   • Thyroid nodule 04/29/2019   • Charcot's joint of foot 04/29/2019   • Abnormal CT scan, lumbar spine 05/21/2019   • Alcohol dependence, in remission ([facility]) 05/21/2020   • Ischemic cardiomyopathy 04/03/2021   • Normocytic anemia 04/27/2021   • Inguinal adenopathy 04/27/2021   • Elevated lactic acid level 01/09/2022     Resolved Ambulatory Problems     Diagnosis Date Noted   • Hyponatremia 04/24/2019   • Open wound 04/24/2019   • Adverse effect of thiazide diuretic 04/24/2019   • Weakness 04/24/2019   • Abnormal weight loss 04/24/2019   • Thrombocytopenia ([facility]) 04/25/2019   • Candidal intertrigo 04/25/2019   • Left leg cellulitis 04/30/2019   • Sepsis ([facility]) 05/01/2019   • Severe sepsis ([facility]) 03/30/2021   • Hyponatremia 04/03/2021   • Thrombocytopenia ([facility]) 04/03/2021   • Metabolic encephalopathy 04/03/2021   • Acute systolic (congestive) heart failure ([facility]) 04/03/2021   • Cellulitis of lower extremity 04/03/2021   • UTI (urinary tract infection) 03/30/2021   • Hemoptysis 03/30/2021   • Generalized weakness 01/09/2022   • Fever in adult 01/09/2022     Past Medical History:   Diagnosis Date   • Allergic rhinitis    •  Anxiety    • Arthritis    • Chronic venous insufficiency    • Coronary atherosclerosis    • Diverticulosis    • Duodenitis    • Fatty liver    • Gastritis    • Hematoma    • Hyperlipidemia    • Hypertension    • Internal hemorrhoids    • Osteomyelitis (HCC)    • Paroxysmal atrial fibrillation (HCC)    • Skin cancer    • Venous stasis    • Venous stasis ulcer (HCC)          PAST SURGICAL HISTORY  Past Surgical History:   Procedure Laterality Date   • BASAL CELL CARCINOMA EXCISION      ear and left side of face   • BRONCHOSCOPY N/A 4/12/2021    Procedure: BRONCHOSCOPY WITH BAL;  Surgeon: Bunny Lepe MD;  Location: [facility] ENDOSCOPY;  Service: Pulmonary;  Laterality: N/A;  PRE-HEMOPTYSIS  POST-SAME   • CARDIAC CATHETERIZATION     • CATARACT EXTRACTION     • COLONOSCOPY  09/28/2015    NBIH, diverticulosis, polyps   • COLONOSCOPY N/A 9/11/2018    Procedure: COLONOSCOPY TO CECUM  AND TERM. ILEUM WITH COLD SNARE POLYPECTOMIES;  Surgeon: Kane Lagunas MD;  Location: [facility] ENDOSCOPY;  Service: Gastroenterology   • COLONOSCOPY N/A 10/29/2019    Procedure: COLONOSCOPY TO TO CECUM AND TERMINAL ILEUM WITH HOT AND COLD SNARE POLYPECTOMIES;  Surgeon: Kane Lagunas MD;  Location: [facility] ENDOSCOPY;  Service: Gastroenterology   • HIP ARTHROPLASTY Right 2017   • JOINT REPLACEMENT Left    • OTHER SURGICAL HISTORY      Catheter ablation atrial flutter   • REPAIR ANEURYSM / PSEUDO ANEURYSM / RUPTURED ANEURYSM POPLITEAL ARTERY      Stent-Graft of the the left popliteal artery   • REPAIR KNEE LIGAMENT      Primary repair of knee ligament cruciate anterior right   • TONSILLECTOMY  1958   • TOTAL KNEE ARTHROPLASTY Bilateral    • UPPER GASTROINTESTINAL ENDOSCOPY  09/16/2014    acute gastritis, acute duodenitis         FAMILY HISTORY  Family History   Problem Relation Age of Onset   • Emphysema Father    • Malig Hyperthermia Neg Hx          SOCIAL HISTORY  Social History     Socioeconomic History   • Marital status:       Spouse name: Mariposa   Tobacco Use   • Smoking status: Current Every Day Smoker     Packs/day: 0.25     Types: Cigarettes     Start date: 1964   • Smokeless tobacco: Never Used   • Tobacco comment: caffeine use - 1.5 cups coffee daily    Vaping Use   • Vaping Use: Never used   Substance and Sexual Activity   • Alcohol use: Yes     Alcohol/week: 14.0 standard drinks     Types: 14 Shots of liquor per week   • Drug use: No   • Sexual activity: Defer         ALLERGIES  Cephalexin, Codeine, and Silver        REVIEW OF SYSTEMS  Review of Systems     All systems reviewed and negative except for those discussed in HPI.       PHYSICAL EXAM    I have reviewed the triage vital signs and nursing notes.    ED Triage Vitals [04/19/22 1100]   Temp Heart Rate Resp BP SpO2   98.8 °F (37.1 °C) 76 16 -- 93 %      Temp src Heart Rate Source Patient Position BP Location FiO2 (%)   Tympanic Monitor -- -- --       Physical Exam    Physical Exam   Constitutional: No distress but uncomfortable appearing.  Not overtly toxic.  HENT:  Head: Normocephalic and atraumatic.   Oropharynx: Mucous membranes are moist.   Eyes: No scleral icterus. No conjunctival pallor.  Neck: Painless range of motion noted. Neck supple.   Cardiovascular: Pink warm and perfusing..  Pulmonary/Chest: No respiratory distress.  No tachypnea or increased work of breathing.    Abdominal: Soft. There is suprapubic discomfort with palpation. There is no rebound and no guarding.   Musculoskeletal: Moves all extremities equally.  Bilateral Unna boots in place lower extremities.    Neurological: Alert.  Baseline strength and sensation noted.   Skin: Skin is pink, warm, and dry. No pallor.   Psychiatric: Mood and affect normal.   Nursing note and vitals reviewed.    LAB RESULTS  Recent Results (from the past 24 hour(s))   Comprehensive Metabolic Panel    Collection Time: 04/19/22 11:56 AM    Specimen: Blood   Result Value Ref Range    Glucose 101 (H) 65 - 99 mg/dL    BUN 10 8 - 23  mg/dL    Creatinine 0.91 0.76 - 1.27 mg/dL    Sodium 133 (L) 136 - 145 mmol/L    Potassium 4.0 3.5 - 5.2 mmol/L    Chloride 97 (L) 98 - 107 mmol/L    CO2 25.0 22.0 - 29.0 mmol/L    Calcium 9.2 8.6 - 10.5 mg/dL    Total Protein 7.6 6.0 - 8.5 g/dL    Albumin 3.70 3.50 - 5.20 g/dL    ALT (SGPT) 9 1 - 41 U/L    AST (SGOT) 16 1 - 40 U/L    Alkaline Phosphatase 93 39 - 117 U/L    Total Bilirubin 0.9 0.0 - 1.2 mg/dL    Globulin 3.9 gm/dL    A/G Ratio 0.9 g/dL    BUN/Creatinine Ratio 11.0 7.0 - 25.0    Anion Gap 11.0 5.0 - 15.0 mmol/L    eGFR 90.1 >60.0 mL/min/1.73   Lipase    Collection Time: 04/19/22 11:56 AM    Specimen: Blood   Result Value Ref Range    Lipase 12 (L) 13 - 60 U/L   Protime-INR    Collection Time: 04/19/22 11:56 AM    Specimen: Blood   Result Value Ref Range    Protime 27.0 (H) 11.7 - 14.2 Seconds    INR 2.49 (H) 0.90 - 1.10   CBC Auto Differential    Collection Time: 04/19/22 11:56 AM    Specimen: Blood   Result Value Ref Range    WBC 8.36 3.40 - 10.80 10*3/mm3    RBC 3.38 (L) 4.14 - 5.80 10*6/mm3    Hemoglobin 11.4 (L) 13.0 - 17.7 g/dL    Hematocrit 33.1 (L) 37.5 - 51.0 %    MCV 97.9 (H) 79.0 - 97.0 fL    MCH 33.7 (H) 26.6 - 33.0 pg    MCHC 34.4 31.5 - 35.7 g/dL    RDW 13.1 12.3 - 15.4 %    RDW-SD 46.4 37.0 - 54.0 fl    MPV 9.7 6.0 - 12.0 fL    Platelets 184 140 - 450 10*3/mm3    Neutrophil % 78.0 (H) 42.7 - 76.0 %    Lymphocyte % 10.3 (L) 19.6 - 45.3 %    Monocyte % 8.4 5.0 - 12.0 %    Eosinophil % 2.0 0.3 - 6.2 %    Basophil % 0.8 0.0 - 1.5 %    Immature Grans % 0.5 0.0 - 0.5 %    Neutrophils, Absolute 6.52 1.70 - 7.00 10*3/mm3    Lymphocytes, Absolute 0.86 0.70 - 3.10 10*3/mm3    Monocytes, Absolute 0.70 0.10 - 0.90 10*3/mm3    Eosinophils, Absolute 0.17 0.00 - 0.40 10*3/mm3    Basophils, Absolute 0.07 0.00 - 0.20 10*3/mm3    Immature Grans, Absolute 0.04 0.00 - 0.05 10*3/mm3    nRBC 0.0 0.0 - 0.2 /100 WBC   Urinalysis With Culture If Indicated - Urine, Catheter    Collection Time: 04/19/22  1:34 PM     Specimen: Urine, Catheter   Result Value Ref Range    Color, UA Yellow Yellow, Straw    Appearance, UA Turbid (A) Clear    pH, UA 6.5 5.0 - 8.0    Specific Gravity, UA 1.007 1.005 - 1.030    Glucose, UA Negative Negative    Ketones, UA Negative Negative    Bilirubin, UA Negative Negative    Blood, UA Moderate (2+) (A) Negative    Protein, UA Trace (A) Negative    Leuk Esterase, UA Large (3+) (A) Negative    Nitrite, UA Positive (A) Negative    Urobilinogen, UA 0.2 E.U./dL 0.2 - 1.0 E.U./dL   Urinalysis, Microscopic Only - Urine, Catheter    Collection Time: 04/19/22  1:34 PM    Specimen: Urine, Catheter   Result Value Ref Range    RBC, UA 6-12 (A) None Seen, 0-2 /HPF    WBC, UA Too Numerous to Count (A) None Seen, 0-2 /HPF    Bacteria, UA None Seen None Seen /HPF    Squamous Epithelial Cells, UA 0-2 None Seen, 0-2 /HPF    Hyaline Casts, UA None Seen None Seen /LPF    Methodology Automated Microscopy        Ordered the above labs and independently reviewed the results.        RADIOLOGY  No Radiology Exams Resulted Within Past 24 Hours    I ordered the above noted radiological studies. Reviewed by me and discussed with radiologist.  See dictation for official radiology interpretation.      PROCEDURES    Procedures        MEDICATIONS GIVEN IN ER    Medications   sodium chloride 0.9 % flush 10 mL (has no administration in time range)         PROGRESS, DATA ANALYSIS, CONSULTS, AND MEDICAL DECISION MAKING    My differential diagnosis for abdominal pain includes but is not limited to:  Gastritis, gastroenteritis, peptic ulcer disease, GERD, esophageal perforation, acute appendicitis, mesenteric adenitis, Meckel’s diverticulum, epiploic appendagitis, diverticulitis, colon cancer, ulcerative colitis, Crohn’s disease, intussusception, small bowel obstruction, adhesions, ischemic bowel, perforated viscus, ileus, obstipation, biliary colic, cholecystitis, cholelithiasis, Ariel-Ander Kemar, hepatitis, pancreatitis, common bile  duct obstruction, cholangitis, bile leak, splenic trauma, splenic rupture, splenic infarction, splenic abscess, abdominal abscess, ascites, spontaneous bacterial peritonitis, hernia, UTI, cystitis, prostatitis, ureterolithiasis, urinary obstruction, AAA, myocardial infarction, pneumonia, cancer, porphyria, DKA, medications, sickle cell, viral syndrome, zoster      All labs have been independently reviewed by me.  All radiology studies have been reviewed by me and discussed with radiologist dictating the report.   EKG's independently viewed and interpreted by me.  Discussion below represents my analysis of pertinent findings related to patient's condition, differential diagnosis, treatment plan and final disposition.      ED Course as of 04/19/22 1426   Tue Apr 19, 2022   1318 Bladder scan was unrevealing for large accumulation of urine by nursing staff.  Patient reports that his discomfort is worsening.  I completed a bedside ultrasound which reveals a large distended bladder using the regular ultrasound machine.  We will go forward and place the urinary catheter which the patient is greatly in favor of. [RS]   1319 BUN: 10 [RS]   1319 Creatinine: 0.91 [RS]   1319 Sodium(!): 133 [RS]   1319 Potassium: 4.0 [RS]   1319 Lipase(!): 12 [RS]   1319 INR(!): 2.49 [RS]   1319 WBC: 8.36 [RS]   1319 Hemoglobin(!): 11.4 [RS]   1319 Platelets: 184 [RS]   1413 Leukocytes, UA(!): Large (3+) [RS]   1413 Nitrite, UA(!): Positive [RS]   1413 WBC, UA(!): Too Numerous to Count  We will initiate therapy for UTI and send urine for culture.  Patient reports relief of all discomfort after placement of Irvin catheter.  Patient is established with Dr. Winchester for urology.  We will leave the indwelling Irvin and recommend close outpatient follow-up.  Patient agreeable to this plan. [RS]   1416 Patient's history of ESBL Klebsiella was on a skin wound.  I discussed patient's findings, renal function, and complex medical history with our  clinical pharmacist Dr. Nunes.  Her recommendation would be for Macrobid. [RS]   1426 As I was discharging the patient, he updates me that he was started on nitrofurantoin yesterday by his urology office.  I have instructed him to continue this. [RS]      ED Course User Index  [RS] Brendan Umana MD       AS OF 14:26 EDT VITALS:    BP - 126/69  HR - 68  TEMP - 98.8 °F (37.1 °C) (Tympanic)  O2 SATS - 94%        DIAGNOSIS  Final diagnoses:   Acute urinary retention   Acute UTI   Chronic anticoagulation         DISPOSITION  DISCHARGE    Patient discharged in stable condition.    Reviewed implications of results, diagnosis, meds, responsibility to follow up, warning signs and symptoms of possible worsening, potential complications and reasons to return to ER.    Patient/Family voiced understanding of above instructions.    Discussed plan for discharge, as there is no emergent indication for admission. Patient referred to primary care provider for regular health maintenance. Pt/family is agreeable and understands need for follow up and possible repeat testing.  Pt is aware that discharge does not mean that nothing is wrong but it indicates no emergency is present that requires admission and they must continue care with follow-up as given below or physician of their choice.     FOLLOW-UP  Marc Ludwig MD  [address] [facility] [id number] [phone]    Schedule an appointment as soon as possible for a visit   As needed    Xavi Elliott MD  [address] [facility] [id number] [phone]    Schedule an appointment as soon as possible for a visit in 5 days           Medication List      New Prescriptions    nitrofurantoin (macrocrystal-monohydrate) 100 MG capsule  Commonly known as: MACROBID  Take 1 capsule by mouth 2 (Two) Times a Day.           Where to Get Your Medications      These medications were sent to [id number] - [place], [place] - [address] AT IN THE [place] -  [phone] [facility] [phone] FX  [address], [place] [address]    Hours: 24-hours Phone: [phone]   · nitrofurantoin (macrocrystal-monohydrate) 100 MG capsule                 Brendan Umana MD  04/19/22 1427

## 2022-04-19 NOTE — ED TRIAGE NOTES
"Pt has been unable to urinate.  He was able to just \"dribble\" this am - has abd pain    Patient was placed in face mask during first look triage.  Patient was wearing a face mask throughout encounter.  I wore personal protective equipment throughout the encounter.  Hand hygiene was performed before and after patient encounter.     "

## 2022-04-19 NOTE — TELEPHONE ENCOUNTER
Called and left VM. Will continue to try to reach patient.     Lalitha Estrella RN  Triage [facility] [place] [facility]

## 2022-04-20 NOTE — TELEPHONE ENCOUNTER
Called and left VM. Will continue to try to reach patient.     Lalitha Estrella RN  Triage [facility] [place]

## 2022-04-20 NOTE — PROGRESS NOTES
Anticoagulation Clinic Progress Note    Anticoagulation Summary  As of 4/20/2022    INR goal:  2.0-3.0   TTR:  69.1 % (3.3 y)   INR used for dosing:  3.80 (4/20/2022)   Warfarin maintenance plan:  5 mg every Mon, Fri; 7.5 mg all other days   Weekly warfarin total:  47.5 mg   Plan last modified:  Brendan Live, PharmD (3/4/2022)   Next INR check:     Priority:  Maintenance   Target end date:  Indefinite    Indications    Permanent atrial fibrillation (HCC) [I48.21]             Anticoagulation Episode Summary     INR check location:      Preferred lab:      Send INR reminders to:  [facility] [place]    Comments:        Anticoagulation Care Providers     Provider Role Specialty Phone number    Kurt Henry MD Referring Cardiology [phone]          Clinic Interview:  Patient Findings     Positives:  Change in health, Change in medications    Negatives:  Signs/symptoms of thrombosis, Signs/symptoms of bleeding,   Laboratory test error suspected, Change in alcohol use, Change in   activity, Upcoming invasive procedure, Emergency department visit,   Upcoming dental procedure, Missed doses, Extra doses, Change in   diet/appetite, Hospital admission, Bruising, Other complaints    Comments:  Patient reports having ED visit for UTI and starting   Nitrofurantoin on Monday.       Clinical Outcomes     Negatives:  Major bleeding event, Thromboembolic event,   Anticoagulation-related hospital admission, Anticoagulation-related ED   visit, Anticoagulation-related fatality    Comments:  Patient reports having ED visit for UTI and starting   Nitrofurantoin on Monday.         INR History:  Anticoagulation Monitoring 3/30/2022 4/15/2022 4/20/2022   INR 2.80 4.30 3.80   INR Date 3/30/2022 4/15/2022 4/20/2022   INR Goal 2.0-3.0 2.0-3.0 2.0-3.0   Trend Same Same Same   Last Week Total 47.5 mg 47.5 mg 42.5 mg   Next Week Total 47.5 mg 42.5 mg 40 mg   Sun 7.5 mg 7.5 mg 7.5 mg   Mon 5 mg 5 mg 5 mg   Tue 7.5 mg 7.5 mg 7.5 mg    Wed 7.5 mg - Hold (4/20)   Thu 7.5 mg - 7.5 mg   Fri 5 mg Hold (4/15) 5 mg   Sat 7.5 mg 7.5 mg 7.5 mg   Visit Report - - -   Some recent data might be hidden       Plan:  1. INR is Supratherapeutic today- see above in Anticoagulation Summary.   Will instruct Jalen Lockwood to Change their warfarin regimen- see above in Anticoagulation Summary.  2. Follow up Friday, 4/22.  3. They have been instructed to call if any changes in medications, doses, concerns, etc. Patient expresses understanding and has no further questions at this time.    Elvi Haskins [facility]

## 2022-04-21 NOTE — PROGRESS NOTES
S/W Estefania ([facility]) to confirm recheck of INR tomorrow, 4/22 and instructions that were reviewed with patient yesterday.

## 2022-04-22 NOTE — PROGRESS NOTES
Anticoagulation Clinic Progress Note    Anticoagulation Summary  As of 4/22/2022    INR goal:  2.0-3.0   TTR:  68.9 % (3.3 y)   INR used for dosing:  3.40 (4/22/2022)   Warfarin maintenance plan:  5 mg every Mon, Fri; 7.5 mg all other days   Weekly warfarin total:  47.5 mg   Plan last modified:  Brendan Live, PharmD (3/4/2022)   Next INR check:  4/27/2022   Priority:  Maintenance   Target end date:  Indefinite    Indications    Permanent atrial fibrillation (HCC) [I48.21]             Anticoagulation Episode Summary     INR check location:      Preferred lab:      [facility]    Comments:        Anticoagulation Care Providers     Provider Role Specialty Phone number    Kurt Henry MD Referring Cardiology [phone]          INR History:  Anticoagulation Monitoring 4/15/2022 4/20/2022 4/22/2022   INR 4.30 3.80 3.40   INR Date 4/15/2022 4/20/2022 4/22/2022   INR Goal 2.0-3.0 2.0-3.0 2.0-3.0   Trend Same Same Same   Last Week Total 47.5 mg 42.5 mg 27.5 mg   Next Week Total 42.5 mg 40 mg 42.5 mg   Sun 7.5 mg - 7.5 mg   Mon 5 mg - 5 mg   Tue 7.5 mg - 7.5 mg   Wed - Hold (4/20) -   Thu - 7.5 mg -   Fri Hold (4/15) - 2.5 mg (4/22)   Sat 7.5 mg - 5 mg (4/23)   Visit Report - - -   Some recent data might be hidden       Plan:  1. INR is Supratherapeutic today- see above in Anticoagulation Summary.   Provided instructions to Estefania with [facility] to change their warfarin regimen (2.5mg today, 5mg tomorrow, then resume his current warfarin regimen) - see above in Anticoagulation Summary.  2. Follow up in 5 days      Sharita Nash [facility]

## 2022-04-27 NOTE — PROGRESS NOTES
Anticoagulation Clinic Progress Note    Anticoagulation Summary  As of 2022    INR goal:  2.0-3.0   TTR:  68.9 % (3.4 y)   INR used for dosin.20 (2022)   Warfarin maintenance plan:  7.5 mg every Tue, Thu; 5 mg all other days   Weekly warfarin total:  40 mg   Plan last modified:  Adeline Marie RPH (2022)   Next INR check:  2022   Priority:  Maintenance   Target end date:  Indefinite    Indications    Permanent atrial fibrillation (HCC) [I48.21]             Anticoagulation Episode Summary     INR check location:      Preferred lab:      [facility] [place]    Comments:        Anticoagulation Care Providers     Provider Role Specialty Phone number    Kurt Henry MD Referring Cardiology [phone]          INR History:  Anticoagulation Monitoring 2022   INR 3.80 3.40 2.20   INR Date 2022   INR Goal 2.0-3.0 2.0-3.0 2.0-3.0   Trend Same Same Down   Last Week Total 42.5 mg 27.5 mg 27.5 mg   Next Week Total 40 mg 42.5 mg 40 mg   Sun - 7.5 mg 5 mg   Mon - 5 mg -   Tue - 7.5 mg -   Wed Hold () - 5 mg   Thu 7.5 mg - 7.5 mg   Fri - 2.5 mg () 5 mg   Sat - 5 mg () 5 mg   Visit Report - - -   Some recent data might be hidden       Plan:  1. INR is Therapeutic today- see above in Anticoagulation Summary.   Provided instructions to Estefania with [facility] to Change their warfarin regimen- see above in Anticoagulation Summary.  2. Follow up in 5 days      Adeline Marie RPH

## 2022-04-27 NOTE — PROGRESS NOTES
"SUBJECTIVE:  The patient is a 72-year-old white male.  He has had significant decline in his health recently.  He has had urinary obstruction as well as cellulitis in his lower legs.  In spite of this he does not complain too much.  He also has atrial fibrillation, cardiomyopathy, hypertension and COPD.    PAST MEDICAL HISTORY:  Reviewed.    REVIEW OF SYSTEMS:  Please see above.  All others reviewed and are negative.      OBJECTIVE:   /80 (BP Location: Left arm, Patient Position: Sitting)   Pulse 78   Temp 98 °F (36.7 °C) (Oral)   Resp 20   Ht 193 cm (76\")   Wt (!) 139 kg (306 lb)   SpO2 95%   BMI 37.25 kg/m²    Vitals signs are reviewed and are stable.    General:  Well-nourished.  Alert and oriented x3 in no acute distress.  Appears chronically ill.  HEENT: PERRLA.   Neck:  Supple.   Lungs: Rare expiratory rhonchi  Heart: Irregularly irregular rate and rhythm.   Abdomen:   Soft, nontender.  Bowel sounds present  Extremities: Both lower extremities are wrapped in the wrapping and dressing is not removed at this time.  He has been followed by home health.  Neurological:  Grossly intact without motor or sensory deficits.     ASSESSMENT:      Diagnoses and all orders for this visit:    1. Permanent atrial fibrillation (HCC) (Primary)  -     CBC & Differential  -     Comprehensive Metabolic Panel  -     Lipid Panel  -     Compliance Drug Analysis, Ur - Urine, Clean Catch; Future    2. Chronic anticoagulation  -     CBC & Differential  -     Comprehensive Metabolic Panel  -     Lipid Panel  -     Compliance Drug Analysis, Ur - Urine, Clean Catch; Future    3. Other mononeuropathy  -     CBC & Differential  -     Comprehensive Metabolic Panel  -     Lipid Panel  -     Compliance Drug Analysis, Ur - Urine, Clean Catch; Future    4. Anxiety  -     CBC & Differential  -     Comprehensive Metabolic Panel  -     Lipid Panel  -     Compliance Drug Analysis, Ur - Urine, Clean Catch; Future    5. Primary " hypertension  -     CBC & Differential  -     Comprehensive Metabolic Panel  -     Lipid Panel  -     Compliance Drug Analysis, Ur - Urine, Clean Catch; Future    6. Urinary retention  -     Compliance Drug Analysis, Ur - Urine, Clean Catch; Future    7. Urinary tract infection with hematuria, site unspecified  -     Compliance Drug Analysis, Ur - Urine, Clean Catch; Future    8. Cellulitis of lower extremity, unspecified laterality  -     Compliance Drug Analysis, Ur - Urine, Clean Catch; Future    9. Medication monitoring encounter  -     Compliance Drug Analysis, Ur - Urine, Clean Catch; Future         PLAN:   See above orders.  Continue to see your home health and specialist as scheduled.  Follow-up on labs.  Call if problems.    Dictated utilizing Dragon dictation.

## 2022-05-02 NOTE — PROGRESS NOTES
Anticoagulation Clinic Progress Note    Anticoagulation Summary  As of 2022    INR goal:  2.0-3.0   TTR:  69.1 % (3.4 y)   INR used for dosin.10 (2022)   Warfarin maintenance plan:  7.5 mg every Tue, Thu; 5 mg all other days   Weekly warfarin total:  40 mg   Plan last modified:  Adeline Marie RPH (2022)   Next INR check:  2022   Priority:  Maintenance   Target end date:  Indefinite    Indications    Permanent atrial fibrillation (HCC) [I48.21]             Anticoagulation Episode Summary     INR check location:      Preferred lab:      Send INR reminders to:  [facility] CLINICAL [place]    Comments:        Anticoagulation Care Providers     Provider Role Specialty Phone number    Kurt Henry MD Referring Cardiology [phone]          INR History:  Anticoagulation Monitoring 2022   INR 3.40 2.20 2.10   INR Date 2022   INR Goal 2.0-3.0 2.0-3.0 2.0-3.0   Trend Same Down Same   Last Week Total 27.5 mg 27.5 mg 40 mg   Next Week Total 42.5 mg 40 mg 40 mg   Sun 7.5 mg 5 mg -   Mon 5 mg - 5 mg   Tue 7.5 mg - 7.5 mg   Wed - 5 mg 5 mg   Thu - 7.5 mg 7.5 mg   Fri 2.5 mg () 5 mg -   Sat 5 mg () 5 mg -   Visit Report - - -   Some recent data might be hidden       Plan:  1. INR is Therapeutic today- see above in Anticoagulation Summary.   Provided instructions to Estefania with [facility] at the [facility] to Continue their warfarin regimen- see above in Anticoagulation Summary.  2. Follow up in 4 days      Adeline Marie RPH

## 2022-05-10 NOTE — TELEPHONE ENCOUNTER
A refill was submitted for pt's Lisinopril 20 mg bid.  Pt called and stated that he was instructed at his last ov to increase his Lisinopril to 40 mg bid due to elevated bp.  Pt reports that this is how he has been taking since his last office visit on 03/17.  I didn't see any documentation of this change.    Note: I do see were his Carvedilol was increased and to 12.5 mg bid, and verified with pt that he is doing so.  He is also taking the the Lisinopril 40 mg bid.     He states that his average bp is running about 110/72 no symptoms. Please advise.

## 2022-05-12 NOTE — TELEPHONE ENCOUNTER
Med Clarification-he should be taking:  · Carvedilol 12.5 mg 1 tablet twice per day.  · Lisinopril 20 mg 1 tablet twice per day (40 mg daily)-80 mg would be too much    After he makes these changes, ask him to monitor his blood pressure and call if it is consistently greater than 140.  Thank you.

## 2022-05-17 NOTE — TELEPHONE ENCOUNTER
PT says [facility] is coming weekly to draw INR. LVM with Estefania at [facility] to confirm and request results be reported to us.

## 2022-05-18 NOTE — PROGRESS NOTES
Anticoagulation Clinic Progress Note    Anticoagulation Summary  As of 2022    INR goal:  2.0-3.0   TTR:  68.8 % (3.4 y)   INR used for dosin.00 (2022)   Warfarin maintenance plan:  7.5 mg every Tue, Thu; 5 mg all other days   Weekly warfarin total:  40 mg   Plan last modified:  Adeline Marie RPH (2022)   Next INR check:  2022   Priority:  Maintenance   Target end date:  Indefinite    Indications    Permanent atrial fibrillation (HCC) [I48.21]             Anticoagulation Episode Summary     INR check location:      Preferred lab:      [facility]    Comments:        Anticoagulation Care Providers     Provider Role Specialty Phone number    Kurt Henry MD Referring Cardiology [phone]          INR History:  Anticoagulation Monitoring 2022   INR 2.20 2.10 4.00   INR Date 2022   INR Goal 2.0-3.0 2.0-3.0 2.0-3.0   Trend Down Same Same   Last Week Total 27.5 mg 40 mg 40 mg   Next Week Total 40 mg 40 mg 35 mg   Sun 5 mg - 5 mg   Mon - 5 mg 5 mg   Tue - 7.5 mg 7.5 mg   Wed 5 mg 5 mg Hold ()   Thu 7.5 mg 7.5 mg 7.5 mg   Fri 5 mg - 5 mg   Sat 5 mg - 5 mg   Visit Report - - -   Some recent data might be hidden       Plan:  1. INR is Subtherapeutic today- see above in Anticoagulation Summary.   Provided instructions to Estefania with [facility] to Change their warfarin regimen- see above in Anticoagulation Summary.  2. Follow up in 1 week      Adeline Marie RPH

## 2022-05-20 NOTE — TELEPHONE ENCOUNTER
PATIENTS WIFE IS CALLING IN PHARMACY IS NOT WANTING TO FILL THIS BECAUSE ON HARD COPY IT SAYS HE NEEDS APPT BUT HE WAS JUST IN ON 05/02/22 AND THEY ARE REALLY NEEDING TO GET THIS FILLED ASAP. PHARMACY IS WANTING TO GET VERIFICATION FROM DOCTORS OFFICE.      PLEASE ADVISE    CALLBACK NUMBER IS  [phone]      CONFIRMED PHARMACY  [id number] - [place], [place] - [address] AT [address] [facility] [phone] [facility] [phone] FX

## 2022-05-26 NOTE — PROGRESS NOTES
Anticoagulation Clinic Progress Note    Anticoagulation Summary  As of 2022    INR goal:  2.0-3.0   TTR:  68.6 % (3.4 y)   INR used for dosin.50 (2022)   Warfarin maintenance plan:  7.5 mg every Tue, Thu; 5 mg all other days   Weekly warfarin total:  40 mg   No change documented:  Elvi Haskins, PharmD   Plan last modified:  Adeline Marie RP (2022)   Next INR check:  2022   Priority:  Maintenance   Target end date:  Indefinite    Indications    Permanent atrial fibrillation (HCC) [I48.21]             Anticoagulation Episode Summary     INR check location:      Preferred lab:      Send INR reminders to:  [facility]    Comments:        Anticoagulation Care Providers     Provider Role Specialty Phone number    Kurt Henry MD Referring Cardiology [phone]          INR History:  Anticoagulation Monitoring 2022   INR 2.10 4.00 2.50   INR Date 2022   INR Goal 2.0-3.0 2.0-3.0 2.0-3.0   Trend Same Same Same   Last Week Total 40 mg 40 mg 40 mg   Next Week Total 40 mg 35 mg 40 mg   Sun - 5 mg 5 mg   Mon 5 mg 5 mg 5 mg   Tue 7.5 mg 7.5 mg 7.5 mg   Wed 5 mg Hold () 5 mg   Thu 7.5 mg 7.5 mg 7.5 mg   Fri - 5 mg 5 mg   Sat - 5 mg 5 mg   Visit Report - - -   Some recent data might be hidden       Plan:  1. INR is Therapeutic today- see above in Anticoagulation Summary.   Provided instructions to Estefania with [facility] to Continue their warfarin regimen- see above in Anticoagulation Summary.  2. Follow up in 1 week.  Left voicemail for Estefania to continue same and recheck INR on ; also contacted and confirmed dosing and plan with Jalen Lockwood.       Elvi Haskins, PharmD

## 2022-06-03 NOTE — PROGRESS NOTES
Anticoagulation Clinic Progress Note    Anticoagulation Summary  As of 6/3/2022    INR goal:  2.0-3.0   TTR:  68.8 % (3.5 y)   INR used for dosin.70 (6/3/2022)   Warfarin maintenance plan:  5 mg every day   Weekly warfarin total:  35 mg   Plan last modified:  Brendan Live, [place] (6/3/2022)   Next INR check:  2022   Priority:  Maintenance   Target end date:  Indefinite    Indications    Permanent atrial fibrillation (HCC) [I48.21]             Anticoagulation Episode Summary     INR check location:      Preferred lab:      Send INR reminders to:  [facility] CLINICAL [place]    Comments:        Anticoagulation Care Providers     Provider Role Specialty Phone number    Kurt Henry MD Referring Cardiology [phone]        Findings:  Reports missed 5-mg warfarin dose on 22.     INR History:  Anticoagulation Monitoring 2022 2022 6/3/2022   INR 4.00 2.50 2.70   INR Date 2022 2022 6/3/2022   INR Goal 2.0-3.0 2.0-3.0 2.0-3.0   Trend Same Same Down   Last Week Total 40 mg 40 mg 35 mg   Next Week Total 35 mg 40 mg 35 mg   Sun 5 mg 5 mg 5 mg   Mon 5 mg 5 mg 5 mg   Tue 7.5 mg 7.5 mg 5 mg   Wed Hold () 5 mg -   Thu 7.5 mg 7.5 mg -   Fri 5 mg 5 mg 5 mg   Sat 5 mg 5 mg 5 mg   Visit Report - - -   Some recent data might be hidden       Plan:  1. INR is Therapeutic today- see above in Anticoagulation Summary.   Provided instructions to Estefania with [facility] to Change their warfarin regimen- see above in Anticoagulation Summary.  2. Follow up in 5 days      Brendan Live, AlexD

## 2022-06-09 NOTE — PROGRESS NOTES
Anticoagulation Clinic Progress Note    Anticoagulation Summary  As of 2022    INR goal:  2.0-3.0   TTR:  68.9 % (3.5 y)   INR used for dosin.30 (2022)   Warfarin maintenance plan:  7.5 mg every Thu; 5 mg all other days   Weekly warfarin total:  37.5 mg   Plan last modified:  Brendan Live, [place] (2022)   Next INR check:  6/15/2022   Priority:  Maintenance   Target end date:  Indefinite    Indications    Permanent atrial fibrillation (HCC) [I48.21]             Anticoagulation Episode Summary     INR check location:      Preferred lab:      [facility]    Comments:        Anticoagulation Care Providers     Provider Role Specialty Phone number    Kurt Henry MD Referring Cardiology [phone]          INR History:  Anticoagulation Monitoring 2022 6/3/2022 2022   INR 2.50 2.70 2.30   INR Date 2022 6/3/2022 2022   INR Goal 2.0-3.0 2.0-3.0 2.0-3.0   Trend Same Down Up   Last Week Total 40 mg 35 mg 37.5 mg   Next Week Total 40 mg 35 mg 37.5 mg   Sun 5 mg 5 mg 5 mg   Mon 5 mg 5 mg 5 mg   Tue 7.5 mg 5 mg 5 mg   Wed 5 mg - -   Thu 7.5 mg - 7.5 mg   Fri 5 mg 5 mg 5 mg   Sat 5 mg 5 mg 5 mg   Visit Report - - -   Some recent data might be hidden       Plan:  1. INR was Therapeutic yesterday- see above in Anticoagulation Summary.   Provided instructions to Estefania with [facility] to Change their warfarin regimen- see above in Anticoagulation Summary.  2. Follow up in 1 week      Brendan Live, AlexD

## 2022-06-09 NOTE — TELEPHONE ENCOUNTER
Caller: Jalen Lockwood     Relationship: [unfilled]     Best call back number:[phone]    What is your medical concern? PHARMACY IS TELLING PATIENT THAT THEY HAVE REACHED OUT TO THE OFFICE BUT HEARD BACK IN REGARDS TO THE Atrovent HFA 17 MCG/ACT inhaler. PHARMACY IS [id number] - [place], [place] - [address] AT [facility] [phone] [facility] [phone] FX

## 2022-06-17 NOTE — PROGRESS NOTES
Anticoagulation Clinic Progress Note    Anticoagulation Summary  As of 2022    INR goal:  2.0-3.0   TTR:  68.7 % (3.5 y)   INR used for dosin.70 (2022)   Warfarin maintenance plan:  7.5 mg every Fri; 5 mg all other days   Weekly warfarin total:  37.5 mg   Plan last modified:  Adeline Marie RPH (2022)   Next INR check:  2022   Priority:  Maintenance   Target end date:  Indefinite    Indications    Permanent atrial fibrillation (HCC) [I48.21]             Anticoagulation Episode Summary     INR check location:      Preferred lab:      [facility]    Comments:        Anticoagulation Care Providers     Provider Role Specialty Phone number    Kurt Henry MD Referring Cardiology [phone]          INR History:  Anticoagulation Monitoring 6/3/2022 2022 2022   INR 2.70 2.30 1.70   INR Date 6/3/2022 2022 2022   INR Goal 2.0-3.0 2.0-3.0 2.0-3.0   Trend Down Up Same   Last Week Total 35 mg 37.5 mg 35 mg   Next Week Total 35 mg 37.5 mg 37.5 mg   Sun 5 mg 5 mg 5 mg   Mon 5 mg 5 mg 5 mg   Tue 5 mg 5 mg 5 mg   Wed - - -   Thu - 7.5 mg -   Fri 5 mg 5 mg 7.5 mg ()   Sat 5 mg 5 mg 5 mg   Visit Report - - -   Some recent data might be hidden       Plan:  1. INR is Subtherapeutic today- see above in Anticoagulation Summary.   Provided instructions to Grecthen with [facility] to Increase their warfarin regimen- see above in Anticoagulation Summary.  2. Follow up in 1 week      Adeline Marie RPH

## 2022-06-19 PROBLEM — L03.116 CELLULITIS OF LEFT FOOT: Status: ACTIVE | Noted: 2022-01-01

## 2022-06-20 PROBLEM — R33.9 URINARY RETENTION: Status: ACTIVE | Noted: 2022-01-01

## 2022-06-20 PROBLEM — A41.9 SEPSIS WITHOUT ACUTE ORGAN DYSFUNCTION (HCC): Status: ACTIVE | Noted: 2022-01-01

## 2022-07-08 NOTE — OUTREACH NOTE
Prep Survey    Flowsheet Row Responses   [facility] facility patient discharged from? Non-BH   Is LACE score < 7 ? Non-BH Discharge   Emergency Room discharge w/ pulse ox? No   Eligibility [facility]    Date of Discharge 07/08/22   Discharge diagnosis Unavailable    Does the patient have one of the following disease processes/diagnoses(primary or secondary)? Other   Prep survey completed? Yes          NICOLASA VIDAL - Registered Nurse

## 2022-07-08 NOTE — PROGRESS NOTES
Anticoagulation Clinic Progress Note    Anticoagulation Summary  As of 2022    INR goal:  2.0-3.0   TTR:  68.6 % (3.5 y)   INR used for dosin.30 (2022)   Warfarin maintenance plan:  7.5 mg every Fri; 5 mg all other days   Weekly warfarin total:  37.5 mg   Plan last modified:  Adeline Marie RPH (2022)   Next INR check:  2022   Priority:  Maintenance   Target end date:  Indefinite    Indications    Permanent atrial fibrillation (HCC) [I48.21]             Anticoagulation Episode Summary     INR check location:      Preferred lab:      Send INR reminders to:   [facility]    Comments:        Anticoagulation Care Providers     Provider Role Specialty Phone number    Kurt Henry MD Referring Cardiology [phone]          INR History:  Anticoagulation Monitoring 2022   INR 2.30 1.70 1.30   INR Date 2022   INR Goal 2.0-3.0 2.0-3.0 2.0-3.0   Trend Up Same Same   Last Week Total 37.5 mg 35 mg 35 mg   Next Week Total 37.5 mg 37.5 mg 42.5 mg   Sun 5 mg 5 mg 7.5 mg (7/10)   Mon 5 mg 5 mg 5 mg   Tue 5 mg 5 mg 5 mg   Wed - - -   Thu 7.5 mg - -   Fri 5 mg 7.5 mg () 7.5 mg   Sat 5 mg 5 mg 7.5 mg ()   Visit Report - - -   Some recent data might be hidden       Plan:  1. INR is Subtherapeutic today- see above in Anticoagulation Summary.   Provided instructions to Estefania with [facility] to Change their warfarin regimen- see above in Anticoagulation Summary.  2. Follow up in 5 days (earliest Estefania could return)       Adeline Marie RPH

## 2022-07-10 PROBLEM — N39.0 ACUTE UTI: Status: ACTIVE | Noted: 2022-01-01

## 2022-07-10 PROBLEM — R50.9 FEBRILE ILLNESS: Status: ACTIVE | Noted: 2022-01-01

## 2022-07-10 NOTE — H&P
Internal medicine history and physical  INTERNAL MEDICINE   [facility]       Patient Identification:  Name: Jalen Lockwood  Age: 72 y.o.  Sex: male  :  1950  MRN: [id number]                   Primary Care Physician: Marc Ludwig MD                               Date of admission:7/10/2022    Chief Complaint: Progressive confusion weakness and change in mental status since Wednesday, fever and shortness of air since this morning.    History of Present Illness:   Patient is a 72-year-old male who has complicated past medical history including chronic venous stasis bilateral lower extremity edema and Charcot changes in his feet with recent hospitalization for cellulitis with infected wound in the lower extremities associated UTI and urinary retention with chronic indwelling catheter.  Patient was treated with broad-spectrum antibiotics and was noted to have elevated procalcitonin level.  Patient was in the hospital for 5 days from 2022 through 2022 and was discharged with Irvin catheter and oral cefdinir.  Patient's urine culture positive for E. coli and Enterobacter cloacae complex.  According to the wife who is at the bedside patient was at the rehabilitation facility from his discharge on 2022 till this past Friday.  In the beginning of this month patient's Irvin catheter was dislodged and he was in urinary retention for 24 hours before it was replaced.  This past Wednesday he noticed that he was not himself and mostly gets confused and slow to respond.  Patient apparently was asked to be evaluated for this change in status by his wife but he was told that everything was checked out okay.  Patient was then subsequently discharged 2 days later to home and from Friday since his discharge from today patient has been intermittently confused and not himself.  He is having more drainage from his legs and feet and today he had a high temperature with associated confusion resulting  in him being brought to the emergency room by EMS.  Patient was noted to have temperature of 102.9.  Patient noted to have abnormal urinalysis in the setting of indwelling catheter and redness of his feet.  Broad-spectrum antibiotics was given and patient is being admitted for further care.      Past Medical History:  Past Medical History:   Diagnosis Date   • Allergic rhinitis    • Anxiety    • Aortectasia (HCC)     3cm infrarenal abdominal aorta   • Arthritis    • Atrial flutter (HCC) 2010    s/p ablation    • Charcot's joint of foot    • Chronic edema     both legs and sees wound care center at [place]    • Chronic venous insufficiency    • COPD (chronic obstructive pulmonary disease) (HCC)    • Coronary atherosclerosis     Cath 2010: diffuse 40-50% disease   • Diverticulosis    • Duodenitis    • Fatty liver    • Gastritis    • Gastroparesis    • Hematoma     post-operative; After catheterization, right groin, required surgical exploration   • Hyperlipidemia    • Hypertension    • Insomnia    • Internal hemorrhoids    • Open wound     izzy legs has teddy chg weekly at wound care center at [place]  pt does second dressing on left leg another time during week   • Osteomyelitis (HCC)    • Paroxysmal atrial fibrillation (HCC)    • Peripheral neuropathy    • Popliteal artery aneurysm (HCC)     left, s/p stenting by Dr. Boston   • Skin cancer    • Sleep apnea     o2   • 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HCC)     due to flutter and afib; cath 2010 with nonobstructive disease   • Venous stasis    • Venous stasis ulcer (HCC)     bilateral legs      Past Surgical History:  Past Surgical History:   Procedure Laterality Date   • BASAL CELL CARCINOMA EXCISION      ear and left side of face   • BRONCHOSCOPY N/A 4/12/2021    Procedure: BRONCHOSCOPY WITH BAL;  Surgeon: Bunny Lepe MD;  Location: [facility] ENDOSCOPY;  Service: Pulmonary;  Laterality: N/A;  PRE-HEMOPTYSIS  POST-SAME   • CARDIAC CATHETERIZATION     • CATARACT  EXTRACTION     • COLONOSCOPY  09/28/2015    NBIH, diverticulosis, polyps   • COLONOSCOPY N/A 9/11/2018    Procedure: COLONOSCOPY TO CECUM  AND TERM. ILEUM WITH COLD SNARE POLYPECTOMIES;  Surgeon: Kane Lagunas MD;  Location:  [facility];  Service: Gastroenterology   • COLONOSCOPY N/A 10/29/2019    Procedure: COLONOSCOPY TO TO CECUM AND TERMINAL ILEUM WITH HOT AND COLD SNARE POLYPECTOMIES;  Surgeon: Kane Lagunas MD;  Location:  [facility] ENDOSCOPY;  Service: Gastroenterology   • HIP ARTHROPLASTY Right 2017   • JOINT REPLACEMENT Left    • OTHER SURGICAL HISTORY      Catheter ablation atrial flutter   • REPAIR ANEURYSM / PSEUDO ANEURYSM / RUPTURED ANEURYSM POPLITEAL ARTERY      Stent-Graft of the the left popliteal artery   • REPAIR KNEE LIGAMENT      Primary repair of knee ligament cruciate anterior right   • TONSILLECTOMY  1958   • TOTAL KNEE ARTHROPLASTY Bilateral    • UPPER GASTROINTESTINAL ENDOSCOPY  09/16/2014    acute gastritis, acute duodenitis      Home Meds:  (Not in a hospital admission)    Current Meds:     Current Facility-Administered Medications:   •  sodium chloride 0.9 % infusion, 125 mL/hr, Intravenous, Continuous, Jeff Ludwig II, MD, Last Rate: 125 mL/hr at 07/10/22 1534, 125 mL/hr at 07/10/22 1534    Current Outpatient Medications:   •  albuterol sulfate  (90 Base) MCG/ACT inhaler, Inhale 2 puffs Every 4 (Four) Hours As Needed for Wheezing., Disp: 2 g, Rfl: 6  •  ALPRAZolam (XANAX) 0.5 MG tablet, TAKE 1 TABLET BY MOUTH EVERY DAY AT NIGHT. NEEDS APPT, Disp: 30 tablet, Rfl: 0  •  Atrovent HFA 17 MCG/ACT inhaler, INHALE 2 PUFFS BY MOUTH 4 TIMES A DAY, Disp: 12.9 each, Rfl: 3  •  Breo Ellipta 200-25 MCG/INH inhaler, INHALE 1 PUFF BY MOUTH EVERY DAY, Disp: 60 each, Rfl: 4  •  carvedilol (COREG) 12.5 MG tablet, Take 1 tablet by mouth 2 (Two) Times a Day With Meals., Disp: 180 tablet, Rfl: 3  •  docusate sodium (COLACE) 100 MG capsule, Take 100 mg by mouth Daily., Disp: , Rfl:   •   finasteride (PROSCAR) 5 MG tablet, Take 1 tablet by mouth daily., Disp: , Rfl:   •  furosemide (LASIX) 40 MG tablet, Take 40 mg by mouth Daily., Disp: , Rfl:   •  gabapentin (NEURONTIN) 600 MG tablet, Take 1 tablet by mouth 3 (Three) Times a Day for 30 days., Disp: 90 tablet, Rfl: 0  •  metoclopramide (REGLAN) 10 MG tablet, TAKE 1 TABLET BY MOUTH 4 (FOUR) TIMES A DAY BEFORE MEALS & AT BEDTIME., Disp: 360 tablet, Rfl: 3  •  OXYGEN-HELIUM IN, 2 L into the nostril(s) as directed by provider As Needed., Disp: , Rfl:   •  pravastatin (PRAVACHOL) 20 MG tablet, TAKE 1 TABLET BY MOUTH EVERY DAY, Disp: 90 tablet, Rfl: 1  •  silodosin (RAPAFLO) 8 MG capsule capsule, Take 1 capsule by mouth daily. With food., Disp: , Rfl:   •  warfarin (COUMADIN) 5 MG tablet, As directed  Indications: Atrial Fibrillation, Disp: , Rfl:   •  warfarin (COUMADIN) 7.5 MG tablet, As directed  Indications: Atrial Fibrillation, Disp: , Rfl:   Allergies:  Allergies   Allergen Reactions   • Cephalexin Hives     Tolerated ceftriaxone Jan 2022   • Codeine Nausea Only   • Silver Other (See Comments)     Social History:   Social History     Tobacco Use   • Smoking status: Current Every Day Smoker     Packs/day: 0.25     Types: Cigarettes     Start date: 1964   • Smokeless tobacco: Never Used   • Tobacco comment: caffeine use - 1.5 cups coffee daily    Substance Use Topics   • Alcohol use: Yes     Alcohol/week: 14.0 standard drinks     Types: 14 Shots of liquor per week      Family History:  Family History   Problem Relation Age of Onset   • Emphysema Father    • Malig Hyperthermia Neg Hx           Review of Systems  See history of present illness and past medical history.    After wife:  Constitutional: sluggish at times confused with associated fever and chills noted today  Cardiovascular: Remarkable for no chest pain or shortness of breath  GI: Remarkable for decreased appetite  : Remarkable for chronic indwelling catheter with issues with catheter as  "described in the history of present illness urine is becoming dark  Musculoskeletal: Remarkable for chronic lymphedema and Charcot changes involving the feet with associated erythema and wounds that are draining  Neurological: Remarkable for associated confusion and intermittent sluggish response.  Denies any focal weakness of arm or legs.      Vitals:   /59   Pulse 86   Temp (!) 102.9 °F (39.4 °C) (Oral)   Resp (!) 30   Ht 193 cm (76\")   Wt 134 kg (296 lb)   SpO2 99%   BMI 36.03 kg/m²   I/O:     Intake/Output Summary (Last 24 hours) at 7/10/2022 1625  Last data filed at 7/10/2022 1600  Gross per 24 hour   Intake 100 ml   Output --   Net 100 ml     Exam:  Patient is examined using the personal protective equipment as per guidelines from infection control for this particular patient as enacted.  Hand washing was performed before and after patient interaction.  General Appearance:   Chronically ill-appearing male who does not appear to be in any acute distress and at this moment able to interact appropriately.   Head:    Normocephalic, without obvious abnormality, atraumatic   Eyes:    PERRL, conjunctiva/corneas clear, EOM's intact, both eyes   Ears:    Normal external ear canals, both ears   Nose:   Nares normal, septum midline, mucosa normal, no drainage    or sinus tenderness   Throat:   Lips, tongue, gums normal; oral mucosa pink and moist   Neck:   Supple, symmetrical, trachea midline, no adenopathy;     thyroid:  no enlargement/tenderness/nodules; no carotid    bruit or JVD   Back:     Symmetric, no curvature, ROM normal, no CVA tenderness   Lungs:     Clear to auscultation bilaterally, respirations unlabored   Chest Wall:    No tenderness or deformity    Heart:   S1-S2 regular   Abdomen:    Obese soft nontender, : Indwelling catheter in place   Extremities:  Chronic lymphedema noted       Pulses:   Pulses palpable in all extremities; symmetric all extremities   Skin:  Chronic skin changes " involving bilateral lower extremities due to lymphedema and Charcot's feet.  There are some open wound on the right leg and on the left foot.   Neurologic:  Grossly nonfocal and currently oriented x3.       Data Review:      I reviewed the patient's new clinical results.  Results from last 7 days   Lab Units 07/10/22  1334   WBC 10*3/mm3 6.91   HEMOGLOBIN g/dL 10.9*   PLATELETS 10*3/mm3 145     Results from last 7 days   Lab Units 07/10/22  1334   SODIUM mmol/L 133*   POTASSIUM mmol/L 4.3   CHLORIDE mmol/L 95*   CO2 mmol/L 27.0   BUN mg/dL 13   CREATININE mg/dL 0.87   CALCIUM mg/dL 9.4   GLUCOSE mg/dL 91     Microbiology Results (last 10 days)     Procedure Component Value - Date/Time    Wound Culture - Wound, Foot, Left [[id number]] Collected: 07/10/22 1417    Lab Status: Preliminary result Specimen: Wound from Foot, Left Updated: 07/10/22 1557     Gram Stain Moderate (3+) Gram negative bacilli      Few (2+) Gram positive cocci      No WBCs seen    Respiratory Panel PCR w/COVID-19(SARS-CoV-2) [facility] In-House, NP Swab in UTM/VTM, 3-4 HR TAT - Swab, Nasopharynx [[id number]]  (Normal) Collected: 07/10/22 1406    Lab Status: Final result Specimen: Swab from Nasopharynx Updated: 07/10/22 2601     ADENOVIRUS, PCR Not Detected     Coronavirus 229E Not Detected     Coronavirus HKU1 Not Detected     Coronavirus NL63 Not Detected     Coronavirus OC43 Not Detected     COVID19 Not Detected     Human Metapneumovirus Not Detected     Human Rhinovirus/Enterovirus Not Detected     Influenza A PCR Not Detected     Influenza B PCR Not Detected     Parainfluenza Virus 1 Not Detected     Parainfluenza Virus 2 Not Detected     Parainfluenza Virus 3 Not Detected     Parainfluenza Virus 4 Not Detected     RSV, PCR Not Detected     Bordetella pertussis pcr Not Detected     Bordetella parapertussis PCR Not Detected     Chlamydophila pneumoniae PCR Not Detected     Mycoplasma pneumo by PCR Not Detected    Narrative:       In the setting of a positive respiratory panel with a viral infection PLUS a negative procalcitonin without other underlying concern for bacterial infection, consider observing off antibiotics or discontinuation of antibiotics and continue supportive care. If the respiratory panel is positive for atypical bacterial infection (Bordetella pertussis, Chlamydophila pneumoniae, or Mycoplasma pneumoniae), consider antibiotic de-escalation to target atypical bacterial infection.          Assessment:  Active Hospital Problems    Diagnosis  POA   • **Febrile illness [R50.9]  Unknown   • Acute UTI [N39.0]  Yes   • Cellulitis of left foot [L03.116]  Yes   • Alcohol dependence, in remission (HCC) [F10.21]  Yes   • Charcot's joint of foot [M14.679]  Yes   • ETOH abuse [F10.10]  Yes   • Chronic anticoagulation [Z79.01]  Not Applicable   • Essential hypertension [I10]  Yes   • COPD (chronic obstructive pulmonary disease) (HCC) [J44.9]  Yes   • Sleep apnea [G47.30]  Yes   • Permanent atrial fibrillation (HCC) [I48.21]  Yes   • Lymphedema of both lower extremities [I89.0]  Yes       Plan: See admitting orders  Follow-up on blood cultures and urine cultures  Continue with IV cefepime and vancomycin  As per family request infectious disease consultation  Continue his home regimen including provision CPAP device, continuous pulse ox monitoring and continuation of his anticoagulation therapy antihypertensive regimen and COPD treatment.    Rahel Singh MD   7/10/2022  16:25 EDT  Much of this encounter note is an electronic transcription/translation of spoken language to printed text. The electronic translation of spoken language may permit erroneous, or at times, nonsensical words or phrases to be inadvertently transcribed; Although I have reviewed the note for such errors, some may still exist

## 2022-07-10 NOTE — ED PROVIDER NOTES
EMERGENCY DEPARTMENT ENCOUNTER    Room Number:  07/07  Date of encounter:  7/10/2022  PCP: Marc Ludwig MD  Historian: Patient      PPE    Patient was placed in face mask in first look. Patient was wearing facemask when I entered the room and throughout our encounter. I wore full protective equipment throughout this patient encounter including a face mask, and gloves. Hand hygiene was performed before donning protective equipment and after removal when leaving the room.        HPI:  Chief Complaint: Fever  A complete HPI/ROS/PMH/PSH/SH/FH are unobtainable due to: Nothing    Context: Jalen Lockwood is a 72 y.o. male with history of PAF, COPD, hypertension, hyperlipidemia, anticoagulated on Coumadin who arrives to the ED via EMS from home.  Patient states that he was just recently discharged from rehab after a hospital stay last month.  He states he is here for generalized weakness and fever.  Patient does have a history of significant bilateral lower extremity lymphedema and cellulitis.  He states his lower leg wrappings were changed on Friday.  Patient denies chest pain, shortness of breath, nausea, vomiting.      PAST MEDICAL HISTORY  Active Ambulatory Problems     Diagnosis Date Noted   • Chronic osteomyelitis (HCC) 04/22/2016   • Foot pain 04/22/2016   • Peripheral neuropathy 04/22/2016   • Lymphedema of both lower extremities 04/22/2016   • Permanent atrial fibrillation (HCC) 04/22/2016   • Sleep apnea 04/22/2016   • Chronic edema 04/22/2016   • Obesity (BMI 30-39.9) 04/22/2016   • COPD (chronic obstructive pulmonary disease) (HCC) 04/22/2016   • Atrial flutter (HCC) 01/01/2010   • 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HCC)    • Aortectasia (HCC)    • Popliteal artery aneurysm (HCC)    • Colon polyps 02/07/2017   • Gastroparesis 02/07/2017   • Insomnia 02/07/2017   • Adenomatous polyp of colon 05/23/2018   • Essential hypertension 11/15/2018   • ETOH abuse 04/24/2019   • Chronic anticoagulation 04/24/2019   •  Oropharyngeal dysphagia 04/24/2019   • Tobacco abuse 04/24/2019   • Thyromegaly 04/25/2019   • Adrenal adenoma, left 04/25/2019   • Retroperitoneal lymphadenopathy 04/25/2019   • Anemia of chronic disease 04/27/2019   • Thyroid nodule 04/29/2019   • Charcot's joint of foot 04/29/2019   • Abnormal CT scan, lumbar spine 05/21/2019   • Alcohol dependence, in remission ([facility]) 05/21/2020   • Ischemic cardiomyopathy 04/03/2021   • Normocytic anemia 04/27/2021   • Inguinal adenopathy 04/27/2021   • Elevated lactic acid level 01/09/2022   • Cellulitis of left foot 06/19/2022   • Urinary retention 06/20/2022   • Sepsis without acute organ dysfunction ([facility]) 06/20/2022     Resolved Ambulatory Problems     Diagnosis Date Noted   • Hyponatremia 04/24/2019   • Open wound 04/24/2019   • Adverse effect of thiazide diuretic 04/24/2019   • Weakness 04/24/2019   • Abnormal weight loss 04/24/2019   • Thrombocytopenia ([facility]) 04/25/2019   • Candidal intertrigo 04/25/2019   • Left leg cellulitis 04/30/2019   • Sepsis ([facility]) 05/01/2019   • Severe sepsis ([facility]) 03/30/2021   • Hyponatremia 04/03/2021   • Thrombocytopenia ([facility]) 04/03/2021   • Metabolic encephalopathy 04/03/2021   • Acute systolic (congestive) heart failure ([facility]) 04/03/2021   • Cellulitis of lower extremity 04/03/2021   • UTI (urinary tract infection) 03/30/2021   • Hemoptysis 03/30/2021   • Generalized weakness 01/09/2022   • Fever in adult 01/09/2022     Past Medical History:   Diagnosis Date   • Allergic rhinitis    • Anxiety    • Arthritis    • Chronic venous insufficiency    • Coronary atherosclerosis    • Diverticulosis    • Duodenitis    • Fatty liver    • Gastritis    • Hematoma    • Hyperlipidemia    • Hypertension    • Internal hemorrhoids    • Osteomyelitis (HCC)    • Paroxysmal atrial fibrillation (HCC)    • Skin cancer    • Venous stasis    • Venous stasis ulcer (HCC)          PAST SURGICAL HISTORY  Past Surgical History:   Procedure Laterality Date   • BASAL CELL  CARCINOMA EXCISION      ear and left side of face   • BRONCHOSCOPY N/A 4/12/2021    Procedure: BRONCHOSCOPY WITH BAL;  Surgeon: Bunny Lepe MD;  Location: [facility] ENDOSCOPY;  Service: Pulmonary;  Laterality: N/A;  PRE-HEMOPTYSIS  POST-SAME   • CARDIAC CATHETERIZATION     • CATARACT EXTRACTION     • COLONOSCOPY  09/28/2015    NBIH, diverticulosis, polyps   • COLONOSCOPY N/A 9/11/2018    Procedure: COLONOSCOPY TO CECUM  AND TERM. ILEUM WITH COLD SNARE POLYPECTOMIES;  Surgeon: Kane Lagunas MD;  Location: [facility] ENDOSCOPY;  Service: Gastroenterology   • COLONOSCOPY N/A 10/29/2019    Procedure: COLONOSCOPY TO TO CECUM AND TERMINAL ILEUM WITH HOT AND COLD SNARE POLYPECTOMIES;  Surgeon: Kane Lagunas MD;  Location: [facility] ENDOSCOPY;  Service: Gastroenterology   • HIP ARTHROPLASTY Right 2017   • JOINT REPLACEMENT Left    • OTHER SURGICAL HISTORY      Catheter ablation atrial flutter   • REPAIR ANEURYSM / PSEUDO ANEURYSM / RUPTURED ANEURYSM POPLITEAL ARTERY      Stent-Graft of the the left popliteal artery   • REPAIR KNEE LIGAMENT      Primary repair of knee ligament cruciate anterior right   • TONSILLECTOMY  1958   • TOTAL KNEE ARTHROPLASTY Bilateral    • UPPER GASTROINTESTINAL ENDOSCOPY  09/16/2014    acute gastritis, acute duodenitis         FAMILY HISTORY  Family History   Problem Relation Age of Onset   • Emphysema Father    • Malig Hyperthermia Neg Hx          SOCIAL HISTORY  Social History     Socioeconomic History   • Marital status:      Spouse name: Mariposa   Tobacco Use   • Smoking status: Current Every Day Smoker     Packs/day: 0.25     Types: Cigarettes     Start date: 1964   • Smokeless tobacco: Never Used   • Tobacco comment: caffeine use - 1.5 cups coffee daily    Vaping Use   • Vaping Use: Never used   Substance and Sexual Activity   • Alcohol use: Yes     Alcohol/week: 14.0 standard drinks     Types: 14 Shots of liquor per week   • Drug use: No   • Sexual activity: Defer          ALLERGIES  Cephalexin, Codeine, and Silver        REVIEW OF SYSTEMS  Review of Systems     All systems reviewed and negative except for those discussed in HPI.        PHYSICAL EXAM    ED Triage Vitals [07/10/22 1304]   Temp Heart Rate Resp BP SpO2   (!) 102.9 °F (39.4 °C) 90 (!) 30 125/66 97 %       Physical Exam  GENERAL: Elderly appearing, nontoxic appearing, not distressed  HENT: normocephalic, atraumatic  EYES: no scleral icterus, PERRL  CV: Irregularly irregular, regular rate, no murmur  RESPIRATORY: normal effort, mild rhonchi noted bilaterally  ABDOMEN: soft, obese abdomen, nontender  MUSCULOSKELETAL: Lower extremity wrappings removed, patient has chronic venous stasis, cellulitis to bilateral lower extremities.  Dorsum of the left foot around the great, first and second toes there is purulent drainage noted, there are chronic appearing wounds to the left posterior calf.  NEURO: alert, moves all extremities, follows commands, mental status normal/baseline  SKIN: warm, dry, no rash   Psych: Appropriate mood and affect  Nursing notes and vital signs reviewed      LAB RESULTS  Recent Results (from the past 24 hour(s))   Green Top (Gel)    Collection Time: 07/10/22  1:34 PM   Result Value Ref Range    Extra Tube Hold for add-ons.    Lavender Top    Collection Time: 07/10/22  1:34 PM   Result Value Ref Range    Extra Tube hold for add-on    Light Blue Top    Collection Time: 07/10/22  1:34 PM   Result Value Ref Range    Extra Tube Hold for add-ons.    Comprehensive Metabolic Panel    Collection Time: 07/10/22  1:34 PM    Specimen: Blood   Result Value Ref Range    Glucose 91 65 - 99 mg/dL    BUN 13 8 - 23 mg/dL    Creatinine 0.87 0.76 - 1.27 mg/dL    Sodium 133 (L) 136 - 145 mmol/L    Potassium 4.3 3.5 - 5.2 mmol/L    Chloride 95 (L) 98 - 107 mmol/L    CO2 27.0 22.0 - 29.0 mmol/L    Calcium 9.4 8.6 - 10.5 mg/dL    Total Protein 7.6 6.0 - 8.5 g/dL    Albumin 3.60 3.50 - 5.20 g/dL    ALT (SGPT) 11 1 - 41 U/L     AST (SGOT) 16 1 - 40 U/L    Alkaline Phosphatase 95 39 - 117 U/L    Total Bilirubin 1.1 0.0 - 1.2 mg/dL    Globulin 4.0 gm/dL    A/G Ratio 0.9 g/dL    BUN/Creatinine Ratio 14.9 7.0 - 25.0    Anion Gap 11.0 5.0 - 15.0 mmol/L    eGFR 92.2 >60.0 mL/min/1.73   Protime-INR    Collection Time: 07/10/22  1:34 PM    Specimen: Blood   Result Value Ref Range    Protime 17.6 (H) 11.7 - 14.2 Seconds    INR 1.47 (H) 0.90 - 1.10   aPTT    Collection Time: 07/10/22  1:34 PM    Specimen: Blood   Result Value Ref Range    PTT 48.4 (H) 22.7 - 35.4 seconds   Lactic Acid, Plasma    Collection Time: 07/10/22  1:34 PM    Specimen: Blood   Result Value Ref Range    Lactate 1.4 0.5 - 2.0 mmol/L   Procalcitonin    Collection Time: 07/10/22  1:34 PM    Specimen: Blood   Result Value Ref Range    Procalcitonin 0.10 0.00 - 0.25 ng/mL   Troponin    Collection Time: 07/10/22  1:34 PM    Specimen: Blood   Result Value Ref Range    Troponin T <0.010 0.000 - 0.030 ng/mL   Magnesium    Collection Time: 07/10/22  1:34 PM    Specimen: Blood   Result Value Ref Range    Magnesium 1.8 1.6 - 2.4 mg/dL   CBC Auto Differential    Collection Time: 07/10/22  1:34 PM    Specimen: Blood   Result Value Ref Range    WBC 6.91 3.40 - 10.80 10*3/mm3    RBC 3.48 (L) 4.14 - 5.80 10*6/mm3    Hemoglobin 10.9 (L) 13.0 - 17.7 g/dL    Hematocrit 32.3 (L) 37.5 - 51.0 %    MCV 92.8 79.0 - 97.0 fL    MCH 31.3 26.6 - 33.0 pg    MCHC 33.7 31.5 - 35.7 g/dL    RDW 13.5 12.3 - 15.4 %    RDW-SD 44.9 37.0 - 54.0 fl    MPV 10.1 6.0 - 12.0 fL    Platelets 145 140 - 450 10*3/mm3    Neutrophil % 84.6 (H) 42.7 - 76.0 %    Lymphocyte % 8.2 (L) 19.6 - 45.3 %    Monocyte % 6.4 5.0 - 12.0 %    Eosinophil % 0.4 0.3 - 6.2 %    Basophil % 0.3 0.0 - 1.5 %    Immature Grans % 0.1 0.0 - 0.5 %    Neutrophils, Absolute 5.84 1.70 - 7.00 10*3/mm3    Lymphocytes, Absolute 0.57 (L) 0.70 - 3.10 10*3/mm3    Monocytes, Absolute 0.44 0.10 - 0.90 10*3/mm3    Eosinophils, Absolute 0.03 0.00 - 0.40 10*3/mm3     Basophils, Absolute 0.02 0.00 - 0.20 10*3/mm3    Immature Grans, Absolute 0.01 0.00 - 0.05 10*3/mm3    nRBC 0.0 0.0 - 0.2 /100 WBC   Respiratory Panel PCR w/COVID-19(SARS-CoV-2) [facility] In-House, NP Swab in UTM/VTM, 3-4 HR TAT - Swab, Nasopharynx    Collection Time: 07/10/22  2:06 PM    Specimen: Nasopharynx; Swab   Result Value Ref Range    ADENOVIRUS, PCR Not Detected Not Detected    Coronavirus 229E Not Detected Not Detected    Coronavirus HKU1 Not Detected Not Detected    Coronavirus NL63 Not Detected Not Detected    Coronavirus OC43 Not Detected Not Detected    COVID19 Not Detected Not Detected - Ref. Range    Human Metapneumovirus Not Detected Not Detected    Human Rhinovirus/Enterovirus Not Detected Not Detected    Influenza A PCR Not Detected Not Detected    Influenza B PCR Not Detected Not Detected    Parainfluenza Virus 1 Not Detected Not Detected    Parainfluenza Virus 2 Not Detected Not Detected    Parainfluenza Virus 3 Not Detected Not Detected    Parainfluenza Virus 4 Not Detected Not Detected    RSV, PCR Not Detected Not Detected    Bordetella pertussis pcr Not Detected Not Detected    Bordetella parapertussis PCR Not Detected Not Detected    Chlamydophila pneumoniae PCR Not Detected Not Detected    Mycoplasma pneumo by PCR Not Detected Not Detected   Urinalysis With Microscopic If Indicated (No Culture) - Urine, Catheter    Collection Time: 07/10/22  2:17 PM    Specimen: Urine, Catheter   Result Value Ref Range    Color, UA Dark Yellow (A) Yellow, Straw    Appearance, UA Turbid (A) Clear    pH, UA 8.5 (H) 5.0 - 8.0    Specific Gravity, UA 1.018 1.005 - 1.030    Glucose, UA Negative Negative    Ketones, UA Negative Negative    Bilirubin, UA Negative Negative    Blood, UA Small (1+) (A) Negative    Protein, UA 30 mg/dL (1+) (A) Negative    Leuk Esterase, UA Moderate (2+) (A) Negative    Nitrite, UA Positive (A) Negative    Urobilinogen, UA 2.0 E.U./dL (A) 0.2 - 1.0 E.U./dL    Urinalysis, Microscopic Only - Urine, Catheter    Collection Time: 07/10/22  2:17 PM    Specimen: Urine, Catheter   Result Value Ref Range    RBC, UA 0-2 None Seen, 0-2 /HPF    WBC, UA 6-12 (A) None Seen, 0-2 /HPF    Bacteria, UA 1+ (A) None Seen /HPF    Squamous Epithelial Cells, UA 0-2 None Seen, 0-2 /HPF    Hyaline Casts, UA None Seen None Seen /LPF    Methodology Manual Light Microscopy    ECG 12 Lead    Collection Time: 07/10/22  2:20 PM   Result Value Ref Range    QT Interval 349 ms       Ordered the above labs and independently reviewed the results.      RADIOLOGY  XR Chest 1 View    Result Date: 7/10/2022  SINGLE VIEW CHEST RADIOGRAPH  HISTORY: 72-year-old male with history of a fever and cough.  FINDINGS: An upright AP portable chest radiograph was obtained. Comparison is made to a chest radiograph dated 06/19/2022. The lungs are normal in volume and are clear of consolidation. There is ground glass and interstitial opacification at the base of the right lung. The cardiac silhouette is enlarged but stable. No evidence for a pneumothorax or pleural effusion is appreciated. The visualized osseous structures appear normal.      There is an interstitial and ground glass infiltrate at the base of the right lung that is most consistent with pneumonia. Stable mild cardiomegaly is noted.  This report was finalized on 7/10/2022 3:17 PM by Dr. Mehran Pepper M.D.        I ordered the above noted radiological studies and viewed the images on the PACS system.       EKG      Independently viewed by me and interpreted by Dr Ludwig         MEDICAL RECORD REVIEW  Medical records reviewed in [facility], patient was admitted 6/19 through 6/24 for cellulitis of the left foot, had MRI of the left foot done 6/20 which showed no evidence of osteomyelitis.  He was sent home with oral Vantin that he should have completed on 6/29.  History of present illness from H&P:     72-year-old gentleman who has chronic indwelling Irvin catheter  and atrial fibrillation who comes to the hospital because of fevers.  He has chronic lower extremity redness.  In the emergency room he is thought to have cellulitis of the lower extremities.  He has a history of lymphedema for which he gets compression wraps.  Pro-Roc admission is 8.  White blood cell count 16.      Hospital Course:      Mr. Lockwood is a 72 y.o. smoker with a history of chronic cellulitis who presents with cellulitis of the left foot.  He was treated with Rocephin and vancomycin to be in hospitalization and then transitioned to cefazolin.  Patient's white blood cell count has normalized procalcitonin level has improved.  He was seen in consultation by infectious disease.  Patient is feeling better.       Sepsis secondary to cellulitis of left foot with history of chronic lymphedema bilaterally  Bilateral lower extremity chronic venous stasis with dermatitis  Venous insufficiency of bilateral lower extremities  -MRI w/o evidence of osteomyelitis  -Patient be placed on Vantin 400 mg p.o. twice daily on discharge  -legs now wrapped with emilio boot to be changed every 3 days     Hyponatremia- resolving  -Sodium dipped to 129 but was improved 133     Hypophosphatemia  - replete per protocol     Permanent atrial fibrillation/ischemic cardiomyopathy  -Rate control, resume antiarrhythmics  -On warfarin, pharmacy dosed, patient's INR was nearly therapeutic on admission at 1.94, he can follow-up with Coumadin dosing as previously prescribed.  Slightly lower but he was given extra Coumadin the last couple days. INR is 1.74 today, goal is 2-3, patient given 7.5 mg today, 6 on 6/23, 10 on 6/22, 12.5 on 6/21. May need closer to 7.5 daily on discharge.     History of urinary retention  -Chronic indwelling Irvin present.    -Urine cultures pending-thought to be colonization     Essential hypertension  -Blood pressure stable, continue coreg     COPD  -Compensating, resume home nebulizer  -patient is on 2 liters at home  and will need to continue with      Anemia of chronic disease  -Hemoglobin stable    PROCEDURES    Procedures        DIFFERENTIAL DIAGNOSIS  Differential Diagnosis for Fever include but are not limited to the following:  Viral Infection, Bacterial Infection, Fever of Unknown origin, Sepsis      PROGRESS, DATA ANALYSIS, CONSULTS, AND MEDICAL DECISION MAKING        ED Course as of 07/10/22 1529   Sun Jul 10, 2022   1355 Patient is a elderly and chronically appearing but pleasant 72-year-old who presents today with fever of 102.  9 and generalized weakness.  He was recently discharged from the hospital last month for cellulitis to his left foot.  We will do sepsis work-up, fluids are started, Tylenol was ordered for temperature of 102.9, blood cultures and wound culture of the left foot has been ordered. [MS]   1416 Hemoglobin(!): 10.9 [MS]   1428 INR(!): 1.47 [MS]   1428 PTT(!): 48.4 [MS]   1439 Leukocytes, UA(!): Moderate (2+) [MS]   1439 Nitrite, UA(!): Positive [MS]   1439 BP: 146/79 [MS]   1439 Heart Rate: 105 [MS]   1439 SpO2: 90 % [MS]   1443 WBC, UA(!): 6-12 [MS]   1443 Bacteria, UA(!): 1+ [MS]   1524 Consult Note    Discussed care with Dr Singh  Reviewed patient's history, exam, results and need for admission secondary to acute UTI, left foot cellulitis, fever  Dr. Singh accepts the patient to be admitted to telemetry inpatient bed.     [MS]   1525 Reviewed pt's history and workup with Dr. Ludwig.  After a bedside evaluation, he agrees with the plan of care.     [MS]      ED Course User Index  [MS] Glory Yousif, APRN     ADMISSION    Discussed treatment plan and reason for admission with pt/family and admitting physician.  Pt/family voiced understanding of the plan for admission for further testing/treatment as needed.      DIAGNOSIS  Final diagnoses:   Acute UTI   Cellulitis of left foot   Anemia, unspecified type           MEDICATIONS GIVEN IN ED    Medications   vancomycin 2750 mg/1000 mL 0.9% NS  IVPB (has no administration in time range)   cefepime 2 gm IVPB in 100 ml NS (VTB) (2 g Intravenous New Bag 7/10/22 1524)   sodium chloride 0.9 % infusion (has no administration in time range)   sodium chloride 0.9% - IBW for BMI > 30 bolus 2,604 mL (0 mL Intravenous Stopped 7/10/22 1526)   acetaminophen (TYLENOL) tablet 1,000 mg (1,000 mg Oral Given 7/10/22 1423)           COURSE & MEDICAL DECISION MAKING  Any/All labs and Any/All Imaging studies that were ordered were reviewed and are noted above.  Results were reviewed/discussed with the patient and they were also made aware of online access.    Pt also made aware that some labs, such as cultures, will not be resulted during ER visit and followup with PMD is necessary.        Glory Yousif, APRN  07/10/22 1528

## 2022-07-10 NOTE — ED PROVIDER NOTES
MD ATTESTATION NOTE    The CHRISTOS and I have discussed this patient's history, physical exam, and treatment plan.    I provided a substantive portion of the care of this patient. I personally performed the physical exam, in its entirety. The attached note describes my personal findings.      Jalen Lockwood is a 72 y.o. male who presents to the ED c/o fever.  Family is concerned about a UTI as he has an indwelling Irvin catheter.  He has also had worsening appearance to his left foot.      On exam:  GENERAL: not distressed  HENT: nares patent  EYES: no scleral icterus  CV: regular rhythm, regular rate  RESPIRATORY: normal effort, clear to auscultation bilaterally  ABDOMEN: soft, nontender, morbidly obese abdomen  : Irvin catheter in place with blood-tinged urine  MUSCULOSKELETAL: no deformity  NEURO: alert, moves all extremities, follows commands  SKIN: Lymphedema to bilateral legs with chronic skin breakdown to the dorsum of the left foot    Labs  Recent Results (from the past 24 hour(s))   Green Top (Gel)    Collection Time: 07/10/22  1:34 PM   Result Value Ref Range    Extra Tube Hold for add-ons.    Lavender Top    Collection Time: 07/10/22  1:34 PM   Result Value Ref Range    Extra Tube hold for add-on    Light Blue Top    Collection Time: 07/10/22  1:34 PM   Result Value Ref Range    Extra Tube Hold for add-ons.    Comprehensive Metabolic Panel    Collection Time: 07/10/22  1:34 PM    Specimen: Blood   Result Value Ref Range    Glucose 91 65 - 99 mg/dL    BUN 13 8 - 23 mg/dL    Creatinine 0.87 0.76 - 1.27 mg/dL    Sodium 133 (L) 136 - 145 mmol/L    Potassium 4.3 3.5 - 5.2 mmol/L    Chloride 95 (L) 98 - 107 mmol/L    CO2 27.0 22.0 - 29.0 mmol/L    Calcium 9.4 8.6 - 10.5 mg/dL    Total Protein 7.6 6.0 - 8.5 g/dL    Albumin 3.60 3.50 - 5.20 g/dL    ALT (SGPT) 11 1 - 41 U/L    AST (SGOT) 16 1 - 40 U/L    Alkaline Phosphatase 95 39 - 117 U/L    Total Bilirubin 1.1 0.0 - 1.2 mg/dL    Globulin 4.0 gm/dL    A/G Ratio  0.9 g/dL    BUN/Creatinine Ratio 14.9 7.0 - 25.0    Anion Gap 11.0 5.0 - 15.0 mmol/L    eGFR 92.2 >60.0 mL/min/1.73   Protime-INR    Collection Time: 07/10/22  1:34 PM    Specimen: Blood   Result Value Ref Range    Protime 17.6 (H) 11.7 - 14.2 Seconds    INR 1.47 (H) 0.90 - 1.10   aPTT    Collection Time: 07/10/22  1:34 PM    Specimen: Blood   Result Value Ref Range    PTT 48.4 (H) 22.7 - 35.4 seconds   Lactic Acid, Plasma    Collection Time: 07/10/22  1:34 PM    Specimen: Blood   Result Value Ref Range    Lactate 1.4 0.5 - 2.0 mmol/L   Procalcitonin    Collection Time: 07/10/22  1:34 PM    Specimen: Blood   Result Value Ref Range    Procalcitonin 0.10 0.00 - 0.25 ng/mL   Troponin    Collection Time: 07/10/22  1:34 PM    Specimen: Blood   Result Value Ref Range    Troponin T <0.010 0.000 - 0.030 ng/mL   Magnesium    Collection Time: 07/10/22  1:34 PM    Specimen: Blood   Result Value Ref Range    Magnesium 1.8 1.6 - 2.4 mg/dL   CBC Auto Differential    Collection Time: 07/10/22  1:34 PM    Specimen: Blood   Result Value Ref Range    WBC 6.91 3.40 - 10.80 10*3/mm3    RBC 3.48 (L) 4.14 - 5.80 10*6/mm3    Hemoglobin 10.9 (L) 13.0 - 17.7 g/dL    Hematocrit 32.3 (L) 37.5 - 51.0 %    MCV 92.8 79.0 - 97.0 fL    MCH 31.3 26.6 - 33.0 pg    MCHC 33.7 31.5 - 35.7 g/dL    RDW 13.5 12.3 - 15.4 %    RDW-SD 44.9 37.0 - 54.0 fl    MPV 10.1 6.0 - 12.0 fL    Platelets 145 140 - 450 10*3/mm3    Neutrophil % 84.6 (H) 42.7 - 76.0 %    Lymphocyte % 8.2 (L) 19.6 - 45.3 %    Monocyte % 6.4 5.0 - 12.0 %    Eosinophil % 0.4 0.3 - 6.2 %    Basophil % 0.3 0.0 - 1.5 %    Immature Grans % 0.1 0.0 - 0.5 %    Neutrophils, Absolute 5.84 1.70 - 7.00 10*3/mm3    Lymphocytes, Absolute 0.57 (L) 0.70 - 3.10 10*3/mm3    Monocytes, Absolute 0.44 0.10 - 0.90 10*3/mm3    Eosinophils, Absolute 0.03 0.00 - 0.40 10*3/mm3    Basophils, Absolute 0.02 0.00 - 0.20 10*3/mm3    Immature Grans, Absolute 0.01 0.00 - 0.05 10*3/mm3    nRBC 0.0 0.0 - 0.2 /100 WBC    Respiratory Panel PCR w/COVID-19(SARS-CoV-2) [facility] In-House, NP Swab in UTM/VTM, 3-4 HR TAT - Swab, Nasopharynx    Collection Time: 07/10/22  2:06 PM    Specimen: Nasopharynx; Swab   Result Value Ref Range    ADENOVIRUS, PCR Not Detected Not Detected    Coronavirus 229E Not Detected Not Detected    Coronavirus HKU1 Not Detected Not Detected    Coronavirus NL63 Not Detected Not Detected    Coronavirus OC43 Not Detected Not Detected    COVID19 Not Detected Not Detected - Ref. Range    Human Metapneumovirus Not Detected Not Detected    Human Rhinovirus/Enterovirus Not Detected Not Detected    Influenza A PCR Not Detected Not Detected    Influenza B PCR Not Detected Not Detected    Parainfluenza Virus 1 Not Detected Not Detected    Parainfluenza Virus 2 Not Detected Not Detected    Parainfluenza Virus 3 Not Detected Not Detected    Parainfluenza Virus 4 Not Detected Not Detected    RSV, PCR Not Detected Not Detected    Bordetella pertussis pcr Not Detected Not Detected    Bordetella parapertussis PCR Not Detected Not Detected    Chlamydophila pneumoniae PCR Not Detected Not Detected    Mycoplasma pneumo by PCR Not Detected Not Detected   Urinalysis With Microscopic If Indicated (No Culture) - Urine, Catheter    Collection Time: 07/10/22  2:17 PM    Specimen: Urine, Catheter   Result Value Ref Range    Color, UA Dark Yellow (A) Yellow, Straw    Appearance, UA Turbid (A) Clear    pH, UA 8.5 (H) 5.0 - 8.0    Specific Gravity, UA 1.018 1.005 - 1.030    Glucose, UA Negative Negative    Ketones, UA Negative Negative    Bilirubin, UA Negative Negative    Blood, UA Small (1+) (A) Negative    Protein, UA 30 mg/dL (1+) (A) Negative    Leuk Esterase, UA Moderate (2+) (A) Negative    Nitrite, UA Positive (A) Negative    Urobilinogen, UA 2.0 E.U./dL (A) 0.2 - 1.0 E.U./dL   Urinalysis, Microscopic Only - Urine, Catheter    Collection Time: 07/10/22  2:17 PM    Specimen: Urine, Catheter   Result Value Ref Range     RBC, UA 0-2 None Seen, 0-2 /HPF    WBC, UA 6-12 (A) None Seen, 0-2 /HPF    Bacteria, UA 1+ (A) None Seen /HPF    Squamous Epithelial Cells, UA 0-2 None Seen, 0-2 /HPF    Hyaline Casts, UA None Seen None Seen /LPF    Methodology Manual Light Microscopy    ECG 12 Lead    Collection Time: 07/10/22  2:20 PM   Result Value Ref Range    QT Interval 349 ms       Radiology  XR Chest 1 View    Result Date: 7/10/2022  SINGLE VIEW CHEST RADIOGRAPH  HISTORY: 72-year-old male with history of a fever and cough.  FINDINGS: An upright AP portable chest radiograph was obtained. Comparison is made to a chest radiograph dated 06/19/2022. The lungs are normal in volume and are clear of consolidation. There is ground glass and interstitial opacification at the base of the right lung. The cardiac silhouette is enlarged but stable. No evidence for a pneumothorax or pleural effusion is appreciated. The visualized osseous structures appear normal.      There is an interstitial and ground glass infiltrate at the base of the right lung that is most consistent with pneumonia. Stable mild cardiomegaly is noted.  This report was finalized on 7/10/2022 3:17 PM by Dr. Mehran Pepper M.D.        Medical Decision Making:  ED Course as of 07/10/22 1542   Sun Jul 10, 2022   1355 Patient is a elderly and chronically appearing but pleasant 72-year-old who presents today with fever of 102.  9 and generalized weakness.  He was recently discharged from the hospital last month for cellulitis to his left foot.  We will do sepsis work-up, fluids are started, Tylenol was ordered for temperature of 102.9, blood cultures and wound culture of the left foot has been ordered. [MS]   1416 Hemoglobin(!): 10.9 [MS]   1428 INR(!): 1.47 [MS]   1428 PTT(!): 48.4 [MS]   1439 Leukocytes, UA(!): Moderate (2+) [MS]   1439 Nitrite, UA(!): Positive [MS]   1439 BP: 146/79 [MS]   1439 Heart Rate: 105 [MS]   1439 SpO2: 90 % [MS]   1443 WBC, UA(!): 6-12 [MS]   1443 Bacteria,  UA(!): 1+ [MS]   1524 Consult Note    Discussed care with Dr Singh  Reviewed patient's history, exam, results and need for admission secondary to acute UTI, left foot cellulitis, fever  Dr. Singh accepts the patient to be admitted to telemetry inpatient bed.     [MS]   1525 Reviewed pt's history and workup with Dr. Ludwig.  After a bedside evaluation, he agrees with the plan of care.     [MS]      ED Course User Index  [MS] Glory Yousif, APRN           PPE: Both the patient and I wore a surgical mask throughout the entire patient encounter. I wore protective goggles.     Diagnosis  Final diagnoses:   Acute UTI   Cellulitis of left foot   Anemia, unspecified type        Jeff Ludwig II, MD  07/10/22 1549

## 2022-07-10 NOTE — ED TRIAGE NOTES
Pt to ED from home via [facility] with c/o fever and SOA starting this AM.  Pt reports recent discharge from NH for sepsis.  Pt has escamilla in place with dark urine noted.  Pt has bilateral LE wraps in place with hx of lymphedema, c/o bilateral foot pain.    Pt wearing mask, staff wearing appropriate PPE.

## 2022-07-10 NOTE — PROGRESS NOTES
"[facility] Clinical Pharmacy Services: Vancomycin Pharmacokinetic Initial Consult Note    Jalen Lockwood is a 72 y.o. male who is on day 1/7 of pharmacy to dose vancomycin.    Indication: SSTI  Consulting Provider: Dr. Singh  Planned Duration of Therapy: 7 Days  Loading DoseGiven: 2750 mg on 7/10 at 1559  Culture/Source:   7/10: Urine culture in process  7/10: Wound culture in process  7/10: Respiratory PCR normal  7/10: Blood cultures 2/2 sets in process  Target: -600 mg/L.hr   Other Antimicrobials: Cefepime    Vitals/Labs  Ht: 193 cm (76\"); Wt: 134 kg (296 lb)  Temp Readings from Last 1 Encounters:   07/10/22 98.8 °F (37.1 °C) (Oral)    Estimated Creatinine Clearance: 116.8 mL/min (by C-G formula based on SCr of 0.87 mg/dL).    Renal function is at baseline     Results from last 7 days   Lab Units 07/10/22  1334   CREATININE mg/dL 0.87   WBC 10*3/mm3 6.91     Assessment/Plan:    1. Vancomycin Dose:   1250 mg IV every  12  hours  2. Predictive AUC level for the dose ordered is 473 mg/L.hr, which is within the target of 400-600 mg/L.hr  3. Vanc Trough has been ordered for 7/12 at 1530 prior to the 5th overall dose at steady state.     Pharmacy will follow patient's kidney function and will adjust doses and obtain levels as necessary. Thank you for involving pharmacy in this patient's care. Please contact pharmacy with any questions or concerns.                           Rosy Newman, PharmD  Clinical Pharmacist, Emergency Medicine   Phone: [phone]      "

## 2022-07-10 NOTE — PROGRESS NOTES
[facility] Clinical Pharmacy Services: Warfarin Dosing/Monitoring Consult    Jalen Lockwood is a 72 y.o. male, estimated creatinine clearance is 116.8 mL/min (by C-G formula based on SCr of 0.87 mg/dL). weighing 134 kg (296 lb).    Results from last 7 days   Lab Units 07/10/22  1334 07/08/22  0000   INR  1.47* 1.30   APTT seconds 48.4*  --    HEMOGLOBIN g/dL 10.9*  --    HEMATOCRIT % 32.3*  --    PLATELETS 10*3/mm3 145  --      Prior to admission anticoagulation: Warfarin managed by [facility]. Most up-to-date warfarin dosing regimen is 7.5 mg every Friday and 5 mg all other days. Patient's INR was 1.30 on 7/8/22, patient was instructed to boost with 7.5 mg doses on 7/8 and 7/9. INR is only 1.47 today despite boosted doses.     Hospital Anticoagulation:  Consulting provider: Dr. Singh with [facility]  Start date: Continuation of home medication  Indication: Permament A.fib  Target INR: 2 - 3  Expected duration: Indefinite   Bridge Therapy: No      Potential food or drug interactions: Cefepime may increase sensitivity to warfarin due to eradication of vitamin K producing gut darian    Education complete?/Date: Defer, patient followed by our warfarin clinic    Assessment/Plan:  1. Despite two 7.5 mg boosted doses that were ordered in the outpatient setting, INR is only 1.47 today. Will order a 10 mg boosted dose to be given tonight.   2. Monitor for any signs or symptoms of bleeding  3. Follow up daily INRs and dose adjustments    Pharmacy will continue to follow until discharge or discontinuation of warfarin.     Rosy Newman, PharmD  Clinical Pharmacist, Emergency Medicine   Phone: [phone]

## 2022-07-11 NOTE — THERAPY EVALUATION
Patient Name: Jalen Lockwood  : 1950    MRN: [id number]                              Today's Date: 2022       Admit Date: 7/10/2022    Visit Dx:     ICD-10-CM ICD-9-CM   1. Acute UTI  N39.0 599.0   2. Cellulitis of left foot  L03.116 682.7   3. Anemia, unspecified type  D64.9 285.9     Patient Active Problem List   Diagnosis   • Chronic osteomyelitis (HCC)   • Foot pain   • Peripheral neuropathy   • Lymphedema of both lower extremities   • Permanent atrial fibrillation (HCC)   • Sleep apnea   • Chronic edema   • Obesity (BMI 30-39.9)   • COPD (chronic obstructive pulmonary disease) (HCC)   • Atrial flutter (HCC)   • 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HCC)   • Aortectasia (HCC)   • Popliteal artery aneurysm (HCC)   • Colon polyps   • Gastroparesis   • Insomnia   • Adenomatous polyp of colon   • Essential hypertension   • ETOH abuse   • Chronic anticoagulation   • Oropharyngeal dysphagia   • Tobacco abuse   • Thyromegaly   • Adrenal adenoma, left   • Retroperitoneal lymphadenopathy   • Anemia of chronic disease   • Thyroid nodule   • Charcot's joint of foot   • Abnormal CT scan, lumbar spine   • Alcohol dependence, in remission (HCC)   • Ischemic cardiomyopathy   • Normocytic anemia   • Inguinal adenopathy   • Elevated lactic acid level   • Cellulitis of left foot   • Urinary retention   • Sepsis without acute organ dysfunction (HCC)   • Acute UTI   • Febrile illness     Past Medical History:   Diagnosis Date   • Allergic rhinitis    • Anxiety    • Aortectasia (HCC)     3cm infrarenal abdominal aorta   • Arthritis    • Atrial flutter (HCC) 2010    s/p ablation    • Charcot's joint of foot    • Chronic edema     both legs and sees wound care center at [place]    • Chronic venous insufficiency    • COPD (chronic obstructive pulmonary disease) (HCC)    • Coronary atherosclerosis     Cath 2010: diffuse 40-50% disease   • Diverticulosis    • Duodenitis    • Fatty liver    • Gastritis    • Gastroparesis    •  Hematoma     post-operative; After catheterization, right groin, required surgical exploration   • Hyperlipidemia    • Hypertension    • Insomnia    • Internal hemorrhoids    • Open wound     izzy legs has drsg chg weekly at wound care center at [place]  pt does second dressing on left leg another time during week   • Osteomyelitis (HCC)    • Paroxysmal atrial fibrillation (HCC)    • Peripheral neuropathy    • Popliteal artery aneurysm (HCC)     left, s/p stenting by Dr. Boston   • Skin cancer    • Sleep apnea     o2   • 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HCC)     due to flutter and afib; cath 2010 with nonobstructive disease   • Venous stasis    • Venous stasis ulcer (HCC)     bilateral legs      Past Surgical History:   Procedure Laterality Date   • BASAL CELL CARCINOMA EXCISION      ear and left side of face   • BRONCHOSCOPY N/A 4/12/2021    Procedure: BRONCHOSCOPY WITH BAL;  Surgeon: Bunny Lepe MD;  Location: [facility] ENDOSCOPY;  Service: Pulmonary;  Laterality: N/A;  PRE-HEMOPTYSIS  POST-SAME   • CARDIAC CATHETERIZATION     • CATARACT EXTRACTION     • COLONOSCOPY  09/28/2015    NBIH, diverticulosis, polyps   • COLONOSCOPY N/A 9/11/2018    Procedure: COLONOSCOPY TO CECUM  AND TERM. ILEUM WITH COLD SNARE POLYPECTOMIES;  Surgeon: Kane Lagunas MD;  Location: [facility] ENDOSCOPY;  Service: Gastroenterology   • COLONOSCOPY N/A 10/29/2019    Procedure: COLONOSCOPY TO TO CECUM AND TERMINAL ILEUM WITH HOT AND COLD SNARE POLYPECTOMIES;  Surgeon: Kane Lagunas MD;  Location: [facility] ENDOSCOPY;  Service: Gastroenterology   • HIP ARTHROPLASTY Right 2017   • JOINT REPLACEMENT Left    • OTHER SURGICAL HISTORY      Catheter ablation atrial flutter   • REPAIR ANEURYSM / PSEUDO ANEURYSM / RUPTURED ANEURYSM POPLITEAL ARTERY      Stent-Graft of the the left popliteal artery   • REPAIR KNEE LIGAMENT      Primary repair of knee ligament cruciate anterior right   • TONSILLECTOMY  1958   • TOTAL KNEE ARTHROPLASTY Bilateral     • UPPER GASTROINTESTINAL ENDOSCOPY  09/16/2014    acute gastritis, acute duodenitis      General Information     Row Name 07/11/22 1143          Physical Therapy Time and Intention    Document Type evaluation  -CB     Mode of Treatment individual therapy;physical therapy  -CB     Row Name 07/11/22 1143          General Information    Patient Profile Reviewed yes  -CB     Prior Level of Function independent:;gait;transfer;bed mobility  -CB     Existing Precautions/Restrictions fall  -CB     Barriers to Rehab none identified  -CB     Row Name 07/11/22 1143          Living Environment    People in Home spouse  -CB     Row Name 07/11/22 1143          Home Main Entrance    Number of Stairs, Main Entrance four  -CB     Stair Railings, Main Entrance railings safe and in good condition  -CB     Row Name 07/11/22 1143          Cognition    Orientation Status (Cognition) oriented x 3  -CB     Row Name 07/11/22 1143          Safety Issues, Functional Mobility    Safety Issues Affecting Function (Mobility) positioning of assistive device  -CB     Impairments Affecting Function (Mobility) balance;endurance/activity tolerance;strength  -CB           User Key  (r) = Recorded By, (t) = Taken By, (c) = Cosigned By    Initials Name Provider Type    CB Jolynn Edouard, PT Physical Therapist               Mobility     Row Name 07/11/22 1145          Bed Mobility    Bed Mobility supine-sit  -CB     Supine-Sit [place] (Bed Mobility) standby assist  -CB     Assistive Device (Bed Mobility) head of bed elevated  -CB     Row Name 07/11/22 1145          Sit-Stand Transfer    Sit-Stand [place] (Transfers) contact guard;verbal cues  -CB     Assistive Device (Sit-Stand Transfers) walker, front-wheeled  -CB     Comment, (Sit-Stand Transfer) STSx2  -CB     Row Name 07/11/22 1145          Gait/Stairs (Locomotion)    [place] Level (Gait) contact guard;verbal cues  -CB     Assistive Device (Gait) walker, front-wheeled  -CB      Distance in Feet (Gait) 40ft and 10ft  -CB     Deviations/Abnormal Patterns (Gait) gait speed decreased;stride length decreased;base of support, wide  -CB     Comment, (Gait/Stairs) pt LLE in excessive ER and pt has difficulty maintaining LLE inside rwx during ambulation with cues required.  -CB           User Key  (r) = Recorded By, (t) = Taken By, (c) = Cosigned By    Initials Name Provider Type    CB Jolynn Edouard PT Physical Therapist               Obj/Interventions     Row Name 07/11/22 1147          Range of Motion Comprehensive    General Range of Motion lower extremity range of motion deficits identified  -CB     Comment, General Range of Motion LLE in excessive ER  -CB     Row Name 07/11/22 1147          Strength Comprehensive (MMT)    General Manual Muscle Testing (MMT) Assessment lower extremity strength deficits identified  -CB     Row Name 07/11/22 1147          Balance    Balance Assessment standing static balance;standing dynamic balance  -CB     Static Standing Balance contact guard;verbal cues  -CB     Dynamic Standing Balance contact guard;verbal cues  -CB     Position/Device Used, Standing Balance supported;walker, front-wheeled  -CB     Balance Interventions sitting;standing;sit to stand;supported;static;dynamic;minimal challenge  -CB     Row Name 07/11/22 1147          Sensory Assessment (Somatosensory)    Sensory Assessment (Somatosensory) sensation intact  -CB           User Key  (r) = Recorded By, (t) = Taken By, (c) = Cosigned By    Initials Name Provider Type    Jolynn Goncalves PT Physical Therapist               Goals/Plan     Row Name 07/11/22 1151          Bed Mobility Goal 1 (PT)    Activity/Assistive Device (Bed Mobility Goal 1, PT) bed mobility activities, all  -CB     [place] Level/Cues Needed (Bed Mobility Goal 1, PT) modified independence  -CB     Time Frame (Bed Mobility Goal 1, PT) long term goal (LTG);1 week  -CB     Row Name 07/11/22 1151          Transfer Goal 1 (PT)     Activity/Assistive Device (Transfer Goal 1, PT) sit-to-stand/stand-to-sit;bed-to-chair/chair-to-bed  -CB     [place] Level/Cues Needed (Transfer Goal 1, PT) standby assist  -CB     Time Frame (Transfer Goal 1, PT) long term goal (LTG);1 week  -CB     Row Name 07/11/22 1151          Gait Training Goal 1 (PT)    Activity/Assistive Device (Gait Training Goal 1, PT) gait (walking locomotion);walker, rolling  -CB     [place] Level (Gait Training Goal 1, PT) standby assist  -CB     Distance (Gait Training Goal 1, PT) 100ft  -CB     Time Frame (Gait Training Goal 1, PT) long term goal (LTG);1 week  -CB     Row Name 07/11/22 1151          Stairs Goal 1 (PT)    Activity/Assistive Device (Stairs Goal 1, PT) ascending stairs;descending stairs;using handrail, left;using handrail, right;step-to-step  -CB     [place] Level/Cues Needed (Stairs Goal 1, PT) contact guard required  -CB     Number of Stairs (Stairs Goal 1, PT) 4  -CB     Time Frame (Stairs Goal 1, PT) long term goal (LTG);1 week  -CB     Row Name 07/11/22 1159          Therapy Assessment/Plan (PT)    Planned Therapy Interventions (PT) balance training;bed mobility training;gait training;home exercise program;patient/family education;strengthening;stair training;transfer training  -CB           User Key  (r) = Recorded By, (t) = Taken By, (c) = Cosigned By    Initials Name Provider Type    Jolynn Goncalves, PT Physical Therapist               Clinical Impression     Row Name 07/11/22 1141          Pain    Pretreatment Pain Rating 0/10 - no pain  -CB     Posttreatment Pain Rating 0/10 - no pain  -CB     Row Name 07/11/22 1144          Plan of Care Review    Plan of Care Reviewed With patient  -CB     Outcome Evaluation Patient is a 73 yo male who presented with SOA. He was admitted with cellulitis of L foot and acute UTI. PMHx inlcudes COPD, PAF, HTN, and lymphedema. He lives with his wife and uses rwx at baseline. He recently discharged from rehab  and reports he was doing well at home and denies falls. He presents today with decreased activity tolerance and LE strength. He was SBA for bed mobility, STS to rwx with CGA, and ambulated 40ft and 10ft using rwx requiring CGA. Pt with excessive ER on LLE in supine and standing position. during ambulation pt with difficulty maintaining BLE inside rwx due to ER and wide CARROLL. He will continue to benefit from skilled PT to address functional deficits. Pt would like to return home at [place] with spouse to assist and PT rec HHPT.  -CB     Row Name 07/11/22 1147          Therapy Assessment/Plan (PT)    Rehab Potential (PT) good, to achieve stated therapy goals  -CB     Criteria for Skilled Interventions Met (PT) yes  -CB     Therapy Frequency (PT) 5 times/wk  -CB     Row Name 07/11/22 114          Positioning and Restraints    Pre-Treatment Position in bed  -CB     Post Treatment Position bathroom  -CB     Bathroom sitting;call light within reach;encouraged to call for assist;notified nsg  -CB           User Key  (r) = Recorded By, (t) = Taken By, (c) = Cosigned By    Initials Name Provider Type    Jolynn Goncalves, PT Physical Therapist               Outcome Measures     Row Name 07/11/22 1152          How much help from another person do you currently need...    Turning from your back to your side while in flat bed without using bedrails? 3  -CB     Moving from lying on back to sitting on the side of a flat bed without bedrails? 3  -CB     Moving to and from a bed to a chair (including a wheelchair)? 3  -CB     Standing up from a chair using your arms (e.g., wheelchair, bedside chair)? 3  -CB     Climbing 3-5 steps with a railing? 3  -CB     To walk in hospital room? 3  -CB     AM-PAC 6 Clicks Score (PT) 18  -CB     Highest level of mobility 6 --> Walked 10 steps or more  -CB     Row Name 07/11/22 1152          Functional Assessment    Outcome Measure Options AM-PAC 6 Clicks Basic Mobility (PT)  -CB           User Key   (r) = Recorded By, (t) = Taken By, (c) = Cosigned By    Initials Name Provider Type    CB Jolynn Edouard PT Physical Therapist                             Physical Therapy Education                 Title: PT OT SLP Therapies (In Progress)     Topic: Physical Therapy (In Progress)     Point: Mobility training (Done)     Learning Progress Summary           Patient Acceptance, E,TB,D, VU,NR by CB at 7/11/2022 1153                   Point: Home exercise program (Not Started)     Learner Progress:  Not documented in this visit.          Point: Body mechanics (Done)     Learning Progress Summary           Patient Acceptance, E,TB,D, VU,NR by CB at 7/11/2022 1153                   Point: Precautions (Done)     Learning Progress Summary           Patient Acceptance, E,TB,D, VU,NR by  at 7/11/2022 1153                               User Key     Initials Effective Dates Name Provider Type Discipline     10/22/21 -  Jolynn Edouard PT Physical Therapist PT              PT Recommendation and Plan  Planned Therapy Interventions (PT): balance training, bed mobility training, gait training, home exercise program, patient/family education, strengthening, stair training, transfer training  Plan of Care Reviewed With: patient  Outcome Evaluation: Patient is a 73 yo male who presented with SOA. He was admitted with cellulitis of L foot and acute UTI. PMHx inlcudes COPD, PAF, HTN, and lymphedema. He lives with his wife and uses rwx at baseline. He recently discharged from rehab and reports he was doing well at home and denies falls. He presents today with decreased activity tolerance and LE strength. He was SBA for bed mobility, STS to rwx with CGA, and ambulated 40ft and 10ft using rwx requiring CGA. Pt with excessive ER on LLE in supine and standing position. during ambulation pt with difficulty maintaining BLE inside rwx due to ER and wide CARROLL. He will continue to benefit from skilled PT to address functional deficits. Pt would  like [place] at [place] with [place] to assist and PT rec HHPT.     Time Calculation:    PT Charges     Row Name 07/11/22 1153             Time Calculation    Start Time 0910  -CB      Stop Time 0930  -CB      Time Calculation (min) 20 min  -CB      PT Received On 07/11/22  -CB      PT - Next Appointment 07/12/22  -CB      PT Goal Re-Cert Due Date 07/18/22  -CB              Time Calculation- PT    Total Timed Code Minutes- PT 15 minute(s)  -CB              Timed Charges    74088 - PT Therapeutic Activity Minutes 15  -CB              Total Minutes    Timed Charges Total Minutes 15  -CB       Total Minutes 15  -CB            User Key  (r) = Recorded By, (t) = Taken By, (c) = Cosigned By    Initials Name Provider Type    CB Jolynn Edouard, PT Physical Therapist              Therapy Charges for Today     Code Description Service Date Service Provider Modifiers Qty    88327715904 HC PT THERAPEUTIC ACT EA 15 MIN 7/11/2022 Jolynn Edouard, PT GP 1    11470507795 HC PT EVAL MOD COMPLEXITY 2 7/11/2022 Jolynn Edouard, PT GP 1          PT G-Codes  Outcome Measure Options: AM-PAC 6 Clicks Basic Mobility (PT)  AM-PAC 6 Clicks Score (PT): 18    Jolynn Edouard PT  7/11/2022

## 2022-07-11 NOTE — OUTREACH NOTE
Call Center TCM Note    Flowsheet Row Responses   [facility] patient discharged from? Non-BH   Does the patient have one of the following disease processes/diagnoses(primary or secondary)? Other   TCM attempt successful? No   Change in Health Status Readmitted          Hui Davis LPN    7/11/2022, 08:17 EDT

## 2022-07-11 NOTE — DISCHARGE PLACEMENT REQUEST
"He Lockwood (72 y.o. Male)             Date of Birth   1950    Social Security Number       Address   [address] [place] [address]    Home Phone   [phone]    MRN   [id number]       Confucianist   None    Marital Status                               Admission Date   7/10/22    Admission Type   Emergency    Admitting Provider   Rahel Singh MD    Attending Provider   Lorenzo Segundo MD    Department, Room/Bed   [address], S509/1       Discharge Date       Discharge Disposition       Discharge Destination                               Attending Provider: Lorenzo Segundo MD    Allergies: Cephalexin, Codeine, Silver    Isolation: Contact   Infection: ESBL Klebsiella (01/12/22), MRSA (06/20/22), Candida Auris (rule out) (07/11/22)   Code Status: CPR   Advance Care Planning Activity    Ht: 193 cm (76\")   Wt: 134 kg (296 lb)    Admission Cmt: None   Principal Problem: Febrile illness [R50.9]                 Active Insurance as of 7/10/2022     Primary Coverage     Payor Plan Insurance Group Employer/Plan Group    HUMANA MEDICARE REPLACEMENT HUMANA MEDICARE REPLACEMENT [id number]     Payor Plan Address Payor Plan Phone Number Payor Plan Fax Number Effective Dates    [address] [address] [phone]  1/1/2018 - None Entered    [facility] [place] [address]       Subscriber Name Subscriber Birth Date Member ID       HE LOCKWOOD 1950 [id number]                 Emergency Contacts      (Rel.) Home Phone Work Phone Mobile Phone    StonecrestVanessa hoangra (Spouse) [phone] -- [phone]    Carl Lozano (Friend) -- -- [phone]              "

## 2022-07-11 NOTE — NURSING NOTE
Wound/Ostomy: Patient is seen for evaluation of skin concern of bilateral lower leg, patient alert and oriented, patient have been several time admitted before, he is particular patiens with his treatment,  upon skin assessment patient is seen with bilateral lower leg chronic Lymphedema, bilateral swelling, discoloration areas with irregular edges,  patient stated discomfort to touch, two open areas one to Left post lower leg and another one to  Rt lat lower leg  are noted,  but with scant amount of drainage,  weak   pulse. Reassessment tomorrow for compression wrap.

## 2022-07-11 NOTE — PROGRESS NOTES
[facility] Clinical Pharmacy Services: Warfarin Dosing/Monitoring Consult    Jalen Lockwood is a 72 y.o. male, estimated creatinine clearance is 161.2 mL/min (A) (by C-G formula based on SCr of 0.63 mg/dL (L)). weighing 134 kg (296 lb).    Results from last 7 days   Lab Units 07/11/22  0747 07/10/22  2139 07/10/22  1334 07/08/22  0000   INR  1.70* 1.62* 1.47* 1.30   APTT seconds  --   --  48.4*  --    HEMOGLOBIN g/dL 9.3*  --  10.9*  --    HEMATOCRIT % 28.0*  --  32.3*  --    PLATELETS 10*3/mm3 111*  --  145  --    Prior to admission anticoagulation: Warfarin managed by [facility]. Most up-to-date warfarin dosing regimen is 7.5 mg every Friday and 5 mg all other days. Patient's INR was 1.30 on 7/8/22, patient was instructed to boost with 7.5 mg doses on 7/8 and 7/9. INR is only 1.47 today despite boosted doses.     Hospital Anticoagulation:  Consulting provider: Dr. Singh with [facility]  Start date: Continuation of home medication  Indication: Permament A.fib  Target INR: 2 - 3  Expected duration: Indefinite   Bridge Therapy: No      Potential food or drug interactions: Cefepime may increase sensitivity to warfarin due to eradication of vitamin K producing gut darian    Education complete: Defer, patient followed by our warfarin clinic    Decrease in hemoglobin and platelets noted. No sign or symptoms of bleeding per nurse.     Assessment/Plan:  Dose: INR increased to 1.7 today but is still subtherapeutic. Will give increased dose of warfarin 10 mg once.  Monitor for any signs or symptoms of bleeding  Follow up daily INRs and dose adjustments    Pharmacy will continue to follow until discharge or discontinuation of warfarin.     Constance Garrett [facility]  Clinical Pharmacist

## 2022-07-11 NOTE — NURSING NOTE
Patient having a pause of 3.5 called on call provider to inform and bradycardic dropping to the 40's, last blood pressure 105/55, patient asymptomatic.    Received call back from Julita Shetty, explained situation, no new orders placed.

## 2022-07-11 NOTE — PROGRESS NOTES
Name: Jalen Lockwood ADMIT: 7/10/2022   : 1950  PCP: Marc Ludwig MD    MRN: [id number] LOS: 1 days   AGE/SEX: 72 y.o. male  ROOM: [facility]     Subjective   Subjective     No events overnight. No further confusion or fevers. Urine, wound cultures both growing bacteria. He has no complaints today. Reports that he's got a productive cough that hasn't yet improved.       Objective   Objective   Vital Signs  Temp:  [97.7 °F (36.5 °C)-102.9 °F (39.4 °C)] 98 °F (36.7 °C)  Heart Rate:  [] 62  Resp:  [18-30] 18  BP: (105-146)/(55-90) 111/69  SpO2:  [90 %-100 %] 97 %  on  Flow (L/min):  [3] 3;   Device (Oxygen Therapy): nasal cannula  Body mass index is 36.03 kg/m².  Physical Exam  Constitutional:       General: He is not in acute distress.     Appearance: He is not toxic-appearing.   Cardiovascular:      Rate and Rhythm: Normal rate and regular rhythm.      Heart sounds: Normal heart sounds.   Pulmonary:      Effort: Pulmonary effort is normal. No respiratory distress.      Breath sounds: Rhonchi present. No wheezing or rales.   Abdominal:      General: Bowel sounds are normal.      Palpations: Abdomen is soft.   Musculoskeletal:         General: No tenderness.      Right lower leg: Edema present.      Left lower leg: No edema.   Skin:     Comments: Left calf wounds with some mild purulent drainage   Neurological:      Mental Status: He is alert.   Psychiatric:         Mood and Affect: Mood normal.         Behavior: Behavior normal.         Results Review     I reviewed the patient's new clinical results.  Results from last 7 days   Lab Units 22  0747 07/10/22  1334   WBC 10*3/mm3 4.90 6.91   HEMOGLOBIN g/dL 9.3* 10.9*   PLATELETS 10*3/mm3 111* 145     Results from last 7 days   Lab Units 22  0747 07/10/22  2306 07/10/22  1334   SODIUM mmol/L 134*  --  133*   POTASSIUM mmol/L 3.7 3.8 4.3   CHLORIDE mmol/L 100  --  95*   CO2 mmol/L 25.0  --  27.0   BUN mg/dL 11  --  13   CREATININE mg/dL 0.63*   --  0.87   GLUCOSE mg/dL 116*  --  91   Estimated Creatinine Clearance: 161.2 mL/min (A) (by C-G formula based on SCr of 0.63 mg/dL (L)).  Results from last 7 days   Lab Units 07/11/22  0747 07/10/22  1334   ALBUMIN g/dL 3.10* 3.60   BILIRUBIN mg/dL 0.9 1.1   ALK PHOS U/L 83 95   AST (SGOT) U/L 13 16   ALT (SGPT) U/L 7 11     Results from last 7 days   Lab Units 07/11/22  0747 07/10/22  2306 07/10/22  1334   CALCIUM mg/dL 8.6  --  9.4   ALBUMIN g/dL 3.10*  --  3.60   MAGNESIUM mg/dL 2.2 1.8 1.8     Results from last 7 days   Lab Units 07/11/22  0747 07/10/22  1334   PROCALCITONIN ng/mL 0.36* 0.10   LACTATE mmol/L 0.9 1.4     COVID19   Date Value Ref Range Status   07/10/2022 Not Detected Not Detected - Ref. Range Final   06/24/2022 Not Detected Not Detected - Ref. Range Final   06/19/2022 Not Detected Not Detected - Ref. Range Final   01/09/2022 Not Detected Not Detected - Ref. Range Final   04/11/2021 Not Detected Not Detected - Ref. Range Final     No results found for: HGBA1C, POCGLU    XR Chest 1 View  Narrative: SINGLE VIEW CHEST RADIOGRAPH     HISTORY: 72-year-old male with history of a fever and cough.     FINDINGS: An upright AP portable chest radiograph was obtained.  Comparison is made to a chest radiograph dated 06/19/2022. The lungs are  normal in volume and are clear of consolidation. There is ground glass  and interstitial opacification at the base of the right lung. The  cardiac silhouette is enlarged but stable. No evidence for a  pneumothorax or pleural effusion is appreciated. The visualized osseous  structures appear normal.     Impression: There is an interstitial and ground glass infiltrate at the  base of the right lung that is most consistent with pneumonia. Stable  mild cardiomegaly is noted.     This report was finalized on 7/10/2022 3:17 PM by Dr. Mehran Pepper M.D.       Scheduled Medications  budesonide-formoterol, 2 puff, Inhalation, BID - RT  cefepime, 2 g, Intravenous, Q8H  docusate  sodium, 100 mg, Oral, Daily  finasteride, 5 mg, Oral, Daily  furosemide, 40 mg, Oral, Daily  gabapentin, 400 mg, Oral, Q8H  metoclopramide, 10 mg, Oral, 4x Daily AC & at Bedtime  pravastatin, 20 mg, Oral, Daily  sodium chloride, 10 mL, Intravenous, Q12H  vancomycin, 1,250 mg, Intravenous, Q12H    Infusions  Pharmacy to dose vancomycin,   Pharmacy to dose warfarin,   sodium chloride, 125 mL/hr, Last Rate: 125 mL/hr (07/10/22 2046)    Diet  Diet Regular; Cardiac       Assessment/Plan     Active Hospital Problems    Diagnosis  POA   • **Febrile illness [R50.9]  Unknown   • Acute UTI [N39.0]  Yes   • Cellulitis of left foot [L03.116]  Yes   • Alcohol dependence, in remission ([facility]) [F10.21]  Yes   • Charcot's joint of foot [M14.679]  Yes   • ETOH abuse [F10.10]  Yes   • Chronic anticoagulation [Z79.01]  Not Applicable   • Essential hypertension [I10]  Yes   • COPD (chronic obstructive pulmonary disease) ([facility]) [J44.9]  Yes   • Sleep apnea [G47.30]  Yes   • Permanent atrial fibrillation ([facility]) [I48.21]  Yes   • Lymphedema of both lower extremities [I89.0]  Yes      Resolved Hospital Problems   No resolved problems to display.       72 y.o. male admitted with Febrile illness.    · Bacterial pneumonia causing acute on chronic hypoxic respiratory failure-on vancomycin and zosyn. Check MRSA nares. Wean O2 as tolerated.   · Infected left lower extremity wound-wound cultures with 4+ and 3+ GNB, and 2+ GPC. On broad spectrum abx. Follow cultures. abx per ID  · Urinary tract infection-will ask RN to exchange escamilla. On broad spectrum abx. Urine cultures with >100k GNB. Follow these. abx per ID  · Sepsis related to the above-resolved. Family requested an ID consult yesterday and due to multiple infections, I think this is appropriate. Will ask Dr. Jaramillo to return as he saw him during the previous hospitalization  · Anemia-likely dilutional. Check b12, folate, iron profile, ferritin  · Paroxysmal afib with slow ventricular  response-on coumadin. Reduce dose of bb  · Chronic bilateral lower extremity venous stasis with dermatitis  · Chronic bilateral lower extremity lymphedema  · Urinary retention with chronic indwelling escamilla catheter  · COPD with chronic hypoxic respiratory failure  · ischemic cardiomyopathy  · Hypertension-bp soft. Coreg changed to metoprolol as above  · Warfarin (home med) for DVT prophylaxis.  · Full code.  · Discussed with patient and nursing staff.  · Anticipate discharge home with HH vs SNU facility timing yet to be determined.      Lorenzo Segundo MD  [facility]  07/11/22  12:55 EDT    I wore protective equipment throughout this patient encounter including a face mask, gloves and protective eyewear.  Hand hygiene was performed before donning protective equipment and after removal when leaving the room.

## 2022-07-11 NOTE — PLAN OF CARE
Problem: Adult Inpatient Plan of Care  Goal: Absence of Hospital-Acquired Illness or Injury  Intervention: Identify and Manage Fall Risk  Description: Perform standard risk assessment on admission using a validated tool or comprehensive approach appropriate to the patient; reassess fall risk frequently, with change in status or transfer to another level of care.  Communicate fall injury risk to interprofessional healthcare team.  Determine need for increased observation, equipment and environmental modification, such as low bed, signage and supportive, nonskid footwear.  Adjust safety measures to individual developmental age, stage and identified risk factors.  Reinforce the importance of safety and physical activity with patient and family.  Perform regular intentional rounding to assess need for position change, pain assessment and personal needs, including assistance with toileting.  Recent Flowsheet Documentation  Taken 7/11/2022 0458 by Kamran Vazquez RN  Safety Promotion/Fall Prevention:   activity supervised   assistive device/personal items within reach   clutter free environment maintained   safety round/check completed   room organization consistent  Taken 7/11/2022 0208 by Kamran Vazquez RN  Safety Promotion/Fall Prevention:   activity supervised   assistive device/personal items within reach   clutter free environment maintained   safety round/check completed   room organization consistent  Taken 7/11/2022 0016 by Kamran Vazquez RN  Safety Promotion/Fall Prevention:   activity supervised   assistive device/personal items within reach   clutter free environment maintained   safety round/check completed   room organization consistent  Taken 7/10/2022 2000 by Kamran Vazquez, RN  Safety Promotion/Fall Prevention:   assistive device/personal items within reach   activity supervised   clutter free environment maintained   safety round/check completed   room organization consistent   Goal Outcome  Evaluation:   Patient having multiple cardiac pauses during shift the longest at 3.5 sec, also has been bradycardic while sleeping. EKG obtained as well as potassium and magnesium all with in normal range. Called and spoke to two providers during shift(see nursing notes) no new orders placed. Patient asymptomatic and back in normal heart range when woken. Will continue to monitor

## 2022-07-11 NOTE — CONSULTS
Referring Provider: Rahel Singh MD  Reason for Consultation: Pneumonia, UTI, wound infection  Chief Complaint   Patient presents with   • Shortness of Breath   • Fever         Subjective   History of present illness: Patient is a 72-year-old male with a past medical history of chronic lower extremity edema, Charcot foot and chronic Irvin who presents in the setting of fever and confusion.    Patient was brought from his nursing facility due to patient's family reporting altered mental status.  Found to have fever of 102.9 on admission.  No leukocytosis.  Originally procalcitonin was normal and has risen since that time to 0.36.  Cultures were taken from the blood, urine and wound on the right foot.  Chest x-ray showed possible right lower lobe pneumonia.  UA showed positive leuk esterase and nitrites with pyuria and 1+ bacteria.    Patient is very alert today and answers questions appropriately.  Reports he is doing very well and denies any shortness of breath, nausea, vomiting or diarrhea.  Reports his legs have continue to improve and thinks they are looking better with minimal redness.  Reports no noted change in the drainage of his legs other than maybe a little bit of drainage from the left posterior aspect of the calf of the left leg.  Denies any further fevers or chills.  States he was having shortness of breath when he came into the hospital but now this is improved.  Now down to 3 L nasal cannula.  Denies any difficulties with a chronic Irvin catheter or any urinary symptoms.    Past Medical History:   Diagnosis Date   • Allergic rhinitis    • Anxiety    • Aortectasia ([facility])     3cm infrarenal abdominal aorta   • Arthritis    • Atrial flutter ([facility]) 2010    s/p ablation    • Charcot's joint of foot    • Chronic edema     both legs and sees wound care center at [place]    • Chronic venous insufficiency    • COPD (chronic obstructive pulmonary disease) ([facility])    • Coronary atherosclerosis     Cath 2010: diffuse  40-50% disease   • Diverticulosis    • Duodenitis    • Fatty liver    • Gastritis    • Gastroparesis    • Hematoma     post-operative; After catheterization, right groin, required surgical exploration   • Hyperlipidemia    • Hypertension    • Insomnia    • Internal hemorrhoids    • Open wound     izzy legs has drsg chg weekly at wound care center at [place]  pt does second dressing on left leg another time during week   • Osteomyelitis (HCC)    • Paroxysmal atrial fibrillation (HCC)    • Peripheral neuropathy    • Popliteal artery aneurysm (HCC)     left, s/p stenting by Dr. [place]   • Skin cancer    • Sleep apnea     o2   • 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HCC)     due to flutter and afib; cath 2010 with nonobstructive disease   • Venous stasis    • Venous stasis ulcer (HCC)     bilateral legs        Past Surgical History:   Procedure Laterality Date   • BASAL CELL CARCINOMA EXCISION      ear and left side of face   • BRONCHOSCOPY N/A 4/12/2021    Procedure: BRONCHOSCOPY WITH BAL;  Surgeon: Bunny Lepe MD;  Location: [facility] ENDOSCOPY;  Service: Pulmonary;  Laterality: N/A;  PRE-HEMOPTYSIS  POST-SAME   • CARDIAC CATHETERIZATION     • CATARACT EXTRACTION     • COLONOSCOPY  09/28/2015    NBIH, diverticulosis, polyps   • COLONOSCOPY N/A 9/11/2018    Procedure: COLONOSCOPY TO CECUM  AND TERM. ILEUM WITH COLD SNARE POLYPECTOMIES;  Surgeon: Kane Lagunas MD;  Location:  [facility];  Service: Gastroenterology   • COLONOSCOPY N/A 10/29/2019    Procedure: COLONOSCOPY TO TO CECUM AND TERMINAL ILEUM WITH HOT AND COLD SNARE POLYPECTOMIES;  Surgeon: Kane Lagunas MD;  Location[facility] END[facility]COPY;  Service: Gastroenterology   • HIP ARTHROPLASTY Right 2017   • JOINT REPLACEMENT Left    • OTHER SURGICAL HISTORY      Catheter ablation atrial flutter   • REPAIR ANEURYSM / PSEUDO ANEURYSM / RUPTURED ANEURYSM POPLITEAL ARTERY      Stent-Graft of the the left popliteal artery   • REPAIR KNEE LIGAMENT      Primary  repair of knee ligament cruciate anterior right   • TONSILLECTOMY  1958   • TOTAL KNEE ARTHROPLASTY Bilateral    • UPPER GASTROINTESTINAL ENDOSCOPY  09/16/2014    acute gastritis, acute duodenitis       family history includes Emphysema in his father.     reports that he has been smoking cigarettes. He started smoking about 58 years ago. He has been smoking about 0.25 packs per day. He has never used smokeless tobacco. He reports current alcohol use of about 14.0 standard drinks of alcohol per week. He reports that he does not use drugs.     Allergies   Allergen Reactions   • Cephalexin Hives     Tolerated ceftriaxone Jan 2022   • Codeine Nausea Only   • Silver Other (See Comments)       Medication:  Antibiotics:  Anti-Infectives (From admission, onward)    Ordered     Dose/Rate Route Frequency Start Stop    07/11/22 1002  cefepime 2 gm IVPB in 100 ml NS (VTB)        Ordering Provider: Lorenzo Segundo MD    2 g  over 30 Minutes Intravenous Every 8 Hours 07/11/22 1600 07/17/22 2359    07/10/22 1833  vancomycin 1250 mg/250 mL 0.9% NS IVPB ([facility])        Ordering Provider: Rahel Singh MD    1,250 mg  over 75 Minutes Intravenous Every 12 Hours 07/11/22 0400 07/17/22 0359    07/10/22 1814  Pharmacy to dose vancomycin        Ordering Provider: Rahel Singh MD     Does not apply Continuous PRN 07/10/22 1814 07/17/22 1813    07/10/22 1503  cefepime 2 gm IVPB in 100 ml NS (VTB)        Ordering Provider: Glory Yousif APRN    2 g  over 30 Minutes Intravenous Once 07/10/22 1505 07/10/22 1600    07/10/22 1418  vancomycin 2750 mg/1000 mL 0.9% NS IVPB        Ordering Provider: Glory Yousif APRN    20 mg/kg × 134 kg Intravenous Once 07/10/22 1420 07/10/22 1559          Review of Systems  Pertinent items are noted in HPI, all other systems reviewed and negative    Objective     Physical Exam:   Vital Signs   Temp:  [97.7 °F (36.5 °C)-98.8 °F (37.1 °C)] 98 °F (36.7 °C)  Heart Rate:  [] 62  Resp:  [18]  18  BP: (105-146)/(55-90) 111/69    GENERAL: Awake and alert, in no acute distress.   HEENT: Oropharynx is clear. Hearing is grossly normal.   EYES: PERRL. No conjunctival injection. No lid lag.   HEART: Regular rate and regular rhythm. No peripheral edema.   LUNGS: Clear to auscultation anteriorly with normal respiratory effort.   GI: Soft, nontender, nondistended. No appreciable organomegaly.   SKIN: Diffuse bilateral lower extremity venous stasis and lymphedema changes.  Discoloration of the skin bilaterally however no acute erythema.  Sloughing of the skin on bilateral feet with the left worse than the right.  Superficial ulcerative wound on the posterior aspect of the left calf with clear drainage.  Intertriginous folds with erythema consistent with candidal dermatitis.  Extremities: Bilateral lower extremity stasis dermatitis, lymphedema and Charcot foot.  PSYCHIATRIC: Appropriate mood, affect, insight, and judgment.     Results Review:   I reviewed the patient's new clinical results.  I reviewed the patient's new imaging results and agree with the interpretation.  I reviewed the patient's other test results and agree with the interpretation    Lab Results   Component Value Date    WBC 4.90 07/11/2022    HGB 9.3 (L) 07/11/2022    HCT 28.0 (L) 07/11/2022    MCV 94.0 07/11/2022     (L) 07/11/2022       Lab Results   Component Value Date    [facility]       Lab Results   Component Value Date    GLUCOSE 116 (H) 07/11/2022    BUN 11 07/11/2022    CREATININE 0.63 (L) 07/11/2022    EGFRIFNONA 103 01/12/2022    EGFRIFAFRI 102 11/20/2018    BCR 17.5 07/11/2022    CO2 25.0 07/11/2022    CALCIUM 8.6 07/11/2022    PROTENTOTREF 6.1 04/26/2019    ALBUMIN 3.10 (L) 07/11/2022    LABIL2 1.1 04/26/2019    AST 13 07/11/2022    ALT 7 07/11/2022         Estimated Creatinine Clearance: 161.2 mL/min (A) (by C-G formula based on SCr of 0.63 mg/dL (L)).      Microbiology:  7/10 blood cultures in process  7/10  respiratory panel negative  7/10 urine culture greater than 100,000 gram-negative bacilli  7/10 wound culture gram-negative bacilli    Radiology:  7/10 chest x-ray with right lower lobe infiltrate and groundglass opacities consistent with pneumonia.    Assessment   #Fever  #Right lower lobe pneumonia  #Encephalopathy  #Abnormal UA  #Elevated procalcitonin  #Candidal intertrigo  #Acute on chronic hypoxic respiratory failure  #Chronic lower extremity venous stasis  #Bilateral lower extremity lymphedema  #Chronic urinary retention with chronic Irvin     Patient's bilateral lower extremities have a very chronic appearance of stasis dermatitis and lymphedema.  His exam actually appears much improved from his recent hospital stay.  The posterior ulcer on the left leg and foot did not have any significant erythema and the drainage appears improved overall.  At this point I have lower suspicion for any acute infection and there is likely a significant amount of bacterial colonization.    Chest x-ray is significant for right lower lobe infiltrate and he has a slightly increased oxygen requirement of 3 L up from 2 L baseline.  UA is abnormal and he has a chronic Irvin but difficult to tease out whether this could be the actual source of his infection given his inability to provide adequate symptomatology to define a urinary tract infection.  At this point plan to continue IV cefepime 2 g every 8 hours to cover for pneumonia and urinary tract infection given his resistance profile of prior urine isolates.  MRSA nares pending and if negative plan to stop vancomycin.  Follow-up outstanding blood cultures.    Thank you for this consult.  We will continue to follow along and tailor antibiotics as the patient's clinical course evolves.

## 2022-07-11 NOTE — PLAN OF CARE
Goal Outcome Evaluation:  Plan of Care Reviewed With: patient           Outcome Evaluation: Patient is a 71 yo male who presented with SOA. He was admitted with cellulitis of L foot an acute UTI. PMHx inlcudes COPD, PAF, HTN, and lymphedema. He lives with his wife and uses rwx at baseline. He recently discharged from rehab and reports he was doing well at home and denies falls. He presents today with decreased activity tolerance and LE strength. He was SBA for bed mobility, STS to rwx with CGA, and ambulated 40ft and 10ft using rwx requiring CGA. Pt with excessive ER on LLE in supine and standing position. during ambulation pt with difficulty maintaining BLE inside rwx due to ER and wide CARROLL. He will continue to benefit from skilled PT to address functional deficits. Pt would like to return home at [place] with spouse to assist and PT rec HHPT.

## 2022-07-11 NOTE — CASE MANAGEMENT/SOCIAL WORK
Discharge Planning Assessment  [facility]     Patient Name: Jalen Lockwood  MRN: [id number]  Today's Date: 7/11/2022    Admit Date: 7/10/2022     Discharge Needs Assessment     Row Name 07/11/22 1001       Living Environment    People in Home spouse    Current Living Arrangements home    Primary Care Provided by spouse/significant other    Provides Primary Care For no one, unable/limited ability to care for self    Family Caregiver if Needed spouse    Quality of Family Relationships helpful;involved;supportive    Able to Return to Prior Arrangements yes       Resource/Environmental Concerns    Resource/Environmental Concerns home accessibility    Home Accessibility Concerns stairs to enter home       Transition Planning    Patient/Family Anticipates Transition to home with help/services;inpatient rehabilitation facility    Patient/Family Anticipated Services at Transition home health care;skilled nursing    Transportation Anticipated family or friend will provide       Discharge Needs Assessment    Current Outpatient/Agency/Support Group homecare agency    Equipment Currently Used at Home walker, rolling;cane, straight;shower chair;lift device    Concerns to be Addressed discharge planning    Anticipated Changes Related to Illness inability to care for self    Outpatient/Agency/Support Group Needs homecare agency    Provided Post Acute Provider List? N/A    Provided Post Acute Provider Quality & Resource List? N/A               Discharge Plan     Row Name 07/11/22 1005       Plan    Plan Home with spouse and continue with [facility] vs SNF    Plan Comments S/W pt's wife Mariposa by phone.  Pt lives in a single story house w/ Mariposa who works full time from home.  There are 4 steps to enter the house.  Home DME includes a cane, walker and shower chair.  Mariposa is with pt at all times and takes care of him at home as needed.  She states pt needs a stair lift and a BSC.  CCP will discuss w/ Keyshawn/ Diann.  Pt is  [facility] and [facility].  Mariposa states first choice is for pt to return home upon DC.  IF SNF needed, she requests referral to [facility].  VM message left w/ Louise/ [facility].... Yris ALCAZAR/ CCP              Continued Care and Services - Admitted Since 7/10/2022     Destination     Service Provider Request Status Selected Services Address Phone Fax Patient Preferred    [facility]  Pending - Request Sent N/A [address] [facility] [address] [phone] --          Durable Medical Equipment     Service Provider Request Status Selected Services Address Phone Fax Patient Preferred    [facility]  Pending - Request Sent N/A [address], [place] [address] [phone] --          Home Medical Care     Service Provider Request Status Selected Services Address Phone Fax Patient Preferred    [facility]  Accepted N/A [address] [place] [id number] [phone] --            Selected Continued Care - Prior Encounters Includes selections from prior encounters from 4/11/2022 to 7/11/2022    Discharged on 6/24/2022 Admission date: 6/19/2022 - Discharge disposition: Skilled Nursing Facility (DC - External)    Destination     Service Provider Selected Services Address Phone Fax Patient Preferred    [facility]  Skilled Nursing [address] [facility] [address] [phone] --          Home Medical Care     Service Provider Selected Services Address Phone Fax Patient Preferred    [facility]  Home Health Services [address] [address] [phone] --                       Demographic Summary     Row Name 07/11/22 1000       General Information    Admission Type inpatient    Arrived From home    Referral Source admission list    Reason for Consult discharge planning    Preferred Language  English               Functional Status     Row Name 07/11/22 1000       Functional Status    Usual Activity Tolerance moderate       Functional Status, IADL    Medications assistive person    Meal Preparation assistive person    Housekeeping assistive person    Laundry assistive person    Shopping assistive person       Employment/    Employment Status retired                         Yris Arechiga RN

## 2022-07-11 NOTE — TELEPHONE ENCOUNTER
PATIENT IS NEEDING AN ORDER FOR A CHAIR LIFT FOR EASIER ACCESS UP AND DOWN STAIRS AND ALSO FOR ANY EMS TEAMS THAT NEED TO TAKE HIM BY AMBULANCE    CAN YOU SEND AN ORDER TO THE PATIENT'S HOME PLEASE ADVISE   Mariposa Lockwood () [phone] (H)

## 2022-07-11 NOTE — NURSING NOTE
Patient having 3.0 pause EKG done, showing patient's previous rhythm. No abnormalities shown, potassium and Magnesium ordered. Previous labs from this morning showed potassium and magnesium all within normal limits. Called and spoke with Jamel Mejia and informed of situation was told to just watch the patient for the night.

## 2022-07-12 NOTE — PROGRESS NOTES
LOS: 2 days     Chief Complaint: UTI, pneumonia, wound infection    Interval History: Patient reports he is feeling better this morning.  Denies any fevers or chills.  Denies any shortness of breath.  Denies any cough.  Denies any urinary symptoms and reports no difficulties with his urinary catheter.  Tolerating antibiotics well.    Vital Signs  Temp:  [97.6 °F (36.4 °C)-98.2 °F (36.8 °C)] 98.2 °F (36.8 °C)  Heart Rate:  [60-73] 68  Resp:  [18] 18  BP: (109-120)/(58-76) 112/76    Physical Exam:  General: In no acute distress  HEENT: Oropharynx clear, moist mucous membranes  Cardiovascular: RRR, normal S1 and S2, no M/R/G  Respiratory: Clear to ascultation bilaterally, no wheezing  GI: Soft, NT/ND, + bowel sounds bilaterally, no masses  Skin: Intertrigo present in skin folds bilaterally below the pannus.  Extremities: Bilateral lower extremities with compression wraps in place.  Access: Peripheral IV.    Antibiotics:  Anti-Infectives (From admission, onward)    Ordered     Dose/Rate Route Frequency Start Stop    07/11/22 1002  cefepime 2 gm IVPB in 100 ml NS ([facility])        Ordering Provider: Lorenzo Segundo MD    2 g  over 30 Minutes Intravenous Every 8 Hours 07/11/22 1600 07/17/22 2359    07/10/22 1833  vancomycin 1250 mg/250 mL 0.9% NS IVPB ([facility])        Ordering Provider: Rahel Singh MD    1,250 mg  over 75 Minutes Intravenous Every 12 Hours 07/11/22 0400 07/17/22 0359    07/10/22 1814  Pharmacy to dose vancomycin        Ordering Provider: Rahel Singh MD     Does not apply Continuous PRN 07/10/22 1814 07/17/22 1813    07/10/22 1503  cefepime 2 gm IVPB in 100 ml NS ([facility])        Ordering Provider: Glory Yousif APRN    2 g  over 30 Minutes Intravenous Once 07/10/22 1505 07/10/22 1600    07/10/22 1418  vancomycin 2750 mg/1000 mL 0.9% NS IVPB        Ordering Provider: Glory Yousif APRN    20 mg/kg × 134 kg Intravenous Once 07/10/22 1420 07/10/22 1559           Results Review:     I reviewed  the patient's new clinical results.    Lab Results   Component Value Date    WBC 5.21 07/12/2022    HGB 9.6 (L) 07/12/2022    HCT 28.6 (L) 07/12/2022    MCV 92.0 07/12/2022     (L) 07/12/2022     Lab Results   Component Value Date    GLUCOSE 98 07/12/2022    BUN 10 07/12/2022    CREATININE 0.65 (L) 07/12/2022    EGFRIFNONA 103 01/12/2022    EGFRIFAFRI 102 11/20/2018    BCR 15.4 07/12/2022    CO2 24.0 07/12/2022    CALCIUM 8.2 (L) 07/12/2022    PROTENTOTREF 6.1 04/26/2019    ALBUMIN 3.10 (L) 07/11/2022    LABIL2 1.1 04/26/2019    AST 13 07/11/2022    ALT 7 07/11/2022       Microbiology:  7/10 respiratory panel negative  7/10 blood cultures no growth to date  7/10 urinary culture with greater than 100,000 Enterobacter cloacae  7/10 wound culture from the left foot with gram-negative bacilli  7/11 MRSA nares positive    Assessment    #Fever  #Right lower lobe pneumonia  #Encephalopathy  #Catheter associated UTI  #Elevated procalcitonin  #Candidal intertrigo  #Acute on chronic hypoxic respiratory failure  #Chronic lower extremity venous stasis  #Bilateral lower extremity lymphedema  #Chronic urinary retention with chronic Irvin    At this point patient has markedly improved from a respiratory standpoint.  MRSA nares screen is positive however I think it less likely he has a MRSA pneumonia and at this point we will plan to stop IV vancomycin.    Continue IV cefepime 2 g every 8 hours to target bacteria isolated from his urine and PNA which seems at this point to be the most likely culprits of his fever.  His bilateral lower extremities appear improved from his prior admission and he has no real significant erythema.  He does have chronic drainage from some wounds however they do not appear acutely infected.  Cefepime should be adequate coverage for the urine, gram-negative growing from the wound and a pneumonia. At this time I would plan for a 7-day course of cefepime 2 g every 8 hours for the above indications to  end on 7/16.    Thank you for allowing me to be involved in the care of this patient. Infectious diseases will sign off at this time with antibiotics plan in place, but please call me at [phone] if any further ID questions or new ID concerns.

## 2022-07-12 NOTE — PLAN OF CARE
Problem: Adult Inpatient Plan of Care  Goal: Plan of Care Review  Outcome: Ongoing, Progressing   Goal Outcome Evaluation: VSS. 2L nasal canula. Pt sat up in chair for a few hours before getting into bed around 2300. Pain medication changed to q4 hrs prn, see mar. Pt's legs had to be re wrapped due to dressings falling off. Xanax and norco given, see mar. Pt rested throughout the night with no other issues or complaints.

## 2022-07-12 NOTE — PROGRESS NOTES
[facility] Clinical Pharmacy Services: Warfarin Dosing/Monitoring Consult    Jalen Lockwood is a 72 y.o. male, estimated creatinine clearance is 156.3 mL/min (A) (by C-G formula based on SCr of 0.65 mg/dL (L)). weighing 134 kg (296 lb).    Results from last 7 days   Lab Units 07/12/22  0744 07/11/22  0747 07/10/22  2139 07/10/22  1334 07/08/22  0000   INR  1.85* 1.70* 1.62* 1.47* 1.30   APTT seconds  --   --   --  48.4*  --    HEMOGLOBIN g/dL 9.6* 9.3*  --  10.9*  --    HEMATOCRIT % 28.6* 28.0*  --  32.3*  --    PLATELETS 10*3/mm3 131* 111*  --  145  --    Prior to admission anticoagulation: Warfarin managed by [facility]. Most up-to-date warfarin dosing regimen is 7.5 mg every Friday and 5 mg all other days. Patient's INR was 1.30 on 7/8/22, patient was instructed to boost with 7.5 mg doses on 7/8 and 7/9. INR is only 1.47 today despite boosted doses.     Hospital Anticoagulation:  Consulting provider: Dr. Singh with [facility]  Start date: Continuation of home medication  Indication: Permament A.fib  Target INR: 2 - 3  Expected duration: Indefinite   Bridge Therapy: No      Potential food or drug interactions: Cefepime may increase sensitivity to warfarin due to eradication of vitamin K producing gut darian    Education complete: Defer, patient followed by our warfarin clinic    Assessment/Plan:  Dose: INR continuing to increase but is still subtherapeutic at 1.85. Will give warfarin 10 mg today.  Monitor for any signs or symptoms of bleeding  Follow up daily INRs and dose adjustments    Pharmacy will continue to follow until discharge or discontinuation of warfarin.     Constance Garrett [facility]  Clinical Pharmacist

## 2022-07-12 NOTE — CASE MANAGEMENT/SOCIAL WORK
Continued Stay Note  [facility]     Patient Name: Jalen Lockwood  MRN: [id number]  Today's Date: 7/12/2022    Admit Date: 7/10/2022     Discharge Plan     Row Name 07/12/22 1214       Plan    Plan Home with spouse and continue w/ [facility] vs SNF    Plan Comments S/W Louise/ [facility] - no beds available at [facility].  [facility] will know about bed availability this afternoon.  CCP spoke w/ pt and his wife Mariposa.  If pt can walk up 4 stairs, he and Mariposa prefer he go home upon DC.  If not, he will need to go to SNF.  He is in agreement with [facility] if needed. He would need precert. [facility]/ [facility] will followup. ...........Yris ALCAZAR/ VONNIE               Discharge Codes    No documentation.               Expected Discharge Date and Time     Expected Discharge Date Expected Discharge Time    Jul 14, 2022             Yris Arechiga RN

## 2022-07-12 NOTE — PROGRESS NOTES
Name: Jalen Lockwood ADMIT: 7/10/2022   : 1950  PCP: Marc Ludwig MD    MRN: [id number] LOS: 2 days   AGE/SEX: 72 y.o. male  ROOM: [facility]     Subjective   Subjective     No complaints. No events overnight. On his home 2L. No further fevers.       Objective   Objective   Vital Signs  Temp:  [97.6 °F (36.4 °C)-98.2 °F (36.8 °C)] 98.2 °F (36.8 °C)  Heart Rate:  [60-73] 68  Resp:  [18] 18  BP: (109-120)/(58-76) 112/76  SpO2:  [98 %-100 %] 98 %  on  Flow (L/min):  [2-3] 2;   Device (Oxygen Therapy): nasal cannula  Body mass index is 36.03 kg/m².  Physical Exam  Constitutional:       General: He is not in acute distress.     Appearance: He is not toxic-appearing.   Cardiovascular:      Rate and Rhythm: Normal rate and regular rhythm.      Heart sounds: Normal heart sounds.   Pulmonary:      Effort: Pulmonary effort is normal. No respiratory distress.      Breath sounds: Rhonchi present. No wheezing or rales.   Abdominal:      General: Bowel sounds are normal.      Palpations: Abdomen is soft.   Musculoskeletal:         General: No tenderness.      Right lower leg: Edema present.      Left lower leg: Edema present.   Skin:     Comments: Left calf wounds   Neurological:      Mental Status: He is alert.   Psychiatric:         Mood and Affect: Mood normal.         Behavior: Behavior normal.         Results Review     I reviewed the patient's new clinical results.  Results from last 7 days   Lab Units 22  0744 22  0747 07/10/22  1334   WBC 10*3/mm3 5.21 4.90 6.91   HEMOGLOBIN g/dL 9.6* 9.3* 10.9*   PLATELETS 10*3/mm3 131* 111* 145     Results from last 7 days   Lab Units 22  0744 22  0747 07/10/22  2306 07/10/22  1334   SODIUM mmol/L 137 134*  --  133*   POTASSIUM mmol/L 3.8 3.7 3.8 4.3   CHLORIDE mmol/L 106 100  --  95*   CO2 mmol/L 24.0 25.0  --  27.0   BUN mg/dL 10   --  13   CREATININE mg/dL 0.65* 0.63*  --  0.87   GLUCOSE mg/dL 98 116*  --  91   Estimated Creatinine Clearance: 156.3  mL/min (A) (by C-G formula based on SCr of 0.65 mg/dL (L)).  Results from last 7 days   Lab Units 07/11/22  0747 07/10/22  1334   ALBUMIN g/dL 3.10* 3.60   BILIRUBIN mg/dL 0.9 1.1   ALK PHOS U/L 83 95   AST (SGOT) U/L 13 16   ALT (SGPT) U/L 7 11     Results from last 7 days   Lab Units 07/12/22  0744 07/11/22  0747 07/10/22  2306 07/10/22  1334   CALCIUM mg/dL 8.2* 8.6  --  9.4   ALBUMIN g/dL  --  3.10*  --  3.60   MAGNESIUM mg/dL  --  2.2 1.8 1.8     Results from last 7 days   Lab Units 07/11/22  0747 07/10/22  1334   PROCALCITONIN ng/mL 0.36* 0.10   LACTATE mmol/L 0.9 1.4     COVID19   Date Value Ref Range Status   07/10/2022 Not Detected Not Detected - Ref. Range Final   06/24/2022 Not Detected Not Detected - Ref. Range Final   06/19/2022 Not Detected Not Detected - Ref. Range Final   01/09/2022 Not Detected Not Detected - Ref. Range Final   04/11/2021 Not Detected Not Detected - Ref. Range Final     No results found for: HGBA1C, POCGLU    XR Chest 1 View  Narrative: SINGLE VIEW CHEST RADIOGRAPH     HISTORY: 72-year-old male with history of a fever and cough.     FINDINGS: An upright AP portable chest radiograph was obtained.  Comparison is made to a chest radiograph dated 06/19/2022. The lungs are  normal in volume and are clear of consolidation. There is ground glass  and interstitial opacification at the base of the right lung. The  cardiac silhouette is enlarged but stable. No evidence for a  pneumothorax or pleural effusion is appreciated. The visualized osseous  structures appear normal.     Impression: There is an interstitial and ground glass infiltrate at the  base of the right lung that is most consistent with pneumonia. Stable  mild cardiomegaly is noted.     This report was finalized on 7/10/2022 3:17 PM by Dr. Mehran Pepper M.D.       Scheduled Medications  budesonide-formoterol, 2 puff, Inhalation, BID - RT  cefepime, 2 g, Intravenous, Q8H  docusate sodium, 100 mg, Oral, Daily  finasteride, 5 mg,  Oral, Daily  furosemide, 40 mg, Oral, Daily  gabapentin, 400 mg, Oral, Q8H  metoclopramide, 10 mg, Oral, 4x Daily AC & at Bedtime  metoprolol tartrate, 12.5 mg, Oral, Q12H  pravastatin, 20 mg, Oral, Daily  sodium chloride, 10 mL, Intravenous, Q12H  vancomycin, 1,250 mg, Intravenous, Q12H    Infusions  Pharmacy to dose vancomycin,   Pharmacy to dose warfarin,     Diet  Diet Regular; Cardiac       Assessment/Plan     Active Hospital Problems    Diagnosis  POA   • **Febrile illness [R50.9]  Unknown   • Acute UTI [N39.0]  Yes   • Cellulitis of left foot [L03.116]  Yes   • Alcohol dependence, in remission ([facility]) [F10.21]  Yes   • Charcot's joint of foot [M14.679]  Yes   • ETOH abuse [F10.10]  Yes   • Chronic anticoagulation [Z79.01]  Not Applicable   • Essential hypertension [I10]  Yes   • COPD (chronic obstructive pulmonary disease) ([facility]) [J44.9]  Yes   • Sleep apnea [G47.30]  Yes   • Permanent atrial fibrillation ([facility]) [I48.21]  Yes   • Lymphedema of both lower extremities [I89.0]  Yes      Resolved Hospital Problems   No resolved problems to display.       72 y.o. male admitted with Febrile illness.    · Bacterial pneumonia causing acute on chronic hypoxic respiratory failure-on vancomycin and zosyn. MRSA nares was positive. Back on his home O2 requirement of 2L  · MDR enterobacter urinary tract infection-catheter exchanged this admission. Sensitive to cefepime.  · Possibly infected left lower extremity wound-ID feels this is less likely to be infected based on their exam. Wound cultures with 4+ and 3+ GNB, and 2+ GPC. On broad spectrum abx. Follow cultures. abx per ID  · Sepsis related to the above-resolved.   · Anemia of chronic disease-hgb stable at 9.6  · Paroxysmal afib with slow ventricular response-on coumadin. Reduced dose of bb. HR looking better  · Chronic bilateral lower extremity venous stasis with dermatitis  · Chronic bilateral lower extremity lymphedema  · Urinary retention with chronic indwelling escamilla  catheter  · COPD with chronic hypoxic respiratory failure  · Ischemic cardiomyopathy with recovered EF  · Hypertension-well controlled  · Warfarin (home med) for DVT prophylaxis.  · Full code.  · Discussed with patient and nursing staff.  · Anticipate discharge home with HH vs SNU facility timing yet to be determined.      Lorenzo Segundo MD  [facility]  07/12/22  09:22 EDT    I wore protective equipment throughout this patient encounter including a face mask, gloves and protective eyewear.  Hand hygiene was performed before donning protective equipment and after removal when leaving the room.

## 2022-07-12 NOTE — NURSING NOTE
Wound/Ostomy: Follow up the patient with bilateral lower leg Chronic Lymphedema, old dressing is removed, the legs continue with edema but it is not worse and redness of skin still is noted, opticel is placed in every open area, betadine gauze 2x2 placed between each toes, rewrapped lower extremities with unna boots  and 4 layer compression bandage system since mid foot to below the knee,  Patient  tolerated well, plan to change 2 time a wk.

## 2022-07-13 NOTE — NURSING NOTE
CWOCN- compression was removed for LE studies. Replaced them today as per prior plan of care. Washed legs with foam cleanser. Betadine gauze between toes and to dorsal foot near toes. Opticel ag to open wounds and weeping areas. Unna boot _ 4 layer compression toes to knee. Patient tolerated well.

## 2022-07-13 NOTE — PROGRESS NOTES
Name: Jalen Lockwood ADMIT: 7/10/2022   : 1950  PCP: Marc Ludwig MD    MRN: [id number] LOS: 3 days   AGE/SEX: 72 y.o. male  ROOM: [facility]     Subjective   Subjective     Doing well. No complaints. Wound cultures were positive for MDR pseudomonas and ESBL e. Coli. ID has low suspicion for active infection with this wound and so they feel that the cefepime is adequate therapy.        Objective   Objective   Vital Signs  Temp:  [97.1 °F (36.2 °C)-97.7 °F (36.5 °C)] 97.5 °F (36.4 °C)  Heart Rate:  [56-78] 78  Resp:  [17-20] 17  BP: ()/(59-96) 133/96  SpO2:  [94 %-100 %] 99 %  on  Flow (L/min):  [2] 2;   Device (Oxygen Therapy): nasal cannula  Body mass index is 36.03 kg/m².  Physical Exam  Constitutional:       General: He is not in acute distress.     Appearance: He is not toxic-appearing.   Cardiovascular:      Rate and Rhythm: Normal rate and regular rhythm.      Heart sounds: Normal heart sounds.   Pulmonary:      Effort: Pulmonary effort is normal. No respiratory distress.      Breath sounds: Normal breath sounds. No wheezing, rhonchi or rales.   Abdominal:      General: Bowel sounds are normal.      Palpations: Abdomen is soft.   Musculoskeletal:         General: No tenderness.      Right lower leg: Edema present.      Left lower leg: Edema present.   Skin:     Comments: Left calf wounds   Neurological:      Mental Status: He is alert.   Psychiatric:         Mood and Affect: Mood normal.         Behavior: Behavior normal.         Results Review     I reviewed the patient's new clinical results.  Results from last 7 days   Lab Units 22  0731 22  0744 22  0747 07/10/22  1334   WBC 10*3/mm3 4.26 5.21 4.90 6.91   HEMOGLOBIN g/dL 9.7* 9.6* 9.3* 10.9*   PLATELETS 10*3/mm3 144 131* 111* 145     Results from last 7 days   Lab Units 22  0731 22  0744 22  0747 07/10/22  2306 07/10/22  1334   SODIUM mmol/L 136 137 134*  --  133*   POTASSIUM mmol/L 3.4* 3.8 3.7 3.8 4.3    CHLORIDE mmol/L 103 106 100  --  95*   CO2 mmol/L 25.8 24.0 25.0  --  27.0   BUN mg/dL 9 10 11  --  13   CREATININE mg/dL 0.70* 0.65* 0.63*  --  0.87   GLUCOSE mg/dL 108* 98 116*  --  91   Estimated Creatinine Clearance: 145.1 mL/min (A) (by C-G formula based on SCr of 0.7 mg/dL (L)).  Results from last 7 days   Lab Units 07/11/22  0747 07/10/22  1334   ALBUMIN g/dL 3.10* 3.60   BILIRUBIN mg/dL 0.9 1.1   ALK PHOS U/L 83 95   AST (SGOT) U/L 13 16   ALT (SGPT) U/L 7 11     Results from last 7 days   Lab Units 07/13/22  0731 07/12/22  0744 07/11/22  0747 07/10/22  2306 07/10/22  1334   CALCIUM mg/dL 8.8 8.2* 8.6  --  9.4   ALBUMIN g/dL  --   --  3.10*  --  3.60   MAGNESIUM mg/dL  --   --  2.2 1.8 1.8     Results from last 7 days   Lab Units 07/11/22  0747 07/10/22  1334   PROCALCITONIN ng/mL 0.36* 0.10   LACTATE mmol/L 0.9 1.4     COVID19   Date Value Ref Range Status   07/10/2022 Not Detected Not Detected - Ref. Range Final   06/24/2022 Not Detected Not Detected - Ref. Range Final   06/19/2022 Not Detected Not Detected - Ref. Range Final   01/09/2022 Not Detected Not Detected - Ref. Range Final   04/11/2021 Not Detected Not Detected - Ref. Range Final     No results found for: HGBA1C, POCGLU    Doppler Ankle Brachial Index Single Level CAR  · Right Conclusion: The right MARIBELL is normal. Normal digital pressures.  · Left Conclusion: The left MARIBELL is normal. Normal digital pressures.       Scheduled Medications  budesonide-formoterol, 2 puff, Inhalation, BID - RT  cefepime, 2 g, Intravenous, Q8H  docusate sodium, 100 mg, Oral, Daily  finasteride, 5 mg, Oral, Daily  furosemide, 40 mg, Oral, Daily  gabapentin, 400 mg, Oral, Q8H  metoclopramide, 10 mg, Oral, 4x Daily AC & at Bedtime  [START ON 7/14/2022] metoprolol succinate XL, 25 mg, Oral, Q24H  pravastatin, 20 mg, Oral, Daily  sodium chloride, 10 mL, Intravenous, Q12H    Infusions  Pharmacy to dose warfarin,     Diet  Diet Regular; Cardiac       Assessment/Plan      Active Hospital Problems    Diagnosis  POA   • **Febrile illness [R50.9]  Unknown   • Acute UTI [N39.0]  Yes   • Cellulitis of left foot [L03.116]  Yes   • Alcohol dependence, in remission ([facility]) [F10.21]  Yes   • Charcot's joint of foot [M14.679]  Yes   • ETOH abuse [F10.10]  Yes   • Chronic anticoagulation [Z79.01]  Not Applicable   • Essential hypertension [I10]  Yes   • COPD (chronic obstructive pulmonary disease) ([facility]) [J44.9]  Yes   • Sleep apnea [G47.30]  Yes   • Permanent atrial fibrillation ([facility]) [I48.21]  Yes   • Lymphedema of both lower extremities [I89.0]  Yes      Resolved Hospital Problems   No resolved problems to display.       72 y.o. male admitted with Febrile illness.    · Bacterial pneumonia causing acute on chronic hypoxic respiratory failure-acute respiratory failure has resolved and he is back on his home 2L O2. MRSA nares was positive, but ID with low suspicion for MRSA pneumonia. He is on cefepime with plans for 7 total days of therapy with an end date of 7/18/22. Will request a midline be placed so that antibiotics can be finished at home with home health  · MDR enterobacter urinary tract infection-catheter exchanged this admission. Sensitive to cefepime. As above  · Left lower extremity wound-wound cultures with MDR pseudomonas and ESBL e. Coli. ID feels there is no active infection here and so recommends no changes to previously prescribed antibiotic therapy as above with cefepime.  · Sepsis related to the above-resolved.   · Anemia of chronic disease-hgb stable at 9.7  · Paroxysmal afib with slow ventricular response-on coumadin. Reduced dose of bb and will transition to metoprolol succinate tomorrow.  · Chronic bilateral lower extremity venous stasis with dermatitis  · Chronic bilateral lower extremity lymphedema  · Urinary retention with chronic indwelling escamilla catheter  · COPD with chronic hypoxic respiratory failure  · Ischemic cardiomyopathy with recovered  EF  · Hypertension-well controlled  · Warfarin (home med) for DVT prophylaxis.  · Full code.  · Discussed with patient and nursing staff.  · Anticipate discharge home with home health once arrangements have been made. likely tomorrow once home infusion/abx can be set up      Lorenzo Segundo MD  [facility]  07/13/22  12:30 EDT    I wore protective equipment throughout this patient encounter including a face mask, gloves and protective eyewear.  Hand hygiene was performed before donning protective equipment and after removal when leaving the room.

## 2022-07-13 NOTE — CASE MANAGEMENT/SOCIAL WORK
Continued Stay Note  [facility]     Patient Name: Jalen Lockwood  MRN: [id number]  Today's Date: 7/13/2022    Admit Date: 7/10/2022     Discharge Plan     Row Name 07/13/22 1240       Plan    Plan Comments Pt did well with PT today and was able to ambulate up several stairs.  VONNIE spoke w/ pt and his wife Mariposa who are in agreement with pt returning home with [facility] upon DC.  Pt will need IV abx until 7/18/22.  Pt has no preference of infusion company.  [facility] notified Flor/ MAGALIE and she recommended [facility] - pt and Mariposa gary in agreement. Referral called to [place]/ [facility] ([phone]).  Pt to have midline placed.   Mariposa requests orders for stair lift and commode chair.  Orders received and Chica/ Diann notified.  She will have the commode delivered to pt's home. Pt is having the stair lift installed next Tuesday .............Yris ALCAZAR/ VONNIE    Row Name 07/13/22 1206       Plan    Plan Home with spouse and continue with [facility].  Referral [facility] for IV abx.                         Yris Arechiga RN

## 2022-07-13 NOTE — PLAN OF CARE
Problem: Adult Inpatient Plan of Care  Goal: Absence of Hospital-Acquired Illness or Injury  Intervention: Prevent Infection  Description: Maintain skin and mucous membrane integrity; promote hand, oral and pulmonary hygiene.  Optimize fluid balance, nutrition, sleep and glycemic control to maximize infection resistance.  Identify potential sources of infection early to prevent or mitigate progression of infection (e.g., wound, lines, devices).  Evaluate ongoing need for invasive devices; remove promptly when no longer indicated.  Recent Flowsheet Documentation  Taken 7/13/2022 0623 by Kamran Vazquez RN  Infection Prevention:   rest/sleep promoted   environmental surveillance performed  Taken 7/13/2022 0455 by Kamran Vazquez RN  Infection Prevention: rest/sleep promoted  Taken 7/13/2022 0157 by Kamran Vazquez RN  Infection Prevention:   rest/sleep promoted   environmental surveillance performed  Taken 7/13/2022 0004 by Kamran Vazquez RN  Infection Prevention:   rest/sleep promoted   environmental surveillance performed   single patient room provided  Taken 7/12/2022 2058 by Kamran Vazquez RN  Infection Prevention:   single patient room provided   rest/sleep promoted   Goal Outcome Evaluation  Patient not resting well this shift, patient sitting up in chair most of the night. VSS, metoprolol held due to low heart rate and soft blood pressure.

## 2022-07-13 NOTE — THERAPY TREATMENT NOTE
Patient Name: Jalen Lockwood  : 1950    MRN: [id number]                              Today's Date: 2022       Admit Date: 7/10/2022    Visit Dx:     ICD-10-CM ICD-9-CM   1. Acute UTI  N39.0 599.0   2. Cellulitis of left foot  L03.116 682.7   3. Anemia, unspecified type  D64.9 285.9   4. Ischemic cardiomyopathy  I25.5 414.8   5. Normocytic anemia  D64.9 285.9   6. Urinary retention  R33.9 788.20   7. Anemia of chronic disease  D63.8 285.29   8. Gastroparesis  K31.84 536.3   9. Obesity (BMI 30-39.9)  E66.9 278.00     Patient Active Problem List   Diagnosis   • Chronic osteomyelitis (HCC)   • Foot pain   • Peripheral neuropathy   • Lymphedema of both lower extremities   • Permanent atrial fibrillation (HCC)   • Sleep apnea   • Chronic edema   • Obesity (BMI 30-39.9)   • COPD (chronic obstructive pulmonary disease) (HCC)   • Atrial flutter (HCC)   • 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HCC)   • Aortectasia (HCC)   • Popliteal artery aneurysm (HCC)   • Colon polyps   • Gastroparesis   • Insomnia   • Adenomatous polyp of colon   • Essential hypertension   • ETOH abuse   • Chronic anticoagulation   • Oropharyngeal dysphagia   • Tobacco abuse   • Thyromegaly   • Adrenal adenoma, left   • Retroperitoneal lymphadenopathy   • Anemia of chronic disease   • Thyroid nodule   • Charcot's joint of foot   • Abnormal CT scan, lumbar spine   • Alcohol dependence, in remission (HCC)   • Ischemic cardiomyopathy   • Normocytic anemia   • Inguinal adenopathy   • Elevated lactic acid level   • Cellulitis of left foot   • Urinary retention   • Sepsis without acute organ dysfunction (HCC)   • Acute UTI   • Febrile illness     Past Medical History:   Diagnosis Date   • Allergic rhinitis    • Anxiety    • Aortectasia (HCC)     3cm infrarenal abdominal aorta   • Arthritis    • Atrial flutter (HCC) 2010    s/p ablation    • Charcot's joint of foot    • Chronic edema     both legs and sees wound care center at [place]    • Chronic  venous insufficiency    • COPD (chronic obstructive pulmonary disease) (HCC)    • Coronary atherosclerosis     Cath 2010: diffuse 40-50% disease   • Diverticulosis    • Duodenitis    • Fatty liver    • Gastritis    • Gastroparesis    • Hematoma     post-operative; After catheterization, right groin, required surgical exploration   • Hyperlipidemia    • Hypertension    • Insomnia    • Internal hemorrhoids    • Open wound     izzy legs has drsg chg weekly at wound care center at [place]  pt does second dressing on left leg another time during week   • Osteomyelitis (HCC)    • Paroxysmal atrial fibrillation (HCC)    • Peripheral neuropathy    • Popliteal artery aneurysm (HCC)     left, s/p stenting by Dr. Boston   • Skin cancer    • Sleep apnea     o2   • 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HCC)     due to flutter and afib; cath 2010 with nonobstructive disease   • Venous stasis    • Venous stasis ulcer (HCC)     bilateral legs      Past Surgical History:   Procedure Laterality Date   • BASAL CELL CARCINOMA EXCISION      ear and left side of face   • BRONCHOSCOPY N/A 4/12/2021    Procedure: BRONCHOSCOPY WITH BAL;  Surgeon: Bunny Lepe MD;  Location: [facility] ENDOSCOPY;  Service: Pulmonary;  Laterality: N/A;  PRE-HEMOPTYSIS  POST-SAME   • CARDIAC CATHETERIZATION     • CATARACT EXTRACTION     • COLONOSCOPY  09/28/2015    NBIH, diverticulosis, polyps   • COLONOSCOPY N/A 9/11/2018    Procedure: COLONOSCOPY TO CECUM  AND TERM. ILEUM WITH COLD SNARE POLYPECTOMIES;  Surgeon: Kane Lagunas MD;  Location: [facility] ENDOSCOPY;  Service: Gastroenterology   • COLONOSCOPY N/A 10/29/2019    Procedure: COLONOSCOPY TO TO CECUM AND TERMINAL ILEUM WITH HOT AND COLD SNARE POLYPECTOMIES;  Surgeon: Kane Lagunas MD;  Location: [facility] ENDOSCOPY;  Service: Gastroenterology   • HIP ARTHROPLASTY Right 2017   • JOINT REPLACEMENT Left    • OTHER SURGICAL HISTORY      Catheter ablation atrial flutter   • REPAIR ANEURYSM / PSEUDO ANEURYSM /  RUPTURED ANEURYSM POPLITEAL ARTERY      Stent-Graft of the the left popliteal artery   • REPAIR KNEE LIGAMENT      Primary repair of knee ligament cruciate anterior right   • TONSILLECTOMY  1958   • TOTAL KNEE ARTHROPLASTY Bilateral    • UPPER GASTROINTESTINAL ENDOSCOPY  09/16/2014    acute gastritis, acute duodenitis      General Information     Row Name 07/13/22 1326          Physical Therapy Time and Intention    Document Type therapy note (daily note)  -EB     Mode of Treatment individual therapy;physical therapy  -EB     Row Name 07/13/22 1326          General Information    Existing Precautions/Restrictions fall  -EB     Row Name 07/13/22 1326          Cognition    Orientation Status (Cognition) oriented x 3  -EB     Row Name 07/13/22 1326          Safety Issues, Functional Mobility    Safety Issues Affecting Function (Mobility) positioning of assistive device  -EB     Impairments Affecting Function (Mobility) strength;endurance/activity tolerance  -EB           User Key  (r) = Recorded By, (t) = Taken By, (c) = Cosigned By    Initials Name Provider Type    EB Leslie Magana PTA Physical Therapist Assistant               Mobility     Row Name 07/13/22 1326          Bed Mobility    Comment, (Bed Mobility) NT-UIC  -EB     Row Name 07/13/22 1326          Sit-Stand Transfer    Sit-Stand Floyd (Transfers) contact guard  -EB     Assistive Device (Sit-Stand Transfers) walker, front-wheeled  -EB     Comment, (Sit-Stand Transfer) cues for hand placement  -EB     Row Name 07/13/22 1326          Gait/Stairs (Locomotion)    Floyd Level (Gait) contact guard  -EB     Assistive Device (Gait) walker, front-wheeled  -EB     Distance in Feet (Gait) 60ft  -EB     Deviations/Abnormal Patterns (Gait) base of support, wide;gait speed decreased;stride length decreased  -EB     Floyd Level (Stairs) contact guard  -EB     Handrail Location (Stairs) both sides  -EB     Number of Steps (Stairs) 4  -EB     Ascending  Technique (Stairs) step-to-step  -EB     Descending Technique (Stairs) step-to-step  -EB     Comment, (Gait/Stairs) pt with ER of left LE. cues for pt to try to keep feet inside walker but has difficulty.  -EB           User Key  (r) = Recorded By, (t) = Taken By, (c) = Cosigned By    Initials Name Provider Type    Leslie Cruz PTA Physical Therapist Assistant               Obj/Interventions    No documentation.                Goals/Plan    No documentation.                Clinical Impression     Row Name 07/13/22 1329          Plan of Care Review    Plan of Care Reviewed With patient  -EB     Progress improving  -EB     Outcome Evaluation Pt tolerated treatment with no complaints. Pt is CGA with transfers. Pt ambulated 60ft with rwx, CGA. Cues for pt to keep feet inside walker during ambulation as pt has a wide CARROLL and hits is feet on the walker. Pt navigated 4 steps with HRs both sides. Pt needed CGA but no unsteadiness or LOB with mobility. Pt ok to d/c home with assist and HHPT.  -EB     Row Name 07/13/22 1329          Therapy Assessment/Plan (PT)    Therapy Frequency (PT) 5 times/wk  -EB     Row Name 07/13/22 1329          Positioning and Restraints    Pre-Treatment Position sitting in chair/recliner  -EB     Post Treatment Position chair  -EB     Bathroom sitting;call light within reach;encouraged to call for assist;exit alarm on;with OT  -EB           User Key  (r) = Recorded By, (t) = Taken By, (c) = Cosigned By    Initials Name Provider Type    Leslie Cruz PTA Physical Therapist Assistant               Outcome Measures     Row Name 07/13/22 4810          How much help from another person do you currently need...    Turning from your back to your side while in flat bed without using bedrails? 3  -EB     Moving from lying on back to sitting on the side of a flat bed without bedrails? 3  -EB     Moving to and from a bed to a chair (including a wheelchair)? 3  -EB     Standing up from a chair using  your arms (e.g., wheelchair, bedside chair)? 3  -EB     Climbing 3-5 steps with a railing? 3  -EB     To walk in hospital room? 3  -EB     AM-PAC 6 Clicks Score (PT) 18  -EB     Highest level of mobility 6 --> Walked 10 steps or more  -           User Key  (r) = Recorded By, (t) = Taken By, (c) = Cosigned By    Initials Name Provider Type    EB Leslie Magana PTA Physical Therapist Assistant                             Physical Therapy Education                 Title: PT OT SLP Therapies (Done)     Topic: Physical Therapy (Done)     Point: Mobility training (Done)     Learning Progress Summary           Patient Acceptance, E,D, VU,DU by  at 7/13/2022 1331    Acceptance, E,TB,D, VU,NR by  at 7/11/2022 1153                   Point: Home exercise program (Done)     Learning Progress Summary           Patient Acceptance, E,D, VU,DU by  at 7/13/2022 1331                   Point: Body mechanics (Done)     Learning Progress Summary           Patient Acceptance, E,D, VU,DU by  at 7/13/2022 1331    Acceptance, E,TB,D, VU,NR by CB at 7/11/2022 1153                   Point: Precautions (Done)     Learning Progress Summary           Patient Acceptance, E,D, VU,DU by  at 7/13/2022 1331    Acceptance, E,TB,D, VU,NR by CB at 7/11/2022 1153                               User Key     Initials Effective Dates Name Provider Type Discipline     06/16/21 -  Leslie Magana PTA Physical Therapist Assistant PT    CB 10/22/21 -  Jolynn Edouard PT Physical Therapist PT              PT Recommendation and Plan     Plan of Care Reviewed With: patient  Progress: improving  Outcome Evaluation: Pt tolerated treatment with no complaints. Pt is CGA with transfers. Pt ambulated 60ft with rwx, CGA. Cues for pt to keep feet inside walker during ambulation as pt has a wide CARROLL and hits is feet on the walker. Pt navigated 4 steps with HRs both sides. Pt needed CGA but no unsteadiness or LOB with mobility. Pt ok to d/c home with assist and  HHPT.     Time Calculation:    PT Charges     Row Name 07/13/22 1325             Time Calculation    Start Time 0954  -EB      Stop Time 1013  -EB      Time Calculation (min) 19 min  -EB      PT Received On 07/13/22  -EB      PT - Next Appointment 07/14/22  -EB              Time Calculation- PT    Total Timed Code Minutes- PT 19 minute(s)  -EB            User Key  (r) = Recorded By, (t) = Taken By, (c) = Cosigned By    Initials Name Provider Type    EB Leslie Magana PTA Physical Therapist Assistant              Therapy Charges for Today     Code Description Service Date Service Provider Modifiers Qty    80165460218 HC GAIT TRAINING EA 15 MIN 7/13/2022 Leslie Magana PTA GP 1    11403566911 HC PT THER SUPP EA 15 MIN 7/13/2022 Leslie Magana PTA GP 1          PT G-Codes  Outcome Measure Options: AM-PAC 6 Clicks Basic Mobility (PT)  AM-PAC 6 Clicks Score (PT): 18    Leslie Magana PTA  7/13/2022

## 2022-07-13 NOTE — PLAN OF CARE
Goal Outcome Evaluation:  Plan of Care Reviewed With: patient        Progress: improving  Outcome Evaluation: Pt tolerated treatment with no complaints. Pt is CGA with transfers. Pt ambulated 60ft with rwx, CGA. Cues for pt to keep feet inside walker during ambulation as pt has a wide CARROLL and hits is feet on the walker. Pt navigated 4 steps with HRs both sides. Pt needed CGA but no unsteadiness or LOB with mobility. Pt ok to d/c home with assist and HHPT.    ..Patient was wearing a face mask during this therapy encounter. Therapist used appropriate personal protective equipment including eye protection, mask, and gloves.  Mask used was standard procedure mask. Appropriate PPE was worn during the entire therapy session. Hand hygiene was completed before and after therapy session. Patient is not in enhanced droplet precautions.

## 2022-07-13 NOTE — PLAN OF CARE
Goal Outcome Evaluation:              Outcome Evaluation: midline placed RUE. [place] of BLE done, wound replaced dressings afterwards. Plan to d/c home 7/14 w/ home health. Pt wife wants him on oral abx once IV abx are completed to make sure infection is for sure gone after d/c, will discuss concerns w/ MD's.

## 2022-07-13 NOTE — SIGNIFICANT NOTE
07/13/22 1519   Midline Catheter - Single Lumen 07/13/22 Right Basilic   Placement Date/Time: 07/13/22 1518   Hand Hygiene Completed: Yes  Site Prep: Chlorhexidine isopropyl alcohol  All 5 Sterile Barriers Used (Gloves, Gown, Cap, Mask, Large Sterile Drape): Yes  Orientation: Right  Location: Basilic  Size (Fr): 3  Initial...   Site Assessment Clean;Dry;Intact   Line Status Blood return noted;Capped;Flushed;Saline locked   Length ange (cm) 20 cm   Extremity Circumference (cm) 41 cm   Dressing Type Border Dressing;Transparent;Antimicrobial dressing/disc   Liquid Adhesive Contraindicated (comment)   Dressing Change Due 07/20/22   Indication/Daily Review of Necessity blood sampling;intravenous fluid therapy;intravenous medication therapy         2 needles, 2 guidewires, and 1 scalpel accounted for and disposed of properly.    Midline is approved for use

## 2022-07-13 NOTE — NURSING NOTE
Notified Dr. Segundo that lab reported critical- leg wound is positive for both MDRO pseudomonas and ESBL e.coli. Dr. Jaramillo w/ ID notified as well.

## 2022-07-13 NOTE — PROGRESS NOTES
[facility]: Warfarin Dosing/Monitoring Consult    Jalen Lockwood is a 72 y.o. male, estimated creatinine clearance is 145.1 mL/min (A) (by C-G formula based on SCr of 0.7 mg/dL (L)). weighing 134 kg (296 lb).    Results from last 7 days   Lab Units 07/13/22  0731 07/12/22  0744 07/11/22  0747 07/10/22  2139 07/10/22  1334   INR  1.89* 1.85* 1.70* 1.62* 1.47*   APTT seconds  --   --   --   --  48.4*   HEMOGLOBIN g/dL 9.7* 9.6* 9.3*  --  10.9*   HEMATOCRIT % 28.3* 28.6* 28.0*  --  32.3*   PLATELETS 10*3/mm3 144 131* 111*  --  145   Prior to admission anticoagulation: Warfarin managed by [facility]. Most up-to-date warfarin dosing regimen is 7.5 mg every Friday and 5 mg all other days. Patient's INR was 1.30 on 7/8/22, patient was instructed to boost with 7.5 mg doses on 7/8 and 7/9. INR is only 1.47 today despite boosted doses.     Hospital Anticoagulation:  Consulting provider: Dr. Singh with [facility]  Start date: Continuation of home medication  Indication: Permament A.fib  Target INR: 2 - 3  Expected duration: Indefinite   Bridge Therapy: No      Potential food or drug interactions: Cefepime may increase sensitivity to warfarin due to eradication of vitamin K producing gut darian    Education complete: Defer, patient followed by our warfarin clinic    Assessment/Plan:  Dose: INR continuing to increase but is still subtherapeutic at 1.89.therefore will give warfarin 10 mg x1 again today.  Monitor for any signs or symptoms of bleeding  Follow up daily INRs and dose adjustments    Pharmacy will continue to follow until discharge or discontinuation of warfarin.     Arden Merida, [facility]  Clinical Pharmacist

## 2022-07-14 PROBLEM — I87.2 VENOUS STASIS DERMATITIS OF BOTH LOWER EXTREMITIES: Status: ACTIVE | Noted: 2022-01-01

## 2022-07-14 PROBLEM — I48.0 PAROXYSMAL ATRIAL FIBRILLATION WITH RAPID VENTRICULAR RESPONSE (HCC): Status: ACTIVE | Noted: 2022-01-01

## 2022-07-14 PROBLEM — T83.511A UTI (URINARY TRACT INFECTION) DUE TO URINARY INDWELLING CATHETER (HCC): Status: ACTIVE | Noted: 2022-01-01

## 2022-07-14 PROBLEM — J96.21 ACUTE ON CHRONIC RESPIRATORY FAILURE WITH HYPOXIA: Status: ACTIVE | Noted: 2022-01-01

## 2022-07-14 PROBLEM — J15.9 BACTERIAL PNEUMONIA: Status: ACTIVE | Noted: 2022-01-01

## 2022-07-14 NOTE — PROGRESS NOTES
LOS: 4 days     Chief Complaint: Recurrent fever    Interval History: Patient noticed this morning to have increased heart rate and temp of 100.9.  He was noted to be more restless and breathing at a much more rapid pace of 35-40 respirations per minute.    Patient reports he is not feeling any more short of breath however does have a cough.  Unclear as to the validity history because it seems as though it seems to change depending on who is asking the question.  States he is not productive of sputum however nurse notes that he has coughed up thick sputum today.  He does report overall fatigue however no nausea, vomiting, diarrhea, fever, chills, dysuria, rash.    Procalcitonin repeated this morning which is normalized and BMP appears stable.  ABG obtained and CBC is without leukocytosis.    Vital Signs  Temp:  [97.5 °F (36.4 °C)-101.6 °F (38.7 °C)] 101.6 °F (38.7 °C)  Heart Rate:  [] 148  Resp:  [17-38] 38  BP: (118-140)/(65-96) 140/94    Physical Exam:  General: In no acute distress  HEENT: Oropharynx clear, moist mucous membranes  Cardiovascular: Markedly tachycardic, normal S1 and S2, no M/R/G  Respiratory: Bilateral rhonchi.  GI: Soft, NT/ND, + bowel sounds bilaterally, no masses  Skin: Bilateral lower extremity venous stasis and lymphedema chronic changes.  Improvement in his intertriginous candidal infection.  Bilateral upper extremity ecchymoses.  Extremities: Bilateral lower extremity venous stasis and lymphedema.  Bilateral leg wraps in place.  Access: Right upper extremity midline    Antibiotics:  Anti-Infectives (From admission, onward)    Ordered     Dose/Rate Route Frequency Start Stop    07/13/22 1433  cefepime 2 gm IVPB in 100 ml NS (VTB)        Ordering Provider: Lorenzo Segundo MD    2 g Intravenous Every 8 Hours 07/13/22 0000 07/18/22 6249    07/12/22 0957  cefepime 2 gm IVPB in 100 ml NS (VTB)        Ordering Provider: Fahad Jaramillo, DO    2 g  over 30 Minutes Intravenous Every 8  Hours 07/12/22 1600 07/18/22 1559    07/10/22 1503  cefepime 2 gm IVPB in 100 ml NS (VTB)        Ordering Provider: Glory Yousif APRN    2 g  over 30 Minutes Intravenous Once 07/10/22 1505 07/10/22 1600    07/10/22 1418  vancomycin 2750 mg/1000 mL 0.9% NS IVPB        Ordering Provider: Glory Yousif APRN    20 mg/kg × 134 kg Intravenous Once 07/10/22 1420 07/10/22 1559           Results Review:     I reviewed the patient's new clinical results.  I reviewed the patient's new imaging results and agree with the interpretation.    Lab Results   Component Value Date    WBC 4.26 07/13/2022    HGB 9.7 (L) 07/13/2022    HCT 28.3 (L) 07/13/2022    MCV 91.3 07/13/2022     07/13/2022     Lab Results   Component Value Date    GLUCOSE 93 07/14/2022    BUN 10 07/14/2022    CREATININE 0.73 (L) 07/14/2022    EGFRIFNONA 103 01/12/2022    EGFRIFAFRI 102 11/20/2018    BCR 13.7 07/14/2022    CO2 27.0 07/14/2022    CALCIUM 9.1 07/14/2022    PROTENTOTREF 6.1 04/26/2019    ALBUMIN 3.10 (L) 07/11/2022    LABIL2 1.1 04/26/2019    AST 13 07/11/2022    ALT 7 07/11/2022       Microbiology:  7/10 respiratory panel negative  7/10 blood cultures no growth to date  7/10 urinary culture with greater than 100,000 Enterobacter cloacae  7/10 wound culture from the left foot with Pseudomonas aeruginosa and E. coli  7/11 MRSA nares positive    New imaging:  Chest x-ray reviewed by me with vascular engorgement however no evidence of pleural effusion or acute infiltrate.    Assessment    #Recurrent fever  #Right lower lobe pneumonia  #Encephalopathy  #Catheter associated UTI  #Candidal intertrigo, improved  #Acute on chronic hypoxic respiratory failure  #Chronic lower extremity venous stasis  #Bilateral lower extremity lymphedema  #Chronic urinary retention with chronic Irvin    Repeat fever again today and agree with obtaining repeat blood cultures.  Procalcitonin repeat and WBC are reassuring.  Sputum culture ordered and  discussed at bedside with patient's nurse.  Patient is a poor historian making history taking difficult and given the recurrence of his fever on antibiotic therapy will rule out any deeper seeded infection. Respiratory status has had somewhat of a change and plan to obtain CT imaging of the chest and abdomen today.    Continue IV cefepime while the above work-up is pending.  Plan to add back vancomycin given his positive MRSA nares screen and worsening respiratory status.    ID will follow.

## 2022-07-14 NOTE — PROGRESS NOTES
"[facility]: Vancomycin Pharmacokinetic Initial Consult Note    Jalen Lockwood is a 72 y.o. male who is on day 1 of pharmacy to dose vancomycin.    Indication: PNA  Consulting Provider: Clint  Planned Duration of Therapy: 5 days  Loading Dose Ordered or Given: 2750 mg on 7/10/22   MRSA PCR performed: yes; Result: was positive but vanco was stopped on 7/12/22  Culture/Source: wound/urine/blood (see micro results)  Target: -600 mg/L.hr   Other Antimicrobials: cefepime    Vitals/Labs  Ht: 193 cm (76\"); Wt: 134 kg (296 lb)  Temp Readings from Last 1 Encounters:   07/14/22 (!) 101.6 °F (38.7 °C)    Estimated Creatinine Clearance: 139.2 mL/min (A) (by C-G formula based on SCr of 0.73 mg/dL (L)).       Results from last 7 days   Lab Units 07/14/22  1033 07/14/22  0551 07/13/22  0731 07/12/22  0744   CREATININE mg/dL  --  0.73* 0.70* 0.65*   WBC 10*3/mm3 7.47  --  4.26 5.21     Assessment/Plan:    Vancomycin Dose: Will restart previous dose of 1250 mg IV every 12 hours that was dced on 7/12/22.  Predictive AUC level for the dose ordered is 451 mg/L.hr, which is within the target of 400-600 mg/L.hr  Vanc Trough has been ordered for 7/16/22 at 1430     Pharmacy will follow patient's kidney function and will adjust doses and obtain levels as necessary. Thank you for involving pharmacy in this patient's care. Please contact pharmacy with any questions or concerns.                           Arden Merida, [facility]  Clinical Pharmacist   "

## 2022-07-14 NOTE — PLAN OF CARE
Goal Outcome Evaluation:  Plan of Care Reviewed With: patient        Progress: improving  Outcome Evaluation: VSS; PRN pain med given throughout the night; Pt up in chair for part of the night; Pt ambulated to the bathroom and had a BM; Possible discharge today home with home health

## 2022-07-14 NOTE — SIGNIFICANT NOTE
07/14/22 1554   OTHER   Discipline physical therapy assistant   Rehab Time/Intention   Session Not Performed other (see comments)  (Pt is not appropriate for PT today. Pt with high resp rate, temp and HR. Will f/u with pt tomorrow.)   Recommendation   PT - Next Appointment 07/15/22

## 2022-07-14 NOTE — PROGRESS NOTES
Name: Jalen Lockwood ADMIT: 7/10/2022   : 1950  PCP: Marc Ludwig MD    MRN: [id number] LOS: 4 days   AGE/SEX: 72 y.o. male  ROOM: [facility]/     Subjective   Subjective   Notified by nursing this AM prior to seeing patient that he was febrile at 100.9 and that his HR was sustaining 140-160s in afib. He apparently was planned for discharge home today but is now more restless with hemodynamic changes.     Patient seen at bedside. No family present. He is mildly confused/restless. He denies any chest pain. Reports dyspnea to me. Some nausea, but no abdominal pain. He has a mild cough. Difficult to get full orientation questions as he tends to lack focus with questioning. Nursing reports that he missed a dose of Coreg last night. Recently got his metoprolol succinate this AM about an hour ago. HR still elevated. I did order blood cultures, procal, BNP and repeat CXR before assessing him.     Objective   Objective   Vital Signs  Temp:  [97.5 °F (36.4 °C)-101.6 °F (38.7 °C)] 98.6 °F (37 °C)  Heart Rate:  [] 115  Resp:  [18-38] 22  BP: (111-140)/(65-94) 111/81  SpO2:  [93 %-100 %] 96 %  on  Flow (L/min):  [2-3] 3;   Device (Oxygen Therapy): nasal cannula  Body mass index is 36.03 kg/m².     Physical Exam  Constitutional:       Appearance: He is ill-appearing.   HENT:      Head: Normocephalic and atraumatic.   Cardiovascular:      Rate and Rhythm: Tachycardia present. Rhythm irregular.      Pulses: Normal pulses.   Pulmonary:      Effort: Respiratory distress (tachypnea and accessory muscle use noted. on 3 L at 96% (baseline oxygen at home)) present.      Breath sounds: Wheezing and rhonchi present.   Abdominal:      General: Bowel sounds are normal. There is no distension.      Palpations: Abdomen is soft.      Tenderness: There is no abdominal tenderness.   Genitourinary:     Comments: Irvin with dark yellow urine noted.  Musculoskeletal:         General: Swelling present. Normal range of motion.       Comments: BLE with wraps in place.   Skin:     General: Skin is warm and dry.      Findings: No bruising.   Neurological:      Mental Status: He is alert.      Sensory: No sensory deficit.      Motor: Weakness present.      Coordination: Coordination normal.      Comments: Aware of self and place. Some confusion to situation.   Psychiatric:      Comments: A little anxious/restless. Poor attention span.        Results Review:       I reviewed the patient's new clinical results.  Results from last 7 days   Lab Units 07/14/22  1033 07/13/22  0731 07/12/22  0744 07/11/22  0747   WBC 10*3/mm3 7.47 4.26 5.21 4.90   HEMOGLOBIN g/dL 11.2* 9.7* 9.6* 9.3*   PLATELETS 10*3/mm3 186 144 131* 111*     Results from last 7 days   Lab Units 07/14/22  0551 07/13/22  1944 07/13/22  0731 07/12/22 0744 07/11/22  0747   SODIUM mmol/L 136  --  136 137 134*   POTASSIUM mmol/L 3.9 4.0 3.4* 3.8 3.7   CHLORIDE mmol/L 103  --  103 106 100   CO2 mmol/L 27.0  --  25.8 24.0 25.0   BUN mg/dL 10  --  9 10 11   CREATININE mg/dL 0.73*  --  0.70* 0.65* 0.63*   GLUCOSE mg/dL 93  --  108* 98 116*   Estimated Creatinine Clearance: 139.2 mL/min (A) (by C-G formula based on SCr of 0.73 mg/dL (L)).  Results from last 7 days   Lab Units 07/11/22  0747 07/10/22  1334   ALBUMIN g/dL 3.10* 3.60   BILIRUBIN mg/dL 0.9 1.1   ALK PHOS U/L 83 95   AST (SGOT) U/L 13 16   ALT (SGPT) U/L 7 11     Results from last 7 days   Lab Units 07/14/22  0551 07/13/22  0731 07/12/22  0744 07/11/22  0747 07/10/22  2306 07/10/22  1334   CALCIUM mg/dL 9.1 8.8 8.2* 8.6  --  9.4   ALBUMIN g/dL  --   --   --  3.10*  --  3.60   MAGNESIUM mg/dL  --   --   --  2.2 1.8 1.8     Results from last 7 days   Lab Units 07/14/22  0551 07/11/22  0747 07/10/22  1334   PROCALCITONIN ng/mL 0.12 0.36* 0.10   LACTATE mmol/L  --  0.9 1.4     No results found for: HGBA1C, POCGLU    budesonide-formoterol, 2 puff, Inhalation, BID - RT  cefepime, 2 g, Intravenous, Q8H  docusate sodium, 100 mg, Oral,  Daily  finasteride, 5 mg, Oral, Daily  furosemide, 40 mg, Intravenous, Q12H  gabapentin, 400 mg, Oral, Q8H  ipratropium-albuterol, 3 mL, Nebulization, 4x Daily - RT  metoclopramide, 10 mg, Oral, 4x Daily AC & at Bedtime  [START ON 7/15/2022] metoprolol succinate XL, 50 mg, Oral, Q24H  pravastatin, 20 mg, Oral, Daily  sodium chloride, 10 mL, Intravenous, Q12H  sodium chloride, 10 mL, Intravenous, Q12H  sodium chloride, 10 mL, Intravenous, Q12H  vancomycin, 1,250 mg, Intravenous, Q12H  warfarin, 5 mg, Oral, Once      Pharmacy to dose vancomycin,   Pharmacy to dose warfarin,     Diet Regular; Cardiac       Assessment/Plan     Active Hospital Problems    Diagnosis  POA   • **Bacterial pneumonia [J15.9]  Yes   • Acute on chronic respiratory failure with hypoxia ([facility]) [J96.21]  Yes   • Paroxysmal atrial fibrillation with rapid ventricular response ([facility]) [I48.0]  Yes   • UTI (urinary tract infection) due to urinary indwelling catheter ([facility]) [T83.511A, N39.0]  Yes   • Venous stasis dermatitis of both lower extremities [I87.2]  Yes   • Encephalopathy, metabolic [G93.41]  Yes   • Ischemic cardiomyopathy [I25.5]  Yes   • Alcohol dependence, in remission ([facility]) [F10.21]  Yes   • Sepsis ([facility]) [A41.9]  Yes   • Charcot's joint of foot [M14.679]  Yes   • Chronic anticoagulation [Z79.01]  Not Applicable   • Essential hypertension [I10]  Yes   • COPD (chronic obstructive pulmonary disease) ([facility]) [J44.9]  Yes   • Sleep apnea [G47.30]  Yes   • Lymphedema of both lower extremities [I89.0]  Yes      Resolved Hospital Problems   No resolved problems to display.     Mr. Lockwood is a 72 year old male who presented to the hospital for confusion and fever in the setting of chronic escamilla and chronic venous stasis wounds. CXR on admission showed possible RLL pneumonia and UA with pyuria and bacteria. He was seen in consultation by ID and placed empirically on antibiotics.     · Bacterial pneumonia/acute on chronic respiratory failure: ID following  and has had him on Cefepime with initial improvement. MRSA nares positive but not felt to have current infection so not on vancomycin. Has had recurrent fever 7/14 with associated dyspnea/tachypnea. Repeat CXR with pulmonary vascular engorgement. ABGs with respiratory alkalosis likely from hyperventilation/swallow breaths. HR too elevated for breathing treatments. Will ask Pulmonology to weigh in. Possible COPD exacerbation contributing? Repeat blood cultures have been sent and procal is improved.   · UTI/chronic escamilla: Urine with MDR enterobacter sensitive to cefepime. Difficult to determine if active infection with possible pneumonia. ID has on cefepime to cover this as well. High risk for recurrent infections.  · Venous stasis dermatitis: Wound cultures on admission with MDR pseudomonas and ESBL E.coli. Felt colonized. Local wound care with wraps.  · Sepsis: Secondary to the above and now screening this way again. ID managing. Await repeat cultures. They added sputum culture.  · PAF with RVR: Had some cruz issues prior to 7/14 so Coreg being transitioned to PO metoprolol. Missed Coreg dose last night and now having elevated rates with fever. Suspect his dyspnea largely d/t this and fever as well. Lopressor IV x 1 ordered and Cardiology has been consulted. He is therapeutic on warfarin.  · Encephalopathy: Multifactorial d/t the above. Monitor for improvement.  · COPD/BECKI: Pulm to see as above. Chronically on 2 L at home.  · ICM: Followed by Dr. Henry. Fluid status is somewhat difficult to assess with his obesity and chronic edema. BNP is elevated but not far from prior levels. Has been on oral lasix. With RVR, Cardiology has added IV Lasix as well.     Discussed with patient, nurse and Dr. Gonzalez.    Appreciate all specialty input.    VTE Prophylaxis - Warfarin (home med)  Code Status - Full code  Disposition - Anticipate discharge TBD.      ESTEFANY Kern  [facility]  07/14/22  14:16  EDT

## 2022-07-14 NOTE — PROGRESS NOTES
[facility] Clinical [facility]acy Services: Warfarin Dosing/Monitoring Consult    Jalen Lockwood is a 72 y.o. male, estimated creatinine clearance is 139.2 mL/min (A) (by C-G formula based on SCr of 0.73 mg/dL (L)). weighing 134 kg (296 lb).    Results from last 7 days   Lab Units 07/14/22  0551 07/13/22  0731 07/12/22  0744 07/11/22  0747 07/10/22  2139 07/10/22  1334   INR  2.09* 1.89* 1.85* 1.70* 1.62* 1.47*   APTT seconds  --   --   --   --   --  48.4*   HEMOGLOBIN g/dL  --  9.7* 9.6* 9.3*  --  10.9*   HEMATOCRIT %  --  28.3* 28.6* 28.0*  --  32.3*   PLATELETS 10*3/mm3  --  144 131* 111*  --  145   Prior to admission anticoagulation: Warfarin managed by [facility]. Most up-to-date warfarin dosing regimen is 7.5 mg every Friday and 5 mg all other days. Patient's INR was 1.30 on 7/8/22, patient was instructed to boost with 7.5 mg doses on 7/8 and 7/9. INR is only 1.47 today despite boosted doses.     Hospital Anticoagulation:  Consulting provider: Dr. Singh with [facility]  Start date: Continuation of home medication  Indication: Permament A.fib  Target INR: 2 - 3  Expected duration: Indefinite   Bridge Therapy: No      Potential food or drug interactions: Cefepime may increase sensitivity to warfarin due to eradication of vitamin K producing gut darian    Education complete: Defer, patient followed by our warfarin clinic    Assessment/Plan:  Dose: INR this am is in target at 2.09; therefore will give patient his normal Thursday dose of warfarin 5 mg today. Patient usually gets 7.5mg on Fridays so if INR is in target tomorrow we will restart previous regimen that is managed by [facility].   Monitor for any signs or symptoms of bleeding  Follow up daily INRs and dose adjustments    Pharmacy will continue to follow until discharge or discontinuation of warfarin.     Arden Merida, [facility]  Clinical Pharmacist

## 2022-07-14 NOTE — TELEPHONE ENCOUNTER
Caller: Mariposa Lockwood    Relationship: Emergency Contact    Best call back number: [phone]    Who are you requesting to speak with (clinical staff, provider,  specific staff member): CLINICAL STAFF     What was the call regarding: CALLING TO CHECK THE STATUS OF LIFT CHAIR PRESCRIPTION NEEDS DONE BEFORE Tuesday     Do you require a callback: YES

## 2022-07-14 NOTE — CONSULTS
[facility]  [phone]  Dr. David Bravo      Subjective   LOS: 4 days     Thank you for this consultation.  72-year-old male who has a diagnosis of COPD.  Comes in with fever and shortness of breath.  Patient tells me he lives at home with his wife.  He had recent hospitalization for cellulitis in the lower extremities and urinary retention with chronic indwelling Irvin catheter.  He continues to have intermittent fevers despite antibiotics.  A CT chest was done and we were asked to see.  He has evidence for a right lower lobe pneumonia.  His current alcohol use is reported as 2 drinks a night.  Also current smoker 6 cigarettes a day though previously up to a pack a day.  Uses oxygen and inhalers/nebulizers at home.  In the past he has had a swallow test which showed dysphagia.  However he takes regular food.  He denies history of stroke.  He does have paroxysmal A. fib and ischemic cardiomyopathy though his EF is now recovered to 55% on last check.    Jalen Lockwood  reports current alcohol use of about 14.0 standard drinks of alcohol per week.,  reports that he has been smoking cigarettes. He started smoking about 58 years ago. He has been smoking about 0.25 packs per day. He has never used smokeless tobacco.     Past Hx:  has a past medical history of Allergic rhinitis, Anxiety, Aortectasia (HCC), Arthritis, Atrial flutter (HCC) (2010), Charcot's joint of foot, Chronic edema, Chronic venous insufficiency, COPD (chronic obstructive pulmonary disease) (HCC), Coronary atherosclerosis, Diverticulosis, Duodenitis, Fatty liver, Gastritis, Gastroparesis, Hematoma, Hyperlipidemia, Hypertension, Insomnia, Internal hemorrhoids, Open wound, Osteomyelitis (HCC), Paroxysmal atrial fibrillation (HCC), Peripheral neuropathy, Popliteal artery aneurysm (HCC), Skin cancer, Sleep apnea, 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HCC), Venous stasis, and Venous stasis ulcer (HCC).  Surg Hx:  has a past surgical history  that includes Total knee arthroplasty (Bilateral); Tonsillectomy (1958); Repair knee ligament; Other surgical history; Repair aneurysm / pseudo aneurysm / ruptured aneurysm popliteal artery; Colonoscopy (09/28/2015); Upper gastrointestinal endoscopy (09/16/2014); Hip Arthroplasty (Right, 2017); Cardiac catheterization; Cataract extraction; Colonoscopy (N/A, 9/11/2018); Joint replacement (Left); Colonoscopy (N/A, 10/29/2019); Bronchoscopy (N/A, 4/12/2021); and Excision basal cell carcinoma.  FH: family history includes Emphysema in his father.  SH:  reports that he has been smoking cigarettes. He started smoking about 58 years ago. He has been smoking about 0.25 packs per day. He has never used smokeless tobacco. He reports current alcohol use of about 14.0 standard drinks of alcohol per week. He reports that he does not use drugs.    Medications Prior to Admission   Medication Sig Dispense Refill Last Dose   • albuterol sulfate  (90 Base) MCG/ACT inhaler Inhale 2 puffs Every 4 (Four) Hours As Needed for Wheezing. 2 g 6    • HYDROcodone-acetaminophen (NORCO)  MG per tablet Take 1 tablet by mouth Every 6 (Six) Hours As Needed for Moderate Pain .      • ALPRAZolam (XANAX) 0.5 MG tablet TAKE 1 TABLET BY MOUTH EVERY DAY AT NIGHT. NEEDS APPT 30 tablet 0    • Atrovent HFA 17 MCG/ACT inhaler INHALE 2 PUFFS BY MOUTH 4 TIMES A DAY 12.9 each 3    • Breo Ellipta 200-25 MCG/INH inhaler INHALE 1 PUFF BY MOUTH EVERY DAY 60 each 4    • carvedilol (COREG) 12.5 MG tablet Take 1 tablet by mouth 2 (Two) Times a Day With Meals. 180 tablet 3    • docusate sodium (COLACE) 100 MG capsule Take 100 mg by mouth Daily.      • finasteride (PROSCAR) 5 MG tablet Take 1 tablet by mouth daily.      • furosemide (LASIX) 40 MG tablet Take 40 mg by mouth Daily.      • gabapentin (NEURONTIN) 600 MG tablet Take 1 tablet by mouth 3 (Three) Times a Day for 30 days. 90 tablet 0    • metoclopramide (REGLAN) 10 MG tablet TAKE 1 TABLET BY MOUTH 4  (FOUR) TIMES A DAY BEFORE MEALS & AT BEDTIME. 360 tablet 3    • OXYGEN-HELIUM IN 2 L into the nostril(s) as directed by provider As Needed.      • pravastatin (PRAVACHOL) 20 MG tablet TAKE 1 TABLET BY MOUTH EVERY DAY 90 tablet 1    • silodosin (RAPAFLO) 8 MG capsule capsule Take 1 capsule by mouth daily. With food.      • warfarin (COUMADIN) 5 MG tablet As directed  Indications: Atrial Fibrillation      • warfarin (COUMADIN) 7.5 MG tablet As directed  Indications: Atrial Fibrillation        Allergies   Allergen Reactions   • Cephalexin Hives     Tolerated ceftriaxone 2022   • Codeine Nausea Only   • Silver Other (See Comments)       Review of Systems   Constitutional: Positive for chills and fever.   HENT: Negative for congestion and sore throat.    Respiratory: Positive for cough and shortness of breath. Negative for wheezing.    Gastrointestinal: Negative for abdominal pain, nausea and vomiting.   Genitourinary: Positive for dysuria. Negative for hematuria.   Musculoskeletal: Negative for arthralgias and back pain.   Skin: Negative for pallor and rash.   Neurological: Negative for seizures and headaches.   Psychiatric/Behavioral: Negative for agitation and confusion.     Vital Signs past 24hrs  BP range: BP: (111-140)/(65-94) 111/81  Pulse range: Heart Rate:  [] 69  Resp rate range: Resp:  [18-38] 22  Temp range: Temp (24hrs), Av.1 °F (37.3 °C), Min:97.5 °F (36.4 °C), Max:101.6 °F (38.7 °C)    Oxygen range: SpO2:  [93 %-98 %] 96 %; Flow (L/min):  [2-3] 3;   Device (Oxygen Therapy): nasal cannula  134 kg (296 lb); Body mass index is 36.03 kg/m².  I/O this shift:  In: -   Out: 600 [Urine:600]    Adult male who is sitting up in a chair.  On low-flow oxygen.  Patient was asleep when I walked in but awakens and answers my question.  He appears to be somewhat hard of hearing.  Pupils equal and reactive.  Oropharynx edentulous.  Moist oropharynx.  JVP not elevated trachea midline thyroid not enlarged.   Lungs with diminished breath sounds equally.  No wheezing.  Rales noted right lower lobe.  Percussion note resonant chest expansion equal no chest wall deformity or tenderness.  Heart examination S1-S2 present rhythm regular no murmurs.  No edema lower extremities.  Abdomen is soft nontender bowel sounds present with no liver spleen enlargement.  No peripheral cyanosis clubbing.  Moves all 4 extremities sensorimotor intact.  No cervical, axillary, inguinal adenopathy.    Results Review:    I have reviewed the laboratory and imaging data from current admission. My annotations are as noted in assessment and plan.    Medication Review:  I have reviewed the current MAR. My annotations are as noted in assessment and plan.    Plan   PCCM Problems  Intermittent fevers  Hypoxia on low-flow oxygen  Right lower lobe pneumonia  Right lower lobe nodule requiring close follow-up  Previously recorded dysphagia  COPD without exacerbation  Current cigarette smoker  Relevant Medical Diagnoses  Urinary retention with chronic indwelling Irvin catheter  MDR Enterobacter UTI  Cellulitis with MRSA    Plan of Treatment  It is possible his intermittent fevers could be due to recurrent aspiration.  Previously recorded positive dysphagia evaluation as noted below.  Recommend repeat evaluation by speech and modified diet as appropriate.    Right lower lobe infiltrate seen on the CT chest.  Requires reimaging in 6 to 8 weeks to make sure it clears.    CT chest report is not yet completely read.  However there appears to be a nodular infiltrate in the right lower lobe.  This may require biopsy for excluding cancer etc.    COPD but without exacerbation continue nebs.    Advised patient to quit smoking completely.    Low-flow oxygen also uses at home and should continue.    Electronically signed by David Bravo MD, 07/14/22, 4:56 PM EDT.      Part of this note may be an electronic transcription/translation of spoken language to printed text using  the Dragon Dictation System.

## 2022-07-14 NOTE — CONSULTS
Cardiology Hospital Consult    Patient Name: Jalen Lockwood  Age/Sex: 72 y.o. male  : 1950  MRN: [id number]    Date of Admission: 7/10/2022  Date of Encounter Visit: 22  Encounter Provider: ESTEFANY Peters  Referring Provider: Rahel Singh MD  Place of Service: [facility] CARDIOLOGY  Patient Care Team:  Marc Ludwig MD as PCP - General (Family Medicine)  Blas Mckeon MD as Surgeon (General Surgery)  Jonnathan Boston II, MD as Consulting Physician (Vascular Surgery)  Xavi Elliott MD as Consulting Physician (Urology)  Marc Benavidez MD as Consulting Physician (Pain Medicine)  Kurt Henry MD as Consulting Physician (Cardiology)  Meghna Horan SHELLEY as Pharmacist  Rip Samuel MD as Referring Physician (Family Medicine)  Juni Landa MD as Consulting Physician (Hematology and Oncology)  Brendan Live, AlexD as Pharmacist (Pharmacy)    Subjective:     Consulted for: Atrial fibrillation RVR    Chief Complaint: Sepsis related to bacterial pneumonia and urinary tract infection    History of Present Illness:  Jalen Lockwood is a 72 y.o. male patient of Dr. Henry.  He has a history of cardiomyopathy, permanent atrial fibrillation/flutter, hypertension, alcohol abuse, obstructive sleep apnea, COPD and obesity.  His most recent echocardiogram in 2021 shows normal EF of 55% and moderately dilated left atrial cavity.  He has had multiple hospitalizations due to cellulitis and sepsis, requiring treatment with IV antibiotics.      Patient was admitted on 7/10/2022 with sepsis secondary to bacterial pneumonia and urinary tract infection.  Apparently beta-blocker dosage was reduced due to atrial fibrillation with slow ventricular response earlier in the admission.  Talking with nurse, she reported that his heart rate would go down into the 30s and 40s when he slept.  Patient does have obstructive sleep apnea.  He appears to be in atrial  fibrillation with rates of 130- 168 this morning.  He is also febrile.  Chest x-ray on admission showed cardiomegaly with pulmonary vascular engorgement.  Procalcitonin elevated on admission.  BNP elevated on admission.  He is on oral Lasix.  BP stable.  Troponin negative.  K/ Mg in normal range.  Normal kidney function.    Past Medical History:  Past Medical History:   Diagnosis Date   • Allergic rhinitis    • Anxiety    • Aortectasia (HCC)     3cm infrarenal abdominal aorta   • Arthritis    • Atrial flutter (HCC) 2010    s/p ablation    • Charcot's joint of foot    • Chronic edema     both legs and sees wound care center at [place]    • Chronic venous insufficiency    • COPD (chronic obstructive pulmonary disease) ([facility])    • Coronary atherosclerosis     Cath 2010: diffuse 40-50% disease   • Diverticulosis    • Duodenitis    • Fatty liver    • Gastritis    • Gastroparesis    • Hematoma     post-operative; After catheterization, right groin, required surgical exploration   • Hyperlipidemia    • Hypertension    • Insomnia    • Internal hemorrhoids    • Open wound     izzy legs has drsg chg weekly at wound care center at [place]  pt does second dressing on left leg another time during week   • Osteomyelitis (HCC)    • Paroxysmal atrial fibrillation (HCC)    • Peripheral neuropathy    • Popliteal artery aneurysm (HCC)     left, s/p stenting by Dr. Boston   • Skin cancer    • Sleep apnea     o2   • 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HCC)     due to flutter and afib; cath 2010 with nonobstructive disease   • Venous stasis    • Venous stasis ulcer (HCC)     bilateral legs        Past Surgical History:   Procedure Laterality Date   • BASAL CELL CARCINOMA EXCISION      ear and left side of face   • BRONCHOSCOPY N/A 4/12/2021    Procedure: BRONCHOSCOPY WITH BAL;  Surgeon: Bunny Lepe MD;  Location: [facility] ENDOSCOPY;  Service: Pulmonary;  Laterality: N/A;  PRE-HEMOPTYSIS  POST-SAME   • CARDIAC CATHETERIZATION     •  CATARACT EXTRACTION     • COLONOSCOPY  09/28/2015    NBIH, diverticulosis, polyps   • COLONOSCOPY N/A 9/11/2018    Procedure: COLONOSCOPY TO CECUM  AND TERM. ILEUM WITH COLD SNARE POLYPECTOMIES;  Surgeon: Kane Lagunas MD;  Location:  [facility];  Service: Gastroenterology   • COLONOSCOPY N/A 10/29/2019    Procedure: COLONOSCOPY TO TO CECUM AND TERMINAL ILEUM WITH HOT AND COLD SNARE POLYPECTOMIES;  Surgeon: Kane Lagunas MD;  Location:  [facility] ENDOSCOPY;  Service: Gastroenterology   • HIP ARTHROPLASTY Right 2017   • JOINT REPLACEMENT Left    • OTHER SURGICAL HISTORY      Catheter ablation atrial flutter   • REPAIR ANEURYSM / PSEUDO ANEURYSM / RUPTURED ANEURYSM POPLITEAL ARTERY      Stent-Graft of the the left popliteal artery   • REPAIR KNEE LIGAMENT      Primary repair of knee ligament cruciate anterior right   • TONSILLECTOMY  1958   • TOTAL KNEE ARTHROPLASTY Bilateral    • UPPER GASTROINTESTINAL ENDOSCOPY  09/16/2014    acute gastritis, acute duodenitis       Home Medications:   Medications Prior to Admission   Medication Sig Dispense Refill Last Dose   • albuterol sulfate  (90 Base) MCG/ACT inhaler Inhale 2 puffs Every 4 (Four) Hours As Needed for Wheezing. 2 g 6    • HYDROcodone-acetaminophen (NORCO)  MG per tablet Take 1 tablet by mouth Every 6 (Six) Hours As Needed for Moderate Pain .      • ALPRAZolam (XANAX) 0.5 MG tablet TAKE 1 TABLET BY MOUTH EVERY DAY AT NIGHT. NEEDS APPT 30 tablet 0    • Atrovent HFA 17 MCG/ACT inhaler INHALE 2 PUFFS BY MOUTH 4 TIMES A DAY 12.9 each 3    • Breo Ellipta 200-25 MCG/INH inhaler INHALE 1 PUFF BY MOUTH EVERY DAY 60 each 4    • carvedilol (COREG) 12.5 MG tablet Take 1 tablet by mouth 2 (Two) Times a Day With Meals. 180 tablet 3    • docusate sodium (COLACE) 100 MG capsule Take 100 mg by mouth Daily.      • finasteride (PROSCAR) 5 MG tablet Take 1 tablet by mouth daily.      • furosemide (LASIX) 40 MG tablet Take 40 mg by mouth Daily.      • gabapentin  (NEURONTIN) 600 MG tablet Take 1 tablet by mouth 3 (Three) Times a Day for 30 days. 90 tablet 0    • metoclopramide (REGLAN) 10 MG tablet TAKE 1 TABLET BY MOUTH 4 (FOUR) TIMES A DAY BEFORE MEALS & AT BEDTIME. 360 tablet 3    • OXYGEN-HELIUM IN 2 L into the nostril(s) as directed by provider As Needed.      • pravastatin (PRAVACHOL) 20 MG tablet TAKE 1 TABLET BY MOUTH EVERY DAY 90 tablet 1    • silodosin (RAPAFLO) 8 MG capsule capsule Take 1 capsule by mouth daily. With food.      • warfarin (COUMADIN) 5 MG tablet As directed  Indications: Atrial Fibrillation      • warfarin (COUMADIN) 7.5 MG tablet As directed  Indications: Atrial Fibrillation          Allergies:  Allergies   Allergen Reactions   • Cephalexin Hives     Tolerated ceftriaxone Jan 2022   • Codeine Nausea Only   • Silver Other (See Comments)       Past Social History:  Social History     Socioeconomic History   • Marital status:      Spouse name: Mariposa   Tobacco Use   • Smoking status: Current Every Day Smoker     Packs/day: 0.25     Types: Cigarettes     Start date: 1964   • Smokeless tobacco: Never Used   • Tobacco comment: caffeine use - 1.5 cups coffee daily    Vaping Use   • Vaping Use: Never used   Substance and Sexual Activity   • Alcohol use: Yes     Alcohol/week: 14.0 standard drinks     Types: 14 Shots of liquor per week   • Drug use: No   • Sexual activity: Defer       Past Family History:  Family History   Problem Relation Age of Onset   • Emphysema Father    • Malig Hyperthermia Neg Hx        Review of Systems:     CONSTITUTIONAL: No weight loss, + fever, no chills, + weakness or fatigue.   HEENT: Eyes: No visual loss, blurred vision, double vision or yellow sclerae. Ears, Nose, Throat: No hearing loss, sneezing, congestion, runny nose or sore throat.   SKIN: No rash or itching.     RESPIRATORY: + shortness of breath,no  Hemoptysis, + cough or sputum.   GASTROINTESTINAL: No anorexia, nausea, vomiting or diarrhea. No abdominal pain,  bright red blood per rectum or melena.  GENITOURINARY: No burning on urination, hematuria or increased frequency.  NEUROLOGICAL: No headache, dizziness, syncope, paralysis, ataxia, numbness or tingling in the extremities. No change in bowel or bladder control. +agitation  MUSCULOSKELETAL: No muscle, back pain, joint pain or stiffness.   HEMATOLOGIC: No anemia, bleeding or bruising, + anticoagulation  LYMPHATICS: No enlarged nodes. No history of splenectomy.   PSYCHIATRIC: No history of depression, anxiety, hallucinations.   ENDOCRINOLOGIC: No reports of sweating, cold or heat intolerance. No polyuria or polydipsia.     Objective:   Temp:  [97.5 °F (36.4 °C)-100.9 °F (38.3 °C)] 100.9 °F (38.3 °C)  Heart Rate:  [52-80] 80  Resp:  [17-19] 18  BP: (118-140)/(65-96) 140/94     Intake/Output Summary (Last 24 hours) at 7/14/2022 1103  Last data filed at 7/14/2022 0500  Gross per 24 hour   Intake --   Output 1875 ml   Net -1875 ml     Body mass index is 36.03 kg/m².      07/10/22  1332   Weight: 134 kg (296 lb)     Weight change:     Physical Exam:   General Appearance:    Alert, agitated, unable to get comfortable   Head:    Normocephalic, without obvious abnormality, atraumatic   Eyes:            Conjunctivae and sclerae normal, no   icterus, no pallor, corneas clear, PERRLA   Neck:   No adenopathy, supple, trachea midline, no thyromegaly, no   carotid bruit, + JVD   Lungs:     Diminished throughout,respirations regular, even and unlabored, moist, nonprod cough    Heart:    Irregular rhythm/ rate, normal S1 and S2, no murmur, no gallop, no rub, no click   Chest Wall:    No abnormalities observed   Abdomen:     Normal bowel sounds, no masses, no organomegaly, soft, nontender, nondistended, no guarding, no rebound  tenderness, escamilla cath in plce   Extremities:   Moves all extremities well, chronic izzy LE edema, legs wrapped, no cyanosis, no redness   Pulses:   Pulses palpable and equal bilaterally.      Lab Review:    Results from last 7 days   Lab Units 07/14/22  0551 07/13/22  1944 07/13/22  0731 07/12/22  0744 07/11/22  0747 07/10/22  2306 07/10/22  1334   SODIUM mmol/L 136  --  136 137 134*  --  133*   POTASSIUM mmol/L 3.9 4.0 3.4* 3.8 3.7 3.8 4.3   CHLORIDE mmol/L 103  --  103 106 100  --  95*   CO2 mmol/L 27.0  --  25.8 24.0 25.0  --  27.0   BUN mg/dL 10  --  9 10 11  --  13   CREATININE mg/dL 0.73*  --  0.70* 0.65* 0.63*  --  0.87   GLUCOSE mg/dL 93  --  108* 98 116*  --  91   CALCIUM mg/dL 9.1  --  8.8 8.2* 8.6  --  9.4   AST (SGOT) U/L  --   --   --   --  13  --  16   ALT (SGPT) U/L  --   --   --   --  7  --  11     Results from last 7 days   Lab Units 07/10/22  1334   TROPONIN T ng/mL <0.010     Results from last 7 days   Lab Units 07/13/22  0731 07/12/22  0744 07/11/22  0747 07/10/22  1334   WBC 10*3/mm3 4.26 5.21 4.90 6.91   HEMOGLOBIN g/dL 9.7* 9.6* 9.3* 10.9*   HEMATOCRIT % 28.3* 28.6* 28.0* 32.3*   PLATELETS 10*3/mm3 144 131* 111* 145     Results from last 7 days   Lab Units 07/14/22  0551 07/13/22  0731 07/12/22  0744 07/11/22  0747 07/10/22  2139 07/10/22  1334 07/08/22  0000   INR  2.09* 1.89* 1.85* 1.70* 1.62* 1.47* 1.30   APTT seconds  --   --   --   --   --  48.4*  --      Results from last 7 days   Lab Units 07/11/22  0747 07/10/22  2306 07/10/22  1334   MAGNESIUM mg/dL 2.2 1.8 1.8           Invalid input(s): LDLCALC  Results from last 7 days   Lab Units 07/14/22  0551   PROBNP pg/mL 2,267.0*           Echo EF Estimated  2D Echocardiogram 9/17/2021:  · Calculated left ventricular EF = 55.5% Estimated left ventricular EF was in agreement with the calculated left ventricular EF. Left ventricular systolic function is normal. Normal left ventricular cavity size noted. Left ventricular wall thickness is consistent with mild to moderate concentric hypertrophy. All left ventricular wall segments contract normally. Left ventricular diastolic function was indeterminate.  · Right ventricle not well visualized. The  right ventricular cavity is moderately dilated.  · The left atrial cavity is moderately dilated.  · The right atrial cavity is severely dilated.  · Mild to moderate mitral valve regurgitation is present.  · Mild to moderate tricuspid valve regurgitation is present. Estimated right ventricular systolic pressure from tricuspid regurgitation is moderately elevated (45-55 mmHg). Calculated right ventricular systolic pressure from tricuspid regurgitation is 54 mmHg.    2D Echocardiogram 03/31/2021:  · The left ventricular cavity is moderately dilated.  · There is akinesis of the entire septum, distal anterior wall, distal inferior wall, and apex  · Left ventricular ejection fraction appears to be 31 - 35%.  · Left ventricular diastolic function was indeterminate.  · The right ventricular cavity is moderate to severely dilated. Normal right ventricular systolic function noted.  · The left atrial cavity is moderately dilated.  · The right atrial cavity is severely dilated.  · Moderate tricuspid valve regurgitation is present.  · Calculated right ventricular systolic pressure from tricuspid regurgitation is 40 mmHg.  · The IVC is grossly dilated, measuring over 3 cm  · There is no evidence of pericardial effusion      EKG:     Imaging:  Imaging Results (Most Recent)     Procedure Component Value Units Date/Time    XR Chest 1 View [[id number]] Collected: 07/14/22 1027     Updated: 07/14/22 1032    Narrative:      CHEST SINGLE VIEW     HISTORY: Tachypnea, wheezing.     COMPARISON: AP chest 07/10/2022, 01/09/2022.     FINDINGS: There is cardiomegaly with pulmonary vascular engorgement.  Lungs appear clear of focal airspace disease and there is no evidence  for pulmonary edema or pleural effusion. Cardiac monitoring leads are  noted. Aortic vascular calcifications are present. There is a  small-caliber catheter and apparent right-sided PICC with tip extending  to the region of the right axilla.       Impression:       Cardiomegaly with pulmonary vascular engorgement.     This report was finalized on 7/14/2022 10:29 AM by Dr. Adria Gifford M.D.       XR Chest 1 View [[id number]] Collected: 07/10/22 1504     Updated: 07/10/22 1521    Narrative:      SINGLE VIEW CHEST RADIOGRAPH     HISTORY: 72-year-old male with history of a fever and cough.     FINDINGS: An upright AP portable chest radiograph was obtained.  Comparison is made to a chest radiograph dated 06/19/2022. The lungs are  normal in volume and are clear of consolidation. There is ground glass  and interstitial opacification at the base of the right lung. The  cardiac silhouette is enlarged but stable. No evidence for a  pneumothorax or pleural effusion is appreciated. The visualized osseous  structures appear normal.       Impression:      There is an interstitial and ground glass infiltrate at the  base of the right lung that is most consistent with pneumonia. Stable  mild cardiomegaly is noted.     This report was finalized on 7/10/2022 3:17 PM by Dr. Mehran Pepper M.D.               Assessment:       Febrile illness    Lymphedema of both lower extremities    Permanent atrial fibrillation (HCC)    Sleep apnea    COPD (chronic obstructive pulmonary disease) (HCC)    Essential hypertension    ETOH abuse    Chronic anticoagulation    Charcot's joint of foot    Alcohol dependence, in remission ([facility])    Cellulitis of left foot    Acute UTI        Plan:     1.  Permanent atrial fibrillation, now with RVR: [id number] 4.  Anticoagulation with warfarin.  Patient is on carvedilol 12.5 mg at home.  Sched metoprolol succinate 50 mg twice daily.  Rate likely elevated due to fever and infection.  2.  Coronary artery disease: Medical treatment due to comorbidities and poor functional status  3.  Ischemic cardiomyopathy: History of tachycardic mediated cardiomyopathy with atrial fibrillation, then developed ischemic cardiomyopathy.  EF has normalized on carvedilol and  lisinopril.   4.  Hypertension: Controlled  5.  Lymphedema: Follows with wound care clinic at [place]  6.  Obesity  7.  Tobacco abuse  8.  ETOH abuse: question whether need CIWA (alcohol w/d protocol)  9.  Acute on chronic diastolic heart failure: Elevated BNP.  Uncontrolled atrial fibrillation at this time.  Will start IV Lasix twice daily x2 days.  BMP in AM.  Strict I's and O's.  Patient has a Irvin catheter in place at this time.  10.  Sepsis secondary to bacterial pneumonia and UTI: On IV antibiotics, followed by ID    Thank you for allowing me to participate in the care of Jalen Lockwood. Feel free to contact me directly with any further questions or concerns.    ESTEFANY Peters  [facility]  07/14/22  11:03 EDT    EMR Dragon/Transcription disclaimer:   Much of this encounter note is an electronic transcription/translation of spoken language to printed text. The electronic translation of spoken language may permit erroneous, or at times, nonsensical words or phrases to be inadvertently transcribed; Although I have reviewed the note for such errors, some may still exist.

## 2022-07-14 NOTE — NURSING NOTE
Notified Lula ALLISON that pt had low grade temp 100.9, pt restless, HR elevated 125-170. Pt RR 35-40, congested cough w/ more sputum than previous shift. Pt denies chest pain or struggle to breath. Resp are shallow. Chest xray and EKG orderd. Metorpolol and tylenol administered, will reassess HR and temp in 1 HR and notify APRN.     Also noted to APRN that restless behavior is how pt gets when he has UTI per pt wife. ID contacted. Cards and pulm consulted.     Procal ordered, 0.12, BNP 2,267. Blood cultures ordered and collected w/ CBC and uric acid. ABG drawn.     HR continued to stay >140 after giving PO metoprolol XL. @ 1130 Pushed 5mg IV metoprolol, HR went down to 100-120. Rechecked temp at that time, was 101.6. Dr. Jaramillo walked into assess pt during temp check and was notified- ordering a CT chest/abd and sputum if pt able to cough up which he denies being able to produce. Lula ALLISON notified of temp and improved as well as well.     Performed screen, pt scored for HR >90, RR >20, Acute mental change, temp >100.9- Lula ALLISON notified. No new orders.

## 2022-07-15 NOTE — PROGRESS NOTES
[facility] Clinical Pharmacy Services: Warfarin Dosing/Monitoring Consult    Jalen Lockwood is a 72 y.o. male, estimated creatinine clearance is 118.1 mL/min (by C-G formula based on SCr of 0.86 mg/dL). weighing 134 kg (296 lb).    Results from last 7 days   Lab Units 07/15/22  0733 07/14/22  1033 07/14/22  0551 07/13/22  0731 07/12/22  0744 07/11/22  0747 07/10/22  2139 07/10/22  1334   INR  2.65*  --  2.09* 1.89* 1.85* 1.70*   < > 1.47*   APTT seconds  --   --   --   --   --   --   --  48.4*   HEMOGLOBIN g/dL 10.8* 11.2*  --  9.7* 9.6* 9.3*  --  10.9*   HEMATOCRIT % 33.3* 33.5*  --  28.3* 28.6* 28.0*  --  32.3*   PLATELETS 10*3/mm3 158 186  --  144 131* 111*  --  145    < > = values in this interval not displayed.   Prior to admission anticoagulation: Warfarin managed by [facility]. Most up-to-date warfarin dosing regimen is 7.5 mg every Friday and 5 mg all other days. Patient's INR was 1.30 on 7/8/22, patient was instructed to boost with 7.5 mg doses on 7/8 and 7/9. INR was only 1.47 on 7/10 despite boosted doses.     Hospital Anticoagulation:  Consulting provider: Dr. Singh with [facility]  Start date: Continuation of home medication  Indication: Permament A.fib  Target INR: 2 - 3  Expected duration: Indefinite   Bridge Therapy: No      Potential food or drug interactions: Cefepime may increase sensitivity to warfarin due to eradication of vitamin K producing gut darian    Education complete: Defer, patient followed by our warfarin clinic    Assessment/Plan:  Dose: INR this am is in target at 2.65; therefore will give patient warfarin 5 mg again today. Patient usually gets 7.5mg on Fridays but considering the jump from 2.09 yesterday to 2.65 today I am hesitant to give 7.5mg today. If INR in target tomorrow (after getting 5mg today) might start a scheduled regimen of 5mg daily.  Monitor for any signs or symptoms of bleeding  Follow up daily INRs and dose adjustments    Pharmacy will continue  to follow until discharge or discontinuation of warfarin.     Arden Merida [facility]  Clinical Pharmacist

## 2022-07-15 NOTE — PROGRESS NOTES
Name: Jalen Lockwood ADMIT: 7/10/2022   : 1950  PCP: Marc Ludwig MD    MRN: [id number] LOS: 5 days   AGE/SEX: 72 y.o. male  ROOM: [facility]     Subjective   Subjective   No family at bedside.  He complains of shortness of breath but it is stable and he has a mild cough.  No chest pain or palpitations.  Appetite okay.  He states he feels poorly in general     Objective   Objective   Vital Signs  Temp:  [98.1 °F (36.7 °C)-100.9 °F (38.3 °C)] 98.1 °F (36.7 °C)  Heart Rate:  [] 76  Resp:  [18-40] 18  BP: ()/(47-99) 109/74  SpO2:  [84 %-100 %] 100 %  on  Flow (L/min):  [2-3] 2;   Device (Oxygen Therapy): nasal cannula  Body mass index is 36.03 kg/m².     Physical Exam  Constitutional:       Appearance: He is ill-appearing.   HENT:      Head: Normocephalic and atraumatic.   Cardiovascular:      Rate and Rhythm: Tachycardia present. Rhythm irregular.      Pulses: Normal pulses.   Pulmonary:      Effort: No respiratory distress (Mild conversational dyspnea).      Breath sounds: Wheezing and rhonchi present.   Abdominal:      General: Bowel sounds are normal. There is no distension.      Palpations: Abdomen is soft.      Tenderness: There is no abdominal tenderness.   Genitourinary:     Comments: Irvin with dark yellow urine noted.  Musculoskeletal:         General: Swelling present. Normal range of motion.      Comments: BLE with wraps in place.   Skin:     General: Skin is warm and dry.      Findings: No bruising.   Neurological:      Mental Status: He is alert.      Sensory: No sensory deficit.      Motor: Weakness present.      Coordination: Coordination normal.      Comments: Aware of self and place. Some confusion to situation.   Psychiatric:      Comments: A little anxious/restless. Poor attention span.        Results Review:       I reviewed the patient's new clinical results.  Results from last 7 days   Lab Units 07/15/22  0733 22  1033 22  0731 22  0744   WBC 10*3/mm3 7.16  7.47 4.26 5.21   HEMOGLOBIN g/dL 10.8* 11.2* 9.7* 9.6*   PLATELETS 10*3/mm3 158 186 144 131*     Results from last 7 days   Lab Units 07/15/22  0733 07/14/22  0551 07/13/22  1944 07/13/22  0731 07/12/22  0744   SODIUM mmol/L 138 136  --  136 137   POTASSIUM mmol/L 4.0 3.9 4.0 3.4* 3.8   CHLORIDE mmol/L 103 103  --  103 106   CO2 mmol/L 26.0 27.0  --  25.8 24.0   BUN mg/dL 14 10  --  9 10   CREATININE mg/dL 0.86 0.73*  --  0.70* 0.65*   GLUCOSE mg/dL 105* 93  --  108* 98   Estimated Creatinine Clearance: 118.1 mL/min (by C-G formula based on SCr of 0.86 mg/dL).  Results from last 7 days   Lab Units 07/11/22  0747 07/10/22  1334   ALBUMIN g/dL 3.10* 3.60   BILIRUBIN mg/dL 0.9 1.1   ALK PHOS U/L 83 95   AST (SGOT) U/L 13 16   ALT (SGPT) U/L 7 11     Results from last 7 days   Lab Units 07/15/22  0733 07/14/22  0551 07/13/22  0731 07/12/22  0744 07/11/22  0747 07/10/22  2306 07/10/22  1334   CALCIUM mg/dL 9.1 9.1 8.8 8.2* 8.6  --  9.4   ALBUMIN g/dL  --   --   --   --  3.10*  --  3.60   MAGNESIUM mg/dL  --   --   --   --  2.2 1.8 1.8     Results from last 7 days   Lab Units 07/14/22  0551 07/11/22  0747 07/10/22  1334   PROCALCITONIN ng/mL 0.12 0.36* 0.10   LACTATE mmol/L  --  0.9 1.4     No results found for: HGBA1C, POCGLU    budesonide-formoterol, 2 puff, Inhalation, BID - RT  cefepime, 2 g, Intravenous, Q8H  docusate sodium, 100 mg, Oral, Daily  finasteride, 5 mg, Oral, Daily  [START ON 7/16/2022] furosemide, 40 mg, Oral, BID  gabapentin, 400 mg, Oral, Q8H  ipratropium-albuterol, 3 mL, Nebulization, 4x Daily - RT  metoclopramide, 10 mg, Oral, 4x Daily AC & at Bedtime  metoprolol tartrate, 25 mg, Oral, Q12H  pravastatin, 20 mg, Oral, Daily  sodium chloride, 10 mL, Intravenous, Q12H  sodium chloride, 10 mL, Intravenous, Q12H  sodium chloride, 10 mL, Intravenous, Q12H  vancomycin, 1,250 mg, Intravenous, Q12H  warfarin, 5 mg, Oral, Once      Pharmacy to dose vancomycin,   Pharmacy to dose warfarin,     Diet Regular;  Cardiac       Assessment/Plan     Active Hospital Problems    Diagnosis  POA   • **Bacterial pneumonia [J15.9]  Yes   • Acute on chronic respiratory failure with hypoxia ([facility]) [J96.21]  Yes   • Paroxysmal atrial fibrillation with rapid ventricular response ([facility]) [I48.0]  Yes   • UTI (urinary tract infection) due to urinary indwelling catheter ([facility]) [T83.511A, N39.0]  Yes   • Venous stasis dermatitis of both lower extremities [I87.2]  Yes   • Encephalopathy, metabolic [G93.41]  Yes   • Ischemic cardiomyopathy [I25.5]  Yes   • Alcohol dependence, in remission ([facility]) [F10.21]  Yes   • Sepsis ([facility]) [A41.9]  Yes   • Charcot's joint of foot [M14.679]  Yes   • Chronic anticoagulation [Z79.01]  Not Applicable   • Essential hypertension [I10]  Yes   • COPD (chronic obstructive pulmonary disease) ([facility]) [J44.9]  Yes   • Sleep apnea [G47.30]  Yes   • Lymphedema of both lower extremities [I89.0]  Yes      Resolved Hospital Problems   No resolved problems to display.     Mr. Lockwood is a 72 year old male who presented to the hospital for confusion and fever in the setting of chronic escamilla and chronic venous stasis wounds. CXR on admission showed possible RLL pneumonia and UA with pyuria and bacteria. He was seen in consultation by ID and placed empirically on antibiotics.     · Right lower lobe pneumonia/acute on chronic respiratory failure: ID following and has had him on Cefepime with initial improvement. MRSA nares positive but not felt to have current infection so not on vancomycin. Has had recurrent fever 7/14 with associated dyspnea/tachypnea. Repeat CXR with pulmonary vascular engorgement. ABGs with respiratory alkalosis likely from hyperventilation/swallow breaths.  Pulmonary following.  ID suspects recurrent aspiration pneumonitis.  He is continued on cefepime and vancomycin  · UTI/chronic escamilla: Urine with MDR enterobacter sensitive to cefepime. Difficult to determine if active infection with possible pneumonia. ID has on  cefepime to cover this as well. High risk for recurrent infections.  · Venous stasis dermatitis: Wound cultures on admission with MDR pseudomonas and ESBL E.coli. Felt colonized. Local wound care with wraps.  · Sepsis - secondary to above  · PAF with RVR: He is metoprolol daily, Lasix IV.  Metoprolol was held for hypotension and bradycardia.  He is on Coumadin but pulmonary request is to be held for a lung biopsy   · Encephalopathy: Multifactorial d/t the above. Monitor for improvement.  · COPD/BECKI: Pulm to see as above. Chronically on 2 L at home.  · ICM: Followed by Dr. Henry. Fluid status is somewhat difficult to assess with his obesity and chronic edema. BNP is elevated but not far from prior levels. Has been on oral lasix. With RVR, Cardiology notes possible transition to oral diuretics tomorrow.  · CT chest with multiple lung nodules.  He needs a biopsy.  Will reach out to cardiology to discuss holding Coumadin.     Discussed with patient, nurse and Dr. Gonzalez.    Appreciate all specialty input.    VTE Prophylaxis - Warfarin (home med)  Code Status - Full code  Disposition - Anticipate discharge TBD.      ESTEFANY Leo  [facility]  07/15/22  13:24 EDT

## 2022-07-15 NOTE — NURSING NOTE
Follow up the patient with bilateral lower leg  Lymphedema, dressing is changed improvement is noticed , washed leg with foam cleanser, opticel is placed in every open area, betadine gauze 2x2 placed between each toes and to dorsal foot,   wrapped lower extremities with unna boots  and 4 layer compression bandage system since mid foot to below the knee,  Patient  tolerated well, plan to change 2 time a wk.

## 2022-07-15 NOTE — PROGRESS NOTES
"CC: Permanent atrial fibrillation RVR    Interval History: No new acute events overnight      Vital Signs  Temp:  [98.1 °F (36.7 °C)-101.6 °F (38.7 °C)] 98.1 °F (36.7 °C)  Heart Rate:  [] 72  Resp:  [18-40] 18  BP: ()/(47-99) 99/72    Intake/Output Summary (Last 24 hours) at 7/15/2022 0954  Last data filed at 7/15/2022 0607  Gross per 24 hour   Intake 350 ml   Output 1650 ml   Net -1300 ml     Flowsheet Rows    Flowsheet Row First Filed Value   Admission Height 193 cm (76\") Documented at 07/10/2022 1332   Admission Weight 134 kg (296 lb) Documented at 07/10/2022 1332          PHYSICAL EXAM:  General: No acute distress  Resp:NL Rate, symmetric chest expansion,unlabored, clear  CV: Irregularly irregular, NL PMI, NL S1 and S2, no Murmur, no gallop, no rub, No JVD.   ABD:Nl sounds, no masses or tenderness, nondistended, no guarding or rebound  Neuro: alert,cooperative and oriented  Extr: Bilateral lower extremity edema.      Results Review:    Results from last 7 days   Lab Units 07/15/22  0733   SODIUM mmol/L 138   POTASSIUM mmol/L 4.0   CHLORIDE mmol/L 103   CO2 mmol/L 26.0   BUN mg/dL 14   CREATININE mg/dL 0.86   GLUCOSE mg/dL 105*   CALCIUM mg/dL 9.1     Results from last 7 days   Lab Units 07/10/22  1334   TROPONIN T ng/mL <0.010     Results from last 7 days   Lab Units 07/15/22  0733   WBC 10*3/mm3 7.16   HEMOGLOBIN g/dL 10.8*   HEMATOCRIT % 33.3*   PLATELETS 10*3/mm3 158     Results from last 7 days   Lab Units 07/15/22  0733 07/14/22  0551 07/13/22  0731 07/10/22  2139 07/10/22  1334   INR  2.65* 2.09* 1.89*   < > 1.47*   APTT seconds  --   --   --   --  48.4*    < > = values in this interval not displayed.         Results from last 7 days   Lab Units 07/11/22  0747   MAGNESIUM mg/dL 2.2         I reviewed the patient's new clinical results.  I personally viewed and interpreted the patient's EKG/Telemetry data        Medication Review:   Meds reviewed    Pharmacy to dose vancomycin,   Pharmacy to " dose warfarin,         Assessment/Plan    1..  Permanent atrial fibrillation with RVR anticoagulated on Coumadin  2.  History of coronary artery disease treated medically  3.  Cardiomyopathy-most recent left ventricular ejection fraction normalized  Acute on chronic diastolic CHF with evidence of volume overload  4.  Essential hypertension  5.  Bacterial pneumonia with sepsis  6.  Alcohol abuse and tobacco use    Current cardiac related medications include metoprolol succinate 50 mg once daily, pravastatin 20 mg p.o. daily, Coumadin, Lasix 40 mg IV every 12 hours  24-hour vital signs with intermittent hypotension and bradycardia.  Metoprolol held this morning for hypotension  BMP within range, INR 2.65 today, hemoglobin 10.8 with normal platelet count.  Diuresing well on IV diuretic.    I will switch him to short acting oral metoprolol and can be switched to oral diuretic tomorrow.    Daily I's and O's and weight monitoring besides BMP.    Kaushal France MD  07/15/22  09:54 EDT

## 2022-07-15 NOTE — PLAN OF CARE
"Goal Outcome Evaluation:  Plan of Care Reviewed With: patient           Outcome Evaluation: Patient seen for clinical swallow assessment and education. Pt oriented to name & . When questioned, patient does recall VFSS recommendations & modified diet hx. He reported, \"I can't drink that stuff\". Education provided on risks of aspiration: recurrent pna, intubation, death. Pt acknowledges risks, but continues to refuse modified diet recs. VFSS  revealed silent aspiration with thins and nectar via straw. Pt agreed to assessment. [facility] for hoarse voice and no dentition. Dentures at bedside. Delayed cough with thins. No overt s/s of aspiration with nectar or hard solids. SLP recs regular, no mixed, and nectar, no straws. Pt again refuses recommendations, but is in agreement with therapy at D/C. SLP discussed with case management for possible HH dysphagia therapy with SLP. Will continue to follow for introduction of dysphagia exercises.     Patient was not wearing a face mask during this therapy encounter. Therapist used appropriate personal protective equipment including gown, eye protection, mask and gloves.  Mask used was standard procedure mask. Appropriate PPE was worn during the entire therapy session. Hand hygiene was completed before and after therapy session. Patient is not in enhanced droplet precautions.             "

## 2022-07-15 NOTE — THERAPY TREATMENT NOTE
Patient Name: Jalen Lockwood  : 1950    MRN: [id number]                              Today's Date: 7/15/2022       Admit Date: 7/10/2022    Visit Dx:     ICD-10-CM ICD-9-CM   1. Acute UTI  N39.0 599.0   2. Cellulitis of left foot  L03.116 682.7   3. Anemia, unspecified type  D64.9 285.9   4. Ischemic cardiomyopathy  I25.5 414.8   5. Normocytic anemia  D64.9 285.9   6. Urinary retention  R33.9 788.20   7. Anemia of chronic disease  D63.8 285.29   8. Gastroparesis  K31.84 536.3   9. Obesity (BMI 30-39.9)  E66.9 278.00     Patient Active Problem List   Diagnosis   • Chronic osteomyelitis (HCC)   • Foot pain   • Peripheral neuropathy   • Lymphedema of both lower extremities   • Permanent atrial fibrillation (HCC)   • Sleep apnea   • Chronic edema   • Obesity (BMI 30-39.9)   • COPD (chronic obstructive pulmonary disease) (HCC)   • Atrial flutter (HCC)   • 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HCC)   • Aortectasia (HCC)   • Popliteal artery aneurysm (HCC)   • Colon polyps   • Gastroparesis   • Insomnia   • Adenomatous polyp of colon   • Essential hypertension   • ETOH abuse   • Chronic anticoagulation   • Oropharyngeal dysphagia   • Tobacco abuse   • Thyromegaly   • Adrenal adenoma, left   • Retroperitoneal lymphadenopathy   • Anemia of chronic disease   • Thyroid nodule   • Charcot's joint of foot   • Sepsis (HCC)   • Abnormal CT scan, lumbar spine   • Alcohol dependence, in remission (HCC)   • Encephalopathy, metabolic   • Ischemic cardiomyopathy   • Normocytic anemia   • Inguinal adenopathy   • Elevated lactic acid level   • Venous stasis dermatitis of both lower extremities   • Urinary retention   • Sepsis without acute organ dysfunction (HCC)   • UTI (urinary tract infection) due to urinary indwelling catheter (HCC)   • Bacterial pneumonia   • Acute on chronic respiratory failure with hypoxia (HCC)   • Paroxysmal atrial fibrillation with rapid ventricular response (HCC)     Past Medical History:   Diagnosis  Date   • Allergic rhinitis    • Anxiety    • Aortectasia (HCC)     3cm infrarenal abdominal aorta   • Arthritis    • Atrial flutter (HCC) 2010    s/p ablation    • Charcot's joint of foot    • Chronic edema     both legs and sees wound care center at [place]    • Chronic venous insufficiency    • COPD (chronic obstructive pulmonary disease) (HCC)    • Coronary atherosclerosis     Cath 2010: diffuse 40-50% disease   • Diverticulosis    • Duodenitis    • Fatty liver    • Gastritis    • Gastroparesis    • Hematoma     post-operative; After catheterization, right groin, required surgical exploration   • Hyperlipidemia    • Hypertension    • Insomnia    • Internal hemorrhoids    • Open wound     izzy legs has drsg chg weekly at wound care center at [facility]  pt does second dressing on left leg another time during week   • Osteomyelitis (HCC)    • Paroxysmal atrial fibrillation (HCC)    • Peripheral neuropathy    • Popliteal artery aneurysm (HCC)     left, s/p stenting by Dr. Boston   • Skin cancer    • Sleep apnea     o2   • 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HCC)     due to flutter and afib; cath 2010 with nonobstructive disease   • Venous stasis    • Venous stasis ulcer (HCC)     bilateral legs      Past Surgical History:   Procedure Laterality Date   • BASAL CELL CARCINOMA EXCISION      ear and left side of face   • BRONCHOSCOPY N/A 4/12/2021    Procedure: BRONCHOSCOPY WITH BAL;  Surgeon: Bunny Lepe MD;  Location: [facility] ENDOSCOPY;  Service: Pulmonary;  Laterality: N/A;  PRE-HEMOPTYSIS  POST-SAME   • CARDIAC CATHETERIZATION     • CATARACT EXTRACTION     • COLONOSCOPY  09/28/2015    NBIH, diverticulosis, polyps   • COLONOSCOPY N/A 9/11/2018    Procedure: COLONOSCOPY TO CECUM  AND TERM. ILEUM WITH COLD SNARE POLYPECTOMIES;  Surgeon: Kane Lagunas MD;  Location: [facility] ENDOSCOPY;  Service: Gastroenterology   • COLONOSCOPY N/A 10/29/2019    Procedure: COLONOSCOPY TO TO CECUM AND TERMINAL ILEUM WITH HOT AND COLD  SNARE POLYPECTOMIES;  Surgeon: Kane Lagunas MD;  Location: [facility] ENDOSCOPY;  Service: Gastroenterology   • HIP ARTHROPLASTY Right 2017   • JOINT REPLACEMENT Left    • OTHER SURGICAL HISTORY      Catheter ablation atrial flutter   • REPAIR ANEURYSM / PSEUDO ANEURYSM / RUPTURED ANEURYSM POPLITEAL ARTERY      Stent-Graft of the the left popliteal artery   • REPAIR KNEE LIGAMENT      Primary repair of knee ligament cruciate anterior right   • TONSILLECTOMY  1958   • TOTAL KNEE ARTHROPLASTY Bilateral    • UPPER GASTROINTESTINAL ENDOSCOPY  09/16/2014    acute gastritis, acute duodenitis      General Information     Row Name 07/15/22 1529          Physical Therapy Time and Intention    Document Type therapy note (daily note)  -MS     Mode of Treatment physical therapy;individual therapy  -MS     Row Name 07/15/22 1529          General Information    Patient Profile Reviewed yes  -MS     Existing Precautions/Restrictions fall   Exit alarm  -MS     Barriers to Rehab none identified  -MS     Row Name 07/15/22 1529          Cognition    Orientation Status (Cognition) oriented x 3  -MS     Row Name 07/15/22 1529          Safety Issues, Functional Mobility    Comment, Safety Issues/Impairments (Mobility) Gait belt used for safety.  -MS           User Key  (r) = Recorded By, (t) = Taken By, (c) = Cosigned By    Initials Name Provider Type    MS Camilo Segundo, PT Physical Therapist               Mobility     Row Name 07/15/22 1529          Bed Mobility    Supine-Sit [place] (Bed Mobility) minimum assist (75% patient effort)  -MS     Row Name 07/15/22 1529          Sit-Stand Transfer    Sit-Stand [place] (Transfers) minimum assist (75% patient effort)  -MS     Assistive Device (Sit-Stand Transfers) walker, front-wheeled  -MS     Row Name 07/15/22 1529          Gait/Stairs (Locomotion)    [place] Level (Gait) contact guard  -MS     Assistive Device (Gait) walker, front-wheeled  -MS     Distance in Feet (Gait)  65 feet  -MS     Deviations/Abnormal Patterns (Gait) sowmya decreased;stride length decreased  -MS     Bilateral Gait Deviations forward flexed posture  -MS     Comment, (Gait/Stairs) Verbal/tactile cues for posture correction and Rwx guidance.  -MS           User Key  (r) = Recorded By, (t) = Taken By, (c) = Cosigned By    Initials Name Provider Type    Camilo Birmingham, PT Physical Therapist               Obj/Interventions     Row Name 07/15/22 1530          Motor Skills    Therapeutic Exercise --  BLE ther. ex. program x 10 reps completed (Ankle pumps, Hip Flexion, LAQ's)  -MS           User Key  (r) = Recorded By, (t) = Taken By, (c) = Cosigned By    Initials Name Provider Type    Camilo Birmingham, PT Physical Therapist               Goals/Plan    No documentation.                Clinical Impression     Row Name 07/15/22 1530          Pain    Pretreatment Pain Rating 0/10 - no pain  -MS     Posttreatment Pain Rating 0/10 - no pain  -MS     Row Name 07/15/22 1530          Positioning and Restraints    Pre-Treatment Position in bed  -MS     Post Treatment Position chair  -MS     In Chair notified nsg;sitting;call light within reach;encouraged to call for assist;exit alarm on;reclined;waffle cushion  All lines intact.  -MS           User Key  (r) = Recorded By, (t) = Taken By, (c) = Cosigned By    Initials Name Provider Type    Camilo Birmingham, PT Physical Therapist               Outcome Measures     Row Name 07/15/22 1530          How much help from another person do you currently need...    Turning from your back to your side while in flat bed without using bedrails? 3  -MS     Moving from lying on back to sitting on the side of a flat bed without bedrails? 3  -MS     Moving to and from a bed to a chair (including a wheelchair)? 3  -MS     Standing up from a chair using your arms (e.g., wheelchair, bedside chair)? 3  -MS     Climbing 3-5 steps with a railing? 3  -MS     To walk in hospital room? 3   -MS     AM-PAC 6 Clicks Score (PT) 18  -MS     Highest level of mobility 6 --> Walked 10 steps or more  -MS     Row Name 07/15/22 1530          Functional Assessment    Outcome Measure Options AM-PAC 6 Clicks Basic Mobility (PT)  -MS           User Key  (r) = Recorded By, (t) = Taken By, (c) = Cosigned By    Initials Name Provider Type    MS SegundoCamilo, PT Physical Therapist                             Physical Therapy Education                 Title: PT OT SLP Therapies (Done)     Topic: Physical Therapy (Done)     Point: Mobility training (Done)     Learning Progress Summary           Patient Acceptance, E,D, VU,NR by MS at 7/15/2022 1531    Acceptance, E,TB, VU,NR by  at 7/15/2022 1033    Acceptance, E,D, VU,DU by EB at 7/13/2022 1331    Acceptance, E,TB,D, VU,NR by CB at 7/11/2022 1153                   Point: Home exercise program (Done)     Learning Progress Summary           Patient Acceptance, E,D, VU,NR by MS at 7/15/2022 1531    Acceptance, E,TB, VU,NR by JW at 7/15/2022 1033    Acceptance, E,D, VU,DU by EB at 7/13/2022 1331                   Point: Body mechanics (Done)     Learning Progress Summary           Patient Acceptance, E,D, VU,NR by MS at 7/15/2022 1531    Acceptance, E,TB, VU,NR by JW at 7/15/2022 1033    Acceptance, E,D, VU,DU by EB at 7/13/2022 1331    Acceptance, E,TB,D, VU,NR by CB at 7/11/2022 1153                   Point: Precautions (Done)     Learning Progress Summary           Patient Acceptance, E,D, VU,NR by MS at 7/15/2022 1531    Acceptance, E,TB, VU,NR by JW at 7/15/2022 1033    Acceptance, E,D, VU,DU by EB at 7/13/2022 1331    Acceptance, E,TB,D, VU,NR by CB at 7/11/2022 1153                               User Key     Initials Effective Dates Name Provider Type Discipline    MS 06/16/21 -  Camilo Segundo, PT Physical Therapist PT    EB 06/16/21 -  Magana, Leslie, PTA Physical Therapist Assistant PT    CB 10/22/21 -  Jolynn Edouard, PT Physical Therapist PT    JW  12/31/20 -  Philip Mejia, RN Registered Nurse Nurse              PT Recommendation and Plan     Plan of Care Reviewed With: patient  Outcome Evaluation: Upon entering room, pt. supine in bed, awake/alert, and agreeable to work with P.T. this date.  Pt. able to ambulate 65 feet, CGA x 1, with use of Rwx this PM.  Pt. requires Min. assist x 1 for bed mobility and Min. assist x 1 for sit <-> stand transfers.  BLE ther. ex. program x 10 reps completed for general strengthening.  Verbal/tactile cues given during ambulation for posture correction and occ. Rwx. guidance. Will continue to progress functional mobility as tolerated.     Time Calculation:    PT Charges     Row Name 07/15/22 1532             Time Calculation    Start Time 1415  -MS      Stop Time 1430  -MS      Time Calculation (min) 15 min  -MS      PT Received On 07/15/22  -MS      PT - Next Appointment 07/16/22  -MS              Time Calculation- PT    Total Timed Code Minutes- PT 14 minute(s)  -MS            User Key  (r) = Recorded By, (t) = Taken By, (c) = Cosigned By    Initials Name Provider Type    Camilo Birmingham, PT Physical Therapist              Therapy Charges for Today     Code Description Service Date Service Provider Modifiers Qty    [id number]  PT THERAPEUTIC ACT EA 15 MIN 7/15/2022 Camilo Segundo PT GP 1          PT G-Codes  Outcome Measure Options: AM-PAC 6 Clicks Basic Mobility (PT)  AM-PAC 6 Clicks Score (PT): 18    Camilo Segundo PT  7/15/2022

## 2022-07-15 NOTE — PLAN OF CARE
Goal Outcome Evaluation:  Plan of Care Reviewed With: patient        Progress: improving  Outcome Evaluation: pt remains on antibiotics for pna/UTI/MRSA in nares. was confused and agitated last night. xanax was effective. refused to sleep in bed. spiked a temp, RR and HR elevated. treated with tylenol and lopressor. BP dropped with SBP in the 80s. lasix helf for tonight. continuing to monitor.

## 2022-07-15 NOTE — PROGRESS NOTES
[facility]  [phone]  Dr. David Bravo    Subjective:  LOS: 5    Chief Complaint: Fever    He continues to have intermittent fever.  Patient sitting up in a chair.  Very sleepy when I saw him.  His legs are wrapped.  Denies new cough or phlegm.  Looks weak and ill-appearing.    Objective   Vital Signs past 24hrs    Temp range: Temp (24hrs), Av.7 °F (37.6 °C), Min:98.1 °F (36.7 °C), Max:101.6 °F (38.7 °C)    BP range: BP: ()/(47-99) 99/72  Pulse range: Heart Rate:  [] 72  Resp rate range: Resp:  [18-40] 18    Device (Oxygen Therapy): nasal cannulaFlow (L/min):  [2-3] 2  Oxygen range:SpO2:  [84 %-100 %] 84 %      134 kg (296 lb); Body mass index is 36.03 kg/m².    Intake/Output Summary (Last 24 hours) at 7/15/2022 0807  Last data filed at 7/15/2022 0607  Gross per 24 hour   Intake 350 ml   Output 1650 ml   Net -1300 ml       Physical Exam  Constitutional:       General: He is sleeping.      Appearance: He is obese. He is ill-appearing.   Eyes:      Pupils: Pupils are equal, round, and reactive to light.   Cardiovascular:      Rate and Rhythm: Bradycardia present. Rhythm irregular.   Pulmonary:      Effort: Pulmonary effort is normal.      Breath sounds: Examination of the right-lower field reveals rales. Decreased breath sounds and rales present.   Abdominal:      General: Bowel sounds are normal.      Palpations: Abdomen is soft. There is no mass.      Tenderness: There is no abdominal tenderness.   Musculoskeletal:         General: Swelling (leg wraps) present.   Neurological:      Mental Status: He is easily aroused.       Results Review:    I have reviewed the laboratory and imaging data since the last note by [facility] physician.  My annotations are noted in assessment and plan.    Medication Review:  I have reviewed the current MAR.  My annotations are noted in assessment and plan.    Pharmacy to dose vancomycin,   Pharmacy to dose warfarin,       Plan   PCCM Problems  Intermittent  fevers  Hypoxia on low-flow oxygen  Right lower lobe pneumonia  Multiple lung nodules  Previously recorded dysphagia  COPD without exacerbation  Current cigarette smoker  Relevant Medical Diagnoses  Urinary retention with chronic indwelling Irvin catheter  MDR Enterobacter UTI  Cellulitis with MRSA  A. fib on anticoagulation      Plan of Treatment    Intermittent fevers may be explained by noninfectious causes.  However patient does have recorded dysphagia.  Currently on a regular diet and does not modify his diet consistency at home.  Chronic aspiration may certainly be causing his intermittent fevers and this needs to be excluded with speech evaluation which I have ordered.  He remains on antibiotics per ID.    CT chest is extremely concerning for multiple lung nodules.  He will likely require a biopsy of lung nodules for diagnostic purposes.  Currently this cannot be performed as he is on anticoagulation with warfarin.  Will discuss with hospitalist/cardiology  about taking him off    COPD but without exacerbation.  Should completely quit smoking.  Continue on bronchodilators.    If lung biopsy is nondiagnostic then continued close follow-up of lung nodules is essential.    David Bravo MD  07/15/22  08:07 EDT    While in the room and during my examination of the patient I wore gloves, gown, mask, eye protection and followed enhanced droplet/contact isolation protocol and precautions.  I washed my hands before and after this patient encounter.    Part of this note may be an electronic transcription/translation of spoken language to printed text using the Dragon Dictation System.

## 2022-07-15 NOTE — THERAPY EVALUATION
Acute Care - Speech Language Pathology   Swallow Initial Evaluation [facility]     Patient Name: Jalen Lockwood  : 1950  MRN: [id number]  Today's Date: 7/15/2022               Admit Date: 7/10/2022    Visit Dx:     ICD-10-CM ICD-9-CM   1. Acute UTI  N39.0 599.0   2. Cellulitis of left foot  L03.116 682.7   3. Anemia, unspecified type  D64.9 285.9   4. Ischemic cardiomyopathy  I25.5 414.8   5. Normocytic anemia  D64.9 285.9   6. Urinary retention  R33.9 788.20   7. Anemia of chronic disease  D63.8 285.29   8. Gastroparesis  K31.84 536.3   9. Obesity (BMI 30-39.9)  E66.9 278.00     Patient Active Problem List   Diagnosis   • Chronic osteomyelitis (HCC)   • Foot pain   • Peripheral neuropathy   • Lymphedema of both lower extremities   • Permanent atrial fibrillation (HCC)   • Sleep apnea   • Chronic edema   • Obesity (BMI 30-39.9)   • COPD (chronic obstructive pulmonary disease) (HCC)   • Atrial flutter (HCC)   • 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HCC)   • Aortectasia (HCC)   • Popliteal artery aneurysm (HCC)   • Colon polyps   • Gastroparesis   • Insomnia   • Adenomatous polyp of colon   • Essential hypertension   • ETOH abuse   • Chronic anticoagulation   • Oropharyngeal dysphagia   • Tobacco abuse   • Thyromegaly   • Adrenal adenoma, left   • Retroperitoneal lymphadenopathy   • Anemia of chronic disease   • Thyroid nodule   • Charcot's joint of foot   • Sepsis (HCC)   • Abnormal CT scan, lumbar spine   • Alcohol dependence, in remission ([facility])   • Encephalopathy, metabolic   • Ischemic cardiomyopathy   • Normocytic anemia   • Inguinal adenopathy   • Elevated lactic acid level   • Venous stasis dermatitis of both lower extremities   • Urinary retention   • Sepsis without acute organ dysfunction (HCC)   • UTI (urinary tract infection) due to urinary indwelling catheter (HCC)   • Bacterial pneumonia   • Acute on chronic respiratory failure with hypoxia (HCC)   • Paroxysmal atrial fibrillation with rapid  ventricular response (HCC)     Past Medical History:   Diagnosis Date   • Allergic rhinitis    • Anxiety    • Aortectasia (HCC)     3cm infrarenal abdominal aorta   • Arthritis    • Atrial flutter (HCC) 2010    s/p ablation    • Charcot's joint of foot    • Chronic edema     both legs and sees wound care center at [place]    • Chronic venous insufficiency    • COPD (chronic obstructive pulmonary disease) (HCC)    • Coronary atherosclerosis     Cath 2010: diffuse 40-50% disease   • Diverticulosis    • Duodenitis    • Fatty liver    • Gastritis    • Gastroparesis    • Hematoma     post-operative; After catheterization, right groin, required surgical exploration   • Hyperlipidemia    • Hypertension    • Insomnia    • Internal hemorrhoids    • Open wound     izzy legs has drsg chg weekly at wound care center at [place]  pt does second dressing on left leg another time during week   • Osteomyelitis (HCC)    • Paroxysmal atrial fibrillation (HCC)    • Peripheral neuropathy    • Popliteal artery aneurysm (HCC)     left, s/p stenting by Dr. [place]   • Skin cancer    • Sleep apnea     o2   • 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HCC)     due to flutter and afib; cath 2010 with nonobstructive disease   • Venous stasis    • Venous stasis ulcer (HCC)     bilateral legs      Past Surgical History:   Procedure Laterality Date   • BASAL CELL CARCINOMA EXCISION      ear and left side of face   • BRONCHOSCOPY N/A 4/12/2021    Procedure: BRONCHOSCOPY WITH BAL;  Surgeon: Bunny Lepe MD;  Location: [facility] ENDOSCOPY;  Service: Pulmonary;  Laterality: N/A;  PRE-HEMOPTYSIS  POST-SAME   • CARDIAC CATHETERIZATION     • CATARACT EXTRACTION     • COLONOSCOPY  09/28/2015    NBIH, diverticulosis, polyps   • COLONOSCOPY N/A 9/11/2018    Procedure: COLONOSCOPY TO CECUM  AND TERM. ILEUM WITH COLD SNARE POLYPECTOMIES;  Surgeon: Kane Lagunas MD;  Location: [facility] ENDOSCOPY;  Service: Gastroenterology   • COLONOSCOPY N/A 10/29/2019     "Procedure: COLONOSCOPY TO TO CECUM AND TERMINAL ILEUM WITH HOT AND COLD SNARE POLYPECTOMIES;  Surgeon: Kane Lagunas MD;  Location: [facility] ENDOSCOPY;  Service: Gastroenterology   • HIP ARTHROPLASTY Right 2017   • JOINT REPLACEMENT Left    • OTHER SURGICAL HISTORY      Catheter ablation atrial flutter   • REPAIR ANEURYSM / PSEUDO ANEURYSM / RUPTURED ANEURYSM POPLITEAL ARTERY      Stent-Graft of the the left popliteal artery   • REPAIR KNEE LIGAMENT      Primary repair of knee ligament cruciate anterior right   • TONSILLECTOMY     • TOTAL KNEE ARTHROPLASTY Bilateral    • UPPER GASTROINTESTINAL ENDOSCOPY  2014    acute gastritis, acute duodenitis       SLP Recommendation and Plan  SLP Swallowing Diagnosis: suspected pharyngeal dysphagia, other (see comments) (Previous VFSS reviewed) (07/15/22 1100)  SLP Diet Recommendation: regular textures, nectar thick liquids, other (see comments) (pt refused) (07/15/22 1100)  Recommended Precautions and Strategies: upright posture during/after eating, small bites of food and sips of liquid, multiple swallows per bite of food, multiple swallows per sip of liquid, volitional throat clear (07/15/22 1100)  SLP Rec. for Method of Medication Administration: as tolerated (07/15/22 1100)           Swallow Criteria for Skilled Therapeutic Interventions Met: demonstrates skilled criteria (07/15/22 1100)  Anticipated Discharge Disposition (SLP): home with OP services (07/15/22 1100)  Rehab Potential/Prognosis, Swallowing: good, to achieve stated therapy goals (07/15/22 1100)  Therapy Frequency (Swallow): PRN (07/15/22 1100)  Predicted Duration Therapy Intervention (Days): until discharge (07/15/22 1100)                                  Plan of Care Reviewed With: patient  Outcome Evaluation: Patient seen for clinical swallow assessment and education. Pt oriented to name & . When questioned, patient does recall VFSS recommendations & modified diet hx. He reported, \"I can't " "drink that stuff\". Education provided on risks of aspiration: recurrent pna, intubation, death. Pt agknowledges risks, but continues to refuse modified diet recs. VFSS 4/21 revealed silent aspiration with thins and nectar via straw. Pt agreed to assessment. Oral [facility] exam was remarkable for hoarse voice and no dentition. Dentures at bedside. Delayed cough with thins. No overt s/s of aspiration with nectar or hard solids. SLP recs regular, no mixed, and nectar, no straws. Pt refuses recommendations, but is in agreement with therapy at D/C. SLP discussed with case management for possible HH dysphagia therapy with SLP.      SWALLOW EVALUATION (last 72 hours)     SLP Adult Swallow Evaluation     Row Name 07/15/22 1100                   Rehab Evaluation    Document Type evaluation  -        Patient Effort adequate  -                  General Information    Patient Profile Reviewed yes  -SH        Pertinent History Of Current Problem RLL PNA  -        Current Method of Nutrition regular textures;thin liquids  -        Precautions/Limitations, Vision WFL;for purposes of eval  -        Precautions/Limitations, Hearing hearing impairment, bilaterally  -        Prior Level of Function-Communication WFL  -        Prior Level of Function-Swallowing mechanical soft textures;nectar thick liquids;other (see comments)  PT NON COMPLIANT  -        Plans/Goals Discussed with patient;agreed upon  -        Patient's Goals for Discharge patient did not state  -                  Pain    Additional Documentation Pain Scale: Word Pre/Post-Treatment (Group)  -                  Pain Scale: Word Pre/Post-Treatment    Pain: Word Scale, Pretreatment 0 - no pain  -SH                  Oral Motor Structure and Function    Dentition Assessment edentulous;other (see comments)  dentures on bedside table  -        Volitional Swallow delayed  -SH        Volitional Cough weak  -SH                  Oral Musculature and Cranial " "Nerve Assessment    Oral Motor General Assessment generalized oral motor weakness  -                  Clinical Swallow Eval    Clinical Swallow Evaluation Summary Patient seen for clinical swallow assessment and education. Pt oriented to name & . When questioned, patient does recall VFSS recommendations & modified diet hx. He reported, \"I can't drink that stuff\". Education provided on risks of aspiration: recurrent pna, intubation, death. Pt acknowledges risks, but continues to refuse modified diet recs. VFSS  revealed silent aspiration with thins and nectar via straw. Pt agreed to assessment. [facility] voice and no dentition. Dentures at bedside. Delayed cough with thins. No overt s/s of aspiration with nectar or hard solids. SLP recs regular, no mixed, and nectar, no straws. Pt again refuses recommendations, but is in agreement with therapy at D/C. SLP discussed with case management for possible HH dysphagia therapy with SLP. Will continue to follow for introduction of dysphagia exercises.  -                  SLP Evaluation Clinical Impression    SLP Swallowing Diagnosis suspected pharyngeal dysphagia;other (see comments)  Previous VFSS reviewed  -        Functional Impact risk of aspiration/pneumonia  -        Rehab Potential/Prognosis, Swallowing good, to achieve stated therapy goals  [facility]        Swallow Criteria for Skilled Therapeutic Interventions Met demonstrates skilled criteria  -                  Recommendations    Therapy Frequency (Swallow) PRN  -        Predicted Duration Therapy Intervention (Days) until discharge  -        SLP Diet Recommendation regular textures;nectar thick liquids;other (see comments)  pt refused  -        Recommended Precautions and Strategies upright posture during/after eating;small bites of food and sips of liquid;multiple swallows per bite of food;multiple swallows per sip of liquid;volitional throat clear  -        Oral " Care Recommendations Oral Care BID/PRN  -        SLP Rec. for Method of Medication Administration as tolerated  -        Anticipated Discharge Disposition (SLP) home with OP services  -                  Swallow Goals (SLP)    Swallow STGs lingual strengthening goal selection (SLP);pharyngeal strengthening exercise goal selection (SLP)  -        Lingual Strengthening Goal Selection (SLP) lingual strengthening, SLP goal 1  -        Pharyngeal Strengthening Exercise Goal Selection (SLP) pharyngeal strengthening exercise, SLP goal 1  -                  (STG) Lingual Strengthening Goal 1 (SLP)    Activity (Lingual Strengthening Goal 1, SLP) increase tongue back strength  -        Increase Tongue Back Strength lingual resistance exercises  Ena  -        [place]/Accuracy (Lingual Strengthening Goal 1, SLP) with minimal cues (75-90% accuracy)  -                  (STG) Pharyngeal Strengthening Exercise Goal 1 (SLP)    Activity (Pharyngeal Strengthening Goal 1, SLP) increase epiglottic inversion and retroflexion;increase closure at entrance to airway/closure of airway at glottis;increase squeeze/positive pressure generation  -        Increase Anterior Movement of the Hyolaryngeal Complex hard effortful swallow;Mendelsohn;shaker;chin tuck against resistance (CTAR)  -SH        Increase Epiglottic Inversion and Retroflexion Mendelsohn;hard effortful swallow;shaker;chin tuck against resistance (CTAR)  -SH        Increase Closure at Entrance to Airway/Closure of Airway at Glottis hard effortful swallow  -SH        Increase Squeeze/Positive Pressure Generation hard effortful swallow  -              User Key  (r) = Recorded By, (t) = Taken By, (c) = Cosigned By    Initials Name Effective Dates     Griselda Orellana MS CCC-SLP 06/16/21 -                 EDUCATION  The patient has been educated in the following areas:   Dysphagia (Swallowing Impairment).        SLP GOALS     Row Name 07/15/22 1100              (STG) Lingual Strengthening Goal 1 (SLP)    Activity (Lingual Strengthening Goal 1, SLP) increase tongue back strength  -SH      Increase Tongue Back Strength lingual resistance exercises  Ena  -SH      [place]/Accuracy (Lingual Strengthening Goal 1, SLP) with minimal cues (75-90% accuracy)  -SH              (STG) Pharyngeal Strengthening Exercise Goal 1 (SLP)    Activity (Pharyngeal Strengthening Goal 1, SLP) increase epiglottic inversion and retroflexion;increase closure at entrance to airway/closure of airway at glottis;increase squeeze/positive pressure generation  -SH      Increase Anterior Movement of the Hyolaryngeal Complex hard effortful swallow;Mendelsohn;shaker;chin tuck against resistance (CTAR)  -SH      Increase Epiglottic Inversion and Retroflexion Mendelsohn;hard effortful swallow;shaker;chin tuck against resistance (CTAR)  -SH      Increase Closure at Entrance to Airway/Closure of Airway at Glottis hard effortful swallow  -SH      Increase Squeeze/Positive Pressure Generation hard effortful swallow  -SH            User Key  (r) = Recorded By, (t) = Taken By, (c) = Cosigned By    Initials Name Provider Type    Griselda Patel MS CCC-SLP Speech and Language Pathologist                   Time Calculation:    Time Calculation- SLP     Row Name 07/15/22 1313             Time Calculation- SLP    SLP Start Time 1000  -SH      SLP Received On 07/15/22  -SH              Untimed Charges    53770-QZ Eval Oral Pharyng Swallow Minutes 75  -SH              Total Minutes    Untimed Charges Total Minutes 75  -SH       Total Minutes 75  -SH            User Key  (r) = Recorded By, (t) = Taken By, (c) = Cosigned By    Initials Name Provider Type    Griselda Patel MS CCC-SLP Speech and Language Pathologist                Therapy Charges for Today     Code Description Service Date Service Provider Modifiers Qty    [id number]  ST EVAL ORAL PHARYNG SWALLOW 5 7/15/2022 Griselda Orellana MS CCC-SUELLEN GN 1                Griselda Orellana, MS CCC-SLP  7/15/2022

## 2022-07-15 NOTE — PLAN OF CARE
Problem: Adult Inpatient Plan of Care  Goal: Plan of Care Review  Outcome: Ongoing, Progressing  Flowsheets (Taken 7/15/2022 1539)  Progress: no change  Plan of Care Reviewed With: patient  Goal: Patient-Specific Goal (Individualized)  Outcome: Ongoing, Progressing  Goal: Absence of Hospital-Acquired Illness or Injury  Outcome: Ongoing, Progressing  Intervention: Identify and Manage Fall Risk  Recent Flowsheet Documentation  Taken 7/15/2022 1400 by Philip Mejia RN  Safety Promotion/Fall Prevention:  • activity supervised  • assistive device/personal items within reach  • clutter free environment maintained  • fall prevention program maintained  • lighting adjusted  • nonskid shoes/slippers when out of bed  • room organization consistent  • safety round/check completed  Taken 7/15/2022 1200 by Philip Mejia RN  Safety Promotion/Fall Prevention:  • activity supervised  • assistive device/personal items within reach  • clutter free environment maintained  • fall prevention program maintained  • lighting adjusted  • nonskid shoes/slippers when out of bed  • room organization consistent  • safety round/check completed  Taken 7/15/2022 1000 by Philip Mejia RN  Safety Promotion/Fall Prevention:  • activity supervised  • assistive device/personal items within reach  • clutter free environment maintained  • fall prevention program maintained  • lighting adjusted  • nonskid shoes/slippers when out of bed  • room organization consistent  • safety round/check completed  Taken 7/15/2022 0800 by Philip Mejia, RN  Safety Promotion/Fall Prevention:  • activity supervised  • assistive device/personal items within reach  • clutter free environment maintained  • fall prevention program maintained  • lighting adjusted  • nonskid shoes/slippers when out of bed  • room organization consistent  • safety round/check completed  Intervention: Prevent Skin Injury  Recent Flowsheet Documentation  Taken 7/15/2022 1400 by Roberto  JAYANT Palacios  Body Position: (OOB in chair) other (see comments)  Taken 7/15/2022 0800 by Philip Mejia RN  Body Position: (OOB in chair) --  Skin Protection:  • adhesive use limited  • transparent dressing maintained  Intervention: Prevent and Manage VTE (Venous Thromboembolism) Risk  Recent Flowsheet Documentation  Taken 7/15/2022 1400 by Philip Mejia RN  VTE Prevention/Management: (unaboots)  • bilateral  • dorsiflexion/plantar flexion performed  • other (see comments)  Range of Motion: active ROM (range of motion) encouraged  Taken 7/15/2022 0800 by Philip Mejia RN  Activity Management:  • activity adjusted per tolerance  • ambulated in room  VTE Prevention/Management: (Bilateral unaboots intact/Coumadin)  • bilateral  • dorsiflexion/plantar flexion performed  Range of Motion: active ROM (range of motion) encouraged  Intervention: Prevent Infection  Recent Flowsheet Documentation  Taken 7/15/2022 1400 by Philip Mejia RN  Infection Prevention:  • environmental surveillance performed  • hand hygiene promoted  • single patient room provided  Taken 7/15/2022 1200 by Philip Mejia RN  Infection Prevention:  • environmental surveillance performed  • hand hygiene promoted  • single patient room provided  Taken 7/15/2022 1000 by Philip Mejia RN  Infection Prevention:  • environmental surveillance performed  • hand hygiene promoted  • single patient room provided  Taken 7/15/2022 0800 by Philip Mejia RN  Infection Prevention:  • environmental surveillance performed  • hand hygiene promoted  • single patient room provided  Goal: Optimal Comfort and Wellbeing  Outcome: Ongoing, Progressing  Intervention: Monitor Pain and Promote Comfort  Recent Flowsheet Documentation  Taken 7/15/2022 1509 by Philip Mejia RN  Pain Management Interventions:  • see MAR  • quiet environment facilitated  • care clustered  • breathing exercises  Intervention: Provide Person-Centered Care  Recent Flowsheet  Documentation  Taken 7/15/2022 1400 by Philip Mejia RN  Trust Relationship/Rapport:  • care explained  • choices provided  • emotional support provided  • empathic listening provided  • questions answered  • questions encouraged  • reassurance provided  • thoughts/feelings acknowledged  Taken 7/15/2022 0800 by Philip Mejia RN  Trust Relationship/Rapport:  • care explained  • choices provided  • emotional support provided  • empathic listening provided  • questions answered  • questions encouraged  • reassurance provided  • thoughts/feelings acknowledged  Goal: Readiness for Transition of Care  Outcome: Ongoing, Progressing     Problem: Fall Injury Risk  Goal: Absence of Fall and Fall-Related Injury  Outcome: Ongoing, Progressing  Intervention: Identify and Manage Contributors  Recent Flowsheet Documentation  Taken 7/15/2022 1400 by Philip Mejia RN  Medication Review/Management: medications reviewed  Self-Care Promotion:  • independence encouraged  • BADL personal objects within reach  • BADL personal routines maintained  Taken 7/15/2022 1200 by Philip Mejia RN  Medication Review/Management: medications reviewed  Taken 7/15/2022 1000 by Philip Mejia RN  Medication Review/Management: medications reviewed  Taken 7/15/2022 0800 by Philip Mejia RN  Medication Review/Management: medications reviewed  Self-Care Promotion:  • independence encouraged  • BADL personal objects within reach  • BADL personal routines maintained  Intervention: Promote Injury-Free Environment  Recent Flowsheet Documentation  Taken 7/15/2022 1400 by Philip Mejia RN  Safety Promotion/Fall Prevention:  • activity supervised  • assistive device/personal items within reach  • clutter free environment maintained  • fall prevention program maintained  • lighting adjusted  • nonskid shoes/slippers when out of bed  • room organization consistent  • safety round/check completed  Taken 7/15/2022 1200 by Philip Mejia RN  Safety  Promotion/Fall Prevention:  • activity supervised  • assistive device/personal items within reach  • clutter free environment maintained  • fall prevention program maintained  • lighting adjusted  • nonskid shoes/slippers when out of bed  • room organization consistent  • safety round/check completed  Taken 7/15/2022 1000 by Philip Mejia RN  Safety Promotion/Fall Prevention:  • activity supervised  • assistive device/personal items within reach  • clutter free environment maintained  • fall prevention program maintained  • lighting adjusted  • nonskid shoes/slippers when out of bed  • room organization consistent  • safety round/check completed  Taken 7/15/2022 0800 by Philip Mejia RN  Safety Promotion/Fall Prevention:  • activity supervised  • assistive device/personal items within reach  • clutter free environment maintained  • fall prevention program maintained  • lighting adjusted  • nonskid shoes/slippers when out of bed  • room organization consistent  • safety round/check completed     Problem: Skin Injury Risk Increased  Goal: Skin Health and Integrity  Outcome: Ongoing, Progressing  Intervention: Optimize Skin Protection  Recent Flowsheet Documentation  Taken 7/15/2022 0800 by Philip Mejia RN  Pressure Reduction Techniques:  • frequent weight shift encouraged  • heels elevated off bed  Pressure Reduction Devices: pressure-redistributing mattress utilized  Skin Protection:  • adhesive use limited  • transparent dressing maintained   Goal Outcome Evaluation:  Plan of Care Reviewed With: patient            Patient alert and oriented this shift. He has been OOB in recliner this shift. He has been OOB and ambulated in room with assistance with PT and CNA. He continues on 2-3L NC. IV ABX as ordered. Unaboots changed by wound care to BLE. Fall precaution maintained. Call light in reach. Irvin to bedside drainage. Chair alarm on.

## 2022-07-15 NOTE — PLAN OF CARE
Goal Outcome Evaluation:  Plan of Care Reviewed With: patient           Outcome Evaluation: Upon entering room, pt. supine in bed, awake/alert, and agreeable to work with P.T. this date.  Pt. able to ambulate 65 feet, CGA x 1, with use of Rwx this PM.  Pt. requires Min. assist x 1 for bed mobility and Min. assist x 1 for sit <-> stand transfers.  BLE ther. ex. program x 10 reps completed for general strengthening.  Verbal/tactile cues given during ambulation for posture correction and occ. Rwx. guidance. Will continue to progress functional mobility as tolerated.    Patient was wearing a face mask during this therapy encounter. Therapist used appropriate personal protective equipment including eye protection, mask, and gloves.  Mask used was standard procedure mask. Appropriate PPE was worn during the entire therapy session. Hand hygiene was completed before and after therapy session. Patient is not in enhanced droplet precautions.

## 2022-07-15 NOTE — NURSING NOTE
Call return from Julita regarding elevated temp and dropping BP. Map is at 67. Lasix is still on hold. He currently has antibiotics running. Asymptomatic HR 70 - 80s. Order to continue to watch now and notify Julita if map is low or SBP less than 80

## 2022-07-15 NOTE — PROGRESS NOTES
LOS: 5 days     Chief Complaint: Recurrent fever    Interval History: Patient reports he is doing much better this morning.  A/O x4 and much more alert today.  Denies any nausea, vomiting, diarrhea, abdominal pain, fevers, chills, shortness of breath or cough.    Vital Signs  Temp:  [98.1 °F (36.7 °C)-101.6 °F (38.7 °C)] 98.1 °F (36.7 °C)  Heart Rate:  [] 72  Resp:  [18-40] 18  BP: ()/(47-99) 99/72    Physical Exam:  General: In no acute distress  HEENT: Oropharynx clear, moist mucous membranes  Cardiovascular:  normal S1 and S2, no M/R/G  Respiratory: Normal work of breathing on nasal cannula  GI: Soft, NT/ND, + bowel sounds bilaterally, no masses  Skin: Bilateral lower extremity venous stasis and lymphedema chronic changes.  Improvement in his intertriginous candidal infection.    Extremities: Bilateral lower extremity venous stasis and lymphedema.  Bilateral leg wraps in place.  Access: Right upper extremity midline    Antibiotics:  Anti-Infectives (From admission, onward)    Ordered     Dose/Rate Route Frequency Start Stop    07/14/22 1243  cefepime 2 gm IVPB in 100 ml NS ([facility])        Ordering Provider: Fahad Jaramillo DO    2 g  over 4 Hours Intravenous Every 8 Hours 07/14/22 1600 07/18/22 1559    07/14/22 1324  vancomycin 1250 mg/250 mL 0.9% NS IVPB ([facility])        Ordering Provider: Fahad Jaramillo DO    1,250 mg  over 75 Minutes Intravenous Every 12 Hours 07/14/22 1500 07/19/22 1759    07/14/22 1231  Pharmacy to dose vancomycin        Ordering Provider: Fahad Jaramillo DO     Does not apply Continuous PRN 07/14/22 1231 07/19/22 1230    07/13/22 1433  cefepime 2 gm IVPB in 100 ml NS ([facility]TB)        Ordering Provider: Lorenzo Segundo MD    2 g Intravenous Every 8 Hours 07/13/22 0000 07/18/22 2359    07/10/22 1503  cefepime 2 gm IVPB in 100 ml NS (VTB)        Ordering Provider: Stettenbenz, Glory R, APRN    2 g  over 30 Minutes Intravenous Once 07/10/22 1505 07/10/22 1600     07/10/22 1418  vancomycin 2750 mg/1000 mL 0.9% NS IVPB        Ordering Provider: Glory Yousif, APRN    20 mg/kg × 134 kg Intravenous Once 07/10/22 1420 07/10/22 1559           Results Review:     I reviewed the patient's new clinical results.  I reviewed the patient's new imaging results and agree with the interpretation.    Lab Results   Component Value Date    WBC 7.16 07/15/2022    HGB 10.8 (L) 07/15/2022    HCT 33.3 (L) 07/15/2022    MCV 93.5 07/15/2022     07/15/2022     Lab Results   Component Value Date    GLUCOSE 105 (H) 07/15/2022    BUN 14 07/15/2022    CREATININE 0.86 07/15/2022    EGFRIFNONA 103 01/12/2022    EGFRIFAFRI 102 11/20/2018    BCR 16.3 07/15/2022    CO2 26.0 07/15/2022    CALCIUM 9.1 07/15/2022    PROTENTOTREF 6.1 04/26/2019    ALBUMIN 3.10 (L) 07/11/2022    LABIL2 1.1 04/26/2019    AST 13 07/11/2022    ALT 7 07/11/2022       Microbiology:  7/10 respiratory panel negative  7/10 blood cultures no growth to date  7/10 urinary culture with greater than 100,000 Enterobacter cloacae  7/10 wound culture from the left foot with Pseudomonas aeruginosa and E. coli  7/11 MRSA nares positive  7/14 blood cultures in process    New imaging:  CT chest abdomen pelvis reviewed with numerous bilateral pulmonary nodules.  Colonic diverticulosis.  Nonobstructive nephrolithiasis.    Assessment    #Recurrent fever  #Right lower lobe pneumonia  #Encephalopathy  #Catheter associated UTI  #Candidal intertrigo, improved  #Acute on chronic hypoxic respiratory failure  #Chronic lower extremity venous stasis  #Bilateral lower extremity lymphedema  #Chronic urinary retention with chronic Irvin    Repeat blood cultures are in process.  Respiratory culture ordered however he has been unable to produce sputum. Procalcitonin was repeated yesterday and it is actually normalized which is reassuring.  I agree with pulmonary that this seems to represent recurrent aspiration pneumonitis given his constellation of  symptoms and prior swallow study.  His overall lower extremity exam has improved significantly since prior and he has been on antibiotics that have targeted both his urine and organisms isolated from the legs.  I suspect since he had fevers while on appropriate therapy that this is a noninfectious etiology more likely, such as aspiration pneumonitis.    Continue IV cefepime 2 g every 8 hours plus IV vancomycin goal -600 while blood cultures are outstanding.  May be able to de-escalate in the near future if he continues to improve as he is on day 6 of cefepime therapy for presumed pneumonia.    ID will follow.

## 2022-07-16 NOTE — PROGRESS NOTES
"DAILY PROGRESS NOTE  [facility]    Patient Identification:  Name: Jalen Lockwood  Age: 72 y.o.  Sex: male  :  1950  MRN: [id number]         Primary Care Physician: Marc Ludwig MD      Subjective  Patient with no new complaints.    Objective:  General Appearance:  Comfortable, well-appearing, in no acute distress and not in pain.    Vital signs: (most recent): Blood pressure 120/82, pulse 70, temperature 97.1 °F (36.2 °C), temperature source Tympanic, resp. rate 18, height 193 cm (76\"), weight 134 kg (296 lb), SpO2 94 %.    Lungs:  Normal effort and normal respiratory rate.  Breath sounds clear to auscultation.    Heart: Normal rate.  Regular rhythm.    Extremities: There is dependent edema.  (Lower extremities wrapped.)  Neurological: Patient is alert and oriented to person, place and time.    Skin:  Warm and dry.                Vital signs in last 24 hours:  Temp:  [97 °F (36.1 °C)-98.2 °F (36.8 °C)] 97.1 °F (36.2 °C)  Heart Rate:  [50-70] 70  Resp:  [18] 18  BP: (102-120)/(58-82) 120/82    Intake/Output:    Intake/Output Summary (Last 24 hours) at 2022 1554  Last data filed at 2022 0619  Gross per 24 hour   Intake 250 ml   Output 600 ml   Net -350 ml         Results from last 7 days   Lab Units 22  0753 07/15/22  0733 22  1033 22  0731 22  0744 22  0747 07/10/22  1334   WBC 10*3/mm3 6.12 7.16 7.47 4.26 5.21 4.90 6.91   HEMOGLOBIN g/dL 9.9* 10.8* 11.2* 9.7* 9.6* 9.3* 10.9*   PLATELETS 10*3/mm3 168 158 186 144 131* 111* 145     Results from last 7 days   Lab Units 22  0753 07/15/22  0733 22  0551 22  1944 22  0731 22  0744 22  0747 07/10/22  2306 07/10/22  1334   SODIUM mmol/L 134* 138 136  --  136 137 134*  --  133*   POTASSIUM mmol/L 3.6 4.0 3.9 4.0 3.4* 3.8 3.7   < > 4.3   CHLORIDE mmol/L 101 103 103  --  103 106 100  --  95*   CO2 mmol/L 26.0 26.0 27.0  --  25.8 24.0 25.0  --  27.0   BUN mg/dL 16 14 10  --  9 " 10 11  --  13   CREATININE mg/dL 0.78 0.86 0.73*  --  0.70* 0.65* 0.63*  --  0.87   GLUCOSE mg/dL 92 105* 93  --  108* 98 116*  --  91    < > = values in this interval not displayed.   Estimated Creatinine Clearance: 130.2 mL/min (by C-G formula based on SCr of 0.78 mg/dL).  Results from last 7 days   Lab Units 07/16/22  0753 07/15/22  0733 07/14/22  0551 07/13/22  0731 07/12/22  0744 07/11/22  0747 07/10/22  2306 07/10/22  1334   CALCIUM mg/dL 9.3 9.1 9.1 8.8 8.2* 8.6  --  9.4   ALBUMIN g/dL  --   --   --   --   --  3.10*  --  3.60   MAGNESIUM mg/dL  --   --   --   --   --  2.2 1.8 1.8     Results from last 7 days   Lab Units 07/11/22  0747 07/10/22  1334   ALBUMIN g/dL 3.10* 3.60   BILIRUBIN mg/dL 0.9 1.1   ALK PHOS U/L 83 95   AST (SGOT) U/L 13 16   ALT (SGPT) U/L 7 11       Assessment:    Bacterial pneumonia    Lymphedema of both lower extremities    Sleep apnea    COPD (chronic obstructive pulmonary disease) ([facility])    Essential hypertension    Chronic anticoagulation    Charcot's joint of foot    Sepsis ([facility])    Alcohol dependence, in remission ([facility])    Encephalopathy, metabolic    Ischemic cardiomyopathy    Venous stasis dermatitis of both lower extremities    UTI (urinary tract infection) due to urinary indwelling catheter ([facility])    Acute on chronic respiratory failure with hypoxia ([facility])    Paroxysmal atrial fibrillation with rapid ventricular response ([facility])    Mr. Lockwood is a 72 year old male who presented to the hospital for confusion and fever in the setting of chronic escamilla and chronic venous stasis wounds. CXR on admission showed possible RLL pneumonia and UA with pyuria and bacteria. He was seen in consultation by ID and placed empirically on antibiotics.      · Right lower lobe pneumonia/acute on chronic respiratory failure: ID following and has had him on Cefepime with initial improvement. MRSA nares positive but not felt to have current infection so not on vancomycin. Has had recurrent fever 7/14 with  associated dyspnea/tachypnea. Repeat CXR with pulmonary vascular engorgement. ABGs with respiratory alkalosis likely from hyperventilation/swallow breaths.  Pulmonary following.  ID suspects recurrent aspiration pneumonitis.  He is continued on cefepime and vancomycin  · UTI/chronic escamilla: Urine with MDR enterobacter sensitive to cefepime. Difficult to determine if active infection with possible pneumonia. ID has on cefepime to cover this as well. High risk for recurrent infections.  · Venous stasis dermatitis: Wound cultures on admission with MDR pseudomonas and ESBL E.coli. Felt colonized. Local wound care with wraps.  · Sepsis - secondary to above  · PAF with RVR: He is metoprolol daily, Lasix IV.  Metoprolol was held for hypotension and bradycardia.  Coumadin presently on hold for probable pulmonary biopsy.  · Encephalopathy: Multifactorial d/t the above. Monitor for improvement.  · COPD/BECKI: Pulm to see as above. Chronically on 2 L at home.  · ICM: Followed by Dr. Henry. Fluid status is somewhat difficult to assess with his obesity and chronic edema. BNP is elevated but not far from prior levels. Has been on oral lasix. With RVR, Cardiology notes possible transition to oral diuretics tomorrow.  · Chest CT is multiple pulmonary nodules:.  Plans for biopsy.  · Right inguinal chain lymph node: Increased size since previous CT scan.  Consider follow-up with PET scan, consider biopsy.    All problems new to this examiner.    Plan:  Please see above.  Discussed with patient and wife at bedside.    Christos Bess MD  7/16/2022  15:54 EDT

## 2022-07-16 NOTE — PROGRESS NOTES
"CC: Permanent atrial fibrillation RVR    Interval History: Rate has been much better controlled up until now, it is labile, but overall is very well controlled in the 70s to 80s.  He does still have some very significant lower extremity edema.      Vital Signs  Temp:  [97 °F (36.1 °C)-98.2 °F (36.8 °C)] 97.1 °F (36.2 °C)  Heart Rate:  [50-70] 70  Resp:  [18] 18  BP: (102-120)/(58-82) 120/82    Intake/Output Summary (Last 24 hours) at 7/16/2022 1224  Last data filed at 7/16/2022 0619  Gross per 24 hour   Intake 490 ml   Output 600 ml   Net -110 ml     Flowsheet Rows    Flowsheet Row First Filed Value   Admission Height 193 cm (76\") Documented at 07/10/2022 1332   Admission Weight 134 kg (296 lb) Documented at 07/10/2022 1332          Physical Exam:   General:  Appears in no acute distress; resting comfortably in chair  Eyes: PERTL,  HEENT:  No JVD. Thyroid not visibly enlarged. No mucosal pallor or cyanosis  Respiratory: Respirations regular and unlabored at rest. BBS with good air entry in all fields anteriorly and laterally. No crackles, rubs or wheezes auscultated  Cardiovascular: S1S2 Regular rate and rhythm. No murmur, rub or gallop. No carotid bruits. DP/PT pulses 1+   .  4+ pretibial pitting edema  Gastrointestinal: Abdomen soft, round, non tender. Bowel sounds present.  No ascites  Musculoskeletal: MOLINA x4. No abnormal movements  Extremities: No digital clubbing or cyanosis  Skin: Color pink. Skin warm and dry to touch. No rashes    Neuro: AAO x3 CN II-XII grossly intact  Psych: Mood and affect normal, pleasant and cooperative      Results Review:    Results from last 7 days   Lab Units 07/16/22  0753   SODIUM mmol/L 134*   POTASSIUM mmol/L 3.6   CHLORIDE mmol/L 101   CO2 mmol/L 26.0   BUN mg/dL 16   CREATININE mg/dL 0.78   GLUCOSE mg/dL 92   CALCIUM mg/dL 9.3     Results from last 7 days   Lab Units 07/10/22  1334   TROPONIN T ng/mL <0.010     Results from last 7 days   Lab Units 07/16/22  0753   WBC 10*3/mm3 " 6.12   HEMOGLOBIN g/dL 9.9*   HEMATOCRIT % 30.7*   PLATELETS 10*3/mm3 168     Results from last 7 days   Lab Units 07/16/22  0753 07/15/22  0733 07/14/22  0551 07/10/22  2139 07/10/22  1334   INR  2.20* 2.65* 2.09*   < > 1.47*   APTT seconds  --   --   --   --  48.4*    < > = values in this interval not displayed.         Results from last 7 days   Lab Units 07/11/22  0747   MAGNESIUM mg/dL 2.2     I reviewed the patient's new clinical results, and personally reviewed and interpreted the patient's ECG and telemetry data from the last 24 hours.        Medication Review:   Meds reviewed    Pharmacy to dose vancomycin,   Pharmacy to dose warfarin,         Assessment/Plan    1..  Permanent atrial fibrillation with RVR anticoagulated on Coumadin-INR therapeutic at 2.20.  2.  History of coronary artery disease treated medically  3.  Cardiomyopathy-most recent left ventricular ejection fraction normalized  Acute on chronic diastolic CHF with evidence of volume overload  4.  Essential hypertension  5.  Bacterial pneumonia with sepsis  6.  Alcohol abuse and tobacco use    Will need to check daily weights.  Admission weight 296.  I am unsure what his edema looks like at baseline, but he has significantly edematous today.  I will transition back to IV Lasix, we will check daily weights, and recheck a BMP in the morning.  Continue metoprolol tartrate, patient responding well to this.    Constance Og, ESTEFANY  07/16/22  12:24 EDT

## 2022-07-16 NOTE — THERAPY TREATMENT NOTE
Patient Name: Jalen Lockwood  : 1950    MRN: [id number]                              Today's Date: 2022       Admit Date: 7/10/2022    Visit Dx:     ICD-10-CM ICD-9-CM   1. Acute UTI  N39.0 599.0   2. Cellulitis of left foot  L03.116 682.7   3. Anemia, unspecified type  D64.9 285.9   4. Ischemic cardiomyopathy  I25.5 414.8   5. Normocytic anemia  D64.9 285.9   6. Urinary retention  R33.9 788.20   7. Anemia of chronic disease  D63.8 285.29   8. Gastroparesis  K31.84 536.3   9. Obesity (BMI 30-39.9)  E66.9 278.00     Patient Active Problem List   Diagnosis   • Chronic osteomyelitis (HCC)   • Foot pain   • Peripheral neuropathy   • Lymphedema of both lower extremities   • Permanent atrial fibrillation (HCC)   • Sleep apnea   • Chronic edema   • Obesity (BMI 30-39.9)   • COPD (chronic obstructive pulmonary disease) (HCC)   • Atrial flutter (HCC)   • 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HCC)   • Aortectasia (HCC)   • Popliteal artery aneurysm (HCC)   • Colon polyps   • Gastroparesis   • Insomnia   • Adenomatous polyp of colon   • Essential hypertension   • ETOH abuse   • Chronic anticoagulation   • Oropharyngeal dysphagia   • Tobacco abuse   • Thyromegaly   • Adrenal adenoma, left   • Retroperitoneal lymphadenopathy   • Anemia of chronic disease   • Thyroid nodule   • Charcot's joint of foot   • Sepsis (HCC)   • Abnormal CT scan, lumbar spine   • Alcohol dependence, in remission (HCC)   • Encephalopathy, metabolic   • Ischemic cardiomyopathy   • Normocytic anemia   • Inguinal adenopathy   • Elevated lactic acid level   • Venous stasis dermatitis of both lower extremities   • Urinary retention   • Sepsis without acute organ dysfunction (HCC)   • UTI (urinary tract infection) due to urinary indwelling catheter (HCC)   • Bacterial pneumonia   • Acute on chronic respiratory failure with hypoxia (HCC)   • Paroxysmal atrial fibrillation with rapid ventricular response (HCC)     Past Medical History:   Diagnosis  Date   • Allergic rhinitis    • Anxiety    • Aortectasia (HCC)     3cm infrarenal abdominal aorta   • Arthritis    • Atrial flutter (HCC) 2010    s/p ablation    • Charcot's joint of foot    • Chronic edema     both legs and sees wound care center at [place]    • Chronic venous insufficiency    • COPD (chronic obstructive pulmonary disease) (HCC)    • Coronary atherosclerosis     Cath 2010: diffuse 40-50% disease   • Diverticulosis    • Duodenitis    • Fatty liver    • Gastritis    • Gastroparesis    • Hematoma     post-operative; After catheterization, right groin, required surgical exploration   • Hyperlipidemia    • Hypertension    • Insomnia    • Internal hemorrhoids    • Open wound     izzy legs has drsg chg weekly at wound care center at [place]  pt does second dressing on left leg another time during week   • Osteomyelitis (HCC)    • Paroxysmal atrial fibrillation (HCC)    • Peripheral neuropathy    • Popliteal artery aneurysm (HCC)     left, s/p stenting by Dr. [place]   • Skin cancer    • Sleep apnea     o2   • 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HCC)     due to flutter and afib; cath 2010 with nonobstructive disease   • Venous stasis    • Venous stasis ulcer (HCC)     bilateral legs      Past Surgical History:   Procedure Laterality Date   • BASAL CELL CARCINOMA EXCISION      ear and left side of face   • BRONCHOSCOPY N/A 4/12/2021    Procedure: BRONCHOSCOPY WITH BAL;  Surgeon: Bunny Lepe MD;  Location: [facility] ENDOSCOPY;  Service: Pulmonary;  Laterality: N/A;  PRE-HEMOPTYSIS  POST-SAME   • CARDIAC CATHETERIZATION     • CATARACT EXTRACTION     • COLONOSCOPY  09/28/2015    NBIH, diverticulosis, polyps   • COLONOSCOPY N/A 9/11/2018    Procedure: COLONOSCOPY TO CECUM  AND TERM. ILEUM WITH COLD SNARE POLYPECTOMIES;  Surgeon: Kane Lagunas MD;  Location: [facility] ENDOSCOPY;  Service: Gastroenterology   • COLONOSCOPY N/A 10/29/2019    Procedure: COLONOSCOPY TO TO CECUM AND TERMINAL ILEUM WITH HOT AND COLD  SNARE POLYPECTOMIES;  Surgeon: Kane Lagunas MD;  Location: [facility] ENDOSCOPY;  Service: Gastroenterology   • HIP ARTHROPLASTY Right 2017   • JOINT REPLACEMENT Left    • OTHER SURGICAL HISTORY      Catheter ablation atrial flutter   • REPAIR ANEURYSM / PSEUDO ANEURYSM / RUPTURED ANEURYSM POPLITEAL ARTERY      Stent-Graft of the the left popliteal artery   • REPAIR KNEE LIGAMENT      Primary repair of knee ligament cruciate anterior right   • TONSILLECTOMY  1958   • TOTAL KNEE ARTHROPLASTY Bilateral    • UPPER GASTROINTESTINAL ENDOSCOPY  09/16/2014    acute gastritis, acute duodenitis      General Information     Row Name 07/16/22 1311          Physical Therapy Time and Intention    Document Type therapy note (daily note)  -     Mode of Treatment physical therapy;individual therapy  -EB     Row Name 07/16/22 1311          General Information    Existing Precautions/Restrictions fall  -EB     Row Name 07/16/22 1311          Cognition    Orientation Status (Cognition) oriented x 3  -EB     Row Name 07/16/22 1311          Safety Issues, Functional Mobility    Impairments Affecting Function (Mobility) endurance/activity tolerance;strength  -EB           User Key  (r) = Recorded By, (t) = Taken By, (c) = Cosigned By    Initials Name Provider Type    EB Leslie Magana PTA Physical Therapist Assistant               Mobility     Row Name 07/16/22 1311          Bed Mobility    Comment, (Bed Mobility) NT-UIC  -EB     Row Name 07/16/22 1311          Sit-Stand Transfer    Sit-Stand [place] (Transfers) contact guard  -EB     Assistive Device (Sit-Stand Transfers) walker, front-wheeled  -EB     Row Name 07/16/22 1311          Gait/Stairs (Locomotion)    [place] Level (Gait) contact guard  -EB     Assistive Device (Gait) walker, front-wheeled  -EB     Distance in Feet (Gait) 60ft  -EB     Deviations/Abnormal Patterns (Gait) sowmya decreased;stride length decreased;base of support, wide;festinating/shuffling  -      Bilateral Gait Deviations forward flexed posture  -EB     Comment, (Gait/Stairs) pt ambulates with wide CARROLL, cues for pt to try to keep feet inside walker. pt fatigued quickly today.  -EB           User Key  (r) = Recorded By, (t) = Taken By, (c) = Cosigned By    Initials Name Provider Type    Leslie Cruz PTA Physical Therapist Assistant               Obj/Interventions    No documentation.                Goals/Plan    No documentation.                Clinical Impression     Row Name 07/16/22 1312          Plan of Care Review    Plan of Care Reviewed With patient  -EB     Progress no change  -EB     Outcome Evaluation Pt tolerated treatment with no acute complaints. Pt is CGA with sit<->stand transfers. Pt ambulated 60ft with rwx, CgA. Pt feeling fatigued and would take short shuffling steps. Pt also required cues to keep feet within walker as pt has a wide CARROLL. Will continue to progress pt as able.  -EB     Row Name 07/16/22 1312          Therapy Assessment/Plan (PT)    Therapy Frequency (PT) 6 times/wk  -EB     Row Name 07/16/22 1312          Positioning and Restraints    Pre-Treatment Position sitting in chair/recliner  -EB     Post Treatment Position chair  -EB     In Chair reclined;call light within reach;encouraged to call for assist;exit alarm on  -EB           User Key  (r) = Recorded By, (t) = Taken By, (c) = Cosigned By    Initials Name Provider Type    Leslie Cruz PTA Physical Therapist Assistant               Outcome Measures     Row Name 07/16/22 1314          How much help from another person do you currently need...    Turning from your back to your side while in flat bed without using bedrails? 3  -EB     Moving from lying on back to sitting on the side of a flat bed without bedrails? 3  -EB     Moving to and from a bed to a chair (including a wheelchair)? 3  -EB     Standing up from a chair using your arms (e.g., wheelchair, bedside chair)? 3  -EB     Climbing 3-5 steps with a railing? 3   -EB     To walk in hospital room? 3  -EB     AM-PAC 6 Clicks Score (PT) 18  -EB     Highest level of mobility 6 --> Walked 10 steps or more  -EB           User Key  (r) = Recorded By, (t) = Taken By, (c) = Cosigned By    Initials Name Provider Type    Leslie Cruz PTA Physical Therapist Assistant                             Physical Therapy Education                 Title: PT OT SLP Therapies (Done)     Topic: Physical Therapy (Done)     Point: Mobility training (Done)     Learning Progress Summary           Patient Acceptance, E, VU by EB at 7/16/2022 1315    Acceptance, E,D, VU,NR by MS at 7/15/2022 1531    Acceptance, E,TB, VU,NR by JW at 7/15/2022 1033    Acceptance, E,D, VU,DU by EB at 7/13/2022 1331    Acceptance, E,TB,D, VU,NR by CB at 7/11/2022 1153                   Point: Home exercise program (Done)     Learning Progress Summary           Patient Acceptance, E, VU by EB at 7/16/2022 1315    Acceptance, E,D, VU,NR by MS at 7/15/2022 1531    Acceptance, E,TB, VU,NR by JW at 7/15/2022 1033    Acceptance, E,D, VU,DU by EB at 7/13/2022 1331                   Point: Body mechanics (Done)     Learning Progress Summary           Patient Acceptance, E, VU by EB at 7/16/2022 1315    Acceptance, E,D, VU,NR by MS at 7/15/2022 1531    Acceptance, E,TB, VU,NR by JW at 7/15/2022 1033    Acceptance, E,D, VU,DU by EB at 7/13/2022 1331    Acceptance, E,TB,D, VU,NR by CB at 7/11/2022 1153                   Point: Precautions (Done)     Learning Progress Summary           Patient Acceptance, E, VU by EB at 7/16/2022 1315    Acceptance, E,D, VU,NR by MS at 7/15/2022 1531    Acceptance, E,TB, VU,NR by JW at 7/15/2022 1033    Acceptance, E,D, VU,DU by EB at 7/13/2022 1331    Acceptance, E,TB,D, VU,NR by CB at 7/11/2022 9660                               User Key     Initials Effective Dates Name Provider Type Discipline    MS 06/16/21 -  Camilo Segundo PT Physical Therapist PT    EB 06/16/21 -  Leslie Magana PTA  Physical Therapist Assistant PT    CB 10/22/21 -  Jolynn Edouard PT Physical Therapist PT    JW 12/31/20 -  Philip Mejia, RN Registered Nurse Nurse              PT Recommendation and Plan     Plan of Care Reviewed With: patient  Progress: no change  Outcome Evaluation: Pt tolerated treatment with no acute complaints. Pt is CGA with sit<->stand transfers. Pt ambulated 60ft with rwx, CgA. Pt feeling fatigued and would take short shuffling steps. Pt also required cues to keep feet within walker as pt has a wide CARROLL. Will continue to progress pt as able.     Time Calculation:    PT Charges     Row Name 07/16/22 1310             Time Calculation    Start Time 1050  -EB      Stop Time 1106  -EB      Time Calculation (min) 16 min  -EB      PT Received On 07/16/22  -EB      PT - Next Appointment 07/18/22  -EB              Time Calculation- PT    Total Timed Code Minutes- PT 16 minute(s)  -EB            User Key  (r) = Recorded By, (t) = Taken By, (c) = Cosigned By    Initials Name Provider Type    EB Leslie Magana PTA Physical Therapist Assistant              Therapy Charges for Today     Code Description Service Date Service Provider Modifiers Qty    75562271319 HC GAIT TRAINING EA 15 MIN 7/16/2022 Leslie Magana PTA GP 1          PT G-Codes  Outcome Measure Options: AM-PAC 6 Clicks Basic Mobility (PT)  AM-PAC 6 Clicks Score (PT): 18    Leslie Magana PTA  7/16/2022

## 2022-07-16 NOTE — PROGRESS NOTES
[facility] Clinical Pharmacy Services: Warfarin Dosing/Monitoring Consult    Jalen Lockwood is a 72 y.o. male, estimated creatinine clearance is 118.1 mL/min (by C-G formula based on SCr of 0.86 mg/dL). weighing 134 kg (296 lb).    Results from last 7 days   Lab Units 07/16/22  0753 07/15/22  0733 07/14/22  1033 07/14/22  0551 07/13/22  0731 07/12/22  0744 07/10/22  2139 07/10/22  1334   INR  2.20* 2.65*  --  2.09* 1.89* 1.85*   < > 1.47*   APTT seconds  --   --   --   --   --   --   --  48.4*   HEMOGLOBIN g/dL 9.9* 10.8* 11.2*  --  9.7* 9.6*   < > 10.9*   HEMATOCRIT % 30.7* 33.3* 33.5*  --  28.3* 28.6*   < > 32.3*   PLATELETS 10*3/mm3 168 158 186  --  144 131*   < > 145    < > = values in this interval not displayed.   Prior to admission anticoagulation: Warfarin managed by [facility]. Most up-to-date warfarin dosing regimen is 7.5 mg every Friday and 5 mg all other days. Patient's INR was 1.30 on 7/8/22, patient was instructed to boost with 7.5 mg doses on 7/8 and 7/9. INR was only 1.47 on 7/10 despite boosted doses.     Hospital Anticoagulation:  Consulting provider: [facility]. Singh with [facility]  Start date: Continuation of home medication  Indication: Permament A.fib  Target INR: 2 - 3  Expected duration: Indefinite   Bridge Therapy: No      Potential food or drug interactions: Cefepime may increase sensitivity to warfarin due to eradication of vitamin K producing gut darian    Education complete: Defer, patient followed by our warfarin clinic    Assessment/Plan:  Dose: INR this am is in target at 2.2;  will begin home scheduled regimen of 7.5mg every Friday and 5mg all other days.   (Today dose =  5mg)  Monitor for any signs or symptoms of bleeding  Follow up daily INRs and dose adjustments    Pharmacy will continue to follow until discharge or discontinuation of warfarin.    Sharita Galvan [facility]    Clinical Pharmacist

## 2022-07-16 NOTE — PROGRESS NOTES
"[facility] Clinical Pharmacy Services: Vancomycin Monitoring Note    Jalen Lockwood is a 72 y.o. male who is on day 3 of pharmacy to dose vancomycin for PNA.    Previous Vancomycin Dose:   1250 mg IV every  12  hours  Updated Cultures and Sensitivities:   7/11 MRSA swab positive  7/10 urine cx Enterobacter cloacae  Results from last 7 days   Lab Units 07/16/22  1735   VANCOMYCIN TR mcg/mL 22.90*     Vitals/Labs  Ht: 193 cm (76\"); Wt: 134 kg (296 lb)   Temp Readings from Last 1 Encounters:   07/16/22 97.1 °F (36.2 °C) (Tympanic)     Estimated Creatinine Clearance: 130.2 mL/min (by C-G formula based on SCr of 0.78 mg/dL).     Results from last 7 days   Lab Units 07/16/22  0753 07/15/22  0733 07/14/22  1033 07/14/22  0551   CREATININE mg/dL 0.78 0.86  --  0.73*   WBC 10*3/mm3 6.12 7.16 7.47  --      Assessment/Plan  Creatinine stable. Vancomycin trough drawn prior to tonight's dose-- predicted AUC with current regimen is 662 mg/L.h which is above goal range.   Vancomycin Dose: decrease total daily dose to 1750 mg IV every  24  hours; provides a predicted  mg/L.hr   Next Level Date and Time: no further levels are ordered for now.  We will continue to monitor patient changes and renal function     Thank you for involving pharmacy in this patient's care. Please contact pharmacy with any questions or concerns.       Estrella Jackson, PharmD  Clinical Pharmacist          "

## 2022-07-16 NOTE — PROGRESS NOTES
LOS: 6 days   Patient Care Team:  Marc Ludwig MD as PCP - General (Family Medicine)  Blas Mckeon MD as Surgeon (General Surgery)  Jonnathan Boston II, MD as Consulting Physician (Vascular Surgery)  Xavi Elliott MD as Consulting Physician (Urology)  Marc Benavidez MD as Consulting Physician (Pain Medicine)  Kurt Henry MD as Consulting Physician (Cardiology)  Meghna Horan [facility] as Pharmacist  Rip Samuel MD as Referring Physician (Family Medicine)  Juni Landa MD as Consulting Physician (Hematology and Oncology)  Brendan Live PharmD as Pharmacist (Pharmacy)    Subjective     New to me today following up with Dr. Bravo following up multiple pulmonary nodules COPD, intermittent fevers, pneumonia with multidrug-resistant Enterobacter UTI MRSA cellulitis and atrial fibrillation on anticoagulation.  Patient is a current smoker  He is feeling some better.  He says that even though he is markedly swollen.  His edema is much less than it has been.  No chest pain minimal cough no sputum  Review of Systems:          Objective     Vital Signs  Vital Sign Min/Max for last 24 hours  Temp  Min: 97 °F (36.1 °C)  Max: 98.2 °F (36.8 °C)   BP  Min: 102/75  Max: 120/82   Pulse  Min: 50  Max: 70   Resp  Min: 18  Max: 18   SpO2  Min: 94 %  Max: 99 %   Flow (L/min)  Min: 2  Max: 2   No data recorded        Ventilator/Non-Invasive Ventilation Settings (From admission, onward)            None                       Body mass index is 36.03 kg/m².  I/O last 3 completed shifts:  In: 1080 [P.O.:480; IV Piggyback:600]  Out: 1050 [Urine:1050]  I/O this shift:  In: -   Out: 1400 [Urine:1400]        Physical Exam:  General Appearance: Well-developed elderly white male looks older than stated age he is sitting up in a chair got his legs propped up on supplemental O2.  He does not appear in any acute distress  Eyes: Conjunctiva are clear and anicteric  ENT: Mucous membranes are little dry no erythema no  exudates nasal septum midline  Neck: No palpable adenopathy no jugular venous tension, trachea midline  Lungs: He has a few crackles in the bases bilaterally no wheezes no rales no rhonchi  Cardiac: Regular rate rhythm no murmur  Abdomen: Soft no palpable hepatosplenomegaly or masses  : Not examined  Musculoskeletal: Both lower extremities are in compression wraps  Skin: No appreciated jaundice or petechiae but I do not see in his distal lower extremities to the wraps  Neuro: He is alert and oriented cooperative  Extremities/P Vascular: No clubbing or cyanosis he has marked bilateral lower extremity edema pitting extending up into the presacral region  MSE: Seems to be in reasonably good spirits       Labs:  Results from last 7 days   Lab Units 07/16/22  0753 07/15/22  0733 07/14/22  0551 07/13/22  1944 07/13/22  0731 07/12/22  0744 07/11/22  0747 07/10/22  2306 07/10/22  1334   GLUCOSE mg/dL 92 105* 93  --  108* 98 116*  --  91   SODIUM mmol/L 134* 138 136  --  136 137 134*  --  133*   POTASSIUM mmol/L 3.6 4.0 3.9 4.0 3.4* 3.8 3.7 3.8 4.3   MAGNESIUM mg/dL  --   --   --   --   --   --  2.2 1.8 1.8   CO2 mmol/L 26.0 26.0 27.0  --  25.8 24.0 25.0  --  27.0   CHLORIDE mmol/L 101 103 103  --  103 106 100  --  95*   ANION GAP mmol/L 7.0 9.0 6.0  --  7.2 7.0 9.0  --  11.0   CREATININE mg/dL 0.78 0.86 0.73*  --  0.70* 0.65* 0.63*  --  0.87   BUN mg/dL 16 14 10  --  9 10 11  --  13   BUN / CREAT RATIO  20.5 16.3 13.7  --  12.9 15.4 17.5  --  14.9   CALCIUM mg/dL 9.3 9.1 9.1  --  8.8 8.2* 8.6  --  9.4   ALK PHOS U/L  --   --   --   --   --   --  83  --  95   TOTAL PROTEIN g/dL  --   --   --   --   --   --  6.6  --  7.6   ALT (SGPT) U/L  --   --   --   --   --   --  7  --  11   AST (SGOT) U/L  --   --   --   --   --   --  13  --  16   BILIRUBIN mg/dL  --   --   --   --   --   --  0.9  --  1.1   ALBUMIN g/dL  --   --   --   --   --   --  3.10*  --  3.60   GLOBULIN gm/dL  --   --   --   --   --   --  3.5  --  4.0      Estimated Creatinine Clearance: 130.2 mL/min (by C-G formula based on SCr of 0.78 mg/dL).      Results from last 7 days   Lab Units 07/16/22  0753 07/15/22  0733 07/14/22  1033 07/13/22  0731 07/12/22  0744 07/11/22  0747 07/10/22  1334   WBC 10*3/mm3 6.12 7.16 7.47 4.26 5.21 4.90 6.91   RBC 10*6/mm3 3.29* 3.56* 3.62* 3.10* 3.11* 2.98* 3.48*   HEMOGLOBIN g/dL 9.9* 10.8* 11.2* 9.7* 9.6* 9.3* 10.9*   HEMATOCRIT % 30.7* 33.3* 33.5* 28.3* 28.6* 28.0* 32.3*   MCV fL 93.3 93.5 92.5 91.3 92.0 94.0 92.8   MCH pg 30.1 30.3 30.9 31.3 30.9 31.2 31.3   MCHC g/dL 32.2 32.4 33.4 34.3 33.6 33.2 33.7   RDW % 13.1 13.2 13.0 13.2 13.1 13.2 13.5   RDW-SD fl 44.5 45.2 43.7 43.0 43.2 44.8 44.9   MPV fL 10.0 10.1 10.2 10.1 10.6 10.3 10.1   PLATELETS 10*3/mm3 168 158 186 144 131* 111* 145   NEUTROPHIL % %  --   --   --  69.9  --  80.1* 84.6*   LYMPHOCYTE % %  --   --   --  17.4*  --  11.0* 8.2*   MONOCYTES % %  --   --   --  7.5  --  6.5 6.4   EOSINOPHIL % %  --   --   --  4.5  --  2.0 0.4   BASOPHIL % %  --   --   --  0.5  --  0.2 0.3   IMM GRAN % %  --   --   --  0.2  --  0.2 0.1   NEUTROS ABS 10*3/mm3  --   --   --  2.98  --  3.92 5.84   LYMPHS ABS 10*3/mm3  --   --   --  0.74  --  0.54* 0.57*   MONOS ABS 10*3/mm3  --   --   --  0.32  --  0.32 0.44   EOS ABS 10*3/mm3  --   --   --  0.19  --  0.10 0.03   BASOS ABS 10*3/mm3  --   --   --  0.02  --  0.01 0.02   IMMATURE GRANS (ABS) 10*3/mm3  --   --   --  0.01  --  0.01 0.01   NRBC /100 WBC  --   --   --  0.0  --  0.0 0.0     Results from last 7 days   Lab Units 07/14/22  1057   PH, ARTERIAL pH units 7.486*   PO2 ART mm Hg 73.7*   PCO2, ARTERIAL mm Hg 28.9*   HCO3 ART mmol/L 21.8*     Results from last 7 days   Lab Units 07/10/22  1334   TROPONIN T ng/mL <0.010     Results from last 7 days   Lab Units 07/14/22  0551   PROBNP pg/mL 2,267.0*         Results from last 7 days   Lab Units 07/14/22  0551 07/11/22  0747 07/10/22  1334   LACTATE mmol/L  --  0.9 1.4   PROCALCITONIN ng/mL 0.12  0.36* 0.10     Results from last 7 days   Lab Units 07/16/22  0753 07/15/22  0733 07/14/22  0551 07/13/22  0731 07/12/22  0744 07/11/22  0747 07/10/22  2139   INR  2.20* 2.65* 2.09* 1.89* 1.85* 1.70* 1.62*     Microbiology Results (last 10 days)     Procedure Component Value - Date/Time    Blood Culture - Blood, Arm, Left [[id number]]  (Normal) Collected: 07/14/22 1033    Lab Status: Preliminary result Specimen: Blood from Arm, Left Updated: 07/16/22 1149     Blood Culture No growth at 2 days    Blood Culture - Blood, Arm, Left [[id number]]  (Normal) Collected: 07/14/22 1033    Lab Status: Preliminary result Specimen: Blood from Arm, Left Updated: 07/16/22 1148     Blood Culture No growth at 2 days    CANDIDA AURIS SCREEN - Swab, Axilla Right, Axilla Left and Groin [[id number]]  (Normal) Collected: 07/11/22 1330    Lab Status: Final result Specimen: Swab from Axilla Right, Axilla Left and Groin Updated: 07/16/22 1348     Sabrina Auris Screen Culture No Candida auris isolated at 5 days    MRSA Screen, PCR (Inpatient) - Swab, Nares [[id number]]  (Abnormal) Collected: 07/11/22 1330    Lab Status: Final result Specimen: Swab from Nares Updated: 07/11/22 1534     MRSA PCR MRSA Detected    Narrative:      The negative predictive value of this diagnostic test is high and should only be used to consider de-escalating anti-MRSA therapy. A positive result may indicate colonization with MRSA and must be correlated clinically.    Wound Culture - Wound, Foot, Left [[id number]]  (Abnormal)  (Susceptibility) Collected: 07/10/22 1417    Lab Status: Final result Specimen: Wound from Foot, Left Updated: 07/14/22 0901     Wound Culture Heavy growth (4+) Pseudomonas aeruginosa MDRO     Comment: Multi drug resistant Pseudomonas, patient may be an isolation risk.         Heavy growth (4+) Escherichia coli     Comment: Consider infectious disease consult.  Susceptibility results may not correlate to clinical outcomes.         Moderate growth  (3+) Normal Skin Loulou     Gram Stain Moderate (3+) Gram negative bacilli      Few (2+) Gram positive cocci      No WBCs seen    Narrative:      ESBL confirmation was negative.  Spoke with Payal in pharmacy and let her know    Susceptibility      Pseudomonas aeruginosa MDRO      DAVIDSON      Amikacin Susceptible      Cefepime Susceptible      Ceftazidime Susceptible      Ciprofloxacin Intermediate      Gentamicin Resistant     Levofloxacin Resistant     Meropenem Intermediate      Piperacillin + Tazobactam Susceptible  [1]       Tobramycin Resistant                  [1]  Appended report. These results have been appended to a previously preliminary verified report.             Susceptibility      Escherichia coli      DAVIDSON      Amikacin Susceptible      Ampicillin Resistant     Ampicillin + Sulbactam Resistant     Cefepime Susceptible      Ceftazidime Resistant     Ceftriaxone Intermediate      Gentamicin Resistant     Levofloxacin Resistant     Piperacillin + Tazobactam Susceptible      Tetracycline Resistant     Tobramycin Resistant     Trimethoprim + Sulfamethoxazole Resistant                      Susceptibility Comments     Pseudomonas aeruginosa MDRO    For MDR Pseudomonas infections, susceptibility results may not correlate to clinical outcomes. Please consider infectious disease consult.    Escherichia coli    Cefazolin sensitivity will not be reported for Enterobacteriaceae in non-urine isolates. If cefazolin is preferred, please call the microbiology lab to request an E-test.  With the exception of urinary-sourced infections, aminoglycosides should not be used as monotherapy.  The previously reported component ERTAPENEM is no longer being reported. Previous result was Susceptible (<=0.5 ug/ml) (Reference Range: [Reference Range]) on 7/13/2022 at 0837 EDT.  The previously reported component MEROPENEM is no longer being reported. Previous result was Susceptible (<=0.25 ug/ml) (Reference Range: [Reference Range]) on  7/13/2022 at 0837 EDT.             Urine Culture - Urine, Urine, Catheter [[id number]]  (Abnormal)  (Susceptibility) Collected: 07/10/22 1417    Lab Status: Final result Specimen: Urine, Catheter Updated: 07/12/22 0739     Urine Culture >100,000 CFU/mL Enterobacter cloacae complex    Narrative:      Colonization of the urinary tract without infection is common. Treatment is discouraged unless the patient is symptomatic, pregnant, or undergoing an invasive urologic procedure.    Susceptibility      Enterobacter cloacae complex      DAVIDSON      Amikacin Susceptible      Cefazolin Resistant     Cefepime Susceptible      Ceftazidime Resistant     Ceftriaxone Resistant     Gentamicin Resistant     Levofloxacin Resistant     Nitrofurantoin Susceptible      Piperacillin + Tazobactam Susceptible      Tobramycin Resistant     Trimethoprim + Sulfamethoxazole Resistant                          Respiratory Panel PCR w/COVID-19(SARS-CoV-2) G[facility]AT[facility]UCAS/SHEILA/[facility] In-House, NP Swab in UTM/VTM, 3-4 HR TAT - Swab, Nasopharynx [[id number]]  (Normal) Collected: 07/10/22 1406    Lab Status: Final result Specimen: Swab from Nasopharynx Updated: 07/10/22 1456     ADENOVIRUS, PCR Not Detected     Coronavirus 229E Not Detected     Coronavirus HKU1 Not Detected     Coronavirus NL63 Not Detected     Coronavirus OC43 Not Detected     COVID19 Not Detected     Human Metapneumovirus Not Detected     Human Rhinovirus/Enterovirus Not Detected     Influenza A PCR Not Detected     Influenza B PCR Not Detected     Parainfluenza Virus 1 Not Detected     Parainfluenza Virus 2 Not Detected     Parainfluenza Virus 3 Not Detected     Parainfluenza Virus 4 Not Detected     RSV, PCR Not Detected     Bordetella pertussis pcr Not Detected     Bordetella parapertussis PCR Not Detected     Chlamydophila pneumoniae PCR Not Detected     Mycoplasma pneumo by PCR Not Detected    Narrative:      In the setting of a positive respiratory panel with a viral infection  PLUS a negative procalcitonin without other underlying concern for bacterial infection, consider observing off antibiotics or discontinuation of antibiotics and continue supportive care. If the respiratory panel is positive for atypical bacterial infection (Bordetella pertussis, Chlamydophila pneumoniae, or Mycoplasma pneumoniae), consider antibiotic de-escalation to target atypical bacterial infection.    Blood Culture - Blood, Arm, Left [[id number]]  (Normal) Collected: 07/10/22 1334    Lab Status: Final result Specimen: Blood from Arm, Left Updated: 07/15/22 1403     Blood Culture No growth at 5 days    Blood Culture - Blood, Arm, Left [[id number]]  (Normal) Collected: 07/10/22 1334    Lab Status: Final result Specimen: Blood from Arm, Left Updated: 07/15/22 1403     Blood Culture No growth at 5 days              budesonide-formoterol, 2 puff, Inhalation, BID - RT  cefepime, 2 g, Intravenous, Q8H  docusate sodium, 100 mg, Oral, Daily  finasteride, 5 mg, Oral, Daily  furosemide, 40 mg, Intravenous, Q12H  gabapentin, 400 mg, Oral, Q8H  HYDROcodone-acetaminophen, 1 tablet, Oral, Q4H While Awake  ipratropium-albuterol, 3 mL, Nebulization, 4x Daily - RT  metoclopramide, 10 mg, Oral, 4x Daily AC & at Bedtime  metoprolol tartrate, 25 mg, Oral, Q12H  pravastatin, 20 mg, Oral, Daily  sodium chloride, 10 mL, Intravenous, Q12H  sodium chloride, 10 mL, Intravenous, Q12H  sodium chloride, 10 mL, Intravenous, Q12H  [START ON 7/17/2022] vancomycin, 1,750 mg, Intravenous, Q24H  warfarin, 5 mg, Oral, Once per day on Sun Mon Tue Wed Thu Sat  warfarin, 5 mg, Oral, Once per day on Sun Mon Tue Wed Thu Sat  [START ON 7/22/2022] warfarin, 7.5 mg, Oral, Once per day on Fri      Pharmacy to dose vancomycin,   Pharmacy to dose warfarin,         Diagnostics:  XR Foot 2 View Left    Result Date: 6/19/2022  LEFT FOOT X-RAYS  CLINICAL HISTORY: Evaluate fracture mild colitis.  Compared to the left foot x-rays dated 01/09/2022.  3 views were  obtained. There is extensive arthritic change within the hindfoot and midfoot that results in a prominent pes planus deformity. This appears unchanged. A hallux valgus deformity is also noted. There is moderate narrowing of the first MTP joint and there is lateral subluxation of the phalanges with respect to the first metatarsal that appears unchanged. Diffuse soft tissue swelling is noted. There is extensive vascular calcification. There is no evidence of acute fracture or subluxation. There is no definite radiographic evidence of osteomyelitis. No bony destruction or periosteal new bone deposition is identified. If there is high clinical suspicion of osteomyelitis further evaluation with an MRI of the foot with and without contrast should be considered.  This report was finalized on 6/19/2022 8:58 PM by Dr. Neto Soler M.D.      CT Chest With Contrast Diagnostic    Result Date: 7/14/2022  CT CHEST W CONTRAST DIAGNOSTIC-, CT ABDOMEN PELVIS W CONTRAST-  INDICATIONS: Respiratory illness, sepsis.  Radiation dose reduction techniques were utilized, including automated exposure control and exposure modulation based on body size.  TECHNIQUE: Enhanced CT of the chest, abdomen, pelvis  COMPARISON: Chest CT from 11/18/2021, abdomen pelvis CT from 03/30/2021  FINDINGS:  Chest CT:  The heart size is enlarged without pericardial effusion. Lymph nodes in the mediastinum and right hilum, a couple which are borderline prominent, appear stable. The ascending aorta is borderline aneurysmal, 4.4 cm, not significantly changed.  The airways appear clear.  No pleural effusion or pneumothorax.  The lungs show a nodular density at the right posterior costophrenic angle, 1.5 cm on axial image 79, new from the prior exam, neoplastic, infectious, inflammatory etiologies can be considered. Another new nodular density in the right lower lobe as seen on image 76, 1.1 cm Another new nodular density is seen in the left lower lobe, 1.6 cm on  axial image 78. A 7 mm nodule in the left lower lobe on axial image 62 is stable, but a 4 mm nodule medially in the left lower lobe at the same image is noted. No cavitation is seen in the standing nodules to suggest septic embolic disease, but not possibility would not be excluded, close follow-up can be obtained as indicated. Mild atelectasis is present in both lungs.   Abdomen pelvis CT:  Spleen is enlarged, 13.8 cm CC. A hepatic cyst is seen.  Nonobstructive right nephrolithiasis measures 1 cm. A 7 mm nonobstructive calcification is seen in the left kidney. Bilateral renal low densities are seen that are too small to characterize.  Nonspecific nodular thickening of the left more than right adrenal glands does not appear significantly changed.  Urinary bladder is catheterized, empty, limiting its assessment.  Otherwise unremarkable appearance of the liver, gallbladder, spleen, adrenal glands, pancreas, kidneys, bladder.  No bowel obstruction or abnormal bowel thickening is identified. Colonic diverticula are seen that do not appear focally inflamed. The appendix is not appear inflamed.  No free intraperitoneal gas or free fluid.  A right iliac chain lymph node measures 2.2 cm short axis on axial image 165, previously 1.4 cm, pathologic by size criteria, could for example represent lymphoma or metastatic disease. A left iliac chain lymph node measuring 1.6 cm short axis on axial image 155 is not significantly changed. Numerous other retroperitoneal lymph nodes appear similar to the prior exam.  Abdominal aorta is mildly aneurysmal, 3.4 cm on axial image 126, previously 3.2 cm. Aortic and other arterial calcifications are present.  Degenerative changes are seen in the spine. No acute fracture is identified. Stable lucent foci are seen in the L2, L4 vertebral bodies. Left shoulder effusion is evident.         New nodular densities in both lungs, further evaluation with positron emission tomography is recommended, as  well as interval follow-up.  A right iliac chain lymph node shows interval increase, and may represent neoplasm, attention to this area on PET CT is advised.  Colonic diverticulosis. No focal acute inflammatory process of bowel is identified, follow up as indications persist.  Bilateral nonobstructive nephrolithiasis.  This report was finalized on 7/14/2022 6:27 PM by Dr. Khurram Chambers M.D.      CT Abdomen Pelvis With Contrast    Result Date: 7/14/2022  CT CHEST W CONTRAST DIAGNOSTIC-, CT ABDOMEN PELVIS W CONTRAST-  INDICATIONS: Respiratory illness, sepsis.  Radiation dose reduction techniques were utilized, including automated exposure control and exposure modulation based on body size.  TECHNIQUE: Enhanced CT of the chest, abdomen, pelvis  COMPARISON: Chest CT from 11/18/2021, abdomen pelvis CT from 03/30/2021  FINDINGS:  Chest CT:  The heart size is enlarged without pericardial effusion. Lymph nodes in the mediastinum and right hilum, a couple which are borderline prominent, appear stable. The ascending aorta is borderline aneurysmal, 4.4 cm, not significantly changed.  The airways appear clear.  No pleural effusion or pneumothorax.  The lungs show a nodular density at the right posterior costophrenic angle, 1.5 cm on axial image 79, new from the prior exam, neoplastic, infectious, inflammatory etiologies can be considered. Another new nodular density in the right lower lobe as seen on image 76, 1.1 cm Another new nodular density is seen in the left lower lobe, 1.6 cm on axial image 78. A 7 mm nodule in the left lower lobe on axial image 62 is stable, but a 4 mm nodule medially in the left lower lobe at the same image is noted. No cavitation is seen in the standing nodules to suggest septic embolic disease, but not possibility would not be excluded, close follow-up can be obtained as indicated. Mild atelectasis is present in both lungs.   Abdomen pelvis CT:  Spleen is enlarged, 13.8 cm CC. A hepatic cyst is  seen.  Nonobstructive right nephrolithiasis measures 1 cm. A 7 mm nonobstructive calcification is seen in the left kidney. Bilateral renal low densities are seen that are too small to characterize.  Nonspecific nodular thickening of the left more than right adrenal glands does not appear significantly changed.  Urinary bladder is catheterized, empty, limiting its assessment.  Otherwise unremarkable appearance of the liver, gallbladder, spleen, adrenal glands, pancreas, kidneys, bladder.  No bowel obstruction or abnormal bowel thickening is identified. Colonic diverticula are seen that do not appear focally inflamed. The appendix is not appear inflamed.  No free intraperitoneal gas or free fluid.  A right iliac chain lymph node measures 2.2 cm short axis on axial image 165, previously 1.4 cm, pathologic by size criteria, could for example represent lymphoma or metastatic disease. A left iliac chain lymph node measuring 1.6 cm short axis on axial image 155 is not significantly changed. Numerous other retroperitoneal lymph nodes appear similar to the prior exam.  Abdominal aorta is mildly aneurysmal, 3.4 cm on axial image 126, previously 3.2 cm. Aortic and other arterial calcifications are present.  Degenerative changes are seen in the spine. No acute fracture is identified. Stable lucent foci are seen in the L2, L4 vertebral bodies. Left shoulder effusion is evident.         New nodular densities in both lungs, further evaluation with positron emission tomography is recommended, as well as interval follow-up.  A right iliac chain lymph node shows interval increase, and may represent neoplasm, attention to this area on PET CT is advised.  Colonic diverticulosis. No focal acute inflammatory process of bowel is identified, follow up as indications persist.  Bilateral nonobstructive nephrolithiasis.  This report was finalized on 7/14/2022 6:27 PM by Dr. Khurram Chambers M.D.      XR Chest 1 View    Result Date:  7/14/2022  CHEST SINGLE VIEW  HISTORY: Tachypnea, wheezing.  COMPARISON: AP chest 07/10/2022, 01/09/2022.  FINDINGS: There is cardiomegaly with pulmonary vascular engorgement. Lungs appear clear of focal airspace disease and there is no evidence for pulmonary edema or pleural effusion. Cardiac monitoring leads are noted. Aortic vascular calcifications are present. There is a small-caliber catheter and apparent right-sided PICC with tip extending to the region of the right axilla.      Cardiomegaly with pulmonary vascular engorgement.  This report was finalized on 7/14/2022 10:29 AM by Dr. Adria Gifford M.D.      XR Chest 1 View    Result Date: 7/10/2022  SINGLE VIEW CHEST RADIOGRAPH  HISTORY: 72-year-old male with history of a fever and cough.  FINDINGS: An upright AP portable chest radiograph was obtained. Comparison is made to a chest radiograph dated 06/19/2022. The lungs are normal in volume and are clear of consolidation. There is ground glass and interstitial opacification at the base of the right lung. The cardiac silhouette is enlarged but stable. No evidence for a pneumothorax or pleural effusion is appreciated. The visualized osseous structures appear normal.      There is an interstitial and ground glass infiltrate at the base of the right lung that is most consistent with pneumonia. Stable mild cardiomegaly is noted.  This report was finalized on 7/10/2022 3:17 PM by Dr. Mehran Pepper M.D.      XR Chest 1 View    Result Date: 6/19/2022  SINGLE VIEW CHEST  CLINICAL HISTORY: Fever. Cough.  Compared to the previous chest x-ray dated 01/09/2022.  The lungs are well-expanded and appear free of focal infiltrates. There are no pleural effusions. The heart is moderately enlarged and unchanged. The pulmonary vasculature is within normal limits.  IMPRESSIONS: Cardiomegaly. No evidence of acute disease within the chest.  This report was finalized on 6/19/2022 6:27 PM by Dr. Neto Soler M.D.      [facility]  Left With & Without Contrast    Result Date: 6/21/2022  MRI LEFT MID AND FOREFOOT WITH AND WITHOUT CONTRAST  HISTORY: Foot pain and swelling. Possible osteomyelitis.  TECHNIQUE: MRI left mid and forefoot was performed before and after the IV administration of 20 mL of MultiHance contrast.  FINDINGS: Fine anatomic detail is compromised by motion on every sequence. There is complete atrophy of the foot musculature. A pronounced hallux valgus deformity is observed. There is prominent skin thickening over the dorsum of the foot and mild diffuse soft tissue edema. No focal fluid collection is present.  The Lisfranc ligament complex is intact. Marrow signal throughout the mid and forefoot is normal. There is no evidence of osteomyelitis or abscess. No abnormal joint fluid is present. There is no abnormal contrast enhancement except in the skin along the dorsum of the foot. Degenerative change is observed at the navicular cuneiform joints.      Cellulitis in the left foot with chronic changes as described. No evidence of osteomyelitis or abscess.  This report was finalized on 6/21/2022 7:39 AM by Dr. Rip Jim M.D.      Doppler Ankle Brachial Index Single Level CAR    Result Date: 7/13/2022  · Right Conclusion: The right MARIBELL is normal. Normal digital pressures. · Left Conclusion: The left MARIBELL is normal. Normal digital pressures.      Results for orders placed during the hospital encounter of 09/17/21    Adult Transthoracic Echo Complete W/ Cont if Necessary Per Protocol    Interpretation Summary  · Calculated left ventricular EF = 55.5% Estimated left ventricular EF was in agreement with the calculated left ventricular EF. Left ventricular systolic function is normal. Normal left ventricular cavity size noted. Left ventricular wall thickness is consistent with mild to moderate concentric hypertrophy. All left ventricular wall segments contract normally. Left ventricular diastolic function was indeterminate.  · Right  ventricle not well visualized. The right ventricular cavity is moderately dilated.  · The left atrial cavity is moderately dilated.  · The right atrial cavity is severely dilated.  · Mild to moderate mitral valve regurgitation is present.  · Mild to moderate tricuspid valve regurgitation is present. Estimated right ventricular systolic pressure from tricuspid regurgitation is moderately elevated (45-55 mmHg). Calculated right ventricular systolic pressure from tricuspid regurgitation is 54 mmHg.      I have reviewed his scans    Active Hospital Problems    Diagnosis  POA   • **Bacterial pneumonia [J15.9]  Yes   • Acute on chronic respiratory failure with hypoxia ([facility]) [J96.21]  Yes   • Paroxysmal atrial fibrillation with rapid ventricular response ([facility]) [I48.0]  Yes   • UTI (urinary tract infection) due to urinary indwelling catheter ([facility]) [T83.511A, N39.0]  Yes   • Venous stasis dermatitis of both lower extremities [I87.2]  Yes   • Encephalopathy, metabolic [G93.41]  Yes   • Ischemic cardiomyopathy [I25.5]  Yes   • Alcohol dependence, in remission ([facility]) [F10.21]  Yes   • Sepsis ([facility]) [A41.9]  Yes   • Charcot's joint of foot [M14.679]  Yes   • Chronic anticoagulation [Z79.01]  Not Applicable   • Essential hypertension [I10]  Yes   • COPD (chronic obstructive pulmonary disease) ([facility]) [J44.9]  Yes   • Sleep apnea [G47.30]  Yes   • Lymphedema of both lower extremities [I89.0]  Yes      Resolved Hospital Problems   No resolved problems to display.         Assessment & Plan     1. Pneumonia presumed bacterial IV cefepime and vancomycin ID following and managing antibiotics  2. Acute on chronic hypoxic respiratory failure continue O2, wean as tolerated to his baseline O2  3. COPD without acute exacerbation  4. Obstructive sleep apnea  5. Tobacco abuse  6. Multiple pulmonary nodules, question if these are inflammatory or malignant they certainly could be inflammatory aspiration related infections or other but certainly has  a history that would be concerning for malignancy as well.  He is going need to be off anticoagulation for biopsy when he is medically stable and cardiology feels safe to take him off anticoagulation.  We could do that and biopsy while he is here or it may be best to get him stabilized get a PET scan outpatient in 6 weeks give a little time for infection to clear and see if there is a particular lesion still present that PET positive that would be more amenable to biopsy or even if those lymph nodes were PET positive in the inguinal region that might be more amenable to biopsy given his chronic respiratory failure and I discussed options with he and his wife they seem to favor giving him some time to recover and getting a PET CT in about 6 weeks to reassess the nodules.  7. Atrial fibrillation rate controlled anticoagulated  8. Lymphedema bilateral lower extremities  9. Pulmonary hypertension RVSP of 54 on echocardiogram with moderately dilated left atrium likely WHO group 2 disease   10.  dysphagia had polyp prior VFSS and has refused recommendations of modified diet and continues to refuse.  11. Essential hypertension  12. Enlarging right inguinal chain lymph node per primary service    Plan for disposition:    Mal Jimenes Jr, MD  07/16/22  18:35 EDT    Time: 35minutes with the patient and his family today reviewing records, discussing with them.

## 2022-07-16 NOTE — NURSING NOTE
Pt had a 2.8 sec pause. Is  Asymptomatic. VSS. Call placed to cardio and spoke to NP Constance Og. Orders received to continue to monitor and call cardio if he had a pause of 5 sec or more.

## 2022-07-16 NOTE — PLAN OF CARE
Goal Outcome Evaluation:  Plan of Care Reviewed With: patient        Progress: improving  Outcome Evaluation: per pulm, fever is r/t chronic aspiration. ST ordered to eval and recommended nectar thicken fluid but pt declined. no episode of high fevers this shift. pt did have 2 pauses one of 2.8 sec and one of 3 sec. cardio to be called if more than 5 sec. He remains in afib with lvr. metoprolol held last night. coumadin on hold for lung biopsy.    Problem: Adult Inpatient Plan of Care  Goal: Absence of Hospital-Acquired Illness or Injury  Intervention: Prevent and Manage VTE (Venous Thromboembolism) Risk  Recent Flowsheet Documentation  Taken 7/15/2022 2355 by Matteo Amezcua, RN  Activity Management: back to bed  Taken 7/15/2022 2058 by Matteo Amezcua, RN  Activity Management: activity adjusted per tolerance  VTE Prevention/Management: (unna boots on) bilateral

## 2022-07-16 NOTE — PLAN OF CARE
Goal Outcome Evaluation:  Plan of Care Reviewed With: patient        Progress: no change  Outcome Evaluation: Pt tolerated treatment with no acute complaints. Pt is CGA with sit<->stand transfers. Pt ambulated 60ft with rwx, CgA. Pt feeling fatigued and would take short shuffling steps. Pt also required cues to keep feet within walker as pt has a wide CARROLL. Will continue to progress pt as able.    ..Patient was wearing a face mask during this therapy encounter. Therapist used appropriate personal protective equipment including eye protection, mask, and gloves.  Mask used was standard procedure mask. Appropriate PPE was worn during the entire therapy session. Hand hygiene was completed before and after therapy session. Patient is not in enhanced droplet precautions.

## 2022-07-17 NOTE — PROGRESS NOTES
LOS: 7 days   Patient Care Team:  Marc Ludwig MD as PCP - General (Family Medicine)  Blas Mckeon MD as Surgeon (General Surgery)  Jonnathan Boston II, MD as Consulting Physician (Vascular Surgery)  Xavi Elliott MD as Consulting Physician (Urology)  Marc Benavidez MD as Consulting Physician (Pain Medicine)  Kurt Henry MD as Consulting Physician (Cardiology)  Meghna Horan [facility] as Pharmacist  Rip Samuel MD as Referring Physician (Family Medicine)  Juni Landa MD as Consulting Physician (Hematology and Oncology)  Brendan iLve, AlexD as Pharmacist (Pharmacy)    Subjective     Following up pulmonary nodules pneumonia COPD, chronic respiratory failure.  Patient does not feel much different than yesterday.  He is getting anxious to go home.  Review of Systems:          Objective     Vital Signs  Vital Sign Min/Max for last 24 hours  Temp  Min: 97.3 °F (36.3 °C)  Max: 98.7 °F (37.1 °C)   BP  Min: 88/70  Max: 126/65   Pulse  Min: 46  Max: 80   Resp  Min: 18  Max: 18   SpO2  Min: 92 %  Max: 100 %   Flow (L/min)  Min: 2  Max: 2   Weight  Min: 134 kg (296 lb)  Max: 134 kg (296 lb)        Ventilator/Non-Invasive Ventilation Settings (From admission, onward)            None                       Body mass index is 36.03 kg/m².  I/O last 3 completed shifts:  In: 250 [IV Piggyback:250]  Out: 5000 [Urine:5000]  I/O this shift:  In: 480 [P.O.:480]  Out: -         Physical Exam:  General Appearance: Well-developed elderly white male looks older than stated age he is sitting up in a chair got his legs propped up on supplemental O2.  He does not appear in any acute distress  Eyes: Conjunctiva are clear and anicteric  ENT: Mucous membranes are little dry no erythema no exudates nasal septum midline  Neck: No palpable adenopathy no jugular venous tension, trachea midline  Lungs: He has a few crackles in the bases bilaterally no wheezes no rales no rhonchi  Cardiac: Regular rate rhythm no  murmur  Abdomen: Soft no palpable hepatosplenomegaly or masses  : Not examined  Musculoskeletal: Both lower extremities are in compression wraps  Skin: No appreciated jaundice or petechiae but I do not see in his distal lower extremities to the wraps  Neuro: He is alert and oriented cooperative  Extremities/P Vascular: No clubbing or cyanosis he has marked bilateral lower extremity edema pitting extending up into the presacral region  MSE: Seems to be in reasonably good spirits, is anxious to go home     Labs:  Results from last 7 days   Lab Units 07/17/22  0741 07/16/22  0753 07/15/22  0733 07/14/22  0551 07/13/22  1944 07/13/22  0731 07/12/22  0744 07/11/22  0747 07/10/22  2306   GLUCOSE mg/dL 99 92 105* 93  --  108* 98 116*  --    SODIUM mmol/L 139 134* 138 136  --  136 137 134*  --    POTASSIUM mmol/L 4.1 3.6 4.0 3.9 4.0 3.4* 3.8 3.7 3.8   MAGNESIUM mg/dL  --   --   --   --   --   --   --  2.2 1.8   CO2 mmol/L 27.0 26.0 26.0 27.0  --  25.8 24.0 25.0  --    CHLORIDE mmol/L 101 101 103 103  --  103 106 100  --    ANION GAP mmol/L 11.0 7.0 9.0 6.0  --  7.2 7.0 9.0  --    CREATININE mg/dL 0.90 0.78 0.86 0.73*  --  0.70* 0.65* 0.63*  --    BUN mg/dL 16 16 14 10  --  9 10 11  --    BUN / CREAT RATIO  17.8 20.5 16.3 13.7  --  12.9 15.4 17.5  --    CALCIUM mg/dL 9.1 9.3 9.1 9.1  --  8.8 8.2* 8.6  --    ALK PHOS U/L  --   --   --   --   --   --   --  83  --    TOTAL PROTEIN g/dL  --   --   --   --   --   --   --  6.6  --    ALT (SGPT) U/L  --   --   --   --   --   --   --  7  --    AST (SGOT) U/L  --   --   --   --   --   --   --  13  --    BILIRUBIN mg/dL  --   --   --   --   --   --   --  0.9  --    ALBUMIN g/dL  --   --   --   --   --   --   --  3.10*  --    GLOBULIN gm/dL  --   --   --   --   --   --   --  3.5  --      Estimated Creatinine Clearance: 112.9 mL/min (by C-G formula based on SCr of 0.9 mg/dL).      Results from last 7 days   Lab Units 07/17/22  0741 07/16/22  0753 07/15/22  0733 07/14/22  1033  07/13/22  0731 07/12/22  0744 07/11/22  0747   WBC 10*3/mm3 6.55 6.12 7.16 7.47 4.26 5.21 4.90   RBC 10*6/mm3 3.22* 3.29* 3.56* 3.62* 3.10* 3.11* 2.98*   HEMOGLOBIN g/dL 9.9* 9.9* 10.8* 11.2* 9.7* 9.6* 9.3*   HEMATOCRIT % 29.9* 30.7* 33.3* 33.5* 28.3* 28.6* 28.0*   MCV fL 92.9 93.3 93.5 92.5 91.3 92.0 94.0   MCH pg 30.7 30.1 30.3 30.9 31.3 30.9 31.2   MCHC g/dL 33.1 32.2 32.4 33.4 34.3 33.6 33.2   RDW % 13.4 13.1 13.2 13.0 13.2 13.1 13.2   RDW-SD fl 45.4 44.5 45.2 43.7 43.0 43.2 44.8   MPV fL 10.2 10.0 10.1 10.2 10.1 10.6 10.3   PLATELETS 10*3/mm3 178 168 158 186 144 131* 111*   NEUTROPHIL % % 68.6  --   --   --  69.9  --  80.1*   LYMPHOCYTE % % 16.5*  --   --   --  17.4*  --  11.0*   MONOCYTES % % 9.3  --   --   --  7.5  --  6.5   EOSINOPHIL % % 4.4  --   --   --  4.5  --  2.0   BASOPHIL % % 0.6  --   --   --  0.5  --  0.2   IMM GRAN % % 0.6*  --   --   --  0.2  --  0.2   NEUTROS ABS 10*3/mm3 4.49  --   --   --  2.98  --  3.92   LYMPHS ABS 10*3/mm3 1.08  --   --   --  0.74  --  0.54*   MONOS ABS 10*3/mm3 0.61  --   --   --  0.32  --  0.32   EOS ABS 10*3/mm3 0.29  --   --   --  0.19  --  0.10   BASOS ABS 10*3/mm3 0.04  --   --   --  0.02  --  0.01   IMMATURE GRANS (ABS) 10*3/mm3 0.04  --   --   --  0.01  --  0.01   NRBC /100 WBC 0.0  --   --   --  0.0  --  0.0     Results from last 7 days   Lab Units 07/14/22  1057   PH, ARTERIAL pH units 7.486*   PO2 ART mm Hg 73.7*   PCO2, ARTERIAL mm Hg 28.9*   HCO3 ART mmol/L 21.8*         Results from last 7 days   Lab Units 07/14/22  0551   PROBNP pg/mL 2,267.0*         Results from last 7 days   Lab Units 07/14/22  0551 07/11/22  0747   LACTATE mmol/L  --  0.9   PROCALCITONIN ng/mL 0.12 0.36*     Results from last 7 days   Lab Units 07/17/22  0741 07/16/22  0753 07/15/22  0733 07/14/22  0551 07/13/22  0731 07/12/22  0744 07/11/22  0747   INR  1.97* 2.20* 2.65* 2.09* 1.89* 1.85* 1.70*     Microbiology Results (last 10 days)     Procedure Component Value - Date/Time    Blood  Culture - Blood, Arm, Left [[id number]]  (Normal) Collected: 07/14/22 1033    Lab Status: Preliminary result Specimen: Blood from Arm, Left Updated: 07/17/22 1149     Blood Culture No growth at 3 days    Blood Culture - Blood, Arm, Left [[id number]]  (Normal) Collected: 07/14/22 1033    Lab Status: Preliminary result Specimen: Blood from Arm, Left Updated: 07/17/22 1149     Blood Culture No growth at 3 days    CANDIDA AURIS SCREEN - Swab, Axilla Right, Axilla Left and Groin [[id number]]  (Normal) Collected: 07/11/22 1330    Lab Status: Final result Specimen: Swab from Axilla Right, Axilla Left and Groin Updated: 07/16/22 1348     Sabrina Auris Screen Culture No Candida auris isolated at 5 days    MRSA Screen, PCR (Inpatient) - Swab, Nares [[id number]]  (Abnormal) Collected: 07/11/22 1330    Lab Status: Final result Specimen: Swab from Nares Updated: 07/11/22 1534     MRSA PCR MRSA Detected    Narrative:      The negative predictive value of this diagnostic test is high and should only be used to consider de-escalating anti-MRSA therapy. A positive result may indicate colonization with MRSA and must be correlated clinically.    Wound Culture - Wound, Foot, Left [[id number]]  (Abnormal)  (Susceptibility) Collected: 07/10/22 1417    Lab Status: Final result Specimen: Wound from Foot, Left Updated: 07/14/22 0901     Wound Culture Heavy growth (4+) Pseudomonas aeruginosa MDRO     Comment: Multi drug resistant Pseudomonas, patient may be an isolation risk.         Heavy growth (4+) Escherichia coli     Comment: Consider infectious disease consult.  Susceptibility results may not correlate to clinical outcomes.         Moderate growth (3+) Normal Skin Loulou     Gram Stain Moderate (3+) Gram negative bacilli      Few (2+) Gram positive cocci      No WBCs seen    Narrative:      ESBL confirmation was negative.  Spoke with Payal in pharmacy and let her know    Susceptibility      Pseudomonas aeruginosa MDRO      DAVIDSON      Amikacin  Susceptible      Cefepime Susceptible      Ceftazidime Susceptible      Ciprofloxacin Intermediate      Gentamicin Resistant     Levofloxacin Resistant     Meropenem Intermediate      Piperacillin + Tazobactam Susceptible  [1]       Tobramycin Resistant                  [1]  Appended report. These results have been appended to a previously preliminary verified report.             Susceptibility      Escherichia coli      DAVIDSON      Amikacin Susceptible      Ampicillin Resistant     Ampicillin + Sulbactam Resistant     Cefepime Susceptible      Ceftazidime Resistant     Ceftriaxone Intermediate      Gentamicin Resistant     Levofloxacin Resistant     Piperacillin + Tazobactam Susceptible      Tetracycline Resistant     Tobramycin Resistant     Trimethoprim + Sulfamethoxazole Resistant                      Susceptibility Comments     Pseudomonas aeruginosa MDRO    For MDR Pseudomonas infections, susceptibility results may not correlate to clinical outcomes. Please consider infectious disease consult.    Escherichia coli    Cefazolin sensitivity will not be reported for Enterobacteriaceae in non-urine isolates. If cefazolin is preferred, please call the microbiology lab to request an E-test.  With the exception of urinary-sourced infections, aminoglycosides should not be used as monotherapy.  The previously reported component ERTAPENEM is no longer being reported. Previous result was Susceptible (<=0.5 ug/ml) (Reference Range: [Reference Range]) on 7/13/2022 at 0837 EDT.  The previously reported component MEROPENEM is no longer being reported. Previous result was Susceptible (<=0.25 ug/ml) (Reference Range: [Reference Range]) on 7/13/2022 at 0837 EDT.             Urine Culture - Urine, Urine, Catheter [[id number]]  (Abnormal)  (Susceptibility) Collected: 07/10/22 1417    Lab Status: Final result Specimen: Urine, Catheter Updated: 07/12/22 5854     Urine Culture >100,000 CFU/mL Enterobacter cloacae complex    Narrative:       Colonization of the urinary tract without infection is common. Treatment is discouraged unless the patient is symptomatic, pregnant, or undergoing an invasive urologic procedure.    Susceptibility      Enterobacter cloacae complex      DAVIDSON      Amikacin Susceptible      Cefazolin Resistant     Cefepime Susceptible      Ceftazidime Resistant     Ceftriaxone Resistant     Gentamicin Resistant     Levofloxacin Resistant     Nitrofurantoin Susceptible      Piperacillin + Tazobactam Susceptible      Tobramycin Resistant     Trimethoprim + Sulfamethoxazole Resistant                          Respiratory Panel PCR w/COVID-19(SARS-CoV-2) GAYAT[facility]/LUCAS/SHEILA/[facility] In-House, NP Swab in UTM/VTM, 3-4 HR TAT - Swab, Nasopharynx [[id number]]  (Normal) Collected: 07/10/22 1406    Lab Status: Final result Specimen: Swab from Nasopharynx Updated: 07/10/22 6896     ADENOVIRUS, PCR Not Detected     Coronavirus 229E Not Detected     Coronavirus HKU1 Not Detected     Coronavirus NL63 Not Detected     Coronavirus OC43 Not Detected     COVID19 Not Detected     Human Metapneumovirus Not Detected     Human Rhinovirus/Enterovirus Not Detected     Influenza A PCR Not Detected     Influenza B PCR Not Detected     Parainfluenza Virus 1 Not Detected     Parainfluenza Virus 2 Not Detected     Parainfluenza Virus 3 Not Detected     Parainfluenza Virus 4 Not Detected     RSV, PCR Not Detected     Bordetella pertussis pcr Not Detected     Bordetella parapertussis PCR Not Detected     Chlamydophila pneumoniae PCR Not Detected     Mycoplasma pneumo by PCR Not Detected    Narrative:      In the setting of a positive respiratory panel with a viral infection PLUS a negative procalcitonin without other underlying concern for bacterial infection, consider observing off antibiotics or discontinuation of antibiotics and continue supportive care. If the respiratory panel is positive for atypical bacterial infection (Bordetella pertussis, Chlamydophila  pneumoniae, or Mycoplasma pneumoniae), consider antibiotic de-escalation to target atypical bacterial infection.    Blood Culture - Blood, Arm, Left [[id number]]  (Normal) Collected: 07/10/22 1334    Lab Status: Final result Specimen: Blood from Arm, Left Updated: 07/15/22 1403     Blood Culture No growth at 5 days    Blood Culture - Blood, Arm, Left [[id number]]  (Normal) Collected: 07/10/22 1334    Lab Status: Final result Specimen: Blood from Arm, Left Updated: 07/15/22 1403     Blood Culture No growth at 5 days              budesonide-formoterol, 2 puff, Inhalation, BID - RT  cefepime, 2 g, Intravenous, Q8H  docusate sodium, 100 mg, Oral, Daily  finasteride, 5 mg, Oral, Daily  furosemide, 40 mg, Oral, BID  gabapentin, 400 mg, Oral, Q8H  HYDROcodone-acetaminophen, 1 tablet, Oral, Q4H While Awake  ipratropium-albuterol, 3 mL, Nebulization, 4x Daily - RT  metoclopramide, 10 mg, Oral, 4x Daily AC & at Bedtime  metoprolol tartrate, 12.5 mg, Oral, Q12H  pravastatin, 20 mg, Oral, Daily  sodium chloride, 10 mL, Intravenous, Q12H  sodium chloride, 10 mL, Intravenous, Q12H  sodium chloride, 10 mL, Intravenous, Q12H  vancomycin, 1,750 mg, Intravenous, Q24H  warfarin, 5 mg, Oral, Daily      Pharmacy to dose vancomycin,   Pharmacy to dose warfarin,         Diagnostics:  XR Foot 2 View Left    Result Date: 6/19/2022  LEFT FOOT X-RAYS  CLINICAL HISTORY: Evaluate fracture mild colitis.  Compared to the left foot x-rays dated 01/09/2022.  3 views were obtained. There is extensive arthritic change within the hindfoot and midfoot that results in a prominent pes planus deformity. This appears unchanged. A hallux valgus deformity is also noted. There is moderate narrowing of the first MTP joint and there is lateral subluxation of the phalanges with respect to the first metatarsal that appears unchanged. Diffuse soft tissue swelling is noted. There is extensive vascular calcification. There is no evidence of acute fracture or  subluxation. There is no definite radiographic evidence of osteomyelitis. No bony destruction or periosteal new bone deposition is identified. If there is high clinical suspicion of osteomyelitis further evaluation with an MRI of the foot with and without contrast should be considered.  This report was finalized on 6/19/2022 8:58 PM by Dr. Neto Soler M.D.      CT Chest With Contrast Diagnostic    Result Date: 7/14/2022  CT CHEST W CONTRAST DIAGNOSTIC-, CT ABDOMEN PELVIS W CONTRAST-  INDICATIONS: Respiratory illness, sepsis.  Radiation dose reduction techniques were utilized, including automated exposure control and exposure modulation based on body size.  TECHNIQUE: Enhanced CT of the chest, abdomen, pelvis  COMPARISON: Chest CT from 11/18/2021, abdomen pelvis CT from 03/30/2021  FINDINGS:  Chest CT:  The heart size is enlarged without pericardial effusion. Lymph nodes in the mediastinum and right hilum, a couple which are borderline prominent, appear stable. The ascending aorta is borderline aneurysmal, 4.4 cm, not significantly changed.  The airways appear clear.  No pleural effusion or pneumothorax.  The lungs show a nodular density at the right posterior costophrenic angle, 1.5 cm on axial image 79, new from the prior exam, neoplastic, infectious, inflammatory etiologies can be considered. Another new nodular density in the right lower lobe as seen on image 76, 1.1 cm Another new nodular density is seen in the left lower lobe, 1.6 cm on axial image 78. A 7 mm nodule in the left lower lobe on axial image 62 is stable, but a 4 mm nodule medially in the left lower lobe at the same image is noted. No cavitation is seen in the standing nodules to suggest septic embolic disease, but not possibility would not be excluded, close follow-up can be obtained as indicated. Mild atelectasis is present in both lungs.   Abdomen pelvis CT:  Spleen is enlarged, 13.8 cm CC. A hepatic cyst is seen.  Nonobstructive right  nephrolithiasis measures 1 cm. A 7 mm nonobstructive calcification is seen in the left kidney. Bilateral renal low densities are seen that are too small to characterize.  Nonspecific nodular thickening of the left more than right adrenal glands does not appear significantly changed.  Urinary bladder is catheterized, empty, limiting its assessment.  Otherwise unremarkable appearance of the liver, gallbladder, spleen, adrenal glands, pancreas, kidneys, bladder.  No bowel obstruction or abnormal bowel thickening is identified. Colonic diverticula are seen that do not appear focally inflamed. The appendix is not appear inflamed.  No free intraperitoneal gas or free fluid.  A right iliac chain lymph node measures 2.2 cm short axis on axial image 165, previously 1.4 cm, pathologic by size criteria, could for example represent lymphoma or metastatic disease. A left iliac chain lymph node measuring 1.6 cm short axis on axial image 155 is not significantly changed. Numerous other retroperitoneal lymph nodes appear similar to the prior exam.  Abdominal aorta is mildly aneurysmal, 3.4 cm on axial image 126, previously 3.2 cm. Aortic and other arterial calcifications are present.  Degenerative changes are seen in the spine. No acute fracture is identified. Stable lucent foci are seen in the L2, L4 vertebral bodies. Left shoulder effusion is evident.         New nodular densities in both lungs, further evaluation with positron emission tomography is recommended, as well as interval follow-up.  A right iliac chain lymph node shows interval increase, and may represent neoplasm, attention to this area on PET CT is advised.  Colonic diverticulosis. No focal acute inflammatory process of bowel is identified, follow up as indications persist.  Bilateral nonobstructive nephrolithiasis.  This report was finalized on 7/14/2022 6:27 PM by Dr. Khurram Chambers M.D.      CT Abdomen Pelvis With Contrast    Result Date: 7/14/2022  CT CHEST  W CONTRAST DIAGNOSTIC-, CT ABDOMEN PELVIS W CONTRAST-  INDICATIONS: Respiratory illness, sepsis.  Radiation dose reduction techniques were utilized, including automated exposure control and exposure modulation based on body size.  TECHNIQUE: Enhanced CT of the chest, abdomen, pelvis  COMPARISON: Chest CT from 11/18/2021, abdomen pelvis CT from 03/30/2021  FINDINGS:  Chest CT:  The heart size is enlarged without pericardial effusion. Lymph nodes in the mediastinum and right hilum, a couple which are borderline prominent, appear stable. The ascending aorta is borderline aneurysmal, 4.4 cm, not significantly changed.  The airways appear clear.  No pleural effusion or pneumothorax.  The lungs show a nodular density at the right posterior costophrenic angle, 1.5 cm on axial image 79, new from the prior exam, neoplastic, infectious, inflammatory etiologies can be considered. Another new nodular density in the right lower lobe as seen on image 76, 1.1 cm Another new nodular density is seen in the left lower lobe, 1.6 cm on axial image 78. A 7 mm nodule in the left lower lobe on axial image 62 is stable, but a 4 mm nodule medially in the left lower lobe at the same image is noted. No cavitation is seen in the standing nodules to suggest septic embolic disease, but not possibility would not be excluded, close follow-up can be obtained as indicated. Mild atelectasis is present in both lungs.   Abdomen pelvis CT:  Spleen is enlarged, 13.8 cm CC. A hepatic cyst is seen.  Nonobstructive right nephrolithiasis measures 1 cm. A 7 mm nonobstructive calcification is seen in the left kidney. Bilateral renal low densities are seen that are too small to characterize.  Nonspecific nodular thickening of the left more than right adrenal glands does not appear significantly changed.  Urinary bladder is catheterized, empty, limiting its assessment.  Otherwise unremarkable appearance of the liver, gallbladder, spleen, adrenal glands,  pancreas, kidneys, bladder.  No bowel obstruction or abnormal bowel thickening is identified. Colonic diverticula are seen that do not appear focally inflamed. The appendix is not appear inflamed.  No free intraperitoneal gas or free fluid.  A right iliac chain lymph node measures 2.2 cm short axis on axial image 165, previously 1.4 cm, pathologic by size criteria, could for example represent lymphoma or metastatic disease. A left iliac chain lymph node measuring 1.6 cm short axis on axial image 155 is not significantly changed. Numerous other retroperitoneal lymph nodes appear similar to the prior exam.  Abdominal aorta is mildly aneurysmal, 3.4 cm on axial image 126, previously 3.2 cm. Aortic and other arterial calcifications are present.  Degenerative changes are seen in the spine. No acute fracture is identified. Stable lucent foci are seen in the L2, L4 vertebral bodies. Left shoulder effusion is evident.         New nodular densities in both lungs, further evaluation with positron emission tomography is recommended, as well as interval follow-up.  A right iliac chain lymph node shows interval increase, and may represent neoplasm, attention to this area on PET CT is advised.  Colonic diverticulosis. No focal acute inflammatory process of bowel is identified, follow up as indications persist.  Bilateral nonobstructive nephrolithiasis.  This report was finalized on 7/14/2022 6:27 PM by Dr. Khurram Chambers M.D.      XR Chest 1 View    Result Date: 7/14/2022  CHEST SINGLE VIEW  HISTORY: Tachypnea, wheezing.  COMPARISON: AP chest 07/10/2022, 01/09/2022.  FINDINGS: There is cardiomegaly with pulmonary vascular engorgement. Lungs appear clear of focal airspace disease and there is no evidence for pulmonary edema or pleural effusion. Cardiac monitoring leads are noted. Aortic vascular calcifications are present. There is a small-caliber catheter and apparent right-sided PICC with tip extending to the region of the  right axilla.      Cardiomegaly with pulmonary vascular engorgement.  This report was finalized on 7/14/2022 10:29 AM by Dr. Adria Gifford M.D.      XR Chest 1 View    Result Date: 7/10/2022  SINGLE VIEW CHEST RADIOGRAPH  HISTORY: 72-year-old male with history of a fever and cough.  FINDINGS: An upright AP portable chest radiograph was obtained. Comparison is made to a chest radiograph dated 06/19/2022. The lungs are normal in volume and are clear of consolidation. There is ground glass and interstitial opacification at the base of the right lung. The cardiac silhouette is enlarged but stable. No evidence for a pneumothorax or pleural effusion is appreciated. The visualized osseous structures appear normal.      There is an interstitial and ground glass infiltrate at the base of the right lung that is most consistent with pneumonia. Stable mild cardiomegaly is noted.  This report was finalized on 7/10/2022 3:17 PM by Dr. Mehran Pepper M.D.      XR Chest 1 View    Result Date: 6/19/2022  SINGLE VIEW CHEST  CLINICAL HISTORY: Fever. Cough.  Compared to the previous chest x-ray dated 01/09/2022.  The lungs are well-expanded and appear free of focal infiltrates. There are no pleural effusions. The heart is moderately enlarged and unchanged. The pulmonary vasculature is within normal limits.  IMPRESSIONS: Cardiomegaly. No evidence of acute disease within the chest.  This report was finalized on 6/19/2022 6:27 PM by Dr. Neto Soler M.D.      MRI Foot Left With & Without Contrast    Result Date: 6/21/2022  MRI LEFT MID AND FOREFOOT WITH AND WITHOUT CONTRAST  HISTORY: Foot pain and swelling. Possible osteomyelitis.  TECHNIQUE: MRI left mid and forefoot was performed before and after the IV administration of 20 mL of MultiHance contrast.  FINDINGS: Fine anatomic detail is compromised by motion on every sequence. There is complete atrophy of the foot musculature. A pronounced hallux valgus deformity is observed. There  is prominent skin thickening over the dorsum of the foot and mild diffuse soft tissue edema. No focal fluid collection is present.  The Lisfranc ligament complex is intact. Marrow signal throughout the mid and forefoot is normal. There is no evidence of osteomyelitis or abscess. No abnormal joint fluid is present. There is no abnormal contrast enhancement except in the skin along the dorsum of the foot. Degenerative change is observed at the navicular cuneiform joints.      Cellulitis in the left foot with chronic changes as described. No evidence of osteomyelitis or abscess.  This report was finalized on 6/21/2022 7:39 AM by Dr. Rip Jim M.D.      Doppler Ankle Brachial Index Single Level CAR    Result Date: 7/13/2022  · Right Conclusion: The right MARIBELL is normal. Normal digital pressures. · Left Conclusion: The left MARIBELL is normal. Normal digital pressures.      Results for orders placed during the hospital encounter of 09/17/21    Adult Transthoracic Echo Complete W/ Cont if Necessary Per Protocol    Interpretation Summary  · Calculated left ventricular EF = 55.5% Estimated left ventricular EF was in agreement with the calculated left ventricular EF. Left ventricular systolic function is normal. Normal left ventricular cavity size noted. Left ventricular wall thickness is consistent with mild to moderate concentric hypertrophy. All left ventricular wall segments contract normally. Left ventricular diastolic function was indeterminate.  · Right ventricle not well visualized. The right ventricular cavity is moderately dilated.  · The left atrial cavity is moderately dilated.  · The right atrial cavity is severely dilated.  · Mild to moderate mitral valve regurgitation is present.  · Mild to moderate tricuspid valve regurgitation is present. Estimated right ventricular systolic pressure from tricuspid regurgitation is moderately elevated (45-55 mmHg). Calculated right ventricular systolic pressure from  tricuspid regurgitation is 54 mmHg.      I have reviewed his scans    Active Hospital Problems    Diagnosis  POA   • **Bacterial pneumonia [J15.9]  Yes   • Acute on chronic respiratory failure with hypoxia ([facility]) [J96.21]  Yes   • Paroxysmal atrial fibrillation with rapid ventricular response ([facility]) [I48.0]  Yes   • UTI (urinary tract infection) due to urinary indwelling catheter ([facility]) [T83.511A, N39.0]  Yes   • Venous stasis dermatitis of both lower extremities [I87.2]  Yes   • Encephalopathy, metabolic [G93.41]  Yes   • Ischemic cardiomyopathy [I25.5]  Yes   • Alcohol dependence, in remission ([facility]) [F10.21]  Yes   • Sepsis ([facility]) [A41.9]  Yes   • Charcot's joint of foot [M14.679]  Yes   • Chronic anticoagulation [Z79.01]  Not Applicable   • Essential hypertension [I10]  Yes   • COPD (chronic obstructive pulmonary disease) ([facility]) [J44.9]  Yes   • Sleep apnea [G47.30]  Yes   • Lymphedema of both lower extremities [I89.0]  Yes      Resolved Hospital Problems   No resolved problems to display.         Assessment & Plan     1. Pneumonia presumed bacterial IV cefepime and vancomycin ID following and managing antibiotics  2. Acute on chronic hypoxic respiratory failure continue O2, wean as tolerated to his baseline O2  3. COPD without acute exacerbation  4. Obstructive sleep apnea  5. Tobacco abuse  6. Multiple pulmonary nodules, question if these are inflammatory or malignant they certainly could be inflammatory aspiration related infections or other but certainly has a history that would be concerning for malignancy as well.  He is going need to be off anticoagulation for biopsy when he is medically stable and cardiology feels safe to take him off anticoagulation.  We could do that and biopsy while he is here or it may be best to get him stabilized get a PET scan outpatient in 6 weeks give a little time for infection to clear and see if there is a particular lesion still present that PET positive that would be more  amenable to biopsy or even if those lymph nodes were PET positive in the inguinal region that might be more amenable to biopsy given his chronic respiratory failure and I discussed options with he and his wife they seem to favor giving him some time to recover and getting a PET CT in about 6 weeks to reassess the nodules.  7. Atrial fibrillation rate controlled anticoagulated  8. Lymphedema bilateral lower extremities  9. Pulmonary hypertension RVSP of 54 on echocardiogram with moderately dilated left atrium likely WHO group 2 disease   10.  dysphagia had prior VFSS and has refused recommendations of modified diet and continues to refuse.  11. Essential hypertension  12. Enlarging right inguinal chain lymph node per primary service.  PET scan will also serve the follow-up of this.  If this were positive on PET scan it would be a safer biopsy site.        Plan for disposition:    Mal Jimenes Jr, MD  07/17/22  18:01 EDT    Time: 35minutes with the patient and his family today reviewing records, discussing with them.

## 2022-07-17 NOTE — PROGRESS NOTES
"CC: Permanent atrial fibrillation RVR    Interval History: Patient states he feels well today and is hoping to go home soon.      Vital Signs  Temp:  [97.3 °F (36.3 °C)-98.7 °F (37.1 °C)] 97.3 °F (36.3 °C)  Heart Rate:  [46-80] 46  Resp:  [18] 18  BP: ()/(45-70) 105/66    Intake/Output Summary (Last 24 hours) at 7/17/2022 1256  Last data filed at 7/17/2022 0900  Gross per 24 hour   Intake 240 ml   Output 4400 ml   Net -4160 ml     Flowsheet Rows    Flowsheet Row First Filed Value   Admission Height 193 cm (76\") Documented at 07/10/2022 1332   Admission Weight 134 kg (296 lb) Documented at 07/10/2022 1332          Physical Exam: Unchanged from 7/16/2022  General:  Appears in no acute distress; resting comfortably in chair  Eyes: PERTL,  HEENT:  No JVD. Thyroid not visibly enlarged. No mucosal pallor or cyanosis  Respiratory: Respirations regular and unlabored at rest. BBS with good air entry in all fields anteriorly and laterally. No crackles, rubs or wheezes auscultated  Cardiovascular: S1S2 Regular rate and rhythm. No murmur, rub or gallop. No carotid bruits. DP/PT pulses 1+   .  4+ pretibial pitting edema  Gastrointestinal: Abdomen soft, round, non tender. Bowel sounds present.  No ascites  Musculoskeletal: MOLINA x4. No abnormal movements  Extremities: No digital clubbing or cyanosis  Skin: Color pink. Skin warm and dry to touch. No rashes    Neuro: AAO x3 CN II-XII grossly intact  Psych: Mood and affect normal, pleasant and cooperative      Results Review:    Results from last 7 days   Lab Units 07/17/22  0741   SODIUM mmol/L 139   POTASSIUM mmol/L 4.1   CHLORIDE mmol/L 101   CO2 mmol/L 27.0   BUN mg/dL 16   CREATININE mg/dL 0.90   GLUCOSE mg/dL 99   CALCIUM mg/dL 9.1     Results from last 7 days   Lab Units 07/10/22  1334   TROPONIN T ng/mL <0.010     Results from last 7 days   Lab Units 07/17/22  0741   WBC 10*3/mm3 6.55   HEMOGLOBIN g/dL 9.9*   HEMATOCRIT % 29.9*   PLATELETS 10*3/mm3 178     Results from " last 7 days   Lab Units 07/17/22  0741 07/16/22  0753 07/15/22  0733 07/10/22  2139 07/10/22  1334   INR  1.97* 2.20* 2.65*   < > 1.47*   APTT seconds  --   --   --   --  48.4*    < > = values in this interval not displayed.         Results from last 7 days   Lab Units 07/11/22  0747   MAGNESIUM mg/dL 2.2     I reviewed the patient's new clinical results, and personally reviewed and interpreted the patient's ECG and telemetry data from the last 24 hours.          Medication Review:       Pharmacy to dose vancomycin,   Pharmacy to dose warfarin,         Assessment/Plan    1..  Permanent atrial fibrillation with RVR anticoagulated on Coumadin-INR subtherapeutic at 1.97.  Would resume warfarin when okay with all treating physicians.  2.  History of coronary artery disease treated medically  3.  Cardiomyopathy-most recent left ventricular ejection fraction normalized.  The patient states he feels as though he is not holding onto any fluid right now so I will transition him back to oral diuretic.    4.  Essential hypertension-blood pressure low normal  5.  Bacterial pneumonia with sepsis  6.  Alcohol abuse and tobacco use    Weight unchanged from yesterday.  Per primary cardiologist he always has very significant lymphedema and Charcot foot.  The patient states he feels that he is not holding onto any fluid today, so I will transition him back to oral Lasix.  Currently stable from CV standpoint, would resume warfarin when okay with all treating providers.  Primary cardiology team to resume care in a.m.    ESTEFANY Boone  07/17/22  12:56 EDT

## 2022-07-17 NOTE — PLAN OF CARE
Goal Outcome Evaluation:  Plan of Care Reviewed With: patient        Progress: no change     Patient alert and oriented. He continues to receive IV ABX. Plans for lung biospy changed. Patient ok to restart Coumadin. He continues on 2L NC. NO distress noted. Metoprolol dose decreased. OOB in recliner. Chair alarm on. Call light in reach.   Problem: Adult Inpatient Plan of Care  Goal: Plan of Care Review  Outcome: Ongoing, Progressing  Flowsheets (Taken 7/17/2022 1743)  Progress: no change  Plan of Care Reviewed With: patient  Goal: Patient-Specific Goal (Individualized)  Outcome: Ongoing, Progressing  Goal: Absence of Hospital-Acquired Illness or Injury  Outcome: Ongoing, Progressing  Intervention: Identify and Manage Fall Risk  Recent Flowsheet Documentation  Taken 7/17/2022 1200 by Philip Mejia RN  Safety Promotion/Fall Prevention:   activity supervised   assistive device/personal items within reach   clutter free environment maintained   fall prevention program maintained   lighting adjusted   nonskid shoes/slippers when out of bed   room organization consistent   safety round/check completed  Taken 7/17/2022 1000 by Philip Mejia RN  Safety Promotion/Fall Prevention:   activity supervised   assistive device/personal items within reach   clutter free environment maintained   fall prevention program maintained   lighting adjusted   nonskid shoes/slippers when out of bed   safety round/check completed   room organization consistent  Intervention: Prevent Skin Injury  Recent Flowsheet Documentation  Taken 7/17/2022 0800 by Philip Mejia RN  Skin Protection:   adhesive use limited   transparent dressing maintained  Intervention: Prevent and Manage VTE (Venous Thromboembolism) Risk  Recent Flowsheet Documentation  Taken 7/17/2022 0800 by Philip Mejia RN  VTE Prevention/Management: (holding Coumadin at this time)   bilateral   dorsiflexion/plantar flexion performed  Range of Motion: active ROM (range of  motion) encouraged  Intervention: Prevent Infection  Recent Flowsheet Documentation  Taken 7/17/2022 1200 by Philip Mejia RN  Infection Prevention:   environmental surveillance performed   hand hygiene promoted   single patient room provided  Taken 7/17/2022 1000 by Philip Mejia RN  Infection Prevention:   environmental surveillance performed   hand hygiene promoted   single patient room provided  Taken 7/17/2022 0800 by Philip Mejia RN  Infection Prevention:   environmental surveillance performed   hand hygiene promoted   single patient room provided  Goal: Optimal Comfort and Wellbeing  Outcome: Ongoing, Progressing  Intervention: Monitor Pain and Promote Comfort  Recent Flowsheet Documentation  Taken 7/17/2022 0831 by Philip Mejia RN  Pain Management Interventions:   see MAR   quiet environment facilitated   breathing exercises   care clustered  Intervention: Provide Person-Centered Care  Recent Flowsheet Documentation  Taken 7/17/2022 0800 by Philip Mejia RN  Trust Relationship/Rapport:   care explained   choices provided   emotional support provided   empathic listening provided   questions answered   reassurance provided   questions encouraged   thoughts/feelings acknowledged  Goal: Readiness for Transition of Care  Outcome: Ongoing, Progressing     Problem: Fall Injury Risk  Goal: Absence of Fall and Fall-Related Injury  Outcome: Ongoing, Progressing  Intervention: Identify and Manage Contributors  Recent Flowsheet Documentation  Taken 7/17/2022 1200 by Philip Mejia RN  Medication Review/Management: medications reviewed  Taken 7/17/2022 1000 by Philip Mejia RN  Medication Review/Management: medications reviewed  Taken 7/17/2022 0800 by Philip Mejia RN  Self-Care Promotion:   independence encouraged   BADL personal objects within reach   BADL personal routines maintained  Intervention: Promote Injury-Free Environment  Recent Flowsheet Documentation  Taken 7/17/2022 1200 by Roberto  JAYANT Palacios  Safety Promotion/Fall Prevention:   activity supervised   assistive device/personal items within reach   clutter free environment maintained   fall prevention program maintained   lighting adjusted   nonskid shoes/slippers when out of bed   room organization consistent   safety round/check completed  Taken 7/17/2022 1000 by Philip Mejia RN  Safety Promotion/Fall Prevention:   activity supervised   assistive device/personal items within reach   clutter free environment maintained   fall prevention program maintained   lighting adjusted   nonskid shoes/slippers when out of bed   safety round/check completed   room organization consistent     Problem: Skin Injury Risk Increased  Goal: Skin Health and Integrity  Outcome: Ongoing, Progressing  Intervention: Optimize Skin Protection  Recent Flowsheet Documentation  Taken 7/17/2022 0800 by Philip Mejia, RN  Pressure Reduction Techniques: (OOB in recliner)   frequent weight shift encouraged   heels elevated off bed  Pressure Reduction Devices: (waffle overlay to mattress)   alternating pressure pump (ADD)   pressure-redistributing mattress utilized   other (see comments)  Skin Protection:   adhesive use limited   transparent dressing maintained

## 2022-07-17 NOTE — PLAN OF CARE
Problem: Adult Inpatient Plan of Care  Goal: Plan of Care Review  Outcome: Ongoing, Progressing   Goal Outcome Evaluation:  Pt up in chair all night. 2L nasal cannula. HR bradycardic, lowest at 38. BP on the lower side as well, left note to physician about this, pt is on metoprolol. Xanax given, see mar. Pain medication is now scheduled every 4 hours while awake. Pt got up and walked around the room twice last night. Pt rested throughout the night with no issues or complaints.

## 2022-07-17 NOTE — PROGRESS NOTES
"[facility] Clinical Pharmacy Services: Vancomycin Monitoring Note    Jalen Lockwood is a 72 y.o. male who is on day 4/5 of pharmacy to dose vancomycin for PNA.    Previous Vancomycin Dose:   1750 mg IV q24h  Updated Cultures and Sensitivities:   7/11 MRSA swab positive  7/10 urine cx >100k  Enterobacter cloacae  7/14 BC NG x 2 days    Results from last 7 days   Lab Units 07/16/22  1735   VANCOMYCIN TR mcg/mL 22.90*     Vitals/Labs  Ht: 193 cm (76\"); Wt: 134 kg (296 lb)   Temp Readings from Last 1 Encounters:   07/16/22 97.1 °F (36.2 °C) (Tympanic)     Estimated Creatinine Clearance: 130.2 mL/min (by C-G formula based on SCr of 0.78 mg/dL).     Results from last 7 days   Lab Units 07/17/22  0741 07/16/22  0753 07/15/22  0733   CREATININE mg/dL 0.90 0.78 0.86   WBC 10*3/mm3 6.55 6.12 7.16     Assessment/Plan    Vancomycin Dose: continue 1750 mg IV every  24  hours; provides a predicted  mg/L.hr  and  trough 15.1 mcg/ml  Next Level Date and Time: no further levels are ordered for now, two days remain for vanc regimen unless extended  We will continue to monitor patient changes and renal function     Thank you for involving pharmacy in this patient's care. Please contact pharmacy with any questions or concerns.      Sharita Galvan, [facility]    Clinical Pharmacist            "

## 2022-07-17 NOTE — PROGRESS NOTES
"DAILY PROGRESS NOTE  [facility]    Patient Identification:  Name: Jalen Lockwood  Age: 72 y.o.  Sex: male  :  1950  MRN: [id number]         Primary Care Physician: Marc Ludwig MD      Subjective  No acute complaints.  Feels about the same.    Objective:  General Appearance:  Comfortable and in no acute distress.    Vital signs: (most recent): Blood pressure 126/65, pulse 72, temperature 97.7 °F (36.5 °C), temperature source Temporal, resp. rate 18, height 193 cm (76\"), weight 134 kg (296 lb), SpO2 100 %.    Lungs:  Normal effort and normal respiratory rate.  Breath sounds clear to auscultation.    Heart: Normal rate.  Regular rhythm.    Extremities: There is dependent edema.    Neurological: Patient is alert and oriented to person, place and time.    Skin:  Warm and dry.                Vital signs in last 24 hours:  Temp:  [97.3 °F (36.3 °C)-98.7 °F (37.1 °C)] 97.7 °F (36.5 °C)  Heart Rate:  [46-80] 72  Resp:  [18] 18  BP: ()/(45-70) 126/65    Intake/Output:    Intake/Output Summary (Last 24 hours) at 2022 1517  Last data filed at 2022 0900  Gross per 24 hour   Intake 480 ml   Output 3000 ml   Net -2520 ml         Results from last 7 days   Lab Units 22  0741 22  0753 07/15/22  0733 22  1033 22  0731 22  0744 22  0747   WBC 10*3/mm3 6.55 6.12 7.16 7.47 4.26 5.21 4.90   HEMOGLOBIN g/dL 9.9* 9.9* 10.8* 11.2* 9.7* 9.6* 9.3*   PLATELETS 10*3/mm3 178 168 158 186 144 131* 111*     Results from last 7 days   Lab Units 22  0741 22  0753 07/15/22  0733 22  0551 22  1944 22  0731 22  0744 22  0747   SODIUM mmol/L 139 134* 138 136  --  136 137 134*   POTASSIUM mmol/L 4.1 3.6 4.0 3.9 4.0 3.4* 3.8 3.7   CHLORIDE mmol/L 101 101 103 103  --  103 106 100   CO2 mmol/L 27.0 26.0 26.0 27.0  --  25.8 24.0 25.0   BUN mg/dL 16 16 14 10  --  9 10 11   CREATININE mg/dL 0.90 0.78 0.86 0.73*  --  0.70* 0.65* 0.63* "   GLUCOSE mg/dL 99 92 105* 93  --  108* 98 116*   Estimated Creatinine Clearance: 112.9 mL/min (by C-G formula based on SCr of 0.9 mg/dL).  Results from last 7 days   Lab Units 07/17/22  0741 07/16/22  0753 07/15/22  0733 07/14/22  0551 07/13/22  0731 07/12/22  0744 07/11/22  0747 07/10/22  2306   CALCIUM mg/dL 9.1 9.3 9.1 9.1 8.8 8.2* 8.6  --    ALBUMIN g/dL  --   --   --   --   --   --  3.10*  --    MAGNESIUM mg/dL  --   --   --   --   --   --  2.2 1.8     Results from last 7 days   Lab Units 07/11/22  0747   ALBUMIN g/dL 3.10*   BILIRUBIN mg/dL 0.9   ALK PHOS U/L 83   AST (SGOT) U/L 13   ALT (SGPT) U/L 7       Assessment:    Bacterial pneumonia    Lymphedema of both lower extremities    Sleep apnea    COPD (chronic obstructive pulmonary disease) ([facility])    Essential hypertension    Chronic anticoagulation    Charcot's joint of foot    Sepsis ([facility])    Alcohol dependence, in remission ([facility])    Encephalopathy, metabolic    Ischemic cardiomyopathy    Venous stasis dermatitis of both lower extremities    UTI (urinary tract infection) due to urinary indwelling catheter ([facility])    Acute on chronic respiratory failure with hypoxia ([facility])    Paroxysmal atrial fibrillation with rapid ventricular response ([facility])    Mr. Lockwood is a 72 year old male who presented to the hospital for confusion and fever in the setting of chronic escamilla and chronic venous stasis wounds. CXR on admission showed possible RLL pneumonia and UA with pyuria and bacteria. He was seen in consultation by ID and placed empirically on antibiotics.      · Right lower lobe pneumonia/acute on chronic respiratory failure: ID following and has had him on Cefepime with initial improvement. MRSA nares positive but not felt to have current infection so not on vancomycin. Has had recurrent fever 7/14 with associated dyspnea/tachypnea. Repeat CXR with pulmonary vascular engorgement. ABGs with respiratory alkalosis likely from hyperventilation/swallow breaths.  Pulmonary  following.  ID suspects recurrent aspiration pneumonitis.  He is continued on cefepime and vancomycin  ·   · UTI/chronic escamilla: Urine with MDR enterobacter sensitive to cefepime. Difficult to determine if active infection with possible pneumonia. ID has on cefepime to cover this as well. High risk for recurrent infections.  ·   · Venous stasis dermatitis: Wound cultures on admission with MDR pseudomonas and ESBL E.coli. Felt colonized. Local wound care with wraps.  ·   · Sepsis - secondary to above  ·   · PAF with RVR: He is metoprolol daily, Lasix IV.  Metoprolol was held for hypotension and bradycardia.    ·   · Encephalopathy: Multifactorial d/t the above.  Resolved   ·   · COPD/BECKI: Pulmonary input appreciated. Chronically on 2 L at home.  ·   · ICM: Followed by Dr. Henry. Fluid status is somewhat difficult to assess with his obesity and chronic edema. BNP is elevated but not far from prior levels.  Patient now back on oral diuretics.    ·   · Chest CT is multiple pulmonary nodules:.    Present plans are for a PET scan in 6 weeks and further work-up pending those results    Right inguinal chain lymph node: Increased size since previous CT scan.    PET scan in 6 weeks.  Possible biopsy depending on results.    Plan:  Please see above.  Resume Coumadin.  Increase activity.  Discharge planning.  Discussed with patient.  Discussed with RN.    Christos Bess MD  7/17/2022  15:17 EDT

## 2022-07-17 NOTE — PROGRESS NOTES
[facility] Clinical Pharmacy Services: Warfarin Dosing/Monitoring Consult    Jalen Lockwood is a 72 y.o. male, estimated creatinine clearance is 118.1 mL/min (by C-G formula based on SCr of 0.86 mg/dL). weighing 134 kg (296 lb).    Results from last 7 days   Lab Units 07/17/22  0741 07/16/22  0753 07/15/22  0733 07/14/22  1033 07/14/22  0551 07/13/22  0731   INR  1.97* 2.20* 2.65*  --  2.09* 1.89*   HEMOGLOBIN g/dL 9.9* 9.9* 10.8* 11.2*  --  9.7*   HEMATOCRIT % 29.9* 30.7* 33.3* 33.5*  --  28.3*   PLATELETS 10*3/mm3 178 168 158 186  --  144   Prior to admission anticoagulation: Warfarin managed by [facility]. Most up-to-date warfarin dosing regimen is 7.5 mg every Friday and 5 mg all other days. Patient's INR was 1.30 on 7/8/22, patient was instructed to boost with 7.5 mg doses on 7/8 and 7/9. INR was only 1.47 on 7/10 despite boosted doses.     Hospital Anticoagulation:  Consulting provider: Dr. Singh with [facility]  Start date: Continuation of home medication  Indication: Permament A.fib  Target INR: 2 - 3  Expected duration: Indefinite   Bridge Therapy: No      Potential food or drug interactions: Cefepime may increase sensitivity to warfarin due to eradication of vitamin K producing gut darian    Education complete: Defer, patient followed by our warfarin clinic    Assessment/Plan:  1.   Dose: INR =1.97   almost at goal ( had been on hold for biopsy)  resume today   dose today = 5mg          Please see Dr. Bess order for warfarin 5 mg po daily  2.   Monitor for any signs or symptoms of bleeding  3.   Follow up daily INRs and dose adjustments    Pharmacy will continue to follow until discharge or discontinuation of warfarin.    Sharita Galvan, [facility]    Clinical Pharmacist

## 2022-07-18 NOTE — TELEPHONE ENCOUNTER
Caller: Mariposa Lockwood    Relationship: Emergency Contact    Best call back number: [phone]    Who are you requesting to speak with (clinical staff, provider,  specific staff member): DR. GANT     What was the call regarding: PATIENT WIFE MARIPOSA NEEDS TO GET FMLA PAPERWORK DONE SO SHE CAN TAKE PATIENT TO DOCTOR APPOINTMENT HAVING PAPERWORK FAXED OVER     Do you require a callback: YES

## 2022-07-18 NOTE — NURSING NOTE
Wound/Ostomy: Follow up the patient with bilateral lower leg, dressing is changed improvement is noticed , washed leg with foam cleanser, opticel is placed in every open area, betadine gauze 2x2 placed between each toes and to dorsal foot,   wrapped lower extremities with unna boots  and 4 layer compression bandage system since mid foot to below the knee,  Patient  tolerated well, plan to change 2 time a wk.

## 2022-07-18 NOTE — THERAPY TREATMENT NOTE
Patient Name: Jalen Lockwood  : 1950    MRN: [id number]                              Today's Date: 2022       Admit Date: 7/10/2022    Visit Dx:     ICD-10-CM ICD-9-CM   1. Acute UTI  N39.0 599.0   2. Cellulitis of left foot  L03.116 682.7   3. Anemia, unspecified type  D64.9 285.9   4. Ischemic cardiomyopathy  I25.5 414.8   5. Normocytic anemia  D64.9 285.9   6. Urinary retention  R33.9 788.20   7. Anemia of chronic disease  D63.8 285.29   8. Gastroparesis  K31.84 536.3   9. Obesity (BMI 30-39.9)  E66.9 278.00     Patient Active Problem List   Diagnosis   • Chronic osteomyelitis (HCC)   • Foot pain   • Peripheral neuropathy   • Lymphedema of both lower extremities   • Permanent atrial fibrillation (HCC)   • Sleep apnea   • Chronic edema   • Obesity (BMI 30-39.9)   • COPD (chronic obstructive pulmonary disease) (HCC)   • Atrial flutter (HCC)   • 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HCC)   • Aortectasia (HCC)   • Popliteal artery aneurysm (HCC)   • Colon polyps   • Gastroparesis   • Insomnia   • Adenomatous polyp of colon   • Essential hypertension   • ETOH abuse   • Chronic anticoagulation   • Oropharyngeal dysphagia   • Tobacco abuse   • Thyromegaly   • Adrenal adenoma, left   • Retroperitoneal lymphadenopathy   • Anemia of chronic disease   • Thyroid nodule   • Charcot's joint of foot   • Sepsis (HCC)   • Abnormal CT scan, lumbar spine   • Alcohol dependence, in remission (HCC)   • Encephalopathy, metabolic   • Ischemic cardiomyopathy   • Normocytic anemia   • Inguinal adenopathy   • Elevated lactic acid level   • Venous stasis dermatitis of both lower extremities   • Urinary retention   • Sepsis without acute organ dysfunction (HCC)   • UTI (urinary tract infection) due to urinary indwelling catheter (HCC)   • Bacterial pneumonia   • Acute on chronic respiratory failure with hypoxia (HCC)   • Paroxysmal atrial fibrillation with rapid ventricular response (HCC)     Past Medical History:   Diagnosis  Date   • Allergic rhinitis    • Anxiety    • Aortectasia (HCC)     3cm infrarenal abdominal aorta   • Arthritis    • Atrial flutter (HCC) 2010    s/p ablation    • Charcot's joint of foot    • Chronic edema     both legs and sees wound care center at [place]    • Chronic venous insufficiency    • COPD (chronic obstructive pulmonary disease) (HCC)    • Coronary atherosclerosis     Cath 2010: diffuse 40-50% disease   • Diverticulosis    • Duodenitis    • Fatty liver    • Gastritis    • Gastroparesis    • Hematoma     post-operative; After catheterization, right groin, required surgical exploration   • Hyperlipidemia    • Hypertension    • Insomnia    • Internal hemorrhoids    • Open wound     izzy legs has drsg chg weekly at wound care center at [place]  pt does second dressing on left leg another time during week   • Osteomyelitis (HCC)    • Paroxysmal atrial fibrillation (HCC)    • Peripheral neuropathy    • Popliteal artery aneurysm (HCC)     left, s/p stenting by Dr. Boston   • Skin cancer    • Sleep apnea     o2   • 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HCC)     due to flutter and afib; cath 2010 with nonobstructive disease   • Venous stasis    • Venous stasis ulcer (HCC)     bilateral legs      Past Surgical History:   Procedure Laterality Date   • BASAL CELL CARCINOMA EXCISION      ear and left side of face   • BRONCHOSCOPY N/A 4/12/2021    Procedure: BRONCHOSCOPY WITH BAL;  Surgeon: Bunny Lepe MD;  Location: [facility] ENDOSCOPY;  Service: Pulmonary;  Laterality: N/A;  PRE-HEMOPTYSIS  POST-SAME   • CARDIAC CATHETERIZATION     • CATARACT EXTRACTION     • COLONOSCOPY  09/28/2015    NBIH, diverticulosis, polyps   • COLONOSCOPY N/A 9/11/2018    Procedure: COLONOSCOPY TO CECUM  AND TERM. ILEUM WITH COLD SNARE POLYPECTOMIES;  Surgeon: Kane Lagunas MD;  Location: [facility] ENDOSCOPY;  Service: Gastroenterology   • COLONOSCOPY N/A 10/29/2019    Procedure: COLONOSCOPY TO TO CECUM AND TERMINAL ILEUM WITH HOT AND COLD  SNARE POLYPECTOMIES;  Surgeon: Kane Lagunas MD;  Location: [facility] ENDOSCOPY;  Service: Gastroenterology   • HIP ARTHROPLASTY Right 2017   • JOINT REPLACEMENT Left    • OTHER SURGICAL HISTORY      Catheter ablation atrial flutter   • REPAIR ANEURYSM / PSEUDO ANEURYSM / RUPTURED ANEURYSM POPLITEAL ARTERY      Stent-Graft of the the left popliteal artery   • REPAIR KNEE LIGAMENT      Primary repair of knee ligament cruciate anterior right   • TONSILLECTOMY  1958   • TOTAL KNEE ARTHROPLASTY Bilateral    • UPPER GASTROINTESTINAL ENDOSCOPY  09/16/2014    acute gastritis, acute duodenitis      General Information     Row Name 07/18/22 1018          Physical Therapy Time and Intention    Document Type therapy note (daily note)  -     Mode of Treatment physical therapy;individual therapy  -     Row Name 07/18/22 1018          General Information    Existing Precautions/Restrictions fall  -EB     Row Name 07/18/22 1018          Cognition    Orientation Status (Cognition) oriented x 3  -EB     Row Name 07/18/22 1018          Safety Issues, Functional Mobility    Impairments Affecting Function (Mobility) endurance/activity tolerance;strength  -           User Key  (r) = Recorded By, (t) = Taken By, (c) = Cosigned By    Initials Name Provider Type    EB Leslie Magana PTA Physical Therapist Assistant               Mobility     Row Name 07/18/22 1019          Bed Mobility    Comment, (Bed Mobility) NT-UIC  -     Row Name 07/18/22 1019          Sit-Stand Transfer    Sit-Stand [place] (Transfers) contact guard  -     Assistive Device (Sit-Stand Transfers) walker, front-wheeled  -     Row Name 07/18/22 1019          Gait/Stairs (Locomotion)    [place] Level (Gait) contact guard;standby assist  -     Assistive Device (Gait) walker, front-wheeled  -     Distance in Feet (Gait) 60ft  -EB     Deviations/Abnormal Patterns (Gait) sowmya decreased;gait speed decreased;stride length decreased;base of support,  wide  -EB     Bilateral Gait Deviations forward flexed posture  -EB     Comment, (Gait/Stairs) wide CARROLL, cues to try to keep right foot within the walker.  -EB           User Key  (r) = Recorded By, (t) = Taken By, (c) = Cosigned By    Initials Name Provider Type    Leslie Cruz PTA Physical Therapist Assistant               Obj/Interventions    No documentation.                Goals/Plan    No documentation.                Clinical Impression     Row Name 07/18/22 1020          Plan of Care Review    Plan of Care Reviewed With patient  -EB     Progress improving  -EB     Outcome Evaluation Pt tolerated treatment with no complaints. Pt continues to improve with mobility. Pt is CGA with sit<->stand transfers. Pt ambulated 60ft with rwx, CGA/SBA. No unsteadiness or LOB noted. Pt has wide CARROLL, cues to try to keep right foot within the walker. Pt wanting to ambulate more throughout the day. Encouraged pt to ambulate with nsg. Will continue to progress pt as able.  -EB     Row Name 07/18/22 1020          Therapy Assessment/Plan (PT)    Therapy Frequency (PT) 6 times/wk  -EB     Row Name 07/18/22 1020          Positioning and Restraints    Pre-Treatment Position sitting in chair/recliner  -EB     Post Treatment Position chair  -EB     In Chair call light within reach;sitting;encouraged to call for assist;exit alarm on  -EB           User Key  (r) = Recorded By, (t) = Taken By, (c) = Cosigned By    Initials Name Provider Type    Leslie Cruz PTA Physical Therapist Assistant               Outcome Measures     Row Name 07/18/22 1023          How much help from another person do you currently need...    Turning from your back to your side while in flat bed without using bedrails? 3  -EB     Moving from lying on back to sitting on the side of a flat bed without bedrails? 3  -EB     Moving to and from a bed to a chair (including a wheelchair)? 3  -EB     Standing up from a chair using your arms (e.g., wheelchair,  bedside chair)? 3  -EB     Climbing 3-5 steps with a railing? 3  -EB     To walk in hospital room? 3  -EB     AM-PAC 6 Clicks Score (PT) 18  -EB     Highest level of mobility 6 --> Walked 10 steps or more  -EB           User Key  (r) = Recorded By, (t) = Taken By, (c) = Cosigned By    Initials Name Provider Type    Leslie Cruz PTA Physical Therapist Assistant                             Physical Therapy Education                 Title: PT OT SLP Therapies (Done)     Topic: Physical Therapy (Done)     Point: Mobility training (Done)     Learning Progress Summary           Patient Acceptance, E, VU by EB at 7/18/2022 1023    Acceptance, E, VU by EB at 7/16/2022 1315    Acceptance, E,D, VU,NR by MS at 7/15/2022 1531    Acceptance, E,TB, VU,NR by JW at 7/15/2022 1033    Acceptance, E,D, VU,DU by EB at 7/13/2022 1331    Acceptance, E,TB,D, VU,NR by CB at 7/11/2022 1153                   Point: Home exercise program (Done)     Learning Progress Summary           Patient Acceptance, E, VU by EB at 7/18/2022 1023    Acceptance, E, VU by EB at 7/16/2022 1315    Acceptance, E,D, VU,NR by MS at 7/15/2022 1531    Acceptance, E,TB, VU,NR by JW at 7/15/2022 1033    Acceptance, E,D, VU,DU by EB at 7/13/2022 1331                   Point: Body mechanics (Done)     Learning Progress Summary           Patient Acceptance, E, VU by EB at 7/18/2022 1023    Acceptance, E, VU by EB at 7/16/2022 1315    Acceptance, E,D, VU,NR by MS at 7/15/2022 1531    Acceptance, E,TB, VU,NR by JW at 7/15/2022 1033    Acceptance, E,D, VU,DU by EB at 7/13/2022 1331    Acceptance, E,TB,D, VU,NR by CB at 7/11/2022 1153                   Point: Precautions (Done)     Learning Progress Summary           Patient Acceptance, E, VU by EB at 7/18/2022 1023    Acceptance, E, VU by EB at 7/16/2022 1315    Acceptance, E,D, VU,NR by MS at 7/15/2022 1531    Acceptance, E,TB, VU,NR by JW at 7/15/2022 1033    Acceptance, E,D, VU,DU by EB at 7/13/2022 1331     Acceptance, E,TB,D, VU,NR by  at 7/11/2022 1153                               User Key     Initials Effective Dates Name Provider Type Discipline    MS 06/16/21 -  Camilo Segundo, PT Physical Therapist PT    EB 06/16/21 -  Leslie Magana PTA Physical Therapist Assistant PT    CB 10/22/21 -  Jolynn Edouard, STEPHEN Physical Therapist PT    JW 12/31/20 -  Philip Mejia, RN Registered Nurse Nurse              PT Recommendation and Plan     Plan of Care Reviewed With: patient  Progress: improving  Outcome Evaluation: Pt tolerated treatment with no complaints. Pt continues to improve with mobility. Pt is CGA with sit<->stand transfers. Pt ambulated 60ft with rwx, CGA/SBA. No unsteadiness or LOB noted. Pt has wide CARROLL, cues to try to keep right foot within the walker. Pt wanting to ambulate more throughout the day. Encouraged pt to ambulate with nsg. Will continue to progress pt as able.     Time Calculation:    PT Charges     Row Name 07/18/22 1018             Time Calculation    Start Time 0926  -EB      Stop Time 0949  -EB      Time Calculation (min) 23 min  -EB      PT Received On 07/18/22  -EB      PT - Next Appointment 07/19/22  -              Time Calculation- PT    Total Timed Code Minutes- PT 23 minute(s)  -EB            User Key  (r) = Recorded By, (t) = Taken By, (c) = Cosigned By    Initials Name Provider Type    EB Leslie Magana PTA Physical Therapist Assistant              Therapy Charges for Today     Code Description Service Date Service Provider Modifiers Qty    46092242560 HC GAIT TRAINING EA 15 MIN 7/18/2022 Leslie Magana PTA GP 1    64932329297 HC PT THERAPEUTIC ACT EA 15 MIN 7/18/2022 Leslie Magana PTA GP 1          PT G-Codes  Outcome Measure Options: AM-PAC 6 Clicks Basic Mobility (PT)  AM-PAC 6 Clicks Score (PT): 18    Leslie Magana PTA  7/18/2022

## 2022-07-18 NOTE — PLAN OF CARE
Goal Outcome Evaluation:  Plan of Care Reviewed With: patient        Progress: improving     Patient alert and oriented. He continues to be OOB in recliner. Patient encouraged to elevate BLE. He reported pain and discomfort to BLE r/t the current unaboots. Nurse assessed and did observe +2-3 swelling over top of unaboots in knee area. [facility] care nurse notified and did assess. Notified nurse that they will speak to OT regarding a better plan for his BLE lymphedema. He finished IV ABX regimen this shift. Plans for patient to be discharged to home with HH possibly tomorrow. He continues Coumadin as per pharmacy dosing. Wife Mariposa notified nurse that he has a PCP appointment this Friday for follow up. Infectious disease and cardiology both theresa doff today. Falls precautions maintained. Chair alarm on. Call light in reach. He is able to make his needs known to staff. He did work with PT and ambulated with a walker, gait belt and assist x1 this shift. Oxygen at 1L NC currently.

## 2022-07-18 NOTE — PROGRESS NOTES
[facility] Clinical Pharmacy Services: Warfarin Dosing/Monitoring Consult    Jalen Lockwood is a 72 y.o. male, estimated creatinine clearance is 110.7 mL/min (by C-G formula based on SCr of 0.9 mg/dL). weighing 131 kg (289 lb 8 oz).    Results from last 7 days   Lab Units 07/18/22  0652 07/17/22  0741 07/16/22  0753 07/15/22  0733 07/14/22  1033 07/14/22  0551 07/13/22  0731   INR  1.69* 1.97* 2.20* 2.65*  --  2.09* 1.89*   HEMOGLOBIN g/dL  --  9.9* 9.9* 10.8* 11.2*  --  9.7*   HEMATOCRIT %  --  29.9* 30.7* 33.3* 33.5*  --  28.3*   PLATELETS 10*3/mm3  --  178 168 158 186  --  144   Prior to admission anticoagulation: Warfarin managed by [facility]. Most up-to-date warfarin dosing regimen is 7.5 mg every Friday and 5 mg all other days. Patient's INR was 1.30 on 7/8/22, patient was instructed to boost with 7.5 mg doses on 7/8 and 7/9. INR was only 1.47 on 7/10 despite boosted doses.     Hospital Anticoagulation:  Consulting provider: Dr. Singh with [facility]  Start date: Continuation of home medication  Indication: Permament A.fib  Target INR: 2 - 3  Expected duration: Indefinite   Bridge Therapy: No      Potential food or drug interactions: Cefepime may increase sensitivity to warfarin due to eradication of vitamin K producing gut darian    Education complete: Defer, patient followed by our warfarin clinic    Assessment/Plan:  Dose: INR subtherapeutic, will give one time dose of 7.5mg today  H/H noted, stable from 7/16. Monitor for any signs or symptoms of bleeding  Follow up daily INRs and dose adjustments    Pharmacy will continue to follow until discharge or discontinuation of warfarin.     Gay Friedman, PharmD   Clinical Staff Pharmacist

## 2022-07-18 NOTE — PROGRESS NOTES
"    Patient Name: Jalen Lockwood  :1950  72 y.o.      Patient Care Team:  Marc Ludwig MD as PCP - General (Family Medicine)  Blas Mckeon MD as Surgeon (General Surgery)  Jonnathan Boston II, MD as Consulting Physician (Vascular Surgery)  Xavi Elliott MD as Consulting Physician (Urology)  Marc Benavidez MD as Consulting Physician (Pain Medicine)  Kurt Henry MD as Consulting Physician (Cardiology)  Meghna Horan [facility] as Pharmacist  Rip Samuel MD as Referring Physician (Family Medicine)  Juni Landa MD as Consulting Physician (Hematology and Oncology)  Brendan Live, AlexD as Pharmacist (Pharmacy)    Chief Complaint: PNA    Interval History: no CP       Objective   Vital Signs  Temp:  [97.6 °F (36.4 °C)-98 °F (36.7 °C)] 98 °F (36.7 °C)  Heart Rate:  [55-80] 62  Resp:  [18] 18  BP: (117-136)/(65-88) 136/83    Intake/Output Summary (Last 24 hours) at 2022 0826  Last data filed at 2022 0800  Gross per 24 hour   Intake 600 ml   Output 1650 ml   Net -1050 ml     Flowsheet Rows    Flowsheet Row First Filed Value   Admission Height 193 cm (76\") Documented at 07/10/2022 1332   Admission Weight 134 kg (296 lb) Documented at 07/10/2022 1332          Physical Exam:   General Appearance:    comfortable   Lungs:     Coarse BS.  Normal respiratory effort and rate.      Heart:    irregular rhythm and normal rate, normal S1 and S2, no murmurs, gallops or rubs.     Chest Wall:    No abnormalities observed   Abdomen:     Soft, nontender, positive bowel sounds.     Extremities:   no cyanosis, clubbing or edema.  No marked joint deformities.  Adequate musculoskeletal strength.       Results Review:    Results from last 7 days   Lab Units 22  0741   SODIUM mmol/L 139   POTASSIUM mmol/L 4.1   CHLORIDE mmol/L 101   CO2 mmol/L 27.0   BUN mg/dL 16   CREATININE mg/dL 0.90   GLUCOSE mg/dL 99   CALCIUM mg/dL 9.1         Results from last 7 days   Lab Units 22  0741   WBC 10*3/mm3 " 6.55   HEMOGLOBIN g/dL 9.9*   HEMATOCRIT % 29.9*   PLATELETS 10*3/mm3 178     Results from last 7 days   Lab Units 07/18/22  0652 07/17/22  0741 07/16/22  0753   INR  1.69* 1.97* 2.20*                       Medication Review:   budesonide-formoterol, 2 puff, Inhalation, BID - RT  cefepime, 2 g, Intravenous, Q8H  docusate sodium, 100 mg, Oral, Daily  finasteride, 5 mg, Oral, Daily  furosemide, 40 mg, Oral, BID  gabapentin, 400 mg, Oral, Q8H  HYDROcodone-acetaminophen, 1 tablet, Oral, Q4H While Awake  ipratropium-albuterol, 3 mL, Nebulization, 4x Daily - RT  metoclopramide, 10 mg, Oral, 4x Daily AC & at Bedtime  metoprolol tartrate, 12.5 mg, Oral, Q12H  pravastatin, 20 mg, Oral, Daily  sodium chloride, 10 mL, Intravenous, Q12H  sodium chloride, 10 mL, Intravenous, Q12H  sodium chloride, 10 mL, Intravenous, Q12H  vancomycin, 1,750 mg, Intravenous, Q24H  warfarin, 5 mg, Oral, Daily         Pharmacy to dose vancomycin,   Pharmacy to dose warfarin,         Assessment & Plan     Active Hospital Problems    Diagnosis  POA   • **Bacterial pneumonia [J15.9]  Yes   • Acute on chronic respiratory failure with hypoxia ([facility]) [J96.21]  Yes   • Paroxysmal atrial fibrillation with rapid ventricular response ([facility]) [I48.0]  Yes   • UTI (urinary tract infection) due to urinary indwelling catheter ([facility]) [T83.511A, N39.0]  Yes   • Venous stasis dermatitis of both lower extremities [I87.2]  Yes   • Encephalopathy, metabolic [G93.41]  Yes   • Ischemic cardiomyopathy [I25.5]  Yes   • Alcohol dependence, in remission ([facility]) [F10.21]  Yes   • Sepsis ([facility]) [A41.9]  Yes   • Charcot's joint of foot [M14.679]  Yes   • Chronic anticoagulation [Z79.01]  Not Applicable   • Essential hypertension [I10]  Yes   • COPD (chronic obstructive pulmonary disease) ([facility]) [J44.9]  Yes   • Sleep apnea [G47.30]  Yes   • Lymphedema of both lower extremities [I89.0]  Yes      Resolved Hospital Problems   No resolved problems to display.     1.  Permanent atrial  fibrillation with RVR anticoagulated on Coumadin- rate controlled, INR subtherapeutic at 1.69.  Has just been resumed on warfarin, but pulmonary wants to perform a biopsy- OK to hold warfarin and proceed with biopsy from our standpoint  2.  History of coronary artery disease treated medically  3.  Cardiomyopathy-most recent left ventricular ejection fraction normalized.  On oral meds  4.  Essential hypertension-blood pressure low normal  5.  Bacterial pneumonia with sepsis  6.  Alcohol abuse and tobacco use  7. PNA  8. Acute on Chronic hypoxic resp failure  9.  Lymphadema  10. Multiple pulm nodules as well as right inguinal chain lymph nodes  11.  Dysphagia    Stable cardiac status, will sign off, call if we can help in any way    Edmund Soto III, MD  [facility]  07/18/22  08:26 EDT

## 2022-07-18 NOTE — PLAN OF CARE
Problem: Adult Inpatient Plan of Care  Goal: Plan of Care Review  Outcome: Ongoing, Progressing  Flowsheets (Taken 7/18/2022 0443)  Outcome Evaluation: Pt A&Ox4, VSS, no falls at this time, contact isolation. IV abx given per MAR. Catheter care preformed. Pt denies any complaints other than difficulty falling asleep treated per MAR.  Goal: Patient-Specific Goal (Individualized)  Outcome: Ongoing, Progressing  Goal: Absence of Hospital-Acquired Illness or Injury  Outcome: Ongoing, Progressing  Intervention: Identify and Manage Fall Risk  Recent Flowsheet Documentation  Taken 7/18/2022 0424 by Dianna Montalvo, JAYANT  Safety Promotion/Fall Prevention:   activity supervised   clutter free environment maintained   assistive device/personal items within reach   fall prevention program maintained   nonskid shoes/slippers when out of bed   room organization consistent   safety round/check completed  Taken 7/18/2022 0246 by Dianna Montalvo, JAYANT  Safety Promotion/Fall Prevention:   activity supervised   assistive device/personal items within reach   clutter free environment maintained   fall prevention program maintained   nonskid shoes/slippers when out of bed   room organization consistent   safety round/check completed  Taken 7/18/2022 0010 by Dianna Montalvo, JAYANT  Safety Promotion/Fall Prevention:   activity supervised   assistive device/personal items within reach   clutter free environment maintained   fall prevention program maintained   nonskid shoes/slippers when out of bed   room organization consistent   safety round/check completed  Taken 7/17/2022 2212 by Dianna Montalvo, RN  Safety Promotion/Fall Prevention:   activity supervised   assistive device/personal items within reach   clutter free environment maintained   fall prevention program maintained   nonskid shoes/slippers when out of bed   room organization consistent   safety round/check completed  Taken 7/17/2022 2129 by Dianna Montalvo, JAYANT  Safety Promotion/Fall  Prevention:   activity supervised   assistive device/personal items within reach   clutter free environment maintained   fall prevention program maintained   nonskid shoes/slippers when out of bed   room organization consistent   safety round/check completed  Intervention: Prevent Skin Injury  Recent Flowsheet Documentation  Taken 7/18/2022 0424 by Dianna Montalvo RN  Body Position: position changed independently  Taken 7/18/2022 0246 by Dianna Montalvo RN  Body Position: position changed independently  Taken 7/18/2022 0010 by Dianna Montalvo RN  Body Position: position changed independently  Skin Protection: adhesive use limited  Taken 7/17/2022 2212 by Dianna Montalvo RN  Body Position: position changed independently  Intervention: Prevent and Manage VTE (Venous Thromboembolism) Risk  Recent Flowsheet Documentation  Taken 7/18/2022 0010 by Dianna Montalvo RN  VTE Prevention/Management: (Coumadin)   bilateral   dorsiflexion/plantar flexion performed   other (see comments)  Taken 7/17/2022 2212 by Dianna Montalvo RN  VTE Prevention/Management: (Coumadin)   bilateral   dorsiflexion/plantar flexion performed   other (see comments)  Intervention: Prevent Infection  Recent Flowsheet Documentation  Taken 7/18/2022 0424 by Dianna Montalvo RN  Infection Prevention:   rest/sleep promoted   single patient room provided  Taken 7/18/2022 0246 by Dianna Montalvo RN  Infection Prevention:   rest/sleep promoted   single patient room provided  Taken 7/18/2022 0010 by Dianna Montalvo RN  Infection Prevention:   rest/sleep promoted   single patient room provided  Taken 7/17/2022 2212 by Dianna Montalvo RN  Infection Prevention:   rest/sleep promoted   single patient room provided  Taken 7/17/2022 2129 by Dianna Montalvo RN  Infection Prevention:   rest/sleep promoted   single patient room provided  Goal: Optimal Comfort and Wellbeing  Outcome: Ongoing, Progressing  Intervention: Provide Person-Centered Care  Recent Flowsheet  Documentation  Taken 7/18/2022 0010 by Dianna Montalvo, RN  Trust Relationship/Rapport: thoughts/feelings acknowledged  Taken 7/17/2022 2212 by Dianna Montalvo, RN  Trust Relationship/Rapport:   care explained   choices provided   emotional support provided   empathic listening provided   questions answered   questions encouraged   reassurance provided   thoughts/feelings acknowledged  Goal: Readiness for Transition of Care  Outcome: Ongoing, Progressing   Goal Outcome Evaluation:              Outcome Evaluation: Pt A&Ox4, VSS, no falls at this time, contact isolation. IV abx given per MAR. Catheter care preformed. Pt denies any complaints other than difficulty falling asleep treated per MAR.

## 2022-07-18 NOTE — PROGRESS NOTES
"DAILY PROGRESS NOTE  [facility]    Patient Identification:  Name: Jalen Lockwood  Age: 72 y.o.  Sex: male  :  1950  MRN: [id number]         Primary Care Physician: Marc Ludwig MD      Subjective  No complaints. No further fevers. Heart rate is controlled. Pt reports that he feels well.    Objective:  General Appearance:  Comfortable and in no acute distress.    Vital signs: (most recent): Blood pressure 136/83, pulse 56, temperature 98 °F (36.7 °C), temperature source Oral, resp. rate 18, height 193 cm (76\"), weight 131 kg (289 lb 8 oz), SpO2 93 %.  No fever.    Lungs:  Normal effort and normal respiratory rate.  Breath sounds clear to auscultation.    Heart: Normal rate.  Regular rhythm.    Abdomen: Abdomen is soft and non-distended.  There is no abdominal tenderness.     Extremities: There is dependent edema.    Neurological: Patient is alert and oriented to person, place and time.    Skin:  Warm and dry.              Vital signs in last 24 hours:  Temp:  [97.6 °F (36.4 °C)-98 °F (36.7 °C)] 98 °F (36.7 °C)  Heart Rate:  [55-80] 56  Resp:  [18] 18  BP: (117-136)/(65-88) 136/83    Intake/Output:    Intake/Output Summary (Last 24 hours) at 2022 1245  Last data filed at 2022 1117  Gross per 24 hour   Intake 360 ml   Output 2750 ml   Net -2390 ml         Results from last 7 days   Lab Units 22  0741 22  0753 07/15/22  0733 22  1033 22  0731 22  0744   WBC 10*3/mm3 6.55 6.12 7.16 7.47 4.26 5.21   HEMOGLOBIN g/dL 9.9* 9.9* 10.8* 11.2* 9.7* 9.6*   PLATELETS 10*3/mm3 178 168 158 186 144 131*     Results from last 7 days   Lab Units 22  0741 22  0753 07/15/22  0733 22  0551 22  19422  0731 22  0744   SODIUM mmol/L 139 134* 138 136  --  136 137   POTASSIUM mmol/L 4.1 3.6 4.0 3.9 4.0 3.4* 3.8   CHLORIDE mmol/L 101 101 103 103  --  103 106   CO2 mmol/L 27.0 26.0 26.0 27.0  --  25.8 24.0   BUN mg/dL 16 16 14 10  --  9 10 "   CREATININE mg/dL 0.90 0.78 0.86 0.73*  --  0.70* 0.65*   GLUCOSE mg/dL 99 92 105* 93  --  108* 98   Estimated Creatinine Clearance: 110.7 mL/min (by C-G formula based on SCr of 0.9 mg/dL).  Results from last 7 days   Lab Units 07/17/22  0741 07/16/22  0753 07/15/22  0733 07/14/22  0551 07/13/22  0731 07/12/22  0744   CALCIUM mg/dL 9.1 9.3 9.1 9.1 8.8 8.2*           Assessment:    Bacterial pneumonia    Lymphedema of both lower extremities    Sleep apnea    COPD (chronic obstructive pulmonary disease) ([facility])    Essential hypertension    Chronic anticoagulation    Charcot's joint of foot    Sepsis ([facility])    Alcohol dependence, in remission ([facility])    Encephalopathy, metabolic    Ischemic cardiomyopathy    Venous stasis dermatitis of both lower extremities    UTI (urinary tract infection) due to urinary indwelling catheter ([facility])    Acute on chronic respiratory failure with hypoxia ([facility])    Paroxysmal atrial fibrillation with rapid ventricular response ([facility])    Mr. Lockwood is a 72 year old male who presented to the hospital for confusion and fever in the setting of chronic escamilla and chronic venous stasis wounds. CXR on admission showed possible RLL pneumonia and UA with pyuria and bacteria. He was seen in consultation by ID and placed empirically on antibiotics.      · Right lower lobe pneumonia/acute on chronic respiratory failure: ID following and has had him on Cefepime with initial improvement. MRSA nares positive but not felt to have current infection so not on vancomycin. Has had recurrent fever 7/14 with associated dyspnea/tachypnea. Repeat CXR with pulmonary vascular engorgement. ABGs with respiratory alkalosis likely from hyperventilation/swallow breaths.  Pulmonary following.  ID suspects recurrent aspiration pneumonitis.  He is continued on cefepime and vancomycin with today being the last day of therapy  · UTI/chronic escamilla: Urine with MDR enterobacter sensitive to cefepime. Difficult to determine if active  infection with possible pneumonia. ID has on cefepime to cover this as well. High risk for recurrent infections.  · Venous stasis dermatitis: Wound cultures on admission with MDR pseudomonas and ESBL E.coli. Felt colonized. Local wound care with wraps.  · Sepsis - secondary to above. Resolved.  · PAF with RVR: he is on metoprolol and warfarin. rvr is resolved.  · Encephalopathy: Multifactorial d/t the above.  Resolved   · COPD/BECKI: Pulmonary input appreciated. Chronically on 2 L at home.  · ICM: Followed by Dr. Henry. Fluid status is somewhat difficult to assess with his obesity and chronic edema. BNP is elevated but not far from prior levels.  Patient now back on oral diuretics.    · Chest CT is multiple pulmonary nodules:  Present plans are for a PET scan in 6 weeks and further work-up pending those results  · Right inguinal chain lymph node: Increased size since previous CT scan.    PET scan in 6 weeks.  Possible biopsy depending on results.  · DVT ppx: warfarin  · Code: full  · Disposition: likely home tomorrow with home health    Lorenzo Segundo MD  7/18/2022  12:45 EDT

## 2022-07-18 NOTE — PROGRESS NOTES
LOS: 8 days     Chief Complaint: Recurrent fever    Interval History: Patient resting comfortably up in the chair this morning.  Remains on 1 L nasal cannula.  No leukocytosis.  Fever curve within normal limits.    Vital Signs  Temp:  [97.6 °F (36.4 °C)-98 °F (36.7 °C)] 98 °F (36.7 °C)  Heart Rate:  [55-80] 62  Resp:  [18] 18  BP: (117-136)/(65-88) 136/83    Physical Exam:  General: In no acute distress  HEENT: Oropharynx clear, moist mucous membranes  Cardiovascular:  normal S1 and S2, no M/R/G  Respiratory: Normal work of breathing on nasal cannula  GI: Soft, NT/ND, + bowel sounds bilaterally, no masses  Skin: Bilateral lower extremity venous stasis and lymphedema chronic changes.  Improvement in his intertriginous candidal infection.    Extremities: Bilateral lower extremity venous stasis and lymphedema.  Bilateral leg wraps in place.  Access: Right upper extremity midline    Antibiotics:  Anti-Infectives (From admission, onward)    Ordered     Dose/Rate Route Frequency Start Stop    07/16/22 1826  vancomycin 1750 mg/500 mL 0.9% NS IVPB ([facility])        Ordering Provider: Fahad Jaramillo DO    1,750 mg Intravenous Every 24 Hours 07/17/22 0600 07/20/22 0559    07/14/22 1243  cefepime 2 gm IVPB in 100 ml NS ([facility])        Ordering Provider: Fahad Jaramillo DO    2 g  over 4 Hours Intravenous Every 8 Hours 07/14/22 1600 07/18/22 1559    07/14/22 1231  Pharmacy to dose vancomycin        Ordering Provider: Fahad Jaramillo DO     Does not apply Continuous PRN 07/14/22 1231 07/19/22 1230    07/13/22 1433  cefepime 2 gm IVPB in 100 ml NS ([facility])        Ordering Provider: Lorenzo Segundo MD    2 g Intravenous Every 8 Hours 07/13/22 0000 07/18/22 2359    07/10/22 1503  cefepime 2 gm IVPB in 100 ml NS ([facility])        Ordering Provider: Glory Yousif APRN    2 g  over 30 Minutes Intravenous Once 07/10/22 1505 07/10/22 1600    07/10/22 1418  vancomycin 2750 mg/1000 mL 0.9% NS IVPB        Ordering  Provider: Glory Yousif, APRN    20 mg/kg × 134 kg Intravenous Once 07/10/22 1420 07/10/22 3259           Results Review:     I reviewed the patient's new clinical results.    Lab Results   Component Value Date    WBC 6.55 07/17/2022    HGB 9.9 (L) 07/17/2022    HCT 29.9 (L) 07/17/2022    MCV 92.9 07/17/2022     07/17/2022     Lab Results   Component Value Date    GLUCOSE 99 07/17/2022    BUN 16 07/17/2022    CREATININE 0.90 07/17/2022    EGFRIFNONA 103 01/12/2022    EGFRIFAFRI 102 11/20/2018    BCR 17.8 07/17/2022    CO2 27.0 07/17/2022    CALCIUM 9.1 07/17/2022    PROTENTOTREF 6.1 04/26/2019    ALBUMIN 3.10 (L) 07/11/2022    LABIL2 1.1 04/26/2019    AST 13 07/11/2022    ALT 7 07/11/2022       Microbiology:  7/10 respiratory panel negative  7/10 blood cultures no growth to date  7/10 urinary culture with greater than 100,000 Enterobacter cloacae  7/10 wound culture from the left foot with Pseudomonas aeruginosa and E. coli  7/11 MRSA nares positive  7/14 blood cultures no growth to date    Assessment    #Right lower lobe pneumonia  #Encephalopathy, improving  #Catheter associated UTI  #Candidal intertrigo, improved  #Acute on chronic hypoxic respiratory failure, improved  #Chronic lower extremity venous stasis  #Bilateral lower extremity lymphedema  #Chronic urinary retention with chronic Irvin  #Bilateral pulmonary nodules    Patient's fever curve is remained within normal limits and still suspect there is likely a component of aspiration pneumonitis and he continues to be resistant and following the appropriate diet to reduce this risk.  Plan to complete out patient's course of antibiotics today given he has adequately been covered for his lower extremity wounds, UTI and any concomitant pneumonia.    Thank you for allowing me to be involved in the care of this patient. Infectious diseases will sign off at this time with antibiotics plan in place, but please call me at [phone] if any further ID  questions or new ID concerns.

## 2022-07-18 NOTE — PLAN OF CARE
Goal Outcome Evaluation:  Plan of Care Reviewed With: patient        Progress: improving  Outcome Evaluation: Pt tolerated treatment with no complaints. Pt continues to improve with mobility. Pt is CGA with sit<->stand transfers. Pt ambulated 60ft with rwx, CGA/SBA. No unsteadiness or LOB noted. Pt has wide CARROLL, cues to try to keep right foot within the walker. Pt wanting to ambulate more throughout the day. Encouraged pt to ambulate with nsg. Will continue to progress pt as able.    ..Patient was wearing a face mask during this therapy encounter. Therapist used appropriate personal protective equipment including eye protection, mask, and gloves.  Mask used was standard procedure mask. Appropriate PPE was worn during the entire therapy session. Hand hygiene was completed before and after therapy session. Patient is not in enhanced droplet precautions.

## 2022-07-18 NOTE — PROGRESS NOTES
LOS: 8 days   Patient Care Team:  Marc Ludwig MD as PCP - General (Family Medicine)  Blas Mckeon MD as Surgeon (General Surgery)  Jonnathan Boston II, MD as Consulting Physician (Vascular Surgery)  Xavi Elliott MD as Consulting Physician (Urology)  Marc Benavidez MD as Consulting Physician (Pain Medicine)  Kurt Henry MD as Consulting Physician (Cardiology)  Meghna Horan [facility] as Pharmacist  Rip Samuel MD as Referring Physician (Family Medicine)  Juni Landa MD as Consulting Physician (Hematology and Oncology)  Brendan Live PharmD as Pharmacist (Pharmacy)    Subjective     Following up pulmonary nodules pneumonia COPD, chronic respiratory failure.  Patient reports he is feeling fine.  He is hoping to go home tomorrow.  He is not really coughing.  No chest pain and he is not short of breath on his baseline home O2.  Review of Systems:          Objective     Vital Signs  Vital Sign Min/Max for last 24 hours  Temp  Min: 97.6 °F (36.4 °C)  Max: 98.4 °F (36.9 °C)   BP  Min: 117/68  Max: 151/90   Pulse  Min: 55  Max: 80   Resp  Min: 18  Max: 18   SpO2  Min: 77 %  Max: 100 %   Flow (L/min)  Min: 1  Max: 1   Weight  Min: 131 kg (289 lb 8 oz)  Max: 131 kg (289 lb 8 oz)        Ventilator/Non-Invasive Ventilation Settings (From admission, onward)            None                       Body mass index is 35.24 kg/m².  I/O last 3 completed shifts:  In: 720 [P.O.:720]  Out: 4650 [Urine:4650]  I/O this shift:  In: 120 [P.O.:120]  Out: 1100 [Urine:1100]        Physical Exam:  General Appearance: Well-developed elderly white male looks older than stated age he is sitting up in a chair  on supplemental O2.  He does not appear in any acute distress  Eyes: Conjunctiva are clear and anicteric  ENT: Mucous membranes are little dry no erythema no exudates nasal septum midline  Neck: No palpable adenopathy no jugular venous tension, trachea midline  Lungs: Was actually pretty clear today than  any wheezes rales or rhonchi  Cardiac: Regular rate rhythm no murmur  Abdomen: Soft no palpable hepatosplenomegaly or masses  : Not examined  Musculoskeletal: Both lower extremities are in compression wraps  Skin: No appreciated jaundice or petechiae but I do not see in his distal lower extremities to the wraps  Neuro: He is alert and oriented cooperative  Extremities/P Vascular: No clubbing or cyanosis he has marked bilateral lower extremity edema pitting extending up into the presacral region  MSE: Seems to be in reasonably good spirits, is anxious to go home     Labs:  Results from last 7 days   Lab Units 07/17/22  0741 07/16/22  0753 07/15/22  0733 07/14/22  0551 07/13/22  1944 07/13/22  0731 07/12/22  0744   GLUCOSE mg/dL 99 92 105* 93  --  108* 98   SODIUM mmol/L 139 134* 138 136  --  136 137   POTASSIUM mmol/L 4.1 3.6 4.0 3.9 4.0 3.4* 3.8   CO2 mmol/L 27.0 26.0 26.0 27.0  --  25.8 24.0   CHLORIDE mmol/L 101 101 103 103  --  103 106   ANION GAP mmol/L 11.0 7.0 9.0 6.0  --  7.2 7.0   CREATININE mg/dL 0.90 0.78 0.86 0.73*  --  0.70* 0.65*   BUN mg/dL 16 16 14 10  --  9 10   BUN / CREAT RATIO  17.8 20.5 16.3 13.7  --  12.9 15.4   CALCIUM mg/dL 9.1 9.3 9.1 9.1  --  8.8 8.2*     Estimated Creatinine Clearance: 110.7 mL/min (by C-G formula based on SCr of 0.9 mg/dL).      Results from last 7 days   Lab Units 07/17/22  0741 07/16/22  0753 07/15/22  0733 07/14/22  1033 07/13/22  0731 07/12/22  0744   WBC 10*3/mm3 6.55 6.12 7.16 7.47 4.26 5.21   RBC 10*6/mm3 3.22* 3.29* 3.56* 3.62* 3.10* 3.11*   HEMOGLOBIN g/dL 9.9* 9.9* 10.8* 11.2* 9.7* 9.6*   HEMATOCRIT % 29.9* 30.7* 33.3* 33.5* 28.3* 28.6*   MCV fL 92.9 93.3 93.5 92.5 91.3 92.0   MCH pg 30.7 30.1 30.3 30.9 31.3 30.9   MCHC g/dL 33.1 32.2 32.4 33.4 34.3 33.6   RDW % 13.4 13.1 13.2 13.0 13.2 13.1   RDW-SD fl 45.4 44.5 45.2 43.7 43.0 43.2   MPV fL 10.2 10.0 10.1 10.2 10.1 10.6   PLATELETS 10*3/mm3 178 168 158 186 144 131*   NEUTROPHIL % % 68.6  --   --   --  69.9  --     LYMPHOCYTE % % 16.5*  --   --   --  17.4*  --    MONOCYTES % % 9.3  --   --   --  7.5  --    EOSINOPHIL % % 4.4  --   --   --  4.5  --    BASOPHIL % % 0.6  --   --   --  0.5  --    IMM GRAN % % 0.6*  --   --   --  0.2  --    NEUTROS ABS 10*3/mm3 4.49  --   --   --  2.98  --    LYMPHS ABS 10*3/mm3 1.08  --   --   --  0.74  --    MONOS ABS 10*3/mm3 0.61  --   --   --  0.32  --    EOS ABS 10*3/mm3 0.29  --   --   --  0.19  --    BASOS ABS 10*3/mm3 0.04  --   --   --  0.02  --    IMMATURE GRANS (ABS) 10*3/mm3 0.04  --   --   --  0.01  --    NRBC /100 WBC 0.0  --   --   --  0.0  --      Results from last 7 days   Lab Units 07/14/22  1057   PH, ARTERIAL pH units 7.486*   PO2 ART mm Hg 73.7*   PCO2, ARTERIAL mm Hg 28.9*   HCO3 ART mmol/L 21.8*         Results from last 7 days   Lab Units 07/14/22  0551   PROBNP pg/mL 2,267.0*         Results from last 7 days   Lab Units 07/14/22  0551   PROCALCITONIN ng/mL 0.12     Results from last 7 days   Lab Units 07/18/22  0652 07/17/22  0741 07/16/22  0753 07/15/22  0733 07/14/22  0551 07/13/22  0731 07/12/22  0744   INR  1.69* 1.97* 2.20* 2.65* 2.09* 1.89* 1.85*     Microbiology Results (last 10 days)     Procedure Component Value - Date/Time    Blood Culture - Blood, Arm, Left [[id number]]  (Normal) Collected: 07/14/22 1033    Lab Status: Preliminary result Specimen: Blood from Arm, Left Updated: 07/18/22 1148     Blood Culture No growth at 4 days    Blood Culture - Blood, Arm, Left [[id number]]  (Normal) Collected: 07/14/22 1033    Lab Status: Preliminary result Specimen: Blood from Arm, Left Updated: 07/18/22 1148     Blood Culture No growth at 4 days    CANDIDA AURIS SCREEN - Swab, Axilla Right, Axilla Left and Groin [[id number]]  (Normal) Collected: 07/11/22 1330    Lab Status: Final result Specimen: Swab from Axilla Right, Axilla Left and Groin Updated: 07/16/22 1348     Sabrina Auris Screen Culture No Candida auris isolated at 5 days    MRSA Screen, PCR (Inpatient) - Swab,  Nares [[id number]]  (Abnormal) Collected: 07/11/22 1330    Lab Status: Final result Specimen: Swab from Nares Updated: 07/11/22 1534     MRSA PCR MRSA Detected    Narrative:      The negative predictive value of this diagnostic test is high and should only be used to consider de-escalating anti-MRSA therapy. A positive result may indicate colonization with MRSA and must be correlated clinically.    Wound Culture - Wound, Foot, Left [[id number]]  (Abnormal)  (Susceptibility) Collected: 07/10/22 1417    Lab Status: Final result Specimen: Wound from Foot, Left Updated: 07/14/22 0901     Wound Culture Heavy growth (4+) Pseudomonas aeruginosa MDRO     Comment: Multi drug resistant Pseudomonas, patient may be an isolation risk.         Heavy growth (4+) Escherichia coli     Comment: Consider infectious disease consult.  Susceptibility results may not correlate to clinical outcomes.         Moderate growth (3+) Normal Skin Loulou     Gram Stain Moderate (3+) Gram negative bacilli      Few (2+) Gram positive cocci      No WBCs seen    Narrative:      ESBL confirmation was negative.  Spoke with Payal in pharmacy and let her know    Susceptibility      Pseudomonas aeruginosa MDRO      DAVIDSON      Amikacin Susceptible      Cefepime Susceptible      Ceftazidime Susceptible      Ciprofloxacin Intermediate      Gentamicin Resistant     Levofloxacin Resistant     Meropenem Intermediate      Piperacillin + Tazobactam Susceptible  [1]       Tobramycin Resistant                  [1]  Appended report. These results have been appended to a previously preliminary verified report.             Susceptibility      Escherichia coli      DAVIDSON      Amikacin Susceptible      Ampicillin Resistant     Ampicillin + Sulbactam Resistant     Cefepime Susceptible      Ceftazidime Resistant     Ceftriaxone Intermediate      Gentamicin Resistant     Levofloxacin Resistant     Piperacillin + Tazobactam Susceptible      Tetracycline Resistant     Tobramycin  Resistant     Trimethoprim + Sulfamethoxazole Resistant                      Susceptibility Comments     Pseudomonas aeruginosa MDRO    For MDR Pseudomonas infections, susceptibility results may not correlate to clinical outcomes. Please consider infectious disease consult.    Escherichia coli    Cefazolin sensitivity will not be reported for Enterobacteriaceae in non-urine isolates. If cefazolin is preferred, please call the microbiology lab to request an E-test.  With the exception of urinary-sourced infections, aminoglycosides should not be used as monotherapy.  The previously reported component ERTAPENEM is no longer being reported. Previous result was Susceptible (<=0.5 ug/ml) (Reference Range: [Reference Range]) on 7/13/2022 at 0837 EDT.  The previously reported component MEROPENEM is no longer being reported. Previous result was Susceptible (<=0.25 ug/ml) (Reference Range: [Reference Range]) on 7/13/2022 at 0837 EDT.             Urine Culture - Urine, Urine, Catheter [[id number]]  (Abnormal)  (Susceptibility) Collected: 07/10/22 1417    Lab Status: Final result Specimen: Urine, Catheter Updated: 07/12/22 0739     Urine Culture >100,000 CFU/mL Enterobacter cloacae complex    Narrative:      Colonization of the urinary tract without infection is common. Treatment is discouraged unless the patient is symptomatic, pregnant, or undergoing an invasive urologic procedure.    Susceptibility      Enterobacter cloacae complex      DAVIDSON      Amikacin Susceptible      Cefazolin Resistant     Cefepime Susceptible      Ceftazidime Resistant     Ceftriaxone Resistant     Gentamicin Resistant     Levofloxacin Resistant     Nitrofurantoin Susceptible      Piperacillin + Tazobactam Susceptible      Tobramycin Resistant     Trimethoprim + Sulfamethoxazole Resistant                          Respiratory Panel PCR w/COVID-19(SARS-CoV-2) G[facility]AT[facility]/LUCAS/SHEILA/[facility] In-House, NP Swab in UTM/VTM, 3-4 HR TAT - Swab, Nasopharynx  [[id number]]  (Normal) Collected: 07/10/22 1406    Lab Status: Final result Specimen: Swab from Nasopharynx Updated: 07/10/22 1456     ADENOVIRUS, PCR Not Detected     Coronavirus 229E Not Detected     Coronavirus HKU1 Not Detected     Coronavirus NL63 Not Detected     Coronavirus OC43 Not Detected     COVID19 Not Detected     Human Metapneumovirus Not Detected     Human Rhinovirus/Enterovirus Not Detected     Influenza A PCR Not Detected     Influenza B PCR Not Detected     Parainfluenza Virus 1 Not Detected     Parainfluenza Virus 2 Not Detected     Parainfluenza Virus 3 Not Detected     Parainfluenza Virus 4 Not Detected     RSV, PCR Not Detected     Bordetella pertussis pcr Not Detected     Bordetella parapertussis PCR Not Detected     Chlamydophila pneumoniae PCR Not Detected     Mycoplasma pneumo by PCR Not Detected    Narrative:      In the setting of a positive respiratory panel with a viral infection PLUS a negative procalcitonin without other underlying concern for bacterial infection, consider observing off antibiotics or discontinuation of antibiotics and continue supportive care. If the respiratory panel is positive for atypical bacterial infection (Bordetella pertussis, Chlamydophila pneumoniae, or Mycoplasma pneumoniae), consider antibiotic de-escalation to target atypical bacterial infection.    Blood Culture - Blood, Arm, Left [[id number]]  (Normal) Collected: 07/10/22 1334    Lab Status: Final result Specimen: Blood from Arm, Left Updated: 07/15/22 1403     Blood Culture No growth at 5 days    Blood Culture - Blood, Arm, Left [[id number]]  (Normal) Collected: 07/10/22 1334    Lab Status: Final result Specimen: Blood from Arm, Left Updated: 07/15/22 1403     Blood Culture No growth at 5 days              budesonide-formoterol, 2 puff, Inhalation, BID - RT  docusate sodium, 100 mg, Oral, Daily  finasteride, 5 mg, Oral, Daily  furosemide, 40 mg, Oral, BID  gabapentin, 400 mg, Oral,  Q8H  HYDROcodone-acetaminophen, 1 tablet, Oral, Q4H While Awake  ipratropium-albuterol, 3 mL, Nebulization, 4x Daily - RT  metoclopramide, 10 mg, Oral, 4x Daily AC & at Bedtime  metoprolol tartrate, 12.5 mg, Oral, Q12H  pravastatin, 20 mg, Oral, Daily  sodium chloride, 10 mL, Intravenous, Q12H  sodium chloride, 10 mL, Intravenous, Q12H  sodium chloride, 10 mL, Intravenous, Q12H  warfarin, 7.5 mg, Oral, Once      Pharmacy to dose warfarin,         Diagnostics:  XR Foot 2 View Left    Result Date: 6/19/2022  LEFT FOOT X-RAYS  CLINICAL HISTORY: Evaluate fracture mild colitis.  Compared to the left foot x-rays dated 01/09/2022.  3 views were obtained. There is extensive arthritic change within the hindfoot and midfoot that results in a prominent pes planus deformity. This appears unchanged. A hallux valgus deformity is also noted. There is moderate narrowing of the first MTP joint and there is lateral subluxation of the phalanges with respect to the first metatarsal that appears unchanged. Diffuse soft tissue swelling is noted. There is extensive vascular calcification. There is no evidence of acute fracture or subluxation. There is no definite radiographic evidence of osteomyelitis. No bony destruction or periosteal new bone deposition is identified. If there is high clinical suspicion of osteomyelitis further evaluation with an MRI of the foot with and without contrast should be considered.  This report was finalized on 6/19/2022 8:58 PM by Dr. Neto Soler M.D.      CT Chest With Contrast Diagnostic    Result Date: 7/14/2022  CT CHEST W CONTRAST DIAGNOSTIC-, CT ABDOMEN PELVIS W CONTRAST-  INDICATIONS: Respiratory illness, sepsis.  Radiation dose reduction techniques were utilized, including automated exposure control and exposure modulation based on body size.  TECHNIQUE: Enhanced CT of the chest, abdomen, pelvis  COMPARISON: Chest CT from 11/18/2021, abdomen pelvis CT from 03/30/2021  FINDINGS:  Chest CT:  The  heart size is enlarged without pericardial effusion. Lymph nodes in the mediastinum and right hilum, a couple which are borderline prominent, appear stable. The ascending aorta is borderline aneurysmal, 4.4 cm, not significantly changed.  The airways appear clear.  No pleural effusion or pneumothorax.  The lungs show a nodular density at the right posterior costophrenic angle, 1.5 cm on axial image 79, new from the prior exam, neoplastic, infectious, inflammatory etiologies can be considered. Another new nodular density in the right lower lobe as seen on image 76, 1.1 cm Another new nodular density is seen in the left lower lobe, 1.6 cm on axial image 78. A 7 mm nodule in the left lower lobe on axial image 62 is stable, but a 4 mm nodule medially in the left lower lobe at the same image is noted. No cavitation is seen in the standing nodules to suggest septic embolic disease, but not possibility would not be excluded, close follow-up can be obtained as indicated. Mild atelectasis is present in both lungs.   Abdomen pelvis CT:  Spleen is enlarged, 13.8 cm CC. A hepatic cyst is seen.  Nonobstructive right nephrolithiasis measures 1 cm. A 7 mm nonobstructive calcification is seen in the left kidney. Bilateral renal low densities are seen that are too small to characterize.  Nonspecific nodular thickening of the left more than right adrenal glands does not appear significantly changed.  Urinary bladder is catheterized, empty, limiting its assessment.  Otherwise unremarkable appearance of the liver, gallbladder, spleen, adrenal glands, pancreas, kidneys, bladder.  No bowel obstruction or abnormal bowel thickening is identified. Colonic diverticula are seen that do not appear focally inflamed. The appendix is not appear inflamed.  No free intraperitoneal gas or free fluid.  A right iliac chain lymph node measures 2.2 cm short axis on axial image 165, previously 1.4 cm, pathologic by size criteria, could for example  represent lymphoma or metastatic disease. A left iliac chain lymph node measuring 1.6 cm short axis on axial image 155 is not significantly changed. Numerous other retroperitoneal lymph nodes appear similar to the prior exam.  Abdominal aorta is mildly aneurysmal, 3.4 cm on axial image 126, previously 3.2 cm. Aortic and other arterial calcifications are present.  Degenerative changes are seen in the spine. No acute fracture is identified. Stable lucent foci are seen in the L2, L4 vertebral bodies. Left shoulder effusion is evident.         New nodular densities in both lungs, further evaluation with positron emission tomography is recommended, as well as interval follow-up.  A right iliac chain lymph node shows interval increase, and may represent neoplasm, attention to this area on PET CT is advised.  Colonic diverticulosis. No focal acute inflammatory process of bowel is identified, follow up as indications persist.  Bilateral nonobstructive nephrolithiasis.  This report was finalized on 7/14/2022 6:27 PM by Dr. Khurram Chambers M.D.      CT Abdomen Pelvis With Contrast    Result Date: 7/14/2022  CT CHEST W CONTRAST DIAGNOSTIC-, CT ABDOMEN PELVIS W CONTRAST-  INDICATIONS: Respiratory illness, sepsis.  Radiation dose reduction techniques were utilized, including automated exposure control and exposure modulation based on body size.  TECHNIQUE: Enhanced CT of the chest, abdomen, pelvis  COMPARISON: Chest CT from 11/18/2021, abdomen pelvis CT from 03/30/2021  FINDINGS:  Chest CT:  The heart size is enlarged without pericardial effusion. Lymph nodes in the mediastinum and right hilum, a couple which are borderline prominent, appear stable. The ascending aorta is borderline aneurysmal, 4.4 cm, not significantly changed.  The airways appear clear.  No pleural effusion or pneumothorax.  The lungs show a nodular density at the right posterior costophrenic angle, 1.5 cm on axial image 79, new from the prior exam,  neoplastic, infectious, inflammatory etiologies can be considered. Another new nodular density in the right lower lobe as seen on image 76, 1.1 cm Another new nodular density is seen in the left lower lobe, 1.6 cm on axial image 78. A 7 mm nodule in the left lower lobe on axial image 62 is stable, but a 4 mm nodule medially in the left lower lobe at the same image is noted. No cavitation is seen in the standing nodules to suggest septic embolic disease, but not possibility would not be excluded, close follow-up can be obtained as indicated. Mild atelectasis is present in both lungs.   Abdomen pelvis CT:  Spleen is enlarged, 13.8 cm CC. A hepatic cyst is seen.  Nonobstructive right nephrolithiasis measures 1 cm. A 7 mm nonobstructive calcification is seen in the left kidney. Bilateral renal low densities are seen that are too small to characterize.  Nonspecific nodular thickening of the left more than right adrenal glands does not appear significantly changed.  Urinary bladder is catheterized, empty, limiting its assessment.  Otherwise unremarkable appearance of the liver, gallbladder, spleen, adrenal glands, pancreas, kidneys, bladder.  No bowel obstruction or abnormal bowel thickening is identified. Colonic diverticula are seen that do not appear focally inflamed. The appendix is not appear inflamed.  No free intraperitoneal gas or free fluid.  A right iliac chain lymph node measures 2.2 cm short axis on axial image 165, previously 1.4 cm, pathologic by size criteria, could for example represent lymphoma or metastatic disease. A left iliac chain lymph node measuring 1.6 cm short axis on axial image 155 is not significantly changed. Numerous other retroperitoneal lymph nodes appear similar to the prior exam.  Abdominal aorta is mildly aneurysmal, 3.4 cm on axial image 126, previously 3.2 cm. Aortic and other arterial calcifications are present.  Degenerative changes are seen in the spine. No acute fracture is  identified. Stable lucent foci are seen in the L2, L4 vertebral bodies. Left shoulder effusion is evident.         New nodular densities in both lungs, further evaluation with positron emission tomography is recommended, as well as interval follow-up.  A right iliac chain lymph node shows interval increase, and may represent neoplasm, attention to this area on PET CT is advised.  Colonic diverticulosis. No focal acute inflammatory process of bowel is identified, follow up as indications persist.  Bilateral nonobstructive nephrolithiasis.  This report was finalized on 7/14/2022 6:27 PM by Dr. Khurram Chambers M.D.      XR Chest 1 View    Result Date: 7/14/2022  CHEST SINGLE VIEW  HISTORY: Tachypnea, wheezing.  COMPARISON: AP chest 07/10/2022, 01/09/2022.  FINDINGS: There is cardiomegaly with pulmonary vascular engorgement. Lungs appear clear of focal airspace disease and there is no evidence for pulmonary edema or pleural effusion. Cardiac monitoring leads are noted. Aortic vascular calcifications are present. There is a small-caliber catheter and apparent right-sided PICC with tip extending to the region of the right axilla.      Cardiomegaly with pulmonary vascular engorgement.  This report was finalized on 7/14/2022 10:29 AM by Dr. Adria Gifford M.D.      XR Chest 1 View    Result Date: 7/10/2022  SINGLE VIEW CHEST RADIOGRAPH  HISTORY: 72-year-old male with history of a fever and cough.  FINDINGS: An upright AP portable chest radiograph was obtained. Comparison is made to a chest radiograph dated 06/19/2022. The lungs are normal in volume and are clear of consolidation. There is ground glass and interstitial opacification at the base of the right lung. The cardiac silhouette is enlarged but stable. No evidence for a pneumothorax or pleural effusion is appreciated. The visualized osseous structures appear normal.      There is an interstitial and ground glass infiltrate at the base of the right lung that is  most consistent with pneumonia. Stable mild cardiomegaly is noted.  This report was finalized on 7/10/2022 3:17 PM by Dr. Mehran Pepper M.D.      XR Chest 1 View    Result Date: 6/19/2022  SINGLE VIEW CHEST  CLINICAL HISTORY: Fever. Cough.  Compared to the previous chest x-ray dated 01/09/2022.  The lungs are well-expanded and appear free of focal infiltrates. There are no pleural effusions. The heart is moderately enlarged and unchanged. The pulmonary vasculature is within normal limits.  IMPRESSIONS: Cardiomegaly. No evidence of acute disease within the chest.  This report was finalized on 6/19/2022 6:27 PM by Dr. Neto Soler M.D.      MRI Foot Left With & Without Contrast    Result Date: 6/21/2022  MRI LEFT MID AND FOREFOOT WITH AND WITHOUT CONTRAST  HISTORY: Foot pain and swelling. Possible osteomyelitis.  TECHNIQUE: MRI left mid and forefoot was performed before and after the IV administration of 20 mL of MultiHance contrast.  FINDINGS: Fine anatomic detail is compromised by motion on every sequence. There is complete atrophy of the foot musculature. A pronounced hallux valgus deformity is observed. There is prominent skin thickening over the dorsum of the foot and mild diffuse soft tissue edema. No focal fluid collection is present.  The Lisfranc ligament complex is intact. Marrow signal throughout the mid and forefoot is normal. There is no evidence of osteomyelitis or abscess. No abnormal joint fluid is present. There is no abnormal contrast enhancement except in the skin along the dorsum of the foot. Degenerative change is observed at the navicular cuneiform joints.      Cellulitis in the left foot with chronic changes as described. No evidence of osteomyelitis or abscess.  This report was finalized on 6/21/2022 7:39 AM by Dr. Rpi Jim M.D.      Doppler Ankle Brachial Index Single Level CAR    Result Date: 7/13/2022  · Right Conclusion: The right MARIBELL is normal. Normal digital pressures. · Left  Conclusion: The left MARIBELL is normal. Normal digital pressures.      Results for orders placed during the hospital encounter of 09/17/21    Adult Transthoracic Echo Complete W/ Cont if Necessary Per Protocol    Interpretation Summary  · Calculated left ventricular EF = 55.5% Estimated left ventricular EF was in agreement with the calculated left ventricular EF. Left ventricular systolic function is normal. Normal left ventricular cavity size noted. Left ventricular wall thickness is consistent with mild to moderate concentric hypertrophy. All left ventricular wall segments contract normally. Left ventricular diastolic function was indeterminate.  · Right ventricle not well visualized. The right ventricular cavity is moderately dilated.  · The left atrial cavity is moderately dilated.  · The right atrial cavity is severely dilated.  · Mild to moderate mitral valve regurgitation is present.  · Mild to moderate tricuspid valve regurgitation is present. Estimated right ventricular systolic pressure from tricuspid regurgitation is moderately elevated (45-55 mmHg). Calculated right ventricular systolic pressure from tricuspid regurgitation is 54 mmHg.      I have reviewed his scans    Active Hospital Problems    Diagnosis  POA   • **Bacterial pneumonia [J15.9]  Yes   • Acute on chronic respiratory failure with hypoxia (HCC) [J96.21]  Yes   • Paroxysmal atrial fibrillation with rapid ventricular response ([facility]) [I48.0]  Yes   • UTI (urinary tract infection) due to urinary indwelling catheter ([facility]) [T83.511A, N39.0]  Yes   • Venous stasis dermatitis of both lower extremities [I87.2]  Yes   • Encephalopathy, metabolic [G93.41]  Yes   • Ischemic cardiomyopathy [I25.5]  Yes   • Alcohol dependence, in remission (HCC) [F10.21]  Yes   • Sepsis (HCC) [A41.9]  Yes   • Charcot's joint of foot [M14.679]  Yes   • Chronic anticoagulation [Z79.01]  Not Applicable   • Essential hypertension [I10]  Yes   • COPD (chronic obstructive pulmonary  disease) ([facility]) [J44.9]  Yes   • Sleep apnea [G47.30]  Yes   • Lymphedema of both lower extremities [I89.0]  Yes      Resolved Hospital Problems   No resolved problems to display.         Assessment & Plan     1. Pneumonia presumed bacterial IV cefepime and vancomycin ID following and managing antibiotics  2. Acute on chronic hypoxic respiratory failure continue O2, wean as tolerated to his baseline O2.  Patient is now on his baseline O2 and should go home with this.  3. COPD without acute exacerbation  4. Obstructive sleep apnea this a difficult situation with some oropharyngeal dysphagia pushing PAP use could just increase his risk of aspiration.  5. Tobacco abuse  6. Multiple pulmonary nodules, question if these are inflammatory or malignant they certainly could be inflammatory aspiration related infections or other but certainly has a history that would be concerning for malignancy as well.  He is going need to be off anticoagulation for biopsy when he is medically stable and cardiology feels safe to take him off anticoagulation.  We could do that and biopsy while he is here or it may be best to get him stabilized get a PET scan outpatient in 6 weeks give a little time for infection to clear and see if there is a particular lesion still present that PET positive that would be more amenable to biopsy or even if those lymph nodes were PET positive in the inguinal region that might be more amenable to biopsy given his chronic respiratory failure and I discussed options with he and his wife they seem to favor giving him some time to recover and getting a PET CT in about 6 weeks to reassess the nodules.  7. Atrial fibrillation rate controlled anticoagulated  8. Lymphedema bilateral lower extremities  9. Pulmonary hypertension RVSP of 54 on echocardiogram with moderately dilated left atrium likely WHO group 2 disease   10.  dysphagia had prior VFSS and has refused recommendations of modified diet and continues to  refuse.  11. Essential hypertension  12. Enlarging right inguinal chain lymph node per primary service.  PET scan will also serve the follow-up of this.  If this were positive on PET scan it would be a safer biopsy site.        Plan for disposition:    Mal Jimenes Jr, MD  07/18/22  14:51 EDT    Time:

## 2022-07-19 PROBLEM — N39.0 UTI (URINARY TRACT INFECTION) DUE TO URINARY INDWELLING CATHETER (HCC): Status: RESOLVED | Noted: 2022-01-01 | Resolved: 2022-01-01

## 2022-07-19 PROBLEM — T83.511A UTI (URINARY TRACT INFECTION) DUE TO URINARY INDWELLING CATHETER (HCC): Status: RESOLVED | Noted: 2022-01-01 | Resolved: 2022-01-01

## 2022-07-19 PROBLEM — G93.41 ENCEPHALOPATHY, METABOLIC: Status: RESOLVED | Noted: 2021-04-03 | Resolved: 2022-01-01

## 2022-07-19 PROBLEM — J15.9 BACTERIAL PNEUMONIA: Status: RESOLVED | Noted: 2022-01-01 | Resolved: 2022-01-01

## 2022-07-19 PROBLEM — I48.0 PAROXYSMAL ATRIAL FIBRILLATION WITH RAPID VENTRICULAR RESPONSE (HCC): Status: RESOLVED | Noted: 2022-01-01 | Resolved: 2022-01-01

## 2022-07-19 PROBLEM — J96.21 ACUTE ON CHRONIC RESPIRATORY FAILURE WITH HYPOXIA (HCC): Status: RESOLVED | Noted: 2022-01-01 | Resolved: 2022-01-01

## 2022-07-19 PROBLEM — A41.9 SEPSIS: Status: RESOLVED | Noted: 2019-05-01 | Resolved: 2022-01-01

## 2022-07-19 NOTE — TELEPHONE ENCOUNTER
Forms received 7/20 left message for wife to call back to go over FMLA forms  7/19 We did not receive forms she will ask them to refax

## 2022-07-19 NOTE — PROGRESS NOTES
[facility] Clinical Pharmacy Services: Warfarin Dosing/Monitoring Consult    Jalen Lockwood is a 72 y.o. male, estimated creatinine clearance is 110.7 mL/min (by C-G formula based on SCr of 0.9 mg/dL). weighing 129 kg (284 lb 6.4 oz).    Results from last 7 days   Lab Units 07/19/22  0537 07/18/22  0652 07/17/22  0741 07/16/22  0753 07/15/22  0733 07/14/22  1033   INR  1.63* 1.69* 1.97* 2.20* 2.65*  --    HEMOGLOBIN g/dL 9.9*  --  9.9* 9.9* 10.8* 11.2*   HEMATOCRIT % 29.9*  --  29.9* 30.7* 33.3* 33.5*   PLATELETS 10*3/mm3 207  --  178 168 158 186   Prior to admission anticoagulation: Warfarin managed by [facility]. Most up-to-date warfarin dosing regimen is 7.5 mg every Friday and 5 mg all other days. Patient's INR was 1.30 on 7/8/22, patient was instructed to boost with 7.5 mg doses on 7/8 and 7/9. INR was only 1.47 on 7/10 despite boosted doses.     Hospital Anticoagulation:  Consulting provider: Dr. Singh with [facility]  Start date: Continuation of home medication  Indication: Permament A.fib  Target INR: 2 - 3  Expected duration: Indefinite   Bridge Therapy: No      Potential food or drug interactions: Cefepime may increase sensitivity to warfarin due to eradication of vitamin K producing gut darian    Education complete: Defer, patient followed by our warfarin clinic    Assessment/Plan:  Dose: INR remains subtherapeutic. Given the trend here and prior to admission, will give 8mg today.   H/H noted, low but stable. Monitor for any signs or symptoms of bleeding  Follow up daily INRs and dose adjustments    Pharmacy will continue to follow until discharge or discontinuation of warfarin.     Gay Friedman, PharmD   Clinical Staff Pharmacist

## 2022-07-19 NOTE — TELEPHONE ENCOUNTER
Caller: Mariposa Lockwood    Relationship: Emergency Contact    Best call back number: [phone]    What was the call regarding: PATIENT'S WIFE WOULD LIKE TO SPEAK TO AMADOR FOR A STATUS UPDATE ON [facility] PAPERWORK.    Do you require a callback: YES

## 2022-07-19 NOTE — PLAN OF CARE
Problem: Adult Inpatient Plan of Care  Goal: Plan of Care Review  Outcome: Ongoing, Progressing  Flowsheets (Taken 7/19/2022 0545)  Outcome Evaluation: Pt A&Ox4, VSS, no falls at this time. Up with standby assistance and a walker. Pt complains of intermittent pain treated per MAR. Pt denies any other complaints at this time. Pt hopeful for discharge today.     Problem: Adult Inpatient Plan of Care  Goal: Plan of Care Review  Outcome: Ongoing, Progressing  Flowsheets (Taken 7/19/2022 0545)  Outcome Evaluation: Pt A&Ox4, VSS, no falls at this time. Up with standby assistance and a walker. Pt complains of intermittent pain treated per MAR. Pt denies any other complaints at this time. Pt hopeful for discharge today.  Goal: Patient-Specific Goal (Individualized)  Outcome: Ongoing, Progressing  Goal: Absence of Hospital-Acquired Illness or Injury  Outcome: Ongoing, Progressing  Intervention: Identify and Manage Fall Risk  Recent Flowsheet Documentation  Taken 7/19/2022 0246 by Dianna Montalvo, RN  Safety Promotion/Fall Prevention:   activity supervised   assistive device/personal items within reach   clutter free environment maintained   fall prevention program maintained   nonskid shoes/slippers when out of bed   room organization consistent   safety round/check completed  Taken 7/19/2022 0018 by Dianna Montalvo, RN  Safety Promotion/Fall Prevention:   activity supervised   assistive device/personal items within reach   elopement precautions   fall prevention program maintained   nonskid shoes/slippers when out of bed   room organization consistent   safety round/check completed  Taken 7/18/2022 2210 by Dianna Montalvo, RN  Safety Promotion/Fall Prevention:   activity supervised   assistive device/personal items within reach   clutter free environment maintained   fall prevention program maintained   nonskid shoes/slippers when out of bed   safety round/check completed  Intervention: Prevent Skin Injury  Recent Flowsheet  Documentation  Taken 7/19/2022 0246 by Dianna Montalvo RN  Body Position: position changed independently  Taken 7/19/2022 0018 by Dianna Montalvo RN  Body Position: position changed independently  Skin Protection:   adhesive use limited   tubing/devices free from skin contact   transparent dressing maintained  Taken 7/18/2022 2210 by Dianna Montalvo RN  Body Position: position changed independently  Intervention: Prevent and Manage VTE (Venous Thromboembolism) Risk  Recent Flowsheet Documentation  Taken 7/19/2022 0018 by Dianna Montalvo RN  VTE Prevention/Management: (Coumadin)   bilateral   dorsiflexion/plantar flexion performed   other (see comments)  Taken 7/18/2022 2210 by Dianna Montalvo RN  Activity Management: ambulated in room  Intervention: Prevent Infection  Recent Flowsheet Documentation  Taken 7/19/2022 0246 by Dianna Montalvo RN  Infection Prevention:   rest/sleep promoted   single patient room provided  Taken 7/19/2022 0018 by Dianna Montalvo RN  Infection Prevention:   rest/sleep promoted   single patient room provided  Goal: Optimal Comfort and Wellbeing  Outcome: Ongoing, Progressing  Intervention: Monitor Pain and Promote Comfort  Recent Flowsheet Documentation  Taken 7/18/2022 2118 by Dianna Montalvo RN  Pain Management Interventions: see MAR  Intervention: Provide Person-Centered Care  Recent Flowsheet Documentation  Taken 7/19/2022 0018 by Dianna Montalvo RN  Trust Relationship/Rapport: thoughts/feelings acknowledged  Taken 7/18/2022 2118 by Dianna Montalvo RN  Trust Relationship/Rapport:   care explained   choices provided   emotional support provided   empathic listening provided   questions encouraged   reassurance provided   thoughts/feelings acknowledged   questions answered  Goal: Readiness for Transition of Care  Outcome: Ongoing, Progressing   Goal Outcome Evaluation:              Outcome Evaluation: Pt A&Ox4, VSS, no falls at this time. Up with standby assistance and a walker. Pt complains of  intermittent pain treated per MAR. Pt denies any other complaints at this time. Pt hopeful for discharge today.

## 2022-07-19 NOTE — CASE MANAGEMENT/SOCIAL WORK
Continued Stay Note  [facility]     Patient Name: Jalen Lockwood  MRN: [id number]  Today's Date: 7/19/2022    Admit Date: 7/10/2022     Discharge Plan     Row Name 07/19/22 1223       Plan    Plan Plans are to dc home today with spouse and [facility].  CCP spoke with pt in room, order for BSC, pt denies any other needs. Spoke with wife, Mariposa, by phone, she plans to  pt after work later today.    Najma ASTORGA RN/CCP               Discharge Codes    No documentation.               Expected Discharge Date and Time     Expected Discharge Date Expected Discharge Time    Jul 19, 2022             Najma Jarrett RN

## 2022-07-19 NOTE — NURSING NOTE
Patient discharged to home. Wife arrived for pickup. Instructions reviewed with patient and wife at bedside. Understanding verbalized. Wife was concerned with patient not being on ABX at home. Wife notified that his ABX was completed yesterday. 8mg of Coumadin administered as per order. Patient awaiting wheelchair escort at this time. Medications delivered from meds to bed. He is to take all belongings home. No distress noted. Midline and telemetry removed. Unaboots intact. HH to f/u with patient at home tomorrow.

## 2022-07-19 NOTE — DISCHARGE SUMMARY
Patient Name: Jalen Lockwood  : 1950  MRN: [id number]    Date of Admission: 7/10/2022  Date of Discharge:  2022  Primary Care Physician: Marc Ludwig MD      Chief Complaint:   Shortness of Breath and Fever      Discharge Diagnoses     Active Hospital Problems    Diagnosis  POA   • Venous stasis dermatitis of both lower extremities [I87.2]  Yes   • Ischemic cardiomyopathy [I25.5]  Yes   • Alcohol dependence, in remission (HCC) [F10.21]  Yes   • Charcot's joint of foot [M14.679]  Yes   • Chronic anticoagulation [Z79.01]  Not Applicable   • Essential hypertension [I10]  Yes   • COPD (chronic obstructive pulmonary disease) (HCC) [J44.9]  Yes   • Sleep apnea [G47.30]  Yes   • Lymphedema of both lower extremities [I89.0]  Yes      Resolved Hospital Problems    Diagnosis Date Resolved POA   • **Bacterial pneumonia [J15.9] 2022 Yes   • Acute on chronic respiratory failure with hypoxia (HCC) [J96.21] 2022 Yes   • Paroxysmal atrial fibrillation with rapid ventricular response (HCC) [I48.0] 2022 Yes   • UTI (urinary tract infection) due to urinary indwelling catheter (HCC) [T83.511A, N39.0] 2022 Yes   • Encephalopathy, metabolic [G93.41] 2022 Yes   • Sepsis (HCC) [A41.9] 2022 Yes        Hospital Course     Mr. Lockwood is a 72 y.o. male with a history of urinary retention with a chronic escamilla catheter, venous stasis dermatitis, paroxysmal afib on warfarin, COPD/BECKI, ischemic cardiomyopathy who presented to [facility] initially complaining of confusion and fever.  Please see the admitting history and physical for further details.  He was found to have a wound on his left posterior calf, possible urinary tract infection, and pneumonia with acute on chronic hypoxic respiratory failure and sepsis secondary to the above and was admitted to the hospital for further evaluation and treatment.      He was started on broad spectrum antibiotics with quick resolution of  his sepsis. Infectious disease was consulted given the multiple possible infections. They felt that the calf wound was not infected and it was difficult to determine whether the patient had a UTI or not. His urine cultures grew MDR enterobacter sensitive to cefepime and so he was recommended to complete a 1 week course of cefepime.     A midline was placed and he was to be discharged when he had a recurrence of fevers as well as an episode of afib with RVR, prompting pulmonology consultation and cardiology consultation. His heart rate was controlled and his diuretics were increased. Pulmonology felt that he was having recurrent aspiration, prompting a SLP evaluation with video swallow showing silent aspiration with thins and nectar via straw. SLP recommended a modified diet, but the patient refused this wishing to remain on regular diet. ID added vancomycin to his regimen and he completed his course of antibiotics while he was hospitalized.     He was noted to have multiple pulmonary nodules on his CT chest and so pulmonology recommended checking a PET in 6 weeks after his pneumonia has cleared.     He was also noted to have right inguinal chain lymph node that had increased in size since his previous CT scan. This can be followed with the PET CT as above.    He will follow up with cardiology and pulmonology in the coming weeks.     His INR has been subtherapeutic here and so I would recommend that he get INR checks with home health every couple of days with his PCP adjusting the warfarin dosage until he has returned to an INR of 2-3.    He will return home with home health today after his midline is removed.     Day of Discharge     Subjective:  No events overnight. Feeling well. Anxious to return home    Physical Exam:  Temp:  [98.1 °F (36.7 °C)-98.4 °F (36.9 °C)] 98.3 °F (36.8 °C)  Heart Rate:  [56-74] 64  Resp:  [18-20] 20  BP: (114-151)/(75-90) 134/78  Body mass index is 34.62 kg/m².  Physical  Exam  Constitutional:       General: He is not in acute distress.     Appearance: He is not toxic-appearing.   Cardiovascular:      Rate and Rhythm: Normal rate. Rhythm irregular.      Heart sounds: Normal heart sounds.   Pulmonary:      Effort: Pulmonary effort is normal.      Breath sounds: Normal breath sounds.   Abdominal:      General: Bowel sounds are normal.      Palpations: Abdomen is soft.   Musculoskeletal:      Right lower leg: Edema present.      Left lower leg: Edema present.   Neurological:      Mental Status: He is alert.   Psychiatric:         Mood and Affect: Mood normal.         Behavior: Behavior normal.         Consultants     Consult Orders (all) (From admission, onward)     Start     Ordered    07/14/22 1025  Inpatient Cardiology Consult  Once        Specialty:  Cardiology  Provider:  Kurt Henry MD    07/14/22 1024    07/14/22 1025  Inpatient Pulmonology Consult  Once        Specialty:  Pulmonary Disease  Provider:  Bunny Lepe MD    07/14/22 1028    07/14/22 0936  Inpatient Infectious Diseases Consult  Once        Specialty:  Infectious Diseases  Provider:  Fahad Jaramillo DO    07/14/22 0937    07/13/22 1127  Inpatient IV Team Consult Midline 1 Lumen  Once        Provider:  (Not yet assigned)    07/13/22 1126    07/11/22 1303  Inpatient Infectious Diseases Consult  Once        Specialty:  Infectious Diseases  Provider:  Fahad Jaramillo DO    07/11/22 1302    07/11/22 1250  Inpatient Infectious Diseases Consult  Once,   Status:  Canceled        Specialty:  Infectious Diseases  Provider:  Rahel Singh MD    07/11/22 1249    07/10/22 1445  [facility] (on-call MD unless specified) Details  Once        Specialty:  Hospitalist  Provider:  (Not yet assigned)    07/10/22 1444              Procedures     Imaging Results (All)     Procedure Component Value Units Date/Time    CT Abdomen Pelvis With Contrast [[id number]] Collected: 07/14/22 1811     Updated: 07/14/22 1830    Narrative:       CT CHEST W CONTRAST DIAGNOSTIC-, CT ABDOMEN PELVIS W CONTRAST-     INDICATIONS: Respiratory illness, sepsis.  Radiation dose reduction  techniques were utilized, including automated exposure control and  exposure modulation based on body size.     TECHNIQUE: Enhanced CT of the chest, abdomen, pelvis     COMPARISON: Chest CT from 11/18/2021, abdomen pelvis CT from 03/30/2021     FINDINGS:     Chest CT:     The heart size is enlarged without pericardial effusion. Lymph nodes in  the mediastinum and right hilum, a couple which are borderline  prominent, appear stable. The ascending aorta is borderline aneurysmal,  4.4 cm, not significantly changed.     The airways appear clear.     No pleural effusion or pneumothorax.     The lungs show a nodular density at the right posterior costophrenic  angle, 1.5 cm on axial image 79, new from the prior exam, neoplastic,  infectious, inflammatory etiologies can be considered. Another new  nodular density in the right lower lobe as seen on image 76, 1.1 cm  Another new nodular density is seen in the left lower lobe, 1.6 cm on  axial image 78. A 7 mm nodule in the left lower lobe on axial image 62  is stable, but a 4 mm nodule medially in the left lower lobe at the same  image is noted. No cavitation is seen in the standing nodules to suggest  septic embolic disease, but not possibility would not be excluded, close  follow-up can be obtained as indicated. Mild atelectasis is present in  both lungs.         Abdomen pelvis CT:     Spleen is enlarged, 13.8 cm CC. A hepatic cyst is seen.     Nonobstructive right nephrolithiasis measures 1 cm. A 7 mm  nonobstructive calcification is seen in the left kidney. Bilateral renal  low densities are seen that are too small to characterize.     Nonspecific nodular thickening of the left more than right adrenal  glands does not appear significantly changed.     Urinary bladder is catheterized, empty, limiting its assessment.     Otherwise  unremarkable appearance of the liver, gallbladder, spleen,  adrenal glands, pancreas, kidneys, bladder.     No bowel obstruction or abnormal bowel thickening is identified. Colonic  diverticula are seen that do not appear focally inflamed. The appendix  is not appear inflamed.     No free intraperitoneal gas or free fluid.     A right iliac chain lymph node measures 2.2 cm short axis on axial image  165, previously 1.4 cm, pathologic by size criteria, could for example  represent lymphoma or metastatic disease. A left iliac chain lymph node  measuring 1.6 cm short axis on axial image 155 is not significantly  changed. Numerous other retroperitoneal lymph nodes appear similar to  the prior exam.     Abdominal aorta is mildly aneurysmal, 3.4 cm on axial image 126,  previously 3.2 cm. Aortic and other arterial calcifications are present.     Degenerative changes are seen in the spine. No acute fracture is  identified. Stable lucent foci are seen in the L2, L4 vertebral bodies.  Left shoulder effusion is evident.             Impression:         New nodular densities in both lungs, further evaluation with positron  emission tomography is recommended, as well as interval follow-up.     A right iliac chain lymph node shows interval increase, and may  represent neoplasm, attention to this area on PET CT is advised.     Colonic diverticulosis. No focal acute inflammatory process of bowel is  identified, follow up as indications persist.     Bilateral nonobstructive nephrolithiasis.     This report was finalized on 7/14/2022 6:27 PM by Dr. Khurram Chambers M.D.       CT Chest With Contrast Diagnostic [[id number]] Collected: 07/14/22 1811     Updated: 07/14/22 1830    Narrative:      CT CHEST W CONTRAST DIAGNOSTIC-, CT ABDOMEN PELVIS W CONTRAST-     INDICATIONS: Respiratory illness, sepsis.  Radiation dose reduction  techniques were utilized, including automated exposure control and  exposure modulation based on body size.      TECHNIQUE: Enhanced CT of the chest, abdomen, pelvis     COMPARISON: Chest CT from 11/18/2021, abdomen pelvis CT from 03/30/2021     FINDINGS:     Chest CT:     The heart size is enlarged without pericardial effusion. Lymph nodes in  the mediastinum and right hilum, a couple which are borderline  prominent, appear stable. The ascending aorta is borderline aneurysmal,  4.4 cm, not significantly changed.     The airways appear clear.     No pleural effusion or pneumothorax.     The lungs show a nodular density at the right posterior costophrenic  angle, 1.5 cm on axial image 79, new from the prior exam, neoplastic,  infectious, inflammatory etiologies can be considered. Another new  nodular density in the right lower lobe as seen on image 76, 1.1 cm  Another new nodular density is seen in the left lower lobe, 1.6 cm on  axial image 78. A 7 mm nodule in the left lower lobe on axial image 62  is stable, but a 4 mm nodule medially in the left lower lobe at the same  image is noted. No cavitation is seen in the standing nodules to suggest  septic embolic disease, but not possibility would not be excluded, close  follow-up can be obtained as indicated. Mild atelectasis is present in  both lungs.         Abdomen pelvis CT:     Spleen is enlarged, 13.8 cm CC. A hepatic cyst is seen.     Nonobstructive right nephrolithiasis measures 1 cm. A 7 mm  nonobstructive calcification is seen in the left kidney. Bilateral renal  low densities are seen that are too small to characterize.     Nonspecific nodular thickening of the left more than right adrenal  glands does not appear significantly changed.     Urinary bladder is catheterized, empty, limiting its assessment.     Otherwise unremarkable appearance of the liver, gallbladder, spleen,  adrenal glands, pancreas, kidneys, bladder.     No bowel obstruction or abnormal bowel thickening is identified. Colonic  diverticula are seen that do not appear focally inflamed. The  appendix  is not appear inflamed.     No free intraperitoneal gas or free fluid.     A right iliac chain lymph node measures 2.2 cm short axis on axial image  165, previously 1.4 cm, pathologic by size criteria, could for example  represent lymphoma or metastatic disease. A left iliac chain lymph node  measuring 1.6 cm short axis on axial image 155 is not significantly  changed. Numerous other retroperitoneal lymph nodes appear similar to  the prior exam.     Abdominal aorta is mildly aneurysmal, 3.4 cm on axial image 126,  previously 3.2 cm. Aortic and other arterial calcifications are present.     Degenerative changes are seen in the spine. No acute fracture is  identified. Stable lucent foci are seen in the L2, L4 vertebral bodies.  Left shoulder effusion is evident.             Impression:         New nodular densities in both lungs, further evaluation with positron  emission tomography is recommended, as well as interval follow-up.     A right iliac chain lymph node shows interval increase, and may  represent neoplasm, attention to this area on PET CT is advised.     Colonic diverticulosis. No focal acute inflammatory process of bowel is  identified, follow up as indications persist.     Bilateral nonobstructive nephrolithiasis.     This report was finalized on 7/14/2022 6:27 PM by Dr. Khurram Chambers M.D.       XR Chest 1 View [[id number]] Collected: 07/14/22 1027     Updated: 07/14/22 1032    Narrative:      CHEST SINGLE VIEW     HISTORY: Tachypnea, wheezing.     COMPARISON: AP chest 07/10/2022, 01/09/2022.     FINDINGS: There is cardiomegaly with pulmonary vascular engorgement.  Lungs appear clear of focal airspace disease and there is no evidence  for pulmonary edema or pleural effusion. Cardiac monitoring leads are  noted. Aortic vascular calcifications are present. There is a  small-caliber catheter and apparent right-sided PICC with tip extending  to the region of the right axilla.       Impression:       Cardiomegaly with pulmonary vascular engorgement.     This report was finalized on 7/14/2022 10:29 AM by Dr. Adria Gifford M.D.       XR Chest 1 View [[id number]] Collected: 07/10/22 1504     Updated: 07/10/22 1521    Narrative:      SINGLE VIEW CHEST RADIOGRAPH     HISTORY: 72-year-old male with history of a fever and cough.     FINDINGS: An upright AP portable chest radiograph was obtained.  Comparison is made to a chest radiograph dated 06/19/2022. The lungs are  normal in volume and are clear of consolidation. There is ground glass  and interstitial opacification at the base of the right lung. The  cardiac silhouette is enlarged but stable. No evidence for a  pneumothorax or pleural effusion is appreciated. The visualized osseous  structures appear normal.       Impression:      There is an interstitial and ground glass infiltrate at the  base of the right lung that is most consistent with pneumonia. Stable  mild cardiomegaly is noted.     This report was finalized on 7/10/2022 3:17 PM by Dr. Mehran Pepper M.D.             Pertinent Labs     Results from last 7 days   Lab Units 07/19/22  0537 07/17/22  0741 07/16/22 0753 07/15/22  0733   WBC 10*3/mm3 6.25 6.55 6.12 7.16   HEMOGLOBIN g/dL 9.9* 9.9* 9.9* 10.8*   PLATELETS 10*3/mm3 207 178 168 158     Results from last 7 days   Lab Units 07/17/22  0741 07/16/22  0753 07/15/22  0733 07/14/22  0551   SODIUM mmol/L 139 134* 138 136   POTASSIUM mmol/L 4.1 3.6 4.0 3.9   CHLORIDE mmol/L 101 101 103 103   CO2 mmol/L 27.0 26.0 26.0 27.0   BUN mg/dL 16 16 14 10   CREATININE mg/dL 0.90 0.78 0.86 0.73*   GLUCOSE mg/dL 99 92 105* 93   Estimated Creatinine Clearance: 110.7 mL/min (by C-G formula based on SCr of 0.9 mg/dL).    Results from last 7 days   Lab Units 07/17/22  0741 07/16/22  0753 07/15/22  0733 07/14/22  0551   CALCIUM mg/dL 9.1 9.3 9.1 9.1       Results from last 7 days   Lab Units 07/14/22  0551   PROBNP pg/mL 2,267.0*     Results from last 7 days   Lab  Units 07/14/22  0551   URIC ACID mg/dL 4.3         Invalid input(s): LDLCALC  Results from last 7 days   Lab Units 07/14/22  1033   BLOODCX  No growth at 4 days  No growth at 4 days       Test Results Pending at Discharge     Pending Labs     Order Current Status    Blood Culture - Blood, Arm, Left Preliminary result    Blood Culture - Blood, Arm, Left Preliminary result          Discharge Details        Discharge Medications      New Medications      Instructions Start Date   Metoprolol Succinate 25 MG capsule extended-release 24 hour sprinkle   25 mg, Oral, Daily         Changes to Medications      Instructions Start Date   furosemide 40 MG tablet  Commonly known as: LASIX  What changed: when to take this   40 mg, Oral, 2 Times Daily         Continue These Medications      Instructions Start Date   albuterol sulfate  (90 Base) MCG/ACT inhaler  Commonly known as: PROVENTIL HFA;VENTOLIN HFA;PROAIR HFA   2 puffs, Inhalation, Every 4 Hours PRN      ALPRAZolam 0.5 MG tablet  Commonly known as: XANAX   TAKE 1 TABLET BY MOUTH EVERY DAY AT NIGHT. NEEDS APPT      Atrovent HFA 17 MCG/ACT inhaler  Generic drug: ipratropium   INHALE 2 PUFFS BY MOUTH 4 TIMES A DAY      Breo Ellipta 200-25 MCG/INH inhaler  Generic drug: Fluticasone Furoate-Vilanterol   INHALE 1 PUFF BY MOUTH EVERY DAY      docusate sodium 100 MG capsule  Commonly known as: COLACE   100 mg, Oral, Daily      finasteride 5 MG tablet  Commonly known as: PROSCAR   1 tablet, Oral, Daily      gabapentin 600 MG tablet  Commonly known as: NEURONTIN   600 mg, Oral, 3 Times Daily      HYDROcodone-acetaminophen  MG per tablet  Commonly known as: NORCO   1 tablet, Oral, Every 6 Hours PRN      metoclopramide 10 MG tablet  Commonly known as: REGLAN   TAKE 1 TABLET BY MOUTH 4 (FOUR) TIMES A DAY BEFORE MEALS & AT BEDTIME.      OXYGEN-HELIUM IN   2 L, Nasal, As Needed      pravastatin 20 MG tablet  Commonly known as: PRAVACHOL   TAKE 1 TABLET BY MOUTH EVERY DAY       silodosin 8 MG capsule capsule  Commonly known as: RAPAFLO   1 capsule, Oral, Daily, With food.       warfarin 7.5 MG tablet  Commonly known as: COUMADIN   As directed      warfarin 5 MG tablet  Commonly known as: COUMADIN   As directed         Stop These Medications    carvedilol 12.5 MG tablet  Commonly known as: COREG            Allergies   Allergen Reactions   • Cephalexin Hives     Tolerated ceftriaxone Jan 2022   • Codeine Nausea Only   • Silver Other (See Comments)         Discharge Disposition:  Home-Health Care Svc    Discharge Diet:  Diet Order   Procedures   • Diet Regular; Cardiac       Discharge Activity:   Activity Instructions     Activity as Tolerated            CODE STATUS:    Code Status and Medical Interventions:   Ordered at: 07/10/22 1630     Code Status (Patient has no pulse and is not breathing):    CPR (Attempt to Resuscitate)     Medical Interventions (Patient has pulse or is breathing):    Full Support       Future Appointments   Date Time Provider Department Center   7/22/2022  9:30 AM Valerie Emmanuel APRN [facility]   9/28/2022  2:00 PM [facility] 1 [facility]   9/28/2022  2:30 PM Kurt Henry MD [facility]     Additional Instructions for the Follow-ups that You Need to Schedule     Ambulatory Referral to Home Health   As directed      Face to Face Visit Date: 7/19/2022    Follow-up provider for Plan of Care?: I treated the patient in an acute care facility and will not continue treatment after discharge.    Follow-up provider: NIDIA GANT [[id number]]    Reason/Clinical Findings: debility/hospitalization    Describe mobility limitations that make leaving home difficult: debility/hospitalization    Nursing/Therapeutic Services Requested: Skilled Nursing Physical Therapy    Skilled nursing orders: Medication education O2 instruction    PT orders: Therapeutic exercise Transfer training Home safety assessment Strengthening    Frequency: 1 Week 1         Call MD  With Problems / Concerns   As directed      Instructions: return to the hosptial if you experience chest pain, shortness of breath, abdominal pain, nausea, vomiting, fevers, sweats, chills, or worsening of your symptoms    Order Comments: Instructions: return to the hosptial if you experience chest pain, shortness of breath, abdominal pain, nausea, vomiting, fevers, sweats, chills, or worsening of your symptoms          Discharge Follow-up with PCP   As directed       Currently Documented PCP:    Marc Ludwig MD    PCP Phone Number:    [phone]     Follow Up Details: 2 weeks         Discharge Follow-up with Specialty: pulmonology, Dr. Jimenes, 6 weeks for PET scan   As directed      Specialty: pulmonology, Dr. Jimenes, 6 weeks for PET scan            Contact information for follow-up providers     Mal Jimenes Jr., MD Follow up in 6 week(s).    Specialty: Pulmonary Disease  Why: PET scan to be done in 6 weeks just prior to my visit to follow-up pulmonary nodules bilateral and enlarging right inguinal lymphadenopathy  Contact information:  [address] [facility] [address]  [facility] [address]  [phone]             Marc Ludwig MD .    Specialty: Family Medicine  Contact information:  [address] [facility] [address]  [facility] [address]  [phone]             Marc Ludwig MD .    Specialty: Family Medicine  Why: 2 weeks  Contact information:  [address] [facility] [address]  [facility] [address]  [phone]                   Contact information for after-discharge care     Durable Medical Equipment     [facility] .    Service: Durable Medical Equipment  Contact information:  [address]  [facility] [address]  [phone]                 Dialysis/Infusion     [facility] .    Service: Infusion and IV Therapy  Contact information:  [address]  [facility] [address]  [phone]                 Home Medical Care     [facility]     Service: Home Health Services  Contact information:  [address]  [facility] [address]  [phone]                             Additional Instructions for the Follow-ups that You Need to Schedule     Ambulatory Referral to Home Health   As directed      Face to Face Visit Date: 7/19/2022    Follow-up provider for Plan of Care?: I treated the patient in an acute care facility and will not continue treatment after discharge.    Follow-up provider: MARC LUDWIG [[id number]]    Reason/Clinical Findings: debility/hospitalization    Describe mobility limitations that make leaving home difficult: debility/hospitalization    Nursing/Therapeutic Services Requested: Skilled Nursing Physical Therapy    Skilled nursing orders: Medication education O2 instruction    PT orders: Therapeutic exercise Transfer training Home safety assessment Strengthening    Frequency: 1 Week 1         Call MD With Problems / Concerns   As directed      Instructions: return to the hosptial if you experience chest pain, shortness of breath, abdominal pain, nausea, vomiting, fevers, sweats, chills, or worsening of your symptoms    Order Comments: Instructions: return to the hosptial if you experience chest pain, shortness of breath, abdominal pain, nausea, vomiting, fevers, sweats, chills, or worsening of your symptoms          Discharge Follow-up with PCP   As directed       Currently Documented PCP:    Marc Ludwig MD    PCP Phone Number:    [phone]     Follow Up Details: 2 weeks         Discharge Follow-up with Specialty: pulmonologyDr. Jimenes, 6 weeks for PET scan   As directed      Specialty: pulmonologyDr. Jimenes, 6 weeks for PET scan           Time Spent on Discharge:  Greater than 30 minutes      Lorenzo Segundo MD  [facility]  07/19/22  11:18 EDT

## 2022-07-20 NOTE — OUTREACH NOTE
Call Center TCM Note    Flowsheet Row Responses   [facility] patient discharged from? [place]   Does the patient have one of the following disease processes/diagnoses(primary or secondary)? Sepsis   TCM attempt successful? Yes   Call start time 1454   Call end time 1456   Discharge diagnosis Bacterial pneumonia,    sepsis   Is patient permission given to speak with other caregiver? Yes   List who call center can speak with spouse- Mariposa   Person spoke with today (if not patient) and relationship spouse   Does the patient have all medications related to this admission filled (includes all antibiotics, inhalers, nebulizers,steroids,etc.) Yes   Is the patient taking all medications as directed (includes completed medication regime)? Yes   Does the patient have a primary care provider?  Yes   Comments regarding PCP will see ESTEFANY Loco on 7/22   Does the patient have an appointment with their PCP within 7 days of discharge? Yes   Has the patient kept scheduled appointments due by today? N/A   What is the Home health agency?  [facility]   Has home health visited the patient within 72 hours of discharge? Yes   Psychosocial issues? No   Did the patient receive a copy of their discharge instructions? Yes   What is the patient's perception of their health status since discharge? Improving   Is the patient/caregiver able to teach back the hierarchy of who to call/visit for symptoms/problems? PCP, Specialist, Home health nurse, Urgent Care, ED, 911 Yes   TCM call completed? Yes   Wrap up additional comments Per spouse, patient is doing well, confirmed appt with PCP office for 7/22.          Arline Warren RN    7/20/2022, 14:56 EDT

## 2022-07-20 NOTE — OUTREACH NOTE
Prep Survey    Flowsheet Row Responses   [facility] patient discharged from? [place]   Is LACE score < 7 ? No   Emergency Room discharge w/ pulse ox? No   Eligibility [facility]   Date of Admission 07/10/22   Date of Discharge 07/19/22   Discharge Disposition Home or Self Care   Discharge diagnosis Bacterial pneumonia,    sepsis   Does the patient have one of the following disease processes/diagnoses(primary or secondary)? Sepsis   Does the patient have Home health ordered? Yes   What is the Home health agency?  [facility]   Is there a DME ordered? Yes   What DME was ordered? [facility]   Prep survey completed? Yes          MIKE MARTINEZ - Registered Nurse

## 2022-07-20 NOTE — CASE MANAGEMENT/SOCIAL WORK
Case Management Discharge Note      Final Note: DC home with [facility]    Provided Post Acute Provider List?: N/A  Provided Post Acute Provider Quality & Resource List?: N/A    Selected Continued Care - Discharged on 7/19/2022 Admission date: 7/10/2022 - Discharge disposition: Home-Health Care Svc    Destination    No services have been selected for the patient.              Durable Medical Equipment     Service Provider Selected Services Address Phone Fax Patient Preferred    [facility]  Durable Medical Equipment [address], [facility] [address] [phone] --          Dialysis/Infusion     Service Provider Selected Services Address Phone Fax Patient Preferred    [facility]  Infusion and IV Therapy [address] [facility] [address], [facility] [address] [phone] --          Home Medical Care Coordination complete.    Service Provider Selected Services Address Phone Fax Patient Preferred    [facility]  Home Health Services [address] [facility] [address] [address] [phone] --       Internal Comment last updated by Najma Jarrett, RN 7/19/2022 1223    msg left with [facility] updating on DC                        Therapy    No services have been selected for the patient.              Community Resources    No services have been selected for the patient.              Community & DME    No services have been selected for the patient.                Selected Continued Care - Prior Encounters Includes selections from prior encounters from 4/11/2022 to 7/19/2022    Discharged on 6/24/2022 Admission date: 6/19/2022 - Discharge disposition: Skilled Nursing Facility (DC - External)    Destination     Service Provider Selected Services Address Phone Fax Patient Preferred    [facility]  Skilled Nursing [address] [facility] [address] [phone] --          Home Medical Care     Service Provider  Selected Services Address Phone Fax Patient Preferred    [facility]  Home Health Services [address] [place], [place] [id number] [phone] --                         Final Discharge Disposition Code: 06 - home with home health care (MAGALIE )

## 2022-07-24 PROBLEM — I25.5 ISCHEMIC CARDIOMYOPATHY: Status: RESOLVED | Noted: 2021-04-03 | Resolved: 2022-01-01

## 2022-07-24 PROBLEM — J96.11 CHRONIC RESPIRATORY FAILURE WITH HYPOXIA (HCC): Status: ACTIVE | Noted: 2022-01-01

## 2022-07-24 PROBLEM — A41.9 SEPSIS (HCC): Status: ACTIVE | Noted: 2022-01-01

## 2022-07-24 PROBLEM — J69.0 ASPIRATION PNEUMONIA: Status: ACTIVE | Noted: 2022-01-01

## 2022-07-24 NOTE — ED PROVIDER NOTES
EMERGENCY DEPARTMENT ENCOUNTER  I wore full protective equipment throughout this patient encounter including a N95 mask, eye shield, gown and gloves. Hand hygiene was performed before donning protective equipment and after removal when leaving the room.    Room Number:  21/21  Date of encounter:  7/24/2022  PCP: Marc Ludwig MD    HPI:  Context: Jalen Lockwood is a 72 y.o. male who presents to the ED c/o chief complaint of fever.  Patient was recently admitted for pneumonia.  Patient reports that all of his symptoms had resolved and he was at baseline yesterday.  Patient reports that he woke up with fever this morning.  Patient reports T-max of 102, responsive to over-the-counter antipyretics.  Patient endorses chills, denies any shakes or night sweats.  Patient denies any other infectious symptoms.  Patient denies any runny nose congestion sore throat or cough.  No loss of sense of smell or taste.  Patient denies any nausea vomiting, no abdominal pain.  Patient has chronic indwelling urinary catheter, reports has been working normally, denies any changes in his urine, denies any pain associated with it.  Patient reports that his catheter was last changed last week, changed by home health nurse.  Patient denies any diarrhea.  No abdominal pain, patient is eating and drinking normally.  Patient denies any chest pain or shortness of breath.    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[facility])    •  Aortectasia ([facility])    • Popliteal artery aneurysm ([facility])    • Colon polyps 02/07/2017   • Gastroparesis 02/07/2017   • Insomnia 02/07/2017   • Adenomatous polyp of colon 05/23/2018   • Essential hypertension 11/15/2018   • ETOH abuse 04/24/2019   • Chronic anticoagulation 04/24/2019   • Oropharyngeal dysphagia 04/24/2019   • Tobacco abuse 04/24/2019   • Thyromegaly 04/25/2019   • Adrenal adenoma, left 04/25/2019   • Retroperitoneal lymphadenopathy 04/25/2019   • Anemia of chronic disease 04/27/2019   • Thyroid nodule 04/29/2019   • Charcot's joint of foot 04/29/2019   • Abnormal CT scan, lumbar spine 05/21/2019   • Alcohol dependence, in remission ([facility]) 05/21/2020   • Ischemic cardiomyopathy 04/03/2021   • Normocytic anemia 04/27/2021   • Inguinal adenopathy 04/27/2021   • Elevated lactic acid level 01/09/2022   • Venous stasis dermatitis of both lower extremities 06/19/2022   • Urinary retention 06/20/2022   • Sepsis without acute organ dysfunction ([facility]) 06/20/2022     Resolved Ambulatory Problems     Diagnosis Date Noted   • Hyponatremia 04/24/2019   • Open wound 04/24/2019   • Adverse effect of thiazide diuretic 04/24/2019   • Weakness 04/24/2019   • Abnormal weight loss 04/24/2019   • Thrombocytopenia ([facility]) 04/25/2019   • Candidal intertrigo 04/25/2019   • Left leg cellulitis 04/30/2019   • Sepsis ([facility]) 05/01/2019   • Severe sepsis ([facility]) 03/30/2021   • Hyponatremia 04/03/2021   • Thrombocytopenia ([facility]) 04/03/2021   • Encephalopathy, metabolic 04/03/2021   • Acute systolic (congestive) heart failure ([facility]) 04/03/2021   • Cellulitis of lower extremity 04/03/2021   • UTI (urinary tract infection) 03/30/2021   • Hemoptysis 03/30/2021   • Generalized weakness 01/09/2022   • Fever in adult 01/09/2022   • UTI (urinary tract infection) due to urinary indwelling catheter ([facility]) 07/10/2022   • Bacterial pneumonia 07/10/2022   • Acute on chronic respiratory failure with hypoxia ([facility]) 07/14/2022   • Paroxysmal atrial  fibrillation with rapid ventricular response (HCC) 07/14/2022     Past Medical History:   Diagnosis Date   • Allergic rhinitis    • Anxiety    • Arthritis    • Chronic venous insufficiency    • Coronary atherosclerosis    • Diverticulosis    • Duodenitis    • Fatty liver    • Gastritis    • Hematoma    • Hyperlipidemia    • Hypertension    • Internal hemorrhoids    • Osteomyelitis (HCC)    • Paroxysmal atrial fibrillation (HCC)    • Skin cancer    • Venous stasis    • Venous stasis ulcer (HCC)        PAST SURGICAL HISTORY  Past Surgical History:   Procedure Laterality Date   • BASAL CELL CARCINOMA EXCISION      ear and left side of face   • BRONCHOSCOPY N/A 4/12/2021    Procedure: BRONCHOSCOPY WITH BAL;  Surgeon: Bunny Lepe MD;  Location: [facility] ENDOSCOPY;  Service: Pulmonary;  Laterality: N/A;  PRE-HEMOPTYSIS  POST-SAME   • CARDIAC CATHETERIZATION     • CATARACT EXTRACTION     • COLONOSCOPY  09/28/2015    NBIH, diverticulosis, polyps   • COLONOSCOPY N/A 9/11/2018    Procedure: COLONOSCOPY TO CECUM  AND TERM. ILEUM WITH COLD SNARE POLYPECTOMIES;  Surgeon: Kane Lagunas MD;  Location: [facility] ENDOSCOPY;  Service: Gastroenterology   • COLONOSCOPY N/A 10/29/2019    Procedure: COLONOSCOPY TO TO CECUM AND TERMINAL ILEUM WITH HOT AND COLD SNARE POLYPECTOMIES;  Surgeon: Kane Lagunas MD;  Location: [facility] ENDOSCOPY;  Service: Gastroenterology   • HIP ARTHROPLASTY Right 2017   • JOINT REPLACEMENT Left    • OTHER SURGICAL HISTORY      Catheter ablation atrial flutter   • REPAIR ANEURYSM / PSEUDO ANEURYSM / RUPTURED ANEURYSM POPLITEAL ARTERY      Stent-Graft of the the left popliteal artery   • REPAIR KNEE LIGAMENT      Primary repair of knee ligament cruciate anterior right   • TONSILLECTOMY  1958   • TOTAL KNEE ARTHROPLASTY Bilateral    • UPPER GASTROINTESTINAL ENDOSCOPY  09/16/2014    acute gastritis, acute duodenitis       FAMILY HISTORY  Family History   Problem Relation Age of Onset   • Emphysema Father    •  Malig Hyperthermia Neg Hx        SOCIAL HISTORY  Social History     Socioeconomic History   • Marital status:      Spouse name: Mariposa   Tobacco Use   • Smoking status: Current Every Day Smoker     Packs/day: 0.25     Types: Cigarettes     Start date: 1964   • Smokeless tobacco: Never Used   • Tobacco comment: caffeine use - 1.5 cups coffee daily    Vaping Use   • Vaping Use: Never used   Substance and Sexual Activity   • Alcohol use: Yes     Alcohol/week: 14.0 standard drinks     Types: 14 Shots of liquor per week   • Drug use: No   • Sexual activity: Defer       ALLERGIES  Cephalexin, Codeine, and Silver    The patient's allergies have been reviewed    REVIEW OF SYSTEMS  All systems reviewed and negative except for those discussed in HPI.     PHYSICAL EXAM  I have reviewed the triage vital signs and nursing notes.  ED Triage Vitals [07/24/22 1818]   Temp Heart Rate Resp BP SpO2   (!) 100.8 °F (38.2 °C) 81 20 122/85 93 %      Temp src Heart Rate Source Patient Position BP Location FiO2 (%)   -- -- -- -- --       General: No acute distress.  HENT: NCAT, PERRL, Nares patent.  Eyes: no scleral icterus.  Neck: trachea midline, no ROM limitations.  CV: Irregularly irregular  Respiratory: normal effort, CTAB.  Abdomen: soft, nondistended, NTTP, no rebound tenderness, no guarding or rigidity.  Musculoskeletal: no deformity.  Neuro: alert, moves all extremities, follows commands.  Skin: warm, dry.    LAB RESULTS  Recent Results (from the past 24 hour(s))   Comprehensive Metabolic Panel    Collection Time: 07/24/22  7:16 PM    Specimen: Blood   Result Value Ref Range    Glucose 94 65 - 99 mg/dL    BUN 12 8 - 23 mg/dL    Creatinine 0.95 0.76 - 1.27 mg/dL    Sodium 137 136 - 145 mmol/L    Potassium 3.1 (L) 3.5 - 5.2 mmol/L    Chloride 95 (L) 98 - 107 mmol/L    CO2 31.0 (H) 22.0 - 29.0 mmol/L    Calcium 9.3 8.6 - 10.5 mg/dL    Total Protein 7.3 6.0 - 8.5 g/dL    Albumin 3.30 (L) 3.50 - 5.20 g/dL    ALT (SGPT) 9 1 - 41  U/L    AST (SGOT) 16 1 - 40 U/L    Alkaline Phosphatase 102 39 - 117 U/L    Total Bilirubin 1.1 0.0 - 1.2 mg/dL    Globulin 4.0 gm/dL    A/G Ratio 0.8 g/dL    BUN/Creatinine Ratio 12.6 7.0 - 25.0    Anion Gap 11.0 5.0 - 15.0 mmol/L    eGFR 85.6 >60.0 mL/min/1.73   Lactic Acid, Plasma    Collection Time: 07/24/22  7:16 PM    Specimen: Blood   Result Value Ref Range    Lactate 2.0 0.5 - 2.0 mmol/L   Procalcitonin    Collection Time: 07/24/22  7:16 PM    Specimen: Blood   Result Value Ref Range    Procalcitonin 0.38 (H) 0.00 - 0.25 ng/mL   Protime-INR    Collection Time: 07/24/22  7:16 PM    Specimen: Blood   Result Value Ref Range    Protime 19.8 (H) 11.7 - 14.2 Seconds    INR 1.72 (H) 0.90 - 1.10   aPTT    Collection Time: 07/24/22  7:16 PM    Specimen: Blood   Result Value Ref Range    PTT 49.2 (H) 22.7 - 35.4 seconds   CBC Auto Differential    Collection Time: 07/24/22  7:16 PM    Specimen: Blood   Result Value Ref Range    WBC 17.71 (H) 3.40 - 10.80 10*3/mm3    RBC 3.19 (L) 4.14 - 5.80 10*6/mm3    Hemoglobin 9.9 (L) 13.0 - 17.7 g/dL    Hematocrit 28.7 (L) 37.5 - 51.0 %    MCV 90.0 79.0 - 97.0 fL    MCH 31.0 26.6 - 33.0 pg    MCHC 34.5 31.5 - 35.7 g/dL    RDW 13.3 12.3 - 15.4 %    RDW-SD 43.0 37.0 - 54.0 fl    MPV 9.7 6.0 - 12.0 fL    Platelets 187 140 - 450 10*3/mm3    Neutrophil % 91.7 (H) 42.7 - 76.0 %    Lymphocyte % 4.1 (L) 19.6 - 45.3 %    Monocyte % 3.2 (L) 5.0 - 12.0 %    Eosinophil % 0.1 (L) 0.3 - 6.2 %    Basophil % 0.3 0.0 - 1.5 %    Immature Grans % 0.6 (H) 0.0 - 0.5 %    Neutrophils, Absolute 16.23 (H) 1.70 - 7.00 10*3/mm3    Lymphocytes, Absolute 0.73 0.70 - 3.10 10*3/mm3    Monocytes, Absolute 0.57 0.10 - 0.90 10*3/mm3    Eosinophils, Absolute 0.02 0.00 - 0.40 10*3/mm3    Basophils, Absolute 0.06 0.00 - 0.20 10*3/mm3    Immature Grans, Absolute 0.10 (H) 0.00 - 0.05 10*3/mm3    nRBC 0.0 0.0 - 0.2 /100 WBC   Respiratory Panel PCR w/COVID-19(SARS-CoV-2) [facility] In-House, NP Swab in  UTM/VTM, 3-4 HR TAT - Swab, Nasopharynx    Collection Time: 07/24/22  7:25 PM    Specimen: Nasopharynx; Swab   Result Value Ref Range    ADENOVIRUS, PCR Not Detected Not Detected    Coronavirus 229E Not Detected Not Detected    Coronavirus HKU1 Not Detected Not Detected    Coronavirus NL63 Not Detected Not Detected    Coronavirus OC43 Not Detected Not Detected    COVID19 Not Detected Not Detected - Ref. Range    Human Metapneumovirus Not Detected Not Detected    Human Rhinovirus/Enterovirus Not Detected Not Detected    Influenza A PCR Not Detected Not Detected    Influenza B PCR Not Detected Not Detected    Parainfluenza Virus 1 Not Detected Not Detected    Parainfluenza Virus 2 Not Detected Not Detected    Parainfluenza Virus 3 Not Detected Not Detected    Parainfluenza Virus 4 Not Detected Not Detected    RSV, PCR Not Detected Not Detected    Bordetella pertussis pcr Not Detected Not Detected    Bordetella parapertussis PCR Not Detected Not Detected    Chlamydophila pneumoniae PCR Not Detected Not Detected    Mycoplasma pneumo by PCR Not Detected Not Detected   Urinalysis With Microscopic If Indicated (No Culture) - Urine, Catheter    Collection Time: 07/24/22  7:48 PM    Specimen: Urine, Catheter   Result Value Ref Range    Color, UA Dark Yellow (A) Yellow, Straw    Appearance, UA Clear Clear    pH, UA 5.5 5.0 - 8.0    Specific Gravity, UA 1.022 1.005 - 1.030    Glucose, UA Negative Negative    Ketones, UA Trace (A) Negative    Bilirubin, UA Negative Negative    Blood, UA Small (1+) (A) Negative    Protein, UA Trace (A) Negative    Leuk Esterase, UA Moderate (2+) (A) Negative    Nitrite, UA Negative Negative    Urobilinogen, UA 1.0 E.U./dL 0.2 - 1.0 E.U./dL   Urinalysis, Microscopic Only - Urine, Catheter    Collection Time: 07/24/22  7:48 PM    Specimen: Urine, Catheter   Result Value Ref Range    RBC, UA 0-2 None Seen, 0-2 /HPF    WBC, UA 3-5 (A) None Seen, 0-2 /HPF    Bacteria, UA None Seen None Seen /HPF     Squamous Epithelial Cells, UA 0-2 None Seen, 0-2 /HPF    Hyaline Casts, UA None Seen None Seen /LPF    Methodology Manual Light Microscopy        I ordered the above labs and reviewed the results.    RADIOLOGY  CT Chest Without Contrast Diagnostic    Result Date: 7/24/2022  CT OF THE CHEST WITHOUT CONTRAST  HISTORY: Abnormal chest radiograph. Fever.  COMPARISON: 07/14/2022  TECHNIQUE: Axial CT imaging was obtained through the thorax. IV contrast was administered.  FINDINGS: The patient is noted to have patchy consolidation within the lower lobes bilaterally, right greater than left. This has worsened when compared to prior study. Area of nodularity within the left lower lobe measures up to 1.4 cm. Its appears stable when compared to prior exam. 3 additional micronodules are noted within the left lower lobe, ranging in size from 4 to 6 mm. These were not on prior CT from 11/18/2021. The previously discussed short-term follow-up is recommended. There are background emphysematous changes. The thyroid gland, trachea, and esophagus are unremarkable. Heart is enlarged. There are extensive coronary artery calcifications. The aortic root is dilated, measuring about 4.1 cm. Ascending thoracic aorta is also dilated, measuring up to 4.1 cm. Proximal descending thoracic aorta is also dilated, measuring up to 3.4 cm. Distal descending thoracic aorta tapers to normal caliber. Main pulmonary artery is enlarged, which can be seen in the setting of pulmonary arterial hypertension. There are prominent mediastinal lymph nodes. For example, a precarinal lymph node measures up to centimeter. This appears stable when compared to prior exam. It may be reactive. There are prominent chest wall collaterals. Images through the upper abdomen demonstrate a cirrhotic morphology to the liver, as well as splenomegaly. No acute osseous abnormalities are seen.       1. The patient has worsening patchy consolidation within the lower lobes  bilaterally, suspicious for pneumonia. Short-term follow-up is recommended. 2. The patient has pulmonary nodules within the left lower lobe. As was previously discussed, short-term CT follow-up in 3 months is recommended. 3. Cirrhotic morphology to the liver, with evidence of portal hypertension, as the patient has splenomegaly.  Radiation dose reduction techniques were utilized, including automated exposure control and exposure modulation based on body size.  This report was finalized on 7/24/2022 10:21 PM by Dr. Stefani Silva M.D.      XR Chest 1 View    Result Date: 7/24/2022  SINGLE VIEW OF THE CHEST  HISTORY: Fever  COMPARISON: 07/14/2022  FINDINGS: Cardiomegaly is present. There are background changes of COPD. No pneumothorax or definite pleural effusion is seen. There is calcification of the aorta. There is nonspecific bibasilar consolidation. This may represent atelectasis and/or scarring. Pneumonia is not excluded.      Nonspecific bibasilar consolidation.  This report was finalized on 7/24/2022 8:30 PM by Dr. Stefani Silva M.D.        I ordered the above noted radiological studies. I reviewed the images and results. I agree with the radiologist interpretation.    PROCEDURES  Procedures    MEDICATIONS GIVEN IN ER  Medications   vancomycin 2500 mg/500 mL 0.9% NS IVPB ([facility]) (has no administration in time range)   cefepime 2 gm IVPB in 100 ml NS ([facility]) (has no administration in time range)   sodium chloride 0.9 % bolus 500 mL (has no administration in time range)   acetaminophen (TYLENOL) tablet 1,000 mg (1,000 mg Oral Given 7/24/22 1953)   HYDROcodone-acetaminophen (NORCO) 5-325 MG per tablet 1 tablet (1 tablet Oral Given 7/24/22 2202)       PROGRESS, DATA ANALYSIS, CONSULTS, AND MEDICAL DECISION MAKING  A complete history and physical exam have been performed.  All available laboratory and imaging results have been reviewed by myself prior to disposition.    MDM  After the initial H&P, I discussed  pertinent information from history and physical exam with patient/family.  Discussed differential diagnosis.  Discussed plan for ED evaluation/workup/treatment.  All questions answered.  Patient/family is agreeable with plan.  ED Course as of 07/24/22 2255   Sun Jul 24, 2022   1908 Medical history reviewed and significant for: 72-year-old male with history of COPD with chronic hypoxia, paroxysmal A. fib anticoagulated on Coumadin, chronic indwelling Irvin catheter who was recently admitted on the 10th of this month for acute on chronic respiratory failure with hypoxia and sepsis secondary to pneumonia.  Patient was treated with broad-spectrum antibiotics, infectious disease was consulted, urinalysis grew out MDR Enterobacter sensitive to cefepime, treated with a course week long in nature.  Patient had recurrence of fever prior to discharge, pulmonology was consulted, patient had speech-language pathology evaluation which showed aspiration, recommended modified diet but patient refused.  ID added vancomycin to regimen.  Patient was discharged home on the 19th. [JG]   2045 Family reports that patient has been followed with wound care, has wrappings on bilateral legs, there was a spot on his right leg that they were concerned about possibility of infection.  Right lower extremity unwrapped, patient has several areas of skin breakdown but no obvious cellulitis. [JG]   2100 Patient febrile here, has leukocytosis with left shift.  Procalcitonin is indeterminate, lactic acid is normal.  Urinalysis shows no sign of infection.  Chest x-ray is equivocal with bibasilar atelectasis versus infiltrate.  Will obtain CT imaging for further evaluation.  Patient is COVID-negative, negative on respiratory viral panel, isolation discontinued. [JG]   2151 Patient currently requesting his home hydrocodone which he takes for chronic feet pain. [JG]   2232 Bilateral lower lobe infiltrates concerning for pneumonia, treating with vancomycin  and cefepime. [JG]   4315 Phone call with Dr. Segundo [facility].  Discussed the patient, relevant history, exam, diagnostics, ED findings/progress, and concerns. They agree to admit the patient to telemetry. Care assumed by the admitting physician at this time.     [JG]      ED Course User Index  [JG] Eddie Julien MD       AS OF 22:55 EDT VITALS:    BP - 130/63  HR - 56  TEMP - 98 °F (36.7 °C) (Oral)  O2 SATS - 99%    DIAGNOSIS  Final diagnoses:   Sepsis, due to unspecified organism, unspecified whether acute organ dysfunction present (HCC)   Pneumonia of both lower lobes due to infectious organism   Chronic respiratory failure with hypoxia (HCC)   Hypokalemia   Chronic anemia         DISPOSITION  ADMISSION    Discussed treatment plan and reason for admission with pt/family and admitting physician.  Pt/family voiced understanding of the plan for admission for further testing/treatment as needed.          Eddie Julien MD  07/24/22 0887

## 2022-07-24 NOTE — ED NOTES
Pt from home w/ fever. Pt tried OTC meds with no improvement. Pt was recently released from this facility for pneumonia and sepsis on Tuesday. Denies cough, SOA or pain.

## 2022-07-25 NOTE — DISCHARGE PLACEMENT REQUEST
He Lockwood (72 y.o. Male)             Date of Birth   1950    Social Security Number       Address   [address] [place] [address]    Home Phone   [phone]    MRN   [id number]       Evangel[employer]   None    Marital Status                               Admission Date   7/24/22    Admission Type   Emergency    Admitting Provider   Lorenzo Segundo MD    Attending Provider   Isaias Vivar MD    Department, Room/Bed   [address], S618/1       Discharge Date       Discharge Disposition       Discharge Destination                               Attending Provider: Isaias Vivar MD    Allergies: Cephalexin, Codeine, Silver    Isolation: Contact   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)   Code Status: Prior   Advance Care Planning Activity    Ht: 193 cm (76\")   Wt: 124 kg (273 lb 3.2 oz)    Admission Cmt: None   Principal Problem: Aspiration pneumonia (HCC) [J69.0]                 Active Insurance as of 7/24/2022     Primary Coverage     Payor Plan Insurance Group Employer/Plan Group    HUMANA MEDICARE REPLACEMENT HUMANA MEDICARE REPLACEMENT [id number]     Payor Plan Address Payor Plan Phone Number Payor Plan Fax Number Effective Dates    [address] [address] [phone]  1/1/2018 - None Entered    [facility] [id number]       Subscriber Name Subscriber Birth Date Member ID       HE LOCKWOOD 1950 [id number]                 Emergency Contacts      (Rel.) Home Phone Work Phone Mobile Phone    Mariposa Lockwood (Spouse) [phone] -- [phone]    Carl Lozano (Friend) -- -- [phone]

## 2022-07-25 NOTE — PLAN OF CARE
Goal Outcome Evaluation:  Plan of Care Reviewed With: patient, spouse           Outcome Evaluation: Patient is a 72 y.o male who presents to [facility] with aspitation pneumonia. Patient had recent hospital addmission early this month for the same thing. Patient lives at home with his wife with 4 KAYLYN. Patient reports independence at baseline with ambulation when using a rwx. Wife who is present at bedside reports patient ambulated 1/4 of a mile this weekend. Patient requires Delbert with LE ADLs at baseline. Patient reports he sleeps in the recliner at home and does not get into or out of bed.Today patient was [facility] upon arrival. Patient completed STS transfer with CGA. Patient ambulated 30ft with rwx and CGA. Gait was slow and shuffled appearing slightly unsteady though no overt LOB noted. Patient would benefit from continued skilled PT to address deficits in strength and activity endurance limiting mobility. PT recommends home with assist and HHPT at d/c. Will continue to monitor.

## 2022-07-25 NOTE — NURSING NOTE
07/25/22 1040   Wound 06/20/22 0014 Right posterior heel   Placement Date/Time: 06/20/22 0014   Present on Hospital Admission: Yes  Side: Right  Orientation: posterior  Location: heel   Dressing Appearance   (kerlix, ace)   Base pink;moist   Periwound moist;macerated  (callused)   Edges jagged   Wound Length (cm) 2 cm   Wound Width (cm) 1.5 cm   Wound Depth (cm) 0.5 cm   Wound Surface Area (cm^2) 3 cm^2   Wound Volume (cm^3) 1.5 cm^3   Drainage Characteristics/Odor yellow   Drainage Amount small   Care, Wound cleansed with;sterile normal saline   Dressing Care dressing applied  (opticel AG, kerlix, ACE toe to knee)   Wound 06/19/22 2334 Left medial calf Laceration   Placement Date/Time: 06/19/22 2334   Present on Hospital Admission: Yes  Side: Left  Orientation: medial  Location: calf  Primary Wound Type: Laceration   Base pink;moist   Wound Length (cm) 3.5 cm  (another site 1.5x1 cm, pink, moist)   Wound Width (cm) 1 cm   Wound Depth (cm) 0.2 cm   Wound Surface Area (cm^2) 3.5 cm^2   Wound Volume (cm^3) 0.7 cm^3   Drainage Amount scant   Care, Wound cleansed with;sterile normal saline   Dressing Care   (opticel AG, unna, 4 layer compression)   Wound 07/25/22 Right medial leg Venous Ulcer   Placement Date: 07/25/22   Present on Hospital Admission: Yes  Side: Right  Orientation: medial  Location: (c) leg  Primary Wound Type: Venous Ulcer   Base moist;pink;yellow   Periwound edematous;redness;excoriated   Periwound Temperature hot   Wound Length (cm) 6 cm  (scattered ulcerations, partial thickness)   Wound Width (cm) 7 cm   Wound Depth (cm) 0.1 cm   Wound Surface Area (cm^2) 42 cm^2   Wound Volume (cm^3) 4.2 cm^3   Drainage Amount scant   Care, Wound cleansed with;sterile normal saline   Dressing Care   (opticel AG, kerlix, ace)   Wound 04/24/19 0540 Left upper foot ulceration, venous   Date first assessed/Time first assessed: 04/24/19 0540   Present On Admission : yes;picture taken  Side: Left  Orientation: upper   Location: (c) foot  Type: ulceration, venous   Base moist;pink  (dry scaly, scattered open, superficial open areas)   Periwound   (cobblestone skin)   Drainage Amount scant   Care, Wound cleansed with;sterile normal saline   Dressing Care   (opticel AG, unna, 4 layer compresison)   Wound 07/25/22 Left posterior knee Pressure Injury   Placement Date: 07/25/22   Present on Hospital Admission: Yes  Side: Left  Orientation: posterior  Location: knee  Primary Wound Type: (c) Pressure Injury   Base maroon/purple;dry   Wound Length (cm) 1 cm   Wound Width (cm) 6 cm   Wound Depth (cm) 0 cm   Wound Surface Area (cm^2) 6 cm^2   Wound Volume (cm^3) 0 cm^3   Drainage Amount none   Dressing Care   (soft cotton wrap, avoid compression applied to area)     Wound/ostomy - wound nurses are familiar with patient from previous hospital admissions. 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 Has had wounds chronically for several years and has only had care provided at [facility]. Is current with [facility]. Typically goes to [facility] on mondays and then HH visits on Wed and Fri for dressing/compression changes to BLE. A variety of wound care treatments have been utilized. Patient has been in the hospital a lot the last 2 months but regimen at home most recently was using hydroferra blue, betadine, unna and 4 layer compression wraps. Betadine moist 2x2's are always placed between toes with dressing changes to prevent ulcerations. Patient has thick, cobblestoned skin to BLE consistent with chronic venous changes.     He admits at this time for redness to E. RLE has significant redness as compared to the LLE, edema is slightly greater on R. Patient has a full thickness wound to plantar surface at heel and the lower leg with scattered ulcerations. Will avoid placing multilayer wrap to RLE at  this time due to the new onset of redness and uncertain treatment course,  we will manage the wounds with absorptive hydrofiber and wrap with kerlix and light compression with ace wraps.     LLE has chronic hemosiderin staining, no erythema. Scab present to L medial malleolus 0.8x0.8 cm. Partial thickness would to posterior calf which shows progressive healing when compared to previous photo. L dorsal foot below toes with dry and moist presentation from chronic ulcerations, opticel AG to be applied to absorb exudate. L posterior knee area with a DTI fromprevious compression wraps. Will avoid compression to this area and place to soft cotton padding against area. To LLE will treat any open areas with optical AG, wrap with unna boot followed by a 4 layer compression wrap.     Patient's overall edema to BLE very compromised by comorbidities but also because he spends all of his time in a recliner, a bed is too uncomfortable to sleep in and cant breathe when back too far,  therefore he never achieves adequate elevation for edema management. The compression wraps only marginally manage the edema to the BLE. He would benefit from lymphedema treatments but that will be deferred to the [facility] who has managed him for many years. There is likely a reason this has not been pursued.     While patient is here wound nurse will see M-W-F to change compression wraps. If the wrapping has to be removed to either or both legs then should be replaced with opticel AG to open wounds, wrapped with kerlix and ace

## 2022-07-25 NOTE — PROGRESS NOTES
[facility] Clinical Pharmacy Services: Warfarin Dosing/Monitoring Consult    Jalen Lockwood is a 72 y.o. male, estimated creatinine clearance is 102.9 mL/min (by C-G formula based on SCr of 0.95 mg/dL). weighing 124 kg (274 lb).    Results from last 7 days   Lab Units 07/24/22  2314 07/24/22  1916 07/22/22  1014 07/19/22  0537 07/18/22  0652   INR  1.76* 1.72*  --  1.63* 1.69*   APTT seconds  --  49.2*  --   --   --    HEMOGLOBIN g/dL  --  9.9* 10.7* 9.9*  --    HEMATOCRIT %  --  28.7* 32.1* 29.9*  --    PLATELETS 10*3/mm3  --  187 244 207  --      Prior to admission anticoagulation: Warfarin 5 mg nightly except 7.5 mg on Friday    Hospital Anticoagulation:  Consulting provider: Dr. Ventura  Start date: 7/24  Indication: A.fib  Target INR: 2 - 3  Expected duration: Indefinite   Bridge Therapy: No      Potential food or drug interactions:     Education complete?/Date: No; plan for follow up TBD    Assessment/Plan:  Dose: INR subtherapeutic and required multiple dose escalations in recent visit.  Will start with warfarin 10 mg tonight then warfarin 7.5 mg nightly.  Monitor for any signs or symptoms of bleeding  Follow up daily INRs and dose adjustments    Pharmacy will continue to follow until discharge or discontinuation of warfarin.     Quinten Jones III [facility]  Clinical Pharmacist

## 2022-07-25 NOTE — PROGRESS NOTES
[facility] Clinical Pharmacy Services: Vancomycin Pharmacokinetic Initial Consult Note    Jalen Lockwood is a 72 y.o. male who is on day 1 of pharmacy to dose vancomycin.    Indication: Pneumonia/sepsis  Consulting Provider: Dr. Segundo  Planned Duration of Therapy: 7 days  Loading Dose Ordered or Given: 2500 mg on 7/25 at 0244  MRSA PCR performed:7/11 Positive  Culture/Source:   7/24 Blood cultures in process  Target: -600 mg/L.hr   Other Antimicrobials: Cefepime 2 g iv every 8 hours    Vitals/Labs  Ht: 193 cm (76\"); Wt: 124 kg (273 lb 3.2 oz)  Temp Readings from Last 1 Encounters:   07/25/22 98.7 °F (37.1 °C) (Oral)    Estimated Creatinine Clearance: 102.9 mL/min (by C-G formula based on SCr of 0.95 mg/dL).     Results from last 7 days   Lab Units 07/24/22  1916 07/22/22  1014 07/19/22  0537   CREATININE mg/dL 0.95 0.89  --    WBC 10*3/mm3 17.71* 8.60 6.25     Assessment/Plan:    Vancomycin Dose:   1500 mg IV every  24  hours  Predictive AUC level for the dose ordered is 469 mg/L.hr, which is within the target of 400-600 mg/L.hr  Vanc Trough has been ordered for 7/27 at 2030     Pharmacy will follow patient's kidney function and will adjust doses and obtain levels as necessary. Thank you for involving pharmacy in this patient's care. Please contact pharmacy with any questions or concerns.                           Quinten Jones III, [facility]  Clinical Pharmacist

## 2022-07-25 NOTE — THERAPY EVALUATION
Patient Name: Jalen Lockwood  : 1950    MRN: [id number]                              Today's Date: 2022       Admit Date: 2022    Visit Dx:     ICD-10-CM ICD-9-CM   1. Sepsis, due to unspecified organism, unspecified whether acute organ dysfunction present (HCC)  A41.9 038.9     995.91   2. Pneumonia of both lower lobes due to infectious organism  J18.9 486   3. Chronic respiratory failure with hypoxia (HCC)  J96.11 518.83     799.02   4. Hypokalemia  E87.6 276.8   5. Chronic anemia  D64.9 285.9     Patient Active Problem List   Diagnosis   • Chronic osteomyelitis (HCC)   • Foot pain   • Peripheral neuropathy   • Lymphedema of both lower extremities   • Permanent atrial fibrillation (HCC)   • Sleep apnea   • Chronic edema   • Obesity (BMI 30-39.9)   • COPD (chronic obstructive pulmonary disease) (HCC)   • Atrial flutter (HCC)   • 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HCC)   • Aortectasia (HCC)   • Popliteal artery aneurysm (HCC)   • Colon polyps   • Gastroparesis   • Insomnia   • Adenomatous polyp of colon   • Essential hypertension   • ETOH abuse   • Chronic anticoagulation   • Oropharyngeal dysphagia   • Tobacco abuse   • Thyromegaly   • Adrenal adenoma, left   • Retroperitoneal lymphadenopathy   • Anemia of chronic disease   • Thyroid nodule   • Charcot's joint of foot   • Abnormal CT scan, lumbar spine   • Alcohol dependence, in remission (HCC)   • Normocytic anemia   • Inguinal adenopathy   • Elevated lactic acid level   • Venous stasis dermatitis of both lower extremities   • Urinary retention   • Sepsis without acute organ dysfunction (HCC)   • Sepsis (HCC)   • Aspiration pneumonia (HCC)   • Chronic respiratory failure with hypoxia (HCC)     Past Medical History:   Diagnosis Date   • Allergic rhinitis    • Anxiety    • Aortectasia (HCC)     3cm infrarenal abdominal aorta   • Arthritis    • Atrial flutter (HCC)     s/p ablation    • Charcot's joint of foot    • Chronic edema     both legs  and sees wound care center at [place]    • Chronic venous insufficiency    • COPD (chronic obstructive pulmonary disease) (HCC)    • Coronary atherosclerosis     Cath 2010: diffuse 40-50% disease   • Diverticulosis    • Duodenitis    • Fatty liver    • Gastritis    • Gastroparesis    • Hematoma     post-operative; After catheterization, right groin, required surgical exploration   • Hyperlipidemia    • Hypertension    • Insomnia    • Internal hemorrhoids    • Open wound     izzy legs has drsg chg weekly at wound care center at [place]  pt does second dressing on left leg another time during week   • Osteomyelitis (HCC)    • Paroxysmal atrial fibrillation (HCC)    • Peripheral neuropathy    • Popliteal artery aneurysm (HCC)     left, s/p stenting by Dr. [place]   • Skin cancer    • Sleep apnea     o2   • 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HCC)     due to flutter and afib; cath 2010 with nonobstructive disease   • Venous stasis    • Venous stasis ulcer (HCC)     bilateral legs      Past Surgical History:   Procedure Laterality Date   • BASAL CELL CARCINOMA EXCISION      ear and left side of face   • BRONCHOSCOPY N/A 4/12/2021    Procedure: BRONCHOSCOPY WITH BAL;  Surgeon: Bunny Lepe MD;  Location: [facility] ENDOSCOPY;  Service: Pulmonary;  Laterality: N/A;  PRE-HEMOPTYSIS  POST-SAME   • CARDIAC CATHETERIZATION     • CATARACT EXTRACTION     • COLONOSCOPY  09/28/2015    NBIH, diverticulosis, polyps   • COLONOSCOPY N/A 9/11/2018    Procedure: COLONOSCOPY TO CECUM  AND TERM. ILEUM WITH COLD SNARE POLYPECTOMIES;  Surgeon: Kane Lagunas MD;  Location: [facility] ENDOSCOPY;  Service: Gastroenterology   • COLONOSCOPY N/A 10/29/2019    Procedure: COLONOSCOPY TO TO CECUM AND TERMINAL ILEUM WITH HOT AND COLD SNARE POLYPECTOMIES;  Surgeon: Kane Lagunas MD;  Location: [facility] ENDOSCOPY;  Service: Gastroenterology   • HIP ARTHROPLASTY Right 2017   • JOINT REPLACEMENT Left    • OTHER SURGICAL HISTORY      Catheter ablation  atrial flutter   • REPAIR ANEURYSM / PSEUDO ANEURYSM / RUPTURED ANEURYSM POPLITEAL ARTERY      Stent-Graft of the the left popliteal artery   • REPAIR KNEE LIGAMENT      Primary repair of knee ligament cruciate anterior right   • TONSILLECTOMY  1958   • TOTAL KNEE ARTHROPLASTY Bilateral    • UPPER GASTROINTESTINAL ENDOSCOPY  09/16/2014    acute gastritis, acute duodenitis      General Information     [facility] Name 07/25/22 1131          Physical Therapy Time and Intention    Document Type evaluation  -     Mode of Treatment individual therapy;physical therapy  -[facility] Name 07/25/22 1131          General Information    Patient Profile Reviewed yes  -     Prior Level of Function min assist:  Indepedent with ambulation, min A with dressing/bathing of LEs  -     Existing Precautions/Restrictions fall;oxygen therapy device and L/min  2L at baseline  -     Row Name 07/25/22 1131          Living Environment    People in Home spouse  -[facility] Name 07/25/22 1131          Home Main Entrance    Number of Stairs, Main Entrance four  -[facility] Name 07/25/22 1131          Stairs Within Home, Primary    Stairs, Within Home, Primary Lift chair within home  -[facility] Name 07/25/22 1131          Cognition    Orientation Status (Cognition) oriented x 3  -[facility] Name 07/25/22 1131          Safety Issues, Functional Mobility    Impairments Affecting Function (Mobility) endurance/activity tolerance;strength;pain  -           User Key  (r) = Recorded By, (t) = Taken By, (c) = Cosigned By    Initials Name Provider Type     Iva Tucker, PT Physical Therapist               Mobility     [facility] Name 07/25/22 1133          Bed Mobility    Comment, (Bed Mobility) Patient UIC upon arrival  -[facility] Name 07/25/22 1133          Bed-Chair Transfer    Bed-Chair [place] (Transfers) not tested  -[facility] Name 07/25/22 1133          Sit-Stand Transfer    Sit-Stand [place] (Transfers) contact guard;verbal cues  -      Assistive Device (Sit-Stand Transfers) walker, front-wheeled  -     Row Name 07/25/22 1133          Gait/Stairs (Locomotion)    [place] Level (Gait) contact guard;verbal cues  -     Assistive Device (Gait) walker, front-wheeled  [facility]     Distance in Feet (Gait) 30ft. Slightly unsteady though no overt LOB noted. Wife reports when patient is having an episode of confusion he usually looses his balance on turns.  -     Deviations/Abnormal Patterns (Gait) festinating/shuffling;gait speed decreased;sowmya decreased;base of support, wide  -     Bilateral Gait Deviations heel strike decreased;forward flexed posture  -           User Key  (r) = Recorded By, (t) = Taken By, (c) = Cosigned By    Initials Name Provider Type     Iva Tucker PT Physical Therapist               Obj/Interventions     Row Name 07/25/22 1134          Range of Motion Comprehensive    General Range of Motion bilateral lower extremity ROM WFL  -[facility] Name 07/25/22 1134          Strength Comprehensive (MMT)    Comment, General Manual Muscle Testing (MMT) Assessment Generalized LE weakness  -     Row Name 07/25/22 1134          Motor Skills    Motor Skills functional endurance  -     Row Name 07/25/22 1134          Balance    Balance Assessment sitting static balance;sitting dynamic balance;sit to stand dynamic balance;standing static balance;standing dynamic balance  -     Static Sitting Balance modified independence  -     Dynamic Sitting Balance standby assist  -     Position, Sitting Balance sitting in chair;supported  -     Sit to Stand Dynamic Balance contact guard;verbal cues  -     Static Standing Balance contact guard  -     Dynamic Standing Balance contact guard;verbal cues  -     Position/Device Used, Standing Balance supported;walker, front-wheeled  -     Balance Interventions sitting;standing;sit to stand;supported;static;dynamic  -           User Key  (r) = Recorded By, (t) = Taken By,  (c) = Cosigned By    Initials Name Provider Type     Iva Tucker, PT Physical Therapist               Goals/Plan     Row Name 07/25/22 1141          Bed Mobility Goal 1 (PT)    Activity/Assistive Device (Bed Mobility Goal 1, PT) bed mobility activities, all  -SM     [place] Level/Cues Needed (Bed Mobility Goal 1, PT) contact guard required  -SM     Time Frame (Bed Mobility Goal 1, PT) 1 week  -SM     Row Name 07/25/22 1141          Transfer Goal 1 (PT)    Activity/Assistive Device (Transfer Goal 1, PT) transfers, all  -SM     [place] Level/Cues Needed (Transfer Goal 1, PT) standby assist;supervision required  -SM     Time Frame (Transfer Goal 1, PT) 1 week  -SM     Row Name 07/25/22 1141          Gait Training Goal 1 (PT)    Activity/Assistive Device (Gait Training Goal 1, PT) gait (walking locomotion);walker, rolling  -SM     [place] Level (Gait Training Goal 1, PT) standby assist  -SM     Distance (Gait Training Goal 1, PT) 150ft  -SM     Time Frame (Gait Training Goal 1, PT) 1 week  -SM           User Key  (r) = Recorded By, (t) = Taken By, (c) = Cosigned By    Initials Name Provider Type     Iva Tucker, STEPHEN Physical Therapist               Clinical Impression     Row Name 07/25/22 1135          Pain    Pretreatment Pain Rating 3/10  -SM     Posttreatment Pain Rating 3/10  -SM     Pain Location - Side/Orientation Bilateral  -SM     Pain Location - foot  -SM     Pain Intervention(s) Rest;Repositioned  -SM     Row Name 07/25/22 5090          Plan of Care Review    Plan of Care Reviewed With patient;spouse  -SM     Outcome Evaluation Patient is a 72 y.o male who presents to [facility] with aspitation pneumonia. Patient had recent hospital addmission early this month for the same thing. Patient lives at home with his wife with 4 KAYLYN. Patient reports independence at baseline with ambulation when using a rwx. Wife who is present at bedside reports patient ambulated 1/4 of a mile this weekend.  Patient requires [place]bert with LE ADLs at baseline. Patient reports he sleeps in the recliner at home and does not get into or out of bed.Today patient was [facility] upon arrival. Patient completed STS transfer with CGA. Patient ambulated 30ft with rwx and CGA. Gait was slow and shuffled appearing slightly unsteady though no overt LOB noted. Patient would benefit from continued skilled PT to address deficits in strength and activity endurance limiting mobility. PT recommends home with assist and HHPT at d/c. Will continue to monitor.  -     Row Name 07/25/22 1135          Therapy Assessment/Plan (PT)    Rehab Potential (PT) good, to achieve stated therapy goals  -     Criteria for Skilled Interventions Met (PT) yes  -SM     Therapy Frequency (PT) 6 times/wk  -     Row Name 07/25/22 1135          Vital Signs    O2 Delivery Pre Treatment supplemental O2  2L  -SM     O2 Delivery Intra Treatment room air  -SM     O2 Delivery Post Treatment supplemental O2  -SM     Pre Patient Position Sitting  -SM     Intra Patient Position Standing  -SM     Post Patient Position Sitting  -SM     Row Name 07/25/22 1135          Positioning and Restraints    Pre-Treatment Position sitting in chair/recliner  -SM     Post Treatment Position chair  -SM     In Chair notified nsg;reclined;call light within reach;encouraged to call for assist;with family/caregiver;with other staff  with SLP  -SM           User Key  (r) = Recorded By, (t) = Taken By, (c) = Cosigned By    Initials Name Provider Type     Iva Tucker, PT Physical Therapist               Outcome Measures     Row Name 07/25/22 3572          How much help from another person do you currently need...    Turning from your back to your side while in flat bed without using bedrails? 3  -SM     Moving from lying on back to sitting on the side of a flat bed without bedrails? 3  -SM     Moving to and from a bed to a chair (including a wheelchair)? 3  -SM     Standing up from a chair  using your arms (e.g., wheelchair, bedside chair)? 3  -SM     Climbing 3-5 steps with a railing? 3  -SM     To walk in hospital room? 3  -SM     AM-PAC 6 Clicks Score (PT) 18  -     Highest level of mobility 6 --> Walked 10 steps or more  -     Row Name 07/25/22 1143          Functional Assessment    Outcome Measure Options AM-PAC 6 Clicks Basic Mobility (PT)  -           User Key  (r) = Recorded By, (t) = Taken By, (c) = Cosigned By    Initials Name Provider Type     Iva Tucker PT Physical Therapist                             Physical Therapy Education                 Title: PT OT SLP Therapies (In Progress)     Topic: Physical Therapy (Done)     Point: Mobility training (Done)     Learning Progress Summary           Patient Acceptance, E, VU by  at 7/25/2022 1143                   Point: Home exercise program (Done)     Learning Progress Summary           Patient Acceptance, E, VU by  at 7/25/2022 1143                   Point: Body mechanics (Done)     Learning Progress Summary           Patient Acceptance, E, VU by  at 7/25/2022 1143                   Point: Precautions (Done)     Learning Progress Summary           Patient Acceptance, E, VU by  at 7/25/2022 1143                               User Key     Initials Effective Dates Name Provider Type Discipline     05/02/22 -  Iva Tucker PT Physical Therapist PT              PT Recommendation and Plan     Plan of Care Reviewed With: patient, spouse  Outcome Evaluation: Patient is a 72 y.o male who presents to [facility] with aspitation pneumonia. Patient had recent hospital addmission early this month for the same thing. Patient lives at home with his wife with 4 KAYLYN. Patient reports independence at baseline with ambulation when using a rwx. Wife who is present at bedside reports patient ambulated 1/4 of a mile this weekend. Patient requires [place]bert with LE ADLs at baseline. Patient reports he sleeps in the recliner at home and does not get  into or out of bed.Today patient was [facility] upon arrival. Patient completed STS transfer with CGA. Patient ambulated 30ft with rwx and CGA. Gait was slow and shuffled appearing slightly unsteady though no overt LOB noted. Patient would benefit from continued skilled PT to address deficits in strength and activity endurance limiting mobility. PT recommends home with assist and HHPT at d/c. Will continue to monitor.     Time Calculation:    PT Charges     Row Name 07/25/22 1144             Time Calculation    Start Time 1106  -SM      Stop Time 1130  -SM      Time Calculation (min) 24 min  -SM      PT Received On 07/25/22  -      PT - Next Appointment 07/26/22  -      PT Goal Re-Cert Due Date 08/01/22  -SM              Time Calculation- PT    Total Timed Code Minutes- PT 15 minute(s)  -SM              Timed Charges    83437 - PT Therapeutic Activity Minutes 15  -SM              Total Minutes    Timed Charges Total Minutes 15  -SM       Total Minutes 15  -SM            User Key  (r) = Recorded By, (t) = Taken By, (c) = Cosigned By    Initials Name Provider Type     Iva Tucker, STEPHEN Physical Therapist              Therapy Charges for Today     Code Description Service Date Service Provider Modifiers Qty    [id number]  PT THERAPEUTIC ACT EA 15 MIN 7/25/2022 Iva Tucker, PT GP 1    [id number] HC PT EVAL LOW COMPLEXITY 3 7/25/2022 Iva Tucker, PT GP 1          PT G-Codes  Outcome Measure Options: AM-PAC 6 Clicks Basic Mobility (PT)  AM-PAC 6 Clicks Score (PT): 18    Iva Tucker PT  7/25/2022

## 2022-07-25 NOTE — H&P
Patient Name:  Jalen Lockwood  YOB: 1950  MRN:  [id number]  Admit Date:  7/24/2022  Patient Care Team:  Marc Ludwig MD as PCP - General (Family Medicine)  Blas Mckeon MD as Surgeon (General Surgery)  Jonnathan Boston II, MD as Consulting Physician (Vascular Surgery)  Xavi Elliott MD as Consulting Physician (Urology)  Marc Benavidez MD as Consulting Physician (Pain Medicine)  Kurt Henry MD as Consulting Physician (Cardiology)  Meghna Horan [facility] as Pharmacist  Rip Samuel MD as Referring Physician (Family Medicine)  Juni Landa MD as Consulting Physician (Hematology and Oncology)  Brendan Live, AlexD as Pharmacist (Pharmacy)      Subjective   History Present Illness     Chief Complaint   Patient presents with   • Fever       Mr. Lockwood is a 72 y.o. smoker with a history of oropharyngeal dysphagia, paroxysmal afib on warfarin, chronic urinary retention with an indwelling escamilla catheter, COPD with chronic hypoxic respiratory failure on 2L home O2, peripheral vascular disease, non obstructive CAD, BECKI who presents to [facility] complaining of a fever at home. He reports that he had been doing well since discharge, but then today developed a fever that was unresponsive to antipyretics. He denies any chills, sweats, shortness of breath, cough, chest pain, nausea, vomiting, diarrhea, suprapubic pain.     History of Present Illness  Review of Systems   Constitutional: Positive for fever. Negative for chills, diaphoresis and fatigue.   HENT: Negative for rhinorrhea, sinus pressure, sinus pain, sneezing and sore throat.    Eyes: Negative.    Respiratory: Negative for cough and shortness of breath.    Cardiovascular: Negative for chest pain and palpitations.   Gastrointestinal: Negative for abdominal pain, diarrhea, nausea and vomiting.   Endocrine: Negative for polydipsia and polyuria.   Genitourinary: Negative for dysuria, frequency and urgency.    Musculoskeletal: Negative for arthralgias and myalgias.   Skin: Negative for rash and wound.   Allergic/Immunologic: Negative.    Neurological: Negative for light-headedness and headaches.   Hematological: Negative.    Psychiatric/Behavioral: Negative for agitation and confusion.        Personal History     Past Medical History:   Diagnosis Date   • Allergic rhinitis    • Anxiety    • Aortectasia (HCC)     3cm infrarenal abdominal aorta   • Arthritis    • Atrial flutter (HCC) 2010    s/p ablation    • Charcot's joint of foot    • Chronic edema     both legs and sees wound care center at [facility]    • Chronic venous insufficiency    • COPD (chronic obstructive pulmonary disease) (HCC)    • Coronary atherosclerosis     Cath 2010: diffuse 40-50% disease   • Diverticulosis    • Duodenitis    • Fatty liver    • Gastritis    • Gastroparesis    • Hematoma     post-operative; After catheterization, right groin, required surgical exploration   • Hyperlipidemia    • Hypertension    • Insomnia    • Internal hemorrhoids    • Open wound     izzy legs has drsg chg weekly at wound care center at [facility]  pt does second dressing on left leg another time during week   • Osteomyelitis (HCC)    • Paroxysmal atrial fibrillation (HCC)    • Peripheral neuropathy    • Popliteal artery aneurysm (HCC)     left, s/p stenting by Dr. Boston   • Skin cancer    • Sleep apnea     o2   • 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HCC)     due to flutter and afib; cath 2010 with nonobstructive disease   • Venous stasis    • Venous stasis ulcer (HCC)     bilateral legs      Past Surgical History:   Procedure Laterality Date   • BASAL CELL CARCINOMA EXCISION      ear and left side of face   • BRONCHOSCOPY N/A 4/12/2021    Procedure: BRONCHOSCOPY WITH BAL;  Surgeon: Bunny Lepe MD;  Location: [facility] ENDOSCOPY;  Service: Pulmonary;  Laterality: N/A;  PRE-HEMOPTYSIS  POST-SAME   • CARDIAC CATHETERIZATION     • CATARACT EXTRACTION     • COLONOSCOPY   09/28/2015    NBIH, diverticulosis, polyps   • COLONOSCOPY N/A 9/11/2018    Procedure: COLONOSCOPY TO CECUM  AND TERM. ILEUM WITH COLD SNARE POLYPECTOMIES;  Surgeon: Kane Lagunas MD;  Location:  [facility] ENDOSCOPY;  Service: Gastroenterology   • COLONOSCOPY N/A 10/29/2019    Procedure: COLONOSCOPY TO TO CECUM AND TERMINAL ILEUM WITH HOT AND COLD SNARE POLYPECTOMIES;  Surgeon: Kane Lagunas MD;  Location:  [facility] ENDOSCOPY;  Service: Gastroenterology   • HIP ARTHROPLASTY Right 2017   • JOINT REPLACEMENT Left    • OTHER SURGICAL HISTORY      Catheter ablation atrial flutter   • REPAIR ANEURYSM / PSEUDO ANEURYSM / RUPTURED ANEURYSM POPLITEAL ARTERY      Stent-Graft of the the left popliteal artery   • REPAIR KNEE LIGAMENT      Primary repair of knee ligament cruciate anterior right   • TONSILLECTOMY  1958   • TOTAL KNEE ARTHROPLASTY Bilateral    • UPPER GASTROINTESTINAL ENDOSCOPY  09/16/2014    acute gastritis, acute duodenitis     Family History   Problem Relation Age of Onset   • Emphysema Father    • Malig Hyperthermia Neg Hx      Social History     Tobacco Use   • Smoking status: Current Every Day Smoker     Packs/day: 0.25     Types: Cigarettes     Start date: 1964   • Smokeless tobacco: Never Used   • Tobacco comment: caffeine use - 1.5 cups coffee daily    Vaping Use   • Vaping Use: Never used   Substance Use Topics   • Alcohol use: Yes     Alcohol/week: 14.0 standard drinks     Types: 14 Shots of liquor per week   • Drug use: No     No current facility-administered medications on file prior to encounter.     Current Outpatient Medications on File Prior to Encounter   Medication Sig Dispense Refill   • albuterol sulfate  (90 Base) MCG/ACT inhaler Inhale 2 puffs Every 4 (Four) Hours As Needed for Wheezing. 2 g 6   • ALPRAZolam (XANAX) 0.5 MG tablet TAKE 1 TABLET BY MOUTH EVERY DAY AT NIGHT. NEEDS APPT 30 tablet 0   • Atrovent HFA 17 MCG/ACT inhaler INHALE 2 PUFFS BY MOUTH 4 TIMES A DAY 12.9 each 3   •  Atrovent HFA 17 MCG/ACT inhaler      • Breo Ellipta 200-25 MCG/INH inhaler INHALE 1 PUFF BY MOUTH EVERY DAY 60 each 4   • docusate sodium (COLACE) 100 MG capsule Take 100 mg by mouth Daily.     • ferrous sulfate (FerrouSul) 325 (65 FE) MG tablet Take 1 tablet by mouth Daily With Breakfast for 30 days. 30 tablet 0   • finasteride (PROSCAR) 5 MG tablet Take 1 tablet by mouth daily.     • furosemide (LASIX) 40 MG tablet Take 1 tablet by mouth 2 (Two) Times a Day for 30 days. 60 tablet 0   • gabapentin (NEURONTIN) 600 MG tablet Take 1 tablet by mouth 3 (Three) Times a Day for 30 days. 90 tablet 0   • gabapentin (NEURONTIN) 600 MG tablet      • gentamicin (GARAMYCIN) 0.1 % ointment APPLY TO AFFECTED AREA EVERY DAY WITH DRESSING CHANGES     • HYDROcodone-acetaminophen (NORCO)  MG per tablet Take 1 tablet by mouth Every 6 (Six) Hours As Needed for Moderate Pain .     • metoclopramide (REGLAN) 10 MG tablet TAKE 1 TABLET BY MOUTH 4 (FOUR) TIMES A DAY BEFORE MEALS & AT BEDTIME. 360 tablet 3   • metoprolol succinate XL (TOPROL-XL) 25 MG 24 hr tablet Take 1 tablet by mouth Daily for 30 days. 30 tablet 0   • OXYGEN-HELIUM IN 2 L into the nostril(s) as directed by provider As Needed.     • pravastatin (PRAVACHOL) 20 MG tablet TAKE 1 TABLET BY MOUTH EVERY DAY 90 tablet 1   • silodosin (RAPAFLO) 8 MG capsule capsule Take 1 capsule by mouth daily. With food.     • warfarin (COUMADIN) 5 MG tablet As directed  Indications: Atrial Fibrillation     • warfarin (COUMADIN) 7.5 MG tablet As directed  Indications: Atrial Fibrillation     • cefpodoxime (VANTIN) 200 MG tablet        Allergies   Allergen Reactions   • Cephalexin Hives     Tolerated ceftriaxone Jan 2022   • Codeine Nausea Only   • Silver Other (See Comments)       Objective    Objective     Vital Signs  Temp:  [98 °F (36.7 °C)-100.8 °F (38.2 °C)] 98 °F (36.7 °C)  Heart Rate:  [50-81] 56  Resp:  [19-20] 20  BP: ()/(46-85) 130/63  SpO2:  [91 %-99 %] 99 %  on  Flow  (L/min):  [2] 2;   Device (Oxygen Therapy): nasal cannula  Body mass index is 33.35 kg/m².    Physical Exam  Vitals and nursing note reviewed.   Constitutional:       General: He is not in acute distress.     Appearance: He is ill-appearing (chronically). He is not toxic-appearing.   HENT:      Head: Normocephalic and atraumatic.      Mouth/Throat:      Mouth: Mucous membranes are moist.      Pharynx: Oropharynx is clear.   Eyes:      Extraocular Movements: Extraocular movements intact.      Conjunctiva/sclera: Conjunctivae normal.      Pupils: Pupils are equal, round, and reactive to light.   Cardiovascular:      Rate and Rhythm: Normal rate and regular rhythm.      Heart sounds: Normal heart sounds.   Pulmonary:      Effort: Pulmonary effort is normal. No respiratory distress.      Breath sounds: Rhonchi present. No wheezing or rales.   Abdominal:      General: Bowel sounds are normal. There is no distension.      Palpations: Abdomen is soft.      Tenderness: There is no abdominal tenderness. There is no guarding or rebound.   Musculoskeletal:         General: Swelling present.      Cervical back: Normal range of motion and neck supple.      Right lower leg: Edema present.      Left lower leg: Edema present.      Comments: Legs wrapped   Skin:     General: Skin is warm and dry.      Findings: No erythema or rash.   Neurological:      General: No focal deficit present.      Mental Status: He is alert and oriented to person, place, and time.   Psychiatric:         Mood and Affect: Mood normal.         Behavior: Behavior normal.         Results Review:  I reviewed the patient's new clinical results.  I reviewed the patient's new imaging results and agree with the interpretation.  I reviewed the patient's other test results and agree with the interpretation  I personally viewed and interpreted the patient's EKG/Telemetry data  Discussed with ED provider.    Lab Results (last 24 hours)     Procedure Component Value  Units Date/Time    CBC & Differential [[id number]]  (Abnormal) Collected: 07/24/22 1916    Specimen: Blood Updated: 07/24/22 1935    Narrative:      The following orders were created for panel order CBC & Differential.  Procedure                               Abnormality         Status                     ---------                               -----------         ------                     CBC Auto Differential[[id number]]        Abnormal            Final result                 Please view results for these tests on the individual orders.    Comprehensive Metabolic Panel [[id number]]  (Abnormal) Collected: 07/24/22 1916    Specimen: Blood Updated: 07/24/22 1957     Glucose 94 mg/dL      BUN 12 mg/dL      Creatinine 0.95 mg/dL      Sodium 137 mmol/L      Potassium 3.1 mmol/L      Chloride 95 mmol/L      CO2 31.0 mmol/L      Calcium 9.3 mg/dL      Total Protein 7.3 g/dL      Albumin 3.30 g/dL      ALT (SGPT) 9 U/L      AST (SGOT) 16 U/L      Alkaline Phosphatase 102 U/L      Total Bilirubin 1.1 mg/dL      Globulin 4.0 gm/dL      A/G Ratio 0.8 g/dL      BUN/Creatinine Ratio 12.6     Anion Gap 11.0 mmol/L      eGFR 85.6 mL/min/1.73      Comment: National Kidney Foundation and American Society of Nephrology (ASN) Task Force recommended calculation based on the Chronic Kidney Disease Epidemiology Collaboration (CKD-EPI) equation refit without adjustment for race.       Narrative:      GFR Normal >60  Chronic Kidney Disease <60  Kidney Failure <15      Blood Culture - Blood, Arm, Left [[id number]] Collected: 07/24/22 1916    Specimen: Blood from Arm, Left Updated: 07/24/22 1929    Lactic Acid, Plasma [[id number]]  (Normal) Collected: 07/24/22 1916    Specimen: Blood Updated: 07/24/22 1957     Lactate 2.0 mmol/L     Procalcitonin [[id number]]  (Abnormal) Collected: 07/24/22 1916    Specimen: Blood Updated: 07/24/22 2031     Procalcitonin 0.38 ng/mL     Narrative:      As a Marker for Sepsis (Non-Neonates):    1. <0.5 ng/mL  represents a low risk of severe sepsis and/or septic shock.  2. >2 ng/mL represents a high risk of severe sepsis and/or septic shock.    As a Marker for Lower Respiratory Tract Infections that require antibiotic therapy:    PCT on Admission    Antibiotic Therapy       6-12 Hrs later    >0.5                Strongly Recommended  >0.25 - <0.5        Recommended   0.1 - 0.25          Discouraged              Remeasure/reassess PCT  <0.1                Strongly Discouraged     Remeasure/reassess PCT    As 28 day mortality risk marker: \"Change in Procalcitonin Result\" (>80% or <=80%) if Day 0 (or Day 1) and Day 4 values are available. Refer to http://www.[facility]-pct-calculator.com    Change in PCT <=80%  A decrease of PCT levels below or equal to 80% defines a positive change in PCT test result representing a higher risk for 28-day all-cause mortality of patients diagnosed with severe sepsis for septic shock.    Change in PCT >80%  A decrease of PCT levels of more than 80% defines a negative change in PCT result representing a lower risk for 28-day all-cause mortality of patients diagnosed with severe sepsis or septic shock.       Protime-INR [[id number]]  (Abnormal) Collected: 07/24/22 1916    Specimen: Blood Updated: 07/1950     Protime 19.8 Seconds      INR 1.72    aPTT [[id number]]  (Abnormal) Collected: 07/24/22 1916    Specimen: Blood Updated: 07/1950     PTT 49.2 seconds     CBC Auto Differential [[id number]]  (Abnormal) Collected: 07/24/22 1916    Specimen: Blood Updated: 07/24/22 1935     WBC 17.71 10*3/mm3      RBC 3.19 10*6/mm3      Hemoglobin 9.9 g/dL      Hematocrit 28.7 %      MCV 90.0 fL      MCH 31.0 pg      MCHC 34.5 g/dL      RDW 13.3 %      RDW-SD 43.0 fl      MPV 9.7 fL      Platelets 187 10*3/mm3      Neutrophil % 91.7 %      Lymphocyte % 4.1 %      Monocyte % 3.2 %      Eosinophil % 0.1 %      Basophil % 0.3 %      Immature Grans % 0.6 %      Neutrophils, Absolute 16.23 10*3/mm3       Lymphocytes, Absolute 0.73 10*3/mm3      Monocytes, Absolute 0.57 10*3/mm3      Eosinophils, Absolute 0.02 10*3/mm3      Basophils, Absolute 0.06 10*3/mm3      Immature Grans, Absolute 0.10 10*3/mm3      nRBC 0.0 /100 WBC     Blood Culture - Blood, Arm, Right [[id number]] Collected: 07/24/22 1923    Specimen: Blood from Arm, Right Updated: 07/24/22 1928    Respiratory Panel PCR w/COVID-19(SARS-CoV-2) [facility] In-House, NP Swab in UTM/VTM, 3-4 HR TAT - Swab, Nasopharynx [[id number]]  (Normal) Collected: 07/24/22 1925    Specimen: Swab from Nasopharynx Updated: 07/24/22 2025     ADENOVIRUS, PCR Not Detected     Coronavirus 229E Not Detected     Coronavirus HKU1 Not Detected     Coronavirus NL63 Not Detected     Coronavirus OC43 Not Detected     COVID19 Not Detected     Human Metapneumovirus Not Detected     Human Rhinovirus/Enterovirus Not Detected     Influenza A PCR Not Detected     Influenza B PCR Not Detected     Parainfluenza Virus 1 Not Detected     Parainfluenza Virus 2 Not Detected     Parainfluenza Virus 3 Not Detected     Parainfluenza Virus 4 Not Detected     RSV, PCR Not Detected     Bordetella pertussis pcr Not Detected     Bordetella parapertussis PCR Not Detected     Chlamydophila pneumoniae PCR Not Detected     Mycoplasma pneumo by PCR Not Detected    Narrative:      In the setting of a positive respiratory panel with a viral infection PLUS a negative procalcitonin without other underlying concern for bacterial infection, consider observing off antibiotics or discontinuation of antibiotics and continue supportive care. If the respiratory panel is positive for atypical bacterial infection (Bordetella pertussis, Chlamydophila pneumoniae, or Mycoplasma pneumoniae), consider antibiotic de-escalation to target atypical bacterial infection.    Urinalysis With Microscopic If Indicated (No Culture) - Urine, Catheter [[id number]]  (Abnormal) Collected: 07/24/22 1948    Specimen: Urine, Catheter  Updated: 07/24/22 2028     Color, UA Dark Yellow     Appearance, UA Clear     pH, UA 5.5     Specific Gravity, UA 1.022     Glucose, UA Negative     Ketones, UA Trace     Bilirubin, UA Negative     Blood, UA Small (1+)     Protein, UA Trace     Leuk Esterase, UA Moderate (2+)     Nitrite, UA Negative     Urobilinogen, UA 1.0 E.U./dL    Urinalysis, Microscopic Only - Urine, Catheter [[id number]]  (Abnormal) Collected: 07/24/22 1948    Specimen: Urine, Catheter Updated: 07/24/22 2044     RBC, UA 0-2 /HPF      WBC, UA 3-5 /HPF      Bacteria, UA None Seen /HPF      Squamous Epithelial Cells, UA 0-2 /HPF      Hyaline Casts, UA None Seen /LPF      Methodology Manual Light Microscopy          Imaging Results (Last 24 Hours)     Procedure Component Value Units Date/Time    CT Chest Without Contrast Diagnostic [[id number]] Collected: 07/24/22 2216     Updated: 07/24/22 2224    Narrative:      CT OF THE CHEST WITHOUT CONTRAST     HISTORY: Abnormal chest radiograph. Fever.     COMPARISON: 07/14/2022     TECHNIQUE: Axial CT imaging was obtained through the thorax. IV contrast  was administered.     FINDINGS:  The patient is noted to have patchy consolidation within the lower lobes  bilaterally, right greater than left. This has worsened when compared to  prior study. Area of nodularity within the left lower lobe measures up  to 1.4 cm. Its appears stable when compared to prior exam. 3 additional  micronodules are noted within the left lower lobe, ranging in size from  4 to 6 mm. These were not on prior CT from 11/18/2021. The previously  discussed short-term follow-up is recommended. There are background  emphysematous changes. The thyroid gland, trachea, and esophagus are  unremarkable. Heart is enlarged. There are extensive coronary artery  calcifications. The aortic root is dilated, measuring about 4.1 cm.  Ascending thoracic aorta is also dilated, measuring up to 4.1 cm.  Proximal descending thoracic aorta is also dilated,  measuring up to 3.4  cm. Distal descending thoracic aorta tapers to normal caliber. Main  pulmonary artery is enlarged, which can be seen in the setting of  pulmonary arterial hypertension. There are prominent mediastinal lymph  nodes. For example, a precarinal lymph node measures up to centimeter.  This appears stable when compared to prior exam. It may be reactive.  There are prominent chest wall collaterals. Images through the upper  abdomen demonstrate a cirrhotic morphology to the liver, as well as  splenomegaly. No acute osseous abnormalities are seen.       Impression:         1. The patient has worsening patchy consolidation within the lower lobes  bilaterally, suspicious for pneumonia. Short-term follow-up is  recommended.  2. The patient has pulmonary nodules within the left lower lobe. As was  previously discussed, short-term CT follow-up in 3 months is  recommended.  3. Cirrhotic morphology to the liver, with evidence of portal  hypertension, as the patient has splenomegaly.     Radiation dose reduction techniques were utilized, including automated  exposure control and exposure modulation based on body size.     This report was finalized on 7/24/2022 10:21 PM by Dr. Stefani Silva M.D.       XR Chest 1 View [[id number]] Collected: 07/24/22 2029     Updated: 07/24/22 2033    Narrative:      SINGLE VIEW OF THE CHEST     HISTORY: Fever     COMPARISON: 07/14/2022     FINDINGS:  Cardiomegaly is present. There are background changes of COPD. No  pneumothorax or definite pleural effusion is seen. There is  calcification of the aorta. There is nonspecific bibasilar  consolidation. This may represent atelectasis and/or scarring. Pneumonia  is not excluded.       Impression:      Nonspecific bibasilar consolidation.     This report was finalized on 7/24/2022 8:30 PM by Dr. Stefani Silva M.D.             Results for orders placed during the hospital encounter of 09/17/21    Adult Transthoracic Echo  Complete W/ Cont if Necessary Per Protocol    Interpretation Summary  · Calculated left ventricular EF = 55.5% Estimated left ventricular EF was in agreement with the calculated left ventricular EF. Left ventricular systolic function is normal. Normal left ventricular cavity size noted. Left ventricular wall thickness is consistent with mild to moderate concentric hypertrophy. All left ventricular wall segments contract normally. Left ventricular diastolic function was indeterminate.  · Right ventricle not well visualized. The right ventricular cavity is moderately dilated.  · The left atrial cavity is moderately dilated.  · The right atrial cavity is severely dilated.  · Mild to moderate mitral valve regurgitation is present.  · Mild to moderate tricuspid valve regurgitation is present. Estimated right ventricular systolic pressure from tricuspid regurgitation is moderately elevated (45-55 mmHg). Calculated right ventricular systolic pressure from tricuspid regurgitation is 54 mmHg.      No orders to display        Assessment/Plan     Active Hospital Problems    Diagnosis  POA   • **Aspiration pneumonia (HCC) [J69.0]  Yes   • Sepsis (HCC) [A41.9]  Yes   • Chronic respiratory failure with hypoxia (HCC) [J96.11]  Yes   • Oropharyngeal dysphagia [R13.12]  Yes   • Essential hypertension [I10]  Yes   • 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HCC) [R00.0, I43]  Yes   • COPD (chronic obstructive pulmonary disease) (HCC) [J44.9]  Yes   • Lymphedema of both lower extremities [I89.0]  Yes   • Permanent atrial fibrillation (HCC) [I48.21]  Yes   • Obesity (BMI 30-39.9) [E66.9]  Yes   • Sleep apnea [G47.30]  Yes      Resolved Hospital Problems   No resolved problems to display.       Mr. Lockwood is a 72 y.o. smoker with a history of oropharyngeal dysphagia, paroxysmal afib on warfarin, chronic urinary retention with an indwelling escamilla catheter, COPD with chronic hypoxic respiratory failure on 2L home O2, peripheral vascular disease,  non obstructive CAD, BECKI who presents with recurrent aspiration pneumonia.    Suspected recurrent aspiration pneumonia causing sepsis (fever, leukocytosis)-continue vancomycin and cefepime. ID consult. The patient again voices that he does not want a modified diet. Will ask speech to see again to see if a diet could be identified that would reduce his risk of aspirating that would be acceptable to him.  Paroxysmal afib on warfarin-pharmacy to dose warfarin  Chronic urinary retention with indwelling urinary catheter-catheter exchanged last admission  Peripheral vascular disease s/p stenting in 2016  COPD with chronic hypoxic respiratory failure on 2L home O2  Moderate nonobstructive CAD  Tachycardia induced systolic heart failure with recovery  BECKI  Cirrhotic appearing liver with evidence of portal hypertension/splenomegaly seen on chest CT  Pulmonary nodules seen on CT chest during last admission with plans for PET in 6 weeks  I discussed the patient's findings and my recommendations with patient and ED provider.    VTE Prophylaxis - Warfarin (home med).  Code Status - Full code.       Lorenzo Segundo MD  [facility]  07/24/22  23:08 EDT

## 2022-07-25 NOTE — PLAN OF CARE
Goal Outcome Evaluation:  Plan of Care Reviewed With: spouse, patient    Outcome Evaluation: Clinical swallow evaluation completed. History or oropharyngeal dysphagia with silent aspiration of nectar thick liquids. Patient dislikes thickened liquids but is currently agreeable to a modified diet and repeat instrumental exam. Recommend: regular diet, no mixed consistencxies, with nectar thick liquids. Meds as tolerated with nectar thick liquids or puree. Ice chips or small sips of water 30 minutes after meals follwing oral care. Upright for PO intake, small bites/sips. VFSS to further assess pharyngeal swallow and determine least restrictive diet.

## 2022-07-25 NOTE — PROGRESS NOTES
Name: Jalen Lockwood ADMIT: 2022   : 1950  PCP: Marc Ludwig MD    MRN: [id number] LOS: 1 days   AGE/SEX: 72 y.o. male  ROOM: [facility]     Subjective   Subjective   Resting in chair. PT at bedside as well as wife. He was recommended to have modified diet last stay when he was here for UTI, pneumonia. He was felt to have recurrent aspiration pneumonia but wife states she was unaware of any modified diet recommended. He came back to the hospital for fever. Now has some worsening cough. No nausea, vomiting, diarrhea, chest pain, dyspnea or changes in urine output. Irvin remains. ST here to do evaluation as well.      Objective   Objective   Vital Signs  Temp:  [98 °F (36.7 °C)-100.8 °F (38.2 °C)] 99.2 °F (37.3 °C)  Heart Rate:  [50-81] 54  Resp:  [18-20] 18  BP: ()/(46-90) 150/90  SpO2:  [88 %-99 %] 92 %  on  Flow (L/min):  [1-2] 2;   Device (Oxygen Therapy): nasal cannula  Body mass index is 33.25 kg/m².     Physical Exam  Vitals and nursing note reviewed.   Constitutional:       Appearance: He is obese. He is ill-appearing.   HENT:      Head: Normocephalic and atraumatic.   Cardiovascular:      Rate and Rhythm: Normal rate. Rhythm irregular.      Pulses: Normal pulses.   Pulmonary:      Effort: Pulmonary effort is normal. No respiratory distress.      Comments: Diminished. On 2 L. Sats did drop to 88% while here. Loose cough noted.  Abdominal:      General: Bowel sounds are normal. There is no distension.      Palpations: Abdomen is soft.      Tenderness: There is no abdominal tenderness.   Genitourinary:     Comments: Irvin in place with dark yellow urine noted.  Musculoskeletal:         General: Swelling (BLE with right greater than left on the thigh) present. Normal range of motion.      Comments: BLE wrapped   Skin:     General: Skin is warm.      Findings: Erythema (right thigh with associated warmth ) present.   Neurological:      Mental Status: He is alert and oriented to person, place,  and time.      Sensory: No sensory deficit.      Motor: Weakness present.      Coordination: Coordination normal.   Psychiatric:         Mood and Affect: Mood normal.         Behavior: Behavior normal.       Results Review:       I reviewed the patient's new clinical results.  Results from last 7 days   Lab Units 07/25/22  1013 07/24/22 1916 07/22/22  1014 07/19/22  0537   WBC 10*3/mm3 10.60 17.71* 8.60 6.25   HEMOGLOBIN g/dL 9.9* 9.9* 10.7* 9.9*   PLATELETS 10*3/mm3 166 187 244 207     Results from last 7 days   Lab Units 07/24/22 1916 07/22/22  1014   SODIUM mmol/L 137 137   POTASSIUM mmol/L 3.1* 3.8   CHLORIDE mmol/L 95* 96*   CO2 mmol/L 31.0* 29.8*   BUN mg/dL 12 12   CREATININE mg/dL 0.95 0.89   GLUCOSE mg/dL 94 90   Estimated Creatinine Clearance: 102.9 mL/min (by C-G formula based on SCr of 0.95 mg/dL).  Results from last 7 days   Lab Units 07/24/22 1916 07/22/22  1014   ALBUMIN g/dL 3.30* 3.70   BILIRUBIN mg/dL 1.1 0.8   ALK PHOS U/L 102 114   AST (SGOT) U/L 16 14   ALT (SGPT) U/L 9 9     Results from last 7 days   Lab Units 07/24/22 1916 07/22/22  1014   CALCIUM mg/dL 9.3 9.4   ALBUMIN g/dL 3.30* 3.70     Results from last 7 days   Lab Units 07/24/22 1916   PROCALCITONIN ng/mL 0.38*   LACTATE mmol/L 2.0     No results found for: HGBA1C, POCGLU    ampicillin-sulbactam, 3 g, Intravenous, Q6H  budesonide-formoterol, 2 puff, Inhalation, BID - RT  docusate sodium, 100 mg, Oral, Daily  ferrous sulfate, 325 mg, Oral, Daily With Breakfast  finasteride, 5 mg, Oral, Daily  furosemide, 40 mg, Oral, BID  gabapentin, 600 mg, Oral, Q8H  metoclopramide, 10 mg, Oral, 4x Daily AC & at Bedtime  metoprolol succinate XL, 25 mg, Oral, Daily  pravastatin, 20 mg, Oral, Daily  tamsulosin, 0.4 mg, Oral, Nightly  warfarin, 7.5 mg, Oral, Daily      Pharmacy to dose warfarin,     NPO Diet NPO Type: Sips with Meds       Assessment/Plan     Active Hospital Problems    Diagnosis  POA   • **Aspiration pneumonia (HCC) [J69.0]  Yes   •  Sepsis (HCC) [A41.9]  Yes   • Chronic respiratory failure with hypoxia (HCC) [J96.11]  Yes   • Oropharyngeal dysphagia [R13.12]  Yes   • Essential hypertension [I10]  Yes   • 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HCC) [R00.0, I43]  Yes   • COPD (chronic obstructive pulmonary disease) (HCC) [J44.9]  Yes   • Lymphedema of both lower extremities [I89.0]  Yes   • Permanent atrial fibrillation (HCC) [I48.21]  Yes   • Obesity (BMI 30-39.9) [E66.9]  Yes   • Sleep apnea [G47.30]  Yes      Resolved Hospital Problems   No resolved problems to display.     Mr. Lockwood is a 72 y.o. smoker with a history of oropharyngeal dysphagia, paroxysmal afib on warfarin, chronic urinary retention with an indwelling escamilla catheter, COPD with chronic hypoxic respiratory failure on 2L home O2, peripheral vascular disease, non obstructive CAD, BECKI who presents with recurrent aspiration pneumonia.     · Aspiration pneumonia: Remains on home oxygen requirements. Loose cough and fever noted. ID has been consulted and de-escalated antibiotics to Unasyn. ST has been re-consulted. He was recommended to do NTL until VFSS tomorrow. Aspiration precautions and pulmonary toilet.   · Paroxysmal afib: Rate controlled at present on metoprolol. Warfarin resumed with pharm to dose. Currently subtherapeutic.  · Chronic urinary retention with indwelling urinary catheter: Catheter exchanged last admission. Urine here looks okay with no signs of active infection.   · Peripheral vascular disease s/p stenting in 2016: On statin and warfarin.  · COPD with chronic hypoxic respiratory failure on 2L home O2: Pulmonology evaluated last admission. Home medications resumed. Pulmonary nodules seen on CT chest during last admission with plans for PET in 6 weeks.  · Moderate nonobstructive CAD/tachycardia induced systolic HF with recovery: Followed by [facility]. On statin/BB. Home Lasix resumed. Hold tonight's dose given patient is not drinking NTL. Re-evaluate labs  and fluid status tomorrow.  · BECKI: Home machine if available.   · Cirrhotic appearing liver with evidence of portal hypertension/splenomegaly seen on chest CT: LFTs and bilirubin okay.     · I discussed the patient's findings and my recommendations with patient, therapy, wife, nurse and Dr. Vivar.    Wife requesting IV fluids, but patient does not appear hypovolemic. Will monitor vitals closely for signs of volume depletion as well as renal function. Hold tonight's Lasix with likely reduced liquid intake d/t not liking modified diet. May need to expand antibiotics if no improvement as wife feels his right leg looks worse than baseline. His wounds like admission were felt colonized and not actively infected.      VTE Prophylaxis - Warfarin (home med).  Code Status - CPR but no intubation- discussed with patient and wife present.  Disposition - Anticipate discharge TBD.       ESTEFANY Kern  [facility]  07/25/22  11:11 EDT

## 2022-07-25 NOTE — CONSULTS
Referring Provider: No ref. provider found  Reason for Consultation: Recurrent aspiration pneumonia  Chief Complaint   Patient presents with   • Fever         Subjective   History of present illness: Patient is a 72-year-old male with a past medical history of chronic lower extremity edema, Charcot foot, dysphagia and chronic Irvin who presents in the setting of fever.    Patient reports that he was doing well at home without any acute change in his symptoms when his wife felt his skin and thought that he felt warm so took his temperature and he was found to have a fever so was brought back to the hospital for evaluation.  He reports he was feeling at his normal state of health without any acute complaints.  He denies any shortness of breath, nausea, vomiting, diarrhea, pain in his lower extremities, rash, or cough.    On presentation he was found to have a fever of 100.8 and was placed on 2 L for hypoxia.  Kasai ptosis was noted with WBC of 17.  Procalcitonin was elevated at 0.38 and he was started on vancomycin and cefepime.  Chest imaging showed worsening lower lobe infiltrates.    Past Medical History:   Diagnosis Date   • Allergic rhinitis    • Anxiety    • Aortectasia (HCC)     3cm infrarenal abdominal aorta   • Arthritis    • Atrial flutter (HCC) 2010    s/p ablation    • Charcot's joint of foot    • Chronic edema     both legs and sees wound care center at [place]    • Chronic venous insufficiency    • COPD (chronic obstructive pulmonary disease) (HCC)    • Coronary atherosclerosis     Cath 2010: diffuse 40-50% disease   • Diverticulosis    • Duodenitis    • Fatty liver    • Gastritis    • Gastroparesis    • Hematoma     post-operative; After catheterization, right groin, required surgical exploration   • Hyperlipidemia    • Hypertension    • Insomnia    • Internal hemorrhoids    • Open wound     izzy legs has teddy marrufog weekly at wound care center at [place]  pt does second dressing on left leg another time  during week   • Osteomyelitis (HCC)    • Paroxysmal atrial fibrillation (HCC)    • Peripheral neuropathy    • Popliteal artery aneurysm (HCC)     left, s/p stenting by Dr. Boston   • Skin cancer    • Sleep apnea     o2   • 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HCC)     due to flutter and afib; cath 2010 with nonobstructive disease   • Venous stasis    • Venous stasis ulcer (HCC)     bilateral legs        Past Surgical History:   Procedure Laterality Date   • BASAL CELL CARCINOMA EXCISION      ear and left side of face   • BRONCHOSCOPY N/A 4/12/2021    Procedure: BRONCHOSCOPY WITH BAL;  Surgeon: Bunny Lepe MD;  Location: [facility] ENDOSCOPY;  Service: Pulmonary;  Laterality: N/A;  PRE-HEMOPTYSIS  POST-SAME   • CARDIAC CATHETERIZATION     • CATARACT EXTRACTION     • COLONOSCOPY  09/28/2015    NBIH, diverticulosis, polyps   • COLONOSCOPY N/A 9/11/2018    Procedure: COLONOSCOPY TO CECUM  AND TERM. ILEUM WITH COLD SNARE POLYPECTOMIES;  Surgeon: Kane Lagunas MD;  Location: [facility] ENDOSCOPY;  Service: Gastroenterology   • COLONOSCOPY N/A 10/29/2019    Procedure: COLONOSCOPY TO TO CECUM AND TERMINAL ILEUM WITH HOT AND COLD SNARE POLYPECTOMIES;  Surgeon: Kane Lagunas MD;  Location: [facility] ENDOSCOPY;  Service: Gastroenterology   • HIP ARTHROPLASTY Right 2017   • JOINT REPLACEMENT Left    • OTHER SURGICAL HISTORY      Catheter ablation atrial flutter   • REPAIR ANEURYSM / PSEUDO ANEURYSM / RUPTURED ANEURYSM POPLITEAL ARTERY      Stent-Graft of the the left popliteal artery   • REPAIR KNEE LIGAMENT      Primary repair of knee ligament cruciate anterior right   • TONSILLECTOMY  1958   • TOTAL KNEE ARTHROPLASTY Bilateral    • UPPER GASTROINTESTINAL ENDOSCOPY  09/16/2014    acute gastritis, acute duodenitis       family history includes Emphysema in his father.     reports that he has been smoking cigarettes. He started smoking about 58 years ago. He has been smoking about 0.25 packs per day. He has never used smokeless  tobacco. He reports current alcohol use of about 14.0 standard drinks of alcohol per week. He reports that he does not use drugs.     Allergies   Allergen Reactions   • Cephalexin Hives     Tolerated ceftriaxone Jan 2022   • Codeine Nausea Only   • Silver Other (See Comments)       Medication:  Antibiotics:  Anti-Infectives (From admission, onward)    Ordered     Dose/Rate Route Frequency Start Stop    07/25/22 0405  vancomycin 1500 mg/500 mL 0.9% NS IVPB ([facility])        Ordering Provider: Lorenzo Segundo MD    1,500 mg  over 90 Minutes Intravenous Every 24 Hours 07/25/22 2100 08/01/22 2059 07/24/22 2322  cefepime 2 gm IVPB in 100 ml NS ([facility])        Ordering Provider: Lorenzo Segundo MD    2 g  over 4 Hours Intravenous Every 8 Hours 07/25/22 0800 08/01/22 0759    07/24/22 2239  vancomycin 2500 mg/500 mL 0.9% NS IVPB ([facility])        Ordering Provider: Eddie Julien MD    20 mg/kg × 124 kg  over 150 Minutes Intravenous Once 07/24/22 2330 07/25/22 0514    07/24/22 2322  Pharmacy to dose vancomycin        Ordering Provider: Lorenzo Segundo MD     Does not apply Continuous PRN 07/24/22 2322 07/31/22 2321    07/24/22 2239  cefepime 2 gm IVPB in 100 ml NS ([facility])        Ordering Provider: Eddie Julien MD    2 g  over 30 Minutes Intravenous Once 07/24/22 2241 07/25/22 0229          Review of Systems  Pertinent items are noted in HPI, all other systems reviewed and negative    Objective     Physical Exam:   Vital Signs   Temp:  [98 °F (36.7 °C)-100.8 °F (38.2 °C)] 99.2 °F (37.3 °C)  Heart Rate:  [50-81] 54  Resp:  [18-20] 18  BP: ()/(46-90) 150/90    GENERAL: Awake and alert, in no acute distress.   HEENT: Oropharynx is clear. Hearing is grossly normal.   EYES: PERRL. No conjunctival injection. No lid lag.   LYMPHATICS: No lymphadenopathy of the neck or inguinal regions.   HEART: Regular.  Bilateral lower extremity edema.  LUNGS: Normal work of breathing.  On 2 L nasal cannula.  No wheezing.  Bilateral lower  rhonchi.  GI: Soft, nontender, nondistended. No appreciable organomegaly.   SKIN: Bilateral lower extremity changes of venous stasis and chronic lymphedema.  Painted with Betadine however no acute erythema noted.  PSYCHIATRIC: Appropriate mood, affect, insight, and judgment.     Results Review:   I reviewed the patient's new clinical results.  I reviewed the patient's new imaging results and agree with the interpretation.  I reviewed the patient's other test results and agree with the interpretation    Lab Results   Component Value Date    WBC 17.71 (H) 07/24/2022    HGB 9.9 (L) 07/24/2022    HCT 28.7 (L) 07/24/2022    MCV 90.0 07/24/2022     07/24/2022       Lab Results   Component Value Date    VANCOTROUGH 22.90 (H) 07/16/2022       Lab Results   Component Value Date    GLUCOSE 94 07/24/2022    BUN 12 07/24/2022    CREATININE 0.95 07/24/2022    EGFRIFNONA 103 01/12/2022    EGFRIFAFRI 102 11/20/2018    BCR 12.6 07/24/2022    CO2 31.0 (H) 07/24/2022    CALCIUM 9.3 07/24/2022    PROTENTOTREF 6.1 04/26/2019    ALBUMIN 3.30 (L) 07/24/2022    LABIL2 1.1 04/26/2019    AST 16 07/24/2022    ALT 9 07/24/2022         Estimated Creatinine Clearance: 102.9 mL/min (by C-G formula based on SCr of 0.95 mg/dL).      Microbiology:  7/24 blood cultures in process  7/24 respiratory panel negative  7/25 strep pneumo urine antigen negative  7/25 Legionella urine antigen negative    Radiology:  7/24 chest x-ray reviewed by me with bilateral lower lobe patchy consolidation.    7/24 CT of the chest report reviewed with worsening patchy consolidation within the lower lobes bilaterally.    Assessment   #Sepsis  #Aspiration pneumonia  #Dysphagia  #COPD  #Bilateral lower extremity lymphedema  #Chronic Irvin catheter in the setting of urinary retention    This is an ongoing very difficult situation given the patient's resistance to modifying his diet to decrease risk of aspiration.  Appears to again have findings consistent with  aspiration pneumonia and we had a long discussion today about diet modification and he is agreeable at this stage.  I discussed with him that without diet modification he is going to repeatedly be admitted to the hospital with the same problem and he desires to avoid this so is agreeable to discussion with speech today.    Plan to transition his antibiotic therapy to Unasyn 3 g every 6 hours to attempt to cover normal aspiration organisms and avoid developing further resistance given his history of resistant pathogens.    Thank you for this consult.  We will continue to follow along and tailor antibiotics as the patient's clinical course evolves.

## 2022-07-25 NOTE — PLAN OF CARE
Goal Outcome Evaluation:  Plan of Care Reviewed With: patient        Progress: no change  Outcome Evaluation: Pt admitted to the floor this shift. Admitted for aspiration pnuemonia, bedside swallow study completed. Pt passed study. Pt remains NPO with sips with meds. Consult wound care for emilio boots. Wound on scrotum and right heel, cleansed and open to air. These were present on admission. Irvin cath in place, good UOP. Buttock red and blancable, mepilex applied on buttock. Q2 turn. C/o anxiety and pain, PRN medication given per chart. IV antibiotics. A&Ox4, confused at times. 2L NC, this is baseline for patient. ID consult placed. VSS, will continue to monitor.

## 2022-07-25 NOTE — THERAPY EVALUATION
Acute Care - Speech Language Pathology   Swallow Initial Evaluation [facility]     Patient Name: Jalen Lockwood  : 1950  MRN: [id number]  Today's Date: 2022               Admit Date: 2022    Visit Dx:     ICD-10-CM ICD-9-CM   1. Sepsis, due to unspecified organism, unspecified whether acute organ dysfunction present (HCC)  A41.9 038.9     995.91   2. Pneumonia of both lower lobes due to infectious organism  J18.9 486   3. Chronic respiratory failure with hypoxia (HCC)  J96.11 518.83     799.02   4. Hypokalemia  E87.6 276.8   5. Chronic anemia  D64.9 285.9     Patient Active Problem List   Diagnosis   • Chronic osteomyelitis (HCC)   • Foot pain   • Peripheral neuropathy   • Lymphedema of both lower extremities   • Permanent atrial fibrillation (HCC)   • Sleep apnea   • Chronic edema   • Obesity (BMI 30-39.9)   • COPD (chronic obstructive pulmonary disease) (HCC)   • Atrial flutter (HCC)   • 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HCC)   • Aortectasia (HCC)   • Popliteal artery aneurysm (HCC)   • Colon polyps   • Gastroparesis   • Insomnia   • Adenomatous polyp of colon   • Essential hypertension   • ETOH abuse   • Chronic anticoagulation   • Oropharyngeal dysphagia   • Tobacco abuse   • Thyromegaly   • Adrenal adenoma, left   • Retroperitoneal lymphadenopathy   • Anemia of chronic disease   • Thyroid nodule   • Charcot's joint of foot   • Abnormal CT scan, lumbar spine   • Alcohol dependence, in remission (HCC)   • Normocytic anemia   • Inguinal adenopathy   • Elevated lactic acid level   • Venous stasis dermatitis of both lower extremities   • Urinary retention   • Sepsis without acute organ dysfunction (HCC)   • Sepsis (HCC)   • Aspiration pneumonia (HCC)   • Chronic respiratory failure with hypoxia (HCC)     Past Medical History:   Diagnosis Date   • Allergic rhinitis    • Anxiety    • Aortectasia (HCC)     3cm infrarenal abdominal aorta   • Arthritis    • Atrial flutter (HCC)     s/p ablation     • Charcot's joint of foot    • Chronic edema     both legs and sees wound care center at [place]    • Chronic venous insufficiency    • COPD (chronic obstructive pulmonary disease) (HCC)    • Coronary atherosclerosis     Cath 2010: diffuse 40-50% disease   • Diverticulosis    • Duodenitis    • Fatty liver    • Gastritis    • Gastroparesis    • Hematoma     post-operative; After catheterization, right groin, required surgical exploration   • Hyperlipidemia    • Hypertension    • Insomnia    • Internal hemorrhoids    • Open wound     izzy legs has drsg chg weekly at wound care center at [place]  pt does second dressing on left leg another time during week   • Osteomyelitis (HCC)    • Paroxysmal atrial fibrillation (HCC)    • Peripheral neuropathy    • Popliteal artery aneurysm (HCC)     left, s/p stenting by Dr. Boston   • Skin cancer    • Sleep apnea     o2   • 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HCC)     due to flutter and afib; cath 2010 with nonobstructive disease   • Venous stasis    • Venous stasis ulcer (HCC)     bilateral legs      Past Surgical History:   Procedure Laterality Date   • BASAL CELL CARCINOMA EXCISION      ear and left side of face   • BRONCHOSCOPY N/A 4/12/2021    Procedure: BRONCHOSCOPY WITH BAL;  Surgeon: Bunny Lepe MD;  Location: [facility] ENDOSCOPY;  Service: Pulmonary;  Laterality: N/A;  PRE-HEMOPTYSIS  POST-SAME   • CARDIAC CATHETERIZATION     • CATARACT EXTRACTION     • COLONOSCOPY  09/28/2015    NBIH, diverticulosis, polyps   • COLONOSCOPY N/A 9/11/2018    Procedure: COLONOSCOPY TO CECUM  AND TERM. ILEUM WITH COLD SNARE POLYPECTOMIES;  Surgeon: Kane Lagunas MD;  Location: [facility] ENDOSCOPY;  Service: Gastroenterology   • COLONOSCOPY N/A 10/29/2019    Procedure: COLONOSCOPY TO TO CECUM AND TERMINAL ILEUM WITH HOT AND COLD SNARE POLYPECTOMIES;  Surgeon: Kane Lagunas MD;  Location: [facility] ENDOSCOPY;  Service: Gastroenterology   • HIP ARTHROPLASTY Right 2017   • JOINT  REPLACEMENT Left    • OTHER SURGICAL HISTORY      Catheter ablation atrial flutter   • REPAIR ANEURYSM / PSEUDO ANEURYSM / RUPTURED ANEURYSM POPLITEAL ARTERY      Stent-Graft of the the left popliteal artery   • REPAIR KNEE LIGAMENT      Primary repair of knee ligament cruciate anterior right   • TONSILLECTOMY  1958   • TOTAL KNEE ARTHROPLASTY Bilateral    • UPPER GASTROINTESTINAL ENDOSCOPY  09/16/2014    acute gastritis, acute duodenitis       SLP Recommendation and Plan  SLP Swallowing Diagnosis: suspected pharyngeal dysphagia (07/25/22 1200)  SLP Diet Recommendation: regular textures, nectar thick liquids, ice chips between meals after oral care, with supervision, water between meals after oral care, with supervision (07/25/22 1200)  Recommended Precautions and Strategies: upright posture during/after eating, small bites of food and sips of liquid, general aspiration precautions (07/25/22 1200)  SLP Rec. for Method of Medication Administration: meds whole, meds crushed, with thick liquids, with pudding or applesauce, as tolerated (07/25/22 1200)     Monitor for Signs of Aspiration: yes, notify SLP if any concerns (07/25/22 1200)  Recommended Diagnostics: VFSS (MBS) (07/25/22 1200)  Swallow Criteria for Skilled Therapeutic Interventions Met: demonstrates skilled criteria (07/25/22 1200)  Anticipated Discharge Disposition (SLP): anticipate therapy at next level of care (07/25/22 1200)  Rehab Potential/Prognosis, Swallowing: adequate, monitor progress closely (07/25/22 1200)  Therapy Frequency (Swallow): PRN (07/25/22 1200)  Predicted Duration Therapy Intervention (Days): until discharge (07/25/22 1200)           Plan of Care Reviewed With: spouse, patient  Outcome Evaluation: Clinical swallow evaluation completed. History or oropharyngeal dysphagia with silent aspiration of nectar thick liquids. Patient dislikes thickened liquids but is currently agreeable to a modified diet and repeat instrumental exam. Recommend:  regular diet, no mixed consistencxies, with nectar thick liquids. Meds as tolerated with nectar thick liquids or puree. Ice chips or small sips of water 30 minutes after meals follwing oral care. Upright for PO intake, small bites/sips. VFSS to further assess pharyngeal swallow and determine least restrictive diet.    Patient was not wearing a face mask during this therapy encounter. Therapist used appropriate personal protective equipment including gown, eye protection, mask and gloves.  Mask used was standard procedure mask. Appropriate PPE was worn during the entire therapy session. Hand hygiene was completed before and after therapy session. Patient is not in enhanced droplet precautions.     SWALLOW EVALUATION (last 72 hours)     SLP Adult Swallow Evaluation     Row Name 07/25/22 1200                   Rehab Evaluation    Document Type evaluation  -HS        Subjective Information no complaints  -HS        Patient Observations alert;cooperative;agree to therapy  -HS        Patient/Family/Caregiver Comments/Observations Wife present during evaluation. Had several questions re: dysphagia from previous admissions. SLP answered all questions with patient and wife.  -HS        Patient Effort good  -HS        Symptoms Noted During/After Treatment none  -HS                  General Information    Patient Profile Reviewed yes  -HS        Pertinent History Of Current Problem Patient admitted with fever, sepsis, aspiration pneumonia. Hx of oropharyngeal dysphagia with refusal of diet modifications. VFSS completed in April 2021 showed silent aspiration of thin liquids. Extensive education with the patient was completed on 7/15/22. Note: wife was not present for education that date. Patient refused diet modifications, but was agreeable to swallow therapy. Unfortunately swallow therapy was no initiated after discharge.  -HS        Current Method of Nutrition NPO  until SLP eval  -HS        Prior Level of Function-Swallowing  regular textures;nectar thick liquids;other (see comments)  per VFSS 04/2021  -        Plans/Goals Discussed with patient;spouse/S.O.  -HS        Barriers to Rehab none identified  -HS        Patient's Goals for Discharge return to PO diet  -        Family Goals for Discharge patient able to return to PO diet;other (see comments)  safest diet  -HS                  Oral Motor Structure and Function    Dentition Assessment upper dentures/partial in place  -        Secretion Management WNL/WFL  -HS        Mucosal Quality dry  -HS                  Oral Musculature and Cranial Nerve Assessment    Oral Motor General Assessment generalized oral motor weakness  -                  General Eating/Swallowing Observations    Respiratory Support Currently in Use nasal cannula  -HS        Eating/Swallowing Skills self-fed  -HS        Positioning During Eating upright in chair  -HS        Utensils Used spoon;cup  -HS        Consistencies Trialed ice chips;thin liquids;nectar/syrup-thick liquids;pureed;mechanical soft, no mixed consistencies;regular textures  -HS                  Clinical Swallow Eval    Clinical Swallow Evaluation Summary Clinical swallow evaluation completed. No overt s/s of aspiration with ice chips, nectar thick liquids via cup, puree, mechanical soft, or regular. With cup sips of thin liquids patient demonstrated delayed throat clearing and mild cough x1. Discussed history of silent aspiration and concerns with aspiration risk with thin liquids. Patient and wife verbalized understanding. Paitent agreeable to VFSS to further assess swallow, determine least restrictive diet, and help with plan of care.  -HS                  Swallowing Quality of Life Assessment    Aversion noted with  nectar;other (see comments)  states the texture bothers him  -HS        Education and counseling provided Signs of aspiration;Silent aspiration;Risks of aspiration;Oral care recommendations and rationale  Water protocol   -                  SLP Evaluation Clinical Impression    SLP Swallowing Diagnosis suspected pharyngeal dysphagia  -        Functional Impact risk of aspiration/pneumonia;risk of malnutrition;risk of dehydration  -        Rehab Potential/Prognosis, Swallowing adequate, monitor progress closely  -        Swallow Criteria for Skilled Therapeutic Interventions Met demonstrates skilled criteria  -                  Recommendations    Therapy Frequency (Swallow) PRN  -        Predicted Duration Therapy Intervention (Days) until discharge  -        SLP Diet Recommendation regular textures;nectar thick liquids;ice chips between meals after oral care, with supervision;water between meals after oral care, with supervision  -        Recommended Diagnostics VFSS (MBS)  -        Recommended Precautions and Strategies upright posture during/after eating;small bites of food and sips of liquid;general aspiration precautions  -        Oral Care Recommendations Oral Care BID/PRN;Before ice/water  -HS        SLP Rec. for Method of Medication Administration meds whole;meds crushed;with thick liquids;with pudding or applesauce;as tolerated  -        Monitor for Signs of Aspiration yes;notify SLP if any concerns  -        Anticipated Discharge Disposition (SLP) anticipate therapy at next level of care  -              User Key  (r) = Recorded By, (t) = Taken By, (c) = Cosigned By    Initials Name Effective Dates     Lalitha Loo, MS CCC-SLP 06/08/18 -                 EDUCATION  The patient has been educated in the following areas:   Dysphagia (Swallowing Impairment) Oral Care/Hydration Modified Diet Instruction.     Time Calculation:    Time Calculation- SLP     Row Name 07/25/22 2964             Time Calculation- SLP    SLP Start Time 1000  -      SLP Received On 07/25/22  -              Untimed Charges    SLP Eval/Re-eval  ST Eval Oral Pharyng Swallow - 13996  -      [id number] Eval Oral Pharyng  Swallow Minutes 75  -HS              Total Minutes    Untimed Charges Total Minutes 75  -HS       Total Minutes 75  -HS            User Key  (r) = Recorded By, (t) = Taken By, (c) = Cosigned By    Initials Name Provider Type    Lalitha Vaughn MS CCC-SLP Speech and Language Pathologist                Therapy Charges for Today     Code Description Service Date Service Provider Modifiers Qty    [id number] HC ST EVAL ORAL PHARYNG SWALLOW 5 7/25/2022 Lalitha Loo MS CCC-SLP GN 1               MS ANGELITA Alegre  7/25/2022

## 2022-07-26 NOTE — PROGRESS NOTES
Name: Jalen Lockwood ADMIT: 2022   : 1950  PCP: Marc Ludwig MD    MRN: [id number] LOS: 2 days   AGE/SEX: 72 y.o. male  ROOM: [facility]     Subjective   Subjective   Resting in chair. No acute issues overnight. No nausea or vomiting. No chest pain, dyspnea. Cough is improving. He didn't like the NTL and now being recommended to have HTL per video swallow. He states he is willing to comply.     Objective   Objective   Vital Signs  Temp:  [97.6 °F (36.4 °C)-98.2 °F (36.8 °C)] 97.6 °F (36.4 °C)  Heart Rate:  [63-81] 73  Resp:  [17-20] 18  BP: (105-118)/(59-73) 118/73  SpO2:  [93 %-97 %] 93 %  on  Flow (L/min):  [1.5-2] 1.5;   Device (Oxygen Therapy): nasal cannula  Body mass index is 33.25 kg/m².     Physical Exam  Vitals and nursing note reviewed.   Constitutional:       Appearance: He is obese. He is ill-appearing.   HENT:      Head: Normocephalic and atraumatic.   Cardiovascular:      Rate and Rhythm: Normal rate. Rhythm irregular.      Pulses: Normal pulses.   Pulmonary:      Effort: Pulmonary effort is normal. No respiratory distress.      Comments: Diminished. On 2 L.   Abdominal:      General: Bowel sounds are normal. There is no distension.      Palpations: Abdomen is soft.      Tenderness: There is no abdominal tenderness.   Genitourinary:     Comments: Irvin in place with dark yellow urine noted.  Musculoskeletal:         General: Swelling (BLE with right greater than left on the thigh) present. Normal range of motion.      Comments: BLE wrapped   Skin:     General: Skin is warm.      Findings: Erythema (right thigh) present.   Neurological:      Mental Status: He is alert and oriented to person, place, and time.      Sensory: No sensory deficit.      Motor: Weakness present.      Coordination: Coordination normal.   Psychiatric:         Mood and Affect: Mood normal.         Behavior: Behavior normal.      Results Review:       I reviewed the patient's new clinical results.  Results from last  7 days   Lab Units 07/26/22  0606 07/25/22  1013 07/24/22 1916 07/22/22  1014   WBC 10*3/mm3 6.36 10.60 17.71* 8.60   HEMOGLOBIN g/dL 9.2* 9.9* 9.9* 10.7*   PLATELETS 10*3/mm3 154 166 187 244     Results from last 7 days   Lab Units 07/26/22  0606 07/25/22  1013 07/24/22 1916 07/22/22  1014   SODIUM mmol/L 140 137 137 137   POTASSIUM mmol/L 3.7 3.7 3.1* 3.8   CHLORIDE mmol/L 99 96* 95* 96*   CO2 mmol/L 32.0* 30.0* 31.0* 29.8*   BUN mg/dL 13 11 12 12   CREATININE mg/dL 0.83 0.87 0.95 0.89   GLUCOSE mg/dL 100* 89 94 90   Estimated Creatinine Clearance: 117.8 mL/min (by C-G formula based on SCr of 0.83 mg/dL).  Results from last 7 days   Lab Units 07/26/22  0606 07/24/22 1916 07/22/22  1014   ALBUMIN g/dL 3.20* 3.30* 3.70   BILIRUBIN mg/dL 0.7 1.1 0.8   ALK PHOS U/L 90 102 114   AST (SGOT) U/L 11 16 14   ALT (SGPT) U/L 9 9 9     Results from last 7 days   Lab Units 07/26/22  0606 07/25/22  1013 07/24/22 1916 07/22/22  1014   CALCIUM mg/dL 9.3 9.5 9.3 9.4   ALBUMIN g/dL 3.20*  --  3.30* 3.70   MAGNESIUM mg/dL  --  2.1  --   --      Results from last 7 days   Lab Units 07/24/22 1916   PROCALCITONIN ng/mL 0.38*   LACTATE mmol/L 2.0     No results found for: HGBA1C, POCGLU    amoxicillin-clavulanate, 1 tablet, Oral, Q12H  budesonide-formoterol, 2 puff, Inhalation, BID - RT  docusate sodium, 100 mg, Oral, Daily  ferrous sulfate, 325 mg, Oral, Daily With Breakfast  finasteride, 5 mg, Oral, Daily  gabapentin, 600 mg, Oral, Q8H  metoclopramide, 10 mg, Oral, 4x Daily AC & at Bedtime  metoprolol succinate XL, 25 mg, Oral, Daily  pravastatin, 20 mg, Oral, Daily  tamsulosin, 0.4 mg, Oral, Nightly  warfarin, 7.5 mg, Oral, Daily      Pharmacy to dose warfarin,     Diet Dysphagia; IV - Mechanical Soft No Mixed Consistencies; Honey Thick; Extra Sauce / Gravy       Assessment/Plan     Active Hospital Problems    Diagnosis  POA   • **Aspiration pneumonia ([facility]) [J69.0]  Yes   • Sepsis ([facility]) [A41.9]  Yes   • Chronic respiratory  failure with hypoxia (HCC) [J96.11]  Yes   • Oropharyngeal dysphagia [R13.12]  Yes   • Essential hypertension [I10]  Yes   • 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HCC) [R00.0, I43]  Yes   • COPD (chronic obstructive pulmonary disease) (HCC) [J44.9]  Yes   • Lymphedema of both lower extremities [I89.0]  Yes   • Permanent atrial fibrillation (HCC) [I48.21]  Yes   • Obesity (BMI 30-39.9) [E66.9]  Yes   • Sleep apnea [G47.30]  Yes      Resolved Hospital Problems   No resolved problems to display.     Mr. Lockwood is a 72 y.o. smoker with a history of oropharyngeal dysphagia, paroxysmal afib on warfarin, chronic urinary retention with an indwelling escamilla catheter, COPD with chronic hypoxic respiratory failure on 2L home O2, peripheral vascular disease, non obstructive CAD, BECKI who presents with recurrent aspiration pneumonia.     · Aspiration pneumonia: Remains on home oxygen requirements. ID following and has switched Unasyn to oral Augmentin. ST has seen and VFSS performed- now recommending HTL, mechanical soft food with no mixed consistencies as well as small bites/drinks with double swallow. Free water protocol with ice. Felt to be candidate for EMST so will plan to order ST at home. Aspiration precautions and pulmonary toilet.   · Paroxysmal afib: Rate controlled at present on metoprolol. Warfarin resumed with pharm to dose- therapeutic today.   · Chronic urinary retention with indwelling urinary catheter: Catheter exchanged last admission. Urine here looks okay with no signs of active infection.   · Peripheral vascular disease s/p stenting in 2016: On statin and warfarin.  · COPD with chronic hypoxic respiratory failure on 2L home O2: Pulmonology evaluated last admission. Home medications resumed. Pulmonary nodules seen on CT chest during last admission with plans for PET in 6 weeks.  · Moderate nonobstructive CAD/tachycardia induced systolic HF with recovery: Followed by [facility]. On statin/BB. Will resume  home Lasix today and follow up labs in AM to assure his liquid oral intake is keeping up with his output given fluid modifications.  · BECKI: Home machine if available.   · Cirrhotic appearing liver with evidence of portal hypertension/splenomegaly seen on chest CT: LFTs and bilirubin okay.      · I discussed the patient's findings and my recommendations with patient, nurse and Dr. Vivar.    Likely home tomorrow with ST and PT assuming AM labs okay and no recurrence of fever on oral therapy. Will update wife this afternoon about plan and modified diet.       VTE Prophylaxis - Warfarin (home med).  Code Status - CPR but no intubation- discussed with patient and wife present.  Disposition - Anticipate discharge as above.    ESTEFANY Kern  [facility]  07/26/22  13:21 EDT

## 2022-07-26 NOTE — PLAN OF CARE
Goal Outcome Evaluation:  Plan of Care Reviewed With: patient        Progress: no change  Outcome Evaluation: Patient had c/o pain, treated with PRN Norco. Got Norco adjusted to q4 vs q6 which is patient home regimen. Up with assist of 1 and walker otherwise up with assist of 2 and gait belt. Chronic escamilla. Refuses to lay in bed, has been in chair all night. Waffle cushion and pillows used in chair. IV antibiotics continued as scheduled. Xanax given once. Remains on 2 L O2, which is baseline per patient. Dysphagia with NTL even though patient is not happy about it. Daily weight. VSS. Will continue to monitor.

## 2022-07-26 NOTE — PROGRESS NOTES
LOS: 2 days     Chief Complaint: Sepsis    Interval History: Patient reports he is feeling back to baseline today.  Denies any shortness of breath.  Underwent swallow study this morning.  He is attempting to follow his outlined dysphagia diet.  Tolerating antibiotics well.  No fevers or chills.    Vital Signs  Temp:  [97.6 °F (36.4 °C)-98.2 °F (36.8 °C)] 97.6 °F (36.4 °C)  Heart Rate:  [63-81] 73  Resp:  [17-20] 18  BP: (105-134)/(59-84) 118/73    Physical Exam:  General: In no acute distress  HEENT: Oropharynx clear, moist mucous membranes  Cardiovascular: RRR, normal S1 and S2, no M/R/G  Respiratory: Normal work of breathing.  On room air.  GI: Soft, NT/ND, + bowel sounds bilaterally, no masses  Skin: No rashes or lesions  Extremities: Bilateral lower extremity lymphedema and venous stasis.  Some petechial changes of the right thigh.  Access: Peripheral IV.    Antibiotics:  Anti-Infectives (From admission, onward)    Ordered     Dose/Rate Route Frequency Start Stop    07/25/22 0957  ampicillin-sulbactam (UNASYN) 3 g in sodium chloride 0.9 % 100 mL IVPB-VTB        Ordering Provider: Fahad Jaramillo,     3 g Intravenous Every 6 Hours 07/25/22 1300 08/01/22 1259    07/24/22 2239  vancomycin 2500 mg/500 mL 0.9% NS IVPB ([facility])        Ordering Provider: Eddie Julien MD    20 mg/kg × 124 kg  over 150 Minutes Intravenous Once 07/24/22 2330 07/25/22 0514    07/24/22 2239  cefepime 2 gm IVPB in 100 ml NS (VTB)        Ordering Provider: Eddie Julien MD    2 g  over 30 Minutes Intravenous Once 07/24/22 2241 07/25/22 0229           Results Review:     I reviewed the patient's new clinical results.    Lab Results   Component Value Date    WBC 6.36 07/26/2022    HGB 9.2 (L) 07/26/2022    HCT 27.1 (L) 07/26/2022    MCV 91.2 07/26/2022     07/26/2022     Lab Results   Component Value Date    GLUCOSE 100 (H) 07/26/2022    BUN 13 07/26/2022    CREATININE 0.83 07/26/2022    EGFRIFNONA 103 01/12/2022     EGFRIFAFRI 102 11/20/2018    BCR 15.7 07/26/2022    CO2 32.0 (H) 07/26/2022    CALCIUM 9.3 07/26/2022    PROTENTOTREF 6.1 04/26/2019    ALBUMIN 3.20 (L) 07/26/2022    LABIL2 1.1 04/26/2019    AST 11 07/26/2022    ALT 9 07/26/2022       Microbiology:  7/24 blood cultures in process  7/24 respiratory panel negative  7/25 strep pneumo urine antigen negative  7/25 Legionella urine antigen negative    Assessment    #Sepsis  #Aspiration pneumonia  #Dysphagia  #COPD  #Bilateral lower extremity lymphedema  #Chronic Irvin catheter in the setting of urinary retention    Patient is now status post swallow study this morning with evidence of sinus pressure with thin and nectar thick liquids intermittently.  Continue to suspect that this is likely driving his recurrent pneumonias.  Patient would benefit from any resources that speech has to offer regarding his ongoing dysphagia to attempt to decrease his ongoing risk.    Also discussed with his wife today the modifications to lifestyle to help control his lymphedema and venous stasis however I suspect he will continue to be at high risk for ongoing cellulitis.    Plan to transition patient to p.o. Augmentin 875 twice daily given his clinical improvement and resolution of fevers to complete out a 7-day course.    Thank you for allowing me to be involved in the care of this patient. Infectious diseases will sign off at this time with antibiotics plan in place, but please call me at [phone] if any further ID questions or new ID concerns.

## 2022-07-26 NOTE — THERAPY TREATMENT NOTE
Acute Care - Speech Language Pathology   Swallow Treatment Note [facility]     Patient Name: Jalen Lockwood  : 1950  MRN: [id number]  Today's Date: 2022               Admit Date: 2022    Visit Dx:     ICD-10-CM ICD-9-CM   1. Sepsis, due to unspecified organism, unspecified whether acute organ dysfunction present ([facility])  A41.9 038.9     995.91   2. Pneumonia of both lower lobes due to infectious organism  J18.9 486   3. Chronic respiratory failure with hypoxia (HCC)  J96.11 518.83     799.02   4. Hypokalemia  E87.6 276.8   5. Chronic anemia  D64.9 285.9   6. Aspiration pneumonia of both lower lobes, unspecified aspiration pneumonia type (HCC)  J69.0 507.0     Patient Active Problem List   Diagnosis   • Chronic osteomyelitis (HCC)   • Foot pain   • Peripheral neuropathy   • Lymphedema of both lower extremities   • Permanent atrial fibrillation (HCC)   • Sleep apnea   • Chronic edema   • Obesity (BMI 30-39.9)   • COPD (chronic obstructive pulmonary disease) (HCC)   • Atrial flutter (HCC)   • 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HCC)   • Aortectasia (HCC)   • Popliteal artery aneurysm (HCC)   • Colon polyps   • Gastroparesis   • Insomnia   • Adenomatous polyp of colon   • Essential hypertension   • ETOH abuse   • Chronic anticoagulation   • Oropharyngeal dysphagia   • Tobacco abuse   • Thyromegaly   • Adrenal adenoma, left   • Retroperitoneal lymphadenopathy   • Anemia of chronic disease   • Thyroid nodule   • Charcot's joint of foot   • Abnormal CT scan, lumbar spine   • Alcohol dependence, in remission (HCC)   • Normocytic anemia   • Inguinal adenopathy   • Elevated lactic acid level   • Venous stasis dermatitis of both lower extremities   • Urinary retention   • Sepsis without acute organ dysfunction (HCC)   • Sepsis (HCC)   • Aspiration pneumonia (HCC)   • Chronic respiratory failure with hypoxia (HCC)     Past Medical History:   Diagnosis Date   • Allergic rhinitis    • Anxiety    • Aortectasia  (HCC)     3cm infrarenal abdominal aorta   • Arthritis    • Atrial flutter (HCC) 2010    s/p ablation    • Charcot's joint of foot    • Chronic edema     both legs and sees wound care center at [place]    • Chronic venous insufficiency    • COPD (chronic obstructive pulmonary disease) (HCC)    • Coronary atherosclerosis     Cath 2010: diffuse 40-50% disease   • Diverticulosis    • Duodenitis    • Fatty liver    • Gastritis    • Gastroparesis    • Hematoma     post-operative; After catheterization, right groin, required surgical exploration   • Hyperlipidemia    • Hypertension    • Insomnia    • Internal hemorrhoids    • Open wound     izzy legs has drsg chg weekly at wound care center at [place]  pt does second dressing on left leg another time during week   • Osteomyelitis (HCC)    • Paroxysmal atrial fibrillation (HCC)    • Peripheral neuropathy    • Popliteal artery aneurysm (HCC)     left, s/p stenting by Dr. Boston   • Skin cancer    • Sleep apnea     o2   • 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HCC)     due to flutter and afib; cath 2010 with nonobstructive disease   • Venous stasis    • Venous stasis ulcer (HCC)     bilateral legs      Past Surgical History:   Procedure Laterality Date   • BASAL CELL CARCINOMA EXCISION      ear and left side of face   • BRONCHOSCOPY N/A 4/12/2021    Procedure: BRONCHOSCOPY WITH BAL;  Surgeon: Bunny Lepe MD;  Location: [facility] ENDOSCOPY;  Service: Pulmonary;  Laterality: N/A;  PRE-HEMOPTYSIS  POST-SAME   • CARDIAC CATHETERIZATION     • CATARACT EXTRACTION     • COLONOSCOPY  09/28/2015    NBIH, diverticulosis, polyps   • COLONOSCOPY N/A 9/11/2018    Procedure: COLONOSCOPY TO CECUM  AND TERM. ILEUM WITH COLD SNARE POLYPECTOMIES;  Surgeon: Kane Lagunas MD;  Location: [facility] ENDOSCOPY;  Service: Gastroenterology   • COLONOSCOPY N/A 10/29/2019    Procedure: COLONOSCOPY TO TO CECUM AND TERMINAL ILEUM WITH HOT AND COLD SNARE POLYPECTOMIES;  Surgeon: Kane Lagunas MD;   Location: [facility] ENDOSCOPY;  Service: Gastroenterology   • HIP ARTHROPLASTY Right 2017   • JOINT REPLACEMENT Left    • OTHER SURGICAL HISTORY      Catheter ablation atrial flutter   • REPAIR ANEURYSM / PSEUDO ANEURYSM / RUPTURED ANEURYSM POPLITEAL ARTERY      Stent-Graft of the the left popliteal artery   • REPAIR KNEE LIGAMENT      Primary repair of knee ligament cruciate anterior right   • TONSILLECTOMY  1958   • TOTAL KNEE ARTHROPLASTY Bilateral    • UPPER GASTROINTESTINAL ENDOSCOPY  09/16/2014    acute gastritis, acute duodenitis       SLP Recommendation and Plan  SLP Swallowing Diagnosis: moderate, oral dysphagia, pharyngeal dysphagia (07/26/22 0900)  SLP Diet Recommendation: mechanical soft with no mixed consistencies, honey thick liquids, ice chips between meals after oral care, with supervision (07/26/22 0900)  Recommended Precautions and Strategies: upright posture during/after eating, small bites of food and sips of liquid, multiple swallows per bite of food, multiple swallows per sip of liquid, assist with feeding (07/26/22 0900)  SLP Rec. for Method of Medication Administration: meds whole, meds crushed, with thick liquids, with pudding or applesauce, as tolerated (07/26/22 0900)     Monitor for Signs of Aspiration: yes, notify SLP if any concerns (07/26/22 0900)  Recommended Diagnostics: VFSS (MBS) (07/26/22 0900)  Swallow Criteria for Skilled Therapeutic Interventions Met: demonstrates skilled criteria (07/26/22 0900)  Anticipated Discharge Disposition (SLP): anticipate therapy at next level of care (07/26/22 0900)  Rehab Potential/Prognosis, Swallowing: adequate, monitor progress closely (07/26/22 0900)  Therapy Frequency (Swallow): PRN (07/26/22 0900)  Predicted Duration Therapy Intervention (Days): until discharge (07/26/22 0900)                                  Plan of Care Reviewed With: patient  Progress: improving  Outcome Evaluation: Patient seen for dysphagia treatment and initial training  with Expiratory Muscle Strength Trainer (EMST). Pt with threshold of 60 cm of H20; therapeutic level set at 45 cm of H20. Patient completed 5 sets of 5 breaths. Data sheet and instruction sheet provided. Pt expressed some confusion with protocol at times. Unable to read instructions due to glasses being at home. Pt would benefit from continued speech therapy at next level of care for carryover of EMST. Pt was instructed to complete 5 sets of 5 breaths x1 a day for 5 weeks (increasing resistance each week). Plan for repeat VFSS after 5 weeks of training.      SWALLOW EVALUATION (last 72 hours)     SLP Adult Swallow Evaluation     Row Name 07/26/22 1400 07/26/22 0900 07/25/22 1200             Rehab Evaluation    Document Type therapy note (daily note)  - evaluation  - evaluation  -      Subjective Information -- no complaints  -SH no complaints  -HS      Patient Observations -- -- alert;cooperative;agree to therapy  -      Patient/Family/Caregiver Comments/Observations -- -- Wife present during evaluation. Had several questions re: dysphagia from previous admissions. SLP answered all questions with patient and wife.  -HS      Patient Effort -- -- good  -HS      Symptoms Noted During/After Treatment -- -- none  -HS              General Information    Patient Profile Reviewed -- yes  -SH yes  -HS      Pertinent History Of Current Problem -- Asp pna  -SH Patient admitted with fever, sepsis, aspiration pneumonia. Hx of oropharyngeal dysphagia with refusal of diet modifications. VFSS completed in April 2021 showed silent aspiration of thin liquids. Extensive education with the patient was completed on 7/15/22. Note: wife was not present for education that date. Patient refused diet modifications, but was agreeable to swallow therapy. Unfortunately swallow therapy was no initiated after discharge.  -      Current Method of Nutrition -- mechanical soft, no mixed consistencies;nectar/syrup-thick liquids  - NPO   until SLP eval  -      Prior Level of Function-Swallowing -- nectar thick liquids;regular textures  - regular textures;nectar thick liquids;other (see comments)  per VFSS 04/2021  -      Plans/Goals Discussed with -- patient;agreed upon  - patient;spouse/S.O.  -      Barriers to Rehab -- none identified  - none identified  -      Patient's Goals for Discharge -- return to regular diet  - return to PO diet  -      Family Goals for Discharge -- -- patient able to return to PO diet;other (see comments)  safest diet  -              Pain Scale: Numbers Pre/Post-Treatment    Pretreatment Pain Rating 0/10 - no pain  - 0/10 - no pain  - --              Oral Motor Structure and Function    Dentition Assessment -- edentulous  - upper dentures/partial in place  -      Secretion Management -- -- WNL/WFL  -      Mucosal Quality -- -- dry  -              Oral Musculature and Cranial Nerve Assessment    Oral Motor General Assessment -- -- generalized oral motor weakness  -              General Eating/Swallowing Observations    Respiratory Support Currently in Use -- -- nasal cannula  -      Eating/Swallowing Skills -- -- self-fed  -      Positioning During Eating -- -- upright in chair  -      Utensils Used -- -- spoon;cup  -      Consistencies Trialed -- -- ice chips;thin liquids;nectar/syrup-thick liquids;pureed;mechanical soft, no mixed consistencies;regular textures  -              Clinical Swallow Eval    Clinical Swallow Evaluation Summary -- --  - Clinical swallow evaluation completed. No overt s/s of aspiration with ice chips, nectar thick liquids via cup, puree, mechanical soft, or regular. With cup sips of thin liquids patient demonstrated delayed throat clearing and mild cough x1. Discussed history of silent aspiration and concerns with aspiration risk with thin liquids. Patient and wife verbalized understanding. Paitent agreeable to VFSS to further assess swallow, determine  least restrictive diet, and help with plan of care.  -              MBS/VFSS Interpretation    VFSS Summary -- Patient presents with moderate oropharyngeal dysphagia  characterized by lingual weakness, poor protective response to aspiration, swallow mistiming, reduced duration of CP opening, and reduced hyoid excursion. Premature spillage to the pyriforms before the swallow with nectar via cup with intermittent deep penetration to vocal cords during the swallow. Transient penetration during the swallow inconsistently with nectar via straw. Improved swallow initiation with honey via cup and straw, transient shallow penetration during the swallow. Silent aspiration during and after the swallow with thins via cup without cough response. Cued small drink thins did eliminate aspiration, but non transient penetration still occured. Chin tuck not appropriate d/t pharyngeal residue at the pyriforms. Penetration also occured after the swallow d/t spillover from the pyriforms. Normal swallow initiation with puree. Mild pharyngeal residue post swallow. Spill to the pyriforms before the swallow with thin portion of mech soft. Poor mastication d/t dentures not being in place. Pharyngeal residue with single peach, patient required cue to clear. Increased mastication time with regular solids. Severe residue at the pyriforms after the initial swallow with regular, patient cleared residue to mild with an additional swallow without cues. Silent aspiration during the swallow with nectar via cup wash with regular.  -SH --              SLP Communication to Radiology    Summary Statement -- Radiologist present: Dr. Noguera. Silent aspiration with thins and nectar intermittently. No aspiration with honey or puree. Pharyngeal residue with solids.  -SH --              Swallowing Quality of Life Assessment    Aversion noted with  -- -- nectar;other (see comments)  states the texture bothers him  -      Education and counseling provided  -- -- Signs of aspiration;Silent aspiration;Risks of aspiration;Oral care recommendations and rationale  Water protocol  -              SLP Evaluation Clinical Impression    SLP Swallowing Diagnosis -- moderate;oral dysphagia;pharyngeal dysphagia  - suspected pharyngeal dysphagia  -      Functional Impact -- risk of aspiration/pneumonia;risk of malnutrition;risk of dehydration  - risk of aspiration/pneumonia;risk of malnutrition;risk of dehydration  -      Rehab Potential/Prognosis, Swallowing -- adequate, monitor progress closely  - adequate, monitor progress closely  -      Swallow Criteria for Skilled Therapeutic Interventions Met -- demonstrates skilled criteria  - demonstrates skilled criteria  -              Recommendations    Therapy Frequency (Swallow) -- PRN  - PRN  -      Predicted Duration Therapy Intervention (Days) -- until discharge  - until discharge  -      SLP Diet Recommendation -- mechanical soft with no mixed consistencies;honey thick liquids;ice chips between meals after oral care, with supervision  - regular textures;nectar thick liquids;ice chips between meals after oral care, with supervision;water between meals after oral care, with supervision  -      Recommended Diagnostics -- VFSS (MBS)  - VFSS (MBS)  -      Recommended Precautions and Strategies -- upright posture during/after eating;small bites of food and sips of liquid;multiple swallows per bite of food;multiple swallows per sip of liquid;assist with feeding  - upright posture during/after eating;small bites of food and sips of liquid;general aspiration precautions  -      Oral Care Recommendations -- Oral Care BID/PRN  - Oral Care BID/PRN;Before ice/water  -      SLP Rec. for Method of Medication Administration -- meds whole;meds crushed;with thick liquids;with pudding or applesauce;as tolerated  - meds whole;meds crushed;with thick liquids;with pudding or applesauce;as tolerated  -       Monitor for Signs of Aspiration -- yes;notify SLP if any concerns  -SH yes;notify SLP if any concerns  -      Anticipated Discharge Disposition (SLP) -- anticipate therapy at next level of care  - anticipate therapy at next level of care  -              Swallow Goals (SLP)    Swallow STGs -- diet tolerance goal selection (SLP)  -SH --      Diet Tolerance Goal Selection (SLP) -- Patient will tolerate trials of  -SH --              (STG) Patient will tolerate trials of    Consistencies Trialed (Tolerate trials) -- soft ground textures;honey/ moderately thick liquids  -SH --      Desired Outcome (Tolerate trials) -- without signs/symptoms of aspiration  -SH --      [place] (Tolerate trials) -- independently (over 90% accuracy)  - --            User Key  (r) = Recorded By, (t) = Taken By, (c) = Cosigned By    Initials Name Effective Dates     Lalitha Loo, MS CCC-SLP 06/08/18 -      Griselda Orellana MS CCC-SLP 06/16/21 -                 EDUCATION  The patient has been educated in the following areas:   Dysphagia (Swallowing Impairment).        SLP GOALS     Row Name 07/26/22 1400 07/26/22 0900          (STG) Patient will tolerate trials of    Consistencies Trialed (Tolerate trials) -- soft ground textures;honey/ moderately thick liquids  -SH     Desired Outcome (Tolerate trials) -- without signs/symptoms of aspiration  -SH     [place] (Tolerate trials) -- independently (over 90% accuracy)  -            ([facility]) Pharyngeal Strengthening Exercise Goal 1 (SLP)    Activity (Pharyngeal Strengthening Goal 1, SLP) increase anterior movement of the hyolaryngeal complex  -SH --     Increase Anterior Movement of the Hyolaryngeal Complex EMST  - --     [place]/Accuracy (Pharyngeal Strengthening Goal 1, SLP) independently (over 90% accuracy)  -SH --     Time Frame (Pharyngeal Strengthening Goal 1, SLP) other (see comments)  5 weeks as evidenced by repeat VFSS  - --     Progress/Outcomes (Pharyngeal  Strengthening Goal 1, SLP) good progress toward goal  -SH --     Comment (Pharyngeal Strengthening Goal 1, SLP) Progress: Patient seen for dysphagia treatment and initial training with Expiratory Muscle Strength Trainer (EMST). Pt with threshold of 60 cm of H20; therapeutic level set at 45 cm of H20. Patient completed 5 sets of 5 breaths. Data sheet and instruction sheet provided. Pt expressed some confusion with protocol at times. Unable to read instructions due to glasses being at home. Pt would benefit from continued speech therapy at next level of care for carryover of EMST. Pt was instructed to complete 5 sets of 5 breaths x1 a day for 5 weeks (increasing resistance each week). Plan for repeat VFSS after 5 weeks of training.  -SH --           User Key  (r) = Recorded By, (t) = Taken By, (c) = Cosigned By    Initials Name Provider Type    Griselda Patel MS CCC-SLP Speech and Language Pathologist                   Time Calculation:    Time Calculation- SLP     Row Name 07/26/22 1501 07/26/22 0949          Time Calculation- SLP    SLP Start Time 1300  -SH 0800  -SH     SLP Received On 07/26/22  -SH 07/26/22  -            Untimed Charges    43313-YT Motion Fluoro Eval Swallow Minutes -- 75  -SH     18191-PL Treatment Swallow Minutes 60  -SH --            Total Minutes    Untimed Charges Total Minutes 60  -SH 75  -SH      Total Minutes 60  -SH 75  -SH           User Key  (r) = Recorded By, (t) = Taken By, (c) = Cosigned By    Initials Name Provider Type     Griselda Orellana MS CCC-SLP Speech and Language Pathologist                Therapy Charges for Today     Code Description Service Date Service Provider Modifiers Qty    65217833433 HC ST MOTION FLUORO EVAL SWALLOW 5 7/26/2022 Griselda Orellana MS CCC-SLP GN 1    [id number] HC ST TREATMENT SWALLOW 4 7/26/2022 Griselda Orellana MS CCC-SUELLEN GN 1               MS ANGELITA Ponce  7/26/2022

## 2022-07-26 NOTE — THERAPY TREATMENT NOTE
Patient Name: Jalen Lockwood  : 1950    MRN: [id number]                              Today's Date: 2022       Admit Date: 2022    Visit Dx:     ICD-10-CM ICD-9-CM   1. Sepsis, due to unspecified organism, unspecified whether acute organ dysfunction present ([facility])  A41.9 038.9     995.91   2. Pneumonia of both lower lobes due to infectious organism  J18.9 486   3. Chronic respiratory failure with hypoxia (HCC)  J96.11 518.83     799.02   4. Hypokalemia  E87.6 276.8   5. Chronic anemia  D64.9 285.9   6. Aspiration pneumonia of both lower lobes, unspecified aspiration pneumonia type ([facility])  J69.0 507.0     Patient Active Problem List   Diagnosis   • Chronic osteomyelitis (HCC)   • Foot pain   • Peripheral neuropathy   • Lymphedema of both lower extremities   • Permanent atrial fibrillation (HCC)   • Sleep apnea   • Chronic edema   • Obesity (BMI 30-39.9)   • COPD (chronic obstructive pulmonary disease) (HCC)   • Atrial flutter (HCC)   • 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HCC)   • Aortectasia (HCC)   • Popliteal artery aneurysm (HCC)   • Colon polyps   • Gastroparesis   • Insomnia   • Adenomatous polyp of colon   • Essential hypertension   • ETOH abuse   • Chronic anticoagulation   • Oropharyngeal dysphagia   • Tobacco abuse   • Thyromegaly   • Adrenal adenoma, left   • Retroperitoneal lymphadenopathy   • Anemia of chronic disease   • Thyroid nodule   • Charcot's joint of foot   • Abnormal CT scan, lumbar spine   • Alcohol dependence, in remission (HCC)   • Normocytic anemia   • Inguinal adenopathy   • Elevated lactic acid level   • Venous stasis dermatitis of both lower extremities   • Urinary retention   • Sepsis without acute organ dysfunction (HCC)   • Sepsis (HCC)   • Aspiration pneumonia (HCC)   • Chronic respiratory failure with hypoxia (HCC)     Past Medical History:   Diagnosis Date   • Allergic rhinitis    • Anxiety    • Aortectasia (HCC)     3cm infrarenal abdominal aorta   • Arthritis    •  Atrial flutter (HCC) 2010    s/p ablation    • Charcot's joint of foot    • Chronic edema     both legs and sees wound care center at [place]    • Chronic venous insufficiency    • COPD (chronic obstructive pulmonary disease) (HCC)    • Coronary atherosclerosis     Cath 2010: diffuse 40-50% disease   • Diverticulosis    • Duodenitis    • Fatty liver    • Gastritis    • Gastroparesis    • Hematoma     post-operative; After catheterization, right groin, required surgical exploration   • Hyperlipidemia    • Hypertension    • Insomnia    • Internal hemorrhoids    • Open wound     izzy legs has drsg chg weekly at wound care center at [place]  pt does second dressing on left leg another time during week   • Osteomyelitis (HCC)    • Paroxysmal atrial fibrillation (HCC)    • Peripheral neuropathy    • Popliteal artery aneurysm (HCC)     left, s/p stenting by Dr. [place]   • Skin cancer    • Sleep apnea     o2   • 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HCC)     due to flutter and afib; cath 2010 with nonobstructive disease   • Venous stasis    • Venous stasis ulcer (HCC)     bilateral legs      Past Surgical History:   Procedure Laterality Date   • BASAL CELL CARCINOMA EXCISION      ear and left side of face   • BRONCHOSCOPY N/A 4/12/2021    Procedure: BRONCHOSCOPY WITH BAL;  Surgeon: Bunny Lepe MD;  Location: [facility] ENDOSCOPY;  Service: Pulmonary;  Laterality: N/A;  PRE-HEMOPTYSIS  POST-SAME   • CARDIAC CATHETERIZATION     • CATARACT EXTRACTION     • COLONOSCOPY  09/28/2015    NBIH, diverticulosis, polyps   • COLONOSCOPY N/A 9/11/2018    Procedure: COLONOSCOPY TO CECUM  AND TERM. ILEUM WITH COLD SNARE POLYPECTOMIES;  Surgeon: Kane Lagunas MD;  Location: [facility] ENDOSCOPY;  Service: Gastroenterology   • COLONOSCOPY N/A 10/29/2019    Procedure: COLONOSCOPY TO TO CECUM AND TERMINAL ILEUM WITH HOT AND COLD SNARE POLYPECTOMIES;  Surgeon: Kane Lagunas MD;  Location: [facility] ENDOSCOPY;  Service: Gastroenterology   • HIP  ARTHROPLASTY Right 2017   • JOINT REPLACEMENT Left    • OTHER SURGICAL HISTORY      Catheter ablation atrial flutter   • REPAIR ANEURYSM / PSEUDO ANEURYSM / RUPTURED ANEURYSM POPLITEAL ARTERY      Stent-Graft of the the left popliteal artery   • REPAIR KNEE LIGAMENT      Primary repair of knee ligament cruciate anterior right   • TONSILLECTOMY  1958   • TOTAL KNEE ARTHROPLASTY Bilateral    • UPPER GASTROINTESTINAL ENDOSCOPY  09/16/2014    acute gastritis, acute duodenitis      General Information     Row Name 07/26/22 1550          Physical Therapy Time and Intention    Document Type therapy note (daily note)  -DJ     Mode of Treatment individual therapy;physical therapy  -DJ     Row Name 07/26/22 1550          General Information    Patient Profile Reviewed yes  -DJ     Existing Precautions/Restrictions fall  -DJ     Row Name 07/26/22 1550          Cognition    Orientation Status (Cognition) oriented x 3  -DJ     Row Name 07/26/22 1550          Safety Issues, Functional Mobility    Comment, Safety Issues/Impairments (Mobility) gt belt, nonskid socks  -DJ           User Key  (r) = Recorded By, (t) = Taken By, (c) = Cosigned By    Initials Name Provider Type    DJ Dayana Wilkins, PT Physical Therapist               Mobility     Row Name 07/26/22 1551          Bed Mobility    Comment, (Bed Mobility) Pt found up in room without O2, placed in recliner after PT  -DJ     Row Name 07/26/22 1551          Transfers    Comment, (Transfers) sit/stand from recliner  -DJ     [facility] Name 07/26/22 1551          Bed-Chair Transfer    Bed-Chair [place] (Transfers) not tested  -DJ     [facility] Name 07/26/22 1551          Sit-Stand Transfer    Sit-Stand [place] (Transfers) contact guard;verbal cues  -DJ     Assistive Device (Sit-Stand Transfers) walker, front-wheeled  -DJ     Row Name 07/26/22 1551          Gait/Stairs (Locomotion)    [place] Level (Gait) contact guard;verbal cues  -DJ     Assistive Device (Gait) walker,  front-wheeled  -DJ     Distance in Feet (Gait) 140'  -DJ     Deviations/Abnormal Patterns (Gait) festinating/shuffling;gait speed decreased;sowmya decreased;base of support, wide  -DJ     Bilateral Gait Deviations heel strike decreased;forward flexed posture  -DJ     Stone Level (Stairs) not tested  -DJ     Comment, (Gait/Stairs) Pt amb 140' with r wx and CGA x 2. Pace is slow, balance slightly unsteady, pt consistently kicks legs of wx due to wide CARROLL, endurance improving but still limits distance. Unable to assess stairs today as pt was fatigued before we got to stairs.  -DJ           User Key  (r) = Recorded By, (t) = Taken By, (c) = Cosigned By    Initials Name Provider Type    Dayana Glao PT Physical Therapist               Obj/Interventions     Row Name 07/26/22 1555          Motor Skills    Functional Endurance improving but still limits further amb distance  -DJ     Row Name 07/26/22 1555          Balance    Balance Assessment standing static balance;standing dynamic balance  -DJ     Static Standing Balance contact guard;verbal cues  -DJ     Dynamic Standing Balance contact guard;verbal cues  -DJ     Position/Device Used, Standing Balance supported;walker, rolling  -DJ     Balance Interventions sitting;standing;sit to stand;supported;weight shifting activity  -DJ     Comment, Balance unsteady with turning  -DJ           User Key  (r) = Recorded By, (t) = Taken By, (c) = Cosigned By    Initials Name Provider Type    Dayana Galo PT Physical Therapist               Goals/Plan    No documentation.                Clinical Impression     Row Name 07/26/22 1555          Pain    Pre/Posttreatment Pain Comment generally without c/os  -DJ     Row Name 07/26/22 1555          Plan of Care Review    Plan of Care Reviewed With patient  -DJ     Progress improving  -DJ     Outcome Evaluation Pt found up in room without O2, in NAD, pleasant and cooperative. Pt amb 140' with r wx and CGA x 2. Pace is slow,  balance slightly unsteady, pt consistently kicks legs of wx due to wide CARROLL, endurance improving but still limits distance. Unable to assess stairs today as pt was fatigued before we got to stairs. Pt states he was negotiating stairs last week daily at home before returning to hospital. Pt returned to recliner with all needs met - encouraged pt to not get up by himself. Cont PT to address functional deficits and progress as tolerated to prepare for d/c back home again.  -DJ     Row Name 07/26/22 2331          Therapy Assessment/Plan (PT)    Patient/Family Therapy Goals Statement (PT) home  -DJ     Criteria for Skilled Interventions Met (PT) skilled treatment is necessary  -DJ     Row Name 07/26/22 9719          Vital Signs    O2 Delivery Pre Treatment room air  -DJ     O2 Delivery Intra Treatment room air  -DJ     O2 Delivery Post Treatment room air  -DJ     Pre Patient Position Standing  -DJ     Intra Patient Position Standing  -DJ     Post Patient Position Sitting  -DJ     Row Name 07/26/22 9039          Positioning and Restraints    Pre-Treatment Position standing in room  -DJ     Post Treatment Position chair  -DJ     In Chair notified nsg;reclined;call light within reach;encouraged to call for assist  0 chair alarm in chair - Nurse Meghna notified  -DJ           User Key  (r) = Recorded By, (t) = Taken By, (c) = Cosigned By    Initials Name Provider Type    Dayana Galo, PT Physical Therapist               Outcome Measures     Row Name 07/26/22 8338          How much help from another person do you currently need...    Turning from your back to your side while in flat bed without using bedrails? 3  -DJ     Moving from lying on back to sitting on the side of a flat bed without bedrails? 3  -DJ     Moving to and from a bed to a chair (including a wheelchair)? 3  -DJ     Standing up from a chair using your arms (e.g., wheelchair, bedside chair)? 3  -DJ     Climbing 3-5 steps with a railing? 3  -DJ     To walk in  hospital room? 3  -DJ     AM-PAC 6 Clicks Score (PT) 18  -DJ     Highest level of mobility 6 --> Walked 10 steps or more  -     Row Name 07/26/22 1559          Functional Assessment    Outcome Measure Options AM-PAC 6 Clicks Basic Mobility (PT)  -           User Key  (r) = Recorded By, (t) = Taken By, (c) = Cosigned By    Initials Name Provider Type    Dayana Galo, PT Physical Therapist                             Physical Therapy Education                 Title: PT OT SLP Therapies (Done)     Topic: Physical Therapy (Done)     Point: Mobility training (Done)     Learning Progress Summary           Patient Acceptance, E, VU,NR by  at 7/26/2022 1559    Acceptance, E, VU by  at 7/25/2022 1143                   Point: Home exercise program (Done)     Learning Progress Summary           Patient Acceptance, E, VU by  at 7/25/2022 1143                   Point: Body mechanics (Done)     Learning Progress Summary           Patient Acceptance, E, VU,NR by  at 7/26/2022 1559    Acceptance, E, VU by  at 7/25/2022 1143                   Point: Precautions (Done)     Learning Progress Summary           Patient Acceptance, E, VU,NR by  at 7/26/2022 1559    Acceptance, E, VU by  at 7/25/2022 1143                               User Key     Initials Effective Dates Name Provider Type Discipline     10/25/19 -  Dayana Wilkins, PT Physical Therapist PT     05/02/22 -  Iva Tucker, STEPHEN Physical Therapist PT              PT Recommendation and Plan     Plan of Care Reviewed With: patient  Progress: improving  Outcome Evaluation: Pt found up in room without O2, in NAD, pleasant and cooperative. Pt amb 140' with r wx and CGA x 2. Pace is slow, balance slightly unsteady, pt consistently kicks legs of wx due to wide CARROLL, endurance improving but still limits distance. Unable to assess stairs today as pt was fatigued before we got to stairs. Pt states he was negotiating stairs last week daily at home before  returning to hospital. Pt returned to recliner with all needs met - encouraged pt to not get up by himself. Cont PT to address functional deficits and progress as tolerated to prepare for d/c back home again.     Time Calculation:    PT Charges     Row Name 07/26/22 1601             Time Calculation    Start Time 1535  -DJ      Stop Time 1550  -DJ      Time Calculation (min) 15 min  -DJ      PT Non-Billable Time (min) 10 min  -DJ      PT Received On 07/26/22  -DJ      PT - Next Appointment 07/27/22  -DJ            User Key  (r) = Recorded By, (t) = Taken By, (c) = Cosigned By    Initials Name Provider Type    Dayana Galo, STEPHEN Physical Therapist              Therapy Charges for Today     Code Description Service Date Service Provider Modifiers Qty    92336974287 HC PT THERAPEUTIC ACT EA 15 MIN 7/26/2022 Dayana Wilkins, PT GP 1    50263906538 HC PT THER SUPP EA 15 MIN 7/26/2022 Dayana Wilkins, PT GP 1          PT G-Codes  Outcome Measure Options: AM-PAC 6 Clicks Basic Mobility (PT)  AM-PAC 6 Clicks Score (PT): 18    Dayana Wilkins PT  7/26/2022

## 2022-07-26 NOTE — PLAN OF CARE
Goal Outcome Evaluation:  Plan of Care Reviewed With: patient        Progress: improving  Outcome Evaluation: Patient seen for dysphagia treatment and initial training with Expiratory Muscle Strength Trainer (EMST). Pt with threshold of 60 cm of H20; therapeutic level set at 45 cm of H20. Patient completed 5 sets of 5 breaths. Data sheet and instruction sheet provided. Pt expressed some confusion with protocol at times. Unable to read instructions due to glasses being at home. Pt would benefit from continued speech therapy at next level of care for carryover of EMST. Pt was instructed to complete 5 sets of 5 breaths x1 a day for 5 weeks (increasing resistance each week). Plan for repeat VFSS after 5 weeks of training.      Patient was not wearing a face mask during this therapy encounter. Therapist used appropriate personal protective equipment including mask, eye protection and gloves.  Mask used was standard procedure mask. Appropriate PPE was worn during the entire therapy session. Hand hygiene was completed before and after therapy session. Patient is not in enhanced droplet precautions.

## 2022-07-26 NOTE — MBS/VFSS/FEES
Acute Care - Speech Language Pathology   Swallow Initial Evaluation [facility]     Patient Name: Jalen Lockwood  : 1950  MRN: [id number]  Today's Date: 2022               Admit Date: 2022    Visit Dx:     ICD-10-CM ICD-9-CM   1. Sepsis, due to unspecified organism, unspecified whether acute organ dysfunction present (HCC)  A41.9 038.9     995.91   2. Pneumonia of both lower lobes due to infectious organism  J18.9 486   3. Chronic respiratory failure with hypoxia (HCC)  J96.11 518.83     799.02   4. Hypokalemia  E87.6 276.8   5. Chronic anemia  D64.9 285.9     Patient Active Problem List   Diagnosis   • Chronic osteomyelitis (HCC)   • Foot pain   • Peripheral neuropathy   • Lymphedema of both lower extremities   • Permanent atrial fibrillation (HCC)   • Sleep apnea   • Chronic edema   • Obesity (BMI 30-39.9)   • COPD (chronic obstructive pulmonary disease) (HCC)   • Atrial flutter (HCC)   • 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HCC)   • Aortectasia (HCC)   • Popliteal artery aneurysm (HCC)   • Colon polyps   • Gastroparesis   • Insomnia   • Adenomatous polyp of colon   • Essential hypertension   • ETOH abuse   • Chronic anticoagulation   • Oropharyngeal dysphagia   • Tobacco abuse   • Thyromegaly   • Adrenal adenoma, left   • Retroperitoneal lymphadenopathy   • Anemia of chronic disease   • Thyroid nodule   • Charcot's joint of foot   • Abnormal CT scan, lumbar spine   • Alcohol dependence, in remission (HCC)   • Normocytic anemia   • Inguinal adenopathy   • Elevated lactic acid level   • Venous stasis dermatitis of both lower extremities   • Urinary retention   • Sepsis without acute organ dysfunction (HCC)   • Sepsis (HCC)   • Aspiration pneumonia (HCC)   • Chronic respiratory failure with hypoxia (HCC)     Past Medical History:   Diagnosis Date   • Allergic rhinitis    • Anxiety    • Aortectasia (HCC)     3cm infrarenal abdominal aorta   • Arthritis    • Atrial flutter (HCC)     s/p ablation     • Charcot's joint of foot    • Chronic edema     both legs and sees wound care center at [place]    • Chronic venous insufficiency    • COPD (chronic obstructive pulmonary disease) (HCC)    • Coronary atherosclerosis     Cath 2010: diffuse 40-50% disease   • Diverticulosis    • Duodenitis    • Fatty liver    • Gastritis    • Gastroparesis    • Hematoma     post-operative; After catheterization, right groin, required surgical exploration   • Hyperlipidemia    • Hypertension    • Insomnia    • Internal hemorrhoids    • Open wound     izzy legs has drsg chg weekly at wound care center at [place]  pt does second dressing on left leg another time during week   • Osteomyelitis (HCC)    • Paroxysmal atrial fibrillation (HCC)    • Peripheral neuropathy    • Popliteal artery aneurysm (HCC)     left, s/p stenting by Dr. [place]   • Skin cancer    • Sleep apnea     o2   • 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HCC)     due to flutter and afib; cath 2010 with nonobstructive disease   • Venous stasis    • Venous stasis ulcer (HCC)     bilateral legs      Past Surgical History:   Procedure Laterality Date   • BASAL CELL CARCINOMA EXCISION      ear and left side of face   • BRONCHOSCOPY N/A 4/12/2021    Procedure: BRONCHOSCOPY WITH BAL;  Surgeon: Bunny Lepe MD;  Location: [facility] ENDOSCOPY;  Service: Pulmonary;  Laterality: N/A;  PRE-HEMOPTYSIS  POST-SAME   • CARDIAC CATHETERIZATION     • CATARACT EXTRACTION     • COLONOSCOPY  09/28/2015    NBIH, diverticulosis, polyps   • COLONOSCOPY N/A 9/11/2018    Procedure: COLONOSCOPY TO CECUM  AND TERM. ILEUM WITH COLD SNARE POLYPECTOMIES;  Surgeon: Kane Lagunas MD;  Location: [facility] ENDOSCOPY;  Service: Gastroenterology   • COLONOSCOPY N/A 10/29/2019    Procedure: COLONOSCOPY TO TO CECUM AND TERMINAL ILEUM WITH HOT AND COLD SNARE POLYPECTOMIES;  Surgeon: Kane Lagunas MD;  Location: [facility] ENDOSCOPY;  Service: Gastroenterology   • HIP ARTHROPLASTY Right 2017   • JOINT  REPLACEMENT Left    • OTHER SURGICAL HISTORY      Catheter ablation atrial flutter   • REPAIR ANEURYSM / PSEUDO ANEURYSM / RUPTURED ANEURYSM POPLITEAL ARTERY      Stent-Graft of the the left popliteal artery   • REPAIR KNEE LIGAMENT      Primary repair of knee ligament cruciate anterior right   • TONSILLECTOMY  1958   • TOTAL KNEE ARTHROPLASTY Bilateral    • UPPER GASTROINTESTINAL ENDOSCOPY  09/16/2014    acute gastritis, acute duodenitis       SLP Recommendation and Plan  SLP Swallowing Diagnosis: moderate, oral dysphagia, pharyngeal dysphagia (07/26/22 0900)  SLP Diet Recommendation: mechanical soft with no mixed consistencies, honey thick liquids, ice chips between meals after oral care, with supervision (07/26/22 0900)  Recommended Precautions and Strategies: upright posture during/after eating, small bites of food and sips of liquid, multiple swallows per bite of food, multiple swallows per sip of liquid, assist with feeding (07/26/22 0900)  SLP Rec. for Method of Medication Administration: meds whole, meds crushed, with thick liquids, with pudding or applesauce, as tolerated (07/26/22 0900)     Monitor for Signs of Aspiration: yes, notify SLP if any concerns (07/26/22 0900)  Recommended Diagnostics: VFSS ([facility]) (07/26/22 0900)  Swallow Criteria for Skilled Therapeutic Interventions Met: demonstrates skilled criteria (07/26/22 0900)  Anticipated Discharge Disposition (SLP): anticipate therapy at next level of care (07/26/22 0900)  Rehab Potential/Prognosis, Swallowing: adequate, monitor progress closely (07/26/22 0900)  Therapy Frequency (Swallow): PRN (07/26/22 0900)  Predicted Duration Therapy Intervention (Days): until discharge (07/26/22 0900)                                  Plan of Care Reviewed With: patient  Progress: declining  Outcome Evaluation: VFSS completed. SLP recs honey, straws okay, mech soft, no mixed. Meds whole or crushed with honey or puree. Assist with meals. Small bites/drinks. Double  swallow with all PO. Can allow free water protocol with ice. Patient would be a good candidate for EMST.      SWALLOW EVALUATION (last 72 hours)     SLP Adult Swallow Evaluation     Row Name 07/26/22 0900          Document Type evaluation  -    Subjective Information no complaints  -    Patient Observations --    Patient/Family/Caregiver Comments/Observations --    Patient Effort --    Symptoms Noted During/After Treatment --               Patient Profile Reviewed yes  -    Pertinent History Of Current Problem Asp pna  -    Current Method of Nutrition mechanical soft, no mixed consistencies;nectar/syrup-thick liquids  -    Prior Level of Function-Swallowing nectar thick liquids;regular textures  -    Plans/Goals Discussed with patient;agreed upon  -    Barriers to Rehab none identified  -    Patient's Goals for Discharge return to regular diet  -    Family Goals for Discharge --               Pretreatment Pain Rating 0/10 - no pain  -               Dentition Assessment edentulous  -    Secretion Management --    Mucosal Quality --               Oral Motor General Assessment --               Respiratory Support Currently in Use --    Eating/Swallowing Skills --    Positioning During Eating --    Utensils Used --    Consistencies Trialed --               Clinical Swallow Evaluation Summary --  -               VFSS Summary Patient presents with moderate oropharyngeal dysphagia  characterized by lingual weakness, poor protective response to aspiration, swallow mistiming, reduced duration of CP opening, and reduced hyoid excursion. Premature spillage to the pyriforms before the swallow with nectar via cup with intermittent deep penetration to vocal cords during the swallow. Transient penetration during the swallow inconsistently with nectar via straw. Improved swallow initiation with honey via cup and straw, transient shallow penetration during the swallow. Silent aspiration during and after the  swallow with thins via cup without cough response. Cued small drink thins did eliminate aspiration, but non transient penetration still occured. Chin tuck not appropriate d/t pharyngeal residue at the pyriforms. Penetration also occured after the swallow d/t spillover from the pyriforms. Normal swallow initiation with puree. Mild pharyngeal residue post swallow. Spill to the pyriforms before the swallow with thin portion of mech soft. Poor mastication d/t dentures not being in place. Pharyngeal residue with single peach, patient required cue to clear. Increased mastication time with regular solids. Severe residue at the pyriforms after the initial swallow with regular, patient cleared residue to mild with an additional swallow without cues. Silent aspiration during the swallow with nectar via cup wash with regular.  -               Summary Statement Radiologist present: Dr. Noguera. Silent aspiration with thins and nectar intermittently. No aspiration with honey or puree. Pharyngeal residue with solids.  -               Aversion noted with  --    Education and counseling provided --               SLP Swallowing Diagnosis moderate;oral dysphagia;pharyngeal dysphagia  -    Functional Impact risk of aspiration/pneumonia;risk of malnutrition;risk of dehydration  -    Rehab Potential/Prognosis, Swallowing adequate, monitor progress closely  -    Swallow Criteria for Skilled Therapeutic Interventions Met demonstrates skilled criteria  -               Therapy Frequency (Swallow) PRN  -    Predicted Duration Therapy Intervention (Days) until discharge  -    SLP Diet Recommendation mechanical soft with no mixed consistencies;honey thick liquids;ice chips between meals after oral care, with supervision  -    Recommended Diagnostics VFSS (MBS)  -    Recommended Precautions and Strategies upright posture during/after eating;small bites of food and sips of liquid;multiple swallows per bite of food;multiple  swallows per sip of liquid;assist with feeding  -    Oral Care Recommendations Oral Care BID/PRN  -    SLP Rec. for Method of Medication Administration meds whole;meds crushed;with thick liquids;with pudding or applesauce;as tolerated  -    Monitor for Signs of Aspiration yes;notify SLP if any concerns  -    Anticipated Discharge Disposition (SLP) anticipate therapy at next level of care  -               Swallow STGs diet tolerance goal selection (SLP)  -    Diet Tolerance Goal Selection (SLP) Patient will tolerate trials of  -               Consistencies Trialed (Tolerate trials) soft ground textures;honey/ moderately thick liquids  -    Desired Outcome (Tolerate trials) without signs/symptoms of aspiration  -    [place] (Tolerate trials) independently (over 90% accuracy)  -          User Key  (r) = Recorded By, (t) = Taken By, (c) = Cosigned By    Initials Name Effective Dates     Lalitha Loo MS CCC-SLP 06/08/18 -      Griselda Orellana MS CCC-SLP 06/16/21 -                 EDUCATION  The patient has been educated in the following areas:   Dysphagia (Swallowing Impairment).        SLP GOALS     Row Name 07/26/22 0900             (STG) Patient will tolerate trials of    Consistencies Trialed (Tolerate trials) soft ground textures;honey/ moderately thick liquids  -      Desired Outcome (Tolerate trials) without signs/symptoms of aspiration  -      [place] (Tolerate trials) independently (over 90% accuracy)  -            User Key  (r) = Recorded By, (t) = Taken By, (c) = Cosigned By    Initials Name Provider Type     Griselda Orellana MS CCC-SLP Speech and Language Pathologist                   Time Calculation:    Time Calculation- SLP     Row Name 07/26/22 0949             Time Calculation- [facility]    SLP Start Time 0800  -      SLP Received On 07/26/22  -              Untimed Charges    23350-BG Motion Fluoro Eval Swallow Minutes 75  -              Total Minutes     Untimed Charges Total Minutes 75  -SH       Total Minutes 75  -SH            User Key  (r) = Recorded By, (t) = Taken By, (c) = Cosigned By    Initials Name Provider Type     Griselda Orellana MS CCC-SLP Speech and Language Pathologist                Therapy Charges for Today     Code Description Service Date Service Provider Modifiers Qty    [id number] HC ST MOTION FLUORO EVAL SWALLOW 5 7/26/2022 Griselda Orellana MS CCC-SLP GN 1               Griselda Orellana MS CCC-SLP  7/26/2022

## 2022-07-26 NOTE — PLAN OF CARE
Goal Outcome Evaluation:  Plan of Care Reviewed With: patient        Progress: declining  Outcome Evaluation: VFSS completed. SLP recs honey, straws okay, mech soft, no mixed. Meds whole or crushed with honey or puree. Assist with meals. Small bites/drinks. Double swallow with all PO. Can allow free water protocol with ice. Patient would be a good candidate for EMST.    Patient was not wearing a face mask during this therapy encounter. Therapist used appropriate personal protective equipment including mask, eye protection and gloves.  Mask used was standard procedure mask. Appropriate PPE was worn during the entire therapy session. Hand hygiene was completed before and after therapy session. Patient is not in enhanced droplet precautions.

## 2022-07-26 NOTE — PROGRESS NOTES
Nutrition Services    Patient Name:  Jalen Lockwood  YOB: 1950  MRN: [id number]  Admit Date:  7/24/2022      PROGRESS NOTE    Comments: PI and venous ulcer- ordered magic cup honey thick shakes, 2 magic cups QD at dinner, wt loss, dislikes diet but eating more than 50%, moderate oral and pharyngeal dysphagia       Encounter Information         Trending Narrative Visited pt sitting in chair who does not like his diet but is tolerating and eating more than 50%. Pt is on 2L O2 nasal cannula. He hs several venous ulcers and is asking for a magic cup milkshake at lunch and 2 magic cups at dinner. Pt states he normally weighs between 275 and 290, \"just depends on the scale\" he states. RD suspects weight loss.       Tests/Procedures VFSS moderate oral and pharyngeal dysphagia        Current Nutrition Orders & Evaluation of Intake       Oral Nutrition     Current PO Diet Diet Dysphagia; IV - Mechanical Soft No Mixed Consistencies; Honey Thick; Extra Sauce / Gravy   Supplement n/a   PO Evaluation     Trending % PO Intake         Factors Affecting Intake  dislikes modified diet   --    Anthropometrics          Height    Weight Height: 193 cm (76\")  Weight: 124 kg (273 lb 2.4 oz) (07/26/22 0518)    BMI kg/m2 Body mass index is 33.25 kg/m².    Weight trend Decreasing      Labs        Pertinent Labs Reviewed, listed below     Results from last 7 days   Lab Units 07/26/22  0606 07/25/22  1013 07/24/22  1916 07/22/22  1014   SODIUM mmol/L 140 137 137 137   POTASSIUM mmol/L 3.7 3.7 3.1* 3.8   CHLORIDE mmol/L 99 96* 95* 96*   CO2 mmol/L 32.0* 30.0* 31.0* 29.8*   BUN mg/dL 13 11 12 12   CREATININE mg/dL 0.83 0.87 0.95 0.89   CALCIUM mg/dL 9.3 9.5 9.3 9.4   BILIRUBIN mg/dL 0.7  --  1.1 0.8   ALK PHOS U/L 90  --  102 114   ALT (SGPT) U/L 9  --  9 9   AST (SGOT) U/L 11  --  16 14   GLUCOSE mg/dL 100* 89 94 90     Results from last 7 days   Lab Units 07/26/22  0606 07/25/22  1013 07/24/22  1916 07/22/22  1014    MAGNESIUM mg/dL  --  2.1  --   --    HEMOGLOBIN g/dL 9.2* 9.9*   < > 10.7*   HEMATOCRIT % 27.1* 28.8*   < > 32.1*   WBC 10*3/mm3 6.36 10.60   < > 8.60   TRIGLYCERIDES mg/dL  --   --   --  59    < > = values in this interval not displayed.     Results from last 7 days   Lab Units 07/26/22  0606 07/25/22  1013 07/24/22  2314 07/24/22  1916 07/22/22  1014   INR  2.16* 1.87* 1.76* 1.72*  --    APTT seconds  --   --   --  49.2*  --    PLATELETS 10*3/mm3 154 166  --  187 244     COVID19   Date Value Ref Range Status   07/24/2022 Not Detected Not Detected - Ref. Range Final     Lab Results   Component Value Date    HGBA1C 5.00 03/30/2021          Medications            Scheduled Medications amoxicillin-clavulanate, 1 tablet, Oral, Q12H  budesonide-formoterol, 2 puff, Inhalation, BID - RT  docusate sodium, 100 mg, Oral, Daily  ferrous sulfate, 325 mg, Oral, Daily With Breakfast  finasteride, 5 mg, Oral, Daily  furosemide, 40 mg, Oral, BID  gabapentin, 600 mg, Oral, Q8H  metoclopramide, 10 mg, Oral, 4x Daily AC & at Bedtime  metoprolol succinate XL, 25 mg, Oral, Daily  pravastatin, 20 mg, Oral, Daily  tamsulosin, 0.4 mg, Oral, Nightly  warfarin, 7.5 mg, Oral, Daily        Infusions Pharmacy to dose warfarin,         PRN Medications •  acetaminophen  •  albuterol  •  ALPRAZolam  •  HYDROcodone-acetaminophen  •  melatonin  •  nitroglycerin  •  ondansetron **OR** ondansetron  •  Pharmacy to dose warfarin  •  potassium chloride **OR** potassium chloride **OR** potassium chloride     Physical Findings         Physical Appearance alert, disheveled , obese, oriented, on oxygen therapy    NFPE Not applicable   --   Edema  lower extremity , 2+ (mild), 3+ (moderate)    Gastrointestinal last bowel movement: 7/24    Tubes/Drains none    Oral/Mouth Cavity JUANA     Skin pressure injury, venous ulcer   --  Intervention Goal        Intervention Goal(s) Nutrition support treatment, Improved nutrition related labs, Reduce/improve symptoms,  Disease management/therapy, Maintain intake and Maintain weight     Nutrition Intervention        RD Action Interview for preferences, Supplement provided, Follow Tx Progress and Care plan reviewed     Nutrition Prescription         Diet Prescription Dysphagia, mech soft, NMC, honey thick liquids    Supplement Prescription Magic Cup, Other: magic cup shake QD    Prescription Ordered Yes    --  Monitor/Evaluation        Monitor Per protocol, I&O, PO intake, Supplement intake, Pertinent labs, Weight, Skin status, GI status, Symptoms, POC/GOC     RD to follow up per protocol.    Electronically signed by:  KARL DOWNING RD  07/26/22 16:52 EDT

## 2022-07-26 NOTE — PLAN OF CARE
Goal Outcome Evaluation:  Plan of Care Reviewed With: patient        Progress: improving  Outcome Evaluation: Pt found up in room without O2, in NAD, pleasant and cooperative. Pt amb 140' with r wx and CGA x 2. Pace is slow, balance slightly unsteady, pt consistently kicks legs of wx due to wide CARROLL, endurance improving but still limits distance. Unable to assess stairs today as pt was fatigued before we got to stairs. Pt states he was negotiating stairs last week daily at home before returning to hospital. Pt returned to recliner with all needs met - encouraged pt to not get up by himself. Cont PT to address functional deficits and progress as tolerated to prepare for d/c back home again.  Patient was intermittently wearing a face mask during this therapy encounter. Therapist used appropriate personal protective equipment including mask and gloves.  Mask used was standard procedure mask. Appropriate PPE was worn during the entire therapy session. Hand hygiene was completed before and after therapy session. Patient is not in enhanced droplet precautions.

## 2022-07-27 PROBLEM — A41.9 SEPSIS (HCC): Status: RESOLVED | Noted: 2022-01-01 | Resolved: 2022-01-01

## 2022-07-27 NOTE — CASE MANAGEMENT/SOCIAL WORK
Continued Stay Note  [facility]     Patient Name: Jalen Lockwood  MRN: [id number]  Today's Date: 7/27/2022    Admit Date: 7/24/2022     Discharge Plan     Row Name 07/27/22 1033       Plan    Plan Return home with spouse and [facility]    Patient/Family in Agreement with Plan yes    Plan Comments DC anticipated today. Patient will be discharging home with spouse and [facility]. Spoke with Flor/CAMPBELLA to add SLP services per request of CHIOMA AQUINO               Discharge Codes    No documentation.               Expected Discharge Date and Time     Expected Discharge Date Expected Discharge Time    Jul 29, 2022             Luis Vee RN

## 2022-07-27 NOTE — DISCHARGE SUMMARY
[facility]    Date of Admission: 7/24/2022  Date of Discharge:  7/27/2022    PCP: Marc Ludwig MD    Discharge Diagnosis:   Active Hospital Problems    Diagnosis  POA   • **Aspiration pneumonia (HCC) [J69.0]  Yes   • Chronic respiratory failure with hypoxia (HCC) [J96.11]  Yes   • Oropharyngeal dysphagia [R13.12]  Yes   • Essential hypertension [I10]  Yes   • 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HCC) [R00.0, I43]  Yes   • COPD (chronic obstructive pulmonary disease) (HCC) [J44.9]  Yes   • Lymphedema of both lower extremities [I89.0]  Yes   • Permanent atrial fibrillation (HCC) [I48.21]  Yes   • Obesity (BMI 30-39.9) [E66.9]  Yes   • Sleep apnea [G47.30]  Yes      Resolved Hospital Problems    Diagnosis Date Resolved POA   • Sepsis (HCC) [A41.9] 07/27/2022 Yes     Procedures Performed  none     Consults     Date and Time Order Name Status Description    7/24/2022 11:06 PM Inpatient Infectious Diseases Consult Completed     7/24/2022 10:40 PM [facility] (on-call MD unless specified) Details      7/14/2022 10:28 AM Inpatient Pulmonology Consult Completed     7/14/2022 10:24 AM Inpatient Cardiology Consult Completed     7/11/2022  1:02 PM Inpatient Infectious Diseases Consult Completed         Hospital Course  Please see history and physical for details. Patient is a 72 y.o. male smoker with a history of oropharyngeal dysphagia, paroxysmal afib on warfarin, chronic urinary retention with an indwelling escamilla catheter, COPD with chronic hypoxic respiratory failure on 2L home O2, peripheral vascular disease, non obstructive CAD, BECKI who presents with recurrent aspiration pneumonia.   The patient apparently was advised by ST last admission to modified his diet and liquids, but refused. ST again saw this admission and VFSS was performed recommending mechanical soft diet with HTL and no mixed consistencies. The patient was agreeable to the modification and his wife appropriately  educated on the recommendations. He was seen in consultation by ID given his complex infectious history. His chronic escamilla remained and urine appeared uninfected. He was felt to have a recurrent pneumonia and was transitioned from IV to oral antibiotics in the form of Augmentin for 7 days. His wife was concerned for possible cellulitic changes but his current antibiotic regimen would cover this as well. He has chronic venous stasis and is at high risk for recurrent infections to both skin and his bladder from chronic cath. ID signed off.   The patient has otherwise remained stable during the stay. His INR was monitored and he was kept on warfarin home dosing. His atrial fibrillation was controlled with beta blocker therapy and he denied any chest pain, dyspnea, nausea, vomiting or diarrhea. His cough and fever have improved and he is eager for discharges home today with HH. ST did recommend EMST and he was shown how to use this device. ST for home has been ordered in addition to skilled nursing and PT. He should see his PCP in 1-2 weeks after discharge. Lab work on the day of discharge is stable.       I discussed the patient's findings and my recommendations with patient and nursing staff.    Condition on Discharge: Improved.     Temp:  [97.9 °F (36.6 °C)-98.1 °F (36.7 °C)] 98 °F (36.7 °C)  Heart Rate:  [55-69] 63  Resp:  [16-18] 16  BP: (109-128)/(67-78) 128/78  Body mass index is 34.58 kg/m².    Physical Exam  Vitals and nursing note reviewed.   Constitutional:       Appearance: He is obese. He is ill-appearing.   HENT:      Head: Normocephalic and atraumatic.   Cardiovascular:      Rate and Rhythm: Normal rate. Rhythm irregular.      Pulses: Normal pulses.   Pulmonary:      Effort: Pulmonary effort is normal. No respiratory distress.      Comments: Diminished. On 2 L.   Abdominal:      General: Bowel sounds are normal. There is no distension.      Palpations: Abdomen is soft.      Tenderness: There is no  abdominal tenderness.   Genitourinary:     Comments: Irvin in place with clear yellow urine noted.  Musculoskeletal:         General: Swelling (BLE with right greater than left on the thigh) present. Normal range of motion.      Comments: BLE wrapped   Skin:     General: Skin is warm.      Findings: Erythema (right thigh) present- improved  Neurological:      Mental Status: He is alert and oriented to person, place, and time.      Sensory: No sensory deficit.      Motor: Weakness present.      Coordination: Coordination normal.   Psychiatric:         Mood and Affect: Mood normal.         Behavior: Behavior normal.         Discharge Medications      New Medications      Instructions Start Date   amoxicillin-clavulanate 875-125 MG per tablet  Commonly known as: AUGMENTIN   1 tablet, Oral, Every 12 Hours Scheduled         Changes to Medications      Instructions Start Date   Atrovent HFA 17 MCG/ACT inhaler  Generic drug: ipratropium  What changed: Another medication with the same name was removed. Continue taking this medication, and follow the directions you see here.   INHALE 2 PUFFS BY MOUTH 4 TIMES A DAY      gabapentin 600 MG tablet  Commonly known as: NEURONTIN  What changed: Another medication with the same name was removed. Continue taking this medication, and follow the directions you see here.   600 mg, Oral, 3 Times Daily         Continue These Medications      Instructions Start Date   albuterol sulfate  (90 Base) MCG/ACT inhaler  Commonly known as: PROVENTIL HFA;VENTOLIN HFA;PROAIR HFA   2 puffs, Inhalation, Every 4 Hours PRN      ALPRAZolam 0.5 MG tablet  Commonly known as: XANAX   TAKE 1 TABLET BY MOUTH EVERY DAY AT NIGHT. NEEDS APPT      Breo Ellipta 200-25 MCG/INH inhaler  Generic drug: Fluticasone Furoate-Vilanterol   INHALE 1 PUFF BY MOUTH EVERY DAY      docusate sodium 100 MG capsule  Commonly known as: COLACE   100 mg, Oral, Daily      ferrous sulfate 325 (65 FE) MG tablet  Commonly known  as: FerrouSul   325 mg, Oral, Daily With Breakfast      finasteride 5 MG tablet  Commonly known as: PROSCAR   1 tablet, Oral, Daily      furosemide 40 MG tablet  Commonly known as: LASIX   40 mg, Oral, 2 Times Daily      gentamicin 0.1 % ointment  Commonly known as: GARAMYCIN   APPLY TO AFFECTED AREA EVERY DAY WITH DRESSING CHANGES      HYDROcodone-acetaminophen  MG per tablet  Commonly known as: NORCO   1 tablet, Oral, Every 6 Hours PRN      metoclopramide 10 MG tablet  Commonly known as: REGLAN   TAKE 1 TABLET BY MOUTH 4 (FOUR) TIMES A DAY BEFORE MEALS & AT BEDTIME.      metoprolol succinate XL 25 MG 24 hr tablet  Commonly known as: TOPROL-XL   25 mg, Oral, Daily      OXYGEN-HELIUM IN   2 L, Nasal, As Needed      pravastatin 20 MG tablet  Commonly known as: PRAVACHOL   TAKE 1 TABLET BY MOUTH EVERY DAY      silodosin 8 MG capsule capsule  Commonly known as: RAPAFLO   1 capsule, Oral, Daily, With food.       warfarin 7.5 MG tablet  Commonly known as: COUMADIN   As directed      warfarin 5 MG tablet  Commonly known as: COUMADIN   As directed            Diet Instructions     Diet: Dysphagia; Honey Thick Liquids; Mechanical Soft      Discharge Diet: Dysphagia    Fluid Consistency: Honey Thick Liquids    Pureed Options: Mechanical Soft    No mixed consistencies, extra sauce/gravy         Activity Instructions     Activity as Tolerated           Additional Instructions for the Follow-ups that You Need to Schedule     Ambulatory Referral to Home Health (Hospital)   As directed      Face to Face Visit Date: 7/26/2022    Follow-up provider for Plan of Care?: I treated the patient in an acute care facility and will not continue treatment after discharge.    Follow-up provider: NIDIA GANT [[id number]]    Reason/Clinical Findings: weakness, dysphagia, recurrent pneumonia    Describe mobility limitations that make leaving home difficult: as above with chronic lymphedema, chronic escamilla, recurrent infections     Nursing/Therapeutic Services Requested: Physical Therapy Skilled Nursing Speech Therapy    Skilled nursing orders: Medication education Cardiopulmonary assessments    PT orders: Therapeutic exercise Gait Training Transfer training Strengthening    Weight Bearing Status: As Tolerated    SLP orders: Dysphagia therapy    Frequency: 1 Week 1         Discharge Follow-up with PCP   As directed       Currently Documented PCP:    Marc Ludwig MD    PCP Phone Number:    [phone]     Follow Up Details: see in 1-2 weeks            Contact information for follow-up providers     Marc Ludwig MD .    Specialty: Family Medicine  Why: see in 1-2 weeks  Contact information:  [address] [facility] [address]  [facility] [address]  [phone]                   Contact information for after-discharge care     Home Medical Care     [facility] .    Service: Home Health Services  Contact information:  [address] [facility] [address]  [place] [address]  [phone]                           Test Results Pending at Discharge  Pending Labs     Order Current Status    Blood Culture - Blood, Arm, Left Preliminary result    Blood Culture - Blood, Arm, Right Preliminary result         Lula Garrison, APRN  07/27/22  11:47 EDT    Discharge time spent greater than 30 minutes.

## 2022-07-27 NOTE — PLAN OF CARE
Goal Outcome Evaluation:  Plan of Care Reviewed With: patient        Progress: improving  Outcome Evaluation: Pt restingin chair in NAD, eager to d/c home, agreeable to PT. Pt req CGA to stand from recliner. Pt amb 110' with r wx on level surfaces with wide CARROLL and ext rot ankles and hips - feet bump into legs of wx. Pt negotiated 4 stairs with handrail and CGA/vc, slowly and cautiously. Pt fatigued on way back to room and requested to bed pushed in chair back to room. Pt slowly progressing but still limited by poor activity tolerance. Pt safe tod.c home with wife - recommend home PT services. Goals met except amb <150'.

## 2022-07-27 NOTE — PLAN OF CARE
Goal Outcome Evaluation:  Plan of Care Reviewed With: patient        Progress: improving  Outcome Evaluation: Patient had some c/o pain through the night, treated with PRN Norco. Up with assist of 1 and walker. PO Augementin given as scheduled. On mechanical soft with HTL. Has been on 2 L O2 through the night. Encouraged to follow recommended diet at discharge to prevent aspiration pneumonia and rehospitalization. Daily weight. VSS. Possible discharge today, home with wife. Dressings on BLE to be done today by wound RN. Will continue to monitor.

## 2022-07-27 NOTE — THERAPY DISCHARGE NOTE
Patient Name: Jalen Lockwood  : 1950    MRN: [id number]                              Today's Date: 2022       Admit Date: 2022    Visit Dx:     ICD-10-CM ICD-9-CM   1. Sepsis, due to unspecified organism, unspecified whether acute organ dysfunction present ([facility])  A41.9 038.9     995.91   2. Pneumonia of both lower lobes due to infectious organism  J18.9 486   3. Chronic respiratory failure with hypoxia ([facility])  J96.11 518.83     799.02   4. Hypokalemia  E87.6 276.8   5. Chronic anemia  D64.9 285.9   6. Aspiration pneumonia of both lower lobes, unspecified aspiration pneumonia type ([facility])  J69.0 507.0     Patient Active Problem List   Diagnosis   • Chronic osteomyelitis (HCC)   • Foot pain   • Peripheral neuropathy   • Lymphedema of both lower extremities   • Permanent atrial fibrillation (HCC)   • Sleep apnea   • Chronic edema   • Obesity (BMI 30-39.9)   • COPD (chronic obstructive pulmonary disease) (HCC)   • Atrial flutter (HCC)   • 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HCC)   • Aortectasia (HCC)   • Popliteal artery aneurysm (HCC)   • Colon polyps   • Gastroparesis   • Insomnia   • Adenomatous polyp of colon   • Essential hypertension   • ETOH abuse   • Chronic anticoagulation   • Oropharyngeal dysphagia   • Tobacco abuse   • Thyromegaly   • Adrenal adenoma, left   • Retroperitoneal lymphadenopathy   • Anemia of chronic disease   • Thyroid nodule   • Charcot's joint of foot   • Abnormal CT scan, lumbar spine   • Alcohol dependence, in remission (HCC)   • Normocytic anemia   • Inguinal adenopathy   • Elevated lactic acid level   • Venous stasis dermatitis of both lower extremities   • Urinary retention   • Sepsis without acute organ dysfunction (HCC)   • Aspiration pneumonia (HCC)   • Chronic respiratory failure with hypoxia (HCC)     Past Medical History:   Diagnosis Date   • Allergic rhinitis    • Anxiety    • Aortectasia (HCC)     3cm infrarenal abdominal aorta   • Arthritis    • Atrial flutter  (HCC) 2010    s/p ablation    • Charcot's joint of foot    • Chronic edema     both legs and sees wound care center at [facility]    • Chronic venous insufficiency    • COPD (chronic obstructive pulmonary disease) (HCC)    • Coronary atherosclerosis     Cath 2010: diffuse 40-50% disease   • Diverticulosis    • Duodenitis    • Fatty liver    • Gastritis    • Gastroparesis    • Hematoma     post-operative; After catheterization, right groin, required surgical exploration   • Hyperlipidemia    • Hypertension    • Insomnia    • Internal hemorrhoids    • Open wound     izzy legs has drsg chg weekly at wound care center at [facility]  pt does second dressing on left leg another time during week   • Osteomyelitis (HCC)    • Paroxysmal atrial fibrillation (HCC)    • Peripheral neuropathy    • Popliteal artery aneurysm (HCC)     left, s/p stenting by Dr. [place]   • Skin cancer    • Sleep apnea     o2   • 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HCC)     due to flutter and afib; cath 2010 with nonobstructive disease   • Venous stasis    • Venous stasis ulcer (HCC)     bilateral legs      Past Surgical History:   Procedure Laterality Date   • BASAL CELL CARCINOMA EXCISION      ear and left side of face   • BRONCHOSCOPY N/A 4/12/2021    Procedure: BRONCHOSCOPY WITH BAL;  Surgeon: Bunny Lepe MD;  Location: [facility] ENDOSCOPY;  Service: Pulmonary;  Laterality: N/A;  PRE-HEMOPTYSIS  POST-SAME   • CARDIAC CATHETERIZATION     • CATARACT EXTRACTION     • COLONOSCOPY  09/28/2015    NBIH, diverticulosis, polyps   • COLONOSCOPY N/A 9/11/2018    Procedure: COLONOSCOPY TO CECUM  AND TERM. ILEUM WITH COLD SNARE POLYPECTOMIES;  Surgeon: Kane Lagunas MD;  Location: [facility] ENDOSCOPY;  Service: Gastroenterology   • COLONOSCOPY N/A 10/29/2019    Procedure: COLONOSCOPY TO TO CECUM AND TERMINAL ILEUM WITH HOT AND COLD SNARE POLYPECTOMIES;  Surgeon: Kane Lagunas MD;  Location: [facility] ENDOSCOPY;  Service: Gastroenterology   • HIP ARTHROPLASTY  Right 2017   • JOINT REPLACEMENT Left    • OTHER SURGICAL HISTORY      Catheter ablation atrial flutter   • REPAIR ANEURYSM / PSEUDO ANEURYSM / RUPTURED ANEURYSM POPLITEAL ARTERY      Stent-Graft of the the left popliteal artery   • REPAIR KNEE LIGAMENT      Primary repair of knee ligament cruciate anterior right   • TONSILLECTOMY  1958   • TOTAL KNEE ARTHROPLASTY Bilateral    • UPPER GASTROINTESTINAL ENDOSCOPY  09/16/2014    acute gastritis, acute duodenitis      General Information     Row Name 07/27/22 1158          Physical Therapy Time and Intention    Document Type therapy note (daily note)  -DJ     Mode of Treatment individual therapy;physical therapy  -DJ     Row Name 07/27/22 1158          General Information    Patient Profile Reviewed yes  -DJ     Existing Precautions/Restrictions fall  -DJ     Row Name 07/27/22 1158          Cognition    Orientation Status (Cognition) oriented x 3  -DJ     Row Name 07/27/22 1158          Safety Issues, Functional Mobility    Comment, Safety Issues/Impairments (Mobility) gt belt, nonskid socks  -DJ           User Key  (r) = Recorded By, (t) = Taken By, (c) = Cosigned By    Initials Name Provider Type    DJ Dayana Wilkins, PT Physical Therapist               Mobility     Row Name 07/27/22 1159          Bed Mobility    Comment, (Bed Mobility) UIC  -DJ     Row Name 07/27/22 1159          Transfers    Comment, (Transfers) sit/stand from recliner  -DJ     Row Name 07/27/22 1159          Bed-Chair Transfer    Bed-Chair [place] (Transfers) not tested  -DJ     Row Name 07/27/22 1159          Sit-Stand Transfer    Sit-Stand [place] (Transfers) contact guard;verbal cues  -DJ     Assistive Device (Sit-Stand Transfers) walker, front-wheeled  -DJ     Row Name 07/27/22 1159          Gait/Stairs (Locomotion)    [place] Level (Gait) contact guard;verbal cues  -DJ     Assistive Device (Gait) walker, front-wheeled  -DJ     Distance in Feet (Gait) 110'  -DJ      Deviations/Abnormal Patterns (Gait) festinating/shuffling;gait speed decreased;sowmya decreased;base of support, wide  -DJ     Bilateral Gait Deviations heel strike decreased;forward flexed posture  -DJ     [place] Level (Stairs) contact guard;verbal cues  -DJ     Handrail Location (Stairs) both sides  -DJ     Ascending Technique (Stairs) step-to-step  -DJ     Descending Technique (Stairs) step-to-step  -DJ     Comment, (Gait/Stairs) Pt amb 110' with r wx on level surfaces with wide CARROLL and ext rot ankles and hips - feet bump into legs of wx. Pt negotiated 4 stairs with handrail and CGA/vc, slowly and cautiously. Pt fatigued on way back to room and requested to bed pushed in chair back to room.  -DJ           User Key  (r) = Recorded By, (t) = Taken By, (c) = Cosigned By    Initials Name Provider Type    Dayana Galo PT Physical Therapist               Obj/Interventions     Row Name 07/27/22 1315          Motor Skills    Motor Skills functional endurance  -DJ     Functional Endurance fatigued after amb and stair negotiation  -DJ     Row Name 07/27/22 1315          Balance    Balance Assessment standing static balance;standing dynamic balance  -DJ     Static Standing Balance contact guard;verbal cues  -DJ     Dynamic Standing Balance contact guard;verbal cues  -DJ     Position/Device Used, Standing Balance walker, rolling  -DJ     Balance Interventions sitting;standing;sit to stand;supported;weight shifting activity  -DJ           User Key  (r) = Recorded By, (t) = Taken By, (c) = Cosigned By    Initials Name Provider Type    Dayana Galo, PT Physical Therapist               Goals/Plan     Row Name 07/27/22 1319          Bed Mobility Goal 1 (PT)    Progress/Outcomes (Bed Mobility Goal 1, PT) goal met  -DJ     Row Name 07/27/22 1319          Transfer Goal 1 (PT)    Progress/Outcome (Transfer Goal 1, PT) goal met  -DJ     Row Name 07/27/22 1319          Gait Training Goal 1 (PT)    Progress/Outcome (Gait  Training Goal 1, PT) good progress toward goal  -DJ           User Key  (r) = Recorded By, (t) = Taken By, (c) = Cosigned By    Initials Name Provider Type    Dayana Galo, STEPHEN Physical Therapist               Clinical Impression     Row Name 07/27/22 1315          Plan of Care Review    Plan of Care Reviewed With patient  -DJ     Progress improving  -DJ     Outcome Evaluation Pt restingin chair in NAD, eager to d/c home, agreeable to PT. Pt req CGA to stand from recliner. Pt amb 110' with r wx on level surfaces with wide CARROLL and ext rot ankles and hips - feet bump into legs of wx. Pt negotiated 4 stairs with handrail and CGA/vc, slowly and cautiously. Pt fatigued on way back to room and requested to bed pushed in chair back to room. Pt slowly progressing but still limited by poor activity tolerance. Pt safe tod.c home with wife - recommend home PT services.  -DJ     Row Name 07/27/22 1315          Therapy Assessment/Plan (PT)    Criteria for Skilled Interventions Met (PT) skilled treatment is necessary  -DJ     Row Name 07/27/22 1315          Vital Signs    O2 Delivery Pre Treatment room air  -DJ     O2 Delivery Intra Treatment room air  -DJ     O2 Delivery Post Treatment room air  -DJ     Pre Patient Position Sitting  -DJ     Intra Patient Position Standing  -DJ     Post Patient Position Sitting  -DJ     Row Name 07/27/22 1315          Positioning and Restraints    Pre-Treatment Position sitting in chair/recliner  -DJ     Post Treatment Position chair  -DJ     In Chair notified nsg;reclined;call light within reach;encouraged to call for assist  0 chair alarm - nurse notified  -DJ           User Key  (r) = Recorded By, (t) = Taken By, (c) = Cosigned By    Initials Name Provider Type    Dayana Galo, PT Physical Therapist               Outcome Measures     Row Name 07/27/22 1320          How much help from another person do you currently need...    Turning from your back to your side while in flat bed without  using bedrails? 3  -DJ     Moving from lying on back to sitting on the side of a flat bed without bedrails? 3  -DJ     Moving to and from a bed to a chair (including a wheelchair)? 3  -DJ     Standing up from a chair using your arms (e.g., wheelchair, bedside chair)? 4  -DJ     Climbing 3-5 steps with a railing? 3  -DJ     To walk in hospital room? 3  -DJ     AM-PAC 6 Clicks Score (PT) 19  -DJ     Highest level of mobility 6 --> Walked 10 steps or more  -DJ     Row Name 07/27/22 1320          Functional Assessment    Outcome Measure Options AM-PAC 6 Clicks Basic Mobility (PT)  -DJ           User Key  (r) = Recorded By, (t) = Taken By, (c) = Cosigned By    Initials Name Provider Type    Dayana Galo, PT Physical Therapist              Physical Therapy Education                 Title: PT OT SLP Therapies (Done)     Topic: Physical Therapy (Done)     Point: Mobility training (Done)     Learning Progress Summary           Patient Acceptance, E, VU,DU by  at 7/27/2022 1321    Acceptance, E, VU,NR by  at 7/26/2022 1559    Acceptance, E, VU by  at 7/25/2022 1143                   Point: Home exercise program (Done)     Learning Progress Summary           Patient Acceptance, E, VU,DU by  at 7/27/2022 1321    Acceptance, E, VU by  at 7/25/2022 1143                   Point: Body mechanics (Done)     Learning Progress Summary           Patient Acceptance, E, VU,DU by  at 7/27/2022 1321    Acceptance, E, VU,NR by  at 7/26/2022 1559    Acceptance, E, VU by  at 7/25/2022 1143                   Point: Precautions (Done)     Learning Progress Summary           Patient Acceptance, E, VU,DU by  at 7/27/2022 1321    Acceptance, E, VU,NR by  at 7/26/2022 1559    Acceptance, E, VU by  at 7/25/2022 1143                               User Key     Initials Effective Dates Name Provider Type Discipline     10/25/19 -  Dayana Wilkins, PT Physical Therapist PT    MARY ELLEN 05/02/22 -  Iva Tucker, PT Physical Therapist  PT              PT Recommendation and Plan     Plan of Care Reviewed With: patient  Progress: improving  Outcome Evaluation: Pt restingin chair in NAD, eager to d/c home, agreeable to PT. Pt req CGA to stand from recliner. Pt amb 110' with r wx on level surfaces with wide CARROLL and ext rot ankles and hips - feet bump into legs of wx. Pt negotiated 4 stairs with handrail and CGA/vc, slowly and cautiously. Pt fatigued on way back to room and requested to bed pushed in chair back to room. Pt slowly progressing but still limited by poor activity tolerance. Pt safe tod.c home with wife - recommend home PT services.     Time Calculation:    PT Charges     Row Name 07/27/22 1322             Time Calculation    Start Time 1135  -DJ      Stop Time 1156  -DJ      Time Calculation (min) 21 min  -DJ      PT Non-Billable Time (min) 10 min  -DJ      PT Received On 07/27/22  -DJ            User Key  (r) = Recorded By, (t) = Taken By, (c) = Cosigned By    Initials Name Provider Type    Dayana Galo, PT Physical Therapist              Therapy Charges for Today     Code Description Service Date Service Provider Modifiers Qty    48538742562 HC PT THERAPEUTIC ACT EA 15 MIN 7/26/2022 Dayana Wilkins, PT GP 1    19916957275 HC PT THER SUPP EA 15 MIN 7/26/2022 Dayana Wilkins, PT GP 1    04062598121 HC GAIT TRAINING EA 15 MIN 7/27/2022 Dayana Wilkins, PT GP 1    57421014089 HC PT THER SUPP EA 15 MIN 7/27/2022 Dayana Wilkins, PT GP 1          PT G-Codes  Outcome Measure Options: AM-PAC 6 Clicks Basic Mobility (PT)  AM-PAC 6 Clicks Score (PT): 19    PT Discharge Summary  Anticipated Discharge Disposition (PT): home with assist, home with home health    Dayana Wilkins PT  7/27/2022

## 2022-07-27 NOTE — DISCHARGE INSTR - DIET
Mechanical Soft with Honey-Thick Liquids    Please get \"Thick-it\" thickener from local pharmacy store such as [employer], [employer], etc.

## 2022-07-27 NOTE — CASE MANAGEMENT/SOCIAL WORK
Continued Stay Note  [facility]     Patient Name: Jalen Lockwood  MRN: [id number]  Today's Date: 7/27/2022    Admit Date: 7/24/2022     Discharge Plan     Row Name 07/27/22 1616       Plan    Final Discharge Disposition Code 06 - home with home health care    Final Note DC'd home with [facility]. Ann Marie RN               Discharge Codes    No documentation.               Expected Discharge Date and Time     Expected Discharge Date Expected Discharge Time    Jul 27, 2022             Ann Marie RN

## 2022-07-27 NOTE — NURSING NOTE
07/27/22 1040   Wound 06/20/22 0014 Right posterior heel   Placement Date/Time: 06/20/22 0014   Present on Hospital Admission: Yes  Side: Right  Orientation: posterior  Location: heel   Wound Image    Base pink;moist   Periwound   (callus)   Drainage Amount scant   Care, Wound cleansed with;sterile normal saline   Dressing Care   (opticel AG, unna, 4 layer compression)   Periwound Care   (cleansed with foam soap)   Wound 06/19/22 2334 Left medial calf Laceration   Placement Date/Time: 06/19/22 2334   Present on Hospital Admission: Yes  Side: Left  Orientation: medial  Location: calf  Primary Wound Type: Laceration   Wound Image    Base moist;pink   Drainage Amount none   Care, Wound cleansed with;sterile normal saline   Dressing Care   (opticel AG, unna, 4 layer compression)   Periwound Care   (foam soap)   Wound 07/25/22 Right medial leg Venous Ulcer   Placement Date: 07/25/22   Present on Hospital Admission: Yes  Side: Right  Orientation: medial  Location: (c) leg  Primary Wound Type: Venous Ulcer   Wound Image    Base pink;moist  (scattered shallow ulcerations)   Periwound redness  (scaly, cobblestone skin)   Drainage Amount none   Care, Wound cleansed with;sterile normal saline   Dressing Care   (unna, 4 layer compression)   Periwound Care   (cleansed with foam soap)   Wound 04/24/19 0540 Left upper foot ulceration, venous   Date first assessed/Time first assessed: 04/24/19 0540   Present On Admission : yes;picture taken  Side: Left  Orientation: upper  Location: (c) foot  Type: ulceration, venous   Wound Image    Base moist;pink;yellow   Drainage Amount scant  (moist)   Care, Wound cleansed with;sterile normal saline   Dressing Care   (opticel AG, unna, 4 layer compression)   Periwound Care   (foam soap)   Wound 07/25/22 Left posterior knee Pressure Injury   Placement Date: 07/25/22   Present on Hospital Admission: Yes  Side: Left  Orientation: posterior  Location: knee  Primary Wound Type: (c) Pressure  Injury   Wound Image    Base maroon/purple;dry   Drainage Amount none   Care, Wound   (site avoided with compresison)   Wound 01/09/22 Left medial ankle   Placement Date: 01/09/22   Present on Hospital Admission: Yes  Side: Left  Orientation: medial  Location: ankle   Wound Image   (this wound has been present at least since 1-9-22 per image in media uploaded by Dr. Menendez)   Base scab   Periwound   (pink scarring)   Drainage Amount none   Dressing Care   (opticel AG, unna, 4 layer compression)     Wound/ostomy - see my note from 2 days ago on 7/25 for more detailed history. Wraps changed today, patient will d/c home. LDA's have been updated with images of all current wounds. Legs and feet cleansed well with foam soap. Legs/feet with chronic skin changes with thick, scaly, cobblestoned skin. Betadine moist 2x2's placed between toes to prevent pressure/friction which eventually result in ulcers/excoriation and weeping. Opticel AG placed to wounds but omitted from the ulcerated area on R medial lower leg due to significant adherence of the piece applied with last dressing change. The amount of drainage/weeping is dependent on fluid volume and edema. When patient is in the hospital his edema tends to improve with monitoring of diet and medication administration, compliance is challenged in these areas when patient is at home.     Patient is established with [facility] for dressing changes to legs.

## 2022-07-27 NOTE — OUTREACH NOTE
Prep Survey    Flowsheet Row Responses   [facility] patient discharged from? [place]   Is LACE score < 7 ? No   Emergency Room discharge w/ pulse ox? No   Eligibility [facility]   Date of Admission 07/24/22   Date of Discharge 07/27/22   Discharge Disposition Home-Health Care Sv   Discharge diagnosis aspiration pneumonia- Sepsis   Does the patient have one of the following disease processes/diagnoses(primary or secondary)? Sepsis   Does the patient have Home health ordered? Yes   [facility]    Is there a DME ordered? No   Prep survey completed? Yes          TYREE CONSTANTINO - Registered Nurse

## 2022-07-28 NOTE — OUTREACH NOTE
Call Center TCM Note    Flowsheet Row Responses   [facility] patient discharged from? [place]   Does the patient have one of the following disease processes/diagnoses(primary or secondary)? Sepsis   TCM attempt successful? Yes   Call start time 1012   Call end time 1015   Discharge diagnosis aspiration pneumonia- Sepsis   Does the patient have all medications related to this admission filled (includes all antibiotics, inhalers, nebulizers,steroids,etc.) Yes   Is the patient taking all medications as directed (includes completed medication regime)? Yes   Does the patient have a primary care provider?  Yes   Comments regarding PCP no open appts noted- will send message to office   Has the patient kept scheduled appointments due by today? N/A   What is the Home health agency?  [facility]    Has home health visited the patient within 72 hours of discharge? Yes   Home health comments states home health will be there today   Psychosocial issues? No   Did the patient receive a copy of their discharge instructions? Yes   What is the patient's perception of their health status since discharge? Improving   Nursing interventions Nurse provided patient education   Is patient/caregiver able to teach back steps to recovery at home? Set small, achievable goals for return to baseline health, Rest and regain strength   Is the patient/caregiver able to teach back the hierarchy of who to call/visit for symptoms/problems? PCP, Specialist, Home health nurse, Urgent Care, ED, 911 Yes   TCM call completed? Yes   Wrap up additional comments States he is doing well, no questions, home health will be there today.          Arline Warren RN    7/28/2022, 10:15 EDT

## 2022-08-01 NOTE — PROGRESS NOTES
Anticoagulation Clinic Progress Note    Anticoagulation Summary  As of 2022    INR goal:  2.0-3.0   TTR:  68.5 % (3.5 y)   INR used for dosin.60 (2022)   Warfarin maintenance plan:  7.5 mg every Tue, Thu; 5 mg all other days   Weekly warfarin total:  40 mg   Plan last modified:  Elvi Haskins, PharmD (2022)   Next INR check:  2022   Priority:  Maintenance   Target end date:  Indefinite    Indications    Permanent atrial fibrillation (HCC) [I48.21]             Anticoagulation Episode Summary     INR check location:      Preferred lab:      Send INR reminders to:   [facility]    Comments:        Anticoagulation Care Providers     Provider Role Specialty Phone number    Kurt Henry MD Referring Cardiology [phone]          Clinic Interview:  Patient Findings     Negatives:  Signs/symptoms of thrombosis, Signs/symptoms of bleeding,   Laboratory test error suspected, Change in health, Change in alcohol use,   Change in activity, Upcoming invasive procedure, Emergency department   visit, Upcoming dental procedure, Missed doses, Extra doses, Change in   medications, Change in diet/appetite, Hospital admission, Bruising, Other   complaints      Clinical Outcomes     Negatives:  Major bleeding event, Thromboembolic event,   Anticoagulation-related hospital admission, Anticoagulation-related ED   visit, Anticoagulation-related fatality        INR History:  Anticoagulation Monitoring 2022   INR 1.70 1.30 2.60   INR Date 2022   INR Goal 2.0-3.0 2.0-3.0 2.0-3.0   Trend Same Same Up   Last Week Total 35 mg 35 mg 40 mg   Next Week Total 37.5 mg 42.5 mg 40 mg   Sun 5 mg 7.5 mg (7/10) -   Mon 5 mg 5 mg 5 mg   Tue 5 mg 5 mg 7.5 mg   Wed - - 5 mg   Thu - - 7.5 mg   Fri 7.5 mg () 7.5 mg -   Sat 5 mg 7.5 mg () -   Visit Report - - -   Some recent data might be hidden       Plan:  1. INR is Therapeutic today- see above in  Anticoagulation Summary.   Will instruct Jalen Lockwood to Continue their warfarin regimen- see above in Anticoagulation Summary.  2. Follow up in 1 weeks  3. Confirmed dosing with patient and left secure message for [facility].   They have been instructed to call if any changes in medications, doses, concerns, etc. Patient expresses understanding and has no further questions at this time.    Elvi Haskins, PharmD

## 2022-08-02 PROBLEM — F41.9 ANXIETY: Status: ACTIVE | Noted: 2022-01-01

## 2022-08-02 NOTE — TELEPHONE ENCOUNTER
"Received a refill request for pt's\"    Rx Refill Note  Requested Prescriptions     Pending Prescriptions Disp Refills   • lisinopril (PRINIVIL,ZESTRIL) 20 MG tablet [Pharmacy Med Name: LISINOPRIL 20 MG TABLET] 180 tablet 0     Sig: TAKE 1 TABLET BY MOUTH TWICE A DAY      Last office visit with prescribing clinician: 9/17/2021      Next office visit with prescribing clinician: 9/28/2022            Monica Zaldivar MA  08/02/22, 10:20 EDT      Pt has an appt with Katie on 08/03.  S/w pt he, is agreeable to wait until his appt just in case there is any changes to his medication.    "

## 2022-08-04 NOTE — PROGRESS NOTES
Date of Office Visit: 2022  Encounter Provider: ESTEFANY Dobson  Place of Service: [facility] CARDIOLOGY  Patient Name: Jalen Lockwood  :1950  Primary Cardiologist: Dr. Kurt Henry     Chief Complaint   Patient presents with   • Cardiomyopathy   • Coronary Artery Disease   • Atrial Fibrillation   :     HPI: Jalen Lockwood is a pleasant 72 y.o. male who presents today for  follow-up on cardiomyopathy and atrial fibrillation. I have reviewed the medical records.     In , he was diagnosed with rapid atrial flutter and tachycardic mediated cardiomyopathy.  Cardiac catheterization revealed moderate nonobstructive coronary artery disease with medical treatment recommended.  His ejection fraction then normalized.  He was also found that he was drinking alcohol quite heavily and recommended to decrease/abstain.  Shortly after that he presented with atrial fibrillation and his EF decreased to 35%.  Once his atrial fibrillation resolved his ejection fraction normalized.     In , he was found to have left popliteal artery aneurysm treated with stent placement by Dr. Boston.  This was complicated by a wound infection.  Noncontrast CT of the abdomen/pelvis revealed aortectasia of the infrarenal abdominal aorta with no aneurysm noted.     In 2021, he was admitted to the hospital with cellulitis and sepsis. Troponin was elevated.  EF was decreased to 30-35% with apical hypokinesis.  EKG showed anterior T wave inversions.  Because of numerous comorbidities and poor functional status, medical treatment was recommended.  In 2021, 2D echocardiogram showed normalized EF of 55%, moderate RV dilation, and moderate pulmonary hypertension.  He reported that he quit smoking.     In 2022, he was hospitalized with left lower extremity cellulitis and treated with IV antibiotic.  Blood cultures are positive for staph.  He was recommended to have his legs  wrapped.  His lisinopril was discontinued.  However, he is taking lisinopril 20 mg twice daily.    In March 2022, he followed up with me in the office.  He continued to have chronic lower extremity edema with leg wrapping from the [place] wound care.  He denies shortness of breath, but I noted mild crackles in his lower bases of lungs.  I increased his carvedilol to 12.5 mg twice daily for better blood pressure control.  He was recommended to have a repeat echocardiogram.    In June 2022, he was hospitalized for cellulitis of left foot and treated with IV antibiotics.    In July 2022, he was hospitalized for treatment of wound on left posterior calf, possible UTI, and pneumonia with acute on chronic respiratory failure, and sepsis.  He was treated with IV antibiotics.  He developed A. fib with RVR and his heart rate became better controlled.  He was noted to have multiple pulmonary nodules on CT of the chest and was to follow-up with pulmonology outpatient.  He was having trouble with dysphagia and was advised to modify his diet and liquids.  His INR was subtherapeutic. MARIBELL testing was normal.    In July 2022, he was readmitted to the hospital with aspiration pneumonia.  He was agreeable to mechanical soft diet.  He was felt to have recurrent pneumonia and treated with Augmentin x7 days.  His atrial fibrillation was controlled with beta-blocker and INRs were stable.  His carvedilol was changed to metoprolol succinate.    He presents today for follow-up visit and his wife is accompanying him.  He is back at home and receiving services through home health, PT, and speech therapy to help with his diet.  He continues to have bilateral lower extremity edema and his legs are wrapped.  He denies chest pain, shortness of air, PND, orthopnea, palpitations, dizziness, or syncope.  He is off his lisinopril. Irvin catheter intact.  Recent INR was 2.6.        Past Medical History:   Diagnosis Date   • Allergic rhinitis    •  Anxiety    • Aortectasia (HCC)     3cm infrarenal abdominal aorta   • Arthritis    • Atrial flutter (HCC) 2010    s/p ablation    • Charcot's joint of foot    • Chronic edema     both legs and sees wound care center at [place]    • Chronic venous insufficiency    • COPD (chronic obstructive pulmonary disease) (HCC)    • Coronary atherosclerosis     Cath 2010: diffuse 40-50% disease   • Diverticulosis    • Duodenitis    • Fatty liver    • Gastritis    • Gastroparesis    • Hematoma     post-operative; After catheterization, right groin, required surgical exploration   • Hyperlipidemia    • Hypertension    • Insomnia    • Internal hemorrhoids    • Open wound     izzy legs has drsg chg weekly at wound care center at [place]  pt does second dressing on left leg another time during week   • Osteomyelitis (HCC)    • Paroxysmal atrial fibrillation (HCC)    • Peripheral neuropathy    • Popliteal artery aneurysm (HCC)     left, s/p stenting by Dr. Boston   • Skin cancer    • Sleep apnea     o2   • 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HCC)     due to flutter and afib; cath 2010 with nonobstructive disease   • Venous stasis    • Venous stasis ulcer (HCC)     bilateral legs        Past Surgical History:   Procedure Laterality Date   • BASAL CELL CARCINOMA EXCISION      ear and left side of face   • BRONCHOSCOPY N/A 4/12/2021    Procedure: BRONCHOSCOPY WITH BAL;  Surgeon: Bunny Lepe MD;  Location: [facility] ENDOSCOPY;  Service: Pulmonary;  Laterality: N/A;  PRE-HEMOPTYSIS  POST-SAME   • CARDIAC CATHETERIZATION     • CATARACT EXTRACTION     • COLONOSCOPY  09/28/2015    NBIH, diverticulosis, polyps   • COLONOSCOPY N/A 9/11/2018    Procedure: COLONOSCOPY TO CECUM  AND TERM. ILEUM WITH COLD SNARE POLYPECTOMIES;  Surgeon: Kane Lagunas MD;  Location: [facility] ENDOSCOPY;  Service: Gastroenterology   • COLONOSCOPY N/A 10/29/2019    Procedure: COLONOSCOPY TO TO CECUM AND TERMINAL ILEUM WITH HOT AND COLD SNARE POLYPECTOMIES;  Surgeon:  Kane Lagunas MD;  Location: [facility] ENDOSCOPY;  Service: Gastroenterology   • HIP ARTHROPLASTY Right 2017   • JOINT REPLACEMENT Left    • OTHER SURGICAL HISTORY      Catheter ablation atrial flutter   • REPAIR ANEURYSM / PSEUDO ANEURYSM / RUPTURED ANEURYSM POPLITEAL ARTERY      Stent-Graft of the the left popliteal artery   • REPAIR KNEE LIGAMENT      Primary repair of knee ligament cruciate anterior right   • TONSILLECTOMY  1958   • TOTAL KNEE ARTHROPLASTY Bilateral    • UPPER GASTROINTESTINAL ENDOSCOPY  09/16/2014    acute gastritis, acute duodenitis       Social History     Socioeconomic History   • Marital status:      Spouse name: Mariposa   Tobacco Use   • Smoking status: Current Every Day Smoker     Packs/day: 0.25     Types: Cigarettes     Start date: 1964   • Smokeless tobacco: Never Used   • Tobacco comment: caffeine use - 1.5 cups coffee daily    Vaping Use   • Vaping Use: Never used   Substance and Sexual Activity   • Alcohol use: Yes     Alcohol/week: 14.0 standard drinks     Types: 14 Shots of liquor per week   • Drug use: No   • Sexual activity: Defer       Family History   Problem Relation Age of Onset   • Emphysema Father    • Malig Hyperthermia Neg Hx        The following portion of the patient's history were reviewed and updated as appropriate: past medical history, past surgical history, past social history, past family history, allergies, current medications, and problem list.    Review of Systems   Constitutional: Negative.   Cardiovascular: Positive for leg swelling.   Respiratory: Negative.    Hematologic/Lymphatic: Negative.    Neurological: Negative.        Allergies   Allergen Reactions   • Cephalexin Hives     Tolerated ceftriaxone Jan 2022   • Codeine Nausea Only   • Silver Other (See Comments)         Current Outpatient Medications:   •  albuterol sulfate  (90 Base) MCG/ACT inhaler, Inhale 2 puffs Every 4 (Four) Hours As Needed for Wheezing., Disp: 2 g, Rfl: 6  •   ALPRAZolam (XANAX) 0.5 MG tablet, TAKE 1 TABLET BY MOUTH EVERY DAY AT NIGHT. NEEDS APPT, Disp: 30 tablet, Rfl: 0  •  Atrovent HFA 17 MCG/ACT inhaler, INHALE 2 PUFFS BY MOUTH 4 TIMES A DAY, Disp: 12.9 each, Rfl: 3  •  docusate sodium (COLACE) 100 MG capsule, Take 100 mg by mouth Daily., Disp: , Rfl:   •  ferrous sulfate (FerrouSul) 325 (65 FE) MG tablet, Take 1 tablet by mouth Daily With Breakfast for 30 days., Disp: 30 tablet, Rfl: 0  •  finasteride (PROSCAR) 5 MG tablet, Take 1 tablet by mouth daily., Disp: , Rfl:   •  furosemide (LASIX) 40 MG tablet, Take 1 tablet by mouth 2 (Two) Times a Day for 30 days., Disp: 60 tablet, Rfl: 0  •  gentamicin (GARAMYCIN) 0.1 % ointment, APPLY TO AFFECTED AREA EVERY DAY WITH DRESSING CHANGES, Disp: , Rfl:   •  HYDROcodone-acetaminophen (NORCO)  MG per tablet, Take 1 tablet by mouth Every 6 (Six) Hours As Needed for Moderate Pain ., Disp: , Rfl:   •  metoclopramide (REGLAN) 10 MG tablet, TAKE 1 TABLET BY MOUTH 4 (FOUR) TIMES A DAY BEFORE MEALS & AT BEDTIME., Disp: 360 tablet, Rfl: 3  •  metoprolol succinate XL (TOPROL-XL) 25 MG 24 hr tablet, Take 1 tablet by mouth Daily for 30 days., Disp: 30 tablet, Rfl: 0  •  OXYGEN-HELIUM IN, 2 L into the nostril(s) as directed by provider As Needed., Disp: , Rfl:   •  pravastatin (PRAVACHOL) 20 MG tablet, TAKE 1 TABLET BY MOUTH EVERY DAY, Disp: 90 tablet, Rfl: 1  •  silodosin (RAPAFLO) 8 MG capsule capsule, Take 1 capsule by mouth daily. With food., Disp: , Rfl:   •  warfarin (COUMADIN) 5 MG tablet, As directed  Indications: Atrial Fibrillation, Disp: , Rfl:   •  warfarin (COUMADIN) 7.5 MG tablet, As directed  Indications: Atrial Fibrillation (Patient taking differently: As directed  (Takes on Tuesdays' and Thursdays)  Indications: Atrial Fibrillation), Disp: , Rfl:   •  Breo Ellipta 200-25 MCG/INH inhaler, INHALE 1 PUFF BY MOUTH EVERY DAY, Disp: 60 each, Rfl: 4  •  gabapentin (NEURONTIN) 600 MG tablet, Take 1 tablet by mouth 3 (Three)  "Times a Day for 30 days., Disp: 90 tablet, Rfl: 0  •  lisinopril (PRINIVIL,ZESTRIL) 20 MG tablet, TAKE 1 TABLET BY MOUTH TWICE A DAY, Disp: 180 tablet, Rfl: 0        Objective:     Vitals:    08/03/22 1114   BP: 132/70   BP Location: Left arm   Patient Position: Sitting   Pulse: 69   Weight: 128 kg (283 lb)   Height: 193 cm (76\")     Body mass index is 34.45 kg/m².    PHYSICAL EXAM:    Vitals Reviewed.   General Appearance: No acute distress, well developed and well nourished. Obese.   Eyes: Conjunctiva and lids: No erythema, swelling, or discharge. Sclera non-icteric. Glasses.   HENT: Atraumatic, normocephalic. External eyes, ears, and nose normal. No hearing loss noted. Mucous membranes normal. Lips not cyanotic. Neck supple with no tenderness. Wearing mask.   Respiratory: No signs of respiratory distress. Respiration rhythm and depth normal.   Clear to auscultation. No rales, crackles, rhonchi, or wheezing auscultated.   Cardiovascular:  Jugular Venous Pressure: Normal  Heart Rate and Rhythm: Irregularly, irregular.  Heart Sounds: Normal S1 and S2. No S3 or S4 noted.  Murmurs: No murmurs noted. No rubs, thrills, or gallops.   Lower Extremities: Bilateral lower extremity edema; legs wrapped.  Gastrointestinal:  Abdomen soft, non-distended, non-tender.    Musculoskeletal: Normal movement of extremities.  Skin and Nails: General appearance normal. No pallor, cyanosis, diaphoresis. Skin temperature normal. No clubbing of fingernails.   Psychiatric: Patient alert and oriented to person, place, and time. Speech and behavior appropriate. Normal mood and affect.   Irvin catheter present.      ECG 12 Lead    Date/Time: 8/3/2022 11:26 AM  Performed by: Katie Hurt APRN  Authorized by: Katie Hurt APRN   Comparison: compared with previous ECG from 7/14/2022  Similar to previous ECG  Comparison to previous ECG: RBBB  Rhythm: atrial fibrillation  Rate: normal  BPM: 69  Conduction: non-specific intraventricular " conduction delay  ST Segments: ST segments normal  T Waves: T waves normal  QRS axis: normal  Other: no other findings    Clinical impression: abnormal EKG              Assessment:       Diagnosis Plan   1. Permanent atrial fibrillation (HCC)     2. Atrial flutter with controlled response (HCC)     3. Coronary artery disease involving native coronary artery of native heart without angina pectoris     4. Ischemic cardiomyopathy     5. Lymphedema of both lower extremities     6. Essential hypertension     7. Pulmonary hypertension (HCC)     8. Tobacco abuse            Plan:       1/2.  Atrial Fibrillation/Atrial Flutter: First diagnosed in 2010.  Permanent atrial fibrillation and rate controlled on carvedilol.  Remains on warfarin with INRs followed at the [facility].  CHADS2 Vascore of 4.       3.  Coronary Artery Disease: Non-STEMI and wall motion abnormalities on echocardiogram in 2021.  Medical treatment recommended due to his comorbidities and poor functional status.  Denies angina. Continue pravastatin.  He is not on aspirin because of the warfarin.     4.  Cardiomyopathy: Initially had tachycardic mediated cardiomyopathy with atrial fibrillation in 2010. He then developed ischemic cardiomyopathy and his EF has normalized on carvedilol and lisinopril.  Repeat echocardiogram.  I discussed with Dr. Henry and she said is fine to leave him on metoprolol succinate.    5.  Lymphedema: Follows in the [place] wound clinic.  Legs are wrapped bilaterally.  Recently treated for cellulitis.     6.  Hypertension: Blood pressure normal today.  He remains on carvedilol.  In June 2022, his lisinopril was discontinued during the hospitalization.  His kidney function is normal.  I discussed with Dr. Henry and we have elected to restart lisinopril 5 mg daily (previously on 20 mg).    7.  Pulmonary Hypertension: Denies shortness of breath.    8.  Cigarette Smoking: He would highly benefit from abstaining  from cigarette smoking.     9.  Sepsis, UTI, aspiration pneumonia: Following up with PCP.    10.  He is scheduled to see Dr. Henry in September and we will arrange a repeat echocardiogram the same day.  He will also need a BMP when he comes to see Dr. Henry.      As always, it has been a pleasure to participate in your patient's care. Thank you.       Sincerely,         ESTEFANY Duckworth  [facility] Cardiology      · Dictated utilizing Dragon Dictation  · COVID-19 Precautions - Patient was compliant in wearing a mask. When I saw the patient, I used appropriate personal protective equipment (PPE) including mask and eye shield (standard procedure).  Additionally, I used gown and gloves if indicated.  Hand hygiene was completed before and after seeing the patient.

## 2022-08-04 NOTE — OUTREACH NOTE
Sepsis Week 2 Survey    Flowsheet Row Responses   [facility] patient discharged from? [place]   Does the patient have one of the following disease processes/diagnoses(primary or secondary)? Sepsis   Week 2 attempt successful? Yes   Call start time 1629   Call end time 1634   Meds reviewed with patient/caregiver? Yes   Is the patient having any side effects they believe may be caused by any medication additions or changes? No   Does the patient have all medications related to this admission filled (includes all antibiotics, inhalers, nebulizers,steroids,etc.) Yes   Is the patient taking all medications as directed (includes completed medication regime)? Yes   Comments regarding appointments PCP appt next week.    Does the patient have a primary care provider?  Yes   Does the patient have an appointment with their PCP within 7 days of discharge? Yes   Has the patient kept scheduled appointments due by today? Yes   Has home health visited the patient within 72 hours of discharge? Yes   Home health comments HH nurse visited today-changed leg wraps.   Has all DME been delivered? Yes   Psychosocial issues? No   Did the patient receive a copy of their discharge instructions? Yes   Nursing interventions Reviewed instructions with patient   What is the patient's perception of their health status since discharge? Improving   Nursing interventions Nurse provided patient education   Is the patient/caregiver able to teach back Sepsis? S - Shivering,fever or very cold, E - Extreme pain or generalized discomfort (worst ever,especially abdomen), P - Pale or discolored skin, S - Sleepy, difficult to arouse,confused, I -   I feel like I might die-a feeling of hopelessness, S - Short of breath   Nursing interventions Nurse provided patient education   Is patient/caregiver able to teach back steps to recovery at home? Set small, achievable goals for return to baseline health, Exercise as tolerated, Rest and regain strength,  Eat a balanced diet   Is the patient/caregiver able to teach back signs and symptoms of worsening condition: Fever, Shortness of breath/rapid respiratory rate, Hyperthermia, Altered mental status(confusion/coma), Rapid heart rate (>90), Edema   If the patient is a current smoker, are they able to teach back resources for cessation? Smoking cessation medications, Smoking cessation classes, Smoking cessation support groups, [id number]   Is the patient/caregiver able to teach back the hierarchy of who to call/visit for symptoms/problems? PCP, Specialist, Home health nurse, Urgent Care, ED, 911 Yes   Week 2 call completed? Yes   Wrap up additional comments States is improving. States edema decreasing in legs. Reports INR was 2.1 today. Denies any SOA or fever. Denies any needs today.          LINDSEY BARTH - Registered Nurse

## 2022-08-05 PROBLEM — I25.5 ISCHEMIC CARDIOMYOPATHY: Status: ACTIVE | Noted: 2022-01-01

## 2022-08-05 PROBLEM — F10.10 ETOH ABUSE: Status: RESOLVED | Noted: 2019-04-24 | Resolved: 2022-01-01

## 2022-08-05 NOTE — TELEPHONE ENCOUNTER
Dr Henry recommended to continue metoprolol succinate and restart lisinopril 5 mg daily.  He will monitor his blood pressure and heart rate and call with any concerns.  He is scheduled to see Dr. Henry in September.    Patient informed & verbalizes understanding.

## 2022-08-05 NOTE — PROGRESS NOTES
Anticoagulation Clinic Progress Note    Anticoagulation Summary  As of 2022    INR goal:  2.0-3.0   TTR:  68.6 % (3.5 y)   INR used for dosin.1 (2022)   Warfarin maintenance plan:  7.5 mg every Tue, Thu; 5 mg all other days   Weekly warfarin total:  40 mg   No change documented:  Brendan Live PharmD   Plan last modified:  Elvi Haskins PharmD (2022)   Next INR check:  2022   Priority:  Maintenance   Target end date:  Indefinite    Indications    Permanent atrial fibrillation (HCC) [I48.21]             Anticoagulation Episode Summary     INR check location:      Preferred lab:      Send INR reminders to:  [facility] CLINICAL [place]    Comments:        Anticoagulation Care Providers     Provider Role Specialty Phone number    Kurt Henry MD Referring Cardiology [phone]          INR History:  Anticoagulation Monitoring 2022   INR 1.30 2.60 2.1   INR Date 2022   INR Goal 2.0-3.0 2.0-3.0 2.0-3.0   Trend Same Up Same   Last Week Total 35 mg 40 mg 40 mg   Next Week Total 42.5 mg 40 mg 40 mg   Sun 7.5 mg (7/10) - 5 mg   Mon 5 mg 5 mg 5 mg   Tue 5 mg 7.5 mg 7.5 mg   Wed - 5 mg 5 mg   Thu - 7.5 mg -   Fri 7.5 mg - 5 mg   Sat 7.5 mg () - 5 mg   Visit Report - - -   Some recent data might be hidden       Plan:  1. INR was Therapeutic yesterday- see above in Anticoagulation Summary.   Provided instructions to Estefania with [facility] to Continue their warfarin regimen- see above in Anticoagulation Summary.  2. Follow up in 1 week      Brendan Live PharmD

## 2022-08-09 NOTE — PROGRESS NOTES
Transitional Care Follow Up Visit  Subjective     Jalen Lockwood is a 72 y.o. male who presents for a transitional care management visit.    Within 48 business hours after discharge our office contacted him via telephone to coordinate his care and needs.      I reviewed and discussed the details of that call along with the discharge summary, hospital problems, inpatient lab results, inpatient diagnostic studies, and consultation reports with Jalen.     Current outpatient and discharge medications have been reconciled for the patient.  Reviewed by: ESTEFANY Loco      Date of TCM Phone Call 7/27/2022   [facility]   Date of Admission 7/24/2022   Date of Discharge 7/27/2022   [place] [facility]     Risk for Readmission (LACE) No data recorded      Patient presents the office today for follow-up on 7/24/2022 to 7/27/2022.  Patient has significant health history.  Patient was admitted and treated for aspiration pneumonia.  Patient is currently taking thickened liquids and tolerating them okay.  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  Patient has a chronic urinary indwelling Irvin catheter.  Patient has generalized anxiety disorder and he takes Xanax 0.5 mg daily as needed.  Patient has chronic anemia last hemoglobin 9.8..  Patient has home health that comes in cares for his chronic venous stasis.  Blood pressure 110/60.  He denies chest pain shortness of air.               Course During Hospital Stay:  7/24/2022-7/27/2022     The following portions of the patient's history were reviewed and updated as appropriate: allergies, current medications, past family history, past medical history, past social history, past surgical history and problem list.    Review of Systems   Constitutional: Negative for activity change and appetite change.   HENT: Negative for congestion and postnasal drip.    Eyes: Negative for discharge and itching.   Respiratory: Positive  for cough. Negative for wheezing.    Cardiovascular: Negative for palpitations and leg swelling.   Gastrointestinal: Negative for abdominal pain, blood in stool, constipation and diarrhea.   Endocrine: Negative for cold intolerance.   Genitourinary:        Irvin catheter   Musculoskeletal:        Decreased mobility in wheelchair   Skin: Negative for color change.        Venous stasis   Allergic/Immunologic: Negative for environmental allergies, food allergies and immunocompromised state.   Neurological: Negative for dizziness and headaches.   Hematological: Negative for adenopathy. Does not bruise/bleed easily.   Psychiatric/Behavioral: The patient is nervous/anxious.        Objective   Physical Exam  Constitutional:       General: He is not in acute distress.     Appearance: Normal appearance.   Eyes:      Pupils: Pupils are equal, round, and reactive to light.   Cardiovascular:      Rate and Rhythm: Normal rate and regular rhythm.      Pulses: Normal pulses.      Heart sounds: Normal heart sounds.   Pulmonary:      Effort: Pulmonary effort is normal.      Breath sounds: Examination of the right-lower field reveals decreased breath sounds. Examination of the left-lower field reveals decreased breath sounds. Decreased breath sounds present. No wheezing, rhonchi or rales.   Neurological:      Mental Status: He is alert.   Psychiatric:         Mood and Affect: Mood is anxious. Mood is not depressed.         Behavior: Behavior normal.         Assessment & Plan   Problems Addressed this Visit        Cardiac and Vasculature    Essential hypertension    Venous stasis dermatitis of both lower extremities       Health Encounters    Hospital discharge follow-up - Primary       Pulmonary and Pneumonias    Aspiration pneumonia (HCC)     Stable.  Patient currently using thickened liquid with meals and swallowing pills           Diagnoses       Codes Comments    Hospital discharge follow-up    -  Primary ICD-10-CM:  Z09  ICD-9-CM: V67.59     Essential hypertension     ICD-10-CM: I10  ICD-9-CM: 401.9     Venous stasis dermatitis of both lower extremities     ICD-10-CM: I87.2  ICD-9-CM: 454.1     Aspiration pneumonia of both lower lobes, unspecified aspiration pneumonia type (HCC)     ICD-10-CM: J69.0  ICD-9-CM: 507.0         Home health following patient.  Blood pressure is stable today 110/60.  Stable with lisinopril and metoprolol.    Repeat CBC for anemia last hemoglobin 9.8.  Start iron supplement.

## 2022-08-12 NOTE — PROGRESS NOTES
Anticoagulation Clinic Progress Note    Anticoagulation Summary  As of 2022    INR goal:  2.0-3.0   TTR:  68.8 % (3.6 y)   INR used for dosin.30 (2022)   Warfarin maintenance plan:  7.5 mg every Tue, Thu; 5 mg all other days   Weekly warfarin total:  40 mg   Plan last modified:  Elvi Haskins, [place] (2022)   Next INR check:     Priority:  Maintenance   Target end date:  Indefinite    Indications    Permanent atrial fibrillation (HCC) [I48.21]             Anticoagulation Episode Summary     INR check location:      Preferred lab:      Send INR reminders to:  [facility] CLINICAL [place]    Comments:        Anticoagulation Care Providers     Provider Role Specialty Phone number    Kurt Henry MD Referring Cardiology [phone]          INR History:  Anticoagulation Monitoring 2022   INR 2.60 2.1 2.30   INR Date 2022   INR Goal 2.0-3.0 2.0-3.0 2.0-3.0   Trend Up Same Same   Last Week Total 40 mg 40 mg 40 mg   Next Week Total 40 mg 40 mg 40 mg   Sun - 5 mg 5 mg   Mon 5 mg 5 mg 5 mg   Tue 7.5 mg 7.5 mg 7.5 mg   Wed 5 mg 5 mg 5 mg   Thu 7.5 mg - 7.5 mg   Fri - 5 mg 5 mg   Sat - 5 mg 5 mg   Visit Report - - -   Some recent data might be hidden       Plan:  1. INR is Therapeutic today- see above in Anticoagulation Summary.   Provided instructions to Estefania with [facility] to Continue their warfarin regimen- see above in Anticoagulation Summary.  2. Follow up in 1 week      Elvi Haskins PharmD

## 2022-08-15 NOTE — TELEPHONE ENCOUNTER
Patient called for a refill on his metoprolol. Patient states the last time his meds were sent to the hospital. Pharmacy has been updated to [facility] on [facility] [address] [facility] [facility]

## 2022-08-16 PROBLEM — Z09 HOSPITAL DISCHARGE FOLLOW-UP: Status: ACTIVE | Noted: 2022-01-01

## 2022-08-18 NOTE — PROGRESS NOTES
Anticoagulation Clinic Progress Note    Anticoagulation Summary  As of 2022    INR goal:  2.0-3.0   TTR:  68.9 % (3.6 y)   INR used for dosin.00 (2022)   Warfarin maintenance plan:  7.5 mg every Tue, Thu; 5 mg all other days   Weekly warfarin total:  40 mg   No change documented:  Adeline Marie RPH   Plan last modified:  Elvi Haskins, PharmD (2022)   Next INR check:  2022   Priority:  Maintenance   Target end date:  Indefinite    Indications    Permanent atrial fibrillation (HCC) [I48.21]             Anticoagulation Episode Summary     INR check location:      Preferred lab:      [facility] [place]    Comments:        Anticoagulation Care Providers     Provider Role Specialty Phone number    Kurt Henry MD Referring Cardiology [phone]          INR History:  Anticoagulation Monitoring 2022   INR 2.1 2.30 2.00   INR Date 2022   INR Goal 2.0-3.0 2.0-3.0 2.0-3.0   Trend Same Same Same   Last Week Total 40 mg 40 mg 40 mg   Next Week Total 40 mg 40 mg 40 mg   Sun 5 mg 5 mg 5 mg   Mon 5 mg 5 mg 5 mg   Tue 7.5 mg 7.5 mg 7.5 mg   Wed 5 mg 5 mg 5 mg   Thu - 7.5 mg 7.5 mg   Fri 5 mg 5 mg 5 mg   Sat 5 mg 5 mg 5 mg   Visit Report - - -   Some recent data might be hidden       Plan:  1. INR is Therapeutic today- see above in Anticoagulation Summary.   Provided instructions to Estefania with [facility] to Continue their warfarin regimen- see above in Anticoagulation Summary.  2. Follow up in 1 week      Adeline Marie RPH

## 2022-08-23 PROBLEM — R13.12 OROPHARYNGEAL DYSPHAGIA: Status: ACTIVE | Noted: 2022-01-01

## 2022-08-23 PROBLEM — N39.0 ACUTE UTI (URINARY TRACT INFECTION): Status: ACTIVE | Noted: 2022-01-01

## 2022-08-23 PROBLEM — E87.6 HYPOKALEMIA: Status: ACTIVE | Noted: 2022-01-01

## 2022-08-23 PROBLEM — Z79.01 WARFARIN ANTICOAGULATION: Status: ACTIVE | Noted: 2022-01-01

## 2022-08-23 PROBLEM — R65.20 SEVERE SEPSIS (HCC): Status: ACTIVE | Noted: 2022-01-01

## 2022-08-23 PROBLEM — R53.1 GENERALIZED WEAKNESS: Status: ACTIVE | Noted: 2022-01-01

## 2022-08-23 PROBLEM — J96.11 CHRONIC RESPIRATORY FAILURE WITH HYPOXIA: Status: ACTIVE | Noted: 2022-01-01

## 2022-08-23 PROBLEM — I48.0 PAROXYSMAL ATRIAL FIBRILLATION: Status: ACTIVE | Noted: 2022-01-01

## 2022-08-23 PROBLEM — N17.9 AKI (ACUTE KIDNEY INJURY) (HCC): Status: ACTIVE | Noted: 2022-01-01

## 2022-08-23 PROBLEM — R79.89 ELEVATED LACTIC ACID LEVEL: Status: ACTIVE | Noted: 2022-01-01

## 2022-08-23 PROBLEM — A41.9 SEVERE SEPSIS: Status: ACTIVE | Noted: 2022-01-01

## 2022-08-25 PROBLEM — N17.9 AKI (ACUTE KIDNEY INJURY) (HCC): Status: RESOLVED | Noted: 2022-01-01 | Resolved: 2022-01-01

## 2022-08-25 PROBLEM — D69.6 THROMBOCYTOPENIA (HCC): Status: ACTIVE | Noted: 2022-01-01

## 2022-08-25 PROBLEM — R79.89 ELEVATED LACTIC ACID LEVEL: Status: RESOLVED | Noted: 2022-01-01 | Resolved: 2022-01-01

## 2022-08-26 PROBLEM — E87.6 HYPOKALEMIA: Status: RESOLVED | Noted: 2022-01-01 | Resolved: 2022-01-01

## 2022-09-08 NOTE — OUTREACH NOTE
AMBULATORY CASE MANAGEMENT NOTE    Name and Relationship of Patient/Support Person: Jalen Lockwood - Self    SNF Follow-up    Questions/Answers    Flowsheet Row Responses   Acute Facility Discharged From formerly [facility]   Name of the Skilled Nursing Facility? [facility]   Purpose of SNF Admission SN, OT, PT   Who is the insurance provider or payor of patient stay? Medicare   Skilled Nursing Discharge Date? --  [TBD]        Patient Outreach    Patient remains at [facility] for skilled rehab.  Outreach scheduled for one week to follow up.    CHARLOTTE BARTH  Ambulatory Case Management    9/8/2022, 13:19 EDT

## 2022-09-15 NOTE — OUTREACH NOTE
Prep Survey    Flowsheet Row Responses   [facility] facility patient discharged from? Non-BH   Is LACE score < 7 ? Non-BH Discharge   Emergency Room discharge w/ pulse ox? No   Eligibility \[facility]\""    Date of Discharge 09/15/22   Discharge Disposition Home or Self Care   Discharge diagnosis Unknown   Does the patient have one of the following disease processes/diagnoses(primary or secondary)? Other   Prep survey completed? Yes          GUANAKITO ROSA - Registered Nurse

## 2022-09-15 NOTE — OUTREACH NOTE
AMBULATORY CASE MANAGEMENT NOTE    Name and Relationship of Patient/Support Person: FabiánJalen C - Self    SNF Follow-up    Questions/Answers    Flowsheet Row Responses   Acute Facility Discharged From [facility]   Name of the Skilled Nursing Facility? [facility]   Purpose of SNF Admission SN, OT, PT   Who is the insurance provider or payor of patient stay? Medicare   Skilled Nursing Discharge Date? 09/15/22          Patient Outreach    Outgoing call to [facility].  Confirmed that patient was discharged today.   for patient to follow up and an outreach has been scheduled for follow up.    CHARLOTTE BARTH  Ambulatory Case Management    9/15/2022, 15:01 EDT

## 2022-09-16 NOTE — PROGRESS NOTES
Anticoagulation Clinic Progress Note    Anticoagulation Summary  As of 2022    INR goal:  2.0-3.0   TTR:  68.8 % (3.6 y)   INR used for dosin.30 (2022)   Warfarin maintenance plan:  7.5 mg every e, Thu; 5 mg all other days   Weekly warfarin total:  40 mg   Plan last modified:  Elvi Haskins, PharmD (2022)   Next INR check:  2022   Priority:  Maintenance   Target end date:  Indefinite    Indications    Permanent atrial fibrillation (HCC) [I48.21]             Anticoagulation Episode Summary     INR check location:      Preferred lab:      Send INR reminders to:   [facility]    Comments:        Anticoagulation Care Providers     Provider Role Specialty Phone number    Kurt Henry MD Referring Cardiology [phone]          Clinic Interview:  Patient Findings     Negatives:  Signs/symptoms of thrombosis, Signs/symptoms of bleeding,   Laboratory test error suspected, Change in health, Change in alcohol use,   Change in activity, Upcoming invasive procedure, Emergency department   visit, Upcoming dental procedure, Missed doses, Extra doses, Change in   medications, Change in diet/appetite, Hospital admission, Bruising, Other   complaints    Comments:  Patient's spouse confirmed that they have 5 mg tablets.    Confirmed that dosing in SNF was 6 mg daily and he took last dose   yesterday. Patient also states he is using injections (lovenox).  Endorses that he takes 60 mg daily (confirmed with Estefania/[facility] that this was started on discharge from SNF).      Clinical Outcomes     Negatives:  Major bleeding event, Thromboembolic event,   Anticoagulation-related hospital admission, Anticoagulation-related ED   visit, Anticoagulation-related fatality    Comments:  Patient's spouse confirmed that they have 5 mg tablets.    Confirmed that dosing in SNF was 6 mg daily and he took last dose   yesterday.  Patient also states he is using injections (lovenox).  Endorses that he takes 60  mg daily (confirmed with Sean that this was started on discharge from SNF).    INR History:  Anticoagulation Monitoring 8/12/2022 8/17/2022 9/16/2022   INR 2.30 2.00 1.30   INR Date 8/12/2022 8/17/2022 9/16/2022   INR Goal 2.0-3.0 2.0-3.0 2.0-3.0   Trend Same Same Same   Last Week Total 40 mg 40 mg 40 mg   Next Week Total 40 mg 40 mg 45 mg   Sun 5 mg 5 mg 5 mg   Mon 5 mg 5 mg 5 mg   Tue 7.5 mg 7.5 mg 7.5 mg   Wed 5 mg 5 mg -   Thu 7.5 mg 7.5 mg -   Fri 5 mg 5 mg 10 mg (9/16)   Sat 5 mg 5 mg 5 mg   Visit Report - - -   Some recent data might be hidden       Plan:  1. INR is Subtherapeutic today- see above in Anticoagulation Summary.   Will instruct Jalen Lockwood to Increase their warfarin regimen- see above in Anticoagulation Summary.  Continue enoxaparin. Boost today to 10 mg.  Increase to 7.5mg Tuesdays and Thursdays and 5 mg on all other days.    2. Follow up in 5 days.   3. Dosing instructions and follow up provided to Sean and Mariposa, patient's wife. They have been instructed to call if any changes in medications, doses, concerns, etc. Patient expresses understanding and has no further questions at this time.    Elvi Haskins, PharmD

## 2022-09-16 NOTE — OUTREACH NOTE
Call Center TCM Note    Flowsheet Row Responses   [facility] patient discharged from? Non-   Does the patient have one of the following disease processes/diagnoses(primary or secondary)? Other   TCM attempt successful? Yes   Call start time 1319   Call end time 1323   Discharge diagnosis sepsis   Person spoke with today (if not patient) and relationship patient   Meds reviewed with patient/caregiver? Yes   Is the patient having any side effects they believe may be caused by any medication additions or changes? No   Does the patient have all medications ordered at discharge? Yes   Is the patient taking all medications as directed (includes completed medication regime)? Yes   Comments He declines a PCP appt at this time with Dr Ludwig. He does have followup with cardiology on 9/28/22   Has home health visited the patient within 72 hours of discharge? Yes   Home health comments HH just left home.    Has all DME been delivered? Yes   Psychosocial issues? No   Did the patient receive a copy of their discharge instructions? Yes   Nursing interventions Reviewed instructions with patient   What is the patient's perception of their health status since discharge? Improving   Is the patient/caregiver able to teach back signs and symptoms related to disease process for when to call PCP? Yes   Is the patient/caregiver able to teach back signs and symptoms related to disease process for when to call 911? Yes   Is the patient/caregiver able to teach back the hierarchy of who to call/visit for symptoms/problems? PCP, Specialist, Home health nurse, Urgent Care, ED, 911 Yes   If the patient is a current smoker, are they able to teach back resources for cessation? Smoking cessation medications   TCM call completed? Yes   Wrap up additional comments Patient is home from [facility]. He reports he is doing well.           Jeff Keating RN    9/16/2022, 13:23 EDT

## 2022-09-19 NOTE — OUTREACH NOTE
AMBULATORY CASE MANAGEMENT NOTE    Name and Relationship of Patient/Support Person: Mariposa Lockwood - Emergency Contact    SNF Follow-up    Questions/Answers    Flowsheet Row Responses   Acute Facility Discharged From [facility]   Acute Discharge Date 09/15/22   Name of the Skilled Nursing Facility? [facility]   Purpose of SNF Admission SN, OT, PT, WC   Who is the insurance provider or payor of patient stay? Medicare   Home Health Agency at discharge? Yes   Name of Home Health Agency? [facility]   DME Needed at Discharge? No   Are there any medication concerns at Discharge No   Does the patient have a follow-up appointment? Yes        Patient Outreach    Outgoing call to the patient's spouse, Mariposa.  Introduced self and explained the role of RN-ACM.  Patient has [facility] HH services.  She states that she has his medications.  There was no new DME.  She took him to the wound center appointment today.  Provided 24/7 Nurse call center number.  Discussed HRCM services and she agrees with enrollment.  She would like an outreach in 2-3 weeks and this has been scheduled.     CHARLOTTE BARTH  Ambulatory Case Management    9/19/2022, 15:03 EDT

## 2022-09-21 NOTE — PROGRESS NOTES
Anticoagulation Clinic Progress Note    Anticoagulation Summary  As of 2022    INR goal:  2.0-3.0   TTR:  68.6 % (3.7 y)   INR used for dosin.50 (2022)   Warfarin maintenance plan:  7.5 mg every Tue, Thu; 5 mg all other days   Weekly warfarin total:  40 mg   Plan last modified:  Elvi Haskins, PharmD (2022)   Next INR check:  2022   Priority:  Maintenance   Target end date:  Indefinite    Indications    Permanent atrial fibrillation (HCC) [I48.21]             Anticoagulation Episode Summary     INR check location:      Preferred lab:      Send INR reminders to:  [facility] CL[facility] POOL    Comments:        Anticoagulation Care Providers     Provider Role Specialty Phone number    Kurt Henry MD Referring Cardiology [phone]          INR History:  Anticoagulation Monitoring 2022   INR 2.00 1.30 1.50   INR Date 2022   INR Goal 2.0-3.0 2.0-3.0 2.0-3.0   Trend Same Same Same   Last Week Total 40 mg 40 mg 42 mg   Next Week Total 40 mg 45 mg 45 mg   Sun 5 mg 5 mg -   Mon 5 mg 5 mg -   Tue 7.5 mg 7.5 mg -   Wed 5 mg - 10 mg ()   Thu 7.5 mg - 7.5 mg   Fri 5 mg 10 mg () -   Sat 5 mg 5 mg -   Visit Report - - -   Some recent data might be hidden       Plan:  1. INR is Subtherapeutic today- see above in Anticoagulation Summary.   Provided instructions to Estefania with [facility] to Increase their warfarin regimen- see above in Anticoagulation Summary. Out of Lovenox, only took 5 mg daily after 10 mg on Fri.   2. Follow up in 2 days      Adeline Marie RPH

## 2022-09-23 NOTE — PROGRESS NOTES
Anticoagulation Clinic Progress Note    Anticoagulation Summary  As of 2022    INR goal:  2.0-3.0   TTR:  68.4 % (3.7 y)   INR used for dosin.40 (2022)   Warfarin maintenance plan:  7.5 mg every Tue, Thu; 5 mg all other days   Weekly warfarin total:  40 mg   Plan last modified:  Elvi Haskins, PharmD (2022)   Next INR check:  2022   Priority:  Maintenance   Target end date:  Indefinite    Indications    Permanent atrial fibrillation (HCC) [I48.21]             Anticoagulation Episode Summary     INR check location:      Preferred lab:      Send INR reminders to:  [facility] [place]    Comments:        Anticoagulation Care Providers     Provider Role Specialty Phone number    Kurt Henry MD Referring Cardiology [phone]          INR History:  Anticoagulation Monitoring 2022   INR 1.30 1.50 1.40   INR Date 2022   INR Goal 2.0-3.0 2.0-3.0 2.0-3.0   Trend Same Same Same   Last Week Total 40 mg 42 mg 47.5 mg   Next Week Total 45 mg 45 mg 47.5 mg   Sun 5 mg - 7.5 mg ()   Mon 5 mg - 5 mg   Tue 7.5 mg - 7.5 mg   Wed - 10 mg () -   Thu - 7.5 mg -   Fri 10 mg () - 10 mg ()   Sat 5 mg - 5 mg   Visit Report - - -   Some recent data might be hidden       Plan:  1. INR is Subtherapeutic today- see above in Anticoagulation Summary.   Provided instructions to Estefania with [facility] to Increase their warfarin regimen- see above in Anticoagulation Summary.  10 mg today; then increase weekly dose to 7.5 mg on Sundays,  and  and 5 mg on all other days.   2. Follow up in 5 days      Alex FletcherD

## 2022-09-28 PROBLEM — J69.0 ASPIRATION PNEUMONIA (HCC): Status: RESOLVED | Noted: 2022-01-01 | Resolved: 2022-01-01

## 2022-09-28 PROBLEM — N39.0 ACUTE UTI (URINARY TRACT INFECTION): Status: RESOLVED | Noted: 2022-01-01 | Resolved: 2022-01-01

## 2022-09-28 PROBLEM — Z09 HOSPITAL DISCHARGE FOLLOW-UP: Status: RESOLVED | Noted: 2022-01-01 | Resolved: 2022-01-01

## 2022-09-28 PROBLEM — A41.9 SEPSIS WITHOUT ACUTE ORGAN DYSFUNCTION (HCC): Status: RESOLVED | Noted: 2022-01-01 | Resolved: 2022-01-01

## 2022-09-28 PROBLEM — R65.20 SEVERE SEPSIS: Status: RESOLVED | Noted: 2022-01-01 | Resolved: 2022-01-01

## 2022-09-28 PROBLEM — A41.9 SEVERE SEPSIS: Status: RESOLVED | Noted: 2022-01-01 | Resolved: 2022-01-01

## 2022-09-28 NOTE — PROGRESS NOTES
Anticoagulation Clinic Progress Note    Anticoagulation Summary  As of 2022    INR goal:  2.0-3.0   TTR:  68.1 % (3.7 y)   INR used for dosin.40 (2022)   Warfarin maintenance plan:  7.5 mg every Sun, Tue, Thu; 5 mg all other days   Weekly warfarin total:  42.5 mg   Plan last modified:  Adeline Marie RPH (2022)   Next INR check:  10/5/2022   Priority:  Maintenance   Target end date:  Indefinite    Indications    Permanent atrial fibrillation (HCC) [I48.21]             Anticoagulation Episode Summary     INR check location:      Preferred lab:      Send INR reminders to:  [facility] CLINICAL [place]    Comments:        Anticoagulation Care Providers     Provider Role Specialty Phone number    Kurt Henry MD Referring Cardiology [phone]          INR History:  Anticoagulation Monitoring 2022   INR 1.50 1.40 1.40   INR Date 2022   INR Goal 2.0-3.0 2.0-3.0 2.0-3.0   Trend Same Same Up   Last Week Total 42 mg 47.5 mg 52.5 mg   Next Week Total 45 mg 47.5 mg 50 mg   Sun - 7.5 mg () 7.5 mg   Mon - 5 mg 5 mg   Tue - 7.5 mg 7.5 mg   Wed 10 mg () - 10 mg ()   Thu 7.5 mg - 10 mg ()   Fri - 10 mg () 5 mg   Sat - 5 mg 5 mg   Visit Report - - -   Some recent data might be hidden       Plan:  1. INR is Subtherapeutic today- see above in Anticoagulation Summary.   Provided instructions to Estefania with [facility] ([phone]) to increase their warfarin regimen- see above in Anticoagulation Summary.  2. Follow up in 1 week      Adeline Marie RPH

## 2022-09-28 NOTE — PROGRESS NOTES
Pt's wife wanted to inquire about pt's Furosemide dose.  She feels that it may be to much due to the recent hospitalization, dehydration and his legs are smaller.

## 2022-09-28 NOTE — PROGRESS NOTES
Date of Office Visit: 2022  Encounter Provider: Kurt Henry MD  Place of Service: [facility] CARDIOLOGY  Patient Name: Jalen Lockwood  :1950    Chief Complaint   Patient presents with   • Cardiomyopathy   :     HPI: Jalen Lockwood is a 72 y.o. male who presents today for yearly follow up. I have reviewed prior notes and there are no changes except for any new updates described below. I have also reviewed any information entered into the medical record by the patient or by ancillary staff.     He presented with rapid atrial flutter in . His ejection fraction was low due to tachycardia-mediated cardiomyopathy. His catheterization revealed moderate nonobstructive coronary disease. Medical therapy only was recommended and his ejection fraction normalized. It was also found that he was drinking quite heavily and we recommended that he cut back.  Shortly after that, he presented with rapid atrial fibrillation and his ejection fraction declined again to 35%, but once his atrial fibrillation resolved, his ejection fraction improved again.    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 Boston.  This was complicated by a wound infection.  A non-contrasted CT of the abdomen/pelvis revealed aortectasia of the infrarenal abdominal aorta but no aneurysm elsewhere.     He has been admitted numerous times with cellulitis and sepsis. He was admitted in 2021; his troponin was quite elevated. An echo revealed an LVEF of 30-35% with apical hypokinesis. His EKG showed anterior T wave inversions.  Because of his numerous comorbidities and poor functional status, we decided to treat him medically.    He has had several more admissions for sepsis due to cellulitis, UTIs, and pneuomnia. In general, there have been no acute cardiac issues. He is now home. He denies chest pain, dyspnea, orthopnea, PND, palpitations, or syncope.  His chronic  severe LE edema is unchanged.     Past Medical History:   Diagnosis Date   • Allergic rhinitis    • Anxiety    • Aortectasia (HCC)     3cm infrarenal abdominal aorta   • Arthritis    • Atrial flutter (HCC) 2010    s/p ablation    • Charcot's joint of foot    • Chronic edema     both legs and sees wound care center at [facility]    • Chronic venous insufficiency    • COPD (chronic obstructive pulmonary disease) (HCC)    • Coronary atherosclerosis     Cath 2010: diffuse 40-50% disease   • Diverticulosis    • Duodenitis    • Fatty liver    • Gastritis    • Gastroparesis    • Hematoma     post-operative; After catheterization, right groin, required surgical exploration   • Hyperlipidemia    • Hypertension    • Insomnia    • Internal hemorrhoids    • Open wound     izzy legs has drsg chg weekly at wound care center at [facility]  pt does second dressing on left leg another time during week   • Osteomyelitis (HCC)    • Paroxysmal atrial fibrillation (HCC)    • Peripheral neuropathy    • Popliteal artery aneurysm (HCC)     left, s/p stenting by Dr. Boston   • Skin cancer    • Sleep apnea     o2   • 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HCC)     due to flutter and afib; cath 2010 with nonobstructive disease   • Venous stasis    • Venous stasis ulcer (HCC)     bilateral legs        Past Surgical History:   Procedure Laterality Date   • BASAL CELL CARCINOMA EXCISION      ear and left side of face   • BRONCHOSCOPY N/A 4/12/2021    Procedure: BRONCHOSCOPY WITH BAL;  Surgeon: Bunny Lepe MD;  Location: [facility] ENDOSCOPY;  Service: Pulmonary;  Laterality: N/A;  PRE-HEMOPTYSIS  POST-SAME   • CARDIAC CATHETERIZATION     • CATARACT EXTRACTION     • COLONOSCOPY  09/28/2015    NBIH, diverticulosis, polyps   • COLONOSCOPY N/A 9/11/2018    Procedure: COLONOSCOPY TO CECUM  AND TERM. ILEUM WITH COLD SNARE POLYPECTOMIES;  Surgeon: Kane Lagunas MD;  Location: [facility] ENDOSCOPY;  Service: Gastroenterology   • COLONOSCOPY N/A 10/29/2019     Procedure: COLONOSCOPY TO TO CECUM AND TERMINAL ILEUM WITH HOT AND COLD SNARE POLYPECTOMIES;  Surgeon: Kane Lagunas MD;  Location: [facility] ENDOSCOPY;  Service: Gastroenterology   • HIP ARTHROPLASTY Right 2017   • JOINT REPLACEMENT Left    • OTHER SURGICAL HISTORY      Catheter ablation atrial flutter   • REPAIR ANEURYSM / PSEUDO ANEURYSM / RUPTURED ANEURYSM POPLITEAL ARTERY      Stent-Graft of the the left popliteal artery   • REPAIR KNEE LIGAMENT      Primary repair of knee ligament cruciate anterior right   • TONSILLECTOMY  1958   • TOTAL KNEE ARTHROPLASTY Bilateral    • UPPER GASTROINTESTINAL ENDOSCOPY  09/16/2014    acute gastritis, acute duodenitis       Social History     Socioeconomic History   • Marital status:      Spouse name: Mariposa   Tobacco Use   • Smoking status: Current Every Day Smoker     Packs/day: 0.25     Types: Cigarettes     Start date: 1964   • Smokeless tobacco: Never Used   • Tobacco comment: caffeine use - 1.5 cups coffee daily    Vaping Use   • Vaping Use: Never used   Substance and Sexual Activity   • Alcohol use: Yes     Alcohol/week: 14.0 standard drinks     Types: 14 Shots of liquor per week   • Drug use: No   • Sexual activity: Defer       Family History   Problem Relation Age of Onset   • Emphysema Father    • Malig Hyperthermia Neg Hx        Review of Systems   Constitutional: Positive for malaise/fatigue.   Cardiovascular: Positive for leg swelling.   Skin: Positive for color change and poor wound healing.   Musculoskeletal: Positive for joint pain and joint swelling.   Genitourinary: Positive for decreased libido.   All other systems reviewed and are negative.      Allergies   Allergen Reactions   • Cephalexin Hives     Tolerated ceftriaxone Jan 2022   • Codeine Nausea Only   • Silver Other (See Comments)         Current Outpatient Medications:   •  acetaminophen (TYLENOL) 325 MG tablet, Take 2 tablets by mouth Every 6 (Six) Hours As Needed for Fever., Disp: , Rfl:   •   acidophilus (FLORANEX) tablet tablet, Take 1 tablet by mouth 2 (Two) Times a Day., Disp: , Rfl:   •  albuterol sulfate  (90 Base) MCG/ACT inhaler, Inhale 2 puffs Every 4 (Four) Hours As Needed for Wheezing., Disp: 2 g, Rfl: 6  •  ALPRAZolam (XANAX) 0.5 MG tablet, Take 1 tablet by mouth At Night As Needed for Anxiety for up to 30 days., Disp: 3 tablet, Rfl: 0  •  Atrovent HFA 17 MCG/ACT inhaler, INHALE 2 PUFFS BY MOUTH 4 TIMES A DAY, Disp: 12.9 each, Rfl: 3  •  ciclopirox (LOPROX) 0.77 % cream, APPLY TO AFFECTED AREA AS DIRECTED, Disp: , Rfl:   •  clotrimazole (LOTRIMIN) 1 % cream, APPLY TO AFFECTED AREA TWICE A DAY AND AS NEEDED, Disp: , Rfl:   •  CVS Vitamin C 500 MG tablet, Take 500 mg by mouth Daily., Disp: , Rfl:   •  docusate sodium (COLACE) 100 MG capsule, Take 100 mg by mouth Daily., Disp: , Rfl:   •  ferrous sulfate 325 (65 FE) MG tablet, TAKE 1 TABLET BY MOUTH DAILY WITH BREAKFAST FOR 30 DAYS., Disp: , Rfl:   •  finasteride (PROSCAR) 5 MG tablet, Take 1 tablet by mouth daily., Disp: , Rfl:   •  fluticasone (FLONASE) 50 MCG/ACT nasal spray, 2 sprays by Each Nare route Daily., Disp: , Rfl:   •  Fluticasone Furoate-Vilanterol (BREO ELLIPTA) 200-25 MCG/INH inhaler, Inhale 1 puff Daily., Disp: , Rfl:   •  furosemide (LASIX) 40 MG tablet, Take 40 mg by mouth Daily., Disp: , Rfl:   •  gentamicin (GARAMYCIN) 0.1 % ointment, APPLY AS DIRECTED TO AFFECTED WOUND AREA ON BOTH LEGS AFTER EACH DRESSING CHANGE, Disp: , Rfl:   •  HYDROcodone-acetaminophen (NORCO)  MG per tablet, Take 1 tablet by mouth Every 6 (Six) Hours As Needed for Moderate Pain ., Disp: 12 tablet, Rfl: 0  •  KLOR-CON 10 MEQ CR tablet, Take 10 mEq by mouth Daily., Disp: , Rfl:   •  metoprolol succinate XL (TOPROL-XL) 25 MG 24 hr tablet, Take 1 tablet by mouth Daily., Disp: 90 tablet, Rfl: 3  •  nitroglycerin (NITROSTAT) 0.4 MG SL tablet, Place 1 tablet under the tongue Every 5 (Five) Minutes As Needed for Chest Pain (Only if SBP Greater  "Than 100). Take no more than 3 doses in 15 minutes., Disp: , Rfl: 12  •  pravastatin (PRAVACHOL) 20 MG tablet, TAKE 1 TABLET BY MOUTH EVERY DAY (Patient taking differently: Take 20 mg by mouth Daily.), Disp: 90 tablet, Rfl: 1  •  silodosin (RAPAFLO) 8 MG capsule capsule, Take 1 capsule by mouth Every Night., Disp: , Rfl:   •  Thiamine HCl (Vitamin B1) 100 MG tablet tablet, Take 100 mg by mouth 2 (Two) Times a Day., Disp: , Rfl:   •  warfarin (COUMADIN) 2.5 MG tablet, Take 1 tablet by mouth Every Night. Indications: Atrial Fibrillation, Disp: , Rfl:   •  warfarin (COUMADIN) 5 MG tablet, Take 5 mg by mouth Take As Directed., Disp: , Rfl:      Objective:     Vitals:    09/28/22 1449   BP: 120/60   BP Location: Right arm   Pulse: 58   Weight: 122 kg (269 lb)   Height: 188 cm (74\")     Body mass index is 34.54 kg/m².    Physical Exam  Vitals reviewed.   Constitutional:       Comments: Obese   HENT:      Head:      Comments: Numerous SK/AK, skin cancers on face/ears/neck     Nose: Nose normal.      Mouth/Throat:      Comments: masked  Eyes:      Conjunctiva/sclera: Conjunctivae normal.   Neck:      Vascular: No JVD.   Cardiovascular:      Rate and Rhythm: Normal rate. Rhythm irregularly irregular.      Heart sounds: Normal heart sounds. No murmur heard.  Pulmonary:      Effort: Pulmonary effort is normal.   Abdominal:      Palpations: Abdomen is soft.      Tenderness: There is no abdominal tenderness.      Comments: Obesity limits abdominal exam   Musculoskeletal:         General: Deformity (bilateral charcot foot) present. Normal range of motion.      Cervical back: Normal range of motion.      Comments: Marked BLE lymphedema, rightly wrapped legs   Skin:     General: Skin is warm and dry.      Findings: No rash.      Comments: Numerous seb kers and actinic kers     Neurological:      General: No focal deficit present.      Mental Status: He is alert.      Cranial Nerves: No cranial nerve deficit.   Psychiatric:         " Mood and Affect: Mood normal.         Behavior: Behavior normal.         ECG 12 Lead    Date/Time: 9/28/2022 3:53 PM  Performed by: Kurt Henry MD  Authorized by: Kurt Henry MD   Comparison: compared with previous ECG   Similar to previous ECG  Rhythm: atrial fibrillation  Conduction: non-specific intraventricular conduction delay  ST Segments: ST segments normal  T Waves: T waves normal  QRS axis: left  Other findings: low voltage    Clinical impression: abnormal EKG            Assessment:       Diagnosis Plan   1. Permanent atrial fibrillation (HCC)     2. Coronary artery disease involving native coronary artery of native heart without angina pectoris     3. History of cardiomyopathy     4. Lymphedema            Plan:       He had a previous history of tachycardia induced cardiomyopathy with moderate but nonobstructive coronary disease.  During a hospitalization in 2021 (cellulitis/sepsis/acute renal failure), he suffered a non-ST elevation myocardial infarction.  His ejection fraction was found to be approximately 30% with anteroapical wall motion abnormalities consistent with ischemia or infarct.  He also had anterior T wave inversions, which resolved.  Ultimately, we felt that medical therapy was most appropriate given his obesity, recurrent cellulitis, ongoing tobacco and alcohol usage, and poor overall functional status.  He is not on an antiplatelet agent because he is on warfarin and his bleeding risk is too high.  He is on carvedilol; he was on lisinopril but his BP was too low. He denies angina. A follow up echocardiogram showed normalization of LVEF.     An echo today again showed normal LVSF; it showed moderate TR and PHTN. He will remain on furosemide.     Sincerely,       Kurt Henry MD

## 2022-10-04 NOTE — TELEPHONE ENCOUNTER
Caller: MAGALY    Relationship: Home Health    Best call back number: [phone]    What was the call regarding: BLOOD WAS DRAWN LAST WEEK AND UYANNA CALLED MAGALIE ON Friday AND HIS POTASSIUM WAS AT 3.0.    THIS PAPERWORK WAS BEING SENT TO DR GATN. PLEASE ADVISE IF IT WAS RECEIVED.     PLEASE ALSO ADVISE [facility], [facility] IF ANYTHING ELSE REGARDING THIS ISSUE NEEDS TO BE DONE OR IF THE LABS NEED TO BE RE-DRAWN.     Do you require a callback: YES

## 2022-10-05 NOTE — PROGRESS NOTES
Anticoagulation Clinic Progress Note    Anticoagulation Summary  As of 10/5/2022    INR goal:  2.0-3.0   TTR:  67.8 % (3.7 y)   INR used for dosin.40 (10/5/2022)   Warfarin maintenance plan:  7.5 mg every day   Weekly warfarin total:  52.5 mg   Plan last modified:  Elvi Haskins, PharmD (10/5/2022)   Next INR check:  10/12/2022   Priority:  Maintenance   Target end date:  Indefinite    Indications    Permanent atrial fibrillation (HCC) [I48.21]             Anticoagulation Episode Summary     INR check location:      Preferred lab:      [facility] [place]    Comments:        Anticoagulation Care Providers     Provider Role Specialty Phone number    Kurt Henry MD Referring Cardiology [phone]          INR History:  Anticoagulation Monitoring 2022 2022 10/5/2022   INR 1.40 1.40 1.40   INR Date 2022 2022 10/5/2022   INR Goal 2.0-3.0 2.0-3.0 2.0-3.0   Trend Same Up Up   Last Week Total 47.5 mg 52.5 mg 50 mg   Next Week Total 47.5 mg 50 mg 55 mg   Sun 7.5 mg () 7.5 mg 7.5 mg   Mon 5 mg 5 mg 7.5 mg   Tue 7.5 mg 7.5 mg 7.5 mg   Wed - 10 mg () 10 mg (10)   Thu - 10 mg () 7.5 mg   Fri 10 mg () 5 mg 7.5 mg (10/7)   Sat 5 mg 5 mg 7.5 mg   Visit Report - - -   Some recent data might be hidden       Plan:  1. INR is Subtherapeutic today- see above in Anticoagulation Summary.   Provided instructions to Estefania with [facility] to Change their warfarin regimen- see above in Anticoagulation Summary.  Recommend 10 mg today, then increase to 7.5mg daily.  2. Follow up in 1 week      Elvi Haskins, PharmD

## 2022-10-05 NOTE — TELEPHONE ENCOUNTER
Estefania with Home Health informed, she will see patient today and advise to begin Potassium 10 meq bid and they will recheck his labs in 1 week

## 2022-10-05 NOTE — TELEPHONE ENCOUNTER
I addressed this yesterday.  How much K is he taking.?  If 10meq then inc to 20m eq and recheck next week

## 2022-10-11 NOTE — OUTREACH NOTE
AMBULATORY CASE MANAGEMENT NOTE    Name and Relationship of Patient/Support Person: Mariposa Lockwood - Emergency Contact    Adult Patient Profile  Questions/Answers    Flowsheet Row Most Recent Value   Symptoms/Conditions Managed at Home skin, genitourinary, respiratory   Barriers to Managing Health other (see comments)  [spouse assists with his care and needs]   Genitourinary Symptoms/Conditions difficulty voiding, unable to void/empty  [chronic escamilla [facility] changes the catheter]   Genitourinary Management Strategies catheter, indwelling   Respiratory Symptoms/Conditions --  [Had follow up with pulmonary and has test upcoming]   Skin Symptoms/Conditions wound  [[facility] 2 times a week for wound care]   Skin Management Strategies dressing changes, adequate rest        Patient Outreach    Spoke with his spouse.  She states that the past few weeks have been busy but things are going well.  She states that [facility] continues services for wound care and escamilla care.  PT comes twice weekly.  She states they have no needs at this time.  He has been getting his dressings changed by HH, also.  She states she would prefer an outreach in 3-4 weeks and this has been scheduled.      CHARLOTTE BARTH  Ambulatory Case Management    10/11/2022, 15:40 EDT

## 2022-10-12 NOTE — TELEPHONE ENCOUNTER
Caller: Mariposa Lockwood    Relationship: Emergency Contact    Best call back number: [phone]    Requested Prescriptions:   Requested Prescriptions     Pending Prescriptions Disp Refills   • Thiamine HCl (Vitamin B1) 100 MG tablet tablet 180 tablet 1     Sig: Take 1 tablet by mouth 2 (Two) Times a Day.   • ascorbic acid ([employer]S Vitamin C) 500 MG tablet 90 tablet 3     Sig: Take 1 tablet by mouth Daily.        Pharmacy where request should be sent: [facility] #[id number] - [place], [place] - [address] AT IN [facility] [phone] [facility] [phone]      Additional details provided by patient: PATIENTS WIFE STATED PATIENT IS COMPLETELY OUT OF THESE MEDICATIONS.    Does the patient have less than a 3 day supply:  [x] Yes  [] No    Carlos A Barreto Rep   10/12/22 14:34 EDT

## 2022-10-12 NOTE — PROGRESS NOTES
Anticoagulation Clinic Progress Note    Anticoagulation Summary  As of 10/12/2022    INR goal:  2.0-3.0   TTR:  67.7 % (3.7 y)   INR used for dosin.20 (10/12/2022)   Warfarin maintenance plan:  7.5 mg every day   Weekly warfarin total:  52.5 mg   No change documented:  Elvi Haskins PharmD   Plan last modified:  Elvi Haskins PharmD (10/5/2022)   Next INR check:  10/19/2022   Priority:  Maintenance   Target end date:  Indefinite    Indications    Permanent atrial fibrillation (HCC) [I48.21]             Anticoagulation Episode Summary     INR check location:      Preferred lab:      Send INR reminders to:  [facility] CLINICAL POOL    Comments:        Anticoagulation Care Providers     Provider Role Specialty Phone number    Kurt Henry MD Referring Cardiology [phone]          INR History:  Anticoagulation Monitoring 2022 10/5/2022 10/12/2022   INR 1.40 1.40 2.20   INR Date 2022 10/5/2022 10/12/2022   INR Goal 2.0-3.0 2.0-3.0 2.0-3.0   Trend Up Up Same   Last Week Total 52.5 mg 50 mg 55 mg   Next Week Total 50 mg 55 mg 52.5 mg   Sun 7.5 mg 7.5 mg 7.5 mg   Mon 5 mg 7.5 mg 7.5 mg   Tue 7.5 mg 7.5 mg 7.5 mg   Wed 10 mg () 10 mg (10) 7.5 mg   Thu 10 mg () 7.5 mg 7.5 mg   Fri 5 mg 7.5 mg (10/7) 7.5 mg   Sat 5 mg 7.5 mg 7.5 mg   Visit Report - - -   Some recent data might be hidden       Plan:  1. INR is Therapeutic today- see above in Anticoagulation Summary.   Provided instructions to Estefania with [facility] to Continue their warfarin regimen- see above in Anticoagulation Summary.  2. Follow up in 1 week      Elvi Haskins PharmD

## 2022-10-18 NOTE — TELEPHONE ENCOUNTER
Caller: AIMEE    Relationship: Home Health    Best call back number: [phone]    What is the best time to reach you: ANY    Who are you requesting to speak with (clinical staff, provider,  specific staff member): CLINICAL    Do you know the name of the person who called: AIMEE FROM Replaced by [facility]    What was the call regarding: PATIENTS POTASSIUM WAS LOW AND AVELINA WAS TOLD TO INCREASE PATIENTS   potassium chloride (MICRO-K) 10 MEQ CR capsule     TO 20 AND PATIENTS POTASSIUM WAS 3.9 ON OCT 14.  ALEJO REQUESTING IF PATIENT SHOULD STAY ON 20 OR DROP BACK DOWN TO THE 10.    Do you require a callback: YES

## 2022-10-20 NOTE — PROGRESS NOTES
Anticoagulation Clinic Progress Note    Anticoagulation Summary  As of 10/20/2022    INR goal:  2.0-3.0   TTR:  67.8 % (3.7 y)   INR used for dosin.10 (10/20/2022)   Warfarin maintenance plan:  7.5 mg every day   Weekly warfarin total:  52.5 mg   No change documented:  Ruth Orta, [facility]   Plan last modified:  Elvi Haskins, PharmD (10/5/2022)   Next INR check:  10/27/2022   Priority:  Maintenance   Target end date:  Indefinite    Indications    Permanent atrial fibrillation (HCC) [I48.21]             Anticoagulation Episode Summary     INR check location:      Preferred lab:      [facility]    Comments:        Anticoagulation Care Providers     Provider Role Specialty Phone number    Kurt Henry MD Referring Cardiology [phone]          Drug interactions: has remained unchanged.  Diet: has remained unchanged.    Clinic Interview:  No pertinent clinical findings have been reported.    INR History:  Anticoagulation Monitoring 10/5/2022 10/12/2022 10/20/2022   INR 1.40 2.20 2.10   INR Date 10/5/2022 10/12/2022 10/20/2022   INR Goal 2.0-3.0 2.0-3.0 2.0-3.0   Trend Up Same Same   Last Week Total 50 mg 55 mg 52.5 mg   Next Week Total 55 mg 52.5 mg 52.5 mg   Sun 7.5 mg 7.5 mg 7.5 mg   Mon 7.5 mg 7.5 mg 7.5 mg   Tue 7.5 mg 7.5 mg 7.5 mg   Wed 10 mg (10/5) 7.5 mg 7.5 mg   Thu 7.5 mg 7.5 mg 7.5 mg   Fri 7.5 mg (10/7) 7.5 mg 7.5 mg   Sat 7.5 mg 7.5 mg 7.5 mg   Visit Report - - -   Some recent data might be hidden       Plan:  1. INR is Therapeutic today- see above in Anticoagulation Summary.   Spoke w/ Estefania MEJIAS RN and pt, instructed to cont same, raisa in 1 wk  10/5 1.4: boost today; then 7.5mg daily.- see above in Anticoagulation Summary.  2. Follow up in 1 weeks  3. Pt has agreed to only be called if INR out of range. They have been instructed to call if any changes in medications, doses, concerns, etc. Patient expresses understanding and has no further questions  at this time.    Ruth Orta [facility]

## 2022-10-26 NOTE — PROGRESS NOTES
Anticoagulation Clinic Progress Note    Anticoagulation Summary  As of 10/26/2022    INR goal:  2.0-3.0   TTR:  68.0 % (3.8 y)   INR used for dosin.80 (10/26/2022)   Warfarin maintenance plan:  7.5 mg every day   Weekly warfarin total:  52.5 mg   Plan last modified:  Elvi Haskins, PharmD (10/5/2022)   Next INR check:  2022   Priority:  Maintenance   Target end date:  Indefinite    Indications    Permanent atrial fibrillation (HCC) [I48.21]             Anticoagulation Episode Summary     INR check location:      Preferred lab:      Send INR reminders to:   [facility]    Comments:        Anticoagulation Care Providers     Provider Role Specialty Phone number    Kurt Henry MD Referring Cardiology [phone]          Clinic Interview:  No pertinent clinical findings have been reported.    INR History:  Anticoagulation Monitoring 10/12/2022 10/20/2022 10/26/2022   INR 2.20 2.10 2.80   INR Date 10/12/2022 10/20/2022 10/26/2022   INR Goal 2.0-3.0 2.0-3.0 2.0-3.0   Trend Same Same Same   Last Week Total 55 mg 52.5 mg 52.5 mg   Next Week Total 52.5 mg 52.5 mg 52.5 mg   Sun 7.5 mg 7.5 mg 7.5 mg   Mon 7.5 mg 7.5 mg 7.5 mg   Tue 7.5 mg 7.5 mg 7.5 mg   Wed 7.5 mg 7.5 mg 7.5 mg   Thu 7.5 mg 7.5 mg 7.5 mg   Fri 7.5 mg 7.5 mg 7.5 mg   Sat 7.5 mg 7.5 mg 7.5 mg   Visit Report - - -   Some recent data might be hidden       Plan:  1. INR is therapeutic today- see above in Anticoagulation Summary.    Jalen Lockwood to continue their warfarin regimen- see above in Anticoagulation Summary.  2. Follow up in 1 week  3. They have been instructed to call if any changes in medications, doses, concerns, etc. Patient expresses understanding and has no further questions at this time.    Adeline Marie [facility]

## 2022-10-26 NOTE — PROGRESS NOTES
Anticoagulation Clinic Progress Note    Anticoagulation Summary  As of 10/26/2022    INR goal:  2.0-3.0   TTR:  68.0 % (3.8 y)   INR used for dosin.80 (10/26/2022)   Warfarin maintenance plan:  7.5 mg every day   Weekly warfarin total:  52.5 mg   No change documented:  Adeline Marie RPH   Plan last modified:  Elvi Haskins, PharmD (10/5/2022)   Next INR check:  2022   Priority:  Maintenance   Target end date:  Indefinite    Indications    Permanent atrial fibrillation (HCC) [I48.21]             Anticoagulation Episode Summary     INR check location:      Preferred lab:      Send INR reminders to:  [facility] [place] CLINICAL POOL    Comments:        Anticoagulation Care Providers     Provider Role Specialty Phone number    Kurt Henry MD Referring Cardiology [phone]          INR History:  Anticoagulation Monitoring 10/12/2022 10/20/2022 10/26/2022   INR 2.20 2.10 2.80   INR Date 10/12/2022 10/20/2022 10/26/2022   INR Goal 2.0-3.0 2.0-3.0 2.0-3.0   Trend Same Same Same   Last Week Total 55 mg 52.5 mg 52.5 mg   Next Week Total 52.5 mg 52.5 mg 52.5 mg   Sun 7.5 mg 7.5 mg 7.5 mg   Mon 7.5 mg 7.5 mg 7.5 mg   Tue 7.5 mg 7.5 mg 7.5 mg   Wed 7.5 mg 7.5 mg 7.5 mg   Thu 7.5 mg 7.5 mg 7.5 mg   Fri 7.5 mg 7.5 mg 7.5 mg   Sat 7.5 mg 7.5 mg 7.5 mg   Visit Report - - -   Some recent data might be hidden       Plan:  1. INR is Therapeutic today- see above in Anticoagulation Summary.   Provided instructions to Estefania with [facility] to Continue their warfarin regimen- see above in Anticoagulation Summary.  2. Follow up in 1 week      Adeline Marie RPH
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
## 2022-10-31 PROBLEM — N39.0 ACUTE UTI: Status: ACTIVE | Noted: 2022-01-01

## 2022-10-31 NOTE — OUTREACH NOTE
AMBULATORY CASE MANAGEMENT NOTE    Name and Relationship of Patient/Support Person: Jalen Lockwood C - Self    Adult Patient Profile  Questions/Answers    Flowsheet Row Most Recent Value   Symptoms/Conditions Managed at Home skin, genitourinary   Barriers to Managing Health none   Genitourinary Symptoms/Conditions unable to void/empty   Genitourinary Management Strategies catheter, indwelling  [changed out at ED 10/28/2022]   Indwelling Catheter Inserted 10/28/22  [changed in ED]   Genitourinary Self-Management Outcome 4 (good)   Skin Symptoms/Conditions wound  [wounds x 2]   Skin Management Strategies dressing changes   Skin Self-Management Outcome 4 (good)   Skin Comment goes to wound center for management   Q1: How often do you have a drink containing alcohol? 4 or more ti   Q2: How many drinks containing alcohol do you have on a typical day when you are drinking? 1 or 2   Q3: How often do you have six or more drinks on one occasion? Never   Audit-C Score 4        SDOH updated and reviewed with the patient during this program:  Financial Resource Strain: Low Risk    • Difficulty of Paying Living Expenses: Not hard at all      Physical Activity: Inactive   • Days of Exercise per Week: 0 days   • Minutes of Exercise per Session: 0 min      Food Insecurity: No Food Insecurity   • Worried About Running Out of Food in the Last Year: Never true   • Ran Out of Food in the Last Year: Never true      Social Connections: Moderately Integrated   • Frequency of Communication with Friends and Family: Three times a week   • Frequency of Social Gatherings with Friends and Family: Once a week   • Attends Voodoo Services: Never   • Active Member of Clubs or Organizations: No   • Attends Club or Organization Meetings: 1 to 4 times per year   • Marital Status:       Transportation Needs: No Transportation Needs   • Lack of Transportation (Medical): No   • Lack of Transportation (Non-Medical): No      Housing Stability:  Low Risk    • Unable to Pay for Housing in the Last Year: No   • Number of Places Lived in the Last Year: 1   • Unstable Housing in the Last Year: No      Stress: No Stress Concern Present   • Feeling of Stress : Not at all     Patient Outreach    Outgoing call to the patient. Follow up for recent ED visit with exchanged indwelling catheter.  He denies any problems with catheter.  He was engaged in HRCM and notified he may opt out at any time.  He has his follow up appointment with his PCP scheduled.  He has an appointment with the wound center and reports progress with his wounds.  He prefers an outreach in about 4 weeks and this has been scheduled.   CHARLOTTE BARTH  Ambulatory Case Management    10/31/2022, 10:08 EDT

## 2022-11-01 PROBLEM — A41.50 SEPSIS DUE TO GRAM NEGATIVE BACTERIA (HCC): Status: ACTIVE | Noted: 2022-01-01

## 2022-11-01 NOTE — NURSING NOTE
"Nursing report ED to floor  Jalen Lockwood  72 y.o.  male    HPI :   Chief Complaint   Patient presents with   • Fall   • Fever       Admitting doctor:   Isaias St MD    Admitting diagnosis:   The primary encounter diagnosis was Acute UTI. Diagnoses of Irvin catheter in place on admission, Acute encephalopathy, Skin tear of elbow without complication, unspecified laterality, initial encounter, Chronic anticoagulation, and Fall, initial encounter were also pertinent to this visit.    Code status:   Current Code Status     Date Active Code Status Order ID Comments User Context       Prior          Allergies:   Cephalexin, Codeine, and Silver    Isolation:   No active isolations    Intake and Output    Intake/Output Summary (Last 24 hours) at 10/31/2022 2229  Last data filed at 10/31/2022 2140  Gross per 24 hour   Intake 100 ml   Output --   Net 100 ml       Weight:       10/31/22  2047   Weight: 132 kg (290 lb)       Most recent vitals:   Vitals:    10/31/22 1857 10/31/22 2004 10/31/22 2010 10/31/22 2047   BP:  96/70     Pulse:  90 86    Resp:       Temp: 99.9 °F (37.7 °C)      TempSrc: Oral      SpO2:  95% 96%    Weight:    132 kg (290 lb)   Height:    193 cm (76\")       Active LDAs/IV Access:   Lines, Drains & Airways     Active LDAs     Name Placement date Placement time Site Days    Peripheral IV 10/31/22 2006 Right Antecubital 10/31/22  2006  Antecubital  less than 1    Urethral Catheter Non-latex 16 Fr. 08/25/22  1105  -- 67                Labs (abnormal labs have a star):   Labs Reviewed   PROTIME-INR - Abnormal; Notable for the following components:       Result Value    Protime 19.0 (*)     INR 1.57 (*)     All other components within normal limits   COMPREHENSIVE METABOLIC PANEL - Abnormal; Notable for the following components:    Glucose 107 (*)     All other components within normal limits    Narrative:     GFR Normal >60  Chronic Kidney Disease <60  Kidney Failure <15    The GFR formula is only " "valid for adults with stable renal function between ages 18 and 70.   PROCALCITONIN - Abnormal; Notable for the following components:    Procalcitonin 2.30 (*)     All other components within normal limits    Narrative:     As a Marker for Sepsis (Non-Neonates):    1. <0.5 ng/mL represents a low risk of severe sepsis and/or septic shock.  2. >2 ng/mL represents a high risk of severe sepsis and/or septic shock.    As a Marker for Lower Respiratory Tract Infections that require antibiotic therapy:    PCT on Admission    Antibiotic Therapy       6-12 Hrs later    >0.5                Strongly Recommended  >0.25 - <0.5        Recommended   0.1 - 0.25          Discouraged              Remeasure/reassess PCT  <0.1                Strongly Discouraged     Remeasure/reassess PCT    As 28 day mortality risk marker: \"Change in Procalcitonin Result\" (>80% or <=80%) if Day 0 (or Day 1) and Day 4 values are available. Refer to http://www.[employer]-pct-calculator.com    Change in PCT <=80%  A decrease of PCT levels below or equal to 80% defines a positive change in PCT test result representing a higher risk for 28-day all-cause mortality of patients diagnosed with severe sepsis for septic shock.    Change in PCT >80%  A decrease of PCT levels of more than 80% defines a negative change in PCT result representing a lower risk for 28-day all-cause mortality of patients diagnosed with severe sepsis or septic shock.      CBC WITH AUTO DIFFERENTIAL - Abnormal; Notable for the following components:    RBC 3.67 (*)     Hemoglobin 11.4 (*)     Hematocrit 33.2 (*)     Neutrophil % 84.9 (*)     Lymphocyte % 5.5 (*)     Eosinophil % 0.1 (*)     Neutrophils, Absolute 7.77 (*)     Lymphocytes, Absolute 0.50 (*)     All other components within normal limits   URINALYSIS W/ CULTURE IF INDICATED - Abnormal; Notable for the following components:    Color, UA Dark Yellow (*)     Ketones, UA Trace (*)     Blood, UA Moderate (2+) (*)     Protein, UA 30 " mg/dL (1+) (*)     Leuk Esterase, UA Moderate (2+) (*)     Nitrite, UA Positive (*)     All other components within normal limits    Narrative:     In absence of clinical symptoms, the presence of pyuria, bacteria, and/or nitrites on the urinalysis result does not correlate with infection.   URINALYSIS, MICROSCOPIC ONLY - Abnormal; Notable for the following components:    RBC, UA 13-20 (*)     WBC, UA 31-50 (*)     Bacteria, UA 1+ (*)     All other components within normal limits   LACTIC ACID, PLASMA - Normal   BLOOD CULTURE   BLOOD CULTURE   URINE CULTURE   CBC AND DIFFERENTIAL    Narrative:     The following orders were created for panel order CBC & Differential.  Procedure                               Abnormality         Status                     ---------                               -----------         ------                     CBC Auto Differential[[id number]]        Abnormal            Final result                 Please view results for these tests on the individual orders.       EKG:   No orders to display       Meds given in ED:   Medications   sodium chloride 0.9 % flush 10 mL (has no administration in time range)   sodium chloride 0.9 % bolus 500 mL (500 mL Intravenous New Bag 10/31/22 2051)   piperacillin-tazobactam (ZOSYN) 4.5 g in iso-osmotic dextrose 100 mL IVPB (premix) (0 g Intravenous Stopped 10/31/22 2140)       Imaging results:  CT Head Without Contrast    Result Date: 10/31/2022   No evidence for acute traumatic intracranial pathology.  Radiation dose reduction techniques were utilized, including automated exposure control and exposure modulation based on body size.  This report was finalized on 10/31/2022 9:10 PM by Dr. Robel Ren M.D.      XR Chest 1 View    Result Date: 10/31/2022  No focal pulmonary consolidation. Mild cardiomegaly. Tortuous aorta. Follow-up as indications persist.  This report was finalized on 10/31/2022 8:20 PM by Dr. Khurram Chambers M.D.        Ambulatory  status:   - br    Social issues:   Social History     Socioeconomic History   • Marital status:      Spouse name: Mariposa   Tobacco Use   • Smoking status: Every Day     Packs/day: 0.50     Types: Cigarettes     Start date: 1964   • Smokeless tobacco: Current   • Tobacco comments:     caffeine use - 1.5 cups coffee daily    Vaping Use   • Vaping Use: Never used   Substance and Sexual Activity   • Alcohol use: Yes     Alcohol/week: 14.0 standard drinks     Types: 14 Shots of liquor per week   • Drug use: No   • Sexual activity: Defer       NIH Stroke Scale:         Christopher Rick RN  10/31/22 22:29 EDT

## 2022-11-01 NOTE — NURSING NOTE
CWOCN- patient's legs wrapped with unna boots, + 4 layer wrap with coban. We removed to assess legs. Legs look better than other times he has been here- there is no edema. He continues with chronic wounds x2 to the right foot and x1 to the left foot. All wounds are moist, pink, with a little yellow coloring but not slough or necrotic tissue. Hydrofera blue has been used for wound care. There is blanching redness along the shins and at the anterior ankles- recommend to pad these areas with gauze.   Cleansed legs with foam cleanser, applied lotion. Cleansed wounds with NS. Placed hydrofera blue over wounds. Placed gauze for padding to anterior ankles and to shins. Replaced unna boots, and 4 layer compression with coban. Patient tolerated well. Will change T/Friday.

## 2022-11-01 NOTE — THERAPY EVALUATION
Patient Name: Jalen Lockwood  : 1950    MRN: [id number]                              Today's Date: 2022       Admit Date: 10/31/2022    Visit Dx:     ICD-10-CM ICD-9-CM   1. Acute UTI  N39.0 599.0   2. Irvin catheter in place on admission  Z97.8 V45.89   3. Acute encephalopathy  G93.40 348.30   4. Skin tear of elbow without complication, unspecified laterality, initial encounter  S51.019A 881.01   5. Chronic anticoagulation  Z79.01 V58.61   6. Fall, initial encounter  W19.XXXA E888.9     Patient Active Problem List   Diagnosis   • Chronic osteomyelitis (HCC)   • Foot pain   • Peripheral neuropathy   • Lymphedema of both lower extremities   • Permanent atrial fibrillation (HCC)   • Sleep apnea   • Obesity (BMI 30-39.9)   • COPD (chronic obstructive pulmonary disease) (HCC)   • Aortectasia (HCC)   • Popliteal artery aneurysm (HCC)   • Colon polyps   • Gastroparesis   • Insomnia   • Adenomatous polyp of colon   • Essential hypertension   • Chronic anticoagulation   • Tobacco abuse   • Thyromegaly   • Adrenal adenoma, left   • Retroperitoneal lymphadenopathy   • Anemia of chronic disease   • Thyroid nodule   • Charcot's joint of foot   • Abnormal CT scan, lumbar spine   • Alcohol dependence, in remission (HCC)   • Normocytic anemia   • Inguinal adenopathy   • Elevated lactic acid level   • Venous stasis dermatitis of both lower extremities   • Urinary retention   • Anxiety   • Ischemic cardiomyopathy   • Chronic respiratory failure with hypoxia (HCC)   • Oropharyngeal dysphagia   • Paroxysmal atrial fibrillation (HCC)   • Warfarin anticoagulation   • Thrombocytopenia (HCC)   • Acute UTI   • Fall     Past Medical History:   Diagnosis Date   • Allergic rhinitis    • Anxiety    • Aortectasia (HCC)     3cm infrarenal abdominal aorta   • Arthritis    • Atrial flutter (HCC)     s/p ablation    • Charcot's joint of foot    • Chronic edema     both legs and sees wound care center at [place]    • Chronic venous  insufficiency    • COPD (chronic obstructive pulmonary disease) (HCC)    • Coronary atherosclerosis     Cath 2010: diffuse 40-50% disease   • Diverticulosis    • Duodenitis    • Fatty liver    • Gastritis    • Gastroparesis    • Hematoma     post-operative; After catheterization, right groin, required surgical exploration   • Hyperlipidemia    • Hypertension    • Insomnia    • Internal hemorrhoids    • Open wound     izzy legs has drsg chg weekly at wound care center at [place]  pt does second dressing on left leg another time during week   • Osteomyelitis (HCC)    • Paroxysmal atrial fibrillation (HCC)    • Peripheral neuropathy    • Popliteal artery aneurysm (HCC)     left, s/p stenting by Dr. [place]   • Skin cancer    • Sleep apnea     o2   • 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HCC)     due to flutter and afib; cath 2010 with nonobstructive disease   • Venous stasis    • Venous stasis ulcer (HCC)     bilateral legs      Past Surgical History:   Procedure Laterality Date   • BASAL CELL CARCINOMA EXCISION      ear and left side of face   • BRONCHOSCOPY N/A 4/12/2021    Procedure: BRONCHOSCOPY WITH BAL;  Surgeon: Bunny Lepe MD;  Location: [facility] ENDOSCOPY;  Service: Pulmonary;  Laterality: N/A;  PRE-HEMOPTYSIS  POST-SAME   • CARDIAC CATHETERIZATION     • CATARACT EXTRACTION     • COLONOSCOPY  09/28/2015    NBIH, diverticulosis, polyps   • COLONOSCOPY N/A 9/11/2018    Procedure: COLONOSCOPY TO CECUM  AND TERM. ILEUM WITH COLD SNARE POLYPECTOMIES;  Surgeon: Kane Lagunas MD;  Location: [facility] ENDOSCOPY;  Service: Gastroenterology   • COLONOSCOPY N/A 10/29/2019    Procedure: COLONOSCOPY TO TO CECUM AND TERMINAL ILEUM WITH HOT AND COLD SNARE POLYPECTOMIES;  Surgeon: Kane Lagunas MD;  Location: [facility] ENDOSCOPY;  Service: Gastroenterology   • HIP ARTHROPLASTY Right 2017   • JOINT REPLACEMENT Left    • OTHER SURGICAL HISTORY      Catheter ablation atrial flutter   • REPAIR ANEURYSM / PSEUDO ANEURYSM /  RUPTURED ANEURYSM POPLITEAL ARTERY      Stent-Graft of the the left popliteal artery   • REPAIR KNEE LIGAMENT      Primary repair of knee ligament cruciate anterior right   • TONSILLECTOMY  1958   • TOTAL KNEE ARTHROPLASTY Bilateral    • UPPER GASTROINTESTINAL ENDOSCOPY  09/16/2014    acute gastritis, acute duodenitis      General Information     Row Name 11/01/22 1513          Physical Therapy Time and Intention    Document Type evaluation  Admitted with an Acute UTI;  Recent fall  -MS     Mode of Treatment physical therapy;individual therapy  -MS     Row Name 11/01/22 1513          General Information    Patient Profile Reviewed yes  -MS     Prior Level of Function independent:  With use of Rwx.  -MS     Existing Precautions/Restrictions fall   Exit alarm  -MS     Barriers to Rehab none identified  -MS     Row Name 11/01/22 1513          Cognition    Orientation Status (Cognition) oriented x 3  -MS     Row Name 11/01/22 1513          Safety Issues, Functional Mobility    Comment, Safety Issues/Impairments (Mobility) Gait belt used for safety.  -MS           User Key  (r) = Recorded By, (t) = Taken By, (c) = Cosigned By    Initials Name Provider Type    MS Camilo Segundo, PT Physical Therapist               Mobility     Row Name 11/01/22 1514          Bed Mobility    Bed Mobility supine-sit;sit-supine  -MS     Supine-Sit [place] (Bed Mobility) minimum assist (75% patient effort)  -MS     Row Name 11/01/22 1514          Sit-Stand Transfer    Sit-Stand [place] (Transfers) contact guard  -MS     Assistive Device (Sit-Stand Transfers) walker, front-wheeled  -MS     Row Name 11/01/22 1514          Gait/Stairs (Locomotion)    [place] Level (Gait) contact guard  -MS     Assistive Device (Gait) walker, front-wheeled  -MS     Distance in Feet (Gait) 25 feet  -MS     Deviations/Abnormal Patterns (Gait) sowmya decreased;stride length decreased  -MS     Bilateral Gait Deviations forward flexed posture  -MS      Comment, (Gait/Stairs) Verbal/tactile cues given for posture correction.  Limited in gait distance due to fatigue/weakness.  -MS           User Key  (r) = Recorded By, (t) = Taken By, (c) = Cosigned By    Initials Name Provider Type    Camilo Birmingham DIXON, PT Physical Therapist               Obj/Interventions     Row Name 11/01/22 1516          Range of Motion Comprehensive    Comment, General Range of Motion BUE/LE (WFL's)  -MS     Row Name 11/01/22 1516          Strength Comprehensive (MMT)    Comment, General Manual Muscle Testing (MMT) Assessment BUE/LE (3/5)  -MS     Row Name 11/01/22 1516          Motor Skills    Therapeutic Exercise --  BLE ther. ex. program x 10 reps completed (Ankle pumps, Hip Flexion, LAQ's)  -MS           User Key  (r) = Recorded By, (t) = Taken By, (c) = Cosigned By    Initials Name Provider Type    Camilo Birmingham DIXON, PT Physical Therapist               Goals/Plan     Row Name 11/01/22 1517          Bed Mobility Goal 1 (PT)    Activity/Assistive Device (Bed Mobility Goal 1, PT) bed mobility activities, all  -MS     [place] Level/Cues Needed (Bed Mobility Goal 1, PT) standby assist  -MS     Time Frame (Bed Mobility Goal 1, PT) long term goal (LTG);1 week  -MS     Row Name 11/01/22 1517          Transfer Goal 1 (PT)    Activity/Assistive Device (Transfer Goal 1, PT) transfers, all;walker, rolling  -MS     [place] Level/Cues Needed (Transfer Goal 1, PT) standby assist  -MS     Time Frame (Transfer Goal 1, PT) long term goal (LTG);1 week  -MS     Row Name 11/01/22 1517          Gait Training Goal 1 (PT)    Activity/Assistive Device (Gait Training Goal 1, PT) gait (walking locomotion);walker, rolling  -MS     [place] Level (Gait Training Goal 1, PT) standby assist  -MS     Distance (Gait Training Goal 1, PT) 100 feet  -MS     Time Frame (Gait Training Goal 1, PT) long term goal (LTG);1 week  -MS     Row Name 11/01/22 1517          Therapy Assessment/Plan (PT)    Planned  Therapy Interventions (PT) balance training;bed mobility training;gait training;home exercise program;patient/family education;postural re-education;transfer training;strengthening  -MS           User Key  (r) = Recorded By, (t) = Taken By, (c) = Cosigned By    Initials Name Provider Type    Camilo Birmingham, PT Physical Therapist               Clinical Impression     Row Name 11/01/22 1516          Pain    Pretreatment Pain Rating 0/10 - no pain  -MS     Posttreatment Pain Rating 0/10 - no pain  -MS     Row Name 11/01/22 1516          Plan of Care Review    Plan of Care Reviewed With patient  -MS     Row Name 11/01/22 1516          Therapy Assessment/Plan (PT)    Rehab Potential (PT) good, to achieve stated therapy goals  -MS     Criteria for Skilled Interventions Met (PT) skilled treatment is necessary  -MS     Therapy Frequency (PT) 6 times/wk  -MS     Row Name 11/01/22 1516          Positioning and Restraints    Pre-Treatment Position in bed  -MS     Post Treatment Position chair  -MS     In Chair notified nsg;reclined;sitting;call light within reach;encouraged to call for assist;exit alarm on  All lines intact.  -MS           User Key  (r) = Recorded By, (t) = Taken By, (c) = Cosigned By    Initials Name Provider Type    Camilo Birmingham, PT Physical Therapist               Outcome Measures     Row Name 11/01/22 1517 11/01/22 0840       How much help from another person do you currently need...    Turning from your back to your side while in flat bed without using bedrails? 3  -MS 3  -JS    Moving from lying on back to sitting on the side of a flat bed without bedrails? 3  -MS 2  -JS    Moving to and from a bed to a chair (including a wheelchair)? 3  -MS 2  -JS    Standing up from a chair using your arms (e.g., wheelchair, bedside chair)? 3  -MS 2  -JS    Climbing 3-5 steps with a railing? 2  -MS 2  -JS    To walk in hospital room? 3  -MS 2  -JS    AM-PAC 6 Clicks Score (PT) 17  -MS 13  -JS    Highest  level of mobility 5 --> Static standing  -MS 4 --> Transferred to chair/commode  -JS    Row Name 11/01/22 1517          Functional Assessment    Outcome Measure Options AM-PAC 6 Clicks Basic Mobility (PT)  -MS           User Key  (r) = Recorded By, (t) = Taken By, (c) = Cosigned By    Initials Name Provider Type    MS Camilo Segundo, PT Physical Therapist    Juanpablo Lopez, RN Registered Nurse                             Physical Therapy Education     Title: PT OT SLP Therapies (Done)     Topic: Physical Therapy (Done)     Point: Mobility training (Done)     Learning Progress Summary           Patient Acceptance, E,D, VU,NR by MS at 11/1/2022 1518                   Point: Home exercise program (Done)     Learning Progress Summary           Patient Acceptance, E,D, VU,NR by MS at 11/1/2022 1518                   Point: Body mechanics (Done)     Learning Progress Summary           Patient Acceptance, E,D, VU,NR by MS at 11/1/2022 1518                   Point: Precautions (Done)     Learning Progress Summary           Patient Acceptance, E,D, VU,NR by MS at 11/1/2022 1518                               User Key     Initials Effective Dates Name Provider Type Discipline    MS 06/16/21 -  SegundoCamilo, PT Physical Therapist PT              PT Recommendation and Plan  Planned Therapy Interventions (PT): balance training, bed mobility training, gait training, home exercise program, patient/family education, postural re-education, transfer training, strengthening  Plan of Care Reviewed With: patient  Outcome Evaluation: Pt. is a 72 year old Male admitted to the hospital with an acute UTI and recent fall.  Pt. reports that prior to admission he was using a Rwx for ambulation.  Pt. currently presents with decreased strength, decreased balance, and decreased tolerance to functional activity.  This PM, pt. able to ambulate 25 feet, CGA x 1, with use of Rwx.  Pt. requires Min. assist x 1 for bed mobility and CGA x 1  for sit <-> stand transfers. BLE ther. ex. program x 10 reps completed for general strengthening.  Pt. will benefit from skilled inpt. P.T. to address his functional deficits and to assist pt. in regaining his maximum level of independence with functional mobility.     Time Calculation:    PT Charges     Row Name 11/01/22 1521             Time Calculation    Start Time 1340  -MS      Stop Time 1355  -MS      Time Calculation (min) 15 min  -MS      PT Received On 11/01/22  -MS      PT - Next Appointment 11/02/22  -MS      PT Goal Re-Cert Due Date 11/08/22  -MS         Time Calculation- PT    Total Timed Code Minutes- PT 14 minute(s)  -MS            User Key  (r) = Recorded By, (t) = Taken By, (c) = Cosigned By    Initials Name Provider Type    Camilo Birmingham, PT Physical Therapist              Therapy Charges for Today     Code Description Service Date Service Provider Modifiers Qty    82786833473 HC PT EVAL MOD COMPLEXITY 2 11/1/2022 Camilo Segundo, PT GP 1    [id number]  PT THERAPEUTIC ACT EA 15 MIN 11/1/2022 Camilo Segundo, PT GP 1          PT G-Codes  Outcome Measure Options: AM-PAC 6 Clicks Basic Mobility (PT)  AM-PAC 6 Clicks Score (PT): 17    Camilo Segundo PT  11/1/2022

## 2022-11-01 NOTE — ED NOTES
"Nursing report ED to floor  Jalen Lockwood  72 y.o.  male    HPI :   Chief Complaint   Patient presents with    Fall    Fever       Admitting doctor:   Isaias St MD    Admitting diagnosis:   The primary encounter diagnosis was Acute UTI. Diagnoses of Irvin catheter in place on admission, Acute encephalopathy, Skin tear of elbow without complication, unspecified laterality, initial encounter, Chronic anticoagulation, and Fall, initial encounter were also pertinent to this visit.    Code status:   Current Code Status       Date Active Code Status Order ID Comments User Context       Prior            Allergies:   Cephalexin, Codeine, and Silver    Isolation:   No active isolations    Intake and Output    Intake/Output Summary (Last 24 hours) at 10/31/2022 2251  Last data filed at 10/31/2022 2140  Gross per 24 hour   Intake 100 ml   Output --   Net 100 ml       Weight:       10/31/22  2047   Weight: 132 kg (290 lb)       Most recent vitals:   Vitals:    10/31/22 1857 10/31/22 2004 10/31/22 2010 10/31/22 2047   BP:  96/70     Pulse:  90 86    Resp:       Temp: 99.9 °F (37.7 °C)      TempSrc: Oral      SpO2:  95% 96%    Weight:    132 kg (290 lb)   Height:    193 cm (76\")       Active LDAs/IV Access:   Lines, Drains & Airways       Active LDAs       Name Placement date Placement time Site Days    Peripheral IV 10/31/22 2006 Right Antecubital 10/31/22  2006  Antecubital  less than 1    Urethral Catheter Non-latex 16 Fr. 08/25/22  1105  -- 67                    Labs (abnormal labs have a star):   Labs Reviewed   PROTIME-INR - Abnormal; Notable for the following components:       Result Value    Protime 19.0 (*)     INR 1.57 (*)     All other components within normal limits   COMPREHENSIVE METABOLIC PANEL - Abnormal; Notable for the following components:    Glucose 107 (*)     All other components within normal limits    Narrative:     GFR Normal >60  Chronic Kidney Disease <60  Kidney Failure <15    The GFR formula " "is only valid for adults with stable renal function between ages 18 and 70.   PROCALCITONIN - Abnormal; Notable for the following components:    Procalcitonin 2.30 (*)     All other components within normal limits    Narrative:     As a Marker for Sepsis (Non-Neonates):    1. <0.5 ng/mL represents a low risk of severe sepsis and/or septic shock.  2. >2 ng/mL represents a high risk of severe sepsis and/or septic shock.    As a Marker for Lower Respiratory Tract Infections that require antibiotic therapy:    PCT on Admission    Antibiotic Therapy       6-12 Hrs later    >0.5                Strongly Recommended  >0.25 - <0.5        Recommended   0.1 - 0.25          Discouraged              Remeasure/reassess PCT  <0.1                Strongly Discouraged     Remeasure/reassess PCT    As 28 day mortality risk marker: \"Change in Procalcitonin Result\" (>80% or <=80%) if Day 0 (or Day 1) and Day 4 values are available. Refer to http://www.[employer]-pct-calculator.com    Change in PCT <=80%  A decrease of PCT levels below or equal to 80% defines a positive change in PCT test result representing a higher risk for 28-day all-cause mortality of patients diagnosed with severe sepsis for septic shock.    Change in PCT >80%  A decrease of PCT levels of more than 80% defines a negative change in PCT result representing a lower risk for 28-day all-cause mortality of patients diagnosed with severe sepsis or septic shock.      CBC WITH AUTO DIFFERENTIAL - Abnormal; Notable for the following components:    RBC 3.67 (*)     Hemoglobin 11.4 (*)     Hematocrit 33.2 (*)     Neutrophil % 84.9 (*)     Lymphocyte % 5.5 (*)     Eosinophil % 0.1 (*)     Neutrophils, Absolute 7.77 (*)     Lymphocytes, Absolute 0.50 (*)     All other components within normal limits   URINALYSIS W/ CULTURE IF INDICATED - Abnormal; Notable for the following components:    Color, UA Dark Yellow (*)     Ketones, UA Trace (*)     Blood, UA Moderate (2+) (*)     Protein, " UA 30 mg/dL (1+) (*)     Leuk Esterase, UA Moderate (2+) (*)     Nitrite, UA Positive (*)     All other components within normal limits    Narrative:     In absence of clinical symptoms, the presence of pyuria, bacteria, and/or nitrites on the urinalysis result does not correlate with infection.   URINALYSIS, MICROSCOPIC ONLY - Abnormal; Notable for the following components:    RBC, UA 13-20 (*)     WBC, UA 31-50 (*)     Bacteria, UA 1+ (*)     All other components within normal limits   LACTIC ACID, PLASMA - Normal   BLOOD CULTURE   BLOOD CULTURE   URINE CULTURE   CBC AND DIFFERENTIAL    Narrative:     The following orders were created for panel order CBC & Differential.  Procedure                               Abnormality         Status                     ---------                               -----------         ------                     CBC Auto Differential[[id number]]        Abnormal            Final result                 Please view results for these tests on the individual orders.       EKG:   No orders to display       Meds given in ED:   Medications   sodium chloride 0.9 % flush 10 mL (has no administration in time range)   sodium chloride 0.9 % bolus 500 mL (500 mL Intravenous New Bag 10/31/22 2051)   piperacillin-tazobactam (ZOSYN) 4.5 g in iso-osmotic dextrose 100 mL IVPB (premix) (0 g Intravenous Stopped 10/31/22 2140)       Imaging results:  CT Head Without Contrast    Result Date: 10/31/2022   No evidence for acute traumatic intracranial pathology.  Radiation dose reduction techniques were utilized, including automated exposure control and exposure modulation based on body size.  This report was finalized on 10/31/2022 9:10 PM by Dr. Robel Ren M.D.      XR Chest 1 View    Result Date: 10/31/2022  No focal pulmonary consolidation. Mild cardiomegaly. Tortuous aorta. Follow-up as indications persist.  This report was finalized on 10/31/2022 8:20 PM by Dr. Khurram Chambers M.D.       Ambulatory  status:   - Up with assist, uses walker at home      Social issues:   Social History     Socioeconomic History    Marital status:      Spouse name: Mariposa   Tobacco Use    Smoking status: Every Day     Packs/day: 0.50     Types: Cigarettes     Start date: 1964    Smokeless tobacco: Current    Tobacco comments:     caffeine use - 1.5 cups coffee daily    Vaping Use    Vaping Use: Never used   Substance and Sexual Activity    Alcohol use: Yes     Alcohol/week: 14.0 standard drinks     Types: 14 Shots of liquor per week    Drug use: No    Sexual activity: Defer       NIH Stroke Scale:         Cristina Ludwig RN  10/31/22 22:51 EDT

## 2022-11-01 NOTE — H&P
Patient Name:  Jalen Lockwood  YOB: 1950  MRN:  [id number]  Admit Date:  10/31/2022  Patient Care Team:  Marc Ludwig MD as PCP - General (Family Medicine)  Blas Mckeon MD as Surgeon (General Surgery)  Jonnathan Boston II, MD as Consulting Physician (Vascular Surgery)  Xavi Elliott MD as Consulting Physician (Urology)  Marc Benavidez MD as Consulting Physician (Pain Medicine)  Kurt Henry MD as Consulting Physician (Cardiology)  Meghna Horan [facility] as Pharmacist  Rip Samuel MD as Referring Physician (Family Medicine)  Juni Landa MD as Consulting Physician (Hematology and Oncology)  Brendan Live, AlexD as Pharmacist (Pharmacy)  Emily Juarez, RN as Ambulatory  ([facility])      Subjective   History Present Illness     Chief Complaint   Patient presents with   • Fall   • Fever     History of Present Illness   Mr. Lockwood is a 72-year-old male with history of A. fib on chronic anticoagulation, lymphedema bilateral lower extremities, COPD with chronic respiratory failure, chronic O2 use, chronic anemia, indwelling Irvin catheter who presents to the emergency room with a fall and generalized weakness.  Patient states he had a fall earlier today, describes it as just his legs giving out because he is weak.  He denies any chest pain, dizziness, loss of consciousness, he has no pain or injury from the fall.  He was just here few days ago to have chronic Irvin catheter changed, he said at that time he was having some burning at the catheter site, but has not had any since and the new catheter placed 3 days ago.  He states he has had a mild temp at home low-grade to 99, but he says he actually feels pretty good.  He has been ambulating with his walker which is his baseline.  He does live at home with his wife.  He does have his bilateral lower extremities wrapped in Ace wraps, he states he gets them wrapped weekly on Mondays.  He states he has wounds  on his ankles that are healing.  In the emergency room patient's glucose 107, sodium 136, potassium 3.5, creatinine 1.07, BUN 17, lactate 1.3, Pro-Roc 2.3, INR 1.57, white blood cell count 9 point hemoglobin 11.4, hematocrit 33.2, platelets 145.  Urinalysis shows trace amount of ketones, moderate blood, positive nitrites, moderate leukocytes, 13-20 red blood cells, 31-50 white blood cells, 1+ bacteria, 0-2 squamous epithelial cells.  A culture is pending patient was given dose of Zosyn in the emergency room.  Patient had previous urine culture on 10/28/2022 which is growing Klebsiella pneumoniae ESBL.  Blood cultures are also pending.  CT of the head shows no intracranial pathology.    Review of Systems   Constitutional: Positive for fever (low grade 99). Negative for appetite change.   HENT: Negative for nosebleeds and trouble swallowing.    Eyes: Negative for photophobia, redness and visual disturbance.   Respiratory: Negative for cough, chest tightness, shortness of breath and wheezing.    Cardiovascular: Negative for chest pain, palpitations and leg swelling.   Gastrointestinal: Negative for abdominal distention, abdominal pain, nausea and vomiting.   Endocrine: Negative.    Genitourinary: Negative.    Musculoskeletal: Negative for gait problem and joint swelling.        Fall at home   Skin: Negative.    Neurological: Positive for weakness (generalized). Negative for dizziness, seizures, speech difficulty, light-headedness and headaches.   Hematological: Negative.    Psychiatric/Behavioral: Negative for behavioral problems and confusion.        Personal History     Past Medical History:   Diagnosis Date   • Allergic rhinitis    • Anxiety    • Aortectasia (HCC)     3cm infrarenal abdominal aorta   • Arthritis    • Atrial flutter (HCC) 2010    s/p ablation    • Charcot's joint of foot    • Chronic edema     both legs and sees wound care center at [place]    • Chronic venous insufficiency    • COPD (chronic  obstructive pulmonary disease) (HCC)    • Coronary atherosclerosis     Cath 2010: diffuse 40-50% disease   • Diverticulosis    • Duodenitis    • Fatty liver    • Gastritis    • Gastroparesis    • Hematoma     post-operative; After catheterization, right groin, required surgical exploration   • Hyperlipidemia    • Hypertension    • Insomnia    • Internal hemorrhoids    • Open wound     izzy legs has drsg chg weekly at wound care center at [place]  pt does second dressing on left leg another time during week   • Osteomyelitis (HCC)    • Paroxysmal atrial fibrillation (HCC)    • Peripheral neuropathy    • Popliteal artery aneurysm (HCC)     left, s/p stenting by Dr. Boston   • Skin cancer    • Sleep apnea     o2   • 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HCC)     due to flutter and afib; cath 2010 with nonobstructive disease   • Venous stasis    • Venous stasis ulcer (HCC)     bilateral legs      Past Surgical History:   Procedure Laterality Date   • BASAL CELL CARCINOMA EXCISION      ear and left side of face   • BRONCHOSCOPY N/A 4/12/2021    Procedure: BRONCHOSCOPY WITH BAL;  Surgeon: Bunny Lepe MD;  Location: [facility] ENDOSCOPY;  Service: Pulmonary;  Laterality: N/A;  PRE-HEMOPTYSIS  POST-SAME   • CARDIAC CATHETERIZATION     • CATARACT EXTRACTION     • COLONOSCOPY  09/28/2015    NBIH, diverticulosis, polyps   • COLONOSCOPY N/A 9/11/2018    Procedure: COLONOSCOPY TO CECUM  AND TERM. ILEUM WITH COLD SNARE POLYPECTOMIES;  Surgeon: Kane Lagunas MD;  Location: [facility] ENDOSCOPY;  Service: Gastroenterology   • COLONOSCOPY N/A 10/29/2019    Procedure: COLONOSCOPY TO TO CECUM AND TERMINAL ILEUM WITH HOT AND COLD SNARE POLYPECTOMIES;  Surgeon: Kane Lagunas MD;  Location: [facility] ENDOSCOPY;  Service: Gastroenterology   • HIP ARTHROPLASTY Right 2017   • JOINT REPLACEMENT Left    • OTHER SURGICAL HISTORY      Catheter ablation atrial flutter   • REPAIR ANEURYSM / PSEUDO ANEURYSM / RUPTURED ANEURYSM POPLITEAL ARTERY       Stent-Graft of the the left popliteal artery   • REPAIR KNEE LIGAMENT      Primary repair of knee ligament cruciate anterior right   • TONSILLECTOMY  1958   • TOTAL KNEE ARTHROPLASTY Bilateral    • UPPER GASTROINTESTINAL ENDOSCOPY  09/16/2014    acute gastritis, acute duodenitis     Family History   Problem Relation Age of Onset   • Emphysema Father    • Malig Hyperthermia Neg Hx      Social History     Tobacco Use   • Smoking status: Every Day     Packs/day: 0.50     Types: Cigarettes     Start date: 1964   • Smokeless tobacco: Current   • Tobacco comments:     caffeine use - 1.5 cups coffee daily    Vaping Use   • Vaping Use: Never used   Substance Use Topics   • Alcohol use: Yes     Alcohol/week: 14.0 standard drinks     Types: 14 Shots of liquor per week   • Drug use: No     No current facility-administered medications on file prior to encounter.     Current Outpatient Medications on File Prior to Encounter   Medication Sig Dispense Refill   • acetaminophen (TYLENOL) 325 MG tablet Take 2 tablets by mouth Every 6 (Six) Hours As Needed for Fever.     • acidophilus (FLORANEX) tablet tablet Take 1 tablet by mouth 2 (Two) Times a Day.     • albuterol sulfate  (90 Base) MCG/ACT inhaler Inhale 2 puffs Every 4 (Four) Hours As Needed for Wheezing. 2 g 6   • ALPRAZolam (XANAX) 0.5 MG tablet TAKE 1 TABLET BY MOUTH AT NIGHT AS NEEDED FOR ANXIETY FOR UP TO 30 DAYS. 30 tablet 0   • ascorbic acid ([employer] Vitamin C) 500 MG tablet Take 1 tablet by mouth Daily. 90 tablet 3   • Atrovent HFA 17 MCG/ACT inhaler INHALE 2 PUFFS BY MOUTH 4 TIMES A DAY 12.9 each 3   • ciclopirox (LOPROX) 0.77 % cream APPLY TO AFFECTED AREA AS DIRECTED     • clotrimazole (LOTRIMIN) 1 % cream APPLY TO AFFECTED AREA TWICE A DAY AND AS NEEDED     • docusate sodium (COLACE) 100 MG capsule Take 100 mg by mouth Daily.     • ferrous sulfate 325 (65 FE) MG tablet TAKE 1 TABLET BY MOUTH DAILY WITH BREAKFAST FOR 30 DAYS.     • finasteride (PROSCAR) 5 MG tablet  Take 1 tablet by mouth daily.     • fluticasone (FLONASE) 50 MCG/ACT nasal spray 2 sprays by Each Nare route Daily.     • Fluticasone Furoate-Vilanterol (BREO ELLIPTA) 200-25 MCG/INH inhaler Inhale 1 puff Daily.     • furosemide (LASIX) 40 MG tablet Take 40 mg by mouth Daily.     • gentamicin (GARAMYCIN) 0.1 % ointment APPLY AS DIRECTED TO AFFECTED WOUND AREA ON BOTH LEGS AFTER EACH DRESSING CHANGE     • HYDROcodone-acetaminophen (NORCO)  MG per tablet Take 1 tablet by mouth Every 6 (Six) Hours As Needed for Moderate Pain . 12 tablet 0   • KLOR-CON 10 MEQ CR tablet Take 10 mEq by mouth Daily.     • metoprolol succinate XL (TOPROL-XL) 25 MG 24 hr tablet Take 1 tablet by mouth Daily. 90 tablet 3   • nitroglycerin (NITROSTAT) 0.4 MG SL tablet Place 1 tablet under the tongue Every 5 (Five) Minutes As Needed for Chest Pain (Only if SBP Greater Than 100). Take no more than 3 doses in 15 minutes.  12   • potassium chloride (MICRO-K) 10 MEQ CR capsule Take 1 capsule by mouth 2 (Two) Times a Day. 180 capsule 3   • pravastatin (PRAVACHOL) 20 MG tablet TAKE 1 TABLET BY MOUTH EVERY DAY 90 tablet 1   • saccharomyces boulardii (Florastor) 250 MG capsule Take 1 capsule by mouth 2 (Two) Times a Day. 180 capsule 3   • silodosin (RAPAFLO) 8 MG capsule capsule Take 1 capsule by mouth Every Night.     • Thiamine HCl (Vitamin B1) 100 MG tablet tablet Take 1 tablet by mouth 2 (Two) Times a Day. 180 tablet 1   • warfarin (COUMADIN) 2.5 MG tablet Take 1 tablet by mouth Every Night. Indications: Atrial Fibrillation     • warfarin (COUMADIN) 5 MG tablet Take 5 mg by mouth Take As Directed.       Allergies   Allergen Reactions   • Cephalexin Hives     Tolerated ceftriaxone Jan 2022   • Codeine Nausea Only   • Silver Other (See Comments)       Objective    Objective     Vital Signs  Temp:  [99.9 °F (37.7 °C)] 99.9 °F (37.7 °C)  Heart Rate:  [] 122  Resp:  [20] 20  BP: ()/(58-77) 106/77  SpO2:  [94 %-99 %] 94 %  on  Flow  (L/min):  [2] 2;   Device (Oxygen Therapy): nasal cannula  Body mass index is 35.3 kg/m².    Physical Exam  Vitals and nursing note reviewed.   Constitutional:       General: He is not in acute distress.     Appearance: He is well-developed.   HENT:      Head: Normocephalic.   Neck:      Vascular: No JVD.   Cardiovascular:      Rate and Rhythm: Normal rate and regular rhythm.      Heart sounds: Normal heart sounds.      Comments: Bilateral legs wrapped with ace wraps  afib on the monitor with rate in the 80s during my exam  Pulmonary:      Effort: Pulmonary effort is normal.      Breath sounds: Examination of the right-upper field reveals wheezing. Examination of the right-lower field reveals decreased breath sounds. Examination of the left-lower field reveals decreased breath sounds. Decreased breath sounds and wheezing present.      Comments: Faint expiratory wheezing in right upper lobe, patient on 2L oxygen with sats 95%. He is on 2L chronically at home  Abdominal:      General: Bowel sounds are normal. There is no distension.      Palpations: Abdomen is soft.      Tenderness: There is no abdominal tenderness.   Musculoskeletal:         General: Normal range of motion.      Cervical back: Normal range of motion.      Right lower le+ Pitting Edema present.      Left lower le+ Pitting Edema present.   Skin:     General: Skin is warm and dry.      Capillary Refill: Capillary refill takes less than 2 seconds.   Neurological:      General: No focal deficit present.      Mental Status: He is alert and oriented to person, place, and time.   Psychiatric:         Attention and Perception: Attention normal.         Mood and Affect: Mood normal.         Behavior: Behavior normal.         Cognition and Memory: Cognition normal.         Results Review:  I reviewed the patient's new clinical results.  I reviewed the patient's new imaging results and agree with the interpretation.  I reviewed the patient's other test  results and agree with the interpretation  I personally viewed and interpreted the patient's EKG/Telemetry data  Discussed with ED provider.    Lab Results (last 24 hours)     Procedure Component Value Units Date/Time    Protime-INR [[id number]]  (Abnormal) Collected: 10/31/22 2005    Specimen: Blood Updated: 10/31/22 2044     Protime 19.0 Seconds      INR 1.57    CBC & Differential [[id number]]  (Abnormal) Collected: 10/31/22 2005    Specimen: Blood Updated: 10/31/22 2045    Narrative:      The following orders were created for panel order CBC & Differential.  Procedure                               Abnormality         Status                     ---------                               -----------         ------                     CBC Auto Differential[[id number]]        Abnormal            Final result                 Please view results for these tests on the individual orders.    Comprehensive Metabolic Panel [[id number]]  (Abnormal) Collected: 10/31/22 2005    Specimen: Blood Updated: 10/31/22 2055     Glucose 107 mg/dL      BUN 17 mg/dL      Creatinine 1.07 mg/dL      Sodium 136 mmol/L      Potassium 3.5 mmol/L      Chloride 99 mmol/L      CO2 27.0 mmol/L      Calcium 9.9 mg/dL      Total Protein 7.3 g/dL      Albumin 3.80 g/dL      ALT (SGPT) 10 U/L      AST (SGOT) 22 U/L      Alkaline Phosphatase 114 U/L      Total Bilirubin 0.9 mg/dL      Globulin 3.5 gm/dL      A/G Ratio 1.1 g/dL      BUN/Creatinine Ratio 15.9     Anion Gap 10.0 mmol/L      eGFR 74.2 mL/min/1.73      Comment: National Kidney Foundation and American Society of Nephrology (ASN) Task Force recommended calculation based on the Chronic Kidney Disease Epidemiology Collaboration (CKD-EPI) equation refit without adjustment for race.       Narrative:      GFR Normal >60  Chronic Kidney Disease <60  Kidney Failure <15    The GFR formula is only valid for adults with stable renal function between ages 18 and 70.    Blood Culture - Blood, Arm, Right  "[[id number]] Collected: 10/31/22 2005    Specimen: Blood from Arm, Right Updated: 10/31/22 2020    Lactic Acid, Plasma [[id number]]  (Normal) Collected: 10/31/22 2005    Specimen: Blood Updated: 10/31/22 2045     Lactate 1.3 mmol/L     Procalcitonin [[id number]]  (Abnormal) Collected: 10/31/22 2005    Specimen: Blood Updated: 10/31/22 2100     Procalcitonin 2.30 ng/mL     Narrative:      As a Marker for Sepsis (Non-Neonates):    1. <0.5 ng/mL represents a low risk of severe sepsis and/or septic shock.  2. >2 ng/mL represents a high risk of severe sepsis and/or septic shock.    As a Marker for Lower Respiratory Tract Infections that require antibiotic therapy:    PCT on Admission    Antibiotic Therapy       6-12 Hrs later    >0.5                Strongly Recommended  >0.25 - <0.5        Recommended   0.1 - 0.25          Discouraged              Remeasure/reassess PCT  <0.1                Strongly Discouraged     Remeasure/reassess PCT    As 28 day mortality risk marker: \"Change in Procalcitonin Result\" (>80% or <=80%) if Day 0 (or Day 1) and Day 4 values are available. Refer to http://www.[email or web address]-pct-calculator.com    Change in PCT <=80%  A decrease of PCT levels below or equal to 80% defines a positive change in PCT test result representing a higher risk for 28-day all-cause mortality of patients diagnosed with severe sepsis for septic shock.    Change in PCT >80%  A decrease of PCT levels of more than 80% defines a negative change in PCT result representing a lower risk for 28-day all-cause mortality of patients diagnosed with severe sepsis or septic shock.       CBC Auto Differential [[id number]]  (Abnormal) Collected: 10/31/22 2005    Specimen: Blood Updated: 10/31/22 2045     WBC 9.16 10*3/mm3      RBC 3.67 10*6/mm3      Hemoglobin 11.4 g/dL      Hematocrit 33.2 %      MCV 90.5 fL      MCH 31.1 pg      MCHC 34.3 g/dL      RDW 14.2 %      RDW-SD 46.3 fl      MPV 10.5 fL      Platelets 145 10*3/mm3      Neutrophil % " 84.9 %      Lymphocyte % 5.5 %      Monocyte % 8.7 %      Eosinophil % 0.1 %      Basophil % 0.4 %      Neutrophils, Absolute 7.77 10*3/mm3      Lymphocytes, Absolute 0.50 10*3/mm3      Monocytes, Absolute 0.80 10*3/mm3      Eosinophils, Absolute 0.01 10*3/mm3      Basophils, Absolute 0.04 10*3/mm3     Urinalysis With Culture If Indicated - Urine, Catheter [[id number]]  (Abnormal) Collected: 10/31/22 2016    Specimen: Urine, Catheter Updated: 10/31/22 2122     Color, UA Dark Yellow     Appearance, UA Clear     pH, UA 5.5     Specific Gravity, UA 1.026     Glucose, UA Negative     Ketones, UA Trace     Bilirubin, UA Negative     Blood, UA Moderate (2+)     Protein, UA 30 mg/dL (1+)     Leuk Esterase, UA Moderate (2+)     Nitrite, UA Positive     Urobilinogen, UA 1.0 E.U./dL    Narrative:      In absence of clinical symptoms, the presence of pyuria, bacteria, and/or nitrites on the urinalysis result does not correlate with infection.    Urinalysis, Microscopic Only - Urine, Catheter [[id number]]  (Abnormal) Collected: 10/31/22 2016    Specimen: Urine, Catheter Updated: 10/31/22 2122     RBC, UA 13-20 /HPF      WBC, UA 31-50 /HPF      Bacteria, UA 1+ /HPF      Squamous Epithelial Cells, UA 0-2 /HPF      Hyaline Casts, UA 7-12 /LPF      Methodology Automated Microscopy    Urine Culture - Urine, Urine, Catheter [[id number]] Collected: 10/31/22 2016    Specimen: Urine, Catheter Updated: 10/31/22 2122    Blood Culture - Blood, Arm, Left [[id number]] Collected: 10/31/22 2047    Specimen: Blood from Arm, Left Updated: 10/31/22 2051          Imaging Results (Last 24 Hours)     Procedure Component Value Units Date/Time    CT Head Without Contrast [[id number]] Collected: 10/31/22 2055     Updated: 10/31/22 2113    Narrative:      CT HEAD WITHOUT CONTRAST     CLINICAL HISTORY: Patient fell hitting head. Anticoagulated.     TECHNIQUE: CT scan of the head was obtained with 3 mm axial soft tissue  and 2 mm axial bone algorithm algorithm  images. No intravenous contrast  was administered. Sagittal and coronal reconstructions were obtained.     COMPARISON: Comparison is made to previous CT scan of the head dated  04/24/2019.     FINDINGS:       There is no evidence for a calvarial fracture. There is no evidence for  an acute extra-axial hemorrhage. The ventricles, sulci, and cisterns are  age appropriate. The basal ganglia and thalami are unremarkable. The  posterior fossa structures are within normal limits.       Impression:         No evidence for acute traumatic intracranial pathology.     Radiation dose reduction techniques were utilized, including automated  exposure control and exposure modulation based on body size.     This report was finalized on 10/31/2022 9:10 PM by Dr. Robel Ren M.D.       XR Chest 1 View [[id number]] Collected: 10/31/22 2018     Updated: 10/31/22 2023    Narrative:      XR CHEST 1 VW-     HISTORY: Male who is 72 years-old,  fever     TECHNIQUE: Frontal view of the chest     COMPARISON: 08/23/2022     FINDINGS: The heart appears mildly enlarged. Aorta is tortuous,  calcified. Pulmonary vasculature is unremarkable. No focal pulmonary  consolidation, pleural effusion, or pneumothorax. No acute osseous  process.       Impression:      No focal pulmonary consolidation. Mild cardiomegaly.  Tortuous aorta. Follow-up as indications persist.     This report was finalized on 10/31/2022 8:20 PM by Dr. Khurram Chambers M.D.             Results for orders placed during the hospital encounter of 09/28/22    Adult Transthoracic Echo Complete W/ Cont if Necessary Per Protocol    Interpretation Summary  · Calculated left ventricular EF = 58% Estimated left ventricular EF was in agreement with the calculated left ventricular EF. Left ventricular systolic function is normal. Normal left ventricular cavity size noted. Left ventricular wall thickness is consistent with mild to moderate concentric hypertrophy. All left ventricular wall  segments contract normally. Left ventricular diastolic function was indeterminate.  · The right ventricular cavity is moderately dilated. Moderately reduced right ventricular systolic function noted.  · The left atrial cavity is severely dilated.  · The right atrial cavity is severely dilated.  · Moderate to severe tricuspid valve regurgitation is present. Estimated right ventricular systolic pressure from tricuspid regurgitation is moderately elevated (45-55 mmHg). Calculated right ventricular systolic pressure from tricuspid regurgitation is 55.1 mmHg.      No orders to display        Assessment/Plan     Active Hospital Problems    Diagnosis  POA   • **Acute UTI [N39.0]  Yes   • Fall [W19.XXXA]  Unknown   • Chronic respiratory failure with hypoxia (HCC) [J96.11]  Yes   • Warfarin anticoagulation [Z79.01]  Not Applicable   • Anemia of chronic disease [D63.8]  Yes   • Chronic anticoagulation [Z79.01]  Not Applicable   • Lymphedema of both lower extremities [I89.0]  Yes   • Permanent atrial fibrillation (HCC) [I48.21]  Yes   • COPD (chronic obstructive pulmonary disease) ([facility]) [J44.9]  Yes     Mr. Lockwood is a 72-year-old male with history of A. fib on chronic anticoagulation, lymphedema bilateral lower extremities, COPD with chronic respiratory failure, chronic O2 use, chronic anemia, indwelling Irvin catheter who presents to the emergency room with a fall and generalized weakness.    Acute UTI  -Patient given a dose of Zosyn in the emergency room, will change that to Invanz given recent culture showing ESBL  -Consult infectious disease given his indwelling catheter and recurrent infections  -Monitor urine output, patient has chronic indwelling Irvin catheter  -Urine culture and blood cultures are pending  -Repeat CBC in a.m.    Falls  -PT to eval and treat safety precautions  -Safety precautions    A. fib/chronic anticoagulation  -Pharmacy to dose Coumadin  -INR subtherapeutic at this time  -Telemetry unit for  monitoring    Lymphedema bilateral lower extremities  -Wound care to eval and treat, patient currently has bilateral Ace wraps on his legs    COPD with chronic respiratory failure  -Patient is chronically on 2 L of oxygen, satting in the mid 90s on 2 L at this time  -Continuous pulse oximetry  -DuoNeb treatments as needed    Patient was seen and evaluated prior to midnight, charting delayed due to patient care.    · I discussed the patient's findings and my recommendations with patient.    VTE Prophylaxis -Home Coumadin.  Code Status - Full code.       ESTEFANY Ann  [facility]  11/01/22  00:03 EDT

## 2022-11-01 NOTE — PROGRESS NOTES
Work-up noted.  Elevated lactic acid and procalcitonin.  CK is normal.  Arterial blood gas without CO2 retention.  Troponin is elevated.  EKG with rapid A. fib and new right bundle branch block and old inferior MI.  Magnesium is normal.  TSH is decreased.  Also discussed with ID about advanced neurotoxicity and he will change to Merrem.  CT scan of the brain is pending.  The above finding confirms the diagnosis of sepsis and metabolic encephalopathy.  Digoxin given for rapid A. fib.  Continue current antibiotics.  Continue treatment for sepsis.  Elevated troponin mostly demand ischemia as the patient has no chest pain.  Will monitor.

## 2022-11-01 NOTE — PROGRESS NOTES
[facility]: Warfarin Dosing/Monitoring Consult    Jalen Lockwood is a 72 y.o. male, estimated creatinine clearance is 93.1 mL/min (by C-G formula based on SCr of 1.07 mg/dL). weighing 132 kg (290 lb).    Results from last 7 days   Lab Units 11/01/22  0715 10/31/22  2005 10/28/22  1654 10/26/22  0000   INR  1.63* 1.57* 2.10* 2.80   HEMOGLOBIN g/dL  --  11.4* 12.2*  --    HEMATOCRIT %  --  33.2* 36.5*  --    PLATELETS 10*3/mm3  --  145 155  --      Prior to admission anticoagulation: warfarin 7.5mg daily per [facility] Anticoagulation:  Consulting provider: Julita ALLISON  Start date: 10/31  Indication: A Fib - requiring full anticoagulation  Target INR: 2 - 3  Expected duration: indefinite   Bridge Therapy: No      Potential food or drug interactions: reg diet    Education complete?/Date: No; plan for follow up TBD    Assessment/Plan:  Dose: INR still below target at 1.63 but increased from yesterday; therefore  will continue previous home dose of 7.5mg daily  Monitor for any signs or symptoms of bleeding  Follow up daily INRs and dose adjustments    Pharmacy will continue to follow until discharge or discontinuation of warfarin.     Kevin Rucker PharmD  Clinical Pharmacist

## 2022-11-01 NOTE — SIGNIFICANT NOTE
Pt lives at home w wife. They have no children. Pt has had increasing number of hospital admissions including a fall

## 2022-11-01 NOTE — PROGRESS NOTES
Name: Jalen Lockwood ADMIT: 10/31/2022   : 1950  PCP: Marc Ludwig MD    MRN: [id number] LOS: 1 days   AGE/SEX: 72 y.o. male  ROOM: [facility]     Subjective   Subjective   Patient developed sudden acute agitation and mentation changes at around 4 PM.  Positive fever.  Moving all extremities.  No seizures.  Has an indwelling Irvin catheter with clear urine.  No chest pain.  No shortness of breath.  No nausea vomiting         Objective   Objective   Vital Signs  Temp:  [97.5 °F (36.4 °C)-101.4 °F (38.6 °C)] 100.4 °F (38 °C)  Heart Rate:  [] 68  Resp:  [18-20] 20  BP: ()/(55-95) 99/82  SpO2:  [92 %-100 %] 100 %  on  Flow (L/min):  [1-3] 1;   Device (Oxygen Therapy): nasal cannula    Intake/Output Summary (Last 24 hours) at 2022 1627  Last data filed at 2022 1300  Gross per 24 hour   Intake 1410 ml   Output 700 ml   Net 710 ml     Body mass index is 34.48 kg/m².      10/31/22  2047 22  0031   Weight: 132 kg (290 lb) 129 kg (283 lb 4.7 oz)     Physical Exam  General.  Elderly gentleman.  He is alert and oriented to self only.  Appears agitated and delirious.  Mild respiratory distress.  Head.  No external head injury.    Eyes.  Status post bilateral cataract surgery.  Pupils equal round and reactive.  Intact extraocular musculature.  No pallor or jaundice.  Oral cavity.  Moist mucous membrane.  Neck.  Supple.  No JVD.  No lymphadenopathy or thyromegaly.    Cardiovascular.  Regular rate and rhythm.  Tachycardia.  Frequent ectopic beats.  Abdomen.  Soft lax.  No tenderness.  No organomegaly.  No guarding or rebound.  Chest.  Poor bilateral air entry with scattered bilateral rhonchi.  Extremities.  Unna boots in both lower extremities.  Evidence of bilateral lower extremity varicosities.  CNS.  Disoriented.  Mildly agitated and delirious.  No focal deficit    Results Review:      Results from last 7 days   Lab Units 10/31/22  2005 10/28/22  1654   SODIUM mmol/L 136 138   POTASSIUM  mmol/L 3.5 3.9   CHLORIDE mmol/L 99 99   CO2 mmol/L 27.0 29.0   BUN mg/dL 17 17   CREATININE mg/dL 1.07 0.96   GLUCOSE mg/dL 107* 110*   CALCIUM mg/dL 9.9 9.9   AST (SGOT) U/L 22 15   ALT (SGPT) U/L 10 8     Estimated Creatinine Clearance: 93.1 mL/min (by C-G formula based on SCr of 1.07 mg/dL).                            Invalid input(s):  PHOS        Invalid input(s): LDLCALC  Results from last 7 days   Lab Units 10/31/22  2005 10/28/22  1654   WBC 10*3/mm3 9.16 9.99   HEMOGLOBIN g/dL 11.4* 12.2*   HEMATOCRIT % 33.2* 36.5*   PLATELETS 10*3/mm3 145 155   MCV fL 90.5 91.9   MCH pg 31.1 30.7   MCHC g/dL 34.3 33.4   RDW % 14.2 14.4   RDW-SD fl 46.3 48.6   MPV fL 10.5 9.4   NEUTROPHIL % % 84.9* 84.7*   LYMPHOCYTE % % 5.5* 7.0*   MONOCYTES % % 8.7 6.0   EOSINOPHIL % % 0.1* 1.5   BASOPHIL % % 0.4 0.5   IMM GRAN % %  --  0.3   NEUTROS ABS 10*3/mm3 7.77* 8.46*   LYMPHS ABS 10*3/mm3 0.50* 0.70   MONOS ABS 10*3/mm3 0.80 0.60   EOS ABS 10*3/mm3 0.01 0.15   BASOS ABS 10*3/mm3 0.04 0.05   IMMATURE GRANS (ABS) 10*3/mm3  --  0.03   NRBC /100 WBC  --  0.0     Results from last 7 days   Lab Units 11/01/22  0715 10/31/22  2005 10/28/22  1654 10/26/22  0000   INR  1.63* 1.57* 2.10* 2.80         Results from last 7 days   Lab Units 10/31/22  2005   PROCALCITONIN ng/mL 2.30*   LACTATE mmol/L 1.3             Results from last 7 days   Lab Units 10/31/22  2047 10/31/22  2016 10/31/22  2005 10/28/22  1820   BLOODCX  Abnormal Stain*  --  Abnormal Stain*  --    URINECX   --  Growth present, too young to evaluate  --  50,000 CFU/mL Klebsiella pneumoniae ESBL*   BCIDPCR  Klebsiella pneumoniae group. Identification by BCID2 PCR.*  CTX-M (ESBL) detected. Identification by BCID2 PCR*  --   --   --          Results from last 7 days   Lab Units 10/31/22  2016   NITRITE UA  Positive*   WBC UA /HPF 31-50*   BACTERIA UA /HPF 1+*   SQUAM EPITHEL UA /HPF 0-2   URINECX  Growth present, too young to evaluate           Imaging:  Imaging Results (Last 24  Hours)     Procedure Component Value Units Date/Time    CT Head Without Contrast [[id number]] Collected: 10/31/22 2055     Updated: 10/31/22 2113    Narrative:      CT HEAD WITHOUT CONTRAST     CLINICAL HISTORY: Patient fell hitting head. Anticoagulated.     TECHNIQUE: CT scan of the head was obtained with 3 mm axial soft tissue  and 2 mm axial bone algorithm algorithm images. No intravenous contrast  was administered. Sagittal and coronal reconstructions were obtained.     COMPARISON: Comparison is made to previous CT scan of the head dated  04/24/2019.     FINDINGS:       There is no evidence for a calvarial fracture. There is no evidence for  an acute extra-axial hemorrhage. The ventricles, sulci, and cisterns are  age appropriate. The basal ganglia and thalami are unremarkable. The  posterior fossa structures are within normal limits.       Impression:         No evidence for acute traumatic intracranial pathology.     Radiation dose reduction techniques were utilized, including automated  exposure control and exposure modulation based on body size.     This report was finalized on 10/31/2022 9:10 PM by Dr. Robel Ren M.D.       XR Chest 1 View [[id number]] Collected: 10/31/22 2018     Updated: 10/31/22 2023    Narrative:      XR CHEST 1 VW-     HISTORY: Male who is 72 years-old,  fever     TECHNIQUE: Frontal view of the chest     COMPARISON: 08/23/2022     FINDINGS: The heart appears mildly enlarged. Aorta is tortuous,  calcified. Pulmonary vasculature is unremarkable. No focal pulmonary  consolidation, pleural effusion, or pneumothorax. No acute osseous  process.       Impression:      No focal pulmonary consolidation. Mild cardiomegaly.  Tortuous aorta. Follow-up as indications persist.     This report was finalized on 10/31/2022 8:20 PM by Dr. Khurram Chambers M.D.                I reviewed the patient's new clinical results / labs / tests / procedures      Assessment/Plan     Active Hospital Problems     Diagnosis  POA   • **Sepsis due to Gram negative bacteria ([facility]) [A41.50]  Yes   • Acute UTI [N39.0]  Yes   • Fall [W19.XXXA]  Yes   • Chronic respiratory failure with hypoxia ([facility]) [J96.11]  Yes   • Warfarin anticoagulation [Z79.01]  Not Applicable   • Metabolic encephalopathy [G93.41]  Yes   • Anemia of chronic disease [D63.8]  Yes   • Chronic anticoagulation [Z79.01]  Not Applicable   • Lymphedema of both lower extremities [I89.0]  Yes   • Permanent atrial fibrillation ([facility]) [I48.21]  Yes   • COPD (chronic obstructive pulmonary disease) ([facility]) [J44.9]  Yes      Resolved Hospital Problems   No resolved problems to display.           · Urosepsis with ESBL Klebsiella pneumonia.  Positive urine and blood culture with ` ESBL Klebsiella.  Currently on Invanz and indwelling Irvin catheter.  Sepsis indicated by fever/elevated procalcitonin/hypotension/tachycardia.  Plan IV fluid bolus and maintenance.  Continue Invanz.  Repeat blood cultures.  Check CT scan of the abdomen and pelvis.  ID on board.  · Acute mental status changes and delirium suggestive of metabolic encephalopathy.  Negative CNS examination.  CT scan of the brain yesterday without acute deficit.  We will repeat CAT scan of the brain.  Will initiate Zyprexa.  I wonder if this could be imbalance from neurotoxicity.  Will discuss with ID.  · Weakness and fall.  Normal potassium.  Check CK/magnesium/TSH.  Consult PT and OT.  This is mostly secondary to sepsis.  · Chronic A. fib.  Appears to be in rapid A. fib at this time.  We will stop metoprolol and Lasix secondary to hypotension.  We will give a single dose of dig.  Check EKG and troponin.  · Lymphedema and lower extremity venous insufficiency.  Unna boots applied by wound nurse.  · History of COPD and chronic hypoxemic respiratory failure.  Respiratory status appears to be stable.  Chest x-ray without infiltrate.  Will check arterial blood gas to rule out hypercapnia.  Continue oxygen.  · Anemia  hemoglobin is stable about the baseline.  Will monitor.    Discussed my findings and plan of treatment with the patient/nurse.  Disposition.  To be determined.          Fina Flannery MD  [facility]  11/01/22  16:27 EDT

## 2022-11-01 NOTE — ED PROVIDER NOTES
EMERGENCY DEPARTMENT ENCOUNTER    Room Number:  E662/1  Date seen:  11/1/2022  PCP: Marc Ludwig MD  Historian: Patient      HPI:  Chief Complaint: Weakness, fall  A complete HPI/ROS/PMH/PSH/SH/FH are unobtainable due to: Nothing  Context: Jalen Lockwood is a 72 y.o. male who presents to the ED c/o weakness at home.  He reports that he just got weak and his legs gave out causing him to fall.  He does not believe that he hit his head but he is on warfarin due to history of atrial fibrillation.  He sustained skin tears to both elbows.  He also reports a low-grade fever of 99 at home.  He denies feeling ill.  He specifically denies chest pain, shortness of breath, nausea, vomiting, diarrhea.  Patient ambulates with a walker or cane at baseline.  He resides at home with his wife.  He denies headache or neck pain currently.            PAST MEDICAL HISTORY  Active Ambulatory Problems     Diagnosis Date Noted   • Chronic osteomyelitis (HCC) 04/22/2016   • Foot pain 04/22/2016   • Peripheral neuropathy 04/22/2016   • Lymphedema of both lower extremities 04/22/2016   • Permanent atrial fibrillation (HCC) 04/22/2016   • Sleep apnea 04/22/2016   • Obesity (BMI 30-39.9) 04/22/2016   • COPD (chronic obstructive pulmonary disease) (HCC) 04/22/2016   • Aortectasia (HCC)    • Popliteal artery aneurysm (HCC)    • Colon polyps 02/07/2017   • Gastroparesis 02/07/2017   • Insomnia 02/07/2017   • Adenomatous polyp of colon 05/23/2018   • Essential hypertension 11/15/2018   • Chronic anticoagulation 04/24/2019   • Tobacco abuse 04/24/2019   • Thyromegaly 04/25/2019   • Adrenal adenoma, left 04/25/2019   • Retroperitoneal lymphadenopathy 04/25/2019   • Anemia of chronic disease 04/27/2019   • Thyroid nodule 04/29/2019   • Charcot's joint of foot 04/29/2019   • Abnormal CT scan, lumbar spine 05/21/2019   • Alcohol dependence, in remission (HCC) 05/21/2020   • Normocytic anemia 04/27/2021   • Inguinal adenopathy 04/27/2021   • Elevated  lactic acid level 01/09/2022   • Venous stasis dermatitis of both lower extremities 06/19/2022   • Urinary retention 06/20/2022   • Anxiety 08/02/2022   • Ischemic cardiomyopathy 08/05/2022   • Chronic respiratory failure with hypoxia (Formerly [facility]) 08/23/2022   • Oropharyngeal dysphagia 08/23/2022   • Paroxysmal atrial fibrillation ([facility]) 08/23/2022   • Warfarin anticoagulation 08/23/2022   • Thrombocytopenia ([facility]) 08/25/2022     Resolved Ambulatory Problems     Diagnosis Date Noted   • Chronic edema 04/22/2016   • Atrial flutter ([facility]) 01/01/2010   • 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[facility])    • Hyponatremia 04/24/2019   • ETOH abuse 04/24/2019   • Open wound 04/24/2019   • Adverse effect of thiazide diuretic 04/24/2019   • Weakness 04/24/2019   • Abnormal weight loss 04/24/2019   • Thrombocytopenia ([facility]) 04/25/2019   • Candidal intertrigo 04/25/2019   • Left leg cellulitis 04/30/2019   • Sepsis ([facility]) 05/01/2019   • Severe sepsis ([facility]) 03/30/2021   • Hyponatremia 04/03/2021   • Thrombocytopenia ([facility]) 04/03/2021   • Encephalopathy, metabolic 04/03/2021   • Acute systolic (congestive) heart failure ([facility]) 04/03/2021   • Cellulitis of lower extremity 04/03/2021   • UTI (urinary tract infection) 03/30/2021   • Hemoptysis 03/30/2021   • Generalized weakness 01/09/2022   • Fever in adult 01/09/2022   • Sepsis without acute organ dysfunction ([facility]) 06/20/2022   • UTI (urinary tract infection) due to urinary indwelling catheter ([facility]) 07/10/2022   • Bacterial pneumonia 07/10/2022   • Acute on chronic respiratory failure with hypoxia ([facility]) 07/14/2022   • Paroxysmal atrial fibrillation with rapid ventricular response ([facility]) 07/14/2022   • Sepsis ([facility]) 07/24/2022   • Aspiration pneumonia ([facility]) 07/24/2022   • Hospital discharge follow-up 08/16/2022   • Severe sepsis ([facility]) 08/23/2022   • Acute UTI (urinary tract infection) 08/23/2022   • SHAUNNA (acute kidney injury) ([facility]) 08/23/2022   • Hypokalemia 08/23/2022   • Elevated lactic acid level  08/23/2022     Past Medical History:   Diagnosis Date   • Allergic rhinitis    • Arthritis    • Chronic venous insufficiency    • Coronary atherosclerosis    • Diverticulosis    • Duodenitis    • Fatty liver    • Gastritis    • Hematoma    • Hyperlipidemia    • Hypertension    • Internal hemorrhoids    • Osteomyelitis (HCC)    • Skin cancer    • Venous stasis    • Venous stasis ulcer (HCC)          PAST SURGICAL HISTORY  Past Surgical History:   Procedure Laterality Date   • BASAL CELL CARCINOMA EXCISION      ear and left side of face   • BRONCHOSCOPY N/A 4/12/2021    Procedure: BRONCHOSCOPY WITH BAL;  Surgeon: Bunny Lepe MD;  Location: [facility] ENDOSCOPY;  Service: Pulmonary;  Laterality: N/A;  PRE-HEMOPTYSIS  POST-SAME   • CARDIAC CATHETERIZATION     • CATARACT EXTRACTION     • COLONOSCOPY  09/28/2015    NBIH, diverticulosis, polyps   • COLONOSCOPY N/A 9/11/2018    Procedure: COLONOSCOPY TO CECUM  AND TERM. ILEUM WITH COLD SNARE POLYPECTOMIES;  Surgeon: Kane Lagunas MD;  Location: [facility] ENDOSCOPY;  Service: Gastroenterology   • COLONOSCOPY N/A 10/29/2019    Procedure: COLONOSCOPY TO TO CECUM AND TERMINAL ILEUM WITH HOT AND COLD SNARE POLYPECTOMIES;  Surgeon: Kane Lagunas MD;  Location: [facility] ENDOSCOPY;  Service: Gastroenterology   • HIP ARTHROPLASTY Right 2017   • JOINT REPLACEMENT Left    • OTHER SURGICAL HISTORY      Catheter ablation atrial flutter   • REPAIR ANEURYSM / PSEUDO ANEURYSM / RUPTURED ANEURYSM POPLITEAL ARTERY      Stent-Graft of the the left popliteal artery   • REPAIR KNEE LIGAMENT      Primary repair of knee ligament cruciate anterior right   • TONSILLECTOMY  1958   • TOTAL KNEE ARTHROPLASTY Bilateral    • UPPER GASTROINTESTINAL ENDOSCOPY  09/16/2014    acute gastritis, acute duodenitis         FAMILY HISTORY  Family History   Problem Relation Age of Onset   • Emphysema Father    • Malig Hyperthermia Neg Hx          SOCIAL HISTORY  Social History     Socioeconomic History   •  Marital status:      Spouse name: Mariposa   Tobacco Use   • Smoking status: Every Day     Packs/day: 0.50     Types: Cigarettes     Start date: 1964   • Smokeless tobacco: Current   • Tobacco comments:     caffeine use - 1.5 cups coffee daily    Vaping Use   • Vaping Use: Never used   Substance and Sexual Activity   • Alcohol use: Yes     Alcohol/week: 14.0 standard drinks     Types: 14 Shots of liquor per week   • Drug use: No   • Sexual activity: Defer         ALLERGIES  Cephalexin, Codeine, and Silver        REVIEW OF SYSTEMS  Review of Systems   Review of all 14 systems is negative other than stated in the HPI above.      PHYSICAL EXAM  ED Triage Vitals   Temp Heart Rate Resp BP SpO2   10/31/22 1857 10/31/22 1856 10/31/22 1856 10/31/22 1856 10/31/22 1856   99.9 °F (37.7 °C) 90 20 117/58 98 %      Temp src Heart Rate Source Patient Position BP Location FiO2 (%)   10/31/22 1857 10/31/22 1856 -- -- --   Oral Monitor            GENERAL: Awake and alert, no acute distress  HENT: nares patent, no scalp hematoma  EYES: no scleral icterus, pupils 3 mm reactive bilateral, EOMI  CV: irregular rhythm, normal rate  RESPIRATORY: normal effort, lungs clear auscultation bilaterally  ABDOMEN: soft, nondistended, nontender throughout  MUSCULOSKELETAL: no deformity, no midline cervical spine tenderness  NEURO: alert, moves all extremities, follows commands, cranial nerves II through XII grossly intact with speech fluent and clear  PSYCH:  calm, cooperative  SKIN: warm, dry, 4 x 4 centimeter superficial skin tear on the lateral aspect of the left forearm with no active bleeding, no foreign body present.  2 cm skin tear on the right elbow with no active bleeding, no foreign body.    Vital signs and nursing notes reviewed.          LAB RESULTS  Recent Results (from the past 24 hour(s))   Protime-INR    Collection Time: 10/31/22  8:05 PM    Specimen: Blood   Result Value Ref Range    Protime 19.0 (H) 11.7 - 14.2 Seconds    INR  1.57 (H) 0.90 - 1.10   Comprehensive Metabolic Panel    Collection Time: 10/31/22  8:05 PM    Specimen: Blood   Result Value Ref Range    Glucose 107 (H) 65 - 99 mg/dL    BUN 17 8 - 23 mg/dL    Creatinine 1.07 0.76 - 1.27 mg/dL    Sodium 136 136 - 145 mmol/L    Potassium 3.5 3.5 - 5.2 mmol/L    Chloride 99 98 - 107 mmol/L    CO2 27.0 22.0 - 29.0 mmol/L    Calcium 9.9 8.6 - 10.5 mg/dL    Total Protein 7.3 6.0 - 8.5 g/dL    Albumin 3.80 3.50 - 5.20 g/dL    ALT (SGPT) 10 1 - 41 U/L    AST (SGOT) 22 1 - 40 U/L    Alkaline Phosphatase 114 39 - 117 U/L    Total Bilirubin 0.9 0.0 - 1.2 mg/dL    Globulin 3.5 gm/dL    A/G Ratio 1.1 g/dL    BUN/Creatinine Ratio 15.9 7.0 - 25.0    Anion Gap 10.0 5.0 - 15.0 mmol/L    eGFR 74.2 >60.0 mL/min/1.73   Lactic Acid, Plasma    Collection Time: 10/31/22  8:05 PM    Specimen: Blood   Result Value Ref Range    Lactate 1.3 0.5 - 2.0 mmol/L   Procalcitonin    Collection Time: 10/31/22  8:05 PM    Specimen: Blood   Result Value Ref Range    Procalcitonin 2.30 (H) 0.00 - 0.25 ng/mL   CBC Auto Differential    Collection Time: 10/31/22  8:05 PM    Specimen: Blood   Result Value Ref Range    WBC 9.16 3.40 - 10.80 10*3/mm3    RBC 3.67 (L) 4.14 - 5.80 10*6/mm3    Hemoglobin 11.4 (L) 13.0 - 17.7 g/dL    Hematocrit 33.2 (L) 37.5 - 51.0 %    MCV 90.5 79.0 - 97.0 fL    MCH 31.1 26.6 - 33.0 pg    MCHC 34.3 31.5 - 35.7 g/dL    RDW 14.2 12.3 - 15.4 %    RDW-SD 46.3 37.0 - 54.0 fl    MPV 10.5 6.0 - 12.0 fL    Platelets 145 140 - 450 10*3/mm3    Neutrophil % 84.9 (H) 42.7 - 76.0 %    Lymphocyte % 5.5 (L) 19.6 - 45.3 %    Monocyte % 8.7 5.0 - 12.0 %    Eosinophil % 0.1 (L) 0.3 - 6.2 %    Basophil % 0.4 0.0 - 1.5 %    Neutrophils, Absolute 7.77 (H) 1.70 - 7.00 10*3/mm3    Lymphocytes, Absolute 0.50 (L) 0.70 - 3.10 10*3/mm3    Monocytes, Absolute 0.80 0.10 - 0.90 10*3/mm3    Eosinophils, Absolute 0.01 0.00 - 0.40 10*3/mm3    Basophils, Absolute 0.04 0.00 - 0.20 10*3/mm3   Urinalysis With Culture If Indicated  - Urine, Catheter    Collection Time: 10/31/22  8:16 PM    Specimen: Urine, Catheter   Result Value Ref Range    Color, UA Dark Yellow (A) Yellow, Straw    Appearance, UA Clear Clear    pH, UA 5.5 5.0 - 8.0    Specific Gravity, UA 1.026 1.005 - 1.030    Glucose, UA Negative Negative    Ketones, UA Trace (A) Negative    Bilirubin, UA Negative Negative    Blood, UA Moderate (2+) (A) Negative    Protein, UA 30 mg/dL (1+) (A) Negative    Leuk Esterase, UA Moderate (2+) (A) Negative    Nitrite, UA Positive (A) Negative    Urobilinogen, UA 1.0 E.U./dL 0.2 - 1.0 E.U./dL   Urinalysis, Microscopic Only - Urine, Catheter    Collection Time: 10/31/22  8:16 PM    Specimen: Urine, Catheter   Result Value Ref Range    RBC, UA 13-20 (A) None Seen, 0-2 /HPF    WBC, UA 31-50 (A) None Seen, 0-2 /HPF    Bacteria, UA 1+ (A) None Seen /HPF    Squamous Epithelial Cells, UA 0-2 None Seen, 0-2 /HPF    Hyaline Casts, UA 7-12 None Seen /LPF    Methodology Automated Microscopy        Ordered the above labs and reviewed the results.        RADIOLOGY  CT Head Without Contrast    Result Date: 10/31/2022  CT HEAD WITHOUT CONTRAST  CLINICAL HISTORY: Patient fell hitting head. Anticoagulated.  TECHNIQUE: CT scan of the head was obtained with 3 mm axial soft tissue and 2 mm axial bone algorithm algorithm images. No intravenous contrast was administered. Sagittal and coronal reconstructions were obtained.  COMPARISON: Comparison is made to previous CT scan of the head dated 04/24/2019.  FINDINGS:   There is no evidence for a calvarial fracture. There is no evidence for an acute extra-axial hemorrhage. The ventricles, sulci, and cisterns are age appropriate. The basal ganglia and thalami are unremarkable. The posterior fossa structures are within normal limits.       No evidence for acute traumatic intracranial pathology.  Radiation dose reduction techniques were utilized, including automated exposure control and exposure modulation based on body  size.  This report was finalized on 10/31/2022 9:10 PM by Dr. Robel Ren M.D.      XR Chest 1 View    Result Date: 10/31/2022  XR CHEST 1 VW-  HISTORY: Male who is 72 years-old,  fever  TECHNIQUE: Frontal view of the chest  COMPARISON: 08/23/2022  FINDINGS: The heart appears mildly enlarged. Aorta is tortuous, calcified. Pulmonary vasculature is unremarkable. No focal pulmonary consolidation, pleural effusion, or pneumothorax. No acute osseous process.      No focal pulmonary consolidation. Mild cardiomegaly. Tortuous aorta. Follow-up as indications persist.  This report was finalized on 10/31/2022 8:20 PM by Dr. Khurram Chambers M.D.        Ordered the above noted radiological studies. Reviewed by me in PACS.            PROCEDURES  Procedures              MEDICATIONS GIVEN IN ER  Medications   sodium chloride 0.9 % flush 10 mL (has no administration in time range)   tamsulosin (FLOMAX) 24 hr capsule 0.4 mg (0.4 mg Oral Not Given 11/1/22 0200)   pravastatin (PRAVACHOL) tablet 20 mg (has no administration in time range)   metoprolol succinate XL (TOPROL-XL) 24 hr tablet 25 mg (has no administration in time range)   HYDROcodone-acetaminophen (NORCO)  MG per tablet 1 tablet (1 tablet Oral Given 11/1/22 0237)   budesonide-formoterol (SYMBICORT) 80-4.5 MCG/ACT inhaler 2 puff (2 puffs Inhalation Not Given 11/1/22 0117)   finasteride (PROSCAR) tablet 5 mg (has no administration in time range)   ferrous sulfate tablet 325 mg (has no administration in time range)   ascorbic acid (VITAMIN C) tablet 500 mg (has no administration in time range)   ALPRAZolam (XANAX) tablet 0.5 mg (0.5 mg Oral Given 11/1/22 0203)   furosemide (LASIX) tablet 40 mg (has no administration in time range)   potassium chloride (K-DUR,KLOR-CON) ER tablet 10 mEq (has no administration in time range)   ertapenem (INVanz) 1 g in sodium chloride 0.9 % 100 mL IVPB-VTB (has no administration in time range)   Pharmacy to dose warfarin (has no  administration in time range)   sodium chloride 0.9 % flush 10 mL ( Intravenous Canceled Entry 11/1/22 0306)   sodium chloride 0.9 % flush 10 mL (has no administration in time range)   nitroglycerin (NITROSTAT) SL tablet 0.4 mg (has no administration in time range)   acetaminophen (TYLENOL) tablet 650 mg (has no administration in time range)     Or   acetaminophen (TYLENOL) 160 MG/5ML solution 650 mg (has no administration in time range)     Or   acetaminophen (TYLENOL) suppository 650 mg (has no administration in time range)   ondansetron (ZOFRAN) injection 4 mg (has no administration in time range)   ipratropium-albuterol (DUO-NEB) nebulizer solution 3 mL (has no administration in time range)   warfarin (COUMADIN) tablet 7.5 mg (7.5 mg Oral Given 11/1/22 0203)   sodium chloride 0.9 % bolus 500 mL (0 mL Intravenous Stopped 10/31/22 2252)   piperacillin-tazobactam (ZOSYN) 4.5 g in iso-osmotic dextrose 100 mL IVPB (premix) (0 g Intravenous Stopped 10/31/22 2140)                   MEDICAL DECISION MAKING, PROGRESS, and CONSULTS    All labs have been independently reviewed by me.  All radiology studies have been reviewed by me and discussed with radiologist dictating the report.   EKG's independently viewed and interpreted by me.  Discussion below represents my analysis of pertinent findings related to patient's condition, differential diagnosis, treatment plan and final disposition.      Differential diagnosis:  UTI  Renal failure  Acute intracranial hemorrhage  Sepsis      ED Course as of 11/01/22 0324   Mon Oct 31, 2022   2011 Medical record review: I reviewed urine culture from 10/28/2022 which was positive for Klebsiella pneumonia a ESBL.  Patient was not having any symptoms during that ER visit therefore no antibiotics were prescribed. [JR]   2012 Repeat urinalysis will be sent today but given that patient is now exhibiting weakness and low-grade fever and had a positive urine culture 3 days ago, I will start  Zosyn based off of that urine culture and plan for admission. [JR]   2013 500 cc fluid bolus ordered given patient's slightly low blood pressure in the 90s systolic.  He does also have a history of CHF. [JR]   2112 Procalcitonin(!): 2.30 [JR]   2112 Lactate: 1.3 [JR]   2112 WBC: 9.16 [JR]   2112 Creatinine: 1.07 [JR]   2112 INR(!): 1.57 [JR]   2112 I discussed the CT brain without contrast with Dr. Ren, radiologist, who reports no acute findings. [JR]   2152 Discussed with Dr. St, [facility], who agrees to admit. [JR]      ED Course User Index  [JR] Sudeep Jaime MD              I wore an N95 mask, face shield, and gloves during this patient encounter.  Patient also wearing a surgical mask.  Hand hygeine performed before and after seeing the patient.      DIAGNOSIS  Final diagnoses:   Acute UTI   Irvin catheter in place on admission   Acute encephalopathy   Skin tear of elbow without complication, unspecified laterality, initial encounter   Chronic anticoagulation   Fall, initial encounter         DISPOSITION  ADMIT            Latest Documented Vital Signs:  As of 03:24 EDT  BP- 121/95 HR- 105 Temp- 98.3 °F (36.8 °C) (Oral) O2 sat- 92%        --    Please note that portions of this were completed with a voice recognition program.          Sudeep Jaime MD  11/01/22 [id number]

## 2022-11-01 NOTE — PLAN OF CARE
Goal Outcome Evaluation:  Plan of Care Reviewed With: patient           Outcome Evaluation: Pt. is a 72 year old Male admitted to the hospital with an acute UTI and recent fall.  Pt. reports that prior to admission he was using a Rwx for ambulation.  Pt. currently presents with decreased strength, decreased balance, and decreased tolerance to functional activity.  This PM, pt. able to ambulate 25 feet, CGA x 1, with use of Rwx.  Pt. requires Min. assist x 1 for bed mobility and CGA x 1 for sit <-> stand transfers. BLE ther. ex. program x 10 reps completed for general strengthening.  Pt. will benefit from skilled inpt. P.T. to address his functional deficits and to assist pt. in regaining his maximum level of independence with functional mobility.    Patient was wearing a face mask during this therapy encounter. Therapist used appropriate personal protective equipment including eye protection, mask, and gloves.  Mask used was standard procedure mask. Appropriate PPE was worn during the entire therapy session. Hand hygiene was completed before and after therapy session. Patient is not in enhanced droplet precautions.

## 2022-11-01 NOTE — CONSULTS
Nutrition Services    Patient Name:  Jalen Lockwood  YOB: 1950  MRN: [id number]  Admit Date:  10/31/2022      Comment: Nutrition consult per nurse admission screen - no indicators present.  Admitted s/p fall with fever and weakness.  Acute UTI.  Ambulates with walker at home.  Chronically debilitated.      Eating 50-75% at meals.  RD to follow along during hospital stay.    CLINICAL NUTRITION ASSESSMENT      Reason for Assessment Nurse or Nurse Practitioner Consult     Diagnosis/Problem   Acute UTI   Medical/Surgical History Past Medical History:   Diagnosis Date   • Allergic rhinitis    • Anxiety    • Aortectasia (HCC)     3cm infrarenal abdominal aorta   • Arthritis    • Atrial flutter (HCC) 2010    s/p ablation    • Charcot's joint of foot    • Chronic edema     both legs and sees wound care center at [place]    • Chronic venous insufficiency    • COPD (chronic obstructive pulmonary disease) (HCC)    • Coronary atherosclerosis     Cath 2010: diffuse 40-50% disease   • Diverticulosis    • Duodenitis    • Fatty liver    • Gastritis    • Gastroparesis    • Hematoma     post-operative; After catheterization, right groin, required surgical exploration   • Hyperlipidemia    • Hypertension    • Insomnia    • Internal hemorrhoids    • Open wound     izzy legs has teddy chg weekly at wound care center at [place]  pt does second dressing on left leg another time during week   • Osteomyelitis (HCC)    • Paroxysmal atrial fibrillation (HCC)    • Peripheral neuropathy    • Popliteal artery aneurysm (HCC)     left, s/p stenting by Dr. Boston   • Skin cancer    • Sleep apnea     o2   • 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HCC)     due to flutter and afib; cath 2010 with nonobstructive disease   • Venous stasis    • Venous stasis ulcer (HCC)     bilateral legs        Past Surgical History:   Procedure Laterality Date   • BASAL CELL CARCINOMA EXCISION      ear and left side of face   • BRONCHOSCOPY N/A 4/12/2021     "Procedure: BRONCHOSCOPY WITH BAL;  Surgeon: Bunny Lepe MD;  Location: [facility] ENDOSCOPY;  Service: Pulmonary;  Laterality: N/A;  PRE-HEMOPTYSIS  POST-SAME   • CARDIAC CATHETERIZATION     • CATARACT EXTRACTION     • COLONOSCOPY  09/28/2015    NBIH, diverticulosis, polyps   • COLONOSCOPY N/A 9/11/2018    Procedure: COLONOSCOPY TO CECUM  AND TERM. ILEUM WITH COLD SNARE POLYPECTOMIES;  Surgeon: Kane Lagunas MD;  Location[facility] END[facility];  Service: Gastroenterology   • COLONOSCOPY N/A 10/29/2019    Procedure: COLONOSCOPY TO TO CECUM AND TERMINAL ILEUM WITH HOT AND COLD SNARE POLYPECTOMIES;  Surgeon: Kane Lagunas MD;  Location: [facility] ENDOSCOPY;  Service: Gastroenterology   • HIP ARTHROPLASTY Right 2017   • JOINT REPLACEMENT Left    • OTHER SURGICAL HISTORY      Catheter ablation atrial flutter   • REPAIR ANEURYSM / PSEUDO ANEURYSM / RUPTURED ANEURYSM POPLITEAL ARTERY      Stent-Graft of the the left popliteal artery   • REPAIR KNEE LIGAMENT      Primary repair of knee ligament cruciate anterior right   • TONSILLECTOMY  1958   • TOTAL KNEE ARTHROPLASTY Bilateral    • UPPER GASTROINTESTINAL ENDOSCOPY  09/16/2014    acute gastritis, acute duodenitis        Encounter Information        Nutrition/Diet History:     Food Preferences:    Supplements:    Factors Affecting Intake: No factors at this time     Anthropometrics        Current Height  Current Weight  BMI kg/m2 Height: 193 cm (76\")  Weight: 129 kg (283 lb 4.7 oz) (11/01/22 0031)  Body mass index is 34.48 kg/m².       Admission Weight 283 lb (11/1)   Ideal Body Weight (IBW) 202 lb (91.8 kg)   Usual Body Weight (UBW)    Weight Change/Trend        Weight History Wt Readings from Last 30 Encounters:   11/01/22 0031 129 kg (283 lb 4.7 oz)   10/31/22 2047 132 kg (290 lb)   10/28/22 1637 132 kg (290 lb)   09/28/22 1437 127 kg (279 lb)   09/28/22 1449 122 kg (269 lb)   08/23/22 0954 127 kg (279 lb)   08/09/22 1114 127 kg (279 lb 3.2 oz)   08/03/22 1114 128 kg " (283 lb)   07/27/22 0500 129 kg (284 lb 1.6 oz)   07/26/22 0518 124 kg (273 lb 2.4 oz)   07/25/22 0531 124 kg (273 lb 3.2 oz)   07/25/22 0046 124 kg (273 lb 3.2 oz)   07/24/22 1955 124 kg (274 lb)   07/19/22 0600 129 kg (284 lb 6.4 oz)   07/18/22 0600 131 kg (289 lb 8 oz)   07/17/22 0549 134 kg (296 lb)   07/10/22 1332 134 kg (296 lb)   06/19/22 1720 134 kg (295 lb)   06/20/22 0703 134 kg (295 lb)   04/27/22 1300 (!) 139 kg (306 lb)   04/09/22 2230 (!) 139 kg (306 lb)   03/17/22 1435 136 kg (300 lb)   01/21/22 1302 (!) 143 kg (314 lb 3.2 oz)   01/09/22 0904 136 kg (299 lb)   12/10/21 1314 133 kg (294 lb)   10/21/21 1434 134 kg (296 lb)   10/12/21 1236 135 kg (298 lb 6.4 oz)   09/17/21 1355 130 kg (287 lb)   09/17/21 1411 135 kg (297 lb)   06/17/21 1353 130 kg (287 lb)   04/30/21 1304 (!) 142 kg (313 lb)   04/29/21 1048 (!) 139 kg (306 lb)   04/27/21 1302 (!) 139 kg (306 lb)   04/13/21 0500 (!) 140 kg (308 lb 6.4 oz)   04/12/21 0501 (!) 138 kg (304 lb 14.4 oz)   04/11/21 0547 (!) 141 kg (309 lb 12.8 oz)   04/10/21 0631 (!) 140 kg (308 lb 4.8 oz)   04/09/21 0649 136 kg (298 lb 14.4 oz)   04/08/21 0557 136 kg (299 lb 2.6 oz)   04/07/21 0557 (!) 142 kg (313 lb 1.6 oz)   04/06/21 0501 136 kg (299 lb 13.2 oz)   04/05/21 0500 (!) 138 kg (304 lb 0.2 oz)   04/04/21 0653 (!) 141 kg (311 lb)   04/04/21 0525 (!) 144 kg (317 lb)   04/03/21 0533 (!) 140 kg (308 lb 3.3 oz)   04/02/21 0600 (!) 137 kg (302 lb)   04/01/21 0400 (!) 142 kg (313 lb 15 oz)   03/31/21 0545 (!) 142 kg (312 lb 13.3 oz)   03/30/21 1205 (!) 138 kg (303 lb 9.2 oz)   03/03/21 1439 (!) 138 kg (303 lb 12.8 oz)   12/10/20 1459 131 kg (288 lb 9.6 oz)   11/10/20 1304 136 kg (299 lb 4.8 oz)   06/25/20 1433 122 kg (270 lb)             Tests/Procedures        Tests/Procedures No new tests/procedures     Labs       Pertinent Labs    Results from last 7 days   Lab Units 10/31/22  2005 10/28/22  1654   SODIUM mmol/L 136 138   POTASSIUM mmol/L 3.5 3.9   CHLORIDE mmol/L 99  99   CO2 mmol/L 27.0 29.0   BUN mg/dL 17 17   CREATININE mg/dL 1.07 0.96   CALCIUM mg/dL 9.9 9.9   BILIRUBIN mg/dL 0.9 0.7   ALK PHOS U/L 114 112   ALT (SGPT) U/L 10 8   AST (SGOT) U/L 22 15   GLUCOSE mg/dL 107* 110*     Results from last 7 days   Lab Units 10/31/22  2005   HEMOGLOBIN g/dL 11.4*   HEMATOCRIT % 33.2*   WBC 10*3/mm3 9.16   ALBUMIN g/dL 3.80     Results from last 7 days   Lab Units 11/01/22  0715 10/31/22  2005 10/28/22  1654 10/26/22  0000   INR  1.63* 1.57* 2.10* 2.80   PLATELETS 10*3/mm3  --  145 155  --      SARS-CoV-2, JOSE ALEJANDRO   Date Value Ref Range Status   09/05/2022 NEGATIVE Negative Final     Comment:     The 2019-CoV rRT-PCR Assay is only for use under a Food and Drug Administration Emergency Use Authorization. The performance characteristics of the assay were verified by the Clinical Laboratory at [facility]. Results should be used in   conjunction with the patient's clinical symptoms, medical history, and other clinical/laboratory findings to determine an overall clinical diagnosis. Negative results do not preclude infection with SARS-CoV-2 (COVID-19).    Test parameters have not been validated for screening asymptomatic patients.     Lab Results   Component Value Date    HGBA1C 5.00 03/30/2021          Medications           Scheduled Medications ascorbic acid, 500 mg, Oral, Daily  budesonide-formoterol, 2 puff, Inhalation, BID - RT  ertapenem, 1 g, Intravenous, Q24H  ferrous sulfate, 325 mg, Oral, Daily With Breakfast  finasteride, 5 mg, Oral, Daily  furosemide, 40 mg, Oral, Daily  metoprolol succinate XL, 25 mg, Oral, Daily  miconazole, , Topical, Q12H  potassium chloride, 10 mEq, Oral, Daily  pravastatin, 20 mg, Oral, Daily  sodium chloride, 10 mL, Intravenous, Q12H  tamsulosin, 0.4 mg, Oral, Nightly  warfarin, 7.5 mg, Oral, Daily       Infusions Pharmacy to dose warfarin,        PRN Medications •  acetaminophen **OR** acetaminophen **OR** acetaminophen  •  ALPRAZolam  •   HYDROcodone-acetaminophen  •  ipratropium-albuterol  •  nitroglycerin  •  ondansetron  •  Pharmacy to dose warfarin  •  [COMPLETED] Insert peripheral IV **AND** sodium chloride  •  sodium chloride     Physical Findings        Physical Appearance alert, oriented, on oxygen therapy     NFPE Not applicable   --  Edema  2+ (mild)   Gastrointestinal last bowel movement:11/1   Tubes/Drains none   Oral/Mouth Cavity teeth missing   Skin skin tear, other:L antecubital skin tear, healing ankle wounds   --  Current Nutrition Orders & Evaluation of Intake       Oral Nutrition     Food Allergies NKFA   Current PO Diet Diet Regular; Cardiac   Supplement n/a   PO Evaluation     Trending % PO Intake 50-75% x 2 meals       --  PES STATEMENT / NUTRITION DIAGNOSIS      Nutrition Dx Problem  Problem: Nutrition Appropriate for Condition at this Time  Etiology: Medical Diagnosis  Signs/Symptoms: Report/Observation    Comment:    --  NUTRITION INTERVENTION      Intervention Goal(s) Maintain nutrition status, Maintain intake and No significant weight loss         RD Intervention/Action Follow Tx Progress         Prescription/Orders:       PO Diet       Supplements       Enteral Nutrition       Parenteral Nutrition    New Prescription Ordered?    --      Monitor/Evaluation Per protocol, PO intake, Skin status, Symptoms   Education Will instruct as appropriate   --    RD to follow per protocol.      Electronically signed by:  Opal Batres RD  11/01/22 15:03 EDT

## 2022-11-01 NOTE — PROGRESS NOTES
[facility]acy Services: Warfarin Dosing/Monitoring Consult    Jalen Lockwood is a 72 y.o. male, estimated creatinine clearance is 94 mL/min (by C-G formula based on SCr of 1.07 mg/dL). weighing 132 kg (290 lb).    Results from last 7 days   Lab Units 10/31/22  2005 10/28/22  1654 10/26/22  0000   INR  1.57* 2.10* 2.80   HEMOGLOBIN g/dL 11.4* 12.2*  --    HEMATOCRIT % 33.2* 36.5*  --    PLATELETS 10*3/mm3 145 155  --      Prior to admission anticoagulation: warfarin 7.5mg daily per [facility] Anticoagulation:  Consulting provider: Julita ALLISON  Start date: 10/31  Indication: A Fib - requiring full anticoagulation  Target INR: 2 - 3  Expected duration: indefinite   Bridge Therapy: No      Potential food or drug interactions: reg diet    Education complete?/Date: No; plan for follow up TBD    Assessment/Plan:  Dose: will resume home dose of 7.5mg daily  Monitor for any signs or symptoms of bleeding  Follow up daily INRs and dose adjustments    Pharmacy will continue to follow until discharge or discontinuation of warfarin.     Kevin Rucker PharmD  Clinical Pharmacist

## 2022-11-01 NOTE — CONSULTS
Referring Provider: Isaias St MD  Reason for Consultation: Recurrent UTI ESBL  Chief Complaint   Patient presents with   • Fall   • Fever         Subjective   History of present illness: Patient is a 72-year-old male with past medical history of dysphagia, Charcot foot, chronic lower extremity edema, chronic Irvin catheter, atrial fib on anticoagulation who presents after mechanical fall.  ID was consulted for recurrent ESBL UTI.    Patient reports at home he had a mechanical fall due to weakness in his lower extremities.  States he has had ongoing weakness since approximately Thursday of last week.  States his temperature got up to 99 but denies any other fevers.  States he did have 1 episode of a chill but was not having rigors.  Denies any shortness of breath or cough.  States he has been breathing well without any difficulty.  States his lower extremities are much better and he has chronic wound care that does his wrapping.    On presentation patient had T-max of 99.9.  Urine culture from last week showing ESBL Klebsiella.  He is without leukocytosis and procalcitonin was elevated at 2.  He was given Zosyn then followed by 1 dose of ertapenem.  Urine culture on admission has pending growth.    Past Medical History:   Diagnosis Date   • Allergic rhinitis    • Anxiety    • Aortectasia (HCC)     3cm infrarenal abdominal aorta   • Arthritis    • Atrial flutter (HCC) 2010    s/p ablation    • Charcot's joint of foot    • Chronic edema     both legs and sees wound care center at [place]    • Chronic venous insufficiency    • COPD (chronic obstructive pulmonary disease) ([facility])    • Coronary atherosclerosis     Cath 2010: diffuse 40-50% disease   • Diverticulosis    • Duodenitis    • Fatty liver    • Gastritis    • Gastroparesis    • Hematoma     post-operative; After catheterization, right groin, required surgical exploration   • Hyperlipidemia    • Hypertension    • Insomnia    • Internal hemorrhoids    • Open  wound     izzy legs has drsg chg weekly at wound care center at [place]  pt does second dressing on left leg another time during week   • Osteomyelitis (HCC)    • Paroxysmal atrial fibrillation (HCC)    • Peripheral neuropathy    • Popliteal artery aneurysm (HCC)     left, s/p stenting by Dr. Boston   • Skin cancer    • Sleep apnea     o2   • 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HCC)     due to flutter and afib; cath 2010 with nonobstructive disease   • Venous stasis    • Venous stasis ulcer (HCC)     bilateral legs        Past Surgical History:   Procedure Laterality Date   • BASAL CELL CARCINOMA EXCISION      ear and left side of face   • BRONCHOSCOPY N/A 4/12/2021    Procedure: BRONCHOSCOPY WITH BAL;  Surgeon: Bunny Lepe MD;  Location: [facility] ENDOSCOPY;  Service: Pulmonary;  Laterality: N/A;  PRE-HEMOPTYSIS  POST-SAME   • CARDIAC CATHETERIZATION     • CATARACT EXTRACTION     • COLONOSCOPY  09/28/2015    NBIH, diverticulosis, polyps   • COLONOSCOPY N/A 9/11/2018    Procedure: COLONOSCOPY TO CECUM  AND TERM. ILEUM WITH COLD SNARE POLYPECTOMIES;  Surgeon: Kane Lagunas MD;  Location: [facility] ENDOSCOPY;  Service: Gastroenterology   • COLONOSCOPY N/A 10/29/2019    Procedure: COLONOSCOPY TO TO CECUM AND TERMINAL ILEUM WITH HOT AND COLD SNARE POLYPECTOMIES;  Surgeon: Kane Lagunas MD;  Location: [facility] ENDOSCOPY;  Service: Gastroenterology   • HIP ARTHROPLASTY Right 2017   • JOINT REPLACEMENT Left    • OTHER SURGICAL HISTORY      Catheter ablation atrial flutter   • REPAIR ANEURYSM / PSEUDO ANEURYSM / RUPTURED ANEURYSM POPLITEAL ARTERY      Stent-Graft of the the left popliteal artery   • REPAIR KNEE LIGAMENT      Primary repair of knee ligament cruciate anterior right   • TONSILLECTOMY  1958   • TOTAL KNEE ARTHROPLASTY Bilateral    • UPPER GASTROINTESTINAL ENDOSCOPY  09/16/2014    acute gastritis, acute duodenitis       family history includes Emphysema in his father.     reports that he has been smoking  cigarettes. He started smoking about 58 years ago. He has been smoking an average of .5 packs per day. He uses smokeless tobacco. He reports current alcohol use of about 14.0 standard drinks per week. He reports that he does not use drugs.     Allergies   Allergen Reactions   • Cephalexin Hives     Tolerated ceftriaxone Jan 2022   • Codeine Nausea Only   • Silver Other (See Comments)       Medication:  Antibiotics:  Anti-Infectives (From admission, onward)    Ordered     Dose/Rate Route Frequency Start Stop    10/31/22 2353  ertapenem (INVanz) 1 g in sodium chloride 0.9 % 100 mL IVPB-VTB        Ordering Provider: Julita Shetty APRN    1 g  200 mL/hr over 30 Minutes Intravenous Every 24 Hours 10/31/22 2355 11/07/22 2353    10/31/22 2029  piperacillin-tazobactam (ZOSYN) 4.5 g in iso-osmotic dextrose 100 mL IVPB (premix)        Ordering Provider: Sudeep Jaime MD    4.5 g  over 30 Minutes Intravenous Once 10/31/22 2031 10/31/22 2140          Review of Systems  Pertinent items are noted in HPI, all other systems reviewed and negative    Objective     Physical Exam:   Vital Signs   Temp:  [97.9 °F (36.6 °C)-99.9 °F (37.7 °C)] 97.9 °F (36.6 °C)  Heart Rate:  [] 67  Resp:  [18-20] 18  BP: ()/(55-95) 98/55    GENERAL: Awake and alert, in no acute distress.  Chronically ill-appearing.  HEENT: Oropharynx is clear. Hearing is grossly normal.   EYES: PERRL. No conjunctival injection. No lid lag.   LYMPHATICS: No lymphadenopathy of the neck or inguinal regions.   HEART: Irregular rate and rhythm.  Bilateral lower extremity trace edema.  LUNGS: Clear to auscultation anteriorly with normal respiratory effort.   GI: Soft, nontender, nondistended. No appreciable organomegaly.   SKIN: Bilateral lower extremity with compression wraps in place.  Small abrasions on the left hand and elbow.  No surrounding erythema.  PSYCHIATRIC: Appropriate mood, affect, insight, and judgment.     Results Review:   I reviewed  the patient's new clinical results.  I reviewed the patient's new imaging results and agree with the interpretation.  I reviewed the patient's other test results and agree with the interpretation    Lab Results   Component Value Date    WBC 9.16 10/31/2022    HGB 11.4 (L) 10/31/2022    HCT 33.2 (L) 10/31/2022    MCV 90.5 10/31/2022     10/31/2022       Lab Results   Component Value Date    VANCOTROUGH 22.90 (H) 07/16/2022       Lab Results   Component Value Date    GLUCOSE 107 (H) 10/31/2022    BUN 17 10/31/2022    CREATININE 1.07 10/31/2022    EGFRIFNONA 103 01/12/2022    EGFRIFAFRI 102 11/20/2018    BCR 15.9 10/31/2022    CO2 27.0 10/31/2022    CALCIUM 9.9 10/31/2022    PROTENTOTREF 6.1 04/26/2019    ALBUMIN 3.80 10/31/2022    LABIL2 1.1 04/26/2019    AST 22 10/31/2022    ALT 10 10/31/2022         Estimated Creatinine Clearance: 93.1 mL/min (by C-G formula based on SCr of 1.07 mg/dL).      Microbiology:  10/28 urine culture 50,000 CFU's Klebsiella pneumoniae    Radiology:  10/31 chest x-ray reviewed by me with no focal consolidation.    10/31 CT of the head report reviewed with no evidence of acute traumatic intracranial pathology.    Assessment   #Weakness  #Bilateral lower extremity venous stasis  #Chronic Irvin catheter  #Mechanical fall  #Atrial fib on anticoagulation  #Chronic hypoxic respiratory failure on 2 L    Plan to continue IV ertapenem 1 g daily and follow-up his outstanding blood cultures.  Overall he has a pretty reassuring set of labs other than his procalcitonin.  Plan to follow-up his blood cultures but if these remain negative may be able to give him 1 dose of fosfomycin for the bacteria in his urine however I am not convinced he has an overt UTI either.  He has a chronic Irvin which will likely continue to grow bacteria indefinitely and he has no other urinary symptoms.  The weakness would be difficult to attribute solely to a UTI with a lack of other symptoms.  He is chronically  debilitated and has had falls in the past and likely would benefit from more help at home.      Thank you for this consult.  We will continue to follow along and tailor antibiotics as the patient's clinical course evolves.

## 2022-11-02 PROBLEM — R94.6 ABNORMAL THYROID FUNCTION TEST: Status: ACTIVE | Noted: 2022-01-01

## 2022-11-02 NOTE — PROGRESS NOTES
Consult received for abnormal EKG.  EKG is drastically abnormal, with diffuse T wave inversion highly concerning for global ischemia. Patient adamantly denies any chest pain. Repeat Tn is pending.    Give aspirin and full dose enoxaparin (INR is subtherapeutic). Patient to let us know if any CP overnight. Full consult to follow in AM.

## 2022-11-02 NOTE — NURSING NOTE
Pt was walked back to r/c from br, observed having chills, temp 101.4, Dr Rosa informed verbally,  Tylenol given, suddenly pt becoming confused, disoriented to place and situation, restless, getting up and down from recliner. Rapid called,Dr. Rosa present on floor and assesed pt, several orders written and noted. Pt put back to bed x2 assist, bed alarm placed, will cont to monitor closely.

## 2022-11-02 NOTE — PLAN OF CARE
Goal Outcome Evaluation:  Plan of Care Reviewed With: patient        Progress: no change  Outcome Evaluation: Pt back to his baseline , eating dinner, wife at bsd, ,updated of pt condition and new orders,  pt oriented x4, feeding self, following commands, response appropriate, all critical labs sent to Dr. Rosa and ,ID. IV a/b changed to [place]. afib (baseline) on tele, hr improved after digoxin iv, 86-89. nad,  bed alarm on.

## 2022-11-02 NOTE — PROGRESS NOTES
Discharge Planning Assessment  [facility]     Patient Name: Jalen Lockwood  MRN: [id number]  Today's Date: 11/2/2022    Admit Date: 10/31/2022    Plan: Home with spouse and [facility]   Discharge Needs Assessment     Row Name 11/02/22 1544       Living Environment    People in Home spouse    Name(s) of People in Home Mariposa Lockwood    Current Living Arrangements home    Primary Care Provided by self;spouse/significant other    Provides Primary Care For no one    Family Caregiver if Needed spouse    Family Caregiver Names Mariposa Lockwood    Quality of Family Relationships helpful;involved;supportive    Able to Return to Prior Arrangements yes       Resource/Environmental Concerns    Resource/Environmental Concerns none       Transition Planning    Patient/Family Anticipates Transition to home with family    Patient/Family Anticipated Services at Transition home health care    Transportation Anticipated family or friend will provide       Discharge Needs Assessment    Equipment Currently Used at Home walker, standard;cane, straight    Concerns to be Addressed adjustment to diagnosis/illness    Discharge Facility/Level of Care Needs home with home health    Provided Post Acute Provider List? N/A    Provided Post Acute Provider Quality & Resource List? N/A               Discharge Plan     Row Name 11/02/22 154       Plan    Plan Home with spouse and [facility]    Patient/Family in Agreement with Plan yes    Plan Comments Spoke with patient's spouse, Mariposa [phone], verified facesheet.  PCP is Dr. Marc Ludwig and preferred pharmacy is [facility]. Pt lives with spouse there are with 4 steps to enter house, all ADL on main level. Pt has a chair lift, recliner lift, cane, walker & BSC/3in1. Pt has home O2 through [employer]. He is current with [facility], spouse would like to continue using [facility]. [facility] SNF at [facility]. Spouse works from home, plan is for patient to return home with spouse and [facility].  Spouse would like for medical team to contact her with updates as patient is forgetful (sticky note left on Epic chart). Spouse denies dc needs at this time, she will provide dc transportation. Epic referral sent to [facility]OMI PE[facility]ALO[facility]. CCP will follow progress.              Continued Care and Services - Admitted Since 10/31/2022     Home Medical Care     Service Provider Request Status Selected Services Address Phone Fax Patient Preferred    [facility] - Request Sent N/A [address] [id number] [phone] --            Selected Continued Care - Episodes Includes continued care and service providers with selected services from the active episodes listed below    High Risk Care Management Episode start date: 8/30/2022   There are no active outsourced providers for this episode.             Selected Continued Care - Prior Encounters Includes continued care and service providers with selected services from prior encounters from 8/2/2022 to 11/2/2022    Discharged on 8/29/2022 Admission date: 8/23/2022 - Discharge disposition: Skilled Nursing Facility (DC - External)    Destination     Service Provider Selected Services Address Phone Fax Patient Preferred    [facility] [address] [place] [address] [phone] --                    Expected Discharge Date and Time     Expected Discharge Date Expected Discharge Time    Nov 3, 2022          Demographic Summary    No documentation.                Functional Status     Row Name 11/02/22 1546       Functional Status    Usual Activity Tolerance moderate       Functional Status, IADL    Medications independent;assistive person    Meal Preparation independent    Housekeeping independent    Laundry independent    Shopping assistive equipment and person       Mental Status    General Appearance WDL WDL               Psychosocial    No documentation.                Abuse/Neglect     No documentation.                Legal    No documentation.                Substance Abuse    No documentation.                Patient Forms    No documentation.                   Hannah South RN

## 2022-11-02 NOTE — PROGRESS NOTES
LOS: 2 days     Chief Complaint: Recurrent ESBL UTI    Interval History: Patient reports she is feeling better this morning.  States he is having some pain but otherwise feels well.  Reports a fever yesterday with T-max of 101.4.  Does remember becoming septic yesterday afternoon per his report.  Reports tolerating antibiotics well.  Continues not to have leukocytosis.  Procalcitonin remains elevated    Vital Signs  Temp:  [97.3 °F (36.3 °C)-101.4 °F (38.6 °C)] 97.3 °F (36.3 °C)  Heart Rate:  [] 68  Resp:  [14-20] 16  BP: ()/(61-82) 109/69    Physical Exam:  General: In no acute distress  HEENT: Oropharynx clear, moist mucous membranes  Cardiovascular: Regular rate and rhythm.  Respiratory: Normal work of breathing.    GI: Soft, NT/ND, + bowel sounds bilaterally, no masses  : Chronic Irvin catheter  Extremities: Bilateral lower extremity edema with leg wrappings in place.  Access: Peripheral IV.    Antibiotics:  Anti-Infectives (From admission, onward)    Ordered     Dose/Rate Route Frequency Start Stop    11/01/22 1725  meropenem (MERREM) 1 g in sodium chloride 0.9 % 100 mL IVPB-VTB        Ordering Provider: Fahad Jaramillo, DO    1 g  over 3 Hours Intravenous Every 8 Hours 11/02/22 0015 11/09/22 0014    11/01/22 1725  meropenem (MERREM) 1 g in sodium chloride 0.9 % 100 mL IVPB-VTB        Ordering Provider: Fahad Jaramillo DO    1 g  over 30 Minutes Intravenous Once 11/01/22 1815 11/01/22 2307    10/31/22 2029  piperacillin-tazobactam (ZOSYN) 4.5 g in iso-osmotic dextrose 100 mL IVPB (premix)        Ordering Provider: Sudeep Jaime MD    4.5 g  over 30 Minutes Intravenous Once 10/31/22 2031 10/31/22 2140           Results Review:     I reviewed the patient's new clinical results.    Lab Results   Component Value Date    WBC 5.42 11/02/2022    HGB 10.6 (L) 11/02/2022    HCT 31.5 (L) 11/02/2022    MCV 91.3 11/02/2022     (L) 11/02/2022     Lab Results   Component Value Date     GLUCOSE 98 11/02/2022    BUN 15 11/02/2022    CREATININE 0.81 11/02/2022    EGFRIFNONA 103 01/12/2022    EGFRIFAFRI 102 11/20/2018    BCR 18.5 11/02/2022    CO2 28.0 11/02/2022    CALCIUM 8.9 11/02/2022    PROTENTOTREF 6.1 04/26/2019    ALBUMIN 3.30 (L) 11/02/2022    LABIL2 1.1 04/26/2019    AST 18 11/02/2022    ALT 8 11/02/2022       Microbiology:  10/28 urine culture 50,000 CFU's Klebsiella pneumonia ESBL  10/31 blood cultures gram-negative bacilli and BC ID Klebsiella pneumonia ESBL  10/31 urine culture greater than 100,000 gram-negative bacilli  10/1 blood cultures in process    Assessment    #ESBL Klebsiella septicemia  #ESBL Klebsiella UTI  #Chronic Irvin catheter  #Mechanical fall  #Atrial fib on anticoagulation  #Chronic respiratory failure on 2 L nasal cannula  #Bilateral lower extremity venous insufficiency and lymphedema    Yesterday patient had acute episode of worsening mental status with fever and worsening lactate.  Ertapenem switched to meropenem to avoid any exacerbation of neurologic side effects.  Continue meropenem 1 g every 8 hours for ESBL Klebsiella treatment.  Repeat blood cultures in process.  Order placed for removal and exchange of urinary catheter.    ID will follow.

## 2022-11-02 NOTE — DISCHARGE PLACEMENT REQUEST
"He Lockwood (72 y.o. Male)     Date of Birth   1950    Social Security Number       Address   [address] [place] [address]    Home Phone   [phone]    MRN   [id number]       Muslim   None    Marital Status                               Admission Date   10/31/22    Admission Type   Emergency    Admitting Provider   Isaias St MD    Attending Provider   Fina Flannery MD    Department, Room/Bed   [address], E[address]/1       Discharge Date       Discharge Disposition       Discharge Destination                               Attending Provider: Fina Flannery MD    Allergies: Cephalexin, Codeine, Silver    Isolation: Contact   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), ESBL Klebsiella (10/30/22), ESBL (11/01/22)   Code Status: CPR    Ht: 193 cm (76\")   Wt: 129 kg (283 lb 4.7 oz)    Admission Cmt: None   Principal Problem: Sepsis due to Gram negative bacteria (HCC) [A41.50]                 Active Insurance as of 10/31/2022     Primary Coverage     Payor Plan Insurance Group Employer/Plan Group    HUMANA MEDICARE REPLACEMENT HUMANA MEDICARE REPLACEMENT [id number]     Payor Plan Address Payor Plan Phone Number Payor Plan Fax Number Effective Dates    [address] [address] [phone]  1/1/2018 - None Entered    [facility] [id number]       Subscriber Name Subscriber Birth Date Member ID       HE LOCWKOOD 1950 [id number]                 Emergency Contacts      (Rel.) Home Phone Work Phone Mobile Phone    La CarlaVanessa hoangra (Spouse) [phone] -- [phone]    Carl Lozano (Friend) -- -- [phone]              "

## 2022-11-02 NOTE — PLAN OF CARE
Goal Outcome Evaluation:  Plan of Care Reviewed With: patient            Patient was educated on fall prevention and utilizing his call light, tobacco cessation was also discussed. Patient rested for most of the night had a bout of pain and was treated for it, see MAR. Pt has moments of confusion and needs prompting to reorient. Vital signs have remained stable throughout the shift.

## 2022-11-02 NOTE — PLAN OF CARE
Goal Outcome Evaluation:           Progress: improving  Outcome Evaluation: Pt AOx4. Forgetful at times. On RA sometimes 2L NC (for comfort). Afib controlled on monitor. K+ replaced x2. Lactate negative. Older f/c replaced/ exchanged-- urine return is  more clear yellow. IVFs infusing 100/hr. IV abx per MAR. Medicated for pain per MAR. Lower extremities-- unna boots wrapped. No acute distress. Up in the chair throughout the day. Nursing will continue to monitor     1600-- pt feeling like he is having a fever. Temp was 100.0 F. Tylenol given. Will recheck later. Call from Nani Turpin. She informed me of EKG changes. Dr. Flannery made aware and STAT trop ordered and new Cardiology consult. New order for troponin, EKG, loevnox 130mg, and aspirin 325 ordered along w/ NPO @ midnight per Cardiology   Temp now 98.4 after tylenol

## 2022-11-02 NOTE — PROGRESS NOTES
[facility] Clinical Pharmacy Services: Warfarin Dosing/Monitoring Consult    Jalen Lockwood is a 72 y.o. male, estimated creatinine clearance is 123 mL/min (by C-G formula based on SCr of 0.81 mg/dL). weighing 132 kg (290 lb).    Results from last 7 days   Lab Units 11/02/22  0731 11/01/22  1659 11/01/22  0715 10/31/22  2005 10/28/22  1654   INR  1.71*  --  1.63* 1.57* 2.10*   HEMOGLOBIN g/dL 10.6* 11.7*  --  11.4* 12.2*   HEMATOCRIT % 31.5* 34.6*  --  33.2* 36.5*   PLATELETS 10*3/mm3 109* 120*  --  145 155     Prior to admission anticoagulation: warfarin 7.5mg daily per [facility] Anticoagulation:  Consulting provider: Julita ALLISON  Start date: 10/31  Indication: A Fib - requiring full anticoagulation  Target INR: 2 - 3  Expected duration: indefinite   Bridge Therapy: No      Potential food or drug interactions: reg diet    Education complete?/Date: No; plan for follow up TBD    Assessment/Plan:  Dose: INR still below target at 1.71 but trending upward; therefore will continue same home dose of 7.5mg daily as determined by anticoagulation clinic.  Monitor for any signs or symptoms of bleeding  Follow up daily INRs and dose adjustments    Pharmacy will continue to follow until discharge or discontinuation of warfarin.     Arden Merida [facility]  Clinical Pharmacist

## 2022-11-02 NOTE — PROGRESS NOTES
Name: Jalen Lockwood ADMIT: 10/31/2022   : 1950  PCP: Marc Ludwig MD    MRN: [id number] LOS: 2 days   AGE/SEX: 72 y.o. male  ROOM: [facility]/     Subjective   Subjective   Patient reports that he is feeling very well today.  He cannot remember his yesterday's event.  No fever or chills.  Has clear urine in the Irvin catheter..  No nausea or vomiting..  No abdominal pain.  Normal bowel movement without constipation/diarrhea/bleeding per rectum/melena.  No headache.  No focal neurological symptoms.  No chest pain or shortness of breath or cough.     Objective   Objective   Vital Signs  Temp:  [97.3 °F (36.3 °C)-101.4 °F (38.6 °C)] 97.3 °F (36.3 °C)  Heart Rate:  [] 70  Resp:  [14-20] 18  BP: ()/(61-82) 109/69  SpO2:  [93 %-100 %] 97 %  on  Flow (L/min):  [1-3] 2;   Device (Oxygen Therapy): nasal cannula    Intake/Output Summary (Last 24 hours) at 2022 1306  Last data filed at 2022 1259  Gross per 24 hour   Intake 2450 ml   Output 700 ml   Net 1750 ml     Body mass index is 34.48 kg/m².      10/31/22  2047 11/01/22  0031   Weight: 132 kg (290 lb) 129 kg (283 lb 4.7 oz)     Physical Exam    General.  Elderly gentleman.  He is alert and oriented x3.  No pain or respiratory distress.    Eyes.  Status post bilateral cataract surgery.  Pupils equal round and reactive.  Intact extraocular musculature.  No pallor or jaundice.  Oral cavity.  Moist mucous membrane.  Neck.  Supple.  No JVD.  No lymphadenopathy or thyromegaly.    Cardiovascular.  Regular rate and rhythm.  Occasional ectopic beat.  Abdomen.  Soft lax.  No tenderness.  No organomegaly.  No guarding or rebound.  Chest.  Poor bilateral air entry with scattered bilateral rhonchi.  Extremities.  Unna boots in both lower extremities.  Evidence of bilateral lower extremity varicosities.  CNS.  No acute focal neurological deficits.    Results Review:      Results from last 7 days   Lab Units 22  0731 22  1659 10/31/22  2005  10/28/22  1654   SODIUM mmol/L 138 135* 136 138   POTASSIUM mmol/L 3.3* 3.5 3.5 3.9   CHLORIDE mmol/L 101 100 99 99   CO2 mmol/L 28.0 22.0 27.0 29.0   BUN mg/dL 15 20 17 17   CREATININE mg/dL 0.81 1.14 1.07 0.96   GLUCOSE mg/dL 98 126* 107* 110*   CALCIUM mg/dL 8.9 9.5 9.9 9.9   AST (SGOT) U/L 18 23 22 15   ALT (SGPT) U/L 8 11 10 8     Estimated Creatinine Clearance: 123 mL/min (by C-G formula based on SCr of 0.81 mg/dL).          Results from last 7 days   Lab Units 11/02/22  0731 11/01/22  1659   CK TOTAL U/L  --  105   TROPONIN T ng/mL 0.021 0.040*         Results from last 7 days   Lab Units 11/01/22  1659   TSH uIU/mL 0.161*     Results from last 7 days   Lab Units 11/01/22  1659   MAGNESIUM mg/dL 1.8           Invalid input(s): LDLCALC  Results from last 7 days   Lab Units 11/02/22  0731 11/01/22  1659 10/31/22  2005 10/28/22  1654   WBC 10*3/mm3 5.42 7.21 9.16 9.99   HEMOGLOBIN g/dL 10.6* 11.7* 11.4* 12.2*   HEMATOCRIT % 31.5* 34.6* 33.2* 36.5*   PLATELETS 10*3/mm3 109* 120* 145 155   MCV fL 91.3 91.1 90.5 91.9   MCH pg 30.7 30.8 31.1 30.7   MCHC g/dL 33.7 33.8 34.3 33.4   RDW % 14.3 14.4 14.2 14.4   RDW-SD fl 47.3 47.4 46.3 48.6   MPV fL 10.8 10.6 10.5 9.4   NEUTROPHIL % % 71.7 85.1* 84.9* 84.7*   LYMPHOCYTE % % 16.2* 5.7* 5.5* 7.0*   MONOCYTES % % 9.6 8.5 8.7 6.0   EOSINOPHIL % % 1.7 0.1* 0.1* 1.5   BASOPHIL % % 0.6 0.3 0.4 0.5   IMM GRAN % %  --  0.3  --  0.3   NEUTROS ABS 10*3/mm3 3.89 6.14 7.77* 8.46*   LYMPHS ABS 10*3/mm3 0.88 0.41* 0.50* 0.70   MONOS ABS 10*3/mm3 0.52 0.61 0.80 0.60   EOS ABS 10*3/mm3 0.09 0.01 0.01 0.15   BASOS ABS 10*3/mm3 0.03 0.02 0.04 0.05   IMMATURE GRANS (ABS) 10*3/mm3  --  0.02  --  0.03   NRBC /100 WBC  --  0.0  --  0.0     Results from last 7 days   Lab Units 11/02/22  0731 11/01/22  0715 10/31/22  2005 10/28/22  1654   INR  1.71* 1.63* 1.57* 2.10*     Results from last 7 days   Lab Units 11/01/22  1654   PH, ARTERIAL pH units 7.542*   PO2 ART mm Hg 74.2*   PCO2, ARTERIAL mm  Hg 25.1*   HCO3 ART mmol/L 21.5*     Results from last 7 days   Lab Units 11/01/22  2057 11/01/22  1659 10/31/22  2005   PROCALCITONIN ng/mL  --  3.59* 2.30*   LACTATE mmol/L 1.7 4.5* 1.3             Results from last 7 days   Lab Units 10/31/22  2047 10/31/22  2016 10/31/22  2005 10/28/22  1820   BLOODCX  Gram Negative Bacilli*  --  Gram Negative Bacilli*  --    URINECX   --  >100,000 CFU/mL Gram Negative Bacilli*  --  50,000 CFU/mL Klebsiella pneumoniae ESBL*   BCIDPCR  Klebsiella pneumoniae group. Identification by BCID2 PCR.*  CTX-M (ESBL) detected. Identification by BCID2 PCR*  --   --   --          Results from last 7 days   Lab Units 10/31/22  2016   NITRITE UA  Positive*   WBC UA /HPF 31-50*   BACTERIA UA /HPF 1+*   SQUAM EPITHEL UA /HPF 0-2   URINECX  >100,000 CFU/mL Gram Negative Bacilli*           Imaging:  Imaging Results (Last 24 Hours)     Procedure Component Value Units Date/Time    CT Head Without Contrast [[id number]] Collected: 11/01/22 2122     Updated: 11/01/22 2122    Narrative:      STAT NONCONTRAST HEAD CT 11/01/2022     CLINICAL HISTORY: Patient fell yesterday, has some confusion today.     TECHNIQUE: Spiral CT images were obtained from the base of the skull to  the vertex without intravenous contrast. Images were reformatted and  submitted in 3 mm thick axial, sagittal and coronal CT sections with  brain algorithm and 2 mm thick axial CT section with high-resolution  bone algorithm.     COMPARISON: This is correlated to a noncontrast head CT 10/31/2022.        FINDINGS: There is minimal low-density in the frontal periventricular  white matter consistent with minimal small vessel disease. The remainder  of the brain parenchyma is normal in attenuation. The ventricles are  normal in size. I see no focal mass effect and no midline shift and no  extra-axial fluid collections are identified and there is no evidence of  acute intracranial hemorrhage. No acute skull fracture is  identified.  Paranasal sinuses, mastoid air cells and middle ear cavities are clear       Impression:      1. No acute intracranial abnormality is seen with no change when  compared to yesterday's head CT 10/31/2022 at 8:27 PM. Other than  minimal small vessel disease in the cerebral white matter scattered  calcified plaques in the cavernous internal carotid arteries this is a  negative head CT.      Radiation dose reduction techniques were utilized, including automated  exposure control and exposure modulation based on body size.          CT Abdomen Pelvis With Contrast [[id number]] Collected: 11/01/22 2013     Updated: 11/01/22 2033    Narrative:      CT ABDOMEN PELVIS W CONTRAST-     INDICATIONS: Urosepsis     TECHNIQUE: Radiation dose reduction techniques were utilized, including  automated exposure control and exposure modulation based on body size.  Enhanced ABDOMEN AND PELVIS CT     COMPARISON: 10/21/2022     FINDINGS:     Obstructive stone in the right kidney measures 8mm. A nonobstructive  stone in the left kidney measures 5 mm. Bilateral renal low densities  are seen that are too small to characterize.     The urinary bladder is obscured by attenuation artifact from right hip  arthroplasty hardware, limiting the assessment. There appears to be some  gas in the urinary bladder that could be evidence of infection.     Indeterminate 1.5 cm low-density focus in the left hepatic lobe on axial  image 36 is stable.     Otherwise unremarkable appearance of the liver, spleen, adrenal glands,  pancreas, kidneys, bladder.     No bowel obstruction or abnormal bowel thickening is identified. Colonic  diverticula are seen that do not appear inflamed. The appendix does not  appear inflamed.     No free intraperitoneal gas. Small nonspecific pelvic free fluid.     Scattered small mesenteric and para-aortic lymph nodes are seen that are  not significant by size criteria.     Abdominal aorta is not aneurysmal. Aortic and  other arterial  calcifications are present.     The lung bases show mild atelectasis. A left lower lobe nodule measuring  7 mm on image 5 is stable.     Degenerative and chronic changes are seen in the spine. No acute  fracture is identified.             Impression:            1. Bilateral nonobstructive nephrolithiasis. No hydronephrosis. Gas in  the urinary bladder could be evidence of infection.  2. Colonic diverticulosis. No acute inflammatory process of bowel is  calcifying. Mild nonspecific pelvic free fluid. Follow up as indications  persist.  3. Stable indeterminate liver lesion.     This report was finalized on 11/1/2022 8:30 PM by Dr. Khurram Chambers M.D.                I reviewed the patient's new clinical results / labs / tests / procedures      Assessment/Plan     Active Hospital Problems    Diagnosis  POA   • **Sepsis due to Gram negative bacteria (HCC) [A41.50]  Yes   • Acute UTI [N39.0]  Yes   • Fall [W19.XXXA]  Yes   • Chronic respiratory failure with hypoxia (HCC) [J96.11]  Yes   • Warfarin anticoagulation [Z79.01]  Not Applicable   • Metabolic encephalopathy [G93.41]  Yes   • Anemia of chronic disease [D63.8]  Yes   • Chronic anticoagulation [Z79.01]  Not Applicable   • Lymphedema of both lower extremities [I89.0]  Yes   • Permanent atrial fibrillation (HCC) [I48.21]  Yes   • COPD (chronic obstructive pulmonary disease) (HCC) [J44.9]  Yes      Resolved Hospital Problems   No resolved problems to display.           · Urosepsis with ESBL Klebsiella pneumonia (positive blood and urine culture) in a patient with chronic urinary retention on indwelling Irvin catheter.  Sepsis indicated by fever/elevated procalcitonin/hypotension/tachycardia/mental status changes.  On IV fluid.  Currently on Merrem.  Infectious disease explored and there help is highly appreciated.  Awaiting repeat blood cultures.  CT scan of the abdomen pelvis revealed nonobstructive nephrolithiasis with diverticulosis and stable  intermediate and liver lesion.  We will continue Proscar.  Continue Irvin catheter.  ·   Acute mental status changes and delirium suggestive of metabolic encephalopathy.  Negative CNS examination.  Repeat CAT scan of the brain without acute deficit.  On as needed Zyprexa.  Great improvement.  No focal neurologic symptoms..  Discussed with ID yesterday about the possibility of Invanz neurotoxicity and he has kindly changed the antibiotics to Merrem.    · Weakness and fall.  Normal potassium.  C normal potassium/CK/magnesium.  TSH is low (look below.)  Consult PT and OT.  This is mostly secondary to sepsis.  · Chronic A. fib.  Was in rapid A. fib yesterday.  Today appears to be in normal sinus rhythm or well-controlled A. fib.  Will recheck EKG.  Lasix and metoprolol DC'd secondary to hypotension.  Status post single dose of IV fluids.  Hopefully resume metoprolol and rate pressure stabilizes..  Troponin is negative.    · Lymphedema and lower extremity venous insufficiency.  Unna boots applied by wound nurse.  · History of COPD and chronic hypoxemic respiratory failure.  Respiratory status appears to be stable.  Chest x-ray without infiltrate.  Arterial blood gas without CO2 retention.  Continue oxygen.  · Anemia positive hemoglobin drop after hydration but continues to be around the baseline.  Continue to monitor.  · Hypokalemia.  Will substitute.  Magnesium is normal.  · Low TSH.  We will check total T3 and free T4.    Discussed my findings and plan of treatment with the patient/nurse.  Disposition.  To be determined based on clinical course..          Fina Flannery MD  [facility]  11/02/22  13:06 EDT        Review of Systems

## 2022-11-02 NOTE — THERAPY EVALUATION
Patient Name: Jalen Lockwood  : 1950    MRN: [id number]                              Today's Date: 2022       Admit Date: 10/31/2022    Visit Dx:     ICD-10-CM ICD-9-CM   1. Acute UTI  N39.0 599.0   2. Irvin catheter in place on admission  Z97.8 V45.89   3. Acute encephalopathy  G93.40 348.30   4. Skin tear of elbow without complication, unspecified laterality, initial encounter  S51.019A 881.01   5. Chronic anticoagulation  Z79.01 V58.61   6. Fall, initial encounter  W19.XXXA E888.9     Patient Active Problem List   Diagnosis   • Chronic osteomyelitis (HCC)   • Foot pain   • Peripheral neuropathy   • Lymphedema of both lower extremities   • Permanent atrial fibrillation (HCC)   • Sleep apnea   • Obesity (BMI 30-39.9)   • COPD (chronic obstructive pulmonary disease) (HCC)   • Aortectasia (HCC)   • Popliteal artery aneurysm (HCC)   • Colon polyps   • Gastroparesis   • Insomnia   • Adenomatous polyp of colon   • Essential hypertension   • Chronic anticoagulation   • Tobacco abuse   • Thyromegaly   • Adrenal adenoma, left   • Retroperitoneal lymphadenopathy   • Anemia of chronic disease   • Thyroid nodule   • Charcot's joint of foot   • Abnormal CT scan, lumbar spine   • Alcohol dependence, in remission (HCC)   • Metabolic encephalopathy   • Normocytic anemia   • Inguinal adenopathy   • Elevated lactic acid level   • Venous stasis dermatitis of both lower extremities   • Urinary retention   • Anxiety   • Ischemic cardiomyopathy   • Chronic respiratory failure with hypoxia (HCC)   • Oropharyngeal dysphagia   • Paroxysmal atrial fibrillation (HCC)   • Warfarin anticoagulation   • Thrombocytopenia (HCC)   • Acute UTI   • Fall   • Sepsis due to Gram negative bacteria (HCC)     Past Medical History:   Diagnosis Date   • Allergic rhinitis    • Anxiety    • Aortectasia (HCC)     3cm infrarenal abdominal aorta   • Arthritis    • Atrial flutter (HCC) 2010    s/p ablation    • Charcot's joint of foot    • Chronic  edema     both legs and sees wound care center at [place]    • Chronic venous insufficiency    • COPD (chronic obstructive pulmonary disease) (HCC)    • Coronary atherosclerosis     Cath 2010: diffuse 40-50% disease   • Diverticulosis    • Duodenitis    • Fatty liver    • Gastritis    • Gastroparesis    • Hematoma     post-operative; After catheterization, right groin, required surgical exploration   • Hyperlipidemia    • Hypertension    • Insomnia    • Internal hemorrhoids    • Open wound     izzy legs has drsg chg weekly at wound care center at [place]  pt does second dressing on left leg another time during week   • Osteomyelitis (HCC)    • Paroxysmal atrial fibrillation (HCC)    • Peripheral neuropathy    • Popliteal artery aneurysm (HCC)     left, s/p stenting by Dr. [place]   • Skin cancer    • Sleep apnea     o2   • 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HCC)     due to flutter and afib; cath 2010 with nonobstructive disease   • Venous stasis    • Venous stasis ulcer (HCC)     bilateral legs      Past Surgical History:   Procedure Laterality Date   • BASAL CELL CARCINOMA EXCISION      ear and left side of face   • BRONCHOSCOPY N/A 4/12/2021    Procedure: BRONCHOSCOPY WITH BAL;  Surgeon: Bunny Lepe MD;  Location: [facility] ENDOSCOPY;  Service: Pulmonary;  Laterality: N/A;  PRE-HEMOPTYSIS  POST-SAME   • CARDIAC CATHETERIZATION     • CATARACT EXTRACTION     • COLONOSCOPY  09/28/2015    NBIH, diverticulosis, polyps   • COLONOSCOPY N/A 9/11/2018    Procedure: COLONOSCOPY TO CECUM  AND TERM. ILEUM WITH COLD SNARE POLYPECTOMIES;  Surgeon: Kane Lagunas MD;  Location: [facility] ENDOSCOPY;  Service: Gastroenterology   • COLONOSCOPY N/A 10/29/2019    Procedure: COLONOSCOPY TO TO CECUM AND TERMINAL ILEUM WITH HOT AND COLD SNARE POLYPECTOMIES;  Surgeon: Kane Lagunas MD;  Location: [facility] ENDOSCOPY;  Service: Gastroenterology   • HIP ARTHROPLASTY Right 2017   • JOINT REPLACEMENT Left    • OTHER SURGICAL HISTORY       "Catheter ablation atrial flutter   • REPAIR ANEURYSM / PSEUDO ANEURYSM / RUPTURED ANEURYSM POPLITEAL ARTERY      Stent-Graft of the the left popliteal artery   • REPAIR KNEE LIGAMENT      Primary repair of knee ligament cruciate anterior right   • TONSILLECTOMY  1958   • TOTAL KNEE ARTHROPLASTY Bilateral    • UPPER GASTROINTESTINAL ENDOSCOPY  09/16/2014    acute gastritis, acute duodenitis      General Information     [facility] Name 11/02/22 1253          OT Time and Intention    Document Type evaluation  -     Mode of Treatment occupational therapy;individual therapy  -     Row Name 11/02/22 1253          General Information    Patient Profile Reviewed yes  -     Prior Level of Function --  pt reports spouse assisting him with LBD and managing his catheter, he reports \"sometimes its a team effort\" in regards to his ADLs. Independent with his mobility but has fallen several times in past few months per pt.  -     Existing Precautions/Restrictions fall;oxygen therapy device and L/min  -     Barriers to Rehab medically complex;previous functional deficit  -     Row Name 11/02/22 1253          Occupational Profile    Environmental Supports and Barriers (Occupational Profile) home DME includes SPC, rwx, he sponge baths at baseline.  -     Row Name 11/02/22 1253          Living Environment    People in Home spouse  -     Row Name 11/02/22 1253          Cognition    Orientation Status (Cognition) oriented x 4  -     Row Name 11/02/22 1253          Safety Issues, Functional Mobility    Impairments Affecting Function (Mobility) balance;endurance/activity tolerance;strength;range of motion (ROM)  -           User Key  (r) = Recorded By, (t) = Taken By, (c) = Cosigned By    Initials Name Provider Type     Griselda Saleh OT Occupational Therapist                 Mobility/ADL's     Row Name 11/02/22 1256          Bed Mobility    Comment, (Bed Mobility) pt up in chair  -[facility] Name 11/02/22 1256          " Sit-Stand Transfer    Sit-Stand [place] (Transfers) minimum assist (75% patient effort)  -     Assistive Device (Sit-Stand Transfers) walker, front-wheeled  -     Row Name 11/02/22 1256          Functional Mobility    Functional Mobility- Ind. Level contact guard assist  -     Functional Mobility- Device walker, front-wheeled  -     Functional Mobility-Distance (Feet) --  30  -     Functional Mobility- Comment in room for distance to sink/bathroom, shuffled small steps taken and he requires extra time.  -[facility] Name 11/02/22 1256          Activities of Daily Living    BADL Assessment/Intervention lower body dressing;toileting;grooming  pt declines bathing or other ADLs today  -[facility] Name 11/02/22 1256          Lower Body Dressing Assessment/Training    [place] Level (Lower Body Dressing) lower body dressing skills;don;socks;dependent (less than 25% patient effort)  (this is pt's baseline)  -[facility] Name 11/02/22 1256          Toileting Assessment/Training    Comment, (Toileting) Pt with chronic escamilla cath  -[facility] Name 11/02/22 1256          Grooming Assessment/Training    [place] Level (Grooming) grooming skills;hair care, combing/brushing;set up  -     Position (Grooming) supported sitting  -           User Key  (r) = Recorded By, (t) = Taken By, (c) = Cosigned By    Initials Name Provider Type     Griselda Saleh OT Occupational Therapist               Obj/Interventions     [facility] Name 11/02/22 1257          Range of Motion Comprehensive    Comment, General Range of Motion R shouler OA, flexion limited 50%, L shoulder limited 25%. Distal UEs WFL  -[facility] Name 11/02/22 1257          Strength Comprehensive (MMT)    Comment, General Manual Muscle Testing (MMT) Assessment generalized weakness noted, shoulders 3-/5, distal UE > or = 3/5  -[facility] Name 11/02/22 1257          Motor Skills    Motor Skills functional endurance  -     Functional Endurance fair -  -      Row Name 11/02/22 [facility]          Balance    Balance Assessment standing static balance;standing dynamic balance  -SM     Static Standing Balance contact guard  -SM     Dynamic Standing Balance contact guard  -SM     Position/Device Used, Standing Balance supported;walker, rolling  -SM           User Key  (r) = Recorded By, (t) = Taken By, (c) = Cosigned By    Initials Name Provider Type    SM Griselda Saleh OT Occupational Therapist               Goals/Plan     Row Name 11/02/22 1303          Transfer Goal 1 (OT)    Activity/Assistive Device (Transfer Goal 1, OT) transfers, all;toilet;sit-to-stand/stand-to-sit  -SM     [place] Level/Cues Needed (Transfer Goal 1, OT) supervision required  -SM     Time Frame (Transfer Goal 1, OT) short term goal (STG);2 weeks  -SM     Progress/Outcome (Transfer Goal 1, OT) goal ongoing  -[facility] Name 11/02/22 1303          Toileting Goal 1 (OT)    Activity/Device (Toileting Goal 1, OT) toileting skills, all;perform perineal hygiene;adjust/manage clothing  for BM  -SM     [place] Level/Cues Needed (Toileting Goal 1, OT) supervision required  -SM     Time Frame (Toileting Goal 1, OT) short term goal (STG);2 weeks  -SM     Progress/Outcome (Toileting Goal 1, OT) goal ongoing  -[facility] Name 11/02/22 1303          Grooming Goal 1 (OT)    Activity/Device (Grooming Goal 1, OT) grooming skills, all  -SM     [place] (Grooming Goal 1, OT) supervision required  -SM     Time Frame (Grooming Goal 1, OT) short term goal (STG);2 weeks  -SM     Strategies/Barriers (Grooming Goal 1, OT) for standing balance > 5 minutes at sink  -SM     Progress/Outcome (Grooming Goal 1, OT) goal ongoing  -[facility] Name 11/02/22 1303          Therapy Assessment/Plan (OT)    Planned Therapy Interventions (OT) activity tolerance training;adaptive equipment training;functional balance retraining;occupation/activity based interventions;patient/caregiver education/training;transfer/mobility  "retraining;strengthening exercise  -           User Key  (r) = Recorded By, (t) = Taken By, (c) = Cosigned By    Initials Name Provider Type    Griselda Bruce, OT Occupational Therapist               Clinical Impression     Row Name 11/02/22 5395          Pain Assessment    Pre/Posttreatment Pain Comment pt reports some stiffness in my legs\" no pain.  -     Pain Intervention(s) Ambulation/increased activity  -     Row Name 11/02/22 3141          Plan of Care Review    Plan of Care Reviewed With patient  -     Outcome Evaluation Pt is a 72 y.o male admitted from home with a fall and confusion, found to have UTI, sepsis, metabolic encephalopathy. He reports he has had a few falls at home in the last few months. He had been working with  therapies but has been discharged. Spouse assists him with ADLs as needed. Pt today presents with decreased balance, decreased strength, decreased shoulder ROM, decreased activity tolerance. He could benefit from continued OT to address deficits and increase his independence and safety, decrease risk of falls during self care tasks. OT recommending home with  and family assistance.  -     Row Name 11/02/22 6691          Therapy Assessment/Plan (OT)    Rehab Potential (OT) good, to achieve stated therapy goals  -     Criteria for Skilled Therapeutic Interventions Met (OT) yes;skilled treatment is necessary  -     Therapy Frequency (OT) 3 times/wk  -     Row Name 11/02/22 2156          Therapy Plan Review/Discharge Plan (OT)    Anticipated Discharge Disposition (OT) home with home health;home with assist  -     Row Name 11/02/22 6297          Vital Signs    Pre SpO2 (%) 98  -     O2 Delivery Pre Treatment room air  -     Post SpO2 (%) 96  completed mobility on RA, O2 sats within limits but pt requesting to put back on his O2 after. On 3L, he reports on 2L intermittently at home.  -     O2 Delivery Post Treatment room air  -     Row Name 11/02/22 8181 "          Positioning and Restraints    Pre-Treatment Position sitting in chair/recliner  -SM     Post Treatment Position chair  -SM     In Chair reclined;call light within reach;encouraged to call for assist;exit alarm on;notified nsg  -           User Key  (r) = Recorded By, (t) = Taken By, (c) = Cosigned By    Initials Name Provider Type    Griselda Bruce OT Occupational Therapist               Outcome Measures     Row Name 11/02/22 1304          How much help from another is currently needed...    Putting on and taking off regular lower body clothing? 2  -SM     Bathing (including washing, rinsing, and drying) 2  -SM     Toileting (which includes using toilet bed pan or urinal) 2  -SM     Putting on and taking off regular upper body clothing 2  -SM     Taking care of personal grooming (such as brushing teeth) 3  -SM     Eating meals 4  -SM     AM-PAC 6 Clicks Score (OT) 15  -SM     Row Name 11/02/22 0826          How much help from another person do you currently need...    Turning from your back to your side while in flat bed without using bedrails? 4  -PT     Moving from lying on back to sitting on the side of a flat bed without bedrails? 4  -PT     Moving to and from a bed to a chair (including a wheelchair)? 4  -PT     Standing up from a chair using your arms (e.g., wheelchair, bedside chair)? 3  -PT     Climbing 3-5 steps with a railing? 2  -PT     To walk in hospital room? 2  -PT     AM-PAC 6 Clicks Score (PT) 19  -PT     Highest level of mobility 6 --> Walked 10 steps or more  -PT     Row Name 11/02/22 1304          Functional Assessment    Outcome Measure Options AM-PAC 6 Clicks Daily Activity (OT)  -           User Key  (r) = Recorded By, (t) = Taken By, (c) = Cosigned By    Initials Name Provider Type    PT Aparna Hollis RN Registered Nurse    Griselda Bruce OT Occupational Therapist                Occupational Therapy Education     Title: PT OT SLP Therapies (In Progress)      Topic: Occupational Therapy (In Progress)     Point: ADL training (Done)     Description:   Instruct learner(s) on proper safety adaptation and remediation techniques during self care or transfers.   Instruct in proper use of assistive devices.              Learning Progress Summary           Patient Acceptance, E, VU by  at 11/2/2022 3163    Comment: OT discussed POC and dc recommendations with pt                   Point: Home exercise program (Not Started)     Description:   Instruct learner(s) on appropriate technique for monitoring, assisting and/or progressing therapeutic exercises/activities.              Learner Progress:  Not documented in this visit.          Point: Precautions (Not Started)     Description:   Instruct learner(s) on prescribed precautions during self-care and functional transfers.              Learner Progress:  Not documented in this visit.          Point: Body mechanics (Not Started)     Description:   Instruct learner(s) on proper positioning and spine alignment during self-care, functional mobility activities and/or exercises.              Learner Progress:  Not documented in this visit.                      User Key     Initials Effective Dates Name Provider Type Discipline     04/02/20 -  Griselda Saleh OT Occupational Therapist OT              OT Recommendation and Plan  Planned Therapy Interventions (OT): activity tolerance training, adaptive equipment training, functional balance retraining, occupation/activity based interventions, patient/caregiver education/training, transfer/mobility retraining, strengthening exercise  Therapy Frequency (OT): 3 times/wk  Plan of Care Review  Plan of Care Reviewed With: patient  Outcome Evaluation: Pt is a 72 y.o male admitted from home with a fall and confusion, found to have UTI, sepsis, metabolic encephalopathy. He reports he has had a few falls at home in the last few months. He had been working with  therapies but has been  discharged. Spouse assists him with ADLs as needed. Pt today presents with decreased balance, decreased strength, decreased shoulder ROM, decreased activity tolerance. He could benefit from continued OT to address deficits and increase his independence and safety, decrease risk of falls during self care tasks. OT recommending home with HH and family assistance.     Time Calculation:    Time Calculation- OT     Row Name 11/02/22 1305             Time Calculation- OT    OT Start Time 1026  -SM      OT Stop Time 1041  -SM      OT Time Calculation (min) 15 min  -SM      Total Timed Code Minutes- OT 10 minute(s)  -SM      OT Received On 11/02/22  -      OT - Next Appointment 11/04/22  -      OT Goal Re-Cert Due Date 11/16/22  -SM         Timed Charges    77451 - OT Therapeutic Activity Minutes 10  -SM         Untimed Charges    OT Eval/Re-eval Minutes 5  -SM         Total Minutes    Timed Charges Total Minutes 10  -SM      Untimed Charges Total Minutes 5  -SM       Total Minutes 15  -SM            User Key  (r) = Recorded By, (t) = Taken By, (c) = Cosigned By    Initials Name Provider Type     Griselda Saleh OT Occupational Therapist              Therapy Charges for Today     Code Description Service Date Service Provider Modifiers Qty    38750020807  OT THERAPEUTIC ACT EA 15 MIN 11/2/2022 Griselda Saleh OT GO 1    [id number]  OT EVAL MOD COMPLEXITY 2 11/2/2022 Griselda Saleh OT GO 1               Griselda Saleh OT  11/2/2022

## 2022-11-02 NOTE — PROGRESS NOTES
EKG with controlled rate atrial fibrillation and diffuse T wave inversion in most leads which is new compared to previous EKG.  Troponin elevated yesterday and I will check a stat troponin right now.  Patient is chest pain-free.  This could be a subendocardial MI or demand ischemia.  We will consult cardiology.

## 2022-11-02 NOTE — PLAN OF CARE
Goal Outcome Evaluation:  Plan of Care Reviewed With: patient           Outcome Evaluation: Pt is a 72 y.o male admitted from home with a fall and confusion, found to have UTI, sepsis, metabolic encephalopathy. He reports he has had a few falls at home in the last few months. He had been working with HH therapies but has been discharged. Spouse assists him with ADLs as needed. Pt today presents with decreased balance, decreased strength, decreased shoulder ROM, decreased activity tolerance. He could benefit from continued OT to address deficits and increase his independence and safety, decrease risk of falls during self care tasks. OT recommending home with HH and family assistance.    OT wore appropriate PPE and completed hand hygiene.

## 2022-11-03 PROBLEM — R94.31 ABNORMAL EKG: Status: ACTIVE | Noted: 2022-01-01

## 2022-11-03 NOTE — PLAN OF CARE
Goal Outcome Evaluation:           Progress: improving  Outcome Evaluation: Pt AOx4. On RA-2L at times. AFib on monitor--controlled. Metoprolol restarted today.Echo completed w/ 36-40% EF. Fluids d/c. IV abx per MAR-- stop date 11/7/22. Spouse has been updated. Medicated per MAR for pain in lower extremities. Unna boot wrapped on lower extremities. Pt not in any distress. Walked to the B/R and Out to hallway w/ walker. Nursing will continue to monitor.

## 2022-11-03 NOTE — PROGRESS NOTES
LOS: 3 days     Chief Complaint: Recurrent ESBL UTI    Interval History: Overnight new EKG changes with cardiology consulted.  Patient reports he is overall feeling better without any chest pain.  Denies any fevers or chills.  Irvin catheter changed yesterday.  Denies any nausea, vomiting, diarrhea.    Vital Signs  Temp:  [97.6 °F (36.4 °C)-100 °F (37.8 °C)] 97.6 °F (36.4 °C)  Heart Rate:  [70-92] 77  Resp:  [18-20] 18  BP: ()/(61-75) 123/74    Physical Exam:  General: In no acute distress  HEENT: Oropharynx clear, moist mucous membranes  Cardiovascular: Regular rate and rhythm.  Respiratory: Normal work of breathing.    GI: Soft, NT/ND, + bowel sounds bilaterally, no masses  : Chronic Irvin catheter  Extremities: Bilateral lower extremity edema with leg wrappings in place.  Access: Peripheral IV.    Antibiotics:  Anti-Infectives (From admission, onward)    Ordered     Dose/Rate Route Frequency Start Stop    11/01/22 1725  meropenem (MERREM) 1 g in sodium chloride 0.9 % 100 mL IVPB-VTB        Ordering Provider: Fahad Jaramillo, DO    1 g  over 3 Hours Intravenous Every 8 Hours 11/02/22 0015 11/09/22 0014    11/01/22 1725  meropenem (MERREM) 1 g in sodium chloride 0.9 % 100 mL IVPB-VTB        Ordering Provider: Fahad Jaramillo, DO    1 g  over 30 Minutes Intravenous Once 11/01/22 1815 11/01/22 2307    10/31/22 2029  piperacillin-tazobactam (ZOSYN) 4.5 g in iso-osmotic dextrose 100 mL IVPB (premix)        Ordering Provider: Sudeep Jaime MD    4.5 g  over 30 Minutes Intravenous Once 10/31/22 2031 10/31/22 2140           Results Review:     I reviewed the patient's new clinical results.    Lab Results   Component Value Date    WBC 5.44 11/03/2022    HGB 9.9 (L) 11/03/2022    HCT 29.7 (L) 11/03/2022    MCV 91.1 11/03/2022     (L) 11/03/2022     Lab Results   Component Value Date    GLUCOSE 107 (H) 11/03/2022    BUN 12 11/03/2022    CREATININE 0.64 (L) 11/03/2022    EGFRIFNONA 103  01/12/2022    EGFRIFAFRI 102 11/20/2018    BCR 18.8 11/03/2022    CO2 26.7 11/03/2022    CALCIUM 8.6 11/03/2022    PROTENTOTREF 6.1 04/26/2019    ALBUMIN 3.30 (L) 11/02/2022    LABIL2 1.1 04/26/2019    AST 18 11/02/2022    ALT 8 11/02/2022       Microbiology:  10/28 urine culture 50,000 CFU's Klebsiella pneumonia ESBL  10/31 blood cultures 2 out of 2 ESBL Klebsiella  10/31 urine culture greater than 100,000 gram-negative bacilli  11/1 blood cultures no growth to date    Assessment    #ESBL Klebsiella septicemia  #ESBL Klebsiella UTI  #Chronic Irvin catheter  #Mechanical fall  #Atrial fib on anticoagulation  #Chronic respiratory failure on 2 L nasal cannula  #Bilateral lower extremity venous insufficiency and lymphedema    Susceptibilities for blood isolates are susceptible to meropenem.  Suspect urine is the likely source given that wife now reports he had a traumatic Irvin removal 2 days before admission with significant bleeding.  Plan to continue IV meropenem 1 g every 8 hours for 7-day course to treat his underlying UTI and bacteremia.  End date 11/7.  Midline should be sufficient for discharge antibiotics however cardiac work-up pending and would hold off on placement until approaching discharge.    Thank you for allowing me to be involved in the care of this patient. Infectious diseases will sign off at this time with antibiotics plan in place, but please call me at [phone] if any further ID questions or new ID concerns.

## 2022-11-03 NOTE — PROGRESS NOTES
[facility] Clinical Pharmacy Services: Warfarin Dosing/Monitoring Consult    Jalen Lockwood is a 72 y.o. male, estimated creatinine clearance is 123 mL/min (by C-G formula based on SCr of 0.81 mg/dL). weighing 132 kg (290 lb).    Results from last 7 days   Lab Units 11/02/22  0731 11/01/22  1659 11/01/22  0715 10/31/22  2005 10/28/22  1654   INR  1.71*  --  1.63* 1.57* 2.10*   HEMOGLOBIN g/dL 10.6* 11.7*  --  11.4* 12.2*   HEMATOCRIT % 31.5* 34.6*  --  33.2* 36.5*   PLATELETS 10*3/mm3 109* 120*  --  145 155     Prior to admission anticoagulation: warfarin 7.5mg daily per [facility] Anticoagulation:  Consulting provider: Julita ALLISON  Start date: 10/31  Indication: A Fib - requiring full anticoagulation  Target INR: 2 - 3  Expected duration: indefinite   Bridge Therapy: No      Potential food or drug interactions: reg diet    Education complete?/Date: No; plan for follow up TBD    Assessment/Plan:  Dose: INR still below target at 1.7 (basically same as yesterday) therefore will continue same home dose of 7.5mg daily as determined by anticoagulation clinic. He was in target on this dose so feel level will continue to increase. If it doesn't tomorrow will give an extra bolus of 1-2mg.   Monitor for any signs or symptoms of bleeding  Follow up daily INRs and dose adjustments    Pharmacy will continue to follow until discharge or discontinuation of warfarin.     Arden Merida [facility]  Clinical Pharmacist

## 2022-11-03 NOTE — CONSULTS
[facility] Consult Note    Patient Name: Jalen Lockwood  :1950  72 y.o.    Date of Admission: 10/31/2022  Date of Consultation:  22  Encounter Provider: Nelly Vargas MD  Place of Service: [facility] CARDIOLOGY  Referring Provider: Isaias St MD  Patient Care Team:  Marc Ludwig MD as PCP - General (Family Medicine)  Blas Mckeon MD as Surgeon (General Surgery)  Jonnathan Boston II, MD as Consulting Physician (Vascular Surgery)  Xavi Elliott MD as Consulting Physician (Urology)  Marc Benavidez MD as Consulting Physician (Pain Medicine)  Kurt Henry MD as Consulting Physician (Cardiology)  Meghna Horan [facility] as Pharmacist  Rip Samuel MD as Referring Physician (Family Medicine)  Juni Landa MD as Consulting Physician (Hematology and Oncology)  Brendan Live, AlexD as Pharmacist (Pharmacy)  Emily Juarez, RN as Ambulatory  ([facility])      Chief complaint: fever, weakness and fall at home    History of Present Illness:  Jalen Lockwood is a 72 y.o. male patient of Dr. Henry chronic atrial fibrillation on chronic anticoagulation with warfarin, atrial flutter status post ablation , hypertension, hyperlipidemia, popliteal artery aneurysm status post stenting, coronary artery disease, obstructive sleep apnea, lymphedema, Charcot right foot, COPD, chronic hypoxic respiratory failure on 2 L nasal cannula, obstructive sleep apnea, chronic anemia, anxiety, urinary retention with a chronic indwelling Irvin catheter, who was admitted to the hospital with urinary tract infection.    The patient presented to the hospital on 10/31/2022 after presenting after a fall.  The patient indicated that he felt that his legs were weak and that they gave out.  A few days prior his Irvin catheter has been exchanged in the emergency room.  At that time his urine grew out Klebsiella.  He was started on antibiotics following his  arrival.  He has since been evaluated by infectious disease and his antibiotics were adjusted.    On 11/1/2022 the patient developed altered mental status along with agitation.  He was noted to be febrile.  He denied any other symptoms at the time.  A CT of the head was performed.  His heart rates were noted to be elevated and he was hypotensive.  He was treated with IV digoxin.  Repeat EKG showed a new right bundle branch block compared to his admission EKG.  He was noted to have an elevated lactic acid and procalcitonin.  Troponin was mildly elevated 0.04.  Following that episode his antibiotics were adjusted further by infectious disease.  Repeat EKGs were performed yesterday showing new diffuse T wave inversions primarily involving the anterolateral leads.  Repeat troponins were trending down in the normal range.  We were consulted regarding the abnormal EKG findings.    The patient denies any chest discomfort.  She denies any significant shortness of breath at this time.  After the EKG changes were noted he was started on full dose enoxaparin by Dr. Henry.  Repeat troponin around that time again continue to show a downtrend.    On review of the patient's prior history it appears that he had a similar presentation in 3/2021 when he was admitted with sepsis.  At that time he also developed a transient right bundle branch block.  Repeat EKGs after which showed anterolateral T wave changes.  He underwent an echocardiogram at that time which showed moderately declined EF of 30 to 35% with anterior and apical wall motion abnormalities.  Due to his comorbidities medical management was recommended.  Fortunately repeat EKG at a later date showed normalization of his LV function and wall motion.  His EKG also appeared to have normalized.    He most recently had an echocardiogram performed on 9/28/2022 which showed normal left ventricular systolic function wall motion with an EF of 58%, moderately dilated right ventricle  with moderately reduced function, severe biatrial enlargement, and moderate pulmonary hypertension with a right ventricular systolic pressure of 55 mmHg.  He was last seen in the office by Dr. Henry on the same date at which time he appeared to be doing well and no changes were made to his management.  .    Previous Testing:  TTE 09/28/2022:  Interpretation Summary    • Calculated left ventricular EF = 58% Estimated left ventricular EF was in agreement with the calculated left ventricular EF. Left ventricular systolic function is normal. Normal left ventricular cavity size noted. Left ventricular wall thickness is consistent with mild to moderate concentric hypertrophy. All left ventricular wall segments contract normally. Left ventricular diastolic function was indeterminate.  • The right ventricular cavity is moderately dilated. Moderately reduced right ventricular systolic function noted.  • The left atrial cavity is severely dilated.  • The right atrial cavity is severely dilated.  • Moderate to severe tricuspid valve regurgitation is present. Estimated right ventricular systolic pressure from tricuspid regurgitation is moderately elevated (45-55 mmHg). Calculated right ventricular systolic pressure from tricuspid regurgitation is 55.1 mmHg.        Past Medical History:   Diagnosis Date   • Allergic rhinitis    • Anxiety    • Aortectasia (HCC)     3cm infrarenal abdominal aorta   • Arthritis    • Atrial flutter (HCC) 2010    s/p ablation    • Charcot's joint of foot    • Chronic edema     both legs and sees wound care center at [place]    • Chronic venous insufficiency    • COPD (chronic obstructive pulmonary disease) ([facility])    • Coronary atherosclerosis     Cath 2010: diffuse 40-50% disease   • Diverticulosis    • Duodenitis    • Fatty liver    • Gastritis    • Gastroparesis    • Hematoma     post-operative; After catheterization, right groin, required surgical exploration   • Hyperlipidemia    • Hypertension     • Insomnia    • Internal hemorrhoids    • Open wound     izzy legs has teddy chg weekly at wound care center at [place]  pt does second dressing on left leg another time during week   • Osteomyelitis (HCC)    • Paroxysmal atrial fibrillation (HCC)    • Peripheral neuropathy    • Popliteal artery aneurysm (HCC)     left, s/p stenting by Dr. Boston   • Skin cancer    • Sleep apnea     o2   • 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HCC)     due to flutter and afib; cath 2010 with nonobstructive disease   • Venous stasis    • Venous stasis ulcer (HCC)     bilateral legs        Past Surgical History:   Procedure Laterality Date   • BASAL CELL CARCINOMA EXCISION      ear and left side of face   • BRONCHOSCOPY N/A 4/12/2021    Procedure: BRONCHOSCOPY WITH BAL;  Surgeon: Bunny Lepe MD;  Location: [facility] ENDOSCOPY;  Service: Pulmonary;  Laterality: N/A;  PRE-HEMOPTYSIS  POST-SAME   • CARDIAC CATHETERIZATION     • CATARACT EXTRACTION     • COLONOSCOPY  09/28/2015    NBIH, diverticulosis, polyps   • COLONOSCOPY N/A 9/11/2018    Procedure: COLONOSCOPY TO CECUM  AND TERM. ILEUM WITH COLD SNARE POLYPECTOMIES;  Surgeon: Kane Lagunas MD;  Location: [facility] ENDOSCOPY;  Service: Gastroenterology   • COLONOSCOPY N/A 10/29/2019    Procedure: COLONOSCOPY TO TO CECUM AND TERMINAL ILEUM WITH HOT AND COLD SNARE POLYPECTOMIES;  Surgeon: Kane Lagunas MD;  Location: [facility] ENDOSCOPY;  Service: Gastroenterology   • HIP ARTHROPLASTY Right 2017   • JOINT REPLACEMENT Left    • OTHER SURGICAL HISTORY      Catheter ablation atrial flutter   • REPAIR ANEURYSM / PSEUDO ANEURYSM / RUPTURED ANEURYSM POPLITEAL ARTERY      Stent-Graft of the the left popliteal artery   • REPAIR KNEE LIGAMENT      Primary repair of knee ligament cruciate anterior right   • TONSILLECTOMY  1958   • TOTAL KNEE ARTHROPLASTY Bilateral    • UPPER GASTROINTESTINAL ENDOSCOPY  09/16/2014    acute gastritis, acute duodenitis         Prior to Admission medications     Medication Sig Start Date End Date Taking? Authorizing Provider   acetaminophen (TYLENOL) 325 MG tablet Take 2 tablets by mouth Every 6 (Six) Hours As Needed for Fever. 8/29/22   Rip Samuel MD   acidophilus (FLORANEX) tablet tablet Take 1 tablet by mouth 2 (Two) Times a Day. 9/15/22   Peri Stein MD   albuterol sulfate  (90 Base) MCG/ACT inhaler Inhale 2 puffs Every 4 (Four) Hours As Needed for Wheezing. 3/28/22   Marc Ludwig MD   ALPRAZolam (XANAX) 0.5 MG tablet TAKE 1 TABLET BY MOUTH AT NIGHT AS NEEDED FOR ANXIETY FOR UP TO 30 DAYS. 10/5/22 11/4/22  Marc Ludwig MD   ascorbic acid ([employer] Vitamin C) 500 MG tablet Take 1 tablet by mouth Daily. 10/12/22   Marc Ludwig MD   Atrovent HFA 17 MCG/ACT inhaler INHALE 2 PUFFS BY MOUTH 4 TIMES A DAY 9/2/22   Marc Ludwig MD   ciclopirox (LOPROX) 0.77 % cream APPLY TO AFFECTED AREA AS DIRECTED 9/19/22   Peri Stein MD   clotrimazole (LOTRIMIN) 1 % cream APPLY TO AFFECTED AREA TWICE A DAY AND AS NEEDED 9/19/22   Peri Stein MD   docusate sodium (COLACE) 100 MG capsule Take 100 mg by mouth Daily.    Peri Stein MD   ferrous sulfate 325 (65 FE) MG tablet TAKE 1 TABLET BY MOUTH DAILY WITH BREAKFAST FOR 30 DAYS. 9/20/22   Peri Stein MD   finasteride (PROSCAR) 5 MG tablet Take 1 tablet by mouth daily. 11/6/13   Peri Stein MD   fluticasone (FLONASE) 50 MCG/ACT nasal spray 2 sprays by Each Nare route Daily. 8/30/22   Rip Samuel MD   Fluticasone Furoate-Vilanterol (BREO ELLIPTA) 200-25 MCG/INH inhaler Inhale 1 puff Daily.    Peri Stein MD   furosemide (LASIX) 40 MG tablet Take 40 mg by mouth Daily.    Peri Stein MD   gentamicin (GARAMYCIN) 0.1 % ointment APPLY AS DIRECTED TO AFFECTED WOUND AREA ON BOTH LEGS AFTER EACH DRESSING CHANGE 9/19/22   Peri Stein MD   HYDROcodone-acetaminophen (NORCO)  MG per tablet Take 1 tablet by mouth Every 6 (Six) Hours As Needed  for Moderate Pain . 8/29/22   Rip Samuel MD   KLOR-CON 10 MEQ CR tablet Take 10 mEq by mouth Daily. 9/15/22   Peri Stein MD   metoprolol succinate XL (TOPROL-XL) 25 MG 24 hr tablet Take 1 tablet by mouth Daily. 8/15/22   Katie Hurt APRN   nitroglycerin (NITROSTAT) 0.4 MG SL tablet Place 1 tablet under the tongue Every 5 (Five) Minutes As Needed for Chest Pain (Only if SBP Greater Than 100). Take no more than 3 doses in 15 minutes. 8/29/22   Rip Samuel MD   potassium chloride (MICRO-K) 10 MEQ CR capsule Take 1 capsule by mouth 2 (Two) Times a Day. 10/5/22   Marc Ludwig MD   pravastatin (PRAVACHOL) 20 MG tablet TAKE 1 TABLET BY MOUTH EVERY DAY 10/14/22   Marc Ludwig MD   saccharomyces boulardii (Florastor) 250 MG capsule Take 1 capsule by mouth 2 (Two) Times a Day. 10/5/22   Marc Ludwig MD   silodosin (RAPAFLO) 8 MG capsule capsule Take 1 capsule by mouth Every Night. 11/8/13   Peri Stein MD   Thiamine HCl (Vitamin B1) 100 MG tablet tablet Take 1 tablet by mouth 2 (Two) Times a Day. 10/12/22   Marc Ludwig MD   warfarin (COUMADIN) 2.5 MG tablet Take 1 tablet by mouth Every Night. Indications: Atrial Fibrillation 8/29/22   Rip Samuel MD   warfarin (COUMADIN) 5 MG tablet Take 5 mg by mouth Take As Directed.    Peri Stein MD       Allergies   Allergen Reactions   • Cephalexin Hives     Tolerated ceftriaxone Jan 2022   • Codeine Nausea Only   • Silver Other (See Comments)       Social History     Socioeconomic History   • Marital status:      Spouse name: Mariposa   Tobacco Use   • Smoking status: Every Day     Packs/day: 0.50     Types: Cigarettes     Start date: 1964   • Smokeless tobacco: Current   • Tobacco comments:     caffeine use - 1.5 cups coffee daily    Vaping Use   • Vaping Use: Never used   Substance and Sexual Activity   • Alcohol use: Yes     Alcohol/week: 14.0 standard drinks     Types: 14 Shots of liquor per week   • Drug use: No   •  Sexual activity: Defer       Family History   Problem Relation Age of Onset   • Emphysema Father    • Malig Hyperthermia Neg Hx        REVIEW OF SYSTEMS:   All systems reviewed.  Pertinent positives identified in HPI.  All other systems are negative.      Objective:     Vitals:    11/02/22 2146 11/02/22 2315 11/03/22 0727 11/03/22 0803   BP:  124/75 123/74    BP Location:  Right arm Left arm    Patient Position:  Lying Lying    Pulse: 88 84 75 77   Resp: 20 20 18 18   Temp:  98.6 °F (37 °C) 97.6 °F (36.4 °C)    TempSrc:  Oral Oral    SpO2: 96% 95% 95% 94%   Weight:       Height:         Body mass index is 34.48 kg/m².    General Appearance:    Alert, cooperative, in no acute distress   Head:    Normocephalic, without obvious abnormality, atraumatic   Eyes:            Lids and lashes normal, conjunctivae and sclerae normal, no icterus, no pallor, corneas clear, PERRLA   Ears:    Ears appear intact with no abnormalities noted   Neck:   No adenopathy, supple, trachea midline, no thyromegaly, no carotid bruit, no JVD   Lungs:     Clear to auscultation, respirations regular, even and unlabored    Heart:   Irregularly, irregular, normal S1 and S2, no murmur, no gallop, no rub, no click   Chest Wall:    No abnormalities observed   Abdomen:     Normal bowel sounds, no masses, no organomegaly, soft, nontender, nondistended, no guarding, no rebound  tenderness   Extremities:   Moves all extremities well, no edema, no cyanosis, no redness   Pulses:   Pulses palpable and equal bilaterally. Normal radial, carotid, femoral, dorsalis pedis and posterior tibial pulses bilaterally. Normal abdominal aorta   Skin:  Psychiatric:   No bleeding, bruising or rash    Alert and oriented x 3, normal mood and affect   Lab Review:     Results from last 7 days   Lab Units 11/03/22  0605 11/02/22  1858 11/02/22  0731   SODIUM mmol/L 135*  --  138   POTASSIUM mmol/L 3.8   < > 3.3*   CHLORIDE mmol/L 103  --  101   CO2 mmol/L 26.7  --  28.0   BUN  mg/dL 12  --  15   CREATININE mg/dL 0.64*  --  0.81   CALCIUM mg/dL 8.6  --  8.9   BILIRUBIN mg/dL  --   --  0.8   ALK PHOS U/L  --   --  90   ALT (SGPT) U/L  --   --  8   AST (SGOT) U/L  --   --  18   GLUCOSE mg/dL 107*  --  98    < > = values in this interval not displayed.     Results from last 7 days   Lab Units 11/02/22  1407 11/02/22  0731 11/01/22  1659   CK TOTAL U/L  --   --  105   TROPONIN T ng/mL 0.011 0.021 0.040*     Results from last 7 days   Lab Units 11/03/22  0605   WBC 10*3/mm3 5.44   HEMOGLOBIN g/dL 9.9*   HEMATOCRIT % 29.7*   PLATELETS 10*3/mm3 125*     Results from last 7 days   Lab Units 11/03/22  0604 11/02/22  0731 11/01/22  0715   INR  1.70* 1.71* 1.63*     Results from last 7 days   Lab Units 11/02/22  1407   MAGNESIUM mg/dL 2.4           EKG 11/03/2022 @ 0555:             EKG 11/02/2022 @ 1344:        EKG 11/01/2022 @ 1642:      EKG from office visit 09/28/2022 @ 1553:              I personally viewed and interpreted the patient's EKG/Telemetry data.        Assessment and Plan:       1.  Abnormal EKG.  Not associated with any chest discomfort.  Troponins have trended down to normal range.  He is currently on full dose enoxaparin.  2.  Klebsiella UTI.  On antibiotics per ID.  3.  Coronary artery disease.  Reported history of coronary artery disease although his anatomy is unknown.  4.  Chronic atrial fibrillation.  Currently rate controlled.  He is now on anticoagulation with enoxaparin and warfarin which is still subtherapeutic.  5.  COPD  6.  Chronic hypoxic respiratory failure.  On 2 L nasal cannula at baseline.  7.  Chronic indwelling Irvin catheter  8.  Hypertension  9.  Hyperlipidemia  10.  Obstructive sleep apnea  11.  Obesity    -As mentioned in the HPI the patient had a similar presentation in 3/2021 with similar EKG findings including a new right bundle branch block followed by onset of anterolateral T wave inversions.  At that time his echocardiogram showed moderately depressed  LV function with anterior and apical wall motion abnormalities.  He was treated medically at that time and is wall motion abnormalities resolved.  -Suspect he likely has significant underlying coronary artery disease however in light of his lack of symptoms and normalization of his troponins I think it would be appropriate to start with the same approach as above.    -We will recheck an echocardiogram.    -Continue enoxaparin especially while his warfarin is still subtherapeutic.  - Discussed the above plan with Dr. Henry and the patient's wife at bedside.  The patient's wife requests that if any further cardiac work-up is considered that this be discussed with her due to the patient's memory issues.    Nelly Vargas MD  11/03/22  09:18 EDT

## 2022-11-03 NOTE — PLAN OF CARE
Goal Outcome Evaluation:  Plan of Care Reviewed With: patient           Pt is alert and oriented, sat in chair during half of shift. Patient completed marching to promote circulation. Pain and anxiety was treated, see MAR.

## 2022-11-03 NOTE — PROGRESS NOTES
Name: Jalen Lockwood ADMIT: 10/31/2022   : 1950  PCP: Marc Ludwig MD    MRN: [id number] LOS: 3 days   AGE/SEX: 72 y.o. male  ROOM: [facility]     Subjective   Subjective   Sitting in the recliner comfortably.  Denies any complaint.   No fever or chills.  Has clear urine in the Irvin catheter..  No nausea or vomiting..  No abdominal pain.  Normal bowel movement without constipation/diarrhea/bleeding per rectum/melena 2 days ago..  No headache.  No focal neurological symptoms.  No chest pain or shortness of breath.  No palpitation.  No change in the lower extremity edema.  Objective   Objective   Vital Signs  Temp:  [97.6 °F (36.4 °C)-100 °F (37.8 °C)] 97.6 °F (36.4 °C)  Heart Rate:  [70-92] 77  Resp:  [18-20] 18  BP: ()/(61-75) 123/74  SpO2:  [94 %-100 %] 94 %  on  Flow (L/min):  [2-3] 2;   Device (Oxygen Therapy): nasal cannula    Intake/Output Summary (Last 24 hours) at 11/3/2022 1005  Last data filed at 2022 2149  Gross per 24 hour   Intake 1090 ml   Output 1350 ml   Net -260 ml     Body mass index is 34.48 kg/m².      10/31/22  2047 11/01/22  0031   Weight: 132 kg (290 lb) 129 kg (283 lb 4.7 oz)     Physical Exam    General.  Elderly gentleman.  He is alert and oriented x3.  No pain or respiratory distress.    Eyes.  Status post bilateral cataract surgery.  Pupils equal round and reactive.  Intact extraocular musculature.  No pallor or jaundice.  Oral cavity.  Moist mucous membrane.  Neck.  Supple.  No JVD.  No lymphadenopathy or thyromegaly.    Cardiovascular.  Regular rate and rhythm.  Occasional ectopic beat.  No murmur.    Abdomen.  Soft lax.  No tenderness.  No organomegaly.  No guarding or rebound.  Chest.  Poor bilateral air entry with scattered bilateral rhonchi.  Extremities.  Unna boots in both lower extremities.  Evidence of bilateral lower extremity varicosities.  CNS.  No acute focal neurological deficits.    Results Review:      Results from last 7 days   Lab Units  11/03/22  0605 11/02/22  1858 11/02/22  0731 11/01/22  1659 10/31/22  2005 10/28/22  1654   SODIUM mmol/L 135*  --  138 135* 136 138   POTASSIUM mmol/L 3.8 3.9 3.3* 3.5 3.5 3.9   CHLORIDE mmol/L 103  --  101 100 99 99   CO2 mmol/L 26.7  --  28.0 22.0 27.0 29.0   BUN mg/dL 12  --  15 20 17 17   CREATININE mg/dL 0.64*  --  0.81 1.14 1.07 0.96   GLUCOSE mg/dL 107*  --  98 126* 107* 110*   CALCIUM mg/dL 8.6  --  8.9 9.5 9.9 9.9   AST (SGOT) U/L  --   --  18 23 22 15   ALT (SGPT) U/L  --   --  8 11 10 8     Estimated Creatinine Clearance: 155.7 mL/min (A) (by C-G formula based on SCr of 0.64 mg/dL (L)).          Results from last 7 days   Lab Units 11/02/22  1407 11/02/22  0731 11/01/22  1659   CK TOTAL U/L  --   --  105   TROPONIN T ng/mL 0.011 0.021 0.040*         Results from last 7 days   Lab Units 11/01/22  1659   TSH uIU/mL 0.161*     Results from last 7 days   Lab Units 11/02/22  1407 11/01/22  1659   MAGNESIUM mg/dL 2.4 1.8           Invalid input(s): LDLCALC  Results from last 7 days   Lab Units 11/03/22  0605 11/02/22  0731 11/01/22  1659 10/31/22  2005 10/28/22  1654   WBC 10*3/mm3 5.44 5.42 7.21 9.16 9.99   HEMOGLOBIN g/dL 9.9* 10.6* 11.7* 11.4* 12.2*   HEMATOCRIT % 29.7* 31.5* 34.6* 33.2* 36.5*   PLATELETS 10*3/mm3 125* 109* 120* 145 155   MCV fL 91.1 91.3 91.1 90.5 91.9   MCH pg 30.4 30.7 30.8 31.1 30.7   MCHC g/dL 33.3 33.7 33.8 34.3 33.4   RDW % 14.4 14.3 14.4 14.2 14.4   RDW-SD fl 48.1 47.3 47.4 46.3 48.6   MPV fL 10.8 10.8 10.6 10.5 9.4   NEUTROPHIL % % 72.9 71.7 85.1* 84.9* 84.7*   LYMPHOCYTE % % 15.1* 16.2* 5.7* 5.5* 7.0*   MONOCYTES % % 9.9 9.6 8.5 8.7 6.0   EOSINOPHIL % % 1.3 1.7 0.1* 0.1* 1.5   BASOPHIL % % 0.4 0.6 0.3 0.4 0.5   IMM GRAN % % 0.4  --  0.3  --  0.3   NEUTROS ABS 10*3/mm3 3.97 3.89 6.14 7.77* 8.46*   LYMPHS ABS 10*3/mm3 0.82 0.88 0.41* 0.50* 0.70   MONOS ABS 10*3/mm3 0.54 0.52 0.61 0.80 0.60   EOS ABS 10*3/mm3 0.07 0.09 0.01 0.01 0.15   BASOS ABS 10*3/mm3 0.02 0.03 0.02 0.04 0.05    IMMATURE GRANS (ABS) 10*3/mm3 0.02  --  0.02  --  0.03   NRBC /100 WBC 0.0  --  0.0  --  0.0     Results from last 7 days   Lab Units 11/03/22  0604 11/02/22  0731 11/01/22  0715 10/31/22  2005 10/28/22  1654   INR  1.70* 1.71* 1.63* 1.57* 2.10*     Results from last 7 days   Lab Units 11/01/22  1654   PH, ARTERIAL pH units 7.542*   PO2 ART mm Hg 74.2*   PCO2, ARTERIAL mm Hg 25.1*   HCO3 ART mmol/L 21.5*     Results from last 7 days   Lab Units 11/01/22  2057 11/01/22  1659 10/31/22  2005   PROCALCITONIN ng/mL  --  3.59* 2.30*   LACTATE mmol/L 1.7 4.5* 1.3             Results from last 7 days   Lab Units 11/01/22  1700 11/01/22  1659 10/31/22  2047 10/31/22  2016 10/31/22  2005 10/28/22  1820   BLOODCX  No growth at 24 hours No growth at 24 hours Klebsiella pneumoniae ESBL*  --  Klebsiella pneumoniae ESBL*  --    URINECX   --   --   --  >100,000 CFU/mL Klebsiella pneumoniae ESBL*  50,000 CFU/mL Enterobacter cloacae complex*  --  50,000 CFU/mL Klebsiella pneumoniae ESBL*   BCIDPCR   --   --  Klebsiella pneumoniae group. Identification by BCID2 PCR.*  CTX-M (ESBL) detected. Identification by BCID2 PCR*  --   --   --          Results from last 7 days   Lab Units 10/31/22  2016   NITRITE UA  Positive*   WBC UA /HPF 31-50*   BACTERIA UA /HPF 1+*   SQUAM EPITHEL UA /HPF 0-2   URINECX  >100,000 CFU/mL Klebsiella pneumoniae ESBL*  50,000 CFU/mL Enterobacter cloacae complex*           Imaging:  Imaging Results (Last 24 Hours)     Procedure Component Value Units Date/Time    CT Head Without Contrast [[id number]] Collected: 11/01/22 2122     Updated: 11/02/22 1325    Narrative:      STAT NONCONTRAST HEAD CT 11/01/2022     CLINICAL HISTORY: Patient fell yesterday, has some confusion today.     TECHNIQUE: Spiral CT images were obtained from the base of the skull to  the vertex without intravenous contrast. Images were reformatted and  submitted in 3 mm thick axial, sagittal and coronal CT sections with  brain algorithm and 2  mm thick axial CT section with high-resolution  bone algorithm.     COMPARISON: This is correlated to a noncontrast head CT 10/31/2022.     FINDINGS: There is minimal low-density in the frontal periventricular  white matter consistent with minimal small vessel disease. The remainder  of the brain parenchyma is normal in attenuation. The ventricles are  normal in size. I see no focal mass effect and no midline shift and no  extra-axial fluid collections are identified and there is no evidence of  acute intracranial hemorrhage. No acute skull fracture is identified.  Paranasal sinuses, mastoid air cells and middle ear cavities are clear       Impression:      1. No acute intracranial abnormality is seen with no change when  compared to yesterday's head CT 10/31/2022 at 8:27 PM. Other than  minimal small vessel disease in the cerebral white matter scattered  calcified plaques in the cavernous internal carotid arteries this is a  negative head CT.      Radiation dose reduction techniques were utilized, including automated  exposure control and exposure modulation based on body size.     This report was finalized on 11/2/2022 1:22 PM by Dr. Javi Hayward M.D.                I reviewed the patient's new clinical results / labs / tests / procedures      Assessment/Plan     Active Hospital Problems    Diagnosis  POA   • **Sepsis due to Gram negative bacteria (HCC) [A41.50]  Yes   • Abnormal EKG [R94.31]  No   • Abnormal thyroid function test [R94.6]  Yes   • Acute UTI [N39.0]  Yes   • Fall [W19.XXXA]  Yes   • Chronic respiratory failure with hypoxia (HCC) [J96.11]  Yes   • Warfarin anticoagulation [Z79.01]  Not Applicable   • Hypokalemia [E87.6]  No   • Metabolic encephalopathy [G93.41]  Yes   • Anemia of chronic disease [D63.8]  Yes   • Chronic anticoagulation [Z79.01]  Not Applicable   • Lymphedema of both lower extremities [I89.0]  Yes   • Permanent atrial fibrillation (HCC) [I48.21]  Yes   • COPD (chronic obstructive  pulmonary disease) (HCC) [J44.9]  Yes      Resolved Hospital Problems   No resolved problems to display.           · Urosepsis with ESBL Klebsiella pneumonia (positive blood and urine culture) in a patient with chronic urinary retention on indwelling Irvin catheter (UTI related to Irvin catheter)..  Sepsis indicated by fever/elevated procalcitonin/hypotension/tachycardia/mental status changes.  Resolved sepsis indices.  Status post IV fluid resuscitation I will stop IV fluid.  Currently on Merrem to expect Oxyal sensitive..  Infectious disease recommends continuation of the Merrem 1 g every 8 hours for a total of 7 days with a stoppage date on 11/7/2022.  will need a midline once cardiology clears the patient.  Repeat blood culture are negative.CT scan of the abdomen pelvis revealed nonobstructive nephrolithiasis with diverticulosis and stable intermediate liver lesion.  We will continue Proscar.  Continue Irvin catheter.  ·   Acute mental status changes and delirium suggestive of metabolic encephalopathy.  Negative CNS examination.  Repeat CAT scan of the brain without acute deficit.  On as needed Zyprexa.  Great improvement.  No focal neurologic symptoms..   · Weakness and fall.   normal potassium/CK/magnesium.  TSH is low (look below.)  Consult PT and OT.  This is mostly secondary to sepsis.  · Chronic A. fib/mildly elevated troponin/ischemic EKG changes..  Was in rapid A. fib 2 days ago.  EKG changes are new since admission.  Repeat troponins are negative.  No chest pain.  Resolved hypotension.  Today appears to be in normal sinus rhythm or well-controlled A. fib. .  Lasix and metoprolol DC'd secondary to hypotension.  Cardiology seeing the patient and checking 2D echo.  Currently on aspirin/Coumadin (cardiology gave a therapeutic dose of Lovenox until INR is therapeutic)..  We will start low-dose Lopressor now that the blood pressure is normal.  GI protection with Pepcid with Pepcid  ·  Lymphedema and lower  extremity venous insufficiency.  Unna boots applied by wound nurse.  Counseled about leg elevation when sitting down.  · History of COPD and chronic hypoxemic respiratory failure.  Respiratory status appears to be stable.  Chest x-ray without infiltrate.  Arterial blood gas without CO2 retention.  Continue oxygen.  · Anemia positive hemoglobin drop after hydration.  Hemoglobin today worse than yesterday.  Will work-up the anemia.    Continue to monitor.  · Hypokalemia.  Resolved with substitution.  Magnesium is normal.  · Low TSH.  Awaiting T3 and T4.  Discussed my findings and plan of treatment with the patient/nurse.  Disposition.  To be determined based on clinical course..          Fina Flannery MD  [facility]  11/03/22  10:05 EDT        Review of Systems

## 2022-11-04 NOTE — PLAN OF CARE
Goal Outcome Evaluation:  Plan of Care Reviewed With: patient        Progress: no change  Outcome Evaluation: Pt seen by OT this date for treatment. Upon arrival pt sitting up in chair, no c/o pain, A&O x3. Pt completed BUE AROM x10 reps to reinforce HEP for improve joint mobility. Pt required verbal/tactile cues to complete task. Pt left sitting up in chair, needs in reach, alarm on. Pt to continue with skilled OT services to address goals and deficits. OT wore mask, gloves, glasses, hand hygiene performed.

## 2022-11-04 NOTE — PROGRESS NOTES
[facility] Clinical Pharmacy Services: Warfarin Dosing/Monitoring Consult    Jalen Lockwood is a 72 y.o. male, estimated creatinine clearance is 123 mL/min (by C-G formula based on SCr of 0.81 mg/dL). weighing 132 kg (290 lb).    Results from last 7 days   Lab Units 11/04/22  0753 11/03/22  0605 11/03/22  0604 11/02/22  0731 11/01/22  1659 11/01/22  0715 10/31/22  2005   INR  1.70*  --  1.70* 1.71*  --  1.63* 1.57*   HEMOGLOBIN g/dL 9.8* 9.9*  --  10.6* 11.7*  --  11.4*   HEMATOCRIT % 29.3* 29.7*  --  31.5* 34.6*  --  33.2*   PLATELETS 10*3/mm3 151 125*  --  109* 120*  --  145     Prior to admission anticoagulation: warfarin 7.5mg daily per [facility] Anticoagulation:  Consulting provider: Julita ALLISON  Start date: 10/31  Indication: A Fib - requiring full anticoagulation  Target INR: 2 - 3  Expected duration: indefinite   Bridge Therapy: No      Potential food or drug interactions: reg diet    Education complete?/Date: No; plan for follow up TBD    Assessment/Plan:  Dose: INR still below target at 1.7 (same as yesterday and below target for the past 5 days) therefore will continue same home dose of 7.5mg daily as determined by anticoagulation clinic but I will give an extra 2mg bolus today for a total of 9.5mg of warfarin on 11/4/22.   Monitor for any signs or symptoms of bleeding  Follow up daily INRs and dose adjustments    Pharmacy will continue to follow until discharge or discontinuation of warfarin.     Arden Merida [facility]  Clinical Pharmacist

## 2022-11-04 NOTE — PROGRESS NOTES
Name: Jalen Lockwood ADMIT: 10/31/2022   : 1950  PCP: Marc Ludwig MD    MRN: [id number] LOS: 4 days   AGE/SEX: 72 y.o. male  ROOM: [facility]     Subjective   Subjective   Patient reports feeling well.  No fever or chills.  Has clear urine in the Irvin catheter..  No nausea or vomiting..  No abdominal pain.  No headache.  No focal neurological symptoms.  No chest pain or shortness of breath.  No palpitation.  No change in the lower extremity edema.  Objective   Objective   Vital Signs  Temp:  [97.8 °F (36.6 °C)-98.3 °F (36.8 °C)] 97.9 °F (36.6 °C)  Heart Rate:  [58-91] 58  Resp:  [16-18] 18  BP: (102-145)/(66-88) 145/88  SpO2:  [96 %-99 %] 98 %  on  Flow (L/min):  [2] 2;   Device (Oxygen Therapy): room air    Intake/Output Summary (Last 24 hours) at 2022 1338  Last data filed at 2022 1002  Gross per 24 hour   Intake 340 ml   Output 850 ml   Net -510 ml     Body mass index is 34.45 kg/m².      10/31/22  2047 22  0031 22  1519   Weight: 132 kg (290 lb) 129 kg (283 lb 4.7 oz) 128 kg (283 lb)     Physical Exam    General.  Elderly gentleman.  He is alert and oriented x3.  No pain or respiratory distress.    Eyes.  Status post bilateral cataract surgery.  Pupils equal round and reactive.  Intact extraocular musculature.  No pallor or jaundice.  Oral cavity.  Moist mucous membrane.  Neck.  Supple.  No JVD.  No lymphadenopathy or thyromegaly.    Cardiovascular.  Regular rate and rhythm.    No murmur.    Abdomen.  Soft lax.  No tenderness.  No organomegaly.  No guarding or rebound.  Chest.  Poor bilateral air entry with scattered bilateral rhonchi.  .  Irvin catheter in place with clear urine in the bag.  Extremities.  Unna boots in both lower extremities.  Evidence of bilateral lower extremity varicosities.  CNS.  No acute focal neurological deficits.    Results Review:      Results from last 7 days   Lab Units 22  0753 22  0605 22  1858 22  0731 22  1657  10/31/22  2005 10/28/22  1654   SODIUM mmol/L 137 135*  --  138 135* 136 138   POTASSIUM mmol/L 3.8 3.8 3.9 3.3* 3.5 3.5 3.9   CHLORIDE mmol/L 106 103  --  101 100 99 99   CO2 mmol/L 24.4 26.7  --  28.0 22.0 27.0 29.0   BUN mg/dL 11 12  --  15 20 17 17   CREATININE mg/dL 0.60* 0.64*  --  0.81 1.14 1.07 0.96   GLUCOSE mg/dL 101* 107*  --  98 126* 107* 110*   CALCIUM mg/dL 9.1 8.6  --  8.9 9.5 9.9 9.9   AST (SGOT) U/L  --   --   --  18 23 22 15   ALT (SGPT) U/L  --   --   --  8 11 10 8     Estimated Creatinine Clearance: 164.5 mL/min (A) (by C-G formula based on SCr of 0.6 mg/dL (L)).          Results from last 7 days   Lab Units 11/02/22  1407 11/02/22  0731 11/01/22  1659   CK TOTAL U/L  --   --  105   TROPONIN T ng/mL 0.011 0.021 0.040*         Results from last 7 days   Lab Units 11/01/22  1659   TSH uIU/mL 0.161*     Results from last 7 days   Lab Units 11/02/22  1407 11/01/22  1659   MAGNESIUM mg/dL 2.4 1.8           Invalid input(s): LDLCALC  Results from last 7 days   Lab Units 11/04/22  0753 11/03/22  0605 11/02/22  0731 11/01/22  1659 10/31/22  2005 10/28/22  1654   WBC 10*3/mm3 6.27 5.44 5.42 7.21 9.16 9.99   HEMOGLOBIN g/dL 9.8* 9.9* 10.6* 11.7* 11.4* 12.2*   HEMATOCRIT % 29.3* 29.7* 31.5* 34.6* 33.2* 36.5*   PLATELETS 10*3/mm3 151 125* 109* 120* 145 155   MCV fL 90.7 91.1 91.3 91.1 90.5 91.9   MCH pg 30.3 30.4 30.7 30.8 31.1 30.7   MCHC g/dL 33.4 33.3 33.7 33.8 34.3 33.4   RDW % 14.5 14.4 14.3 14.4 14.2 14.4   RDW-SD fl 47.5 48.1 47.3 47.4 46.3 48.6   MPV fL 10.8 10.8 10.8 10.6 10.5 9.4   NEUTROPHIL % % 72.0 72.9 71.7 85.1* 84.9* 84.7*   LYMPHOCYTE % % 16.3* 15.1* 16.2* 5.7* 5.5* 7.0*   MONOCYTES % % 9.3 9.9 9.6 8.5 8.7 6.0   EOSINOPHIL % % 1.6 1.3 1.7 0.1* 0.1* 1.5   BASOPHIL % % 0.5 0.4 0.6 0.3 0.4 0.5   IMM GRAN % % 0.3 0.4  --  0.3  --  0.3   NEUTROS ABS 10*3/mm3 4.52 3.97 3.89 6.14 7.77* 8.46*   LYMPHS ABS 10*3/mm3 1.02 0.82 0.88 0.41* 0.50* 0.70   MONOS ABS 10*3/mm3 0.58 0.54 0.52 0.61 0.80 0.60    EOS ABS 10*3/mm3 0.10 0.07 0.09 0.01 0.01 0.15   BASOS ABS 10*3/mm3 0.03 0.02 0.03 0.02 0.04 0.05   IMMATURE GRANS (ABS) 10*3/mm3 0.02 0.02  --  0.02  --  0.03   NRBC /100 WBC 0.2 0.0  --  0.0  --  0.0     Results from last 7 days   Lab Units 11/04/22  0753 11/03/22  0604 11/02/22  0731 11/01/22  0715 10/31/22  2005 10/28/22  1654   INR  1.70* 1.70* 1.71* 1.63* 1.57* 2.10*     Results from last 7 days   Lab Units 11/01/22  1654   PH, ARTERIAL pH units 7.542*   PO2 ART mm Hg 74.2*   PCO2, ARTERIAL mm Hg 25.1*   HCO3 ART mmol/L 21.5*     Results from last 7 days   Lab Units 11/01/22 2057 11/01/22 1659 10/31/22  2005   PROCALCITONIN ng/mL  --  3.59* 2.30*   LACTATE mmol/L 1.7 4.5* 1.3             Results from last 7 days   Lab Units 11/01/22  1700 11/01/22 1659 10/31/22  2047 10/31/22  2016 10/31/22  2005 10/28/22  1820   BLOODCX  No growth at 2 days No growth at 2 days Klebsiella pneumoniae ESBL*  --  Klebsiella pneumoniae ESBL*  --    URINECX   --   --   --  >100,000 CFU/mL Klebsiella pneumoniae ESBL*  50,000 CFU/mL Enterobacter cloacae complex*  --  50,000 CFU/mL Klebsiella pneumoniae ESBL*   BCIDPCR   --   --  Klebsiella pneumoniae group. Identification by BCID2 PCR.*  CTX-M (ESBL) detected. Identification by BCID2 PCR*  --   --   --          Results from last 7 days   Lab Units 10/31/22  2016   NITRITE UA  Positive*   WBC UA /HPF 31-50*   BACTERIA UA /HPF 1+*   SQUAM EPITHEL UA /HPF 0-2   URINECX  >100,000 CFU/mL Klebsiella pneumoniae ESBL*  50,000 CFU/mL Enterobacter cloacae complex*           Imaging:  Imaging Results (Last 24 Hours)     ** No results found for the last 24 hours. **             I reviewed the patient's new clinical results / labs / tests / procedures      Assessment/Plan     Active Hospital Problems    Diagnosis  POA   • **Sepsis due to Gram negative bacteria (HCC) [A41.50]  Yes   • Abnormal EKG [R94.31]  No   • Abnormal thyroid function test [R94.6]  Yes   • Acute UTI [N39.0]  Yes   •  Fall [W19.XXXA]  Yes   • Chronic respiratory failure with hypoxia (HCC) [J96.11]  Yes   • Warfarin anticoagulation [Z79.01]  Not Applicable   • Hypokalemia [E87.6]  No   • Metabolic encephalopathy [G93.41]  Yes   • Anemia of chronic disease [D63.8]  Yes   • Chronic anticoagulation [Z79.01]  Not Applicable   • Lymphedema of both lower extremities [I89.0]  Yes   • Permanent atrial fibrillation (HCC) [I48.21]  Yes   • COPD (chronic obstructive pulmonary disease) (HCC) [J44.9]  Yes      Resolved Hospital Problems   No resolved problems to display.           · Urosepsis with ESBL Klebsiella pneumonia (positive blood and urine culture) in a patient with chronic urinary retention on indwelling Irvin catheter (UTI related to Irvin catheter)..  Sepsis indicated by fever/elevated procalcitonin/hypotension/tachycardia/mental status changes.  Resolved sepsis indices.  Status post IV fluid resuscitation I will stop IV fluid.  Currently on Merrem to which the Klebsiella is sensitive...  Infectious disease recommends continuation of the Merrem 1 g every 8 hours for a total of 7 days with a stoppage date on 11/7/2022.  We will order a midline.  Repeat blood culture are negative.CT scan of the abdomen pelvis revealed nonobstructive nephrolithiasis with diverticulosis and stable intermediate liver lesion.  We will continue Proscar.  Continue Irvin catheter.  ·   Acute mental status changes and delirium suggestive of metabolic encephalopathy.  Negative CNS examination.  Repeat CAT scan of the brain without acute deficit.  On as needed Zyprexa.  Great improvement.  No focal neurologic symptoms..   · Weakness and fall.   normal potassium/CK/magnesium.  TSH is low (look below.)  Consult PT and OT.  This is mostly secondary to sepsis.  PT reports improvement.  · Chronic A. fib/mildly elevated troponin/ischemic EKG changes..  Was in rapid A. fib 2 days ago.  Ischemic EKG changes are new since admission.  Repeat troponins are negative.  No  chest pain.  Resolved hypotension.  Today appears to be in normal sinus rhythm or well-controlled A. fib.  2D echo with a decrease in the ejection fraction to 36-40% with right atrial and right ventricular dilatation..  Lasix and metoprolol initially DC'd secondary to hypotension.  Now on low-dose metoprolol and I will add a low-dose lisinopril.  Continue Coumadin.  Appreciate cardiology help.  INR is still subtherapeutic and pharmacy is following.   ·  Lymphedema and lower extremity venous insufficiency.  Unna boots applied by wound nurse.  Counseled about leg elevation when sitting down.  · History of COPD and chronic hypoxemic respiratory failure.  Respiratory status appears to be stable.  Chest x-ray without infiltrate.  Arterial blood gas without CO2 retention.  Continue oxygen.  · Chronic disease anemia/iron deficiency positive hemoglobin drop after hydration.  Hemoglobin now stabilizing at around 9.5-10..    Continue to monitor.  · Hypokalemia.  Resolved with substitution.  Magnesium is normal.  · Low TSH.  NormalT3 and T4.  This is mostly sick thyroid syndrome and needs to be followed up as an outpatient.  Discussed my findings and plan of treatment with the patient/nurse.  Disposition.  Patient declined skilled facility placement for physical therapy and wants to go home.  Anticipate discharge home tomorrow if okay with ID and cardiology          Fina Flannery MD  [facility]  11/04/22  13:38 EDT        Review of Systems

## 2022-11-04 NOTE — THERAPY TREATMENT NOTE
Patient Name: Jalen Lockwood  : 1950    MRN: [id number]                              Today's Date: 2022       Admit Date: 10/31/2022    Visit Dx:     ICD-10-CM ICD-9-CM   1. Acute UTI  N39.0 599.0   2. Irvin catheter in place on admission  Z97.8 V45.89   3. Acute encephalopathy  G93.40 348.30   4. Skin tear of elbow without complication, unspecified laterality, initial encounter  S51.019A 881.01   5. Chronic anticoagulation  Z79.01 V58.61   6. Fall, initial encounter  W19.XXXA E888.9     Patient Active Problem List   Diagnosis   • Chronic osteomyelitis (HCC)   • Foot pain   • Peripheral neuropathy   • Lymphedema of both lower extremities   • Permanent atrial fibrillation (HCC)   • Sleep apnea   • Obesity (BMI 30-39.9)   • COPD (chronic obstructive pulmonary disease) (HCC)   • Aortectasia (HCC)   • Popliteal artery aneurysm (HCC)   • Colon polyps   • Gastroparesis   • Insomnia   • Adenomatous polyp of colon   • Essential hypertension   • Chronic anticoagulation   • Tobacco abuse   • Thyromegaly   • Adrenal adenoma, left   • Retroperitoneal lymphadenopathy   • Anemia of chronic disease   • Thyroid nodule   • Charcot's joint of foot   • Abnormal CT scan, lumbar spine   • Alcohol dependence, in remission (HCC)   • Metabolic encephalopathy   • Normocytic anemia   • Inguinal adenopathy   • Elevated lactic acid level   • Venous stasis dermatitis of both lower extremities   • Urinary retention   • Anxiety   • Ischemic cardiomyopathy   • Hypokalemia   • Chronic respiratory failure with hypoxia (HCC)   • Oropharyngeal dysphagia   • Paroxysmal atrial fibrillation (HCC)   • Warfarin anticoagulation   • Thrombocytopenia (HCC)   • Acute UTI   • Fall   • Sepsis due to Gram negative bacteria (HCC)   • Abnormal thyroid function test   • Abnormal EKG     Past Medical History:   Diagnosis Date   • Allergic rhinitis    • Anxiety    • Aortectasia (HCC)     3cm infrarenal abdominal aorta   • Arthritis    • Atrial flutter  (HCC) 2010    s/p ablation    • Charcot's joint of foot    • Chronic edema     both legs and sees wound care center at [facility]    • Chronic venous insufficiency    • COPD (chronic obstructive pulmonary disease) (HCC)    • Coronary atherosclerosis     Cath 2010: diffuse 40-50% disease   • Diverticulosis    • Duodenitis    • Fatty liver    • Gastritis    • Gastroparesis    • Hematoma     post-operative; After catheterization, right groin, required surgical exploration   • Hyperlipidemia    • Hypertension    • Insomnia    • Internal hemorrhoids    • Open wound     izzy legs has drsg chg weekly at wound care center at [facility]  pt does second dressing on left leg another time during week   • Osteomyelitis (HCC)    • Paroxysmal atrial fibrillation (HCC)    • Peripheral neuropathy    • Popliteal artery aneurysm (HCC)     left, s/p stenting by Dr. Boston   • Skin cancer    • Sleep apnea     o2   • 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HCC)     due to flutter and afib; cath 2010 with nonobstructive disease   • Venous stasis    • Venous stasis ulcer (HCC)     bilateral legs      Past Surgical History:   Procedure Laterality Date   • BASAL CELL CARCINOMA EXCISION      ear and left side of face   • BRONCHOSCOPY N/A 4/12/2021    Procedure: BRONCHOSCOPY WITH BAL;  Surgeon: Bunny Lepe MD;  Location: [facility] ENDOSCOPY;  Service: Pulmonary;  Laterality: N/A;  PRE-HEMOPTYSIS  POST-SAME   • CARDIAC CATHETERIZATION     • CATARACT EXTRACTION     • COLONOSCOPY  09/28/2015    NBIH, diverticulosis, polyps   • COLONOSCOPY N/A 9/11/2018    Procedure: COLONOSCOPY TO CECUM  AND TERM. ILEUM WITH COLD SNARE POLYPECTOMIES;  Surgeon: Kane Lagunas MD;  Location: [facility] ENDOSCOPY;  Service: Gastroenterology   • COLONOSCOPY N/A 10/29/2019    Procedure: COLONOSCOPY TO TO CECUM AND TERMINAL ILEUM WITH HOT AND COLD SNARE POLYPECTOMIES;  Surgeon: Kane Lagunas MD;  Location: [facility] ENDOSCOPY;  Service: Gastroenterology   • HIP ARTHROPLASTY  Right 2017   • JOINT REPLACEMENT Left    • OTHER SURGICAL HISTORY      Catheter ablation atrial flutter   • REPAIR ANEURYSM / PSEUDO ANEURYSM / RUPTURED ANEURYSM POPLITEAL ARTERY      Stent-Graft of the the left popliteal artery   • REPAIR KNEE LIGAMENT      Primary repair of knee ligament cruciate anterior right   • TONSILLECTOMY  1958   • TOTAL KNEE ARTHROPLASTY Bilateral    • UPPER GASTROINTESTINAL ENDOSCOPY  09/16/2014    acute gastritis, acute duodenitis      General Information     Row Name 11/04/22 1318          Physical Therapy Time and Intention    Document Type therapy note (daily note)  -     Mode of Treatment physical therapy  -     Row Name 11/04/22 1318          General Information    Existing Precautions/Restrictions fall;oxygen therapy device and L/min  -     Row Name 11/04/22 1318          Cognition    Orientation Status (Cognition) oriented x 4  -           User Key  (r) = Recorded By, (t) = Taken By, (c) = Cosigned By    Initials Name Provider Type     Griselda River PTA Physical Therapist Assistant               Mobility     Row Name 11/04/22 1318          Bed Mobility    Bed Mobility supine-sit  -     Supine-Sit Howard (Bed Mobility) standby assist  -     Assistive Device (Bed Mobility) bed rails;head of bed elevated  -[facility] Name 11/04/22 1318          Sit-Stand Transfer    Sit-Stand Howard (Transfers) minimum assist (75% patient effort)  -     Assistive Device (Sit-Stand Transfers) walker, front-wheeled  -[facility] Name 11/04/22 1318          Gait/Stairs (Locomotion)    Howard Level (Gait) contact guard  -     Assistive Device (Gait) walker, front-wheeled  -     Distance in Feet (Gait) 45  -     Deviations/Abnormal Patterns (Gait) sowmya decreased;stride length decreased  -     Bilateral Gait Deviations forward flexed posture  -           User Key  (r) = Recorded By, (t) = Taken By, (c) = Cosigned By    Initials Name Provider Type      Griselda River PTA Physical Therapist Assistant               Obj/Interventions    No documentation.                Goals/Plan    No documentation.                Clinical Impression     Row Name 11/04/22 1320          Pain    Pretreatment Pain Rating 0/10 - no pain  -     Posttreatment Pain Rating 0/10 - no pain  -     Row Name 11/04/22 1320          Positioning and Restraints    Pre-Treatment Position in bed  -SM     Post Treatment Position chair  -SM     In Chair reclined;call light within reach;encouraged to call for assist;exit alarm on;notified nsg  w/ RT  -           User Key  (r) = Recorded By, (t) = Taken By, (c) = Cosigned By    Initials Name Provider Type    Griselda Baum PTA Physical Therapist Assistant               Outcome Measures     Row Name 11/04/22 1321          How much help from another person do you currently need...    Turning from your back to your side while in flat bed without using bedrails? 3  -SM     Moving from lying on back to sitting on the side of a flat bed without bedrails? 3  -SM     Moving to and from a bed to a chair (including a wheelchair)? 3  -SM     Standing up from a chair using your arms (e.g., wheelchair, bedside chair)? 3  -SM     Climbing 3-5 steps with a railing? 1  -SM     To walk in hospital room? 3  -SM     AM-PAC 6 Clicks Score (PT) 16  -SM     Highest level of mobility 5 --> Static standing  -SM     Row Name 11/04/22 1321          Functional Assessment    Outcome Measure Options AM-PAC 6 Clicks Basic Mobility (PT)  -           User Key  (r) = Recorded By, (t) = Taken By, (c) = Cosigned By    Initials Name Provider Type    Griselda Baum PTA Physical Therapist Assistant                             Physical Therapy Education     Title: PT OT SLP Therapies (Done)     Topic: Physical Therapy (Done)     Point: Mobility training (Done)     Learning Progress Summary           Patient Acceptance, E,TB,D, VU,NR by  at 11/4/2022 1322     Acceptance, TB,E, VU by PT at 11/3/2022 1745    Acceptance, E,TB, VU by PT at 11/2/2022 1442    Acceptance, E,D, VU,NR by MS at 11/1/2022 1518                   Point: Home exercise program (Done)     Learning Progress Summary           Patient Acceptance, E,TB,D, VU,NR by SM at 11/4/2022 1322    Acceptance, TB,E, VU by PT at 11/3/2022 1745    Acceptance, E,TB, VU by PT at 11/2/2022 1442    Acceptance, E,D, VU,NR by MS at 11/1/2022 1518                   Point: Body mechanics (Done)     Learning Progress Summary           Patient Acceptance, E,TB,D, VU,NR by  at 11/4/2022 1322    Acceptance, TB,E, VU by PT at 11/3/2022 1745    Acceptance, E,TB, VU by PT at 11/2/2022 1442    Acceptance, E,D, VU,NR by MS at 11/1/2022 1518                   Point: Precautions (Done)     Learning Progress Summary           Patient Acceptance, E,TB,D, VU,NR by SM at 11/4/2022 1322    Acceptance, TB,E, VU by PT at 11/3/2022 1745    Acceptance, E,TB, VU by PT at 11/2/2022 1442    Acceptance, E,D, VU,NR by MS at 11/1/2022 1518                               User Key     Initials Effective Dates Name Provider Type Discipline    MS 06/16/21 -  Camilo Segundo, PT Physical Therapist PT     03/07/18 -  Griselda River PTA Physical Therapist Assistant PT    PT 06/16/21 -  Aparna Hollis RN Registered Nurse Nurse              PT Recommendation and Plan     Plan of Care Reviewed With: patient  Progress: improving  Outcome Evaluation: Pt tolerated treatment well this date. Increased gait distance to 45ft w/ Rw and CGA. Pt required min A to initially stand, though improved overall. Limited d/t fatigue. Pt was left reclined in chair w/ RT present.     Time Calculation:    PT Charges     Row Name 11/04/22 1326             Time Calculation    Start Time 1125  -      Stop Time 1148  -      Time Calculation (min) 23 min  -      PT Received On 11/04/22  -      PT - Next Appointment 11/05/22  -MARY ELLEN            User Key  (r) = Recorded By,  (t) = Taken By, (c) = Cosigned By    Initials Name Provider Type     Griselda River, DESIREE Physical Therapist Assistant              Therapy Charges for Today     Code Description Service Date Service Provider Modifiers Qty    70181891308 HC PT THERAPEUTIC ACT EA 15 MIN 11/4/2022 Griselda River, DESIREE GP 1    64310233615 HC GAIT TRAINING EA 15 MIN 11/4/2022 Griselda River PTA GP 1          PT G-Codes  Outcome Measure Options: AM-PAC 6 Clicks Basic Mobility (PT)  AM-PAC 6 Clicks Score (PT): 16  AM-PAC 6 Clicks Score (OT): 15    Griselda River PTA  11/4/2022

## 2022-11-04 NOTE — NURSING NOTE
IV team consulted for midline placement.  Patient has IVAB order for discharge until 11-7.  Spoke to Corky RN regarding placement.  Patient has a hx of pulling IV line out and we will place line on discharge.  RN states she will update us when discharge plans have been made. Will follow.

## 2022-11-04 NOTE — NURSING NOTE
CWOCN- changed patient's BLE compression wraps. Removed wraps, cleansed legs and applied lotion. Patient continues with wounds, unchanged from prior. There is blanching redness noted along the shin and at the anterior ankle, as before. I had padded these areas prior with gauze but I think he needs less compression at this time because his legs have no extra fluid on them. Patient agrees with plan- will use unna boots + 3 layer compression instead of 4 and see how the skin responds. Placed the above with hydrofera blue to the wounds. Patient tolerated well. Plan to change 2x/week.

## 2022-11-04 NOTE — THERAPY TREATMENT NOTE
Patient Name: Jalen Lockwood  : 1950    MRN: [id number]                              Today's Date: 2022       Admit Date: 10/31/2022    Visit Dx:     ICD-10-CM ICD-9-CM   1. Acute UTI  N39.0 599.0   2. Irvin catheter in place on admission  Z97.8 V45.89   3. Acute encephalopathy  G93.40 348.30   4. Skin tear of elbow without complication, unspecified laterality, initial encounter  S51.019A 881.01   5. Chronic anticoagulation  Z79.01 V58.61   6. Fall, initial encounter  W19.XXXA E888.9     Patient Active Problem List   Diagnosis   • Chronic osteomyelitis (HCC)   • Foot pain   • Peripheral neuropathy   • Lymphedema of both lower extremities   • Permanent atrial fibrillation (HCC)   • Sleep apnea   • Obesity (BMI 30-39.9)   • COPD (chronic obstructive pulmonary disease) (HCC)   • Aortectasia (HCC)   • Popliteal artery aneurysm (HCC)   • Colon polyps   • Gastroparesis   • Insomnia   • Adenomatous polyp of colon   • Essential hypertension   • Chronic anticoagulation   • Tobacco abuse   • Thyromegaly   • Adrenal adenoma, left   • Retroperitoneal lymphadenopathy   • Anemia of chronic disease   • Thyroid nodule   • Charcot's joint of foot   • Abnormal CT scan, lumbar spine   • Alcohol dependence, in remission (HCC)   • Metabolic encephalopathy   • Normocytic anemia   • Inguinal adenopathy   • Elevated lactic acid level   • Venous stasis dermatitis of both lower extremities   • Urinary retention   • Anxiety   • Ischemic cardiomyopathy   • Hypokalemia   • Chronic respiratory failure with hypoxia (HCC)   • Oropharyngeal dysphagia   • Paroxysmal atrial fibrillation (HCC)   • Warfarin anticoagulation   • Thrombocytopenia (HCC)   • Acute UTI   • Fall   • Sepsis due to Gram negative bacteria (HCC)   • Abnormal thyroid function test   • Abnormal EKG     Past Medical History:   Diagnosis Date   • Allergic rhinitis    • Anxiety    • Aortectasia (HCC)     3cm infrarenal abdominal aorta   • Arthritis    • Atrial flutter  (HCC) 2010    s/p ablation    • Charcot's joint of foot    • Chronic edema     both legs and sees wound care center at [facility]    • Chronic venous insufficiency    • COPD (chronic obstructive pulmonary disease) (HCC)    • Coronary atherosclerosis     Cath 2010: diffuse 40-50% disease   • Diverticulosis    • Duodenitis    • Fatty liver    • Gastritis    • Gastroparesis    • Hematoma     post-operative; After catheterization, right groin, required surgical exploration   • Hyperlipidemia    • Hypertension    • Insomnia    • Internal hemorrhoids    • Open wound     izzy legs has drsg chg weekly at wound care center at [facility]  pt does second dressing on left leg another time during week   • Osteomyelitis (HCC)    • Paroxysmal atrial fibrillation (HCC)    • Peripheral neuropathy    • Popliteal artery aneurysm (HCC)     left, s/p stenting by Dr. Boston   • Skin cancer    • Sleep apnea     o2   • 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HCC)     due to flutter and afib; cath 2010 with nonobstructive disease   • Venous stasis    • Venous stasis ulcer (HCC)     bilateral legs      Past Surgical History:   Procedure Laterality Date   • BASAL CELL CARCINOMA EXCISION      ear and left side of face   • BRONCHOSCOPY N/A 4/12/2021    Procedure: BRONCHOSCOPY WITH BAL;  Surgeon: Bunny Lepe MD;  Location: [facility] ENDOSCOPY;  Service: Pulmonary;  Laterality: N/A;  PRE-HEMOPTYSIS  POST-SAME   • CARDIAC CATHETERIZATION     • CATARACT EXTRACTION     • COLONOSCOPY  09/28/2015    NBIH, diverticulosis, polyps   • COLONOSCOPY N/A 9/11/2018    Procedure: COLONOSCOPY TO CECUM  AND TERM. ILEUM WITH COLD SNARE POLYPECTOMIES;  Surgeon: Kane Lagunas MD;  Location: [facility] ENDOSCOPY;  Service: Gastroenterology   • COLONOSCOPY N/A 10/29/2019    Procedure: COLONOSCOPY TO TO CECUM AND TERMINAL ILEUM WITH HOT AND COLD SNARE POLYPECTOMIES;  Surgeon: Kane Lagunas MD;  Location: [facility] ENDOSCOPY;  Service: Gastroenterology   • HIP ARTHROPLASTY  Right 2017   • JOINT REPLACEMENT Left    • OTHER SURGICAL HISTORY      Catheter ablation atrial flutter   • REPAIR ANEURYSM / PSEUDO ANEURYSM / RUPTURED ANEURYSM POPLITEAL ARTERY      Stent-Graft of the the left popliteal artery   • REPAIR KNEE LIGAMENT      Primary repair of knee ligament cruciate anterior right   • TONSILLECTOMY  1958   • TOTAL KNEE ARTHROPLASTY Bilateral    • UPPER GASTROINTESTINAL ENDOSCOPY  09/16/2014    acute gastritis, acute duodenitis      General Information     Row Name 11/04/22 1437          OT Time and Intention    Document Type therapy note (daily note)  -     Mode of Treatment occupational therapy  -     Row Name 11/04/22 1437          General Information    Patient Profile Reviewed yes  -     Existing Precautions/Restrictions fall;oxygen therapy device and L/min  -     Row Name 11/04/22 1437          Cognition    Orientation Status (Cognition) oriented x 3  -     Row Name 11/04/22 1437          Safety Issues, Functional Mobility    Impairments Affecting Function (Mobility) endurance/activity tolerance;strength  -           User Key  (r) = Recorded By, (t) = Taken By, (c) = Cosigned By    Initials Name Provider Type     Indra Banerjee OT Occupational Therapist                 Mobility/ADL's     Row Name 11/04/22 1438          Bed Mobility    Comment, (Bed Mobility) Pt sitting up in chair upon OT arrival and departure this date  -           User Key  (r) = Recorded By, (t) = Taken By, (c) = Cosigned By    Initials Name Provider Type     Indra Banerjee OT Occupational Therapist               Obj/Interventions     Row Name 11/04/22 1439          Shoulder (Therapeutic Exercise)    Shoulder (Therapeutic Exercise) AROM (active range of motion)  -     Shoulder AROM (Therapeutic Exercise) bilateral;flexion;extension;aBduction;aDduction;10 repetitions  -     Row Name 11/04/22 1439          Elbow/Forearm (Therapeutic Exercise)    Elbow/Forearm (Therapeutic Exercise)  AROM (active range of motion)  -     Elbow/Forearm AROM (Therapeutic Exercise) bilateral;flexion;extension;supination;pronation;10 repetitions  -     Row Name 11/04/22 1439          Hand (Therapeutic Exercise)    Hand (Therapeutic Exercise) AROM (active range of motion)  -     Hand AROM/AAROM (Therapeutic Exercise) bilateral;AROM (active range of motion);finger aBduction;finger aDduction;finger flexion;finger extension;10 repetitions  -     Row Name 11/04/22 1439          Motor Skills    Therapeutic Exercise shoulder;elbow/forearm;hand  -BL           User Key  (r) = Recorded By, (t) = Taken By, (c) = Cosigned By    Initials Name Provider Type     Indra Banerjee OT Occupational Therapist               Goals/Plan    No documentation.                Clinical Impression     Row Name 11/04/22 1441          Pain Assessment    Pretreatment Pain Rating 0/10 - no pain  -BL     Posttreatment Pain Rating 0/10 - no pain  -     Row Name 11/04/22 1441          Plan of Care Review    Plan of Care Reviewed With patient  -     Progress no change  -     Outcome Evaluation Pt seen by OT this date for treatment. Upon arrival pt sitting up in chair, no c/o pain, A&O x3. Pt completed BUE AROM x10 reps to reinforce HEP for improve joint mobility. Pt required verbal/tactile cues to complete task. Pt left sitting up in chair, needs in reach, alarm on. Pt to continue with skilled OT services to address goals and deficits. OT wore mask, gloves, glasses, hand hygiene performed.  -     Row Name 11/04/22 1441          Vital Signs    Pre Patient Position Sitting  -BL     Intra Patient Position Sitting  -BL     Post Patient Position Sitting  -BL     Row Name 11/04/22 1441          Positioning and Restraints    Pre-Treatment Position sitting in chair/recliner  -BL     Post Treatment Position chair  -BL     In Chair reclined;call light within reach;encouraged to call for assist;exit alarm on  -BL           User Key  (r) =  Recorded By, (t) = Taken By, (c) = Cosigned By    Initials Name Provider Type    Indra Borrego OT Occupational Therapist               Outcome Measures     Row Name 11/04/22 1321          How much help from another person do you currently need...    Turning from your back to your side while in flat bed without using bedrails? 3  -SM     Moving from lying on back to sitting on the side of a flat bed without bedrails? 3  -SM     Moving to and from a bed to a chair (including a wheelchair)? 3  -SM     Standing up from a chair using your arms (e.g., wheelchair, bedside chair)? 3  -SM     Climbing 3-5 steps with a railing? 1  -SM     To walk in hospital room? 3  -SM     AM-PAC 6 Clicks Score (PT) 16  -SM     Highest level of mobility 5 --> Static standing  -SM     Row Name 11/04/22 1321          Functional Assessment    Outcome Measure Options AM-PAC 6 Clicks Basic Mobility (PT)  -SM           User Key  (r) = Recorded By, (t) = Taken By, (c) = Cosigned By    Initials Name Provider Type    Griselda Baum PTA Physical Therapist Assistant                Occupational Therapy Education     Title: PT OT SLP Therapies (Done)     Topic: Occupational Therapy (Done)     Point: ADL training (Done)     Description:   Instruct learner(s) on proper safety adaptation and remediation techniques during self care or transfers.   Instruct in proper use of assistive devices.              Learning Progress Summary           Patient Acceptance, TB,E, VU by PT at 11/3/2022 1745    Acceptance, E,TB, VU by PT at 11/2/2022 1442    Acceptance, E, VU by SM at 11/2/2022 1305    Comment: OT discussed POC and dc recommendations with pt                   Point: Home exercise program (Done)     Description:   Instruct learner(s) on appropriate technique for monitoring, assisting and/or progressing therapeutic exercises/activities.              Learning Progress Summary           Patient Acceptance, TB,E, VU by PT at 11/3/2022 6698     Acceptance, E,TB, VU by PT at 11/2/2022 1442                   Point: Precautions (Done)     Description:   Instruct learner(s) on prescribed precautions during self-care and functional transfers.              Learning Progress Summary           Patient Acceptance, TB,E, VU by PT at 11/3/2022 1745    Acceptance, E,TB, VU by PT at 11/2/2022 1442                   Point: Body mechanics (Done)     Description:   Instruct learner(s) on proper positioning and spine alignment during self-care, functional mobility activities and/or exercises.              Learning Progress Summary           Patient Acceptance, TB,E, VU by PT at 11/3/2022 1745    Acceptance, E,TB, VU by PT at 11/2/2022 1442                               User Key     Initials Effective Dates Name Provider Type Discipline    PT 06/16/21 -  Aparna Hollis RN Registered Nurse Nurse     04/02/20 -  Griselda Saleh OT Occupational Therapist OT              OT Recommendation and Plan     Plan of Care Review  Plan of Care Reviewed With: patient  Progress: no change  Outcome Evaluation: Pt seen by OT this date for treatment. Upon arrival pt sitting up in chair, no c/o pain, A&O x3. Pt completed BUE AROM x10 reps to reinforce HEP for improve joint mobility. Pt required verbal/tactile cues to complete task. Pt left sitting up in chair, needs in reach, alarm on. Pt to continue with skilled OT services to address goals and deficits. OT wore mask, gloves, glasses, hand hygiene performed.     Time Calculation:    Time Calculation- OT     Row Name 11/04/22 1443             Time Calculation- OT    OT Start Time 1317  -BL      OT Stop Time 1330  -BL      OT Time Calculation (min) 13 min  -BL      Total Timed Code Minutes- OT 13 minute(s)  -BL      OT Received On 11/04/22  -BL      OT - Next Appointment 11/07/22  -BL         Timed Charges    74132 - OT Therapeutic Exercise Minutes 13  -BL         Total Minutes    Timed Charges Total Minutes 13  -BL       Total Minutes  13  -            User Key  (r) = Recorded By, (t) = Taken By, (c) = Cosigned By    Initials Name Provider Type    Indra Borrego OT Occupational Therapist              Therapy Charges for Today     Code Description Service Date Service Provider Modifiers Qty    [id number]  OT THER PROC EA 15 MIN 11/4/2022 Indra Banerjee OT GO 1               Indra Banerjee OT  11/4/2022

## 2022-11-04 NOTE — PROGRESS NOTES
"    Patient Name: Jalen Lockwood  :1950  72 y.o.      Patient Care Team:  Marc Ludwig MD as PCP - General (Family Medicine)  Blas Mckeon MD as Surgeon (General Surgery)  Jonnathan Boston II, MD as Consulting Physician (Vascular Surgery)  Xavi Elliott MD as Consulting Physician (Urology)  Marc Benavidez MD as Consulting Physician (Pain Medicine)  Kurt Henry MD as Consulting Physician (Cardiology)  Meghna Horan [facility] as Pharmacist  Rip Samuel MD as Referring Physician (Family Medicine)  Juni Landa MD as Consulting Physician (Hematology and Oncology)  Brendan Live, AlexD as Pharmacist (Pharmacy)  Emily Juarez RN as Ambulatory  ([facility])    Chief Complaint:   Fever, weakness    Interval History:   No acute events overnight, echo with depressed EF which is new from September.  He continues to deny any cardiac complaints including chest pain or shortness of breath.    Objective   Vital Signs  Temp:  [97.8 °F (36.6 °C)-98.3 °F (36.8 °C)] 97.8 °F (36.6 °C)  Heart Rate:  [60-91] 60  Resp:  [18] 18  BP: (102-114)/(66-71) 102/69    Intake/Output Summary (Last 24 hours) at 2022 0727  Last data filed at 2022 0506  Gross per 24 hour   Intake 360 ml   Output 1500 ml   Net -1140 ml     Flowsheet Rows    Flowsheet Row First Filed Value   Admission Height 193 cm (76\") Documented at 10/31/2022 2047   Admission Weight 132 kg (290 lb) Documented at 10/31/2022 2047          GEN: no distress, alert and oriented  HEENT: NACT, EOMI, moist mucous membranes  Lungs: CTAB, no wheezes, rales or rhonchi  CV: normal rate, regular rhythm, normal S1, S2, no murmurs, +2 radial pulses b/l, no carotid bruit  Abdomen: soft, nontender, nondistended, NABS  Extremities: no edema  Skin: no rash, warm, dry  Heme/Lymph: no bruising  Psych: organized thought, normal behavior and affect    Results Review:    Results from last 7 days   Lab Units 22  0605   SODIUM mmol/L 135* "   POTASSIUM mmol/L 3.8   CHLORIDE mmol/L 103   CO2 mmol/L 26.7   BUN mg/dL 12   CREATININE mg/dL 0.64*   GLUCOSE mg/dL 107*   CALCIUM mg/dL 8.6     Results from last 7 days   Lab Units 11/02/22  1407 11/02/22  0731 11/01/22  1659   CK TOTAL U/L  --   --  105   TROPONIN T ng/mL 0.011 0.021 0.040*     Results from last 7 days   Lab Units 11/03/22  0605   WBC 10*3/mm3 5.44   HEMOGLOBIN g/dL 9.9*   HEMATOCRIT % 29.7*   PLATELETS 10*3/mm3 125*     Results from last 7 days   Lab Units 11/03/22  0604 11/02/22  0731 11/01/22  0715   INR  1.70* 1.71* 1.63*     Results from last 7 days   Lab Units 11/02/22  1407   MAGNESIUM mg/dL 2.4                   Medication Review:   budesonide-formoterol, 2 puff, Inhalation, BID - RT  famotidine, 20 mg, Oral, BID AC  ferrous sulfate, 325 mg, Oral, Daily With Breakfast  finasteride, 5 mg, Oral, Daily  ipratropium-albuterol, 3 mL, Nebulization, 4x Daily - RT  meropenem, 1 g, Intravenous, Q8H  metoprolol tartrate, 12.5 mg, Oral, Q12H  miconazole, , Topical, Q12H  sodium chloride, 10 mL, Intravenous, Q12H  warfarin, 7.5 mg, Oral, Daily         Pharmacy to dose warfarin,         Assessment & Plan   #Abnormal EKG  #Klebsiella UTI  #CAD  #Atrial fibrillation  #COPD/chronic approximator rylan failure  #Hypertension   #Hyperlipidemia    Echocardiogram 11/3/2022 with an LVEF of 36 to 40%, moderately dilated RA and RV with moderately reduced RV systolic function, RVSP of 53.  His most recent echo in September had a normal EF.  He had a very mild troponin elevation of 0.04, with prior similar admission.  Although he likely has CAD, there may be a component of stress cardiomyopathy as he had a prior episode that resolved with medical therapy.  No plan for further ischemic evaluation at this time.    - Continue metoprolol tartrate 12.5 mg twice daily, will hold on further up titration at this time given pulse in the 60s however if this were to increase we would increase to 25 mg twice daily  -We will  start low-dose ACE inhibitor as BP allows  - Continued Klebsiella UTI treatment per ID and primary team  - Continue warfarin for A. fib      Rigo White MD  [facility]  11/04/22  07:27 EDT

## 2022-11-04 NOTE — NURSING NOTE
Pt experienced confusion last night, was redirected and treated for pain experienced in his feet, see MAR.

## 2022-11-04 NOTE — PLAN OF CARE
Goal Outcome Evaluation:  Plan of Care Reviewed With: patient        Progress: improving  Outcome Evaluation: Pt tolerated treatment well this date. Increased gait distance to 45ft w/ Rw and CGA. Pt required min A to initially stand, though improved overall. Limited d/t fatigue. Pt was left reclined in chair w/ RT present.

## 2022-11-04 NOTE — PLAN OF CARE
Goal Outcome Evaluation:  Plan of Care Reviewed With: patient        Progress: no change  Outcome Evaluation: Pt up in recliner chair at this time, c/o pain to izzy feet/toes, prn norco given , izzy leg wraps chnage done per wound nurse, cont on iv merem, no adverse effects, IV team aware of midline consult. Afebrile this shift, nad, afib on tele, rate controlled 70-80's, nad, wife updated with new orders, pt anxious to go home. F/C patent, urine carmen. PT amb pt , sujata well.

## 2022-11-05 PROBLEM — I50.23 ACUTE ON CHRONIC SYSTOLIC HEART FAILURE: Status: ACTIVE | Noted: 2022-01-01

## 2022-11-05 PROBLEM — I50.22 CHRONIC SYSTOLIC HEART FAILURE (HCC): Status: ACTIVE | Noted: 2022-01-01

## 2022-11-05 PROBLEM — I50.21 ACUTE SYSTOLIC HEART FAILURE (HCC): Status: ACTIVE | Noted: 2022-01-01

## 2022-11-05 NOTE — PLAN OF CARE
Pt AAOX3, vs stable HR range 50-60s, NAD, up to chair most of day, IV team to place midline for IV therapy. C/o gen pain norco x1. Call placed to CCP for HH/infusion set up. Pt spouse needs education/supplies for abx and midline.      Problem: Adult Inpatient Plan of Care  Goal: Plan of Care Review  Outcome: Met  Goal: Patient-Specific Goal (Individualized)  Outcome: Met  Goal: Absence of Hospital-Acquired Illness or Injury  Outcome: Met  Intervention: Identify and Manage Fall Risk  Recent Flowsheet Documentation  Taken 11/5/2022 1400 by Angela Noble RN  Safety Promotion/Fall Prevention: activity supervised  Taken 11/5/2022 0905 by Angela Noble RN  Safety Promotion/Fall Prevention:   activity supervised   assistive device/personal items within reach   clutter free environment maintained   fall prevention program maintained   nonskid shoes/slippers when out of bed   room organization consistent   safety round/check completed  Intervention: Prevent Skin Injury  Recent Flowsheet Documentation  Taken 11/5/2022 1600 by Angela Noble RN  Body Position: position changed independently  Taken 11/5/2022 1400 by Angela Noble RN  Body Position: (recliner) position changed independently  Taken 11/5/2022 1200 by Angela Noble RN  Body Position: (recliner) position changed independently  Taken 11/5/2022 0905 by Angela Noble RN  Body Position: (recliner) --  Skin Protection: adhesive use limited  Intervention: Prevent and Manage VTE (Venous Thromboembolism) Risk  Recent Flowsheet Documentation  Taken 11/5/2022 1600 by Angela Noble RN  Activity Management: up in chair  Taken 11/5/2022 1400 by Angela Noble RN  Activity Management: up in chair  Taken 11/5/2022 1000 by Angela Noble RN  Activity Management: up in chair  Taken 11/5/2022 0905 by Angela Noble RN  Activity Management: up in chair  VTE Prevention/Management: patient refused intervention  Intervention: Prevent Infection  Recent Flowsheet  Documentation  Taken 11/5/2022 1400 by Angela Noble RN  Infection Prevention:   rest/sleep promoted   single patient room provided  Taken 11/5/2022 0905 by Angela Noble RN  Infection Prevention:   rest/sleep promoted   single patient room provided  Goal: Optimal Comfort and Wellbeing  Outcome: Met  Intervention: Provide Person-Centered Care  Recent Flowsheet Documentation  Taken 11/5/2022 1400 by Angela Noble RN  Trust Relationship/Rapport: care explained  Taken 11/5/2022 0905 by Angela Noble RN  Trust Relationship/Rapport:   care explained   thoughts/feelings acknowledged   reassurance provided   questions encouraged   questions answered   emotional support provided  Goal: Readiness for Transition of Care  Outcome: Met     Problem: Fall Injury Risk  Goal: Absence of Fall and Fall-Related Injury  Outcome: Met  Intervention: Identify and Manage Contributors  Recent Flowsheet Documentation  Taken 11/5/2022 1400 by Angela Noble RN  Medication Review/Management: medications reviewed  Self-Care Promotion:   independence encouraged   BADL personal objects within reach   BADL personal routines maintained  Taken 11/5/2022 0905 by Angela Noble RN  Medication Review/Management: medications reviewed  Self-Care Promotion:   independence encouraged   BADL personal objects within reach   BADL personal routines maintained  Intervention: Promote Injury-Free Environment  Recent Flowsheet Documentation  Taken 11/5/2022 1400 by Angela Noble RN  Safety Promotion/Fall Prevention: activity supervised  Taken 11/5/2022 0905 by Angela Noble RN  Safety Promotion/Fall Prevention:   activity supervised   assistive device/personal items within reach   clutter free environment maintained   fall prevention program maintained   nonskid shoes/slippers when out of bed   room organization consistent   safety round/check completed     Problem: Skin Injury Risk Increased  Goal: Skin Health and Integrity  Outcome:  Met  Intervention: Optimize Skin Protection  Recent Flowsheet Documentation  Taken 11/5/2022 1600 by Angela Noble RN  Head of Bed (HOB) Positioning: HOB elevated  Taken 11/5/2022 1400 by Angela Noble RN  Head of Bed (HOB) Positioning: HOB flat  Taken 11/5/2022 1200 by Angela Noble RN  Head of Bed (HOB) Positioning: HOB flat  Taken 11/5/2022 0905 by Angela Noble RN  Pressure Reduction Techniques: frequent weight shift encouraged  Head of Bed (HOB) Positioning: HOB flat  Pressure Reduction Devices: pressure-redistributing mattress utilized  Skin Protection: adhesive use limited     Problem: UTI (Urinary Tract Infection)  Goal: Improved Infection Symptoms  Outcome: Met     Problem: Electrolyte Imbalance  Goal: Electrolyte Balance  Outcome: Met     Problem: Confusion Acute  Goal: Optimal Cognitive Function  Outcome: Met     Problem: Dysrhythmia  Goal: Normalized Cardiac Rhythm  Outcome: Met  Intervention: Monitor and Manage Cardiac Rhythm Effect  Recent Flowsheet Documentation  Taken 11/5/2022 0905 by Angela Noble RN  VTE Prevention/Management: patient refused intervention   Goal Outcome Evaluation:

## 2022-11-05 NOTE — SIGNIFICANT NOTE
11/05/22 1652   Midline Catheter - Single Lumen 11/05/22 Right Basilic   Placement Date/Time: 11/05/22 1650   Hand Hygiene Completed: Yes  Site Prep: Chlorhexidine isopropyl alcohol  All 5 Sterile Barriers Used (Gloves, Gown, Cap, Mask, Large Sterile Drape): Yes  Orientation: Right  Location: Basilic  Size (Fr): (c) 3  Ini...   Site Assessment Clean;Dry;Intact   Line Status Blood return noted;Capped;Flushed   Dressing Type Border Dressing;Transparent;Securing device;Antimicrobial dressing/disc   Dressing Change Due 11/12/22   Indication/Daily Review of Necessity intravenous medication therapy   3 needles, 2 guidewires and 1 scalpel counted post procedure.

## 2022-11-05 NOTE — PROGRESS NOTES
[facility] Clinical Pharmacy Services: Warfarin Dosing/Monitoring Consult    Jalen Lockwood is a 72 y.o. male, estimated creatinine clearance is 173.2 mL/min (A) (by C-G formula based on SCr of 0.57 mg/dL (L)). weighing 128 kg (283 lb).    Results from last 7 days   Lab Units 11/05/22  0750 11/04/22  0753 11/03/22  0605 11/03/22  0604 11/02/22  0731 11/01/22  1659 11/01/22  0715   INR  1.92* 1.70*  --  1.70* 1.71*  --  1.63*   HEMOGLOBIN g/dL 10.5* 9.8* 9.9*  --  10.6* 11.7*  --    HEMATOCRIT % 32.0* 29.3* 29.7*  --  31.5* 34.6*  --    PLATELETS 10*3/mm3 181 151 125*  --  109* 120*  --      Prior to admission anticoagulation: warfarin 7.5mg daily per [facility] Anticoagulation:  Consulting provider: Julita ALLISON  Start date: 10/31  Indication: A Fib - requiring full anticoagulation  Target INR: 2 - 3  Expected duration: indefinite   Bridge Therapy: No      Potential food or drug interactions: reg diet    Education complete?/Date: No; plan for follow up TBD    Assessment/Plan:  Dose: INR still subtherapeutic at 1.92 but has increased from yesterday, likely from the additional dose. Therefore will continue same home dose of 7.5mg daily and give an extra 1 mg today for a total of 8.5mg of warfarin on 11/5/22.   Monitor for any signs or symptoms of bleeding  Follow up daily INRs and dose adjustments    Pharmacy will continue to follow until discharge or discontinuation of warfarin.     Constance Garrett, PharmD  Clinical Pharmacist

## 2022-11-05 NOTE — CASE MANAGEMENT/SOCIAL WORK
Continued Stay Note  [facility]     Patient Name: Jalen Lockwood  MRN: [id number]  Today's Date: 11/5/2022    Admit Date: 10/31/2022    Plan: Home via private auto, spouse to assist, [facility] and [facility] for IV antibiotics   Discharge Plan     Row Name 11/05/22 6187       Plan    Plan Home via private auto, spouse to assist, [facility] and [facility] for IV antibiotics    Patient/Family in Agreement with Plan yes    Plan Comments Return call from Shaina/[facility]--they will be able to deliver pt's meds to bedside this evening. Called and updated staff RN....DEON    Row Name 11/05/22 162       Plan    Plan Home via private auto with spouse to assist, [facility] and [facility] for IV antibiotics    Patient/Family in Agreement with Plan yes    Plan Comments Contacted by staff RN who states pt has orders to DC today and is going to get a line and go home on IV antibiotics. Reviewed current DC plan. Note indicates pt is current with [facility]. Attempted to call in pt's room and no answer--staff RN states pt is getting his line now. Called and spoke with pt's spouse Mariposa. Allowed spouse to express feelings and concerns re: DC plan. Spouse states pt is current with [facility] and at one time was going to go home with [facility] for IV antibiotics in June; that did not happen. States they never started it, she has no preference for IV antibiotic provider. Spoke with [facility] to make sure they would have nursing available on Sunday should pt leave this evening. Per [facility] will be able to see pt on Sunday. In basket referral to [facility]. Called and spoke with Shaina/[facility] pharmacist. She states she will call the rounding nurse and see when pt can be seen, whether this evening or on Sunday. Waiting for return call. Spouse states she will come and  pt after all arrangements for IV antibiotics are completed. CCP to follow.........DEON               Discharge Codes    No  documentation.               Expected Discharge Date and Time     Expected Discharge Date Expected Discharge Time    Nov 5, 2022             Estrella Greenberg RN

## 2022-11-05 NOTE — PROGRESS NOTES
HOSPITAL PROGRESS NOTE    Date of Service: 22  LOS:  LOS: 5 days   Patient Name: Jalen Lockwood  Age/Sex: 72 y.o. male  : 1950  MRN: [id number]  Primary Cardiologist: Dr. Kurt Henry    Subjective:     Chief Complaint/Follow up:   Atrial Fibrillation, Cardiomyopathy, CAD    Interval History:   Patient resting in bed.  He says he is going home today.  He denies chest pain, shortness of breath, palpitations, or dizziness.  He is wearing 2 L of oxygen nasal cannula.  He says he uses oxygen as needed at home.  Continues to have lower extremity edema and legs are wrapped.      Objective:     Objective:  Temp:  [97.6 °F (36.4 °C)-98.5 °F (36.9 °C)] 98.2 °F (36.8 °C)  Heart Rate:  [52-75] 58  Resp:  [16-20] 20  BP: ()/(55-83) 94/55  Body mass index is 34.45 kg/m².    Intake/Output Summary (Last 24 hours) at 2022 1627  Last data filed at 2022 1317  Gross per 24 hour   Intake 700 ml   Output 775 ml   Net -75 ml         10/31/22  2047 22  0031 22  1519   Weight: 132 kg (290 lb) 129 kg (283 lb 4.7 oz) 128 kg (283 lb)     Weight change:     Physical Exam:   General Appearance: Alert, cooperative, in no acute distress. AAOx4.  Elderly.  Ill.  HEENT: Normocephalic.  Neck: Supple. No JVD. No carotid bruit. No thyromegaly  Lungs: Diminished.  Wearing 2 L of oxygen.  Normal respiratory effort and rate.  Heart: Irregularly, irregular.  Normal S1 and S2, no murmurs, gallops or rubs.  Abdomen: Soft, nontender, nondistended.   Extremities: Warm, no cyanosis, or clubbing.  Bilateral lower extremity edema present.  Legs wrapped.    Lab Review:   Results from last 7 days   Lab Units 22  0750 22  0753 22  1858 22  0731 22  1659   SODIUM mmol/L 140 137   < > 138 135*   POTASSIUM mmol/L 3.9 3.8   < > 3.3* 3.5   CHLORIDE mmol/L 104 106   < > 101 100   CO2 mmol/L 25.7 24.4   < > 28.0 22.0   BUN mg/dL 9 11   < > 15 20   CREATININE mg/dL 0.57* 0.60*   < > 0.81 1.14    GLUCOSE mg/dL 103* 101*   < > 98 126*   CALCIUM mg/dL 8.8 9.1   < > 8.9 9.5   AST (SGOT) U/L  --   --   --  18 23   ALT (SGPT) U/L  --   --   --  8 11    < > = values in this interval not displayed.     Results from last 7 days   Lab Units 11/02/22  1407 11/02/22  0731 11/01/22  1659   CK TOTAL U/L  --   --  105   TROPONIN T ng/mL 0.011 0.021 0.040*     Results from last 7 days   Lab Units 11/05/22  0750 11/04/22  0753   WBC 10*3/mm3 7.19 6.27   HEMOGLOBIN g/dL 10.5* 9.8*   HEMATOCRIT % 32.0* 29.3*   PLATELETS 10*3/mm3 181 151     Results from last 7 days   Lab Units 11/05/22  0750 11/04/22  0753   INR  1.92* 1.70*     Results from last 7 days   Lab Units 11/02/22  1407 11/01/22  1659   MAGNESIUM mg/dL 2.4 1.8             Results from last 7 days   Lab Units 11/01/22  1659   TSH uIU/mL 0.161*       Results for orders placed during the hospital encounter of 10/31/22    Adult Transthoracic Echo Complete W/ Cont if Necessary Per Protocol    Interpretation Summary  •  Left ventricular ejection fraction appears to be 36 - 40%.  •  Left ventricular diastolic function was indeterminate.  •  Moderately reduced right ventricular systolic function noted.  •  The right ventricular cavity is moderately dilated.  •  The right atrial cavity is mild to moderately  dilated.  •  There is calcification of the aortic valve.  •  Moderate to severe tricuspid valve regurgitation is present.  •  Estimated right ventricular systolic pressure from tricuspid regurgitation is moderately elevated (45-55 mmHg). Calculated right ventricular systolic pressure from tricuspid regurgitation is 53 mmHg.  •  The left atrial cavity is mild to moderately dilated.    I reviewed the patient's new clinical results.  I personally viewed and interpreted the patient's EKG/Telemetry data/Labs/Test Results.     Current Medications:   Scheduled Meds:budesonide-formoterol, 2 puff, Inhalation, BID - RT  famotidine, 20 mg, Oral, BID AC  ferrous sulfate, 325 mg,  Oral, Daily With Breakfast  finasteride, 5 mg, Oral, Daily  ipratropium-albuterol, 3 mL, Nebulization, 4x Daily - RT  meropenem, 1 g, Intravenous, Q8H  metoprolol tartrate, 12.5 mg, Oral, Q12H  miconazole, , Topical, Q12H  sodium chloride, 10 mL, Intravenous, Q12H  warfarin, 1 mg, Oral, Once  warfarin, 7.5 mg, Oral, Daily      Continuous Infusions:Pharmacy to dose warfarin,         Allergies:  Allergies   Allergen Reactions   • Cephalexin Hives     Tolerated ceftriaxone Jan 2022   • Codeine Nausea Only   • Silver Other (See Comments)       Assessment:       Sepsis due to Gram negative bacteria (HCC)    Lymphedema of both lower extremities    Permanent atrial fibrillation (HCC)    COPD (chronic obstructive pulmonary disease) (HCC)    Chronic anticoagulation    Anemia of chronic disease    Metabolic encephalopathy    Hypokalemia    Chronic respiratory failure with hypoxia (HCC)    Warfarin anticoagulation    Acute UTI    Fall    Abnormal thyroid function test    Abnormal EKG        Plan:   Assessment & Plan       1/2.  Atrial Fibrillation/Atrial Flutter:  Diagnosed 2010.  Permanent rate controlled with periods of bradycardia.  Biatrial enlargement.  Continue warfarin (pharmacy dosing).    3.  Coronary Artery Disease: Non-STEMI and wall motion abnormalities on echocardiogram in 2021.  Medical treatment recommended due to his comorbidities and poor functional status.  Denies angina. Continue pravastatin.  He is not on aspirin because of the warfarin.  Recent ischemic changes on EKG.  Troponins negative.  Denies chest pain.  Monitor.     4.  Cardiomyopathy: EF 36-40%.  Reduced right ventricular systolic function.  Change metoprolol to tartrate to metoprolol succinate.    5.  Lymphedema: Follows in the [place] wound clinic.  Legs are wrapped bilaterally.  Recently treated for cellulitis.     6.  Hypertension: Low at times.    7.    Moderate pulmonary Hypertension and moderate to severe tricuspid regurgitation: Denies  shortness of breath.     8.  Right bundle branch block.    9.  Urosepsis with ESBL Klebsiella pneumonia/chronic urinary retention.  Irvin catheter.    10.  Cigarette Smoking: He would highly benefit from abstaining from cigarette smoking.    11.  Anemia.    11.  Okay to discharge from cardiac standpoint.  If he is being discharged today, he will need a 1 week follow-up visit in the office with me and our office will arrange.  Thank you.      ESTEFANY Dobson  [facility]   11/05/22  16:27 EDT

## 2022-11-05 NOTE — CASE MANAGEMENT/SOCIAL WORK
Continued Stay Note  [facility]     Patient Name: Jalen Lockwood  MRN: [id number]  Today's Date: 11/5/2022    Admit Date: 10/31/2022    Plan: Home via private auto with spouse to assist, [facility]  and [facility] for IV antibiotics   Discharge Plan     Row Name 11/05/22 1627       Plan    Plan Home via private auto with spouse to assist, [facility] HH and [facility] for IV antibiotics    Patient/Family in Agreement with Plan yes    Plan Comments Contacted by staff RN who states pt has orders to DC today and is going to get a line and go home on IV antibiotics. Reviewed current DC plan. Note indicates pt is current with [facility]. Attempted to call in pt's room and no answer--staff RN states pt is getting his line now. Called and spoke with pt's spouse Mariposa. Allowed spouse to express feelings and concerns re: DC plan. Spouse states pt is current with [facility] and at one time was going to go home with [facility] for IV antibiotics in June; that did not happen. States they never started it, she has no preference for IV antibiotic provider. Spoke with [facility] to make sure they would have nursing available on Sunday should pt leave this evening. Per [facility] will be able to see pt on Sunday. In basket referral to [facility]. Called and spoke with Shaina/[facility] pharmacist. She states she will call the rounding nurse and see when pt can be seen, whether this evening or on Sunday. Waiting for return call. Spouse states she will come and  pt after all arrangements for IV antibiotics are completed. CCP to follow.........JW               Discharge Codes    No documentation.               Expected Discharge Date and Time     Expected Discharge Date Expected Discharge Time    Nov 5, 2022             Estrella Greenberg, RN

## 2022-11-05 NOTE — DISCHARGE SUMMARY
Patient Name: Jalen Lockwood  : 1950  MRN: [id number]    Date of Admission: 10/31/2022  Date of Discharge:  2022  Primary Care Physician: Marc Ludwig MD      Chief Complaint:   Fall and Fever      Discharge Diagnoses     Active Hospital Problems    Diagnosis  POA   • **Sepsis due to Gram negative bacteria (HCC) [A41.50]  Yes   • Chronic systolic heart failure (CMS/HCC) [I50.22]  Unknown   • Abnormal EKG [R94.31]  No   • Abnormal thyroid function test [R94.6]  Yes   • Acute UTI [N39.0]  Yes   • Fall [W19.XXXA]  Yes   • Chronic respiratory failure with hypoxia (HCC) [J96.11]  Yes   • Warfarin anticoagulation [Z79.01]  Not Applicable   • Hypokalemia [E87.6]  No   • Metabolic encephalopathy [G93.41]  Yes   • Anemia of chronic disease [D63.8]  Yes   • Chronic anticoagulation [Z79.01]  Not Applicable   • Lymphedema of both lower extremities [I89.0]  Yes   • Permanent atrial fibrillation (HCC) [I48.21]  Yes   • COPD (chronic obstructive pulmonary disease) (HCC) [J44.9]  Yes      Resolved Hospital Problems   No resolved problems to display.        Hospital Course   Mr. Lockwood is a 72-year-old male with history of A. fib on chronic anticoagulation, lymphedema bilateral lower extremities, COPD with chronic respiratory failure, chronic O2 use, chronic anemia, indwelling Irvin catheter who presents to the emergency room with a fall and generalized weakness.      Urosepsis with ESBL Klebsiella pneumonia (positive blood and urine culture) in a patient with chronic urinary retention on indwelling Irvin catheter (UTI related to Irvin catheter). Patient had fever/elevated procalcitonin/hypotension/tachycardia/mental status changes.  Was resuscitated with IV fluids per protocol on admission, IV antibiotics.  ID was consulted.  Switch to meropenem per culture sensitivities. ID fectious disease recommended continuation of the meropenem 1 g every 8 hours for a total of 7 days with a stoppage date on 2022.     Midline placed prior to discharge.   Repeat blood culture are negative. Home health ordered for outpatient antibiotics.         Chronic A. fib/mildly elevated troponin/coronary artery disease ischemic EKG changes..  Cardiology evaluated.  Medical management was recommended due to his comorbidities and poor functional status.  Continue metoprolol, warfarin for anticoagulation, statin, Lasix.  Follow-up with cardiology in 1 week.      Low TSH.  NormalT3 and T4.  This is mostly sick thyroid syndrome and needs to be followed up as an outpatient.  Repeat TSH free T4 in 6-8 weeks.    At the time of discharge patient was told to take all medications as prescribed, keep all follow-up appointments, and call their doctor or return to the hospital with any worsening or concerning symptoms.         Day of Discharge     Subjective:  Seen this morning.  Sitting up in recliner.  Denies complaints, no chest pain, palpitations, shortness of breath, fevers, chills.  Continues to decline rehab, wants to be discharged home.    Physical Exam:  Temp:  [97.6 °F (36.4 °C)-98.5 °F (36.9 °C)] 98.2 °F (36.8 °C)  Heart Rate:  [52-75] 58  Resp:  [16-20] 20  BP: ()/(55-83) 94/55  Body mass index is 34.45 kg/m².  Physical Exam  General.    Sitting up in recliner, not in distress, clinically well-appearingly.    Cardiovascular.  RRR,  No murmur.   Abdomen.  Soft.  No tenderness.  No organomegaly.  No guarding or rebound.  Chest.    Scattered bilateral rhonchi, no wheezing, on 2 L nasal cannula.  Extremities.    Bilateral lower extremity wraps,]  Consultants     Consult Orders (all) (From admission, onward)     Start     Ordered    11/04/22 1339  IV TEAM - Consult for Midline Placement (IV Team to Determine Number of Lumens)  Once        Provider:  (Not yet assigned)    11/04/22 1338    11/02/22 1635  Inpatient Cardiology Consult  Once        Specialty:  Cardiology  Provider:  Kurt Henry MD    11/02/22 1634    11/01/22 0613  Inpatient  Nutrition Consult  Once        Provider:  (Not yet assigned)    11/01/22 0612    11/01/22 0124  Inpatient Case Management  Consult  Once        Provider:  (Not yet assigned)    11/01/22 0124    10/31/22 2354  Inpatient Infectious Diseases Consult  Once        Specialty:  Infectious Diseases  Provider:  Fahad Jaramillo DO    10/31/22 2353    10/31/22 2114  [facility] (on-call MD unless specified) Details  Once        Specialty:  Hospitalist  Provider:  Isaias St MD    10/31/22 2113              Procedures     * Surgery not found *      Imaging Results (All)     Procedure Component Value Units Date/Time    CT Head Without Contrast [[id number]] Collected: 11/01/22 2122     Updated: 11/02/22 1325    Narrative:      STAT NONCONTRAST HEAD CT 11/01/2022     CLINICAL HISTORY: Patient fell yesterday, has some confusion today.     TECHNIQUE: Spiral CT images were obtained from the base of the skull to  the vertex without intravenous contrast. Images were reformatted and  submitted in 3 mm thick axial, sagittal and coronal CT sections with  brain algorithm and 2 mm thick axial CT section with high-resolution  bone algorithm.     COMPARISON: This is correlated to a noncontrast head CT 10/31/2022.     FINDINGS: There is minimal low-density in the frontal periventricular  white matter consistent with minimal small vessel disease. The remainder  of the brain parenchyma is normal in attenuation. The ventricles are  normal in size. I see no focal mass effect and no midline shift and no  extra-axial fluid collections are identified and there is no evidence of  acute intracranial hemorrhage. No acute skull fracture is identified.  Paranasal sinuses, mastoid air cells and middle ear cavities are clear       Impression:      1. No acute intracranial abnormality is seen with no change when  compared to yesterday's head CT 10/31/2022 at 8:27 PM. Other than  minimal small vessel disease in the cerebral white matter  scattered  calcified plaques in the cavernous internal carotid arteries this is a  negative head CT.      Radiation dose reduction techniques were utilized, including automated  exposure control and exposure modulation based on body size.     This report was finalized on 11/2/2022 1:22 PM by Dr. Javi Hayward M.D.       CT Abdomen Pelvis With Contrast [[id number]] Collected: 11/01/22 2013     Updated: 11/01/22 2033    Narrative:      CT ABDOMEN PELVIS W CONTRAST-     INDICATIONS: Urosepsis     TECHNIQUE: Radiation dose reduction techniques were utilized, including  automated exposure control and exposure modulation based on body size.  Enhanced ABDOMEN AND PELVIS CT     COMPARISON: 10/21/2022     FINDINGS:     Obstructive stone in the right kidney measures 8mm. A nonobstructive  stone in the left kidney measures 5 mm. Bilateral renal low densities  are seen that are too small to characterize.     The urinary bladder is obscured by attenuation artifact from right hip  arthroplasty hardware, limiting the assessment. There appears to be some  gas in the urinary bladder that could be evidence of infection.     Indeterminate 1.5 cm low-density focus in the left hepatic lobe on axial  image 36 is stable.     Otherwise unremarkable appearance of the liver, spleen, adrenal glands,  pancreas, kidneys, bladder.     No bowel obstruction or abnormal bowel thickening is identified. Colonic  diverticula are seen that do not appear inflamed. The appendix does not  appear inflamed.     No free intraperitoneal gas. Small nonspecific pelvic free fluid.     Scattered small mesenteric and para-aortic lymph nodes are seen that are  not significant by size criteria.     Abdominal aorta is not aneurysmal. Aortic and other arterial  calcifications are present.     The lung bases show mild atelectasis. A left lower lobe nodule measuring  7 mm on image 5 is stable.     Degenerative and chronic changes are seen in the spine. No acute  fracture  is identified.             Impression:            1. Bilateral nonobstructive nephrolithiasis. No hydronephrosis. Gas in  the urinary bladder could be evidence of infection.  2. Colonic diverticulosis. No acute inflammatory process of bowel is  calcifying. Mild nonspecific pelvic free fluid. Follow up as indications  persist.  3. Stable indeterminate liver lesion.     This report was finalized on 11/1/2022 8:30 PM by Dr. Khurram Chambers M.D.       CT Head Without Contrast [[id number]] Collected: 10/31/22 2055     Updated: 10/31/22 2113    Narrative:      CT HEAD WITHOUT CONTRAST     CLINICAL HISTORY: Patient fell hitting head. Anticoagulated.     TECHNIQUE: CT scan of the head was obtained with 3 mm axial soft tissue  and 2 mm axial bone algorithm algorithm images. No intravenous contrast  was administered. Sagittal and coronal reconstructions were obtained.     COMPARISON: Comparison is made to previous CT scan of the head dated  04/24/2019.     FINDINGS:       There is no evidence for a calvarial fracture. There is no evidence for  an acute extra-axial hemorrhage. The ventricles, sulci, and cisterns are  age appropriate. The basal ganglia and thalami are unremarkable. The  posterior fossa structures are within normal limits.       Impression:         No evidence for acute traumatic intracranial pathology.     Radiation dose reduction techniques were utilized, including automated  exposure control and exposure modulation based on body size.     This report was finalized on 10/31/2022 9:10 PM by Dr. Robel Ren M.D.       XR Chest 1 View [[id number]] Collected: 10/31/22 2018     Updated: 10/31/22 2023    Narrative:      XR CHEST 1 VW-     HISTORY: Male who is 72 years-old,  fever     TECHNIQUE: Frontal view of the chest     COMPARISON: 08/23/2022     FINDINGS: The heart appears mildly enlarged. Aorta is tortuous,  calcified. Pulmonary vasculature is unremarkable. No focal pulmonary  consolidation, pleural  effusion, or pneumothorax. No acute osseous  process.       Impression:      No focal pulmonary consolidation. Mild cardiomegaly.  Tortuous aorta. Follow-up as indications persist.     This report was finalized on 10/31/2022 8:20 PM by Dr. Khurram Chambers M.D.           Results for orders placed during the hospital encounter of 07/10/22    Doppler Ankle Brachial Index Single Level CAR    Interpretation Summary  · Right Conclusion: The right MARIBELL is normal. Normal digital pressures.  · Left Conclusion: The left MARIBELL is normal. Normal digital pressures.    Results for orders placed during the hospital encounter of 10/31/22    Adult Transthoracic Echo Complete W/ Cont if Necessary Per Protocol    Interpretation Summary  •  Left ventricular ejection fraction appears to be 36 - 40%.  •  Left ventricular diastolic function was indeterminate.  •  Moderately reduced right ventricular systolic function noted.  •  The right ventricular cavity is moderately dilated.  •  The right atrial cavity is mild to moderately  dilated.  •  There is calcification of the aortic valve.  •  Moderate to severe tricuspid valve regurgitation is present.  •  Estimated right ventricular systolic pressure from tricuspid regurgitation is moderately elevated (45-55 mmHg). Calculated right ventricular systolic pressure from tricuspid regurgitation is 53 mmHg.  •  The left atrial cavity is mild to moderately dilated.    Pertinent Labs     Results from last 7 days   Lab Units 11/05/22  0750 11/04/22  0753 11/03/22  0605 11/02/22  0731   WBC 10*3/mm3 7.19 6.27 5.44 5.42   HEMOGLOBIN g/dL 10.5* 9.8* 9.9* 10.6*   PLATELETS 10*3/mm3 181 151 125* 109*     Results from last 7 days   Lab Units 11/05/22  0750 11/04/22  0753 11/03/22  0605 11/02/22  1858 11/02/22  0731   SODIUM mmol/L 140 137 135*  --  138   POTASSIUM mmol/L 3.9 3.8 3.8 3.9 3.3*   CHLORIDE mmol/L 104 106 103  --  101   CO2 mmol/L 25.7 24.4 26.7  --  28.0   BUN mg/dL 9 11 12  --  15    CREATININE mg/dL 0.57* 0.60* 0.64*  --  0.81   GLUCOSE mg/dL 103* 101* 107*  --  98   Estimated Creatinine Clearance: 173.2 mL/min (A) (by C-G formula based on SCr of 0.57 mg/dL (L)).  Results from last 7 days   Lab Units 11/02/22  0731 11/01/22  1659 10/31/22  2005   ALBUMIN g/dL 3.30* 3.50 3.80   BILIRUBIN mg/dL 0.8 1.1 0.9   ALK PHOS U/L 90 110 114   AST (SGOT) U/L 18 23 22   ALT (SGPT) U/L 8 11 10     Results from last 7 days   Lab Units 11/05/22  0750 11/04/22  0753 11/03/22  0605 11/02/22  1407 11/02/22  0731 11/01/22  1659 10/31/22  2005   CALCIUM mg/dL 8.8 9.1 8.6  --  8.9 9.5 9.9   ALBUMIN g/dL  --   --   --   --  3.30* 3.50 3.80   MAGNESIUM mg/dL  --   --   --  2.4  --  1.8  --        Results from last 7 days   Lab Units 11/02/22  1407 11/02/22 0731 11/01/22 1659   CK TOTAL U/L  --   --  105   TROPONIN T ng/mL 0.011 0.021 0.040*           Invalid input(s): LDLCALC  Results from last 7 days   Lab Units 11/01/22  1700 11/01/22  1659 10/31/22  2047 10/31/22  2016 10/31/22  2005   BLOODCX  No growth at 3 days No growth at 3 days Klebsiella pneumoniae ESBL*  --  Klebsiella pneumoniae ESBL*   URINECX   --   --   --  >100,000 CFU/mL Klebsiella pneumoniae ESBL*  50,000 CFU/mL Enterobacter cloacae complex*  --    BCIDPCR   --   --  Klebsiella pneumoniae group. Identification by BCID2 PCR.*  CTX-M (ESBL) detected. Identification by BCID2 PCR*  --   --          Test Results Pending at Discharge     Pending Labs     Order Current Status    Blood Culture - Blood, Hand, Left Preliminary result    Blood Culture - Blood, Hand, Right Preliminary result          Discharge Details        Discharge Medications      New Medications      Instructions Start Date   meropenem (MERREM) 1gm IVPB in 100ml NS (VTB)   1 g, Intravenous, Every 8 Hours         Changes to Medications      Instructions Start Date   furosemide 40 MG tablet  Commonly known as: LASIX  What changed: how much to take   20 mg, Oral, Daily         Continue  These Medications      Instructions Start Date   acetaminophen 325 MG tablet  Commonly known as: TYLENOL   650 mg, Oral, Every 6 Hours PRN      acidophilus tablet tablet   1 tablet, Oral, 2 Times Daily      albuterol sulfate  (90 Base) MCG/ACT inhaler  Commonly known as: PROVENTIL HFA;VENTOLIN HFA;PROAIR HFA   2 puffs, Inhalation, Every 4 Hours PRN      ALPRAZolam 0.5 MG tablet  Commonly known as: XANAX   0.5 mg, Oral, Nightly PRN      ascorbic acid 500 MG tablet  Commonly known as: [facility] Vitamin C   500 mg, Oral, Daily      Atrovent HFA 17 MCG/ACT inhaler  Generic drug: ipratropium   INHALE 2 PUFFS BY MOUTH 4 TIMES A DAY      ciclopirox 0.77 % cream  Commonly known as: LOPROX   APPLY TO AFFECTED AREA AS DIRECTED      clotrimazole 1 % cream  Commonly known as: LOTRIMIN   APPLY TO AFFECTED AREA TWICE A DAY AND AS NEEDED      docusate sodium 100 MG capsule  Commonly known as: COLACE   100 mg, Oral, Daily      ferrous sulfate 325 (65 FE) MG tablet   TAKE 1 TABLET BY MOUTH DAILY WITH BREAKFAST FOR 30 DAYS.      finasteride 5 MG tablet  Commonly known as: PROSCAR   1 tablet, Oral, Daily      fluticasone 50 MCG/ACT nasal spray  Commonly known as: FLONASE   2 sprays, Each Nare, Daily      Fluticasone Furoate-Vilanterol 200-25 MCG/INH inhaler  Commonly known as: BREO ELLIPTA   1 puff, Inhalation, Daily - RT      gentamicin 0.1 % ointment  Commonly known as: GARAMYCIN   APPLY AS DIRECTED TO AFFECTED WOUND AREA ON BOTH LEGS AFTER EACH DRESSING CHANGE      HYDROcodone-acetaminophen  MG per tablet  Commonly known as: NORCO   1 tablet, Oral, Every 6 Hours PRN      KLOR-CON 10 MEQ CR tablet  Generic drug: potassium chloride   10 mEq, Oral, Daily      metoprolol succinate XL 25 MG 24 hr tablet  Commonly known as: TOPROL-XL   25 mg, Oral, Daily      nitroglycerin 0.4 MG SL tablet  Commonly known as: NITROSTAT   0.4 mg, Sublingual, Every 5 Minutes PRN, Take no more than 3 doses in 15 minutes.      potassium chloride 10  MEQ CR capsule  Commonly known as: MICRO-K   10 mEq, Oral, 2 Times Daily      pravastatin 20 MG tablet  Commonly known as: PRAVACHOL   TAKE 1 TABLET BY MOUTH EVERY DAY      saccharomyces boulardii 250 MG capsule  Commonly known as: Florastor   250 mg, Oral, 2 Times Daily      silodosin 8 MG capsule capsule  Commonly known as: RAPAFLO   1 capsule, Oral, Nightly      Vitamin B1 100 MG tablet tablet   100 mg, Oral, 2 Times Daily      warfarin 5 MG tablet  Commonly known as: COUMADIN   5 mg, Oral, Take As Directed      warfarin 2.5 MG tablet  Commonly known as: COUMADIN   2.5 mg, Oral, Nightly             Allergies   Allergen Reactions   • Cephalexin Hives     Tolerated ceftriaxone Jan 2022   • Codeine Nausea Only   • Silver Other (See Comments)       Discharge Disposition:  Home-Health Care Svc      Discharge Diet:  Diet Order   Procedures   • Diet Regular; Cardiac       Discharge Activity:   Activity Instructions    As tolerated           CODE STATUS:    Code Status and Medical Interventions:   Ordered at: 10/31/22 7223     Code Status (Patient has no pulse and is not breathing):    CPR (Attempt to Resuscitate)     Medical Interventions (Patient has pulse or is breathing):    Full Support       Future Appointments   Date Time Provider Department Center   11/10/2022 11:45 AM Marc Ludwig MD [facility]   9/28/2023 11:30 AM Katie Hurt APRN [facility]     Additional Instructions for the Follow-ups that You Need to Schedule     Ambulatory Referral to Home Health (Hospital)   As directed      Face to Face Visit Date: 11/5/2022    Follow-up provider for Plan of Care?: I treated the patient in an acute care facility and will not continue treatment after discharge.    Follow-up provider: MARC LUDWIG [[id number]]    Reason/Clinical Findings: sepsis secodnary to ESBL klebsiella    Describe mobility limitations that make leaving home difficult: Generalized weakness    Nursing/Therapeutic Services Requested:  Skilled Nursing Physical Therapy    Skilled nursing orders: Medication education PICC line care/instruction    PT orders: Total joint pathway Transfer training Therapeutic exercise Gait Training    Weight Bearing Status: As Tolerated    Frequency: 1 Week 1         Protime-INR    Nov 07, 2022 (Approximate)      Is Patient on anti-coag: Yes            Contact information for follow-up providers     Marc Ludwig MD Follow up in 1 week(s).    Specialty: Family Medicine  Contact information:  [address]  [address]  [facility] [address]  [phone]             Katie Hurt APRN Follow up in 1 week(s).    Specialty: Cardiology  Contact information:  [address]  [address]  [facility] [address]  [phone]                   Contact information for after-discharge care     Dialysis/Infusion     [facility] .    Service: Infusion and IV Therapy  Contact information:  [address] [facility] [address]  [phone]                 Home Medical Care     [facility] .    Service: Home Health Services  Contact information:  [address]  [facility] [address]  [phone]                             Additional Instructions for the Follow-ups that You Need to Schedule     Ambulatory Referral to Home Health (Hospital)   As directed      Face to Face Visit Date: 11/5/2022    Follow-up provider for Plan of Care?: I treated the patient in an acute care facility and will not continue treatment after discharge.    Follow-up provider: MARC LUDWIG [[id number]]    Reason/Clinical Findings: sepsis secodnary to ESBL klebsiella    Describe mobility limitations that make leaving home difficult: Generalized weakness    Nursing/Therapeutic Services Requested: Skilled Nursing Physical Therapy    Skilled nursing orders: Medication education PICC line care/instruction    PT orders: Total joint pathway Transfer training Therapeutic exercise Gait Training    Weight Bearing  Status: As Tolerated    Frequency: 1 Week 1         Protime-INR    Nov 07, 2022 (Approximate)      Is Patient on anti-coag: Yes           Time Spent on Discharge:  Greater than 30 minutes      Jennifer Regan MD  [facility]  11/05/22  14:18 EDT

## 2022-11-06 NOTE — OUTREACH NOTE
Prep Survey    Flowsheet Row Responses   [facility] [place] patient discharged from? [place]   Is LACE score < 7 ? No   Emergency Room discharge w/ pulse ox? No   Eligibility [facility]   Date of Admission 10/31/22   Date of Discharge 11/05/22   Discharge Disposition Home-Health Care Sv   Discharge diagnosis Fall and Fever,  sepsis,  Urosepsis with ESBL Klebsiella pneumonia    Does the patient have one of the following disease processes/diagnoses(primary or secondary)? Sepsis   Does the patient have Home health ordered? Yes   What is the Home health agency?  [facility]   Medication alerts for this patient [facility] for IV antibiotics   Prep survey completed? Yes          NICOLASA VIDAL - Registered Nurse

## 2022-11-06 NOTE — CASE MANAGEMENT/SOCIAL WORK
Case Management Discharge Note      Final Note: DC'd home 11/5 with spouse and [facility] HH following    Provided Post Acute Provider List?: N/A  Provided Post Acute Provider Quality & Resource List?: N/A        Dialysis/Infusion Coordination complete.    Service Provider Selected Services Address Phone Fax Patient Preferred    [facility] Infusion and IV Therapy [address], [facility] [address] [phone] --          Home Medical Care     Service Provider Selected Services Address Phone Fax Patient Preferred    [facility] [address] [place], [place] [address] [phone] --                   Selected Continued Care - Prior Encounters Includes continued care and service providers with selected services from prior encounters from 8/2/2022 to 11/5/2022    Discharged on 8/29/2022 Admission date: 8/23/2022 - Discharge disposition: Skilled Nursing Facility (DC - External)    Destination     Service Provider Selected Services Address Phone Fax Patient Preferred    [facility] [address] [place] [address] [phone] --                    Transportation Services  Private: Car    Final Discharge Disposition Code: 06 - home with home health care

## 2022-11-06 NOTE — PROGRESS NOTES
Enter Query Response Below      Query Response:       Yes, metabolic encephalopathy is secondary to sepsis        If applicable, please update the problem list.     Patient: Jalen Lockwood        : 1950  Account: [id number]           Admit Date: 10/31/2022        How to Respond to this query:       a. Click New Note     b. Answer query within the yellow box.                c. Update the Problem List, if applicable.      If you have any questions about this query contact me at: [email or web address]      Dr. Regan:     Patient admitted with urinary tract infection. Sepsis and metabolic encephalopathy was listed as a diagnosis. Patient was treated with IV Merrem, IV fluids, and neuro monitoring.     After study, can you further clarify if there is a relationship between sepsis and metabolic encephalopathy?    Yes, metabolic encephalopathy is secondary to sepsis   No, there is not a relationship   Other (please specify)________________  Clinically indeterminable     By submitting this query, we are merely seeking further clarification of documentation to accurately reflect all conditions that you are monitoring, evaluating, treating or that extend the hospitalization or utilize additional resources of care. Please utilize your independent clinical judgment when addressing the question(s) above.     This query and your response, once completed, will be entered into the legal medical record.    Sincerely,  Elena SANOTS, RN, CCDS   Clinical Documentation Integrity Program

## 2022-11-07 NOTE — OUTREACH NOTE
Call Center TCM Note    Flowsheet Row Responses   [facility] patient discharged from? [place]   Does the patient have one of the following disease processes/diagnoses(primary or secondary)? Sepsis   TCM attempt successful? Yes   Call start time 1540   Call end time 1542   Discharge diagnosis Fall and Fever,  sepsis,  Urosepsis with ESBL Klebsiella pneumonia    Medication alerts for this patient [facility] for IV antibiotics   Meds reviewed with patient/caregiver? Yes   Is the patient having any side effects they believe may be caused by any medication additions or changes? No   Does the patient have all medications related to this admission filled (includes all antibiotics, inhalers, nebulizers,steroids,etc.) Yes   Is the patient taking all medications as directed (includes completed medication regime)? Yes   Comments Hosp dc fu apt on 11/10/22 with PCP    Does the patient have an appointment with their PCP within 7 days of discharge? Yes   What is the Home health agency?  [facility]   Has home health visited the patient within 72 hours of discharge? Yes   Home health comments 11/9/22   Psychosocial issues? No   Did the patient receive a copy of their discharge instructions? Yes   Nursing interventions Reviewed instructions with patient   What is the patient's perception of their health status since discharge? Improving   Nursing interventions Nurse provided patient education   Is the patient/caregiver able to teach back TIME? T emperature - higher or lower than normal, I nfection - may have signs and symptoms of an infection, M ental Decline - confused, sleepy, difficult to arouse, E xtremely Ill - severe pain, discomfort, shortness of breath   Nursing interventions Nurse provided reassurance to patient   Is patient/caregiver able to teach back steps to recovery at home? Set small, achievable goals for return to baseline health, Rest and regain strength, Eat a balanced diet   Is the patient/caregiver  able to teach back signs and symptoms of worsening condition: Rapid heart rate (>90), Fever, Hyperthermia, Shortness of breath/rapid respiratory rate, Altered mental status(confusion/coma), Edema, High blood glucose without diabetes   If the patient is a current smoker, are they able to teach back resources for cessation? [id number]   Is the patient/caregiver able to teach back the hierarchy of who to call/visit for symptoms/problems? PCP, Specialist, Home health nurse, Urgent Care, ED, 911 Yes   TCM call completed? Yes   Call end time [id number]          Aurea Alicea RN    11/7/2022, 15:42 EST

## 2022-11-10 NOTE — PROGRESS NOTES
"SUBJECTIVE:  The patient is a 72-year-old male.  He was treated in the hospital for urosepsis.  He has multiple medical problems.  The hospital discharge summary and admission physical and history are reviewed.  Patient is currently catheterized.  He is scheduled to see a cardiologist and neurologist next week.  He has finished his course of IV antibiotics.  He is not taking any antibiotics currently.  He is not having shortness of breath or chest pain.    PAST MEDICAL HISTORY:  Reviewed.    REVIEW OF SYSTEMS:  Please see above.  All others reviewed and are negative.      OBJECTIVE:   /72 (BP Location: Left arm, Patient Position: Sitting)   Pulse 80   Temp 98 °F (36.7 °C) (Infrared)   Resp 18   Ht 193 cm (75.98\")   SpO2 96%   BMI 34.46 kg/m²    Vitals signs are reviewed and are stable.    General:  Well-nourished.  Chronically ill-appearing.  Alert and oriented x3 in no acute distress.  HEENT: PERRLA.   Neck:  Supple.   Lungs:  Clear.    Heart:  Regular rate and rhythm.   Abdomen:   Soft, nontender.   Extremities: 1-2+ edema bilaterally.  Neurological:  Grossly intact without motor or sensory deficits.     ASSESSMENT:      Diagnoses and all orders for this visit:    1. Urinary tract infection after period of immobility (Primary)  -     CBC & Differential  -     Cancel: Basic Metabolic Panel  -     Urinalysis With Microscopic - Urine, Catheter  -     Urine Culture - Urine, Urine, Catheter    2. Chronic obstructive pulmonary disease, unspecified COPD type (HCC)  -     CBC & Differential  -     Cancel: Basic Metabolic Panel  -     Urinalysis With Microscopic - Urine, Catheter  -     Urine Culture - Urine, Urine, Catheter    3. Sepsis due to Gram negative bacteria (HCC)  -     CBC & Differential  -     Cancel: Basic Metabolic Panel  -     Urinalysis With Microscopic - Urine, Catheter  -     Urine Culture - Urine, Urine, Catheter    4. Urinary retention  -     CBC & Differential  -     Cancel: Basic Metabolic " Panel  -     Urinalysis With Microscopic - Urine, Catheter  -     Urine Culture - Urine, Urine, Catheter    5. Chronic systolic heart failure (CMS/HCC)  -     CBC & Differential  -     Cancel: Basic Metabolic Panel  -     Urinalysis With Microscopic - Urine, Catheter  -     Urine Culture - Urine, Urine, Catheter    6. Essential hypertension  -     CBC & Differential  -     Cancel: Basic Metabolic Panel  -     Urinalysis With Microscopic - Urine, Catheter  -     Urine Culture - Urine, Urine, Catheter    7. Anxiety  -     CBC & Differential  -     Cancel: Basic Metabolic Panel  -     Urinalysis With Microscopic - Urine, Catheter  -     Urine Culture - Urine, Urine, Catheter    8. Anxiety disorder, unspecified  -     ALPRAZolam (XANAX) 0.5 MG tablet; Take 1 tablet by mouth At Night As Needed for Anxiety for up to 30 days.  Dispense: 30 tablet; Refill: 0    9. Flu vaccine need  -     Fluzone High-Dose 65+yrs ([id number])    10. Need for COVID-19 vaccine  -     COVID-19 Bivalent Booster (Pfizer) 12+yrs    11. Acute on chronic systolic heart failure (CMS/HCC)    12. Other mononeuropathy    13. Immobility         PLAN: See above orders. This pt requires a wheelchair. His immobility limitations significantly impairs his ability to perform ADL's including bathing, dressing, and toleting at home. He is at risk for increased morbidity and injury when attempting to do the ADL's mentioned previously.  Mobility cannot safely be resolved by a cane or walker.  Patient will keep his appointment with urology and cardiology in the upcoming week.  They will call me if any problems.  They advised to go to emergency room if any significant problems as well.        Dictated utilizing Dragon dictation.

## 2022-11-11 NOTE — PROGRESS NOTES
Anticoagulation Clinic Progress Note    Anticoagulation Summary  As of 2022    INR goal:  2.0-3.0   TTR:  67.9 % (3.8 y)   INR used for dosin.70 (2022)   Warfarin maintenance plan:  7.5 mg every day; Starting 2022   Weekly warfarin total:  52.5 mg   Plan last modified:  Elvi Haskins, PharmD (10/5/2022)   Next INR check:  2022   Priority:  Maintenance   Target end date:  Indefinite    Indications    Permanent atrial fibrillation (HCC) [I48.21]             Anticoagulation Episode Summary     INR check location:      Preferred lab:      Send INR reminders to:  [facility] [place]    Comments:        Anticoagulation Care Providers     Provider Role Specialty Phone number    Kurt Henry MD Referring Cardiology [phone]        Findings:   Admitted 10/31/22 - 22 with sepsis secondary to UTI. Discharged no IV meropenem, which has since been completed.     INR History:  Anticoagulation Monitoring 10/20/2022 10/26/2022 2022   INR 2.10 2.80 4.70   INR Date 10/20/2022 10/26/2022 2022   INR Goal 2.0-3.0 2.0-3.0 2.0-3.0   Trend Same Same Same   Last Week Total 52.5 mg 52.5 mg 52.5 mg   Next Week Total 52.5 mg 52.5 mg 37.5 mg   Sun 7.5 mg 7.5 mg 7.5 mg   Mon 7.5 mg 7.5 mg 7.5 mg   Tue 7.5 mg 7.5 mg 7.5 mg   Wed 7.5 mg 7.5 mg -   Thu 7.5 mg 7.5 mg -   Fri 7.5 mg 7.5 mg Hold ()   Sat 7.5 mg 7.5 mg Hold ()   Visit Report - - -   Some recent data might be hidden       Plan:  1. INR is Supratherapeutic today- see above in Anticoagulation Summary.   Provided instructions to Estefania with [facility] to Change their warfarin regimen (HOLD x 2 days, then resume 7.5 mg daily) - see above in Anticoagulation Summary.  2. Follow up in 5 days      Brendan Live PharmD

## 2022-11-15 NOTE — PROGRESS NOTES
Anticoagulation Clinic Progress Note    Anticoagulation Summary  As of 11/15/2022    INR goal:  2.0-3.0   TTR:  67.9 % (3.8 y)   INR used for dosin.20 (11/15/2022)   Warfarin maintenance plan:  7.5 mg every day; Starting 11/15/2022   Weekly warfarin total:  52.5 mg   No change documented:  Elvi Haskins PharmD   Plan last modified:  Elvi Haskins PharmD (10/5/2022)   Next INR check:  2022   Priority:  Maintenance   Target end date:  Indefinite    Indications    Permanent atrial fibrillation (HCC) [I48.21]             Anticoagulation Episode Summary     INR check location:      Preferred lab:      Send INR reminders to:  [facility]    Comments:        Anticoagulation Care Providers     Provider Role Specialty Phone number    Kurt Henry MD Referring Cardiology [phone]          Clinic Interview:  Patient Findings     Negatives:  Signs/symptoms of thrombosis, Signs/symptoms of bleeding,   Laboratory test error suspected, Change in health, Change in alcohol use,   Change in activity, Upcoming invasive procedure, Emergency department   visit, Upcoming dental procedure, Missed doses, Extra doses, Change in   medications, Change in diet/appetite, Hospital admission, Bruising, Other   complaints      Clinical Outcomes     Negatives:  Major bleeding event, Thromboembolic event,   Anticoagulation-related hospital admission, Anticoagulation-related ED   visit, Anticoagulation-related fatality        INR History:  Anticoagulation Monitoring 10/26/2022 2022 11/15/2022   INR 2.80 4.70 2.20   INR Date 10/26/2022 2022 11/15/2022   INR Goal 2.0-3.0 2.0-3.0 2.0-3.0   Trend Same Same Same   Last Week Total 52.5 mg 52.5 mg 37.5 mg   Next Week Total 52.5 mg 37.5 mg 52.5 mg   Sun 7.5 mg 7.5 mg 7.5 mg   Mon 7.5 mg 7.5 mg 7.5 mg   Tue 7.5 mg 7.5 mg 7.5 mg   Wed 7.5 mg - 7.5 mg   Thu 7.5 mg - 7.5 mg   Fri 7.5 mg Hold () 7.5 mg   Sat 7.5 mg Hold () 7.5 mg   Visit Report - - -    Some recent data might be hidden       Plan:  1. INR is Therapeutic today- see above in Anticoagulation Summary.   Will instruct Jalen Lockwood to Continue their warfarin regimen- see above in Anticoagulation Summary.  2. Follow up in 1 weeks  3. They have been instructed to call if any changes in medications, doses, concerns, etc. Patient expresses understanding and has no further questions at this time.    Elvi Haskins, PharmD

## 2022-11-15 NOTE — PROGRESS NOTES
Date of Office Visit: 11/15/2022  Encounter Provider: ESTEFANY Dobson  Place of Service: [facility] CARDIOLOGY  Patient Name: Jalen Lockwood  :1950  Primary Cardiologist: Dr. Kurt Henry     Chief Complaint   Patient presents with   • Follow-up     Hospital follow up   :     HPI: Jalen Lockwood is a pleasant 72 y.o. male who presents today for  follow-up on cardiomyopathy and atrial fibrillation. I have reviewed the medical records.     In , he was diagnosed with rapid atrial flutter and tachycardic mediated cardiomyopathy.  He was diagnosed with nonobstructive CAD per cath.  EF normalized.  He was drinking alcohol heavily and highly recommended to abstain.  He then developed recurrent A. fib and EF decreased to 35% and then normalized with NSR.     In , he was found to have left popliteal artery aneurysm treated with stent placement by Dr. Boston.  This was complicated by a wound infection.  Noncontrast CT of the abdomen/pelvis revealed aortectasia of the infrarenal abdominal aorta with no aneurysm noted.     In 2021, he was admitted to the hospital with cellulitis and sepsis. Troponin was elevated and consistent with a non-STEMI.  EF was decreased to 30-35% with apical hypokinesis.  EKG showed anterior T wave inversions. Because of numerous comorbidities and poor functional status, medical treatment was recommended.      In 2021, 2D echocardiogram showed normalized EF of 55%, moderate RV dilation, and moderate pulmonary hypertension.  He reported that he quit smoking.     In 2022, he was hospitalized with left lower extremity cellulitis (staff) and treated with IV antibiotic.  Blood cultures are positive for staph.  He was referred to the [facility] wound care for leg wrapping.    In 2022, I increased his carvedilol to 12.5 mg twice daily for better BP control.  In 2022, he was hospitalized again for cellulitis of the left foot  and treated with IV antibiotics.    In July 2022, he was hospitalized for wound on left calf, UTI, pneumonia, acute on chronic respiratory failure and sepsis.  He developed atrial fibrillation with RVR and was started on warfarin.  He was discharged and readmitted with aspiration pneumonia.  A. fib was well controlled during that hospitalization.    In October 2022, he was hospitalized at [facility] with urosepsis from chronic urinary retention on indwelling Irvin catheter.  Symptoms improved with IV fluids and IV antibiotics.  He was noted to have an elevated troponin x1 (0.040) and EKG changes (right bundle branch block and deep T wave inversions).  Echocardiogram showed decreased EF of 36-40%, moderately reduced right ventricular systolic function, left atrium mild to moderately dilated, right ventricle/right atrium moderately dilated, aortic valve calcification, moderate to severe TR, and moderate pulmonary hypertension.  Thyroid panel was abnormal and he was recommended follow-up with his PCP.    He presents today for follow-up visit with his wife accompanying him.  He saw his PCP last week for blood work and repeat urine analysis.  He is seeing urology tomorrow.  Home health nurses are coming to wrap his legs he is going to the wound center once per week.  He is receiving services through physical therapy.    He denies chest pain, PND, orthopnea, palpitations, dizziness, syncope, or bleeding.  He reports thigh pain, chronic bilateral lower extremity edema, and fatigue.  His wife is concerned that he falls asleep very easily just sitting there.  He has known obstructive sleep apnea, does not wear CPAP, but wears 2 L of oxygen at nighttime.  He is in a wheelchair today, but walking a little bit.  He has smoked 1 cigarette in the past 2 weeks.      Past Medical History:   Diagnosis Date   • Allergic rhinitis    • Anxiety    • Aortectasia (HCC)     3cm infrarenal abdominal aorta   • Arthritis    • Atrial flutter  (HCC) 2010    s/p ablation    • Charcot's joint of foot    • Chronic edema     both legs and sees wound care center at [place]    • Chronic venous insufficiency    • COPD (chronic obstructive pulmonary disease) (HCC)    • Coronary atherosclerosis     Cath 2010: diffuse 40-50% disease   • Diverticulosis    • Duodenitis    • Fatty liver    • Gastritis    • Gastroparesis    • Hematoma     post-operative; After catheterization, right groin, required surgical exploration   • Hyperlipidemia    • Hypertension    • Insomnia    • Internal hemorrhoids    • Metabolic encephalopathy 4/3/2021   • Open wound     izzy legs has drsg chg weekly at wound care center at [place]  pt does second dressing on left leg another time during week   • Osteomyelitis (HCC)    • Paroxysmal atrial fibrillation (HCC)    • Peripheral neuropathy    • Popliteal artery aneurysm (HCC)     left, s/p stenting by Dr. Boston   • Skin cancer    • Sleep apnea     o2   • 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HCC)     due to flutter and afib; cath 2010 with nonobstructive disease   • Venous stasis    • Venous stasis ulcer (HCC)     bilateral legs        Past Surgical History:   Procedure Laterality Date   • BASAL CELL CARCINOMA EXCISION      ear and left side of face   • BRONCHOSCOPY N/A 4/12/2021    Procedure: BRONCHOSCOPY WITH BAL;  Surgeon: Bunny Lepe MD;  Location: [facility] ENDOSCOPY;  Service: Pulmonary;  Laterality: N/A;  PRE-HEMOPTYSIS  POST-SAME   • CARDIAC CATHETERIZATION     • CATARACT EXTRACTION     • COLONOSCOPY  09/28/2015    NBIH, diverticulosis, polyps   • COLONOSCOPY N/A 9/11/2018    Procedure: COLONOSCOPY TO CECUM  AND TERM. ILEUM WITH COLD SNARE POLYPECTOMIES;  Surgeon: Kane Lagunas MD;  Location: [facility] ENDOSCOPY;  Service: Gastroenterology   • COLONOSCOPY N/A 10/29/2019    Procedure: COLONOSCOPY TO TO CECUM AND TERMINAL ILEUM WITH HOT AND COLD SNARE POLYPECTOMIES;  Surgeon: Kane Lagunas MD;  Location: [facility] ENDOSCOPY;  Service:  Gastroenterology   • HIP ARTHROPLASTY Right 2017   • JOINT REPLACEMENT Left    • OTHER SURGICAL HISTORY      Catheter ablation atrial flutter   • REPAIR ANEURYSM / PSEUDO ANEURYSM / RUPTURED ANEURYSM POPLITEAL ARTERY      Stent-Graft of the the left popliteal artery   • REPAIR KNEE LIGAMENT      Primary repair of knee ligament cruciate anterior right   • TONSILLECTOMY  1958   • TOTAL KNEE ARTHROPLASTY Bilateral    • UPPER GASTROINTESTINAL ENDOSCOPY  09/16/2014    acute gastritis, acute duodenitis       Social History     Socioeconomic History   • Marital status:      Spouse name: Mariposa   Tobacco Use   • Smoking status: Every Day     Packs/day: 0.50     Types: Cigarettes     Start date: 1964   • Smokeless tobacco: Current   • Tobacco comments:     caffeine use - 1.5 cups coffee daily    Vaping Use   • Vaping Use: Never used   Substance and Sexual Activity   • Alcohol use: Yes     Alcohol/week: 14.0 standard drinks     Types: 14 Shots of liquor per week   • Drug use: No   • Sexual activity: Defer       Family History   Problem Relation Age of Onset   • Emphysema Father    • Malig Hyperthermia Neg Hx        The following portion of the patient's history were reviewed and updated as appropriate: past medical history, past surgical history, past social history, past family history, allergies, current medications, and problem list.    Review of Systems   Constitutional: Positive for malaise/fatigue.   Cardiovascular: Positive for leg swelling.   Respiratory: Negative.    Hematologic/Lymphatic: Negative.    Musculoskeletal: Positive for myalgias.   Neurological: Positive for excessive daytime sleepiness.       Allergies   Allergen Reactions   • Cephalexin Hives     Tolerated ceftriaxone Jan 2022   • Codeine Nausea Only   • Silver Other (See Comments)         Current Outpatient Medications:   •  acidophilus (FLORANEX) tablet tablet, Take 1 tablet by mouth 2 (Two) Times a Day., Disp: , Rfl:   •  albuterol sulfate HFA  108 (90 Base) MCG/ACT inhaler, Inhale 2 puffs Every 4 (Four) Hours As Needed for Wheezing., Disp: 2 g, Rfl: 6  •  ALPRAZolam (XANAX) 0.5 MG tablet, Take 1 tablet by mouth At Night As Needed for Anxiety for up to 30 days., Disp: 30 tablet, Rfl: 0  •  ascorbic acid ([employer] Vitamin C) 500 MG tablet, Take 1 tablet by mouth Daily., Disp: 90 tablet, Rfl: 3  •  Atrovent HFA 17 MCG/ACT inhaler, INHALE 2 PUFFS BY MOUTH 4 TIMES A DAY, Disp: 12.9 each, Rfl: 3  •  ciclopirox (LOPROX) 0.77 % cream, APPLY TO AFFECTED AREA AS DIRECTED, Disp: , Rfl:   •  clotrimazole (LOTRIMIN) 1 % cream, APPLY TO AFFECTED AREA TWICE A DAY AND AS NEEDED, Disp: , Rfl:   •  EPINEPHrine (EPIPEN) 0.3 MG/0.3ML solution auto-injector injection, , Disp: , Rfl:   •  ferrous sulfate 325 (65 FE) MG tablet, TAKE 1 TABLET BY MOUTH DAILY WITH BREAKFAST FOR 30 DAYS., Disp: , Rfl:   •  finasteride (PROSCAR) 5 MG tablet, Take 1 tablet by mouth daily., Disp: , Rfl:   •  fluconazole (DIFLUCAN) 200 MG tablet, Take 1 tablet by mouth Daily., Disp: , Rfl:   •  fluticasone (FLONASE) 50 MCG/ACT nasal spray, 2 sprays by Each Nare route Daily., Disp: , Rfl:   •  Fluticasone Furoate-Vilanterol (BREO ELLIPTA) 200-25 MCG/INH inhaler, Inhale 1 puff Daily., Disp: , Rfl:   •  furosemide (LASIX) 40 MG tablet, Take 0.5 tablets by mouth Daily., Disp: , Rfl:   •  gabapentin (NEURONTIN) 600 MG tablet, Take 1 tablet by mouth 3 (Three) Times a Day., Disp: , Rfl:   •  gentamicin (GARAMYCIN) 0.1 % ointment, APPLY AS DIRECTED TO AFFECTED WOUND AREA ON BOTH LEGS AFTER EACH DRESSING CHANGE, Disp: , Rfl:   •  HYDROcodone-acetaminophen (NORCO)  MG per tablet, Take 1 tablet by mouth Every 6 (Six) Hours As Needed for Moderate Pain . (Patient taking differently: Take 1 tablet by mouth Every 4 (Four) Hours As Needed for Moderate Pain.), Disp: 12 tablet, Rfl: 0  •  metoclopramide (REGLAN) 10 MG tablet, Take 1 tablet by mouth 4 (Four) Times a Day Before Meals & at Bedtime., Disp: , Rfl:   •   "metoprolol succinate XL (TOPROL-XL) 25 MG 24 hr tablet, Take 1 tablet by mouth Daily., Disp: , Rfl:   •  potassium chloride (MICRO-K) 10 MEQ CR capsule, Take 1 capsule by mouth 2 (Two) Times a Day., Disp: 180 capsule, Rfl: 3  •  pravastatin (PRAVACHOL) 20 MG tablet, TAKE 1 TABLET BY MOUTH EVERY DAY, Disp: 90 tablet, Rfl: 1  •  saccharomyces boulardii (Florastor) 250 MG capsule, Take 1 capsule by mouth 2 (Two) Times a Day., Disp: 180 capsule, Rfl: 3  •  silodosin (RAPAFLO) 8 MG capsule capsule, Take 1 capsule by mouth Every Night., Disp: , Rfl:   •  Thiamine HCl (Vitamin B1) 100 MG tablet tablet, Take 1 tablet by mouth 2 (Two) Times a Day., Disp: 180 tablet, Rfl: 1  •  warfarin (COUMADIN) 5 MG tablet, Take 5 mg by mouth Take As Directed., Disp: , Rfl:   •  nitroglycerin (NITROSTAT) 0.4 MG SL tablet, Place 1 tablet under the tongue Every 5 (Five) Minutes As Needed for Chest Pain (Only if SBP Greater Than 100). Take no more than 3 doses in 15 minutes., Disp: , Rfl: 12        Objective:     Vitals:    11/15/22 1315   BP: 100/58   BP Location: Right arm   Patient Position: Sitting   Cuff Size: Adult   Pulse: 66   SpO2: 97%   Weight: 132 kg (290 lb)   Height: 193 cm (75.98\")     Body mass index is 35.31 kg/m².    PHYSICAL EXAM:    Vitals Reviewed.   General Appearance: No acute distress, well developed and well nourished. Obese.  Chronically ill.  Eyes: Conjunctiva and lids: No erythema, swelling, or discharge. Sclera non-icteric.    HENT: Atraumatic, normocephalic. External eyes, ears, and nose normal. No hearing loss noted. Mucous membranes normal. Lips not cyanotic. Neck supple with no tenderness. Wearing mask.   Respiratory: No signs of respiratory distress. Respiration rhythm and depth normal.   Clear to auscultation. No rales, crackles, rhonchi, or wheezing auscultated.   Cardiovascular:  Jugular Venous Pressure: Normal  Heart Rate and Rhythm: Irregularly, irregular.  Heart Sounds: Normal S1 and S2. No S3 or S4 " noted.  Murmurs: No murmurs noted. No rubs, thrills, or gallops.   Lower Extremities: Bilateral lower extremity edema; legs wrapped.  Gastrointestinal:  Abdomen soft, non-distended, non-tender.    Musculoskeletal: Normal movement of extremities.  Skin and Nails: General appearance normal. No pallor, cyanosis, diaphoresis. Skin temperature normal. No clubbing of fingernails.   Psychiatric: Patient alert and oriented to person, place, and time. Speech and behavior appropriate. Normal mood and affect.   Irvin catheter present with clear urine noted.      ECG 12 Lead    Date/Time: 11/15/2022 1:17 PM  Performed by: Katie Hurt APRN  Authorized by: Katie Hurt APRN   Comparison: compared with previous ECG from 11/3/2022  Similar to previous ECG  Rhythm: atrial fibrillation  Rate: normal  BPM: 66  Conduction: non-specific intraventricular conduction delay  ST Segments: ST segments normal  T inversion: I, II, III, aVL, V4, V5, V6 and V3  QRS axis: normal    Clinical impression: abnormal EKG              Assessment:       Diagnosis Plan   1. Acute on chronic systolic heart failure (CMS/HCC)        2. Ischemic cardiomyopathy        3. Lymphedema of both lower extremities        4. Venous stasis dermatitis of both lower extremities        5. Abnormal EKG        6. Permanent atrial fibrillation (HCC)  ECG 12 Lead      7. Essential hypertension        8. Chronic respiratory failure with hypoxia ([facility])        9. Obstructive sleep apnea syndrome        10. Urinary retention        11. Tobacco abuse               Plan:       1.  Atrial Fibrillation/Atrial Flutter: First diagnosed in 2010.  Permanent atrial fibrillation and rate controlled on carvedilol. Remains on warfarin with INRs followed at the [facility].  [id number] score of 4.       2/3. Cardiomyopathy: Initially had tachycardic mediated cardiomyopathy with atrial fibrillation in 2010. He then developed ischemic cardiomyopathy and his  EF has normalized on carvedilol and lisinopril.  Recent echocardiogram showed decreased EF again to 36-40% with deep T wave inversions.  Is a still ischemic?  Or could it be a Takotsubo Cardiomyopathy?    4/5.  Chronic lymphedema and venous stasis of lower extremities with multiple wound infections and cellulitis over the past year.  Continue in the wound center at [place].    6/7.  Coronary Artery Disease: Non-STEMI and wall motion abnormalities on echocardiogram in 2021.  Medical treatment recommended due to his comorbidities and poor functional status.  Positive troponin (flat) and EKG changes during recent hospitalization October 2022.  Denies angina. Continue pravastatin.  He is not on aspirin because of the warfarin.     8.  Hypertension: Blood pressure low today.    9.  Moderate to severe tricuspid regurgitation.    10/11.  Chronic respiratory failure and moderate pulmonary Hypertension: Denies shortness of breath.    12.  Cigarette Smoking: He would highly benefit from abstaining from cigarette smoking.     13.  Obstructive Sleep Apnea: He is nodding off during today's entire visit.  He does not use a CPAP, but wears 2 L of oxygen at nighttime.    14.  Urinary Retention: Seeing urology tomorrow.  Irvin catheter present.  Hospitalized for chronic UTIs.    15.  I will further discuss his plan of care with Dr. Kurt Henry.  His EF had normalized, but is now down again.  He had a flat troponin during recent hospitalization, and has a markedly abnormal EKG.  Continue metoprolol succinate and I will verify that he is definitely taking that medication.    ADDENDUM:  · Dr. Henry is not concerned about the EKGs.    · Continue metoprolol succinate 25 mg daily.  Do not take the metoprolol tartrate. Wife informed.   · Per Dr. Henry - keep appt on 11/30 with her. Perform limited echo to reassess EF and wall motion that day.     As always, it has been a pleasure to participate in your patient's care. Thank you.        Sincerely,         ESTEFANY Duckworth  [facility]      · Dictated utilizing Dragon Dictation  · COVID-19 Precautions - Patient was compliant in wearing a mask. When I saw the patient, I used appropriate personal protective equipment (PPE) including mask and eye shield (standard procedure).  Additionally, I used gown and gloves if indicated.  Hand hygiene was completed before and after seeing the patient.

## 2022-11-16 PROBLEM — I50.21 ACUTE SYSTOLIC HEART FAILURE (HCC): Status: RESOLVED | Noted: 2022-01-01 | Resolved: 2022-01-01

## 2022-11-16 PROBLEM — E87.6 HYPOKALEMIA: Status: RESOLVED | Noted: 2022-01-01 | Resolved: 2022-01-01

## 2022-11-16 PROBLEM — G93.41 METABOLIC ENCEPHALOPATHY: Status: RESOLVED | Noted: 2021-04-03 | Resolved: 2022-01-01

## 2022-11-16 PROBLEM — I48.0 PAROXYSMAL ATRIAL FIBRILLATION: Status: RESOLVED | Noted: 2022-01-01 | Resolved: 2022-01-01

## 2022-11-16 PROBLEM — I50.22 CHRONIC SYSTOLIC HEART FAILURE (HCC): Status: RESOLVED | Noted: 2022-01-01 | Resolved: 2022-01-01

## 2022-11-16 NOTE — SIGNIFICANT NOTE
11/16/22 0659   [id number] Score for Atrial Fibrillation Stroke Risk   [age] Years 65-74   Sex Male   CHF History Y   Hypertension History Y   Stroke/TIA/Thromboembolism History N   Vascular Disease History Y   Diabetes History N   [id number] Score 4

## 2022-11-16 NOTE — OUTREACH NOTE
Sepsis Week 2 Survey    Flowsheet Row Responses   [facility] patient discharged from? [facility]   Does the patient have one of the following disease processes/diagnoses(primary or secondary)? Sepsis   Week 2 attempt successful? Yes   Call start time 1903   Discharge diagnosis Fall and Fever,  sepsis,  Urosepsis with ESBL Klebsiella pneumonia    Is the patient taking all medications as directed (includes completed medication regime)? Yes   Does the patient have a primary care provider?  Yes   Has the patient kept scheduled appointments due by today? Yes   Is the patient/caregiver able to teach back TIME? T emperature - higher or lower than normal, I nfection - may have signs and symptoms of an infection, M ental Decline - confused, sleepy, difficult to arouse, E xtremely Ill - severe pain, discomfort, shortness of breath   Is patient/caregiver able to teach back steps to recovery at home? Rest and regain strength, Set small, achievable goals for return to baseline health, Eat a balanced diet   Is the patient/caregiver able to teach back signs and symptoms of worsening condition: Rapid heart rate (>90), Fever, Edema, Altered mental status(confusion/coma), Shortness of breath/rapid respiratory rate   If the patient is a current smoker, are they able to teach back resources for cessation? Not a smoker  [has quit smoking ]   Is the patient/caregiver able to teach back the hierarchy of who to call/visit for symptoms/problems? PCP, Specialist, Home health nurse, Urgent Care, ED, 911 Yes   Week 2 call completed? Yes          RAF COHN - Licensed Nurse

## 2022-11-17 NOTE — OUTREACH NOTE
AMBULATORY CASE MANAGEMENT NOTE    Name and Relationship of Patient/Support Person: Mariposa Lockwood - Emergency Contact    Adult Patient Profile  Questions/Answers    Flowsheet Row Most Recent Value   Barriers to Managing Health other (see comments)  [spouse manages care]   Genitourinary Symptoms/Conditions other (see comments)  [spouse reports recent infection and sepsis]   Genitourinary Management Strategies catheter, indwelling   Genitourinary Self-Management Outcome 3 (uncertain)   Barriers to Taking Medication as Prescribed none   Walking or Climbing Stairs Difficulty yes  [spouse states he is unable to ambulate more than 10 steps and PCP notified of WC request]   Dressing/Bathing Difficulty yes   Dressing/Bathing dressing difficulty, assistance 1 person, bathing difficulty, assistance 1 person   Doing Errands Independently Difficulty (such as shopping) yes   Change in Functional Status Since Onset of Current Illness/Injury yes        Patient Outreach    Outgoing call to the patient and spoke with spouse, Mariposa.  She states that the patient is home with A .  She has requested an order for a WC for the patient as he is only able to ambulate about 10 feet since recent illness.  She states that at this time the WC is the only need for him.  Message sent to PCP and an outreach for 11/21/2022 is scheduled for follow up with the spouse as she prefers.    Advance Care Planning     Spouse reports that patient has an advanced directive.  She has recently reviewed this document with her spouse.  She denies any questions or concerns about ACP at this time.             CHARLOTTE BARTH  Ambulatory Case Management    11/17/2022, 16:20 EST

## 2022-11-18 NOTE — TELEPHONE ENCOUNTER
Patient's wife states she is following up on how patient is supposed to be taking his medication and needs to be called today.     Please Advise       AR

## 2022-11-18 NOTE — TELEPHONE ENCOUNTER
I recommended that they stop the metoprolol tartrate and take the metoprolol succinate 25 mg 1 tablet daily.  New prescription sent to pharmacy.  They verbalized understanding.  I also explained that Dr. Henry reviewed the EKGs and will follow up with them on 11/30.

## 2022-11-21 NOTE — OUTREACH NOTE
AMBULATORY CASE MANAGEMENT NOTE    Name and Relationship of Patient/Support Person: FabiánMariposa - Emergency Contact    Patient Outreach    Outgoing call to spouse, Mariposa.  Notified of order for WC from PCP in [facility].  She states PT will see the patient via HH on Tuesday at noon and she will ask that they submit their notes with the order for WC DME.  She prefers an outreach in 2-3 weeks and this has been scheduled.     CHARLOTTE BARTH  Ambulatory Case Management    11/21/2022, 11:21 EST

## 2022-11-22 PROBLEM — Z74.09 IMMOBILITY: Status: ACTIVE | Noted: 2022-01-01

## 2022-11-22 NOTE — TELEPHONE ENCOUNTER
Please call patient.  Dr. Henry wants him to have a repeat limited echocardiogram the same day as her visit with him on 11/30.  Please arrange.  Thank you.

## 2022-11-23 NOTE — TELEPHONE ENCOUNTER
For she should call pain management and see what they recommend.  They would not approve of me giving pain medicine since they see him.   Patient understands condition, prognosis and need for follow up care.

## 2022-11-23 NOTE — TELEPHONE ENCOUNTER
Caller: TaborMariposa    Relationship: Emergency Contact    Best call back number: [phone]    Who are you requesting to speak with (clinical staff, provider,  specific staff member): CLINICAL STAFF     What was the call regarding: PATIENT HAS DEVELOPED BACK PAIN AND CAN HARDLY WALK FROM THE PAIN STATES THE PAIN MEDICATION IS NOT HELPING THAT HE GETS FROM THE PAIN MANAGEMENT NOT SURE WHAT NEXT STEPS SHOULD BE PLEASE CALL AND ADVISE      Do you require a callback: YES

## 2022-11-24 PROBLEM — I50.42 CHRONIC COMBINED SYSTOLIC AND DIASTOLIC CHF (CONGESTIVE HEART FAILURE): Status: ACTIVE | Noted: 2022-01-01

## 2022-11-24 PROBLEM — R50.9 FEVER AND CHILLS: Status: ACTIVE | Noted: 2022-01-01

## 2022-11-25 PROBLEM — A49.8 INFECTION DUE TO ESBL-PRODUCING KLEBSIELLA PNEUMONIAE: Status: ACTIVE | Noted: 2022-01-01

## 2022-11-25 PROBLEM — R78.81 BACTEREMIA: Status: ACTIVE | Noted: 2022-01-01

## 2022-11-25 PROBLEM — Z16.12 INFECTION DUE TO ESBL-PRODUCING KLEBSIELLA PNEUMONIAE: Status: ACTIVE | Noted: 2022-01-01

## 2022-11-28 PROBLEM — M86.9 OSTEOMYELITIS (HCC): Status: ACTIVE | Noted: 2022-01-01

## 2022-11-30 PROBLEM — G89.29 CHRONIC PAIN: Status: ACTIVE | Noted: 2022-01-01

## 2022-12-01 NOTE — CASE MANAGEMENT/SOCIAL WORK
Case Management Discharge Note      Final Note: [facility] via [facility]         Selected Continued Care - Discharged on 11/30/2022 Admission date: 11/24/2022 - Discharge disposition: Skilled Nursing Facility (DC - External)    Destination Coordination complete.    Service Provider Selected Services Address Phone Fax Patient Preferred    [facility] [address] [facility] [address] [phone] --          Durable Medical Equipment    No services have been selected for the patient.              Dialysis/Infusion    No services have been selected for the patient.              Home Medical Care     Service Provider Selected Services Address Phone Fax Patient Preferred    [facility] [address] [address] [phone] --          Therapy    No services have been selected for the patient.              Community Resources    No services have been selected for the patient.              Community & DME    No services have been selected for the patient.                Selected Continued Care - Episodes Includes continued care and service providers with selected services from the active episodes listed below    High Risk Care Management Episode start date: 8/30/2022   There are no active outsourced providers for this episode.             Selected Continued Care - Prior Encounters Includes continued care and service providers with selected services from prior encounters from 8/26/2022 to 11/30/2022    Discharged on 11/5/2022 Admission date: 10/31/2022 - Discharge disposition: Home-Health Care Svc    Dialysis/Infusion     Service Provider Selected Services Address Phone Fax Patient Preferred    [facility] Infusion and IV Therapy [address], [facility] [address] [phone] --          Home Medical Care     Service Provider Selected Services Address Phone Fax  Patient Preferred    [facility] [address] [address] [phone] --                Discharged on 8/29/2022 Admission date: 8/23/2022 - Discharge disposition: Skilled Nursing Facility (DC - External)    Destination     Service Provider Selected Services Address Phone Fax Patient Preferred    [facility] [address] [place], [place] [address] [phone] --                    Transportation Services  Ambulance: [facility]    Final Discharge Disposition Code: 03 - skilled nursing facility (SNF)

## 2022-12-01 NOTE — NURSING NOTE
Patient is being discharged at this time. Patient is leaving with escamilla and PICC line in place. Patient is being transported by [facility] to [facility]. Pt's wife, Mariposa has been notified. Patient left with all personal belongings including cell phone, , and personal walker. Patient is alert, oriented, and forgetful at times. Report was called to Areli at [facility]. AVS and discharge summary were faxed to facility and sent in EMS packet.

## 2022-12-01 NOTE — PROGRESS NOTES
"Enter Query Response Below      Query Response:     bacteremia due to klebsiella ESBL         If applicable, please update the problem list.        Patient: Jalen Lockwood        : 1950  Account: [id number]           Admit Date:         How to Respond to this query:       a. Click New Note     b. Answer query within the yellow box.                c. Update the Problem List, if applicable.      If you have any questions about this query contact me at:  [email or web address]    Dr. Rucker,     Patient admitted with lumbosacral osteomyelitis/discitis.  WBC 11, lactic 1.7, procalcitonin 0.12, sed rate 67, 6.74, HR 100s, temperature 102, blood pressures 162/84 decreased to 96/60, 80/52.  Blood cultures klebsiella pneumoniae ESBL.  Treatment with ertapenem (to be continued as outpatient), IVF.  Progress notes and discharge summary note bacteremia.  ID consult \"ESBL Klebsiella septicemia\".    Please clarify if the patient is treated/monitored for the following:  -bacteremia due to klebsiella ESBL  -ESBL klebsiella septicemia/sepsis  -other__________  -unable to clinically determine      By submitting this query, we are merely seeking further clarification of documentation to accurately reflect all conditions that you are monitoring, evaluating, treating or that extend the hospitalization or utilize additional resources of care. Please utilize your independent clinical judgment when addressing the question(s) above.     This query and your response, once completed, will be entered into the legal medical record.    Sincerely,  Chantale Garg RN BSN  Clinical Documentation Integrity Program   "

## 2022-12-01 NOTE — PROGRESS NOTES
"Enter Query Response Below      Query Response:   pressure injuries to right great toe and right ear, present on admission           If applicable, please update the problem list.        Patient: Jalen Lockwood        : 1950  Account: [id number]           Admit Date:         How to Respond to this query:       a. Click New Note     b. Answer query within the yellow box.                c. Update the Problem List, if applicable.      If you have any questions about this query contact me at: [email or web address]    Dr. Rucker,     Patient admitted with osteomyelitis/discitis, bacteremia.  Per wound care note  \"Wound 22 1000 Right anterior great toe Pressure Injury Placement Date/Time: 22 1000   Side: Right  Orientation: anterior  Location: great toe  Primary Wound Type: Pressure Injury\" and \"Wound 22 1000 Right ear Pressure Injury Placement Date/Time: 22 1000   Side: Right  Location: ear  Primary Wound Type: Pressure Injury\".  Treatment: open to air.     Please clarify if the patient is treated/monitored for the following:  -pressure injuries to right great toe and right ear, present on admission  -other___________  -unable to clinically determine     By submitting this query, we are merely seeking further clarification of documentation to accurately reflect all conditions that you are monitoring, evaluating, treating or that extend the hospitalization or utilize additional resources of care. Please utilize your independent clinical judgment when addressing the question(s) above.     This query and your response, once completed, will be entered into the legal medical record.    Sincerely,  Chantale Garg RN BSN  Clinical Documentation Integrity Program   "

## 2022-12-22 NOTE — OUTREACH NOTE
AMBULATORY CASE MANAGEMENT NOTE    Name and Relationship of Patient/Support Person: Vernon Bettye -     SNF Follow-up    Questions/Answers    Flowsheet Row Responses   Acute Facility Discharged From [facility]   Acute Discharge Date 11/30/22   [facility] of the Skilled [facility] Facility? [facility]   Purpose of SNF Admission SN, OT, PT, WC   Who is the insurance provider or payor of patient stay? Medicare   Skilled Nursing Discharge Date? --  [to be determined]        Patient Outreach    Outgoing call to [facility] and verified that the patient remains in SNF.  Outreach in one week for follow up.    CHARLOTTE BARTH  Ambulatory Case Management    12/22/2022, 16:12 EST

## 2022-12-27 NOTE — TELEPHONE ENCOUNTER
FYI: Phone with Nurse Kurt at [facility]. States patient will be cancelling his appt with our office for 1/5/23 and is moving to Palliative Care. They will remove PICC line after his last dose of IV ABX on 1/4/23. CHAPARRO, RN

## 2022-12-27 NOTE — PROGRESS NOTES
This encounter is for documentation purposes only. Spoke with the nurses station at [facility]. Patient is currently in the rehab facility and is not on warfarin anymore. He will be transitioned to palliative care. The medication management clinic will no longer be following this patient. It has been a pleasure being a part of his care.

## 2022-12-29 NOTE — OUTREACH NOTE
AMBULATORY CASE MANAGEMENT NOTE    Name and Relationship of Patient/Support Person: Mariposa Lockwood - Emergency Contact    SNF Follow-up    Questions/Answers    Flowsheet Row Responses   Acute Facility Discharged From [facility]   Acute Discharge Date 11/30/22   Name of the Skilled Nursing Facility? [facility]   Purpose of SNF Admission SN, OT, PT, WC   Who is the insurance provider or payor of patient stay? Medicare   Progression of Patient? ongoing  [plan for palliative care to begin after completion of IV ABX is possible]        Patient Outreach    Outgoing call to the patient's spouse, Mariposa.  She states that the patient remains in SNF at [facility].  She states that they are discussing palliative care to possibly start once completion of IV ABX in January.  She prefers an outreach in 3-4 weeks and this has been scheduled.     CHARLOTTE BARTH  Ambulatory Case Management    12/29/2022, 16:16 EST

## 2023-01-01 ENCOUNTER — TELEPHONE (OUTPATIENT)
Dept: FAMILY MEDICINE CLINIC | Facility: CLINIC | Age: 73
End: 2023-01-01

## 2023-01-01 RX ORDER — LISINOPRIL 5 MG/1
TABLET ORAL
Qty: 90 TABLET | Refills: 0 | OUTPATIENT
Start: 2023-01-01

## 2023-01-04 NOTE — TELEPHONE ENCOUNTER
ALF WITH [facility] CALLED AND STATES THE PHYSICIAN  ORDER REQUEST WAS CUT OFF DURING FAXING. THE SIGNATURE WAS CUT OFF. PLEASE RESEND PHYSICAN ORDER [phone]    ORDER # [id number]    CALL BACK NUMBER [phone]

## 2023-01-12 NOTE — TELEPHONE ENCOUNTER
Caller: Mariposa Lockwood    Relationship: Emergency Contact    Best call back number: [phone]     What is the best time to reach you: ANY    Who are you requesting to speak with (clinical staff, provider,  specific staff member): DELMAR OR MA    What was the call regarding: PATIENT WAS TO HAVE [employer] PAPERWORK FAXED TO THE OFFICE ON 01/11/23. FMLA RUNS OUT ON 01/16/23 AND HE NEEDS THE FORMS SUBMITTED ASAP.    Do you require a callback: YES

## 2023-01-18 NOTE — TELEPHONE ENCOUNTER
Caller: Mariposa Lockwood    Relationship: Emergency Contact    Best call back number: [phone]    What is the best time to reach you: AFTER LUNCH    Who are you requesting to speak with (clinical staff, provider,  specific staff member): CLINICAL    Do you know the name of the person who called: AMADOR?    What was the call regarding: PATIENT IS CALLING REGARDING PAPERWORK    Do you require a callback: YES

## 2023-01-26 ENCOUNTER — PATIENT OUTREACH (OUTPATIENT)
Dept: CASE MANAGEMENT | Facility: OTHER | Age: 73
End: 2023-01-26
Payer: MEDICARE

## 2023-01-26 NOTE — OUTREACH NOTE
AMBULATORY CASE MANAGEMENT NOTE    Name and Relationship of Patient/Support Person: Mariposa Lockwood - Emergency Contact    Patient Outreach    Outgoing call to the patient's spouse, Mariposa.  She states the patient passed away on Monday 1/23.  Program closed.    CHARLOTTE BARTH  Ambulatory Case Management    1/26/2023, 15:17 EST

## 2023-02-13 ENCOUNTER — TRANSCRIBE ORDERS (OUTPATIENT)
Dept: SLEEP MEDICINE | Facility: HOSPITAL | Age: 73
End: 2023-02-13
Payer: MEDICARE

## 2023-02-13 DIAGNOSIS — G47.33 OBSTRUCTIVE SLEEP APNEA SYNDROME: Primary | ICD-10-CM

## 2024-08-14 NOTE — PROGRESS NOTES
Anticoagulation Clinic Progress Note    Anticoagulation Summary  As of 2022    INR goal:  2.0-3.0   TTR:  67.6 % (3.8 y)   INR used for dosin.00 (2022)   Warfarin maintenance plan:  7.5 mg every day; Starting 2022   Weekly warfarin total:  52.5 mg   Plan last modified:  Elvi Haskins, PharmD (10/5/2022)   Next INR check:  2022   Priority:  Maintenance   Target end date:  Indefinite    Indications    Permanent atrial fibrillation (HCC) [I48.21]             Anticoagulation Episode Summary     INR check location:      Preferred lab:      Send INR reminders to:   [facility] CLINICAL POOL    Comments:        Anticoagulation Care Providers     Provider Role Specialty Phone number    Kurt Henry MD Referring Cardiology [phone]          Clinic Interview:  Patient Findings     Positives:  Change in health, Change in medications    Negatives:  Signs/symptoms of thrombosis, Signs/symptoms of bleeding,   Laboratory test error suspected, Change in alcohol use, Change in   activity, Upcoming invasive procedure, Emergency department visit,   Upcoming dental procedure, Missed doses, Extra doses, Change in   diet/appetite, Hospital admission, Bruising, Other complaints    Comments:  Patient's wife  reports having abscess on scrotum and starting   levaquin and fluconazole on  for at least one 10 days course,   possibly another if infections does not resolve.       Clinical Outcomes     Negatives:  Major bleeding event, Thromboembolic event,   Anticoagulation-related hospital admission, Anticoagulation-related ED   visit, Anticoagulation-related fatality    Comments:  Patient's wife  reports having abscess on scrotum and starting   levaquin and fluconazole on  for at least one 10 days course,   possibly another if infections does not resolve.         INR History:  Anticoagulation Monitoring 2022 11/15/2022 2022   INR 4.70 2.20 8.00   INR Date 2022 11/15/2022  It was a pleasure seeing you today, please reach out to our office directly if you have any concerns or questions about your heart.    Dr. Toth would like you to return to the clinic in: 12 months     What to bring to your next appointment:   Please make sure to bring your medication bottles to your next scheduled visit, this ensures that your medication list is up to date and accurate.  If you have a home blood pressure cuff that has never been checked for accuracy, please bring to appointment for staff to ensure cuff is accurate.       Please call us if you would like to be seen sooner for any reason. Please call our office with questions or concerns        Dr. Toth would like you to:  No medication changes or tests ordered today    Recommend daily cardiovascular exercise 30 minutes     Stop smoking         11/23/2022   INR Goal 2.0-3.0 2.0-3.0 2.0-3.0   Trend Same Same Same   Last Week Total 52.5 mg 37.5 mg 52.5 mg   Next Week Total 37.5 mg 52.5 mg 22.5 mg   Sun 7.5 mg 7.5 mg 7.5 mg   Mon 7.5 mg 7.5 mg -   Tue 7.5 mg 7.5 mg -   Wed - 7.5 mg Hold (11/23)   Thu - 7.5 mg Hold (11/24)   Fri Hold (11/11) 7.5 mg Hold (11/25)   Sat Hold (11/12) 7.5 mg Hold (11/26)   Visit Report - - -   Some recent data might be hidden       Plan:  1. INR is Supratherapeutic today- see above in Anticoagulation Summary.   Will instruct Jalen Lockwood to HOLD  their warfarin regimen- see above in Anticoagulation Summary.  Hold warfarin through Saturday, 11/26.  Take 7.5 mg Sunday and recheck INR Monday, 11/28.    2. Follow up in 5 days ([facility]).   3. Spoke with patient's wife and provided instruction to Estefania with [facility].They have been instructed to call if any changes in medications, doses, concerns, etc. Patient expresses understanding and has no further questions at this time.    Elvi Haskins, PharmD

## 2024-09-06 NOTE — ED NOTES
"Pt to triage from home via [facility] [id number] with c/o fall @ 1 hour prior to arrival. Pt states just \"lost his balance,\" started feeling weak just before he fell. Denies syncope, denies hitting head.  No blood thinners.  Pt has no c/o pain at this time. Pt wearing mask in triage. Triage personnel wore appropriate PPE    Skin tear to right forearm  " How Did Your Itching Occur?: sudden in onset (over a period of weeks to a few months) How Severe Is Your Itching?: moderate

## (undated) DEVICE — PAD GRND REM POLYHESIVE A/ DISP

## (undated) DEVICE — TRAP,MUCUS SPECIMEN, 80CC: Brand: MEDLINE

## (undated) DEVICE — SENSR O2 OXIMAX FNGR A/ 18IN NONSTR

## (undated) DEVICE — TUBING, SUCTION, 1/4" X 10', STRAIGHT: Brand: MEDLINE

## (undated) DEVICE — LN SMPL O2 NASL/ORL SMART/CAPNOLINE PLS A/

## (undated) DEVICE — CANN NASL CO2 TRULINK W/O2 A/

## (undated) DEVICE — THE SINGLE USE ETRAP – POLYP TRAP IS USED FOR SUCTION RETRIEVAL OF ENDOSCOPICALLY REMOVED POLYPS.: Brand: ETRAP

## (undated) DEVICE — ADAPT SWVL FIBROPTIC BRONCH

## (undated) DEVICE — SINGLE USE BIOPSY VALVE MAJ-210: Brand: SINGLE USE BIOPSY VALVE (STERILE)

## (undated) DEVICE — THE TORRENT IRRIGATION SCOPE CONNECTOR IS USED WITH THE TORRENT IRRIGATION TUBING TO PROVIDE IRRIGATION FLUIDS SUCH AS STERILE WATER DURING GASTROINTESTINAL ENDOSCOPIC PROCEDURES WHEN USED IN CONJUNCTION WITH AN IRRIGATION PUMP (OR ELECTROSURGICAL UNIT).: Brand: TORRENT

## (undated) DEVICE — ADAPT CLN BIOGUARD AIR/H2O DISP

## (undated) DEVICE — SNAR POLYP SENSATION STDOVL 27 240 BX40

## (undated) DEVICE — SINGLE USE SUCTION VALVE MAJ-209: Brand: SINGLE USE SUCTION VALVE (STERILE)

## (undated) DEVICE — KT VLV BIOGUARD SXN BIOP AIR/H20 CONN 4PC DISP

## (undated) DEVICE — VITAL SIGNS™ JACKSON-REES CIRCUITS: Brand: VITAL SIGNS™

## (undated) DEVICE — MSK AIRWY LARYNG LMA PILOT SZ4

## (undated) DEVICE — Device: Brand: DEFENDO AIR/WATER/SUCTION AND BIOPSY VALVE